# Patient Record
Sex: MALE | Race: WHITE | Employment: OTHER | URBAN - METROPOLITAN AREA
[De-identification: names, ages, dates, MRNs, and addresses within clinical notes are randomized per-mention and may not be internally consistent; named-entity substitution may affect disease eponyms.]

---

## 2017-01-02 PROBLEM — J40 BRONCHITIS: Status: ACTIVE | Noted: 2017-01-02

## 2017-08-08 ENCOUNTER — OFFICE VISIT (OUTPATIENT)
Dept: PULMONOLOGY | Age: 72
End: 2017-08-08

## 2017-08-08 VITALS
HEART RATE: 80 BPM | DIASTOLIC BLOOD PRESSURE: 74 MMHG | HEIGHT: 70 IN | OXYGEN SATURATION: 95 % | BODY MASS INDEX: 36.79 KG/M2 | SYSTOLIC BLOOD PRESSURE: 138 MMHG | TEMPERATURE: 98.6 F | RESPIRATION RATE: 12 BRPM | WEIGHT: 257 LBS

## 2017-08-08 DIAGNOSIS — R06.02 SHORTNESS OF BREATH: Primary | ICD-10-CM

## 2017-08-08 PROCEDURE — 1123F ACP DISCUSS/DSCN MKR DOCD: CPT | Performed by: NURSE PRACTITIONER

## 2017-08-08 PROCEDURE — 4040F PNEUMOC VAC/ADMIN/RCVD: CPT | Performed by: NURSE PRACTITIONER

## 2017-08-08 PROCEDURE — 1036F TOBACCO NON-USER: CPT | Performed by: NURSE PRACTITIONER

## 2017-08-08 PROCEDURE — G8417 CALC BMI ABV UP PARAM F/U: HCPCS | Performed by: NURSE PRACTITIONER

## 2017-08-08 PROCEDURE — G8598 ASA/ANTIPLAT THER USED: HCPCS | Performed by: NURSE PRACTITIONER

## 2017-08-08 PROCEDURE — 99213 OFFICE O/P EST LOW 20 MIN: CPT | Performed by: NURSE PRACTITIONER

## 2017-08-08 PROCEDURE — G8427 DOCREV CUR MEDS BY ELIG CLIN: HCPCS | Performed by: NURSE PRACTITIONER

## 2017-08-08 PROCEDURE — 3017F COLORECTAL CA SCREEN DOC REV: CPT | Performed by: NURSE PRACTITIONER

## 2017-08-08 RX ORDER — DOXYCYCLINE HYCLATE 100 MG/1
100 CAPSULE ORAL 2 TIMES DAILY
Qty: 20 CAPSULE | Refills: 0 | Status: SHIPPED | OUTPATIENT
Start: 2017-08-08 | End: 2017-08-18

## 2017-08-08 RX ORDER — PREDNISONE 20 MG/1
40 TABLET ORAL DAILY
Qty: 10 TABLET | Refills: 0 | Status: SHIPPED | OUTPATIENT
Start: 2017-08-08 | End: 2017-08-13

## 2017-08-09 RX ORDER — INHALER, ASSIST DEVICES
SPACER (EA) MISCELLANEOUS
Qty: 1 DEVICE | Refills: 0 | COMMUNITY
Start: 2017-08-09 | End: 2018-12-18 | Stop reason: ALTCHOICE

## 2017-08-16 ENCOUNTER — TELEPHONE (OUTPATIENT)
Dept: PULMONOLOGY | Age: 72
End: 2017-08-16

## 2017-08-16 DIAGNOSIS — R06.02 SOB (SHORTNESS OF BREATH): Primary | ICD-10-CM

## 2017-08-21 ENCOUNTER — TELEPHONE (OUTPATIENT)
Dept: PULMONOLOGY | Age: 72
End: 2017-08-21

## 2017-08-21 DIAGNOSIS — R60.9 SWELLING: Primary | ICD-10-CM

## 2017-08-21 RX ORDER — FUROSEMIDE 20 MG/1
TABLET ORAL
Qty: 10 TABLET | Refills: 0 | Status: SHIPPED | OUTPATIENT
Start: 2017-08-21 | End: 2017-09-08 | Stop reason: SDUPTHER

## 2017-08-23 ENCOUNTER — OFFICE VISIT (OUTPATIENT)
Dept: PULMONOLOGY | Age: 72
End: 2017-08-23

## 2017-08-23 VITALS
WEIGHT: 257 LBS | DIASTOLIC BLOOD PRESSURE: 60 MMHG | HEART RATE: 75 BPM | HEIGHT: 70 IN | TEMPERATURE: 97.7 F | SYSTOLIC BLOOD PRESSURE: 148 MMHG | RESPIRATION RATE: 20 BRPM | OXYGEN SATURATION: 94 % | BODY MASS INDEX: 36.79 KG/M2

## 2017-08-23 DIAGNOSIS — J44.1 COPD EXACERBATION (HCC): Primary | ICD-10-CM

## 2017-08-23 DIAGNOSIS — I50.22 CHRONIC SYSTOLIC HEART FAILURE (HCC): ICD-10-CM

## 2017-08-23 DIAGNOSIS — J41.0 SIMPLE CHRONIC BRONCHITIS (HCC): ICD-10-CM

## 2017-08-23 PROCEDURE — 4040F PNEUMOC VAC/ADMIN/RCVD: CPT | Performed by: NURSE PRACTITIONER

## 2017-08-23 PROCEDURE — G8598 ASA/ANTIPLAT THER USED: HCPCS | Performed by: NURSE PRACTITIONER

## 2017-08-23 PROCEDURE — 99213 OFFICE O/P EST LOW 20 MIN: CPT | Performed by: NURSE PRACTITIONER

## 2017-08-23 PROCEDURE — G8926 SPIRO NO PERF OR DOC: HCPCS | Performed by: NURSE PRACTITIONER

## 2017-08-23 PROCEDURE — 1036F TOBACCO NON-USER: CPT | Performed by: NURSE PRACTITIONER

## 2017-08-23 PROCEDURE — 3017F COLORECTAL CA SCREEN DOC REV: CPT | Performed by: NURSE PRACTITIONER

## 2017-08-23 PROCEDURE — 3023F SPIROM DOC REV: CPT | Performed by: NURSE PRACTITIONER

## 2017-08-23 PROCEDURE — 1123F ACP DISCUSS/DSCN MKR DOCD: CPT | Performed by: NURSE PRACTITIONER

## 2017-08-23 PROCEDURE — G8427 DOCREV CUR MEDS BY ELIG CLIN: HCPCS | Performed by: NURSE PRACTITIONER

## 2017-08-23 PROCEDURE — G8417 CALC BMI ABV UP PARAM F/U: HCPCS | Performed by: NURSE PRACTITIONER

## 2017-08-23 RX ORDER — PREDNISONE 10 MG/1
TABLET ORAL
Qty: 30 TABLET | Refills: 0 | Status: SHIPPED | OUTPATIENT
Start: 2017-08-23 | End: 2017-09-02

## 2017-08-23 RX ORDER — DOXYCYCLINE HYCLATE 100 MG/1
100 CAPSULE ORAL 2 TIMES DAILY
Qty: 20 CAPSULE | Refills: 0 | Status: SHIPPED | OUTPATIENT
Start: 2017-08-23 | End: 2017-09-02

## 2017-08-25 ENCOUNTER — TELEPHONE (OUTPATIENT)
Dept: PULMONOLOGY | Age: 72
End: 2017-08-25

## 2017-08-28 LAB
ANION GAP SERPL CALCULATED.3IONS-SCNC: 17 MMOL/L (ref 3–16)
BUN BLDV-MCNC: 24 MG/DL (ref 7–20)
CALCIUM SERPL-MCNC: 9.2 MG/DL (ref 8.3–10.6)
CHLORIDE BLD-SCNC: 93 MMOL/L (ref 99–110)
CO2: 26 MMOL/L (ref 21–32)
CREAT SERPL-MCNC: 1.1 MG/DL (ref 0.8–1.3)
GFR AFRICAN AMERICAN: >60
GFR NON-AFRICAN AMERICAN: >60
GLUCOSE BLD-MCNC: 321 MG/DL (ref 70–99)
POTASSIUM SERPL-SCNC: 4.7 MMOL/L (ref 3.5–5.1)
SODIUM BLD-SCNC: 136 MMOL/L (ref 136–145)

## 2017-08-30 ENCOUNTER — TELEPHONE (OUTPATIENT)
Dept: PULMONOLOGY | Age: 72
End: 2017-08-30

## 2017-09-05 ENCOUNTER — TELEPHONE (OUTPATIENT)
Dept: PULMONOLOGY | Age: 72
End: 2017-09-05

## 2017-09-05 ENCOUNTER — HOSPITAL ENCOUNTER (OUTPATIENT)
Dept: OTHER | Age: 72
Discharge: HOME | End: 2017-09-05
Attending: INTERNAL MEDICINE | Admitting: INTERNAL MEDICINE

## 2017-09-05 DIAGNOSIS — R06.02 SOB (SHORTNESS OF BREATH): Primary | ICD-10-CM

## 2017-09-05 DIAGNOSIS — R06.02 SOB (SHORTNESS OF BREATH): ICD-10-CM

## 2017-09-05 RX ORDER — PREDNISONE 10 MG/1
TABLET ORAL
Qty: 30 TABLET | Refills: 0 | Status: SHIPPED | OUTPATIENT
Start: 2017-09-05 | End: 2017-09-15

## 2017-09-05 RX ORDER — LEVOFLOXACIN 500 MG/1
500 TABLET, FILM COATED ORAL DAILY
Qty: 10 TABLET | Refills: 0 | Status: SHIPPED | OUTPATIENT
Start: 2017-09-05 | End: 2017-09-15

## 2017-09-08 ENCOUNTER — OFFICE VISIT (OUTPATIENT)
Dept: PULMONOLOGY | Age: 72
End: 2017-09-08

## 2017-09-08 VITALS
OXYGEN SATURATION: 93 % | RESPIRATION RATE: 18 BRPM | SYSTOLIC BLOOD PRESSURE: 120 MMHG | HEIGHT: 70 IN | TEMPERATURE: 97.6 F | DIASTOLIC BLOOD PRESSURE: 60 MMHG | WEIGHT: 255.2 LBS | HEART RATE: 86 BPM | BODY MASS INDEX: 36.54 KG/M2

## 2017-09-08 DIAGNOSIS — J44.1 COPD EXACERBATION (HCC): ICD-10-CM

## 2017-09-08 DIAGNOSIS — J41.1 MUCOPURULENT CHRONIC BRONCHITIS (HCC): Primary | ICD-10-CM

## 2017-09-08 PROCEDURE — 4040F PNEUMOC VAC/ADMIN/RCVD: CPT | Performed by: INTERNAL MEDICINE

## 2017-09-08 PROCEDURE — G8598 ASA/ANTIPLAT THER USED: HCPCS | Performed by: INTERNAL MEDICINE

## 2017-09-08 PROCEDURE — 99214 OFFICE O/P EST MOD 30 MIN: CPT | Performed by: INTERNAL MEDICINE

## 2017-09-08 PROCEDURE — G8428 CUR MEDS NOT DOCUMENT: HCPCS | Performed by: INTERNAL MEDICINE

## 2017-09-08 PROCEDURE — G8926 SPIRO NO PERF OR DOC: HCPCS | Performed by: INTERNAL MEDICINE

## 2017-09-08 PROCEDURE — G8417 CALC BMI ABV UP PARAM F/U: HCPCS | Performed by: INTERNAL MEDICINE

## 2017-09-08 PROCEDURE — 1036F TOBACCO NON-USER: CPT | Performed by: INTERNAL MEDICINE

## 2017-09-08 PROCEDURE — 1123F ACP DISCUSS/DSCN MKR DOCD: CPT | Performed by: INTERNAL MEDICINE

## 2017-09-08 PROCEDURE — 3023F SPIROM DOC REV: CPT | Performed by: INTERNAL MEDICINE

## 2017-09-08 PROCEDURE — 3017F COLORECTAL CA SCREEN DOC REV: CPT | Performed by: INTERNAL MEDICINE

## 2017-09-15 ENCOUNTER — OFFICE VISIT (OUTPATIENT)
Dept: PULMONOLOGY | Age: 72
End: 2017-09-15

## 2017-09-15 ENCOUNTER — TELEPHONE (OUTPATIENT)
Dept: PULMONOLOGY | Age: 72
End: 2017-09-15

## 2017-09-15 VITALS
RESPIRATION RATE: 20 BRPM | SYSTOLIC BLOOD PRESSURE: 152 MMHG | OXYGEN SATURATION: 97 % | WEIGHT: 255 LBS | BODY MASS INDEX: 36.51 KG/M2 | HEART RATE: 79 BPM | DIASTOLIC BLOOD PRESSURE: 72 MMHG | HEIGHT: 70 IN

## 2017-09-15 DIAGNOSIS — J31.0 CHRONIC RHINITIS: ICD-10-CM

## 2017-09-15 DIAGNOSIS — J41.1 MUCOPURULENT CHRONIC BRONCHITIS (HCC): Primary | ICD-10-CM

## 2017-09-15 DIAGNOSIS — Z23 NEEDS FLU SHOT: ICD-10-CM

## 2017-09-15 PROCEDURE — G0008 ADMIN INFLUENZA VIRUS VAC: HCPCS | Performed by: INTERNAL MEDICINE

## 2017-09-15 PROCEDURE — 1036F TOBACCO NON-USER: CPT | Performed by: INTERNAL MEDICINE

## 2017-09-15 PROCEDURE — G8417 CALC BMI ABV UP PARAM F/U: HCPCS | Performed by: INTERNAL MEDICINE

## 2017-09-15 PROCEDURE — G8427 DOCREV CUR MEDS BY ELIG CLIN: HCPCS | Performed by: INTERNAL MEDICINE

## 2017-09-15 PROCEDURE — 1123F ACP DISCUSS/DSCN MKR DOCD: CPT | Performed by: INTERNAL MEDICINE

## 2017-09-15 PROCEDURE — 90662 IIV NO PRSV INCREASED AG IM: CPT | Performed by: INTERNAL MEDICINE

## 2017-09-15 PROCEDURE — G8926 SPIRO NO PERF OR DOC: HCPCS | Performed by: INTERNAL MEDICINE

## 2017-09-15 PROCEDURE — 3023F SPIROM DOC REV: CPT | Performed by: INTERNAL MEDICINE

## 2017-09-15 PROCEDURE — G8598 ASA/ANTIPLAT THER USED: HCPCS | Performed by: INTERNAL MEDICINE

## 2017-09-15 PROCEDURE — 4040F PNEUMOC VAC/ADMIN/RCVD: CPT | Performed by: INTERNAL MEDICINE

## 2017-09-15 PROCEDURE — 3017F COLORECTAL CA SCREEN DOC REV: CPT | Performed by: INTERNAL MEDICINE

## 2017-09-15 PROCEDURE — 99213 OFFICE O/P EST LOW 20 MIN: CPT | Performed by: INTERNAL MEDICINE

## 2017-09-16 PROBLEM — M54.9 BACK PAIN: Status: ACTIVE | Noted: 2017-09-16

## 2017-09-22 ENCOUNTER — OFFICE VISIT (OUTPATIENT)
Dept: PULMONOLOGY | Age: 72
End: 2017-09-22

## 2017-09-22 VITALS
OXYGEN SATURATION: 94 % | DIASTOLIC BLOOD PRESSURE: 62 MMHG | SYSTOLIC BLOOD PRESSURE: 118 MMHG | HEIGHT: 71 IN | TEMPERATURE: 98 F | HEART RATE: 77 BPM | RESPIRATION RATE: 20 BRPM

## 2017-09-22 DIAGNOSIS — J31.0 CHRONIC RHINITIS: ICD-10-CM

## 2017-09-22 DIAGNOSIS — J41.1 MUCOPURULENT CHRONIC BRONCHITIS (HCC): Primary | ICD-10-CM

## 2017-09-22 PROCEDURE — G8427 DOCREV CUR MEDS BY ELIG CLIN: HCPCS | Performed by: NURSE PRACTITIONER

## 2017-09-22 PROCEDURE — G8926 SPIRO NO PERF OR DOC: HCPCS | Performed by: NURSE PRACTITIONER

## 2017-09-22 PROCEDURE — G8598 ASA/ANTIPLAT THER USED: HCPCS | Performed by: NURSE PRACTITIONER

## 2017-09-22 PROCEDURE — G8417 CALC BMI ABV UP PARAM F/U: HCPCS | Performed by: NURSE PRACTITIONER

## 2017-09-22 PROCEDURE — 1111F DSCHRG MED/CURRENT MED MERGE: CPT | Performed by: NURSE PRACTITIONER

## 2017-09-22 PROCEDURE — 1036F TOBACCO NON-USER: CPT | Performed by: NURSE PRACTITIONER

## 2017-09-22 PROCEDURE — 1123F ACP DISCUSS/DSCN MKR DOCD: CPT | Performed by: NURSE PRACTITIONER

## 2017-09-22 PROCEDURE — 4040F PNEUMOC VAC/ADMIN/RCVD: CPT | Performed by: NURSE PRACTITIONER

## 2017-09-22 PROCEDURE — 3017F COLORECTAL CA SCREEN DOC REV: CPT | Performed by: NURSE PRACTITIONER

## 2017-09-22 PROCEDURE — 3023F SPIROM DOC REV: CPT | Performed by: NURSE PRACTITIONER

## 2017-09-22 PROCEDURE — 99213 OFFICE O/P EST LOW 20 MIN: CPT | Performed by: NURSE PRACTITIONER

## 2017-10-17 ENCOUNTER — HOSPITAL ENCOUNTER (OUTPATIENT)
Dept: OTHER | Age: 72
Discharge: HOME | End: 2017-10-17
Attending: INTERNAL MEDICINE | Admitting: INTERNAL MEDICINE

## 2017-10-17 ENCOUNTER — OFFICE VISIT (OUTPATIENT)
Dept: PULMONOLOGY | Age: 72
End: 2017-10-17

## 2017-10-17 VITALS
RESPIRATION RATE: 16 BRPM | HEIGHT: 71 IN | TEMPERATURE: 97.7 F | WEIGHT: 252 LBS | HEART RATE: 80 BPM | BODY MASS INDEX: 35.28 KG/M2 | OXYGEN SATURATION: 95 % | SYSTOLIC BLOOD PRESSURE: 150 MMHG | DIASTOLIC BLOOD PRESSURE: 70 MMHG

## 2017-10-17 DIAGNOSIS — J31.0 CHRONIC RHINITIS, UNSPECIFIED TYPE: ICD-10-CM

## 2017-10-17 DIAGNOSIS — J11.1 INFLUENZA: Primary | ICD-10-CM

## 2017-10-17 DIAGNOSIS — J41.1 MUCOPURULENT CHRONIC BRONCHITIS (HCC): ICD-10-CM

## 2017-10-17 DIAGNOSIS — J11.1 INFLUENZA: ICD-10-CM

## 2017-10-17 PROCEDURE — 1123F ACP DISCUSS/DSCN MKR DOCD: CPT | Performed by: INTERNAL MEDICINE

## 2017-10-17 PROCEDURE — 1111F DSCHRG MED/CURRENT MED MERGE: CPT | Performed by: INTERNAL MEDICINE

## 2017-10-17 PROCEDURE — G8484 FLU IMMUNIZE NO ADMIN: HCPCS | Performed by: INTERNAL MEDICINE

## 2017-10-17 PROCEDURE — G8417 CALC BMI ABV UP PARAM F/U: HCPCS | Performed by: INTERNAL MEDICINE

## 2017-10-17 PROCEDURE — G8427 DOCREV CUR MEDS BY ELIG CLIN: HCPCS | Performed by: INTERNAL MEDICINE

## 2017-10-17 PROCEDURE — 99214 OFFICE O/P EST MOD 30 MIN: CPT | Performed by: INTERNAL MEDICINE

## 2017-10-17 PROCEDURE — 1036F TOBACCO NON-USER: CPT | Performed by: INTERNAL MEDICINE

## 2017-10-17 PROCEDURE — G8598 ASA/ANTIPLAT THER USED: HCPCS | Performed by: INTERNAL MEDICINE

## 2017-10-17 PROCEDURE — 4040F PNEUMOC VAC/ADMIN/RCVD: CPT | Performed by: INTERNAL MEDICINE

## 2017-10-17 PROCEDURE — 3023F SPIROM DOC REV: CPT | Performed by: INTERNAL MEDICINE

## 2017-10-17 PROCEDURE — G8926 SPIRO NO PERF OR DOC: HCPCS | Performed by: INTERNAL MEDICINE

## 2017-10-17 PROCEDURE — 3017F COLORECTAL CA SCREEN DOC REV: CPT | Performed by: INTERNAL MEDICINE

## 2017-10-17 RX ORDER — OSELTAMIVIR PHOSPHATE 75 MG/1
75 CAPSULE ORAL 2 TIMES DAILY
Qty: 20 CAPSULE | Refills: 0 | Status: SHIPPED | OUTPATIENT
Start: 2017-10-17 | End: 2017-10-27

## 2017-10-17 NOTE — PROGRESS NOTES
REASON FOR CONSULTATION/CC: simple chronic bronchitis    PCP: Walter Bosworth, MD    HISTORY OF PRESENT ILLNESS: Meena Farnsworth is a 67y.o. year old male with a history of 'COPD' who presents :           He was doing well on Daliresp with samples and then went to fill prescription for Daliresp but it was 125 dollar so did not fill. complains of being hot , sweats, decreased energy, no cough, no nausea, vomiting, diarrhea. This started 2 days ago. Did not take any otc for this. Chronic rhinitis. Controlled with Flonase. Cough. Still resolved after taking Daliresp. complains of phlegm in chest.  Continues on Spiriva and Symbicort with albuterol prn. PAST MEDICAL HISTORY:  Past Medical History:   Diagnosis Date    CAD (coronary artery disease)     CABG in 2009    Chronic back pain     Chronic systolic CHF (congestive heart failure) (HCC)     COPD (chronic obstructive pulmonary disease) (HCC)     Diabetes mellitus ([address]     Essential hypertension     Paroxysmal atrial fibrillation ([address]     On Coumadin       PAST SURGICAL HISTORY:  Past Surgical History:   Procedure Laterality Date    BACK SURGERY  1995    BACK SURGERY      CARDIAC SURGERY  2007    CORONARY ARTERY BYPASS GRAFT      FRACTURE SURGERY Left 1988    Shoulder       FAMILY HISTORY:  family history includes Brain Cancer in his mother; Heart Attack in his brother and father. SOCIAL HISTORY:   reports that he quit smoking about 21 years ago. He has a 18.00 pack-year smoking history. He has never used smokeless tobacco.      ALLERGIES:  Patient is allergic to no known allergies. REVIEW OF SYSTEMS:  Constitutional: + for fever    HENT: Negative for sore throat  Eyes: Negative for redness   Respiratory: less  for dyspnea  Resolved.   cough  Cardiovascular: Negative for chest pain  Gastrointestinal: Negative for vomiting, diarrhea      Musculoskeletal: + for arthralgias   Skin: Negative for rash  Neurological: Negative for syncope  Hematological: Negative for adenopathy  Psychiatric/Behavorial: Negative for anxiety  , decreased energy    Objective:   PHYSICAL EXAM:  Blood pressure (!) 148/78, pulse 80, temperature 97.7 °F (36.5 °C), temperature source Oral, resp. rate 16, height 5' 11\" (1.803 m), weight 252 lb (114.3 kg), SpO2 95 %.'  Gen: No distress. obese  Eyes: PERRL. No sclera icterus. No conjunctival injection. Resp: No accessory muscle use. No crackles. No wheezes. No rhonchi. CV: Regular rate. Regular rhythm. No murmur or rub. No edema. .   M/S: No cyanosis. No clubbing. No joint deformity. Neuro: Moves all four extremities. Psych: Oriented x 3. No anxiety. Awake. Alert. Intact judgement and insight. Current Outpatient Prescriptions   Medication Sig Dispense Refill    oseltamivir (TAMIFLU) 75 MG capsule Take 1 capsule by mouth 2 times daily for 10 days 20 capsule 0    miconazole (MICOTIN) 2 % powder Apply topically 2 times daily. 45 g 1    simvastatin (ZOCOR) 80 MG tablet Take 40 mg by mouth nightly      tiotropium (SPIRIVA) 18 MCG inhalation capsule Inhale 18 mcg into the lungs daily      lisinopril (PRINIVIL;ZESTRIL) 20 MG tablet Take 10 mg by mouth daily      Insulin NPH Isophane & Regular (NOVOLIN 70/30 PENFILL SC) Inject 40 Units into the skin every morning       Insulin NPH Isophane & Regular (NOVOLIN 70/30 PENFILL SC) Inject 30 Units into the skin nightly       lidocaine (LIDODERM) Place 1 patch onto the skin nightly      Misc. Devices (ACAPELLA) MISC 1 Device by Does not apply route 2 times daily 1 device. Use up to 4 times per day. 1 each 0    Roflumilast (DALIRESP) 500 MCG tablet Take 500 mcg by mouth daily 7 tablet 3    Respiratory Therapy Supplies (VORTEX HOLDING CHAMBER/MASK) JOVANA Use with inhalers 1 Device 0    furosemide (LASIX) 20 MG tablet Take 2 tablets by mouth once daily for 5 days. Then resume one tablet by mouth as needed for swelling.  30 tablet 1    polyethylene glycol (GLYCOLAX) packet Take 17 g by mouth daily as needed for Constipation      flunisolide (NASALIDE) 25 MCG/ACT (0.025%) SOLN Inhale 2 sprays into the lungs every 12 hours      lidocaine (XYLOCAINE) 5 % ointment Apply topically as needed for Pain Apply topically as needed.  vitamin D (CHOLECALCIFEROL) 1000 UNITS TABS tablet Take 1,000 Units by mouth daily      albuterol (PROVENTIL) (2.5 MG/3ML) 0.083% nebulizer solution Take 3 mLs by nebulization every 6 hours as needed for Wheezing 120 each 3    gabapentin (NEURONTIN) 800 MG tablet Take 800 mg by mouth 4 times daily      oxyCODONE HCl ER (OXYCONTIN) 40 MG CR tablet Take 40 mg by mouth 3 times daily  .  aspirin 81 MG tablet Take 81 mg by mouth daily      metFORMIN (GLUCOPHAGE) 1000 MG tablet Take 1,000 mg by mouth 2 times daily (with meals)      budesonide-formoterol (SYMBICORT) 80-4.5 MCG/ACT AERO Inhale 2 puffs into the lungs 2 times daily      warfarin (COUMADIN) 5 MG tablet Take 7.5 mg by mouth daily Except 10 mg Sunday, Tuesday, and Friday.  carvedilol (COREG) 6.25 MG tablet Take 6.25 mg by mouth 2 times daily (with meals)      albuterol sulfate HFA (PROVENTIL HFA) 108 (90 BASE) MCG/ACT inhaler Inhale 2 puffs into the lungs every 4 hours as needed for Wheezing or Shortness of Breath (cough; use with spacing device) 1 Inhaler 1     No current facility-administered medications for this visit.         Data Reviewed:   CBC and Renal reviewed  Last CBC  Lab Results   Component Value Date    WBC 8.2 09/16/2017    RBC 4.92 09/16/2017    HGB 15.1 09/16/2017    MCV 91.3 09/16/2017     09/16/2017     Last Renal  Lab Results   Component Value Date     09/19/2017    K 4.3 09/19/2017    CL 94 09/19/2017    CO2 26 09/19/2017    CO2 26 09/18/2017    CO2 28 09/16/2017    BUN 21 09/19/2017    CREATININE 1.1 09/19/2017    GLUCOSE 315 09/19/2017    CALCIUM 8.8 09/19/2017       Last ABG  POC Blood Gas: No results found for:

## 2017-10-26 ENCOUNTER — OFFICE VISIT (OUTPATIENT)
Dept: PULMONOLOGY | Age: 72
End: 2017-10-26

## 2017-10-26 VITALS
DIASTOLIC BLOOD PRESSURE: 72 MMHG | SYSTOLIC BLOOD PRESSURE: 124 MMHG | BODY MASS INDEX: 35.14 KG/M2 | HEART RATE: 80 BPM | TEMPERATURE: 97.8 F | HEIGHT: 71 IN | OXYGEN SATURATION: 94 % | RESPIRATION RATE: 16 BRPM | WEIGHT: 251 LBS

## 2017-10-26 DIAGNOSIS — J31.0 CHRONIC RHINITIS, UNSPECIFIED TYPE: Primary | ICD-10-CM

## 2017-10-26 DIAGNOSIS — J11.1 INFLUENZA: ICD-10-CM

## 2017-10-26 DIAGNOSIS — J41.1 MUCOPURULENT CHRONIC BRONCHITIS (HCC): ICD-10-CM

## 2017-10-26 PROCEDURE — 99213 OFFICE O/P EST LOW 20 MIN: CPT | Performed by: INTERNAL MEDICINE

## 2017-10-26 PROCEDURE — G8484 FLU IMMUNIZE NO ADMIN: HCPCS | Performed by: INTERNAL MEDICINE

## 2017-10-26 PROCEDURE — 1036F TOBACCO NON-USER: CPT | Performed by: INTERNAL MEDICINE

## 2017-10-26 PROCEDURE — 3017F COLORECTAL CA SCREEN DOC REV: CPT | Performed by: INTERNAL MEDICINE

## 2017-10-26 PROCEDURE — G8427 DOCREV CUR MEDS BY ELIG CLIN: HCPCS | Performed by: INTERNAL MEDICINE

## 2017-10-26 PROCEDURE — G8926 SPIRO NO PERF OR DOC: HCPCS | Performed by: INTERNAL MEDICINE

## 2017-10-26 PROCEDURE — 4040F PNEUMOC VAC/ADMIN/RCVD: CPT | Performed by: INTERNAL MEDICINE

## 2017-10-26 PROCEDURE — G8598 ASA/ANTIPLAT THER USED: HCPCS | Performed by: INTERNAL MEDICINE

## 2017-10-26 PROCEDURE — 1123F ACP DISCUSS/DSCN MKR DOCD: CPT | Performed by: INTERNAL MEDICINE

## 2017-10-26 PROCEDURE — 3023F SPIROM DOC REV: CPT | Performed by: INTERNAL MEDICINE

## 2017-10-26 PROCEDURE — G8417 CALC BMI ABV UP PARAM F/U: HCPCS | Performed by: INTERNAL MEDICINE

## 2017-10-26 NOTE — PROGRESS NOTES
REASON FOR CONSULTATION/CC: simple chronic bronchitis    PCP: Marii Cardona MD    HISTORY OF PRESENT ILLNESS: Darling Ramon is a 67y.o. year old male with a history of 'COPD' who presents :      Viral infection. Started on Tamiflu and stated to improve after 2 days. Stopped taking after getting better. Now back to baseline. Cough resolved. Not taking Daliresp. Cough. Resolved. See above. Continues to use Symbicort and Spiriva with albuterol prn. Has nebulizer. Chronic rhinitis. Flonase. PAST MEDICAL HISTORY:  Past Medical History:   Diagnosis Date    CAD (coronary artery disease)     CABG in 2009    Chronic back pain     Chronic systolic CHF (congestive heart failure) (HCC)     [facility]     [facility]     Essential hypertension     Paroxysmal atrial fibrillation ([facility]     On Coumadin       PAST SURGICAL HISTORY:  Past Surgical History:   Procedure Laterality Date    BACK SURGERY  1995    BACK SURGERY      CARDIAC SURGERY  2007    CORONARY ARTERY BYPASS GRAFT      FRACTURE SURGERY Left 1988    Shoulder       FAMILY HISTORY:  family history includes Brain Cancer in his mother; Heart Attack in his brother and father. SOCIAL HISTORY:   reports that he quit smoking about 21 years ago. He has a 18.00 pack-year smoking history. He has never used smokeless tobacco.      ALLERGIES:  Patient is allergic to no known allergies. REVIEW OF SYSTEMS:  Constitutional: - for fever    HENT: Negative for sore throat  Eyes: Negative for redness   Respiratory: resolved for dyspnea  Resolved.   cough  Cardiovascular: Negative for chest pain  Gastrointestinal: Negative for vomiting, diarrhea      Musculoskeletal: - for arthralgias   Skin: Negative for rash  Neurological: Negative for syncope  Hematological: Negative for adenopathy  Psychiatric/Behavorial: Negative for anxiety  , resolved energy    Objective:   PHYSICAL EXAM:  Blood was reviewed by me and showed   9.5. 17.  chest X-ray. No acute disease. Assessment:     ·  chronic bronchitis, simple  ·  Chronic rhintis    Plan:     1. Chronic rhinitis, unspecified type  Flonase    2. Mucopurulent chronic bronchitis (HCC)  Symbicort and Spiriva with albuterol prn. Responded to Daliresp in the past but stopped secondary to cost.    3. Influenza  symptoms resolved with Tamiflu.   He did not complete prescription for Tamiflu

## 2017-11-01 ENCOUNTER — HOSPITAL ENCOUNTER (OUTPATIENT)
Dept: OTHER | Age: 72
Discharge: HOME | End: 2017-11-30
Attending: INTERNAL MEDICINE | Admitting: INTERNAL MEDICINE

## 2017-12-01 ENCOUNTER — HOSPITAL ENCOUNTER (OUTPATIENT)
Dept: OTHER | Age: 72
Discharge: HOME | End: 2017-12-31
Attending: INTERNAL MEDICINE | Admitting: INTERNAL MEDICINE

## 2017-12-13 ENCOUNTER — OFFICE VISIT (OUTPATIENT)
Dept: PULMONOLOGY | Age: 72
End: 2017-12-13

## 2017-12-13 VITALS
OXYGEN SATURATION: 95 % | WEIGHT: 256 LBS | DIASTOLIC BLOOD PRESSURE: 62 MMHG | RESPIRATION RATE: 16 BRPM | BODY MASS INDEX: 35.84 KG/M2 | HEIGHT: 71 IN | HEART RATE: 72 BPM | SYSTOLIC BLOOD PRESSURE: 116 MMHG | TEMPERATURE: 97.7 F

## 2017-12-13 DIAGNOSIS — J41.1 MUCOPURULENT CHRONIC BRONCHITIS (HCC): Primary | ICD-10-CM

## 2017-12-13 DIAGNOSIS — J31.0 CHRONIC RHINITIS, UNSPECIFIED TYPE: ICD-10-CM

## 2017-12-13 PROCEDURE — 1036F TOBACCO NON-USER: CPT | Performed by: INTERNAL MEDICINE

## 2017-12-13 PROCEDURE — 3017F COLORECTAL CA SCREEN DOC REV: CPT | Performed by: INTERNAL MEDICINE

## 2017-12-13 PROCEDURE — G8926 SPIRO NO PERF OR DOC: HCPCS | Performed by: INTERNAL MEDICINE

## 2017-12-13 PROCEDURE — G8484 FLU IMMUNIZE NO ADMIN: HCPCS | Performed by: INTERNAL MEDICINE

## 2017-12-13 PROCEDURE — 4040F PNEUMOC VAC/ADMIN/RCVD: CPT | Performed by: INTERNAL MEDICINE

## 2017-12-13 PROCEDURE — G8417 CALC BMI ABV UP PARAM F/U: HCPCS | Performed by: INTERNAL MEDICINE

## 2017-12-13 PROCEDURE — G8598 ASA/ANTIPLAT THER USED: HCPCS | Performed by: INTERNAL MEDICINE

## 2017-12-13 PROCEDURE — 99213 OFFICE O/P EST LOW 20 MIN: CPT | Performed by: INTERNAL MEDICINE

## 2017-12-13 PROCEDURE — G8427 DOCREV CUR MEDS BY ELIG CLIN: HCPCS | Performed by: INTERNAL MEDICINE

## 2017-12-13 PROCEDURE — 3023F SPIROM DOC REV: CPT | Performed by: INTERNAL MEDICINE

## 2017-12-13 PROCEDURE — 1123F ACP DISCUSS/DSCN MKR DOCD: CPT | Performed by: INTERNAL MEDICINE

## 2017-12-13 ASSESSMENT — ENCOUNTER SYMPTOMS
SINUS PAIN: 0
SPUTUM PRODUCTION: 0
WHEEZING: 0
STRIDOR: 0
RESPIRATORY NEGATIVE: 1
COUGH: 0
HEMOPTYSIS: 0

## 2017-12-13 NOTE — PROGRESS NOTES
REASON FOR CONSULTATION/CC: simple chronic bronchitis    PCP: Tirso Bansal    HISTORY OF PRESENT ILLNESS: Dennys Yip is a 67y.o. year old male with a history of 'COPD' who presents :         shortness of breath   Called fo rapt secondary to symptoms of small bowel obstruction but this has resolved. Symbicort and Spiriva with albuterol prn. Has nebulizer but rare usage. No on daliresp      chronic rhinitis  Flonase          PAST MEDICAL HISTORY:  Past Medical History:   Diagnosis Date    CAD (coronary artery disease)     CABG in 2009    Chronic back pain     Chronic systolic CHF (congestive heart failure) (HCC)     COPD (chronic obstructive pulmonary disease) (HCC)     [facility]     Essential hypertension     Paroxysmal atrial fibrillation ([facility]     On Coumadin       PAST SURGICAL HISTORY:  Past Surgical History:   Procedure Laterality Date    BACK SURGERY  1995    BACK SURGERY      CARDIAC SURGERY  2007    CORONARY ARTERY BYPASS GRAFT      FRACTURE SURGERY Left 1988    Shoulder       FAMILY HISTORY:  family history includes Brain Cancer in his mother; Heart Attack in his brother and father. SOCIAL HISTORY:   reports that he quit smoking about 21 years ago. He has a 18.00 pack-year smoking history. He has never used smokeless tobacco.      ALLERGIES:  Patient is allergic to no known allergies. REVIEW OF SYSTEMS:  Review of Systems   Constitutional: Negative. Negative for chills and fever. HENT: Negative. Negative for congestion and sinus pain. Respiratory: Negative. Negative for cough, hemoptysis, sputum production, wheezing and stridor. Cardiovascular: Negative. Skin: Negative. Psychiatric/Behavioral: Negative. Objective:   PHYSICAL EXAM:  Blood pressure 116/62, pulse 72, temperature 97.7 °F (36.5 °C), temperature source Oral, resp.  rate 16, height 5' 11\" (1.803 m), weight 256 lb (116.1 kg), SpO2 95 %.'  Physical Exam   Constitutional: He is oriented to person, place, and time. He appears well-developed and well-nourished. No distress. HENT:   Head: Normocephalic and atraumatic. Mouth/Throat: No oropharyngeal exudate. Eyes: Right eye exhibits no discharge. Left eye exhibits no discharge. No scleral icterus. Neck: Normal range of motion. Neck supple. Cardiovascular: Normal rate. Exam reveals no gallop and no friction rub. No murmur heard. Pulmonary/Chest: Effort normal and breath sounds normal. No stridor. No respiratory distress. He has no wheezes. He has no rales. Abdominal:   obese   Musculoskeletal: Normal range of motion. Neurological: He is alert and oriented to person, place, and time. Skin: Skin is warm and dry. He is not diaphoretic. Psychiatric: He has a normal mood and affect. Judgment normal.       Current Outpatient Prescriptions   Medication Sig Dispense Refill    miconazole (MICOTIN) 2 % powder Apply topically 2 times daily. 45 g 1    simvastatin (ZOCOR) 80 MG tablet Take 40 mg by mouth nightly      tiotropium (SPIRIVA) 18 MCG inhalation capsule Inhale 18 mcg into the lungs daily      lisinopril (PRINIVIL;ZESTRIL) 20 MG tablet Take 10 mg by mouth daily      Insulin NPH Isophane & Regular (NOVOLIN 70/30 PENFILL SC) Inject 40 Units into the skin every morning       Insulin NPH Isophane & Regular (NOVOLIN 70/30 PENFILL SC) Inject 30 Units into the skin nightly       Misc. Devices (ACAPELLA) MISC 1 Device by Does not apply route 2 times daily 1 device. Use up to 4 times per day. 1 each 0    Respiratory Therapy Supplies (VORTEX HOLDING CHAMBER/MASK) JOVANA Use with inhalers 1 Device 0    furosemide (LASIX) 20 MG tablet Take 2 tablets by mouth once daily for 5 days. Then resume one tablet by mouth as needed for swelling.  30 tablet 1    polyethylene glycol (GLYCOLAX) packet Take 17 g by mouth daily as needed for Constipation      flunisolide (NASALIDE) 25 MCG/ACT (0.025%) SOLN Inhale 2 sprays into the lungs every 12 hours      lidocaine (XYLOCAINE) 5 % ointment Apply topically as needed for Pain Apply topically as needed.  vitamin D (CHOLECALCIFEROL) 1000 UNITS TABS tablet Take 1,000 Units by mouth daily      albuterol (PROVENTIL) (2.5 MG/3ML) 0.083% nebulizer solution Take 3 mLs by nebulization every 6 hours as needed for Wheezing 120 each 3    gabapentin (NEURONTIN) 800 MG tablet Take 800 mg by mouth 4 times daily      oxyCODONE HCl ER (OXYCONTIN) 40 MG CR tablet Take 40 mg by mouth 3 times daily  .  aspirin 81 MG tablet Take 81 mg by mouth daily      metFORMIN (GLUCOPHAGE) 1000 MG tablet Take 1,000 mg by mouth 2 times daily (with meals)      budesonide-formoterol (SYMBICORT) 80-4.5 MCG/ACT AERO Inhale 2 puffs into the lungs 2 times daily      warfarin (COUMADIN) 5 MG tablet Take 7.5 mg by mouth daily Except 10 mg Sunday, Tuesday, and Friday.  carvedilol (COREG) 6.25 MG tablet Take 6.25 mg by mouth 2 times daily (with meals)      albuterol sulfate HFA (PROVENTIL HFA) 108 (90 BASE) MCG/ACT inhaler Inhale 2 puffs into the lungs every 4 hours as needed for Wheezing or Shortness of Breath (cough; use with spacing device) 1 Inhaler 1    lidocaine (LIDODERM) Place 1 patch onto the skin nightly       No current facility-administered medications for this visit.         Data Reviewed:   CBC and Renal reviewed  Last CBC  Lab Results   Component Value Date    WBC 8.2 09/16/2017    RBC 4.92 09/16/2017    HGB 15.1 09/16/2017    MCV 91.3 09/16/2017     09/16/2017     Last Renal  Lab Results   Component Value Date     09/19/2017    K 4.3 09/19/2017    CL 94 09/19/2017    CO2 26 09/19/2017    CO2 26 09/18/2017    CO2 28 09/16/2017    BUN 21 09/19/2017    CREATININE 1.1 09/19/2017    GLUCOSE 315 09/19/2017    CALCIUM 8.8 09/19/2017       Last ABG  POC Blood Gas: No results found for: POCPH, POCPCO2, POCPO2, POCHCO3, NBEA, [id number]  No results for input(s): PH, PCO2, PO2, HCO3, BE, O2SAT in the last 72 hours.     Chest imaging reports were reviewed and imaging was reviewed by me and showed   9.5. 17.  chest X-ray. No acute disease. Assessment:     ·  chronic bronchitis, simple  ·  Chronic rhintis    Plan:       1. Mucopurulent chronic bronchitis (HCC)  symptoms controlled with Spiriva and Symbicort. albuterol prn    2. Chronic rhinitis, unspecified type  symptoms controlled with Flonase.

## 2018-01-01 ENCOUNTER — HOSPITAL ENCOUNTER (OUTPATIENT)
Dept: OTHER | Age: 73
Discharge: HOME | End: 2018-01-31
Attending: INTERNAL MEDICINE | Admitting: INTERNAL MEDICINE

## 2018-02-01 ENCOUNTER — HOSPITAL ENCOUNTER (OUTPATIENT)
Dept: OTHER | Age: 73
Discharge: HOME | End: 2018-02-28
Attending: INTERNAL MEDICINE | Admitting: INTERNAL MEDICINE

## 2018-03-01 ENCOUNTER — HOSPITAL ENCOUNTER (OUTPATIENT)
Dept: OTHER | Age: 73
Discharge: HOME | End: 2018-03-31
Attending: INTERNAL MEDICINE | Admitting: INTERNAL MEDICINE

## 2018-03-07 ENCOUNTER — OFFICE VISIT (OUTPATIENT)
Dept: PULMONOLOGY | Age: 73
End: 2018-03-07

## 2018-03-07 VITALS
RESPIRATION RATE: 16 BRPM | SYSTOLIC BLOOD PRESSURE: 126 MMHG | HEART RATE: 78 BPM | OXYGEN SATURATION: 94 % | TEMPERATURE: 98.3 F | WEIGHT: 257 LBS | DIASTOLIC BLOOD PRESSURE: 70 MMHG | HEIGHT: 71 IN | BODY MASS INDEX: 35.98 KG/M2

## 2018-03-07 DIAGNOSIS — J41.1 MUCOPURULENT CHRONIC BRONCHITIS (HCC): Primary | ICD-10-CM

## 2018-03-07 DIAGNOSIS — J31.0 CHRONIC RHINITIS, UNSPECIFIED TYPE: ICD-10-CM

## 2018-03-07 PROCEDURE — 3017F COLORECTAL CA SCREEN DOC REV: CPT | Performed by: INTERNAL MEDICINE

## 2018-03-07 PROCEDURE — 99213 OFFICE O/P EST LOW 20 MIN: CPT | Performed by: INTERNAL MEDICINE

## 2018-03-07 PROCEDURE — 1123F ACP DISCUSS/DSCN MKR DOCD: CPT | Performed by: INTERNAL MEDICINE

## 2018-03-07 PROCEDURE — G8484 FLU IMMUNIZE NO ADMIN: HCPCS | Performed by: INTERNAL MEDICINE

## 2018-03-07 PROCEDURE — G8598 ASA/ANTIPLAT THER USED: HCPCS | Performed by: INTERNAL MEDICINE

## 2018-03-07 PROCEDURE — G8427 DOCREV CUR MEDS BY ELIG CLIN: HCPCS | Performed by: INTERNAL MEDICINE

## 2018-03-07 PROCEDURE — 1036F TOBACCO NON-USER: CPT | Performed by: INTERNAL MEDICINE

## 2018-03-07 PROCEDURE — G8926 SPIRO NO PERF OR DOC: HCPCS | Performed by: INTERNAL MEDICINE

## 2018-03-07 PROCEDURE — 4040F PNEUMOC VAC/ADMIN/RCVD: CPT | Performed by: INTERNAL MEDICINE

## 2018-03-07 PROCEDURE — G8417 CALC BMI ABV UP PARAM F/U: HCPCS | Performed by: INTERNAL MEDICINE

## 2018-03-07 PROCEDURE — 3023F SPIROM DOC REV: CPT | Performed by: INTERNAL MEDICINE

## 2018-03-07 ASSESSMENT — ENCOUNTER SYMPTOMS
SPUTUM PRODUCTION: 1
STRIDOR: 0
WHEEZING: 0
COUGH: 1
HEMOPTYSIS: 0
SINUS PAIN: 0

## 2018-03-07 NOTE — PROGRESS NOTES
REASON FOR CONSULTATION/CC: simple chronic bronchitis    PCP: Jael Chavez    HISTORY OF PRESENT ILLNESS: Rachid Brooke is a 67y.o. year old male with a history of 'COPD' who presents :       Cough  He is doing significant better at this point. He was seen by Jael Chavez recently. He is now feeling better. Now back ot baseline coughing phlegm at night. ,Spiriva Symbicort and albuterol as needed with nebulizer. Chronic rhinitis    Flonase . Doing well. Outside records requested and reviewed:   He was seen and prescription for Doxy and Medrol dose back. PAST MEDICAL HISTORY:  Past Medical History:   Diagnosis Date    CAD (coronary artery disease)     CABG in 2009    Chronic back pain     Chronic systolic CHF (congestive heart failure) (HCC)     COPD (chronic obstructive pulmonary disease) (HCC)     Diabetes mellitus ([address]     Essential hypertension     Paroxysmal atrial fibrillation ([address]     On Coumadin       PAST SURGICAL HISTORY:  Past Surgical History:   Procedure Laterality Date    BACK SURGERY  1995    BACK SURGERY      CARDIAC SURGERY  2007    CORONARY ARTERY BYPASS GRAFT      FRACTURE SURGERY Left 1988    Shoulder       FAMILY HISTORY:  family history includes Brain Cancer in his mother; Heart Attack in his brother and father. SOCIAL HISTORY:   reports that he quit smoking about 22 years ago. He has a 18.00 pack-year smoking history. He has never used smokeless tobacco.      ALLERGIES:  Patient is allergic to no known allergies. REVIEW OF SYSTEMS:  Review of Systems   Constitutional: Negative. Negative for chills and fever. HENT: Negative. Negative for congestion and sinus pain. Respiratory: Positive for cough and sputum production. Negative for hemoptysis, wheezing and stridor. Cardiovascular: Negative. Skin: Negative. Neurological: Negative for dizziness, tremors and headaches. Psychiatric/Behavioral: Negative.   Negative for depression and memory loss. Objective:   PHYSICAL EXAM:  Blood pressure 126/70, pulse 78, temperature 98.3 °F (36.8 °C), temperature source Oral, resp. rate 16, height 5' 11\" (1.803 m), weight 257 lb (116.6 kg), SpO2 94 %.'  Physical Exam   Constitutional: He is oriented to person, place, and time. He appears well-developed and well-nourished. No distress. HENT:   Head: Normocephalic and atraumatic. Mouth/Throat: No oropharyngeal exudate. Eyes: Right eye exhibits no discharge. Left eye exhibits no discharge. No scleral icterus. Neck: Normal range of motion. Neck supple. Cardiovascular: Normal rate. Exam reveals no gallop and no friction rub. No murmur heard. Pulmonary/Chest: Effort normal and breath sounds normal. No stridor. No respiratory distress. He has no wheezes. He has no rales. Abdominal:   obese   Musculoskeletal: Normal range of motion. Neurological: He is alert and oriented to person, place, and time. Skin: Skin is warm and dry. He is not diaphoretic. Psychiatric: He has a normal mood and affect. Judgment normal.       Current Outpatient Prescriptions   Medication Sig Dispense Refill    miconazole (MICOTIN) 2 % powder Apply topically 2 times daily. 45 g 1    simvastatin (ZOCOR) 80 MG tablet Take 40 mg by mouth nightly      tiotropium (SPIRIVA) 18 MCG inhalation capsule Inhale 18 mcg into the lungs daily      lisinopril (PRINIVIL;ZESTRIL) 20 MG tablet Take 10 mg by mouth daily      Insulin NPH Isophane & Regular (NOVOLIN 70/30 PENFILL SC) Inject 40 Units into the skin every morning       Insulin NPH Isophane & Regular (NOVOLIN 70/30 PENFILL SC) Inject 30 Units into the skin nightly       lidocaine (LIDODERM) Place 1 patch onto the skin nightly      Misc. Devices (ACAPELLA) MISC 1 Device by Does not apply route 2 times daily 1 device. Use up to 4 times per day.  1 each 0    Respiratory Therapy Supplies (VORTEX HOLDING CHAMBER/MASK) JOVANA Use with inhalers 1 Device 0    furosemide 09/19/2017    CREATININE 1.1 09/19/2017    GLUCOSE 315 09/19/2017    CALCIUM 8.8 09/19/2017       Last ABG  POC Blood Gas: No results found for: POCPH, POCPCO2, POCPO2, POCHCO3, NBEA, [id number]  No results for input(s): PH, PCO2, PO2, HCO3, BE, O2SAT in the last 72 hours. Chest imaging reports were reviewed and imaging was reviewed by me and showed   9.5. 17.  chest X-ray. No acute disease. Assessment:     ·  chronic bronchitis, simple  ·  Chronic rhintis    Plan:        1. Mucopurulent chronic bronchitis ([address]  He is currently doing very well with Spiriva and Symbicort. No change in therapy. He responded well to recent Doxy / Medrol from [facility]     2. Chronic rhinitis, unspecified type  Controlled with Flonase.

## 2018-03-07 NOTE — LETTER
[address] Cristy Montero  Phone: [phone]  Fax: [phone]     3/7/2018    Dr. Lynn Rios    I have seen your patient, Bennett Farrar on follow up. Here is the assessment and plan for your patient. Any question, please do not hesitate to call. Assessment:     ·  chronic bronchitis, simple  ·  Chronic rhintis    Plan:        1. Mucopurulent chronic bronchitis ([address]  He is currently doing very well with Spiriva and Symbicort. No change in therapy. He responded well to recent Doxy / Medrol from [facility]     2. Chronic rhinitis, unspecified type  Controlled with Flonase.                 Sincerely,    Ana Rosa Carodna MD

## 2018-04-01 ENCOUNTER — HOSPITAL ENCOUNTER (OUTPATIENT)
Dept: OTHER | Age: 73
Discharge: HOME | End: 2018-04-30
Attending: INTERNAL MEDICINE | Admitting: INTERNAL MEDICINE

## 2018-04-06 PROBLEM — J96.01 ACUTE RESPIRATORY FAILURE WITH HYPOXIA (HCC): Status: ACTIVE | Noted: 2018-04-06

## 2018-04-06 PROBLEM — J18.9 PNEUMONIA: Status: ACTIVE | Noted: 2018-04-06

## 2018-04-06 PROBLEM — G89.29 CHRONIC BACK PAIN: Status: ACTIVE | Noted: 2018-04-06

## 2018-04-06 PROBLEM — M54.9 CHRONIC BACK PAIN: Status: ACTIVE | Noted: 2018-04-06

## 2018-04-06 PROBLEM — I50.22 CHRONIC SYSTOLIC HEART FAILURE (HCC): Status: ACTIVE | Noted: 2018-04-06

## 2018-04-06 PROBLEM — J18.9 CAP (COMMUNITY ACQUIRED PNEUMONIA): Status: ACTIVE | Noted: 2018-04-06

## 2018-04-06 PROBLEM — A41.9 SEPSIS (HCC): Status: ACTIVE | Noted: 2018-04-06

## 2018-05-01 ENCOUNTER — HOSPITAL ENCOUNTER (OUTPATIENT)
Dept: OTHER | Age: 73
Discharge: HOME | End: 2018-05-31
Attending: INTERNAL MEDICINE | Admitting: INTERNAL MEDICINE

## 2018-06-01 ENCOUNTER — HOSPITAL ENCOUNTER (OUTPATIENT)
Dept: OTHER | Age: 73
Discharge: HOME | End: 2018-06-30
Attending: INTERNAL MEDICINE | Admitting: INTERNAL MEDICINE

## 2018-06-05 ENCOUNTER — TELEPHONE (OUTPATIENT)
Dept: PULMONOLOGY | Age: 73
End: 2018-06-05

## 2018-06-05 RX ORDER — PREDNISONE 20 MG/1
20 TABLET ORAL DAILY
Qty: 7 TABLET | Refills: 0 | Status: SHIPPED | OUTPATIENT
Start: 2018-06-05 | End: 2018-06-12

## 2018-06-05 RX ORDER — DOXYCYCLINE HYCLATE 100 MG
100 TABLET ORAL 2 TIMES DAILY
Qty: 14 TABLET | Refills: 0 | Status: SHIPPED | OUTPATIENT
Start: 2018-06-05 | End: 2018-06-12

## 2018-07-01 ENCOUNTER — HOSPITAL ENCOUNTER (OUTPATIENT)
Dept: OTHER | Age: 73
Discharge: HOME | End: 2018-07-31
Attending: INTERNAL MEDICINE | Admitting: INTERNAL MEDICINE

## 2018-07-29 PROBLEM — I50.23 ACUTE ON CHRONIC SYSTOLIC CONGESTIVE HEART FAILURE (HCC): Status: ACTIVE | Noted: 2018-07-29

## 2018-07-30 PROBLEM — E78.5 HYPERLIPIDEMIA: Status: ACTIVE | Noted: 2018-07-30

## 2018-07-30 PROBLEM — I10 ESSENTIAL HYPERTENSION: Status: ACTIVE | Noted: 2018-07-30

## 2018-08-01 ENCOUNTER — HOSPITAL ENCOUNTER (OUTPATIENT)
Dept: OTHER | Age: 73
Discharge: HOME | End: 2018-08-31
Attending: INTERNAL MEDICINE | Admitting: INTERNAL MEDICINE

## 2018-08-03 ENCOUNTER — TELEPHONE (OUTPATIENT)
Dept: OTHER | Facility: CLINIC | Age: 73
End: 2018-08-03

## 2018-08-06 PROBLEM — R53.1 GENERALIZED WEAKNESS: Status: ACTIVE | Noted: 2018-08-06

## 2018-09-01 ENCOUNTER — HOSPITAL ENCOUNTER (OUTPATIENT)
Dept: OTHER | Age: 73
Discharge: HOME | End: 2018-09-01
Attending: INTERNAL MEDICINE | Admitting: INTERNAL MEDICINE

## 2018-09-28 ENCOUNTER — HOSPITAL ENCOUNTER (OUTPATIENT)
Age: 73
Discharge: HOME | End: 2018-09-28

## 2018-09-28 PROCEDURE — 9900000065 HC CARDIAC REHAB PHASE 3 - 1 VISIT

## 2018-10-01 ENCOUNTER — HOSPITAL ENCOUNTER (OUTPATIENT)
Age: 73
Discharge: HOME | End: 2018-10-01

## 2018-10-01 PROCEDURE — 9900000038 HC CARDIAC REHAB PHASE 3 - MONTHLY

## 2018-10-04 ENCOUNTER — HOSPITAL ENCOUNTER (EMERGENCY)
Age: 73
Discharge: HOME | End: 2018-10-04
Attending: EMERGENCY MEDICINE
Payer: MEDICARE

## 2018-10-04 ENCOUNTER — APPOINTMENT (OUTPATIENT)
Dept: GENERAL RADIOLOGY | Age: 73
End: 2018-10-04
Payer: MEDICARE

## 2018-10-04 VITALS
OXYGEN SATURATION: 94 % | HEART RATE: 81 BPM | HEIGHT: 70 IN | BODY MASS INDEX: 36.8 KG/M2 | RESPIRATION RATE: 16 BRPM | TEMPERATURE: 97.9 F | SYSTOLIC BLOOD PRESSURE: 147 MMHG | DIASTOLIC BLOOD PRESSURE: 74 MMHG | WEIGHT: 257.06 LBS

## 2018-10-04 DIAGNOSIS — R53.81 MALAISE AND FATIGUE: Primary | ICD-10-CM

## 2018-10-04 DIAGNOSIS — R53.83 MALAISE AND FATIGUE: Primary | ICD-10-CM

## 2018-10-04 LAB
A/G RATIO: 1.4 (ref 1.1–2.2)
ALBUMIN SERPL-MCNC: 4.2 G/DL (ref 3.4–5)
ALP BLD-CCNC: 53 U/L (ref 40–129)
ALT SERPL-CCNC: 17 U/L (ref 10–40)
ANION GAP SERPL CALCULATED.3IONS-SCNC: 11 MMOL/L (ref 3–16)
AST SERPL-CCNC: 18 U/L (ref 15–37)
BILIRUB SERPL-MCNC: <0.2 MG/DL (ref 0–1)
BILIRUBIN URINE: NEGATIVE
BLOOD, URINE: NEGATIVE
BUN BLDV-MCNC: 22 MG/DL (ref 7–20)
CALCIUM SERPL-MCNC: 9 MG/DL (ref 8.3–10.6)
CHLORIDE BLD-SCNC: 96 MMOL/L (ref 99–110)
CLARITY: ABNORMAL
CO2: 30 MMOL/L (ref 21–32)
COLOR: YELLOW
CREAT SERPL-MCNC: 1.1 MG/DL (ref 0.8–1.3)
EPITHELIAL CELLS, UA: 0 /HPF (ref 0–5)
GFR AFRICAN AMERICAN: >60
GFR NON-AFRICAN AMERICAN: >60
GLOBULIN: 3.1 G/DL
GLUCOSE BLD-MCNC: 207 MG/DL (ref 70–99)
GLUCOSE URINE: NEGATIVE MG/DL
HCT VFR BLD CALC: 40.5 % (ref 40.5–52.5)
HEMOGLOBIN: 13.4 G/DL (ref 13.5–17.5)
HYALINE CASTS: 1 /LPF (ref 0–8)
INR BLD: 3 (ref 0.86–1.14)
KETONES, URINE: NEGATIVE MG/DL
LACTIC ACID: 1.4 MMOL/L (ref 0.4–2)
LEUKOCYTE ESTERASE, URINE: NEGATIVE
MCH RBC QN AUTO: 30.5 PG (ref 26–34)
MCHC RBC AUTO-ENTMCNC: 33.1 G/DL (ref 31–36)
MCV RBC AUTO: 92.2 FL (ref 80–100)
MICROSCOPIC EXAMINATION: YES
NITRITE, URINE: NEGATIVE
PDW BLD-RTO: 13.5 % (ref 12.4–15.4)
PH UA: 5.5
PLATELET # BLD: 232 K/UL (ref 135–450)
PMV BLD AUTO: 7.5 FL (ref 5–10.5)
POTASSIUM SERPL-SCNC: 4.2 MMOL/L (ref 3.5–5.1)
PROTEIN UA: NEGATIVE MG/DL
PROTHROMBIN TIME: 34.2 SEC (ref 9.8–13)
RAPID INFLUENZA  B AGN: NEGATIVE
RAPID INFLUENZA A AGN: NEGATIVE
RBC # BLD: 4.39 M/UL (ref 4.2–5.9)
RBC UA: 1 /HPF (ref 0–4)
SODIUM BLD-SCNC: 137 MMOL/L (ref 136–145)
SPECIFIC GRAVITY UA: 1.01
TOTAL PROTEIN: 7.3 G/DL (ref 6.4–8.2)
TROPONIN: <0.01 NG/ML
URINE REFLEX TO CULTURE: ABNORMAL
URINE TYPE: ABNORMAL
UROBILINOGEN, URINE: 0.2 E.U./DL
WBC # BLD: 8.7 K/UL (ref 4–11)
WBC UA: 2 /HPF (ref 0–5)

## 2018-10-04 PROCEDURE — 87804 INFLUENZA ASSAY W/OPTIC: CPT

## 2018-10-04 PROCEDURE — 93005 ELECTROCARDIOGRAM TRACING: CPT | Performed by: PHYSICIAN ASSISTANT

## 2018-10-04 PROCEDURE — 85027 COMPLETE CBC AUTOMATED: CPT

## 2018-10-04 PROCEDURE — 71046 X-RAY EXAM CHEST 2 VIEWS: CPT

## 2018-10-04 PROCEDURE — 87040 BLOOD CULTURE FOR BACTERIA: CPT

## 2018-10-04 PROCEDURE — 80053 COMPREHEN METABOLIC PANEL: CPT

## 2018-10-04 PROCEDURE — 83605 ASSAY OF LACTIC ACID: CPT

## 2018-10-04 PROCEDURE — 81001 URINALYSIS AUTO W/SCOPE: CPT

## 2018-10-04 PROCEDURE — 36415 COLL VENOUS BLD VENIPUNCTURE: CPT

## 2018-10-04 PROCEDURE — 84484 ASSAY OF TROPONIN QUANT: CPT

## 2018-10-04 PROCEDURE — 99285 EMERGENCY DEPT VISIT HI MDM: CPT

## 2018-10-04 PROCEDURE — 85610 PROTHROMBIN TIME: CPT

## 2018-10-04 RX ORDER — AZITHROMYCIN 250 MG/1
TABLET, FILM COATED ORAL
Qty: 1 PACKET | Refills: 0 | Status: SHIPPED | OUTPATIENT
Start: 2018-10-04 | End: 2018-12-18 | Stop reason: ALTCHOICE

## 2018-10-04 ASSESSMENT — ENCOUNTER SYMPTOMS
DIARRHEA: 0
VOMITING: 0
SORE THROAT: 0
ABDOMINAL PAIN: 0
NAUSEA: 0
RHINORRHEA: 1
COUGH: 0
COLOR CHANGE: 0
BACK PAIN: 0
SHORTNESS OF BREATH: 0

## 2018-10-04 NOTE — ED PROVIDER NOTES
systolic CHF (congestive heart failure) (HCC)     COPD [facility]) [facility]     Diabetes mellitus ([facility]     Essential hypertension     Paroxysmal atrial fibrillation ([facility]     On Coumadin       SURGICAL HISTORY           Procedure Laterality Date    BACK SURGERY  1995    BACK SURGERY      CARDIAC SURGERY  2007    CORONARY ARTERY BYPASS GRAFT      FRACTURE SURGERY Left 1988    Shoulder       CURRENT MEDICATIONS       Discharge Medication List as of 10/4/2018  8:43 PM      CONTINUE these medications which have NOT CHANGED    Details   furosemide (LASIX) 20 MG tablet Take 2 tablets by mouth 2 times daily, Disp-30 tablet, R-1Normal      !! oxyCODONE (OXYCONTIN) 40 MG extended release tablet Take 40 mg by mouth 3 times daily. Pasquale Ball Historical Med      docusate sodium (COLACE) 100 MG capsule Take 100 mg by mouth 2 times dailyHistorical Med      lidocaine (LMX) 4 % cream Apply topically as needed for Pain (hip area) Apply topically as needed., Topical, PRN, Historical Med      !! oxyCODONE (OXYCONTIN) 20 MG extended release tablet Take 20 mg by mouth every 8 hours. .Historical Med      miconazole (MICOTIN) 2 % powder Apply topically 2 times daily. , Disp-45 g, R-1, Normal      simvastatin (ZOCOR) 80 MG tablet Take 40 mg by mouth nightlyHistorical Med      tiotropium (SPIRIVA) 18 MCG inhalation capsule Inhale 18 mcg into the lungs dailyHistorical Med      lisinopril (PRINIVIL;ZESTRIL) 20 MG tablet Take 10 mg by mouth dailyHistorical Med      !! Insulin NPH Isophane & Regular (NOVOLIN 70/30 PENFILL SC) Inject 40 Units into the skin every morning Historical Med      !! Insulin NPH Isophane & Regular (NOVOLIN 70/30 PENFILL SC) Inject 36 Units into the skin nightly Historical Med      Misc. Devices (ACAPELLA) MISC 2 TIMES DAILY Starting 9/8/2017, Until Discontinued, Disp-1 each, R-0, Print1 device. Use up to 4 times per day.       Respiratory Therapy Supplies ([employer]ING CHAMBER/MASK) JOVANA Disp-1 96 (*)     Glucose 207 (*)     BUN 22 (*)     All other components within normal limits    Narrative:     Performed at:  [facility]  [address] [facility] [address]   Phone [phone]   URINE RT REFLEX TO CULTURE - Abnormal; Notable for the following:     Clarity, UA CLOUDY (*)     All other components within normal limits    Narrative:     Performed at:  [facility]  [address] [address] [address]   Phone [phone]   PROTIME-INR - Abnormal; Notable for the following:     Protime 34.2 (*)     INR 3.00 (*)     All other components within normal limits    Narrative:     Performed at:  [facility]  [address] [facility] [address] [address]   Phone [phone]   RAPID INFLUENZA A/B ANTIGENS    Narrative:     Performed at:  [facility]  [address] [facility] [address] [address]   Phone [phone]   CULTURE BLOOD #1   CULTURE BLOOD #2   LACTIC ACID, PLASMA    Narrative:     Performed at:  [facility]  [address] [facility] [address] [address]   Phone [phone]   TROPONIN    Narrative:     Performed at:  [facility]  [address] [place] [address] [address]   Phone [phone]   MICROSCOPIC URINALYSIS    Narrative:     Performed at:  [facility]  [address] [place] [address] [address] [address]   Phone [phone]       All other labs were within normal range or not returned as of this dictation.     EMERGENCY DEPARTMENT COURSE and DIFFERENTIAL DIAGNOSIS/MDM:   Vitals:    Vitals:    10/04/18 1802   BP: (!) 147/74   Pulse: 81   Resp: 16   Temp: 97.9 °F (36.6 °C)   TempSrc: Oral   SpO2: 94%   Weight: 257 lb 0.9 oz (116.6 kg)   Height: 5' 10\" (1.778 m)     I discussed with Skyla Sim and/or family the exam results, diagnosis, care, prognosis, reasons to return and the importance of follow up. Patient and/or family is in full agreement with plan and all questions have been answered. Specific discharge instructions explained, including reasons to return to the emergency department. Luis Gayle is well appearing, non-toxic, and afebrile at the time of discharge. Patient is afebrile and not tachycardic. He complains of generalized fatigue and a runny nose no other specific symptoms. He is nontoxic clear lung sounds soft abdomen. We'll discharge with antibiotic and advised to have his INR rechecked as it is 3 in the next couple days. He is to take antibiotic as symptoms progress or fever otherwise return for any new, worsening or other concerns. I estimate there is LOW risk for PNEUMONIA, MENINGITIS, PERITONSILLAR ABSCESS, SEPSIS, MALIGNANT OTITIS EXTERNA, OR EPIGLOTTITIS thus I consider the discharge disposition reasonable. This patient was seen by the attending physician and they agreed with the assessment and plan. CONSULTS:  None    PROCEDURES:  None    FINAL IMPRESSION      1. Malaise and fatigue          DISPOSITION/PLAN   DISPOSITION Decision To Discharge 10/04/2018 08:19:42 PM      PATIENT REFERRED TO:  Faith Solano, [facility]    Call in 1 day  For follow up      DISCHARGE MEDICATIONS:  Discharge Medication List as of 10/4/2018  8:43 PM      START taking these medications    Details   azithromycin (ZITHROMAX) 250 MG tablet 2 po day 1, then 1 po days 2-5. Have your INR rechecked in the next 2-3 days. , Disp-1 packet, R-0Print             (Please note that portions of this note were completed with a voice recognition program.  Efforts were made to edit the dictations but occasionally words are mis-transcribed.)    Osito Bailey, [address], [address]  10/04/18 7065

## 2018-10-05 PROCEDURE — 93010 ELECTROCARDIOGRAM REPORT: CPT | Performed by: INTERNAL MEDICINE

## 2018-10-09 LAB
BLOOD CULTURE, ROUTINE: NORMAL
CULTURE, BLOOD 2: NORMAL
EKG ATRIAL RATE: 73 BPM
EKG DIAGNOSIS: NORMAL
EKG P AXIS: 27 DEGREES
EKG P-R INTERVAL: 172 MS
EKG Q-T INTERVAL: 392 MS
EKG QRS DURATION: 86 MS
EKG QTC CALCULATION (BAZETT): 431 MS
EKG R AXIS: 31 DEGREES
EKG T AXIS: 86 DEGREES
EKG VENTRICULAR RATE: 73 BPM

## 2018-11-01 ENCOUNTER — HOSPITAL ENCOUNTER (OUTPATIENT)
Age: 73
Discharge: HOME | End: 2018-11-01

## 2018-11-01 PROCEDURE — 9900000038 HC CARDIAC REHAB PHASE 3 - MONTHLY

## 2018-12-08 PROCEDURE — 9900000038 HC CARDIAC REHAB PHASE 3 - MONTHLY

## 2018-12-15 ENCOUNTER — HOSPITAL ENCOUNTER (OUTPATIENT)
Age: 73
Discharge: HOME | End: 2018-12-15

## 2018-12-16 ENCOUNTER — HOSPITAL ENCOUNTER (EMERGENCY)
Age: 73
Discharge: HOME | DRG: 178 | End: 2018-12-16
Attending: EMERGENCY MEDICINE
Payer: MEDICARE

## 2018-12-16 ENCOUNTER — APPOINTMENT (OUTPATIENT)
Dept: GENERAL RADIOLOGY | Age: 73
DRG: 178 | End: 2018-12-16
Payer: MEDICARE

## 2018-12-16 ENCOUNTER — APPOINTMENT (OUTPATIENT)
Dept: CT IMAGING | Age: 73
DRG: 178 | End: 2018-12-16
Payer: MEDICARE

## 2018-12-16 VITALS
HEART RATE: 91 BPM | TEMPERATURE: 98 F | HEIGHT: 70 IN | OXYGEN SATURATION: 94 % | BODY MASS INDEX: 25.7 KG/M2 | SYSTOLIC BLOOD PRESSURE: 131 MMHG | RESPIRATION RATE: 16 BRPM | WEIGHT: 179.5 LBS | DIASTOLIC BLOOD PRESSURE: 72 MMHG

## 2018-12-16 DIAGNOSIS — K59.00 CONSTIPATION, UNSPECIFIED CONSTIPATION TYPE: Primary | ICD-10-CM

## 2018-12-16 DIAGNOSIS — Z86.79 HISTORY OF CHRONIC CHF: ICD-10-CM

## 2018-12-16 DIAGNOSIS — R06.02 SOB (SHORTNESS OF BREATH): ICD-10-CM

## 2018-12-16 LAB
A/G RATIO: 1.3 (ref 1.1–2.2)
ALBUMIN SERPL-MCNC: 3.8 G/DL (ref 3.4–5)
ALP BLD-CCNC: 48 U/L (ref 40–129)
ALT SERPL-CCNC: 22 U/L (ref 10–40)
ANION GAP SERPL CALCULATED.3IONS-SCNC: 13 MMOL/L (ref 3–16)
AST SERPL-CCNC: 16 U/L (ref 15–37)
BILIRUB SERPL-MCNC: 0.6 MG/DL (ref 0–1)
BILIRUBIN URINE: NEGATIVE
BLOOD, URINE: NEGATIVE
BUN BLDV-MCNC: 20 MG/DL (ref 7–20)
CALCIUM SERPL-MCNC: 8.8 MG/DL (ref 8.3–10.6)
CHLORIDE BLD-SCNC: 94 MMOL/L (ref 99–110)
CLARITY: CLEAR
CO2: 26 MMOL/L (ref 21–32)
COLOR: YELLOW
CREAT SERPL-MCNC: 1 MG/DL (ref 0.8–1.3)
GFR AFRICAN AMERICAN: >60
GFR NON-AFRICAN AMERICAN: >60
GLOBULIN: 2.9 G/DL
GLUCOSE BLD-MCNC: 134 MG/DL (ref 70–99)
GLUCOSE BLD-MCNC: 202 MG/DL (ref 70–99)
GLUCOSE URINE: NEGATIVE MG/DL
HCT VFR BLD CALC: 41.4 % (ref 40.5–52.5)
HEMOGLOBIN: 13.8 G/DL (ref 13.5–17.5)
KETONES, URINE: NEGATIVE MG/DL
LEUKOCYTE ESTERASE, URINE: NEGATIVE
LIPASE: 12 U/L (ref 13–60)
MCH RBC QN AUTO: 30.3 PG (ref 26–34)
MCHC RBC AUTO-ENTMCNC: 33.3 G/DL (ref 31–36)
MCV RBC AUTO: 91 FL (ref 80–100)
MICROSCOPIC EXAMINATION: NORMAL
NITRITE, URINE: NEGATIVE
PDW BLD-RTO: 13.7 % (ref 12.4–15.4)
PERFORMED ON: ABNORMAL
PH UA: 5
PLATELET # BLD: 250 K/UL (ref 135–450)
PMV BLD AUTO: 6.9 FL (ref 5–10.5)
POTASSIUM SERPL-SCNC: 4.1 MMOL/L (ref 3.5–5.1)
PRO-BNP: 141 PG/ML (ref 0–124)
PROTEIN UA: NEGATIVE MG/DL
RBC # BLD: 4.55 M/UL (ref 4.2–5.9)
SODIUM BLD-SCNC: 133 MMOL/L (ref 136–145)
SPECIFIC GRAVITY UA: 1.02
TOTAL PROTEIN: 6.7 G/DL (ref 6.4–8.2)
URINE REFLEX TO CULTURE: NORMAL
URINE TYPE: NORMAL
UROBILINOGEN, URINE: 0.2 E.U./DL
WBC # BLD: 10.2 K/UL (ref 4–11)

## 2018-12-16 PROCEDURE — 94640 AIRWAY INHALATION TREATMENT: CPT

## 2018-12-16 PROCEDURE — 83690 ASSAY OF LIPASE: CPT

## 2018-12-16 PROCEDURE — 94762 N-INVAS EAR/PLS OXIMTRY CONT: CPT

## 2018-12-16 PROCEDURE — 99285 EMERGENCY DEPT VISIT HI MDM: CPT

## 2018-12-16 PROCEDURE — 81003 URINALYSIS AUTO W/O SCOPE: CPT

## 2018-12-16 PROCEDURE — 96374 THER/PROPH/DIAG INJ IV PUSH: CPT

## 2018-12-16 PROCEDURE — 83880 ASSAY OF NATRIURETIC PEPTIDE: CPT

## 2018-12-16 PROCEDURE — 85027 COMPLETE CBC AUTOMATED: CPT

## 2018-12-16 PROCEDURE — 6360000002 HC RX W HCPCS: Performed by: NURSE PRACTITIONER

## 2018-12-16 PROCEDURE — 74177 CT ABD & PELVIS W/CONTRAST: CPT

## 2018-12-16 PROCEDURE — 6370000000 HC RX 637 (ALT 250 FOR IP): Performed by: NURSE PRACTITIONER

## 2018-12-16 PROCEDURE — 80053 COMPREHEN METABOLIC PANEL: CPT

## 2018-12-16 PROCEDURE — 71045 X-RAY EXAM CHEST 1 VIEW: CPT

## 2018-12-16 PROCEDURE — 6360000004 HC RX CONTRAST MEDICATION: Performed by: NURSE PRACTITIONER

## 2018-12-16 RX ORDER — MAG HYDROX/ALUMINUM HYD/SIMETH 400-400-40
1 SUSPENSION, ORAL (FINAL DOSE FORM) ORAL ONCE
Qty: 1 SUPPOSITORY | Refills: 0 | Status: SHIPPED | OUTPATIENT
Start: 2018-12-16 | End: 2018-12-16

## 2018-12-16 RX ORDER — POLYETHYLENE GLYCOL 3350 17 G/17G
POWDER, FOR SOLUTION ORAL
Qty: 1 BOTTLE | Refills: 0 | Status: SHIPPED | OUTPATIENT
Start: 2018-12-16 | End: 2019-01-07

## 2018-12-16 RX ORDER — FUROSEMIDE 10 MG/ML
40 INJECTION INTRAMUSCULAR; INTRAVENOUS ONCE
Status: COMPLETED | OUTPATIENT
Start: 2018-12-16 | End: 2018-12-16

## 2018-12-16 RX ORDER — MAGNESIUM CITRATE
SOLUTION, ORAL ORAL
Qty: 300 ML | Refills: 0 | Status: SHIPPED | OUTPATIENT
Start: 2018-12-16 | End: 2018-12-18 | Stop reason: ALTCHOICE

## 2018-12-16 RX ORDER — METHYLPREDNISOLONE SODIUM SUCCINATE 125 MG/2ML
125 INJECTION, POWDER, LYOPHILIZED, FOR SOLUTION INTRAMUSCULAR; INTRAVENOUS ONCE
Status: COMPLETED | OUTPATIENT
Start: 2018-12-16 | End: 2018-12-16

## 2018-12-16 RX ORDER — IPRATROPIUM BROMIDE AND ALBUTEROL SULFATE 2.5; .5 MG/3ML; MG/3ML
1 SOLUTION RESPIRATORY (INHALATION) ONCE
Status: COMPLETED | OUTPATIENT
Start: 2018-12-16 | End: 2018-12-16

## 2018-12-16 RX ADMIN — IOVERSOL 75 ML: 678 INJECTION INTRA-ARTERIAL; INTRAVENOUS at 19:02

## 2018-12-16 RX ADMIN — IPRATROPIUM BROMIDE AND ALBUTEROL SULFATE 1 AMPULE: .5; 3 SOLUTION RESPIRATORY (INHALATION) at 17:59

## 2018-12-16 RX ADMIN — METHYLPREDNISOLONE SODIUM SUCCINATE 125 MG: 125 INJECTION, POWDER, FOR SOLUTION INTRAMUSCULAR; INTRAVENOUS at 18:25

## 2018-12-16 RX ADMIN — FUROSEMIDE 40 MG: 10 INJECTION, SOLUTION INTRAMUSCULAR; INTRAVENOUS at 19:20

## 2018-12-16 ASSESSMENT — ENCOUNTER SYMPTOMS
CHEST TIGHTNESS: 0
ABDOMINAL DISTENTION: 1
BACK PAIN: 1
CONSTIPATION: 1
SHORTNESS OF BREATH: 1
VOMITING: 0
COUGH: 1
ABDOMINAL PAIN: 0
DIARRHEA: 0
NAUSEA: 0

## 2018-12-16 ASSESSMENT — PAIN DESCRIPTION - PAIN TYPE: TYPE: ACUTE PAIN

## 2018-12-16 ASSESSMENT — PAIN SCALES - GENERAL
PAINLEVEL_OUTOF10: 9
PAINLEVEL_OUTOF10: 0

## 2018-12-16 ASSESSMENT — PAIN DESCRIPTION - LOCATION: LOCATION: BACK

## 2018-12-18 ENCOUNTER — HOSPITAL ENCOUNTER (INPATIENT)
Age: 73
LOS: 3 days | Discharge: HOME | DRG: 178 | End: 2018-12-21
Attending: EMERGENCY MEDICINE | Admitting: INTERNAL MEDICINE
Payer: MEDICARE

## 2018-12-18 ENCOUNTER — APPOINTMENT (OUTPATIENT)
Dept: GENERAL RADIOLOGY | Age: 73
DRG: 178 | End: 2018-12-18
Payer: MEDICARE

## 2018-12-18 DIAGNOSIS — R53.1 GENERALIZED WEAKNESS: ICD-10-CM

## 2018-12-18 DIAGNOSIS — J18.9 PNEUMONIA DUE TO ORGANISM: Primary | ICD-10-CM

## 2018-12-18 DIAGNOSIS — R29.6 FREQUENT FALLS: ICD-10-CM

## 2018-12-18 LAB
A/G RATIO: 1.2 (ref 1.1–2.2)
ALBUMIN SERPL-MCNC: 3.7 G/DL (ref 3.4–5)
ALP BLD-CCNC: 46 U/L (ref 40–129)
ALT SERPL-CCNC: 31 U/L (ref 10–40)
ANION GAP SERPL CALCULATED.3IONS-SCNC: 13 MMOL/L (ref 3–16)
AST SERPL-CCNC: 40 U/L (ref 15–37)
BASOPHILS ABSOLUTE: 0.1 K/UL (ref 0–0.2)
BASOPHILS RELATIVE PERCENT: 1.1 %
BILIRUB SERPL-MCNC: 0.7 MG/DL (ref 0–1)
BILIRUBIN URINE: NEGATIVE
BLOOD, URINE: NEGATIVE
BUN BLDV-MCNC: 29 MG/DL (ref 7–20)
CALCIUM SERPL-MCNC: 8.7 MG/DL (ref 8.3–10.6)
CHLORIDE BLD-SCNC: 91 MMOL/L (ref 99–110)
CLARITY: ABNORMAL
CO2: 30 MMOL/L (ref 21–32)
COLOR: YELLOW
CREAT SERPL-MCNC: 1.3 MG/DL (ref 0.8–1.3)
EOSINOPHILS ABSOLUTE: 0.1 K/UL (ref 0–0.6)
EOSINOPHILS RELATIVE PERCENT: 0.8 %
EPITHELIAL CELLS, UA: 0 /HPF (ref 0–5)
GFR AFRICAN AMERICAN: >60
GFR NON-AFRICAN AMERICAN: 54
GLOBULIN: 3.1 G/DL
GLUCOSE BLD-MCNC: 112 MG/DL (ref 70–99)
GLUCOSE BLD-MCNC: 216 MG/DL (ref 70–99)
GLUCOSE URINE: NEGATIVE MG/DL
HCT VFR BLD CALC: 40.1 % (ref 40.5–52.5)
HEMOGLOBIN: 13.4 G/DL (ref 13.5–17.5)
HYALINE CASTS: 0 /LPF (ref 0–8)
INR BLD: 2.41 (ref 0.86–1.14)
KETONES, URINE: NEGATIVE MG/DL
LACTIC ACID: 1.2 MMOL/L (ref 0.4–2)
LEUKOCYTE ESTERASE, URINE: NEGATIVE
LYMPHOCYTES ABSOLUTE: 1.9 K/UL (ref 1–5.1)
LYMPHOCYTES RELATIVE PERCENT: 13.9 %
MCH RBC QN AUTO: 30.6 PG (ref 26–34)
MCHC RBC AUTO-ENTMCNC: 33.4 G/DL (ref 31–36)
MCV RBC AUTO: 91.7 FL (ref 80–100)
MICROSCOPIC EXAMINATION: YES
MONOCYTES ABSOLUTE: 1.2 K/UL (ref 0–1.3)
MONOCYTES RELATIVE PERCENT: 8.5 %
NEUTROPHILS ABSOLUTE: 10.3 K/UL (ref 1.7–7.7)
NEUTROPHILS RELATIVE PERCENT: 75.7 %
NITRITE, URINE: NEGATIVE
PDW BLD-RTO: 13.9 % (ref 12.4–15.4)
PERFORMED ON: ABNORMAL
PH UA: 7.5
PLATELET # BLD: 249 K/UL (ref 135–450)
PMV BLD AUTO: 7 FL (ref 5–10.5)
POTASSIUM SERPL-SCNC: 4.3 MMOL/L (ref 3.5–5.1)
PRO-BNP: 336 PG/ML (ref 0–124)
PROCALCITONIN: 0.29 NG/ML (ref 0–0.15)
PROTEIN UA: NEGATIVE MG/DL
PROTHROMBIN TIME: 27.5 SEC (ref 9.8–13)
RAPID INFLUENZA  B AGN: NEGATIVE
RAPID INFLUENZA A AGN: NEGATIVE
RBC # BLD: 4.38 M/UL (ref 4.2–5.9)
RBC UA: 3 /HPF (ref 0–4)
SODIUM BLD-SCNC: 134 MMOL/L (ref 136–145)
SPECIFIC GRAVITY UA: 1.02
TOTAL PROTEIN: 6.8 G/DL (ref 6.4–8.2)
TROPONIN: <0.01 NG/ML
URINE REFLEX TO CULTURE: ABNORMAL
URINE TYPE: ABNORMAL
UROBILINOGEN, URINE: 0.2 E.U./DL
WBC # BLD: 13.6 K/UL (ref 4–11)
WBC UA: 0 /HPF (ref 0–5)

## 2018-12-18 PROCEDURE — 87186 SC STD MICRODIL/AGAR DIL: CPT

## 2018-12-18 PROCEDURE — 87070 CULTURE OTHR SPECIMN AEROBIC: CPT

## 2018-12-18 PROCEDURE — 73502 X-RAY EXAM HIP UNI 2-3 VIEWS: CPT

## 2018-12-18 PROCEDURE — 87040 BLOOD CULTURE FOR BACTERIA: CPT

## 2018-12-18 PROCEDURE — 83605 ASSAY OF LACTIC ACID: CPT

## 2018-12-18 PROCEDURE — 71045 X-RAY EXAM CHEST 1 VIEW: CPT

## 2018-12-18 PROCEDURE — 87205 SMEAR GRAM STAIN: CPT

## 2018-12-18 PROCEDURE — 1200000000 HC SEMI PRIVATE

## 2018-12-18 PROCEDURE — 83880 ASSAY OF NATRIURETIC PEPTIDE: CPT

## 2018-12-18 PROCEDURE — 2580000003 HC RX 258: Performed by: HOSPITALIST

## 2018-12-18 PROCEDURE — 2580000003 HC RX 258: Performed by: PHYSICIAN ASSISTANT

## 2018-12-18 PROCEDURE — 84484 ASSAY OF TROPONIN QUANT: CPT

## 2018-12-18 PROCEDURE — 6360000002 HC RX W HCPCS: Performed by: PHYSICIAN ASSISTANT

## 2018-12-18 PROCEDURE — 85025 COMPLETE CBC W/AUTO DIFF WBC: CPT

## 2018-12-18 PROCEDURE — 6370000000 HC RX 637 (ALT 250 FOR IP): Performed by: HOSPITALIST

## 2018-12-18 PROCEDURE — 87804 INFLUENZA ASSAY W/OPTIC: CPT

## 2018-12-18 PROCEDURE — 93005 ELECTROCARDIOGRAM TRACING: CPT | Performed by: PHYSICIAN ASSISTANT

## 2018-12-18 PROCEDURE — 84145 PROCALCITONIN (PCT): CPT

## 2018-12-18 PROCEDURE — 80053 COMPREHEN METABOLIC PANEL: CPT

## 2018-12-18 PROCEDURE — 99285 EMERGENCY DEPT VISIT HI MDM: CPT

## 2018-12-18 PROCEDURE — 87077 CULTURE AEROBIC IDENTIFY: CPT

## 2018-12-18 PROCEDURE — 36415 COLL VENOUS BLD VENIPUNCTURE: CPT

## 2018-12-18 PROCEDURE — 96365 THER/PROPH/DIAG IV INF INIT: CPT

## 2018-12-18 PROCEDURE — 85610 PROTHROMBIN TIME: CPT

## 2018-12-18 PROCEDURE — 81001 URINALYSIS AUTO W/SCOPE: CPT

## 2018-12-18 PROCEDURE — 73110 X-RAY EXAM OF WRIST: CPT

## 2018-12-18 RX ORDER — ONDANSETRON 2 MG/ML
4 INJECTION INTRAMUSCULAR; INTRAVENOUS EVERY 6 HOURS PRN
Status: DISCONTINUED | OUTPATIENT
Start: 2018-12-18 | End: 2018-12-21 | Stop reason: HOSPADM

## 2018-12-18 RX ORDER — FLUTICASONE PROPIONATE 50 MCG
2 SPRAY, SUSPENSION (ML) NASAL 2 TIMES DAILY
Status: DISCONTINUED | OUTPATIENT
Start: 2018-12-18 | End: 2018-12-21 | Stop reason: HOSPADM

## 2018-12-18 RX ORDER — FUROSEMIDE 20 MG/1
20 TABLET ORAL DAILY
COMMUNITY

## 2018-12-18 RX ORDER — INHALER, ASSIST DEVICES
1 SPACER (EA) MISCELLANEOUS 4 TIMES DAILY PRN
COMMUNITY
Start: 2017-08-09 | End: 2018-12-18 | Stop reason: ALTCHOICE

## 2018-12-18 RX ORDER — CARVEDILOL 12.5 MG/1
12.5 TABLET ORAL 2 TIMES DAILY WITH MEALS
Status: DISCONTINUED | OUTPATIENT
Start: 2018-12-19 | End: 2018-12-21 | Stop reason: HOSPADM

## 2018-12-18 RX ORDER — FLUNISOLIDE 0.25 MG/ML
2 SOLUTION NASAL 2 TIMES DAILY
COMMUNITY
End: 2018-12-18

## 2018-12-18 RX ORDER — NICOTINE POLACRILEX 4 MG
15 LOZENGE BUCCAL PRN
Status: DISCONTINUED | OUTPATIENT
Start: 2018-12-18 | End: 2018-12-20 | Stop reason: SDUPTHER

## 2018-12-18 RX ORDER — ASPIRIN 81 MG/1
81 TABLET, CHEWABLE ORAL DAILY
Status: DISCONTINUED | OUTPATIENT
Start: 2018-12-19 | End: 2018-12-21 | Stop reason: HOSPADM

## 2018-12-18 RX ORDER — GABAPENTIN 400 MG/1
800 CAPSULE ORAL 4 TIMES DAILY
Status: DISCONTINUED | OUTPATIENT
Start: 2018-12-18 | End: 2018-12-21 | Stop reason: HOSPADM

## 2018-12-18 RX ORDER — CARVEDILOL 6.25 MG/1
6.25 TABLET ORAL 2 TIMES DAILY
COMMUNITY
Start: 2018-03-27 | End: 2018-12-18

## 2018-12-18 RX ORDER — ACETAMINOPHEN 325 MG/1
650 TABLET ORAL EVERY 4 HOURS PRN
Status: DISCONTINUED | OUTPATIENT
Start: 2018-12-18 | End: 2018-12-21 | Stop reason: HOSPADM

## 2018-12-18 RX ORDER — DOCUSATE SODIUM 100 MG/1
100 CAPSULE, LIQUID FILLED ORAL 2 TIMES DAILY
Status: DISCONTINUED | OUTPATIENT
Start: 2018-12-18 | End: 2018-12-21 | Stop reason: HOSPADM

## 2018-12-18 RX ORDER — OXYCODONE HCL 20 MG/1
20 TABLET, FILM COATED, EXTENDED RELEASE ORAL EVERY 8 HOURS
COMMUNITY
Start: 2018-03-02 | End: 2019-05-08 | Stop reason: ALTCHOICE

## 2018-12-18 RX ORDER — SIMVASTATIN 40 MG
40 TABLET ORAL NIGHTLY
Status: DISCONTINUED | OUTPATIENT
Start: 2018-12-18 | End: 2018-12-21 | Stop reason: HOSPADM

## 2018-12-18 RX ORDER — SIMVASTATIN 80 MG
80 TABLET ORAL NIGHTLY
COMMUNITY
Start: 2018-03-27

## 2018-12-18 RX ORDER — FLUTICASONE PROPIONATE 50 MCG
2 SPRAY, SUSPENSION (ML) NASAL 2 TIMES DAILY
COMMUNITY
End: 2018-12-18

## 2018-12-18 RX ORDER — BUDESONIDE AND FORMOTEROL FUMARATE DIHYDRATE 80; 4.5 UG/1; UG/1
2 AEROSOL RESPIRATORY (INHALATION) 2 TIMES DAILY
COMMUNITY
Start: 2018-03-27 | End: 2018-12-18

## 2018-12-18 RX ORDER — FUROSEMIDE 40 MG/1
40 TABLET ORAL DAILY
COMMUNITY
Start: 2018-12-13 | End: 2018-12-18

## 2018-12-18 RX ORDER — POLYETHYLENE GLYCOL 3350 17 G/17G
17 POWDER, FOR SOLUTION ORAL DAILY
COMMUNITY
End: 2018-12-18

## 2018-12-18 RX ORDER — IPRATROPIUM BROMIDE AND ALBUTEROL SULFATE 2.5; .5 MG/3ML; MG/3ML
1 SOLUTION RESPIRATORY (INHALATION)
Status: DISCONTINUED | OUTPATIENT
Start: 2018-12-19 | End: 2018-12-21 | Stop reason: HOSPADM

## 2018-12-18 RX ORDER — LISINOPRIL 20 MG/1
10 TABLET ORAL DAILY
COMMUNITY
End: 2018-12-18

## 2018-12-18 RX ORDER — INSULIN GLARGINE 100 [IU]/ML
15 INJECTION, SOLUTION SUBCUTANEOUS NIGHTLY
Status: DISCONTINUED | OUTPATIENT
Start: 2018-12-18 | End: 2018-12-21 | Stop reason: HOSPADM

## 2018-12-18 RX ORDER — SODIUM CHLORIDE 0.9 % (FLUSH) 0.9 %
10 SYRINGE (ML) INJECTION EVERY 12 HOURS SCHEDULED
Status: DISCONTINUED | OUTPATIENT
Start: 2018-12-18 | End: 2018-12-21 | Stop reason: HOSPADM

## 2018-12-18 RX ORDER — WARFARIN SODIUM 5 MG/1
5 TABLET ORAL DAILY
COMMUNITY
End: 2018-12-18

## 2018-12-18 RX ORDER — OXYCODONE HCL 40 MG/1
40 TABLET, FILM COATED, EXTENDED RELEASE ORAL EVERY 12 HOURS PRN
COMMUNITY
End: 2018-12-18 | Stop reason: ALTCHOICE

## 2018-12-18 RX ORDER — OXYCODONE HCL 10 MG/1
20 TABLET, FILM COATED, EXTENDED RELEASE ORAL EVERY 8 HOURS PRN
Status: DISCONTINUED | OUTPATIENT
Start: 2018-12-18 | End: 2018-12-21 | Stop reason: HOSPADM

## 2018-12-18 RX ORDER — FUROSEMIDE 40 MG/1
40 TABLET ORAL 2 TIMES DAILY
Status: DISCONTINUED | OUTPATIENT
Start: 2018-12-18 | End: 2018-12-21 | Stop reason: HOSPADM

## 2018-12-18 RX ORDER — SODIUM CHLORIDE 0.9 % (FLUSH) 0.9 %
10 SYRINGE (ML) INJECTION PRN
Status: DISCONTINUED | OUTPATIENT
Start: 2018-12-18 | End: 2018-12-21 | Stop reason: HOSPADM

## 2018-12-18 RX ORDER — GABAPENTIN 800 MG/1
800 TABLET ORAL 4 TIMES DAILY
COMMUNITY
Start: 2018-03-27 | End: 2018-12-18

## 2018-12-18 RX ORDER — DEXTROSE MONOHYDRATE 25 G/50ML
12.5 INJECTION, SOLUTION INTRAVENOUS PRN
Status: DISCONTINUED | OUTPATIENT
Start: 2018-12-18 | End: 2018-12-20 | Stop reason: SDUPTHER

## 2018-12-18 RX ORDER — LISINOPRIL 10 MG/1
10 TABLET ORAL DAILY
Status: DISCONTINUED | OUTPATIENT
Start: 2018-12-19 | End: 2018-12-21 | Stop reason: HOSPADM

## 2018-12-18 RX ORDER — LIDOCAINE 50 MG/G
1 OINTMENT TOPICAL DAILY PRN
COMMUNITY
End: 2019-04-13 | Stop reason: ALTCHOICE

## 2018-12-18 RX ORDER — WARFARIN SODIUM 7.5 MG/1
7.5 TABLET ORAL DAILY
Status: DISCONTINUED | OUTPATIENT
Start: 2018-12-19 | End: 2018-12-18

## 2018-12-18 RX ORDER — OXYCODONE HCL 40 MG/1
40 TABLET, FILM COATED, EXTENDED RELEASE ORAL EVERY 8 HOURS SCHEDULED
Status: DISCONTINUED | OUTPATIENT
Start: 2018-12-18 | End: 2018-12-21 | Stop reason: HOSPADM

## 2018-12-18 RX ORDER — DEXTROSE MONOHYDRATE 50 MG/ML
100 INJECTION, SOLUTION INTRAVENOUS PRN
Status: DISCONTINUED | OUTPATIENT
Start: 2018-12-18 | End: 2018-12-20 | Stop reason: SDUPTHER

## 2018-12-18 RX ORDER — ALBUTEROL SULFATE 90 UG/1
2 AEROSOL, METERED RESPIRATORY (INHALATION) 4 TIMES DAILY PRN
COMMUNITY
End: 2018-12-18

## 2018-12-18 RX ADMIN — GABAPENTIN 800 MG: 400 CAPSULE ORAL at 23:33

## 2018-12-18 RX ADMIN — AZITHROMYCIN MONOHYDRATE 500 MG: 500 INJECTION, POWDER, LYOPHILIZED, FOR SOLUTION INTRAVENOUS at 21:52

## 2018-12-18 RX ADMIN — INSULIN GLARGINE 15 UNITS: 100 INJECTION, SOLUTION SUBCUTANEOUS at 23:37

## 2018-12-18 RX ADMIN — SIMVASTATIN 40 MG: 40 TABLET, FILM COATED ORAL at 23:33

## 2018-12-18 RX ADMIN — OXYCODONE HYDROCHLORIDE 40 MG: 40 TABLET, FILM COATED, EXTENDED RELEASE ORAL at 23:33

## 2018-12-18 RX ADMIN — INSULIN LISPRO 2 UNITS: 100 INJECTION, SOLUTION INTRAVENOUS; SUBCUTANEOUS at 23:37

## 2018-12-18 RX ADMIN — FUROSEMIDE 40 MG: 40 TABLET ORAL at 23:34

## 2018-12-18 RX ADMIN — DOCUSATE SODIUM 100 MG: 100 CAPSULE, LIQUID FILLED ORAL at 23:33

## 2018-12-18 RX ADMIN — Medication 10 ML: at 23:35

## 2018-12-18 RX ADMIN — FLUTICASONE PROPIONATE 2 SPRAY: 50 SPRAY, METERED NASAL at 23:33

## 2018-12-18 RX ADMIN — CEFTRIAXONE 1 G: 1 INJECTION, POWDER, FOR SOLUTION INTRAMUSCULAR; INTRAVENOUS at 21:20

## 2018-12-18 ASSESSMENT — ENCOUNTER SYMPTOMS
SORE THROAT: 0
ABDOMINAL PAIN: 0
NAUSEA: 0
COUGH: 1
VOMITING: 0
BACK PAIN: 0
SHORTNESS OF BREATH: 1

## 2018-12-18 ASSESSMENT — PAIN SCALES - GENERAL
PAINLEVEL_OUTOF10: 10
PAINLEVEL_OUTOF10: 7

## 2018-12-18 ASSESSMENT — PAIN DESCRIPTION - PAIN TYPE: TYPE: ACUTE PAIN

## 2018-12-19 LAB
ANION GAP SERPL CALCULATED.3IONS-SCNC: 11 MMOL/L (ref 3–16)
BASOPHILS ABSOLUTE: 0 K/UL (ref 0–0.2)
BASOPHILS RELATIVE PERCENT: 0.6 %
BUN BLDV-MCNC: 22 MG/DL (ref 7–20)
CALCIUM SERPL-MCNC: 8.4 MG/DL (ref 8.3–10.6)
CHLORIDE BLD-SCNC: 95 MMOL/L (ref 99–110)
CO2: 29 MMOL/L (ref 21–32)
CREAT SERPL-MCNC: 1.1 MG/DL (ref 0.8–1.3)
EKG ATRIAL RATE: 86 BPM
EKG DIAGNOSIS: NORMAL
EKG P AXIS: 48 DEGREES
EKG P-R INTERVAL: 172 MS
EKG Q-T INTERVAL: 358 MS
EKG QRS DURATION: 98 MS
EKG QTC CALCULATION (BAZETT): 428 MS
EKG R AXIS: 29 DEGREES
EKG T AXIS: 66 DEGREES
EKG VENTRICULAR RATE: 86 BPM
EOSINOPHILS ABSOLUTE: 0.1 K/UL (ref 0–0.6)
EOSINOPHILS RELATIVE PERCENT: 1.6 %
ESTIMATED AVERAGE GLUCOSE: 205.9 MG/DL
GFR AFRICAN AMERICAN: >60
GFR NON-AFRICAN AMERICAN: >60
GLUCOSE BLD-MCNC: 178 MG/DL (ref 70–99)
GLUCOSE BLD-MCNC: 224 MG/DL (ref 70–99)
GLUCOSE BLD-MCNC: 275 MG/DL (ref 70–99)
GLUCOSE BLD-MCNC: 294 MG/DL (ref 70–99)
GLUCOSE BLD-MCNC: 418 MG/DL (ref 70–99)
HBA1C MFR BLD: 8.8 %
HCT VFR BLD CALC: 39.8 % (ref 40.5–52.5)
HEMOGLOBIN: 13.3 G/DL (ref 13.5–17.5)
INR BLD: 2.04 (ref 0.86–1.14)
LYMPHOCYTES ABSOLUTE: 1.8 K/UL (ref 1–5.1)
LYMPHOCYTES RELATIVE PERCENT: 23.2 %
MCH RBC QN AUTO: 30.6 PG (ref 26–34)
MCHC RBC AUTO-ENTMCNC: 33.4 G/DL (ref 31–36)
MCV RBC AUTO: 91.5 FL (ref 80–100)
MONOCYTES ABSOLUTE: 0.9 K/UL (ref 0–1.3)
MONOCYTES RELATIVE PERCENT: 11.2 %
NEUTROPHILS ABSOLUTE: 4.9 K/UL (ref 1.7–7.7)
NEUTROPHILS RELATIVE PERCENT: 63.4 %
PDW BLD-RTO: 13.9 % (ref 12.4–15.4)
PERFORMED ON: ABNORMAL
PLATELET # BLD: 244 K/UL (ref 135–450)
PMV BLD AUTO: 7 FL (ref 5–10.5)
POTASSIUM REFLEX MAGNESIUM: 4.1 MMOL/L (ref 3.5–5.1)
PROTHROMBIN TIME: 23.2 SEC (ref 9.8–13)
RBC # BLD: 4.35 M/UL (ref 4.2–5.9)
SODIUM BLD-SCNC: 135 MMOL/L (ref 136–145)
WBC # BLD: 7.7 K/UL (ref 4–11)

## 2018-12-19 PROCEDURE — 6360000002 HC RX W HCPCS: Performed by: HOSPITALIST

## 2018-12-19 PROCEDURE — G8978 MOBILITY CURRENT STATUS: HCPCS

## 2018-12-19 PROCEDURE — 97530 THERAPEUTIC ACTIVITIES: CPT

## 2018-12-19 PROCEDURE — 94760 N-INVAS EAR/PLS OXIMETRY 1: CPT

## 2018-12-19 PROCEDURE — G8988 SELF CARE GOAL STATUS: HCPCS

## 2018-12-19 PROCEDURE — G8987 SELF CARE CURRENT STATUS: HCPCS

## 2018-12-19 PROCEDURE — 83036 HEMOGLOBIN GLYCOSYLATED A1C: CPT

## 2018-12-19 PROCEDURE — 6370000000 HC RX 637 (ALT 250 FOR IP): Performed by: HOSPITALIST

## 2018-12-19 PROCEDURE — G8979 MOBILITY GOAL STATUS: HCPCS

## 2018-12-19 PROCEDURE — 85025 COMPLETE CBC W/AUTO DIFF WBC: CPT

## 2018-12-19 PROCEDURE — 97535 SELF CARE MNGMENT TRAINING: CPT

## 2018-12-19 PROCEDURE — 97116 GAIT TRAINING THERAPY: CPT

## 2018-12-19 PROCEDURE — 80048 BASIC METABOLIC PNL TOTAL CA: CPT

## 2018-12-19 PROCEDURE — 97162 PT EVAL MOD COMPLEX 30 MIN: CPT

## 2018-12-19 PROCEDURE — 2580000003 HC RX 258: Performed by: HOSPITALIST

## 2018-12-19 PROCEDURE — 1200000000 HC SEMI PRIVATE

## 2018-12-19 PROCEDURE — 94640 AIRWAY INHALATION TREATMENT: CPT

## 2018-12-19 PROCEDURE — 97166 OT EVAL MOD COMPLEX 45 MIN: CPT

## 2018-12-19 PROCEDURE — 36415 COLL VENOUS BLD VENIPUNCTURE: CPT

## 2018-12-19 PROCEDURE — 85610 PROTHROMBIN TIME: CPT

## 2018-12-19 PROCEDURE — 6370000000 HC RX 637 (ALT 250 FOR IP): Performed by: PHARMACIST

## 2018-12-19 RX ORDER — WARFARIN SODIUM 5 MG/1
10 TABLET ORAL
Status: COMPLETED | OUTPATIENT
Start: 2018-12-19 | End: 2018-12-19

## 2018-12-19 RX ADMIN — FLUTICASONE PROPIONATE 2 SPRAY: 50 SPRAY, METERED NASAL at 23:11

## 2018-12-19 RX ADMIN — DOCUSATE SODIUM 100 MG: 100 CAPSULE, LIQUID FILLED ORAL at 08:15

## 2018-12-19 RX ADMIN — FUROSEMIDE 40 MG: 40 TABLET ORAL at 17:51

## 2018-12-19 RX ADMIN — GABAPENTIN 800 MG: 400 CAPSULE ORAL at 08:14

## 2018-12-19 RX ADMIN — MOMETASONE FUROATE AND FORMOTEROL FUMARATE DIHYDRATE 2 PUFF: 200; 5 AEROSOL RESPIRATORY (INHALATION) at 11:52

## 2018-12-19 RX ADMIN — IPRATROPIUM BROMIDE AND ALBUTEROL SULFATE 1 AMPULE: .5; 3 SOLUTION RESPIRATORY (INHALATION) at 16:31

## 2018-12-19 RX ADMIN — INSULIN LISPRO 3 UNITS: 100 INJECTION, SOLUTION INTRAVENOUS; SUBCUTANEOUS at 20:36

## 2018-12-19 RX ADMIN — OXYCODONE HYDROCHLORIDE 40 MG: 40 TABLET, FILM COATED, EXTENDED RELEASE ORAL at 20:35

## 2018-12-19 RX ADMIN — INSULIN LISPRO 5 UNITS: 100 INJECTION, SOLUTION INTRAVENOUS; SUBCUTANEOUS at 12:21

## 2018-12-19 RX ADMIN — INSULIN LISPRO 5 UNITS: 100 INJECTION, SOLUTION INTRAVENOUS; SUBCUTANEOUS at 16:51

## 2018-12-19 RX ADMIN — IPRATROPIUM BROMIDE AND ALBUTEROL SULFATE 1 AMPULE: .5; 3 SOLUTION RESPIRATORY (INHALATION) at 20:58

## 2018-12-19 RX ADMIN — IPRATROPIUM BROMIDE AND ALBUTEROL SULFATE 1 AMPULE: .5; 3 SOLUTION RESPIRATORY (INHALATION) at 11:52

## 2018-12-19 RX ADMIN — FUROSEMIDE 40 MG: 40 TABLET ORAL at 08:14

## 2018-12-19 RX ADMIN — Medication 10 ML: at 08:15

## 2018-12-19 RX ADMIN — Medication 10 ML: at 20:41

## 2018-12-19 RX ADMIN — FLUTICASONE PROPIONATE 2 SPRAY: 50 SPRAY, METERED NASAL at 08:17

## 2018-12-19 RX ADMIN — OXYCODONE HYDROCHLORIDE 40 MG: 40 TABLET, FILM COATED, EXTENDED RELEASE ORAL at 06:09

## 2018-12-19 RX ADMIN — CARVEDILOL 12.5 MG: 12.5 TABLET, FILM COATED ORAL at 16:50

## 2018-12-19 RX ADMIN — INSULIN GLARGINE 15 UNITS: 100 INJECTION, SOLUTION SUBCUTANEOUS at 20:36

## 2018-12-19 RX ADMIN — WARFARIN SODIUM 10 MG: 5 TABLET ORAL at 17:51

## 2018-12-19 RX ADMIN — GABAPENTIN 800 MG: 400 CAPSULE ORAL at 12:31

## 2018-12-19 RX ADMIN — DOCUSATE SODIUM 100 MG: 100 CAPSULE, LIQUID FILLED ORAL at 20:36

## 2018-12-19 RX ADMIN — AZITHROMYCIN MONOHYDRATE 500 MG: 500 INJECTION, POWDER, LYOPHILIZED, FOR SOLUTION INTRAVENOUS at 20:35

## 2018-12-19 RX ADMIN — INSULIN LISPRO 6 UNITS: 100 INJECTION, SOLUTION INTRAVENOUS; SUBCUTANEOUS at 12:21

## 2018-12-19 RX ADMIN — INSULIN LISPRO 5 UNITS: 100 INJECTION, SOLUTION INTRAVENOUS; SUBCUTANEOUS at 08:18

## 2018-12-19 RX ADMIN — LISINOPRIL 10 MG: 10 TABLET ORAL at 08:15

## 2018-12-19 RX ADMIN — OXYCODONE HYDROCHLORIDE 40 MG: 40 TABLET, FILM COATED, EXTENDED RELEASE ORAL at 14:05

## 2018-12-19 RX ADMIN — MOMETASONE FUROATE AND FORMOTEROL FUMARATE DIHYDRATE 2 PUFF: 200; 5 AEROSOL RESPIRATORY (INHALATION) at 20:58

## 2018-12-19 RX ADMIN — CARVEDILOL 12.5 MG: 12.5 TABLET, FILM COATED ORAL at 08:14

## 2018-12-19 RX ADMIN — SIMVASTATIN 40 MG: 40 TABLET, FILM COATED ORAL at 20:35

## 2018-12-19 RX ADMIN — INSULIN LISPRO 12 UNITS: 100 INJECTION, SOLUTION INTRAVENOUS; SUBCUTANEOUS at 16:50

## 2018-12-19 RX ADMIN — CEFTRIAXONE 1 G: 1 INJECTION, POWDER, FOR SOLUTION INTRAMUSCULAR; INTRAVENOUS at 20:35

## 2018-12-19 RX ADMIN — Medication 10 ML: at 23:11

## 2018-12-19 RX ADMIN — GABAPENTIN 800 MG: 400 CAPSULE ORAL at 20:35

## 2018-12-19 RX ADMIN — INSULIN LISPRO 4 UNITS: 100 INJECTION, SOLUTION INTRAVENOUS; SUBCUTANEOUS at 08:17

## 2018-12-19 RX ADMIN — ASPIRIN 81 MG 81 MG: 81 TABLET ORAL at 08:15

## 2018-12-19 RX ADMIN — GABAPENTIN 800 MG: 400 CAPSULE ORAL at 16:49

## 2018-12-19 ASSESSMENT — PAIN DESCRIPTION - LOCATION
LOCATION: BACK;LEG
LOCATION: LEG;BACK

## 2018-12-19 ASSESSMENT — PAIN DESCRIPTION - PAIN TYPE
TYPE: CHRONIC PAIN;ACUTE PAIN
TYPE: CHRONIC PAIN
TYPE: CHRONIC PAIN

## 2018-12-19 ASSESSMENT — PAIN SCALES - GENERAL
PAINLEVEL_OUTOF10: 3
PAINLEVEL_OUTOF10: 8
PAINLEVEL_OUTOF10: 9
PAINLEVEL_OUTOF10: 3
PAINLEVEL_OUTOF10: 10
PAINLEVEL_OUTOF10: 3
PAINLEVEL_OUTOF10: 8
PAINLEVEL_OUTOF10: 7
PAINLEVEL_OUTOF10: 3
PAINLEVEL_OUTOF10: 0

## 2018-12-19 ASSESSMENT — PAIN DESCRIPTION - ORIENTATION
ORIENTATION: RIGHT
ORIENTATION: RIGHT

## 2018-12-20 LAB
ANION GAP SERPL CALCULATED.3IONS-SCNC: 10 MMOL/L (ref 3–16)
BUN BLDV-MCNC: 21 MG/DL (ref 7–20)
CALCIUM SERPL-MCNC: 8.8 MG/DL (ref 8.3–10.6)
CHLORIDE BLD-SCNC: 94 MMOL/L (ref 99–110)
CO2: 29 MMOL/L (ref 21–32)
CREAT SERPL-MCNC: 1 MG/DL (ref 0.8–1.3)
GFR AFRICAN AMERICAN: >60
GFR NON-AFRICAN AMERICAN: >60
GLUCOSE BLD-MCNC: 268 MG/DL (ref 70–99)
GLUCOSE BLD-MCNC: 276 MG/DL (ref 70–99)
GLUCOSE BLD-MCNC: 282 MG/DL (ref 70–99)
GLUCOSE BLD-MCNC: 347 MG/DL (ref 70–99)
GLUCOSE BLD-MCNC: 417 MG/DL (ref 70–99)
HCT VFR BLD CALC: 39.4 % (ref 40.5–52.5)
HEMOGLOBIN: 13.2 G/DL (ref 13.5–17.5)
INR BLD: 1.78 (ref 0.86–1.14)
MCH RBC QN AUTO: 30.8 PG (ref 26–34)
MCHC RBC AUTO-ENTMCNC: 33.6 G/DL (ref 31–36)
MCV RBC AUTO: 91.6 FL (ref 80–100)
PDW BLD-RTO: 13.8 % (ref 12.4–15.4)
PERFORMED ON: ABNORMAL
PLATELET # BLD: 239 K/UL (ref 135–450)
PMV BLD AUTO: 6.9 FL (ref 5–10.5)
POTASSIUM SERPL-SCNC: 4.6 MMOL/L (ref 3.5–5.1)
PROTHROMBIN TIME: 20.3 SEC (ref 9.8–13)
RBC # BLD: 4.3 M/UL (ref 4.2–5.9)
SODIUM BLD-SCNC: 133 MMOL/L (ref 136–145)
WBC # BLD: 8.3 K/UL (ref 4–11)

## 2018-12-20 PROCEDURE — 97530 THERAPEUTIC ACTIVITIES: CPT

## 2018-12-20 PROCEDURE — 94640 AIRWAY INHALATION TREATMENT: CPT

## 2018-12-20 PROCEDURE — 85027 COMPLETE CBC AUTOMATED: CPT

## 2018-12-20 PROCEDURE — 1200000000 HC SEMI PRIVATE

## 2018-12-20 PROCEDURE — 6370000000 HC RX 637 (ALT 250 FOR IP): Performed by: INTERNAL MEDICINE

## 2018-12-20 PROCEDURE — 36415 COLL VENOUS BLD VENIPUNCTURE: CPT

## 2018-12-20 PROCEDURE — 2580000003 HC RX 258: Performed by: HOSPITALIST

## 2018-12-20 PROCEDURE — 85610 PROTHROMBIN TIME: CPT

## 2018-12-20 PROCEDURE — 97116 GAIT TRAINING THERAPY: CPT

## 2018-12-20 PROCEDURE — 6370000000 HC RX 637 (ALT 250 FOR IP): Performed by: HOSPITALIST

## 2018-12-20 PROCEDURE — 80048 BASIC METABOLIC PNL TOTAL CA: CPT

## 2018-12-20 PROCEDURE — 94664 DEMO&/EVAL PT USE INHALER: CPT

## 2018-12-20 PROCEDURE — 6360000002 HC RX W HCPCS: Performed by: HOSPITALIST

## 2018-12-20 PROCEDURE — 94760 N-INVAS EAR/PLS OXIMETRY 1: CPT

## 2018-12-20 RX ORDER — WARFARIN SODIUM 5 MG/1
10 TABLET ORAL
Status: COMPLETED | OUTPATIENT
Start: 2018-12-20 | End: 2018-12-20

## 2018-12-20 RX ORDER — DEXTROSE MONOHYDRATE 25 G/50ML
12.5 INJECTION, SOLUTION INTRAVENOUS PRN
Status: DISCONTINUED | OUTPATIENT
Start: 2018-12-20 | End: 2018-12-21 | Stop reason: HOSPADM

## 2018-12-20 RX ORDER — DEXTROSE MONOHYDRATE 50 MG/ML
100 INJECTION, SOLUTION INTRAVENOUS PRN
Status: DISCONTINUED | OUTPATIENT
Start: 2018-12-20 | End: 2018-12-21 | Stop reason: HOSPADM

## 2018-12-20 RX ORDER — NICOTINE POLACRILEX 4 MG
15 LOZENGE BUCCAL PRN
Status: DISCONTINUED | OUTPATIENT
Start: 2018-12-20 | End: 2018-12-21 | Stop reason: HOSPADM

## 2018-12-20 RX ORDER — GUAIFENESIN 600 MG/1
600 TABLET, EXTENDED RELEASE ORAL 2 TIMES DAILY
Status: DISCONTINUED | OUTPATIENT
Start: 2018-12-20 | End: 2018-12-21 | Stop reason: HOSPADM

## 2018-12-20 RX ORDER — POLYETHYLENE GLYCOL 3350 17 G/17G
17 POWDER, FOR SOLUTION ORAL DAILY
Status: DISCONTINUED | OUTPATIENT
Start: 2018-12-20 | End: 2018-12-21 | Stop reason: HOSPADM

## 2018-12-20 RX ADMIN — FLUTICASONE PROPIONATE 2 SPRAY: 50 SPRAY, METERED NASAL at 20:49

## 2018-12-20 RX ADMIN — CARVEDILOL 12.5 MG: 12.5 TABLET, FILM COATED ORAL at 08:17

## 2018-12-20 RX ADMIN — Medication 10 ML: at 08:22

## 2018-12-20 RX ADMIN — ASPIRIN 81 MG 81 MG: 81 TABLET ORAL at 08:17

## 2018-12-20 RX ADMIN — GABAPENTIN 800 MG: 400 CAPSULE ORAL at 08:17

## 2018-12-20 RX ADMIN — IPRATROPIUM BROMIDE AND ALBUTEROL SULFATE 1 AMPULE: .5; 3 SOLUTION RESPIRATORY (INHALATION) at 15:48

## 2018-12-20 RX ADMIN — GABAPENTIN 800 MG: 400 CAPSULE ORAL at 20:50

## 2018-12-20 RX ADMIN — IPRATROPIUM BROMIDE AND ALBUTEROL SULFATE 1 AMPULE: .5; 3 SOLUTION RESPIRATORY (INHALATION) at 12:14

## 2018-12-20 RX ADMIN — IPRATROPIUM BROMIDE AND ALBUTEROL SULFATE 1 AMPULE: .5; 3 SOLUTION RESPIRATORY (INHALATION) at 08:31

## 2018-12-20 RX ADMIN — INSULIN LISPRO 6 UNITS: 100 INJECTION, SOLUTION INTRAVENOUS; SUBCUTANEOUS at 07:57

## 2018-12-20 RX ADMIN — GABAPENTIN 800 MG: 400 CAPSULE ORAL at 13:08

## 2018-12-20 RX ADMIN — INSULIN LISPRO 5 UNITS: 100 INJECTION, SOLUTION INTRAVENOUS; SUBCUTANEOUS at 18:03

## 2018-12-20 RX ADMIN — OXYCODONE HYDROCHLORIDE 40 MG: 40 TABLET, FILM COATED, EXTENDED RELEASE ORAL at 05:54

## 2018-12-20 RX ADMIN — GABAPENTIN 800 MG: 400 CAPSULE ORAL at 16:00

## 2018-12-20 RX ADMIN — INSULIN LISPRO 5 UNITS: 100 INJECTION, SOLUTION INTRAVENOUS; SUBCUTANEOUS at 20:55

## 2018-12-20 RX ADMIN — GUAIFENESIN 600 MG: 600 TABLET, EXTENDED RELEASE ORAL at 20:50

## 2018-12-20 RX ADMIN — OXYCODONE HYDROCHLORIDE 40 MG: 40 TABLET, FILM COATED, EXTENDED RELEASE ORAL at 21:44

## 2018-12-20 RX ADMIN — FUROSEMIDE 40 MG: 40 TABLET ORAL at 08:17

## 2018-12-20 RX ADMIN — INSULIN LISPRO 5 UNITS: 100 INJECTION, SOLUTION INTRAVENOUS; SUBCUTANEOUS at 13:13

## 2018-12-20 RX ADMIN — FUROSEMIDE 40 MG: 40 TABLET ORAL at 17:11

## 2018-12-20 RX ADMIN — IPRATROPIUM BROMIDE AND ALBUTEROL SULFATE 1 AMPULE: .5; 3 SOLUTION RESPIRATORY (INHALATION) at 20:37

## 2018-12-20 RX ADMIN — FLUTICASONE PROPIONATE 2 SPRAY: 50 SPRAY, METERED NASAL at 08:21

## 2018-12-20 RX ADMIN — DOCUSATE SODIUM 100 MG: 100 CAPSULE, LIQUID FILLED ORAL at 08:17

## 2018-12-20 RX ADMIN — WARFARIN SODIUM 10 MG: 5 TABLET ORAL at 17:11

## 2018-12-20 RX ADMIN — CARVEDILOL 12.5 MG: 12.5 TABLET, FILM COATED ORAL at 17:10

## 2018-12-20 RX ADMIN — INSULIN GLARGINE 15 UNITS: 100 INJECTION, SOLUTION SUBCUTANEOUS at 20:56

## 2018-12-20 RX ADMIN — AZITHROMYCIN MONOHYDRATE 500 MG: 500 INJECTION, POWDER, LYOPHILIZED, FOR SOLUTION INTRAVENOUS at 21:21

## 2018-12-20 RX ADMIN — POLYETHYLENE GLYCOL 3350 17 G: 17 POWDER, FOR SOLUTION ORAL at 13:07

## 2018-12-20 RX ADMIN — LISINOPRIL 10 MG: 10 TABLET ORAL at 08:21

## 2018-12-20 RX ADMIN — INSULIN LISPRO 9 UNITS: 100 INJECTION, SOLUTION INTRAVENOUS; SUBCUTANEOUS at 17:59

## 2018-12-20 RX ADMIN — CEFTRIAXONE 1 G: 1 INJECTION, POWDER, FOR SOLUTION INTRAMUSCULAR; INTRAVENOUS at 20:48

## 2018-12-20 RX ADMIN — MOMETASONE FUROATE AND FORMOTEROL FUMARATE DIHYDRATE 2 PUFF: 200; 5 AEROSOL RESPIRATORY (INHALATION) at 20:37

## 2018-12-20 RX ADMIN — Medication 10 ML: at 20:49

## 2018-12-20 RX ADMIN — INSULIN LISPRO 5 UNITS: 100 INJECTION, SOLUTION INTRAVENOUS; SUBCUTANEOUS at 07:59

## 2018-12-20 RX ADMIN — OXYCODONE HYDROCHLORIDE 40 MG: 40 TABLET, FILM COATED, EXTENDED RELEASE ORAL at 13:08

## 2018-12-20 RX ADMIN — SIMVASTATIN 40 MG: 40 TABLET, FILM COATED ORAL at 20:50

## 2018-12-20 RX ADMIN — MOMETASONE FUROATE AND FORMOTEROL FUMARATE DIHYDRATE 2 PUFF: 200; 5 AEROSOL RESPIRATORY (INHALATION) at 08:32

## 2018-12-20 RX ADMIN — INSULIN LISPRO 12 UNITS: 100 INJECTION, SOLUTION INTRAVENOUS; SUBCUTANEOUS at 12:08

## 2018-12-20 ASSESSMENT — PAIN SCALES - GENERAL
PAINLEVEL_OUTOF10: 8
PAINLEVEL_OUTOF10: 3
PAINLEVEL_OUTOF10: 0
PAINLEVEL_OUTOF10: 7

## 2018-12-20 ASSESSMENT — PAIN DESCRIPTION - LOCATION: LOCATION: BACK

## 2018-12-20 ASSESSMENT — PAIN DESCRIPTION - PAIN TYPE: TYPE: CHRONIC PAIN

## 2018-12-20 ASSESSMENT — PAIN DESCRIPTION - ORIENTATION: ORIENTATION: RIGHT

## 2018-12-21 VITALS
TEMPERATURE: 98.3 F | DIASTOLIC BLOOD PRESSURE: 70 MMHG | WEIGHT: 264.55 LBS | SYSTOLIC BLOOD PRESSURE: 114 MMHG | OXYGEN SATURATION: 96 % | HEIGHT: 71 IN | BODY MASS INDEX: 37.04 KG/M2 | RESPIRATION RATE: 18 BRPM | HEART RATE: 77 BPM

## 2018-12-21 LAB
ANION GAP SERPL CALCULATED.3IONS-SCNC: 13 MMOL/L (ref 3–16)
BUN BLDV-MCNC: 17 MG/DL (ref 7–20)
CALCIUM SERPL-MCNC: 8.8 MG/DL (ref 8.3–10.6)
CHLORIDE BLD-SCNC: 96 MMOL/L (ref 99–110)
CO2: 28 MMOL/L (ref 21–32)
CREAT SERPL-MCNC: 0.9 MG/DL (ref 0.8–1.3)
CULTURE, RESPIRATORY: ABNORMAL
CULTURE, RESPIRATORY: ABNORMAL
GFR AFRICAN AMERICAN: >60
GFR NON-AFRICAN AMERICAN: >60
GLUCOSE BLD-MCNC: 242 MG/DL (ref 70–99)
GLUCOSE BLD-MCNC: 290 MG/DL (ref 70–99)
GLUCOSE BLD-MCNC: 325 MG/DL (ref 70–99)
GRAM STAIN RESULT: ABNORMAL
HCT VFR BLD CALC: 39.2 % (ref 40.5–52.5)
HEMOGLOBIN: 13.1 G/DL (ref 13.5–17.5)
INR BLD: 2.05 (ref 0.86–1.14)
MCH RBC QN AUTO: 30.8 PG (ref 26–34)
MCHC RBC AUTO-ENTMCNC: 33.4 G/DL (ref 31–36)
MCV RBC AUTO: 92.1 FL (ref 80–100)
ORGANISM: ABNORMAL
PDW BLD-RTO: 13.4 % (ref 12.4–15.4)
PERFORMED ON: ABNORMAL
PERFORMED ON: ABNORMAL
PLATELET # BLD: 254 K/UL (ref 135–450)
PMV BLD AUTO: 6.7 FL (ref 5–10.5)
POTASSIUM SERPL-SCNC: 4.2 MMOL/L (ref 3.5–5.1)
PROTHROMBIN TIME: 23.4 SEC (ref 9.8–13)
RBC # BLD: 4.25 M/UL (ref 4.2–5.9)
SODIUM BLD-SCNC: 137 MMOL/L (ref 136–145)
WBC # BLD: 7.9 K/UL (ref 4–11)

## 2018-12-21 PROCEDURE — 6370000000 HC RX 637 (ALT 250 FOR IP): Performed by: HOSPITALIST

## 2018-12-21 PROCEDURE — 85027 COMPLETE CBC AUTOMATED: CPT

## 2018-12-21 PROCEDURE — 6370000000 HC RX 637 (ALT 250 FOR IP): Performed by: INTERNAL MEDICINE

## 2018-12-21 PROCEDURE — 80048 BASIC METABOLIC PNL TOTAL CA: CPT

## 2018-12-21 PROCEDURE — 2580000003 HC RX 258: Performed by: HOSPITALIST

## 2018-12-21 PROCEDURE — 94640 AIRWAY INHALATION TREATMENT: CPT

## 2018-12-21 PROCEDURE — 36415 COLL VENOUS BLD VENIPUNCTURE: CPT

## 2018-12-21 PROCEDURE — 85610 PROTHROMBIN TIME: CPT

## 2018-12-21 PROCEDURE — 94760 N-INVAS EAR/PLS OXIMETRY 1: CPT

## 2018-12-21 RX ORDER — CIPROFLOXACIN 500 MG/1
500 TABLET, FILM COATED ORAL 2 TIMES DAILY
Qty: 10 TABLET | Refills: 0 | Status: SHIPPED | OUTPATIENT
Start: 2018-12-21 | End: 2018-12-26

## 2018-12-21 RX ORDER — GUAIFENESIN/DEXTROMETHORPHAN 100-10MG/5
5 SYRUP ORAL 3 TIMES DAILY PRN
Qty: 120 ML | Refills: 0 | Status: SHIPPED | OUTPATIENT
Start: 2018-12-21 | End: 2018-12-31

## 2018-12-21 RX ORDER — AZITHROMYCIN 500 MG/1
500 TABLET, FILM COATED ORAL EVERY 24 HOURS
Status: DISCONTINUED | OUTPATIENT
Start: 2018-12-21 | End: 2018-12-21 | Stop reason: HOSPADM

## 2018-12-21 RX ORDER — WARFARIN SODIUM 7.5 MG/1
7.5 TABLET ORAL
Status: DISCONTINUED | OUTPATIENT
Start: 2018-12-21 | End: 2018-12-21 | Stop reason: HOSPADM

## 2018-12-21 RX ADMIN — INSULIN LISPRO 5 UNITS: 100 INJECTION, SOLUTION INTRAVENOUS; SUBCUTANEOUS at 09:13

## 2018-12-21 RX ADMIN — CARVEDILOL 12.5 MG: 12.5 TABLET, FILM COATED ORAL at 09:09

## 2018-12-21 RX ADMIN — OXYCODONE HYDROCHLORIDE 40 MG: 40 TABLET, FILM COATED, EXTENDED RELEASE ORAL at 05:56

## 2018-12-21 RX ADMIN — ASPIRIN 81 MG 81 MG: 81 TABLET ORAL at 09:08

## 2018-12-21 RX ADMIN — FLUTICASONE PROPIONATE 2 SPRAY: 50 SPRAY, METERED NASAL at 09:09

## 2018-12-21 RX ADMIN — GUAIFENESIN 600 MG: 600 TABLET, EXTENDED RELEASE ORAL at 09:08

## 2018-12-21 RX ADMIN — POLYETHYLENE GLYCOL 3350 17 G: 17 POWDER, FOR SOLUTION ORAL at 09:08

## 2018-12-21 RX ADMIN — IPRATROPIUM BROMIDE AND ALBUTEROL SULFATE 1 AMPULE: .5; 3 SOLUTION RESPIRATORY (INHALATION) at 08:28

## 2018-12-21 RX ADMIN — LISINOPRIL 10 MG: 10 TABLET ORAL at 09:08

## 2018-12-21 RX ADMIN — Medication 10 ML: at 09:09

## 2018-12-21 RX ADMIN — GABAPENTIN 800 MG: 400 CAPSULE ORAL at 11:55

## 2018-12-21 RX ADMIN — INSULIN LISPRO 9 UNITS: 100 INJECTION, SOLUTION INTRAVENOUS; SUBCUTANEOUS at 09:11

## 2018-12-21 RX ADMIN — GABAPENTIN 800 MG: 400 CAPSULE ORAL at 09:08

## 2018-12-21 RX ADMIN — MOMETASONE FUROATE AND FORMOTEROL FUMARATE DIHYDRATE 2 PUFF: 200; 5 AEROSOL RESPIRATORY (INHALATION) at 08:28

## 2018-12-21 RX ADMIN — DOCUSATE SODIUM 100 MG: 100 CAPSULE, LIQUID FILLED ORAL at 09:08

## 2018-12-21 RX ADMIN — FUROSEMIDE 40 MG: 40 TABLET ORAL at 09:09

## 2018-12-21 ASSESSMENT — PAIN SCALES - GENERAL: PAINLEVEL_OUTOF10: 7

## 2018-12-22 ENCOUNTER — CARE COORDINATION (OUTPATIENT)
Dept: CASE MANAGEMENT | Age: 73
End: 2018-12-22

## 2018-12-23 LAB
BLOOD CULTURE, ROUTINE: NORMAL
CULTURE, BLOOD 2: NORMAL

## 2019-01-01 ENCOUNTER — APPOINTMENT (OUTPATIENT)
Dept: GENERAL RADIOLOGY | Age: 74
End: 2019-01-01
Payer: MEDICARE

## 2019-01-01 ENCOUNTER — HOSPITAL ENCOUNTER (EMERGENCY)
Age: 74
Discharge: HOME | End: 2019-01-01
Attending: EMERGENCY MEDICINE
Payer: MEDICARE

## 2019-01-01 ENCOUNTER — TELEPHONE (OUTPATIENT)
Dept: OTHER | Facility: CLINIC | Age: 74
End: 2019-01-01

## 2019-01-01 VITALS
HEIGHT: 71 IN | HEART RATE: 74 BPM | DIASTOLIC BLOOD PRESSURE: 63 MMHG | BODY MASS INDEX: 36.33 KG/M2 | RESPIRATION RATE: 24 BRPM | OXYGEN SATURATION: 90 % | TEMPERATURE: 98.4 F | WEIGHT: 259.48 LBS | SYSTOLIC BLOOD PRESSURE: 114 MMHG

## 2019-01-01 DIAGNOSIS — J44.1 ACUTE EXACERBATION OF CHRONIC OBSTRUCTIVE PULMONARY DISEASE (COPD) (HCC): Primary | ICD-10-CM

## 2019-01-01 LAB
A/G RATIO: 1.3 (ref 1.1–2.2)
ALBUMIN SERPL-MCNC: 3.7 G/DL (ref 3.4–5)
ALP BLD-CCNC: 55 U/L (ref 40–129)
ALT SERPL-CCNC: 19 U/L (ref 10–40)
ANION GAP SERPL CALCULATED.3IONS-SCNC: 12 MMOL/L (ref 3–16)
AST SERPL-CCNC: 15 U/L (ref 15–37)
BASE EXCESS VENOUS: 3.9 MMOL/L
BASOPHILS ABSOLUTE: 0.1 K/UL (ref 0–0.2)
BASOPHILS RELATIVE PERCENT: 1.1 %
BILIRUB SERPL-MCNC: 0.4 MG/DL (ref 0–1)
BILIRUBIN URINE: NEGATIVE
BLOOD, URINE: NEGATIVE
BUN BLDV-MCNC: 15 MG/DL (ref 7–20)
CALCIUM SERPL-MCNC: 8.8 MG/DL (ref 8.3–10.6)
CARBOXYHEMOGLOBIN: 3 %
CHLORIDE BLD-SCNC: 97 MMOL/L (ref 99–110)
CLARITY: ABNORMAL
CO2: 27 MMOL/L (ref 21–32)
COLOR: YELLOW
CREAT SERPL-MCNC: 1 MG/DL (ref 0.8–1.3)
EKG ATRIAL RATE: 74 BPM
EKG DIAGNOSIS: NORMAL
EKG P AXIS: 10 DEGREES
EKG P-R INTERVAL: 196 MS
EKG Q-T INTERVAL: 392 MS
EKG QRS DURATION: 78 MS
EKG QTC CALCULATION (BAZETT): 435 MS
EKG R AXIS: 13 DEGREES
EKG T AXIS: 222 DEGREES
EKG VENTRICULAR RATE: 74 BPM
EOSINOPHILS ABSOLUTE: 0.2 K/UL (ref 0–0.6)
EOSINOPHILS RELATIVE PERCENT: 2.3 %
EPITHELIAL CELLS, UA: 0 /HPF (ref 0–5)
GFR AFRICAN AMERICAN: >60
GFR NON-AFRICAN AMERICAN: >60
GLOBULIN: 2.9 G/DL
GLUCOSE BLD-MCNC: 132 MG/DL (ref 70–99)
GLUCOSE URINE: NEGATIVE MG/DL
HCO3 VENOUS: 29 MMOL/L (ref 23–29)
HCT VFR BLD CALC: 37.1 % (ref 40.5–52.5)
HEMOGLOBIN: 12.4 G/DL (ref 13.5–17.5)
HYALINE CASTS: 0 /LPF (ref 0–8)
INR BLD: 2.34 (ref 0.86–1.14)
KETONES, URINE: NEGATIVE MG/DL
LACTIC ACID: 1.5 MMOL/L (ref 0.4–2)
LEUKOCYTE ESTERASE, URINE: NEGATIVE
LYMPHOCYTES ABSOLUTE: 1.3 K/UL (ref 1–5.1)
LYMPHOCYTES RELATIVE PERCENT: 13.8 %
MCH RBC QN AUTO: 30.6 PG (ref 26–34)
MCHC RBC AUTO-ENTMCNC: 33.4 G/DL (ref 31–36)
MCV RBC AUTO: 91.4 FL (ref 80–100)
METHEMOGLOBIN VENOUS: 0.8 %
MICROSCOPIC EXAMINATION: YES
MONOCYTES ABSOLUTE: 0.9 K/UL (ref 0–1.3)
MONOCYTES RELATIVE PERCENT: 9.6 %
NEUTROPHILS ABSOLUTE: 6.9 K/UL (ref 1.7–7.7)
NEUTROPHILS RELATIVE PERCENT: 73.2 %
NITRITE, URINE: NEGATIVE
O2 CONTENT, VEN: 17 ML/DL
O2 SAT, VEN: 99 %
O2 THERAPY: NORMAL
PCO2, VEN: 46.2 MMHG (ref 40–50)
PDW BLD-RTO: 13.9 % (ref 12.4–15.4)
PH UA: 7.5
PH VENOUS: 7.41 (ref 7.35–7.45)
PLATELET # BLD: 266 K/UL (ref 135–450)
PMV BLD AUTO: 6.7 FL (ref 5–10.5)
PO2, VEN: 131 MMHG
POTASSIUM SERPL-SCNC: 4.3 MMOL/L (ref 3.5–5.1)
PRO-BNP: 232 PG/ML (ref 0–124)
PROTEIN UA: NEGATIVE MG/DL
PROTHROMBIN TIME: 26.7 SEC (ref 9.8–13)
RAPID INFLUENZA  B AGN: NEGATIVE
RAPID INFLUENZA A AGN: NEGATIVE
RBC # BLD: 4.05 M/UL (ref 4.2–5.9)
RBC UA: 0 /HPF (ref 0–4)
SODIUM BLD-SCNC: 136 MMOL/L (ref 136–145)
SPECIFIC GRAVITY UA: 1.01
TCO2 CALC VENOUS: 30 MMOL/L
TOTAL PROTEIN: 6.6 G/DL (ref 6.4–8.2)
TROPONIN: <0.01 NG/ML
URINE REFLEX TO CULTURE: ABNORMAL
URINE TYPE: ABNORMAL
UROBILINOGEN, URINE: 0.2 E.U./DL
WBC # BLD: 9.5 K/UL (ref 4–11)
WBC UA: 0 /HPF (ref 0–5)

## 2019-01-01 PROCEDURE — 71046 X-RAY EXAM CHEST 2 VIEWS: CPT

## 2019-01-01 PROCEDURE — 83605 ASSAY OF LACTIC ACID: CPT

## 2019-01-01 PROCEDURE — 36415 COLL VENOUS BLD VENIPUNCTURE: CPT

## 2019-01-01 PROCEDURE — 96374 THER/PROPH/DIAG INJ IV PUSH: CPT

## 2019-01-01 PROCEDURE — 82803 BLOOD GASES ANY COMBINATION: CPT

## 2019-01-01 PROCEDURE — 94640 AIRWAY INHALATION TREATMENT: CPT

## 2019-01-01 PROCEDURE — 99285 EMERGENCY DEPT VISIT HI MDM: CPT

## 2019-01-01 PROCEDURE — 6360000002 HC RX W HCPCS: Performed by: PHYSICIAN ASSISTANT

## 2019-01-01 PROCEDURE — 94762 N-INVAS EAR/PLS OXIMTRY CONT: CPT

## 2019-01-01 PROCEDURE — 93010 ELECTROCARDIOGRAM REPORT: CPT | Performed by: INTERNAL MEDICINE

## 2019-01-01 PROCEDURE — 93005 ELECTROCARDIOGRAM TRACING: CPT | Performed by: PHYSICIAN ASSISTANT

## 2019-01-01 PROCEDURE — 83880 ASSAY OF NATRIURETIC PEPTIDE: CPT

## 2019-01-01 PROCEDURE — 87804 INFLUENZA ASSAY W/OPTIC: CPT

## 2019-01-01 PROCEDURE — 85610 PROTHROMBIN TIME: CPT

## 2019-01-01 PROCEDURE — 6370000000 HC RX 637 (ALT 250 FOR IP): Performed by: PHYSICIAN ASSISTANT

## 2019-01-01 PROCEDURE — 85025 COMPLETE CBC W/AUTO DIFF WBC: CPT

## 2019-01-01 PROCEDURE — 84484 ASSAY OF TROPONIN QUANT: CPT

## 2019-01-01 PROCEDURE — 81001 URINALYSIS AUTO W/SCOPE: CPT

## 2019-01-01 PROCEDURE — 87040 BLOOD CULTURE FOR BACTERIA: CPT

## 2019-01-01 PROCEDURE — 94664 DEMO&/EVAL PT USE INHALER: CPT

## 2019-01-01 PROCEDURE — 80053 COMPREHEN METABOLIC PANEL: CPT

## 2019-01-01 RX ORDER — METHYLPREDNISOLONE SODIUM SUCCINATE 125 MG/2ML
125 INJECTION, POWDER, LYOPHILIZED, FOR SOLUTION INTRAMUSCULAR; INTRAVENOUS ONCE
Status: COMPLETED | OUTPATIENT
Start: 2019-01-01 | End: 2019-01-01

## 2019-01-01 RX ORDER — DOXYCYCLINE 100 MG/1
100 TABLET ORAL 2 TIMES DAILY
Qty: 20 TABLET | Refills: 0 | Status: ON HOLD | OUTPATIENT
Start: 2019-01-01 | End: 2019-01-11 | Stop reason: HOSPADM

## 2019-01-01 RX ORDER — PREDNISONE 50 MG/1
50 TABLET ORAL DAILY
Qty: 55 TABLET | Refills: 0 | Status: SHIPPED | OUTPATIENT
Start: 2019-01-01 | End: 2019-01-06

## 2019-01-01 RX ORDER — IPRATROPIUM BROMIDE AND ALBUTEROL SULFATE 2.5; .5 MG/3ML; MG/3ML
1 SOLUTION RESPIRATORY (INHALATION) ONCE
Status: COMPLETED | OUTPATIENT
Start: 2019-01-01 | End: 2019-01-01

## 2019-01-01 RX ORDER — ALBUTEROL SULFATE 2.5 MG/3ML
5 SOLUTION RESPIRATORY (INHALATION) ONCE
Status: COMPLETED | OUTPATIENT
Start: 2019-01-01 | End: 2019-01-01

## 2019-01-01 RX ADMIN — ALBUTEROL SULFATE 5 MG: 2.5 SOLUTION RESPIRATORY (INHALATION) at 11:29

## 2019-01-01 RX ADMIN — METHYLPREDNISOLONE SODIUM SUCCINATE 125 MG: 125 INJECTION, POWDER, FOR SOLUTION INTRAMUSCULAR; INTRAVENOUS at 12:11

## 2019-01-01 RX ADMIN — IPRATROPIUM BROMIDE AND ALBUTEROL SULFATE 1 AMPULE: .5; 3 SOLUTION RESPIRATORY (INHALATION) at 11:29

## 2019-01-02 ENCOUNTER — TELEPHONE (OUTPATIENT)
Dept: OTHER | Facility: CLINIC | Age: 74
End: 2019-01-02

## 2019-01-06 LAB
BLOOD CULTURE, ROUTINE: NORMAL
CULTURE, BLOOD 2: NORMAL

## 2019-01-07 ENCOUNTER — HOSPITAL ENCOUNTER (INPATIENT)
Age: 74
LOS: 4 days | Discharge: HOME HEALTH | DRG: 871 | End: 2019-01-11
Attending: EMERGENCY MEDICINE | Admitting: INTERNAL MEDICINE
Payer: MEDICARE

## 2019-01-07 ENCOUNTER — APPOINTMENT (OUTPATIENT)
Dept: GENERAL RADIOLOGY | Age: 74
DRG: 871 | End: 2019-01-07
Payer: MEDICARE

## 2019-01-07 ENCOUNTER — TELEPHONE (OUTPATIENT)
Dept: PULMONOLOGY | Age: 74
End: 2019-01-07

## 2019-01-07 ENCOUNTER — TELEPHONE (OUTPATIENT)
Dept: OTHER | Facility: CLINIC | Age: 74
End: 2019-01-07

## 2019-01-07 ENCOUNTER — APPOINTMENT (OUTPATIENT)
Dept: CT IMAGING | Age: 74
DRG: 871 | End: 2019-01-07
Payer: MEDICARE

## 2019-01-07 DIAGNOSIS — J44.1 COPD EXACERBATION (HCC): ICD-10-CM

## 2019-01-07 DIAGNOSIS — J18.9 PNEUMONIA DUE TO ORGANISM: Primary | ICD-10-CM

## 2019-01-07 LAB
A/G RATIO: 1.3 (ref 1.1–2.2)
ALBUMIN SERPL-MCNC: 3.9 G/DL (ref 3.4–5)
ALP BLD-CCNC: 55 U/L (ref 40–129)
ALT SERPL-CCNC: 19 U/L (ref 10–40)
ANION GAP SERPL CALCULATED.3IONS-SCNC: 14 MMOL/L (ref 3–16)
AST SERPL-CCNC: 15 U/L (ref 15–37)
BASOPHILS ABSOLUTE: 0.1 K/UL (ref 0–0.2)
BASOPHILS RELATIVE PERCENT: 0.5 %
BILIRUB SERPL-MCNC: 0.5 MG/DL (ref 0–1)
BUN BLDV-MCNC: 19 MG/DL (ref 7–20)
CALCIUM SERPL-MCNC: 8.7 MG/DL (ref 8.3–10.6)
CHLORIDE BLD-SCNC: 92 MMOL/L (ref 99–110)
CO2: 28 MMOL/L (ref 21–32)
CREAT SERPL-MCNC: 1 MG/DL (ref 0.8–1.3)
EOSINOPHILS ABSOLUTE: 0.1 K/UL (ref 0–0.6)
EOSINOPHILS RELATIVE PERCENT: 1.1 %
GFR AFRICAN AMERICAN: >60
GFR NON-AFRICAN AMERICAN: >60
GLOBULIN: 3.1 G/DL
GLUCOSE BLD-MCNC: 132 MG/DL (ref 70–99)
GLUCOSE BLD-MCNC: 347 MG/DL (ref 70–99)
GLUCOSE BLD-MCNC: 375 MG/DL (ref 70–99)
GLUCOSE BLD-MCNC: 380 MG/DL (ref 70–99)
HCT VFR BLD CALC: 41.8 % (ref 40.5–52.5)
HEMOGLOBIN: 13.9 G/DL (ref 13.5–17.5)
INR BLD: 2.57 (ref 0.86–1.14)
LACTIC ACID: 1.8 MMOL/L (ref 0.4–2)
LYMPHOCYTES ABSOLUTE: 1.3 K/UL (ref 1–5.1)
LYMPHOCYTES RELATIVE PERCENT: 9.3 %
MCH RBC QN AUTO: 30.4 PG (ref 26–34)
MCHC RBC AUTO-ENTMCNC: 33.2 G/DL (ref 31–36)
MCV RBC AUTO: 91.6 FL (ref 80–100)
MONOCYTES ABSOLUTE: 1 K/UL (ref 0–1.3)
MONOCYTES RELATIVE PERCENT: 7.5 %
NEUTROPHILS ABSOLUTE: 11.4 K/UL (ref 1.7–7.7)
NEUTROPHILS RELATIVE PERCENT: 81.6 %
PDW BLD-RTO: 13.9 % (ref 12.4–15.4)
PERFORMED ON: ABNORMAL
PLATELET # BLD: 332 K/UL (ref 135–450)
PMV BLD AUTO: 6.7 FL (ref 5–10.5)
POTASSIUM SERPL-SCNC: 4.4 MMOL/L (ref 3.5–5.1)
PRO-BNP: 218 PG/ML (ref 0–124)
PROTHROMBIN TIME: 29.3 SEC (ref 9.8–13)
RAPID INFLUENZA  B AGN: NEGATIVE
RAPID INFLUENZA A AGN: NEGATIVE
RBC # BLD: 4.56 M/UL (ref 4.2–5.9)
SODIUM BLD-SCNC: 134 MMOL/L (ref 136–145)
TOTAL PROTEIN: 7 G/DL (ref 6.4–8.2)
TROPONIN: <0.01 NG/ML
WBC # BLD: 14 K/UL (ref 4–11)

## 2019-01-07 PROCEDURE — 96361 HYDRATE IV INFUSION ADD-ON: CPT

## 2019-01-07 PROCEDURE — 85025 COMPLETE CBC W/AUTO DIFF WBC: CPT

## 2019-01-07 PROCEDURE — 6370000000 HC RX 637 (ALT 250 FOR IP): Performed by: INTERNAL MEDICINE

## 2019-01-07 PROCEDURE — 96365 THER/PROPH/DIAG IV INF INIT: CPT

## 2019-01-07 PROCEDURE — 96375 TX/PRO/DX INJ NEW DRUG ADDON: CPT

## 2019-01-07 PROCEDURE — 80053 COMPREHEN METABOLIC PANEL: CPT

## 2019-01-07 PROCEDURE — 83880 ASSAY OF NATRIURETIC PEPTIDE: CPT

## 2019-01-07 PROCEDURE — 87641 MR-STAPH DNA AMP PROBE: CPT

## 2019-01-07 PROCEDURE — 84484 ASSAY OF TROPONIN QUANT: CPT

## 2019-01-07 PROCEDURE — 6370000000 HC RX 637 (ALT 250 FOR IP): Performed by: EMERGENCY MEDICINE

## 2019-01-07 PROCEDURE — 2580000003 HC RX 258: Performed by: INTERNAL MEDICINE

## 2019-01-07 PROCEDURE — 94664 DEMO&/EVAL PT USE INHALER: CPT

## 2019-01-07 PROCEDURE — 6360000002 HC RX W HCPCS: Performed by: INTERNAL MEDICINE

## 2019-01-07 PROCEDURE — 87040 BLOOD CULTURE FOR BACTERIA: CPT

## 2019-01-07 PROCEDURE — 2700000000 HC OXYGEN THERAPY PER DAY

## 2019-01-07 PROCEDURE — 85610 PROTHROMBIN TIME: CPT

## 2019-01-07 PROCEDURE — 83605 ASSAY OF LACTIC ACID: CPT

## 2019-01-07 PROCEDURE — 71045 X-RAY EXAM CHEST 1 VIEW: CPT

## 2019-01-07 PROCEDURE — 6360000002 HC RX W HCPCS: Performed by: EMERGENCY MEDICINE

## 2019-01-07 PROCEDURE — 94640 AIRWAY INHALATION TREATMENT: CPT

## 2019-01-07 PROCEDURE — 99285 EMERGENCY DEPT VISIT HI MDM: CPT

## 2019-01-07 PROCEDURE — 87804 INFLUENZA ASSAY W/OPTIC: CPT

## 2019-01-07 PROCEDURE — 94762 N-INVAS EAR/PLS OXIMTRY CONT: CPT

## 2019-01-07 PROCEDURE — 1200000000 HC SEMI PRIVATE

## 2019-01-07 PROCEDURE — 36415 COLL VENOUS BLD VENIPUNCTURE: CPT

## 2019-01-07 PROCEDURE — 93010 ELECTROCARDIOGRAM REPORT: CPT | Performed by: INTERNAL MEDICINE

## 2019-01-07 PROCEDURE — 71250 CT THORAX DX C-: CPT

## 2019-01-07 PROCEDURE — 87449 NOS EACH ORGANISM AG IA: CPT

## 2019-01-07 PROCEDURE — 2580000003 HC RX 258: Performed by: EMERGENCY MEDICINE

## 2019-01-07 PROCEDURE — 93005 ELECTROCARDIOGRAM TRACING: CPT | Performed by: EMERGENCY MEDICINE

## 2019-01-07 RX ORDER — OXYCODONE HCL 40 MG/1
40 TABLET, FILM COATED, EXTENDED RELEASE ORAL 3 TIMES DAILY
Status: DISCONTINUED | OUTPATIENT
Start: 2019-01-07 | End: 2019-01-11 | Stop reason: HOSPADM

## 2019-01-07 RX ORDER — ASPIRIN 81 MG/1
81 TABLET, CHEWABLE ORAL DAILY
Status: DISCONTINUED | OUTPATIENT
Start: 2019-01-08 | End: 2019-01-11 | Stop reason: HOSPADM

## 2019-01-07 RX ORDER — 0.9 % SODIUM CHLORIDE 0.9 %
1000 INTRAVENOUS SOLUTION INTRAVENOUS ONCE
Status: COMPLETED | OUTPATIENT
Start: 2019-01-07 | End: 2019-01-08

## 2019-01-07 RX ORDER — BENZONATATE 100 MG/1
100 CAPSULE ORAL 3 TIMES DAILY PRN
Status: DISCONTINUED | OUTPATIENT
Start: 2019-01-07 | End: 2019-01-11 | Stop reason: HOSPADM

## 2019-01-07 RX ORDER — ONDANSETRON 2 MG/ML
4 INJECTION INTRAMUSCULAR; INTRAVENOUS EVERY 6 HOURS PRN
Status: DISCONTINUED | OUTPATIENT
Start: 2019-01-07 | End: 2019-01-11 | Stop reason: HOSPADM

## 2019-01-07 RX ORDER — LISINOPRIL 10 MG/1
10 TABLET ORAL DAILY
Status: DISCONTINUED | OUTPATIENT
Start: 2019-01-08 | End: 2019-01-11 | Stop reason: HOSPADM

## 2019-01-07 RX ORDER — GABAPENTIN 400 MG/1
800 CAPSULE ORAL 4 TIMES DAILY
Status: DISCONTINUED | OUTPATIENT
Start: 2019-01-07 | End: 2019-01-11 | Stop reason: HOSPADM

## 2019-01-07 RX ORDER — SODIUM CHLORIDE 9 MG/ML
INJECTION, SOLUTION INTRAVENOUS CONTINUOUS
Status: DISCONTINUED | OUTPATIENT
Start: 2019-01-07 | End: 2019-01-08

## 2019-01-07 RX ORDER — SIMVASTATIN 40 MG
40 TABLET ORAL NIGHTLY
Status: DISCONTINUED | OUTPATIENT
Start: 2019-01-07 | End: 2019-01-11 | Stop reason: HOSPADM

## 2019-01-07 RX ORDER — WARFARIN SODIUM 5 MG/1
10 TABLET ORAL
Status: COMPLETED | OUTPATIENT
Start: 2019-01-07 | End: 2019-01-07

## 2019-01-07 RX ORDER — IPRATROPIUM BROMIDE AND ALBUTEROL SULFATE 2.5; .5 MG/3ML; MG/3ML
1 SOLUTION RESPIRATORY (INHALATION) ONCE
Status: COMPLETED | OUTPATIENT
Start: 2019-01-07 | End: 2019-01-07

## 2019-01-07 RX ORDER — SODIUM CHLORIDE 0.9 % (FLUSH) 0.9 %
10 SYRINGE (ML) INJECTION PRN
Status: DISCONTINUED | OUTPATIENT
Start: 2019-01-07 | End: 2019-01-11 | Stop reason: HOSPADM

## 2019-01-07 RX ORDER — FLUTICASONE PROPIONATE 50 MCG
2 SPRAY, SUSPENSION (ML) NASAL 2 TIMES DAILY
Status: DISCONTINUED | OUTPATIENT
Start: 2019-01-08 | End: 2019-01-11 | Stop reason: HOSPADM

## 2019-01-07 RX ORDER — SODIUM CHLORIDE 0.9 % (FLUSH) 0.9 %
10 SYRINGE (ML) INJECTION EVERY 12 HOURS SCHEDULED
Status: DISCONTINUED | OUTPATIENT
Start: 2019-01-07 | End: 2019-01-11 | Stop reason: HOSPADM

## 2019-01-07 RX ORDER — DEXTROSE MONOHYDRATE 25 G/50ML
12.5 INJECTION, SOLUTION INTRAVENOUS PRN
Status: DISCONTINUED | OUTPATIENT
Start: 2019-01-07 | End: 2019-01-11 | Stop reason: HOSPADM

## 2019-01-07 RX ORDER — OXYCODONE HCL 10 MG/1
20 TABLET, FILM COATED, EXTENDED RELEASE ORAL EVERY 8 HOURS PRN
Status: DISCONTINUED | OUTPATIENT
Start: 2019-01-07 | End: 2019-01-07 | Stop reason: CLARIF

## 2019-01-07 RX ORDER — ALBUTEROL SULFATE 90 UG/1
2 AEROSOL, METERED RESPIRATORY (INHALATION) EVERY 4 HOURS PRN
Status: DISCONTINUED | OUTPATIENT
Start: 2019-01-07 | End: 2019-01-11 | Stop reason: HOSPADM

## 2019-01-07 RX ORDER — ACETAMINOPHEN 500 MG
1000 TABLET ORAL ONCE
Status: COMPLETED | OUTPATIENT
Start: 2019-01-07 | End: 2019-01-07

## 2019-01-07 RX ORDER — GUAIFENESIN/DEXTROMETHORPHAN 100-10MG/5
5 SYRUP ORAL EVERY 4 HOURS PRN
Status: DISCONTINUED | OUTPATIENT
Start: 2019-01-07 | End: 2019-01-11 | Stop reason: HOSPADM

## 2019-01-07 RX ORDER — IPRATROPIUM BROMIDE AND ALBUTEROL SULFATE 2.5; .5 MG/3ML; MG/3ML
1 SOLUTION RESPIRATORY (INHALATION)
Status: DISCONTINUED | OUTPATIENT
Start: 2019-01-08 | End: 2019-01-11 | Stop reason: HOSPADM

## 2019-01-07 RX ORDER — DEXTROSE MONOHYDRATE 50 MG/ML
100 INJECTION, SOLUTION INTRAVENOUS PRN
Status: DISCONTINUED | OUTPATIENT
Start: 2019-01-07 | End: 2019-01-11 | Stop reason: HOSPADM

## 2019-01-07 RX ORDER — ALBUTEROL SULFATE 2.5 MG/3ML
5 SOLUTION RESPIRATORY (INHALATION) ONCE
Status: COMPLETED | OUTPATIENT
Start: 2019-01-07 | End: 2019-01-07

## 2019-01-07 RX ORDER — DOCUSATE SODIUM 100 MG/1
100 CAPSULE, LIQUID FILLED ORAL 2 TIMES DAILY
Status: DISCONTINUED | OUTPATIENT
Start: 2019-01-07 | End: 2019-01-11 | Stop reason: HOSPADM

## 2019-01-07 RX ORDER — ACETAMINOPHEN 325 MG/1
650 TABLET ORAL EVERY 4 HOURS PRN
Status: DISCONTINUED | OUTPATIENT
Start: 2019-01-07 | End: 2019-01-11 | Stop reason: HOSPADM

## 2019-01-07 RX ORDER — OXYCODONE HYDROCHLORIDE 5 MG/1
20 TABLET ORAL EVERY 8 HOURS PRN
Status: DISCONTINUED | OUTPATIENT
Start: 2019-01-07 | End: 2019-01-11 | Stop reason: HOSPADM

## 2019-01-07 RX ORDER — CARVEDILOL 12.5 MG/1
12.5 TABLET ORAL 2 TIMES DAILY WITH MEALS
Status: DISCONTINUED | OUTPATIENT
Start: 2019-01-08 | End: 2019-01-11 | Stop reason: HOSPADM

## 2019-01-07 RX ORDER — METHYLPREDNISOLONE SODIUM SUCCINATE 125 MG/2ML
125 INJECTION, POWDER, LYOPHILIZED, FOR SOLUTION INTRAMUSCULAR; INTRAVENOUS ONCE
Status: COMPLETED | OUTPATIENT
Start: 2019-01-07 | End: 2019-01-07

## 2019-01-07 RX ORDER — NICOTINE POLACRILEX 4 MG
15 LOZENGE BUCCAL PRN
Status: DISCONTINUED | OUTPATIENT
Start: 2019-01-07 | End: 2019-01-11 | Stop reason: HOSPADM

## 2019-01-07 RX ORDER — 0.9 % SODIUM CHLORIDE 0.9 %
1000 INTRAVENOUS SOLUTION INTRAVENOUS ONCE
Status: COMPLETED | OUTPATIENT
Start: 2019-01-07 | End: 2019-01-07

## 2019-01-07 RX ADMIN — METHYLPREDNISOLONE SODIUM SUCCINATE 125 MG: 125 INJECTION, POWDER, FOR SOLUTION INTRAMUSCULAR; INTRAVENOUS at 13:36

## 2019-01-07 RX ADMIN — SODIUM CHLORIDE 1000 ML: 9 INJECTION, SOLUTION INTRAVENOUS at 13:38

## 2019-01-07 RX ADMIN — PIPERACILLIN SODIUM,TAZOBACTAM SODIUM 3.38 G: 3; .375 INJECTION, POWDER, FOR SOLUTION INTRAVENOUS at 16:16

## 2019-01-07 RX ADMIN — VANCOMYCIN HYDROCHLORIDE 1500 MG: 10 INJECTION, POWDER, LYOPHILIZED, FOR SOLUTION INTRAVENOUS at 16:51

## 2019-01-07 RX ADMIN — SIMVASTATIN 40 MG: 40 TABLET, FILM COATED ORAL at 23:44

## 2019-01-07 RX ADMIN — INSULIN LISPRO 3 UNITS: 100 INJECTION, SOLUTION INTRAVENOUS; SUBCUTANEOUS at 23:44

## 2019-01-07 RX ADMIN — OXYCODONE HYDROCHLORIDE 40 MG: 40 TABLET, FILM COATED, EXTENDED RELEASE ORAL at 21:29

## 2019-01-07 RX ADMIN — SODIUM CHLORIDE 1000 ML: 9 INJECTION, SOLUTION INTRAVENOUS at 23:43

## 2019-01-07 RX ADMIN — ACETAMINOPHEN 1000 MG: 500 TABLET ORAL at 13:51

## 2019-01-07 RX ADMIN — PIPERACILLIN SODIUM,TAZOBACTAM SODIUM 3.38 G: 3; .375 INJECTION, POWDER, FOR SOLUTION INTRAVENOUS at 23:44

## 2019-01-07 RX ADMIN — WARFARIN SODIUM 10 MG: 5 TABLET ORAL at 23:44

## 2019-01-07 RX ADMIN — INSULIN HUMAN 30 UNITS: 100 INJECTION, SUSPENSION SUBCUTANEOUS at 21:40

## 2019-01-07 RX ADMIN — IPRATROPIUM BROMIDE AND ALBUTEROL SULFATE 1 AMPULE: .5; 3 SOLUTION RESPIRATORY (INHALATION) at 12:32

## 2019-01-07 RX ADMIN — GABAPENTIN 800 MG: 400 CAPSULE ORAL at 21:29

## 2019-01-07 RX ADMIN — OXYCODONE HYDROCHLORIDE 20 MG: 5 TABLET ORAL at 18:34

## 2019-01-07 RX ADMIN — ALBUTEROL SULFATE 5 MG: 2.5 SOLUTION RESPIRATORY (INHALATION) at 12:25

## 2019-01-07 ASSESSMENT — PAIN DESCRIPTION - PAIN TYPE
TYPE: CHRONIC PAIN

## 2019-01-07 ASSESSMENT — PAIN SCALES - GENERAL
PAINLEVEL_OUTOF10: 10
PAINLEVEL_OUTOF10: 9
PAINLEVEL_OUTOF10: 7
PAINLEVEL_OUTOF10: 10
PAINLEVEL_OUTOF10: 8
PAINLEVEL_OUTOF10: 7
PAINLEVEL_OUTOF10: 9
PAINLEVEL_OUTOF10: 10

## 2019-01-07 ASSESSMENT — PAIN DESCRIPTION - LOCATION
LOCATION: BACK

## 2019-01-07 ASSESSMENT — PAIN DESCRIPTION - ORIENTATION
ORIENTATION: LOWER

## 2019-01-08 LAB
A/G RATIO: 1.4 (ref 1.1–2.2)
ALBUMIN SERPL-MCNC: 3.7 G/DL (ref 3.4–5)
ALP BLD-CCNC: 46 U/L (ref 40–129)
ALT SERPL-CCNC: 15 U/L (ref 10–40)
ANION GAP SERPL CALCULATED.3IONS-SCNC: 14 MMOL/L (ref 3–16)
AST SERPL-CCNC: 10 U/L (ref 15–37)
BASOPHILS ABSOLUTE: 0 K/UL (ref 0–0.2)
BASOPHILS RELATIVE PERCENT: 0.3 %
BILIRUB SERPL-MCNC: 0.5 MG/DL (ref 0–1)
BUN BLDV-MCNC: 19 MG/DL (ref 7–20)
CALCIUM SERPL-MCNC: 8.8 MG/DL (ref 8.3–10.6)
CHLORIDE BLD-SCNC: 95 MMOL/L (ref 99–110)
CO2: 26 MMOL/L (ref 21–32)
CREAT SERPL-MCNC: 1 MG/DL (ref 0.8–1.3)
EOSINOPHILS ABSOLUTE: 0 K/UL (ref 0–0.6)
EOSINOPHILS RELATIVE PERCENT: 0 %
GFR AFRICAN AMERICAN: >60
GFR NON-AFRICAN AMERICAN: >60
GLOBULIN: 2.6 G/DL
GLUCOSE BLD-MCNC: 240 MG/DL (ref 70–99)
GLUCOSE BLD-MCNC: 264 MG/DL (ref 70–99)
GLUCOSE BLD-MCNC: 309 MG/DL (ref 70–99)
GLUCOSE BLD-MCNC: 365 MG/DL (ref 70–99)
GLUCOSE BLD-MCNC: 432 MG/DL (ref 70–99)
GLUCOSE BLD-MCNC: 446 MG/DL (ref 70–99)
HCT VFR BLD CALC: 39.4 % (ref 40.5–52.5)
HEMOGLOBIN: 12.5 G/DL (ref 13.5–17.5)
INR BLD: 2.19 (ref 0.86–1.14)
L. PNEUMOPHILA SEROGP 1 UR AG: NORMAL
LYMPHOCYTES ABSOLUTE: 0.9 K/UL (ref 1–5.1)
LYMPHOCYTES RELATIVE PERCENT: 5.1 %
MCH RBC QN AUTO: 29.7 PG (ref 26–34)
MCHC RBC AUTO-ENTMCNC: 31.8 G/DL (ref 31–36)
MCV RBC AUTO: 93.4 FL (ref 80–100)
MONOCYTES ABSOLUTE: 0.6 K/UL (ref 0–1.3)
MONOCYTES RELATIVE PERCENT: 3.4 %
MRSA SCREEN RT-PCR: NORMAL
NEUTROPHILS ABSOLUTE: 15.2 K/UL (ref 1.7–7.7)
NEUTROPHILS RELATIVE PERCENT: 91.2 %
PDW BLD-RTO: 13.9 % (ref 12.4–15.4)
PERFORMED ON: ABNORMAL
PLATELET # BLD: 318 K/UL (ref 135–450)
PMV BLD AUTO: 7 FL (ref 5–10.5)
POTASSIUM REFLEX MAGNESIUM: 5 MMOL/L (ref 3.5–5.1)
PROTHROMBIN TIME: 25 SEC (ref 9.8–13)
RBC # BLD: 4.21 M/UL (ref 4.2–5.9)
REPORT: NORMAL
RESPIRATORY PANEL PCR: NORMAL
SODIUM BLD-SCNC: 135 MMOL/L (ref 136–145)
STREP PNEUMONIAE ANTIGEN, URINE: NORMAL
TOTAL PROTEIN: 6.3 G/DL (ref 6.4–8.2)
WBC # BLD: 16.7 K/UL (ref 4–11)

## 2019-01-08 PROCEDURE — 6370000000 HC RX 637 (ALT 250 FOR IP): Performed by: INTERNAL MEDICINE

## 2019-01-08 PROCEDURE — 87070 CULTURE OTHR SPECIMN AEROBIC: CPT

## 2019-01-08 PROCEDURE — 97165 OT EVAL LOW COMPLEX 30 MIN: CPT

## 2019-01-08 PROCEDURE — 87798 DETECT AGENT NOS DNA AMP: CPT

## 2019-01-08 PROCEDURE — 94761 N-INVAS EAR/PLS OXIMETRY MLT: CPT

## 2019-01-08 PROCEDURE — 97110 THERAPEUTIC EXERCISES: CPT

## 2019-01-08 PROCEDURE — 87205 SMEAR GRAM STAIN: CPT

## 2019-01-08 PROCEDURE — 94640 AIRWAY INHALATION TREATMENT: CPT

## 2019-01-08 PROCEDURE — 85610 PROTHROMBIN TIME: CPT

## 2019-01-08 PROCEDURE — 80053 COMPREHEN METABOLIC PANEL: CPT

## 2019-01-08 PROCEDURE — 6360000002 HC RX W HCPCS: Performed by: INTERNAL MEDICINE

## 2019-01-08 PROCEDURE — G8979 MOBILITY GOAL STATUS: HCPCS | Performed by: PHYSICAL THERAPIST

## 2019-01-08 PROCEDURE — 97530 THERAPEUTIC ACTIVITIES: CPT

## 2019-01-08 PROCEDURE — G8978 MOBILITY CURRENT STATUS: HCPCS | Performed by: PHYSICAL THERAPIST

## 2019-01-08 PROCEDURE — 85025 COMPLETE CBC W/AUTO DIFF WBC: CPT

## 2019-01-08 PROCEDURE — 97116 GAIT TRAINING THERAPY: CPT | Performed by: PHYSICAL THERAPIST

## 2019-01-08 PROCEDURE — 94664 DEMO&/EVAL PT USE INHALER: CPT

## 2019-01-08 PROCEDURE — 87581 M.PNEUMON DNA AMP PROBE: CPT

## 2019-01-08 PROCEDURE — 87486 CHLMYD PNEUM DNA AMP PROBE: CPT

## 2019-01-08 PROCEDURE — 97162 PT EVAL MOD COMPLEX 30 MIN: CPT | Performed by: PHYSICAL THERAPIST

## 2019-01-08 PROCEDURE — 87633 RESP VIRUS 12-25 TARGETS: CPT

## 2019-01-08 PROCEDURE — G8988 SELF CARE GOAL STATUS: HCPCS

## 2019-01-08 PROCEDURE — 2580000003 HC RX 258: Performed by: INTERNAL MEDICINE

## 2019-01-08 PROCEDURE — 1200000000 HC SEMI PRIVATE

## 2019-01-08 PROCEDURE — 92610 EVALUATE SWALLOWING FUNCTION: CPT

## 2019-01-08 PROCEDURE — 36415 COLL VENOUS BLD VENIPUNCTURE: CPT

## 2019-01-08 PROCEDURE — 97530 THERAPEUTIC ACTIVITIES: CPT | Performed by: PHYSICAL THERAPIST

## 2019-01-08 PROCEDURE — 94667 MNPJ CHEST WALL 1ST: CPT

## 2019-01-08 PROCEDURE — G8987 SELF CARE CURRENT STATUS: HCPCS

## 2019-01-08 RX ORDER — WARFARIN SODIUM 7.5 MG/1
7.5 TABLET ORAL
Status: COMPLETED | OUTPATIENT
Start: 2019-01-08 | End: 2019-01-08

## 2019-01-08 RX ORDER — POLYETHYLENE GLYCOL 3350 17 G/17G
17 POWDER, FOR SOLUTION ORAL DAILY
Status: DISCONTINUED | OUTPATIENT
Start: 2019-01-08 | End: 2019-01-11 | Stop reason: HOSPADM

## 2019-01-08 RX ADMIN — IPRATROPIUM BROMIDE AND ALBUTEROL SULFATE 1 AMPULE: 2.5; .5 SOLUTION RESPIRATORY (INHALATION) at 07:41

## 2019-01-08 RX ADMIN — Medication 1500 MG: at 11:02

## 2019-01-08 RX ADMIN — INSULIN HUMAN 10 UNITS: 100 INJECTION, SOLUTION PARENTERAL at 13:57

## 2019-01-08 RX ADMIN — INSULIN LISPRO 18 UNITS: 100 INJECTION, SOLUTION INTRAVENOUS; SUBCUTANEOUS at 11:23

## 2019-01-08 RX ADMIN — IPRATROPIUM BROMIDE AND ALBUTEROL SULFATE 1 AMPULE: 2.5; .5 SOLUTION RESPIRATORY (INHALATION) at 16:49

## 2019-01-08 RX ADMIN — WARFARIN SODIUM 7.5 MG: 7.5 TABLET ORAL at 17:36

## 2019-01-08 RX ADMIN — INSULIN HUMAN 30 UNITS: 100 INJECTION, SUSPENSION SUBCUTANEOUS at 16:29

## 2019-01-08 RX ADMIN — FLUTICASONE PROPIONATE 2 SPRAY: 50 SPRAY, METERED NASAL at 08:33

## 2019-01-08 RX ADMIN — GABAPENTIN 800 MG: 400 CAPSULE ORAL at 16:23

## 2019-01-08 RX ADMIN — LISINOPRIL 10 MG: 10 TABLET ORAL at 08:33

## 2019-01-08 RX ADMIN — MOMETASONE FUROATE AND FORMOTEROL FUMARATE DIHYDRATE 2 PUFF: 200; 5 AEROSOL RESPIRATORY (INHALATION) at 07:41

## 2019-01-08 RX ADMIN — GABAPENTIN 800 MG: 400 CAPSULE ORAL at 08:33

## 2019-01-08 RX ADMIN — OXYCODONE HYDROCHLORIDE 40 MG: 40 TABLET, FILM COATED, EXTENDED RELEASE ORAL at 20:32

## 2019-01-08 RX ADMIN — ASPIRIN 81 MG 81 MG: 81 TABLET ORAL at 08:33

## 2019-01-08 RX ADMIN — INSULIN LISPRO 5 UNITS: 100 INJECTION, SOLUTION INTRAVENOUS; SUBCUTANEOUS at 08:38

## 2019-01-08 RX ADMIN — INSULIN LISPRO 6 UNITS: 100 INJECTION, SOLUTION INTRAVENOUS; SUBCUTANEOUS at 21:28

## 2019-01-08 RX ADMIN — PIPERACILLIN SODIUM,TAZOBACTAM SODIUM 3.38 G: 3; .375 INJECTION, POWDER, FOR SOLUTION INTRAVENOUS at 07:13

## 2019-01-08 RX ADMIN — MOMETASONE FUROATE AND FORMOTEROL FUMARATE DIHYDRATE 2 PUFF: 200; 5 AEROSOL RESPIRATORY (INHALATION) at 19:59

## 2019-01-08 RX ADMIN — Medication 10 ML: at 20:32

## 2019-01-08 RX ADMIN — SIMVASTATIN 40 MG: 40 TABLET, FILM COATED ORAL at 20:32

## 2019-01-08 RX ADMIN — SODIUM CHLORIDE: 9 INJECTION, SOLUTION INTRAVENOUS at 02:39

## 2019-01-08 RX ADMIN — Medication 10 ML: at 00:00

## 2019-01-08 RX ADMIN — GABAPENTIN 800 MG: 400 CAPSULE ORAL at 11:05

## 2019-01-08 RX ADMIN — OXYCODONE HYDROCHLORIDE 20 MG: 5 TABLET ORAL at 02:38

## 2019-01-08 RX ADMIN — PIPERACILLIN SODIUM,TAZOBACTAM SODIUM 3.38 G: 3; .375 INJECTION, POWDER, FOR SOLUTION INTRAVENOUS at 15:01

## 2019-01-08 RX ADMIN — PIPERACILLIN SODIUM,TAZOBACTAM SODIUM 3.38 G: 3; .375 INJECTION, POWDER, FOR SOLUTION INTRAVENOUS at 23:35

## 2019-01-08 RX ADMIN — Medication 10 ML: at 08:36

## 2019-01-08 RX ADMIN — CARVEDILOL 12.5 MG: 12.5 TABLET, FILM COATED ORAL at 08:33

## 2019-01-08 RX ADMIN — DOCUSATE SODIUM 100 MG: 100 CAPSULE, LIQUID FILLED ORAL at 08:33

## 2019-01-08 RX ADMIN — POLYETHYLENE GLYCOL 3350 17 G: 17 POWDER, FOR SOLUTION ORAL at 12:41

## 2019-01-08 RX ADMIN — GABAPENTIN 800 MG: 400 CAPSULE ORAL at 20:31

## 2019-01-08 RX ADMIN — INSULIN LISPRO 6 UNITS: 100 INJECTION, SOLUTION INTRAVENOUS; SUBCUTANEOUS at 16:29

## 2019-01-08 RX ADMIN — IPRATROPIUM BROMIDE AND ALBUTEROL SULFATE 1 AMPULE: 2.5; .5 SOLUTION RESPIRATORY (INHALATION) at 19:59

## 2019-01-08 RX ADMIN — CARVEDILOL 12.5 MG: 12.5 TABLET, FILM COATED ORAL at 16:23

## 2019-01-08 RX ADMIN — OXYCODONE HYDROCHLORIDE 40 MG: 40 TABLET, FILM COATED, EXTENDED RELEASE ORAL at 13:57

## 2019-01-08 RX ADMIN — INSULIN HUMAN 30 UNITS: 100 INJECTION, SUSPENSION SUBCUTANEOUS at 09:49

## 2019-01-08 RX ADMIN — FLUTICASONE PROPIONATE 2 SPRAY: 50 SPRAY, METERED NASAL at 20:32

## 2019-01-08 RX ADMIN — IPRATROPIUM BROMIDE AND ALBUTEROL SULFATE 1 AMPULE: 2.5; .5 SOLUTION RESPIRATORY (INHALATION) at 11:32

## 2019-01-08 RX ADMIN — OXYCODONE HYDROCHLORIDE 40 MG: 40 TABLET, FILM COATED, EXTENDED RELEASE ORAL at 08:33

## 2019-01-08 RX ADMIN — VITAMIN D, TAB 1000IU (100/BT) 1000 UNITS: 25 TAB at 08:33

## 2019-01-08 RX ADMIN — DOCUSATE SODIUM 100 MG: 100 CAPSULE, LIQUID FILLED ORAL at 20:32

## 2019-01-08 ASSESSMENT — PAIN SCALES - GENERAL
PAINLEVEL_OUTOF10: 6
PAINLEVEL_OUTOF10: 4
PAINLEVEL_OUTOF10: 5
PAINLEVEL_OUTOF10: 2
PAINLEVEL_OUTOF10: 4
PAINLEVEL_OUTOF10: 4
PAINLEVEL_OUTOF10: 3
PAINLEVEL_OUTOF10: 0
PAINLEVEL_OUTOF10: 0

## 2019-01-08 ASSESSMENT — PAIN SCALES - WONG BAKER
WONGBAKER_NUMERICALRESPONSE: 2
WONGBAKER_NUMERICALRESPONSE: 0
WONGBAKER_NUMERICALRESPONSE: 0

## 2019-01-08 ASSESSMENT — PAIN DESCRIPTION - PAIN TYPE
TYPE: CHRONIC PAIN
TYPE: CHRONIC PAIN

## 2019-01-08 ASSESSMENT — PAIN DESCRIPTION - LOCATION
LOCATION: BACK
LOCATION: BACK

## 2019-01-09 LAB
A/G RATIO: 1.3 (ref 1.1–2.2)
ALBUMIN SERPL-MCNC: 3.4 G/DL (ref 3.4–5)
ALP BLD-CCNC: 40 U/L (ref 40–129)
ALT SERPL-CCNC: 13 U/L (ref 10–40)
ANION GAP SERPL CALCULATED.3IONS-SCNC: 10 MMOL/L (ref 3–16)
AST SERPL-CCNC: 10 U/L (ref 15–37)
BASOPHILS ABSOLUTE: 0.1 K/UL (ref 0–0.2)
BASOPHILS RELATIVE PERCENT: 0.6 %
BILIRUB SERPL-MCNC: 0.4 MG/DL (ref 0–1)
BUN BLDV-MCNC: 20 MG/DL (ref 7–20)
CALCIUM SERPL-MCNC: 8.8 MG/DL (ref 8.3–10.6)
CHLORIDE BLD-SCNC: 101 MMOL/L (ref 99–110)
CO2: 27 MMOL/L (ref 21–32)
CREAT SERPL-MCNC: 1.1 MG/DL (ref 0.8–1.3)
EOSINOPHILS ABSOLUTE: 0.1 K/UL (ref 0–0.6)
EOSINOPHILS RELATIVE PERCENT: 0.5 %
GFR AFRICAN AMERICAN: >60
GFR NON-AFRICAN AMERICAN: >60
GLOBULIN: 2.7 G/DL
GLUCOSE BLD-MCNC: 122 MG/DL (ref 70–99)
GLUCOSE BLD-MCNC: 131 MG/DL (ref 70–99)
GLUCOSE BLD-MCNC: 240 MG/DL (ref 70–99)
GLUCOSE BLD-MCNC: 279 MG/DL (ref 70–99)
GLUCOSE BLD-MCNC: 320 MG/DL (ref 70–99)
HCT VFR BLD CALC: 36 % (ref 40.5–52.5)
HEMOGLOBIN: 11.9 G/DL (ref 13.5–17.5)
INR BLD: 3.29 (ref 0.86–1.14)
LYMPHOCYTES ABSOLUTE: 2.1 K/UL (ref 1–5.1)
LYMPHOCYTES RELATIVE PERCENT: 19.2 %
MCH RBC QN AUTO: 30.3 PG (ref 26–34)
MCHC RBC AUTO-ENTMCNC: 33.1 G/DL (ref 31–36)
MCV RBC AUTO: 91.6 FL (ref 80–100)
MONOCYTES ABSOLUTE: 0.8 K/UL (ref 0–1.3)
MONOCYTES RELATIVE PERCENT: 7.7 %
NEUTROPHILS ABSOLUTE: 7.9 K/UL (ref 1.7–7.7)
NEUTROPHILS RELATIVE PERCENT: 72 %
PDW BLD-RTO: 13.7 % (ref 12.4–15.4)
PERFORMED ON: ABNORMAL
PLATELET # BLD: 320 K/UL (ref 135–450)
PMV BLD AUTO: 7 FL (ref 5–10.5)
POTASSIUM REFLEX MAGNESIUM: 4.3 MMOL/L (ref 3.5–5.1)
PROTHROMBIN TIME: 37.5 SEC (ref 9.8–13)
RBC # BLD: 3.94 M/UL (ref 4.2–5.9)
SODIUM BLD-SCNC: 138 MMOL/L (ref 136–145)
TOTAL PROTEIN: 6.1 G/DL (ref 6.4–8.2)
WBC # BLD: 10.9 K/UL (ref 4–11)

## 2019-01-09 PROCEDURE — 2580000003 HC RX 258: Performed by: INTERNAL MEDICINE

## 2019-01-09 PROCEDURE — 97530 THERAPEUTIC ACTIVITIES: CPT

## 2019-01-09 PROCEDURE — 94640 AIRWAY INHALATION TREATMENT: CPT

## 2019-01-09 PROCEDURE — 1200000000 HC SEMI PRIVATE

## 2019-01-09 PROCEDURE — 6360000002 HC RX W HCPCS: Performed by: INTERNAL MEDICINE

## 2019-01-09 PROCEDURE — 6370000000 HC RX 637 (ALT 250 FOR IP): Performed by: INTERNAL MEDICINE

## 2019-01-09 PROCEDURE — 94667 MNPJ CHEST WALL 1ST: CPT

## 2019-01-09 PROCEDURE — 80053 COMPREHEN METABOLIC PANEL: CPT

## 2019-01-09 PROCEDURE — 92526 ORAL FUNCTION THERAPY: CPT

## 2019-01-09 PROCEDURE — 36415 COLL VENOUS BLD VENIPUNCTURE: CPT

## 2019-01-09 PROCEDURE — 85610 PROTHROMBIN TIME: CPT

## 2019-01-09 PROCEDURE — 94760 N-INVAS EAR/PLS OXIMETRY 1: CPT

## 2019-01-09 PROCEDURE — 85025 COMPLETE CBC W/AUTO DIFF WBC: CPT

## 2019-01-09 RX ORDER — FUROSEMIDE 40 MG/1
40 TABLET ORAL DAILY
Status: DISCONTINUED | OUTPATIENT
Start: 2019-01-09 | End: 2019-01-11 | Stop reason: HOSPADM

## 2019-01-09 RX ADMIN — INSULIN HUMAN 30 UNITS: 100 INJECTION, SUSPENSION SUBCUTANEOUS at 08:39

## 2019-01-09 RX ADMIN — MOMETASONE FUROATE AND FORMOTEROL FUMARATE DIHYDRATE 2 PUFF: 200; 5 AEROSOL RESPIRATORY (INHALATION) at 19:49

## 2019-01-09 RX ADMIN — IPRATROPIUM BROMIDE AND ALBUTEROL SULFATE 1 AMPULE: 2.5; .5 SOLUTION RESPIRATORY (INHALATION) at 07:58

## 2019-01-09 RX ADMIN — IPRATROPIUM BROMIDE AND ALBUTEROL SULFATE 1 AMPULE: 2.5; .5 SOLUTION RESPIRATORY (INHALATION) at 15:54

## 2019-01-09 RX ADMIN — LISINOPRIL 10 MG: 10 TABLET ORAL at 08:36

## 2019-01-09 RX ADMIN — INSULIN LISPRO 9 UNITS: 100 INJECTION, SOLUTION INTRAVENOUS; SUBCUTANEOUS at 16:51

## 2019-01-09 RX ADMIN — DOCUSATE SODIUM 100 MG: 100 CAPSULE, LIQUID FILLED ORAL at 20:26

## 2019-01-09 RX ADMIN — Medication 10 ML: at 08:39

## 2019-01-09 RX ADMIN — IPRATROPIUM BROMIDE AND ALBUTEROL SULFATE 1 AMPULE: 2.5; .5 SOLUTION RESPIRATORY (INHALATION) at 19:49

## 2019-01-09 RX ADMIN — POLYETHYLENE GLYCOL 3350 17 G: 17 POWDER, FOR SOLUTION ORAL at 08:35

## 2019-01-09 RX ADMIN — GABAPENTIN 800 MG: 400 CAPSULE ORAL at 20:26

## 2019-01-09 RX ADMIN — INSULIN LISPRO 3 UNITS: 100 INJECTION, SOLUTION INTRAVENOUS; SUBCUTANEOUS at 21:49

## 2019-01-09 RX ADMIN — Medication 10 ML: at 20:26

## 2019-01-09 RX ADMIN — GABAPENTIN 800 MG: 400 CAPSULE ORAL at 08:37

## 2019-01-09 RX ADMIN — FLUTICASONE PROPIONATE 2 SPRAY: 50 SPRAY, METERED NASAL at 20:28

## 2019-01-09 RX ADMIN — SIMVASTATIN 40 MG: 40 TABLET, FILM COATED ORAL at 20:26

## 2019-01-09 RX ADMIN — INSULIN HUMAN 30 UNITS: 100 INJECTION, SUSPENSION SUBCUTANEOUS at 16:50

## 2019-01-09 RX ADMIN — ASPIRIN 81 MG 81 MG: 81 TABLET ORAL at 08:36

## 2019-01-09 RX ADMIN — PIPERACILLIN SODIUM,TAZOBACTAM SODIUM 3.38 G: 3; .375 INJECTION, POWDER, FOR SOLUTION INTRAVENOUS at 06:55

## 2019-01-09 RX ADMIN — PIPERACILLIN SODIUM,TAZOBACTAM SODIUM 3.38 G: 3; .375 INJECTION, POWDER, FOR SOLUTION INTRAVENOUS at 14:40

## 2019-01-09 RX ADMIN — OXYCODONE HYDROCHLORIDE 40 MG: 40 TABLET, FILM COATED, EXTENDED RELEASE ORAL at 13:46

## 2019-01-09 RX ADMIN — MOMETASONE FUROATE AND FORMOTEROL FUMARATE DIHYDRATE 2 PUFF: 200; 5 AEROSOL RESPIRATORY (INHALATION) at 07:58

## 2019-01-09 RX ADMIN — VITAMIN D, TAB 1000IU (100/BT) 1000 UNITS: 25 TAB at 08:36

## 2019-01-09 RX ADMIN — PIPERACILLIN SODIUM,TAZOBACTAM SODIUM 3.38 G: 3; .375 INJECTION, POWDER, FOR SOLUTION INTRAVENOUS at 23:29

## 2019-01-09 RX ADMIN — OXYCODONE HYDROCHLORIDE 40 MG: 40 TABLET, FILM COATED, EXTENDED RELEASE ORAL at 08:36

## 2019-01-09 RX ADMIN — CARVEDILOL 12.5 MG: 12.5 TABLET, FILM COATED ORAL at 08:36

## 2019-01-09 RX ADMIN — GABAPENTIN 800 MG: 400 CAPSULE ORAL at 11:12

## 2019-01-09 RX ADMIN — CARVEDILOL 12.5 MG: 12.5 TABLET, FILM COATED ORAL at 16:49

## 2019-01-09 RX ADMIN — OXYCODONE HYDROCHLORIDE 40 MG: 40 TABLET, FILM COATED, EXTENDED RELEASE ORAL at 20:30

## 2019-01-09 RX ADMIN — GABAPENTIN 800 MG: 400 CAPSULE ORAL at 16:49

## 2019-01-09 RX ADMIN — INSULIN LISPRO 12 UNITS: 100 INJECTION, SOLUTION INTRAVENOUS; SUBCUTANEOUS at 11:20

## 2019-01-09 RX ADMIN — FLUTICASONE PROPIONATE 2 SPRAY: 50 SPRAY, METERED NASAL at 08:37

## 2019-01-09 RX ADMIN — FUROSEMIDE 40 MG: 40 TABLET ORAL at 11:20

## 2019-01-09 ASSESSMENT — PAIN SCALES - GENERAL
PAINLEVEL_OUTOF10: 3
PAINLEVEL_OUTOF10: 8
PAINLEVEL_OUTOF10: 5
PAINLEVEL_OUTOF10: 0
PAINLEVEL_OUTOF10: 3
PAINLEVEL_OUTOF10: 6
PAINLEVEL_OUTOF10: 7

## 2019-01-09 ASSESSMENT — PAIN SCALES - WONG BAKER
WONGBAKER_NUMERICALRESPONSE: 0

## 2019-01-10 ENCOUNTER — APPOINTMENT (OUTPATIENT)
Dept: GENERAL RADIOLOGY | Age: 74
DRG: 871 | End: 2019-01-10
Payer: MEDICARE

## 2019-01-10 LAB
A/G RATIO: 1.2 (ref 1.1–2.2)
ALBUMIN SERPL-MCNC: 3.5 G/DL (ref 3.4–5)
ALP BLD-CCNC: 42 U/L (ref 40–129)
ALT SERPL-CCNC: 17 U/L (ref 10–40)
ANION GAP SERPL CALCULATED.3IONS-SCNC: 11 MMOL/L (ref 3–16)
AST SERPL-CCNC: 16 U/L (ref 15–37)
BASOPHILS ABSOLUTE: 0 K/UL (ref 0–0.2)
BASOPHILS RELATIVE PERCENT: 0.3 %
BILIRUB SERPL-MCNC: 0.4 MG/DL (ref 0–1)
BUN BLDV-MCNC: 21 MG/DL (ref 7–20)
CALCIUM SERPL-MCNC: 8.9 MG/DL (ref 8.3–10.6)
CHLORIDE BLD-SCNC: 98 MMOL/L (ref 99–110)
CO2: 27 MMOL/L (ref 21–32)
CREAT SERPL-MCNC: 1.1 MG/DL (ref 0.8–1.3)
CULTURE, RESPIRATORY: NORMAL
EOSINOPHILS ABSOLUTE: 0.1 K/UL (ref 0–0.6)
EOSINOPHILS RELATIVE PERCENT: 1.7 %
GFR AFRICAN AMERICAN: >60
GFR NON-AFRICAN AMERICAN: >60
GLOBULIN: 3 G/DL
GLUCOSE BLD-MCNC: 180 MG/DL (ref 70–99)
GLUCOSE BLD-MCNC: 193 MG/DL (ref 70–99)
GLUCOSE BLD-MCNC: 248 MG/DL (ref 70–99)
GLUCOSE BLD-MCNC: 274 MG/DL (ref 70–99)
GLUCOSE BLD-MCNC: 274 MG/DL (ref 70–99)
GRAM STAIN RESULT: NORMAL
HCT VFR BLD CALC: 38.8 % (ref 40.5–52.5)
HEMOGLOBIN: 12.7 G/DL (ref 13.5–17.5)
INR BLD: 2.26 (ref 0.86–1.14)
LYMPHOCYTES ABSOLUTE: 1.9 K/UL (ref 1–5.1)
LYMPHOCYTES RELATIVE PERCENT: 22.8 %
MCH RBC QN AUTO: 30.4 PG (ref 26–34)
MCHC RBC AUTO-ENTMCNC: 32.8 G/DL (ref 31–36)
MCV RBC AUTO: 92.7 FL (ref 80–100)
MONOCYTES ABSOLUTE: 0.9 K/UL (ref 0–1.3)
MONOCYTES RELATIVE PERCENT: 10.2 %
NEUTROPHILS ABSOLUTE: 5.5 K/UL (ref 1.7–7.7)
NEUTROPHILS RELATIVE PERCENT: 65 %
PDW BLD-RTO: 13.9 % (ref 12.4–15.4)
PERFORMED ON: ABNORMAL
PLATELET # BLD: 346 K/UL (ref 135–450)
PMV BLD AUTO: 6.9 FL (ref 5–10.5)
POTASSIUM REFLEX MAGNESIUM: 4.4 MMOL/L (ref 3.5–5.1)
PROTHROMBIN TIME: 25.8 SEC (ref 9.8–13)
RBC # BLD: 4.18 M/UL (ref 4.2–5.9)
SODIUM BLD-SCNC: 136 MMOL/L (ref 136–145)
TOTAL PROTEIN: 6.5 G/DL (ref 6.4–8.2)
WBC # BLD: 8.5 K/UL (ref 4–11)

## 2019-01-10 PROCEDURE — 2580000003 HC RX 258: Performed by: INTERNAL MEDICINE

## 2019-01-10 PROCEDURE — 80053 COMPREHEN METABOLIC PANEL: CPT

## 2019-01-10 PROCEDURE — 94667 MNPJ CHEST WALL 1ST: CPT

## 2019-01-10 PROCEDURE — 94640 AIRWAY INHALATION TREATMENT: CPT

## 2019-01-10 PROCEDURE — 1200000000 HC SEMI PRIVATE

## 2019-01-10 PROCEDURE — 85025 COMPLETE CBC W/AUTO DIFF WBC: CPT

## 2019-01-10 PROCEDURE — 85610 PROTHROMBIN TIME: CPT

## 2019-01-10 PROCEDURE — 6370000000 HC RX 637 (ALT 250 FOR IP): Performed by: INTERNAL MEDICINE

## 2019-01-10 PROCEDURE — 6360000002 HC RX W HCPCS: Performed by: INTERNAL MEDICINE

## 2019-01-10 PROCEDURE — 92611 MOTION FLUOROSCOPY/SWALLOW: CPT

## 2019-01-10 PROCEDURE — 94760 N-INVAS EAR/PLS OXIMETRY 1: CPT

## 2019-01-10 PROCEDURE — 74230 X-RAY XM SWLNG FUNCJ C+: CPT

## 2019-01-10 PROCEDURE — 36415 COLL VENOUS BLD VENIPUNCTURE: CPT

## 2019-01-10 RX ORDER — WARFARIN SODIUM 7.5 MG/1
7.5 TABLET ORAL
Status: COMPLETED | OUTPATIENT
Start: 2019-01-10 | End: 2019-01-10

## 2019-01-10 RX ADMIN — GABAPENTIN 800 MG: 400 CAPSULE ORAL at 11:55

## 2019-01-10 RX ADMIN — IPRATROPIUM BROMIDE AND ALBUTEROL SULFATE 1 AMPULE: 2.5; .5 SOLUTION RESPIRATORY (INHALATION) at 07:54

## 2019-01-10 RX ADMIN — INSULIN LISPRO 5 UNITS: 100 INJECTION, SOLUTION INTRAVENOUS; SUBCUTANEOUS at 22:18

## 2019-01-10 RX ADMIN — CARVEDILOL 12.5 MG: 12.5 TABLET, FILM COATED ORAL at 08:32

## 2019-01-10 RX ADMIN — GABAPENTIN 800 MG: 400 CAPSULE ORAL at 15:07

## 2019-01-10 RX ADMIN — INSULIN HUMAN 30 UNITS: 100 INJECTION, SUSPENSION SUBCUTANEOUS at 08:30

## 2019-01-10 RX ADMIN — LEVOFLOXACIN 750 MG: 500 TABLET, FILM COATED ORAL at 15:07

## 2019-01-10 RX ADMIN — FLUTICASONE PROPIONATE 2 SPRAY: 50 SPRAY, METERED NASAL at 08:38

## 2019-01-10 RX ADMIN — CARVEDILOL 12.5 MG: 12.5 TABLET, FILM COATED ORAL at 17:26

## 2019-01-10 RX ADMIN — WARFARIN SODIUM 7.5 MG: 7.5 TABLET ORAL at 08:32

## 2019-01-10 RX ADMIN — POLYETHYLENE GLYCOL 3350 17 G: 17 POWDER, FOR SOLUTION ORAL at 08:32

## 2019-01-10 RX ADMIN — Medication 10 ML: at 20:45

## 2019-01-10 RX ADMIN — OXYCODONE HYDROCHLORIDE 40 MG: 40 TABLET, FILM COATED, EXTENDED RELEASE ORAL at 15:09

## 2019-01-10 RX ADMIN — INSULIN LISPRO 9 UNITS: 100 INJECTION, SOLUTION INTRAVENOUS; SUBCUTANEOUS at 08:31

## 2019-01-10 RX ADMIN — LISINOPRIL 10 MG: 10 TABLET ORAL at 08:32

## 2019-01-10 RX ADMIN — FUROSEMIDE 40 MG: 40 TABLET ORAL at 08:32

## 2019-01-10 RX ADMIN — VITAMIN D, TAB 1000IU (100/BT) 1000 UNITS: 25 TAB at 08:32

## 2019-01-10 RX ADMIN — MOMETASONE FUROATE AND FORMOTEROL FUMARATE DIHYDRATE 2 PUFF: 200; 5 AEROSOL RESPIRATORY (INHALATION) at 07:55

## 2019-01-10 RX ADMIN — ASPIRIN 81 MG 81 MG: 81 TABLET ORAL at 08:31

## 2019-01-10 RX ADMIN — INSULIN LISPRO 3 UNITS: 100 INJECTION, SOLUTION INTRAVENOUS; SUBCUTANEOUS at 11:52

## 2019-01-10 RX ADMIN — MOMETASONE FUROATE AND FORMOTEROL FUMARATE DIHYDRATE 2 PUFF: 200; 5 AEROSOL RESPIRATORY (INHALATION) at 19:40

## 2019-01-10 RX ADMIN — GABAPENTIN 800 MG: 400 CAPSULE ORAL at 08:31

## 2019-01-10 RX ADMIN — OXYCODONE HYDROCHLORIDE 40 MG: 40 TABLET, FILM COATED, EXTENDED RELEASE ORAL at 08:32

## 2019-01-10 RX ADMIN — INSULIN LISPRO 6 UNITS: 100 INJECTION, SOLUTION INTRAVENOUS; SUBCUTANEOUS at 17:26

## 2019-01-10 RX ADMIN — INSULIN HUMAN 30 UNITS: 100 INJECTION, SUSPENSION SUBCUTANEOUS at 17:26

## 2019-01-10 RX ADMIN — IPRATROPIUM BROMIDE AND ALBUTEROL SULFATE 1 AMPULE: 2.5; .5 SOLUTION RESPIRATORY (INHALATION) at 12:23

## 2019-01-10 RX ADMIN — OXYCODONE HYDROCHLORIDE 40 MG: 40 TABLET, FILM COATED, EXTENDED RELEASE ORAL at 20:45

## 2019-01-10 RX ADMIN — IPRATROPIUM BROMIDE AND ALBUTEROL SULFATE 1 AMPULE: 2.5; .5 SOLUTION RESPIRATORY (INHALATION) at 16:57

## 2019-01-10 RX ADMIN — IPRATROPIUM BROMIDE AND ALBUTEROL SULFATE 1 AMPULE: 2.5; .5 SOLUTION RESPIRATORY (INHALATION) at 19:40

## 2019-01-10 RX ADMIN — Medication 10 ML: at 08:39

## 2019-01-10 RX ADMIN — GABAPENTIN 800 MG: 400 CAPSULE ORAL at 20:44

## 2019-01-10 RX ADMIN — FLUTICASONE PROPIONATE 2 SPRAY: 50 SPRAY, METERED NASAL at 20:45

## 2019-01-10 RX ADMIN — PIPERACILLIN SODIUM,TAZOBACTAM SODIUM 3.38 G: 3; .375 INJECTION, POWDER, FOR SOLUTION INTRAVENOUS at 06:43

## 2019-01-10 RX ADMIN — SIMVASTATIN 40 MG: 40 TABLET, FILM COATED ORAL at 20:44

## 2019-01-10 ASSESSMENT — PAIN SCALES - GENERAL
PAINLEVEL_OUTOF10: 3
PAINLEVEL_OUTOF10: 6
PAINLEVEL_OUTOF10: 0
PAINLEVEL_OUTOF10: 6
PAINLEVEL_OUTOF10: 6
PAINLEVEL_OUTOF10: 4

## 2019-01-11 VITALS
OXYGEN SATURATION: 90 % | RESPIRATION RATE: 16 BRPM | TEMPERATURE: 97.3 F | WEIGHT: 252.5 LBS | SYSTOLIC BLOOD PRESSURE: 167 MMHG | DIASTOLIC BLOOD PRESSURE: 63 MMHG | HEIGHT: 70 IN | BODY MASS INDEX: 36.15 KG/M2 | HEART RATE: 89 BPM

## 2019-01-11 PROBLEM — J96.01 ACUTE RESPIRATORY FAILURE WITH HYPOXIA (HCC): Status: RESOLVED | Noted: 2018-04-06 | Resolved: 2019-01-11

## 2019-01-11 LAB
A/G RATIO: 1.3 (ref 1.1–2.2)
ALBUMIN SERPL-MCNC: 3.7 G/DL (ref 3.4–5)
ALP BLD-CCNC: 44 U/L (ref 40–129)
ALT SERPL-CCNC: 18 U/L (ref 10–40)
ANION GAP SERPL CALCULATED.3IONS-SCNC: 10 MMOL/L (ref 3–16)
AST SERPL-CCNC: 13 U/L (ref 15–37)
BASOPHILS ABSOLUTE: 0.1 K/UL (ref 0–0.2)
BASOPHILS RELATIVE PERCENT: 0.6 %
BILIRUB SERPL-MCNC: 0.3 MG/DL (ref 0–1)
BUN BLDV-MCNC: 19 MG/DL (ref 7–20)
CALCIUM SERPL-MCNC: 9.1 MG/DL (ref 8.3–10.6)
CHLORIDE BLD-SCNC: 99 MMOL/L (ref 99–110)
CO2: 29 MMOL/L (ref 21–32)
CREAT SERPL-MCNC: 1.1 MG/DL (ref 0.8–1.3)
EKG ATRIAL RATE: 114 BPM
EKG DIAGNOSIS: NORMAL
EKG P AXIS: 40 DEGREES
EKG P-R INTERVAL: 164 MS
EKG Q-T INTERVAL: 324 MS
EKG QRS DURATION: 90 MS
EKG QTC CALCULATION (BAZETT): 446 MS
EKG R AXIS: 41 DEGREES
EKG T AXIS: 27 DEGREES
EKG VENTRICULAR RATE: 114 BPM
EOSINOPHILS ABSOLUTE: 0.1 K/UL (ref 0–0.6)
EOSINOPHILS RELATIVE PERCENT: 1.6 %
GFR AFRICAN AMERICAN: >60
GFR NON-AFRICAN AMERICAN: >60
GLOBULIN: 2.9 G/DL
GLUCOSE BLD-MCNC: 164 MG/DL (ref 70–99)
GLUCOSE BLD-MCNC: 187 MG/DL (ref 70–99)
GLUCOSE BLD-MCNC: 272 MG/DL (ref 70–99)
HCT VFR BLD CALC: 40.1 % (ref 40.5–52.5)
HEMOGLOBIN: 13.3 G/DL (ref 13.5–17.5)
INR BLD: 1.92 (ref 0.86–1.14)
LYMPHOCYTES ABSOLUTE: 1.8 K/UL (ref 1–5.1)
LYMPHOCYTES RELATIVE PERCENT: 19.6 %
MCH RBC QN AUTO: 30.2 PG (ref 26–34)
MCHC RBC AUTO-ENTMCNC: 33.1 G/DL (ref 31–36)
MCV RBC AUTO: 91.4 FL (ref 80–100)
MONOCYTES ABSOLUTE: 1 K/UL (ref 0–1.3)
MONOCYTES RELATIVE PERCENT: 11.1 %
NEUTROPHILS ABSOLUTE: 6 K/UL (ref 1.7–7.7)
NEUTROPHILS RELATIVE PERCENT: 67.1 %
PDW BLD-RTO: 13.8 % (ref 12.4–15.4)
PERFORMED ON: ABNORMAL
PERFORMED ON: ABNORMAL
PLATELET # BLD: 326 K/UL (ref 135–450)
PMV BLD AUTO: 6.9 FL (ref 5–10.5)
POTASSIUM REFLEX MAGNESIUM: 4.4 MMOL/L (ref 3.5–5.1)
PROTHROMBIN TIME: 21.9 SEC (ref 9.8–13)
RBC # BLD: 4.39 M/UL (ref 4.2–5.9)
SODIUM BLD-SCNC: 138 MMOL/L (ref 136–145)
TOTAL PROTEIN: 6.6 G/DL (ref 6.4–8.2)
WBC # BLD: 9 K/UL (ref 4–11)

## 2019-01-11 PROCEDURE — 2580000003 HC RX 258: Performed by: INTERNAL MEDICINE

## 2019-01-11 PROCEDURE — G8988 SELF CARE GOAL STATUS: HCPCS

## 2019-01-11 PROCEDURE — 80053 COMPREHEN METABOLIC PANEL: CPT

## 2019-01-11 PROCEDURE — 94760 N-INVAS EAR/PLS OXIMETRY 1: CPT

## 2019-01-11 PROCEDURE — 97116 GAIT TRAINING THERAPY: CPT | Performed by: PHYSICAL THERAPIST

## 2019-01-11 PROCEDURE — G8987 SELF CARE CURRENT STATUS: HCPCS

## 2019-01-11 PROCEDURE — G8989 SELF CARE D/C STATUS: HCPCS

## 2019-01-11 PROCEDURE — 94640 AIRWAY INHALATION TREATMENT: CPT

## 2019-01-11 PROCEDURE — 92526 ORAL FUNCTION THERAPY: CPT

## 2019-01-11 PROCEDURE — 6370000000 HC RX 637 (ALT 250 FOR IP): Performed by: INTERNAL MEDICINE

## 2019-01-11 PROCEDURE — 94667 MNPJ CHEST WALL 1ST: CPT

## 2019-01-11 PROCEDURE — 97530 THERAPEUTIC ACTIVITIES: CPT

## 2019-01-11 PROCEDURE — 85610 PROTHROMBIN TIME: CPT

## 2019-01-11 PROCEDURE — 85025 COMPLETE CBC W/AUTO DIFF WBC: CPT

## 2019-01-11 RX ORDER — BENZONATATE 100 MG/1
100 CAPSULE ORAL 3 TIMES DAILY PRN
Qty: 21 CAPSULE | Refills: 0 | Status: SHIPPED | OUTPATIENT
Start: 2019-01-11 | End: 2019-01-18

## 2019-01-11 RX ORDER — LEVOFLOXACIN 750 MG/1
750 TABLET ORAL DAILY
Qty: 2 TABLET | Refills: 0 | Status: SHIPPED | OUTPATIENT
Start: 2019-01-11 | End: 2019-01-13

## 2019-01-11 RX ORDER — WARFARIN SODIUM 5 MG/1
10 TABLET ORAL
Status: COMPLETED | OUTPATIENT
Start: 2019-01-11 | End: 2019-01-11

## 2019-01-11 RX ORDER — GUAIFENESIN/DEXTROMETHORPHAN 100-10MG/5
5 SYRUP ORAL EVERY 4 HOURS PRN
Qty: 120 ML | Refills: 0 | Status: SHIPPED | OUTPATIENT
Start: 2019-01-11 | End: 2019-01-21

## 2019-01-11 RX ADMIN — INSULIN LISPRO 9 UNITS: 100 INJECTION, SOLUTION INTRAVENOUS; SUBCUTANEOUS at 13:11

## 2019-01-11 RX ADMIN — WARFARIN SODIUM 10 MG: 5 TABLET ORAL at 08:17

## 2019-01-11 RX ADMIN — ASPIRIN 81 MG 81 MG: 81 TABLET ORAL at 08:17

## 2019-01-11 RX ADMIN — GABAPENTIN 800 MG: 400 CAPSULE ORAL at 12:00

## 2019-01-11 RX ADMIN — MOMETASONE FUROATE AND FORMOTEROL FUMARATE DIHYDRATE 2 PUFF: 200; 5 AEROSOL RESPIRATORY (INHALATION) at 07:46

## 2019-01-11 RX ADMIN — OXYCODONE HYDROCHLORIDE 40 MG: 40 TABLET, FILM COATED, EXTENDED RELEASE ORAL at 08:18

## 2019-01-11 RX ADMIN — IPRATROPIUM BROMIDE AND ALBUTEROL SULFATE 1 AMPULE: 2.5; .5 SOLUTION RESPIRATORY (INHALATION) at 07:46

## 2019-01-11 RX ADMIN — CARVEDILOL 12.5 MG: 12.5 TABLET, FILM COATED ORAL at 08:17

## 2019-01-11 RX ADMIN — Medication 10 ML: at 08:25

## 2019-01-11 RX ADMIN — INSULIN LISPRO 3 UNITS: 100 INJECTION, SOLUTION INTRAVENOUS; SUBCUTANEOUS at 08:20

## 2019-01-11 RX ADMIN — LISINOPRIL 10 MG: 10 TABLET ORAL at 08:17

## 2019-01-11 RX ADMIN — VITAMIN D, TAB 1000IU (100/BT) 1000 UNITS: 25 TAB at 08:18

## 2019-01-11 RX ADMIN — OXYCODONE HYDROCHLORIDE 40 MG: 40 TABLET, FILM COATED, EXTENDED RELEASE ORAL at 13:09

## 2019-01-11 RX ADMIN — POLYETHYLENE GLYCOL 3350 17 G: 17 POWDER, FOR SOLUTION ORAL at 08:17

## 2019-01-11 RX ADMIN — DOCUSATE SODIUM 100 MG: 100 CAPSULE, LIQUID FILLED ORAL at 08:17

## 2019-01-11 RX ADMIN — INSULIN HUMAN 30 UNITS: 100 INJECTION, SUSPENSION SUBCUTANEOUS at 08:26

## 2019-01-11 RX ADMIN — FUROSEMIDE 40 MG: 40 TABLET ORAL at 08:18

## 2019-01-11 RX ADMIN — LEVOFLOXACIN 750 MG: 500 TABLET, FILM COATED ORAL at 13:09

## 2019-01-11 RX ADMIN — GABAPENTIN 800 MG: 400 CAPSULE ORAL at 15:46

## 2019-01-11 RX ADMIN — IPRATROPIUM BROMIDE AND ALBUTEROL SULFATE 1 AMPULE: 2.5; .5 SOLUTION RESPIRATORY (INHALATION) at 12:25

## 2019-01-11 RX ADMIN — GABAPENTIN 800 MG: 400 CAPSULE ORAL at 08:17

## 2019-01-11 RX ADMIN — FLUTICASONE PROPIONATE 2 SPRAY: 50 SPRAY, METERED NASAL at 08:25

## 2019-01-11 ASSESSMENT — PAIN SCALES - GENERAL
PAINLEVEL_OUTOF10: 3
PAINLEVEL_OUTOF10: 8
PAINLEVEL_OUTOF10: 8

## 2019-01-11 ASSESSMENT — PAIN DESCRIPTION - LOCATION
LOCATION: BACK
LOCATION: BACK

## 2019-01-11 ASSESSMENT — PAIN DESCRIPTION - PAIN TYPE: TYPE: CHRONIC PAIN

## 2019-01-11 ASSESSMENT — PAIN DESCRIPTION - DESCRIPTORS: DESCRIPTORS: ACHING

## 2019-01-11 ASSESSMENT — PAIN DESCRIPTION - FREQUENCY: FREQUENCY: CONTINUOUS

## 2019-01-12 LAB
BLOOD CULTURE, ROUTINE: NORMAL
CULTURE, BLOOD 2: NORMAL

## 2019-01-14 ENCOUNTER — CARE COORDINATION (OUTPATIENT)
Dept: CASE MANAGEMENT | Age: 74
End: 2019-01-14

## 2019-01-25 ENCOUNTER — OFFICE VISIT (OUTPATIENT)
Dept: PULMONOLOGY | Age: 74
End: 2019-01-25
Payer: MEDICARE

## 2019-01-25 VITALS
RESPIRATION RATE: 20 BRPM | HEART RATE: 83 BPM | DIASTOLIC BLOOD PRESSURE: 78 MMHG | BODY MASS INDEX: 36.51 KG/M2 | HEIGHT: 70 IN | TEMPERATURE: 97.7 F | OXYGEN SATURATION: 94 % | SYSTOLIC BLOOD PRESSURE: 144 MMHG | WEIGHT: 255 LBS

## 2019-01-25 DIAGNOSIS — J31.0 CHRONIC RHINITIS: ICD-10-CM

## 2019-01-25 DIAGNOSIS — R93.89 ABNORMAL CHEST CT: Primary | ICD-10-CM

## 2019-01-25 DIAGNOSIS — J41.0 CHRONIC BRONCHITIS, SIMPLE (HCC): ICD-10-CM

## 2019-01-25 PROCEDURE — 3023F SPIROM DOC REV: CPT | Performed by: INTERNAL MEDICINE

## 2019-01-25 PROCEDURE — G8417 CALC BMI ABV UP PARAM F/U: HCPCS | Performed by: INTERNAL MEDICINE

## 2019-01-25 PROCEDURE — 3017F COLORECTAL CA SCREEN DOC REV: CPT | Performed by: INTERNAL MEDICINE

## 2019-01-25 PROCEDURE — 1101F PT FALLS ASSESS-DOCD LE1/YR: CPT | Performed by: INTERNAL MEDICINE

## 2019-01-25 PROCEDURE — G8427 DOCREV CUR MEDS BY ELIG CLIN: HCPCS | Performed by: INTERNAL MEDICINE

## 2019-01-25 PROCEDURE — 1123F ACP DISCUSS/DSCN MKR DOCD: CPT | Performed by: INTERNAL MEDICINE

## 2019-01-25 PROCEDURE — 4040F PNEUMOC VAC/ADMIN/RCVD: CPT | Performed by: INTERNAL MEDICINE

## 2019-01-25 PROCEDURE — 1036F TOBACCO NON-USER: CPT | Performed by: INTERNAL MEDICINE

## 2019-01-25 PROCEDURE — G8926 SPIRO NO PERF OR DOC: HCPCS | Performed by: INTERNAL MEDICINE

## 2019-01-25 PROCEDURE — G8598 ASA/ANTIPLAT THER USED: HCPCS | Performed by: INTERNAL MEDICINE

## 2019-01-25 PROCEDURE — 99214 OFFICE O/P EST MOD 30 MIN: CPT | Performed by: INTERNAL MEDICINE

## 2019-01-25 PROCEDURE — 1111F DSCHRG MED/CURRENT MED MERGE: CPT | Performed by: INTERNAL MEDICINE

## 2019-01-25 PROCEDURE — G8484 FLU IMMUNIZE NO ADMIN: HCPCS | Performed by: INTERNAL MEDICINE

## 2019-01-25 RX ORDER — PREDNISONE 1 MG/1
2 TABLET ORAL DAILY
Status: ON HOLD | COMMUNITY
Start: 2019-01-18 | End: 2019-04-17 | Stop reason: SDUPTHER

## 2019-01-25 ASSESSMENT — ENCOUNTER SYMPTOMS
SPUTUM PRODUCTION: 0
SINUS PAIN: 0
COUGH: 0
WHEEZING: 0
STRIDOR: 0
HEMOPTYSIS: 0

## 2019-02-01 ENCOUNTER — CARE COORDINATION (OUTPATIENT)
Dept: CARE COORDINATION | Age: 74
End: 2019-02-01

## 2019-02-14 ENCOUNTER — CARE COORDINATION (OUTPATIENT)
Dept: CASE MANAGEMENT | Age: 74
End: 2019-02-14

## 2019-02-15 ENCOUNTER — HOSPITAL ENCOUNTER (OUTPATIENT)
Age: 74
Discharge: HOME | End: 2019-02-15

## 2019-02-15 PROCEDURE — 9900000038 HC CARDIAC REHAB PHASE 3 - MONTHLY

## 2019-02-25 ENCOUNTER — CARE COORDINATION (OUTPATIENT)
Dept: CASE MANAGEMENT | Age: 74
End: 2019-02-25

## 2019-02-26 ENCOUNTER — HOSPITAL ENCOUNTER (OUTPATIENT)
Dept: CT IMAGING | Age: 74
Discharge: HOME | End: 2019-02-26
Payer: MEDICARE

## 2019-02-26 DIAGNOSIS — R93.89 ABNORMAL CHEST CT: ICD-10-CM

## 2019-02-26 PROCEDURE — 71250 CT THORAX DX C-: CPT

## 2019-03-14 ENCOUNTER — CARE COORDINATION (OUTPATIENT)
Dept: CASE MANAGEMENT | Age: 74
End: 2019-03-14

## 2019-04-05 ENCOUNTER — HOSPITAL ENCOUNTER (OUTPATIENT)
Age: 74
Discharge: HOME | End: 2019-04-05

## 2019-04-05 PROCEDURE — 9900000038 HC CARDIAC REHAB PHASE 3 - MONTHLY

## 2019-04-13 ENCOUNTER — APPOINTMENT (OUTPATIENT)
Dept: GENERAL RADIOLOGY | Age: 74
DRG: 871 | End: 2019-04-13
Payer: MEDICARE

## 2019-04-13 ENCOUNTER — HOSPITAL ENCOUNTER (INPATIENT)
Age: 74
LOS: 4 days | Discharge: HOME | DRG: 871 | End: 2019-04-17
Attending: EMERGENCY MEDICINE | Admitting: INTERNAL MEDICINE
Payer: MEDICARE

## 2019-04-13 DIAGNOSIS — R06.00 DYSPNEA, UNSPECIFIED TYPE: ICD-10-CM

## 2019-04-13 DIAGNOSIS — J44.1 COPD EXACERBATION (HCC): ICD-10-CM

## 2019-04-13 DIAGNOSIS — J18.9 PNEUMONIA DUE TO ORGANISM: Primary | ICD-10-CM

## 2019-04-13 PROBLEM — N17.9 ACUTE RENAL FAILURE (ARF) (HCC): Status: ACTIVE | Noted: 2019-04-13

## 2019-04-13 PROBLEM — E87.20 LACTIC ACIDOSIS: Status: ACTIVE | Noted: 2019-04-13

## 2019-04-13 PROBLEM — R65.20 SEVERE SEPSIS (HCC): Status: ACTIVE | Noted: 2018-04-06

## 2019-04-13 PROBLEM — J96.02 ACUTE RESPIRATORY FAILURE WITH HYPERCAPNIA (HCC): Status: ACTIVE | Noted: 2018-04-06

## 2019-04-13 LAB
A/G RATIO: 1.2 (ref 1.1–2.2)
ALBUMIN SERPL-MCNC: 4.4 G/DL (ref 3.4–5)
ALP BLD-CCNC: 64 U/L (ref 40–129)
ALT SERPL-CCNC: 20 U/L (ref 10–40)
ANION GAP SERPL CALCULATED.3IONS-SCNC: 17 MMOL/L (ref 3–16)
AST SERPL-CCNC: 12 U/L (ref 15–37)
BASE EXCESS ARTERIAL: 3 MMOL/L (ref -3–3)
BASE EXCESS VENOUS: 5 MMOL/L
BASOPHILS ABSOLUTE: 0.1 K/UL (ref 0–0.2)
BASOPHILS RELATIVE PERCENT: 1.1 %
BILIRUB SERPL-MCNC: 0.5 MG/DL (ref 0–1)
BILIRUBIN URINE: NEGATIVE
BLOOD, URINE: ABNORMAL
BUN BLDV-MCNC: 31 MG/DL (ref 7–20)
CALCIUM SERPL-MCNC: 9.6 MG/DL (ref 8.3–10.6)
CARBOXYHEMOGLOBIN ARTERIAL: 1.6 % (ref 0–1.5)
CARBOXYHEMOGLOBIN: 1.6 %
CHLORIDE BLD-SCNC: 91 MMOL/L (ref 99–110)
CLARITY: CLEAR
CO2: 29 MMOL/L (ref 21–32)
COLOR: YELLOW
CREAT SERPL-MCNC: 1.4 MG/DL (ref 0.8–1.3)
EOSINOPHILS ABSOLUTE: 0 K/UL (ref 0–0.6)
EOSINOPHILS RELATIVE PERCENT: 0.3 %
EPITHELIAL CELLS, UA: 0 /HPF (ref 0–5)
GFR AFRICAN AMERICAN: 60
GFR NON-AFRICAN AMERICAN: 50
GLOBULIN: 3.8 G/DL
GLUCOSE BLD-MCNC: 298 MG/DL (ref 70–99)
GLUCOSE BLD-MCNC: 344 MG/DL (ref 70–99)
GLUCOSE BLD-MCNC: 361 MG/DL (ref 70–99)
GLUCOSE BLD-MCNC: 537 MG/DL (ref 70–99)
GLUCOSE BLD-MCNC: >600 MG/DL (ref 70–99)
GLUCOSE URINE: >=1000 MG/DL
HCO3 ARTERIAL: 27.1 MMOL/L (ref 21–29)
HCO3 VENOUS: 31 MMOL/L (ref 23–29)
HCT VFR BLD CALC: 44.7 % (ref 40.5–52.5)
HEMOGLOBIN, ART, EXTENDED: 13 G/DL (ref 13.5–17.5)
HEMOGLOBIN: 14.9 G/DL (ref 13.5–17.5)
HYALINE CASTS: 1 /LPF (ref 0–8)
INR BLD: 7.65 (ref 0.86–1.14)
KETONES, URINE: NEGATIVE MG/DL
LACTIC ACID: 2.2 MMOL/L (ref 0.4–2)
LACTIC ACID: 2.8 MMOL/L (ref 0.4–2)
LACTIC ACID: 3.3 MMOL/L (ref 0.4–2)
LEUKOCYTE ESTERASE, URINE: NEGATIVE
LYMPHOCYTES ABSOLUTE: 1 K/UL (ref 1–5.1)
LYMPHOCYTES RELATIVE PERCENT: 8.9 %
MCH RBC QN AUTO: 30.3 PG (ref 26–34)
MCHC RBC AUTO-ENTMCNC: 33.3 G/DL (ref 31–36)
MCV RBC AUTO: 90.8 FL (ref 80–100)
METHEMOGLOBIN ARTERIAL: 0.5 %
METHEMOGLOBIN VENOUS: 0.6 %
MICROSCOPIC EXAMINATION: YES
MONOCYTES ABSOLUTE: 0.8 K/UL (ref 0–1.3)
MONOCYTES RELATIVE PERCENT: 7.1 %
NEUTROPHILS ABSOLUTE: 9.1 K/UL (ref 1.7–7.7)
NEUTROPHILS RELATIVE PERCENT: 82.6 %
NITRITE, URINE: NEGATIVE
O2 CONTENT ARTERIAL: 17 ML/DL
O2 CONTENT, VEN: 13 ML/DL
O2 SAT, ARTERIAL: 95.6 %
O2 SAT, VEN: 61 %
O2 THERAPY: ABNORMAL
O2 THERAPY: ABNORMAL
PCO2 ARTERIAL: 41.3 MMHG (ref 35–45)
PCO2, VEN: 54.6 MMHG (ref 40–50)
PDW BLD-RTO: 14.3 % (ref 12.4–15.4)
PERFORMED ON: ABNORMAL
PH ARTERIAL: 7.43 (ref 7.35–7.45)
PH UA: 5 (ref 5–8)
PH VENOUS: 7.38 (ref 7.35–7.45)
PLATELET # BLD: 222 K/UL (ref 135–450)
PMV BLD AUTO: 7.1 FL (ref 5–10.5)
PO2 ARTERIAL: 69.9 MMHG (ref 75–108)
PO2, VEN: 33 MMHG
POTASSIUM REFLEX MAGNESIUM: 4.4 MMOL/L (ref 3.5–5.1)
PROCALCITONIN: 0.18 NG/ML (ref 0–0.15)
PROTEIN UA: NEGATIVE MG/DL
PROTHROMBIN TIME: 87.2 SEC (ref 9.8–13)
RAPID INFLUENZA  B AGN: NEGATIVE
RAPID INFLUENZA A AGN: NEGATIVE
RBC # BLD: 4.93 M/UL (ref 4.2–5.9)
RBC UA: 1 /HPF (ref 0–4)
SODIUM BLD-SCNC: 137 MMOL/L (ref 136–145)
SPECIFIC GRAVITY UA: 1.02 (ref 1–1.03)
TCO2 ARTERIAL: 28.3 MMOL/L
TCO2 CALC VENOUS: 33 MMOL/L
TOTAL PROTEIN: 8.2 G/DL (ref 6.4–8.2)
TROPONIN: <0.01 NG/ML
URINE TYPE: ABNORMAL
UROBILINOGEN, URINE: 0.2 E.U./DL
WBC # BLD: 11.1 K/UL (ref 4–11)
WBC UA: 1 /HPF (ref 0–5)

## 2019-04-13 PROCEDURE — 94664 DEMO&/EVAL PT USE INHALER: CPT

## 2019-04-13 PROCEDURE — 87040 BLOOD CULTURE FOR BACTERIA: CPT

## 2019-04-13 PROCEDURE — 6370000000 HC RX 637 (ALT 250 FOR IP): Performed by: INTERNAL MEDICINE

## 2019-04-13 PROCEDURE — 80053 COMPREHEN METABOLIC PANEL: CPT

## 2019-04-13 PROCEDURE — 94660 CPAP INITIATION&MGMT: CPT

## 2019-04-13 PROCEDURE — 85025 COMPLETE CBC W/AUTO DIFF WBC: CPT

## 2019-04-13 PROCEDURE — 83036 HEMOGLOBIN GLYCOSYLATED A1C: CPT

## 2019-04-13 PROCEDURE — 94667 MNPJ CHEST WALL 1ST: CPT

## 2019-04-13 PROCEDURE — 6370000000 HC RX 637 (ALT 250 FOR IP): Performed by: PHYSICIAN ASSISTANT

## 2019-04-13 PROCEDURE — 6360000002 HC RX W HCPCS: Performed by: INTERNAL MEDICINE

## 2019-04-13 PROCEDURE — 2060000000 HC ICU INTERMEDIATE R&B

## 2019-04-13 PROCEDURE — 71045 X-RAY EXAM CHEST 1 VIEW: CPT

## 2019-04-13 PROCEDURE — 96367 TX/PROPH/DG ADDL SEQ IV INF: CPT

## 2019-04-13 PROCEDURE — 96365 THER/PROPH/DIAG IV INF INIT: CPT

## 2019-04-13 PROCEDURE — 94640 AIRWAY INHALATION TREATMENT: CPT

## 2019-04-13 PROCEDURE — 87205 SMEAR GRAM STAIN: CPT

## 2019-04-13 PROCEDURE — 96361 HYDRATE IV INFUSION ADD-ON: CPT

## 2019-04-13 PROCEDURE — 99285 EMERGENCY DEPT VISIT HI MDM: CPT

## 2019-04-13 PROCEDURE — 6370000000 HC RX 637 (ALT 250 FOR IP): Performed by: EMERGENCY MEDICINE

## 2019-04-13 PROCEDURE — 2700000000 HC OXYGEN THERAPY PER DAY

## 2019-04-13 PROCEDURE — 84145 PROCALCITONIN (PCT): CPT

## 2019-04-13 PROCEDURE — 87070 CULTURE OTHR SPECIMN AEROBIC: CPT

## 2019-04-13 PROCEDURE — 94762 N-INVAS EAR/PLS OXIMTRY CONT: CPT

## 2019-04-13 PROCEDURE — 85610 PROTHROMBIN TIME: CPT

## 2019-04-13 PROCEDURE — 82803 BLOOD GASES ANY COMBINATION: CPT

## 2019-04-13 PROCEDURE — 6360000002 HC RX W HCPCS: Performed by: EMERGENCY MEDICINE

## 2019-04-13 PROCEDURE — 87804 INFLUENZA ASSAY W/OPTIC: CPT

## 2019-04-13 PROCEDURE — 83605 ASSAY OF LACTIC ACID: CPT

## 2019-04-13 PROCEDURE — 36415 COLL VENOUS BLD VENIPUNCTURE: CPT

## 2019-04-13 PROCEDURE — 36600 WITHDRAWAL OF ARTERIAL BLOOD: CPT

## 2019-04-13 PROCEDURE — 93005 ELECTROCARDIOGRAM TRACING: CPT | Performed by: EMERGENCY MEDICINE

## 2019-04-13 PROCEDURE — 2580000003 HC RX 258: Performed by: EMERGENCY MEDICINE

## 2019-04-13 PROCEDURE — 2580000003 HC RX 258: Performed by: INTERNAL MEDICINE

## 2019-04-13 PROCEDURE — 84484 ASSAY OF TROPONIN QUANT: CPT

## 2019-04-13 PROCEDURE — 81001 URINALYSIS AUTO W/SCOPE: CPT

## 2019-04-13 RX ORDER — SODIUM CHLORIDE 0.9 % (FLUSH) 0.9 %
10 SYRINGE (ML) INJECTION EVERY 12 HOURS SCHEDULED
Status: DISCONTINUED | OUTPATIENT
Start: 2019-04-13 | End: 2019-04-17 | Stop reason: HOSPADM

## 2019-04-13 RX ORDER — CARVEDILOL 6.25 MG/1
12.5 TABLET ORAL 2 TIMES DAILY WITH MEALS
Status: DISCONTINUED | OUTPATIENT
Start: 2019-04-13 | End: 2019-04-17 | Stop reason: HOSPADM

## 2019-04-13 RX ORDER — PREDNISONE 20 MG/1
40 TABLET ORAL DAILY
Status: DISCONTINUED | OUTPATIENT
Start: 2019-04-13 | End: 2019-04-16

## 2019-04-13 RX ORDER — 0.9 % SODIUM CHLORIDE 0.9 %
30 INTRAVENOUS SOLUTION INTRAVENOUS ONCE
Status: COMPLETED | OUTPATIENT
Start: 2019-04-13 | End: 2019-04-13

## 2019-04-13 RX ORDER — IPRATROPIUM BROMIDE AND ALBUTEROL SULFATE 2.5; .5 MG/3ML; MG/3ML
1 SOLUTION RESPIRATORY (INHALATION)
Status: DISCONTINUED | OUTPATIENT
Start: 2019-04-13 | End: 2019-04-17 | Stop reason: HOSPADM

## 2019-04-13 RX ORDER — DOCUSATE SODIUM 100 MG/1
100 CAPSULE, LIQUID FILLED ORAL 2 TIMES DAILY PRN
Status: DISCONTINUED | OUTPATIENT
Start: 2019-04-13 | End: 2019-04-17 | Stop reason: HOSPADM

## 2019-04-13 RX ORDER — ASPIRIN 81 MG/1
81 TABLET, CHEWABLE ORAL DAILY
Status: DISCONTINUED | OUTPATIENT
Start: 2019-04-14 | End: 2019-04-17 | Stop reason: HOSPADM

## 2019-04-13 RX ORDER — ACETAMINOPHEN 325 MG/1
650 TABLET ORAL EVERY 8 HOURS PRN
Status: DISCONTINUED | OUTPATIENT
Start: 2019-04-13 | End: 2019-04-17 | Stop reason: HOSPADM

## 2019-04-13 RX ORDER — DEXTROSE MONOHYDRATE 25 G/50ML
12.5 INJECTION, SOLUTION INTRAVENOUS PRN
Status: DISCONTINUED | OUTPATIENT
Start: 2019-04-13 | End: 2019-04-17 | Stop reason: HOSPADM

## 2019-04-13 RX ORDER — ONDANSETRON 2 MG/ML
4 INJECTION INTRAMUSCULAR; INTRAVENOUS EVERY 6 HOURS PRN
Status: DISCONTINUED | OUTPATIENT
Start: 2019-04-13 | End: 2019-04-17 | Stop reason: HOSPADM

## 2019-04-13 RX ORDER — NICOTINE POLACRILEX 4 MG
15 LOZENGE BUCCAL PRN
Status: DISCONTINUED | OUTPATIENT
Start: 2019-04-13 | End: 2019-04-17 | Stop reason: HOSPADM

## 2019-04-13 RX ORDER — ALBUTEROL SULFATE 90 UG/1
2 AEROSOL, METERED RESPIRATORY (INHALATION) EVERY 4 HOURS PRN
Status: DISCONTINUED | OUTPATIENT
Start: 2019-04-13 | End: 2019-04-17 | Stop reason: HOSPADM

## 2019-04-13 RX ORDER — DEXTROSE MONOHYDRATE 50 MG/ML
100 INJECTION, SOLUTION INTRAVENOUS PRN
Status: DISCONTINUED | OUTPATIENT
Start: 2019-04-13 | End: 2019-04-17 | Stop reason: HOSPADM

## 2019-04-13 RX ORDER — IPRATROPIUM BROMIDE AND ALBUTEROL SULFATE 2.5; .5 MG/3ML; MG/3ML
1 SOLUTION RESPIRATORY (INHALATION) ONCE
Status: COMPLETED | OUTPATIENT
Start: 2019-04-13 | End: 2019-04-13

## 2019-04-13 RX ORDER — ACETAMINOPHEN 325 MG/1
650 TABLET ORAL ONCE
Status: COMPLETED | OUTPATIENT
Start: 2019-04-13 | End: 2019-04-13

## 2019-04-13 RX ORDER — SODIUM CHLORIDE 0.9 % (FLUSH) 0.9 %
10 SYRINGE (ML) INJECTION PRN
Status: DISCONTINUED | OUTPATIENT
Start: 2019-04-13 | End: 2019-04-17 | Stop reason: HOSPADM

## 2019-04-13 RX ADMIN — PIPERACILLIN SODIUM,TAZOBACTAM SODIUM 4.5 G: 4; .5 INJECTION, POWDER, FOR SOLUTION INTRAVENOUS at 10:32

## 2019-04-13 RX ADMIN — SODIUM CHLORIDE 3222 ML: 9 INJECTION, SOLUTION INTRAVENOUS at 07:41

## 2019-04-13 RX ADMIN — IPRATROPIUM BROMIDE AND ALBUTEROL SULFATE 1 AMPULE: .5; 3 SOLUTION RESPIRATORY (INHALATION) at 21:14

## 2019-04-13 RX ADMIN — INSULIN LISPRO 6 UNITS: 100 INJECTION, SOLUTION INTRAVENOUS; SUBCUTANEOUS at 22:45

## 2019-04-13 RX ADMIN — INSULIN LISPRO 4 UNITS: 100 INJECTION, SOLUTION INTRAVENOUS; SUBCUTANEOUS at 18:10

## 2019-04-13 RX ADMIN — ACETAMINOPHEN 650 MG: 325 TABLET ORAL at 07:22

## 2019-04-13 RX ADMIN — PIPERACILLIN SODIUM,TAZOBACTAM SODIUM 3.38 G: 3; .375 INJECTION, POWDER, FOR SOLUTION INTRAVENOUS at 18:11

## 2019-04-13 RX ADMIN — PREDNISONE 40 MG: 20 TABLET ORAL at 13:07

## 2019-04-13 RX ADMIN — PIPERACILLIN SODIUM,TAZOBACTAM SODIUM 3.38 G: 3; .375 INJECTION, POWDER, FOR SOLUTION INTRAVENOUS at 22:44

## 2019-04-13 RX ADMIN — MOMETASONE FUROATE AND FORMOTEROL FUMARATE DIHYDRATE 2 PUFF: 100; 5 AEROSOL RESPIRATORY (INHALATION) at 21:14

## 2019-04-13 RX ADMIN — IPRATROPIUM BROMIDE AND ALBUTEROL SULFATE 1 AMPULE: .5; 3 SOLUTION RESPIRATORY (INHALATION) at 16:26

## 2019-04-13 RX ADMIN — IPRATROPIUM BROMIDE AND ALBUTEROL SULFATE 1 AMPULE: .5; 3 SOLUTION RESPIRATORY (INHALATION) at 12:50

## 2019-04-13 RX ADMIN — ACETAMINOPHEN 650 MG: 325 TABLET ORAL at 15:36

## 2019-04-13 RX ADMIN — VANCOMYCIN HYDROCHLORIDE 1000 MG: 1 INJECTION, POWDER, LYOPHILIZED, FOR SOLUTION INTRAVENOUS at 16:24

## 2019-04-13 RX ADMIN — CARVEDILOL 12.5 MG: 6.25 TABLET, FILM COATED ORAL at 18:10

## 2019-04-13 RX ADMIN — VANCOMYCIN HYDROCHLORIDE 1000 MG: 1 INJECTION, POWDER, LYOPHILIZED, FOR SOLUTION INTRAVENOUS at 09:25

## 2019-04-13 RX ADMIN — IPRATROPIUM BROMIDE AND ALBUTEROL SULFATE 1 AMPULE: .5; 3 SOLUTION RESPIRATORY (INHALATION) at 07:31

## 2019-04-13 ASSESSMENT — PAIN DESCRIPTION - ORIENTATION
ORIENTATION: MID

## 2019-04-13 ASSESSMENT — PAIN DESCRIPTION - PAIN TYPE
TYPE: CHRONIC PAIN
TYPE: ACUTE PAIN
TYPE: CHRONIC PAIN

## 2019-04-13 ASSESSMENT — PAIN DESCRIPTION - LOCATION
LOCATION: BACK

## 2019-04-13 ASSESSMENT — PAIN SCALES - GENERAL
PAINLEVEL_OUTOF10: 7
PAINLEVEL_OUTOF10: 9
PAINLEVEL_OUTOF10: 8
PAINLEVEL_OUTOF10: 0
PAINLEVEL_OUTOF10: 0
PAINLEVEL_OUTOF10: 9
PAINLEVEL_OUTOF10: 9

## 2019-04-13 ASSESSMENT — PAIN DESCRIPTION - PROGRESSION
CLINICAL_PROGRESSION: NOT CHANGED
CLINICAL_PROGRESSION: NOT CHANGED

## 2019-04-13 ASSESSMENT — PAIN - FUNCTIONAL ASSESSMENT: PAIN_FUNCTIONAL_ASSESSMENT: PREVENTS OR INTERFERES SOME ACTIVE ACTIVITIES AND ADLS

## 2019-04-13 ASSESSMENT — PAIN DESCRIPTION - ONSET
ONSET: ON-GOING
ONSET: ON-GOING

## 2019-04-13 ASSESSMENT — PAIN DESCRIPTION - FREQUENCY
FREQUENCY: CONTINUOUS
FREQUENCY: CONTINUOUS

## 2019-04-13 ASSESSMENT — PAIN DESCRIPTION - DESCRIPTORS
DESCRIPTORS: DISCOMFORT
DESCRIPTORS: DISCOMFORT

## 2019-04-13 NOTE — ED NOTES
Verbal order to infulse 20mL NS and repeat Lactic and call results to MD per dr Glenna Barlow. Oncoming RN notified of verbal orders.       Ania Washington, RN  04/13/19 5130

## 2019-04-13 NOTE — ED NOTES
Pt resting in bed, eyes closed and wife at besides. Pt wife updated on admission and bed availability.       Lindsay Torres RN  04/13/19 1392

## 2019-04-13 NOTE — PROGRESS NOTES
Occupational Therapy  Occupational Therapy orders noted. Patient's therapy held by nursing this date due to him being too sedated. Will try O.T. Evaluation Monday.           Grisel Mcgraw OTL

## 2019-04-13 NOTE — ED NOTES
Pt alert and oriented. Pt sitting up and able to talk at this time. Pt has productive cough with large amount of cream tinged sputum. VSS, will continue to monitor.       Ania Washington RN  04/13/19 4221

## 2019-04-13 NOTE — PROGRESS NOTES
Physical Therapy  Janet Scott  4/13/2019  [id number]  1508    Therapy evaluate and treat orders noted. RN Lexie Londono defers evaluation until 4-14-19, as patient recently arrived to his room and is currently sedate.    Electronically signed by Nicolás George, PT [id number] on 4/13/2019 at 3:09 PM Vaccine Information Statement(s) was given today. This has been reviewed, questions answered, and verbal consent given by Parent for injection(s) and administration of Human papillomavirus (HPV) (patient waited the recommended 15 mins after receiving the HPV vaccine).        Patient tolerated without incident. See immunization grid for documentation.

## 2019-04-13 NOTE — ED NOTES
Pt report called to PETRA reed to assume care of pt. RN states no further questions at this time.      [employer], RN  04/13/19 6988

## 2019-04-13 NOTE — PROGRESS NOTES
Pt arrived to room 5123, alert, oriented, sleepy. Pt denies discomfort. Audible rhonchi noted. Pt is on 6L high flow o2. Pt oriented to room, call light, PCU routine. 4 Eyes Skin Assessment     The patient is being assess for  Admission    I agree that 2 RN's have performed a thorough Head to Toe Skin Assessment on the patient. ALL assessment sites listed below have been assessed. Areas assessed by both nurses: ***  [x]   Head, Face, and Ears   [x]   Shoulders, Back, and Chest  [x]   Arms, Elbows, and Hands   [x]   Coccyx, Sacrum, and IschIum  []   Legs, Feet, and Heels        Does the Patient have Skin Breakdown?   No         Jose Alfredo Prevention initiated:  Yes   Wound Care Orders initiated:  No      [facility] nurse consulted for Pressure Injury (Stage 3,4, Unstageable, DTI, NWPT, and Complex wounds), New and Established Ostomies:  No      Nurse 1 eSignature: Electronically signed by Chepe Vasquez RN on 4/13/19 at 2:51 PM    **SHARE this note so that the co-signing nurse is able to place an eSignature**    Nurse 2 eSignature: {Esignature:[id number]}

## 2019-04-13 NOTE — ED NOTES
Pt presents to ED via EMS from home. Pt c/o SOB and increased weakness x2 days. Pt 82% on RA on arrivAL. EMS states duo-neb given en route. Pt alert but appears lethargic. Pt VSS. Will continue to monitor.       Kevin Michelle RN  04/13/19 2185

## 2019-04-13 NOTE — CONSULTS
Clinical Pharmacy Note  Warfarin Consult    Vinicio Do is a 68 y.o. male receiving warfarin managed by pharmacy. Patient being bridged with none. Warfarin Indication: afib and CABG  Target INR range: 2-3   Dose prior to admission: regimen per spouse is 7.5 mg daily EXCEPT 10 mg on Monday, Wednesday, and Friday. Current warfarin drug-drug interactions: zosyn    Recent Labs     04/13/19  0733 04/13/19  1456   HGB 14.9  --    HCT 44.7  --    INR  --  7.65*       Assessment/Plan:    INR elevated at 7.65. Will hold warfarin tonight and likely tomorrow. Monitor for signs/symptoms of bleeding. Daily PT/INR until stable within therapeutic range. Thank you for the consult.    Solange Ledesma PharmD  4/13/2019  3:54 PM

## 2019-04-13 NOTE — PROGRESS NOTES
Pharmacy Medication Reconciliation Note    List of current medications patient is taking is complete. Source of information:   Conversation with patients spouse at bedside  Epic Records    Allergies: NKDA    Medication Notes:   Patients spouse states he received all morning medications prior to arriving in the Emergency Department today. Patient is on warfarin for history of A-Fib and CABG per spouse. His current warfarin regimen per spouse is 7.5 mg daily EXCEPT 10 mg on Monday, Wednesday, and Friday. He has not received his dose for today yet as he usually takes it in the evening. Patient is currently on a Prednisone taper for respiratory condition. His spouse states he has about 2 days of therapy remaining and is taking 2 tablets (10 mg total) daily. Patient alternates both Oxycontin 20 and 40 mg every 4 hours. Denies other OTC or herbal medications.      Junaid Flores   PharmD Candidate 2019    4/13/2019 8:55 AM

## 2019-04-13 NOTE — H&P
Hospital Medicine History & Physical      PCP: Ayana Burch    Date of Admission: 4/13/2019    Date of Service: Pt seen/examined on 04/13/2019 and Admitted to Inpatient with expected LOS greater than two midnights due to medical therapy. Chief Complaint:  SOB, cough      History Of Present Illness:      68 y.o. male who presented to [facility] with one-week history of worsening breathing, gets much worse in the last 2 days worse was ambulation, better when he is sitting still associated with cough with green sputum, fever and chills along was generalized weakness, no obvious trigger, came to emergency department started on BiPAP, found to have elevated lactic acid, found to have a pneumonia, started on IV fluid IV antibiotics and we were asked to admit for further management and treatment. Patient denies any nausea, vomiting, abdominal pain, dizziness or lightheadedness, no chest pain or palpitation. Past Medical History:          Diagnosis Date    CAD (coronary artery disease)     CABG in 2009    Chronic back pain     Chronic systolic CHF (congestive heart failure) (HCC)     COPD (chronic obstructive pulmonary disease) ([address]     Diabetes mellitus ([facility]     Essential hypertension     Paroxysmal atrial fibrillation ([facility]     On Coumadin       Past Surgical History:          Procedure Laterality Date    BACK SURGERY  1995    BACK SURGERY      CARDIAC SURGERY  2007    CORONARY ARTERY BYPASS GRAFT      FRACTURE SURGERY Left 1988    Shoulder       Medications Prior to Admission:      Prior to Admission medications    Medication Sig Start Date End Date Taking? Authorizing Provider   predniSONE (DELTASONE) 5 MG tablet Take 2 tablets by mouth daily Tapering dose 1/18/19  Yes Historical Provider, MD   oxyCODONE (OXYCONTIN) 20 MG extended release tablet Take 20 mg by mouth every 8 hours.  . 3/2/18  Yes Historical Provider, MD   simvastatin (ZOCOR) 80 MG tablet Take 40 mg by mouth nightly 3/27/18 Yes Historical Provider, MD   furosemide (LASIX) 40 MG tablet Take 40 mg by mouth daily   Yes Historical Provider, MD   oxyCODONE (OXYCONTIN) 40 MG extended release tablet Take 40 mg by mouth every 8 hours. .   Yes Historical Provider, MD   docusate sodium (COLACE) 100 MG capsule Take 100 mg by mouth 2 times daily as needed for Constipation    Yes Historical Provider, MD   lisinopril (PRINIVIL;ZESTRIL) 10 MG tablet Take 10 mg by mouth daily    Yes Historical Provider, MD   Insulin NPH Isophane & Regular (NOVOLIN 70/30 PENFILL SC) Inject 30 Units into the skin 2 times daily    Yes Historical Provider, MD   flunisolide (NASALIDE) 25 MCG/ACT (0.025%) SOLN Inhale 2 sprays into the lungs every 12 hours   Yes Historical Provider, MD   vitamin D (CHOLECALCIFEROL) 1000 UNITS TABS tablet Take 1,000 Units by mouth daily   Yes Historical Provider, MD   gabapentin (NEURONTIN) 800 MG tablet Take 800 mg by mouth 4 times daily   Yes Historical Provider, MD   aspirin 81 MG tablet Take 81 mg by mouth daily   Yes Historical Provider, MD   metFORMIN (GLUCOPHAGE) 1000 MG tablet Take 1,000 mg by mouth 2 times daily (with meals)   Yes Historical Provider, MD   budesonide-formoterol (SYMBICORT) 160-4.5 MCG/ACT AERO Inhale 2 puffs into the lungs 2 times daily   Yes Historical Provider, MD   warfarin (COUMADIN) 5 MG tablet Take 7.5 mg every evening EXCEPT 10 mg on Monday, Wednesday, and Friday. Yes Historical Provider, MD   carvedilol (COREG) 25 MG tablet Take 12.5 mg by mouth 2 times daily (with meals)    Yes Historical Provider, MD   albuterol sulfate HFA (PROVENTIL HFA) 108 (90 BASE) MCG/ACT inhaler Inhale 2 puffs into the lungs every 4 hours as needed for Wheezing or Shortness of Breath (cough; use with spacing device) 1/30/16  Yes Kaykay Katz MD       Allergies:  No known allergies    Social History:      The patient currently lives at home    TOBACCO:   reports that he quit smoking about 23 years ago.  He has a 18.00 pack-year smoking history. He has never used smokeless tobacco.  ETOH:   reports that he does not drink alcohol. Family History:      Reviewed in detail and positive as follows:        Problem Relation Age of Onset    Brain Cancer Mother     Heart Attack Father     Heart Attack Brother        REVIEW OF SYSTEMS:   Pertinent positives as noted in the HPI. All other systems reviewed and negative. PHYSICAL EXAM PERFORMED:    BP (!) 102/54   Pulse 112   Temp 98.2 °F (36.8 °C) (Oral)   Resp 25   Wt 236 lb 12.4 oz (107.4 kg)   SpO2 94%   BMI 33.97 kg/m²     General appearance:  No apparent distress  HEENT:  Normal cephalic  Neck: Supple  Respiratory:  Coarse breath sounds. Cardiovascular:  Regular rate and rhythm with normal S1/S2 without murmurs, rubs or gallops. Abdomen: Soft, non-tender. Musculoskeletal:  No clubbing. Bilateral leg edema. Skin: Skin color, texture, turgor normal.  No rashes or lesions. Neurologic:  No focal weakness   Psychiatric:  Alert and oriented  Capillary Refill: Brisk,< 3 seconds   Peripheral Pulses: +2 palpable, equal bilaterally       Labs:     Recent Labs     04/13/19  0733   WBC 11.1*   HGB 14.9   HCT 44.7        Recent Labs     04/13/19  0733      K 4.4   CL 91*   CO2 29   BUN 31*   CREATININE 1.4*   CALCIUM 9.6     Recent Labs     04/13/19  0733   AST 12*   ALT 20   BILITOT 0.5   ALKPHOS 64     No results for input(s): INR in the last 72 hours.   Recent Labs     04/13/19  0733   TROPONINI <0.01       Urinalysis:      Lab Results   Component Value Date    NITRU Negative 04/13/2019    WBCUA 1 04/13/2019    BACTERIA Rare 07/11/2018    RBCUA 1 04/13/2019    BLOODU TRACE 04/13/2019    SPECGRAV 1.021 04/13/2019    GLUCOSEU >=1000 04/13/2019       Radiology:     CXR: I have reviewed the CXR with the following interpretation: infiltrate  EKG:  I have reviewed the EKG with the following interpretation: no ST elevation    XR CHEST PORTABLE   Final Result   Findings are suggestive of pulmonary edema. Pneumonia is not excluded in the   appropriate clinical setting. XR CHEST STANDARD (2 VW)    (Results Pending)       ASSESSMENT:    Active Hospital Problems    Diagnosis Date Noted    Pneumonia due to organism [J18.9] 02/10/2016     Priority: High    Lactic acidosis [E87.2] 04/13/2019    Acute renal failure (ARF) ([facility] [N17.9] 04/13/2019    Generalized weakness [R53.1] 08/06/2018    Essential hypertension [I10] 07/30/2018    Diabetes mellitus type 2, uncontrolled ([facility] [E11.65] 04/06/2018    Severe sepsis ([facility] [A41.9, R65.20] 04/06/2018    Acute respiratory failure with hypercapnia (HCC) [J96.02] 04/06/2018    Chronic obstructive pulmonary disease ([facility] [J44.9]          PLAN:    1. Severe sepsis, due to pneumonia, IV fluid given in emergency department, IV antibiotics, patient was in the hospital was in the last 3 months, we will use Zosyn and vancomycin at this time, pro-calcitonin mildly elevated, admit for further management and treatment, blood cultures pending. Plan of care discussed with patient in details, all questions answered. 2.  Pneumonia, IV antibiotics as above. 3.  Acute respiratory failure with hypercapnia and hypoxia, when necessary BiPAP, clinically improved, will continue when necessary BiPAP, continuous pulse oximetry. 4.  COPD, no obvious exacerbation, steroids by mouth added. 5.  Acute renal failure, IV fluid, repeat renal function test in a.m.  6.  Essential hypertension, by mouth medication. 7.  Lactic acidosis, IV fluid, will trend lactic acid  8. Generalized weakness, due to infection, monitor closely, PT OT  9. H/O CHF, monitor closely     Diet: DIET CLEAR LIQUID;  Code Status: Full Code        Dispo - inpatient 2-4 days       Elvie Alcantara MD    Thank you Sadie Bautista for the opportunity to be involved in this patient's care. If you have any questions or concerns please feel free to contact me at [phone].

## 2019-04-13 NOTE — ED NOTES
Dr. Glenroy Katz at bedside to evaluate pt.  Transport arranged to [address] [place] [address], PETRA  04/13/19 9001

## 2019-04-13 NOTE — ED NOTES
Pt switched to Med-Surg bed at this time. Pt tolerated well. Pt wife at bedside. Pt off bi-pap at this time per respiratory. Pt tolerating well. Pt currently on 6L NC per resp tech.       Jessica Dumont RN  04/13/19 1118

## 2019-04-13 NOTE — PROGRESS NOTES
Clinical Pharmacy Note  Vancomycin Consult    Roseanne Babin is a 68 y.o. male ordered Vancomycin for HCAP; consult received from Dr. Glenory Katz to manage therapy. Also receiving zosyn. Patient Active Problem List   Diagnosis    Pneumonia due to organism    [facility] mellitus [facility]    CAD (coronary artery disease)    Morbid obesity due to excess calories ([address]    COPD (chronic obstructive pulmonary disease) ([address]    Chronic obstructive pulmonary disease (HCC)    Sepsis ([address]    SOB (shortness of breath)    Centrilobular emphysema (HCC)    Abnormal chest CT    Bronchitis    Back pain    Pneumonia    CAP (community acquired pneumonia)    Severe sepsis ([address]    Acute respiratory failure with hypercapnia ([facility]    Diabetes mellitus type 2, uncontrolled (HCC)    Chronic back pain    Chronic systolic heart failure (HCC)    Acute on chronic systolic congestive heart failure (HCC)    Hyperlipidemia    Essential hypertension    Leukocytosis    Chronic bronchitis, simple (HCC)    Chronic rhinitis    Generalized weakness    Lactic acidosis    [facility]F) [facility]       Allergies:  No known allergies     Temp max:  Temp (24hrs), Av.2 °F (37.9 °C), Min:98.2 °F (36.8 °C), Max:102.1 °F (38.9 °C)      Recent Labs     19  0733   WBC 11.1*       Recent Labs     19  0733   BUN 31*   CREATININE 1.4*       No intake or output data in the 24 hours ending 19 1316    Culture Results:      Ht Readings from Last 1 Encounters:   19 5' 10\" (1.778 m)        Wt Readings from Last 1 Encounters:   19 236 lb 12.4 oz (107.4 kg)         CrCl cannot be calculated (Unknown ideal weight. ). Assessment/Plan:  Will initiate vancomycin 1000 mg IV every 12 hours. Regimen projects a trough level of 15-20 mg/L. Timing of trough level will be determined based on culture results, renal function, and clinical response. Thank you for the consult. Will continue to follow.

## 2019-04-13 NOTE — ED NOTES
Pt placed on fall precautions. Pt fall risk bractlet and non-skid footwear applied to pt. Bed alarm in place. Pt directed to not get up without assistance. Pt verbalizes understanding.         Sonia Leahy RN  04/13/19 9558

## 2019-04-14 ENCOUNTER — APPOINTMENT (OUTPATIENT)
Dept: GENERAL RADIOLOGY | Age: 74
DRG: 871 | End: 2019-04-14
Payer: MEDICARE

## 2019-04-14 PROBLEM — R79.1 SUPRATHERAPEUTIC INR: Status: ACTIVE | Noted: 2019-04-14

## 2019-04-14 LAB
A/G RATIO: 1.1 (ref 1.1–2.2)
ALBUMIN SERPL-MCNC: 3.4 G/DL (ref 3.4–5)
ALP BLD-CCNC: 48 U/L (ref 40–129)
ALT SERPL-CCNC: 15 U/L (ref 10–40)
ANION GAP SERPL CALCULATED.3IONS-SCNC: 13 MMOL/L (ref 3–16)
AST SERPL-CCNC: 12 U/L (ref 15–37)
BASOPHILS ABSOLUTE: 0 K/UL (ref 0–0.2)
BASOPHILS RELATIVE PERCENT: 0.2 %
BILIRUB SERPL-MCNC: 0.9 MG/DL (ref 0–1)
BUN BLDV-MCNC: 17 MG/DL (ref 7–20)
CALCIUM SERPL-MCNC: 9 MG/DL (ref 8.3–10.6)
CHLORIDE BLD-SCNC: 96 MMOL/L (ref 99–110)
CO2: 25 MMOL/L (ref 21–32)
CREAT SERPL-MCNC: 0.9 MG/DL (ref 0.8–1.3)
EKG ATRIAL RATE: 119 BPM
EKG DIAGNOSIS: NORMAL
EKG P AXIS: 57 DEGREES
EKG P-R INTERVAL: 176 MS
EKG Q-T INTERVAL: 314 MS
EKG QRS DURATION: 94 MS
EKG QTC CALCULATION (BAZETT): 441 MS
EKG R AXIS: 65 DEGREES
EKG T AXIS: 41 DEGREES
EKG VENTRICULAR RATE: 119 BPM
EOSINOPHILS ABSOLUTE: 0 K/UL (ref 0–0.6)
EOSINOPHILS RELATIVE PERCENT: 0.1 %
ESTIMATED AVERAGE GLUCOSE: 203 MG/DL
GFR AFRICAN AMERICAN: >60
GFR NON-AFRICAN AMERICAN: >60
GLOBULIN: 3.2 G/DL
GLUCOSE BLD-MCNC: 311 MG/DL (ref 70–99)
GLUCOSE BLD-MCNC: 316 MG/DL (ref 70–99)
GLUCOSE BLD-MCNC: 376 MG/DL (ref 70–99)
GLUCOSE BLD-MCNC: 413 MG/DL (ref 70–99)
GLUCOSE BLD-MCNC: 468 MG/DL (ref 70–99)
HBA1C MFR BLD: 8.7 %
HCT VFR BLD CALC: 39.4 % (ref 40.5–52.5)
HEMOGLOBIN: 13 G/DL (ref 13.5–17.5)
INR BLD: 5.08 (ref 0.86–1.14)
LACTIC ACID: 2 MMOL/L (ref 0.4–2)
LACTIC ACID: 2.5 MMOL/L (ref 0.4–2)
LYMPHOCYTES ABSOLUTE: 1.1 K/UL (ref 1–5.1)
LYMPHOCYTES RELATIVE PERCENT: 7.8 %
MAGNESIUM: 1.9 MG/DL (ref 1.8–2.4)
MCH RBC QN AUTO: 30.3 PG (ref 26–34)
MCHC RBC AUTO-ENTMCNC: 33 G/DL (ref 31–36)
MCV RBC AUTO: 91.6 FL (ref 80–100)
MONOCYTES ABSOLUTE: 1.2 K/UL (ref 0–1.3)
MONOCYTES RELATIVE PERCENT: 8.5 %
NEUTROPHILS ABSOLUTE: 11.4 K/UL (ref 1.7–7.7)
NEUTROPHILS RELATIVE PERCENT: 83.4 %
PDW BLD-RTO: 13.6 % (ref 12.4–15.4)
PERFORMED ON: ABNORMAL
PLATELET # BLD: 223 K/UL (ref 135–450)
PMV BLD AUTO: 7.2 FL (ref 5–10.5)
POTASSIUM REFLEX MAGNESIUM: 4.4 MMOL/L (ref 3.5–5.1)
PROTHROMBIN TIME: 57.9 SEC (ref 9.8–13)
RBC # BLD: 4.3 M/UL (ref 4.2–5.9)
SODIUM BLD-SCNC: 134 MMOL/L (ref 136–145)
TOTAL PROTEIN: 6.6 G/DL (ref 6.4–8.2)
VANCOMYCIN RANDOM: 12 UG/ML
WBC # BLD: 13.7 K/UL (ref 4–11)

## 2019-04-14 PROCEDURE — 87641 MR-STAPH DNA AMP PROBE: CPT

## 2019-04-14 PROCEDURE — 80202 ASSAY OF VANCOMYCIN: CPT

## 2019-04-14 PROCEDURE — 6370000000 HC RX 637 (ALT 250 FOR IP): Performed by: INTERNAL MEDICINE

## 2019-04-14 PROCEDURE — 2580000003 HC RX 258: Performed by: INTERNAL MEDICINE

## 2019-04-14 PROCEDURE — 93010 ELECTROCARDIOGRAM REPORT: CPT | Performed by: INTERNAL MEDICINE

## 2019-04-14 PROCEDURE — 97162 PT EVAL MOD COMPLEX 30 MIN: CPT

## 2019-04-14 PROCEDURE — 2700000000 HC OXYGEN THERAPY PER DAY

## 2019-04-14 PROCEDURE — 2060000000 HC ICU INTERMEDIATE R&B

## 2019-04-14 PROCEDURE — 97530 THERAPEUTIC ACTIVITIES: CPT

## 2019-04-14 PROCEDURE — 71046 X-RAY EXAM CHEST 2 VIEWS: CPT

## 2019-04-14 PROCEDURE — 97116 GAIT TRAINING THERAPY: CPT

## 2019-04-14 PROCEDURE — 83605 ASSAY OF LACTIC ACID: CPT

## 2019-04-14 PROCEDURE — 6370000000 HC RX 637 (ALT 250 FOR IP): Performed by: PHYSICIAN ASSISTANT

## 2019-04-14 PROCEDURE — 36415 COLL VENOUS BLD VENIPUNCTURE: CPT

## 2019-04-14 PROCEDURE — 6360000002 HC RX W HCPCS: Performed by: INTERNAL MEDICINE

## 2019-04-14 PROCEDURE — 80053 COMPREHEN METABOLIC PANEL: CPT

## 2019-04-14 PROCEDURE — 94640 AIRWAY INHALATION TREATMENT: CPT

## 2019-04-14 PROCEDURE — 85025 COMPLETE CBC W/AUTO DIFF WBC: CPT

## 2019-04-14 PROCEDURE — 94668 MNPJ CHEST WALL SBSQ: CPT

## 2019-04-14 PROCEDURE — 85610 PROTHROMBIN TIME: CPT

## 2019-04-14 PROCEDURE — 94762 N-INVAS EAR/PLS OXIMTRY CONT: CPT

## 2019-04-14 PROCEDURE — 83735 ASSAY OF MAGNESIUM: CPT

## 2019-04-14 RX ORDER — SIMVASTATIN 40 MG
40 TABLET ORAL NIGHTLY
Status: DISCONTINUED | OUTPATIENT
Start: 2019-04-14 | End: 2019-04-17 | Stop reason: HOSPADM

## 2019-04-14 RX ORDER — OXYCODONE HCL 10 MG/1
20 TABLET, FILM COATED, EXTENDED RELEASE ORAL EVERY 8 HOURS
Status: DISCONTINUED | OUTPATIENT
Start: 2019-04-14 | End: 2019-04-17 | Stop reason: HOSPADM

## 2019-04-14 RX ORDER — FUROSEMIDE 10 MG/ML
20 INJECTION INTRAMUSCULAR; INTRAVENOUS ONCE
Status: COMPLETED | OUTPATIENT
Start: 2019-04-14 | End: 2019-04-14

## 2019-04-14 RX ORDER — OXYCODONE HCL 40 MG/1
40 TABLET, FILM COATED, EXTENDED RELEASE ORAL EVERY 8 HOURS
Status: DISCONTINUED | OUTPATIENT
Start: 2019-04-14 | End: 2019-04-17 | Stop reason: HOSPADM

## 2019-04-14 RX ORDER — OXYCODONE HCL 10 MG/1
20 TABLET, FILM COATED, EXTENDED RELEASE ORAL ONCE
Status: COMPLETED | OUTPATIENT
Start: 2019-04-14 | End: 2019-04-14

## 2019-04-14 RX ORDER — GABAPENTIN 400 MG/1
800 CAPSULE ORAL 4 TIMES DAILY
Status: DISCONTINUED | OUTPATIENT
Start: 2019-04-14 | End: 2019-04-14

## 2019-04-14 RX ORDER — GABAPENTIN 400 MG/1
800 CAPSULE ORAL 4 TIMES DAILY
Status: DISCONTINUED | OUTPATIENT
Start: 2019-04-14 | End: 2019-04-17 | Stop reason: HOSPADM

## 2019-04-14 RX ADMIN — OXYCODONE HYDROCHLORIDE 40 MG: 40 TABLET, FILM COATED, EXTENDED RELEASE ORAL at 12:22

## 2019-04-14 RX ADMIN — GABAPENTIN 800 MG: 400 CAPSULE ORAL at 03:48

## 2019-04-14 RX ADMIN — Medication 10 ML: at 20:33

## 2019-04-14 RX ADMIN — CARVEDILOL 12.5 MG: 6.25 TABLET, FILM COATED ORAL at 16:55

## 2019-04-14 RX ADMIN — FUROSEMIDE 20 MG: 10 INJECTION, SOLUTION INTRAMUSCULAR; INTRAVENOUS at 12:22

## 2019-04-14 RX ADMIN — SIMVASTATIN 40 MG: 40 TABLET, FILM COATED ORAL at 20:33

## 2019-04-14 RX ADMIN — PIPERACILLIN SODIUM,TAZOBACTAM SODIUM 3.38 G: 3; .375 INJECTION, POWDER, FOR SOLUTION INTRAVENOUS at 13:28

## 2019-04-14 RX ADMIN — OXYCODONE HYDROCHLORIDE 20 MG: 10 TABLET, FILM COATED, EXTENDED RELEASE ORAL at 16:55

## 2019-04-14 RX ADMIN — ACETAMINOPHEN 650 MG: 325 TABLET ORAL at 09:35

## 2019-04-14 RX ADMIN — CARVEDILOL 12.5 MG: 6.25 TABLET, FILM COATED ORAL at 09:30

## 2019-04-14 RX ADMIN — INSULIN LISPRO 6 UNITS: 100 INJECTION, SOLUTION INTRAVENOUS; SUBCUTANEOUS at 20:40

## 2019-04-14 RX ADMIN — INSULIN LISPRO 12 UNITS: 100 INJECTION, SOLUTION INTRAVENOUS; SUBCUTANEOUS at 18:00

## 2019-04-14 RX ADMIN — INSULIN LISPRO 8 UNITS: 100 INJECTION, SOLUTION INTRAVENOUS; SUBCUTANEOUS at 09:29

## 2019-04-14 RX ADMIN — PREDNISONE 40 MG: 20 TABLET ORAL at 09:30

## 2019-04-14 RX ADMIN — IPRATROPIUM BROMIDE AND ALBUTEROL SULFATE 1 AMPULE: .5; 3 SOLUTION RESPIRATORY (INHALATION) at 20:47

## 2019-04-14 RX ADMIN — MOMETASONE FUROATE AND FORMOTEROL FUMARATE DIHYDRATE 2 PUFF: 100; 5 AEROSOL RESPIRATORY (INHALATION) at 20:47

## 2019-04-14 RX ADMIN — VANCOMYCIN HYDROCHLORIDE 1000 MG: 1 INJECTION, POWDER, LYOPHILIZED, FOR SOLUTION INTRAVENOUS at 03:59

## 2019-04-14 RX ADMIN — MOMETASONE FUROATE AND FORMOTEROL FUMARATE DIHYDRATE 2 PUFF: 100; 5 AEROSOL RESPIRATORY (INHALATION) at 09:19

## 2019-04-14 RX ADMIN — GABAPENTIN 800 MG: 400 CAPSULE ORAL at 16:55

## 2019-04-14 RX ADMIN — GABAPENTIN 800 MG: 400 CAPSULE ORAL at 20:33

## 2019-04-14 RX ADMIN — VANCOMYCIN HYDROCHLORIDE 1000 MG: 1 INJECTION, POWDER, LYOPHILIZED, FOR SOLUTION INTRAVENOUS at 15:00

## 2019-04-14 RX ADMIN — INSULIN LISPRO 8 UNITS: 100 INJECTION, SOLUTION INTRAVENOUS; SUBCUTANEOUS at 12:23

## 2019-04-14 RX ADMIN — IPRATROPIUM BROMIDE AND ALBUTEROL SULFATE 1 AMPULE: .5; 3 SOLUTION RESPIRATORY (INHALATION) at 09:18

## 2019-04-14 RX ADMIN — VITAMIN D, TAB 1000IU (100/BT) 1000 UNITS: 25 TAB at 16:13

## 2019-04-14 RX ADMIN — OXYCODONE HYDROCHLORIDE 40 MG: 40 TABLET, FILM COATED, EXTENDED RELEASE ORAL at 20:32

## 2019-04-14 RX ADMIN — PIPERACILLIN SODIUM,TAZOBACTAM SODIUM 3.38 G: 3; .375 INJECTION, POWDER, FOR SOLUTION INTRAVENOUS at 16:56

## 2019-04-14 RX ADMIN — OXYCODONE HYDROCHLORIDE 20 MG: 10 TABLET, FILM COATED, EXTENDED RELEASE ORAL at 05:44

## 2019-04-14 RX ADMIN — GABAPENTIN 800 MG: 400 CAPSULE ORAL at 12:22

## 2019-04-14 RX ADMIN — ASPIRIN 81 MG 81 MG: 81 TABLET ORAL at 09:30

## 2019-04-14 RX ADMIN — INSULIN LISPRO 30 UNITS: 100 INJECTION, SUSPENSION SUBCUTANEOUS at 18:02

## 2019-04-14 RX ADMIN — PIPERACILLIN SODIUM,TAZOBACTAM SODIUM 3.38 G: 3; .375 INJECTION, POWDER, FOR SOLUTION INTRAVENOUS at 23:07

## 2019-04-14 RX ADMIN — IPRATROPIUM BROMIDE AND ALBUTEROL SULFATE 1 AMPULE: .5; 3 SOLUTION RESPIRATORY (INHALATION) at 16:01

## 2019-04-14 RX ADMIN — PIPERACILLIN SODIUM,TAZOBACTAM SODIUM 3.38 G: 3; .375 INJECTION, POWDER, FOR SOLUTION INTRAVENOUS at 06:05

## 2019-04-14 RX ADMIN — IPRATROPIUM BROMIDE AND ALBUTEROL SULFATE 1 AMPULE: .5; 3 SOLUTION RESPIRATORY (INHALATION) at 12:53

## 2019-04-14 ASSESSMENT — PAIN SCALES - GENERAL
PAINLEVEL_OUTOF10: 8
PAINLEVEL_OUTOF10: 7
PAINLEVEL_OUTOF10: 6
PAINLEVEL_OUTOF10: 0
PAINLEVEL_OUTOF10: 10
PAINLEVEL_OUTOF10: 0
PAINLEVEL_OUTOF10: 10
PAINLEVEL_OUTOF10: 2
PAINLEVEL_OUTOF10: 1

## 2019-04-14 ASSESSMENT — PAIN DESCRIPTION - ONSET
ONSET: ON-GOING

## 2019-04-14 ASSESSMENT — PAIN DESCRIPTION - LOCATION
LOCATION: LEG

## 2019-04-14 ASSESSMENT — PAIN DESCRIPTION - PROGRESSION
CLINICAL_PROGRESSION: NOT CHANGED
CLINICAL_PROGRESSION: GRADUALLY IMPROVING
CLINICAL_PROGRESSION: NOT CHANGED
CLINICAL_PROGRESSION: GRADUALLY IMPROVING

## 2019-04-14 ASSESSMENT — PAIN DESCRIPTION - PAIN TYPE
TYPE: CHRONIC PAIN
TYPE: CHRONIC PAIN
TYPE: CHRONIC PAIN;ACUTE PAIN

## 2019-04-14 ASSESSMENT — PAIN - FUNCTIONAL ASSESSMENT
PAIN_FUNCTIONAL_ASSESSMENT: ACTIVITIES ARE NOT PREVENTED
PAIN_FUNCTIONAL_ASSESSMENT: PREVENTS OR INTERFERES WITH MANY ACTIVE NOT PASSIVE ACTIVITIES
PAIN_FUNCTIONAL_ASSESSMENT: PREVENTS OR INTERFERES WITH MANY ACTIVE NOT PASSIVE ACTIVITIES

## 2019-04-14 ASSESSMENT — PAIN DESCRIPTION - ORIENTATION
ORIENTATION: RIGHT

## 2019-04-14 ASSESSMENT — PAIN DESCRIPTION - FREQUENCY
FREQUENCY: CONTINUOUS
FREQUENCY: CONTINUOUS
FREQUENCY: INTERMITTENT

## 2019-04-14 ASSESSMENT — PAIN DESCRIPTION - DESCRIPTORS
DESCRIPTORS: CONSTANT
DESCRIPTORS: CONSTANT
DESCRIPTORS: ACHING

## 2019-04-14 NOTE — CARE COORDINATION
INITIAL CASE MANAGEMENT ASSESSMENT    Reviewed chart, met with patient to assess possible discharge needs. Explained Case Management role/services. Living Situation: Lives with his wife in a 2 story house, [address]. He does not go to the 2nd floor. ADLs: He is independent with his care, uses a cane to assist with ambulation. DME: cane    PT/OT Recs: Recommending [facility]     Active Services: None     Transportation: He drives, his wife will take him home. Medications: He is able to afford all medications. PCP: Dr. Alberto Molina      HD/PD: N/A    PLAN/COMMENTS: Patient plans on returning home at discharge. [facility] He chose [facility] as he has had them in the past. Referral initiated with [facility]. Will need HC order and completion of AHSAN at discharge.      Neil Fraga, [address] [place] [address]  [phone]  4/14/19 at 10:51 AM

## 2019-04-14 NOTE — PLAN OF CARE
Problem: Pain:  Goal: Pain level will decrease  Description  Pain level will decrease  4/14/2019 0538 by Maco Culp RN  Outcome: Ongoing  4/14/2019 0536 by Maco Culp RN  Outcome: Ongoing   Order obtained for gabapentin as taken at home. Order for one-time dose of oxycontin.

## 2019-04-14 NOTE — PROGRESS NOTES
Clinical Pharmacy Note  Vancomycin Consult    Berry Hays is a 68 y.o. male ordered Vancomycin for pneumonia; consult received from Dr. Lord Luong to manage therapy. Also receiving . Patient Active Problem List   Diagnosis    Pneumonia due to organism    [facility] mellitus [facility]    CAD (coronary artery disease)    Morbid obesity due to excess calories ([facility]    COPD (chronic obstructive pulmonary disease) (HCC)    Chronic obstructive pulmonary disease (HCC)    Sepsis (HCC)    SOB (shortness of breath)    Centrilobular emphysema (HCC)    Abnormal chest CT    Bronchitis    Back pain    Pneumonia    CAP (community acquired pneumonia)    Severe [facility]    Acute [facility] failure with hypercapnia ([facility]    Diabetes mellitus type 2, uncontrolled (HCC)    Chronic back pain    Chronic systolic heart failure (HCC)    Acute on chronic systolic congestive heart failure (HCC)    Hyperlipidemia    Essential hypertension    Leukocytosis    Chronic bronchitis, simple (HCC)    Chronic rhinitis    Generalized weakness    Lactic acidosis    Acute renal failure (ARF) ([facility])    Uncontrolled diabetes mellitus ([facility])    Supratherapeutic INR       Allergies:  No known allergies     Temp max:  Temp (24hrs), Av.6 °F (36.4 °C), Min:97.5 °F (36.4 °C), Max:97.8 °F (36.6 °C)      Recent Labs     19  0733 19  0556   WBC 11.1* 13.7*       Recent Labs     19  0733 19  0556   BUN 31* 17   CREATININE 1.4* 0.9         Intake/Output Summary (Last 24 hours) at 2019 1517  Last data filed at 2019 1349  Gross per 24 hour   Intake 950 ml   Output 1895 ml   Net -945 ml       Culture Results:      Ht Readings from Last 1 Encounters:   19 5' 10.08\" (1.78 m)        Wt Readings from Last 1 Encounters:   19 253 lb 8.5 oz (115 kg)         Estimated Creatinine Clearance: 93 mL/min (based on SCr of 0.9 mg/dL). Assessment/Plan:  Will continue vancomycin 1000 mg IV every 12 hours. Regimen projects a trough level of 15-20 mg/L. Timing of trough level will be determined based on culture results, renal function, and clinical response. Thank you for the consult. Will continue to follow.

## 2019-04-14 NOTE — CONSULTS
Clinical Pharmacy Note  Warfarin Consult    Leroy Bernardo is a 68 y.o. male receiving warfarin managed by pharmacy. Patient being bridged with none. Warfarin Indication: afib and CABG  Target INR range: 2-3   Dose prior to admission: regimen per spouse is 7.5 mg daily EXCEPT 10 mg on Monday, Wednesday, and Friday. Current warfarin drug-drug interactions: zosyn  Pt was on a 21 day PREDNISONE taper prior to admit for respiratory / COPD exac     Recent Labs     04/13/19  0733 04/13/19  1456 04/14/19  0556   HGB 14.9  --  13.0*   HCT 44.7  --  39.4*   INR  --  7.65* 5.08*       Assessment/Plan:    INR elevated at 5.08. Will hold warfarin again tonight. Remains on Prednisone - has a stop date of 4/18/19  Monitor for signs/symptoms of bleeding. Daily PT/INR until stable within therapeutic range. Thank you for the consult.    Matthew Lainez [facility]  4/14/2019  7:16 AM

## 2019-04-14 NOTE — PROGRESS NOTES
Hospitalist Progress Note      PCP: Kartik Nascimento    Date of Admission: 4/13/2019        Subjective: up to chair, feels better, still coughing, no more fever, no chest pain, palpitation or abdominal pain, no family member at bedside. Had some jerky movement right leg, resolved now.         Medications:  Reviewed    Infusion Medications    dextrose       Scheduled Medications    gabapentin  800 mg Oral 4x daily    oxyCODONE  20 mg Oral Q8H    oxyCODONE  40 mg Oral Q8H    simvastatin  40 mg Oral Nightly    vitamin D  1,000 Units Oral Daily    insulin lispro protamine & lispro  30 Units Subcutaneous BID WC    furosemide  20 mg Intravenous Once    aspirin  81 mg Oral Daily    mometasone-formoterol  2 puff Inhalation BID    carvedilol  12.5 mg Oral BID WC    sodium chloride flush  10 mL Intravenous 2 times per day    piperacillin-tazobactam  3.375 g Intravenous Q6H    predniSONE  40 mg Oral Daily    ipratropium-albuterol  1 ampule Inhalation Q4H WA    vancomycin  1,000 mg Intravenous Q12H    vancomycin (VANCOCIN) intermittent dosing (placeholder)   Other RX Placeholder    warfarin (COUMADIN) daily dosing (placeholder)   Other RX Placeholder    insulin lispro  0-12 Units Subcutaneous TID WC    insulin lispro  0-6 Units Subcutaneous Nightly     PRN Meds: albuterol sulfate HFA, docusate sodium, sodium chloride flush, magnesium hydroxide, ondansetron, glucose, dextrose, glucagon (rDNA), dextrose, acetaminophen      Intake/Output Summary (Last 24 hours) at 4/14/2019 1134  Last data filed at 4/14/2019 1019  Gross per 24 hour   Intake 710 ml   Output 1495 ml   Net -785 ml       Physical Exam Performed:    /72   Pulse 79   Temp 97.6 °F (36.4 °C) (Oral)   Resp 18   Ht 5' 10.08\" (1.78 m)   Wt 253 lb 8.5 oz (115 kg)   SpO2 94%   BMI 36.30 kg/m²     General appearance: No apparent distress  Neck: Supple  Respiratory:  Coarse breath sounds   Cardiovascular: Regular rate and rhythm with normal S1/S2 without murmurs, rubs or gallops. Abdomen: Soft, non-tender  Musculoskeletal: No clubbing, cyanosis  Skin: Skin color, texture, turgor normal.  No rashes or lesions. Neurologic:  No focal weakness   Psychiatric: Alert and oriented  Capillary Refill: Brisk,< 3 seconds   Peripheral Pulses: +2 palpable, equal bilaterally       Labs:   Recent Labs     04/13/19  0733 04/14/19  0556   WBC 11.1* 13.7*   HGB 14.9 13.0*   HCT 44.7 39.4*    223     Recent Labs     04/13/19  0733 04/14/19  0556    134*   K 4.4 4.4   CL 91* 96*   CO2 29 25   BUN 31* 17   CREATININE 1.4* 0.9   CALCIUM 9.6 9.0     Recent Labs     04/13/19  0733 04/14/19  0556   AST 12* 12*   ALT 20 15   BILITOT 0.5 0.9   ALKPHOS 64 48     Recent Labs     04/13/19  1456 04/14/19  0556   INR 7.65* 5.08*     Recent Labs     04/13/19  0733   TROPONINI <0.01       Urinalysis:      Lab Results   Component Value Date    NITRU Negative 04/13/2019    WBCUA 1 04/13/2019    BACTERIA Rare 07/11/2018    RBCUA 1 04/13/2019    BLOODU TRACE 04/13/2019    SPECGRAV 1.021 04/13/2019    GLUCOSEU >=1000 04/13/2019       Radiology:  XR CHEST STANDARD (2 VW)   Final Result   Persistent bibasilar airspace disease, compatible with edema or pneumonia,   with small left pleural effusion. XR CHEST PORTABLE   Final Result   Findings are suggestive of pulmonary edema. Pneumonia is not excluded in the   appropriate clinical setting.                  Assessment/Plan:    Active Hospital Problems    Diagnosis Date Noted    Pneumonia due to organism [J18.9] 02/10/2016     Priority: High    Lactic acidosis [E87.2] 04/13/2019    Acute renal failure (ARF) ([facility] [N17.9] 04/13/2019    Generalized weakness [R53.1] 08/06/2018    Essential hypertension [I10] 07/30/2018    Diabetes mellitus type 2, uncontrolled ([facility] [E11.65] 04/06/2018    Severe sepsis ([facility] [A41.9, R65.20] 04/06/2018    Acute respiratory failure with hypercapnia (HCC) [J96.02] 04/06/2018    Chronic obstructive pulmonary disease ([facility] [J44.9]      1. Severe sepsis, due to pneumonia, IV fluid given in emergency department based on ideal weight as patient with heart failure, IV antibiotics, patient was in the hospital was in the last 3 months, currently on Zosyn and vancomycin pro-calcitonin mildly elevated,cliincally improving. 2.  streptococcal Pneumonia, IV antibiotics as above. 3.  Acute respiratory failure with hypercapnia and hypoxia, when necessary BiPAP, clinically improved, will continue when necessary BiPAP, continuous pulse oximetry. Discussed with nurse. 4.  COPD, no obvious exacerbation, steroids by mouth added for pneumonia. 5.  Acute renal failure, IV fluid, improved for now. 6.  Essential hypertension, by mouth medication. 7.  Lactic acidosis, IV fluid, trended down. 8.  Generalized weakness, due to infection, monitor closely, PT OT  9. H/O CHF, monitor closely   10. DM, uncontrolled, home insulin, SS.  11. Jerky movement in right leg, resolved, likely myoclonus, patient diabetic and with with sepsis, monitor, also gabapentin and MS contin restarted, OARRS reviewed. 12. Supra therpautic INR, pharmacy consulted.            Diet: DIET CARDIAC; Carb Control: 4 carb choices (60 gms)/meal; No Added Salt (3-4 GM)  Code Status: Full Code      Dispo - ongoing management     Abby Lyn MD

## 2019-04-14 NOTE — PROGRESS NOTES
Physical Therapy    Facility/Department: [address] PROGRESSIVE CARE  Initial Assessment    NAME: Madhavi Mejia  : [birth year]  MRN: [id number]    Date of Service: 2019    Discharge Recommendations:  Continue to assess pending progress   PT Equipment Recommendations  Equipment Needed: No   Madhavi Mejia scored a 18/24 on the AM-PAC short mobility form. Current research shows that an AM-PAC score of 18 or greater is typically associated with a discharge to the patient's home setting. Based on the patients AM-PAC score and their current functional mobility deficits, it is recommended that the patient have 2-3 sessions per week of Physical Therapy at d/c to increase the patients independence. Assessment   Body structures, Functions, Activity limitations: Decreased functional mobility ; Decreased strength;Decreased endurance; Increased Pain  Assessment: 69 y/o male admit 19 with Pneumonia, COPD Exac. PMH as noted including CAD, CABG , A-Fib (Coumadin), CHF, COPD, Chronic Back Pain, Back Surg (10-12 yr ago per pt), L UE Fx/Surg . Pt reports h/o intermittent LE Sxs (\"jerking\", giving way over past 5-6 yrs (R>L, usually at night, takes  meds). PTA pt living with wife in home iwth 1st floor bed/bath; independent with daily care and amb activities (with walker short distances/use of electric scooter longer distances). Pt does report LE sxs (jerking/giving way) affects mobility although this appears very chronic (5-6 yr at least per pt) and pt reports cont pain meds several yrs to address this. Probable d/c home with recommend Home PT although will monitor pt's progress. Prognosis: Fair  Decision Making: Medium Complexity  History: 69 y/o male admit 19 with Pneumonia, COPD Exac. PMH as noted including CAD, CABG , A-Fib (Coumadin), CHF, COPD, Chronic Back Pain, Back Surg (10-12 yr ago per pt), L UE Fx/Surg .   Pt reports h/o intermittent LE Sxs (\"jerking\", giving way over past 5-6 yrs (R>L, usually at night, takes meds). Exam: See above. Clinical Presentation: See above. Patient Education: Role of PT, POC, Need to call for assist.   Barriers to Learning: Pain management. REQUIRES PT FOLLOW UP: Yes  Activity Tolerance  Activity Tolerance: Patient limited by fatigue;Patient limited by pain; Patient limited by endurance       Patient Diagnosis(es): The primary encounter diagnosis was Pneumonia due to organism. Diagnoses of Dyspnea, unspecified type and COPD exacerbation ([address] were also pertinent to this visit. has a past medical history of CAD (coronary artery disease), Chronic back pain, Chronic systolic CHF (congestive heart failure) ([address] COPD (chronic obstructive pulmonary disease) ([address] Diabetes mellitus ([address] Essential hypertension, and Paroxysmal atrial fibrillation ([facility] has a past surgical history that includes fracture surgery (Left, 1988); back surgery (1995); Cardiac surgery (2007); Coronary artery bypass graft; and back surgery. Restrictions  Restrictions/Precautions  Restrictions/Precautions: Fall Risk  Position Activity Restriction  Other position/activity restrictions: Diet : Clear Liquid. O2 6L. Vision/Hearing  Vision: Within Functional Limits  Hearing: Within functional limits     Subjective  General  Chart Reviewed: Yes  Patient assessed for rehabilitation services?: Yes  Additional Pertinent Hx: 69 y/o male admit 4/13/19 with Pneumonia, COPD Exac. PMH as noted including CAD, CABG 2009, A-Fib (Coumadin), CHF, COPD, Chronic Back Pain, Back Surg (10-12 yr ago per pt), L UE Fx/Surg 1988. Pt reports h/o intermittent LE Sxs (\"jerking\", giving way over past 5-6 yrs (R>L, usually at night, takes meds). Family / Caregiver Present: No  Referring Practitioner: Dr. Julia Kwan  Diagnosis: Pneumonia, COPD Exac. Follows Commands: Within Functional Limits  Subjective  Subjective: Pt agreeable to PT Eval/Rx.   (Pt appears very focused on LE sxs (jerking mvt) and wanting this to be addressed, has meds prescribed and appears chronic over at least 5-6 yrs). Pain 5/10 R LE (chronic, nursing aware and addressing with meds). Pain Screening  Patient Currently in Pain: No          Orientation  Orientation  Overall Orientation Status: Within Functional Limits  Social/Functional History  Social/Functional History  Lives With: Spouse(Wife Milady Gamez). )  Type of Home: House  Home Layout: Multi-level, Able to Live on Main level with bedroom/bathroom, Laundry in basement  Home Access: Stairs to enter with rails(2 steps to enter. )  Bathroom Shower/Tub: Walk-in shower  Bathroom Toilet: Handicap height  Bathroom Equipment: Grab bars in shower, Built-in shower seat  Bathroom Accessibility: Accessible  Home Equipment: Rolling walker, 4 wheeled walker, Cane, Electric scooter  ADL Assistance: Independent(Pt reports independent daily care.  )  Ambulation Assistance: Independent(With use of [address] within home.  )  Transfer Assistance: Independent  Active : Yes(Pt reports that he cont to drive (despite LE sxs, reports \"usually at night\"). )  Occupation: Retired  Type of occupation: Retired - . Cognition        Objective     Observation/Palpation  Observation: Note intermittent mild jerking mvts R LE when sitting at EOB. AROM R LE uncontrolled at times due to this. AROM RLE (degrees)  RLE AROM: WFL  RLE General AROM: AROM WFLs although uncontrolled hip/knee at times. AROM LLE (degrees)  LLE AROM : WFL  AROM RUE (degrees)  RUE AROM : WFL  AROM LUE (degrees)  LUE AROM : WFL  Strength RLE  Comment: Inability to assess due to diminished control with AROM mvt. Strength LLE  Comment: Grossly 4- 4/5. Sensation  Overall Sensation Status: Impaired(Pt reports R LE entirely numb.   L LE numb dorsum foot. )  Bed mobility  Rolling to Left: Independent  Rolling to Right: Independent  Supine to Sit: Independent  Sit to Supine: Independent  Comment: Pt able to complete sit Therapy Time   Individual Concurrent Group Co-treatment   Time In 0645         Time Out 0730         Minutes [address], PT

## 2019-04-14 NOTE — PLAN OF CARE
Problem: Falls - Risk of:  Goal: Will remain free from falls  Description  Will remain free from falls  Outcome: Ongoing  Goal: Absence of physical injury  Description  Absence of physical injury  Outcome: Ongoing     Problem: Airway Clearance - Ineffective:  Goal: Clear lung sounds  Description  Clear lung sounds  Outcome: Ongoing  Goal: Ability to maintain a clear airway will improve  Description  Ability to maintain a clear airway will improve  Outcome: Ongoing     Problem: Gas Exchange - Impaired:  Goal: Levels of oxygenation will improve  Description  Levels of oxygenation will improve  Outcome: Ongoing

## 2019-04-15 LAB
CULTURE, RESPIRATORY: ABNORMAL
CULTURE, RESPIRATORY: ABNORMAL
GLUCOSE BLD-MCNC: 213 MG/DL (ref 70–99)
GLUCOSE BLD-MCNC: 354 MG/DL (ref 70–99)
GLUCOSE BLD-MCNC: 363 MG/DL (ref 70–99)
GLUCOSE BLD-MCNC: 397 MG/DL (ref 70–99)
GRAM STAIN RESULT: ABNORMAL
INR BLD: 2.37 (ref 0.86–1.14)
MRSA SCREEN RT-PCR: NORMAL
ORGANISM: ABNORMAL
PERFORMED ON: ABNORMAL
PROTHROMBIN TIME: 27 SEC (ref 9.8–13)

## 2019-04-15 PROCEDURE — 6370000000 HC RX 637 (ALT 250 FOR IP): Performed by: INTERNAL MEDICINE

## 2019-04-15 PROCEDURE — 6360000002 HC RX W HCPCS: Performed by: INTERNAL MEDICINE

## 2019-04-15 PROCEDURE — 94761 N-INVAS EAR/PLS OXIMETRY MLT: CPT

## 2019-04-15 PROCEDURE — 6360000002 HC RX W HCPCS: Performed by: PHARMACIST

## 2019-04-15 PROCEDURE — 97116 GAIT TRAINING THERAPY: CPT

## 2019-04-15 PROCEDURE — 2580000003 HC RX 258: Performed by: PHARMACIST

## 2019-04-15 PROCEDURE — 85610 PROTHROMBIN TIME: CPT

## 2019-04-15 PROCEDURE — 2700000000 HC OXYGEN THERAPY PER DAY

## 2019-04-15 PROCEDURE — 97530 THERAPEUTIC ACTIVITIES: CPT

## 2019-04-15 PROCEDURE — 6370000000 HC RX 637 (ALT 250 FOR IP): Performed by: PHYSICIAN ASSISTANT

## 2019-04-15 PROCEDURE — 94668 MNPJ CHEST WALL SBSQ: CPT

## 2019-04-15 PROCEDURE — 2580000003 HC RX 258: Performed by: INTERNAL MEDICINE

## 2019-04-15 PROCEDURE — 36415 COLL VENOUS BLD VENIPUNCTURE: CPT

## 2019-04-15 PROCEDURE — 94640 AIRWAY INHALATION TREATMENT: CPT

## 2019-04-15 PROCEDURE — 97166 OT EVAL MOD COMPLEX 45 MIN: CPT

## 2019-04-15 PROCEDURE — 94760 N-INVAS EAR/PLS OXIMETRY 1: CPT

## 2019-04-15 PROCEDURE — 2060000000 HC ICU INTERMEDIATE R&B

## 2019-04-15 RX ORDER — WARFARIN SODIUM 7.5 MG/1
7.5 TABLET ORAL
Status: COMPLETED | OUTPATIENT
Start: 2019-04-15 | End: 2019-04-15

## 2019-04-15 RX ADMIN — GABAPENTIN 800 MG: 400 CAPSULE ORAL at 07:50

## 2019-04-15 RX ADMIN — MOMETASONE FUROATE AND FORMOTEROL FUMARATE DIHYDRATE 2 PUFF: 100; 5 AEROSOL RESPIRATORY (INHALATION) at 20:39

## 2019-04-15 RX ADMIN — INSULIN LISPRO 10 UNITS: 100 INJECTION, SOLUTION INTRAVENOUS; SUBCUTANEOUS at 17:41

## 2019-04-15 RX ADMIN — MOMETASONE FUROATE AND FORMOTEROL FUMARATE DIHYDRATE 2 PUFF: 100; 5 AEROSOL RESPIRATORY (INHALATION) at 10:13

## 2019-04-15 RX ADMIN — IPRATROPIUM BROMIDE AND ALBUTEROL SULFATE 1 AMPULE: .5; 3 SOLUTION RESPIRATORY (INHALATION) at 20:38

## 2019-04-15 RX ADMIN — VANCOMYCIN HYDROCHLORIDE 1000 MG: 1 INJECTION, POWDER, LYOPHILIZED, FOR SOLUTION INTRAVENOUS at 02:43

## 2019-04-15 RX ADMIN — OXYCODONE HYDROCHLORIDE 20 MG: 10 TABLET, FILM COATED, EXTENDED RELEASE ORAL at 07:50

## 2019-04-15 RX ADMIN — INSULIN LISPRO 30 UNITS: 100 INJECTION, SUSPENSION SUBCUTANEOUS at 17:42

## 2019-04-15 RX ADMIN — GABAPENTIN 800 MG: 400 CAPSULE ORAL at 12:38

## 2019-04-15 RX ADMIN — PIPERACILLIN SODIUM,TAZOBACTAM SODIUM 3.38 G: 3; .375 INJECTION, POWDER, FOR SOLUTION INTRAVENOUS at 04:14

## 2019-04-15 RX ADMIN — Medication 10 ML: at 21:33

## 2019-04-15 RX ADMIN — CARVEDILOL 12.5 MG: 6.25 TABLET, FILM COATED ORAL at 17:42

## 2019-04-15 RX ADMIN — CARVEDILOL 12.5 MG: 6.25 TABLET, FILM COATED ORAL at 07:50

## 2019-04-15 RX ADMIN — INSULIN LISPRO 5 UNITS: 100 INJECTION, SOLUTION INTRAVENOUS; SUBCUTANEOUS at 21:29

## 2019-04-15 RX ADMIN — IPRATROPIUM BROMIDE AND ALBUTEROL SULFATE 1 AMPULE: .5; 3 SOLUTION RESPIRATORY (INHALATION) at 16:14

## 2019-04-15 RX ADMIN — GABAPENTIN 800 MG: 400 CAPSULE ORAL at 20:00

## 2019-04-15 RX ADMIN — MAGNESIUM HYDROXIDE 30 ML: 400 SUSPENSION ORAL at 21:29

## 2019-04-15 RX ADMIN — VITAMIN D, TAB 1000IU (100/BT) 1000 UNITS: 25 TAB at 07:50

## 2019-04-15 RX ADMIN — PIPERACILLIN SODIUM,TAZOBACTAM SODIUM 3.38 G: 3; .375 INJECTION, POWDER, FOR SOLUTION INTRAVENOUS at 11:00

## 2019-04-15 RX ADMIN — ASPIRIN 81 MG 81 MG: 81 TABLET ORAL at 07:50

## 2019-04-15 RX ADMIN — PREDNISONE 40 MG: 20 TABLET ORAL at 07:50

## 2019-04-15 RX ADMIN — GABAPENTIN 800 MG: 400 CAPSULE ORAL at 17:42

## 2019-04-15 RX ADMIN — Medication 10 ML: at 07:51

## 2019-04-15 RX ADMIN — IPRATROPIUM BROMIDE AND ALBUTEROL SULFATE 1 AMPULE: .5; 3 SOLUTION RESPIRATORY (INHALATION) at 12:20

## 2019-04-15 RX ADMIN — WARFARIN SODIUM 7.5 MG: 7.5 TABLET ORAL at 17:42

## 2019-04-15 RX ADMIN — INSULIN LISPRO 30 UNITS: 100 INJECTION, SUSPENSION SUBCUTANEOUS at 07:56

## 2019-04-15 RX ADMIN — OXYCODONE HYDROCHLORIDE 20 MG: 10 TABLET, FILM COATED, EXTENDED RELEASE ORAL at 00:00

## 2019-04-15 RX ADMIN — PIPERACILLIN SODIUM,TAZOBACTAM SODIUM 3.38 G: 3; .375 INJECTION, POWDER, FOR SOLUTION INTRAVENOUS at 17:42

## 2019-04-15 RX ADMIN — OXYCODONE HYDROCHLORIDE 40 MG: 40 TABLET, FILM COATED, EXTENDED RELEASE ORAL at 04:13

## 2019-04-15 RX ADMIN — OXYCODONE HYDROCHLORIDE 40 MG: 40 TABLET, FILM COATED, EXTENDED RELEASE ORAL at 12:39

## 2019-04-15 RX ADMIN — IPRATROPIUM BROMIDE AND ALBUTEROL SULFATE 1 AMPULE: .5; 3 SOLUTION RESPIRATORY (INHALATION) at 10:13

## 2019-04-15 RX ADMIN — SIMVASTATIN 40 MG: 40 TABLET, FILM COATED ORAL at 21:29

## 2019-04-15 RX ADMIN — INSULIN LISPRO 10 UNITS: 100 INJECTION, SOLUTION INTRAVENOUS; SUBCUTANEOUS at 12:40

## 2019-04-15 RX ADMIN — INSULIN LISPRO 4 UNITS: 100 INJECTION, SOLUTION INTRAVENOUS; SUBCUTANEOUS at 07:56

## 2019-04-15 RX ADMIN — OXYCODONE HYDROCHLORIDE 20 MG: 10 TABLET, FILM COATED, EXTENDED RELEASE ORAL at 17:42

## 2019-04-15 RX ADMIN — OXYCODONE HYDROCHLORIDE 40 MG: 40 TABLET, FILM COATED, EXTENDED RELEASE ORAL at 20:00

## 2019-04-15 ASSESSMENT — PAIN DESCRIPTION - PROGRESSION
CLINICAL_PROGRESSION: GRADUALLY IMPROVING

## 2019-04-15 ASSESSMENT — PAIN - FUNCTIONAL ASSESSMENT
PAIN_FUNCTIONAL_ASSESSMENT: ACTIVITIES ARE NOT PREVENTED
PAIN_FUNCTIONAL_ASSESSMENT: PREVENTS OR INTERFERES SOME ACTIVE ACTIVITIES AND ADLS
PAIN_FUNCTIONAL_ASSESSMENT: PREVENTS OR INTERFERES SOME ACTIVE ACTIVITIES AND ADLS
PAIN_FUNCTIONAL_ASSESSMENT: ACTIVITIES ARE NOT PREVENTED
PAIN_FUNCTIONAL_ASSESSMENT: PREVENTS OR INTERFERES SOME ACTIVE ACTIVITIES AND ADLS

## 2019-04-15 ASSESSMENT — PAIN DESCRIPTION - LOCATION
LOCATION: BACK
LOCATION: LEG
LOCATION: BACK
LOCATION: BACK;LEG
LOCATION: HEAD;BACK
LOCATION: LEG
LOCATION: BACK

## 2019-04-15 ASSESSMENT — PAIN SCALES - GENERAL
PAINLEVEL_OUTOF10: 4
PAINLEVEL_OUTOF10: 2
PAINLEVEL_OUTOF10: 0
PAINLEVEL_OUTOF10: 2
PAINLEVEL_OUTOF10: 0
PAINLEVEL_OUTOF10: 2
PAINLEVEL_OUTOF10: 2
PAINLEVEL_OUTOF10: 4
PAINLEVEL_OUTOF10: 6
PAINLEVEL_OUTOF10: 6
PAINLEVEL_OUTOF10: 0
PAINLEVEL_OUTOF10: 10
PAINLEVEL_OUTOF10: 6

## 2019-04-15 ASSESSMENT — PAIN DESCRIPTION - FREQUENCY
FREQUENCY: CONTINUOUS
FREQUENCY: INTERMITTENT
FREQUENCY: INTERMITTENT

## 2019-04-15 ASSESSMENT — PAIN DESCRIPTION - ONSET
ONSET: ON-GOING

## 2019-04-15 ASSESSMENT — PAIN DESCRIPTION - DESCRIPTORS
DESCRIPTORS: ACHING
DESCRIPTORS: ACHING
DESCRIPTORS: ACHING;HEADACHE
DESCRIPTORS: ACHING

## 2019-04-15 ASSESSMENT — PAIN DESCRIPTION - ORIENTATION
ORIENTATION: RIGHT

## 2019-04-15 ASSESSMENT — PAIN DESCRIPTION - PAIN TYPE
TYPE: CHRONIC PAIN

## 2019-04-15 ASSESSMENT — PAIN DESCRIPTION - DIRECTION: RADIATING_TOWARDS: LEG

## 2019-04-15 NOTE — PROGRESS NOTES
Physical Therapy    Facility/Department: [address] PROGRESSIVE CARE  Daily Treatment Note    This note serves as patient discharge summary if pt discharges prior to next PT visit      NAME: Joaquín Hewitt  : 1945  MRN: [id number]    Date of Service: 4/15/2019    Discharge Recommendations:  Home with assist PRN, Patient would benefit from continued therapy after discharge   (Home therapy? Return to outpatient cardiopulmonary therapy?)  Joaquín Hewitt scored a 21/24 on the AM-PAC short mobility form. Current research shows that an AM-PAC score of 18 or greater is typically associated with a discharge to the patient's home setting. Based on the patients AM-PAC score and their current functional mobility deficits, it is recommended that the patient have 2-3 sessions per week of Physical Therapy at d/c to increase the patients independence. PT Equipment Recommendations  Equipment Needed: No    Assessment   Body structures, Functions, Activity limitations: Decreased functional mobility ; Decreased strength;Decreased endurance; Increased Pain  Assessment: 67 y/o male admit 19 with Pneumonia, COPD Exac. PMH as noted including CAD, CABG , A-Fib (Coumadin), CHF, COPD, Chronic Back Pain, Back Surg (10-12 yr ago per pt), L UE Fx/Surg . PTA pt living with wife in home with 1st floor bed/bath; independent with daily care and amb activities (with walker short distances/use of electric scooter longer distances). Anticipate patient is approaching baseline status. His insight and safety awareness are variable. Anticipate he will be OK for d/c home with family support as prior. He may prefer return to cardiac rehab at [facility]. Will continue to see for therapy and will monitor pt's progress. Prognosis: Good  Patient Education: Role of PT, POC, Need to call for assist. Rationale for chair alarm. Patient upset at thought of chair alarm, and removes, despite therapist's explanation.    REQUIRES PT FOLLOW UP: Yes  Activity Tolerance  Activity Tolerance: Patient Tolerated treatment well       Patient Diagnosis(es): The primary encounter diagnosis was Pneumonia due to organism. Diagnoses of Dyspnea, unspecified type and COPD exacerbation ([facility] were also pertinent to this visit. has a past medical history of CAD (coronary artery disease), Chronic back pain, Chronic systolic CHF (congestive heart failure) ([facility] COPD (chronic obstructive pulmonary disease) ([facility] Essential hypertension, and Paroxysmal atrial fibrillation ([facility] has a past surgical history that includes fracture surgery (Left, 1988); back surgery (1995); Cardiac surgery (2007); Coronary artery bypass graft; and back surgery. Restrictions  Restrictions/Precautions  Restrictions/Precautions: Fall Risk  Position Activity Restriction  Other position/activity restrictions: 4-15-19 O2 2L.  97%, 73 HR resting; 96%, 62 following gait. Subjective  General  Chart Reviewed: Yes  Patient assessed for rehabilitation services?: Yes  Additional Pertinent Hx: 67 y/o male admit 4/13/19 with Pneumonia, COPD Exac. PMH as noted including CAD, CABG 2009, A-Fib (Coumadin), CHF, COPD, Chronic Back Pain, Back Surg (10-12 yr ago per pt), L UE Fx/Surg 1988. Pt reports h/o intermittent LE Sxs (\"jerking\", giving way over past 5-6 yrs (R>L, usually at night, takes meds). Response To Previous Treatment: Patient with no complaints from previous session. Family / Caregiver Present: No  Referring Practitioner: Dr. Silvia Norton  Subjective  Subjective: Pt agreeable to PT. Patient becomes upset as therapist places chair alarm. States \"I'm not going to fall. You should give me some slack. \" Patient becomes increasingly agitated, but conversant with this therapist. Removes chair alarm. RN informed. Patient reports constant pain at his lowback and R LE. Doesn't rate.    Pain Screening  Patient Currently in Pain: Yes Orientation  Orientation  Overall Orientation Status: Within Functional Limits(Demonstrated poor safety awareness.)  Social/Functional History  Social/Functional History  Lives With: Spouse(Wife Luann Hernandez). )  Type of Home: House  Home Layout: Multi-level, Able to Live on Main level with bedroom/bathroom, Laundry in basement  Home Access: Stairs to enter with rails(2 steps to enter. )  Bathroom Shower/Tub: Walk-in shower  Bathroom Toilet: Handicap height  Bathroom Equipment: Grab bars in shower, Built-in shower seat  Bathroom Accessibility: Accessible  Home Equipment: Rolling walker, 4 wheeled walker, Cane, Electric scooter  ADL Assistance: Independent(Pt reports independent daily care.  )  Ambulation Assistance: Independent(With use of [address] [place] [address] within home.  )  Transfer Assistance: Independent  Active : Yes(Pt reports that he cont to drive (despite LE sxs, reports \"usually at night\"). )  Occupation: Retired  Type of occupation: Retired - . Objective  Transfers  Sit to Stand: Stand by assistance  Stand to sit: Stand by assistance  Ambulation  Ambulation?: Yes  Ambulation 1  Surface: level tile  Device: Rolling Walker  Assistance: Stand by assistance  Quality of Gait: Step through pattern, with R LE brushing along floor during progression, and decreased ankle DF. Forward flexed trunk, and stands behind  walker base. Does not correct, despite mutliple verbal/tactile cues. Patient disregards, stating, \"I have my system. \"  Distance: 79' x 2. Seated rest break x 5 minutes. Comments: Patient ambulates into bathroom and stands to urinate, and stand at sink to wash hands, SBA. Stairs/Curb  Stairs?: No  Balance  Posture: Poor  Sitting - Static: Good  Sitting - Dynamic: Good  Standing - Static: Good;-  Standing - Dynamic: Good;-  Comments: Gait and stance supported at  walker    Plan   Plan  Times per week: 3-5x week while in acute care setting.    Current Treatment Recommendations: Strengthening, Functional Mobility Training, Transfer Training, Gait Training, Safety Education & Training, Patient/Caregiver Education & Training  Safety Devices  Type of devices: Call light within reach, Gait belt, Patient at risk for falls, Left in chair, Chair alarm in place, Nurse notified(Chair alarm placed and patient removes. PETRA Yan notified. )    AM-PAC Score  AM-PAC Inpatient Mobility Raw Score : 21  AM-PAC Inpatient T-Scale Score : 50.25  Mobility Inpatient CMS 0-100% Score: 28.97  Mobility Inpatient CMS G-Code Modifier : CJ     Goals  Short term goals  Time Frame for Short term goals: Upon d/c acute care setting. 4-15-19 Goals met  Short term goal 1: Transfers with assist device SBA. 4-15-19: Transfers Supervision  Short term goal 2: Amb with assist device 50' SBA/CGA. 4-15-19: Amb with LRAD, 70', SBA-Supervision. Patient Goals   Patient goals : Be able to walk, less R LE jerking/giving way.          Therapy Time   Individual Concurrent Group Co-treatment   Time In 1625         Time Out 1700         Minutes 28            Michael Razo PT  Electronically signed by Laure Summers PT [id number] on 4/15/2019 at 5:07 PM

## 2019-04-15 NOTE — PROGRESS NOTES
Exam:    Gen: No distress. Eyes: PERRL. No sclera icterus. No conjunctival injection. ENT: No discharge. Pharynx clear. External appearance of ears and nose normal.  Neck: Trachea midline. No obvious mass. Resp: No accessory muscle use. No crackles. No wheezes. Few basal rhonchi. No dullness on percussion. CV: Regular rate. Regular rhythm. No murmur or rub. No edema. GI: Non-tender. Non-distended. No hernia. Skin: Warm, dry, normal texture and turgor. No nodule on exposed extremities. Lymph: No cervical LAD. No supraclavicular LAD. M/S: No cyanosis. No clubbing. No joint deformity. Neuro: Moves all four extremities. CN 2-12 tested, no defect noted. Psych: Oriented x 3. No anxiety. Awake. Alert. Intact judgement and insight. Data    LABS  CBC:   Recent Labs     04/13/19  0733 04/14/19  0556   WBC 11.1* 13.7*   HGB 14.9 13.0*   HCT 44.7 39.4*   MCV 90.8 91.6    223     BMP:   Recent Labs     04/13/19  0733 04/14/19  0556    134*   K 4.4 4.4   CL 91* 96*   CO2 29 25   BUN 31* 17   CREATININE 1.4* 0.9     POC GLUCOSE:    Recent Labs     04/14/19  0804 04/14/19  1212 04/14/19  1624 04/14/19  2031 04/15/19  0739   POCGLU 311* 316* 468* 413* 213*     LIVER PROFILE:   Recent Labs     04/13/19  0733 04/14/19  0556   AST 12* 12*   ALT 20 15   LABALBU 4.4 3.4   BILITOT 0.5 0.9   ALKPHOS 64 48     PT/INR:   Recent Labs     04/13/19  1456 04/14/19  0556 04/15/19  0450   PROTIME 87.2* 57.9* 27.0*   INR 7.65* 5.08* 2.37*     APTT: No results for input(s): APTT in the last 72 hours.   UA:  Recent Labs     04/13/19  0757   COLORU YELLOW   PHUR 5.0   WBCUA 1   RBCUA 1   CLARITYU Clear   SPECGRAV 1.021   LEUKOCYTESUR Negative   UROBILINOGEN 0.2   BILIRUBINUR Negative   BLOODU TRACE*   GLUCOSEU >=1000*   KETUA Negative     Microbiology:  Wound Culture:   Recent Labs     04/13/19  1621   [facility] B (Strep agalacticae)*     Nasal Culture:   Recent Labs     04/13/19  1621 04/14/19  1200   [facility] B (Strep agalacticae)*  --    MRSAPCR  --  Negative - MRSA DNA not detected. Normal Range: Not detected       Blood Culture:   Recent Labs     04/13/19  0732 04/13/19  0842   BC No Growth to date. Any change in status will be called. --    BLOODCULT2  --  No Growth to date. Any change in status will be called. Fungal Culture:   No results for input(s): FUNGSM in the last 72 hours. No results for input(s): FUNCXBLD in the last 72 hours. CSF Culture:  No results for input(s): COLORCSF, APPEARCSF, CFTUBE, CLOTCSF, WBCCSF, RBCCSF, NEUTCSF, NUMCELLSCSF, LYMPHSCSF, MONOCSF, GLUCCSF, VOLCSF in the last 72 hours. Respiratory Culture:  Recent Labs     04/13/19  1621   CULTRESP Rare growth  Susceptibility testing of penicillin and other beta-lactams is  not necessary for beta hemolytic Streptococci since resistant  strains have not been identified. (CLSI J502-E49, 2018)     LABGRAM 2+ WBC's (Polymorphonuclear)  1+ Gram positive cocci       AFB:No results for input(s): AFBSMEAR in the last 72 hours. Urine Culture  No results for input(s): LABURIN in the last 72 hours. RADIOLOGY:    XR CHEST STANDARD (2 VW)   Final Result   Persistent bibasilar airspace disease, compatible with edema or pneumonia,   with small left pleural effusion. XR CHEST PORTABLE   Final Result   Findings are suggestive of pulmonary edema. Pneumonia is not excluded in the   appropriate clinical setting.              CONSULTS:    PHARMACY TO DOSE WARFARIN    ASSESSMENT AND PLAN:      Principal Problem:    Severe sepsis ([address]  Active Problems:    Pneumonia due to organism    Chronic obstructive pulmonary disease ([address]    Acute respiratory failure with hypercapnia (HCC)    Diabetes mellitus type 2, uncontrolled (HCC)    Essential hypertension    Generalized weakness    Lactic acidosis    Acute renal failure (ARF) (HCC)    Uncontrolled diabetes mellitus ([address]    Supratherapeutic INR  Resolved Problems:    * No resolved hospital problems. *    68 y.o. male who presented to [facility] with one-week history of worsening breathing, gets much worse in the last 2 days worse was ambulation, better when he is sitting still associated with cough with green sputum, fever and chills along was generalized weakness, no obvious trigger, came to emergency department started on BiPAP, found to have elevated lactic acid, found to have a pneumonia, started on IV fluid IV antibiotics and we were asked to admit for further management and treatment    Severe sepsis  -improved     Streptococcal pneumonia  -ct abx  Day 3      Acute respiratory failure with hypercapnia and hypoxia  -wean o2 as tolerated      COPD, no obvious exacerbation  - steroids by mouth added for pneumonia.         Acute renal failure  -improved   -stop IVF      Essential hypertension, by mouth medication.      Lactic acidosis, IV fluid, trended down.      Generalized weakness, due to infection, monitor closely, PT OT     H/O CHF, monitor closely        DM, uncontrolled, home insulin, SS.       Jerky movement in right leg, resolved, likely myoclonus  -resolved  - ct home meds    Afib -on coumadin  - pharmacy to manage         DVT Prophylaxis: coumadin   Diet: DIET CARDIAC; Carb Control: 4 carb choices (60 gms)/meal; No Added Salt (3-4 GM)  Code Status: Full Code    PT/OT Eval Status:ordered    Discharge plan - ct care    The patient and / or the family were informed of the results of any tests, a time was given to answer questions, a plan was proposed and they agreed with plan. Discussed with consulting physicians, nursing and social work     The note was completed using EMR. Every effort was made to ensure accuracy; however, inadvertent computerized transcription errors may be present.        Jeff Kirk MD

## 2019-04-15 NOTE — PLAN OF CARE
Problem: Falls - Risk of:  Goal: Will remain free from falls  Description  Will remain free from falls  4/15/2019 0736 by Soto Holt RN  Outcome: Ongoing  4/15/2019 0029 by Gordon Moise RN  Outcome: Ongoing  Goal: Absence of physical injury  Description  Absence of physical injury  4/15/2019 0736 by Soto Holt RN  Outcome: Ongoing  4/15/2019 0029 by Gordon Moise RN  Outcome: Ongoing  Note:   Fall risk precautions in place. Bed in lowest position with wheels locked,bed alarm in place and activated,non-skid socks on pt, fall risk ID on pt, call light in reach, will continue to monitor.        Problem: Discharge Planning:  Goal: Discharged to appropriate level of care  Description  Discharged to appropriate level of care  Outcome: Ongoing  Goal: Participates in care planning  Description  Participates in care planning  Outcome: Ongoing     Problem: Airway Clearance - Ineffective:  Goal: Clear lung sounds  Description  Clear lung sounds  Outcome: Ongoing  Goal: Ability to maintain a clear airway will improve  Description  Ability to maintain a clear airway will improve  Outcome: Ongoing     Problem: Fluid Volume - Deficit:  Goal: Achieves intake and output within specified parameters  Description  Achieves intake and output within specified parameters  Outcome: Ongoing     Problem: Gas Exchange - Impaired:  Goal: Levels of oxygenation will improve  Description  Levels of oxygenation will improve  Outcome: Ongoing     Problem: Hyperthermia:  Goal: Ability to maintain a body temperature in the normal range will improve  Description  Ability to maintain a body temperature in the normal range will improve  Outcome: Ongoing     Problem: Tobacco Use:  Goal: Will participate in inpatient tobacco-use cessation counseling  Description  Will participate in inpatient tobacco-use cessation counseling  Outcome: Ongoing     Problem: Pain:  Goal: Pain level will decrease  Description  Pain level will decrease  4/15/2019 0736 by Ziggy Quarles RN  Outcome: Ongoing  4/15/2019 0029 by Rizwana Hartman RN  Outcome: Ongoing  Goal: Control of acute pain  Description  Control of acute pain  4/15/2019 0736 by Ziggy Quarles RN  Outcome: Ongoing  4/15/2019 0029 by Rizwana Hartman RN  Outcome: Ongoing  Goal: Control of chronic pain  Description  Control of chronic pain  4/15/2019 0736 by Ziggy Quarles RN  Outcome: Ongoing  4/15/2019 0029 by Rizwana Hartman RN  Outcome: Ongoing  Note:   Pain/discomfort being managed with PRN analgesics per MD orders. Pt able to express presence and absence of pain and rate pain appropriately using numerical scale.

## 2019-04-15 NOTE — PLAN OF CARE
Problem: Falls - Risk of:  Goal: Will remain free from falls  Description  Will remain free from falls  Outcome: Ongoing  Goal: Absence of physical injury  Description  Absence of physical injury  Outcome: Ongoing  Note:   Fall risk precautions in place. Bed in lowest position with wheels locked,bed alarm in place and activated,non-skid socks on pt, fall risk ID on pt, call light in reach, will continue to monitor. Problem: Pain:  Goal: Pain level will decrease  Description  Pain level will decrease  Outcome: Ongoing  Goal: Control of acute pain  Description  Control of acute pain  Outcome: Ongoing  Goal: Control of chronic pain  Description  Control of chronic pain  Outcome: Ongoing  Note:   Pain/discomfort being managed with PRN analgesics per MD orders. Pt able to express presence and absence of pain and rate pain appropriately using numerical scale.

## 2019-04-15 NOTE — PROGRESS NOTES
Occupational Therapy   Occupational Therapy Initial Assessment and Daily Txt Note  Date: 4/15/2019   Patient Name: Vera Schmitz  MRN: [id number]     : 1945    Date of Service: 4/15/2019    Discharge Recommendations:Jorge Michaud scored a 19/24 on the AM-PAC ADL Inpatient form. At this time, no further OT is recommended upon discharge. Recommend patient returns to prior setting with prior services. Home with assist PRN(con't cardio pulmonary rehab )  OT Equipment Recommendations  Other: TBD     Assessment   Performance deficits / Impairments: Decreased functional mobility ; Decreased ADL status  Assessment: Patient is a 68 y.o. male who presents d/t Pneumonia, COPD Exac. PTA pt was independent with fxl mobility via RW for short distances and indepedent with ADLs. Currently, pt is functioning below baseline secondary to decreased endurance and activity tolerance. Anticipate pt to be safe to return home with assist prn and to resume cardio pulmonary rehab when medically ready for d/c. Prognosis: Good  Decision Making: Medium Complexity  History: See above  Exam: ADLs, transfers   Assistance / Modification: SBA  Patient Education: Role of OT, d/c planning, safety, use of call light   REQUIRES OT FOLLOW UP: Yes  Activity Tolerance  Activity Tolerance: Patient Tolerated treatment well  Safety Devices  Safety Devices in place: Yes  Type of devices: Left in chair;Nurse notified;Call light within reach; Chair alarm in place           Patient Diagnosis(es): The primary encounter diagnosis was Pneumonia due to organism. Diagnoses of Dyspnea, unspecified type and COPD exacerbation ([address] were also pertinent to this visit. has a past medical history of CAD (coronary artery disease), Chronic back pain, Chronic systolic CHF (congestive heart failure) ([address] COPD (chronic obstructive pulmonary disease) ([address] Diabetes mellitus ([address] Essential hypertension, and Paroxysmal atrial fibrillation ([address]    has a past surgical history that includes fracture surgery (Left, 1988); back surgery (1995); Cardiac surgery (2007); Coronary artery bypass graft; and back surgery. Restrictions  Restrictions/Precautions  Restrictions/Precautions: Fall Risk  Position Activity Restriction  Other position/activity restrictions: Diet : Clear Liquid. O2 2L. Subjective   General  Chart Reviewed: Yes  Patient assessed for rehabilitation services?: Yes  Additional Pertinent Hx: Patient is a 68 y.o. male who presents d/t Pneumonia, COPD Exac. Family / Caregiver Present: No  Referring Practitioner: Lolly Councilman, MD  Subjective  Subjective: Patient presents in the recliner and agreeable to OT eval. C/o pain in back 4/10. Able to continue with therapy. Pain Assessment  Pain Assessment: 0-10  Pain Level: 10  Pain Type: Chronic pain  Pain Location: Head;Back  Pain Descriptors: Aching;Headache  Non-Pharmaceutical Pain Intervention(s): Emotional support  Oxygen Therapy  SpO2: 94 %  Pulse Oximeter Device Mode: Intermittent  Pulse Oximeter Device Location: Finger  O2 Device: Nasal cannula  O2 Flow Rate (L/min): 2 L/min  Social/Functional History  Social/Functional History  Lives With: Spouse(Wife Milady Gamez). )  Type of Home: House  Home Layout: Multi-level, Able to Live on Main level with bedroom/bathroom, Laundry in basement  Home Access: Stairs to enter with rails(2 steps to enter. )  Bathroom Shower/Tub: Walk-in shower  Bathroom Toilet: Handicap height  Bathroom Equipment: Grab bars in shower, Built-in shower seat  Bathroom Accessibility: Accessible  Home Equipment: Rolling walker, 4 wheeled walker, Cane, Electric scooter  ADL Assistance: Independent(Pt reports independent daily care.  )  Ambulation Assistance: Independent(With use of [address] within home.  )  Transfer Assistance: Independent  Active : Yes(Pt reports that he cont to drive (despite LE sxs, reports \"usually at night\"). )  Occupation: Retired  Type of occupation: Retired - . Objective   Vision: Within Functional Limits  Hearing: Within functional limits    Orientation  Overall Orientation Status: Within Functional Limits     Balance  Sitting Balance: Independent  Standing Balance: Stand by assistance(RW)  Standing Balance  Sit to stand: Stand by assistance  Stand to sit: Stand by assistance  Functional Mobility  Functional - Mobility Device: Rolling Walker  Activity: To/from bathroom  Assist Level: Stand by assistance  Functional Mobility Comments: 20 ft x 2 with SBA; no LOB. Therapist managed O2 line. Toilet Transfers  Toilet - Technique: Ambulating  Equipment Used: Standard toilet  Toilet Transfer: Stand by assistance  Toilet Transfers Comments: grab bar; RW   ADL  Toileting: (dry toilet t/f completed with SBA; pt able to manage gown )  Additional Comments: Anticipate pt to require CGA/min A for bathing, CGA for LE dressing, SBA for grooming and UE dressing. Transfers  Sit to stand: Stand by assistance  Stand to sit: Stand by assistance     Cognition  Overall Cognitive Status: WFL        Sensation  Overall Sensation Status: Impaired(Pt reports R LE entirely numb.   L LE numb dorsum foot. ; WFL UEs)        LUE AROM (degrees)  LUE AROM : WFL  Left Hand AROM (degrees)  Left Hand AROM: WFL  RUE AROM (degrees)  RUE AROM : WFL  Right Hand AROM (degrees)  Right Hand AROM: WFL  LUE Strength  Gross LUE Strength: WFL  RUE Strength  Gross RUE Strength: WFL         Plan   Plan  Times per week: 3-5  Times per day: Daily  Current Treatment Recommendations: Strengthening, ROM, Balance Training, Functional Mobility Training, Endurance Training, Safety Education & Training, Patient/Caregiver Education & Training, Equipment Evaluation, Education, & procurement, Self-Care / ADL    AM-PAC Score        AM-[facility] Inpatient Daily Activity Raw Score: 19  AM-PAC Inpatient ADL T-Scale Score : 40.22  ADL Inpatient CMS 0-100% Score: 42.8  ADL Inpatient CMS G-Code

## 2019-04-16 LAB
GLUCOSE BLD-MCNC: 223 MG/DL (ref 70–99)
GLUCOSE BLD-MCNC: 295 MG/DL (ref 70–99)
GLUCOSE BLD-MCNC: 360 MG/DL (ref 70–99)
GLUCOSE BLD-MCNC: 455 MG/DL (ref 70–99)
INR BLD: 1.6 (ref 0.86–1.14)
PERFORMED ON: ABNORMAL
PROTHROMBIN TIME: 18.2 SEC (ref 9.8–13)

## 2019-04-16 PROCEDURE — 6360000002 HC RX W HCPCS: Performed by: PHARMACIST

## 2019-04-16 PROCEDURE — 2060000000 HC ICU INTERMEDIATE R&B

## 2019-04-16 PROCEDURE — 6360000002 HC RX W HCPCS: Performed by: INTERNAL MEDICINE

## 2019-04-16 PROCEDURE — 6370000000 HC RX 637 (ALT 250 FOR IP): Performed by: INTERNAL MEDICINE

## 2019-04-16 PROCEDURE — 85610 PROTHROMBIN TIME: CPT

## 2019-04-16 PROCEDURE — 2580000003 HC RX 258: Performed by: PHARMACIST

## 2019-04-16 PROCEDURE — 97116 GAIT TRAINING THERAPY: CPT | Performed by: PHYSICAL THERAPIST

## 2019-04-16 PROCEDURE — 2580000003 HC RX 258: Performed by: INTERNAL MEDICINE

## 2019-04-16 PROCEDURE — 94640 AIRWAY INHALATION TREATMENT: CPT

## 2019-04-16 PROCEDURE — 94667 MNPJ CHEST WALL 1ST: CPT

## 2019-04-16 PROCEDURE — 2700000000 HC OXYGEN THERAPY PER DAY

## 2019-04-16 PROCEDURE — 94761 N-INVAS EAR/PLS OXIMETRY MLT: CPT

## 2019-04-16 PROCEDURE — 36415 COLL VENOUS BLD VENIPUNCTURE: CPT

## 2019-04-16 PROCEDURE — 94664 DEMO&/EVAL PT USE INHALER: CPT

## 2019-04-16 PROCEDURE — 6370000000 HC RX 637 (ALT 250 FOR IP): Performed by: PHYSICIAN ASSISTANT

## 2019-04-16 RX ORDER — POLYETHYLENE GLYCOL 3350 17 G/17G
17 POWDER, FOR SOLUTION ORAL DAILY
Status: DISCONTINUED | OUTPATIENT
Start: 2019-04-16 | End: 2019-04-17 | Stop reason: HOSPADM

## 2019-04-16 RX ORDER — FUROSEMIDE 10 MG/ML
40 INJECTION INTRAMUSCULAR; INTRAVENOUS DAILY
Status: DISCONTINUED | OUTPATIENT
Start: 2019-04-16 | End: 2019-04-17 | Stop reason: HOSPADM

## 2019-04-16 RX ORDER — WARFARIN SODIUM 5 MG/1
10 TABLET ORAL
Status: COMPLETED | OUTPATIENT
Start: 2019-04-16 | End: 2019-04-16

## 2019-04-16 RX ADMIN — INSULIN LISPRO 30 UNITS: 100 INJECTION, SUSPENSION SUBCUTANEOUS at 08:43

## 2019-04-16 RX ADMIN — OXYCODONE HYDROCHLORIDE 40 MG: 40 TABLET, FILM COATED, EXTENDED RELEASE ORAL at 20:00

## 2019-04-16 RX ADMIN — ASPIRIN 81 MG 81 MG: 81 TABLET ORAL at 10:01

## 2019-04-16 RX ADMIN — SIMVASTATIN 40 MG: 40 TABLET, FILM COATED ORAL at 20:31

## 2019-04-16 RX ADMIN — INSULIN LISPRO 6 UNITS: 100 INJECTION, SOLUTION INTRAVENOUS; SUBCUTANEOUS at 17:38

## 2019-04-16 RX ADMIN — OXYCODONE HYDROCHLORIDE 20 MG: 10 TABLET, FILM COATED, EXTENDED RELEASE ORAL at 17:48

## 2019-04-16 RX ADMIN — CARVEDILOL 12.5 MG: 6.25 TABLET, FILM COATED ORAL at 17:41

## 2019-04-16 RX ADMIN — POLYETHYLENE GLYCOL 3350 17 G: 17 POWDER, FOR SOLUTION ORAL at 14:30

## 2019-04-16 RX ADMIN — OXYCODONE HYDROCHLORIDE 20 MG: 10 TABLET, FILM COATED, EXTENDED RELEASE ORAL at 08:43

## 2019-04-16 RX ADMIN — PREDNISONE 40 MG: 20 TABLET ORAL at 10:01

## 2019-04-16 RX ADMIN — CARVEDILOL 12.5 MG: 6.25 TABLET, FILM COATED ORAL at 08:44

## 2019-04-16 RX ADMIN — Medication 10 ML: at 10:02

## 2019-04-16 RX ADMIN — GABAPENTIN 800 MG: 400 CAPSULE ORAL at 20:00

## 2019-04-16 RX ADMIN — IPRATROPIUM BROMIDE AND ALBUTEROL SULFATE 1 AMPULE: .5; 3 SOLUTION RESPIRATORY (INHALATION) at 11:43

## 2019-04-16 RX ADMIN — INSULIN LISPRO 6 UNITS: 100 INJECTION, SOLUTION INTRAVENOUS; SUBCUTANEOUS at 20:32

## 2019-04-16 RX ADMIN — WARFARIN SODIUM 10 MG: 5 TABLET ORAL at 10:01

## 2019-04-16 RX ADMIN — MAGNESIUM HYDROXIDE 30 ML: 400 SUSPENSION ORAL at 10:01

## 2019-04-16 RX ADMIN — OXYCODONE HYDROCHLORIDE 20 MG: 10 TABLET, FILM COATED, EXTENDED RELEASE ORAL at 00:20

## 2019-04-16 RX ADMIN — Medication 10 ML: at 20:31

## 2019-04-16 RX ADMIN — PIPERACILLIN SODIUM,TAZOBACTAM SODIUM 3.38 G: 3; .375 INJECTION, POWDER, FOR SOLUTION INTRAVENOUS at 02:00

## 2019-04-16 RX ADMIN — MOMETASONE FUROATE AND FORMOTEROL FUMARATE DIHYDRATE 2 PUFF: 100; 5 AEROSOL RESPIRATORY (INHALATION) at 20:49

## 2019-04-16 RX ADMIN — OXYCODONE HYDROCHLORIDE 40 MG: 40 TABLET, FILM COATED, EXTENDED RELEASE ORAL at 04:00

## 2019-04-16 RX ADMIN — IPRATROPIUM BROMIDE AND ALBUTEROL SULFATE 1 AMPULE: .5; 3 SOLUTION RESPIRATORY (INHALATION) at 07:35

## 2019-04-16 RX ADMIN — VITAMIN D, TAB 1000IU (100/BT) 1000 UNITS: 25 TAB at 10:01

## 2019-04-16 RX ADMIN — MOMETASONE FUROATE AND FORMOTEROL FUMARATE DIHYDRATE 2 PUFF: 100; 5 AEROSOL RESPIRATORY (INHALATION) at 07:45

## 2019-04-16 RX ADMIN — PIPERACILLIN SODIUM,TAZOBACTAM SODIUM 3.38 G: 3; .375 INJECTION, POWDER, FOR SOLUTION INTRAVENOUS at 10:01

## 2019-04-16 RX ADMIN — IPRATROPIUM BROMIDE AND ALBUTEROL SULFATE 1 AMPULE: .5; 3 SOLUTION RESPIRATORY (INHALATION) at 20:50

## 2019-04-16 RX ADMIN — PIPERACILLIN SODIUM,TAZOBACTAM SODIUM 3.38 G: 3; .375 INJECTION, POWDER, FOR SOLUTION INTRAVENOUS at 17:41

## 2019-04-16 RX ADMIN — IPRATROPIUM BROMIDE AND ALBUTEROL SULFATE 1 AMPULE: .5; 3 SOLUTION RESPIRATORY (INHALATION) at 16:23

## 2019-04-16 RX ADMIN — INSULIN LISPRO 10 UNITS: 100 INJECTION, SOLUTION INTRAVENOUS; SUBCUTANEOUS at 12:08

## 2019-04-16 RX ADMIN — FUROSEMIDE 40 MG: 10 INJECTION, SOLUTION INTRAMUSCULAR; INTRAVENOUS at 14:30

## 2019-04-16 RX ADMIN — INSULIN LISPRO 4 UNITS: 100 INJECTION, SOLUTION INTRAVENOUS; SUBCUTANEOUS at 08:42

## 2019-04-16 RX ADMIN — INSULIN LISPRO 30 UNITS: 100 INJECTION, SUSPENSION SUBCUTANEOUS at 17:37

## 2019-04-16 RX ADMIN — GABAPENTIN 800 MG: 400 CAPSULE ORAL at 17:41

## 2019-04-16 RX ADMIN — GABAPENTIN 800 MG: 400 CAPSULE ORAL at 12:09

## 2019-04-16 RX ADMIN — OXYCODONE HYDROCHLORIDE 40 MG: 40 TABLET, FILM COATED, EXTENDED RELEASE ORAL at 12:09

## 2019-04-16 RX ADMIN — GABAPENTIN 800 MG: 400 CAPSULE ORAL at 08:44

## 2019-04-16 ASSESSMENT — PAIN DESCRIPTION - PAIN TYPE
TYPE: CHRONIC PAIN

## 2019-04-16 ASSESSMENT — PAIN DESCRIPTION - PROGRESSION
CLINICAL_PROGRESSION: GRADUALLY IMPROVING

## 2019-04-16 ASSESSMENT — PAIN DESCRIPTION - DIRECTION
RADIATING_TOWARDS: LEG
RADIATING_TOWARDS: LEG

## 2019-04-16 ASSESSMENT — PAIN DESCRIPTION - FREQUENCY
FREQUENCY: CONTINUOUS

## 2019-04-16 ASSESSMENT — PAIN SCALES - GENERAL
PAINLEVEL_OUTOF10: 5
PAINLEVEL_OUTOF10: 3
PAINLEVEL_OUTOF10: 3
PAINLEVEL_OUTOF10: 4
PAINLEVEL_OUTOF10: 0
PAINLEVEL_OUTOF10: 7
PAINLEVEL_OUTOF10: 0
PAINLEVEL_OUTOF10: 4
PAINLEVEL_OUTOF10: 5
PAINLEVEL_OUTOF10: 4
PAINLEVEL_OUTOF10: 5
PAINLEVEL_OUTOF10: 0

## 2019-04-16 ASSESSMENT — PAIN DESCRIPTION - LOCATION
LOCATION: BACK;LEG
LOCATION: BACK
LOCATION: BACK;LEG
LOCATION: BACK
LOCATION: BACK;LEG
LOCATION: BACK;LEG

## 2019-04-16 ASSESSMENT — PAIN DESCRIPTION - DESCRIPTORS
DESCRIPTORS: ACHING

## 2019-04-16 ASSESSMENT — PAIN DESCRIPTION - ONSET
ONSET: ON-GOING

## 2019-04-16 ASSESSMENT — PAIN DESCRIPTION - ORIENTATION
ORIENTATION: RIGHT

## 2019-04-16 NOTE — CONSULTS
Clinical Pharmacy Note  Warfarin Consult    Tamiko Wells is a 68 y.o. male receiving warfarin managed by pharmacy. Patient being bridged with none. Warfarin Indication: afib and CABG  Target INR range: 2-3   Dose prior to admission: regimen per spouse is 7.5 mg daily EXCEPT 10 mg on Monday, Wednesday, and Friday. Current warfarin drug-drug interactions: zosyn  Pt was on a 21 day PREDNISONE taper prior to admit for respiratory / COPD exac     Recent Labs     04/14/19  0556 04/15/19  0450 04/16/19  0503   HGB 13.0*  --   --    HCT 39.4*  --   --    INR 5.08* 2.37* 1.60*       Assessment/Plan:  Warfarin 10mg now  INR 2.37 --> 1.6 with 7.5mg yesterday. Warfarin had been held for 2  Days prior d/t supra levels. .. Remains on Prednisone - has a stop date of 4/18/19. Warfarin 7.5mg tonight  Monitor for signs/symptoms of bleeding. Daily PT/INR until stable within therapeutic range. Thank you for the consult.    Prashanth Henriquez, [facility]  4/16/2019  8:05 AM

## 2019-04-16 NOTE — PROGRESS NOTES
Physical Therapy  Facility/Department: [facility] PROGRESSIVE CARE  Daily Treatment Note  NAME: Nelly Cui  :   MRN: [id number]    Date of Service: 2019    Discharge Recommendations:  Home with assist PRN, Patient would benefit from continued therapy after discharge, S Level 1   PT Equipment Recommendations  Equipment Needed: No   Nelly Cui scored a 21/24 on the AM-PAC short mobility form. Current research shows that an AM-PAC score of 18 or greater is typically associated with a discharge to the patient's home setting. Based on the patients AM-PAC score and their current functional mobility deficits, it is recommended that the patient have 2-3 sessions per week of Physical Therapy at d/c to increase the patients independence. HOME HEALTH CARE: LEVEL 1 STANDARD     -Initial home health evaluation to occur within 24-48 hours, in patient home    -Home health agency to establish plan of care for patient over 60 day period    -Medication Reconciliation    -PCP Visit scheduled within seven days of discharge    -PT/OT to evaluate with goal of regaining prior level of functioning    -OT to evaluate if patient has [address] needs for personal care     Patient Diagnosis(es): The primary encounter diagnosis was Pneumonia due to organism. Diagnoses of Dyspnea, unspecified type and COPD exacerbation ([address] were also pertinent to this visit. has a past medical history of CAD (coronary artery disease), Chronic back pain, Chronic systolic CHF (congestive heart failure) ([address] COPD (chronic obstructive pulmonary disease) ([address] Diabetes mellitus ([address] Essential hypertension, and Paroxysmal atrial fibrillation ([address] has a past surgical history that includes fracture surgery (Left, ); back surgery (); Cardiac surgery (); Coronary artery bypass graft; and back surgery. Social/Functional History  Lives With: Spouse(Wife Juan Foley).  )  Type of Home: House  Home Layout: Multi-level, Able to Live on Main level with bedroom/bathroom, Laundry in basement  Home Access: Stairs to enter with rails(2 steps to enter. )  Bathroom Shower/Tub: Walk-in shower  Bathroom Toilet: Handicap height  Bathroom Equipment: Grab bars in shower, Built-in shower seat  Bathroom Accessibility: Accessible  Home Equipment: Rolling walker, 4 wheeled walker, Cane, Electric scooter  ADL Assistance: Independent(Pt reports independent daily care.  )  Ambulation Assistance: Independent(With use of Rolling Walker within home.  )  Transfer Assistance: Independent  Active : Yes(Pt reports that he cont to drive (despite LE sxs, reports \"usually at night\"). )  Occupation: Retired  Type of occupation: Retired - . ;    Restrictions  Restrictions/Precautions  Restrictions/Precautions: Fall Risk  Position Activity Restriction  Other position/activity restrictions: 82% spO2 on RA when walking  Subjective   General  Chart Reviewed: Yes  Additional Pertinent Hx: 69 y/o male admit 4/13/19 with Pneumonia, COPD Exac. PMH as noted including CAD, CABG 2009, A-Fib (Coumadin), CHF, COPD, Chronic Back Pain, Back Surg (10-12 yr ago per pt), L UE Fx/Surg 1988. Pt reports h/o intermittent LE Sxs (\"jerking\", giving way over past 5-6 yrs (R>L, usually at night, takes meds). Response To Previous Treatment: Patient with no complaints from previous session.   Family / Caregiver Present: No  Referring Practitioner: Dr. Block Splinter: pt agreeable to walking; denies any pain          Orientation  Orientation  Overall Orientation Status: Within Functional Limits  Cognition   Cognition  Overall Cognitive Status: WFL  Objective   Bed mobility  Comment: sitting up in chair at beginning and end of session  Transfers  Sit to Stand: Stand by assistance  Stand to sit: Stand by assistance  Ambulation  Ambulation?: Yes  Ambulation 1  Surface: level tile  Device: Rolling Walker  Assistance: Stand by assistance  Quality of Gait: slightly drags R foot; flexed posture but no LOB  Distance: 74' x4     Balance  Sitting - Static: Good  Sitting - Dynamic: Good  Standing - Static: Good  Standing - Dynamic: Good;-                           Assessment   Assessment: pt making good progress; feel he will be safe to return home with PRN assist and home PT  Patient Education: reviewed home situation and pt feels ready to return home  REQUIRES PT FOLLOW UP: Yes  Activity Tolerance  Activity Tolerance: Patient Tolerated treatment well       AM-PAC Score  AM-PAC Inpatient Mobility Raw Score : 21  AM-PAC Inpatient T-Scale Score : 50.25  Mobility Inpatient CMS 0-100% Score: 28.97  Mobility Inpatient CMS G-Code Modifier : CJ          Goals  Short term goals  Time Frame for Short term goals: Upon d/c acute care setting. 4-15-19 Goals met  Short term goal 1: Transfers with assist device SBA. 4-15-19: Transfers Supervision  Short term goal 2: Amb with assist device 50' SBA/CGA. 4-15-19: Amb with LRAD, 70', SBA-Supervision. Patient Goals   Patient goals : Be able to walk, less R LE jerking/giving way. Plan    Plan  Times per week: 3-5x week while in acute care setting. Current Treatment Recommendations: Strengthening, Functional Mobility Training, Transfer Training, Gait Training, Safety Education & Training, Patient/Caregiver Education & Training  Safety Devices  Type of devices: Call light within reach, Left in chair, Nurse notified     Therapy Time   Individual Concurrent Group Co-treatment   Time In          Time Out 8332         Minutes 54              If patient is discharged prior to the next physical therapy visit;  Please see last written PT note for discharge status.     Charu Lino, PT, [id number]   CHARU LINO, PT

## 2019-04-16 NOTE — PROGRESS NOTES
Eyes: PERRL. No sclera icterus. No conjunctival injection. ENT: No discharge. Pharynx clear. External appearance of ears and nose normal.  Neck: Trachea midline. No obvious mass. Resp: No accessory muscle use. No crackles. No wheezes. Few basal rhonchi. No dullness on percussion. CV: Regular rate. Regular rhythm. No murmur or rub. + edema. GI: Non-tender. Non-distended. No hernia. Skin: Warm, dry, normal texture and turgor. No nodule on exposed extremities. Lymph: No cervical LAD. No supraclavicular LAD. M/S: No cyanosis. No clubbing. No joint deformity. Neuro: Moves all four extremities. CN 2-12 tested, no defect noted. Psych: Oriented x 3. No anxiety. Awake. Alert. Intact judgement and insight. Data    LABS  CBC:   Recent Labs     04/14/19  0556   WBC 13.7*   HGB 13.0*   HCT 39.4*   MCV 91.6        BMP:   Recent Labs     04/14/19  0556   *   K 4.4   CL 96*   CO2 25   BUN 17   CREATININE 0.9     POC GLUCOSE:    Recent Labs     04/15/19  1122 04/15/19  1618 04/15/19  2113 04/16/19  0727 04/16/19  1142   POCGLU 354* 397* 363* 223* 360*     LIVER PROFILE:   Recent Labs     04/14/19  0556   AST 12*   ALT 15   LABALBU 3.4   BILITOT 0.9   ALKPHOS 48     PT/INR:   Recent Labs     04/14/19  0556 04/15/19  0450 04/16/19  0503   PROTIME 57.9* 27.0* 18.2*   INR 5.08* 2.37* 1.60*     APTT: No results for input(s): APTT in the last 72 hours. UA:  No results for input(s): NITRITE, COLORU, PHUR, LABCAST, WBCUA, RBCUA, MUCUS, TRICHOMONAS, YEAST, BACTERIA, CLARITYU, SPECGRAV, LEUKOCYTESUR, UROBILINOGEN, BILIRUBINUR, BLOODU, GLUCOSEU, KETUA, AMORPHOUS in the last 72 hours. Microbiology:  Wound Culture:   No results for input(s): WNDABS, ORG in the last 72 hours. Invalid input(s):  LABGRAM  Nasal Culture:   Recent Labs     04/14/19  1200   MRSAPCR Negative - MRSA DNA not detected.   Normal Range: Not detected       Blood Culture:   No results for input(s): BC, BLOODCULT2 in the last 72 hours.  Fungal Culture:   No results for input(s): FUNGSM in the last 72 hours. No results for input(s): FUNCXBLD in the last 72 hours. CSF Culture:  No results for input(s): COLORCSF, APPEARCSF, CFTUBE, CLOTCSF, WBCCSF, RBCCSF, NEUTCSF, NUMCELLSCSF, LYMPHSCSF, MONOCSF, GLUCCSF, VOLCSF in the last 72 hours. Respiratory Culture:  No results for input(s): Marliss Herter in the last 72 hours. AFB:No results for input(s): AFBSMEAR in the last 72 hours. Urine Culture  No results for input(s): LABURIN in the last 72 hours. RADIOLOGY:    XR CHEST STANDARD (2 VW)   Final Result   Persistent bibasilar airspace disease, compatible with edema or pneumonia,   with small left pleural effusion. XR CHEST PORTABLE   Final Result   Findings are suggestive of pulmonary edema. Pneumonia is not excluded in the   appropriate clinical setting. CONSULTS:    PHARMACY TO DOSE WARFARIN    ASSESSMENT AND PLAN:      Principal Problem:    Severe sepsis ([facility] [address]  Active Problems:    Pneumonia due to organism    Chronic obstructive pulmonary disease ([facility] [address]    Acute respiratory failure with hypercapnia (HCC)    Diabetes mellitus type 2, uncontrolled (HCC)    Essential hypertension    Generalized weakness    Lactic acidosis    Acute renal failure (ARF) (HCC)    Uncontrolled diabetes mellitus (HCC)    Supratherapeutic INR  Resolved Problems:    * No resolved hospital problems.  *    68 y.o. male who presented to [facility] with one-week history of worsening breathing, gets much worse in the last 2 days worse was ambulation, better when he is sitting still associated with cough with green sputum, fever and chills along was generalized weakness, no obvious trigger, came to emergency department started on BiPAP, found to have elevated lactic acid, found to have a pneumonia, started on IV fluid IV antibiotics and we were asked to admit for further management and treatment    Severe sepsis  -improved     Streptococcal pneumonia  -ct abx  Day 4, switch to po tomorrow      Acute respiratory failure with hypercapnia and hypoxia  -wean o2 as tolerated      COPD, no obvious exacerbation  - steroids by mouth added for pneumonia.         Acute renal failure  -improved   -stop IVF      Essential hypertension, by mouth medication.      Lactic acidosis, IV fluid, trended down.      Generalized weakness, due to infection, monitor closely, PT OT     H/O CHF, monitor closely - restart lasix        DM, uncontrolled, home insulin, SS.       Jerky movement in right leg, resolved, likely myoclonus  -resolved  - ct home meds    Afib -on coumadin  - pharmacy to manage         DVT Prophylaxis: coumadin   Diet: DIET CARDIAC; Carb Control: 4 carb choices (60 gms)/meal; No Added Salt (3-4 GM)  Code Status: Full Code    PT/OT Eval Status:ordered    Discharge plan - possibly tomorrow    The patient and / or the family were informed of the results of any tests, a time was given to answer questions, a plan was proposed and they agreed with plan. Discussed with consulting physicians, nursing and social work     The note was completed using EMR. Every effort was made to ensure accuracy; however, inadvertent computerized transcription errors may be present.        Yashira Veloz MD

## 2019-04-16 NOTE — PLAN OF CARE
Problem: Falls - Risk of:  Goal: Will remain free from falls  Description  Will remain free from falls  Outcome: Ongoing  Goal: Absence of physical injury  Description  Absence of physical injury  Outcome: Ongoing  Note:   Fall risk precautions in place. Bed in lowest position with wheels locked,bed alarm in place and activated,non-skid socks on pt, fall risk ID on pt, call light in reach, will continue to monitor.        Problem: Discharge Planning:  Goal: Discharged to appropriate level of care  Description  Discharged to appropriate level of care  Outcome: Ongoing  Goal: Participates in care planning  Description  Participates in care planning  Outcome: Ongoing     Problem: Airway Clearance - Ineffective:  Goal: Clear lung sounds  Description  Clear lung sounds  Outcome: Ongoing  Goal: Ability to maintain a clear airway will improve  Description  Ability to maintain a clear airway will improve  Outcome: Ongoing     Problem: Fluid Volume - Deficit:  Goal: Achieves intake and output within specified parameters  Description  Achieves intake and output within specified parameters  Outcome: Ongoing     Problem: Gas Exchange - Impaired:  Goal: Levels of oxygenation will improve  Description  Levels of oxygenation will improve  Outcome: Ongoing     Problem: Hyperthermia:  Goal: Ability to maintain a body temperature in the normal range will improve  Description  Ability to maintain a body temperature in the normal range will improve  Outcome: Ongoing     Problem: Tobacco Use:  Goal: Will participate in inpatient tobacco-use cessation counseling  Description  Will participate in inpatient tobacco-use cessation counseling  Outcome: Ongoing     Problem: Pain:  Goal: Pain level will decrease  Description  Pain level will decrease  Outcome: Ongoing  Goal: Control of acute pain  Description  Control of acute pain  Outcome: Ongoing  Goal: Control of chronic pain  Description  Control of chronic pain  Outcome: Ongoing  Note: Pain/discomfort being managed with PRN analgesics per MD orders. Pt able to express presence and absence of pain and rate pain appropriately using numerical scale.

## 2019-04-17 VITALS
SYSTOLIC BLOOD PRESSURE: 137 MMHG | HEART RATE: 83 BPM | RESPIRATION RATE: 18 BRPM | WEIGHT: 254.41 LBS | TEMPERATURE: 97.7 F | DIASTOLIC BLOOD PRESSURE: 86 MMHG | HEIGHT: 70 IN | OXYGEN SATURATION: 93 % | BODY MASS INDEX: 36.42 KG/M2

## 2019-04-17 LAB
GLUCOSE BLD-MCNC: 260 MG/DL (ref 70–99)
GLUCOSE BLD-MCNC: 333 MG/DL (ref 70–99)
INR BLD: 2.34 (ref 0.86–1.14)
PERFORMED ON: ABNORMAL
PERFORMED ON: ABNORMAL
PROTHROMBIN TIME: 26.7 SEC (ref 9.8–13)

## 2019-04-17 PROCEDURE — 6370000000 HC RX 637 (ALT 250 FOR IP): Performed by: PHYSICIAN ASSISTANT

## 2019-04-17 PROCEDURE — 94640 AIRWAY INHALATION TREATMENT: CPT

## 2019-04-17 PROCEDURE — 6370000000 HC RX 637 (ALT 250 FOR IP): Performed by: INTERNAL MEDICINE

## 2019-04-17 PROCEDURE — 85610 PROTHROMBIN TIME: CPT

## 2019-04-17 PROCEDURE — 6360000002 HC RX W HCPCS: Performed by: PHARMACIST

## 2019-04-17 PROCEDURE — 2580000003 HC RX 258: Performed by: PHARMACIST

## 2019-04-17 PROCEDURE — 94760 N-INVAS EAR/PLS OXIMETRY 1: CPT

## 2019-04-17 PROCEDURE — 2580000003 HC RX 258: Performed by: INTERNAL MEDICINE

## 2019-04-17 PROCEDURE — 97530 THERAPEUTIC ACTIVITIES: CPT

## 2019-04-17 PROCEDURE — 6360000002 HC RX W HCPCS: Performed by: INTERNAL MEDICINE

## 2019-04-17 PROCEDURE — 94668 MNPJ CHEST WALL SBSQ: CPT

## 2019-04-17 PROCEDURE — 36415 COLL VENOUS BLD VENIPUNCTURE: CPT

## 2019-04-17 RX ORDER — AMOXICILLIN AND CLAVULANATE POTASSIUM 875; 125 MG/1; MG/1
1 TABLET, FILM COATED ORAL 2 TIMES DAILY
Qty: 14 TABLET | Refills: 0 | Status: SHIPPED | OUTPATIENT
Start: 2019-04-17 | End: 2019-04-24

## 2019-04-17 RX ORDER — PREDNISONE 10 MG/1
TABLET ORAL
Qty: 12 TABLET | Refills: 0 | Status: SHIPPED | OUTPATIENT
Start: 2019-04-17 | End: 2019-12-10 | Stop reason: ALTCHOICE

## 2019-04-17 RX ADMIN — MOMETASONE FUROATE AND FORMOTEROL FUMARATE DIHYDRATE 2 PUFF: 100; 5 AEROSOL RESPIRATORY (INHALATION) at 08:38

## 2019-04-17 RX ADMIN — Medication 10 ML: at 09:38

## 2019-04-17 RX ADMIN — OXYCODONE HYDROCHLORIDE 40 MG: 40 TABLET, FILM COATED, EXTENDED RELEASE ORAL at 04:00

## 2019-04-17 RX ADMIN — PREDNISONE 30 MG: 20 TABLET ORAL at 09:37

## 2019-04-17 RX ADMIN — FUROSEMIDE 40 MG: 10 INJECTION, SOLUTION INTRAMUSCULAR; INTRAVENOUS at 09:37

## 2019-04-17 RX ADMIN — VITAMIN D, TAB 1000IU (100/BT) 1000 UNITS: 25 TAB at 09:37

## 2019-04-17 RX ADMIN — GABAPENTIN 800 MG: 400 CAPSULE ORAL at 07:34

## 2019-04-17 RX ADMIN — CARVEDILOL 12.5 MG: 6.25 TABLET, FILM COATED ORAL at 07:33

## 2019-04-17 RX ADMIN — POLYETHYLENE GLYCOL 3350 17 G: 17 POWDER, FOR SOLUTION ORAL at 09:37

## 2019-04-17 RX ADMIN — OXYCODONE HYDROCHLORIDE 40 MG: 40 TABLET, FILM COATED, EXTENDED RELEASE ORAL at 12:11

## 2019-04-17 RX ADMIN — OXYCODONE HYDROCHLORIDE 20 MG: 10 TABLET, FILM COATED, EXTENDED RELEASE ORAL at 00:11

## 2019-04-17 RX ADMIN — PIPERACILLIN SODIUM,TAZOBACTAM SODIUM 3.38 G: 3; .375 INJECTION, POWDER, FOR SOLUTION INTRAVENOUS at 02:00

## 2019-04-17 RX ADMIN — OXYCODONE HYDROCHLORIDE 20 MG: 10 TABLET, FILM COATED, EXTENDED RELEASE ORAL at 07:34

## 2019-04-17 RX ADMIN — PIPERACILLIN SODIUM,TAZOBACTAM SODIUM 3.38 G: 3; .375 INJECTION, POWDER, FOR SOLUTION INTRAVENOUS at 09:36

## 2019-04-17 RX ADMIN — GABAPENTIN 800 MG: 400 CAPSULE ORAL at 12:11

## 2019-04-17 RX ADMIN — IPRATROPIUM BROMIDE AND ALBUTEROL SULFATE 1 AMPULE: .5; 3 SOLUTION RESPIRATORY (INHALATION) at 13:05

## 2019-04-17 RX ADMIN — IPRATROPIUM BROMIDE AND ALBUTEROL SULFATE 1 AMPULE: .5; 3 SOLUTION RESPIRATORY (INHALATION) at 08:38

## 2019-04-17 RX ADMIN — ASPIRIN 81 MG 81 MG: 81 TABLET ORAL at 09:37

## 2019-04-17 RX ADMIN — INSULIN LISPRO 30 UNITS: 100 INJECTION, SUSPENSION SUBCUTANEOUS at 07:33

## 2019-04-17 RX ADMIN — INSULIN LISPRO 6 UNITS: 100 INJECTION, SOLUTION INTRAVENOUS; SUBCUTANEOUS at 07:33

## 2019-04-17 RX ADMIN — INSULIN LISPRO 8 UNITS: 100 INJECTION, SOLUTION INTRAVENOUS; SUBCUTANEOUS at 12:11

## 2019-04-17 ASSESSMENT — PAIN DESCRIPTION - PROGRESSION
CLINICAL_PROGRESSION: GRADUALLY IMPROVING

## 2019-04-17 ASSESSMENT — PAIN DESCRIPTION - FREQUENCY
FREQUENCY: CONTINUOUS

## 2019-04-17 ASSESSMENT — PAIN DESCRIPTION - ORIENTATION
ORIENTATION: RIGHT

## 2019-04-17 ASSESSMENT — PAIN - FUNCTIONAL ASSESSMENT
PAIN_FUNCTIONAL_ASSESSMENT: PREVENTS OR INTERFERES SOME ACTIVE ACTIVITIES AND ADLS

## 2019-04-17 ASSESSMENT — PAIN SCALES - GENERAL
PAINLEVEL_OUTOF10: 0
PAINLEVEL_OUTOF10: 0
PAINLEVEL_OUTOF10: 2
PAINLEVEL_OUTOF10: 5
PAINLEVEL_OUTOF10: 5
PAINLEVEL_OUTOF10: 3
PAINLEVEL_OUTOF10: 0
PAINLEVEL_OUTOF10: 2

## 2019-04-17 ASSESSMENT — PAIN DESCRIPTION - DESCRIPTORS
DESCRIPTORS: ACHING

## 2019-04-17 ASSESSMENT — PAIN DESCRIPTION - ONSET
ONSET: ON-GOING

## 2019-04-17 ASSESSMENT — PAIN DESCRIPTION - PAIN TYPE
TYPE: CHRONIC PAIN

## 2019-04-17 ASSESSMENT — PAIN DESCRIPTION - LOCATION
LOCATION: BACK
LOCATION: BACK;LEG
LOCATION: BACK
LOCATION: BACK;LEG

## 2019-04-17 NOTE — CONSULTS
Clinical Pharmacy Note  Warfarin Consult    Richardson Coleman is a 68 y.o. male receiving warfarin managed by pharmacy. Patient being bridged with none. Warfarin Indication: afib and CABG  Target INR range: 2-3   Dose prior to admission: regimen per spouse is 7.5 mg daily EXCEPT 10 mg on Monday, Wednesday, and Friday. Current warfarin drug-drug interactions: zosyn  Pt was on a 21 day PREDNISONE taper prior to admit for respiratory / COPD exac     Recent Labs     04/15/19  0450 04/16/19  0503 04/17/19  0451   INR 2.37* 1.60* 2.34*       Assessment/Plan:    INR 1.6--> 2.34 with 10mg yesterday. .  Will hold warfarin today with previous supra levels while on prednisone. .. Remains on Prednisone 30mg - has a stop date of 4/20/19. Monitor for signs/symptoms of bleeding. Daily PT/INR until stable within therapeutic range. Thank you for the consult.    Christina Funes, [address] [place] [address]  4/17/2019  7:58 AM

## 2019-04-17 NOTE — PROGRESS NOTES
Progress Note  Admit Date: 2019      PCP: Kartik Nascimento     CC: F/U for Cough SOB     SUBJECTIVE / Interval History:   Patient feels ok. SOB improved     Allergies  No known allergies    Medications    Scheduled Meds:   furosemide  40 mg Intravenous Daily    polyethylene glycol  17 g Oral Daily    predniSONE  30 mg Oral Daily    piperacillin-tazobactam  3.375 g Intravenous Q8H    gabapentin  800 mg Oral 4x daily    oxyCODONE  20 mg Oral Q8H    oxyCODONE  40 mg Oral Q8H    simvastatin  40 mg Oral Nightly    vitamin D  1,000 Units Oral Daily    insulin lispro protamine & lispro  30 Units Subcutaneous BID WC    aspirin  81 mg Oral Daily    mometasone-formoterol  2 puff Inhalation BID    carvedilol  12.5 mg Oral BID WC    sodium chloride flush  10 mL Intravenous 2 times per day    ipratropium-albuterol  1 ampule Inhalation Q4H WA    warfarin (COUMADIN) daily dosing (placeholder)   Other RX Placeholder    insulin lispro  0-12 Units Subcutaneous TID WC    insulin lispro  0-6 Units Subcutaneous Nightly     Continuous Infusions:   dextrose         PRN Meds:  albuterol sulfate HFA, docusate sodium, sodium chloride flush, magnesium hydroxide, ondansetron, glucose, dextrose, glucagon (rDNA), dextrose, acetaminophen    Vitals    TEMPERATURE:  Current - Temp: 98.1 °F (36.7 °C); Max - Temp  Av.9 °F (36.6 °C)  Min: 97.4 °F (36.3 °C)  Max: 98.3 °F (36.8 °C)  RESPIRATIONS RANGE: Resp  Av.1  Min: 18  Max: 19  PULSE RANGE: Pulse  Av.8  Min: 69  Max: 86  BLOOD PRESSURE RANGE:  Systolic (09HVT), ANKUSH:216 , Min:100 , DHP:810   ; Diastolic (36NCH), YPU:01, Min:58, Max:92    PULSE OXIMETRY RANGE: SpO2  Av.9 %  Min: 90 %  Max: 96 %  24HR INTAKE/OUTPUT:      Intake/Output Summary (Last 24 hours) at 2019 0857  Last data filed at 2019 0400  Gross per 24 hour   Intake 1410 ml   Output 2350 ml   Net -940 ml       Exam:    Gen: No distress. Eyes: PERRL. No sclera icterus.  No conjunctival injection. ENT: No discharge. Pharynx clear. External appearance of ears and nose normal.  Neck: Trachea midline. No obvious mass. Resp: No accessory muscle use. No crackles. No wheezes. Few basal rhonchi. No dullness on percussion. CV: Regular rate. Regular rhythm. No murmur or rub.trace + edema. GI: Non-tender. Non-distended. No hernia. Skin: Warm, dry, normal texture and turgor. No nodule on exposed extremities. Lymph: No cervical LAD. No supraclavicular LAD. M/S: No cyanosis. No clubbing. No joint deformity. Neuro: Moves all four extremities. CN 2-12 tested, no defect noted. Psych: Oriented x 3. No anxiety. Awake. Alert. Intact judgement and insight. Data    LABS  CBC:   No results for input(s): WBC, HGB, HCT, MCV, PLT in the last 72 hours. BMP:   No results for input(s): NA, K, CL, CO2, PHOS, BUN, CREATININE in the last 72 hours. Invalid input(s): CA  POC GLUCOSE:    Recent Labs     04/16/19  0727 04/16/19  1142 04/16/19  1703 04/16/19 2005 04/17/19  0719   POCGLU 223* 360* 295* 455* 260*     LIVER PROFILE:   No results for input(s): AST, ALT, LIPASE, AMYLASE, LABALBU, BILIDIR, BILITOT, ALKPHOS in the last 72 hours. PT/INR:   Recent Labs     04/15/19  0450 04/16/19  0503 04/17/19  0451   PROTIME 27.0* 18.2* 26.7*   INR 2.37* 1.60* 2.34*     APTT: No results for input(s): APTT in the last 72 hours. UA:  No results for input(s): NITRITE, COLORU, PHUR, LABCAST, WBCUA, RBCUA, MUCUS, TRICHOMONAS, YEAST, BACTERIA, CLARITYU, SPECGRAV, LEUKOCYTESUR, UROBILINOGEN, BILIRUBINUR, BLOODU, GLUCOSEU, KETUA, AMORPHOUS in the last 72 hours. Microbiology:  Wound Culture:   No results for input(s): WNDABS, ORG in the last 72 hours. Invalid input(s):  LABGRAM  Nasal Culture:   Recent Labs     04/14/19  1200   MRSAPCR Negative - MRSA DNA not detected. Normal Range: Not detected       Blood Culture:   No results for input(s): BC, BLOODCULT2 in the last 72 hours.   Fungal Culture:   No results for secondary to streptococcal pneumonia. His culture grew group B strep. Patient was treated with Zosyn and switched to Augmentin at the time of discharge. Patient does have COPD and was on a steroid taper at home. He will need to finish the taper. Severe sepsis  -resolved     Streptococcal pneumonia  -ct abx  Day 5, switch to po tomorrow      Acute respiratory failure with hypercapnia and hypoxia- resolved   -wean o2 as tolerated      COPD, no obvious exacerbation  - steroids by mouth added for pneumonia.         Acute renal failure- resolved         Essential hypertension, by mouth medication.      Lactic acidosis,-resolved      Generalized weakness, due to infection, monitor closely, PT OT     H/O CHF, monitor closely - restart lasix        DM, uncontrolled, home insulin, SS.       Jerky movement in right leg, resolved, likely myoclonus  -resolved  - ct home meds    Afib -on coumadin  - pharmacy to manage         DVT Prophylaxis: coumadin   Diet: DIET CARDIAC; Carb Control: 4 carb choices (60 gms)/meal; No Added Salt (3-4 GM)  Code Status: Full Code    PT/OT Eval Status:ordered    Discharge plan - today     The patient and / or the family were informed of the results of any tests, a time was given to answer questions, a plan was proposed and they agreed with plan. Discussed with consulting physicians, nursing and social work     The note was completed using EMR. Every effort was made to ensure accuracy; however, inadvertent computerized transcription errors may be present.        Ambar Jefferson MD

## 2019-04-17 NOTE — PLAN OF CARE
Problem: Falls - Risk of:  Goal: Will remain free from falls  Description  Will remain free from falls  4/17/2019 1135 by Gato Jay RN  Outcome: Ongoing  Note:   Pt is up ambulating in the room with walker and gait is steady. Problem: Falls - Risk of:  Goal: Absence of physical injury  Description  Absence of physical injury  4/17/2019 1135 by Gato Jay RN  Outcome: Ongoing  Note:   Pt has no signs of physical injury. Problem: Discharge Planning:  Goal: Discharged to appropriate level of care  Description  Discharged to appropriate level of care  4/17/2019 1135 by Gato Jay RN  Outcome: Ongoing  Note:   Plan is for pt to be discharged home today. Problem: Discharge Planning:  Goal: Participates in care planning  Description  Participates in care planning  4/17/2019 1135 by Gato Jay RN  Outcome: Ongoing  Note:   Pt is active in his care planning. Problem: Airway Clearance - Ineffective:  Goal: Clear lung sounds  Description  Clear lung sounds  4/17/2019 1135 by Gato Jay RN  Outcome: Ongoing  Note:   Lung sounds are clear but diminished. Problem: Airway Clearance - Ineffective:  Goal: Ability to maintain a clear airway will improve  Description  Ability to maintain a clear airway will improve  4/17/2019 1135 by Gato Jay RN  Outcome: Ongoing  Note:   Pt does have a productive cough. Thick brown sputum. Problem: Fluid Volume - Deficit:  Goal: Achieves intake and output within specified parameters  Description  Achieves intake and output within specified parameters  4/17/2019 1135 by Gato Jay RN  Outcome: Ongoing  Note:   Intake and output being monitored.       Problem: Fluid Volume - Deficit:  Goal: Achieves intake and output within specified parameters  Description  Achieves intake and output within specified parameters  4/17/2019 0019 by Cathy Ramirez RN  Outcome: Ongoing     Problem: Gas Exchange - Impaired:  Goal: Levels of oxygenation will improve  Description  Levels of oxygenation will improve  4/17/2019 1135 by Zachary Garcia RN  Outcome: Ongoing  Note:   Pt remains on room air and pulse ox is stable. Problem: Hyperthermia:  Goal: Ability to maintain a body temperature in the normal range will improve  Description  Ability to maintain a body temperature in the normal range will improve  4/17/2019 1135 by Zachary Garcia RN  Outcome: Ongoing  Note:   Pt is afebrile. Problem: Tobacco Use:  Goal: Will participate in inpatient tobacco-use cessation counseling  Description  Will participate in inpatient tobacco-use cessation counseling  4/17/2019 1135 by Zachary Garcia RN  Outcome: Ongoing  Note:   . Problem: Pain:  Goal: Pain level will decrease  Description  Pain level will decrease  4/17/2019 1135 by Zachary Garcia RN  Outcome: Ongoing  Note:   Pt is receiving scheduled pain medication and also changes positions frequently to also help with pain reduction. Problem: Pain:  Goal: Control of acute pain  Description  Control of acute pain  4/17/2019 1135 by Zachary Garcia RN  Outcome: Ongoing  Note:   Pain is chronic not acute. Problem: Pain:  Goal: Control of chronic pain  Description  Control of chronic pain  4/17/2019 1135 by Zachary Garcia RN  Outcome: Ongoing  Note:   Pain/discomfort being managed with PRN analgesics per MD orders. Pt able to express presence and absence of pain and rate pain appropriately using numerical scale. Problem: Risk for Impaired Skin Integrity  Goal: Tissue integrity - skin and mucous membranes  Description  Structural intactness and normal physiological function of skin and  mucous membranes. 4/17/2019 1135 by Zachary Garcia RN  Outcome: Ongoing  Note:   Skin assessment completed every shift. Pt assessed for incontinence, appropriate barrier cream applied prn. Pt encouraged to turn/rotate every 2 hours. Assistance provided if pt unable to do so themselves.

## 2019-04-17 NOTE — PLAN OF CARE
Problem: Hyperthermia:  Goal: Ability to maintain a body temperature in the normal range will improve  Description  Ability to maintain a body temperature in the normal range will improve  Outcome: Met This Shift  Note:   Pt is afebrile. Problem: Falls - Risk of:  Goal: Will remain free from falls  Description  Will remain free from falls  Outcome: Ongoing  Note:   Pt up ambulating in room with walker, gait steady. Call light within reach. Problem: Falls - Risk of:  Goal: Absence of physical injury  Description  Absence of physical injury  Outcome: Ongoing  Note:   No signs of physical injury. Problem: Falls - Risk of:  Goal: Absence of physical injury  Description  Absence of physical injury  Outcome: Ongoing  Note:   No signs of physical injury. Problem: Discharge Planning:  Goal: Discharged to appropriate level of care  Description  Discharged to appropriate level of care  Outcome: Ongoing  Note:   Pt is hoping to be discharged to home tomorrow. Problem: Discharge Planning:  Goal: Participates in care planning  Description  Participates in care planning  Outcome: Ongoing  Note:   Pt is very active in his plan of care. Knowledgeable about his care. Problem: Airway Clearance - Ineffective:  Goal: Clear lung sounds  Description  Clear lung sounds  Outcome: Ongoing  Note:   Lung sounds are clear but diminished. Pt does have a productive cough, brown sputum. Problem: Airway Clearance - Ineffective:  Goal: Ability to maintain a clear airway will improve  Description  Ability to maintain a clear airway will improve  Outcome: Ongoing  Note:   Pt has a strong cough, productive of brown sputum     Problem: Fluid Volume - Deficit:  Goal: Achieves intake and output within specified parameters  Description  Achieves intake and output within specified parameters  Outcome: Ongoing  Note:   Intake and output being monitored. Pt does forget to use the urinal at times and just goes in the toilet.  Pt

## 2019-04-17 NOTE — PLAN OF CARE
Problem: Falls - Risk of:  Goal: Will remain free from falls  Description  Will remain free from falls  4/17/2019 0721 by Angie Councilman, RN  Outcome: Ongoing  4/17/2019 0020 by Angie Councilman, RN  Outcome: Ongoing  4/17/2019 0019 by Angie Councilman, RN  Outcome: Ongoing  4/17/2019 0017 by Angie Councilman, RN  Outcome: Ongoing  4/16/2019 2012 by Hansa Chung RN  Outcome: Ongoing  Note:   Pt up ambulating in room with walker, gait steady. Call light within reach. Goal: Absence of physical injury  Description  Absence of physical injury  4/17/2019 0721 by Angie Councilman, RN  Outcome: Ongoing  4/17/2019 0020 by Angie Councilman, RN  Outcome: Ongoing  Note:   Fall risk assessment completed All precautions in place. Pt has call light within reach at all times. Room clear of clutter. Pt aware to call for assistance when getting up. 4/17/2019 0019 by Angie Councilman, RN  Outcome: Ongoing  Note:   Fall risk assessment completed  All precautions in place. Pt has call light within reach at all times. Room clear of clutter. Pt aware to call for assistance when getting up. 4/17/2019 0017 by Angie Councilman, RN  Outcome: Ongoing  Note:   Fall risk assessment completed . All precautions in place. Pt has call light within reach at all times. Room clear of clutter. Pt aware to call for assistance when getting up. 4/16/2019 2012 by Hansa Chung RN  Outcome: Ongoing  Note:   No signs of physical injury. Problem: Discharge Planning:  Goal: Discharged to appropriate level of care  Description  Discharged to appropriate level of care  4/17/2019 0721 by Angie Councilman, RN  Outcome: Ongoing  4/17/2019 0020 by Angie Councilman, RN  Outcome: Ongoing  4/17/2019 0019 by Angie Councilman, RN  Outcome: Ongoing  4/16/2019 2012 by Hansa Chung RN  Outcome: Ongoing  Note:   Pt is hoping to be discharged to home tomorrow.   Goal: Participates in care planning  Description  Participates in care RN  Outcome: Ongoing  4/17/2019 0019 by Kobe Ramirez RN  Outcome: Ongoing  4/16/2019 2012 by Clara Delgado RN  Outcome: Ongoing  Note:   Pt weaned off of oxygen today. Pulse ox continues to be checked every 4 hours and prn. Problem: Hyperthermia:  Goal: Ability to maintain a body temperature in the normal range will improve  Description  Ability to maintain a body temperature in the normal range will improve  4/17/2019 0721 by Kobe Ramirez RN  Outcome: Ongoing  4/17/2019 0020 by Kobe Ramirez RN  Outcome: Ongoing  4/17/2019 0019 by Kobe Ramirez RN  Outcome: Ongoing  4/16/2019 2012 by Clara Delgado RN  Outcome: Met This Shift  Note:   Pt is afebrile. Problem: Tobacco Use:  Goal: Will participate in inpatient tobacco-use cessation counseling  Description  Will participate in inpatient tobacco-use cessation counseling  4/17/2019 0721 by Kobe Ramirez RN  Outcome: Ongoing  4/17/2019 0020 by Kobe Ramirez RN  Outcome: Ongoing  4/17/2019 0019 by Kobe Ramirez RN  Outcome: Ongoing  4/16/2019 2012 by Clara Delgado RN  Outcome: Ongoing  Note:   . Problem: Pain:  Goal: Pain level will decrease  Description  Pain level will decrease  4/17/2019 0721 by Kobe Ramirez RN  Outcome: Ongoing  4/17/2019 0020 by Kobe Ramirez RN  Outcome: Ongoing  4/17/2019 0019 by Kobe Ramirez RN  Outcome: Ongoing  4/17/2019 0017 by Kobe Ramirez RN  Outcome: Ongoing  4/16/2019 2012 by Clara Delgado RN  Outcome: Ongoing  Note:   Pt continues to receive scheduled pain medication for his chronic back pain that radiates down his right leg. Pt does change positions to also help alleviate the pain.   Goal: Control of acute pain  Description  Control of acute pain  4/17/2019 0721 by Kobe Ramirez RN  Outcome: Ongoing  4/17/2019 0020 by Kobe Ramirez RN  Outcome: Ongoing  4/17/2019 0019 by Kobe Ramirez RN  Outcome: Ongoing  4/17/2019 0017 by Kobe Ramirez RN  Outcome: Ongoing  4/16/2019 2012 by Gato Jay RN  Outcome: Ongoing  Note:   Pt pt is experiencing is chronic not acute. Goal: Control of chronic pain  Description  Control of chronic pain  4/17/2019 0721 by Cathy Ramirez RN  Outcome: Ongoing  Note:   Pain/discomfort being managed with PRN analgesics per MD orders. Pt able to express presence and absence of pain and rate pain appropriately using numerical scale. 4/17/2019 0020 by Cathy Ramirez RN  Outcome: Ongoing  Note:   Pain/discomfort being managed with PRN analgesics per MD orders. Pt able to express presence and absence of pain and rate pain appropriately using numerical scale. 4/17/2019 0019 by Cathy Ramirez RN  Outcome: Ongoing  Note:   Pain/discomfort being managed with PRN analgesics per MD orders. Pt able to express presence and absence of pain and rate pain appropriately using numerical scale. 4/17/2019 0017 by Cathy Ramirez RN  Outcome: Ongoing  Note:   Pain/discomfort being managed with PRN analgesics per MD orders. Pt able to express presence and absence of pain and rate pain appropriately using numerical scale. 4/16/2019 2012 by Gato Jay RN  Outcome: Ongoing  Note:   Pain/discomfort being managed with PRN analgesics per MD orders. Pt able to express presence and absence of pain and rate pain appropriately using numerical scale. Problem: Risk for Impaired Skin Integrity  Goal: Tissue integrity - skin and mucous membranes  Description  Structural intactness and normal physiological function of skin and  mucous membranes. 4/17/2019 0721 by Cathy Ramirez RN  Outcome: Ongoing  4/17/2019 0020 by Cathy Ramirez RN  Outcome: Ongoing  4/17/2019 0019 by Cathy Ramirez RN  Outcome: Ongoing  4/17/2019 0017 by Cathy Ramirez RN  Outcome: Ongoing  4/16/2019 2012 by Gato Jay RN  Outcome: Ongoing  Note:   Skin assessment completed every shift.  Pt assessed for incontinence, appropriate barrier cream applied prn. Pt encouraged to turn/rotate every 2 hours. Assistance provided if pt unable to do so themselves.

## 2019-04-17 NOTE — DISCHARGE INSTR - COC
calories ([facility]) E66.01    COPD (chronic obstructive pulmonary disease) ([facility]) J44.9    Chronic obstructive pulmonary disease ([facility]) J44.9    Sepsis ([facility]) A41.9    SOB (shortness of breath) R06.02    Centrilobular emphysema ([facility]) J43.2    Abnormal chest CT R93.89    Bronchitis J40    Back pain M54.9    Pneumonia J18.9    CAP (community acquired pneumonia) J18.9    Severe sepsis ([facility]) A41.9, R65.20    Acute respiratory failure with hypercapnia ([facility]) J96.02    Diabetes mellitus type 2, uncontrolled ([facility]) E11.65    Chronic back pain M54.9, G89.29    Chronic systolic heart failure ([facility]) I50.22    Acute on chronic systolic congestive heart failure ([facility]) I50.23    Hyperlipidemia E78.5    Essential hypertension I10    Leukocytosis D72.829    Chronic bronchitis, simple ([facility]) J41.0    Chronic rhinitis J31.0    Generalized weakness R53.1    Lactic acidosis E87.2    Acute renal failure (ARF) ([facility]) N17.9    Uncontrolled diabetes mellitus ([facility]) E11.65    Supratherapeutic INR R79.1       Isolation/Infection:   Isolation          No Isolation            Nurse Assessment:  Last Vital Signs: BP (!) 150/92   Pulse 84   Temp 98.1 °F (36.7 °C) (Oral)   Resp 18   Ht 5' 10.08\" (1.78 m)   Wt 254 lb 6.6 oz (115.4 kg)   SpO2 90%   BMI 36.42 kg/m²     Last documented pain score (0-10 scale): Pain Level: 5  Last Weight:   Wt Readings from Last 1 Encounters:   04/17/19 254 lb 6.6 oz (115.4 kg)     Mental Status:  oriented, alert, coherent, logical, thought processes intact and able to concentrate and follow conversation    IV Access:  - None    Nursing Mobility/ADLs:  Walking   Independent  Transfer  Independent  Bathing  Assisted  Dressing  Independent  Toileting  Independent  Feeding  Independent  Med Admin  Assisted  Med Delivery   whole    Wound Care Documentation and Therapy:        Elimination:  Continence:   · Bowel:  Yes  · Bladder: Yes  Urinary Catheter: None   Colostomy/Ileostomy/Ileal Conduit: No Date of Last BM:     Intake/Output Summary (Last 24 hours) at 4/17/2019 0956  Last data filed at 4/17/2019 0400  Gross per 24 hour   Intake 1410 ml   Output 2350 ml   Net -940 ml     I/O last 3 completed shifts: In: 4684 [P.O.:1440; I.V.:10; IV Piggyback:200]  Out: 2350 [Urine:2350]    Safety Concerns: At Risk for Falls    Impairments/Disabilities:      None    Nutrition Therapy:  Current Nutrition Therapy:   - Oral Diet:  Carb Control 4 carbs/meal (1800kcals/day) and Low Sodium (3-4gm)    Routes of Feeding: Oral  Liquids: Thin Liquids  Daily Fluid Restriction: no  Last Modified Barium Swallow with Video (Video Swallowing Test): not done    Treatments at the Time of Hospital Discharge:   Respiratory Treatments:   Oxygen Therapy:  is not on home oxygen therapy. Ventilator:    - No ventilator support    Rehab Therapies: Physical Therapy and Occupational Therapy  Weight Bearing Status/Restrictions: No weight bearing restirctions  Other Medical Equipment (for information only, NOT a DME order):  cane and walker  Other Treatments: WEIGH YOURSELF EVERY DAY AND WRITE IT DOWN IF YOU GAIN 2LBS FROM ONE DAY TO THE NEXT OR 5LBS OVER A FEW DAYS CALL YOUR DOCTOR    Patient's personal belongings (please select all that are sent with patient): MANAGING HIS OWN BELONGINGS    RN SIGNATURE:  Electronically signed by Shreyas Worley RN on 4/17/19 at 12:00 PM    CASE MANAGEMENT/SOCIAL WORK SECTION    Inpatient Status Date: ***    Readmission Risk Assessment Score:  Readmission Risk              Risk of Unplanned Readmission:        30           Discharging to Facility/ Agency   · Name:   · Address:  · Phone:  · Fax:    Dialysis Facility (if applicable)   · Name:  · Address:  · Dialysis Schedule:  · Phone:  · Fax:    / signature: {Esignature:[id number]}    PHYSICIAN SECTION    Prognosis: Fair    Condition at Discharge: Stable    Rehab Potential (if transferring to Rehab):  Fair    Recommended Labs or Other Treatments After Discharge: none    Physician Certification: I certify the above information and transfer of Janet Scott  is necessary for the continuing treatment of the diagnosis listed and that he requires [address] for less 30 days.      Update Admission H&P: No change in H&P    PHYSICIAN SIGNATURE:  Electronically signed by Lazaro Nolasco MD on 4/17/19 at 9:56 AM

## 2019-04-17 NOTE — DISCHARGE SUMMARY
Hospital Medicine Discharge Summary      Patient ID: Leroy Bernardo      Patient's PCP: Reema Garcia    Admit Date: 4/13/2019     Discharge Date: 4/17/2019  The patient was seen and examined on day of discharge and this discharge summary is in conjunction with any daily progress note from day of discharge. Admitting Physician: Lolly Councilman, MD    Discharge Physician: Leslie Patterson MD     Admitted for   Chief Complaint   Patient presents with    Shortness of Breath     x2 days    Fatigue       Admitting Diagnosis Severe sepsis ([address] [A41.9, R65.20]    Discharge Diagnoses: Active Hospital Problems    Diagnosis Date Noted    Pneumonia due to organism [J18.9] 02/10/2016     Priority: High    Uncontrolled diabetes mellitus ([address] [E11.65] 04/14/2019    Supratherapeutic INR [R79.1] 04/14/2019    Lactic acidosis [E87.2] 04/13/2019    Acute renal failure (ARF) ([address] [N17.9] 04/13/2019    Generalized weakness [R53.1] 08/06/2018    Essential hypertension [I10] 07/30/2018    Diabetes mellitus type 2, uncontrolled ([address] [E11.65] 04/06/2018    Severe sepsis ([address] [A41.9, R65.20] 04/06/2018    Acute respiratory failure with hypercapnia ([facility]) [J96.02] 04/06/2018    Chronic obstructive pulmonary disease ([address] [J44.9]        Follow Up: Primary Care Physician in one week    PCP to Follow up on   Post dc FU           Hospital Course:   68 y. o. male  with a past medical history of COPD, chronic pain, diabetes who presented to [facility] one-week history of worsening breathing, gets much worse in the last 2 days worse was ambulation, better when he is sitting still associated with cough with green sputum, fever and chills along was generalized weakness, no obvious trigger, came to emergency department started on BiPAP, found to have elevated lactic acid, found to have a pneumonia, started on IV fluid IV antibiotics and we were asked to admit for further management and treatment.  He was (cough; use with spacing device)             amoxicillin-clavulanate (AUGMENTIN) 875-125 MG per tablet  Take 1 tablet by mouth 2 times daily for 7 days             aspirin 81 MG tablet  Take 81 mg by mouth daily             budesonide-formoterol (SYMBICORT) 160-4.5 MCG/ACT AERO  Inhale 2 puffs into the lungs 2 times daily             carvedilol (COREG) 25 MG tablet  Take 12.5 mg by mouth 2 times daily (with meals)              docusate sodium (COLACE) 100 MG capsule  Take 100 mg by mouth 2 times daily as needed for Constipation              flunisolide (NASALIDE) 25 MCG/ACT (0.025%) SOLN  Inhale 2 sprays into the lungs every 12 hours             furosemide (LASIX) 40 MG tablet  Take 40 mg by mouth daily             gabapentin (NEURONTIN) 800 MG tablet  Take 800 mg by mouth 4 times daily             Insulin NPH Isophane & Regular (NOVOLIN 70/30 PENFILL SC)  Inject 30 Units into the skin 2 times daily              lisinopril (PRINIVIL;ZESTRIL) 10 MG tablet  Take 10 mg by mouth daily              metFORMIN (GLUCOPHAGE) 1000 MG tablet  Take 1,000 mg by mouth 2 times daily (with meals)             oxyCODONE (OXYCONTIN) 20 MG extended release tablet  Take 20 mg by mouth every 8 hours. [place] oxyCODONE (OXYCONTIN) 40 MG extended release tablet  Take 40 mg by mouth every 8 hours. .             predniSONE (DELTASONE) 10 MG tablet  Take 10 mg  3 tabs po qd x 2, then 2 tabs po qd x 3, then 1 tab po qd x 3 then STOP             simvastatin (ZOCOR) 80 MG tablet  Take 40 mg by mouth nightly             vitamin D (CHOLECALCIFEROL) 1000 UNITS TABS tablet  Take 1,000 Units by mouth daily             warfarin (COUMADIN) 5 MG tablet  Take 7.5 mg every evening EXCEPT 10 mg on Monday, Wednesday, and Friday.                  Future Appointments   Date Time Provider Lindy Priya   5/8/2019  9:20 AM Kan Sanchez MD [facility]       Time Spent on discharge is more than 45 minutes in the examination, evaluation, counseling and review of medications and discharge plan. Signed:  Booker Martinez MD   4/17/2019    The note was completed using EMR. Every effort was made to ensure accuracy; however, inadvertent computerized transcription errors may be present. Thank you Vikram Blackburn for the opportunity to be involved in this patient's care. If you have any questions or concerns please feel free to contact me at [phone].

## 2019-04-17 NOTE — CARE COORDINATION
D/c order acknowledged. Sw met with patient, he confirmed plan is to return home. He is now declining home therapy. He feels that he has progressed and does not need it anymore. Encouraged him to f/u with his PCP should he change his mind at home. He expressed no other needs.     Daisy Almonte, [address] [facility]  [phone]  4/17/19 at 10:36 AM

## 2019-04-17 NOTE — PLAN OF CARE
Problem: Falls - Risk of:  Goal: Will remain free from falls  Description  Will remain free from falls  4/17/2019 0721 by Rosemary Jha RN  Outcome: Ongoing  4/17/2019 0020 by Rosemary Jha RN  Outcome: Ongoing  4/17/2019 0019 by Rosemary Jha RN  Outcome: Ongoing  4/17/2019 0017 by Rosemary Jha RN  Outcome: Ongoing  4/16/2019 2012 by Shreyas Worley RN  Outcome: Ongoing  Note:   Pt up ambulating in room with walker, gait steady. Call light within reach. Goal: Absence of physical injury  Description  Absence of physical injury  4/17/2019 0721 by Rosemary Jha RN  Outcome: Ongoing  4/17/2019 0020 by Rosemary Jha RN  Outcome: Ongoing  Note:   Fall risk assessment completed All precautions in place. Pt has call light within reach at all times. Room clear of clutter. Pt aware to call for assistance when getting up. 4/17/2019 0019 by Rosemary Jha RN  Outcome: Ongoing  Note:   Fall risk assessment completed  All precautions in place. Pt has call light within reach at all times. Room clear of clutter. Pt aware to call for assistance when getting up. 4/17/2019 0017 by Rosemary Jha RN  Outcome: Ongoing  Note:   Fall risk assessment completed . All precautions in place. Pt has call light within reach at all times. Room clear of clutter. Pt aware to call for assistance when getting up. 4/16/2019 2012 by Shreyas Worley RN  Outcome: Ongoing  Note:   No signs of physical injury. Problem: Discharge Planning:  Goal: Discharged to appropriate level of care  Description  Discharged to appropriate level of care  4/17/2019 0721 by Rosemary Jha RN  Outcome: Ongoing  4/17/2019 0020 by Rosemary Jha RN  Outcome: Ongoing  4/17/2019 0019 by Rosemary Jha RN  Outcome: Ongoing  4/16/2019 2012 by Shreyas Worley RN  Outcome: Ongoing  Note:   Pt is hoping to be discharged to home tomorrow.   Goal: Participates in care planning  Description  Participates in care planning  4/17/2019 0721 by Kate Marroquin RN  Outcome: Ongoing  4/17/2019 0020 by Kate Marroquin RN  Outcome: Ongoing  4/17/2019 0019 by Kate Marroquin RN  Outcome: Ongoing  4/16/2019 2012 by Ashutosh Bermudez RN  Outcome: Ongoing  Note:   Pt is very active in his plan of care. Knowledgeable about his care. Problem: Airway Clearance - Ineffective:  Goal: Clear lung sounds  Description  Clear lung sounds  4/17/2019 0721 by Kate Marroquin RN  Outcome: Ongoing  4/17/2019 0020 by Kate Marroquin, RN  Outcome: Ongoing  4/17/2019 0019 by Kate Marroquin RN  Outcome: Ongoing  4/16/2019 2012 by Ashutosh Bermudez RN  Outcome: Ongoing  Note:   Lung sounds are clear but diminished. Pt does have a productive cough, brown sputum. Goal: Ability to maintain a clear airway will improve  Description  Ability to maintain a clear airway will improve  4/17/2019 0721 by Kate Marroquin, RN  Outcome: Ongoing  4/17/2019 0020 by Kate Marroquin RN  Outcome: Ongoing  4/17/2019 0019 by Kate Marroquin RN  Outcome: Ongoing  4/16/2019 2012 by Ashutosh Bermudez RN  Outcome: Ongoing  Note:   Pt has a strong cough, productive of brown sputum     Problem: Fluid Volume - Deficit:  Goal: Achieves intake and output within specified parameters  Description  Achieves intake and output within specified parameters  4/17/2019 0721 by Kate Marroquin RN  Outcome: Ongoing  4/17/2019 0020 by Kate Marroquin, RN  Outcome: Ongoing  4/17/2019 0019 by Kate Marroquin, RN  Outcome: Ongoing  4/16/2019 2012 by Ashutosh Bermudez RN  Outcome: Ongoing  Note:   Intake and output being monitored. Pt does forget to use the urinal at times and just goes in the toilet.  Pt has been reminded to use the urinal.     Problem: Gas Exchange - Impaired:  Goal: Levels of oxygenation will improve  Description  Levels of oxygenation will improve  4/17/2019 0721 by Kate Marroquin, RN  Outcome: Ongoing  4/17/2019 0020 by Kate Marroquin RN  Outcome: Ongoing  4/17/2019 0019 by Felicita Burgess RN  Outcome: Ongoing  4/16/2019 2012 by Celsa Shelton RN  Outcome: Ongoing  Note:   Pt weaned off of oxygen today. Pulse ox continues to be checked every 4 hours and prn. Problem: Hyperthermia:  Goal: Ability to maintain a body temperature in the normal range will improve  Description  Ability to maintain a body temperature in the normal range will improve  4/17/2019 0721 by Felicita Burgess RN  Outcome: Ongoing  4/17/2019 0020 by Felicita Burgess RN  Outcome: Ongoing  4/17/2019 0019 by Felicita Burgess RN  Outcome: Ongoing  4/16/2019 2012 by Celsa Shelton RN  Outcome: Met This Shift  Note:   Pt is afebrile. Problem: Tobacco Use:  Goal: Will participate in inpatient tobacco-use cessation counseling  Description  Will participate in inpatient tobacco-use cessation counseling  4/17/2019 0721 by Felicita Burgess RN  Outcome: Ongoing  4/17/2019 0020 by Felicita Burgess RN  Outcome: Ongoing  4/17/2019 0019 by Felicita Burgess RN  Outcome: Ongoing  4/16/2019 2012 by Celsa Shelton RN  Outcome: Ongoing  Note:   . Problem: Pain:  Goal: Pain level will decrease  Description  Pain level will decrease  4/17/2019 0721 by Felicita Burgess RN  Outcome: Ongoing  4/17/2019 0020 by Felicita Burgess RN  Outcome: Ongoing  4/17/2019 0019 by Felicita Burgess RN  Outcome: Ongoing  4/17/2019 0017 by Felicita Burgess RN  Outcome: Ongoing  4/16/2019 2012 by Celsa Shelton RN  Outcome: Ongoing  Note:   Pt continues to receive scheduled pain medication for his chronic back pain that radiates down his right leg. Pt does change positions to also help alleviate the pain.   Goal: Control of acute pain  Description  Control of acute pain  4/17/2019 0721 by Felicita Burgess RN  Outcome: Ongoing  4/17/2019 0020 by Felicita Buregss RN  Outcome: Ongoing  4/17/2019 0019 by Felicita Burgess RN  Outcome: Ongoing  4/17/2019 0017 by Felicita Burgess RN  Outcome: Ongoing  4/16/2019 2012 by Kandace Merlin, RN  Outcome: Ongoing  Note:   Pt pt is experiencing is chronic not acute. Goal: Control of chronic pain  Description  Control of chronic pain  4/17/2019 0721 by Annmarie Dominguez RN  Outcome: Ongoing  Note:   Pain/discomfort being managed with PRN analgesics per MD orders. Pt able to express presence and absence of pain and rate pain appropriately using numerical scale. 4/17/2019 0020 by Annmarie Dominguez RN  Outcome: Ongoing  Note:   Pain/discomfort being managed with PRN analgesics per MD orders. Pt able to express presence and absence of pain and rate pain appropriately using numerical scale. 4/17/2019 0019 by Annmarie Dominguez RN  Outcome: Ongoing  Note:   Pain/discomfort being managed with PRN analgesics per MD orders. Pt able to express presence and absence of pain and rate pain appropriately using numerical scale. 4/17/2019 0017 by Annmarie Dominguez RN  Outcome: Ongoing  Note:   Pain/discomfort being managed with PRN analgesics per MD orders. Pt able to express presence and absence of pain and rate pain appropriately using numerical scale. 4/16/2019 2012 by Kandace Merlin, RN  Outcome: Ongoing  Note:   Pain/discomfort being managed with PRN analgesics per MD orders. Pt able to express presence and absence of pain and rate pain appropriately using numerical scale. Problem: Risk for Impaired Skin Integrity  Goal: Tissue integrity - skin and mucous membranes  Description  Structural intactness and normal physiological function of skin and  mucous membranes. 4/17/2019 0721 by Annmarie Dominguez RN  Outcome: Ongoing  4/17/2019 0020 by Annmarie Dominguez RN  Outcome: Ongoing  4/17/2019 0019 by Annmarie Dominguez RN  Outcome: Ongoing  4/17/2019 0017 by Annmarie Dominguez RN  Outcome: Ongoing  4/16/2019 2012 by Kandace Merlin, RN  Outcome: Ongoing  Note:   Skin assessment completed every shift.  Pt assessed for incontinence, appropriate barrier cream applied prn. Pt encouraged to turn/rotate every 2 hours. Assistance provided if pt unable to do so themselves.

## 2019-04-17 NOTE — PROGRESS NOTES
Occupational Therapy  Facility/Department: [facility] PROGRESSIVE CARE  Daily Treatment Note  NAME: Roseanne Babin  :   MRN: [id number]    Date of Service: 2019    Discharge Recommendations:  Home with assist PRN(continue cardiopulmonary rehab)  OT Equipment Recommendations  Other: Pt reports having equipment required. Pt has a built in shower chair, walker, and electric scooter. Roseanne Babin scored a 19/24 on the AM-PAC ADL Inpatient form. At this time, no further OT is recommended as pt is declining home OT. Recommend patient returns to prior setting with prior services including cardiopulmonary rehab when medically able. Assessment   Performance deficits / Impairments: Decreased functional mobility ; Decreased ADL status  Assessment: Patient is a 68 y.o. male who presents d/t Pneumonia, COPD Exac. PTA pt was independent with fxl mobility via RW for short distances and indepedent with ADLs. Currently, pt is functioning below baseline secondary to decreased endurance and activity tolerance. Pt is safe to return home with assist prn and to resume cardio pulmonary rehab when medically ready for d/c. Prognosis: Good  History: See above  Patient Education: Safe transfers  REQUIRES OT FOLLOW UP: Yes  Activity Tolerance  Activity Tolerance: Patient Tolerated treatment well  Safety Devices  Type of devices: Call light within reach; Left in chair         Patient Diagnosis(es): The primary encounter diagnosis was Pneumonia due to organism. Diagnoses of Dyspnea, unspecified type and COPD exacerbation ([address] were also pertinent to this visit. has a past medical history of CAD (coronary artery disease), Chronic back pain, Chronic systolic CHF (congestive heart failure) ([address] COPD (chronic obstructive pulmonary disease) ([address] Diabetes mellitus ([address] Essential hypertension, and Paroxysmal atrial fibrillation ([address]    has a past surgical history that includes fracture surgery (Left, ); back surgery (1995); Cardiac surgery (2007); Coronary artery bypass graft; and back surgery. Restrictions  Restrictions/Precautions  Restrictions/Precautions: Fall Risk  Position Activity Restriction  Other position/activity restrictions: 82% spO2 on RA when walking  Subjective   General  Chart Reviewed: Yes  Patient assessed for rehabilitation services?: Yes  Additional Pertinent Hx: Patient is a 68 y.o. male who presents d/t Pneumonia, COPD Exac. Family / Caregiver Present: No  Referring Practitioner: Nat Natarajan MD  Subjective  Subjective: Pt seen bedside and agreed to functional mobility. Objective       Functional Mobility  Functional - Mobility Device: Rolling Walker  Activity: Other  Assist Level: Stand by assistance  Wheelchair Bed Transfers  Wheelchair/Bed - Technique: Ambulating  Equipment Used: Other(recliner, chair with arms)  Level of Asssistance: Modified independent ;Stand by assistance        Plan   Plan  Times per week: 3-5  Times per day: Daily  Current Treatment Recommendations: Strengthening, ROM, Balance Training, Functional Mobility Training, Endurance Training, Safety Education & Training, Patient/Caregiver Education & Training, Equipment Evaluation, Education, & procurement, Self-Care / ADL    AM-[facility] Score        AM-[facility] Inpatient Daily Activity Raw Score: 19  AM-PAC Inpatient ADL T-Scale Score : 40.22  ADL Inpatient CMS 0-100% Score: 42.8  ADL Inpatient CMS G-Code Modifier : CK    Goals  Short term goals  Time Frame for Short term goals: until d/c:   Short term goal 1: Fxl mobility/transfers via RW mod I. Short term goal 2: Bathing with SPV. Short term goal 3: Dressing with SPV. Short term goal 4: Toileting mod I. Short term goal 5: Grooming at sink mod I. Patient Goals   Patient goals : \"I want to be able to return home with my wife and cat\".         Therapy Time   Individual Concurrent Group Co-treatment   Time In 0372         Time Out 1545         Minutes 30

## 2019-04-18 LAB
BLOOD CULTURE, ROUTINE: NORMAL
CULTURE, BLOOD 2: NORMAL

## 2019-04-29 ENCOUNTER — HOSPITAL ENCOUNTER (EMERGENCY)
Age: 74
Discharge: HOME | End: 2019-04-29
Attending: EMERGENCY MEDICINE
Payer: MEDICARE

## 2019-04-29 ENCOUNTER — APPOINTMENT (OUTPATIENT)
Dept: GENERAL RADIOLOGY | Age: 74
End: 2019-04-29
Payer: MEDICARE

## 2019-04-29 VITALS
BODY MASS INDEX: 35.92 KG/M2 | HEART RATE: 93 BPM | TEMPERATURE: 99.5 F | DIASTOLIC BLOOD PRESSURE: 62 MMHG | OXYGEN SATURATION: 92 % | RESPIRATION RATE: 17 BRPM | SYSTOLIC BLOOD PRESSURE: 141 MMHG | WEIGHT: 250.88 LBS

## 2019-04-29 DIAGNOSIS — R05.9 COUGH: Primary | ICD-10-CM

## 2019-04-29 LAB
A/G RATIO: 1.1 (ref 1.1–2.2)
ALBUMIN SERPL-MCNC: 3.8 G/DL (ref 3.4–5)
ALP BLD-CCNC: 57 U/L (ref 40–129)
ALT SERPL-CCNC: 19 U/L (ref 10–40)
ANION GAP SERPL CALCULATED.3IONS-SCNC: 12 MMOL/L (ref 3–16)
AST SERPL-CCNC: 15 U/L (ref 15–37)
BASE EXCESS VENOUS: 5.3 MMOL/L
BASOPHILS ABSOLUTE: 0.1 K/UL (ref 0–0.2)
BASOPHILS RELATIVE PERCENT: 1.2 %
BILIRUB SERPL-MCNC: 0.3 MG/DL (ref 0–1)
BUN BLDV-MCNC: 18 MG/DL (ref 7–20)
CALCIUM SERPL-MCNC: 9 MG/DL (ref 8.3–10.6)
CARBOXYHEMOGLOBIN: 2.2 %
CHLORIDE BLD-SCNC: 95 MMOL/L (ref 99–110)
CO2: 30 MMOL/L (ref 21–32)
CREAT SERPL-MCNC: 1.3 MG/DL (ref 0.8–1.3)
EOSINOPHILS ABSOLUTE: 0.2 K/UL (ref 0–0.6)
EOSINOPHILS RELATIVE PERCENT: 1.7 %
GFR AFRICAN AMERICAN: >60
GFR NON-AFRICAN AMERICAN: 54
GLOBULIN: 3.4 G/DL
GLUCOSE BLD-MCNC: 305 MG/DL (ref 70–99)
HCO3 VENOUS: 31 MMOL/L (ref 23–29)
HCT VFR BLD CALC: 39.5 % (ref 40.5–52.5)
HEMOGLOBIN: 13.1 G/DL (ref 13.5–17.5)
LIPASE: 14 U/L (ref 13–60)
LYMPHOCYTES ABSOLUTE: 1.6 K/UL (ref 1–5.1)
LYMPHOCYTES RELATIVE PERCENT: 18.5 %
MCH RBC QN AUTO: 30.1 PG (ref 26–34)
MCHC RBC AUTO-ENTMCNC: 33.3 G/DL (ref 31–36)
MCV RBC AUTO: 90.3 FL (ref 80–100)
METHEMOGLOBIN VENOUS: 0.8 %
MONOCYTES ABSOLUTE: 1 K/UL (ref 0–1.3)
MONOCYTES RELATIVE PERCENT: 11.1 %
NEUTROPHILS ABSOLUTE: 5.8 K/UL (ref 1.7–7.7)
NEUTROPHILS RELATIVE PERCENT: 67.5 %
O2 CONTENT, VEN: 16 ML/DL
O2 SAT, VEN: 87 %
O2 THERAPY: ABNORMAL
PCO2, VEN: 52.8 MMHG (ref 40–50)
PDW BLD-RTO: 14 % (ref 12.4–15.4)
PH VENOUS: 7.39 (ref 7.35–7.45)
PLATELET # BLD: 255 K/UL (ref 135–450)
PMV BLD AUTO: 7.4 FL (ref 5–10.5)
PO2, VEN: 51 MMHG
POTASSIUM REFLEX MAGNESIUM: 4.2 MMOL/L (ref 3.5–5.1)
PRO-BNP: 186 PG/ML (ref 0–124)
RBC # BLD: 4.38 M/UL (ref 4.2–5.9)
SODIUM BLD-SCNC: 137 MMOL/L (ref 136–145)
TCO2 CALC VENOUS: 33 MMOL/L
TOTAL PROTEIN: 7.2 G/DL (ref 6.4–8.2)
WBC # BLD: 8.7 K/UL (ref 4–11)

## 2019-04-29 PROCEDURE — 83880 ASSAY OF NATRIURETIC PEPTIDE: CPT

## 2019-04-29 PROCEDURE — 82803 BLOOD GASES ANY COMBINATION: CPT

## 2019-04-29 PROCEDURE — 83690 ASSAY OF LIPASE: CPT

## 2019-04-29 PROCEDURE — 99284 EMERGENCY DEPT VISIT MOD MDM: CPT

## 2019-04-29 PROCEDURE — 93005 ELECTROCARDIOGRAM TRACING: CPT | Performed by: EMERGENCY MEDICINE

## 2019-04-29 PROCEDURE — 80053 COMPREHEN METABOLIC PANEL: CPT

## 2019-04-29 PROCEDURE — 71046 X-RAY EXAM CHEST 2 VIEWS: CPT

## 2019-04-29 PROCEDURE — 85025 COMPLETE CBC W/AUTO DIFF WBC: CPT

## 2019-04-29 RX ORDER — GUAIFENESIN 600 MG/1
600 TABLET, EXTENDED RELEASE ORAL 2 TIMES DAILY
Qty: 30 TABLET | Refills: 0 | Status: SHIPPED | OUTPATIENT
Start: 2019-04-29 | End: 2019-04-29 | Stop reason: SDUPTHER

## 2019-04-29 RX ORDER — GUAIFENESIN 600 MG/1
600 TABLET, EXTENDED RELEASE ORAL 2 TIMES DAILY
Qty: 30 TABLET | Refills: 0 | Status: SHIPPED | OUTPATIENT
Start: 2019-04-29 | End: 2019-05-14

## 2019-04-29 RX ORDER — AMOXICILLIN AND CLAVULANATE POTASSIUM 875; 125 MG/1; MG/1
1 TABLET, FILM COATED ORAL 2 TIMES DAILY
Qty: 10 TABLET | Refills: 0 | Status: SHIPPED | OUTPATIENT
Start: 2019-04-29 | End: 2019-05-04

## 2019-04-29 RX ORDER — BENZONATATE 100 MG/1
100 CAPSULE ORAL 3 TIMES DAILY PRN
Qty: 20 CAPSULE | Refills: 0 | Status: SHIPPED | OUTPATIENT
Start: 2019-04-29 | End: 2019-12-10 | Stop reason: ALTCHOICE

## 2019-04-29 ASSESSMENT — ENCOUNTER SYMPTOMS
CHEST TIGHTNESS: 0
COUGH: 1
SHORTNESS OF BREATH: 0
CHOKING: 0
NAUSEA: 0
RHINORRHEA: 0
PHOTOPHOBIA: 0
VOMITING: 0
WHEEZING: 0
BACK PAIN: 0
COLOR CHANGE: 0

## 2019-04-30 NOTE — ED TRIAGE NOTES
PT reports productive cough, yellow and white in color. Also increased SOB. Denies CP. States that he was just recently admitted for pneumonia.

## 2019-04-30 NOTE — ED NOTES
D/c instructions given to pt at this time. Recommended follow up with PCP. Encouraged to return to ED if symptoms worsen. Verbalized understanding. No further questions or concerns. Left ambulatory with wife.       Pelon Perkins RN  04/29/19 [id number]

## 2019-04-30 NOTE — ED PROVIDER NOTES
OXYCODONE (OXYCONTIN) 20 MG EXTENDED RELEASE TABLET    Take 20 mg by mouth every 8 hours. .    OXYCODONE (OXYCONTIN) 40 MG EXTENDED RELEASE TABLET    Take 40 mg by mouth every 8 hours.  PREDNISONE (DELTASONE) 10 MG TABLET    Take 10 mg  3 tabs po qd x 2, then 2 tabs po qd x 3, then 1 tab po qd x 3 then STOP    SIMVASTATIN (ZOCOR) 80 MG TABLET    Take 40 mg by mouth nightly    VITAMIN D (CHOLECALCIFEROL) 1000 UNITS TABS TABLET    Take 1,000 Units by mouth daily    WARFARIN (COUMADIN) 5 MG TABLET    Take 7.5 mg every evening EXCEPT 10 mg on Monday, Wednesday, and Friday.        ALLERGIES     No known allergies    FAMILY HISTORY       Family History   Problem Relation Age of Onset    Brain Cancer Mother     Heart Attack Father     Heart Attack Brother           SOCIAL HISTORY       Social History     Socioeconomic History    Marital status:      Spouse name: None    Number of children: None    Years of education: None    Highest education level: None   Occupational History    None   Social Needs    Financial resource strain: None    Food insecurity:     Worry: None     Inability: None    Transportation needs:     Medical: None     Non-medical: None   Tobacco Use    Smoking status: Former Smoker     Packs/day: 1.50     Years: 12.00     Pack years: 18.00     Last attempt to quit: 3/1/1996     Years since quittin.1    Smokeless tobacco: Never Used   Substance and Sexual Activity    Alcohol use: No    Drug use: No    Sexual activity: Not Currently   Lifestyle    Physical activity:     Days per week: None     Minutes per session: None    Stress: None   Relationships    Social connections:     Talks on phone: None     Gets together: None     Attends [employer] service: None     Active member of club or organization: None     Attends meetings of clubs or organizations: None     Relationship status: None    Intimate partner violence:     Fear of current or ex partner: None     Emotionally abused: None     Physically abused: None     Forced sexual activity: None   Other Topics Concern    None   Social History Narrative    None       SCREENINGS      @FLOW([id number])@      PHYSICAL EXAM    (up to 7 for level 4, 8 or more for level 5)     ED Triage Vitals [04/29/19 1918]   BP Temp Temp Source Pulse Resp SpO2 Height Weight   (!) 171/81 99.5 °F (37.5 °C) Oral 89 17 98 % -- 250 lb 14.1 oz (113.8 kg)       Physical Exam   Constitutional: He is oriented to person, place, and time. He appears well-developed and well-nourished. No distress. HENT:   Head: Normocephalic and atraumatic. Eyes: Conjunctivae and EOM are normal.   Neck: Normal range of motion. No tracheal deviation present. Cardiovascular: Normal rate and regular rhythm. Pulmonary/Chest: Effort normal and breath sounds normal. No respiratory distress. He has no wheezes. He has no rales. He exhibits no tenderness. Abdominal: Soft. He exhibits no distension. There is no tenderness. There is no CVA tenderness. Musculoskeletal: Normal range of motion. He exhibits no edema or deformity. Neurological: He is alert and oriented to person, place, and time. Skin: Skin is warm and dry. DIAGNOSTIC RESULTS     EKG: All EKG's are interpreted by the Emergency Department Physician who either signs or Co-signsthis chart in the absence of a cardiologist.    Patient's EKG shows a sinus rhythm with a ventricular rate of 89 bpm.  The patient's MN interval and QTC intervals are within normal limits and the patient has a normal axis. Parasitic ST elevations or depressions and EKG is nondiagnostic for ACS. Compared to previous EKG there are nonspecific ST changes in the inferior leads and ST depressions or with her previously or not as pronounced .     RADIOLOGY:   Non-plain filmimages such as CT, Ultrasound and MRI are read by the radiologist. Plain radiographic images are visualized and preliminarily interpreted by the emergency physician with the below findings:    Interpretation per the Radiologist below, if available at the time ofthis note:    XR CHEST STANDARD (2 VW)   Final Result   Prior sternal splitting procedure for CABG. Left basilar chronic atelectasis/fibrosis. Lungs are otherwise clear. Small metallic foreign body in the anterior right chest wall unchanged from   the CT chest of 02/26/2019. ED BEDSIDE ULTRASOUND:   Performed by ED Physician - none    LABS:  Labs Reviewed   CBC WITH AUTO DIFFERENTIAL - Abnormal; Notable for the following components:       Result Value    Hemoglobin 13.1 (*)     Hematocrit 39.5 (*)     All other components within normal limits    Narrative:     Performed at:  [facility]  [address] [facility] [place] [address]   Phone [phone]   COMPREHENSIVE METABOLIC PANEL W/ REFLEX TO MG FOR LOW K - Abnormal; Notable for the following components:    Chloride 95 (*)     Glucose 305 (*)     GFR Non- 54 (*)     All other components within normal limits    Narrative:     Performed at:  [facility]  [address] [facility] [place] [address]   Phone [phone]   BLOOD GAS, VENOUS - Abnormal; Notable for the following components:    pCO2, Fede 52.8 (*)     HCO3, Venous 31 (*)     All other components within normal limits    Narrative:     Performed at:  [facility]  [address] [facility] [address] [facility] [place] [address]   Phone [phone]   LIPASE    Narrative:     Performed at:  [facility]  [address] [place] [address] [place] [address]   Phone [phone]   BRAIN NATRIURETIC PEPTIDE   URINE RT REFLEX TO CULTURE       All other labs were within normal range or not returned as of this dictation.     EMERGENCY DEPARTMENT COURSE and DIFFERENTIAL DIAGNOSIS/MDM:   Vitals:    Vitals:    04/29/19 1918   BP: (!) 171/81   Pulse: 89   Resp: 17   Temp: 99.5 °F (37.5 °C) TempSrc: Oral   SpO2: 98%   Weight: 250 lb 14.1 oz (113.8 kg)         MDM  Number of Diagnoses or Management Options  Cough:   Diagnosis management comments: 68-year-old male with a history of chronic pulmonary disease presents to the ED for evaluation of productive cough and congestion. On presentation patient has a normal respiratory effort and is satting well on room air. Patient is afebrile and not tachycardic. Patient has coarse breath sounds. There is no wheezing. We'll obtain a chest x-ray basic laboratory workup to evaluate for pneumonia. Differential also includes CHF exacerbation, GERD, URI, and bronchitis. REASSESSMENT      On reevaluation the patient is sitting upright and requested to be discharged home. The patient states that he does feel well. Chest x-ray showed chronic fibrotic changes but no acute consolidation or infiltrate. Patient's laboratory work was unremarkable and he is not acidotic had no leukocytosis. Given the patient's extensive pulmonary history we'll start on a course of antibiotics. Patient is amenable to discharge home with outpatient follow-up. Results were discussed with the patient and why it was of the opinion that the patient was suitable for discharge. Patient is amenable to discharge home. Return indications discussed with the patient. Patient demonstrates understanding of when to return for reevaluation for persistent or worsening symptoms. CRITICAL CARE TIME   Total Critical Care time was 0 minutes, excluding separatelyreportable procedures. There was a high probability ofclinically significant/life threatening deterioration in the patient's condition which required my urgent intervention. CONSULTS:  None    PROCEDURES:  Unless otherwise noted below, none     Procedures    FINAL IMPRESSION      1.  Cough          DISPOSITION/PLAN   DISPOSITION Decision To Discharge 04/29/2019 08:50:01 PM      PATIENT REFERREDTO:  Steve Alcala [address] [facility]  [phone]    Schedule an appointment as soon as possible for a visit  As needed      DISCHARGEMEDICATIONS:  New Prescriptions    AMOXICILLIN-CLAVULANATE (AUGMENTIN) 875-125 MG PER TABLET    Take 1 tablet by mouth 2 times daily for 5 days    BENZONATATE (TESSALON PERLES) 100 MG CAPSULE    Take 1 capsule by mouth 3 times daily as needed for Cough    GUAIFENESIN (MUCINEX) 600 MG EXTENDED RELEASE TABLET    Take 1 tablet by mouth 2 times daily for 15 days          (Please note that portions of this note were completed with a voice recognition program.  Efforts were made to edit the dictations but occasionally words are mis-transcribed.)    Sang Garza MD (electronically signed)  Attending Emergency Physician          Sang Garza MD  04/29/19 5369

## 2019-05-01 LAB
EKG ATRIAL RATE: 89 BPM
EKG DIAGNOSIS: NORMAL
EKG P AXIS: 24 DEGREES
EKG P-R INTERVAL: 150 MS
EKG Q-T INTERVAL: 372 MS
EKG QRS DURATION: 86 MS
EKG QTC CALCULATION (BAZETT): 452 MS
EKG R AXIS: 28 DEGREES
EKG T AXIS: 59 DEGREES
EKG VENTRICULAR RATE: 89 BPM

## 2019-05-01 PROCEDURE — 93010 ELECTROCARDIOGRAM REPORT: CPT | Performed by: INTERNAL MEDICINE

## 2019-05-07 ENCOUNTER — HOSPITAL ENCOUNTER (OUTPATIENT)
Age: 74
Discharge: HOME | End: 2019-05-07

## 2019-05-07 PROCEDURE — 9900000038 HC CARDIAC REHAB PHASE 3 - MONTHLY

## 2019-05-08 ENCOUNTER — OFFICE VISIT (OUTPATIENT)
Dept: PULMONOLOGY | Age: 74
End: 2019-05-08
Payer: MEDICARE

## 2019-05-08 VITALS
HEART RATE: 82 BPM | DIASTOLIC BLOOD PRESSURE: 72 MMHG | WEIGHT: 254 LBS | OXYGEN SATURATION: 97 % | TEMPERATURE: 98 F | SYSTOLIC BLOOD PRESSURE: 132 MMHG | BODY MASS INDEX: 36.36 KG/M2

## 2019-05-08 DIAGNOSIS — J31.0 CHRONIC RHINITIS: ICD-10-CM

## 2019-05-08 DIAGNOSIS — J41.0 CHRONIC BRONCHITIS, SIMPLE (HCC): Primary | ICD-10-CM

## 2019-05-08 DIAGNOSIS — J41.1 MUCOPURULENT CHRONIC BRONCHITIS (HCC): ICD-10-CM

## 2019-05-08 PROCEDURE — 1111F DSCHRG MED/CURRENT MED MERGE: CPT | Performed by: INTERNAL MEDICINE

## 2019-05-08 PROCEDURE — 3023F SPIROM DOC REV: CPT | Performed by: INTERNAL MEDICINE

## 2019-05-08 PROCEDURE — 1036F TOBACCO NON-USER: CPT | Performed by: INTERNAL MEDICINE

## 2019-05-08 PROCEDURE — G8598 ASA/ANTIPLAT THER USED: HCPCS | Performed by: INTERNAL MEDICINE

## 2019-05-08 PROCEDURE — 1123F ACP DISCUSS/DSCN MKR DOCD: CPT | Performed by: INTERNAL MEDICINE

## 2019-05-08 PROCEDURE — 4040F PNEUMOC VAC/ADMIN/RCVD: CPT | Performed by: INTERNAL MEDICINE

## 2019-05-08 PROCEDURE — 3017F COLORECTAL CA SCREEN DOC REV: CPT | Performed by: INTERNAL MEDICINE

## 2019-05-08 PROCEDURE — G8417 CALC BMI ABV UP PARAM F/U: HCPCS | Performed by: INTERNAL MEDICINE

## 2019-05-08 PROCEDURE — 99213 OFFICE O/P EST LOW 20 MIN: CPT | Performed by: INTERNAL MEDICINE

## 2019-05-08 PROCEDURE — G8926 SPIRO NO PERF OR DOC: HCPCS | Performed by: INTERNAL MEDICINE

## 2019-05-08 PROCEDURE — G8428 CUR MEDS NOT DOCUMENT: HCPCS | Performed by: INTERNAL MEDICINE

## 2019-05-08 ASSESSMENT — ENCOUNTER SYMPTOMS
HEMOPTYSIS: 0
COUGH: 1
SINUS PAIN: 0
WHEEZING: 0
STRIDOR: 0
SPUTUM PRODUCTION: 1

## 2019-05-08 NOTE — PROGRESS NOTES
REASON FOR CONSULTATION/CC: simple chronic bronchitis    PCP: Reema Garcia    HISTORY OF PRESENT ILLNESS: Leroy Bernardo is a 68y.o. year old male with a history of 'COPD' who presents :           Continue to cough up phlegm. He was admitted to the hospital for pneumonia. COPD  Symbicort and Spiriva   She was Daliresp. This worked but was very expensive. Has albuterol. chronic rhinitis   Slight drainage. Using Flonase  Bid. PAST MEDICAL HISTORY:  Past Medical History:   Diagnosis Date    CAD (coronary artery disease)     CABG in 2009    Chronic back pain     Chronic systolic CHF (congestive heart failure) (HCC)     COPD (chronic obstructive pulmonary disease) (HCC)     Diabetes mellitus ([facility]     Essential hypertension     Paroxysmal atrial fibrillation ([facility]     On Coumadin       PAST SURGICAL HISTORY:  Past Surgical History:   Procedure Laterality Date    BACK SURGERY  1995    BACK SURGERY      CARDIAC SURGERY  2007    CERVICAL SPINE SURGERY      CORONARY ARTERY BYPASS GRAFT      FRACTURE SURGERY Left 1988    Shoulder       FAMILY HISTORY:  family history includes Brain Cancer in his mother; Heart Attack in his brother and father. SOCIAL HISTORY:   reports that he quit smoking about 23 years ago. He has a 18.00 pack-year smoking history. He has never used smokeless tobacco.      ALLERGIES:  Patient is allergic to no known allergies. REVIEW OF SYSTEMS:  Review of Systems   Constitutional: Negative. Negative for chills and fever. HENT: Negative. Negative for congestion and sinus pain. Respiratory: Positive for cough and sputum production. Negative for hemoptysis, wheezing and stridor. Cardiovascular: Negative. Skin: Negative. Neurological: Negative for dizziness, tremors and headaches. Psychiatric/Behavioral: Negative. Negative for depression and memory loss.         Objective:   PHYSICAL EXAM:  Blood pressure 132/72, pulse 82, temperature 98 °F (36.7 °C), temperature source Oral, weight 254 lb (115.2 kg), SpO2 97 %.'  Physical Exam   Constitutional: He is oriented to person, place, and time. He appears well-developed and well-nourished. No distress. HENT:   Head: Normocephalic and atraumatic. Mouth/Throat: No oropharyngeal exudate. Eyes: Right eye exhibits no discharge. Left eye exhibits no discharge. No scleral icterus. Neck: Normal range of motion. Neck supple. Cardiovascular: Normal rate. Exam reveals no gallop and no friction rub. No murmur heard. Pulmonary/Chest: Effort normal. No stridor. No respiratory distress. He has no wheezes. He has rales. Abdominal:   obese   Musculoskeletal: Normal range of motion. Neurological: He is alert and oriented to person, place, and time. Skin: Skin is warm and dry. He is not diaphoretic. Psychiatric: He has a normal mood and affect.  Judgment normal.       Current Outpatient Medications   Medication Sig Dispense Refill    Roflumilast (DALIRESP) 500 MCG tablet Take 1 tablet by mouth daily 30 tablet 5    Roflumilast (DALIRESP) 250 MCG TABS Take 1 tablet by mouth daily 28 tablet 0    benzonatate (TESSALON PERLES) 100 MG capsule Take 1 capsule by mouth 3 times daily as needed for Cough 20 capsule 0    guaiFENesin (MUCINEX) 600 MG extended release tablet Take 1 tablet by mouth 2 times daily for 15 days 30 tablet 0    predniSONE (DELTASONE) 10 MG tablet Take 10 mg  3 tabs po qd x 2, then 2 tabs po qd x 3, then 1 tab po qd x 3 then STOP 12 tablet 0    simvastatin (ZOCOR) 80 MG tablet Take 40 mg by mouth nightly      furosemide (LASIX) 40 MG tablet Take 40 mg by mouth daily      lisinopril (PRINIVIL;ZESTRIL) 10 MG tablet Take 10 mg by mouth daily       Insulin NPH Isophane & Regular (NOVOLIN 70/30 PENFILL SC) Inject 40 Units into the skin 2 times daily       flunisolide (NASALIDE) 25 MCG/ACT (0.025%) SOLN Inhale 2 sprays into the lungs every 12 hours      vitamin D (CHOLECALCIFEROL) 1000 UNITS TABS tablet Take 1,000 Units by mouth daily      gabapentin (NEURONTIN) 800 MG tablet Take 800 mg by mouth 4 times daily      aspirin 81 MG tablet Take 81 mg by mouth daily      metFORMIN (GLUCOPHAGE) 1000 MG tablet Take 1,000 mg by mouth 2 times daily (with meals)      budesonide-formoterol (SYMBICORT) 160-4.5 MCG/ACT AERO Inhale 2 puffs into the lungs 2 times daily      warfarin (COUMADIN) 5 MG tablet Take 7.5 mg every evening EXCEPT 10 mg on Monday, Wednesday, and Friday.  carvedilol (COREG) 25 MG tablet Take 12.5 mg by mouth 2 times daily (with meals)       albuterol sulfate HFA (PROVENTIL HFA) 108 (90 BASE) MCG/ACT inhaler Inhale 2 puffs into the lungs every 4 hours as needed for Wheezing or Shortness of Breath (cough; use with spacing device) 1 Inhaler 1     No current facility-administered medications for this visit. Data Reviewed:   CBC and Renal reviewed  Last CBC  Lab Results   Component Value Date    WBC 8.7 04/29/2019    RBC 4.38 04/29/2019    HGB 13.1 04/29/2019    MCV 90.3 04/29/2019     04/29/2019     Last Renal  Lab Results   Component Value Date     04/29/2019    K 4.2 04/29/2019    CL 95 04/29/2019    CO2 30 04/29/2019    CO2 25 04/14/2019    CO2 29 04/13/2019    BUN 18 04/29/2019    CREATININE 1.3 04/29/2019    GLUCOSE 305 04/29/2019    CALCIUM 9.0 04/29/2019       Last ABG  POC Blood Gas: No results found for: POCPH, POCPCO2, POCPO2, POCHCO3, NBEA, FGSH2EEV  No results for input(s): PH, PCO2, PO2, HCO3, BE, O2SAT in the last 72 hours. Chest imaging reports were reviewed and imaging was reviewed by me and showed   Radiology Review:  Pertinent images / reports were reviewed as a part of this visit.     CT Chest w/ contrast:   Results for orders placed during the hospital encounter of 01/01/17   CT Chest W Contrast    Narrative EXAMINATION:  CT OF THE CHEST WITH CONTRAST 1/1/2017 3:01 am    TECHNIQUE:  CT of the chest was performed with the administration of linear and few scattered areas of ground-glass  opacities are noted in the anterior lingula and right middle lobe. Dependent  atelectasis in the left lower lobe with calcification is noted, compatible  with chronic atelectasis. No new airspace disease or effusion identified. The airway is patent. Upper Abdomen: No acute findings. Soft Tissues/Bones: No acute abnormality. Thoracic spine catheter. Impression Near complete resolution of previously seen bilateral airspace disease. No  new airspace disease identified. Chronic atelectasis in the dependent left  lower lobe. CTPA: No results found for this or any previous visit. CXR PA/LAT:   Results for orders placed during the hospital encounter of 04/29/19   XR CHEST STANDARD (2 VW)    Narrative EXAMINATION:  TWO VIEWS OF THE CHEST    4/29/2019 7:31 pm    COMPARISON:  04/14/2019 and CT chest 02/26/2019    HISTORY:  ORDERING SYSTEM PROVIDED HISTORY: sob  TECHNOLOGIST PROVIDED HISTORY:  Reason for exam:->sob  Ordering Physician Provided Reason for Exam: sob    FINDINGS:  Prior sternal splitting procedure. Heart size is normal.  There are few  bands of left basilar chronic atelectasis/fibrosis. No acute infiltrate,  pneumothorax or pleural effusion. There is a small metallic foreign body in  the subcutaneous fat of the mid right anterior chest wall. No acute bone  finding. Impression Prior sternal splitting procedure for CABG. Left basilar chronic atelectasis/fibrosis. Lungs are otherwise clear. Small metallic foreign body in the anterior right chest wall unchanged from  the CT chest of 02/26/2019.          CXR portable:   Results for orders placed during the hospital encounter of 04/13/19   XR CHEST PORTABLE    Narrative EXAMINATION:  SINGLE XRAY VIEW OF THE CHEST    4/13/2019 7:22 am    COMPARISON:  01/07/2019    HISTORY:  ORDERING SYSTEM PROVIDED HISTORY: SOB, fever  TECHNOLOGIST PROVIDED HISTORY:  Reason for exam:->SOB, fever  Ordering Physician Provided Reason for Exam: sob  Acuity: Acute  Type of Exam: Initial    FINDINGS:  Status post median sternotomy. Mild heterogeneous pulmonary opacity is seen  diffusely bilaterally, left greater than right. No pneumothorax. Cardiac  and mediastinal silhouettes are unchanged. Impression Findings are suggestive of pulmonary edema. Pneumonia is not excluded in the  appropriate clinical setting. Assessment:     ·  chronic bronchitis, simple  ·  Chronic rhintis    Plan:        Problem List Items Addressed This Visit     COPD (chronic obstructive pulmonary disease) ([address]    Relevant Medications    Roflumilast (DALIRESP) 500 MCG tablet    Roflumilast (DALIRESP) 250 MCG TABS    Chronic rhinitis     symptoms controlled with Flonase           Chronic bronchitis, simple ([address] - Primary     He is having symptoms consistent with chronic bronchitis which has been recurrent. He was on Daliresp in 2017 for this. It worked well at the time but had to stop secondary to cost.  The symptoms did not come back until 2018 and has had several episodes since. Will restart this. Continue Symbicort and Spiriva     He is on mucolytics as well and will complete. prescription for mucolyitcs from the ED. Relevant Medications    Roflumilast (DALIRESP) 250 MCG TABS        This note was transcribed using [id number]. Please disregard any translational errors.

## 2019-05-08 NOTE — ASSESSMENT & PLAN NOTE
He is having symptoms consistent with chronic bronchitis which has been recurrent. He was on Daliresp in 2017 for this. It worked well at the time but had to stop secondary to cost.  The symptoms did not come back until 2018 and has had several episodes since. Will restart this. Continue Symbicort and Spiriva     He is on mucolytics as well and will complete. prescription for mucolyitcs from the ED.

## 2019-05-29 NOTE — TELEPHONE ENCOUNTER
Pt called stating the daliresp is $581 and he can not afford it, let him know we will contact kalee and see if it needs a PA or what was going on and call him back

## 2019-06-04 ENCOUNTER — TELEPHONE (OUTPATIENT)
Dept: PULMONOLOGY | Age: 74
End: 2019-06-04

## 2019-06-04 NOTE — TELEPHONE ENCOUNTER
Received fax from [employer] for [address]iz for [address]  PA approved through 12/31/19  Faxed approval to [employer]

## 2019-06-26 ENCOUNTER — TELEPHONE (OUTPATIENT)
Dept: PULMONOLOGY | Age: 74
End: 2019-06-26

## 2019-06-26 NOTE — TELEPHONE ENCOUNTER
I don't know what really expensive means but I don't know of another option. He can use OTC Mucinex to see if this helps.

## 2019-09-04 ENCOUNTER — OFFICE VISIT (OUTPATIENT)
Dept: PULMONOLOGY | Age: 74
End: 2019-09-04
Payer: MEDICARE

## 2019-09-04 VITALS
TEMPERATURE: 97.5 F | DIASTOLIC BLOOD PRESSURE: 76 MMHG | RESPIRATION RATE: 16 BRPM | WEIGHT: 254 LBS | HEART RATE: 69 BPM | OXYGEN SATURATION: 96 % | HEIGHT: 70 IN | SYSTOLIC BLOOD PRESSURE: 151 MMHG | BODY MASS INDEX: 36.36 KG/M2

## 2019-09-04 DIAGNOSIS — J43.2 CENTRILOBULAR EMPHYSEMA (HCC): ICD-10-CM

## 2019-09-04 DIAGNOSIS — J31.0 CHRONIC RHINITIS: ICD-10-CM

## 2019-09-04 PROBLEM — J18.9 CAP (COMMUNITY ACQUIRED PNEUMONIA): Status: RESOLVED | Noted: 2018-04-06 | Resolved: 2019-09-04

## 2019-09-04 PROCEDURE — 99213 OFFICE O/P EST LOW 20 MIN: CPT | Performed by: INTERNAL MEDICINE

## 2019-09-04 PROCEDURE — 3017F COLORECTAL CA SCREEN DOC REV: CPT | Performed by: INTERNAL MEDICINE

## 2019-09-04 PROCEDURE — G8417 CALC BMI ABV UP PARAM F/U: HCPCS | Performed by: INTERNAL MEDICINE

## 2019-09-04 PROCEDURE — G8926 SPIRO NO PERF OR DOC: HCPCS | Performed by: INTERNAL MEDICINE

## 2019-09-04 PROCEDURE — G8598 ASA/ANTIPLAT THER USED: HCPCS | Performed by: INTERNAL MEDICINE

## 2019-09-04 PROCEDURE — G8427 DOCREV CUR MEDS BY ELIG CLIN: HCPCS | Performed by: INTERNAL MEDICINE

## 2019-09-04 PROCEDURE — 4040F PNEUMOC VAC/ADMIN/RCVD: CPT | Performed by: INTERNAL MEDICINE

## 2019-09-04 PROCEDURE — 1123F ACP DISCUSS/DSCN MKR DOCD: CPT | Performed by: INTERNAL MEDICINE

## 2019-09-04 PROCEDURE — 1036F TOBACCO NON-USER: CPT | Performed by: INTERNAL MEDICINE

## 2019-09-04 PROCEDURE — 3023F SPIROM DOC REV: CPT | Performed by: INTERNAL MEDICINE

## 2019-09-04 ASSESSMENT — ENCOUNTER SYMPTOMS
SINUS PAIN: 0
COUGH: 0
STRIDOR: 0
WHEEZING: 0
SPUTUM PRODUCTION: 0
HEMOPTYSIS: 0

## 2019-12-10 ENCOUNTER — OFFICE VISIT (OUTPATIENT)
Dept: PULMONOLOGY | Age: 74
End: 2019-12-10
Payer: MEDICARE

## 2019-12-10 VITALS
DIASTOLIC BLOOD PRESSURE: 72 MMHG | RESPIRATION RATE: 16 BRPM | OXYGEN SATURATION: 95 % | TEMPERATURE: 98.5 F | HEIGHT: 70 IN | SYSTOLIC BLOOD PRESSURE: 128 MMHG | HEART RATE: 74 BPM | BODY MASS INDEX: 37.37 KG/M2 | WEIGHT: 261 LBS

## 2019-12-10 DIAGNOSIS — J41.0 CHRONIC BRONCHITIS, SIMPLE (HCC): ICD-10-CM

## 2019-12-10 DIAGNOSIS — J31.0 CHRONIC RHINITIS: ICD-10-CM

## 2019-12-10 PROCEDURE — 1036F TOBACCO NON-USER: CPT | Performed by: INTERNAL MEDICINE

## 2019-12-10 PROCEDURE — 3023F SPIROM DOC REV: CPT | Performed by: INTERNAL MEDICINE

## 2019-12-10 PROCEDURE — 1123F ACP DISCUSS/DSCN MKR DOCD: CPT | Performed by: INTERNAL MEDICINE

## 2019-12-10 PROCEDURE — G8926 SPIRO NO PERF OR DOC: HCPCS | Performed by: INTERNAL MEDICINE

## 2019-12-10 PROCEDURE — 4040F PNEUMOC VAC/ADMIN/RCVD: CPT | Performed by: INTERNAL MEDICINE

## 2019-12-10 PROCEDURE — G8484 FLU IMMUNIZE NO ADMIN: HCPCS | Performed by: INTERNAL MEDICINE

## 2019-12-10 PROCEDURE — G8427 DOCREV CUR MEDS BY ELIG CLIN: HCPCS | Performed by: INTERNAL MEDICINE

## 2019-12-10 PROCEDURE — 3017F COLORECTAL CA SCREEN DOC REV: CPT | Performed by: INTERNAL MEDICINE

## 2019-12-10 PROCEDURE — 99213 OFFICE O/P EST LOW 20 MIN: CPT | Performed by: INTERNAL MEDICINE

## 2019-12-10 PROCEDURE — G8598 ASA/ANTIPLAT THER USED: HCPCS | Performed by: INTERNAL MEDICINE

## 2019-12-10 PROCEDURE — G8417 CALC BMI ABV UP PARAM F/U: HCPCS | Performed by: INTERNAL MEDICINE

## 2019-12-10 RX ORDER — POLYETHYLENE GLYCOL 3350 17 G/17G
17 POWDER, FOR SOLUTION ORAL DAILY PRN
COMMUNITY
End: 2022-02-21 | Stop reason: ALTCHOICE

## 2019-12-10 RX ORDER — ROPINIROLE 2 MG/1
2 TABLET, FILM COATED ORAL NIGHTLY
COMMUNITY

## 2019-12-10 ASSESSMENT — ENCOUNTER SYMPTOMS
HEMOPTYSIS: 0
COUGH: 0
SINUS PAIN: 0
STRIDOR: 0
WHEEZING: 0
SPUTUM PRODUCTION: 0

## 2020-02-04 ENCOUNTER — HOSPITAL ENCOUNTER (EMERGENCY)
Age: 75
Discharge: HOME | End: 2020-02-04
Payer: MEDICARE

## 2020-02-04 VITALS
HEART RATE: 98 BPM | TEMPERATURE: 98 F | SYSTOLIC BLOOD PRESSURE: 151 MMHG | DIASTOLIC BLOOD PRESSURE: 86 MMHG | BODY MASS INDEX: 37.45 KG/M2 | RESPIRATION RATE: 17 BRPM | HEIGHT: 70 IN | OXYGEN SATURATION: 98 %

## 2020-02-04 LAB
A/G RATIO: 1.2 (ref 1.1–2.2)
ALBUMIN SERPL-MCNC: 4.3 G/DL (ref 3.4–5)
ALP BLD-CCNC: 60 U/L (ref 40–129)
ALT SERPL-CCNC: 38 U/L (ref 10–40)
ANION GAP SERPL CALCULATED.3IONS-SCNC: 18 MMOL/L (ref 3–16)
AST SERPL-CCNC: 28 U/L (ref 15–37)
BASOPHILS ABSOLUTE: 0.1 K/UL (ref 0–0.2)
BASOPHILS RELATIVE PERCENT: 0.6 %
BILIRUB SERPL-MCNC: 0.6 MG/DL (ref 0–1)
BUN BLDV-MCNC: 23 MG/DL (ref 7–20)
CALCIUM SERPL-MCNC: 9.5 MG/DL (ref 8.3–10.6)
CHLORIDE BLD-SCNC: 95 MMOL/L (ref 99–110)
CO2: 24 MMOL/L (ref 21–32)
CREAT SERPL-MCNC: 1.1 MG/DL (ref 0.8–1.3)
EOSINOPHILS ABSOLUTE: 0.2 K/UL (ref 0–0.6)
EOSINOPHILS RELATIVE PERCENT: 1.7 %
GFR AFRICAN AMERICAN: >60
GFR NON-AFRICAN AMERICAN: >60
GLOBULIN: 3.7 G/DL
GLUCOSE BLD-MCNC: 254 MG/DL (ref 70–99)
HCT VFR BLD CALC: 44.1 % (ref 40.5–52.5)
HEMOGLOBIN: 14.8 G/DL (ref 13.5–17.5)
LIPASE: 20 U/L (ref 13–60)
LYMPHOCYTES ABSOLUTE: 1.3 K/UL (ref 1–5.1)
LYMPHOCYTES RELATIVE PERCENT: 12.6 %
MCH RBC QN AUTO: 31.3 PG (ref 26–34)
MCHC RBC AUTO-ENTMCNC: 33.5 G/DL (ref 31–36)
MCV RBC AUTO: 93.2 FL (ref 80–100)
MONOCYTES ABSOLUTE: 0.9 K/UL (ref 0–1.3)
MONOCYTES RELATIVE PERCENT: 8.4 %
NEUTROPHILS ABSOLUTE: 8.1 K/UL (ref 1.7–7.7)
NEUTROPHILS RELATIVE PERCENT: 76.7 %
PDW BLD-RTO: 13.2 % (ref 12.4–15.4)
PLATELET # BLD: 285 K/UL (ref 135–450)
PMV BLD AUTO: 7.2 FL (ref 5–10.5)
POTASSIUM REFLEX MAGNESIUM: 4.7 MMOL/L (ref 3.5–5.1)
RBC # BLD: 4.73 M/UL (ref 4.2–5.9)
SODIUM BLD-SCNC: 137 MMOL/L (ref 136–145)
TOTAL PROTEIN: 8 G/DL (ref 6.4–8.2)
WBC # BLD: 10.5 K/UL (ref 4–11)

## 2020-02-04 PROCEDURE — 80053 COMPREHEN METABOLIC PANEL: CPT

## 2020-02-04 PROCEDURE — 83690 ASSAY OF LIPASE: CPT

## 2020-02-04 PROCEDURE — 85025 COMPLETE CBC W/AUTO DIFF WBC: CPT

## 2020-02-04 PROCEDURE — 99283 EMERGENCY DEPT VISIT LOW MDM: CPT

## 2020-02-04 PROCEDURE — 6370000000 HC RX 637 (ALT 250 FOR IP): Performed by: PHYSICIAN ASSISTANT

## 2020-02-04 RX ORDER — POLYETHYLENE GLYCOL 3350 17 G/17G
17 POWDER, FOR SOLUTION ORAL DAILY
Qty: 1 BOTTLE | Refills: 0 | Status: SHIPPED | OUTPATIENT
Start: 2020-02-04 | End: 2020-02-11

## 2020-02-04 RX ORDER — MINERAL OIL 100 G/100G
1 OIL RECTAL ONCE
Status: COMPLETED | OUTPATIENT
Start: 2020-02-04 | End: 2020-02-04

## 2020-02-04 RX ORDER — DOCUSATE SODIUM 100 MG/1
100 CAPSULE, LIQUID FILLED ORAL 2 TIMES DAILY PRN
Qty: 60 CAPSULE | Refills: 0 | Status: SHIPPED | OUTPATIENT
Start: 2020-02-04 | End: 2021-03-04

## 2020-02-04 RX ADMIN — MINERAL OIL 1 ENEMA: 118 ENEMA RECTAL at 17:00

## 2020-02-04 ASSESSMENT — ENCOUNTER SYMPTOMS
EYE PAIN: 0
COUGH: 0
VOMITING: 0
DIARRHEA: 0
NAUSEA: 0
CONSTIPATION: 1
ABDOMINAL PAIN: 1
SHORTNESS OF BREATH: 0
BACK PAIN: 0

## 2020-02-04 ASSESSMENT — PAIN DESCRIPTION - PROGRESSION: CLINICAL_PROGRESSION: NOT CHANGED

## 2020-02-04 ASSESSMENT — PAIN - FUNCTIONAL ASSESSMENT: PAIN_FUNCTIONAL_ASSESSMENT: PREVENTS OR INTERFERES SOME ACTIVE ACTIVITIES AND ADLS

## 2020-02-04 ASSESSMENT — PAIN DESCRIPTION - ONSET: ONSET: ON-GOING

## 2020-02-04 ASSESSMENT — PAIN DESCRIPTION - PAIN TYPE: TYPE: ACUTE PAIN

## 2020-02-04 ASSESSMENT — PAIN SCALES - GENERAL: PAINLEVEL_OUTOF10: 8

## 2020-02-04 ASSESSMENT — PAIN DESCRIPTION - FREQUENCY: FREQUENCY: CONTINUOUS

## 2020-02-04 ASSESSMENT — PAIN DESCRIPTION - LOCATION: LOCATION: ABDOMEN

## 2020-02-04 NOTE — ED NOTES
Patient unable to have bowel movement on bedside commode. GRAHAM Rene made aware. Verbal order to proceed with soap suds enema.       Marbin Reagan RN  02/04/20 5782

## 2020-02-04 NOTE — ED NOTES
D/C: Order noted for d/c. Pt confirmed d/c paperwork and three RX have correct name. Discharge and education instructions reviewed with patient. Teach-back successful. Pt verbalized understanding and signed d/c papers. Pt denied questions at this time. No acute distress noted. Patient instructed to follow-up as noted - return to emergency department if symptoms worsen. Patient verbalized understanding. Discharged per EDMD with discharge instructions. Pt discharged to private vehicle. Patient stable upon departure. Thanked patient for choosing [facility] for care. Provider aware of patient pain at time of discharge.        Margot Faulkner RN  02/04/20 4809

## 2020-02-04 NOTE — ED NOTES
Patient yelled out from stretcher, \"I HAVE TO GO NOW. HELP ME.\" Bedside commode brought to patient's room. Patient sitting on commode at this time.       French Nguyen RN  02/04/20 0432

## 2020-02-04 NOTE — ED PROVIDER NOTES
Medications    ALBUTEROL SULFATE HFA (PROVENTIL HFA) 108 (90 BASE) MCG/ACT INHALER    Inhale 2 puffs into the lungs every 4 hours as needed for Wheezing or Shortness of Breath (cough; use with spacing device)    ASPIRIN 81 MG TABLET    Take 81 mg by mouth daily    BUDESONIDE-FORMOTEROL (SYMBICORT) 160-4.5 MCG/ACT AERO    Inhale 2 puffs into the lungs 2 times daily    CARVEDILOL (COREG) 25 MG TABLET    Take 12.5 mg by mouth 2 times daily (with meals)     FLUTICASONE PROPIONATE, NASAL, NA    by Nasal route    FUROSEMIDE (LASIX) 40 MG TABLET    Take 40 mg by mouth daily    GABAPENTIN (NEURONTIN) 800 MG TABLET    Take 800 mg by mouth 4 times daily    INSULIN NPH ISOPHANE & REGULAR (NOVOLIN 70/30 PENFILL SC)    Inject 40 Units into the skin 2 times daily     LIDOCAINE (XYLOCAINE) 5 % OINTMENT    Apply topically as needed for Pain Apply topically as needed. LISINOPRIL (PRINIVIL;ZESTRIL) 10 MG TABLET    Take 10 mg by mouth daily     METFORMIN (GLUCOPHAGE) 1000 MG TABLET    Take 1,000 mg by mouth 2 times daily (with meals)    POLYETHYLENE GLYCOL (GLYCOLAX) PACKET    Take 17 g by mouth daily as needed for Constipation    ROFLUMILAST (DALIRESP) 500 MCG TABLET    Take 1 tablet by mouth daily    ROPINIROLE (REQUIP) 1 MG TABLET    Take 1 mg by mouth 3 times daily    SIMVASTATIN (ZOCOR) 80 MG TABLET    Take 40 mg by mouth nightly    VITAMIN D (CHOLECALCIFEROL) 1000 UNITS TABS TABLET    Take 1,000 Units by mouth daily    WARFARIN (COUMADIN) 5 MG TABLET    Take 7.5 mg every evening EXCEPT 10 mg on Monday, Wednesday, and Friday. Review of Systems:  Review of Systems   Constitutional: Negative for chills, fatigue and fever. Eyes: Negative for pain. Respiratory: Negative for cough and shortness of breath. Cardiovascular: Negative for chest pain. Gastrointestinal: Positive for abdominal pain and constipation. Negative for diarrhea, nausea and vomiting. Genitourinary: Negative for dysuria.    Musculoskeletal: Negative for back pain, neck pain and neck stiffness. Skin: Negative for rash. Neurological: Negative for dizziness and headaches. Psychiatric/Behavioral: Negative for confusion. Positives and Pertinent negatives as per HPI. Except as noted above in the ROS, problem specific ROS was completed and is negative. Physical Exam:  Physical Exam  Vitals signs and nursing note reviewed. Constitutional:       General: He is not in acute distress. Appearance: He is well-developed. He is not diaphoretic. HENT:      Head: Normocephalic and atraumatic. Eyes:      General:         Right eye: No discharge. Left eye: No discharge. Neck:      Musculoskeletal: Normal range of motion and neck supple. Pulmonary:      Effort: No respiratory distress. Breath sounds: No stridor. Abdominal:      Tenderness: There is abdominal tenderness (generalized mild). There is no rebound. Genitourinary:     Rectum: Normal.      Comments: JEWEL with firm brown stool on exam  Musculoskeletal: Normal range of motion. Skin:     General: Skin is warm and dry. Coloration: Skin is not pale. Neurological:      Mental Status: He is alert and oriented to person, place, and time. Comments: No gross facial drooping. Moves all 4 extremities spontaneously.    Psychiatric:         Behavior: Behavior normal.         MEDICAL DECISION MAKING    Vitals:    Vitals:    02/04/20 1437   BP: (!) 151/86   Pulse: 98   Resp: 17   Temp: 98 °F (36.7 °C)   TempSrc: Oral   SpO2: 98%   Height: 5' 10\" (1.778 m)       LABS:  Labs Reviewed   CBC WITH AUTO DIFFERENTIAL - Abnormal; Notable for the following components:       Result Value    Neutrophils Absolute 8.1 (*)     All other components within normal limits    Narrative:     Performed at:  [address] [address]   Phone [phone]   COMPREHENSIVE METABOLIC PANEL W/ REFLEX TO MG FOR LOW K - Abnormal; Notable for the following components:    Chloride 95 (*)     Anion Gap 18 (*)     Glucose 254 (*)     BUN 23 (*)     All other components within normal limits    Narrative:     Performed at:  [facility]  [address] [place] [address]   Phone [phone]   LIPASE    Narrative:     Performed at:  [facility]  [address] [address]   Phone [phone]   URINE RT REFLEX TO CULTURE        Remainder of labs reviewed and werenegative at this time or not returned at the time of this note. RADIOLOGY:   Non-plain film images such as CT, Ultrasound and MRI are read by the radiologist. GRAHAM Blue have directly visualized the radiologic plain film image(s) with the below findings:        Interpretation per the Radiologist below, if available at the time of this note:    No orders to display        No results found. MEDICAL DECISION MAKING / ED COURSE:      PROCEDURES:   Procedures    None    Patient was given:  Medications   mineral oil enema 1 enema (has no administration in time range)       Ddx: constipation, SBO, mesenteric ischemia  Patient seen and examined today for constipation x 3 days in setting of chronic opioid use. Has tried miralax, fleets enema, mg citrate without relief. See HPI for patient presentation. Patient is hemodynamically stable, in no acute distress, nontoxic, afebrile, and without tachycardia, tachypnea, and hypoxia. Physical exam as above. I have reviewed the patient's laboratory results. The patient has normal WBCs, hematocrit and platelets. They have no severe electrolyte abnormality or renal impairment. He has elevated glucose 254 with known h/o DM. Lipase nml. Attempted soap suds enema without stool output. Attempted digital disimpaction multiple times with moderate success. Firm and brown.      Attempted mineral oil warm water enema which patient tolerated well but not [address]  [phone]      ED follow up    [facility] Emergency Department  [address] [facility]  [phone]    If symptoms worsen      DISCHARGE MEDICATIONS:  New Prescriptions    DOCUSATE SODIUM (COLACE) 100 MG CAPSULE    Take 1 capsule by mouth 2 times daily as needed for Constipation    MAGNESIUM CITRATE SOLUTION    Take 296 mLs by mouth once for 1 dose    POLYETHYLENE GLYCOL (MIRALAX) POWDER    Take 17 g by mouth daily for 7 days PRN constipation       DISCONTINUED MEDICATIONS:  Discontinued Medications    MAGNESIUM CITRATE 1.745 GM/30ML SOLUTION    Take 296 mLs by mouth once for 1 dose              (Please note the MDM and HPI sections of this note were completed with a voice recognition program.  Efforts were made to edit the dictations but occasionally words are mis-transcribed.)    Electronically signed, Michael Mac,           Michael Mac  02/04/20 4876

## 2020-02-04 NOTE — ED TRIAGE NOTES
Patient to ED via private vehicle for constipation. Patient had his last BM three days ago. Patient states he took miralax, two suppositories, and magnesium citrate at home for constipation today, with no relief. States abdominal pain with movement. VS as noted, BP elevated, other vitals stable. Patient A&Ox4. Respirations easy and unlabored. Skin warm and dry and appropriate for ethnicity. No acute distress noted at this time.

## 2020-03-18 RX ORDER — ALBUTEROL SULFATE 2.5 MG/3ML
SOLUTION RESPIRATORY (INHALATION)
Qty: 120 VIAL | Refills: 11 | Status: SHIPPED | OUTPATIENT
Start: 2020-03-18 | End: 2021-08-25

## 2020-03-24 ENCOUNTER — VIRTUAL VISIT (OUTPATIENT)
Dept: PULMONOLOGY | Age: 75
End: 2020-03-24
Payer: MEDICARE

## 2020-03-24 PROCEDURE — 99441 PR PHYS/QHP TELEPHONE EVALUATION 5-10 MIN: CPT | Performed by: INTERNAL MEDICINE

## 2020-03-24 NOTE — PROGRESS NOTES
Virtual Visit note:  [phone] (home)    Preferred phone: [phone]      History:  chronic bronchitis  Symbicort , Daliresp. No issues with cost or side effects       Has nebulizer but doing very well and rare usage. Chronic rhinitis    Controlled. Using Flonase . Controlled. Problem List Items Addressed This Visit     Chronic rhinitis     Controlled with Flonase. Chronic bronchitis, simple ([address]     He continues to do great with that Daliresp and Symbicort. No issues with cost or side effect. Because of the COVID-19, I was advised by to have telephone encounter and bill rather than in person visit. Entire telephone time spent was 8 minutes             Current Outpatient Medications   Medication Sig Dispense Refill    albuterol (PROVENTIL) (2.5 MG/3ML) 0.083% nebulizer solution USE 1 VIAL IN NEBULIZER 4 TIMES DAILY 120 vial 11    docusate sodium (COLACE) 100 MG capsule Take 1 capsule by mouth 2 times daily as needed for Constipation 60 capsule 0    magnesium citrate solution Take 296 mLs by mouth once for 1 dose 296 mL 0    FLUTICASONE PROPIONATE, NASAL, NA by Nasal route      lidocaine (XYLOCAINE) 5 % ointment Apply topically as needed for Pain Apply topically as needed.       rOPINIRole (REQUIP) 1 MG tablet Take 1 mg by mouth 3 times daily      polyethylene glycol (GLYCOLAX) packet Take 17 g by mouth daily as needed for Constipation      Roflumilast (DALIRESP) 500 MCG tablet Take 1 tablet by mouth daily 30 tablet 5    simvastatin (ZOCOR) 80 MG tablet Take 40 mg by mouth nightly      furosemide (LASIX) 40 MG tablet Take 40 mg by mouth daily      lisinopril (PRINIVIL;ZESTRIL) 10 MG tablet Take 10 mg by mouth daily       Insulin NPH Isophane & Regular (NOVOLIN 70/30 PENFILL SC) Inject 40 Units into the skin 2 times daily       vitamin D (CHOLECALCIFEROL) 1000 UNITS TABS tablet Take 1,000 Units by mouth daily      gabapentin (NEURONTIN) 800 MG

## 2020-05-02 RX ORDER — PREDNISONE 10 MG/1
TABLET ORAL
Qty: 30 TABLET | Refills: 0 | Status: SHIPPED | OUTPATIENT
Start: 2020-05-02 | End: 2020-05-12

## 2020-05-04 ENCOUNTER — VIRTUAL VISIT (OUTPATIENT)
Dept: PULMONOLOGY | Age: 75
End: 2020-05-04
Payer: MEDICARE

## 2020-05-04 PROBLEM — A41.9 SEVERE SEPSIS (HCC): Status: RESOLVED | Noted: 2018-04-06 | Resolved: 2020-05-04

## 2020-05-04 PROBLEM — R65.20 SEVERE SEPSIS (HCC): Status: RESOLVED | Noted: 2018-04-06 | Resolved: 2020-05-04

## 2020-05-04 PROCEDURE — 3023F SPIROM DOC REV: CPT | Performed by: INTERNAL MEDICINE

## 2020-05-04 PROCEDURE — 99213 OFFICE O/P EST LOW 20 MIN: CPT | Performed by: INTERNAL MEDICINE

## 2020-05-04 PROCEDURE — G8428 CUR MEDS NOT DOCUMENT: HCPCS | Performed by: INTERNAL MEDICINE

## 2020-05-04 PROCEDURE — 4040F PNEUMOC VAC/ADMIN/RCVD: CPT | Performed by: INTERNAL MEDICINE

## 2020-05-04 PROCEDURE — G8926 SPIRO NO PERF OR DOC: HCPCS | Performed by: INTERNAL MEDICINE

## 2020-05-04 PROCEDURE — 1123F ACP DISCUSS/DSCN MKR DOCD: CPT | Performed by: INTERNAL MEDICINE

## 2020-05-04 PROCEDURE — 3017F COLORECTAL CA SCREEN DOC REV: CPT | Performed by: INTERNAL MEDICINE

## 2020-05-04 PROCEDURE — G8417 CALC BMI ABV UP PARAM F/U: HCPCS | Performed by: INTERNAL MEDICINE

## 2020-05-04 PROCEDURE — 1036F TOBACCO NON-USER: CPT | Performed by: INTERNAL MEDICINE

## 2020-05-04 NOTE — ASSESSMENT & PLAN NOTE
Feeling better after prednisone. Continues to use Symbicort. No change. This visit was completed virtually using Doxy. me    Provider: Serenity Steele  Location of patient: Home   Location of provider: Work   Consent: given  Other parties on phone call: wife

## 2020-05-04 NOTE — PROGRESS NOTES
median sternotomy. Mild heterogeneous pulmonary opacity is seen  diffusely bilaterally, left greater than right. No pneumothorax. Cardiac  and mediastinal silhouettes are unchanged. Impression Findings are suggestive of pulmonary edema. Pneumonia is not excluded in the  appropriate clinical setting. Assessment:     ·  chronic bronchitis   · Chronic rhonitis  ·       Plan:      Problem List Items Addressed This Visit     Chronic bronchitis, simple ([address]     Feeling better after prednisone. Continues to use Symbicort. No change. This visit was completed virtually using Doxy. me    Provider: Jesus Young  Location of patient: Home   Location of provider: Work   Consent: given  Other parties on phone call: wife                       This note was transcribed using Dragon Dictation software. Please disregard any translational errors.     aVlentin Ignacio Pulmonary, Sleep and [facility] [phone]

## 2020-05-22 RX ORDER — ALBUTEROL SULFATE 90 UG/1
2 AEROSOL, METERED RESPIRATORY (INHALATION) EVERY 4 HOURS PRN
Qty: 1 INHALER | Refills: 5 | Status: SHIPPED | OUTPATIENT
Start: 2020-05-22 | End: 2020-09-15 | Stop reason: SDUPTHER

## 2020-09-15 ENCOUNTER — OFFICE VISIT (OUTPATIENT)
Dept: PULMONOLOGY | Age: 75
End: 2020-09-15
Payer: MEDICARE

## 2020-09-15 VITALS
BODY MASS INDEX: 36.94 KG/M2 | RESPIRATION RATE: 16 BRPM | WEIGHT: 258 LBS | SYSTOLIC BLOOD PRESSURE: 138 MMHG | OXYGEN SATURATION: 94 % | HEART RATE: 76 BPM | DIASTOLIC BLOOD PRESSURE: 80 MMHG | TEMPERATURE: 98.7 F | HEIGHT: 70 IN

## 2020-09-15 PROCEDURE — G8417 CALC BMI ABV UP PARAM F/U: HCPCS | Performed by: INTERNAL MEDICINE

## 2020-09-15 PROCEDURE — 1123F ACP DISCUSS/DSCN MKR DOCD: CPT | Performed by: INTERNAL MEDICINE

## 2020-09-15 PROCEDURE — 4040F PNEUMOC VAC/ADMIN/RCVD: CPT | Performed by: INTERNAL MEDICINE

## 2020-09-15 PROCEDURE — 3017F COLORECTAL CA SCREEN DOC REV: CPT | Performed by: INTERNAL MEDICINE

## 2020-09-15 PROCEDURE — 99213 OFFICE O/P EST LOW 20 MIN: CPT | Performed by: INTERNAL MEDICINE

## 2020-09-15 PROCEDURE — 3023F SPIROM DOC REV: CPT | Performed by: INTERNAL MEDICINE

## 2020-09-15 PROCEDURE — 1036F TOBACCO NON-USER: CPT | Performed by: INTERNAL MEDICINE

## 2020-09-15 PROCEDURE — G8926 SPIRO NO PERF OR DOC: HCPCS | Performed by: INTERNAL MEDICINE

## 2020-09-15 PROCEDURE — G8427 DOCREV CUR MEDS BY ELIG CLIN: HCPCS | Performed by: INTERNAL MEDICINE

## 2020-09-15 RX ORDER — ALBUTEROL SULFATE 90 UG/1
2 AEROSOL, METERED RESPIRATORY (INHALATION) EVERY 4 HOURS PRN
Qty: 1 INHALER | Refills: 5 | Status: SHIPPED | OUTPATIENT
Start: 2020-09-15 | End: 2021-03-08

## 2020-09-15 RX ORDER — OXYCODONE 27 MG/1
27 CAPSULE, EXTENDED RELEASE ORAL 2 TIMES DAILY
COMMUNITY
End: 2021-03-04

## 2020-09-15 NOTE — LETTER
[facility]   [address] [facility] [facility]  Phone: [phone]  Fax: [phone]     9/15/2020    Dr. Nathanael Maldonado    I have seen your patient, Mehreen Danielson on follow up. Here is the assessment and plan for your patient. Any question, please do not hesitate to call. Problem List Items Addressed This Visit     Chronic rhinitis     Controlled with Flonase  Bid. Chronic bronchitis, simple ([address] - Primary     symptoms currently controlled off Daliresp. Using Symbicort bid.             Relevant Medications    albuterol sulfate HFA (PROVENTIL HFA) 108 (90 Base) MCG/ACT inhaler            Sincerely,    Valentin Ignacio Pulmonary, Sleep and Critical Care  [phone]

## 2020-09-15 NOTE — PROGRESS NOTES
138/80, pulse 76, temperature 98.7 °F (37.1 °C), temperature source Oral, resp. rate 16, height 5' 10\" (1.778 m), weight 258 lb (117 kg), SpO2 94 %.'  Gen: No distress. ENT:   Resp: No accessory muscle use. No crackles. No wheezes. No rhonchi. CV: Regular rate. Regular rhythm. No murmur or rub. No edema. Skin: Warm, dry, normal texture and turgor. No nodule on exposed extremities. M/S: No cyanosis. No clubbing. No joint deformity. Psych: Oriented x 3. No anxiety. Awake. Alert. Intact judgement and insight. Good Mood / Affect. Memory appears in tact        Current Outpatient Medications   Medication Sig Dispense Refill    oxyCODONE ER (XTAMPZA ER) 27 MG C12A Take 27 mg by mouth 2 times daily.  albuterol sulfate HFA (PROVENTIL HFA) 108 (90 Base) MCG/ACT inhaler Inhale 2 puffs into the lungs every 4 hours as needed for Wheezing or Shortness of Breath (cough; use with spacing device) 1 Inhaler 5    albuterol (PROVENTIL) (2.5 MG/3ML) 0.083% nebulizer solution USE 1 VIAL IN NEBULIZER 4 TIMES DAILY 120 vial 11    docusate sodium (COLACE) 100 MG capsule Take 1 capsule by mouth 2 times daily as needed for Constipation 60 capsule 0    FLUTICASONE PROPIONATE, NASAL, NA by Nasal route      lidocaine (XYLOCAINE) 5 % ointment Apply topically as needed for Pain Apply topically as needed.       rOPINIRole (REQUIP) 1 MG tablet Take 1 mg by mouth 3 times daily      polyethylene glycol (GLYCOLAX) packet Take 17 g by mouth daily as needed for Constipation      simvastatin (ZOCOR) 80 MG tablet Take 40 mg by mouth nightly      furosemide (LASIX) 40 MG tablet Take 40 mg by mouth daily      lisinopril (PRINIVIL;ZESTRIL) 10 MG tablet Take 10 mg by mouth daily       Insulin NPH Isophane & Regular (NOVOLIN 70/30 PENFILL SC) Inject 40 Units into the skin 2 times daily       vitamin D (CHOLECALCIFEROL) 1000 UNITS TABS tablet Take 1,000 Units by mouth daily      gabapentin (NEURONTIN) 800 MG tablet Take 800 mg by mouth 4 times daily      aspirin 81 MG tablet Take 81 mg by mouth daily      metFORMIN (GLUCOPHAGE) 1000 MG tablet Take 1,000 mg by mouth 2 times daily (with meals)      budesonide-formoterol (SYMBICORT) 160-4.5 MCG/ACT AERO Inhale 2 puffs into the lungs 2 times daily      warfarin (COUMADIN) 5 MG tablet Take 7.5 mg every evening EXCEPT 10 mg on Monday, Wednesday, and Friday.  carvedilol (COREG) 25 MG tablet Take 12.5 mg by mouth 2 times daily (with meals)       magnesium citrate solution Take 296 mLs by mouth once for 1 dose 296 mL 0     No current facility-administered medications for this visit. Data Reviewed:   CBC and Renal reviewed  Last CBC  Lab Results   Component Value Date    WBC 10.5 02/04/2020    RBC 4.73 02/04/2020    HGB 14.8 02/04/2020    MCV 93.2 02/04/2020     02/04/2020     Last Renal  Lab Results   Component Value Date     02/04/2020    K 4.7 02/04/2020    CL 95 02/04/2020    CO2 24 02/04/2020    CO2 30 04/29/2019    CO2 25 04/14/2019    BUN 23 02/04/2020    CREATININE 1.1 02/04/2020    GLUCOSE 254 02/04/2020    CALCIUM 9.5 02/04/2020       Last ABG  POC Blood Gas: No results found for: POCPH, POCPCO2, POCPO2, POCHCO3, NBEA, SZFN1MTF  No results for input(s): PH, PCO2, PO2, HCO3, BE, O2SAT in the last 72 hours. Assessment:     ·  chronic bronchitis   · Chronic rhonitis  ·       Plan:      Problem List Items Addressed This Visit     Chronic rhinitis     Controlled with Flonase  Bid. Chronic bronchitis, simple ([address] - Primary     symptoms currently controlled off Daliresp. Using Symbicort bid. Relevant Medications    albuterol sulfate HFA (PROVENTIL HFA) 108 (90 Base) MCG/ACT inhaler                This note was transcribed using [id number] [facility]. Please disregard any translational errors.     Valentin Ignacio Pulmonary, Sleep and [facility] [phone]

## 2020-10-07 RX ORDER — PREDNISONE 20 MG/1
20 TABLET ORAL DAILY
Qty: 7 TABLET | Refills: 0 | Status: SHIPPED | OUTPATIENT
Start: 2020-10-07 | End: 2020-10-14

## 2020-11-18 ENCOUNTER — TELEPHONE (OUTPATIENT)
Dept: PULMONOLOGY | Age: 75
End: 2020-11-18

## 2020-11-18 RX ORDER — PREDNISONE 10 MG/1
TABLET ORAL
Qty: 30 TABLET | Refills: 0 | Status: SHIPPED | OUTPATIENT
Start: 2020-11-18 | End: 2020-11-28

## 2020-11-18 NOTE — TELEPHONE ENCOUNTER
LOV: 9/15/2020    Bang calls sob for 3-4 days. Worse with exertion/ walking room to room. No other symptoms including no fever. Complains  SOB  Duration 3-4 days  Cough with sputum production? No   Fever? no  Any other Symptoms? no  Any current treatment tried? Nebulizer/Albuterol   Using inhalers? No. Has Albuterol though. [facility]? [facility]. On file  Pt requesting tampering dosage of prednisone 10 mg if possible always helps.

## 2020-12-21 RX ORDER — PREDNISONE 10 MG/1
TABLET ORAL
Qty: 30 TABLET | Refills: 0 | Status: SHIPPED | OUTPATIENT
Start: 2020-12-21 | End: 2020-12-31

## 2020-12-23 ENCOUNTER — TELEPHONE (OUTPATIENT)
Dept: PULMONOLOGY | Age: 75
End: 2020-12-23

## 2020-12-23 RX ORDER — DOXYCYCLINE HYCLATE 100 MG
100 TABLET ORAL 2 TIMES DAILY
Qty: 14 TABLET | Refills: 0 | Status: SHIPPED | OUTPATIENT
Start: 2020-12-23 | End: 2020-12-30

## 2020-12-23 NOTE — TELEPHONE ENCOUNTER
Genie Rubio called back said that he is not on Warfarin but is taking Eliquis 5 mg BID. He is asking should he still take a half of a pill twice daily?

## 2020-12-23 NOTE — TELEPHONE ENCOUNTER
Patient was given instructions. I had him repeat the instructions back to me, so I knew he understood. He did.

## 2021-01-02 ENCOUNTER — APPOINTMENT (OUTPATIENT)
Dept: GENERAL RADIOLOGY | Age: 76
End: 2021-01-02
Payer: MEDICARE

## 2021-01-02 ENCOUNTER — HOSPITAL ENCOUNTER (EMERGENCY)
Age: 76
Discharge: HOME | End: 2021-01-03
Attending: EMERGENCY MEDICINE
Payer: MEDICARE

## 2021-01-02 DIAGNOSIS — I49.3 PVC (PREMATURE VENTRICULAR CONTRACTION): ICD-10-CM
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
## 2021-01-02 LAB
A/G RATIO: 1.6 (ref 1.1–2.2)
ALBUMIN SERPL-MCNC: 4.1 G/DL (ref 3.4–5)
ALP BLD-CCNC: 55 U/L (ref 40–129)
ALT SERPL-CCNC: 26 U/L (ref 10–40)
ANION GAP SERPL CALCULATED.3IONS-SCNC: 11 MMOL/L (ref 3–16)
AST SERPL-CCNC: 21 U/L (ref 15–37)
BASOPHILS ABSOLUTE: 0.1 K/UL (ref 0–0.2)
BASOPHILS RELATIVE PERCENT: 0.9 %
BILIRUB SERPL-MCNC: 0.4 MG/DL (ref 0–1)
BUN BLDV-MCNC: 26 MG/DL (ref 7–20)
CALCIUM SERPL-MCNC: 9.1 MG/DL (ref 8.3–10.6)
CHLORIDE BLD-SCNC: 96 MMOL/L (ref 99–110)
CO2: 26 MMOL/L (ref 21–32)
CREAT SERPL-MCNC: 1.1 MG/DL (ref 0.8–1.3)
EOSINOPHILS ABSOLUTE: 0.2 K/UL (ref 0–0.6)
EOSINOPHILS RELATIVE PERCENT: 1.5 %
GFR AFRICAN AMERICAN: >60
GFR NON-AFRICAN AMERICAN: >60
GLOBULIN: 2.5 G/DL
GLUCOSE BLD-MCNC: 282 MG/DL (ref 70–99)
HCT VFR BLD CALC: 44.7 % (ref 40.5–52.5)
HEMOGLOBIN: 14.4 G/DL (ref 13.5–17.5)
LYMPHOCYTES ABSOLUTE: 1.8 K/UL (ref 1–5.1)
LYMPHOCYTES RELATIVE PERCENT: 15.6 %
MCH RBC QN AUTO: 30.3 PG (ref 26–34)
MCHC RBC AUTO-ENTMCNC: 32.2 G/DL (ref 31–36)
MCV RBC AUTO: 94 FL (ref 80–100)
MONOCYTES ABSOLUTE: 1.2 K/UL (ref 0–1.3)
MONOCYTES RELATIVE PERCENT: 10.3 %
NEUTROPHILS ABSOLUTE: 8.4 K/UL (ref 1.7–7.7)
NEUTROPHILS RELATIVE PERCENT: 71.7 %
PDW BLD-RTO: 13.2 % (ref 12.4–15.4)
PLATELET # BLD: 273 K/UL (ref 135–450)
PMV BLD AUTO: 7.9 FL (ref 5–10.5)
POTASSIUM REFLEX MAGNESIUM: 4.3 MMOL/L (ref 3.5–5.1)
PRO-BNP: 214 PG/ML (ref 0–449)
RAPID INFLUENZA  B AGN: NEGATIVE
RAPID INFLUENZA A AGN: NEGATIVE
RBC # BLD: 4.75 M/UL (ref 4.2–5.9)
SARS-COV-2, NAAT: NOT DETECTED
SODIUM BLD-SCNC: 133 MMOL/L (ref 136–145)
TOTAL PROTEIN: 6.6 G/DL (ref 6.4–8.2)
TROPONIN: <0.01 NG/ML
WBC # BLD: 11.8 K/UL (ref 4–11)

## 2021-01-02 PROCEDURE — 87804 INFLUENZA ASSAY W/OPTIC: CPT

## 2021-01-02 PROCEDURE — 83880 ASSAY OF NATRIURETIC PEPTIDE: CPT

## 2021-01-02 PROCEDURE — 80053 COMPREHEN METABOLIC PANEL: CPT

## 2021-01-02 PROCEDURE — U0002 COVID-19 LAB TEST NON-CDC: HCPCS

## 2021-01-02 PROCEDURE — 73080 X-RAY EXAM OF ELBOW: CPT

## 2021-01-02 PROCEDURE — 85025 COMPLETE CBC W/AUTO DIFF WBC: CPT

## 2021-01-02 PROCEDURE — 99282 EMERGENCY DEPT VISIT SF MDM: CPT

## 2021-01-02 PROCEDURE — 93005 ELECTROCARDIOGRAM TRACING: CPT | Performed by: STUDENT IN AN ORGANIZED HEALTH CARE EDUCATION/TRAINING PROGRAM

## 2021-01-02 PROCEDURE — 71045 X-RAY EXAM CHEST 1 VIEW: CPT

## 2021-01-02 PROCEDURE — 84484 ASSAY OF TROPONIN QUANT: CPT

## 2021-01-02 ASSESSMENT — PAIN DESCRIPTION - ONSET: ONSET: ON-GOING

## 2021-01-02 ASSESSMENT — PAIN DESCRIPTION - LOCATION: LOCATION: LEG

## 2021-01-02 ASSESSMENT — PAIN SCALES - GENERAL: PAINLEVEL_OUTOF10: 8

## 2021-01-03 VITALS
WEIGHT: 264.55 LBS | HEIGHT: 70 IN | TEMPERATURE: 97.8 F | OXYGEN SATURATION: 94 % | DIASTOLIC BLOOD PRESSURE: 59 MMHG | HEART RATE: 83 BPM | BODY MASS INDEX: 37.87 KG/M2 | RESPIRATION RATE: 16 BRPM | SYSTOLIC BLOOD PRESSURE: 158 MMHG

## 2021-01-03 RX ORDER — PREDNISONE 20 MG/1
60 TABLET ORAL ONCE
Status: DISCONTINUED | OUTPATIENT
Start: 2021-01-03 | End: 2021-01-03 | Stop reason: HOSPADM

## 2021-01-03 RX ORDER — PREDNISONE 50 MG/1
50 TABLET ORAL DAILY
Qty: 5 TABLET | Refills: 0 | Status: SHIPPED | OUTPATIENT
Start: 2021-01-03 | End: 2021-01-08

## 2021-01-03 RX ORDER — DOXYCYCLINE 100 MG/1
100 TABLET ORAL 2 TIMES DAILY
Qty: 20 TABLET | Refills: 0 | Status: SHIPPED | OUTPATIENT
Start: 2021-01-03 | End: 2021-01-13

## 2021-01-03 RX ORDER — DOXYCYCLINE HYCLATE 100 MG
100 TABLET ORAL ONCE
Status: DISCONTINUED | OUTPATIENT
Start: 2021-01-03 | End: 2021-01-03 | Stop reason: HOSPADM

## 2021-01-03 RX ORDER — IPRATROPIUM BROMIDE AND ALBUTEROL SULFATE 2.5; .5 MG/3ML; MG/3ML
1 SOLUTION RESPIRATORY (INHALATION) ONCE
Status: DISCONTINUED | OUTPATIENT
Start: 2021-01-03 | End: 2021-01-03 | Stop reason: HOSPADM

## 2021-01-03 ASSESSMENT — ENCOUNTER SYMPTOMS
BLOOD IN STOOL: 0
APNEA: 0
EYE PAIN: 0
RECTAL PAIN: 0
DIARRHEA: 0
CHEST TIGHTNESS: 1
SHORTNESS OF BREATH: 1
VOMITING: 0
COUGH: 1
STRIDOR: 0
ABDOMINAL PAIN: 0
PHOTOPHOBIA: 0
ABDOMINAL DISTENTION: 0
CHOKING: 0
WHEEZING: 0
COLOR CHANGE: 0
EYE DISCHARGE: 0
EYE REDNESS: 0
NAUSEA: 0
EYE ITCHING: 0
BACK PAIN: 0
ANAL BLEEDING: 0
CONSTIPATION: 0

## 2021-01-03 NOTE — ED NOTES
D/C: Order noted for d/c. Pt confirmed d/c paperwork . Discharge and education instructions reviewed with patient. Teach-back successful. Pt verbalized understanding and signed d/c papers. Pt denied questions at this time. No acute distress noted. Patient instructed to follow-up as noted - return to emergency department if symptoms worsen. Patient verbalized understanding. Discharged per EDMD with discharge instructions. Pt discharged to private vehicle. Patient stable upon departure. Thanked patient for choosing [facility] for care. Provider aware of patient pain at time of discharge.      Kathleen Walker RN  01/03/21 3613

## 2021-01-03 NOTE — ED NOTES
Bed: B-10  Expected date:   Expected time:   Means of arrival:   Comments:  Once bed back; [facility]  01/02/21 4731

## 2021-01-03 NOTE — ED TRIAGE NOTES
Patient came in from home complaining of fall at home. States he is here for symptoms of pneumonia because he fell. Denies dizziness or hitting his head. States he fell because his legs gave out beneath him. History of COPD which causes his shortness of breath. Denies increased shortness of breath.

## 2021-01-04 LAB
EKG ATRIAL RATE: 102 BPM
EKG DIAGNOSIS: NORMAL
EKG P AXIS: 67 DEGREES
EKG P-R INTERVAL: 180 MS
EKG Q-T INTERVAL: 344 MS
EKG QRS DURATION: 92 MS
EKG QTC CALCULATION (BAZETT): 448 MS
EKG R AXIS: 71 DEGREES
EKG T AXIS: 62 DEGREES
EKG VENTRICULAR RATE: 102 BPM

## 2021-01-04 NOTE — ED PROVIDER NOTES
for arthralgias and back pain. Skin: Negative for color change and pallor. Neurological: Negative for dizziness and facial asymmetry. Hematological: Negative for adenopathy. Does not bruise/bleed easily. Psychiatric/Behavioral: Negative for agitation, behavioral problems, confusion and decreased concentration. Except as noted above the remainder of the review of systems was reviewed and negative. PAST MEDICAL HISTORY     Past Medical History:   Diagnosis Date    CAD (coronary artery disease)     CABG in 2009    Chronic back pain     Chronic systolic CHF (congestive heart failure) (HCC)     [facility]     Diabetes mellitus [facility]     Essential hypertension     Paroxysmal atrial fibrillation ([facility]     On Coumadin       SURGICAL HISTORY       Past Surgical History:   Procedure Laterality Date    BACK SURGERY  1995    BACK SURGERY      CARDIAC SURGERY  2007    CERVICAL SPINE SURGERY      CORONARY ARTERY BYPASS GRAFT      FRACTURE SURGERY Left 1988    Shoulder       CURRENT MEDICATIONS       Discharge Medication List as of 1/3/2021 12:28 AM      CONTINUE these medications which have NOT CHANGED    Details   apixaban (ELIQUIS) 5 MG TABS tablet Take 1 tablet by mouth 2 times daily, Disp-60 tablet, R-0Historical Med      oxyCODONE ER (XTAMPZA ER) 27 MG C12A Take 27 mg by mouth 2 times daily. Historical Med      albuterol sulfate HFA (PROVENTIL HFA) 108 (90 Base) MCG/ACT inhaler Inhale 2 puffs into the lungs every 4 hours as needed for Wheezing or Shortness of Breath (cough; use with spacing device), Disp-1 Inhaler,R-5Normal      albuterol (PROVENTIL) (2.5 MG/3ML) 0.083% nebulizer solution USE 1 VIAL IN NEBULIZER 4 TIMES DAILY, Disp-120 vial, R-11Normal      docusate sodium (COLACE) 100 MG capsule Take 1 capsule by mouth 2 times daily as needed for Constipation, Disp-60 capsule, R-0Print      magnesium citrate solution Take 296 mLs by mouth once for 1 dose, Disp-296 mL, R-0Print      FLUTICASONE PROPIONATE, NASAL, NA by Nasal routeHistorical Med      lidocaine (XYLOCAINE) 5 % ointment Apply topically as needed for Pain Apply topically as needed., Topical, PRN, Historical Med      rOPINIRole (REQUIP) 1 MG tablet Take 1 mg by mouth 3 times dailyHistorical Med      polyethylene glycol (GLYCOLAX) packet Take 17 g by mouth daily as needed for ConstipationHistorical Med      simvastatin (ZOCOR) 80 MG tablet Take 40 mg by mouth nightlyHistorical Med      furosemide (LASIX) 40 MG tablet Take 40 mg by mouth dailyHistorical Med      lisinopril (PRINIVIL;ZESTRIL) 10 MG tablet Take 10 mg by mouth daily Historical Med      Insulin NPH Isophane & Regular (NOVOLIN 70/30 PENFILL SC) Inject 40 Units into the skin 2 times daily Historical Med      vitamin D (CHOLECALCIFEROL) 1000 UNITS TABS tablet Take 1,000 Units by mouth dailyHistorical Med      gabapentin (NEURONTIN) 800 MG tablet Take 800 mg by mouth 4 times daily      aspirin 81 MG tablet Take 81 mg by mouth daily      metFORMIN (GLUCOPHAGE) 1000 MG tablet Take 1,000 mg by mouth 2 times daily (with meals)      budesonide-formoterol (SYMBICORT) 160-4.5 MCG/ACT AERO Inhale 2 puffs into the lungs 2 times dailyHistorical Med      carvedilol (COREG) 25 MG tablet Take 12.5 mg by mouth 2 times daily (with meals) Historical Med             ALLERGIES     No known allergies    FAMILY HISTORY        Family History   Problem Relation Age of Onset    Brain Cancer Mother     Heart Attack Father     Heart Attack Brother        SOCIAL HISTORY       Social History     Socioeconomic History    Marital status:      Spouse name: Not on file    Number of children: Not on file    Years of education: Not on file    Highest education level: Not on file   Occupational History    Not on file   Social Needs    Financial resource strain: Not on file    Food insecurity     Worry: Not on file     Inability: Not on file   [employer] needs Medical: Not on file     Non-medical: Not on file   Tobacco Use    Smoking status: Former Smoker     Packs/day: 1.50     Years: 12.00     Pack years: 18.00     Quit date: 3/1/1996     Years since quittin.8    Smokeless tobacco: Never Used   Substance and Sexual Activity    Alcohol use: No    Drug use: No    Sexual activity: Not Currently   Lifestyle    Physical activity     Days per week: Not on file     Minutes per session: Not on file    Stress: Not on file   Relationships    Social connections     Talks on phone: Not on file     Gets together: Not on file     Attends Jewish service: Not on file     Active member of club or organization: Not on file     Attends meetings of clubs or organizations: Not on file     Relationship status: Not on file    Intimate partner violence     Fear of current or ex partner: Not on file     Emotionally abused: Not on file     Physically abused: Not on file     Forced sexual activity: Not on file   Other Topics Concern    Not on file   Social History Narrative    Not on file       PHYSICAL EXAM       ED Triage Vitals   BP Temp Temp Source Pulse Resp SpO2 Height Weight   21   (!) 153/82 97.8 °F (36.6 °C) Oral 92 22 94 % 5' 10\" (1.778 m) 264 lb 8.8 oz (120 kg)       Physical Exam  Vitals signs and nursing note reviewed. Constitutional:       General: He is not in acute distress. Appearance: He is well-developed. He is not diaphoretic. HENT:      Head: Normocephalic and atraumatic. Eyes:      General:         Right eye: No discharge. Left eye: No discharge. Pupils: Pupils are equal, round, and reactive to light. Neck:      Musculoskeletal: Normal range of motion. Thyroid: No thyromegaly. Trachea: No tracheal deviation. Cardiovascular:      Rate and Rhythm: Normal rate and regular rhythm. Heart sounds: No murmur. Pulmonary:      Breath sounds: Wheezing present. No rales. Chest:      Chest wall: No tenderness. Abdominal:      General: There is no distension. Palpations: Abdomen is soft. There is no mass. Tenderness: There is no abdominal tenderness. There is no guarding or rebound. Musculoskeletal: Normal range of motion. General: No tenderness or deformity. Skin:     General: Skin is warm. Coloration: Skin is not pale. Findings: No erythema or rash. Comments: Abrasion left elbow   Neurological:      Mental Status: He is alert. Cranial Nerves: No cranial nerve deficit. Motor: No abnormal muscle tone.       Coordination: Coordination normal.         DIAGNOSTIC RESULTS     EKG: All EKG's are interpreted by the Emergency Department Physician who either signs or Co-signs this chart in the absence of acardiologist.    EKG sinus tachycardia with occasional PVC nonspecific EKG changes    RADIOLOGY:   Non-plain film images such as CT, Ultrasoundand MRI are read by the radiologist. Plain radiographic images are visualized and preliminarily interpreted by the emergency physician with the below findings:    X-ray are reassuring    ED BEDSIDE ULTRASOUND:   Performed by ED Physician - none    LABS:  Labs Reviewed   CBC WITH AUTO DIFFERENTIAL - Abnormal; Notable for the following components:       Result Value    WBC 11.8 (*)     Neutrophils Absolute 8.4 (*)     All other components within normal limits    Narrative:     Performed at:  [facility]  [address] [place], [place] [address]   Phone [phone]   COMPREHENSIVE METABOLIC PANEL W/ REFLEX TO MG FOR LOW K - Abnormal; Notable for the following components:    Sodium 133 (*)     Chloride 96 (*)     Glucose 282 (*)     BUN 26 (*)     All other components within normal limits    Narrative:     Performed at:  [facility]  [address] [place], [place] [address] Phone [phone]   RAPID INFLUENZA A/B ANTIGENS    Narrative:     Performed at:  [facility]  [address] [facility], [place] [address]   Phone [phone]   TROPONIN    Narrative:     Performed at:  [facility]  [address] [facility], [place] [address]   Phone [phone]   BRAIN NATRIURETIC PEPTIDE    Narrative:     Performed at:  [facility]  [address] [facility], [place] [address]   Phone [phone]    Narrative:     Performed at:  [facility]  [address] [facility], [place] [address]   Phone [phone]       All other labs were withinnormal range or not returned as of this dictation. EMERGENCY DEPARTMENT COURSE and DIFFERENTIAL DIAGNOSIS/MDM:     PMH, Surgical Hx, FH, Social Hx reviewed by myself (ETOH usage, Tobacco usage, Drug usage reviewed by myself, no pertinent Hx)- No Pertinent Hx     Old records were reviewed by me    66-year-old concern for COPD exacerbation. Nebs and steroids given with improvement. We'll send home on prednisone and doxycycline. Ambulates with O2 greater than 94%. I estimate there is LOW risk for Sepsis, MI, Stroke, Tamponade, PTX, Toxicity or other life threatening etiology thus I consider the discharge disposition reasonable. The patient is at low risk for mortality based on demographic, history and clinical factors. Given the best available information and clinical assessment, I estimate the risk of hospitalization to be greater than risk of treatment at home. I have explained to the patient that the risk could rapidly change, given precautions for return and instructions. Explained to patient that the risk for mortality is low based on demographic, history and clinical factors. I discussed with patient the results of evaluation in the ED, diagnosis, care, and prognosis.   The plan is to discharge to home.  Patient is in agreement with plan and questions have been answered. I also discussed with patient the reasons which may require a return visit and the importance of follow-up care. The patient is well-appearing, nontoxic, and improved at the time of discharge. Patient agrees to call to arrange follow-up care as directed. Patient understands to return immediately for worsening/change in symptoms. CRITICAL CARE TIME   Total Critical Caretime was 21 minutes, excluding separately reportable procedures. There was a high probability of clinically significant/life threatening deterioration in the patient's condition which required my urgent intervention. PROCEDURES:  Unlessotherwise noted below, none    FINAL IMPRESSION      1. Fall, initial encounter    2.  PVC (premature ventricular contraction)          DISPOSITION/PLAN   DISPOSITION Decision To Discharge 01/03/2021 12:21:08 AM    PATIENT REFERRED TO:  Jolly Bruno  [address] [place] [address]  [address]  [phone]    Call today        DISCHARGE MEDICATIONS:  Discharge Medication List as of 1/3/2021 12:28 AM      START taking these medications    Details   doxycycline monohydrate (ADOXA) 100 MG tablet Take 1 tablet by mouth 2 times daily for 10 days, Disp-20 tablet, R-0Print      predniSONE (DELTASONE) 50 MG tablet Take 1 tablet by mouth daily for 5 days, Disp-5 tablet, R-0Print                (Please note that portions ofthis note were completed with a voice recognition program.  Efforts were made to edit the dictations but occasionally words are mis-transcribed.)    Andrzej Kelly MD(electronically signed)  Attending Emergency Physician        Andrzej Kelly MD  01/03/21 [id number]

## 2021-01-15 ENCOUNTER — TELEPHONE (OUTPATIENT)
Dept: SLEEP MEDICINE | Age: 76
End: 2021-01-15

## 2021-01-15 DIAGNOSIS — R05.9 COUGH: Primary | ICD-10-CM

## 2021-01-15 RX ORDER — PREDNISONE 20 MG/1
40 TABLET ORAL DAILY
Qty: 10 TABLET | Refills: 0 | Status: SHIPPED | OUTPATIENT
Start: 2021-01-15 | End: 2021-01-20

## 2021-01-30 RX ORDER — PREDNISONE 20 MG/1
TABLET ORAL
Qty: 13 TABLET | Refills: 0 | Status: SHIPPED | OUTPATIENT
Start: 2021-01-30 | End: 2021-03-04

## 2021-01-30 NOTE — PROGRESS NOTES
Patient called the answering service complaining of 1 day of exertional shortness of breath. He denies sick contacts and says he recently had his first vaccine against COVID-19. He has been using his nebulizer. He checked his oxygen saturation while we were on the phone. His oxygen saturation was 91%. Will send a 10-day taper of prednisone, however, I have discussed with patient and his wife that he should monitor his oxygen saturation frequently and that if his oxygen saturation is persistently below 90% or if his symptoms worsen he should go to the emergency room. They expressed understanding.

## 2021-02-14 DIAGNOSIS — J41.0 CHRONIC BRONCHITIS, SIMPLE (HCC): Primary | ICD-10-CM

## 2021-02-14 RX ORDER — PREDNISONE 20 MG/1
40 TABLET ORAL DAILY
Qty: 10 TABLET | Refills: 0 | Status: SHIPPED | OUTPATIENT
Start: 2021-02-14 | End: 2021-02-19

## 2021-02-22 ENCOUNTER — TELEPHONE (OUTPATIENT)
Dept: PULMONOLOGY | Age: 76
End: 2021-02-22

## 2021-02-22 DIAGNOSIS — R06.02 SOB (SHORTNESS OF BREATH): Primary | ICD-10-CM

## 2021-02-22 RX ORDER — PREDNISONE 10 MG/1
TABLET ORAL
Qty: 30 TABLET | Refills: 0 | Status: SHIPPED | OUTPATIENT
Start: 2021-02-22 | End: 2021-03-04

## 2021-02-22 NOTE — TELEPHONE ENCOUNTER
He is still on prednisone from his request for prednisone on 2/14. New prescription sent 10 but he needs to complete a chest x-ray. This was also ordered.

## 2021-02-22 NOTE — TELEPHONE ENCOUNTER
Francis Weaver calling stating he is having some SOB with his lowest O2 is 92. When he takes a breathing treatment his O2 goes back up but not for long. No other symptoms. He is requesting Prednisone.   Please advise

## 2021-02-24 ENCOUNTER — HOSPITAL ENCOUNTER (OUTPATIENT)
Dept: GENERAL RADIOLOGY | Age: 76
Discharge: HOME | End: 2021-02-24
Payer: MEDICARE

## 2021-02-24 ENCOUNTER — HOSPITAL ENCOUNTER (OUTPATIENT)
Age: 76
Discharge: HOME | End: 2021-02-24
Payer: MEDICARE

## 2021-02-24 DIAGNOSIS — R06.02 SOB (SHORTNESS OF BREATH): ICD-10-CM

## 2021-02-24 PROCEDURE — 71046 X-RAY EXAM CHEST 2 VIEWS: CPT

## 2021-02-25 ENCOUNTER — TELEPHONE (OUTPATIENT)
Dept: PULMONOLOGY | Age: 76
End: 2021-02-25

## 2021-02-25 DIAGNOSIS — J41.0 CHRONIC BRONCHITIS, SIMPLE (HCC): Primary | ICD-10-CM

## 2021-02-25 NOTE — TELEPHONE ENCOUNTER
Received fax from [place] requesting PA for [employer] and did PA over the Phone   Approved from 2/25/21 - 12/31/21    [id number]  Faxed approval to [place]

## 2021-02-26 ENCOUNTER — TELEPHONE (OUTPATIENT)
Dept: PULMONOLOGY | Age: 76
End: 2021-02-26

## 2021-03-04 ENCOUNTER — OFFICE VISIT (OUTPATIENT)
Dept: PULMONOLOGY | Age: 76
End: 2021-03-04
Payer: MEDICARE

## 2021-03-04 VITALS
BODY MASS INDEX: 36.08 KG/M2 | TEMPERATURE: 97.2 F | WEIGHT: 252 LBS | HEART RATE: 96 BPM | HEIGHT: 70 IN | DIASTOLIC BLOOD PRESSURE: 78 MMHG | OXYGEN SATURATION: 93 % | RESPIRATION RATE: 16 BRPM | SYSTOLIC BLOOD PRESSURE: 128 MMHG

## 2021-03-04 DIAGNOSIS — J41.0 CHRONIC BRONCHITIS, SIMPLE (HCC): ICD-10-CM

## 2021-03-04 DIAGNOSIS — J31.0 CHRONIC RHINITIS: ICD-10-CM

## 2021-03-04 PROBLEM — J18.9 PNEUMONIA: Status: RESOLVED | Noted: 2018-04-06 | Resolved: 2021-03-04

## 2021-03-04 PROCEDURE — 4040F PNEUMOC VAC/ADMIN/RCVD: CPT | Performed by: INTERNAL MEDICINE

## 2021-03-04 PROCEDURE — 3023F SPIROM DOC REV: CPT | Performed by: INTERNAL MEDICINE

## 2021-03-04 PROCEDURE — 3017F COLORECTAL CA SCREEN DOC REV: CPT | Performed by: INTERNAL MEDICINE

## 2021-03-04 PROCEDURE — 99214 OFFICE O/P EST MOD 30 MIN: CPT | Performed by: INTERNAL MEDICINE

## 2021-03-04 PROCEDURE — G8417 CALC BMI ABV UP PARAM F/U: HCPCS | Performed by: INTERNAL MEDICINE

## 2021-03-04 PROCEDURE — G8427 DOCREV CUR MEDS BY ELIG CLIN: HCPCS | Performed by: INTERNAL MEDICINE

## 2021-03-04 PROCEDURE — 1123F ACP DISCUSS/DSCN MKR DOCD: CPT | Performed by: INTERNAL MEDICINE

## 2021-03-04 PROCEDURE — G8926 SPIRO NO PERF OR DOC: HCPCS | Performed by: INTERNAL MEDICINE

## 2021-03-04 PROCEDURE — 1036F TOBACCO NON-USER: CPT | Performed by: INTERNAL MEDICINE

## 2021-03-04 PROCEDURE — G8484 FLU IMMUNIZE NO ADMIN: HCPCS | Performed by: INTERNAL MEDICINE

## 2021-03-04 ASSESSMENT — COPD QUESTIONNAIRES
QUESTION2_CHESTPHLEGM: 3
QUESTION5_HOMEACTIVITIES: 0

## 2021-03-04 NOTE — LETTER
[facility]   [address] [facility] [facility]  Phone: [phone]  Fax: [phone]     3/4/2021    Dr. Carina Juárez    Today had the pleasure to see our mutual patient, Lee Bates. My office note is attached. Please let me know if you have any questions.         Sincerely,    Valentin Ignacio Pulmonary, Sleep and Critical Care  [phone]

## 2021-03-04 NOTE — PROGRESS NOTES
REASON FOR CONSULTATION/CC:    Chief Complaint   Patient presents with    COPD     f/u COPD        Consult at request of   Christopher Villatoro for      PCP: Christopher Villatoro    HISTORY OF PRESENT ILLNESS: Glenis Carter is a 76y.o. year old male with a history of chronic bronchitis  who presents :        chronic bronchitis   He was recently having increaed cough and shortness of breath. He was use Daliresp and very well controlled. With this he stopped Daliresp. Over the last several months, he as had increased symptoms with cough and shortness of breath. He states Daliresp now cost $158 dollars. This has worked well in the past but he forgot this. He kept referring to it as \"the new medications\"     Continues on Symbicort with albuterol        Chronic rhinitis    Well controlled with Flonase                   PAST MEDICAL HISTORY:  Past Medical History:   Diagnosis Date    CAD (coronary artery disease)     CABG in 2009    Chronic back pain     Chronic systolic CHF (congestive heart failure) (HCC)     COPD (chronic obstructive pulmonary disease) ([address]     Diabetes mellitus ([address]     Essential hypertension     Paroxysmal atrial fibrillation ([address]     On Coumadin       PAST SURGICAL HISTORY:  Past Surgical History:   Procedure Laterality Date    BACK SURGERY  1995    BACK SURGERY      CARDIAC SURGERY  2007    CERVICAL SPINE SURGERY      CORONARY ARTERY BYPASS GRAFT      FRACTURE SURGERY Left 1988    Shoulder       FAMILY HISTORY:  family history includes Brain Cancer in his mother; Heart Attack in his brother and father. SOCIAL HISTORY:   reports that he quit smoking about 25 years ago. He has a 18.00 pack-year smoking history. He has never used smokeless tobacco.      ALLERGIES:  Patient is allergic to no known allergies. Objective:   PHYSICAL EXAM:  Blood pressure 128/78, pulse 96, temperature 97.2 °F (36.2 °C), temperature source Infrared, resp.  rate 16, height 5' 10\" (1.778 m), weight 252 lb (114.3 kg), SpO2 93 %.'  Body mass index is 36.16 kg/m². Gen: No distress. Eyes:    ENT:    Neck:    Resp: No accessory muscle use. No crackles. No wheezes. No rhonchi. CV: Regular rate. Regular rhythm. No murmur or rub. No edema. GI:    Skin:    Lymph:    M/S: No cyanosis. No clubbing. Neuro: Moves all four extremities. Psych: Oriented x 3. No anxiety. Awake. Alert. Intact judgement and insight. Data Reviewed:        Assessment:     ·  chronic bronchitis   · Chronic rhonitis  ·       Plan:      Problem List Items Addressed This Visit     Chronic rhinitis     Continues to be controlled on Flonase         Chronic bronchitis, simple ([address]     He continues to be on Symbicort and albuterol as needed. His symptoms have been increased over the last 3 months. He used to be on Daliresp. This worked significantly better than any other inhaled medications that he was on. Secondary to cost, this was stopped back in May. While he initially did well, symptoms having increased over time. Therefore, he was restarted. Is currently complaining about the cost of $158 after insurance at this time. This is not able to be picked up by the [place], reasons for which he is not clear. Therefore, he will restart the Daliresp and we will stop this as soon as able. Consider stopping at follow-up since he did well during the summer months without the medication. This note was transcribed using [id number] [employer]. Please disregard any translational errors.     Valentin Ignacio Pulmonary, Sleep and [phone]

## 2021-03-04 NOTE — ASSESSMENT & PLAN NOTE
He continues to be on Symbicort and albuterol as needed. His symptoms have been increased over the last 3 months. He used to be on Daliresp. This worked significantly better than any other inhaled medications that he was on. Secondary to cost, this was stopped back in May. While he initially did well, symptoms having increased over time. Therefore, he was restarted. Is currently complaining about the cost of $158 after insurance at this time. This is not able to be picked up by the [place], reasons for which he is not clear. Therefore, he will restart the Daliresp and we will stop this as soon as able. Consider stopping at follow-up since he did well during the summer months without the medication.

## 2021-03-16 ENCOUNTER — HOSPITAL ENCOUNTER (EMERGENCY)
Age: 76
Discharge: HOME | End: 2021-03-16
Attending: EMERGENCY MEDICINE
Payer: MEDICARE

## 2021-03-16 ENCOUNTER — APPOINTMENT (OUTPATIENT)
Dept: GENERAL RADIOLOGY | Age: 76
End: 2021-03-16
Payer: MEDICARE

## 2021-03-16 ENCOUNTER — TELEPHONE (OUTPATIENT)
Dept: PULMONOLOGY | Age: 76
End: 2021-03-16

## 2021-03-16 VITALS
OXYGEN SATURATION: 92 % | DIASTOLIC BLOOD PRESSURE: 63 MMHG | TEMPERATURE: 97.8 F | RESPIRATION RATE: 15 BRPM | HEART RATE: 82 BPM | SYSTOLIC BLOOD PRESSURE: 149 MMHG

## 2021-03-16 DIAGNOSIS — I50.9 CONGESTIVE HEART FAILURE, UNSPECIFIED HF CHRONICITY, UNSPECIFIED HEART FAILURE TYPE (HCC): ICD-10-CM

## 2021-03-16 DIAGNOSIS — J44.9 CHRONIC OBSTRUCTIVE PULMONARY DISEASE, UNSPECIFIED COPD TYPE (HCC): ICD-10-CM

## 2021-03-16 DIAGNOSIS — R06.02 SHORTNESS OF BREATH: Primary | ICD-10-CM

## 2021-03-16 LAB
A/G RATIO: 1.2 (ref 1.1–2.2)
ALBUMIN SERPL-MCNC: 3.9 G/DL (ref 3.4–5)
ALP BLD-CCNC: 53 U/L (ref 40–129)
ALT SERPL-CCNC: 17 U/L (ref 10–40)
ANION GAP SERPL CALCULATED.3IONS-SCNC: 10 MMOL/L (ref 3–16)
AST SERPL-CCNC: 17 U/L (ref 15–37)
BASE EXCESS VENOUS: 5.6 MMOL/L
BASOPHILS ABSOLUTE: 0.1 K/UL (ref 0–0.2)
BASOPHILS RELATIVE PERCENT: 1.1 %
BILIRUB SERPL-MCNC: 0.4 MG/DL (ref 0–1)
BUN BLDV-MCNC: 10 MG/DL (ref 7–20)
CALCIUM SERPL-MCNC: 9.1 MG/DL (ref 8.3–10.6)
CARBOXYHEMOGLOBIN: 1.3 %
CHLORIDE BLD-SCNC: 97 MMOL/L (ref 99–110)
CO2: 29 MMOL/L (ref 21–32)
CREAT SERPL-MCNC: 0.9 MG/DL (ref 0.8–1.3)
EOSINOPHILS ABSOLUTE: 0.2 K/UL (ref 0–0.6)
EOSINOPHILS RELATIVE PERCENT: 2.5 %
GFR AFRICAN AMERICAN: >60
GFR NON-AFRICAN AMERICAN: >60
GLOBULIN: 3.2 G/DL
GLUCOSE BLD-MCNC: 263 MG/DL (ref 70–99)
GLUCOSE BLD-MCNC: 327 MG/DL (ref 70–99)
HCO3 VENOUS: 32 MMOL/L (ref 23–29)
HCT VFR BLD CALC: 39.1 % (ref 40.5–52.5)
HEMOGLOBIN: 13.2 G/DL (ref 13.5–17.5)
LYMPHOCYTES ABSOLUTE: 1.3 K/UL (ref 1–5.1)
LYMPHOCYTES RELATIVE PERCENT: 19.2 %
MCH RBC QN AUTO: 30.6 PG (ref 26–34)
MCHC RBC AUTO-ENTMCNC: 33.7 G/DL (ref 31–36)
MCV RBC AUTO: 90.7 FL (ref 80–100)
METHEMOGLOBIN VENOUS: 0.6 %
MONOCYTES ABSOLUTE: 0.6 K/UL (ref 0–1.3)
MONOCYTES RELATIVE PERCENT: 9.1 %
NEUTROPHILS ABSOLUTE: 4.6 K/UL (ref 1.7–7.7)
NEUTROPHILS RELATIVE PERCENT: 68.1 %
O2 CONTENT, VEN: 12 ML/DL
O2 SAT, VEN: 66 %
O2 THERAPY: ABNORMAL
PCO2, VEN: 51.6 MMHG (ref 40–50)
PDW BLD-RTO: 13.6 % (ref 12.4–15.4)
PERFORMED ON: ABNORMAL
PH VENOUS: 7.4 (ref 7.35–7.45)
PLATELET # BLD: 222 K/UL (ref 135–450)
PMV BLD AUTO: 7.2 FL (ref 5–10.5)
PO2, VEN: 35 MMHG
POTASSIUM REFLEX MAGNESIUM: 4.8 MMOL/L (ref 3.5–5.1)
PRO-BNP: 714 PG/ML (ref 0–449)
RBC # BLD: 4.31 M/UL (ref 4.2–5.9)
SODIUM BLD-SCNC: 136 MMOL/L (ref 136–145)
TCO2 CALC VENOUS: 33 MMOL/L
TOTAL PROTEIN: 7.1 G/DL (ref 6.4–8.2)
TROPONIN: <0.01 NG/ML
WBC # BLD: 6.8 K/UL (ref 4–11)

## 2021-03-16 PROCEDURE — 85025 COMPLETE CBC W/AUTO DIFF WBC: CPT

## 2021-03-16 PROCEDURE — 6360000002 HC RX W HCPCS: Performed by: NURSE PRACTITIONER

## 2021-03-16 PROCEDURE — 6370000000 HC RX 637 (ALT 250 FOR IP): Performed by: NURSE PRACTITIONER

## 2021-03-16 PROCEDURE — 83880 ASSAY OF NATRIURETIC PEPTIDE: CPT

## 2021-03-16 PROCEDURE — 93005 ELECTROCARDIOGRAM TRACING: CPT | Performed by: NURSE PRACTITIONER

## 2021-03-16 PROCEDURE — 94761 N-INVAS EAR/PLS OXIMETRY MLT: CPT

## 2021-03-16 PROCEDURE — 94640 AIRWAY INHALATION TREATMENT: CPT

## 2021-03-16 PROCEDURE — 82803 BLOOD GASES ANY COMBINATION: CPT

## 2021-03-16 PROCEDURE — 96374 THER/PROPH/DIAG INJ IV PUSH: CPT

## 2021-03-16 PROCEDURE — 84484 ASSAY OF TROPONIN QUANT: CPT

## 2021-03-16 PROCEDURE — 80053 COMPREHEN METABOLIC PANEL: CPT

## 2021-03-16 PROCEDURE — 36415 COLL VENOUS BLD VENIPUNCTURE: CPT

## 2021-03-16 PROCEDURE — 71045 X-RAY EXAM CHEST 1 VIEW: CPT

## 2021-03-16 PROCEDURE — 99283 EMERGENCY DEPT VISIT LOW MDM: CPT

## 2021-03-16 PROCEDURE — 96375 TX/PRO/DX INJ NEW DRUG ADDON: CPT

## 2021-03-16 RX ORDER — IPRATROPIUM BROMIDE AND ALBUTEROL SULFATE 2.5; .5 MG/3ML; MG/3ML
1 SOLUTION RESPIRATORY (INHALATION) ONCE
Status: COMPLETED | OUTPATIENT
Start: 2021-03-16 | End: 2021-03-16

## 2021-03-16 RX ORDER — FUROSEMIDE 10 MG/ML
60 INJECTION INTRAMUSCULAR; INTRAVENOUS ONCE
Status: COMPLETED | OUTPATIENT
Start: 2021-03-16 | End: 2021-03-16

## 2021-03-16 RX ORDER — ALBUTEROL SULFATE 2.5 MG/3ML
5 SOLUTION RESPIRATORY (INHALATION) ONCE
Status: COMPLETED | OUTPATIENT
Start: 2021-03-16 | End: 2021-03-16

## 2021-03-16 RX ORDER — ROPINIROLE 1 MG/1
1 TABLET, FILM COATED ORAL ONCE
Status: COMPLETED | OUTPATIENT
Start: 2021-03-16 | End: 2021-03-16

## 2021-03-16 RX ORDER — METHYLPREDNISOLONE SODIUM SUCCINATE 125 MG/2ML
62.5 INJECTION, POWDER, LYOPHILIZED, FOR SOLUTION INTRAMUSCULAR; INTRAVENOUS ONCE
Status: COMPLETED | OUTPATIENT
Start: 2021-03-16 | End: 2021-03-16

## 2021-03-16 RX ADMIN — IPRATROPIUM BROMIDE AND ALBUTEROL SULFATE 1 AMPULE: .5; 3 SOLUTION RESPIRATORY (INHALATION) at 20:37

## 2021-03-16 RX ADMIN — METHYLPREDNISOLONE SODIUM SUCCINATE 62.5 MG: 125 INJECTION, POWDER, FOR SOLUTION INTRAMUSCULAR; INTRAVENOUS at 19:53

## 2021-03-16 RX ADMIN — ALBUTEROL SULFATE 5 MG: 2.5 SOLUTION RESPIRATORY (INHALATION) at 20:36

## 2021-03-16 RX ADMIN — FUROSEMIDE 60 MG: 10 INJECTION, SOLUTION INTRAMUSCULAR; INTRAVENOUS at 22:16

## 2021-03-16 RX ADMIN — ROPINIROLE HYDROCHLORIDE 1 MG: 1 TABLET, FILM COATED ORAL at 21:01

## 2021-03-16 ASSESSMENT — ENCOUNTER SYMPTOMS
DIARRHEA: 0
NAUSEA: 0
RECTAL PAIN: 0
SHORTNESS OF BREATH: 1
VOMITING: 0
ABDOMINAL PAIN: 0
CONSTIPATION: 0
EYE PAIN: 0
CHOKING: 0
CHEST TIGHTNESS: 0
ABDOMINAL DISTENTION: 0
BACK PAIN: 0
EYE DISCHARGE: 0
STRIDOR: 0
COLOR CHANGE: 0
ANAL BLEEDING: 0
EYE REDNESS: 0
COUGH: 1
PHOTOPHOBIA: 0
BLOOD IN STOOL: 0
APNEA: 0
EYE ITCHING: 0
WHEEZING: 0

## 2021-03-16 NOTE — TELEPHONE ENCOUNTER
Called with complains of shortness of breath and sats of 80%. I advised he needs o2 and needs to go to the ER to be assessed since after hours. He will go after wife gets home.

## 2021-03-16 NOTE — ED NOTES
Unable to obtain EKG at this time due to significant power line interference. Will complete when pt is transferred to bed.       Casi Meier  03/16/21 [id number]

## 2021-03-16 NOTE — ED PROVIDER NOTES
and headaches. Hematological: Negative for adenopathy. Psychiatric/Behavioral: Negative for confusion. All other systems reviewed and are negative. Positives and Pertinent negatives as per HPI. Except as noted above in the ROS, all other systems were reviewed and negative.        PAST MEDICAL HISTORY     Past Medical History:   Diagnosis Date    CAD (coronary artery disease)     CABG in 2009    Chronic back pain     Chronic systolic CHF (congestive heart failure) (HCC)     COPD (chronic obstructive pulmonary disease) (HCC)     Diabetes mellitus ([facility]     Essential hypertension     Paroxysmal atrial fibrillation ([facility]     On Coumadin         SURGICAL HISTORY     Past Surgical History:   Procedure Laterality Date    BACK SURGERY  1995    BACK SURGERY      CARDIAC SURGERY  2007    CERVICAL SPINE SURGERY      CORONARY ARTERY BYPASS GRAFT      FRACTURE SURGERY Left 1988    Shoulder         CURRENTMEDICATIONS       Previous Medications    ALBUTEROL (PROVENTIL) (2.5 MG/3ML) 0.083% NEBULIZER SOLUTION    USE 1 VIAL IN NEBULIZER 4 TIMES DAILY    ALBUTEROL SULFATE  (90 BASE) MCG/ACT INHALER    Inhale 2 puffs into the lungs every 4 hours as needed for Wheezing    APIXABAN (ELIQUIS) 5 MG TABS TABLET    Take 1 tablet by mouth 2 times daily    ASPIRIN 81 MG TABLET    Take 81 mg by mouth daily    BUDESONIDE-FORMOTEROL (SYMBICORT) 160-4.5 MCG/ACT AERO    Inhale 2 puffs into the lungs 2 times daily    CARVEDILOL (COREG) 25 MG TABLET    Take 12.5 mg by mouth 2 times daily (with meals)     FEXOFENADINE HCL (MUCINEX ALLERGY PO)    Take by mouth 2 times daily    FLUTICASONE PROPIONATE, NASAL, NA    by Nasal route    FUROSEMIDE (LASIX) 40 MG TABLET    Take 40 mg by mouth daily    GABAPENTIN (NEURONTIN) 800 MG TABLET    Take 800 mg by mouth 4 times daily    INSULIN NPH ISOPHANE & REGULAR (NOVOLIN 70/30 PENFILL SC)    Inject 45 Units into the skin 2 times daily     LIDOCAINE (XYLOCAINE) 5 % OINTMENT    Apply topically as needed for Pain Apply topically as needed. LISINOPRIL (PRINIVIL;ZESTRIL) 10 MG TABLET    Take 10 mg by mouth daily     MAGNESIUM CITRATE SOLUTION    Take 296 mLs by mouth once for 1 dose    METFORMIN (GLUCOPHAGE) 1000 MG TABLET    Take 1,000 mg by mouth 2 times daily (with meals)    POLYETHYLENE GLYCOL (GLYCOLAX) PACKET    Take 17 g by mouth daily as needed for Constipation    ROFLUMILAST (DALIRESP) 500 MCG TABLET    Take 1 tablet by mouth daily    ROPINIROLE (REQUIP) 1 MG TABLET    Take 1 mg by mouth 3 times daily    SIMVASTATIN (ZOCOR) 80 MG TABLET    Take 40 mg by mouth nightly    VITAMIN D (CHOLECALCIFEROL) 1000 UNITS TABS TABLET    Take 1,000 Units by mouth daily         ALLERGIES     No known allergies    FAMILYHISTORY       Family History   Problem Relation Age of Onset    Brain Cancer Mother     Heart Attack Father     Heart Attack Brother           SOCIAL HISTORY       Social History     Tobacco Use    Smoking status: Former Smoker     Packs/day: 1.50     Years: 12.00     Pack years: 18.00     Quit date: 3/1/1996     Years since quittin.0    Smokeless tobacco: Never Used   Substance Use Topics    Alcohol use: No    Drug use: No       SCREENINGS             PHYSICAL EXAM    (up to 7 for level 4, 8 or more for level 5)     ED Triage Vitals [21 1825]   BP Temp Temp Source Pulse Resp SpO2 Height Weight   (!) 189/97 97.8 °F (36.6 °C) Temporal 96 18 95 % -- --       Physical Exam  Vitals signs and nursing note reviewed. Constitutional:       General: He is not in acute distress. Appearance: Normal appearance. He is well-developed. He is not toxic-appearing. HENT:      Head: Normocephalic and atraumatic. Eyes:      General: No scleral icterus. Conjunctiva/sclera: Conjunctivae normal.   Neck:      Musculoskeletal: Normal range of motion and neck supple. Vascular: No JVD. Cardiovascular:      Rate and Rhythm: Normal rate and regular rhythm.       Heart sounds: Normal heart sounds. Comments: Leg edema R>L  Pulmonary:      Effort: Pulmonary effort is normal. No respiratory distress. Breath sounds: Wheezing and rales present. Comments: Patient ambulated from bed to wheelchair, and his oxygen saturation stayed > 91%. Abdominal:      General: There is no distension. Palpations: Abdomen is soft. Abdomen is not rigid. Tenderness: There is no abdominal tenderness. Musculoskeletal: Normal range of motion. Skin:     General: Skin is warm and dry. Findings: No rash. Neurological:      General: No focal deficit present. Mental Status: He is alert and oriented to person, place, and time.    Psychiatric:         Mood and Affect: Mood normal.         DIAGNOSTIC RESULTS   LABS:    Labs Reviewed   CBC WITH AUTO DIFFERENTIAL - Abnormal; Notable for the following components:       Result Value    Hemoglobin 13.2 (*)     Hematocrit 39.1 (*)     All other components within normal limits    Narrative:     Performed at:  [address] [facility] [address]   Phone [phone]   COMPREHENSIVE METABOLIC PANEL W/ REFLEX TO MG FOR LOW K - Abnormal; Notable for the following components:    Chloride 97 (*)     Glucose 327 (*)     All other components within normal limits    Narrative:     Performed at:  [address] [address]   Phone [phone]   BLOOD GAS, VENOUS - Abnormal; Notable for the following components:    pCO2, Fede 51.6 (*)     HCO3, Venous 32 (*)     All other components within normal limits    Narrative:     Performed at:  [address] [facility] [address]   Phone [phone]   BRAIN NATRIURETIC PEPTIDE - Abnormal; Notable for the following components:    Pro- (*)     All other components within normal limits    Narrative:     Performed at:  [address] [facility]  [address] [facility], [place] [address]   Phone [phone]   POCT GLUCOSE - Abnormal; Notable for the following components:    POC Glucose 263 (*)     All other components within normal limits    Narrative:     Performed at:  [facility]  [address] [place] [address]   Phone [phone]   TROPONIN    Narrative:     Performed at:  [facility]  [address] [facility], [place] [address]   Phone [phone]       All other labs were within normal range or not returned as of this dictation. EKG: All EKG's are interpreted by the Emergency Department Physician in the absence of a cardiologist.  Please see their note for interpretation of EKG. RADIOLOGY:   Non-plain film images such as CT, Ultrasound and MRI are read by the radiologist. Plain radiographic images are visualized and preliminarily interpreted by the ED Provider with the below findings:        Interpretation per the Radiologist below, if available at the time of this note:    XR CHEST PORTABLE   Final Result   1. Minimal interstitial edema. Xr Chest Portable    Result Date: 3/16/2021  EXAMINATION: ONE XRAY VIEW OF THE CHEST 3/16/2021 7:00 pm COMPARISON: None. HISTORY: ORDERING SYSTEM PROVIDED HISTORY: sob TECHNOLOGIST PROVIDED HISTORY: Reason for exam:->sob Reason for Exam: Shortness of Breath Acuity: Acute Type of Exam: Initial FINDINGS: No lines or tubes. Normal cardiomediastinal silhouette. The lungs are clear without focal consolidation or pleural effusion. No suspicious pulmonary nodules. Minimal interstitial edema is present. No pneumothorax. No acute osseous abnormality. Status post sternotomy. 1. Minimal interstitial edema.            PROCEDURES   Unless otherwise noted below, none     Procedures    CRITICAL CARE TIME   N/A    CONSULTS:  None      EMERGENCY DEPARTMENT COURSE and DIFFERENTIAL DIAGNOSIS/MDM: Vitals:    Vitals:    03/16/21 2043 03/16/21 2131 03/16/21 2147 03/16/21 2202   BP:  (!) 168/69 (!) 159/66 (!) 149/63   Pulse:  89 85 82   Resp: 14 19 19 15   Temp:       TempSrc:       SpO2: 100% 93% 93% 92%       Patient was given the following medications:  Medications   methylPREDNISolone sodium (SOLU-MEDROL) injection 62.5 mg (62.5 mg Intravenous Given 3/16/21 1953)   ipratropium-albuterol (DUONEB) nebulizer solution 1 ampule (1 ampule Inhalation Given 3/16/21 2037)   albuterol (PROVENTIL) nebulizer solution 5 mg (5 mg Nebulization Given 3/16/21 2036)   rOPINIRole (REQUIP) tablet 1 mg (1 mg Oral Given 3/16/21 2101)   furosemide (LASIX) injection 60 mg (60 mg Intravenous Given 3/16/21 2216)           Differential Diagnosis: Acute Coronary Syndrome, Congestive Heart Failure, Myocardial Infarction, Pulmonary Embolus, Pneumonia, Pneumothorax, other    Patient seen and examined today for SOB. See HPI for patient presentation. Patient is hemodynamically stable, nontoxic, afebrile, and without tachycardia, tachypnea, and hypoxia. Physical exam as above. 76year-old lying in bed in no acute distress. He is not hypoxic on arrival.  He was wheezy with some scattered rales. Cardiac exam normal.  VBG shows normal pH. CBC and chemistry unremarkable. Troponin negative.  and CXR shows interstitial edema. Emergency department course included breathing treatments and steroids as well as 60 mg of Lasix IV. Patient is not hypoxic. He is in no respiratory distress. He is not tachycardic and I have no suspicion for PE. He has no chest pain or tightness and I have no suspicion for ACS. He is not taken his Lasix in 2 weeks I advised him he should probably start taking it more frequently. I advised him to follow-up with his pulmonologist tomorrow. Discharged home in stable condition with strict return precautions.   At this time, the evidence for any other entities in the differential is insufficient to DISCONTINUED MEDICATIONS:  Discontinued Medications    No medications on file              (Please note that portions of this note were completed with a voice recognition program.  Efforts were made to edit the dictations but occasionally words are mis-transcribed.)    FLAVIO Collins Se, CNP (electronically signed)           FLAVIO Collins Se, CNP  03/16/21 4791

## 2021-03-17 ENCOUNTER — TELEPHONE (OUTPATIENT)
Dept: PULMONOLOGY | Age: 76
End: 2021-03-17

## 2021-03-17 ENCOUNTER — CARE COORDINATION (OUTPATIENT)
Dept: CARE COORDINATION | Age: 76
End: 2021-03-17

## 2021-03-17 LAB
EKG ATRIAL RATE: 92 BPM
EKG DIAGNOSIS: NORMAL
EKG P AXIS: 36 DEGREES
EKG P-R INTERVAL: 174 MS
EKG Q-T INTERVAL: 356 MS
EKG QRS DURATION: 96 MS
EKG QTC CALCULATION (BAZETT): 440 MS
EKG R AXIS: 38 DEGREES
EKG T AXIS: 46 DEGREES
EKG VENTRICULAR RATE: 92 BPM

## 2021-03-17 PROCEDURE — 93010 ELECTROCARDIOGRAM REPORT: CPT | Performed by: INTERNAL MEDICINE

## 2021-03-17 NOTE — TELEPHONE ENCOUNTER
----- Message from Laure Whipple MD sent at 3/17/2021 10:16 AM EDT -----      ----- Message -----  From: Joan Reynoso MD  Sent: 3/16/2021  10:42 PM EDT  To: Laure Whipple MD

## 2021-03-17 NOTE — CARE COORDINATION
symptoms and risk factors. Pt reports he is feeling better today. He stated he is no longer SOB, but continues to cough. He reports coughing up thick, yellow phlegm, which is baseline for him in the mornings while drinking coffee. ACM encouraged pt to call his PCP if the color changes. Pt reports he will f/u with his PCP and his pulmonologist today. He reports he is checking his pulse ox on a regular basis, but he has not checked it today. Pt stated the swelling in his legs has started to go down, but it typically takes 3-4 days for the swelling to go away completely. He reports he took his 40mg of Lasix this morning and he will resume taking this daily as ordered. ACM asked pt if he checks his weight on a regular basis and he stated he does not. ACM encouraged pt to start doing this on a daily basis and keeping a log of his weights. ACM educated pt on calling his PCP if he notices weight gain from day to day as this could be related to holding onto excess water. ACM reviewed s/s of when to return to the ED including any chest pain/pressure, any SOB or increase in difficulty breathing, any fainting episodes. Pt verbalized understanding. Pt reports he checks his BS daily and that it was \"in the 400's\" this morning. He stated it's usually in the lower 200 range. He stated he took his insulin, will recheck it today and f/u with his PCP today as well. Pt reports he is trying to change his eating habits and his wife is cooking low Na and lower sugar. Pt encouraged to call with any questions/concerns and he agrees with this plan.

## 2021-03-17 NOTE — ED PROVIDER NOTES
[id number]      Pt Name: Baldemar Godfrey  MRN: [id number]  [place] 1945  Date of evaluation: 3/16/2021  Provider: Alexander Walters MD    CHIEF COMPLAINT       Chief Complaint   Patient presents with    Shortness of Breath     Sent by PCP for low O2, 95% on RA upon arrival       [facility]    Baldemar Godfrey is a 76 y.o. male who presents to the emergency department with SOB. To the patient endorses acute on chronic shortness of breath for the last few days. States his pulmonologist wanted him to come to the emergency room because his O2 was in the low 80s. He does have a productive cough. Denies chest pain or tightness. No fevers or chills. Last echocardiogram showed EF of 40 to 45%. Is supposed be taking Lasix every other day. States he has not taken in over 2 weeks. Positive for bilateral pitting edema. No other associated symptoms. No fevers or chills. Nursing Notes were reviewed. Including nursing noted for FM, Surgical History, Past Medical History, Social History, vitals, and allergies; agree with all. REVIEW OF SYSTEMS       Review of Systems   Constitutional: Positive for fatigue. Negative for fever and unexpected weight change. HENT: Negative for congestion, dental problem, drooling, ear discharge and ear pain. Eyes: Negative for photophobia, pain, discharge, redness, itching and visual disturbance. Respiratory: Positive for cough and shortness of breath. Negative for apnea, choking, chest tightness, wheezing and stridor. Cardiovascular: Positive for palpitations. Negative for chest pain and leg swelling. Gastrointestinal: Negative for abdominal distention, abdominal pain, anal bleeding, blood in stool, constipation, diarrhea, nausea, rectal pain and vomiting. Endocrine: Negative for cold intolerance and heat intolerance. Genitourinary: Negative for decreased urine volume and urgency. Musculoskeletal: Negative for arthralgias and back pain. Skin: Negative for color change and pallor. Neurological: Negative for dizziness and facial asymmetry. Hematological: Negative for adenopathy. Does not bruise/bleed easily. Psychiatric/Behavioral: Negative for agitation, behavioral problems, confusion and decreased concentration. Except as noted above the remainder of the review of systems was reviewed and negative.      PAST MEDICAL HISTORY     Past Medical History:   Diagnosis Date    CAD (coronary artery disease)     CABG in 2009    Chronic back pain     Chronic systolic CHF (congestive heart failure) (HCC)     COPD (chronic obstructive pulmonary disease) ([facility] [address]     Diabetes mellitus ([facility] [address]     Essential hypertension     Paroxysmal atrial fibrillation ([address]     On Coumadin       SURGICAL HISTORY       Past Surgical History:   Procedure Laterality Date    BACK SURGERY  1995    BACK SURGERY      CARDIAC SURGERY  2007    CERVICAL SPINE SURGERY      CORONARY ARTERY BYPASS GRAFT      FRACTURE SURGERY Left 1988    Shoulder       CURRENT MEDICATIONS       Discharge Medication List as of 3/16/2021 10:22 PM      CONTINUE these medications which have NOT CHANGED    Details   albuterol sulfate  (90 Base) MCG/ACT inhaler Inhale 2 puffs into the lungs every 4 hours as needed for Wheezing, Disp-1 Inhaler, R-11Normal      Fexofenadine HCl (MUCINEX ALLERGY PO) Take by mouth 2 times dailyHistorical Med      Roflumilast (DALIRESP) 500 MCG tablet Take 1 tablet by mouth daily, Disp-30 tablet, R-11Normal      apixaban (ELIQUIS) 5 MG TABS tablet Take 1 tablet by mouth 2 times daily, Disp-60 tablet, R-0Historical Med      albuterol (PROVENTIL) (2.5 MG/3ML) 0.083% nebulizer solution USE 1 VIAL IN NEBULIZER 4 TIMES DAILY, Disp-120 vial, R-11Normal      magnesium citrate solution Take 296 mLs by mouth once for 1 dose, Disp-296 mL, R-0Print      FLUTICASONE PROPIONATE, NASAL, NA by Nasal routeHistorical Med      lidocaine (XYLOCAINE) 5 % ointment Apply topically as needed for Pain Apply topically as needed., Topical, PRN, Historical Med      rOPINIRole (REQUIP) 1 MG tablet Take 1 mg by mouth 3 times dailyHistorical Med      polyethylene glycol (GLYCOLAX) packet Take 17 g by mouth daily as needed for ConstipationHistorical Med      simvastatin (ZOCOR) 80 MG tablet Take 40 mg by mouth nightlyHistorical Med      furosemide (LASIX) 40 MG tablet Take 40 mg by mouth dailyHistorical Med      lisinopril (PRINIVIL;ZESTRIL) 10 MG tablet Take 10 mg by mouth daily Historical Med      Insulin NPH Isophane & Regular (NOVOLIN 70/30 PENFILL SC) Inject 45 Units into the skin 2 times daily Historical Med      vitamin D (CHOLECALCIFEROL) 1000 UNITS TABS tablet Take 1,000 Units by mouth dailyHistorical Med      gabapentin (NEURONTIN) 800 MG tablet Take 800 mg by mouth 4 times daily      aspirin 81 MG tablet Take 81 mg by mouth daily      metFORMIN (GLUCOPHAGE) 1000 MG tablet Take 1,000 mg by mouth 2 times daily (with meals)      budesonide-formoterol (SYMBICORT) 160-4.5 MCG/ACT AERO Inhale 2 puffs into the lungs 2 times dailyHistorical Med      carvedilol (COREG) 25 MG tablet Take 12.5 mg by mouth 2 times daily (with meals) Historical Med             ALLERGIES     No known allergies    FAMILY HISTORY        Family History   Problem Relation Age of Onset    Brain Cancer Mother     Heart Attack Father     Heart Attack Brother        SOCIAL HISTORY       Social History     Socioeconomic History    Marital status:      Spouse name: None    Number of children: None    Years of education: None    Highest education level: None   Occupational History    None   Social Needs    Financial resource strain: None    Food insecurity     Worry: None     Inability: None    Transportation needs     Medical: None     Non-medical: None   Tobacco Use    Smoking status: Former Smoker     Packs/day: 1.50     Years: 12.00 Pack years: 18.00     Quit date: 3/1/1996     Years since quittin.0    Smokeless tobacco: Never Used   Substance and Sexual Activity    Alcohol use: No    Drug use: No    Sexual activity: Not Currently   Lifestyle    Physical activity     Days per week: None     Minutes per session: None    Stress: None   Relationships    Social connections     Talks on phone: None     Gets together: None     Attends [employer] service: None     Active member of club or organization: None     Attends meetings of clubs or organizations: None     Relationship status: None    Intimate partner violence     Fear of current or ex partner: None     Emotionally abused: None     Physically abused: None     Forced sexual activity: None   Other Topics Concern    None   Social History Narrative    None       PHYSICAL EXAM       ED Triage Vitals [21 1825]   BP Temp Temp Source Pulse Resp SpO2 Height Weight   (!) 189/97 97.8 °F (36.6 °C) Temporal 96 18 95 % -- --       Physical Exam  Vitals signs and nursing note reviewed. Constitutional:       General: He is not in acute distress. Appearance: He is well-developed. He is ill-appearing. He is not diaphoretic. HENT:      Head: Normocephalic and atraumatic. Eyes:      General:         Right eye: No discharge. Left eye: No discharge. Pupils: Pupils are equal, round, and reactive to light. Neck:      Musculoskeletal: Normal range of motion. Thyroid: No thyromegaly. Trachea: No tracheal deviation. Cardiovascular:      Rate and Rhythm: Normal rate and regular rhythm. Heart sounds: No murmur. Pulmonary:      Breath sounds: Wheezing and rales present. Chest:      Chest wall: No tenderness. Abdominal:      General: There is no distension. Palpations: Abdomen is soft. There is no mass. Tenderness: There is no abdominal tenderness. There is no guarding or rebound. Musculoskeletal: Normal range of motion.          General: No tenderness or deformity. Right lower leg: Edema present. Left lower leg: Edema present. Skin:     General: Skin is warm. Coloration: Skin is not pale. Findings: No erythema or rash. Neurological:      Mental Status: He is alert. Cranial Nerves: No cranial nerve deficit. Motor: No abnormal muscle tone. Coordination: Coordination normal.         DIAGNOSTIC RESULTS     RADIOLOGY:   Non-plain film images such as CT, Ultrasoundand MRI are read by the radiologist. Plain radiographic images are visualized and preliminarily interpreted by the emergency physician with the below findings:    Impression   1.  Minimal interstitial edema.         ED BEDSIDE ULTRASOUND:   Performed by ED Physician - none    LABS:  Labs Reviewed   CBC WITH AUTO DIFFERENTIAL - Abnormal; Notable for the following components:       Result Value    Hemoglobin 13.2 (*)     Hematocrit 39.1 (*)     All other components within normal limits    Narrative:     Performed at:  [address] [place] [address]   Phone [phone]   COMPREHENSIVE METABOLIC PANEL W/ REFLEX TO MG FOR LOW K - Abnormal; Notable for the following components:    Chloride 97 (*)     Glucose 327 (*)     All other components within normal limits    Narrative:     Performed at:  [address] [place] [address]   Phone [phone]   BLOOD GAS, VENOUS - Abnormal; Notable for the following components:    pCO2, Fede 51.6 (*)     HCO3, Venous 32 (*)     All other components within normal limits    Narrative:     Performed at:  [address] [facility] [facility] [address]   Phone [phone]   BRAIN NATRIURETIC PEPTIDE - Abnormal; Notable for the following components:    Pro- (*)     All other components within normal limits    Narrative:     Performed at:  [facility]  [address] [address]   Phone [phone]   POCT GLUCOSE - Abnormal; Notable for the following components:    POC Glucose 263 (*)     All other components within normal limits    Narrative:     Performed at:  [facility]  [address] [place] [address]   Phone [phone]   TROPONIN    Narrative:     Performed at:  [facility]  [address] [address]   Phone [phone]       All other labs were withinnormal range or not returned as of this dictation. EMERGENCY DEPARTMENT COURSE and DIFFERENTIAL DIAGNOSIS/MDM:     PMH, Surgical Hx, FH, Social Hx reviewed by myself (ETOH usage, Tobacco usage, Drug usage reviewed by myself, no pertinent Hx)- No Pertinent Hx     Old records were reviewed by me     66-year-old male with shortness of breath. Appears to be COPD and pulmonary edema related. Lasix given. Discussed the importance of being compliant with his water pills. Feels significantly better. O2 greater than 97% on room air. Patient feels comfortable going home. Discussed close PCP follow-up. I estimate there is LOW risk for Sepsis, MI, Stroke, Tamponade, PTX, Toxicity or other life threatening etiology thus I consider the discharge disposition reasonable. The patient is at low risk for mortality based on demographic, history and clinical factors. Given the best available information and clinical assessment, I estimate the risk of hospitalization to be greater than risk of treatment at home. I have explained to the patient that the risk could rapidly change, given precautions for return and instructions. Explained to patient that the risk for mortality is low based on demographic, history and clinical factors. I discussed with patient the results of evaluation in the ED, diagnosis, care, and prognosis. The plan is to discharge to home.   Patient is in agreement with plan and questions have been answered. I also discussed with patient the reasons which may require a return visit and the importance of follow-up care. The patient is well-appearing, nontoxic, and improved at the time of discharge. Patient agrees to call to arrange follow-up care as directed. Patient understands to return immediately for worsening/change in symptoms. CRITICAL CARE TIME   Total Critical Caretime was 21 minutes, excluding separately reportable procedures. There was a high probability of clinically significant/life threatening deterioration in the patient's condition which required my urgent intervention. PROCEDURES:  Unlessotherwise noted below, none    FINAL IMPRESSION      1. Shortness of breath    2. Congestive heart failure, unspecified HF chronicity, unspecified heart failure type ([facility]    3.  Chronic obstructive pulmonary disease, unspecified COPD type [facility])          DISPOSITION/PLAN   DISPOSITION Decision To Discharge 03/16/2021 10:13:43 PM    PATIENT REFERRED TO:  Dano Strong, [address]  [address] [place] [address]  [phone]    Schedule an appointment as soon as possible for a visit       Kavya Pagan  [address] [address]  [phone]    Schedule an appointment as soon as possible for a visit       [facility] Emergency Department  [address] [address]  [phone]  Go to   As needed      DISCHARGE MEDICATIONS:  Discharge Medication List as of 3/16/2021 10:22 PM             (Please note that portions ofthis note were completed with a voice recognition program.  Efforts were made to edit the dictations but occasionally words are mis-transcribed.)    Karen Grubbs MD(electronically signed)  Attending Emergency Physician          Karen Grubbs MD  03/16/21 [id number]

## 2021-03-23 ENCOUNTER — OFFICE VISIT (OUTPATIENT)
Dept: PULMONOLOGY | Age: 76
End: 2021-03-23
Payer: MEDICARE

## 2021-03-23 VITALS
HEART RATE: 80 BPM | RESPIRATION RATE: 16 BRPM | HEIGHT: 70 IN | DIASTOLIC BLOOD PRESSURE: 80 MMHG | OXYGEN SATURATION: 94 % | SYSTOLIC BLOOD PRESSURE: 138 MMHG | WEIGHT: 255 LBS | BODY MASS INDEX: 36.51 KG/M2 | TEMPERATURE: 97.7 F

## 2021-03-23 DIAGNOSIS — J41.0 CHRONIC BRONCHITIS, SIMPLE (HCC): ICD-10-CM

## 2021-03-23 DIAGNOSIS — I50.22 CHRONIC SYSTOLIC HEART FAILURE (HCC): ICD-10-CM

## 2021-03-23 PROBLEM — I50.23 ACUTE ON CHRONIC SYSTOLIC CONGESTIVE HEART FAILURE (HCC): Status: RESOLVED | Noted: 2018-07-29 | Resolved: 2021-03-23

## 2021-03-23 PROCEDURE — 3017F COLORECTAL CA SCREEN DOC REV: CPT | Performed by: INTERNAL MEDICINE

## 2021-03-23 PROCEDURE — 1123F ACP DISCUSS/DSCN MKR DOCD: CPT | Performed by: INTERNAL MEDICINE

## 2021-03-23 PROCEDURE — 1036F TOBACCO NON-USER: CPT | Performed by: INTERNAL MEDICINE

## 2021-03-23 PROCEDURE — 99214 OFFICE O/P EST MOD 30 MIN: CPT | Performed by: INTERNAL MEDICINE

## 2021-03-23 PROCEDURE — G8484 FLU IMMUNIZE NO ADMIN: HCPCS | Performed by: INTERNAL MEDICINE

## 2021-03-23 PROCEDURE — 3023F SPIROM DOC REV: CPT | Performed by: INTERNAL MEDICINE

## 2021-03-23 PROCEDURE — G8427 DOCREV CUR MEDS BY ELIG CLIN: HCPCS | Performed by: INTERNAL MEDICINE

## 2021-03-23 PROCEDURE — G8417 CALC BMI ABV UP PARAM F/U: HCPCS | Performed by: INTERNAL MEDICINE

## 2021-03-23 PROCEDURE — G8926 SPIRO NO PERF OR DOC: HCPCS | Performed by: INTERNAL MEDICINE

## 2021-03-23 PROCEDURE — 4040F PNEUMOC VAC/ADMIN/RCVD: CPT | Performed by: INTERNAL MEDICINE

## 2021-03-23 NOTE — LETTER
[facility]  [address] [place] [address] [address] [facility]. [address]  Phone: [phone]  Fax: [phone]     3/23/2021    Dr. Dee Dee Saez    Today had the pleasure to see our mutual patient, Milton Carias. My office note is attached. Please let me know if you have any questions.         Sincerely,    Val[facility] Ign[facility]  [phone]

## 2021-03-23 NOTE — PROGRESS NOTES
REASON FOR CONSULTATION/CC:    Chief Complaint   Patient presents with    Shortness of Breath     f/u SOB        Consult at request of   Oleg Yepez for      PCP: Oleg Yepez    HISTORY OF PRESENT ILLNESS: Ike Noble is a 76y.o. year old male with a history of chronic bronchitis  who presents :           chronic bronchitis   Restarted Daliresp. Still having yellow phlegm in the morning. This is much better. Using Symbicort as well. shortness of breath    shortness of breath is better. He had stopped lasix and become fluid overload. After restarting lasix, lower extremities edema has improved   Still has some edema. Objective:   PHYSICAL EXAM:  Blood pressure 138/80, pulse 80, temperature 97.7 °F (36.5 °C), temperature source Infrared, resp. rate 16, height 5' 10\" (1.778 m), weight 255 lb (115.7 kg), SpO2 94 %.'  Body mass index is 36.59 kg/m². Gen: No distress. Eyes:    ENT:    Neck:    Resp: No accessory muscle use. No crackles. No wheezes. No rhonchi. CV: Regular rate. Regular rhythm. No murmur or rub. + edema. GI:    Skin:    Lymph:    M/S: No cyanosis. No clubbing. Neuro: Moves all four extremities. Psych: Oriented x 3. No anxiety. Awake. Alert. Intact judgement and insight. Data Reviewed:        Assessment:     ·  chronic bronchitis   · Chronic rhonitis  ·       Plan:      Problem List Items Addressed This Visit     Chronic systolic heart failure ([address]     Patient is now controlled after resuming Lasix. Is not clear why he stopped his Lasix. It is likely secondary to the patient not knowing his medications. This is likely the etiology of his decline over the last month to 2 months with increasing shortness of breath. Therefore, Erasmo Sharps may not be needed at this juncture. Chronic bronchitis, simple ([address]     Symptoms significant controlled with the Daliresp albuterol and Symbicort. Less phlegm.     Shortness of breath appears to

## 2021-03-23 NOTE — ASSESSMENT & PLAN NOTE
Symptoms significant controlled with the Daliresp albuterol and Symbicort. Less phlegm. Shortness of breath appears to be correlated to control of volume with Lasix.   Therefore, he may be able to stop the Daliresp in the summer to try to save cost.

## 2021-03-24 ENCOUNTER — CARE COORDINATION (OUTPATIENT)
Dept: CARE COORDINATION | Age: 76
End: 2021-03-24

## 2021-03-24 NOTE — CARE COORDINATION
Patient contacted regarding COVID-19 risk and screening. Discussed COVID-19 related testing which was not done at this time. Test results were not done. Patient informed of results, if available? N/A. Ambulatory Care Manager contacted the patient by telephone to perform follow-up assessment. Verified name and  with patient as identifiers. Patient has following risk factors of: heart failure and COPD. Symptoms reviewed with patient who verbalized the following symptoms: shortness of breath, no new symptoms and no worsening symptoms. Was patient discharged with a pulse oximeter? Pt was not sent home with pulse oximeter because he has one at home already. Discussed and confirmed pulse oximeter discharge instructions and when to notify provider or seek emergency care. Due to no new or worsening symptoms encounter was not routed to provider for escalation. Education provided regarding infection prevention, and signs and symptoms of COVID-19 and when to seek medical attention with patient who verbalized understanding. Discussed exposure protocols and quarantine from [address] you at higher risk for severe illness  and given an opportunity for questions and concerns. The patient agrees to contact the COVID-19 hotline 619-940-2673 or PCP office for questions related to their healthcare. AC provided contact information for future reference. From CDC: Are you at higher risk for severe illness?  Wash your hands often.  Avoid close contact (6 feet, which is about two arm lengths) with people who are sick.  Put distance between yourself and other people if COVID-19 is spreading in your community.  Clean and disinfect frequently touched surfaces.  Avoid all cruise travel and non-essential air travel.  Call your healthcare professional if you have concerns about COVID-19 and your underlying condition or if you are sick.     For more information on steps you can take to protect yourself, see CDC's How to [place] for follow-up call in 5-7 days based on severity of symptoms and risk factors. Pt reports he is doing well. He stated he f/u with his pulmonologist and has an appointment scheduled with his PCP as well. He continues to take his medications for COPD and stated he still has some SOB, but his meds help. Pt reports he checks his O2 sats regularly and his most recent reading is 97%. He does not have any questions/concerns for ACM today.

## 2021-03-31 ENCOUNTER — CARE COORDINATION (OUTPATIENT)
Dept: CARE COORDINATION | Age: 76
End: 2021-03-31

## 2021-03-31 NOTE — CARE COORDINATION
You Patient resolved from the Care Transitions episode on 3.31.21  Discussed COVID-19 related testing which was previously tested in January at this time. Test results were n/a. Patient informed of results, if available? n/a    Patient/family has been provided the following resources and education related to COVID-19:                         Signs, symptoms and red flags related to COVID-19            [facility] exposure and quarantine guidelines            [employer] exposure contact - [phone]            Contact for their local Department of Health                 Patient currently reports that the following symptoms have improved:  no new/worsening symptoms     No further outreach scheduled with this CTN/ACM. Episode of Care resolved. Patient has this CTN/ACM contact information if future needs arise. Pt reports he is doing well. He stated he has no needs from ACM at this time.

## 2021-07-06 ENCOUNTER — HOSPITAL ENCOUNTER (INPATIENT)
Age: 76
LOS: 2 days | Discharge: HOME | DRG: 190 | End: 2021-07-08
Attending: INTERNAL MEDICINE | Admitting: INTERNAL MEDICINE
Payer: MEDICARE

## 2021-07-06 ENCOUNTER — APPOINTMENT (OUTPATIENT)
Dept: GENERAL RADIOLOGY | Age: 76
DRG: 190 | End: 2021-07-06
Payer: MEDICARE

## 2021-07-06 DIAGNOSIS — J18.9 PNEUMONIA OF LEFT LOWER LOBE DUE TO INFECTIOUS ORGANISM: ICD-10-CM

## 2021-07-06 DIAGNOSIS — A41.9 SEPTICEMIA (HCC): Primary | ICD-10-CM

## 2021-07-06 LAB
ALBUMIN SERPL-MCNC: 4.2 G/DL (ref 3.4–5)
ALP BLD-CCNC: 62 U/L (ref 40–129)
ALT SERPL-CCNC: 16 U/L (ref 10–40)
ANION GAP SERPL CALCULATED.3IONS-SCNC: 13 MMOL/L (ref 3–16)
AST SERPL-CCNC: 28 U/L (ref 15–37)
BASOPHILS ABSOLUTE: 0.2 K/UL (ref 0–0.2)
BASOPHILS RELATIVE PERCENT: 0.9 %
BILIRUB SERPL-MCNC: 0.6 MG/DL (ref 0–1)
BILIRUBIN DIRECT: <0.2 MG/DL (ref 0–0.3)
BILIRUBIN, INDIRECT: NORMAL MG/DL (ref 0–1)
BUN BLDV-MCNC: 17 MG/DL (ref 7–20)
CALCIUM SERPL-MCNC: 9.6 MG/DL (ref 8.3–10.6)
CHLORIDE BLD-SCNC: 98 MMOL/L (ref 99–110)
CO2: 25 MMOL/L (ref 21–32)
CREAT SERPL-MCNC: 1.1 MG/DL (ref 0.8–1.3)
EOSINOPHILS ABSOLUTE: 0.3 K/UL (ref 0–0.6)
EOSINOPHILS RELATIVE PERCENT: 1.6 %
GFR AFRICAN AMERICAN: >60
GFR NON-AFRICAN AMERICAN: >60
GLUCOSE BLD-MCNC: 175 MG/DL (ref 70–99)
GLUCOSE BLD-MCNC: 226 MG/DL (ref 70–99)
HCT VFR BLD CALC: 43.3 % (ref 40.5–52.5)
HEMOGLOBIN: 14.6 G/DL (ref 13.5–17.5)
LACTIC ACID: 1.1 MMOL/L (ref 0.4–2)
LYMPHOCYTES ABSOLUTE: 1.6 K/UL (ref 1–5.1)
LYMPHOCYTES RELATIVE PERCENT: 9.2 %
MCH RBC QN AUTO: 30.3 PG (ref 26–34)
MCHC RBC AUTO-ENTMCNC: 33.8 G/DL (ref 31–36)
MCV RBC AUTO: 89.5 FL (ref 80–100)
MONOCYTES ABSOLUTE: 1.4 K/UL (ref 0–1.3)
MONOCYTES RELATIVE PERCENT: 8.3 %
NEUTROPHILS ABSOLUTE: 13.9 K/UL (ref 1.7–7.7)
NEUTROPHILS RELATIVE PERCENT: 80 %
PDW BLD-RTO: 13.7 % (ref 12.4–15.4)
PERFORMED ON: ABNORMAL
PLATELET # BLD: 301 K/UL (ref 135–450)
PMV BLD AUTO: 6.9 FL (ref 5–10.5)
POTASSIUM REFLEX MAGNESIUM: 4.6 MMOL/L (ref 3.5–5.1)
PRO-BNP: 297 PG/ML (ref 0–449)
PROCALCITONIN: 0.12 NG/ML (ref 0–0.15)
RBC # BLD: 4.83 M/UL (ref 4.2–5.9)
SODIUM BLD-SCNC: 136 MMOL/L (ref 136–145)
TOTAL PROTEIN: 8.1 G/DL (ref 6.4–8.2)
TROPONIN: <0.01 NG/ML
WBC # BLD: 17.4 K/UL (ref 4–11)

## 2021-07-06 PROCEDURE — 71046 X-RAY EXAM CHEST 2 VIEWS: CPT

## 2021-07-06 PROCEDURE — 2580000003 HC RX 258: Performed by: NURSE PRACTITIONER

## 2021-07-06 PROCEDURE — 87040 BLOOD CULTURE FOR BACTERIA: CPT

## 2021-07-06 PROCEDURE — 6370000000 HC RX 637 (ALT 250 FOR IP): Performed by: NURSE PRACTITIONER

## 2021-07-06 PROCEDURE — 84484 ASSAY OF TROPONIN QUANT: CPT

## 2021-07-06 PROCEDURE — 85025 COMPLETE CBC W/AUTO DIFF WBC: CPT

## 2021-07-06 PROCEDURE — 83605 ASSAY OF LACTIC ACID: CPT

## 2021-07-06 PROCEDURE — 93005 ELECTROCARDIOGRAM TRACING: CPT | Performed by: EMERGENCY MEDICINE

## 2021-07-06 PROCEDURE — 80076 HEPATIC FUNCTION PANEL: CPT

## 2021-07-06 PROCEDURE — 99283 EMERGENCY DEPT VISIT LOW MDM: CPT

## 2021-07-06 PROCEDURE — 84145 PROCALCITONIN (PCT): CPT

## 2021-07-06 PROCEDURE — 6360000002 HC RX W HCPCS: Performed by: NURSE PRACTITIONER

## 2021-07-06 PROCEDURE — 80048 BASIC METABOLIC PNL TOTAL CA: CPT

## 2021-07-06 PROCEDURE — 83880 ASSAY OF NATRIURETIC PEPTIDE: CPT

## 2021-07-06 PROCEDURE — 94640 AIRWAY INHALATION TREATMENT: CPT

## 2021-07-06 PROCEDURE — 2060000000 HC ICU INTERMEDIATE R&B

## 2021-07-06 RX ORDER — SODIUM CHLORIDE 0.9 % (FLUSH) 0.9 %
10 SYRINGE (ML) INJECTION EVERY 12 HOURS SCHEDULED
Status: DISCONTINUED | OUTPATIENT
Start: 2021-07-06 | End: 2021-07-08 | Stop reason: HOSPADM

## 2021-07-06 RX ORDER — VITAMIN B COMPLEX
1000 TABLET ORAL DAILY
Status: DISCONTINUED | OUTPATIENT
Start: 2021-07-07 | End: 2021-07-08 | Stop reason: HOSPADM

## 2021-07-06 RX ORDER — OXYCODONE 27 MG/1
27 CAPSULE, EXTENDED RELEASE ORAL 2 TIMES DAILY
Status: ON HOLD | COMMUNITY
End: 2021-07-06 | Stop reason: ALTCHOICE

## 2021-07-06 RX ORDER — NICOTINE POLACRILEX 4 MG
15 LOZENGE BUCCAL PRN
Status: DISCONTINUED | OUTPATIENT
Start: 2021-07-06 | End: 2021-07-08 | Stop reason: HOSPADM

## 2021-07-06 RX ORDER — ACETAMINOPHEN 650 MG/1
650 SUPPOSITORY RECTAL EVERY 6 HOURS PRN
Status: DISCONTINUED | OUTPATIENT
Start: 2021-07-06 | End: 2021-07-08 | Stop reason: HOSPADM

## 2021-07-06 RX ORDER — GUAIFENESIN 600 MG/1
600 TABLET, EXTENDED RELEASE ORAL DAILY
Status: DISCONTINUED | OUTPATIENT
Start: 2021-07-07 | End: 2021-07-08 | Stop reason: HOSPADM

## 2021-07-06 RX ORDER — POLYETHYLENE GLYCOL 3350 17 G/17G
17 POWDER, FOR SOLUTION ORAL DAILY PRN
Status: DISCONTINUED | OUTPATIENT
Start: 2021-07-06 | End: 2021-07-08 | Stop reason: HOSPADM

## 2021-07-06 RX ORDER — ALBUTEROL SULFATE 2.5 MG/3ML
2.5 SOLUTION RESPIRATORY (INHALATION)
Status: DISCONTINUED | OUTPATIENT
Start: 2021-07-06 | End: 2021-07-08 | Stop reason: HOSPADM

## 2021-07-06 RX ORDER — ATORVASTATIN CALCIUM 20 MG/1
20 TABLET, FILM COATED ORAL DAILY
Status: DISCONTINUED | OUTPATIENT
Start: 2021-07-07 | End: 2021-07-08 | Stop reason: HOSPADM

## 2021-07-06 RX ORDER — EMPAGLIFLOZIN 25 MG/1
25 TABLET, FILM COATED ORAL DAILY
COMMUNITY
End: 2022-02-21 | Stop reason: ALTCHOICE

## 2021-07-06 RX ORDER — PREDNISONE 20 MG/1
40 TABLET ORAL DAILY
Status: DISCONTINUED | OUTPATIENT
Start: 2021-07-07 | End: 2021-07-08 | Stop reason: HOSPADM

## 2021-07-06 RX ORDER — M-VIT,TX,IRON,MINS/CALC/FOLIC 27MG-0.4MG
1 TABLET ORAL DAILY
COMMUNITY

## 2021-07-06 RX ORDER — BUDESONIDE AND FORMOTEROL FUMARATE DIHYDRATE 80; 4.5 UG/1; UG/1
2 AEROSOL RESPIRATORY (INHALATION) 2 TIMES DAILY
Status: DISCONTINUED | OUTPATIENT
Start: 2021-07-06 | End: 2021-07-08 | Stop reason: HOSPADM

## 2021-07-06 RX ORDER — ONDANSETRON 2 MG/ML
4 INJECTION INTRAMUSCULAR; INTRAVENOUS EVERY 6 HOURS PRN
Status: DISCONTINUED | OUTPATIENT
Start: 2021-07-06 | End: 2021-07-08 | Stop reason: HOSPADM

## 2021-07-06 RX ORDER — ROPINIROLE 1 MG/1
1 TABLET, FILM COATED ORAL 2 TIMES DAILY
Status: DISCONTINUED | OUTPATIENT
Start: 2021-07-06 | End: 2021-07-08 | Stop reason: HOSPADM

## 2021-07-06 RX ORDER — ACETAMINOPHEN 325 MG/1
650 TABLET ORAL EVERY 6 HOURS PRN
Status: DISCONTINUED | OUTPATIENT
Start: 2021-07-06 | End: 2021-07-08 | Stop reason: HOSPADM

## 2021-07-06 RX ORDER — IPRATROPIUM BROMIDE AND ALBUTEROL SULFATE 2.5; .5 MG/3ML; MG/3ML
2 SOLUTION RESPIRATORY (INHALATION) ONCE
Status: COMPLETED | OUTPATIENT
Start: 2021-07-06 | End: 2021-07-06

## 2021-07-06 RX ORDER — DEXTROSE MONOHYDRATE 50 MG/ML
100 INJECTION, SOLUTION INTRAVENOUS PRN
Status: DISCONTINUED | OUTPATIENT
Start: 2021-07-06 | End: 2021-07-08 | Stop reason: HOSPADM

## 2021-07-06 RX ORDER — 0.9 % SODIUM CHLORIDE 0.9 %
30 INTRAVENOUS SOLUTION INTRAVENOUS ONCE
Status: COMPLETED | OUTPATIENT
Start: 2021-07-06 | End: 2021-07-06

## 2021-07-06 RX ORDER — GABAPENTIN 400 MG/1
800 CAPSULE ORAL 4 TIMES DAILY
Status: DISCONTINUED | OUTPATIENT
Start: 2021-07-06 | End: 2021-07-08 | Stop reason: HOSPADM

## 2021-07-06 RX ORDER — OXYCODONE 36 MG/1
36 CAPSULE, EXTENDED RELEASE ORAL EVERY 12 HOURS SCHEDULED
COMMUNITY
End: 2021-08-25

## 2021-07-06 RX ORDER — GUAIFENESIN 600 MG/1
600 TABLET, EXTENDED RELEASE ORAL DAILY
COMMUNITY

## 2021-07-06 RX ORDER — SODIUM CHLORIDE 9 MG/ML
INJECTION, SOLUTION INTRAVENOUS CONTINUOUS
Status: DISCONTINUED | OUTPATIENT
Start: 2021-07-06 | End: 2021-07-08 | Stop reason: HOSPADM

## 2021-07-06 RX ORDER — FUROSEMIDE 20 MG/1
10 TABLET ORAL EVERY OTHER DAY
Status: DISCONTINUED | OUTPATIENT
Start: 2021-07-08 | End: 2021-07-08 | Stop reason: HOSPADM

## 2021-07-06 RX ORDER — AZITHROMYCIN 500 MG/1
500 TABLET, FILM COATED ORAL EVERY 24 HOURS
Status: DISCONTINUED | OUTPATIENT
Start: 2021-07-07 | End: 2021-07-08 | Stop reason: HOSPADM

## 2021-07-06 RX ORDER — SODIUM CHLORIDE 9 MG/ML
25 INJECTION, SOLUTION INTRAVENOUS PRN
Status: DISCONTINUED | OUTPATIENT
Start: 2021-07-06 | End: 2021-07-08 | Stop reason: HOSPADM

## 2021-07-06 RX ORDER — LIDOCAINE 50 MG/G
OINTMENT TOPICAL 4 TIMES DAILY PRN
Status: DISCONTINUED | OUTPATIENT
Start: 2021-07-06 | End: 2021-07-08 | Stop reason: HOSPADM

## 2021-07-06 RX ORDER — ASPIRIN 81 MG/1
81 TABLET, CHEWABLE ORAL DAILY
Status: DISCONTINUED | OUTPATIENT
Start: 2021-07-07 | End: 2021-07-08 | Stop reason: HOSPADM

## 2021-07-06 RX ORDER — IPRATROPIUM BROMIDE AND ALBUTEROL SULFATE 2.5; .5 MG/3ML; MG/3ML
1 SOLUTION RESPIRATORY (INHALATION)
Status: DISCONTINUED | OUTPATIENT
Start: 2021-07-07 | End: 2021-07-07

## 2021-07-06 RX ORDER — CARVEDILOL 12.5 MG/1
12.5 TABLET ORAL 2 TIMES DAILY WITH MEALS
Status: DISCONTINUED | OUTPATIENT
Start: 2021-07-07 | End: 2021-07-08 | Stop reason: HOSPADM

## 2021-07-06 RX ORDER — SODIUM CHLORIDE 0.9 % (FLUSH) 0.9 %
10 SYRINGE (ML) INJECTION PRN
Status: DISCONTINUED | OUTPATIENT
Start: 2021-07-06 | End: 2021-07-08 | Stop reason: HOSPADM

## 2021-07-06 RX ORDER — METHYLPREDNISOLONE SODIUM SUCCINATE 125 MG/2ML
125 INJECTION, POWDER, LYOPHILIZED, FOR SOLUTION INTRAMUSCULAR; INTRAVENOUS ONCE
Status: COMPLETED | OUTPATIENT
Start: 2021-07-06 | End: 2021-07-06

## 2021-07-06 RX ORDER — DEXTROSE MONOHYDRATE 25 G/50ML
12.5 INJECTION, SOLUTION INTRAVENOUS PRN
Status: DISCONTINUED | OUTPATIENT
Start: 2021-07-06 | End: 2021-07-08 | Stop reason: HOSPADM

## 2021-07-06 RX ORDER — ONDANSETRON 4 MG/1
4 TABLET, ORALLY DISINTEGRATING ORAL EVERY 8 HOURS PRN
Status: DISCONTINUED | OUTPATIENT
Start: 2021-07-06 | End: 2021-07-08 | Stop reason: HOSPADM

## 2021-07-06 RX ADMIN — AZITHROMYCIN MONOHYDRATE 500 MG: 500 INJECTION, POWDER, LYOPHILIZED, FOR SOLUTION INTRAVENOUS at 22:11

## 2021-07-06 RX ADMIN — OXYCODONE HYDROCHLORIDE 35 MG: 15 TABLET, FILM COATED, EXTENDED RELEASE ORAL at 22:12

## 2021-07-06 RX ADMIN — SODIUM CHLORIDE 2190 ML: 9 INJECTION, SOLUTION INTRAVENOUS at 20:09

## 2021-07-06 RX ADMIN — APIXABAN 5 MG: 5 TABLET, FILM COATED ORAL at 22:12

## 2021-07-06 RX ADMIN — CEFTRIAXONE 1000 MG: 1 INJECTION, POWDER, FOR SOLUTION INTRAMUSCULAR; INTRAVENOUS at 20:11

## 2021-07-06 RX ADMIN — BUDESONIDE AND FORMOTEROL FUMARATE DIHYDRATE 2 PUFF: 80; 4.5 AEROSOL RESPIRATORY (INHALATION) at 21:29

## 2021-07-06 RX ADMIN — IPRATROPIUM BROMIDE AND ALBUTEROL SULFATE 1 AMPULE: .5; 3 SOLUTION RESPIRATORY (INHALATION) at 21:29

## 2021-07-06 RX ADMIN — METHYLPREDNISOLONE SODIUM SUCCINATE 125 MG: 125 INJECTION, POWDER, FOR SOLUTION INTRAMUSCULAR; INTRAVENOUS at 20:09

## 2021-07-06 RX ADMIN — SODIUM CHLORIDE: 9 INJECTION, SOLUTION INTRAVENOUS at 21:24

## 2021-07-06 RX ADMIN — Medication 10 ML: at 22:11

## 2021-07-06 RX ADMIN — ROPINIROLE HYDROCHLORIDE 1 MG: 1 TABLET, FILM COATED ORAL at 22:13

## 2021-07-06 RX ADMIN — GABAPENTIN 800 MG: 400 CAPSULE ORAL at 22:12

## 2021-07-06 ASSESSMENT — ENCOUNTER SYMPTOMS
SHORTNESS OF BREATH: 1
DIARRHEA: 0
ABDOMINAL PAIN: 0
COUGH: 1
BACK PAIN: 0
VOMITING: 0
NAUSEA: 0

## 2021-07-06 ASSESSMENT — PAIN DESCRIPTION - LOCATION: LOCATION: GENERALIZED

## 2021-07-06 ASSESSMENT — PAIN SCALES - GENERAL: PAINLEVEL_OUTOF10: 6

## 2021-07-06 NOTE — ED NOTES
Pt 86% on RA after ambulating from scooter to bed, pt placed on 2L NC pt now 91%.      Bernabe Reed RN  07/06/21 8107

## 2021-07-06 NOTE — H&P
Hospital Medicine History & Physical      PCP: Marybeth Hayden    Date of Admission: 7/6/2021    Date of Service: Pt seen/examined on 7/6/2021 and Admitted to Inpatient with expected LOS greater than two midnights due to medical therapy. Chief Complaint: Shortness of breath      History Of Present Illness:      68 y.o. male with PMHx of CAD, CHF, COPD, DM2, HTN, HLD and A-fib presented to [facility] with shortness of breath, cough and change in sputum production. Patient reports change in sputum and cough began about a week ago. He then noted shortness of breath followed by worsening weakness and fatigue. Over the last day or so he has had decreased appetite and profound weakness. He denies any vomiting, abdominal pain or diarrhea. He has no chest pain. He has no reported fevers. Past Medical History:          Diagnosis Date    CAD (coronary artery disease)     CABG in 2009    Chronic back pain     Chronic systolic CHF (congestive heart failure) (HCC)     [facility]ch[facility]     Diabetes mellitus ([facility]     Essential hypertension     Paroxysmal atrial fibrillation ([facility]     On Coumadin       Past Surgical History:          Procedure Laterality Date    BACK SURGERY  1995    BACK SURGERY      CARDIAC SURGERY  2007    CERVICAL SPINE SURGERY      CORONARY ARTERY BYPASS GRAFT      FRACTURE SURGERY Left 1988    Shoulder       Medications Prior to Admission:      Prior to Admission medications    Medication Sig Start Date End Date Taking?  Authorizing Provider   Multiple Vitamins-Minerals (THERAPEUTIC MULTIVITAMIN-MINERALS) tablet Take 1 tablet by mouth daily   Yes Historical Provider, MD   empagliflozin (JARDIANCE) 25 MG tablet Take 25 mg by mouth daily   Yes Historical Provider, MD   guaiFENesin (MUCINEX) 600 MG extended release tablet Take 600 mg by mouth daily   Yes Historical Provider, MD   insulin NPH (HUMULIN N;NOVOLIN N) 100 UNIT/ML injection vial Inject 38 Units into the skin 2 times daily (before meals)   Yes Historical Provider, MD   oxyCODONE ER (XTAMPZA ER) 36 MG C12A Take 36 mg by mouth every 12 hours. Yes Historical Provider, MD   albuterol sulfate  (90 Base) MCG/ACT inhaler Inhale 2 puffs into the lungs every 4 hours as needed for Wheezing 3/8/21  Yes Smith Cochran MD   Roflumilast (DALIRESP) 500 MCG tablet Take 1 tablet by mouth daily 2/25/21  Yes Smith Cochran MD   apixaban [facility]) 5 MG TABS tablet Take 1 tablet by mouth 2 times daily 12/23/20  Yes Smith Cochran MD   lidocaine (XYLOCAINE) 5 % ointment Apply topically as needed for Pain Apply topically as needed.    Yes Historical Provider, MD   rOPINIRole (REQUIP) 1 MG tablet Take 1 mg by mouth 2 times daily    Yes Historical Provider, MD   polyethylene glycol (GLYCOLAX) packet Take 17 g by mouth daily as needed for Constipation   Yes Historical Provider, MD   simvastatin (ZOCOR) 80 MG tablet Take 40 mg by mouth nightly 3/27/18  Yes Historical Provider, MD   furosemide (LASIX) 20 MG tablet Take 10 mg by mouth every other day    Yes Historical Provider, MD   lisinopril (PRINIVIL;ZESTRIL) 20 MG tablet Take 30 mg by mouth daily    Yes Historical Provider, MD   vitamin D (CHOLECALCIFEROL) 1000 UNITS TABS tablet Take 1,000 Units by mouth daily   Yes Historical Provider, MD   gabapentin (NEURONTIN) 800 MG tablet Take 800 mg by mouth 4 times daily   Yes Historical Provider, MD   aspirin 81 MG tablet Take 81 mg by mouth daily   Yes Historical Provider, MD   metFORMIN (GLUCOPHAGE) 1000 MG tablet Take 1,000 mg by mouth 2 times daily (with meals)   Yes Historical Provider, MD   budesonide-formoterol (SYMBICORT) 80-4.5 MCG/ACT AERO Inhale 2 puffs into the lungs 2 times daily    Yes Historical Provider, MD   carvedilol (COREG) 25 MG tablet Take 12.5 mg by mouth 2 times daily (with meals)    Yes Historical Provider, MD   albuterol (PROVENTIL) (2.5 MG/3ML) 0.083% nebulizer solution USE 1 VIAL IN NEBULIZER 4 TIMES DAILY 3/18/20   Ari Pugh MD   magnesium citrate solution Take 296 mLs by mouth once for 1 dose 2/4/20 2/4/20  GRAHAM Sullivan       Allergies:  No known allergies    Social History:      The patient currently lives with wife    TOBACCO:   reports that he quit smoking about 25 years ago. He has a 18.00 pack-year smoking history. He has never used smokeless tobacco.  ETOH:   reports no history of alcohol use. Family History:      Reviewed in detail positive as follows:        Problem Relation Age of Onset    Brain Cancer Mother     Heart Attack Father     Heart Attack Brother        REVIEW OF SYSTEMS:   Pertinent positives as noted in the HPI. All other systems reviewed and negative. PHYSICAL EXAM PERFORMED:    BP (!) 144/82   Pulse 93   Temp 98.2 °F (36.8 °C) (Oral)   Resp 16   Ht 5' 10\" (1.778 m)   Wt 244 lb 0.8 oz (110.7 kg)   SpO2 95%   BMI 35.02 kg/m²     General appearance: Well-developed, well-nourished  male lying on hospital bed in no apparent distress, appears stated age and cooperative. HEENT:  Normal cephalic, atraumatic without obvious deformity. Pupils equal, round, and reactive to light. Extra ocular muscles intact. Conjunctivae/corneas clear. Neck: Supple, with full range of motion. No jugular venous distention. Trachea midline. Respiratory:  Normal respiratory effort. Clear to auscultation, bilaterally without Rales/Wheezes/Rhonchi. Patient just completed DuoNeb treatment. Cardiovascular:  Regular rate and rhythm without murmurs, rubs or gallops. No lower extremity edema. Abdomen: Soft, nontender protuberant abdomen without rebound, guarding or distention. Normal bowel sounds. Musculoskeletal:  No clubbing, cyanosis or edema bilaterally. Full range of motion without deformity. Skin: Skin color, texture, turgor normal.  Skin tear noted to the right shin. Bandage applied. Bleeding controlled.   No other rashes or lesions. Neurologic:  Neurovascularly intact without any focal sensory/motor deficits. Cranial nerves: II-XII intact, grossly non-focal.  Psychiatric:  Alert and oriented, thought content appropriate, normal insight  Capillary Refill: Brisk,< 3 seconds   Peripheral Pulses: +2 palpable, equal bilaterally       Labs:     Recent Labs     07/06/21  1700   WBC 17.4*   HGB 14.6   HCT 43.3        Recent Labs     07/06/21  1700      K 4.6   CL 98*   CO2 25   BUN 17   CREATININE 1.1   CALCIUM 9.6     Recent Labs     07/06/21  1700   AST 28   ALT 16   BILIDIR <0.2   BILITOT 0.6   ALKPHOS 62     No results for input(s): INR in the last 72 hours. Recent Labs     07/06/21  1700   TROPONINI <0.01       Urinalysis:      Lab Results   Component Value Date    NITRU Negative 04/13/2019    WBCUA 1 04/13/2019    BACTERIA Rare 07/11/2018    RBCUA 1 04/13/2019    BLOODU TRACE 04/13/2019    SPECGRAV 1.021 04/13/2019    GLUCOSEU >=1000 04/13/2019       Radiology:       XR CHEST (2 VW)   Preliminary Result   Left lower lobe airspace disease, likely representing either aspiration or   pneumonia. RECOMMENDATION:   Suggest appropriate clinical treatment, and short-term chest CT follow-up in   2-4 weeks to ensure resolution of this opacity. ASSESSMENT:    Active Hospital Problems    Diagnosis Date Noted    Pneumonia [J18.9] 07/06/2021         PLAN:    Pneumonia, organism unspecified   - Pt has been started on Rocephin and Azithromycin   - Blood cultures x 2 ordered  - Sputum culture ordered  - Legionella pneumophilia and Strep pneumoniae urine antigens ordered  - Procalcitonin ordered  - supplemental O2, Nebs PRN    COPD (acute exacerbation)   - Nebulizer treatments every 4 hours and every 2 hours prn   - Solumedrol and Antibiotics started in ED. - will continue prednisone daily   - Patient will be monitored closely, and deep breathing and coughing will be encouraged while awake.      Atrial Fibrillation   - currently rate controlled  - continue Eliquis    Diabetes mellitus II   - hold home po meds, Lantus, SSI and CCD    Essential (primary) hypertension   - monitor blood pressure  - continue home meds     Hyperlipidemia   - continue statin     CAD  - s/p CABG 20558  - asa and statin    Systolic CHF  - euvolemic  - last Echo 2018 EF 47-51%  - continue coreg, lasix, lisinopril  - daily weight   - I/O's    DVT Prophylaxis: Eliquis  Diet: ADULT DIET; Regular; 3 carb choices (45 gm/meal)  Code Status: Full Code    Dispo - Inpatient       [address], APRN - CNP    Thank you Owen Pena for the opportunity to be involved in this patient's care. If you have any questions or concerns please feel free to contact me at [phone].

## 2021-07-06 NOTE — ED PROVIDER NOTES
**ADVANCED PRACTICE PROVIDER, I HAVE EVALUATED THIS PATIENT**        [address]      Pt Name: Mey Todd  CRW:[id number]  Cbgfagnes 1945  Date of evaluation: 7/6/2021  Provider: FLAVIO Collado CNP      Chief Complaint:    Chief Complaint   Patient presents with    Shortness of Breath     onset 3 days ago. acute on chronic.   history of COPD.  pt denies cough. pt denies fever         Nursing Notes, Past Medical Hx, Past Surgical Hx, Social Hx, Allergies, and Family Hx were all reviewed and agreed with or any disagreements were addressed in the HPI.    HPI: (Location, Duration, Timing, Severity, Quality, Assoc Sx, Context, Modifying factors)    Chief Complaint of SOB    This is a  68 y.o. male with PMH significant for CAD on Eliquis, CHF, COPD, HTN, PAF, and DMII who presents to the emergency department today complaining of shortness of breath, cough, chills, and fatigue. Onset was 3 days ago. The context is that the symptoms started spontaneously and have been worsening. Cough is productive to yellow sputum in the morning when drinking coffee. No chest pain.     PastMedical/Surgical History:      Diagnosis Date    CAD (coronary artery disease)     CABG in 2009    Chronic back pain     Chronic systolic CHF (congestive heart failure) (HCC)     COPD (chronic obstructive pulmonary disease) (HCC)     Diabetes mellitus ([address]     Essential hypertension     Paroxysmal atrial fibrillation ([address]     On Coumadin         Procedure Laterality Date    BACK SURGERY  1995    BACK SURGERY      CARDIAC SURGERY  2007    CERVICAL SPINE SURGERY      CORONARY ARTERY BYPASS GRAFT      FRACTURE SURGERY Left 1988    Shoulder       Medications:  Previous Medications    ALBUTEROL (PROVENTIL) (2.5 MG/3ML) 0.083% NEBULIZER SOLUTION    USE 1 VIAL IN NEBULIZER 4 TIMES DAILY    ALBUTEROL SULFATE  (90 BASE) MCG/ACT INHALER    Inhale 2 puffs into the lungs every 4 hours as needed for Wheezing    APIXABAN (ELIQUIS) 5 MG TABS TABLET    Take 1 tablet by mouth 2 times daily    ASPIRIN 81 MG TABLET    Take 81 mg by mouth daily    BUDESONIDE-FORMOTEROL (SYMBICORT) 160-4.5 MCG/ACT AERO    Inhale 2 puffs into the lungs 2 times daily    CARVEDILOL (COREG) 25 MG TABLET    Take 12.5 mg by mouth 2 times daily (with meals)     FEXOFENADINE HCL (MUCINEX ALLERGY PO)    Take by mouth 2 times daily    FLUTICASONE PROPIONATE, NASAL, NA    by Nasal route    FUROSEMIDE (LASIX) 40 MG TABLET    Take 40 mg by mouth daily    GABAPENTIN (NEURONTIN) 800 MG TABLET    Take 800 mg by mouth 4 times daily    INSULIN NPH ISOPHANE & REGULAR (NOVOLIN 70/30 PENFILL SC)    Inject 45 Units into the skin 2 times daily     LIDOCAINE (XYLOCAINE) 5 % OINTMENT    Apply topically as needed for Pain Apply topically as needed. LISINOPRIL (PRINIVIL;ZESTRIL) 10 MG TABLET    Take 10 mg by mouth daily     MAGNESIUM CITRATE SOLUTION    Take 296 mLs by mouth once for 1 dose    METFORMIN (GLUCOPHAGE) 1000 MG TABLET    Take 1,000 mg by mouth 2 times daily (with meals)    POLYETHYLENE GLYCOL (GLYCOLAX) PACKET    Take 17 g by mouth daily as needed for Constipation    ROFLUMILAST (DALIRESP) 500 MCG TABLET    Take 1 tablet by mouth daily    ROPINIROLE (REQUIP) 1 MG TABLET    Take 1 mg by mouth 3 times daily    SIMVASTATIN (ZOCOR) 80 MG TABLET    Take 40 mg by mouth nightly    VITAMIN D (CHOLECALCIFEROL) 1000 UNITS TABS TABLET    Take 1,000 Units by mouth daily         Review of Systems:  (2-9 systems needed)  Review of Systems   Constitutional: Positive for chills and fatigue. Negative for diaphoresis and fever. HENT: Negative. Respiratory: Positive for cough and shortness of breath. Cardiovascular: Negative for chest pain, palpitations and leg swelling. Gastrointestinal: Negative for abdominal pain, diarrhea, nausea and vomiting.    Genitourinary: Negative for dysuria, flank pain and hematuria. Musculoskeletal: Negative for back pain, neck pain and neck stiffness. Skin: Negative for pallor and rash. Allergic/Immunologic: Negative for immunocompromised state. Neurological: Negative for dizziness and headaches. Hematological: Negative for adenopathy. Psychiatric/Behavioral: Negative for confusion. All other systems reviewed and are negative. \"Positives and Pertinent negatives as per HPI\"    Physical Exam:  Physical Exam  Vitals and nursing note reviewed. Constitutional:       General: He is not in acute distress. Appearance: Normal appearance. He is well-developed. He is not toxic-appearing. HENT:      Head: Normocephalic and atraumatic. Eyes:      General: No scleral icterus. Conjunctiva/sclera: Conjunctivae normal.   Neck:      Vascular: No JVD. Cardiovascular:      Rate and Rhythm: Normal rate and regular rhythm. Heart sounds: Normal heart sounds. Pulmonary:      Effort: Pulmonary effort is normal. No respiratory distress. Breath sounds: Wheezing and rhonchi (expiratory) present. Abdominal:      General: There is no distension. Palpations: Abdomen is soft. Abdomen is not rigid. Tenderness: There is no abdominal tenderness. Musculoskeletal:         General: Normal range of motion. Cervical back: Normal range of motion and neck supple. Skin:     General: Skin is warm and dry. Capillary Refill: Capillary refill takes less than 2 seconds. Findings: No rash. Neurological:      General: No focal deficit present. Mental Status: He is alert and oriented to person, place, and time.    Psychiatric:         Mood and Affect: Mood normal.         MEDICAL DECISION MAKING    Vitals:    Vitals:    07/06/21 1836 07/06/21 1915 07/1945 07/06/21 1948   BP: 135/69 (!) 123/57 116/66    Pulse: 97 90 99    Resp: 18 16 15    Temp: 99.3 °F (37.4 °C)      TempSrc: Oral      SpO2: 92% 93% 94%    Weight: 242 lb 4.6 oz (109.9 kg) Height:    5' 10\" (1.778 m)       LABS:  Labs Reviewed   CBC WITH AUTO DIFFERENTIAL - Abnormal; Notable for the following components:       Result Value    WBC 17.4 (*)     Neutrophils Absolute 13.9 (*)     Monocytes Absolute 1.4 (*)     All other components within normal limits    Narrative:     Performed at:  [address] [employer] [address]   Phone [phone]   BASIC METABOLIC PANEL W/ REFLEX TO MG FOR LOW K - Abnormal; Notable for the following components:    Chloride 98 (*)     Glucose 175 (*)     All other components within normal limits    Narrative:     Performed at:  [address] [address]   Phone [phone]   CULTURE, BLOOD 1   CULTURE, BLOOD 2   BRAIN NATRIURETIC PEPTIDE    Narrative:     Performed at:  [address] [employer] [address]   Phone [phone]   TROPONIN    Narrative:     Performed at:  [facility]  [address] [place] [address]   Phone [phone]   PROCALCITONIN    Narrative:     Performed at:  [address] [address]   Phone [phone]   HEPATIC FUNCTION PANEL    Narrative:     Performed at:  [address] [address]   Phone [phone]   LACTIC ACID, PLASMA        Remainder of labs reviewed and were negative at this time or not returned at the time of this note.     RADIOLOGY:   Non-plain film images such as CT, Ultrasound and MRI are read by the radiologist. FLAVIO Mccabe CNP have directly visualized the radiologic plain film image(s) with the below findings:      Interpretation per the Radiologist below, if available at the time of this note:    XR CHEST (2 VW)   Preliminary Result   Left lower lobe airspace disease, likely representing either aspiration or   pneumonia. RECOMMENDATION:   Suggest appropriate clinical treatment, and short-term chest CT follow-up in   2-4 weeks to ensure resolution of this opacity. XR CHEST (2 VW)    Result Date: 7/6/2021  EXAMINATION: TWO XRAY VIEWS OF THE CHEST 7/6/2021 5:08 pm COMPARISON: 03/16/2021 HISTORY: ORDERING SYSTEM PROVIDED HISTORY: Shortness of breath TECHNOLOGIST PROVIDED HISTORY: Reason for exam:->Shortness of breath Reason for Exam: SOB, winded during exam Acuity: Acute Type of Exam: Initial FINDINGS: Frontal and lateral views of the chest were performed. There is no acute skeletal abnormality. The patient is status post CABG. The heart size and mediastinal contours are stable, and within normal limits. There is mild pulmonary vascular congestion. There is focal airspace opacity within left lower lobe, concerning for either aspiration or pneumonia. There is no evidence of a pneumothorax. Left lower lobe airspace disease, likely representing either aspiration or pneumonia. RECOMMENDATION: Suggest appropriate clinical treatment, and short-term chest CT follow-up in 2-4 weeks to ensure resolution of this opacity.           MEDICAL DECISION MAKING / ED COURSE:      PROCEDURES:   Procedures    None    Patient was given:  Medications   cefTRIAXone (ROCEPHIN) 1000 mg IVPB in 50 mL D5W minibag (has no administration in time range)   azithromycin (ZITHROMAX) 500 mg in D5W 250ml addavial (has no administration in time range)   ipratropium-albuterol (DUONEB) nebulizer solution 2 ampule (has no administration in time range)   methylPREDNISolone sodium (SOLU-MEDROL) injection 125 mg (has no administration in time range)   0.9 % sodium chloride bolus (has no administration in time range)       Differential Diagnosis: Acute Coronary Syndrome, Congestive Heart Failure, Myocardial Infarction, Pulmonary Embolus, Pneumonia, Pneumothorax, other    Patient presents with shortness of breath cough and fever. See HPI for full presentation. Physical exam as above. 68year-old lying in bed in no acute distress. Awake alert and oriented. No neurovascular deficits. Expiratory wheezing and rhonchi. Cardiac exam normal.  Chest x-ray shows LLL pneumonia. CBC shows leukocytosis with a white count of 17 and left shift of 13 with no bandemia. Chemistry unremarkable. . Troponin negative. Emergency department course included breathing treatments and steroids. He was given sepsis fluids, and Rocephin and azithromycin to cover for community-acquired pneumonia. He will be admitted for further treatment. He was given all test results and given an opportunity to ask and have any questions answered. He was agreeable to admission. The hospitalist was consulted and agreed to meet patient and write orders. The patient tolerated their visit well. I evaluated the patient. The physician was available for consultation as needed. The patient and / or the family were informed of the results of any tests, a time was given to answer questions, a plan was proposed and they agreed with plan. CLINICAL IMPRESSION:  1. Septicemia ([facility]    2. Pneumonia of left lower lobe due to infectious organism        DISPOSITION Decision To Admit 07/06/2021 07:46:27 PM      PATIENT REFERRED TO:  No follow-up provider specified.     DISCHARGE MEDICATIONS:  New Prescriptions    No medications on file       DISCONTINUED MEDICATIONS:  Discontinued Medications    No medications on file              (Please note the MDM and HPI sections of this note were completed with a voice recognition program.  Efforts were made to edit the dictations but occasionally words are mis-transcribed.)    Electronically signed, FLAVIO Mccain CNP,           FLAVIO Mccain CNP  07/06/21 1958

## 2021-07-06 NOTE — ED NOTES
Fall risk screening completed. Fall risk bracelet applied to patient. Non-skid socks provided and placed on patient. Side rails up x2. The fall risk is indicated using  dome light . Based on score, a bed alarm was indicated and applied. The call light is within the patient's reach, and instructions for use were provided. The bed is in the lowest position with wheels locked. The patient has been advised to notify staff, using the call light, if there is a need to get up or use restroom. The patient verbalized understanding of safety precautions and how to contact staff for assistance.       Nay Ng RN  07/06/21 4693

## 2021-07-07 LAB
ANION GAP SERPL CALCULATED.3IONS-SCNC: 15 MMOL/L (ref 3–16)
BUN BLDV-MCNC: 19 MG/DL (ref 7–20)
CALCIUM SERPL-MCNC: 9.4 MG/DL (ref 8.3–10.6)
CHLORIDE BLD-SCNC: 98 MMOL/L (ref 99–110)
CO2: 22 MMOL/L (ref 21–32)
CREAT SERPL-MCNC: 1 MG/DL (ref 0.8–1.3)
EKG ATRIAL RATE: 108 BPM
EKG DIAGNOSIS: NORMAL
EKG P AXIS: 65 DEGREES
EKG P-R INTERVAL: 176 MS
EKG Q-T INTERVAL: 348 MS
EKG QRS DURATION: 98 MS
EKG QTC CALCULATION (BAZETT): 466 MS
EKG R AXIS: 55 DEGREES
EKG T AXIS: 40 DEGREES
EKG VENTRICULAR RATE: 108 BPM
GFR AFRICAN AMERICAN: >60
GFR NON-AFRICAN AMERICAN: >60
GLUCOSE BLD-MCNC: 304 MG/DL (ref 70–99)
GLUCOSE BLD-MCNC: 328 MG/DL (ref 70–99)
GLUCOSE BLD-MCNC: 328 MG/DL (ref 70–99)
GLUCOSE BLD-MCNC: 355 MG/DL (ref 70–99)
GLUCOSE BLD-MCNC: 381 MG/DL (ref 70–99)
HCT VFR BLD CALC: 42.1 % (ref 40.5–52.5)
HEMOGLOBIN: 14 G/DL (ref 13.5–17.5)
L. PNEUMOPHILA SEROGP 1 UR AG: NORMAL
LACTIC ACID: 1.4 MMOL/L (ref 0.4–2)
MCH RBC QN AUTO: 30 PG (ref 26–34)
MCHC RBC AUTO-ENTMCNC: 33.3 G/DL (ref 31–36)
MCV RBC AUTO: 90 FL (ref 80–100)
PDW BLD-RTO: 13.6 % (ref 12.4–15.4)
PERFORMED ON: ABNORMAL
PLATELET # BLD: 272 K/UL (ref 135–450)
PMV BLD AUTO: 7 FL (ref 5–10.5)
POTASSIUM REFLEX MAGNESIUM: 5 MMOL/L (ref 3.5–5.1)
PROCALCITONIN: 0.11 NG/ML (ref 0–0.15)
RBC # BLD: 4.68 M/UL (ref 4.2–5.9)
SODIUM BLD-SCNC: 135 MMOL/L (ref 136–145)
STREP PNEUMONIAE ANTIGEN, URINE: NORMAL
WBC # BLD: 9.7 K/UL (ref 4–11)

## 2021-07-07 PROCEDURE — 6360000002 HC RX W HCPCS: Performed by: NURSE PRACTITIONER

## 2021-07-07 PROCEDURE — 2580000003 HC RX 258: Performed by: NURSE PRACTITIONER

## 2021-07-07 PROCEDURE — 6370000000 HC RX 637 (ALT 250 FOR IP): Performed by: INTERNAL MEDICINE

## 2021-07-07 PROCEDURE — 6370000000 HC RX 637 (ALT 250 FOR IP): Performed by: NURSE PRACTITIONER

## 2021-07-07 PROCEDURE — 84145 PROCALCITONIN (PCT): CPT

## 2021-07-07 PROCEDURE — 2060000000 HC ICU INTERMEDIATE R&B

## 2021-07-07 PROCEDURE — 94761 N-INVAS EAR/PLS OXIMETRY MLT: CPT

## 2021-07-07 PROCEDURE — 94680 O2 UPTK RST&XERS DIR SIMPLE: CPT

## 2021-07-07 PROCEDURE — 80048 BASIC METABOLIC PNL TOTAL CA: CPT

## 2021-07-07 PROCEDURE — 85027 COMPLETE CBC AUTOMATED: CPT

## 2021-07-07 PROCEDURE — 94640 AIRWAY INHALATION TREATMENT: CPT

## 2021-07-07 PROCEDURE — 97166 OT EVAL MOD COMPLEX 45 MIN: CPT

## 2021-07-07 PROCEDURE — 83036 HEMOGLOBIN GLYCOSYLATED A1C: CPT

## 2021-07-07 PROCEDURE — 83605 ASSAY OF LACTIC ACID: CPT

## 2021-07-07 PROCEDURE — 93010 ELECTROCARDIOGRAM REPORT: CPT | Performed by: INTERNAL MEDICINE

## 2021-07-07 PROCEDURE — 97162 PT EVAL MOD COMPLEX 30 MIN: CPT

## 2021-07-07 PROCEDURE — 87449 NOS EACH ORGANISM AG IA: CPT

## 2021-07-07 PROCEDURE — 97530 THERAPEUTIC ACTIVITIES: CPT

## 2021-07-07 PROCEDURE — 36415 COLL VENOUS BLD VENIPUNCTURE: CPT

## 2021-07-07 RX ORDER — DEXTROSE MONOHYDRATE 50 MG/ML
100 INJECTION, SOLUTION INTRAVENOUS PRN
Status: DISCONTINUED | OUTPATIENT
Start: 2021-07-07 | End: 2021-07-08 | Stop reason: HOSPADM

## 2021-07-07 RX ORDER — IPRATROPIUM BROMIDE AND ALBUTEROL SULFATE 2.5; .5 MG/3ML; MG/3ML
1 SOLUTION RESPIRATORY (INHALATION) EVERY 4 HOURS PRN
Status: DISCONTINUED | OUTPATIENT
Start: 2021-07-07 | End: 2021-07-08 | Stop reason: HOSPADM

## 2021-07-07 RX ORDER — DEXTROSE MONOHYDRATE 25 G/50ML
12.5 INJECTION, SOLUTION INTRAVENOUS PRN
Status: DISCONTINUED | OUTPATIENT
Start: 2021-07-07 | End: 2021-07-08 | Stop reason: HOSPADM

## 2021-07-07 RX ORDER — NICOTINE POLACRILEX 4 MG
15 LOZENGE BUCCAL PRN
Status: DISCONTINUED | OUTPATIENT
Start: 2021-07-07 | End: 2021-07-08 | Stop reason: HOSPADM

## 2021-07-07 RX ADMIN — APIXABAN 5 MG: 5 TABLET, FILM COATED ORAL at 21:17

## 2021-07-07 RX ADMIN — BUDESONIDE AND FORMOTEROL FUMARATE DIHYDRATE 2 PUFF: 80; 4.5 AEROSOL RESPIRATORY (INHALATION) at 20:30

## 2021-07-07 RX ADMIN — INSULIN LISPRO 3 UNITS: 100 INJECTION, SOLUTION INTRAVENOUS; SUBCUTANEOUS at 12:37

## 2021-07-07 RX ADMIN — ROFLUMILAST 500 MCG: 500 TABLET ORAL at 08:01

## 2021-07-07 RX ADMIN — OXYCODONE HYDROCHLORIDE 35 MG: 15 TABLET, FILM COATED, EXTENDED RELEASE ORAL at 21:18

## 2021-07-07 RX ADMIN — INSULIN HUMAN 38 UNITS: 100 INJECTION, SUSPENSION SUBCUTANEOUS at 08:03

## 2021-07-07 RX ADMIN — Medication 10 ML: at 08:01

## 2021-07-07 RX ADMIN — BUDESONIDE AND FORMOTEROL FUMARATE DIHYDRATE 2 PUFF: 80; 4.5 AEROSOL RESPIRATORY (INHALATION) at 09:10

## 2021-07-07 RX ADMIN — GABAPENTIN 800 MG: 400 CAPSULE ORAL at 08:03

## 2021-07-07 RX ADMIN — IPRATROPIUM BROMIDE AND ALBUTEROL SULFATE 1 AMPULE: .5; 3 SOLUTION RESPIRATORY (INHALATION) at 13:00

## 2021-07-07 RX ADMIN — GABAPENTIN 800 MG: 400 CAPSULE ORAL at 16:10

## 2021-07-07 RX ADMIN — GABAPENTIN 800 MG: 400 CAPSULE ORAL at 21:23

## 2021-07-07 RX ADMIN — Medication 1000 UNITS: at 08:02

## 2021-07-07 RX ADMIN — OXYCODONE HYDROCHLORIDE 35 MG: 15 TABLET, FILM COATED, EXTENDED RELEASE ORAL at 08:02

## 2021-07-07 RX ADMIN — APIXABAN 5 MG: 5 TABLET, FILM COATED ORAL at 08:03

## 2021-07-07 RX ADMIN — CEFTRIAXONE 1000 MG: 1 INJECTION, POWDER, FOR SOLUTION INTRAMUSCULAR; INTRAVENOUS at 21:18

## 2021-07-07 RX ADMIN — ATORVASTATIN CALCIUM 20 MG: 20 TABLET, FILM COATED ORAL at 08:03

## 2021-07-07 RX ADMIN — GUAIFENESIN 600 MG: 600 TABLET ORAL at 08:01

## 2021-07-07 RX ADMIN — SODIUM CHLORIDE: 9 INJECTION, SOLUTION INTRAVENOUS at 15:34

## 2021-07-07 RX ADMIN — GABAPENTIN 800 MG: 400 CAPSULE ORAL at 12:13

## 2021-07-07 RX ADMIN — IPRATROPIUM BROMIDE AND ALBUTEROL SULFATE 1 AMPULE: .5; 3 SOLUTION RESPIRATORY (INHALATION) at 09:10

## 2021-07-07 RX ADMIN — INSULIN HUMAN 38 UNITS: 100 INJECTION, SUSPENSION SUBCUTANEOUS at 16:10

## 2021-07-07 RX ADMIN — CARVEDILOL 12.5 MG: 12.5 TABLET, FILM COATED ORAL at 08:01

## 2021-07-07 RX ADMIN — AZITHROMYCIN MONOHYDRATE 500 MG: 500 TABLET ORAL at 21:17

## 2021-07-07 RX ADMIN — ASPIRIN 81 MG: 81 TABLET, CHEWABLE ORAL at 08:01

## 2021-07-07 RX ADMIN — PREDNISONE 40 MG: 20 TABLET ORAL at 08:02

## 2021-07-07 RX ADMIN — CARVEDILOL 12.5 MG: 12.5 TABLET, FILM COATED ORAL at 16:47

## 2021-07-07 RX ADMIN — ROPINIROLE HYDROCHLORIDE 1 MG: 1 TABLET, FILM COATED ORAL at 21:17

## 2021-07-07 RX ADMIN — LISINOPRIL 30 MG: 20 TABLET ORAL at 08:02

## 2021-07-07 RX ADMIN — ROPINIROLE HYDROCHLORIDE 1 MG: 1 TABLET, FILM COATED ORAL at 08:02

## 2021-07-07 ASSESSMENT — PAIN SCALES - GENERAL
PAINLEVEL_OUTOF10: 8
PAINLEVEL_OUTOF10: 8

## 2021-07-07 NOTE — PLAN OF CARE
Problem: Falls - Risk of:  Goal: Will remain free from falls  Description: Will remain free from falls  7/7/2021 1416 by Cory Mann RN  Outcome: Ongoing     Problem: Falls - Risk of:  Goal: Absence of physical injury  Description: Absence of physical injury  7/7/2021 1416 by Cory Mann RN  Outcome: Ongoing

## 2021-07-07 NOTE — ED NOTES
O2 was taken off by JEFFRY Paredes. Pt sats currently 92% on RA.       [facility]  07/06/21 2041

## 2021-07-07 NOTE — RT PROTOCOL NOTE
RT Inhaler-Nebulizer Bronchodilator Protocol Note    There is a bronchodilator order in the chart from a provider indicating to follow the RT Bronchodilator Protocol and there is an Initiate RT Bronchodilator Protocol order as well (see protocol at bottom of note). The findings from the last RT Protocol Assessment were as follows:  Smoking: >15 Pack years  Surgical Status: No surgery  Xray: One lobe infiltrates/atelectasis/pleural effusion/edema  Respiratory Pattern: RR 12-20  Mental Status: Alert and Oriented  Breath Sounds: Diminished and/or crackles  Cough: Strong, spontaneous, non-productive  Activity Level: Walking with assistance  Oxygen Requirement: Room Air - 2LNC/28% or home setting  Indication for Bronchodilator Therapy: Decreased or absent breath sounds, On home bronchodilators  Bronchodilator Assessment Score: 5    Aerosolized bronchodilator medication orders have been revised according to the RT Bronchodilator Protocol. RT Inhaler-Nebulizer Bronchodilator Protocol:    Respiratory Therapist to perform RT Therapy Protocol Assessment then follow the protocol. No Indications - adjust the frequency to every 6 hours PRN wheezing or bronchospasm, if no treatments needed after 48 hours then discontinue using Per Protocol order mode. If indication present, adjust the RT bronchodilator orders based on the Bronchodilator Assessment Score as follows:    0-6 - enter or revise RT bronchodilator order to Albuterol Inhaler order with frequency of every 2 hours PRN for wheezing or increased work of breathing using Per Protocol order mode. If Albuterol Inhaler not tolerated or not effective, then discontinue the Albuterol Inhaler order and enter Albuterol Nebulizer order with same frequency and PRN reasons. Repeat RT Therapy Protocol Assessment as needed.     7-10 - discontinue any other Inpatient aerosolized bronchodilator medication orders and enter or revise two Albuterol Inhaler orders, one with BID frequency and one with frequency of every 2 hours PRN wheezing or increased work of breathing using Per Protocol order mode. Repeat RT Therapy Protocol Assessment with second treatment then BID and as needed. If Albuterol Inhaler not tolerated or not effective, then discontinue the Albuterol Inhaler orders and enter two Albuterol Nebulizer orders with same frequencies and PRN reasons. 11-13 - discontinue any other Inpatient aerosolized bronchodilator medication orders and enter DuoNeb Nebulizer orders QID frequency and an Albuterol Nebulizer order every 2 hours PRN wheezing or increased work of breathing using Per Protocol order mode. Repeat RT Therapy Protocol Assessment with second treatment then QID and as needed. Greater than 13 - discontinue any other Inpatient bronchodilator aerosolized medication orders and enter DuoNeb Nebulizer order every 4 hours frequency and Albuterol Nebulizer every 2 hours PRN wheezing or increased work of breathing using Per Protocol order mode. Repeat RT Therapy Protocol Assessment with second treatment then every 4 hours and as needed. RT to enter RT Home Evaluation for COPD & MDI Assessment order using Per Protocol order mode.     Electronically signed by Nii Vasquez RCP on 7/7/2021 at 1:06 PM

## 2021-07-07 NOTE — CARE COORDINATION
INITIAL CASE MANAGEMENT ASSESSMENT    Reviewed chart, met with patient to assess possible discharge needs. Explained Case Management role/services. Living Situation: verified address, lives with his wife in a 2 story home, only access second floor when necessary, 2 SULY    ADLs: independent     DME: cane (several, in all cars & in house), scooter, walker    PT/OT Recs:   PT  \"Date of Service: 7/7/2021     Discharge Recommendations:  24 hour supervision or assist, Patient would benefit from continued therapy after discharge, S Level 1     Cecilia Martinez scored a 19/24 on the AM-PAC short mobility form. Current research shows that an AM-PAC score of 18 or greater is typically associated with a discharge to the patient's home setting. Based on the patient's AM-PAC score and their current functional mobility deficits, it is recommended that the patient have 2-3 sessions per week of Physical Therapy at d/c to increase the patient's independence.  At this time, this patient demonstrates the endurance and safety to discharge home with HHPT and a follow up treatment frequency of 2-3x/wk. Please see assessment section for further patient specific details. \"    OT  \"Date of Service: 7/7/2021     Discharge Recommendations:  24 hour supervision or assist, Home with assist PRN  OT Equipment Recommendations  Equipment Needed: No     Cecilia Martinez scored a 19/24 on the AM-PAC ADL Inpatient form. At this time, no further OT is recommended upon discharge due to fxl near baseline. Recommend patient returns to prior setting with prior services.  \"     Active Services: none at this time, has used [facility] in the past & if recommended for [facility]  at [place], would like them again     Transportation: active , wife will transport [place] at [place]     Medications: no barriers, [facility] [place]    PCP: Andrzej Panchal      HD/PD: na    PLAN/COMMENTS: plan is to return home, denies any needs at this time but is open to [facility]

## 2021-07-07 NOTE — ACP (ADVANCE CARE PLANNING)
ADVANCED CARE PLANNING    Name:Jorge Spivey       :  1945              MRN:  [id number]    Purpose of Encounter: Advanced care planning in light of acute and chronic deteriorating medical conditions. Parties in attendance: :Rossana Henderson MD ( myself ),wife at bedside    POA : wife at bedside    Decisional Capacity: YES      Subjective: Patient/family understand in this voluntary conversation what inteventions and plans patient would want implemented in light of the following diagnoses:    Diagnosis: PNA, uncontrolled DM    Discussion highlights: I discussed with patient advanced directives or impending END of life event in the light of above diagnosis, we discussed the needs for possible CPR, intubation/ventilator support if needed. In such an event patient wishes to remain DNR-CCA. Goals of Care Determinations: Patient wishes DNR-CCA but has interest in continuing this conversation, and discussing with family before making any changes to code status and goals of care.      Code Status: DNR-CCA    Time Spent: 17 minutes    Electronically signed by Melinda Garrison MD on 2021 at 1:53 PM

## 2021-07-07 NOTE — DISCHARGE INSTR - COC
Summary (Last 24 hours) at 2021 1302  Last data filed at 2021 1248  Gross per 24 hour   Intake 2941.5 ml   Output --   Net 2941.5 ml     I/O last 3 completed shifts: In: 2521.5 [P.O.:240;  I.V.:120; IV Piggyback:2161.5]  Out: -     Safety Concerns:     { AHSAN Safety Concerns:211065285}    Impairments/Disabilities:      {[facility] Impairments/Disabilities:614474832}    Nutrition Therapy:  Current Nutrition Therapy:   [facility] Diet List:385702441}    Routes of Feeding: {CHP DME Other Feedings:207177736}  Liquids: {Slp liquid thickness:11187}  Daily Fluid Restriction: {CHP DME Yes amt example:306482935}  Last Modified Barium Swallow with Video (Video Swallowing Test): {Done Not Done CAXR:920267981}    Treatments at the Time of Hospital Discharge:   Respiratory Treatments: ***  Oxygen Therapy:  {Therapy; copd oxygen:75425}  Ventilator:    {[facility] Vent CUBN:812429377}    Rehab Therapies: Physical Therapy, Occupational Therapy and Nurse  Weight Bearing Status/Restrictions: [facility] Weight Bearin}  Other Medical Equipment (for information only, NOT a DME order):  {EQUIPMENT:141433975}  Other Treatments: HOME HEALTH CARE: LEVEL 3 [address] [place] [address], [address] agency to establish plan of care for patient over 60 day period   Nursing  Initial home SN evaluation visit to occur within 24-48 hours for:  1)  medication management  2)  VS and clinical assessment  3)  S&S chronic disease exacerbation education + when to contact MD/NP  4)  care coordination  Medication Reconciliation during 1st SN visit  PT/OT/Speech   Evaluations in home within 24-48 hours of discharge to include DME and home safety   Frontload therapy 5 days, then 3x a week   OT to evaluate if patient has [address] needs for personal care    evaluation within 24-48 hours to evaluate resources & insurance for potential [place], [place], LTC, and Medicaid options   Palliative Care referral within 5 days of hospital discharge   PCP Visit scheduled within 3 - 7 days of hospital discharge [address] (If patient is agreeable and meets guidelines)  Heart Failure Instructions for Daily Management  Patient was treated for chronic systolic heart failure. he  will require the following:     Please weigh daily on the same scale and approximately the same time of day. Report weight gain of 3 pounds/day or 5 pounds/week to : [facility] [phone].  Please use hospital discharge weight as baseline reference.  Please monitor for signs and symptoms of and report to MD:  o Worsening Heart Failure: sudden weight gain, shortness of breath, lower extremity or general edema/swelling, abdominal bloating/swelling, inability to lie flat, intolerance to usual activity, or cough (especially at night). Report these finding even if no increase in weight.  o Dehydration:  having difficulty or a decrease in urination, dizziness, worsening fatigue, or new onset/worsening of generalized weakness.  Please continue a LOW SODIUM diet and LIMIT fluid intake to 48 - 64 ounces ( 1.5 - 2 liters) per day. Mark Dior Call [facility] [phone] and/or [address] @ [phone] with any questions or concerns.  Please continue heart failure education to patient and family/support system.  See After Visit Summary for hospital follow up appointment details.  Consider spiritual care referral for support and/or completion of advance directives [phone].  Consider: [facility]  telehealth program if patient agreeable and able to participate, palliative care consult for ongoing goals of care, end of life, and/or chronic disease management discussions and referral to [facility] [phone]) once SNF/HHC complete.       Patient's personal belongings (please select all that are sent with patient):  {CHP DME Belongings:118707376}    RN SIGNATURE:  {Esignature:572755524}    CASE MANAGEMENT/SOCIAL WORK

## 2021-07-07 NOTE — PROGRESS NOTES
Physical Therapy    Facility/Department: [address] MED SURG  Initial Assessment    NAME: Shade Carter  :   MRN: [id number]    Date of Service: 2021    Discharge Recommendations:  24 hour supervision or assist, Patient would benefit from continued therapy after discharge, S Level 1     Shade Carter scored a 19/24 on the AM-PAC short mobility form. Current research shows that an AM-PAC score of 18 or greater is typically associated with a discharge to the patient's home setting. Based on the patient's AM-PAC score and their current functional mobility deficits, it is recommended that the patient have 2-3 sessions per week of Physical Therapy at d/c to increase the patient's independence. At this time, this patient demonstrates the endurance and safety to discharge home with HHPT and a follow up treatment frequency of 2-3x/wk. Please see assessment section for further patient specific details. If patient discharges prior to next session this note will serve as a discharge summary. Please see below for the latest assessment towards goals. HOME HEALTH CARE: LEVEL 1 STANDARD     -Initial home health evaluation to occur within 24-48 hours, in patient home    -Home health agency to establish plan of care for patient over 60 day period    -Medication Reconciliation    -PCP Visit scheduled within seven days of discharge    -PT/OT to evaluate with goal of regaining prior level of functioning    -OT to evaluate if patient has [address] needs for personal care       Assessment   Body structures, Functions, Activity limitations: Decreased functional mobility ; Decreased strength;Decreased balance;Decreased safe awareness  Assessment: 68 y.o. male with PMHx of CAD, CHF, COPD, DM2, HTN, HLD and A-fib presented to [facility] on 21 with shortness of breath, cough and change in sputum production. Pt found to have pneumonia. Prior to admission, pt living with spouse in one level home with 2 SULY. Pt reports he was independent with ADLs and ambulation with RW. Pt currently functioning near his baseline. Recommend return home with initial 24hr supv and level 1 HHPT. Treatment Diagnosis: impaired mobility; impaired activity tolerance  Prognosis: Good  Decision Making: Medium Complexity  History: see below  Exam: see below  Clinical Presentation: evolving  PT Education: PT Role;Plan of Care;General Safety;Transfer Training;Functional Mobility Training;Gait Training  Barriers to Learning: impulsive  REQUIRES PT FOLLOW UP: Yes  Activity Tolerance  Activity Tolerance: Patient Tolerated treatment well       Patient Diagnosis(es): The primary encounter diagnosis was Septicemia ([facility] A diagnosis of Pneumonia of left lower lobe due to infectious organism was also pertinent to this visit. has a past medical history of CAD (coronary artery disease), Chronic back pain, Chronic systolic CHF (congestive heart failure) ([facility] COPD (chronic obstructive pulmonary disease) ([facility] [facility] Diabetes mellitus ([facility] [facility] Essential hypertension, and Paroxysmal atrial fibrillation ([facility] has a past surgical history that includes fracture surgery (Left, 1988); back surgery (1995); Cardiac surgery (2007); Coronary artery bypass graft; back surgery; and Cervical spine surgery. Restrictions  Restrictions/Precautions  Restrictions/Precautions: Fall Risk     Vision/Hearing  Vision: Impaired  Vision Exceptions: Wears glasses at all times  Hearing: Within functional limits       Subjective  General  Chart Reviewed: Yes  Patient assessed for rehabilitation services?: Yes  Additional Pertinent Hx: 68 y.o. male with PMHx of CAD, CHF, COPD, DM2, HTN, HLD and A-fib presented to [facility] on 7/6/21 with shortness of breath, cough and change in sputum production. Pt found to have pneumonia.   Response To Previous Treatment: Not applicable  Family / Caregiver Present: No  Referring Practitioner: FLAVIO Shirley - CNP  Referral Date : 07/06/21  Diagnosis: Pneumonia  Follows Commands: Within Functional Limits  Subjective  Subjective: Pt is agreeable to PT. Denies pain. Pain Screening  Patient Currently in Pain: Denies          Orientation  Orientation  Overall Orientation Status: Within Functional Limits     Social/Functional History  Social/Functional History  Lives With: Spouse  Type of Home: House  Home Layout: One level, Able to Live on Main level with bedroom/bathroom  Home Access: Stairs to enter with rails  Entrance Stairs - Number of Steps: 2  Entrance Stairs - Rails: Both  Bathroom Shower/Tub: Walk-in shower  Bathroom Toilet: Handicap height  Bathroom Equipment: Grab bars in shower, Built-in shower seat, Shower chair, Hand-held shower, Grab bars around toilet  Bathroom Accessibility: Wheelchair accessible  Home Equipment: Lift chair, Rolling walker, Cane, 4 wheeled walker, Electric scooter (scooter)  ADL Assistance: Independent  Homemaking Assistance: Needs assistance (wife)  Ambulation Assistance:  (combination of RW, cane in home. scooter outside home)  Active : Yes (very little)  Occupation: Retired  Type of occupation: ,   Additional Comments: fall \"a long time ago\"     Cognition   Cognition  Overall Cognitive Status: Exceptions  Cognition Comment: decreased safety awareness; impulsive    Objective  Strength RLE  Strength RLE: Exception  Comment: mild generalized weakness  Strength LLE  Strength LLE: WFL  Motor Control  Gross Motor?: WFL     Bed mobility  Supine to Sit: Unable to assess  Sit to Supine: Unable to assess  Comment: In recliner at beginning and end of session.   Transfers  Sit to Stand: Contact guard assistance;Minimal Assistance  Stand to sit: Contact guard assistance;Minimal Assistance  Ambulation  Ambulation?: Yes  Ambulation 1  Surface: level tile  Device: Rolling Walker  Assistance: Contact guard assistance  Quality of Gait: R foot drag (pt reports chronic)  Gait Deviations: Slow Emely;Decreased step height  Distance: 80' x 2 trials  Comments: Pt impulsive - cues for walker safety. Stairs/Curb  Stairs?: No     Balance  Posture: Fair  Sitting - Static: Good  Sitting - Dynamic: Good  Standing - Static: Fair;+ (@RW)  Standing - Dynamic: Fair;+ (@RW)        Plan   Plan  Times per week: 3-5x/week  Current Treatment Recommendations: Strengthening, Transfer Training, Neuromuscular Re-education, Patient/Caregiver Education & Training, Functional Mobility Training, Gait Training, Safety Education & Training, Balance Training  Safety Devices  Type of devices:  All fall risk precautions in place, Gait belt, Call light within reach, Patient at risk for falls, Chair alarm in place, Left in chair, Nurse notified      AM-PAC Score  AM-PAC Inpatient Mobility Raw Score : 19 (07/07/21 0814)  AM-PAC Inpatient T-Scale Score : 45.44 (07/07/21 0814)  Mobility Inpatient CMS 0-100% Score: 41.77 (07/07/21 6655)  Mobility Inpatient CMS G-Code Modifier : CK (07/07/21 0814)          Goals  Short term goals  Time Frame for Short term goals: by acute discharge  Short term goal 1: bed mobility with SBA  Short term goal 2: sit<>stand with SBA  Short term goal 3: ambulate > 48' with RW and SBA  Patient Goals   Patient goals : none stated       Therapy Time   Individual Concurrent Group Co-treatment   Time In 0715         Time Out 0805         Minutes 50         Timed Code Treatment Minutes: 40 Minutes       Nenita Henry PT

## 2021-07-07 NOTE — ED NOTES
ED SBAR report provider to floor RN. Patient to be transported to Room 4278 via stretcher by RN. Patient transported with bedside cardiac monitor and with IV medications infusing. IV site clean, dry, and intact. MEWS score and pain assessed  and documented. Updated patient and family on plan of care.      [address] [place] [address]  07/06/21 4291

## 2021-07-07 NOTE — PROGRESS NOTES
Occupational Therapy   Occupational Therapy Initial Assessment  Date: 2021   Patient Name: Rosalio Dye  MRN: [id number]     : 1945    Date of Service: 2021    Discharge Recommendations:  24 hour supervision or assist, Home with assist PRN  OT Equipment Recommendations  Equipment Needed: No    Rosalio Dye scored a 19/24 on the AM[facility] ADL Inpatient form. At this time, no further OT is recommended upon discharge due to fxl near baseline. Recommend patient returns to prior setting with prior services. Assessment   Performance deficits / Impairments: Decreased functional mobility ; Decreased safe awareness;Decreased balance;Decreased ADL status; Decreased endurance;Decreased high-level IADLs;Decreased strength  Assessment: 69 yo male admitted  for SOB with Pneumonia. PMHx of CAD, CHF, COPD, DM2, HTN, HLD and A-fib. PTA, pt reports living with wife and independence with ADL, fxl mobility with RW and cane in home and scooter outside of home, and assist with IADLs. Today, pt functioning below, yet near baseline base don above deficits. Pt with decreased safety awareness and requires Min/CGA for transfers, CGA for fxl mobility with RW with cues for RW management, and SBA seated for sock doff/donning. Pt with minimal SOB noted with ~40 feet ambulation requiring seated rest break. WFL BUE ROM for self care. Anticipate CGA/Min A with LB and seated set up for UB ADL based on PLOF, balance, endurance, cognition, and safety awareness. Cont acute OT and rec 24 hr supervision at d/t when returning home  Treatment Diagnosis: Impaired ADl and fxl mobility  Prognosis: Good  Decision Making: Low Complexity  OT Education: OT Role;Plan of Care;Transfer Training  REQUIRES OT FOLLOW UP: Yes  Activity Tolerance  Activity Tolerance: Patient Tolerated treatment well  Activity Tolerance: minimal SOB noted with ambulation  Safety Devices  Safety Devices in place: Yes  Type of devices: Nurse notified; Left in chair;Patient at risk for falls;Gait belt;Call light within reach; Chair alarm in place         Patient Diagnosis(es): The primary encounter diagnosis was Septicemia ([facility] [address] A diagnosis of Pneumonia of left lower lobe due to infectious organism was also pertinent to this visit. has a past medical history of CAD (coronary artery disease), Chronic back pain, Chronic systolic CHF (congestive heart failure) ([facility] [address] COPD (chronic obstructive pulmonary disease) ([facility] [address] [facility] Diabetes mellitus ([facility] [address] [facility] Essential hypertension, and Paroxysmal atrial fibrillation ([facility] [address] has a past surgical history that includes fracture surgery (Left, 1988); back surgery (1995); Cardiac surgery (2007); Coronary artery bypass graft; back surgery; and Cervical spine surgery. Treatment Diagnosis: Impaired ADl and fxl mobility      Restrictions  Restrictions/Precautions  Restrictions/Precautions: Fall Risk    Subjective   General  Chart Reviewed: Yes  Patient assessed for rehabilitation services?: Yes  Additional Pertinent Hx: 69 yo male admitted 7/6 for SOB with Pneumonia. PMHx of CAD, CHF, COPD, DM2, HTN, HLD and A-fib  Family / Caregiver Present: No  Referring Practitioner: FLAVIO House CNP  Diagnosis: Pneumonia  Subjective  Subjective: Pt resting in chair with PT upon arrival. Pt agreeable to OT/PT eval. Complaints of RLE shin pain.   Pain Assessment  Pain Level: 8    Social/Functional History  Social/Functional History  Lives With: Spouse  Type of Home: House  Home Layout: One level, Able to Live on Main level with bedroom/bathroom  Home Access: Stairs to enter with rails  Entrance Stairs - Number of Steps: 2  Entrance Stairs - Rails: Both  Bathroom Shower/Tub: Walk-in shower  Bathroom Toilet: Handicap height  Bathroom Equipment: Grab bars in shower, Built-in shower seat, Shower chair, Hand-held shower, Grab bars around toilet  Bathroom Accessibility: Wheelchair accessible  Home Equipment: Lift chair, Rolling walker, [place], 4 wheeled walker, Electric scooter (scooter)  ADL Assistance: [address]: Needs assistance (wife)  Ambulation Assistance:  (combination of RW, cane in home. scooter outside home)  Active : Yes (very little)  Occupation: Retired  Type of occupation: ,   Additional Comments: fall \"a long time ago\"       Objective   Vision: Impaired  Vision Exceptions: Wears glasses at all times  Hearing: Within functional limits    Orientation  Overall Orientation Status: Within Normal Limits     Balance  Sitting Balance: Supervision  Standing Balance: Contact guard assistance (PRN transfers)  Functional Mobility  Functional - Mobility Device: Rolling Walker  Activity:  (recliner>within tiwari (~40 ft)>recliner)  Assist Level: Contact guard assistance  Functional Mobility Comments: cues required for proximity of self in RW. Forward flexed posture. RLE dragging, cues to - reports baseline. Therapist manages IV pole  ADL  LE Dressing: Stand by assistance (socks)  Additional Comments: Declines further ADL. Anticipate CGA/Min A with LB and seated set up for UB ADL based on PLOF, balance, endurance, cognition, and safety awareness  Tone RUE  RUE Tone: Normotonic  Tone LUE  LUE Tone: Normotonic  Coordination  Movements Are Fluid And Coordinated: Yes     Bed mobility  Comment: ZEENAT. chair at start and EOS  Transfers  Sit to stand: Contact guard assistance;Minimal assistance (Min A from lower surface)  Stand to sit: Contact guard assistance  Transfer Comments: chair>RW. couch in tiwari>RW.  Cues for hand placement     Cognition  Overall Cognitive Status: Exceptions  Cognition Comment: decreased safety awareness        Sensation  Overall Sensation Status:  (BLE neuropathy)        LUE AROM (degrees)  LUE AROM : WFL  RUE AROM (degrees)  RUE AROM : WFL  LUE Strength  Gross LUE Strength: WFL  RUE Strength  Gross RUE Strength: WFL                 Plan   Plan  Times per week: 3-5  Times per day: Daily  Current Treatment Recommendations: Strengthening, Endurance Training, Patient/Caregiver Education & Training, ROM, Self-Care / ADL, Balance Training, Functional Mobility Training, Safety Education & Training, Positioning      AM-PAC Score        AM-PAC Inpatient Daily Activity Raw Score: 19 (07/07/21 0811)  AM-PAC Inpatient ADL T-Scale Score : 40.22 (07/07/21 0811)  ADL Inpatient CMS 0-100% Score: 42.8 (07/07/21 0811)  ADL Inpatient CMS G-Code Modifier : CK (07/07/21 0811)    Goals  Short term goals  Time Frame for Short term goals: prior to d/c  Short term goal 1: ADL tx SUP  Short term goal 2: toileting SUP  Short term goal 3: LB dressing SUP  Short term goal 4: Tolerate 5 min fxl standing task with SUP  Short term goal 5: BUE exercises in all planes to improve strength and endurance for ADL  Patient Goals   Patient goals : to get home       Therapy Time   Individual Concurrent Group Co-treatment   Time In 0725         Time Out 0805         Minutes 40         Timed Code Treatment Minutes: 25 Minutes (15 eval, 25 TA)       CAMERON Subramanian, OTR/L

## 2021-07-07 NOTE — PROGRESS NOTES
Pharmacy Medication Reconciliation Note     List of medications patient is currently taking is complete. Source of information:   1. Per conversations with patient and spouse at bedside   2. Patient had a home med list brought in from home     Notes regarding home medications:   1. Spouse reports patient had all AM meds PTA and will needs his PM meds tonight   2. Spouse reports insulin changed to Novolin N 38 units BID, Lisinpril 1.5 tabs (30 mg) QD, and patient started on Jardiance recently.   3. Patient on Eliquis for AFIB    Patient denies taking any other OTC or herbal medications    Rhoda English, Pharmacy Intern  7/6/2021  8:11 PM

## 2021-07-07 NOTE — PROGRESS NOTES
Pt admitted from the ED to room 4278. Able to transfer from the stretcher to the bed with assist. Personal scooter at the bedside. Weakness noted to bilateral lower extremities with increased pain and weakness to the right leg. Right lower leg with 3 dressings to shin area. Pt states that he \"slid out of his chair\". Dyspnea with exertion noted with ambulation. Currently on room air. Vital signs stable. Respiratory therapy notified that pt has been admitted. Oriented to room, call light and fall precautions. Pt verbalized understanding. Will continue to monitor.

## 2021-07-07 NOTE — CARE COORDINATION
Advance Care Planning     Advance Care Planning Activator (Inpatient)  Conversation Note      Date of ACP Conversation: 7/7/2021     Conversation Conducted with: Patient with Decision Making Capacity    ACP Activator: Robel Garner RN      Health Care Decision Maker:     Current Designated Health Care Decision Maker:     Primary Decision Maker: Harjeet Whitfield - [phone]  Click here to complete Healthcare Decision Makers including section of the Healthcare Decision Maker Relationship (ie \"Primary\")  Today we documented Decision Maker(s) consistent with Legal Next of Kin hierarchy. Care Preferences    Ventilation: \"If you were in your present state of health and suddenly became very ill and were unable to breathe on your own, what would your preference be about the use of a ventilator (breathing machine) if it were available to you? \"      Would the patient desire the use of ventilator (breathing machine)?: no    \"If your health worsens and it becomes clear that your chance of recovery is unlikely, what would your preference be about the use of a ventilator (breathing machine) if it were available to you? \"     Would the patient desire the use of ventilator (breathing machine)?: No      Resuscitation  \"CPR works best to restart the heart when there is a sudden event, like a heart attack, in someone who is otherwise healthy. Unfortunately, CPR does not typically restart the heart for people who have serious health conditions or who are very sick. \"    \"In the event your heart stopped as a result of an underlying serious health condition, would you want attempts to be made to restart your heart (answer \"yes\" for attempt to resuscitate) or would you prefer a natural death (answer \"no\" for do not attempt to resuscitate)? \" no       [] Yes   [] No   Educated Patient / Moshe Public regarding differences between Advance Directives and portable DNR orders.     Length of ACP Conversation in minutes:  4 min    Conversation Outcomes:  [x] ACP discussion completed  [] Existing advance directive reviewed with patient; no changes to patient's previously recorded wishes  [] New Advance Directive completed  [] Portable Do Not Rescitate prepared for Provider review and signature  [] POLST/POST/MOLST/MOST prepared for Provider review and signature      Follow-up plan:    [] Schedule follow-up conversation to continue planning  [] Referred individual to Provider for additional questions/concerns   [] Advised patient/agent/surrogate to review completed ACP document and update if needed with changes in condition, patient preferences or care setting    [] This note routed to one or more involved healthcare providers        Electronically signed by Marleny Quan RN on 7/7/21 at 10:18 AM EDT

## 2021-07-07 NOTE — PROGRESS NOTES
Hospitalist Progress Note      PCP: Marybeth Hayden    Chief Complaint. Presented to hospital for SOB    Date of Admission: 7/6/2021      Subjective:   denies chest pain, nausea, vomiting, shortness of breath, fever or chills. mention feels overall better    Medications:  Reviewed    Infusion Medications    dextrose      sodium chloride 75 mL/hr at 07/06/21 2124    sodium chloride      dextrose       Scheduled Medications    sodium chloride flush  10 mL Intravenous 2 times per day    cefTRIAXone (ROCEPHIN) IV  1,000 mg Intravenous Q24H    And    azithromycin  500 mg Oral Q24H    predniSONE  40 mg Oral Daily    apixaban  5 mg Oral BID    aspirin  81 mg Oral Daily    budesonide-formoterol  2 puff Inhalation BID    carvedilol  12.5 mg Oral BID WC    [START ON 7/8/2021] furosemide  10 mg Oral Every Other Day    gabapentin  800 mg Oral 4x daily    guaiFENesin  600 mg Oral Daily    insulin NPH  38 Units Subcutaneous BID AC    lisinopril  30 mg Oral Daily    Roflumilast  500 mcg Oral Daily    rOPINIRole  1 mg Oral BID    atorvastatin  20 mg Oral Daily    Vitamin D  1,000 Units Oral Daily    oxyCODONE  35 mg Oral 2 times per day     PRN Meds: glucose, dextrose, glucagon (rDNA), dextrose, ipratropium-albuterol, sodium chloride flush, sodium chloride, ondansetron **OR** ondansetron, magnesium hydroxide, acetaminophen **OR** acetaminophen, albuterol, lidocaine, polyethylene glycol, glucose, dextrose, glucagon (rDNA), dextrose      Intake/Output Summary (Last 24 hours) at 7/7/2021 1525  Last data filed at 7/7/2021 1248  Gross per 24 hour   Intake 2941.5 ml   Output --   Net 2941.5 ml       Physical Exam Performed:    BP (!) 140/68   Pulse 82   Temp 97.8 °F (36.6 °C) (Oral)   Resp 18   Ht 5' 10\" (1.778 m)   Wt 244 lb 0.8 oz (110.7 kg)   SpO2 94%   BMI 35.02 kg/m²     General appearance: No apparent distress,   HEENT:  Conjunctivae/corneas clear. Neck: Supple, with full range of motion. Fibrillation   - currently rate controlled  - continue Eliquis     Diabetes mellitus II   - hold home po meds, Lantus, SSI and CCD     Essential (primary) hypertension   - monitor blood pressure  - continue home meds      Hyperlipidemia   - continue statin     CAD  - s/p CABG 43829  - asa and statin     Systolic CHF  - euvolemic  - last Echo 2018 EF 47-51%  - continue coreg, lasix, lisinopril  - daily weight   - I/O's    DVT Prophylaxis:  Diet: ADULT DIET; Regular; 3 carb choices (45 gm/meal);  Low Sodium (2 gm); 2000 ml  Code Status: DNR-CCA    PT/OT Eval Status: ordered    Dispo - continue above treatment plan today, likely Vikki Cam MD

## 2021-07-08 VITALS
HEART RATE: 90 BPM | TEMPERATURE: 97.7 F | SYSTOLIC BLOOD PRESSURE: 145 MMHG | DIASTOLIC BLOOD PRESSURE: 74 MMHG | RESPIRATION RATE: 18 BRPM | BODY MASS INDEX: 34.77 KG/M2 | WEIGHT: 242.9 LBS | OXYGEN SATURATION: 91 % | HEIGHT: 70 IN

## 2021-07-08 LAB
ESTIMATED AVERAGE GLUCOSE: 162.8 MG/DL
GLUCOSE BLD-MCNC: 170 MG/DL (ref 70–99)
GLUCOSE BLD-MCNC: 299 MG/DL (ref 70–99)
HBA1C MFR BLD: 7.3 %
PERFORMED ON: ABNORMAL
PERFORMED ON: ABNORMAL

## 2021-07-08 PROCEDURE — 97116 GAIT TRAINING THERAPY: CPT

## 2021-07-08 PROCEDURE — 94640 AIRWAY INHALATION TREATMENT: CPT

## 2021-07-08 PROCEDURE — 94761 N-INVAS EAR/PLS OXIMETRY MLT: CPT

## 2021-07-08 PROCEDURE — 2580000003 HC RX 258: Performed by: NURSE PRACTITIONER

## 2021-07-08 PROCEDURE — 6370000000 HC RX 637 (ALT 250 FOR IP): Performed by: NURSE PRACTITIONER

## 2021-07-08 RX ORDER — CEFUROXIME AXETIL 500 MG/1
500 TABLET ORAL 2 TIMES DAILY
Qty: 14 TABLET | Refills: 0 | Status: SHIPPED | OUTPATIENT
Start: 2021-07-08 | End: 2021-07-15

## 2021-07-08 RX ADMIN — GABAPENTIN 800 MG: 400 CAPSULE ORAL at 08:29

## 2021-07-08 RX ADMIN — Medication 10 ML: at 08:30

## 2021-07-08 RX ADMIN — GUAIFENESIN 600 MG: 600 TABLET ORAL at 08:28

## 2021-07-08 RX ADMIN — LISINOPRIL 30 MG: 20 TABLET ORAL at 08:28

## 2021-07-08 RX ADMIN — ATORVASTATIN CALCIUM 20 MG: 20 TABLET, FILM COATED ORAL at 08:29

## 2021-07-08 RX ADMIN — ASPIRIN 81 MG: 81 TABLET, CHEWABLE ORAL at 08:28

## 2021-07-08 RX ADMIN — BUDESONIDE AND FORMOTEROL FUMARATE DIHYDRATE 2 PUFF: 80; 4.5 AEROSOL RESPIRATORY (INHALATION) at 08:27

## 2021-07-08 RX ADMIN — OXYCODONE HYDROCHLORIDE 35 MG: 15 TABLET, FILM COATED, EXTENDED RELEASE ORAL at 08:28

## 2021-07-08 RX ADMIN — SODIUM CHLORIDE: 9 INJECTION, SOLUTION INTRAVENOUS at 03:53

## 2021-07-08 RX ADMIN — ROFLUMILAST 500 MCG: 500 TABLET ORAL at 08:31

## 2021-07-08 RX ADMIN — CARVEDILOL 12.5 MG: 12.5 TABLET, FILM COATED ORAL at 08:29

## 2021-07-08 RX ADMIN — APIXABAN 5 MG: 5 TABLET, FILM COATED ORAL at 08:28

## 2021-07-08 RX ADMIN — FUROSEMIDE 10 MG: 20 TABLET ORAL at 08:30

## 2021-07-08 RX ADMIN — PREDNISONE 40 MG: 20 TABLET ORAL at 08:29

## 2021-07-08 RX ADMIN — ROPINIROLE HYDROCHLORIDE 1 MG: 1 TABLET, FILM COATED ORAL at 08:29

## 2021-07-08 RX ADMIN — INSULIN HUMAN 38 UNITS: 100 INJECTION, SUSPENSION SUBCUTANEOUS at 08:31

## 2021-07-08 RX ADMIN — Medication 1000 UNITS: at 08:29

## 2021-07-08 ASSESSMENT — PAIN SCALES - GENERAL
PAINLEVEL_OUTOF10: 0
PAINLEVEL_OUTOF10: 8
PAINLEVEL_OUTOF10: 0

## 2021-07-08 NOTE — PLAN OF CARE
Problem: Falls - Risk of:  Goal: Will remain free from falls  Description: Will remain free from falls  7/8/2021 0204 by Jocelyne Cortes RN  Outcome: Ongoing  7/7/2021 1416 by Qi Terry RN  Outcome: Ongoing  Goal: Absence of physical injury  Description: Absence of physical injury  7/8/2021 0204 by Jocelyne Cortes RN  Outcome: Ongoing  7/7/2021 1416 by Qi Terry RN  Outcome: Ongoing     Problem: Discharge Planning:  Goal: Discharged to appropriate level of care  Description: Discharged to appropriate level of care  7/8/2021 0204 by Jocelyne Cortes RN  Outcome: Ongoing  7/7/2021 1416 by Qi Terry RN  Outcome: Ongoing  Goal: Participates in care planning  Description: Participates in care planning  7/8/2021 0204 by Jocelyne Cortes RN  Outcome: Ongoing  7/7/2021 1416 by Qi Terry RN  Outcome: Ongoing     Problem: Airway Clearance - Ineffective:  Goal: Clear lung sounds  Description: Clear lung sounds  7/8/2021 0204 by Jocelyne Cortes RN  Outcome: Ongoing  7/7/2021 1416 by Qi Terry RN  Outcome: Ongoing  Goal: Ability to maintain a clear airway will improve  Description: Ability to maintain a clear airway will improve  7/8/2021 0204 by Jocelyne Cortes RN  Outcome: Ongoing  7/7/2021 1416 by Qi Terry RN  Outcome: Ongoing     Problem: Fluid Volume - Deficit:  Goal: Achieves intake and output within specified parameters  Description: Achieves intake and output within specified parameters  7/8/2021 0204 by Jocelyne Cortes RN  Outcome: Ongoing  7/7/2021 1416 by Qi Terry RN  Outcome: Ongoing     Problem: Gas Exchange - Impaired:  Goal: Levels of oxygenation will improve  Description: Levels of oxygenation will improve  7/8/2021 0204 by Jocelyne Cortes RN  Outcome: Ongoing  7/7/2021 1416 by Qi Terry RN  Outcome: Ongoing     Problem: Hyperthermia:  Goal: Ability to maintain a body temperature in the normal range will improve  Description: Ability to maintain a body temperature in the normal range will improve  7/8/2021 0204 by Scottie Talbot RN  Outcome: Ongoing  7/7/2021 1416 by Easton Murray RN  Outcome: Ongoing     Problem: OXYGENATION/RESPIRATORY FUNCTION  Goal: Patient will maintain patent airway  7/8/2021 0204 by Scottie Talbot RN  Outcome: Ongoing  7/7/2021 1416 by Easton Murray RN  Outcome: Ongoing  Goal: Patient will achieve/maintain normal respiratory rate/effort  Description: Respiratory rate and effort will be within normal limits for the patient  7/8/2021 0204 by Scottie Talbot RN  Outcome: Ongoing  7/7/2021 1416 by Easton Murray RN  Outcome: Ongoing     Problem: HEMODYNAMIC STATUS  Goal: Patient has stable vital signs and fluid balance  7/8/2021 0204 by Scottie Talbot RN  Outcome: Ongoing  7/7/2021 1416 by Easton Murray RN  Outcome: Ongoing     Problem: FLUID AND ELECTROLYTE IMBALANCE  Goal: Fluid and electrolyte balance are achieved/maintained  7/8/2021 0204 by Scottie Talbot RN  Outcome: Ongoing  7/7/2021 1416 by Easton Murray RN  Outcome: Ongoing     Problem: ACTIVITY INTOLERANCE/IMPAIRED MOBILITY  Goal: Mobility/activity is maintained at optimum level for patient  7/8/2021 0204 by Scottie Talbot RN  Outcome: Ongoing  7/7/2021 1416 by Easton Murray RN  Outcome: Ongoing

## 2021-07-08 NOTE — PROGRESS NOTES
Pt refusing bed and chair alarm at this time. Informed pt of the importance of safety and alarms. Pt still refusing. Asked pt to call if he needs to get up.    Electronically signed by Malorie Nava RN on 7/7/2021 at 9:12 PM

## 2021-07-08 NOTE — PLAN OF CARE
Exchange - Impaired:  Goal: Levels of oxygenation will improve  Description: Levels of oxygenation will improve  7/8/2021 1213 by Carisa Fortune RN  Outcome: Ongoing  7/8/2021 1213 by Carisa Fortune RN  Outcome: Ongoing  7/8/2021 0204 by Joanne Rivera RN  Outcome: Ongoing     Problem: Hyperthermia:  Goal: Ability to maintain a body temperature in the normal range will improve  Description: Ability to maintain a body temperature in the normal range will improve  7/8/2021 1213 by Carisa Fortune RN  Outcome: Ongoing  7/8/2021 1213 by Carisa Fortune RN  Outcome: Ongoing  7/8/2021 0204 by Joanne Rivera RN  Outcome: Ongoing     Problem: OXYGENATION/RESPIRATORY FUNCTION  Goal: Patient will maintain patent airway  7/8/2021 1213 by Carisa Fortune RN  Outcome: Ongoing  7/8/2021 1213 by Carisa Fortune RN  Outcome: Ongoing  7/8/2021 0204 by Joanne Rivera RN  Outcome: Ongoing  Goal: Patient will achieve/maintain normal respiratory rate/effort  Description: Respiratory rate and effort will be within normal limits for the patient  7/8/2021 1213 by Carisa Fortune RN  Outcome: Ongoing  7/8/2021 1213 by Carisa Fortune RN  Outcome: Ongoing  7/8/2021 0204 by Joanne Rivera RN  Outcome: Ongoing     Problem: HEMODYNAMIC STATUS  Goal: Patient has stable vital signs and fluid balance  7/8/2021 1213 by Carisa Fortune RN  Outcome: Ongoing  7/8/2021 1213 by Carisa Fortune RN  Outcome: Ongoing  7/8/2021 0204 by Joanne Rivera RN  Outcome: Ongoing     Problem: FLUID AND ELECTROLYTE IMBALANCE  Goal: Fluid and electrolyte balance are achieved/maintained  7/8/2021 1213 by Carisa Fortune RN  Outcome: Ongoing  7/8/2021 1213 by Carisa Fortune RN  Outcome: Ongoing  7/8/2021 0204 by Joanne Rivera RN  Outcome: Ongoing     Problem: ACTIVITY INTOLERANCE/IMPAIRED MOBILITY  Goal: Mobility/activity is maintained at optimum level for patient  7/8/2021 1213 by Carisa Fortune RN  Outcome: Ongoing  7/8/2021 1213 by Carisa Fortune RN  Outcome: Ongoing  7/8/2021 0204 by Vivi Cardenas RN  Outcome: Ongoing

## 2021-07-08 NOTE — PROGRESS NOTES
Physical Therapy  Facility/Department: [address] MED SURG  Daily Treatment Note  NAME: Nayely Avila  : [birth year]  MRN: [id number]    Date of Service: 2021    Discharge Recommendations:  24 hour supervision or assist, Patient would benefit from continued therapy after discharge, S Level 1     Nayely Avila scored a 19/24 on the AM-PAC short mobility form. Current research shows that an AM-PAC score of 18 or greater is typically associated with a discharge to the patient's home setting. Based on the patient's AM-PAC score and their current functional mobility deficits, it is recommended that the patient have 2-3 sessions per week of Physical Therapy at d/c to increase the patient's independence. At this time, this patient demonstrates the endurance and safety to discharge home with HHPT and a follow up treatment frequency of 2-3x/wk. Please see assessment section for further patient specific details. If patient discharges prior to next session this note will serve as a discharge summary. Please see below for the latest assessment towards goals. HOME HEALTH CARE: LEVEL 1 STANDARD     -Initial home health evaluation to occur within 24-48 hours, in patient home    -Home health agency to establish plan of care for patient over 60 day period    -Medication Reconciliation    -PCP Visit scheduled within seven days of discharge    -PT/OT to evaluate with goal of regaining prior level of functioning    -OT to evaluate if patient has [address] needs for personal care         Assessment   Body structures, Functions, Activity limitations: Decreased functional mobility ; Decreased strength;Decreased balance;Decreased safe awareness  Assessment: 68 y.o. male with PMHx of CAD, CHF, COPD, DM2, HTN, HLD and A-fib presented to [facility] on 21 with shortness of breath, cough and change in sputum production. Pt found to have pneumonia. Prior to admission, pt living with spouse in one level home with 2 SULY.  Pt reports he was independent with ADLs and ambulation with RW. Pt currently functioning near his baseline. Recommend return home with initial 24hr supv and level 1 HHPT. Treatment Diagnosis: impaired mobility; impaired activity tolerance  Prognosis: Good  Decision Making: Medium Complexity  History: see below  Exam: see below  Clinical Presentation: evolving  PT Education: PT Role;Plan of Care;General Safety;Transfer Training;Functional Mobility Training;Gait Training  Barriers to Learning: impulsive  REQUIRES PT FOLLOW UP: Yes  Activity Tolerance  Activity Tolerance: Patient Tolerated treatment well     Patient Diagnosis(es): The primary encounter diagnosis was Septicemia ([facility] A diagnosis of Pneumonia of left lower lobe due to infectious organism was also pertinent to this visit. has a past medical history of CAD (coronary artery disease), Chronic back pain, Chronic systolic CHF (congestive heart failure) ([facility] COPD (chronic obstructive pulmonary disease) ([facility] Diabetes mellitus ([facility] Essential hypertension, and Paroxysmal atrial fibrillation ([facility] has a past surgical history that includes fracture surgery (Left, 1988); back surgery (1995); Cardiac surgery (2007); Coronary artery bypass graft; back surgery; and Cervical spine surgery. Restrictions  Restrictions/Precautions  Restrictions/Precautions: Fall Risk     Subjective   General  Chart Reviewed: Yes  Additional Pertinent Hx: 68 y.o. male with PMHx of CAD, CHF, COPD, DM2, HTN, HLD and A-fib presented to [facility] on 7/6/21 with shortness of breath, cough and change in sputum production. Pt found to have pneumonia. Response To Previous Treatment: Patient with no complaints from previous session. Family / Caregiver Present: No  Referring Practitioner: FLAVIO Bennett CNP  Subjective  Subjective: Pt is agreeable to PT. Denies pain.           Orientation  Orientation  Overall Orientation Status: Within Functional Limits     Cognition Cognition  Overall Cognitive Status: Exceptions  Cognition Comment: decreased safety awareness; impulsive     Objective   Bed mobility  Supine to Sit: Unable to assess  Sit to Supine: Unable to assess  Transfers  Sit to Stand: Stand by assistance  Stand to sit: Stand by assistance  Comment: multiple trials  Ambulation  Ambulation?: Yes  Ambulation 1  Surface: level tile  Device: Rolling Walker  Assistance: Contact guard assistance  Quality of Gait: R foot drag (pt reports chronic)  Gait Deviations: Slow Emely;Decreased step height  Distance: 80' x 2 trials  Comments: Pt impulsive - cues for walker safety. Discussed that his R foot drag is worse when he is ambulating behind walker, flexed posture - improved foot drag when at appropriate distance from RW. Stairs/Curb  Stairs?: No     Balance  Posture: Fair  Sitting - Static: Good  Sitting - Dynamic: Good  Standing - Static: Fair;+ (@RW)  Standing - Dynamic: Fair;+ (@RW)  Exercises  Straight Leg Raise: x10 reps with cues for alignment        AM-PAC Score  AM-PAC Inpatient Mobility Raw Score : 19 (07/08/21 0727)  AM-PAC Inpatient T-Scale Score : 45.44 (07/08/21 0727)  Mobility Inpatient CMS 0-100% Score: 41.77 (07/08/21 0727)  Mobility Inpatient CMS G-Code Modifier : CK (07/08/21 0727)          Goals  Short term goals  Time Frame for Short term goals: by acute discharge - all goals ongoing as of 7/8/21 unless noted otherwise  Short term goal 1: bed mobility with SBA  Short term goal 2: sit<>stand with SBA - MET 7/8  Short term goal 3: ambulate > 48' with RW and SBA  Patient Goals   Patient goals : none stated    Plan    Plan  Times per week: 3-5x/week  Current Treatment Recommendations: Strengthening, Transfer Training, Neuromuscular Re-education, Patient/Caregiver Education & Training, Functional Mobility Training, Gait Training, Safety Education & Training, Balance Training  Safety Devices  Type of devices:  All fall risk precautions in place, Gait belt, Patient at risk for falls, Left in chair, Nurse notified, Call light within reach     Therapy Time   Individual Concurrent Group Co-treatment   Time In 0700         Time Out 0725         Minutes 25         Timed Code Treatment Minutes: 25 Minutes       Dino Hill, PT

## 2021-07-08 NOTE — CARE COORDINATION
CASE MANAGEMENT DISCHARGE SUMMARY:    DISCHARGE DATE: 7/8/21    DISCHARGED TO: Home    Discharging to Facility/ Agency   · Name:  [facility]    · Address: [address] [place] [address]., [address] [place] [address]., [address] [place] [address]  · Phone: [phone]  · Fax: [phone]     Primo Reyes RN, BSN, Case Management  [phone]    Electronically signed by Primo Reyes RN on 7/8/2021 at 11:51 AM

## 2021-07-10 LAB
BLOOD CULTURE, ROUTINE: NORMAL
CULTURE, BLOOD 2: NORMAL

## 2021-07-19 ENCOUNTER — OFFICE VISIT (OUTPATIENT)
Dept: PULMONOLOGY | Age: 76
End: 2021-07-19
Payer: MEDICARE

## 2021-07-19 VITALS
BODY MASS INDEX: 34.1 KG/M2 | SYSTOLIC BLOOD PRESSURE: 130 MMHG | DIASTOLIC BLOOD PRESSURE: 64 MMHG | OXYGEN SATURATION: 94 % | WEIGHT: 238.2 LBS | HEIGHT: 70 IN | TEMPERATURE: 97.5 F | RESPIRATION RATE: 16 BRPM | HEART RATE: 74 BPM

## 2021-07-19 DIAGNOSIS — J41.0 CHRONIC BRONCHITIS, SIMPLE (HCC): ICD-10-CM

## 2021-07-19 DIAGNOSIS — J18.9 PNEUMONIA DUE TO INFECTIOUS ORGANISM, UNSPECIFIED LATERALITY, UNSPECIFIED PART OF LUNG: ICD-10-CM

## 2021-07-19 PROCEDURE — G8417 CALC BMI ABV UP PARAM F/U: HCPCS | Performed by: INTERNAL MEDICINE

## 2021-07-19 PROCEDURE — G8427 DOCREV CUR MEDS BY ELIG CLIN: HCPCS | Performed by: INTERNAL MEDICINE

## 2021-07-19 PROCEDURE — 1111F DSCHRG MED/CURRENT MED MERGE: CPT | Performed by: INTERNAL MEDICINE

## 2021-07-19 PROCEDURE — 1123F ACP DISCUSS/DSCN MKR DOCD: CPT | Performed by: INTERNAL MEDICINE

## 2021-07-19 PROCEDURE — 4040F PNEUMOC VAC/ADMIN/RCVD: CPT | Performed by: INTERNAL MEDICINE

## 2021-07-19 PROCEDURE — 1036F TOBACCO NON-USER: CPT | Performed by: INTERNAL MEDICINE

## 2021-07-19 PROCEDURE — G8926 SPIRO NO PERF OR DOC: HCPCS | Performed by: INTERNAL MEDICINE

## 2021-07-19 PROCEDURE — 3023F SPIROM DOC REV: CPT | Performed by: INTERNAL MEDICINE

## 2021-07-19 PROCEDURE — 99214 OFFICE O/P EST MOD 30 MIN: CPT | Performed by: INTERNAL MEDICINE

## 2021-07-19 ASSESSMENT — COPD QUESTIONNAIRES
QUESTION6_LEAVINGHOUSE: 0
QUESTION7_SLEEPQUALITY: 0
QUESTION8_ENERGYLEVEL: 1
QUESTION1_COUGHFREQUENCY: 1
CAT_TOTALSCORE: 4
QUESTION4_WALKINCLINE: 1
QUESTION5_HOMEACTIVITIES: 1
QUESTION3_CHESTTIGHTNESS: 0
QUESTION2_CHESTPHLEGM: 0

## 2021-07-19 NOTE — PROGRESS NOTES
REASON FOR CONSULTATION/CC:    Chief Complaint   Patient presents with    COPD        Consult at request of   [facility] for      PCP: [facility]    HISTORY OF PRESENT ILLNESS: Mey Todd is a 68y.o. year old male with a history of chronic bronchitis  who presents :                   chronic bronchitis   Recently admitted for pneumonia . No better. He is using Daliresp but this works work. Symbicort and albuterol           Objective:   PHYSICAL EXAM:  Blood pressure 130/64, pulse 74, temperature 97.5 °F (36.4 °C), temperature source Infrared, resp. rate 16, height 5' 10\" (1.778 m), weight 238 lb 3.2 oz (108 kg), SpO2 94 %.'  Body mass index is 34.18 kg/m². Gen: No distress. Eyes:    ENT:    Neck:    Resp: No accessory muscle use. No crackles. No wheezes. No rhonchi. CV: Regular rate. Regular rhythm. No murmur or rub. + edema. GI:    Skin:    Lymph:    M/S: No cyanosis. No clubbing. Neuro: Moves all four extremities. Psych: Oriented x 3. No anxiety. Awake. Alert. Intact judgement and insight. Data Reviewed:        Assessment:     ·  chronic bronchitis   · Chronic rhonitis  ·       Plan:      Problem List Items Addressed This Visit     Pneumonia     Completed antibiotics and now back to baseline    Plan to follow up CT chest at next visit to ensure clearance. Chronic bronchitis, simple ([address]      As prior, he has responded well to Daliresp with Symbicort and albuterol as needed. This note was transcribed using [id number] [address]. Please disregard any translational errors.     Valentin Ignacio Pulmonary, Sleep and [facility] [phone]

## 2021-07-20 NOTE — ASSESSMENT & PLAN NOTE
Completed antibiotics and now back to baseline    Plan to follow up CT chest at next visit to ensure clearance.

## 2021-08-09 NOTE — DISCHARGE SUMMARY
Hospital Medicine Discharge Summary    Patient ID: Martinez Pain      Patient's PCP: Rosemarie Templeton    Admit Date: 7/6/2021     Discharge Date: 7/8/2021     Admitting Physician: Juma Miles MD     Discharge Physician: Lamine Wolf MD     Discharge Diagnoses: Active Hospital Problems    Diagnosis Date Noted    Pneumonia [J18.9] 07/06/2021       The patient was seen and examined on day of discharge and this discharge summary is in conjunction with any daily progress note from day of discharge. Condition at discharge - stable    Hospital Course: patient seen and evaluated on the day of discharge. Patient informed about following up with appointments. Patient verbalized understanding for follow-up appointments. The patient and / or the family were informed of the results of tests, a time was given to answer questions, a plan was proposed and they agreed with plan. Medical reconciliation performed. Patient discharged stable condition.       Pneumonia, organism unspecified   - Pt has been started on Rocephin and Azithromycin   - Blood cultures x 2 ordered - ngtd  - Sputum culture ordered  - Procalcitonin ordered - wnl  - supplemental O2, Nebs PRN     COPD (acute exacerbation)   - Nebulizer treatments every 4 hours and every 2 hours prn, continue breathing treatments, steroids     Atrial Fibrillation   - currently rate controlled  - continue Eliquis     Diabetes mellitus II   - hold home po meds, Lantus, SSI and CCD     Essential (primary) hypertension   - monitor blood pressure  - continue home meds      Hyperlipidemia   - continue statin     CAD  - s/p CABG 50098  - asa and statin     Systolic CHF  - euvolemic  - last Echo 2018 EF 47-51%  - continue coreg, lasix, lisinopril  - daily weight   - I/O's          Exam:     BP (!) 145/74   Pulse 90   Temp 97.7 °F (36.5 °C) (Oral)   Resp 18   Ht 5' 10\" (1.778 m)   Wt 242 lb 14.4 oz (110.2 kg)   SpO2 91%   BMI 34.85 kg/m²     General appearance: No apparent distress,   HEENT:  Conjunctivae/corneas clear. Neck: Supple, with full range of motion. Respiratory:  Normal respiratory effort. Clear to auscultation, bilaterally without Rales/Wheezes/Rhonchi. Cardiovascular: Regular rate and rhythm with normal S1/S2 without murmurs or rubs  Abdomen: Soft, non-tender, non-distended, normal bowel sounds. Musculoskeletal: No cyanosis or edema bilaterally  Neurologic:  without any focal sensory/motor deficits. grossly non-focal.  Psychiatric: Alert and oriented, Normal mood  Peripheral Pulses: +2 palpable, equal bilaterally     Consults:     IP CONSULT TO HOME CARE NEEDS      Code Status:  Prior    Activity: activity as tolerated    Labs: For convenience and continuity at follow-up the following most recent labs are provided:      CBC:    Lab Results   Component Value Date    WBC 9.7 07/07/2021    HGB 14.0 07/07/2021    HCT 42.1 07/07/2021     07/07/2021       Renal:    Lab Results   Component Value Date     07/07/2021    K 5.0 07/07/2021    CL 98 07/07/2021    CO2 22 07/07/2021    BUN 19 07/07/2021    CREATININE 1.0 07/07/2021    CALCIUM 9.4 07/07/2021    PHOS 3.5 09/19/2017       Discharge Medications:     Discharge Medication List as of 7/8/2021 12:28 PM           Details   cefUROXime (CEFTIN) 500 MG tablet Take 1 tablet by mouth 2 times daily for 7 days, Disp-14 tablet, R-0Normal              Details   Multiple Vitamins-Minerals (THERAPEUTIC MULTIVITAMIN-MINERALS) tablet Take 1 tablet by mouth dailyHistorical Med      empagliflozin (JARDIANCE) 25 MG tablet Take 25 mg by mouth dailyHistorical Med      guaiFENesin (MUCINEX) 600 MG extended release tablet Take 600 mg by mouth dailyHistorical Med      insulin NPH (HUMULIN N;NOVOLIN N) 100 UNIT/ML injection vial Inject 38 Units into the skin 2 times daily (before meals)Historical Med      oxyCODONE ER (XTAMPZA ER) 36 MG C12A Take 36 mg by mouth every 12 hours. Historical Med      albuterol sulfate  (90 Base) MCG/ACT inhaler Inhale 2 puffs into the lungs every 4 hours as needed for Wheezing, Disp-1 Inhaler, R-11Normal      Roflumilast (DALIRESP) 500 MCG tablet Take 1 tablet by mouth daily, Disp-30 tablet, R-11Normal      apixaban (ELIQUIS) 5 MG TABS tablet Take 1 tablet by mouth 2 times daily, Disp-60 tablet, R-0Historical Med      lidocaine (XYLOCAINE) 5 % ointment Apply topically as needed for Pain Apply topically as needed., Topical, PRN, Historical Med      rOPINIRole (REQUIP) 1 MG tablet Take 1 mg by mouth 2 times daily Historical Med      polyethylene glycol (GLYCOLAX) packet Take 17 g by mouth daily as needed for ConstipationHistorical Med      simvastatin (ZOCOR) 80 MG tablet Take 40 mg by mouth nightlyHistorical Med      furosemide (LASIX) 20 MG tablet Take 10 mg by mouth every other day Historical Med      lisinopril (PRINIVIL;ZESTRIL) 20 MG tablet Take 30 mg by mouth daily Historical Med      vitamin D (CHOLECALCIFEROL) 1000 UNITS TABS tablet Take 1,000 Units by mouth dailyHistorical Med      gabapentin (NEURONTIN) 800 MG tablet Take 800 mg by mouth 4 times daily      aspirin 81 MG tablet Take 81 mg by mouth daily      metFORMIN (GLUCOPHAGE) 1000 MG tablet Take 1,000 mg by mouth 2 times daily (with meals)      budesonide-formoterol (SYMBICORT) 80-4.5 MCG/ACT AERO Inhale 2 puffs into the lungs 2 times daily Historical Med      carvedilol (COREG) 25 MG tablet Take 12.5 mg by mouth 2 times daily (with meals) Historical Med      albuterol (PROVENTIL) (2.5 MG/3ML) 0.083% nebulizer solution USE 1 VIAL IN NEBULIZER 4 TIMES DAILY, Disp-120 vial, R-11Normal      magnesium citrate solution Take 296 mLs by mouth once for 1 dose, Disp-296 mL, R-0Print             Time Spent on discharge is more than 30 mints in the examination, evaluation, counseling and review of medications and discharge plan.       Signed:    Chanelle Bah MD   8/8/2021      Thank you Wilson Ware for the opportunity to be involved in this patient's care. If you have any questions or concerns please feel free to contact me at [phone].

## 2021-08-25 ENCOUNTER — HOSPITAL ENCOUNTER (INPATIENT)
Age: 76
LOS: 2 days | Discharge: HOME HEALTH | DRG: 871 | End: 2021-08-27
Attending: HOSPITALIST | Admitting: HOSPITALIST
Payer: MEDICARE

## 2021-08-25 ENCOUNTER — APPOINTMENT (OUTPATIENT)
Dept: GENERAL RADIOLOGY | Age: 76
DRG: 871 | End: 2021-08-25
Payer: MEDICARE

## 2021-08-25 DIAGNOSIS — J18.9 PNEUMONIA OF BOTH LUNGS DUE TO INFECTIOUS ORGANISM, UNSPECIFIED PART OF LUNG: ICD-10-CM

## 2021-08-25 DIAGNOSIS — J18.9 PNEUMONIA DUE TO INFECTIOUS ORGANISM, UNSPECIFIED LATERALITY, UNSPECIFIED PART OF LUNG: ICD-10-CM

## 2021-08-25 DIAGNOSIS — J96.01 ACUTE RESPIRATORY FAILURE WITH HYPOXIA (HCC): Primary | ICD-10-CM

## 2021-08-25 DIAGNOSIS — A41.9 SEPTICEMIA (HCC): ICD-10-CM

## 2021-08-25 LAB
ANION GAP SERPL CALCULATED.3IONS-SCNC: 17 MMOL/L (ref 3–16)
BASOPHILS ABSOLUTE: 0.1 K/UL (ref 0–0.2)
BASOPHILS RELATIVE PERCENT: 0.4 %
BUN BLDV-MCNC: 18 MG/DL (ref 7–20)
CALCIUM SERPL-MCNC: 9.9 MG/DL (ref 8.3–10.6)
CHLORIDE BLD-SCNC: 94 MMOL/L (ref 99–110)
CO2: 24 MMOL/L (ref 21–32)
CREAT SERPL-MCNC: 1.2 MG/DL (ref 0.8–1.3)
EOSINOPHILS ABSOLUTE: 0.1 K/UL (ref 0–0.6)
EOSINOPHILS RELATIVE PERCENT: 0.7 %
GFR AFRICAN AMERICAN: >60
GFR NON-AFRICAN AMERICAN: 59
GLUCOSE BLD-MCNC: 263 MG/DL (ref 70–99)
GLUCOSE BLD-MCNC: 297 MG/DL (ref 70–99)
HCG QUALITATIVE: NEGATIVE
HCT VFR BLD CALC: 47.9 % (ref 40.5–52.5)
HEMOGLOBIN: 15.8 G/DL (ref 13.5–17.5)
LACTIC ACID: 1.8 MMOL/L (ref 0.4–2)
LACTIC ACID: 3.1 MMOL/L (ref 0.4–2)
LYMPHOCYTES ABSOLUTE: 0.7 K/UL (ref 1–5.1)
LYMPHOCYTES RELATIVE PERCENT: 5.6 %
MCH RBC QN AUTO: 30 PG (ref 26–34)
MCHC RBC AUTO-ENTMCNC: 33 G/DL (ref 31–36)
MCV RBC AUTO: 90.7 FL (ref 80–100)
MONOCYTES ABSOLUTE: 1.1 K/UL (ref 0–1.3)
MONOCYTES RELATIVE PERCENT: 8.5 %
NEUTROPHILS ABSOLUTE: 10.9 K/UL (ref 1.7–7.7)
NEUTROPHILS RELATIVE PERCENT: 84.8 %
PDW BLD-RTO: 14.4 % (ref 12.4–15.4)
PERFORMED ON: ABNORMAL
PLATELET # BLD: 281 K/UL (ref 135–450)
PMV BLD AUTO: 7 FL (ref 5–10.5)
POTASSIUM REFLEX MAGNESIUM: 4.7 MMOL/L (ref 3.5–5.1)
PRO-BNP: 139 PG/ML (ref 0–449)
PROCALCITONIN: 0.19 NG/ML (ref 0–0.15)
RBC # BLD: 5.28 M/UL (ref 4.2–5.9)
SARS-COV-2: NOT DETECTED
SODIUM BLD-SCNC: 135 MMOL/L (ref 136–145)
TROPONIN: <0.01 NG/ML
WBC # BLD: 12.8 K/UL (ref 4–11)

## 2021-08-25 PROCEDURE — 6360000002 HC RX W HCPCS: Performed by: PHYSICIAN ASSISTANT

## 2021-08-25 PROCEDURE — 84703 CHORIONIC GONADOTROPIN ASSAY: CPT

## 2021-08-25 PROCEDURE — 93005 ELECTROCARDIOGRAM TRACING: CPT | Performed by: EMERGENCY MEDICINE

## 2021-08-25 PROCEDURE — 83605 ASSAY OF LACTIC ACID: CPT

## 2021-08-25 PROCEDURE — 2580000003 HC RX 258: Performed by: PHYSICIAN ASSISTANT

## 2021-08-25 PROCEDURE — 71046 X-RAY EXAM CHEST 2 VIEWS: CPT

## 2021-08-25 PROCEDURE — 83880 ASSAY OF NATRIURETIC PEPTIDE: CPT

## 2021-08-25 PROCEDURE — 6370000000 HC RX 637 (ALT 250 FOR IP): Performed by: HOSPITALIST

## 2021-08-25 PROCEDURE — 84145 PROCALCITONIN (PCT): CPT

## 2021-08-25 PROCEDURE — 6370000000 HC RX 637 (ALT 250 FOR IP): Performed by: NURSE PRACTITIONER

## 2021-08-25 PROCEDURE — 94640 AIRWAY INHALATION TREATMENT: CPT

## 2021-08-25 PROCEDURE — 96361 HYDRATE IV INFUSION ADD-ON: CPT

## 2021-08-25 PROCEDURE — 6370000000 HC RX 637 (ALT 250 FOR IP): Performed by: PHYSICIAN ASSISTANT

## 2021-08-25 PROCEDURE — 2700000000 HC OXYGEN THERAPY PER DAY

## 2021-08-25 PROCEDURE — U0003 INFECTIOUS AGENT DETECTION BY NUCLEIC ACID (DNA OR RNA); SEVERE ACUTE RESPIRATORY SYNDROME CORONAVIRUS 2 (SARS-COV-2) (CORONAVIRUS DISEASE [COVID-19]), AMPLIFIED PROBE TECHNIQUE, MAKING USE OF HIGH THROUGHPUT TECHNOLOGIES AS DESCRIBED BY CMS-2020-01-R: HCPCS

## 2021-08-25 PROCEDURE — 80048 BASIC METABOLIC PNL TOTAL CA: CPT

## 2021-08-25 PROCEDURE — 1200000000 HC SEMI PRIVATE

## 2021-08-25 PROCEDURE — 99283 EMERGENCY DEPT VISIT LOW MDM: CPT

## 2021-08-25 PROCEDURE — 84484 ASSAY OF TROPONIN QUANT: CPT

## 2021-08-25 PROCEDURE — 83036 HEMOGLOBIN GLYCOSYLATED A1C: CPT

## 2021-08-25 PROCEDURE — 85025 COMPLETE CBC W/AUTO DIFF WBC: CPT

## 2021-08-25 PROCEDURE — U0005 INFEC AGEN DETEC AMPLI PROBE: HCPCS

## 2021-08-25 PROCEDURE — 87040 BLOOD CULTURE FOR BACTERIA: CPT

## 2021-08-25 PROCEDURE — 94760 N-INVAS EAR/PLS OXIMETRY 1: CPT

## 2021-08-25 PROCEDURE — 2580000003 HC RX 258: Performed by: HOSPITALIST

## 2021-08-25 PROCEDURE — 96365 THER/PROPH/DIAG IV INF INIT: CPT

## 2021-08-25 RX ORDER — ALBUTEROL SULFATE 2.5 MG/3ML
2.5 SOLUTION RESPIRATORY (INHALATION) EVERY 4 HOURS PRN
Status: DISCONTINUED | OUTPATIENT
Start: 2021-08-25 | End: 2021-08-27 | Stop reason: HOSPADM

## 2021-08-25 RX ORDER — GUAIFENESIN 600 MG/1
600 TABLET, EXTENDED RELEASE ORAL DAILY
Status: DISCONTINUED | OUTPATIENT
Start: 2021-08-26 | End: 2021-08-27 | Stop reason: HOSPADM

## 2021-08-25 RX ORDER — SODIUM CHLORIDE 0.9 % (FLUSH) 0.9 %
10 SYRINGE (ML) INJECTION PRN
Status: DISCONTINUED | OUTPATIENT
Start: 2021-08-25 | End: 2021-08-27 | Stop reason: HOSPADM

## 2021-08-25 RX ORDER — VITAMIN B COMPLEX
1000 TABLET ORAL DAILY
Status: DISCONTINUED | OUTPATIENT
Start: 2021-08-26 | End: 2021-08-27 | Stop reason: HOSPADM

## 2021-08-25 RX ORDER — ROPINIROLE 1 MG/1
1 TABLET, FILM COATED ORAL 2 TIMES DAILY
Status: DISCONTINUED | OUTPATIENT
Start: 2021-08-25 | End: 2021-08-25

## 2021-08-25 RX ORDER — ACETAMINOPHEN 650 MG/1
650 SUPPOSITORY RECTAL EVERY 6 HOURS PRN
Status: DISCONTINUED | OUTPATIENT
Start: 2021-08-25 | End: 2021-08-27 | Stop reason: HOSPADM

## 2021-08-25 RX ORDER — M-VIT,TX,IRON,MINS/CALC/FOLIC 27MG-0.4MG
1 TABLET ORAL DAILY
Status: DISCONTINUED | OUTPATIENT
Start: 2021-08-26 | End: 2021-08-27 | Stop reason: HOSPADM

## 2021-08-25 RX ORDER — ROPINIROLE 1 MG/1
1 TABLET, FILM COATED ORAL ONCE
Status: DISCONTINUED | OUTPATIENT
Start: 2021-08-25 | End: 2021-08-25 | Stop reason: SDUPTHER

## 2021-08-25 RX ORDER — DEXTROSE MONOHYDRATE 50 MG/ML
100 INJECTION, SOLUTION INTRAVENOUS PRN
Status: DISCONTINUED | OUTPATIENT
Start: 2021-08-25 | End: 2021-08-27 | Stop reason: HOSPADM

## 2021-08-25 RX ORDER — DEXTROSE MONOHYDRATE 25 G/50ML
12.5 INJECTION, SOLUTION INTRAVENOUS PRN
Status: DISCONTINUED | OUTPATIENT
Start: 2021-08-25 | End: 2021-08-27 | Stop reason: HOSPADM

## 2021-08-25 RX ORDER — POLYETHYLENE GLYCOL 3350 17 G/17G
17 POWDER, FOR SOLUTION ORAL DAILY PRN
Status: DISCONTINUED | OUTPATIENT
Start: 2021-08-25 | End: 2021-08-27 | Stop reason: HOSPADM

## 2021-08-25 RX ORDER — 0.9 % SODIUM CHLORIDE 0.9 %
1000 INTRAVENOUS SOLUTION INTRAVENOUS ONCE
Status: COMPLETED | OUTPATIENT
Start: 2021-08-25 | End: 2021-08-25

## 2021-08-25 RX ORDER — BUDESONIDE AND FORMOTEROL FUMARATE DIHYDRATE 160; 4.5 UG/1; UG/1
2 AEROSOL RESPIRATORY (INHALATION) 2 TIMES DAILY
Status: DISCONTINUED | OUTPATIENT
Start: 2021-08-25 | End: 2021-08-27 | Stop reason: HOSPADM

## 2021-08-25 RX ORDER — ONDANSETRON 4 MG/1
4 TABLET, ORALLY DISINTEGRATING ORAL EVERY 8 HOURS PRN
Status: DISCONTINUED | OUTPATIENT
Start: 2021-08-25 | End: 2021-08-27 | Stop reason: HOSPADM

## 2021-08-25 RX ORDER — ALBUTEROL SULFATE 90 UG/1
2 AEROSOL, METERED RESPIRATORY (INHALATION) EVERY 4 HOURS PRN
Status: DISCONTINUED | OUTPATIENT
Start: 2021-08-25 | End: 2021-08-26

## 2021-08-25 RX ORDER — ROPINIROLE 0.5 MG/1
1 TABLET, FILM COATED ORAL ONCE
Status: COMPLETED | OUTPATIENT
Start: 2021-08-25 | End: 2021-08-25

## 2021-08-25 RX ORDER — ALBUTEROL SULFATE 2.5 MG/3ML
2.5 SOLUTION RESPIRATORY (INHALATION)
Status: DISCONTINUED | OUTPATIENT
Start: 2021-08-25 | End: 2021-08-25

## 2021-08-25 RX ORDER — ROPINIROLE 1 MG/1
2 TABLET, FILM COATED ORAL NIGHTLY
Status: DISCONTINUED | OUTPATIENT
Start: 2021-08-25 | End: 2021-08-27 | Stop reason: HOSPADM

## 2021-08-25 RX ORDER — ALBUTEROL SULFATE 90 UG/1
2 AEROSOL, METERED RESPIRATORY (INHALATION)
Status: DISCONTINUED | OUTPATIENT
Start: 2021-08-26 | End: 2021-08-26

## 2021-08-25 RX ORDER — INSULIN GLARGINE 100 [IU]/ML
43 INJECTION, SOLUTION SUBCUTANEOUS NIGHTLY
Status: DISCONTINUED | OUTPATIENT
Start: 2021-08-25 | End: 2021-08-27 | Stop reason: HOSPADM

## 2021-08-25 RX ORDER — CARVEDILOL 6.25 MG/1
12.5 TABLET ORAL 2 TIMES DAILY WITH MEALS
Status: DISCONTINUED | OUTPATIENT
Start: 2021-08-25 | End: 2021-08-27 | Stop reason: HOSPADM

## 2021-08-25 RX ORDER — ASPIRIN 81 MG/1
81 TABLET, CHEWABLE ORAL DAILY
Status: DISCONTINUED | OUTPATIENT
Start: 2021-08-26 | End: 2021-08-27 | Stop reason: HOSPADM

## 2021-08-25 RX ORDER — NICOTINE POLACRILEX 4 MG
15 LOZENGE BUCCAL PRN
Status: DISCONTINUED | OUTPATIENT
Start: 2021-08-25 | End: 2021-08-27 | Stop reason: HOSPADM

## 2021-08-25 RX ORDER — ACETAMINOPHEN 325 MG/1
650 TABLET ORAL EVERY 6 HOURS PRN
Status: DISCONTINUED | OUTPATIENT
Start: 2021-08-25 | End: 2021-08-27 | Stop reason: HOSPADM

## 2021-08-25 RX ORDER — SODIUM CHLORIDE 9 MG/ML
25 INJECTION, SOLUTION INTRAVENOUS PRN
Status: DISCONTINUED | OUTPATIENT
Start: 2021-08-25 | End: 2021-08-27 | Stop reason: HOSPADM

## 2021-08-25 RX ORDER — POLYETHYLENE GLYCOL 3350 17 G/17G
17 POWDER, FOR SOLUTION ORAL DAILY PRN
Status: DISCONTINUED | OUTPATIENT
Start: 2021-08-25 | End: 2021-08-25 | Stop reason: SDUPTHER

## 2021-08-25 RX ORDER — SODIUM CHLORIDE 0.9 % (FLUSH) 0.9 %
10 SYRINGE (ML) INJECTION EVERY 12 HOURS SCHEDULED
Status: DISCONTINUED | OUTPATIENT
Start: 2021-08-25 | End: 2021-08-27 | Stop reason: HOSPADM

## 2021-08-25 RX ORDER — GABAPENTIN 800 MG/1
800 TABLET ORAL 4 TIMES DAILY
Status: DISCONTINUED | OUTPATIENT
Start: 2021-08-25 | End: 2021-08-25

## 2021-08-25 RX ORDER — ONDANSETRON 2 MG/ML
4 INJECTION INTRAMUSCULAR; INTRAVENOUS EVERY 6 HOURS PRN
Status: DISCONTINUED | OUTPATIENT
Start: 2021-08-25 | End: 2021-08-27 | Stop reason: HOSPADM

## 2021-08-25 RX ORDER — GABAPENTIN 400 MG/1
800 CAPSULE ORAL 3 TIMES DAILY
Status: DISCONTINUED | OUTPATIENT
Start: 2021-08-26 | End: 2021-08-27 | Stop reason: HOSPADM

## 2021-08-25 RX ORDER — ATORVASTATIN CALCIUM 10 MG/1
10 TABLET, FILM COATED ORAL NIGHTLY
Status: DISCONTINUED | OUTPATIENT
Start: 2021-08-25 | End: 2021-08-27 | Stop reason: HOSPADM

## 2021-08-25 RX ORDER — ALBUTEROL SULFATE 2.5 MG/3ML
2.5 SOLUTION RESPIRATORY (INHALATION)
Status: DISCONTINUED | OUTPATIENT
Start: 2021-08-25 | End: 2021-08-27 | Stop reason: HOSPADM

## 2021-08-25 RX ORDER — BUDESONIDE AND FORMOTEROL FUMARATE DIHYDRATE 80; 4.5 UG/1; UG/1
2 AEROSOL RESPIRATORY (INHALATION) 2 TIMES DAILY
Status: DISCONTINUED | OUTPATIENT
Start: 2021-08-25 | End: 2021-08-25

## 2021-08-25 RX ORDER — SODIUM CHLORIDE 9 MG/ML
INJECTION, SOLUTION INTRAVENOUS CONTINUOUS
Status: DISCONTINUED | OUTPATIENT
Start: 2021-08-25 | End: 2021-08-25

## 2021-08-25 RX ADMIN — CARVEDILOL 12.5 MG: 6.25 TABLET, FILM COATED ORAL at 21:18

## 2021-08-25 RX ADMIN — ATORVASTATIN CALCIUM 10 MG: 10 TABLET, FILM COATED ORAL at 21:18

## 2021-08-25 RX ADMIN — APIXABAN 5 MG: 5 TABLET, FILM COATED ORAL at 21:18

## 2021-08-25 RX ADMIN — SODIUM CHLORIDE: 9 INJECTION, SOLUTION INTRAVENOUS at 20:53

## 2021-08-25 RX ADMIN — INSULIN GLARGINE 43 UNITS: 100 INJECTION, SOLUTION SUBCUTANEOUS at 21:21

## 2021-08-25 RX ADMIN — ROPINIROLE HYDROCHLORIDE 1 MG: 0.5 TABLET, FILM COATED ORAL at 18:48

## 2021-08-25 RX ADMIN — SODIUM CHLORIDE 1000 ML: 9 INJECTION, SOLUTION INTRAVENOUS at 17:57

## 2021-08-25 RX ADMIN — Medication 2 PUFF: at 21:25

## 2021-08-25 RX ADMIN — ROPINIROLE HYDROCHLORIDE 2 MG: 1 TABLET, FILM COATED ORAL at 21:18

## 2021-08-25 RX ADMIN — OXYCODONE HYDROCHLORIDE 40 MG: 15 TABLET, FILM COATED, EXTENDED RELEASE ORAL at 21:18

## 2021-08-25 RX ADMIN — Medication 2 PUFF: at 21:30

## 2021-08-25 RX ADMIN — Medication 1500 MG: at 18:55

## 2021-08-25 RX ADMIN — CEFEPIME HYDROCHLORIDE 2000 MG: 2 INJECTION, POWDER, FOR SOLUTION INTRAVENOUS at 17:15

## 2021-08-25 ASSESSMENT — PAIN DESCRIPTION - PAIN TYPE
TYPE: CHRONIC PAIN
TYPE: CHRONIC PAIN

## 2021-08-25 ASSESSMENT — ENCOUNTER SYMPTOMS
VOMITING: 0
COUGH: 1
ABDOMINAL PAIN: 0
NAUSEA: 0
SHORTNESS OF BREATH: 1
EYES NEGATIVE: 1

## 2021-08-25 ASSESSMENT — PAIN SCALES - GENERAL
PAINLEVEL_OUTOF10: 9
PAINLEVEL_OUTOF10: 4
PAINLEVEL_OUTOF10: 7
PAINLEVEL_OUTOF10: 0

## 2021-08-25 ASSESSMENT — PAIN DESCRIPTION - LOCATION
LOCATION: LEG
LOCATION: LEG

## 2021-08-25 ASSESSMENT — PAIN DESCRIPTION - ORIENTATION
ORIENTATION: RIGHT;LEFT
ORIENTATION: RIGHT;LEFT

## 2021-08-25 NOTE — ED PROVIDER NOTES
[address]  [address] [address]  Dept: [phone]  Loc: [phone]  eMERGENCYdEPARTMENT eNCOUnter      Pt Name: Mariam Babin  MRN: [id number]  Cbgfagnes 1945  Date of evaluation: 8/25/2021  Provider:Enid Engle PA-C    CHIEF COMPLAINT       Chief Complaint   Patient presents with    Shortness of Breath     States he has pneumonia, has not been seen for this yet, having SOB for a few days        CRITICAL CARE TIME   Total Critical Care time was 34 minutes, excluding separately reportable procedures. There was a high probability of clinically significant/life threatening deterioration in the patient's condition which required my urgentintervention. HISTORY OF PRESENT ILLNESS  (Location/Symptom, Timing/Onset, Context/Setting, Quality, Duration,Modifying Factors, Severity.)   Mariam Babin is a 68 y.o. male with past medical history of hypertension, hyperlipidemia, type 2 diabetes, COPD, CAD, who presents to the emergency department by private vehicle for concern of pneumonia. Patient states 3 days ago he began with cough, fatigue, shortness of breath and generalized ill feeling. Symptoms have progressively increased in severity since onset. Patient states he can barely stand up without support given severity of fatigue. Cough is productive. He denies any chest pain. Symptoms consistent previous episodes of pneumonia. Patient fully vaccine against COVID-19. No known sick contacts. He did have all his lower teeth extracted on 8/19/2021, he states he was conscious for procedure. No reported complications following extraction. He was admitted for sepsis and pneumonia here at [facility] from 7/6/2020 127/8/2021. He was treated with azithromycin and ceftriaxone at previous  hospitalization. Nursing Notes were reviewedand agreed with or any disagreements were addressed in the HPI.     REVIEW OF SYSTEMS    (2-9 systems for level 4, 10 or more for level 5)     Review of Systems   Constitutional: Positive for activity change and fatigue. Negative for chills and fever. HENT: Negative. Eyes: Negative. Respiratory: Positive for cough and shortness of breath. Cardiovascular: Negative for chest pain. Gastrointestinal: Negative for abdominal pain, nausea and vomiting. Genitourinary: Negative. Musculoskeletal: Negative for arthralgias and myalgias. Neurological: Negative for dizziness and light-headedness. Psychiatric/Behavioral: Negative for behavioral problems and confusion. Except as noted above the remainder of the review of systems was reviewed and negative. PAST MEDICAL HISTORY         Diagnosis Date    CAD (coronary artery disease)     CABG in     Chronic back pain     Chronic systolic CHF [facility]congestive heart failure) ([facility]     COPD (chronic obstructive pulmonary disease) ([facility]     Diabetes mellitus ([address]     Essential hypertension     Paroxysmal atrial fibrillation [address] [address]     On Coumadin       SURGICAL HISTORY           Procedure Laterality Date    BACK SURGERY      BACK SURGERY      CARDIAC SURGERY      CERVICAL SPINE SURGERY      CORONARY ARTERY BYPASS GRAFT      FRACTURE SURGERY Left 1988    Shoulder       CURRENT MEDICATIONS     [unfilled]    ALLERGIES     No known allergies    FAMILY HISTORY           Problem Relation Age of Onset    Brain Cancer Mother     Heart Attack Father     Heart Attack Brother      Family Status   Relation Name Status    Mother      Father      Brother  Alive        SOCIAL HISTORY      reports that he quit smoking about 25 years ago. He has a 18.00 pack-year smoking history. He has never used smokeless tobacco. He reports that he does not drink alcohol and does not use drugs.     PHYSICAL EXAM    (up to 7 for level 4, 8 or more for level 5)     ED Triage Vitals   Enc Vitals Group      BP 21 1532 (!) 145/78      Pulse 08/25/21 1532 119      Resp 08/25/21 1532 22      Temp 08/25/21 1532 99.4 °F (37.4 °C)      Temp Source 08/25/21 1532 Temporal      SpO2 08/25/21 1532 96 %      Weight 08/25/21 1815 238 lb 8.6 oz (108.2 kg)      Height 08/25/21 1815 5' 10\" (1.778 m)      Head Circumference --       Peak Flow --       Pain Score --       Pain Loc --       Pain Edu? --       Excl. in [address]? --         Physical Exam  Vitals reviewed. Constitutional:       Appearance: He is well-developed. HENT:      Head: Normocephalic and atraumatic. Cardiovascular:      Rate and Rhythm: Regular rhythm. Tachycardia present. Pulmonary:      Breath sounds: No stridor. No wheezing. Comments: Tachypneic  Chest:      Chest wall: No tenderness. Abdominal:      Palpations: Abdomen is soft. Tenderness: There is no abdominal tenderness. There is no guarding or rebound. Musculoskeletal:         General: Normal range of motion. Cervical back: Normal range of motion and neck supple. Skin:     General: Skin is warm. Neurological:      General: No focal deficit present. Mental Status: He is alert and oriented to person, place, and time.    Psychiatric:         Mood and Affect: Mood normal.         Behavior: Behavior normal.           DIAGNOSTIC RESULTS     EKG: All EKG's are interpreted by the Emergency Department Physician who either signs or Co-signs this chart in the absence of a cardiologist.    RADIOLOGY:   Non-plain film images such as CT, Ultrasound and MRI are read by the radiologist. Plain radiographic images are visualized and preliminarilyinterpreted by the emergency physician with the below findings:    Interpretation per the Radiologist below,if available at the time of this note:    XR CHEST (2 VW)   Final Result   Bibasilar airspace opacities favored to represent pneumonia               LABS:  Labs Reviewed   CBC WITH AUTO DIFFERENTIAL - Abnormal; Notable for the following components: Result Value    WBC 12.8 (*)     Neutrophils Absolute 10.9 (*)     Lymphocytes Absolute 0.7 (*)     All other components within normal limits    Narrative:     Performed at:  [facility]  [address] [facility] [place] [address]   Phone [phone]   BASIC METABOLIC PANEL W/ REFLEX TO MG FOR LOW K - Abnormal; Notable for the following components:    Sodium 135 (*)     Chloride 94 (*)     Anion Gap 17 (*)     Glucose 263 (*)     GFR Non- 59 (*)     All other components within normal limits    Narrative:     Performed at:  [facility]  [address]   Phone [phone]   LACTIC ACID, PLASMA - Abnormal; Notable for the following components:    Lactic Acid 3.1 (*)     All other components within normal limits    Narrative:     Performed at:  [facility]  [address] [facility] [place] [address]   Phone [phone]   PROCALCITONIN - Abnormal; Notable for the following components:    Procalcitonin 0.19 (*)     All other components within normal limits    Narrative:     Performed at:  [facility]  [address] [facility] [place] [address]   Phone [phone]   CULTURE, BLOOD 1   CULTURE, BLOOD 2   CULTURE, RESPIRATORY   RAPID INFLUENZA A/B ANTIGENS   LEGIONELLA ANTIGEN, URINE   STREP PNEUMONIAE ANTIGEN   HCG, SERUM, QUALITATIVE    Narrative:     Performed at:  [place] [address] [facility] [address] [address]   Phone [phone]   BRAIN NATRIURETIC PEPTIDE    Narrative:     Performed at:  [facility]  [address] [place], [place] [address] [address]   Phone [phone]   TROPONIN    Narrative:     Performed at:  [facility]  [address] [place], [place] [address]   Phone [phone]   LACTIC ACID, PLASMA    Narrative: Performed at:  [facility]  [address] [facility] [address] [address]   Phone [phone]   [id number]   BASIC METABOLIC PANEL W/ REFLEX TO MG FOR LOW K   PROCALCITONIN   CBC       All other labs were within normal range or not returned as of this dictation. EMERGENCY DEPARTMENT COURSE and DIFFERENTIAL DIAGNOSIS/MDM:   Vitals:    Vitals:    08/25/21 1815 08/25/21 1900 08/25/21 1930 08/1945   BP: 135/69 138/62 112/72 133/63   Pulse: 109 102 97 103   Resp: 17 19 18 21   Temp:       TempSrc:       SpO2: 93% 94% 94% 94%   Weight: 238 lb 8.6 oz (108.2 kg)      Height: 5' 10\" (1.778 m)          MDM     Patient presents ED with HPI noted above. Work-up in the ED found patient to have pneumonia. Chest x-ray showed bibasilar airspace disease. Symptoms and history consistent with pneumonia. Patient was hospitalized in early July for sepsis and pneumonia as well. Patient started on IV vancomycin and cefepime in the ED. He does meet sepsis criteria. Lactic acid 3.1, WBC 12.8. Patient tachycardic and tachypneic. Blood cultures obtained and pending. Procalcitonin 0.19. Patient fully vaccine against COVID-19. Did obtain Covid PCR. Cardiac work-up obtained. EKG showed no STEMI. Troponin less than 0.01. BNP normal.  Patient with no chest pain. Remainder of labs as above. Patient be admitted for acute respiratory failure with hypoxia, pneumonia, sepsis. Dr. Waylon Hicks kindly admitted patient. CONSULTS:  PHARMACY TO DOSE VANCOMYCIN    PROCEDURES:  Procedures    FINAL IMPRESSION      1. Acute respiratory failure with hypoxia ([address]    2. Septicemia ([address]    3. Pneumonia of both lungs due to infectious organism, unspecified part of lung          DISPOSITION/PLAN   @[facility]@    PATIENT REFERRED TO:  No follow-up provider specified.     DISCHARGE MEDICATIONS:  New Prescriptions    No medications on file       (Please note that portions of this note were completed with a voice recognition program.  Efforts were made to edit the dictations but occasionally words are mis-transcribed.)    RAVEN Núñez PA-C  08/25/21 [address]  08/25/21 2001

## 2021-08-25 NOTE — ED NOTES
Report given to PETRA James. All questions answered, RN verbalized understanding.       Sacha Nguyen RN  08/25/21 1942

## 2021-08-26 LAB
ANION GAP SERPL CALCULATED.3IONS-SCNC: 15 MMOL/L (ref 3–16)
BUN BLDV-MCNC: 16 MG/DL (ref 7–20)
CALCIUM SERPL-MCNC: 9.5 MG/DL (ref 8.3–10.6)
CHLORIDE BLD-SCNC: 96 MMOL/L (ref 99–110)
CO2: 25 MMOL/L (ref 21–32)
CREAT SERPL-MCNC: 1.1 MG/DL (ref 0.8–1.3)
EKG ATRIAL RATE: 117 BPM
EKG DIAGNOSIS: NORMAL
EKG P AXIS: 29 DEGREES
EKG P-R INTERVAL: 178 MS
EKG Q-T INTERVAL: 318 MS
EKG QRS DURATION: 88 MS
EKG QTC CALCULATION (BAZETT): 443 MS
EKG R AXIS: 59 DEGREES
EKG T AXIS: 27 DEGREES
EKG VENTRICULAR RATE: 117 BPM
ESTIMATED AVERAGE GLUCOSE: 174.3 MG/DL
GFR AFRICAN AMERICAN: >60
GFR NON-AFRICAN AMERICAN: >60
GLUCOSE BLD-MCNC: 163 MG/DL (ref 70–99)
GLUCOSE BLD-MCNC: 205 MG/DL (ref 70–99)
GLUCOSE BLD-MCNC: 227 MG/DL (ref 70–99)
GLUCOSE BLD-MCNC: 240 MG/DL (ref 70–99)
GLUCOSE BLD-MCNC: 297 MG/DL (ref 70–99)
HBA1C MFR BLD: 7.7 %
HCT VFR BLD CALC: 41.9 % (ref 40.5–52.5)
HEMOGLOBIN: 13.8 G/DL (ref 13.5–17.5)
L. PNEUMOPHILA SEROGP 1 UR AG: NORMAL
MCH RBC QN AUTO: 29.7 PG (ref 26–34)
MCHC RBC AUTO-ENTMCNC: 33 G/DL (ref 31–36)
MCV RBC AUTO: 90 FL (ref 80–100)
MRSA SCREEN RT-PCR: NORMAL
PDW BLD-RTO: 14.5 % (ref 12.4–15.4)
PERFORMED ON: ABNORMAL
PLATELET # BLD: 266 K/UL (ref 135–450)
PMV BLD AUTO: 7.2 FL (ref 5–10.5)
POTASSIUM REFLEX MAGNESIUM: 4.5 MMOL/L (ref 3.5–5.1)
PROCALCITONIN: 0.83 NG/ML (ref 0–0.15)
RAPID INFLUENZA  B AGN: NEGATIVE
RAPID INFLUENZA A AGN: NEGATIVE
RBC # BLD: 4.65 M/UL (ref 4.2–5.9)
SODIUM BLD-SCNC: 136 MMOL/L (ref 136–145)
STREP PNEUMONIAE ANTIGEN, URINE: NORMAL
WBC # BLD: 10.7 K/UL (ref 4–11)

## 2021-08-26 PROCEDURE — 2580000003 HC RX 258: Performed by: HOSPITALIST

## 2021-08-26 PROCEDURE — 2700000000 HC OXYGEN THERAPY PER DAY

## 2021-08-26 PROCEDURE — 87641 MR-STAPH DNA AMP PROBE: CPT

## 2021-08-26 PROCEDURE — 36415 COLL VENOUS BLD VENIPUNCTURE: CPT

## 2021-08-26 PROCEDURE — 94640 AIRWAY INHALATION TREATMENT: CPT

## 2021-08-26 PROCEDURE — 6370000000 HC RX 637 (ALT 250 FOR IP): Performed by: HOSPITALIST

## 2021-08-26 PROCEDURE — 6360000002 HC RX W HCPCS: Performed by: HOSPITALIST

## 2021-08-26 PROCEDURE — 93010 ELECTROCARDIOGRAM REPORT: CPT | Performed by: INTERNAL MEDICINE

## 2021-08-26 PROCEDURE — 84145 PROCALCITONIN (PCT): CPT

## 2021-08-26 PROCEDURE — 1200000000 HC SEMI PRIVATE

## 2021-08-26 PROCEDURE — 6370000000 HC RX 637 (ALT 250 FOR IP): Performed by: NURSE PRACTITIONER

## 2021-08-26 PROCEDURE — 94150 VITAL CAPACITY TEST: CPT

## 2021-08-26 PROCEDURE — 87449 NOS EACH ORGANISM AG IA: CPT

## 2021-08-26 PROCEDURE — 85027 COMPLETE CBC AUTOMATED: CPT

## 2021-08-26 PROCEDURE — 80048 BASIC METABOLIC PNL TOTAL CA: CPT

## 2021-08-26 PROCEDURE — 87804 INFLUENZA ASSAY W/OPTIC: CPT

## 2021-08-26 RX ORDER — ALBUTEROL SULFATE 90 UG/1
2 AEROSOL, METERED RESPIRATORY (INHALATION) EVERY 4 HOURS PRN
Status: DISCONTINUED | OUTPATIENT
Start: 2021-08-26 | End: 2021-08-27 | Stop reason: HOSPADM

## 2021-08-26 RX ADMIN — Medication 1 TABLET: at 09:02

## 2021-08-26 RX ADMIN — Medication 2 PUFF: at 09:38

## 2021-08-26 RX ADMIN — SODIUM CHLORIDE: 9 INJECTION, SOLUTION INTRAVENOUS at 00:24

## 2021-08-26 RX ADMIN — ATORVASTATIN CALCIUM 10 MG: 10 TABLET, FILM COATED ORAL at 21:26

## 2021-08-26 RX ADMIN — OXYCODONE HYDROCHLORIDE 40 MG: 15 TABLET, FILM COATED, EXTENDED RELEASE ORAL at 21:25

## 2021-08-26 RX ADMIN — OXYCODONE HYDROCHLORIDE 40 MG: 15 TABLET, FILM COATED, EXTENDED RELEASE ORAL at 09:01

## 2021-08-26 RX ADMIN — VANCOMYCIN HYDROCHLORIDE 750 MG: 750 INJECTION, POWDER, LYOPHILIZED, FOR SOLUTION INTRAVENOUS at 09:09

## 2021-08-26 RX ADMIN — GABAPENTIN 800 MG: 400 CAPSULE ORAL at 16:57

## 2021-08-26 RX ADMIN — POLYETHYLENE GLYCOL 3350 17 G: 17 POWDER, FOR SOLUTION ORAL at 08:59

## 2021-08-26 RX ADMIN — APIXABAN 5 MG: 5 TABLET, FILM COATED ORAL at 09:02

## 2021-08-26 RX ADMIN — GUAIFENESIN 600 MG: 600 TABLET ORAL at 09:01

## 2021-08-26 RX ADMIN — Medication 2 PUFF: at 20:45

## 2021-08-26 RX ADMIN — INSULIN LISPRO 8 UNITS: 100 INJECTION, SOLUTION INTRAVENOUS; SUBCUTANEOUS at 12:42

## 2021-08-26 RX ADMIN — Medication 1000 UNITS: at 09:02

## 2021-08-26 RX ADMIN — INSULIN LISPRO 2 UNITS: 100 INJECTION, SOLUTION INTRAVENOUS; SUBCUTANEOUS at 18:01

## 2021-08-26 RX ADMIN — INSULIN GLARGINE 43 UNITS: 100 INJECTION, SOLUTION SUBCUTANEOUS at 21:26

## 2021-08-26 RX ADMIN — SODIUM CHLORIDE 25 ML: 9 INJECTION, SOLUTION INTRAVENOUS at 17:04

## 2021-08-26 RX ADMIN — ASPIRIN 81 MG: 81 TABLET, CHEWABLE ORAL at 09:02

## 2021-08-26 RX ADMIN — ROFLUMILAST 500 MCG: 500 TABLET ORAL at 09:02

## 2021-08-26 RX ADMIN — CARVEDILOL 12.5 MG: 6.25 TABLET, FILM COATED ORAL at 16:57

## 2021-08-26 RX ADMIN — INSULIN LISPRO 8 UNITS: 100 INJECTION, SOLUTION INTRAVENOUS; SUBCUTANEOUS at 18:01

## 2021-08-26 RX ADMIN — GABAPENTIN 800 MG: 400 CAPSULE ORAL at 21:26

## 2021-08-26 RX ADMIN — CEFEPIME HYDROCHLORIDE 2000 MG: 2 INJECTION, POWDER, FOR SOLUTION INTRAVENOUS at 04:51

## 2021-08-26 RX ADMIN — INSULIN LISPRO 6 UNITS: 100 INJECTION, SOLUTION INTRAVENOUS; SUBCUTANEOUS at 12:42

## 2021-08-26 RX ADMIN — CEFEPIME HYDROCHLORIDE 2000 MG: 2 INJECTION, POWDER, FOR SOLUTION INTRAVENOUS at 17:05

## 2021-08-26 RX ADMIN — INSULIN LISPRO 8 UNITS: 100 INJECTION, SOLUTION INTRAVENOUS; SUBCUTANEOUS at 10:28

## 2021-08-26 RX ADMIN — CARVEDILOL 12.5 MG: 6.25 TABLET, FILM COATED ORAL at 09:02

## 2021-08-26 RX ADMIN — Medication 10 ML: at 09:04

## 2021-08-26 RX ADMIN — APIXABAN 5 MG: 5 TABLET, FILM COATED ORAL at 21:25

## 2021-08-26 RX ADMIN — Medication 10 ML: at 21:26

## 2021-08-26 RX ADMIN — ROPINIROLE HYDROCHLORIDE 2 MG: 1 TABLET, FILM COATED ORAL at 21:26

## 2021-08-26 RX ADMIN — ALBUTEROL SULFATE 2 PUFF: 90 AEROSOL, METERED RESPIRATORY (INHALATION) at 09:38

## 2021-08-26 RX ADMIN — INSULIN LISPRO 3 UNITS: 100 INJECTION, SOLUTION INTRAVENOUS; SUBCUTANEOUS at 01:00

## 2021-08-26 RX ADMIN — INSULIN LISPRO 4 UNITS: 100 INJECTION, SOLUTION INTRAVENOUS; SUBCUTANEOUS at 10:28

## 2021-08-26 RX ADMIN — GABAPENTIN 800 MG: 400 CAPSULE ORAL at 09:01

## 2021-08-26 RX ADMIN — INSULIN LISPRO 2 UNITS: 100 INJECTION, SOLUTION INTRAVENOUS; SUBCUTANEOUS at 21:26

## 2021-08-26 ASSESSMENT — PAIN DESCRIPTION - FREQUENCY: FREQUENCY: CONTINUOUS

## 2021-08-26 ASSESSMENT — PAIN - FUNCTIONAL ASSESSMENT: PAIN_FUNCTIONAL_ASSESSMENT: ACTIVITIES ARE NOT PREVENTED

## 2021-08-26 ASSESSMENT — PAIN SCALES - GENERAL
PAINLEVEL_OUTOF10: 0
PAINLEVEL_OUTOF10: 0
PAINLEVEL_OUTOF10: 6
PAINLEVEL_OUTOF10: 0
PAINLEVEL_OUTOF10: 0
PAINLEVEL_OUTOF10: 6

## 2021-08-26 ASSESSMENT — PAIN DESCRIPTION - PAIN TYPE: TYPE: CHRONIC PAIN

## 2021-08-26 ASSESSMENT — PAIN DESCRIPTION - PROGRESSION: CLINICAL_PROGRESSION: NOT CHANGED

## 2021-08-26 ASSESSMENT — PAIN DESCRIPTION - LOCATION: LOCATION: BACK;LEG

## 2021-08-26 ASSESSMENT — PAIN DESCRIPTION - DESCRIPTORS: DESCRIPTORS: ACHING

## 2021-08-26 ASSESSMENT — PAIN DESCRIPTION - ONSET: ONSET: ON-GOING

## 2021-08-26 ASSESSMENT — PAIN DESCRIPTION - ORIENTATION: ORIENTATION: RIGHT;LEFT;LOWER

## 2021-08-26 NOTE — FLOWSHEET NOTE
4 Eyes Skin Assessment     NAME:  Pierre Canchola  YOB: 1945  MEDICAL RECORD NUMBER:  [id number]    The patient is being assess for  Admission    I agree that 2 RN's have performed a thorough Head to Toe Skin Assessment on the patient. ALL assessment sites listed below have been assessed. Areas assessed by both nurses:    Head, Face, Ears, Shoulders, Back, Chest, Arms, Elbows, Hands, Sacrum. Buttock, Coccyx, Ischium and Legs. Feet and Heels        Does the Patient have a Wound?  No noted wound(s)       Jose Alfredo Prevention initiated:  No   Wound Care Orders initiated:  No    Pressure Injury (Stage 3,4, Unstageable, DTI, NWPT, and Complex wounds) if present place consult order under [de-identified] No    New and Established Ostomies if present place consult order under : No      Nurse 1 eSignature: Electronically signed by Todd Landau, RN on 8/26/21 at 5:35 AM EDT    **SHARE this note so that the co-signing nurse is able to place an eSignature**    Nurse 2 eSignature: Electronically signed by Wilson Forbes RN on 8/26/21 at 5:36 AM EDT

## 2021-08-26 NOTE — PLAN OF CARE
Problem: Falls - Risk of:  Goal: Will remain free from falls  Description: Will remain free from falls  Outcome: Ongoing  Goal: Absence of physical injury  Description: Absence of physical injury  Outcome: Ongoing   Pt free from falls this shift. Fall precautions in place at all times. Call light always within reach. Pt able and agreeable to contact for safety appropriately. Problem: Airway Clearance - Ineffective:  Goal: Ability to maintain a clear airway will improve  Description: Ability to maintain a clear airway will improve  Outcome: Ongoing     Problem: Discharge Planning:  Goal: Discharged to appropriate level of care  Description: Discharged to appropriate level of care  Outcome: Ongoing   Continuing to work with patient and health care team on discharge plan. Discharge instructions and medication management will be reviewed prior to discharge.

## 2021-08-26 NOTE — CARE COORDINATION
Advance Care Planning     Advance Care Planning Activator (Inpatient)  Conversation Note      Date of ACP Conversation: 8/26/2021     Conversation Conducted with: Patient with Decision Making Capacity    ACP Activator: Angel Clinton RN    Health Care Decision Maker:     Current Designated Health Care Decision Maker:     Primary Decision Maker: Cherylene Imperial  [phone]  Click here to complete Healthcare Decision Makers including section of the Healthcare Decision Maker Relationship (ie \"Primary\")  Today we documented Decision Maker(s) consistent with Legal Next of Kin hierarchy. Care Preferences    Ventilation: \"If you were in your present state of health and suddenly became very ill and were unable to breathe on your own, what would your preference be about the use of a ventilator (breathing machine) if it were available to you? \"      Would the patient desire the use of ventilator (breathing machine)?: no    \"If your health worsens and it becomes clear that your chance of recovery is unlikely, what would your preference be about the use of a ventilator (breathing machine) if it were available to you? \"     Would the patient desire the use of ventilator (breathing machine)?: No      Resuscitation  \"CPR works best to restart the heart when there is a sudden event, like a heart attack, in someone who is otherwise healthy. Unfortunately, CPR does not typically restart the heart for people who have serious health conditions or who are very sick. \"    \"In the event your heart stopped as a result of an underlying serious health condition, would you want attempts to be made to restart your heart (answer \"yes\" for attempt to resuscitate) or would you prefer a natural death (answer \"no\" for do not attempt to resuscitate)? \" no       [] Yes   [] No   Educated Patient / Rupert Farmer regarding differences between Advance Directives and portable DNR orders.     Length of ACP Conversation in minutes:  4 min    Conversation Outcomes:  [x] ACP discussion completed  [] Existing advance directive reviewed with patient; no changes to patient's previously recorded wishes  [] New Advance Directive completed  [] Portable Do Not Rescitate prepared for Provider review and signature  [] POLST/POST/MOLST/MOST prepared for Provider review and signature      Follow-up plan:    [] Schedule follow-up conversation to continue planning  [] Referred individual to Provider for additional questions/concerns   [] Advised patient/agent/surrogate to review completed ACP document and update if needed with changes in condition, patient preferences or care setting    [x] This note routed to one or more involved healthcare providers      Dr. Lashonda Mcleod updated on pt wishes.     Electronically signed by Monae Shelton RN on 8/26/21 at 10:41 AM EDT

## 2021-08-26 NOTE — RT PROTOCOL NOTE
RT Nebulizer Bronchodilator Protocol Note    There is a bronchodilator order in the chart from a provider indicating to follow the RT Bronchodilator Protocol and there is an Initiate RT Bronchodilator Protocol order as well (see protocol at bottom of note). The findings from the last RT Protocol Assessment were as follows:  Smoking: >15 Pack years  Surgical Status: No surgery  Xray: Multiple lobe infiltrates/atelectasis/pleural effusion/edema  Respiratory Pattern: RR 12-20  Mental Status: Alert and Oriented  Breath Sounds: Diminished and/or crackles  Cough: Strong, spontaneous, non-productive  Activity Level: Bedridden, unresponsive or quadriplegic  Oxygen Requirement: 29% or 3LNC - 5LNC/40%  Indication for Bronchodilator Therapy: On home bronchodilators  Bronchodilator Assessment Score: 6. Pt states takes respiratory medications on regular basis at home    Aerosolized bronchodilator medication orders have been revised according to the RT Bronchodilator Protocol. RT Bronchodilator Protocol:    Respiratory Therapist to perform RT Therapy Protocol Assessment then follow the protocol. No Indications - adjust the frequency to every 6 hours PRN wheezing or bronchospasm, if no treatments needed after 48 hours then discontinue using Per Protocol order mode. If indication present, adjust the RT bronchodilator orders based on the Bronchodilator Assessment Score as follows:    0-6 - enter or revise RT Bronchodilator order to Albuterol Nebulizer order with frequency of every 2 hours PRN for wheezing or increased work of breathing using Per Protocol order mode. Repeat RT Therapy Protocol Assessment as needed. 7-10 - discontinue any other Inpatient aerosolized bronchodilator medication orders and enter or revise two Albuterol Nebulizer orders, one with BID frequency and one with frequency of every 2 hours PRN wheezing or increased work of breathing using Per Protocol order mode.   Repeat RT Therapy Protocol Assessment with second treatment then BID and as needed. 11-13 - discontinue any other Inpatient aerosolized bronchodilator medication orders and enter DuoNeb Nebulizer order with QID frequency and an Albuterol Nebulizer order with frequency of every 2 hours PRN wheezing or increased work of breathing using Per Protocol order mode. Repeat RT Therapy Protocol Assessment with second treatment then QID and as needed. Greater than 13 - discontinue any other Inpatient aerosolized bronchodilator medication orders and enter a DuoNeb Nebulizer order with every 4 hours frequency and an Albuterol Nebulizer order with frequency of every 2 hours PRN wheezing or increased work of breathing using Per Protocol order mode. Repeat RT Therapy Protocol Assessment with second treatment then every 4 hours and as needed. RT to enter RT Home Evaluation for COPD & MDI Assessment order using Per Protocol order mode.     Electronically signed by Guerrero Jackson RCP on 8/25/2021 at 9:36 PM

## 2021-08-26 NOTE — PROGRESS NOTES
Pharmacy Medication Reconciliation Note     List of medications patient is currently taking is complete. Source of information:   [facility]. [facility]  2. Patient interview  3. MD office visits    Notes regarding home medications:   1. Changed Symbicort to 160-4.5,changed Gabapentin to 800mg tid,& changed Ropinirole to 2mg QHS  2.  Changed Humulin N to Novolin 70/30 38 units BID    Denies taking any other OTC or herbal medications    ASAEL Fraser [facility] 8/26/2021 6:33 AM

## 2021-08-26 NOTE — FLOWSHEET NOTE
Pt arrived to 4278 at 2000 via stretcher from [facility] Dx:  PNA. AAO x 4 and able to answer all admission questions. From home with wife. Recent admission in July 2021. In isolation d/t waiting for Covid PCR to result. Anxious at times. Unsteady and will be a SBA with use of walker. Refuses to wear gown. On tele NSR. Pt on O2 at 4 lpm/nc d/t O2 sats in the 80's on room air. CXR shows PNA. Oriented to room and call light. All fall precautions in place. Bed alarm on. Call light in reach.

## 2021-08-26 NOTE — PROGRESS NOTES
Hospitalist Progress Note      PCP: Lawrence Hdz    Date of Admission: 8/25/2021    Chief Complaint: 68 y.o.male with medical history significant for coronary artery disease patient is status post coronary artery bypass grafting in 2009. Patient has history of CHF COPD paroxysmal atrial fibrillation and hypertension. Patient presented to the emergency room with 3 days of cough congestion and shortness of breath that has been getting worse progressively. Subjective: feels better today.           Medications:  Reviewed    Infusion Medications    sodium chloride 25 mL (08/26/21 1704)    dextrose       Scheduled Medications    apixaban  5 mg Oral BID    aspirin  81 mg Oral Daily    carvedilol  12.5 mg Oral BID WC    guaiFENesin  600 mg Oral Daily    therapeutic multivitamin-minerals  1 tablet Oral Daily    Roflumilast  500 mcg Oral Daily    atorvastatin  10 mg Oral Nightly    Vitamin D  1,000 Units Oral Daily    sodium chloride flush  10 mL IntraVENous 2 times per day    cefepime  2,000 mg IntraVENous Q12H    gabapentin  800 mg Oral TID    oxyCODONE  40 mg Oral 2 times per day    rOPINIRole  2 mg Oral Nightly    budesonide-formoterol  2 puff Inhalation BID    insulin glargine  43 Units Subcutaneous Nightly    insulin lispro  8 Units Subcutaneous TID WC    vancomycin  750 mg IntraVENous Q12H    vancomycin (VANCOCIN) intermittent dosing (placeholder)   Other RX Placeholder    insulin lispro  0-12 Units Subcutaneous TID WC    insulin lispro  0-6 Units Subcutaneous Nightly     PRN Meds: albuterol sulfate HFA, albuterol, polyethylene glycol, sodium chloride flush, sodium chloride, ondansetron **OR** ondansetron, acetaminophen **OR** acetaminophen, albuterol, glucose, dextrose, glucagon (rDNA), dextrose      Intake/Output Summary (Last 24 hours) at 8/26/2021 1712  Last data filed at 8/26/2021 1027  Gross per 24 hour   Intake 1970 ml   Output --   Net 1970 ml       Physical Exam Performed:    /68   Pulse 88   Temp 98.1 °F (36.7 °C) (Oral)   Resp 16   Ht 5' 10\" (1.778 m)   Wt 237 lb 14 oz (107.9 kg)   SpO2 98%   BMI 34.13 kg/m²     General appearance: No apparent distress, appears stated age and cooperative. HEENT: Pupils equal, round, and reactive to light. Conjunctivae/corneas clear. Neck: Supple, with full range of motion. No jugular venous distention. Trachea midline. Respiratory:  Normal respiratory effort. Clear to auscultation, bilaterally without Rales/Wheezes/Rhonchi. Cardiovascular: Regular rate and rhythm with normal S1/S2 without murmurs, rubs or gallops. Abdomen: Soft, non-tender, non-distended with normal bowel sounds. Musculoskeletal: No clubbing, cyanosis or edema bilaterally. Full range of motion without deformity. Skin: Skin color, texture, turgor normal.  No rashes or lesions. Neurologic:  Neurovascularly intact without any focal sensory/motor deficits. Cranial nerves: II-XII intact, grossly non-focal.  Psychiatric: Alert and oriented, thought content appropriate, normal insight  Capillary Refill: Brisk,3 seconds, normal   Peripheral Pulses: +2 palpable, equal bilaterally       Labs:   Recent Labs     08/25/21  1641 08/26/21  0631   WBC 12.8* 10.7   HGB 15.8 13.8   HCT 47.9 41.9    266     Recent Labs     08/25/21  1641 08/26/21  0631   * 136   K 4.7 4.5   CL 94* 96*   CO2 24 25   BUN 18 16   CREATININE 1.2 1.1   CALCIUM 9.9 9.5     No results for input(s): AST, ALT, BILIDIR, BILITOT, ALKPHOS in the last 72 hours. No results for input(s): INR in the last 72 hours.   Recent Labs     08/25/21  1641   TROPONINI <0.01       Urinalysis:      Lab Results   Component Value Date    NITRU Negative 04/13/2019    WBCUA 1 04/13/2019    BACTERIA Rare 07/11/2018    RBCUA 1 04/13/2019    BLOODU TRACE 04/13/2019    SPECGRAV 1.021 04/13/2019    GLUCOSEU >=1000 04/13/2019       Radiology:  XR CHEST (2 VW)   Final Result   Bibasilar airspace opacities favored to represent pneumonia                 Assessment/Plan:    Active Hospital Problems    Diagnosis     Pneumonia [J18.9]        Pneumonia. Likely gram-negative  Cover with cefepime and vancomycin to cover healthcare associated pneumonia  Check procalcitonin  Bronchodilators  Echocardiogram to rule out CHF  Patient has history of low ejection fraction in 2017  DC IV fluids   Flu negative, MRSA negative, COVID negative. Stop vanc.           Acute respiratory failure with hypoxia  Likely due to pneumonia and COPD  Titrate oxygen for saturations greater than or equal to 90%        Atrial fibrillation I48.91  Heart rate is controlled  Continue patient on eliquis        Type 2 diabetes mellitus E11.9  Continue Lantus insulin  prandial insulin.   Insulin sliding scale  Check A1c               DVT and GI prophylaxis        Full Code         Clarice Irwin MD

## 2021-08-26 NOTE — RT PROTOCOL NOTE
RT Inhaler-Nebulizer Bronchodilator Protocol Note    There is a bronchodilator order in the chart from a provider indicating to follow the RT Bronchodilator Protocol and there is an Initiate RT Bronchodilator Protocol order as well (see protocol at bottom of note). The findings from the last RT Protocol Assessment were as follows:  Smoking: >15 Pack years  Surgical Status: No surgery  Xray: Multiple lobe infiltrates/atelectasis/pleural effusion/edema  Respiratory Pattern: RR 12-20  Mental Status: Alert and Oriented  Breath Sounds: Diminished and/or crackles  Cough: Strong, spontaneous, non-productive  Activity Level: Walking with assistance  Oxygen Requirement: 29% or 3LNC - 5LNC/40%  Indication for Bronchodilator Therapy: On home bronchodilators  Bronchodilator Assessment Score: 6    Aerosolized bronchodilator medication orders have been revised according to the RT Bronchodilator Protocol. RT Inhaler-Nebulizer Bronchodilator Protocol:    Respiratory Therapist to perform RT Therapy Protocol Assessment then follow the protocol. No Indications - adjust the frequency to every 6 hours PRN wheezing or bronchospasm, if no treatments needed after 48 hours then discontinue using Per Protocol order mode. If indication present, adjust the RT bronchodilator orders based on the Bronchodilator Assessment Score as follows:    0-6 - enter or revise RT bronchodilator order to Albuterol Inhaler order with frequency of every 2 hours PRN for wheezing or increased work of breathing using Per Protocol order mode. If Albuterol Inhaler not tolerated or not effective, then discontinue the Albuterol Inhaler order and enter Albuterol Nebulizer order with same frequency and PRN reasons. Repeat RT Therapy Protocol Assessment as needed.     7-10 - discontinue any other Inpatient aerosolized bronchodilator medication orders and enter or revise two Albuterol Inhaler orders, one with BID frequency and one with frequency of every 2 hours PRN wheezing or increased work of breathing using Per Protocol order mode. Repeat RT Therapy Protocol Assessment with second treatment then BID and as needed. If Albuterol Inhaler not tolerated or not effective, then discontinue the Albuterol Inhaler orders and enter two Albuterol Nebulizer orders with same frequencies and PRN reasons. 11-13 - discontinue any other Inpatient aerosolized bronchodilator medication orders and enter DuoNeb Nebulizer orders QID frequency and an Albuterol Nebulizer order every 2 hours PRN wheezing or increased work of breathing using Per Protocol order mode. Repeat RT Therapy Protocol Assessment with second treatment then QID and as needed. Greater than 13 - discontinue any other Inpatient bronchodilator aerosolized medication orders and enter DuoNeb Nebulizer order every 4 hours frequency and Albuterol Nebulizer every 2 hours PRN wheezing or increased work of breathing using Per Protocol order mode. Repeat RT Therapy Protocol Assessment with second treatment then every 4 hours and as needed. RT to enter RT Home Evaluation for COPD & MDI Assessment order using Per Protocol order mode.     Electronically signed by Noa Alvarado RCP on 8/26/2021 at 9:42 AM

## 2021-08-26 NOTE — DISCHARGE INSTR - COC
bronchitis, simple ([facility]) J41.0    Chronic rhinitis J31.0    Generalized weakness R53.1    Lactic acidosis E87.2    Acute renal failure (ARF) ([facility]) N17.9    Uncontrolled diabetes mellitus ([facility]) E11.65    Supratherapeutic INR R79.1    Pneumonia J18.9       Isolation/Infection:   Isolation          No Isolation        Patient Infection Status     Infection Onset Added Last Indicated Last Indicated By Review Planned Expiration Resolved Resolved By    None active    Resolved    COVID-19 Rule Out 08/25/21 08/25/21 08/25/21 COVID-19 (Ordered)   08/26/21 Rule-Out Test Resulted    COVID-19 Rule Out 01/02/21 01/02/21 01/02/21 COVID-19 (Ordered)   01/02/21 Rule-Out Test Resulted          Nurse Assessment:  Last Vital Signs: /77   Pulse 98   Temp 98.2 °F (36.8 °C) (Oral)   Resp 17   Ht 5' 10\" (1.778 m)   Wt 237 lb 14 oz (107.9 kg)   SpO2 93%   BMI 34.13 kg/m²     Last documented pain score (0-10 scale): Pain Level: 8  Last Weight:   Wt Readings from Last 1 Encounters:   08/27/21 237 lb 14 oz (107.9 kg)     Mental Status:  {IP PT MENTAL STATUS:20030}    IV Access:  { AHSAN IV ACCESS:202031203}    Nursing Mobility/ADLs:  Walking   {CHP DME EOXM:524069397}  Transfer  {CHP DME JXZX:268467520}  Bathing  {CHP DME AREA:155993169}  Dressing  {P DME DMDP:919384474}  Toileting  {P DME FQKS:010601648}  Feeding  {P DME NJWZ:947444811}  Med Admin  {P DME CINZ:118295659}  Med Delivery   { AHSAN MED Delivery:564443583}    Wound Care Documentation and Therapy:        Elimination:  Continence:   · Bowel: {YES / MR:99060}  · Bladder: {YES / TX:09958}  Urinary Catheter: {Urinary Catheter:745204068}   Colostomy/Ileostomy/Ileal Conduit: {YES / RE:40384}       Date of Last BM: ***    Intake/Output Summary (Last 24 hours) at 8/27/2021 1637  Last data filed at 8/27/2021 1229  Gross per 24 hour   Intake 1178.91 ml   Output 325 ml   Net 853.91 ml     I/O last 3 completed shifts: In: 1178.9 [P.O.:1020;  I.V.:8.4; IV Piggyback:150.5]  Out: 325 [Urine:325]    Safety Concerns:     [address] FOREMAN Safety Concerns:256582520}    Impairments/Disabilities:      [address] FOREMAN Impairments/Disabilities:856695906}    Nutrition Therapy:  Current Nutrition Therapy:   [address] FOREMAN Diet List:565344845}    Routes of Feeding: {CHP DME Other Feedings:338882788}  Liquids: {Slp liquid thickness:85406}  Daily Fluid Restriction: {CHP DME Yes amt example:290189499}  Last Modified Barium Swallow with Video (Video Swallowing Test): {Done Not Done ZZWY:335508884}    Treatments at the Time of Hospital Discharge:   Respiratory Treatments: ***  Oxygen Therapy:  {Therapy; copd oxygen:68900}  Ventilator:    [address] Aranda  Vent CKQQ:021721214}    Rehab Therapies: Physical Therapy, Occupational Therapy and Skilled Nurse    - [address]     Weight Bearing Status/Restrictions: [address] Aranda  Weight Bearin}  Other Medical Equipment (for information only, NOT a DME order):  {EQUIPMENT:990514315}  Other Treatments: ***    Patient's personal belongings (please select all that are sent with patient):  {CHP DME Belongings:935569587}    RN SIGNATURE:  {Esignature:202666006}    CASE MANAGEMENT/SOCIAL WORK SECTION    Inpatient Status Date: ***    Readmission Risk Assessment Score:  Readmission Risk              Risk of Unplanned Readmission:  19           · Discharging to Facility/ Agency   · Name:  [facility] care    · Address: [address] [place] [address] [address] [facility], [address] [facility]  · Phone: [phone]  · Fax: [phone]    / signature: {Esignature:036646723}    PHYSICIAN SECTION    Prognosis: {Prognosis:0816114579}    Condition at Discharge: [address] Aranda Patient Condition:666632725}    Rehab Potential (if transferring to Rehab): {Prognosis:1524064152}    Recommended Labs or Other Treatments After Discharge: ***    Physician Certification: I certify the above information and transfer of Ophelia Edwards  is necessary for the continuing treatment of the diagnosis listed and that he requires {Admit to Appropriate Level of Care:14955} for {GREATER/LESS:497979004} 30 days.      Update Admission H&P: {CHP DME Changes in UFVUW:349504596}    PHYSICIAN SIGNATURE:  {Esignature:918075778}

## 2021-08-26 NOTE — CARE COORDINATION
Count includes the [facility]  Patient is active with [facility], Will follow for discharge to home with orders to resume care.   Trinity Nuñez LPN  CTN with  [facility],  [address] [place] Cell [phone], Fax [phone]

## 2021-08-26 NOTE — PLAN OF CARE
Problem: Falls - Risk of:  Goal: Will remain free from falls  Description: Will remain free from falls  8/26/2021 0210 by Teena Travis RN  Outcome: Ongoing  8/26/2021 0139 by Amparo William RN  Outcome: Ongoing  Goal: Absence of physical injury  Description: Absence of physical injury  8/26/2021 0210 by Teena Travis RN  Outcome: Ongoing  8/26/2021 0139 by Amparo William RN  Outcome: Ongoing     Problem: Airway Clearance - Ineffective:  Goal: Ability to maintain a clear airway will improve  Description: Ability to maintain a clear airway will improve  8/26/2021 0210 by Teena Travis RN  Outcome: Ongoing  8/26/2021 0139 by Amparo William RN  Outcome: Ongoing     Problem: Discharge Planning:  Goal: Discharged to appropriate level of care  Description: Discharged to appropriate level of care  8/26/2021 0210 by Teena Travis RN  Outcome: Ongoing  8/26/2021 0139 by Amparo William RN  Outcome: Ongoing

## 2021-08-26 NOTE — CARE COORDINATION
INITIAL CASE MANAGEMENT ASSESSMENT    Reviewed chart, met with patient to assess possible discharge needs. Explained Case Management role/services. Living Situation: verified address, lives with wife in a 2 story house, only goes to the second floor when necessary, 2 [address]ULY house    ADLs: independent     DME: canes (several through out the house & in all cars), scooter (in the room), walker    PT/OT Recs: not ordered yet     Active Services: [facility]-they are following     Transportation: active , wife will take him home at dc     Medications: no barriers, [facility]    PCP: Tony Villalobos    PLAN/COMMENTS: denies any needs, plan is to return home at dc    CM provided contact information for patient or family to call with any questions. CM will follow and assist as needed.     Robin Harper RN, BSN,   [phone]    Electronically signed by Robin Harper RN on 8/26/2021 at 10:38 AM

## 2021-08-26 NOTE — PROGRESS NOTES
Physician Progress Note      PATIENTJaynee Dakins  CSN #:                  [id number]  :                       1945  ADMIT DATE:       2021 3:35 PM  [address] [place] DATE:  RESPONDING  PROVIDER #:        Maira Sharma MD          QUERY TEXT:    Pt with admitted with gram negative pneumonia and acute respiratory failure. Pt noted to have lactic acid 3.1, Procalcitonin up to 0.83, WBC 12.8. On   arrival T 99.4 - P 119 - R 22 - /78. If possible, please document in the   progress notes and discharge summary if you are evaluating and /or treating   any of the following: The medical record reflects the following:  Risk Factors: pneumonia, DM, COPD  Clinical Indicators: lactic acid 3.1, Procalcitonin up to 0.83, WBC 12.8. On   arrival T 99.4 - P 119 - R 22 - /78  Treatment: Maxipime, Vancomycin    Thank you,  Samantha Arevalo RN, BSN, CCDS  [email or web address]  Options provided:  -- Sepsis, present on admission  -- Pneumonia without sepsis  -- Other - I will add my own diagnosis  -- Disagree - Not applicable / Not valid  -- Disagree - Clinically unable to determine / Unknown  -- Refer to Clinical Documentation Reviewer    PROVIDER RESPONSE TEXT:    This patient has sepsis which was present on admission.     Query created by: Yared Arteaga on 2021 10:23 AM      Electronically signed by:  Maira Sharma MD 2021 5:09 PM

## 2021-08-26 NOTE — FLOWSHEET NOTE
Pt arrived to [phone] at 2000 with Dx: PNA. Pt is in Covid isolation pending PCR test. AAO x 4 and able to answer all admission questions. From home with wife. Up with assist d/t being unsteady. Oriented to room and call light. Has scooter in room. No skin impairments. Pt advised to call for assist with any ambulation and pt verbalized understanding. Bed alarm on. Call light in reach.

## 2021-08-26 NOTE — H&P
Hospitalist  History and Physical    Patient:  Mariam Babin  MRN: [id number]  PCP: Randi Rios    CHIEF COMPLAINT:  sob      HISTORY OF PRESENT ILLNESS:   The patient Mariam Babin is a 68 y.o.male with medical history significant for coronary artery disease patient is status post coronary artery bypass grafting in 2009. Patient has history of CHF COPD paroxysmal atrial fibrillation and hypertension. Patient presented to the emergency room with 3 days of cough congestion and shortness of breath that has been getting worse progressively. Patient reports cough as productive of yellow to green sputum. Patient denies chest pain. Patient has history of pneumonia and he feels a that he has developed pneumonia again. Patient is vaccinated against COVID-19. Past Medical History:        Diagnosis Date    CAD (coronary artery disease)     CABG in 2009    Chronic back pain     Chronic systolic CHF (congestive heart failure) (HCC)     COPD (chronic obstructive pulmonary disease) ([address]     Diabetes mellitus [facility]     Essential hypertension     Paroxysmal atrial fibrillation ([facility]     On Coumadin       Past Surgical History:        Procedure Laterality Date    BACK SURGERY  1995    BACK SURGERY      CARDIAC SURGERY  2007    CERVICAL SPINE SURGERY      CORONARY ARTERY BYPASS GRAFT      FRACTURE SURGERY Left 1988    Shoulder       Medications Prior to Admission:    Prior to Admission medications    Medication Sig Start Date End Date Taking? Authorizing Provider   dapagliflozin (FARXIGA) 5 MG tablet Take 5 mg by mouth every morning   Yes Historical Provider, MD   oxyCODONE ER 36 MG C12A Take 36 mg by mouth 2 times daily.     Yes Historical Provider, MD   insulin 70-30 (NOVOLIN 70/30) (70-30) 100 UNIT per ML injection vial Inject 38 Units into the skin 2 times daily    Yes Historical Provider, MD   Multiple Vitamins-Minerals (THERAPEUTIC MULTIVITAMIN-MINERALS) tablet Take 1 tablet by mouth daily   Yes Historical Provider, MD   Roflumilast (DALIRESP) 500 MCG tablet Take 1 tablet by mouth daily 2/25/21  Yes Christian Winston MD   apixaban [facility]) 5 MG TABS tablet Take 1 tablet by mouth 2 times daily 12/23/20  Yes Christian Winston MD   Lidocaine 4 % OINT Apply topically as needed for Pain Apply topically as needed. Yes Historical Provider, MD   rOPINIRole (REQUIP) 2 MG tablet Take 2 mg by mouth nightly    Yes Historical Provider, MD   polyethylene glycol (GLYCOLAX) packet Take 17 g by mouth daily as needed for Constipation   Yes Historical Provider, MD   simvastatin (ZOCOR) 80 MG tablet Take 80 mg by mouth nightly  3/27/18  Yes Historical Provider, MD   furosemide (LASIX) 20 MG tablet Take 20 mg by mouth daily    Yes Historical Provider, MD   lisinopril (PRINIVIL;ZESTRIL) 20 MG tablet Take 30 mg by mouth daily    Yes Historical Provider, MD   gabapentin (NEURONTIN) 800 MG tablet Take 800 mg by mouth 3 times daily.     Yes Historical Provider, MD   aspirin 81 MG tablet Take 81 mg by mouth daily   Yes Historical Provider, MD   metFORMIN (GLUCOPHAGE) 1000 MG tablet Take 1,000 mg by mouth 2 times daily (with meals)   Yes Historical Provider, MD   empagliflozin (JARDIANCE) 25 MG tablet Take 25 mg by mouth daily    Historical Provider, MD   guaiFENesin (MUCINEX) 600 MG extended release tablet Take 600 mg by mouth daily    Historical Provider, MD   albuterol sulfate  (90 Base) MCG/ACT inhaler Inhale 2 puffs into the lungs every 4 hours as needed for Wheezing 3/8/21   Christian Winston MD   magnesium citrate solution Take 296 mLs by mouth once for 1 dose 2/4/20 2/4/20  GRAHAM Mei   vitamin D (CHOLECALCIFEROL) 1000 UNITS TABS tablet Take 1,000 Units by mouth daily    Historical Provider, MD   budesonide-formoterol (SYMBICORT) 160-4.5 MCG/ACT AERO Inhale 2 puffs into the lungs 2 times daily     Historical Provider, MD   carvedilol (COREG) 25 MG tablet Take 12.5 mg by mouth 2 times daily (with meals)     Historical Provider, MD       Allergies:  No known allergies      Social History:   TOBACCO:   reports that he quit smoking about 25 years ago. He has a 18.00 pack-year smoking history. He has never used smokeless tobacco.  ETOH:   reports no history of alcohol use. Family History:       Problem Relation Age of Onset    Brain Cancer Mother     Heart Attack Father     Heart Attack Brother            REVIEW OF SYSTEMS:     patients reported symptoms are in BOLD all other symptoms are negative. CONSTITUTIONAL:      fatigue, fever, chills or night sweats, recent weight gain, recent wt loss, insomnia,  General weakness, poor appetite, muscle aches and pains, activity change    HEAD: headache, dizziness    EYES:      blurriness,  double vision, dryness,  discharge, irritation,diplopia    EARS:      hearing loss, vertigo, ear discharge,  Earache. Ringing in the ears. NOSE:      Rhinorrhea, sneezing, epistaxis. Discharge, sinusitis,     MOUTH/THROAT:         sore throat, mouth ulcers, Hoarseness    RESPIRATORY:        Shortness of breath, wheezing,  cough, sputum, hemoptysis, obstructive sleep apnea,    CARDIOVASCULAR :      chest pain, palpitations, dyspnea on exercise, Lower extrimity edema (swelling),     GASTROINTESTINAL:       Dysphagia, Poor appetite,  Nausea, Vomiting, diarrhea, heartburn, abdominal pain. Blood in the stools, hematemesis. Pain with swallowing, constipation    GENITOURINARY:       Urinary frequency, hesitancy,  urgency, Dysuria, hematuria,  Urinary Incontinence. Urinary Retention. GYNECOLOGICAL: vaginal bleeding , vaginal discharge, menopause    MUSCULOSKELETAL:       joint swelling or stiffness, joint pain, muscle pain, balance problems, low back pain. NEUROLOGICAL:      Gait problems. Tremor. Dizziness. Pain and paresthesias, weakness in extremities.  Seizures, memory loss    PSYCHLOGICAL:        Anxiety, depression    SKIN :      Rashes ulcers, skin color changes, easy bruisability, lymphadenopathy      Physical Exam:      Vitals: /66   Pulse 88   Temp 99.4 °F (37.4 °C) (Temporal)   Resp 20   Ht 5' 10\" (1.778 m)   Wt 235 lb 3.7 oz (106.7 kg)   SpO2 96%   BMI 33.75 kg/m²     Gen:          Alert and oriented x 3  Eyes: PERRL. No sclera icterus. No conjunctival injection. ENT: No discharge. Pharynx clear. External appearance of ears and nose normal.  Neck: Trachea midline. No obvious mass. Resp: Decreased breath sounds bilaterally  CV: Regular rate. Regular rhythm. No murmur or rub. No edema. GI: Non-tender. Non-distended. No hernia. Skin: Warm, dry, normal texture and turgor. Lymph: No cervical LAD. No supraclavicular LAD. M/S: / Ext. No cyanosis. No clubbing. No joint deformity. Neuro: Moves all four extremities. CN 2-12 tested, no deficits noted. Peripheral pulses and capillary refill is intact. CBC:   Recent Labs     08/25/21  1641   WBC 12.8*   HGB 15.8        BMP:    Recent Labs     08/25/21  1641   *   K 4.7   CL 94*   CO2 24   BUN 18   CREATININE 1.2   GLUCOSE 263*     Hepatic: No results for input(s): AST, ALT, ALB, BILITOT, ALKPHOS in the last 72 hours. Troponin:   Recent Labs     08/25/21  1641   TROPONINI <0.01     BNP: No results for input(s): BNP in the last 72 hours. INR: No results for input(s): INR in the last 72 hours. Lab Results   Component Value Date    LABA1C 7.3 07/07/2021           No results for input(s): CKTOTAL in the last 72 hours. -----------------------------------------------------------------    XR CHEST (2 VW)  Bibasilar airspace opacities favored to represent pneumonia    Echocardiogram in 2017  Ejection fraction 40 to 45%    Assessment / Plan     Pneumonia.   Likely gram-negative  Cover with cefepime and vancomycin to cover healthcare associated pneumonia  Check procalcitonin  Bronchodilators  Echocardiogram to rule out CHF  Patient has history of low ejection fraction in 2017  DC IV fluids      Acute respiratory failure with hypoxia  Likely due to pneumonia and COPD  Titrate oxygen for saturations greater than or equal to 90%      Atrial fibrillation I48.91  Heart rate is controlled  Continue patient on eliquis      Type 2 diabetes mellitus E11.9  Continue Lantus insulin  prandial insulin. Insulin sliding scale  Check A1c                      DVT and GI prophylaxis      Full Code      Daniel Carr MD M.D    This note was transcribed using [id number] [employer]. Please disregard any translational errors.

## 2021-08-26 NOTE — ED TRIAGE NOTES
Pt presents to ED with SOB, O2 in upper 80s. Pt states he knows he has pneumonia. Pt is on room air at home.

## 2021-08-26 NOTE — CONSULTS
Clinical Pharmacy Note  Vancomycin Consult    Cecilia Martinez is a 68 y.o. male ordered Vancomycin for HAP; consult received from Dr. Luis Miguel Lane to manage therapy. Also receiving Cefepime. Patient Active Problem List   Diagnosis    [facility]    CAD (coronary artery disease)    Morbid obesity due to excess calories (HCC)    SOB (shortness of breath)    Abnormal chest CT    Back pain    Diabetes mellitus type 2, uncontrolled (HCC)    Chronic back pain    Chronic systolic heart failure (HCC)    Hyperlipidemia    Essential hypertension    Leukocytosis    Chronic bronchitis, simple (HCC)    Chronic rhinitis    Generalized weakness    Lactic acidosis    Acute renal failure (ARF) ([facility]    Uncontrolled diabetes mellitus ([facility]    Supratherapeutic INR    Pneumonia       Allergies:  No known allergies     Temp max:  Temp (24hrs), Av.4 °F (37.4 °C), Min:99.4 °F (37.4 °C), Max:99.4 °F (37.4 °C)      Recent Labs     21  1641   WBC 12.8*       Recent Labs     21  1641   BUN 18   CREATININE 1.2         Intake/Output Summary (Last 24 hours) at 2021  Last data filed at 2021 1932  Gross per 24 hour   Intake 1000 ml   Output --   Net 1000 ml       Culture Results:      Ht Readings from Last 1 Encounters:   21 5' 10\" (1.778 m)        Wt Readings from Last 1 Encounters:   21 235 lb 3.7 oz (106.7 kg)         Estimated Creatinine Clearance: 64 mL/min (based on SCr of 1.2 mg/dL). Assessment/Plan:  Patient received Vancomycin 1500mg in ED  Vancomycin 750 mg IV every 12 hours ordered. Regimen projects a trough level of 15-20 mg/L. Level ordered for . Thank you for the consult.    Zach Miller, [facility], [address] 2021 8:59 PM

## 2021-08-27 VITALS
WEIGHT: 237.88 LBS | TEMPERATURE: 98.2 F | HEIGHT: 70 IN | HEART RATE: 98 BPM | DIASTOLIC BLOOD PRESSURE: 77 MMHG | RESPIRATION RATE: 17 BRPM | SYSTOLIC BLOOD PRESSURE: 112 MMHG | OXYGEN SATURATION: 93 % | BODY MASS INDEX: 34.06 KG/M2

## 2021-08-27 LAB
GLUCOSE BLD-MCNC: 188 MG/DL (ref 70–99)
GLUCOSE BLD-MCNC: 238 MG/DL (ref 70–99)
LV EF: 48 %
LVEF MODALITY: NORMAL
PERFORMED ON: ABNORMAL
PERFORMED ON: ABNORMAL

## 2021-08-27 PROCEDURE — C8929 TTE W OR WO FOL WCON,DOPPLER: HCPCS

## 2021-08-27 PROCEDURE — 97166 OT EVAL MOD COMPLEX 45 MIN: CPT

## 2021-08-27 PROCEDURE — 6370000000 HC RX 637 (ALT 250 FOR IP): Performed by: NURSE PRACTITIONER

## 2021-08-27 PROCEDURE — 94640 AIRWAY INHALATION TREATMENT: CPT

## 2021-08-27 PROCEDURE — 97530 THERAPEUTIC ACTIVITIES: CPT

## 2021-08-27 PROCEDURE — 2580000003 HC RX 258: Performed by: HOSPITALIST

## 2021-08-27 PROCEDURE — 97535 SELF CARE MNGMENT TRAINING: CPT

## 2021-08-27 PROCEDURE — 6360000002 HC RX W HCPCS: Performed by: HOSPITALIST

## 2021-08-27 PROCEDURE — 6360000004 HC RX CONTRAST MEDICATION: Performed by: INTERNAL MEDICINE

## 2021-08-27 PROCEDURE — 97162 PT EVAL MOD COMPLEX 30 MIN: CPT

## 2021-08-27 PROCEDURE — 6370000000 HC RX 637 (ALT 250 FOR IP): Performed by: HOSPITALIST

## 2021-08-27 RX ORDER — LEVOFLOXACIN 500 MG/1
500 TABLET, FILM COATED ORAL DAILY
Qty: 7 TABLET | Refills: 0 | Status: SHIPPED | OUTPATIENT
Start: 2021-08-27 | End: 2021-09-03

## 2021-08-27 RX ADMIN — GABAPENTIN 800 MG: 400 CAPSULE ORAL at 09:42

## 2021-08-27 RX ADMIN — OXYCODONE HYDROCHLORIDE 40 MG: 15 TABLET, FILM COATED, EXTENDED RELEASE ORAL at 09:42

## 2021-08-27 RX ADMIN — Medication 2 PUFF: at 09:15

## 2021-08-27 RX ADMIN — CEFEPIME HYDROCHLORIDE 2000 MG: 2 INJECTION, POWDER, FOR SOLUTION INTRAVENOUS at 05:00

## 2021-08-27 RX ADMIN — ROFLUMILAST 500 MCG: 500 TABLET ORAL at 09:43

## 2021-08-27 RX ADMIN — Medication 1000 UNITS: at 09:43

## 2021-08-27 RX ADMIN — Medication 10 ML: at 09:43

## 2021-08-27 RX ADMIN — INSULIN LISPRO 8 UNITS: 100 INJECTION, SOLUTION INTRAVENOUS; SUBCUTANEOUS at 12:10

## 2021-08-27 RX ADMIN — GUAIFENESIN 600 MG: 600 TABLET ORAL at 09:43

## 2021-08-27 RX ADMIN — Medication 1 TABLET: at 09:43

## 2021-08-27 RX ADMIN — INSULIN LISPRO 2 UNITS: 100 INJECTION, SOLUTION INTRAVENOUS; SUBCUTANEOUS at 09:44

## 2021-08-27 RX ADMIN — CARVEDILOL 12.5 MG: 6.25 TABLET, FILM COATED ORAL at 09:42

## 2021-08-27 RX ADMIN — APIXABAN 5 MG: 5 TABLET, FILM COATED ORAL at 09:43

## 2021-08-27 RX ADMIN — INSULIN LISPRO 4 UNITS: 100 INJECTION, SOLUTION INTRAVENOUS; SUBCUTANEOUS at 12:10

## 2021-08-27 RX ADMIN — GABAPENTIN 800 MG: 400 CAPSULE ORAL at 14:30

## 2021-08-27 RX ADMIN — SODIUM CHLORIDE 25 ML: 9 INJECTION, SOLUTION INTRAVENOUS at 04:59

## 2021-08-27 RX ADMIN — PERFLUTREN 1.5 ML: 6.52 INJECTION, SUSPENSION INTRAVENOUS at 07:55

## 2021-08-27 RX ADMIN — INSULIN LISPRO 8 UNITS: 100 INJECTION, SOLUTION INTRAVENOUS; SUBCUTANEOUS at 09:44

## 2021-08-27 RX ADMIN — ASPIRIN 81 MG: 81 TABLET, CHEWABLE ORAL at 09:42

## 2021-08-27 ASSESSMENT — PAIN DESCRIPTION - DESCRIPTORS: DESCRIPTORS: ACHING

## 2021-08-27 ASSESSMENT — PAIN SCALES - GENERAL: PAINLEVEL_OUTOF10: 8

## 2021-08-27 ASSESSMENT — PAIN - FUNCTIONAL ASSESSMENT: PAIN_FUNCTIONAL_ASSESSMENT: PREVENTS OR INTERFERES SOME ACTIVE ACTIVITIES AND ADLS

## 2021-08-27 ASSESSMENT — PAIN DESCRIPTION - LOCATION: LOCATION: BACK;LEG

## 2021-08-27 ASSESSMENT — PAIN DESCRIPTION - PAIN TYPE: TYPE: CHRONIC PAIN

## 2021-08-27 ASSESSMENT — PAIN DESCRIPTION - PROGRESSION: CLINICAL_PROGRESSION: GRADUALLY WORSENING

## 2021-08-27 ASSESSMENT — PAIN DESCRIPTION - ORIENTATION: ORIENTATION: RIGHT;LEFT

## 2021-08-27 ASSESSMENT — PAIN DESCRIPTION - FREQUENCY: FREQUENCY: CONTINUOUS

## 2021-08-27 ASSESSMENT — PAIN DESCRIPTION - ONSET: ONSET: ON-GOING

## 2021-08-27 NOTE — CARE COORDINATION
CASE MANAGEMENT DISCHARGE SUMMARY:    DISCHARGE DATE: 8/27/21    DISCHARGED TO: Home     Discharging to Facility/ Agency   · Name:  [facility]    · Address: [address] [place] [address]., [address] [place] [address]., [facility]  · Phone: [phone]  · Fax: [phone]     TRANSPORTATION: per wife             TIME: on call    [facility]    Pt agreeable with dc plan. Called Vivian with [facility] & updated of dc order.     Oriana Colin RN, BSN, Case Management  [phone]    Electronically signed by Oriana Colin RN on 8/27/2021 at 11:11 AM

## 2021-08-27 NOTE — DISCHARGE SUMMARY
Hospital Medicine Discharge Summary    Patient ID: Shade Carter      Patient's PCP: Owen Pena    Admit Date: 8/25/2021     Discharge Date:  8/27/2021      Admitting Physician: Jerry Cruz MD     Discharge Physician: Cheyenne Najera MD     Discharge Diagnoses: Active Hospital Problems    Diagnosis     Pneumonia [J18.9]        The patient was seen and examined on day of discharge and this discharge summary is in conjunction with any daily progress note from day of discharge. Hospital Course: 68 y.o.male with medical history significant for coronary artery disease patient is status post coronary artery bypass grafting in 2009. Fredi Flaherty has history of CHF COPD paroxysmal atrial fibrillation and hypertension. Also Hx of failed back surgery with chronic severe pain on chronic high dose opiate regimen. Patient presented to the emergency room with 3 days of cough, congestion and shortness of breath that has been getting worse progressively. Pneumonia. Likely gram-negative  Cover with cefepime and vancomycin to cover healthcare associated pneumonia  Bronchodilators  DC IV fluids              Flu negative, MRSA negative, COVID negative. Stopped vanc.     levaquin PO on discharge         Acute respiratory failure with hypoxia - resolved  Likely due to pneumonia and COPD  Titrate oxygen for saturations greater than or equal to 90%        Atrial fibrillation   Heart rate is controlled  Continue patient on eliquis        Type 2 diabetes mellitus E11.9  Continue Lantus insulin  prandial insulin. Insulin sliding scale  Check A1c      Chronic Pain - medications unchanged.      Chronic systolic CHF. ECHO this admit actually shows improving EF.    Resume PTA regimen.        Physical Exam Performed:     /77   Pulse 98   Temp 98.2 °F (36.8 °C) (Oral)   Resp 17   Ht 5' 10\" (1.778 m)   Wt 237 lb 14 oz (107.9 kg)   SpO2 93%   BMI 34.13 kg/m²       General appearance: No apparent distress, appears stated age and cooperative. HEENT: Pupils equal, round, and reactive to light. Conjunctivae/corneas clear. Neck: Supple, with full range of motion. No jugular venous distention. Trachea midline. Respiratory:  Normal respiratory effort. Clear to auscultation, bilaterally without Rales/Wheezes/Rhonchi. Cardiovascular: Regular rate and rhythm with normal S1/S2 without murmurs, rubs or gallops. Abdomen: Soft, non-tender, non-distended with normal bowel sounds. Musculoskeletal: No clubbing, cyanosis or edema bilaterally. Full range of motion without deformity. Skin: Skin color, texture, turgor normal.  No rashes or lesions. Neurologic:  Neurovascularly intact without any focal sensory/motor deficits. Cranial nerves: II-XII intact, grossly non-focal.  Psychiatric: Alert and oriented, thought content appropriate, normal insight  Capillary Refill: Brisk,3 seconds, normal   Peripheral Pulses: +2 palpable, equal bilaterally          Labs: For convenience and continuity at follow-up the following most recent labs are provided:      CBC:    Lab Results   Component Value Date    WBC 10.7 08/26/2021    HGB 13.8 08/26/2021    HCT 41.9 08/26/2021     08/26/2021       Renal:    Lab Results   Component Value Date     08/26/2021    K 4.5 08/26/2021    CL 96 08/26/2021    CO2 25 08/26/2021    BUN 16 08/26/2021    CREATININE 1.1 08/26/2021    CALCIUM 9.5 08/26/2021    PHOS 3.5 09/19/2017         Significant Diagnostic Studies    Radiology:   XR CHEST (2 VW)   Final Result   Bibasilar airspace opacities favored to represent pneumonia         XR CHEST STANDARD (2 VW)    (Results Pending)          Consults:     None    Disposition: home    Condition at Discharge: Stable    Discharge Instructions/Follow-up:  PCP 1 week.      Code Status:  Full Code     Activity: activity as tolerated    Diet: cardiac diet      Discharge Medications:     Current Discharge Medication List (SYMBICORT) 160-4.5 MCG/ACT AERO Inhale 2 puffs into the lungs 2 times daily       carvedilol (COREG) 25 MG tablet Take 12.5 mg by mouth 2 times daily (with meals)              Time Spent on discharge is more than 30 minutes in the examination, evaluation, counseling and review of medications and discharge plan. Signed:    Eliel Villar MD   8/27/2021      Thank you Alex Anguiano for the opportunity to be involved in this patient's care. If you have any questions or concerns please feel free to contact me at [phone].

## 2021-08-27 NOTE — PROGRESS NOTES
Occupational Therapy   Occupational Therapy Initial Assessment  Date: 2021   Patient Name: Mariam Babin  MRN: [id number]     : 1945    Date of Service: 2021    Discharge Recommendations:  Home with Home health OT, Home with assist PRN  OT Equipment Recommendations  Other: TBD by home OT, may benefit from A/E to assist with LB ADLs     Mariam Babin scored a 20/24 on the AM-PAC ADL Inpatient form. Current research shows that an AM-PAC score of 18 or greater is typically associated with a discharge to the patient's home setting. Based on the patient's AM-PAC score, and their current ADL deficits, it is recommended that the patient have 2-3 sessions per week of Occupational Therapy at d/c to increase the patient's independence. At this time, this patient demonstrates the endurance and safety to discharge home with assist prn and a home OT follow up treatment frequency of 2-3x/wk. Please see assessment section for further patient specific details. If patient discharges prior to next session this note will serve as a discharge summary. Please see below for the latest assessment towards goals. Assessment   Performance deficits / Impairments: Decreased functional mobility ; Decreased ADL status; Decreased balance;Decreased endurance;Decreased sensation;Decreased high-level IADLs  Assessment: Pt is a 68 y.o. male admitted with PNA. At baseline, pt lives with wife and modified independent ADLs and fxl mobility using RW vs cane. Pt currently functioning slightly below baseline d/t the above deficits, today requiring SBA/CGA fxl transfers/mobility with RW, SBA for grooming in stance at sink, and anticipate pt would require overall SBA/CGA for ADLs. Will cont to see on acute to address the above limitations and maximize pt's safety and independence. Will cont to follow while hospitalized.   Prognosis: Good  Decision Making: Medium Complexity  History: see below  Exam: self-care, ROM/strength, balance/fxl mobility, endurance/O2 sats  Assistance / Modification: anticipate overall SBA/CGA for ADLs  OT Education: OT Role;Plan of Care;ADL Adaptive Strategies;Transfer Training  REQUIRES OT FOLLOW UP: Yes  Activity Tolerance  Activity Tolerance: Patient Tolerated treatment well  Activity Tolerance: SpO2 on RA >90% throughout session. Safety Devices  Safety Devices in place: Yes  Type of devices: Call light within reach; Chair alarm in place; Left in chair;Gait belt           Patient Diagnosis(es): The primary encounter diagnosis was Acute respiratory failure with hypoxia ([address] Diagnoses of Septicemia ([address] Pneumonia of both lungs due to infectious organism, unspecified part of lung, and Pneumonia due to infectious organism, unspecified laterality, unspecified part of lung were also pertinent to this visit. has a past medical history of CAD (coronary artery disease), Chronic back pain, Chronic systolic CHF (congestive heart failure) ([address] COPD (chronic obstructive pulmonary disease) ([address] Diabetes mellitus ([address] Essential hypertension, and Paroxysmal atrial fibrillation ([address] has a past surgical history that includes fracture surgery (Left, 1988); back surgery (1995); Cardiac surgery (2007); Coronary artery bypass graft; back surgery; and Cervical spine surgery. Restrictions  Restrictions/Precautions  Restrictions/Precautions: Fall Risk  Position Activity Restriction  Other position/activity restrictions: 1500ml    Subjective   General  Chart Reviewed: Yes  Patient assessed for rehabilitation services?: Yes  Additional Pertinent Hx: 68 y.o.male with medical history significant for coronary artery disease patient is status post coronary artery bypass grafting in 2009. Patient has history of CHF COPD paroxysmal atrial fibrillation and hypertension. Patient presented to the emergency room with 3 days of cough congestion and shortness of breath that has been getting worse progressively.  Pt found to have PNA. Family / Caregiver Present: No  Referring Practitioner: Darek Millan MD  Diagnosis: PNA  Subjective  Subjective: Pt met b/s for OT eval/tx. Pt seated in recliner on arrival, agreeable to participate in therapy. Pt reports 6/10 chronic pain B LE's, feet, back. Social/Functional History  Social/Functional History  Lives With: Spouse (wife ambulates with cane)  Type of Home: House  Home Layout: One level, Able to Live on Main level with bedroom/bathroom  Home Access: Stairs to enter with rails  Entrance Stairs - Number of Steps: 2  Entrance Stairs - Rails: Both  Bathroom Shower/Tub: Walk-in shower  Bathroom Toilet: Handicap height  Bathroom Equipment: Grab bars in shower, Built-in shower seat, Shower chair, Hand-held shower, Grab bars around toilet  Bathroom Accessibility: Wheelchair accessible  Home Equipment: Lift chair, Rolling walker, Cane, 4 wheeled walker, Electric scooter (bedrail)  ADL Assistance: Independent  Homemaking Assistance: Needs assistance (wife assists with med management, cooking, laundry, cleaning.  Pt goes to grocery store with wife and rides electric scooter)  Ambulation Assistance: Independent (combination of RW, cane in home. scooter outside home)  Transfer Assistance: Independent  Active : Yes (very little)  Occupation: Retired  Type of occupation: ,   IADL Comments: pt sleeps in regular bed with bedrail       Objective   Vision: Impaired  Vision Exceptions: Wears glasses at all times (bifocals)  Hearing: Exceptions to [facility]  Hearing Exceptions: Hard of hearing/hearing concerns;Bilateral hearing aid (hearing aids at home)      Orientation  Overall Orientation Status: Impaired  Orientation Level: Disoriented to time;Oriented to person;Oriented to place;Oriented to situation     Balance  Sitting Balance: Independent  Standing Balance: Stand by assistance  Functional Mobility  Functional - Mobility Device: Rolling Walker  Activity: To/from bathroom  Assist Level: Contact guard assistance  Functional Mobility Comments: Pt completed fxl mobility to/from BR with RW and SBA/CGA. Pt slightly drags R LE. ADL  Grooming: Stand by assistance (standing at sink for oral care, pt bracing over sink onto forearms)  LE Bathing:  (per pt report, showered earlier this morning with set-up and use of shower chair)  Toileting:  (pt demo'd toilet transfer with SBA/CGA. Pt has been using urinal. anticipate SBA/CGA for toileting)  Additional Comments: Anticipate pt independent feeding, SBA/CGA bathing and dressing based on ROM/strength, balance, endurance.      Tone RUE  RUE Tone: Normotonic  Tone LUE  LUE Tone: Normotonic  Coordination  Movements Are Fluid And Coordinated: Yes     Bed mobility  Supine to Sit: Stand by assistance (HOB flat/no rail)  Sit to Supine: Stand by assistance  Scooting: Stand by assistance     Transfers  Sit to stand: Contact guard assistance;Stand by assistance  Stand to sit: Contact guard assistance;Stand by assistance  Transfer Comments: to/from RW, VC's for safe technique as pt tends to leave walker off to side   Toilet Transfers  Toilet - Technique: Ambulating  Equipment Used: Grab bars  Toilet Transfer: Contact guard assistance;Stand by assistance    Cognition  Overall Cognitive Status: Exceptions  Memory: Decreased short term memory  Safety Judgement: Decreased awareness of need for safety;Decreased awareness of need for assistance  Problem Solving: Decreased awareness of errors     Sensation  Overall Sensation Status: Impaired (pt reports decreased sensation B LE's d/t peripheral neuropathy, denies N/T in hands)     LUE AROM (degrees)  LUE AROM : WFL  RUE AROM (degrees)  RUE AROM : WFL     LUE Strength  Gross LUE Strength: WFL  RUE Strength  Gross RUE Strength: WFL                   Plan   Plan  Times per week: 3-5  Current Treatment Recommendations: Functional Mobility Training, Balance Training, Strengthening, Endurance Training, Safety Education & Training, Self-Care / ADL, Equipment Evaluation, Education, & procurement      AM-PAC Score        AM-PAC Inpatient Daily Activity Raw Score: 20 (08/27/21 1136)  AM-PAC Inpatient ADL T-Scale Score : 42.03 (08/27/21 1136)  ADL Inpatient CMS 0-100% Score: 38.32 (08/27/21 1136)  ADL Inpatient CMS G-Code Modifier : Angeles Zaragoza (08/27/21 1136)    Goals  Short term goals  Time Frame for Short term goals: Prior to d/c:  Short term goal 1: Pt will bathe with mod I. Short term goal 2: Pt will dress with mod I. Short term goal 3: Pt will toilet with mod I. Short term goal 4: Pt complete fxl mobility and fxl transfers to/from ADL surfaces with mod I using AD. Short term goal 5: Pt will tolerate standing >5 minutes for functional task with mod I.  Long term goals  Time Frame for Long term goals : STGs=LTGs  Patient Goals   Patient goals : to return home.        Therapy Time   Individual Concurrent Group Co-treatment   Time In 5860         Time Out 1120         Minutes 29         Timed Code Treatment Minutes: [address] [facility], OTR/L 0487

## 2021-08-27 NOTE — PROGRESS NOTES
CLINICAL PHARMACY NOTE: MEDS TO BEDS    Total # of Prescriptions Filled: 1   The following medications were delivered to the patient:  Current Discharge Medication List      START taking these medications    Details   levoFLOXacin (LEVAQUIN) 500 MG tablet Take 1 tablet by mouth daily for 7 days  Qty: 7 tablet, Refills: 0         ·   ·     Additional Documentation:

## 2021-08-27 NOTE — PLAN OF CARE
Problem: Falls - Risk of:  Goal: Will remain free from falls  Description: Will remain free from falls  8/27/2021 1057 by Genet Sanchez RN  Outcome: Ongoing  8/27/2021 0450 by Lane Vazquez RN  Outcome: Ongoing  Goal: Absence of physical injury  Description: Absence of physical injury  8/27/2021 1057 by Genet Sanchez RN  Outcome: Ongoing  8/27/2021 0450 by Lane Vazquez RN  Outcome: Ongoing     Problem: Airway Clearance - Ineffective:  Goal: Ability to maintain a clear airway will improve  Description: Ability to maintain a clear airway will improve  8/27/2021 1057 by Genet Sanchez RN  Outcome: Ongoing  8/27/2021 0450 by Lane Vazquez RN  Outcome: Ongoing     Problem: Discharge Planning:  Goal: Discharged to appropriate level of care  Description: Discharged to appropriate level of care  8/27/2021 1057 by Genet Sanchez RN  Outcome: Ongoing  8/27/2021 0450 by Lane Vazquez RN  Outcome: Ongoing

## 2021-08-27 NOTE — PROGRESS NOTES
Pt alert and oriented x4, up to chair. VSS. Denies SOB. Complains of chronic back and bilateral leg pain. Meds given per MAR. Denies further needs. Call light within reach.  Will monitor

## 2021-08-27 NOTE — PLAN OF CARE
Problem: Falls - Risk of:  Goal: Will remain free from falls  Description: Will remain free from falls  Outcome: Ongoing  Goal: Absence of physical injury  Description: Absence of physical injury  Outcome: Ongoing     Problem: Airway Clearance - Ineffective:  Goal: Ability to maintain a clear airway will improve  Description: Ability to maintain a clear airway will improve  Outcome: Ongoing     Problem: Discharge Planning:  Goal: Discharged to appropriate level of care  Description: Discharged to appropriate level of care  Outcome: Ongoing

## 2021-08-27 NOTE — CARE COORDINATION
[facility]      Message left for CM to have HCO placed.         Jose Ramon Mattsonhan  Work mobile: [phone]  [facility] office: [phone]

## 2021-08-27 NOTE — DISCHARGE INSTR - DIET

## 2021-08-27 NOTE — PROGRESS NOTES
his RW or SPC inside home, scooter for community. Pt currently functioning slightly below baseline. Anticipate return home with PRN assist and level 1 HHPT. Treatment Diagnosis: impaired mobility  Prognosis: Good  Decision Making: Medium Complexity  History: see below  Exam: see below  Clinical Presentation: evolving  PT Education: PT Role;Plan of Care;General Safety;Gait Training;Functional Mobility Training;Transfer Training  REQUIRES PT FOLLOW UP: Yes  Activity Tolerance  Activity Tolerance: Patient Tolerated treatment well       Patient Diagnosis(es): The primary encounter diagnosis was Acute respiratory failure with hypoxia ([address] Diagnoses of Septicemia ([address] and Pneumonia of both lungs due to infectious organism, unspecified part of lung were also pertinent to this visit. has a past medical history of CAD (coronary artery disease), Chronic back pain, Chronic systolic CHF (congestive heart failure) ([address] COPD (chronic obstructive pulmonary disease) ([address] Diabetes mellitus ([address] Essential hypertension, and Paroxysmal atrial fibrillation ([address] has a past surgical history that includes fracture surgery (Left, 1988); back surgery (1995); Cardiac surgery (2007); Coronary artery bypass graft; back surgery; and Cervical spine surgery. Restrictions  Restrictions/Precautions  Restrictions/Precautions: Fall Risk  Position Activity Restriction  Other position/activity restrictions: 1500ml     Vision/Hearing  Vision: Impaired  Vision Exceptions: Wears glasses at all times  Hearing: Within functional limits       Subjective  General  Chart Reviewed: Yes  Patient assessed for rehabilitation services?: Yes  Additional Pertinent Hx: Patient presented to the emergency room on 8/25/21 with 3 days of cough congestion and shortness of breath that has been getting worse progressively. Pt found to have pneumonia.   Response To Previous Treatment: Not applicable  Family / Caregiver Present: No  Referring Practitioner: Saji Steen MD  Referral Date : 08/26/21  Diagnosis: pneumonia  Follows Commands: Within Functional Limits  Subjective  Subjective: Pt is agreeable to PT.           Orientation  Orientation  Overall Orientation Status: Within Functional Limits     Social/Functional History  Social/Functional History  Lives With: Spouse  Type of Home: House  Home Layout: One level, Able to Live on Main level with bedroom/bathroom  Home Access: Stairs to enter with rails  Entrance Stairs - Number of Steps: 2  Entrance Stairs - Rails: Both  Bathroom Shower/Tub: Walk-in shower  Bathroom Toilet: Handicap height  Bathroom Equipment: Grab bars in shower, Built-in shower seat, Shower chair, Hand-held shower, Grab bars around toilet  Bathroom Accessibility: Wheelchair accessible  Home Equipment: Lift chair, Rolling walker, Cane, 4 wheeled walker, Electric scooter  ADL Assistance: Independent  Ambulation Assistance: Independent (combination of RW, cane in home. scooter outside home)  Transfer Assistance: Independent  Active : Yes (very little)  Occupation: Retired  Type of occupation: ,      Cognition   Cognition  Overall Cognitive Status: WFL    Objective  Strength RLE  Strength RLE: Exception  Comment: mild generalized weakness  Strength LLE  Strength LLE: Exception  Comment: mild generalized weakness  Motor Control  Gross Motor?: WFL     Bed mobility  Supine to Sit: Moderate assistance  Sit to Supine: Unable to assess (in recliner at end of session.)  Transfers  Sit to Stand: Stand by assistance;Contact guard assistance  Stand to sit: Stand by assistance;Contact guard assistance  Ambulation  Ambulation?: Yes  Ambulation 1  Surface: level tile  Device: Rolling Walker  Assistance: Contact guard assistance  Gait Deviations: Slow Emely;Decreased step length;Decreased step height  Distance: 10' + 30'  Stairs/Curb  Stairs?: No     Balance  Posture: Good  Sitting - Static: Good  Sitting - Dynamic: Good  Standing -

## 2021-08-27 NOTE — CARE COORDINATION
[facility]    Referral received from CM to follow for home care services. I will follow for needs, and speak with patient to verify demos. [facility] approved and will see patient on DC home.           Jose Ramon Geiger  Work mobile: [phone]  [facility] office: [phone]

## 2021-08-27 NOTE — CARE COORDINATION
[facility]    DC order noted, all docs needed have been faxed to [facility] for home care services. GREG WITH [facility].         Jose Ramon Mattsonhan  Jose Carlos mobile: [phone]  [facility] office: [phone]

## 2021-08-29 LAB
BLOOD CULTURE, ROUTINE: NORMAL
CULTURE, BLOOD 2: NORMAL

## 2021-11-22 ENCOUNTER — OFFICE VISIT (OUTPATIENT)
Dept: PULMONOLOGY | Age: 76
End: 2021-11-22
Payer: MEDICARE

## 2021-11-22 VITALS
SYSTOLIC BLOOD PRESSURE: 128 MMHG | TEMPERATURE: 98 F | BODY MASS INDEX: 34.13 KG/M2 | RESPIRATION RATE: 16 BRPM | DIASTOLIC BLOOD PRESSURE: 78 MMHG | OXYGEN SATURATION: 95 % | HEART RATE: 94 BPM | HEIGHT: 70 IN

## 2021-11-22 DIAGNOSIS — J41.0 CHRONIC BRONCHITIS, SIMPLE (HCC): ICD-10-CM

## 2021-11-22 PROCEDURE — 3023F SPIROM DOC REV: CPT | Performed by: INTERNAL MEDICINE

## 2021-11-22 PROCEDURE — G8926 SPIRO NO PERF OR DOC: HCPCS | Performed by: INTERNAL MEDICINE

## 2021-11-22 PROCEDURE — 4040F PNEUMOC VAC/ADMIN/RCVD: CPT | Performed by: INTERNAL MEDICINE

## 2021-11-22 PROCEDURE — G8484 FLU IMMUNIZE NO ADMIN: HCPCS | Performed by: INTERNAL MEDICINE

## 2021-11-22 PROCEDURE — 1123F ACP DISCUSS/DSCN MKR DOCD: CPT | Performed by: INTERNAL MEDICINE

## 2021-11-22 PROCEDURE — G8427 DOCREV CUR MEDS BY ELIG CLIN: HCPCS | Performed by: INTERNAL MEDICINE

## 2021-11-22 PROCEDURE — 1036F TOBACCO NON-USER: CPT | Performed by: INTERNAL MEDICINE

## 2021-11-22 PROCEDURE — G8417 CALC BMI ABV UP PARAM F/U: HCPCS | Performed by: INTERNAL MEDICINE

## 2021-11-22 PROCEDURE — 99213 OFFICE O/P EST LOW 20 MIN: CPT | Performed by: INTERNAL MEDICINE

## 2021-11-22 ASSESSMENT — COPD QUESTIONNAIRES
QUESTION4_WALKINCLINE: 0
QUESTION7_SLEEPQUALITY: 0
QUESTION5_HOMEACTIVITIES: 5
QUESTION6_LEAVINGHOUSE: 0
QUESTION3_CHESTTIGHTNESS: 0
CAT_TOTALSCORE: 12
QUESTION8_ENERGYLEVEL: 5
QUESTION2_CHESTPHLEGM: 0
QUESTION1_COUGHFREQUENCY: 2

## 2021-11-22 NOTE — PROGRESS NOTES
REASON FOR CONSULTATION/CC:    Chief Complaint   Patient presents with    COPD     f/u COPD        Consult at request of   Jessie Taylor for      PCP: Jessie Taylor    HISTORY OF PRESENT ILLNESS: Iiss Cao is a 68y.o. year old male with a history of chronic bronchitis  who presents :           chronic bronchitis   Daliresp   Symbicort   albuterol     All working well. Objective:   PHYSICAL EXAM:  Blood pressure 128/78, pulse 94, temperature 98 °F (36.7 °C), temperature source Infrared, resp. rate 16, height 5' 10\" (1.778 m), SpO2 95 %.'  Body mass index is 34.13 kg/m². Gen: No distress. Eyes:    ENT:    Neck:    Resp: No accessory muscle use. No crackles. No wheezes. No rhonchi. CV: Regular rate. Regular rhythm. No murmur or rub. no edema. GI:    Skin:    Lymph:    M/S: No cyanosis. No clubbing. Neuro: Moves all four extremities. Psych: Oriented x 3. No anxiety. Awake. Alert. Intact judgement and insight. Data Reviewed:        Assessment:     ·  chronic bronchitis   · Chronic rhonitis  · Neuropathy with DM       Plan:      Problem List Items Addressed This Visit     Chronic bronchitis, simple ([address]      Controlled with Daliresp and Symbicort   albuterol                      This note was transcribed using [id number] [employer]. Please disregard any translational errors.     Valentin Ignacio Pulmonary, Sleep and [facility] [address] [phone]

## 2022-02-21 ENCOUNTER — OFFICE VISIT (OUTPATIENT)
Dept: ENT CLINIC | Age: 77
End: 2022-02-21
Payer: MEDICARE

## 2022-02-21 VITALS
TEMPERATURE: 98.2 F | BODY MASS INDEX: 32.28 KG/M2 | WEIGHT: 230.6 LBS | DIASTOLIC BLOOD PRESSURE: 56 MMHG | HEART RATE: 78 BPM | HEIGHT: 71 IN | SYSTOLIC BLOOD PRESSURE: 116 MMHG

## 2022-02-21 DIAGNOSIS — K11.7 SIALORRHEA: ICD-10-CM

## 2022-02-21 DIAGNOSIS — H61.23 BILATERAL IMPACTED CERUMEN: Primary | ICD-10-CM

## 2022-02-21 PROCEDURE — G8417 CALC BMI ABV UP PARAM F/U: HCPCS | Performed by: OTOLARYNGOLOGY

## 2022-02-21 PROCEDURE — G8427 DOCREV CUR MEDS BY ELIG CLIN: HCPCS | Performed by: OTOLARYNGOLOGY

## 2022-02-21 PROCEDURE — 1036F TOBACCO NON-USER: CPT | Performed by: OTOLARYNGOLOGY

## 2022-02-21 PROCEDURE — 99202 OFFICE O/P NEW SF 15 MIN: CPT | Performed by: OTOLARYNGOLOGY

## 2022-02-21 PROCEDURE — G8484 FLU IMMUNIZE NO ADMIN: HCPCS | Performed by: OTOLARYNGOLOGY

## 2022-02-21 PROCEDURE — 1123F ACP DISCUSS/DSCN MKR DOCD: CPT | Performed by: OTOLARYNGOLOGY

## 2022-02-21 PROCEDURE — 4040F PNEUMOC VAC/ADMIN/RCVD: CPT | Performed by: OTOLARYNGOLOGY

## 2022-02-21 RX ORDER — LISINOPRIL 10 MG/1
10 TABLET ORAL DAILY
COMMUNITY

## 2022-02-21 RX ORDER — FLUTICASONE PROPIONATE 50 MCG
1 SPRAY, SUSPENSION (ML) NASAL DAILY
COMMUNITY

## 2022-02-21 NOTE — PROGRESS NOTES
Patient referred by neurologist.  Patient has peripheral diabetic neuropathy and also complained about sialorrhea. He was referred to our office for consideration of botulinum toxin injection into the salivary glands to control his sialorrhea. The patient's ear nose and throat exam was normal today with the exception of bilateral impacted cerumen. Cerumen was cleaned from both ears with curettes and the tympanic membranes are normal.  Not aware of the use of Botox to control sialorrhea and I am unable to help the patient for this.

## 2022-03-01 NOTE — PLAN OF CARE
counseling  Outcome: Ongoing     Problem: Pain:  Goal: Pain level will decrease  Description  Pain level will decrease  4/14/2019 1029 by Franchesca Armstrong RN  Outcome: Ongoing  4/14/2019 0538 by Urban Darden RN  Outcome: Ongoing  4/14/2019 0536 by Urban Darden RN  Outcome: Ongoing  Goal: Control of acute pain  Description  Control of acute pain  4/14/2019 1029 by Franchesca Armstrong RN  Outcome: Ongoing  4/14/2019 0538 by Urban Darden RN  Outcome: Ongoing  4/14/2019 0536 by Urban Darden RN  Outcome: Ongoing  Goal: Control of chronic pain  Description  Control of chronic pain  4/14/2019 1029 by Franchesca Armstrong RN  Outcome: Ongoing  4/14/2019 0538 by Urban Darden RN  Outcome: Ongoing  4/14/2019 0536 by Urban Darden RN  Outcome: Ongoing 65.1

## 2022-05-11 NOTE — FLOWSHEET NOTE
Patient was awake most of the night and asked for assistance in going to the BR. He needs someone to lift his legs to swing over the bed so he could stand up and use his walker. He prefers to urinate on the commode than using a urinal since he had a hard time taking down his pullups/depends. He had a large loose BM earlier this shift. He complained that his legs started hurting too much at 4am, he requested to be assisted to sit on the couch and have Biofreeze applied on his legs (he said he had a pack in his bag that was over his scooter.) HE also was non compliant with wearing his O2 n.c. and often takes it off while in bed. He was hesitant got his nares swabbed for MRSA and rapid flu testing. When offered to be assisted to the toilet to get urine sample for Legionella, he refused. \"I don't want to go to the commode, I want to sit on the couch!:\". A clean urine collection hat left in the commode. Resident's call light was placed on the table that was moved closer to him on the couch. But the patient would just ordered three things to be handed or done to him whenever a staff goes into his room. Pt is aware realized after she left the message

## 2022-05-24 ENCOUNTER — OFFICE VISIT (OUTPATIENT)
Dept: PULMONOLOGY | Age: 77
End: 2022-05-24
Payer: MEDICARE

## 2022-05-24 VITALS
OXYGEN SATURATION: 95 % | BODY MASS INDEX: 32.16 KG/M2 | RESPIRATION RATE: 16 BRPM | TEMPERATURE: 97.7 F | HEART RATE: 68 BPM | HEIGHT: 71 IN | SYSTOLIC BLOOD PRESSURE: 140 MMHG | DIASTOLIC BLOOD PRESSURE: 70 MMHG

## 2022-05-24 DIAGNOSIS — J41.0 CHRONIC BRONCHITIS, SIMPLE (HCC): ICD-10-CM

## 2022-05-24 PROBLEM — E87.20 LACTIC ACIDOSIS: Status: RESOLVED | Noted: 2019-04-13 | Resolved: 2022-05-24

## 2022-05-24 PROCEDURE — 1036F TOBACCO NON-USER: CPT | Performed by: INTERNAL MEDICINE

## 2022-05-24 PROCEDURE — G8417 CALC BMI ABV UP PARAM F/U: HCPCS | Performed by: INTERNAL MEDICINE

## 2022-05-24 PROCEDURE — 1123F ACP DISCUSS/DSCN MKR DOCD: CPT | Performed by: INTERNAL MEDICINE

## 2022-05-24 PROCEDURE — G8427 DOCREV CUR MEDS BY ELIG CLIN: HCPCS | Performed by: INTERNAL MEDICINE

## 2022-05-24 PROCEDURE — 3023F SPIROM DOC REV: CPT | Performed by: INTERNAL MEDICINE

## 2022-05-24 PROCEDURE — 99213 OFFICE O/P EST LOW 20 MIN: CPT | Performed by: INTERNAL MEDICINE

## 2022-05-24 RX ORDER — FLUTICASONE PROPIONATE AND SALMETEROL 100; 50 UG/1; UG/1
1 POWDER RESPIRATORY (INHALATION) EVERY 12 HOURS
COMMUNITY

## 2022-05-24 ASSESSMENT — COPD QUESTIONNAIRES
QUESTION1_COUGHFREQUENCY: 2
QUESTION3_CHESTTIGHTNESS: 0
QUESTION2_CHESTPHLEGM: 0
QUESTION5_HOMEACTIVITIES: 3
QUESTION6_LEAVINGHOUSE: 2
QUESTION7_SLEEPQUALITY: 4
QUESTION4_WALKINCLINE: 0
CAT_TOTALSCORE: 16
QUESTION8_ENERGYLEVEL: 5

## 2022-05-24 NOTE — PROGRESS NOTES
REASON FOR CONSULTATION/CC:    Chief Complaint   Patient presents with    COPD    Follow-up        Consult at request of   Rafael Rivers for      PCP: Rafael Rivers    HISTORY OF PRESENT ILLNESS: Barbi Max is a 68y.o. year old male with a history of chronic bronchitis  who presents :           chronic bronchitis   Daliresp   Symbicort   albuterol   No change. Working well     occassions dark phlegm with drinking coffee. No clear aspiration. No cough during day. Objective:   PHYSICAL EXAM:  Blood pressure (!) 140/70, pulse 68, temperature 97.7 °F (36.5 °C), temperature source Infrared, resp. rate 16, height 5' 11\" (1.803 m), SpO2 95 %.'  Body mass index is 32.16 kg/m². Gen: No distress. Eyes:    ENT:    Neck:    Resp: No accessory muscle use. No crackles. No wheezes. No rhonchi. CV: Regular rate. Regular rhythm. No murmur or rub. no edema. GI:    Skin:    Lymph:    M/S: No cyanosis. No clubbing. Neuro: Moves all four extremities. Psych: Oriented x 3. No anxiety. Awake. Alert. Intact judgement and insight. Data Reviewed:        Assessment:     ·  chronic bronchitis   · Chronic rhonitis  · Neuropathy with DM       Plan:      Problem List Items Addressed This Visit     Chronic bronchitis, simple ([address]     No change. Controlled with Symbicort with Daliresp / albuterol prn                      This note was transcribed using [id number] [employer]. Please disregard any translational errors.     Valentin Igncaio Pulmonary, Sleep and [facility] [address] [phone]

## 2022-12-08 ENCOUNTER — OFFICE VISIT (OUTPATIENT)
Dept: PULMONOLOGY | Age: 77
End: 2022-12-08
Payer: MEDICARE

## 2022-12-08 VITALS
DIASTOLIC BLOOD PRESSURE: 60 MMHG | WEIGHT: 240 LBS | TEMPERATURE: 97.4 F | RESPIRATION RATE: 16 BRPM | BODY MASS INDEX: 33.6 KG/M2 | HEIGHT: 71 IN | SYSTOLIC BLOOD PRESSURE: 110 MMHG

## 2022-12-08 DIAGNOSIS — J41.0 CHRONIC BRONCHITIS, SIMPLE (HCC): ICD-10-CM

## 2022-12-08 PROCEDURE — 3078F DIAST BP <80 MM HG: CPT | Performed by: INTERNAL MEDICINE

## 2022-12-08 PROCEDURE — 99213 OFFICE O/P EST LOW 20 MIN: CPT | Performed by: INTERNAL MEDICINE

## 2022-12-08 PROCEDURE — 1123F ACP DISCUSS/DSCN MKR DOCD: CPT | Performed by: INTERNAL MEDICINE

## 2022-12-08 PROCEDURE — 3074F SYST BP LT 130 MM HG: CPT | Performed by: INTERNAL MEDICINE

## 2022-12-08 PROCEDURE — 1036F TOBACCO NON-USER: CPT | Performed by: INTERNAL MEDICINE

## 2022-12-08 PROCEDURE — 3023F SPIROM DOC REV: CPT | Performed by: INTERNAL MEDICINE

## 2022-12-08 PROCEDURE — G8427 DOCREV CUR MEDS BY ELIG CLIN: HCPCS | Performed by: INTERNAL MEDICINE

## 2022-12-08 PROCEDURE — G8417 CALC BMI ABV UP PARAM F/U: HCPCS | Performed by: INTERNAL MEDICINE

## 2022-12-08 PROCEDURE — G8484 FLU IMMUNIZE NO ADMIN: HCPCS | Performed by: INTERNAL MEDICINE

## 2022-12-08 ASSESSMENT — COPD QUESTIONNAIRES
QUESTION8_ENERGYLEVEL: 4
QUESTION3_CHESTTIGHTNESS: 0
QUESTION4_WALKINCLINE: 5
CAT_TOTALSCORE: 22
QUESTION6_LEAVINGHOUSE: 0
QUESTION1_COUGHFREQUENCY: 2
QUESTION2_CHESTPHLEGM: 2
QUESTION7_SLEEPQUALITY: 5
QUESTION5_HOMEACTIVITIES: 4

## 2022-12-08 NOTE — PROGRESS NOTES
REASON FOR CONSULTATION/CC:    Chief Complaint   Patient presents with    COPD        Consult at request of   Efrain Ness for      PCP: Efrain Ness    HISTORY OF PRESENT ILLNESS: Destiny Blanton is a 68y.o. year old male with a history of chronic bronchitis  who presents :           chronic bronchitis   Daliresp   Advair 250    albuterol   No change. Working well     Receiving meds from [facility]. Objective:   PHYSICAL EXAM:  Blood pressure 110/60, temperature 97.4 °F (36.3 °C), temperature source Infrared, resp. rate 16, height 5' 11\" (1.803 m), weight 240 lb (108.9 kg). '  Body mass index is 33.47 kg/m². Gen: No distress. Eyes:    ENT:    Neck:    Resp: No accessory muscle use. No crackles. No wheezes. No rhonchi. CV: Regular rate. Regular rhythm. No murmur or rub. no edema. GI:    Skin:    Lymph:    M/S: No cyanosis. No clubbing. Neuro: Moves all four extremities. Psych: Oriented x 3. No anxiety. Awake. Alert. Intact judgement and insight. Data Reviewed:        Assessment:      chronic bronchitis   Chronic rhonitis  Neuropathy with DM       Plan:      Problem List Items Addressed This Visit       Chronic bronchitis, simple ([address]     Received notification from the [facility]. Daliresp, Advair, albuterol as needed. Doing extremely well. Follow-up in 6 months. This note was transcribed using [employer]. Please disregard any translational errors.     Valentin Ignacio Pulmonary, Sleep and [facility] [phone]

## 2022-12-09 NOTE — ASSESSMENT & PLAN NOTE
Received notification from the [place]. Daliresp, Advair, albuterol as needed. Doing extremely well. Follow-up in 6 months.

## 2023-01-25 ENCOUNTER — TELEPHONE (OUTPATIENT)
Dept: PULMONOLOGY | Age: 78
End: 2023-01-25

## 2023-01-25 NOTE — TELEPHONE ENCOUNTER
ESTER Pearce regarding Jorge. Did inform the patient that anything under 88% would be concerning and probably warrants him to go back to the ED. She understands and will inform the patient.

## 2023-01-25 NOTE — TELEPHONE ENCOUNTER
Serenity Manzano calls in concerning Leotis Band. She says sitting still his oxygen goes down to 85% He's recovering from the fly and pneumonia. No fever. He got prednisone and mucous relief from his PCP. Finished both 2 days ago and is not better. Coughing up brownish yellow phlegm, SOB, wheezing, no fever. [address] [place] [address] [phone] is their pharmacy. Please review. Thank you.

## 2023-01-28 ENCOUNTER — HOSPITAL ENCOUNTER (OUTPATIENT)
Dept: GENERAL RADIOLOGY | Age: 78
Discharge: HOME | End: 2023-01-28
Payer: MEDICARE

## 2023-01-28 ENCOUNTER — HOSPITAL ENCOUNTER (OUTPATIENT)
Age: 78
Discharge: HOME | End: 2023-01-28
Payer: MEDICARE

## 2023-01-28 DIAGNOSIS — J44.1 COPD EXACERBATION (HCC): ICD-10-CM

## 2023-01-28 PROCEDURE — 71046 X-RAY EXAM CHEST 2 VIEWS: CPT

## 2023-02-02 ENCOUNTER — TELEPHONE (OUTPATIENT)
Dept: PULMONOLOGY | Age: 78
End: 2023-02-02

## 2023-02-02 NOTE — TELEPHONE ENCOUNTER
Wife calling  states [facility] called yesterday (no note found) re pt  Wife states he is sick- had/has pneumonia -has been on 3-4 different rxs  When he breathes he's congested -hears rattles in chest  -had CXR 5-6 days ago  PCP states to f/u with pulm  They would like him to be seen    Please advise

## 2023-02-03 ENCOUNTER — OFFICE VISIT (OUTPATIENT)
Dept: PULMONOLOGY | Age: 78
End: 2023-02-03
Payer: MEDICARE

## 2023-02-03 VITALS
HEART RATE: 71 BPM | OXYGEN SATURATION: 97 % | DIASTOLIC BLOOD PRESSURE: 60 MMHG | BODY MASS INDEX: 31.67 KG/M2 | RESPIRATION RATE: 21 BRPM | WEIGHT: 226.2 LBS | SYSTOLIC BLOOD PRESSURE: 95 MMHG | TEMPERATURE: 97.1 F | HEIGHT: 71 IN

## 2023-02-03 DIAGNOSIS — R05.1 ACUTE COUGH: Primary | ICD-10-CM

## 2023-02-03 DIAGNOSIS — J41.0 CHRONIC BRONCHITIS, SIMPLE (HCC): ICD-10-CM

## 2023-02-03 DIAGNOSIS — E66.01 MORBID OBESITY DUE TO EXCESS CALORIES (HCC): ICD-10-CM

## 2023-02-03 PROBLEM — J18.9 PNEUMONIA: Status: RESOLVED | Noted: 2021-07-06 | Resolved: 2023-02-03

## 2023-02-03 PROCEDURE — G8427 DOCREV CUR MEDS BY ELIG CLIN: HCPCS | Performed by: INTERNAL MEDICINE

## 2023-02-03 PROCEDURE — 3023F SPIROM DOC REV: CPT | Performed by: INTERNAL MEDICINE

## 2023-02-03 PROCEDURE — G8484 FLU IMMUNIZE NO ADMIN: HCPCS | Performed by: INTERNAL MEDICINE

## 2023-02-03 PROCEDURE — 1036F TOBACCO NON-USER: CPT | Performed by: INTERNAL MEDICINE

## 2023-02-03 PROCEDURE — 3074F SYST BP LT 130 MM HG: CPT | Performed by: INTERNAL MEDICINE

## 2023-02-03 PROCEDURE — 3078F DIAST BP <80 MM HG: CPT | Performed by: INTERNAL MEDICINE

## 2023-02-03 PROCEDURE — 1123F ACP DISCUSS/DSCN MKR DOCD: CPT | Performed by: INTERNAL MEDICINE

## 2023-02-03 PROCEDURE — 99214 OFFICE O/P EST MOD 30 MIN: CPT | Performed by: INTERNAL MEDICINE

## 2023-02-03 PROCEDURE — G8417 CALC BMI ABV UP PARAM F/U: HCPCS | Performed by: INTERNAL MEDICINE

## 2023-02-03 RX ORDER — LEVOFLOXACIN 500 MG/1
500 TABLET, FILM COATED ORAL DAILY
Qty: 7 TABLET | Refills: 0 | Status: ON HOLD | OUTPATIENT
Start: 2023-02-03 | End: 2023-02-10

## 2023-02-03 RX ORDER — PRAMIPEXOLE DIHYDROCHLORIDE 0.12 MG/1
0.12 TABLET ORAL 2 TIMES DAILY
COMMUNITY
End: 2023-02-04

## 2023-02-03 NOTE — PROGRESS NOTES
REASON FOR CONSULTATION/CC:    Chief Complaint   Patient presents with    Follow-up        Consult at request of   Leah Barlow for      PCP: Leah Barlow    HISTORY OF PRESENT ILLNESS: Herbert Rojas is a 68y.o. year old male with a history of chronic bronchitis  who presents :           chronic bronchitis   Daliresp   Advair 250    albuterol      He was admitted for flu at [facility] . This wa sin dec and states he has had issues with  recovering  since. He was d/c doxy, Mucinex and Tamiflu. Continues to have cough with phlegm . Seen by Dr. Cher Zee. Used rx for DM and Mucinex. He received prednisone . Then Augmentin. Still using Daliresp, Advair, albuterol     phlegm is yellow. Cough is worse at night. Blowing nose at night. Obesity             Objective:   PHYSICAL EXAM:  Blood pressure 95/60, pulse 71, temperature 97.1 °F (36.2 °C), resp. rate 21, height 5' 11\" (1.803 m), weight 226 lb 3.2 oz (102.6 kg), SpO2 97 %.'  Body mass index is 31.55 kg/m². Gen: No distress. Eyes:    ENT:    Neck:    Resp: No accessory muscle use. No crackles. No wheezes. No rhonchi. CV: Regular rate. Regular rhythm. No murmur or rub. no edema. GI:    Skin:    Lymph:    M/S: No cyanosis. No clubbing. Neuro: Moves all four extremities. Psych: Oriented x 3. No anxiety. Awake. Alert. Intact judgement and insight. Data Reviewed:        Assessment:      chronic bronchitis   Chronic rhonitis  Neuropathy with DM       Plan:      Problem List Items Addressed This Visit       Morbid obesity due to excess calories ([address]      Uncontrolled. Weight loss needed. Chronic bronchitis, simple (HCC)      Advair and albuterol prn      Worsening cough after admission for Flu. This is likely secondary to flu. He has had multiple treatment. Suspect continued cough secondary to post nasal drip.             Other Visit Diagnoses       Acute cough    -  Primary    Relevant Medications Phenylephrine-Bromphen-DM 10-4-20 MG/5ML LIQD    levoFLOXacin (LEVAQUIN) 500 MG tablet                      This note was transcribed using [id number] [employer]. Please disregard any translational errors.     Valentin Ignacio Pulmonary, Sleep and [phone]

## 2023-02-03 NOTE — ASSESSMENT & PLAN NOTE
Advair and albuterol prn      Worsening cough after admission for Flu. This is likely secondary to flu. He has had multiple treatment. Suspect continued cough secondary to post nasal drip.

## 2023-02-04 ENCOUNTER — APPOINTMENT (OUTPATIENT)
Dept: GENERAL RADIOLOGY | Age: 78
DRG: 177 | End: 2023-02-04
Payer: MEDICARE

## 2023-02-04 ENCOUNTER — HOSPITAL ENCOUNTER (INPATIENT)
Age: 78
LOS: 3 days | Discharge: HOME HEALTH | DRG: 177 | End: 2023-02-07
Attending: EMERGENCY MEDICINE | Admitting: INTERNAL MEDICINE
Payer: MEDICARE

## 2023-02-04 DIAGNOSIS — J18.9 MULTIFOCAL PNEUMONIA: Primary | ICD-10-CM

## 2023-02-04 DIAGNOSIS — J41.0 CHRONIC BRONCHITIS, SIMPLE (HCC): ICD-10-CM

## 2023-02-04 DIAGNOSIS — J96.01 ACUTE RESPIRATORY FAILURE WITH HYPOXIA (HCC): ICD-10-CM

## 2023-02-04 PROBLEM — R06.82 TACHYPNEA: Status: ACTIVE | Noted: 2023-02-04

## 2023-02-04 PROBLEM — E11.9 DMII (DIABETES MELLITUS, TYPE 2) (HCC): Status: ACTIVE | Noted: 2019-04-14

## 2023-02-04 PROBLEM — J44.1 COPD EXACERBATION (HCC): Status: ACTIVE | Noted: 2023-02-04

## 2023-02-04 PROBLEM — R00.0 TACHYCARDIA: Status: ACTIVE | Noted: 2023-02-04

## 2023-02-04 PROBLEM — I50.42 CHRONIC COMBINED SYSTOLIC (CONGESTIVE) AND DIASTOLIC (CONGESTIVE) HEART FAILURE (HCC): Status: ACTIVE | Noted: 2018-04-06

## 2023-02-04 PROBLEM — Z95.1 HISTORY OF CORONARY ARTERY BYPASS GRAFT: Status: ACTIVE | Noted: 2023-02-04

## 2023-02-04 PROBLEM — E66.09 NON MORBID OBESITY DUE TO EXCESS CALORIES: Status: ACTIVE | Noted: 2023-02-04

## 2023-02-04 PROBLEM — R53.83 FATIGUE: Status: ACTIVE | Noted: 2023-02-04

## 2023-02-04 PROBLEM — R05.8 COUGH PRODUCTIVE OF CLEAR SPUTUM: Status: ACTIVE | Noted: 2023-02-04

## 2023-02-04 PROBLEM — I48.0 PAROXYSMAL ATRIAL FIBRILLATION (HCC): Status: ACTIVE | Noted: 2023-02-04

## 2023-02-04 PROBLEM — S50.311A ABRASION OF RIGHT ELBOW: Status: ACTIVE | Noted: 2023-02-04

## 2023-02-04 LAB
A/G RATIO: 0.8 (ref 1.1–2.2)
ALBUMIN SERPL-MCNC: 3.5 G/DL (ref 3.4–5)
ALP BLD-CCNC: 52 U/L (ref 40–129)
ALT SERPL-CCNC: 11 U/L (ref 10–40)
ANION GAP SERPL CALCULATED.3IONS-SCNC: 11 MMOL/L (ref 3–16)
AST SERPL-CCNC: 13 U/L (ref 15–37)
BASOPHILS ABSOLUTE: 0.1 K/UL (ref 0–0.2)
BASOPHILS RELATIVE PERCENT: 1.1 %
BILIRUB SERPL-MCNC: 0.4 MG/DL (ref 0–1)
BILIRUBIN URINE: NEGATIVE
BLOOD, URINE: NEGATIVE
BUN BLDV-MCNC: 24 MG/DL (ref 7–20)
CALCIUM SERPL-MCNC: 9 MG/DL (ref 8.3–10.6)
CHLORIDE BLD-SCNC: 94 MMOL/L (ref 99–110)
CLARITY: CLEAR
CO2: 27 MMOL/L (ref 21–32)
COLOR: YELLOW
CREAT SERPL-MCNC: 1.3 MG/DL (ref 0.8–1.3)
EOSINOPHILS ABSOLUTE: 0.4 K/UL (ref 0–0.6)
EOSINOPHILS RELATIVE PERCENT: 4.2 %
GFR SERPL CREATININE-BSD FRML MDRD: 56 ML/MIN/{1.73_M2}
GLUCOSE BLD-MCNC: 180 MG/DL (ref 70–99)
GLUCOSE BLD-MCNC: 183 MG/DL (ref 70–99)
GLUCOSE BLD-MCNC: 207 MG/DL (ref 70–99)
GLUCOSE URINE: >=1000 MG/DL
HCT VFR BLD CALC: 40.8 % (ref 40.5–52.5)
HEMOGLOBIN: 13.2 G/DL (ref 13.5–17.5)
KETONES, URINE: NEGATIVE MG/DL
LACTIC ACID: 1.5 MMOL/L (ref 0.4–2)
LEUKOCYTE ESTERASE, URINE: NEGATIVE
LYMPHOCYTES ABSOLUTE: 1.5 K/UL (ref 1–5.1)
LYMPHOCYTES RELATIVE PERCENT: 16.1 %
MCH RBC QN AUTO: 29.6 PG (ref 26–34)
MCHC RBC AUTO-ENTMCNC: 32.5 G/DL (ref 31–36)
MCV RBC AUTO: 91.2 FL (ref 80–100)
MICROSCOPIC EXAMINATION: ABNORMAL
MONOCYTES ABSOLUTE: 1.1 K/UL (ref 0–1.3)
MONOCYTES RELATIVE PERCENT: 11.5 %
NEUTROPHILS ABSOLUTE: 6.4 K/UL (ref 1.7–7.7)
NEUTROPHILS RELATIVE PERCENT: 67.1 %
NITRITE, URINE: NEGATIVE
PDW BLD-RTO: 14.3 % (ref 12.4–15.4)
PERFORMED ON: ABNORMAL
PERFORMED ON: ABNORMAL
PH UA: 5 (ref 5–8)
PLATELET # BLD: 343 K/UL (ref 135–450)
PMV BLD AUTO: 6.6 FL (ref 5–10.5)
POTASSIUM SERPL-SCNC: 4.9 MMOL/L (ref 3.5–5.1)
PRO-BNP: 536 PG/ML (ref 0–449)
PROTEIN UA: NEGATIVE MG/DL
RAPID INFLUENZA  B AGN: NEGATIVE
RAPID INFLUENZA A AGN: NEGATIVE
RBC # BLD: 4.47 M/UL (ref 4.2–5.9)
SARS-COV-2, NAAT: NOT DETECTED
SODIUM BLD-SCNC: 132 MMOL/L (ref 136–145)
SPECIFIC GRAVITY UA: 1.02 (ref 1–1.03)
TOTAL PROTEIN: 8 G/DL (ref 6.4–8.2)
TROPONIN: 0.01 NG/ML
URINE REFLEX TO CULTURE: ABNORMAL
URINE TYPE: ABNORMAL
UROBILINOGEN, URINE: 0.2 E.U./DL
WBC # BLD: 9.6 K/UL (ref 4–11)

## 2023-02-04 PROCEDURE — 87040 BLOOD CULTURE FOR BACTERIA: CPT

## 2023-02-04 PROCEDURE — 71045 X-RAY EXAM CHEST 1 VIEW: CPT

## 2023-02-04 PROCEDURE — 2580000003 HC RX 258: Performed by: PHYSICIAN ASSISTANT

## 2023-02-04 PROCEDURE — 80053 COMPREHEN METABOLIC PANEL: CPT

## 2023-02-04 PROCEDURE — 85025 COMPLETE CBC W/AUTO DIFF WBC: CPT

## 2023-02-04 PROCEDURE — 36415 COLL VENOUS BLD VENIPUNCTURE: CPT

## 2023-02-04 PROCEDURE — 83880 ASSAY OF NATRIURETIC PEPTIDE: CPT

## 2023-02-04 PROCEDURE — 96375 TX/PRO/DX INJ NEW DRUG ADDON: CPT

## 2023-02-04 PROCEDURE — 6370000000 HC RX 637 (ALT 250 FOR IP): Performed by: PHYSICIAN ASSISTANT

## 2023-02-04 PROCEDURE — 87804 INFLUENZA ASSAY W/OPTIC: CPT

## 2023-02-04 PROCEDURE — 73080 X-RAY EXAM OF ELBOW: CPT

## 2023-02-04 PROCEDURE — 96374 THER/PROPH/DIAG INJ IV PUSH: CPT

## 2023-02-04 PROCEDURE — 83605 ASSAY OF LACTIC ACID: CPT

## 2023-02-04 PROCEDURE — 2700000000 HC OXYGEN THERAPY PER DAY

## 2023-02-04 PROCEDURE — 81003 URINALYSIS AUTO W/O SCOPE: CPT

## 2023-02-04 PROCEDURE — 1200000000 HC SEMI PRIVATE

## 2023-02-04 PROCEDURE — 94640 AIRWAY INHALATION TREATMENT: CPT

## 2023-02-04 PROCEDURE — 6360000002 HC RX W HCPCS: Performed by: PHYSICIAN ASSISTANT

## 2023-02-04 PROCEDURE — 84484 ASSAY OF TROPONIN QUANT: CPT

## 2023-02-04 PROCEDURE — 99285 EMERGENCY DEPT VISIT HI MDM: CPT

## 2023-02-04 PROCEDURE — 87635 SARS-COV-2 COVID-19 AMP PRB: CPT

## 2023-02-04 PROCEDURE — 84145 PROCALCITONIN (PCT): CPT

## 2023-02-04 PROCEDURE — 93005 ELECTROCARDIOGRAM TRACING: CPT | Performed by: PHYSICIAN ASSISTANT

## 2023-02-04 RX ORDER — CARVEDILOL 12.5 MG/1
12.5 TABLET ORAL 2 TIMES DAILY WITH MEALS
Status: DISCONTINUED | OUTPATIENT
Start: 2023-02-04 | End: 2023-02-04

## 2023-02-04 RX ORDER — FLUTICASONE PROPIONATE 50 MCG
1 SPRAY, SUSPENSION (ML) NASAL DAILY PRN
Status: DISCONTINUED | OUTPATIENT
Start: 2023-02-04 | End: 2023-02-07 | Stop reason: HOSPADM

## 2023-02-04 RX ORDER — IPRATROPIUM BROMIDE AND ALBUTEROL SULFATE 2.5; .5 MG/3ML; MG/3ML
3 SOLUTION RESPIRATORY (INHALATION) ONCE
Status: COMPLETED | OUTPATIENT
Start: 2023-02-04 | End: 2023-02-04

## 2023-02-04 RX ORDER — ACETAMINOPHEN 650 MG/1
650 SUPPOSITORY RECTAL EVERY 4 HOURS PRN
Status: DISCONTINUED | OUTPATIENT
Start: 2023-02-04 | End: 2023-02-07 | Stop reason: HOSPADM

## 2023-02-04 RX ORDER — PRAMIPEXOLE DIHYDROCHLORIDE 0.25 MG/1
0.25 TABLET ORAL 3 TIMES DAILY
COMMUNITY
Start: 2023-01-29

## 2023-02-04 RX ORDER — CARVEDILOL 3.12 MG/1
3.12 TABLET ORAL 2 TIMES DAILY WITH MEALS
Status: DISCONTINUED | OUTPATIENT
Start: 2023-02-04 | End: 2023-02-07 | Stop reason: HOSPADM

## 2023-02-04 RX ORDER — GABAPENTIN 800 MG/1
800 TABLET ORAL 3 TIMES DAILY
Status: DISCONTINUED | OUTPATIENT
Start: 2023-02-04 | End: 2023-02-04

## 2023-02-04 RX ORDER — SODIUM CHLORIDE 9 MG/ML
INJECTION, SOLUTION INTRAVENOUS PRN
Status: DISCONTINUED | OUTPATIENT
Start: 2023-02-04 | End: 2023-02-07 | Stop reason: HOSPADM

## 2023-02-04 RX ORDER — BENZONATATE 200 MG/1
200 CAPSULE ORAL 3 TIMES DAILY PRN
Status: DISCONTINUED | OUTPATIENT
Start: 2023-02-04 | End: 2023-02-07 | Stop reason: HOSPADM

## 2023-02-04 RX ORDER — FUROSEMIDE 40 MG/1
40 TABLET ORAL DAILY
Status: DISCONTINUED | OUTPATIENT
Start: 2023-02-05 | End: 2023-02-07 | Stop reason: HOSPADM

## 2023-02-04 RX ORDER — FLUNISOLIDE 0.25 MG/ML
2 SOLUTION NASAL 2 TIMES DAILY
COMMUNITY

## 2023-02-04 RX ORDER — POLYETHYLENE GLYCOL 3350 17 G/17G
17 POWDER, FOR SOLUTION ORAL DAILY PRN
Status: DISCONTINUED | OUTPATIENT
Start: 2023-02-04 | End: 2023-02-07 | Stop reason: HOSPADM

## 2023-02-04 RX ORDER — FUROSEMIDE 20 MG/1
20 TABLET ORAL DAILY
Status: DISCONTINUED | OUTPATIENT
Start: 2023-02-05 | End: 2023-02-04

## 2023-02-04 RX ORDER — ALBUTEROL SULFATE 90 UG/1
2 AEROSOL, METERED RESPIRATORY (INHALATION) EVERY 4 HOURS PRN
Status: DISCONTINUED | OUTPATIENT
Start: 2023-02-04 | End: 2023-02-07 | Stop reason: HOSPADM

## 2023-02-04 RX ORDER — INSULIN LISPRO 100 [IU]/ML
0-4 INJECTION, SOLUTION INTRAVENOUS; SUBCUTANEOUS NIGHTLY
Status: DISCONTINUED | OUTPATIENT
Start: 2023-02-04 | End: 2023-02-07 | Stop reason: HOSPADM

## 2023-02-04 RX ORDER — DEXTROSE MONOHYDRATE 100 MG/ML
INJECTION, SOLUTION INTRAVENOUS CONTINUOUS PRN
Status: DISCONTINUED | OUTPATIENT
Start: 2023-02-04 | End: 2023-02-07 | Stop reason: HOSPADM

## 2023-02-04 RX ORDER — ASPIRIN 81 MG/1
81 TABLET ORAL DAILY
Status: DISCONTINUED | OUTPATIENT
Start: 2023-02-05 | End: 2023-02-07 | Stop reason: HOSPADM

## 2023-02-04 RX ORDER — GABAPENTIN 400 MG/1
800 CAPSULE ORAL EVERY 6 HOURS
Status: DISCONTINUED | OUTPATIENT
Start: 2023-02-05 | End: 2023-02-07 | Stop reason: HOSPADM

## 2023-02-04 RX ORDER — FUROSEMIDE 40 MG/1
1 TABLET ORAL DAILY
COMMUNITY
Start: 2022-12-11

## 2023-02-04 RX ORDER — IPRATROPIUM BROMIDE AND ALBUTEROL SULFATE 2.5; .5 MG/3ML; MG/3ML
1 SOLUTION RESPIRATORY (INHALATION)
Status: DISCONTINUED | OUTPATIENT
Start: 2023-02-05 | End: 2023-02-06

## 2023-02-04 RX ORDER — OXYCODONE 36 MG/1
CAPSULE, EXTENDED RELEASE ORAL
COMMUNITY
Start: 2023-01-19

## 2023-02-04 RX ORDER — PRAMIPEXOLE DIHYDROCHLORIDE 0.25 MG/1
0.25 TABLET ORAL NIGHTLY
COMMUNITY

## 2023-02-04 RX ORDER — SODIUM CHLORIDE 0.9 % (FLUSH) 0.9 %
10 SYRINGE (ML) INJECTION EVERY 12 HOURS SCHEDULED
Status: DISCONTINUED | OUTPATIENT
Start: 2023-02-04 | End: 2023-02-07 | Stop reason: HOSPADM

## 2023-02-04 RX ORDER — IPRATROPIUM BROMIDE AND ALBUTEROL SULFATE 2.5; .5 MG/3ML; MG/3ML
1 SOLUTION RESPIRATORY (INHALATION)
Status: DISCONTINUED | OUTPATIENT
Start: 2023-02-04 | End: 2023-02-07 | Stop reason: HOSPADM

## 2023-02-04 RX ORDER — ROFLUMILAST 500 UG/1
500 TABLET ORAL DAILY
Status: DISCONTINUED | OUTPATIENT
Start: 2023-02-05 | End: 2023-02-07 | Stop reason: HOSPADM

## 2023-02-04 RX ORDER — SODIUM CHLORIDE 0.9 % (FLUSH) 0.9 %
10 SYRINGE (ML) INJECTION PRN
Status: DISCONTINUED | OUTPATIENT
Start: 2023-02-04 | End: 2023-02-07 | Stop reason: HOSPADM

## 2023-02-04 RX ORDER — LISINOPRIL 10 MG/1
10 TABLET ORAL DAILY
Status: DISCONTINUED | OUTPATIENT
Start: 2023-02-05 | End: 2023-02-07 | Stop reason: HOSPADM

## 2023-02-04 RX ORDER — POLYETHYLENE GLYCOL 3350 17 G/17G
17 POWDER, FOR SOLUTION ORAL DAILY PRN
COMMUNITY

## 2023-02-04 RX ORDER — METHYLPREDNISOLONE SODIUM SUCCINATE 40 MG/ML
40 INJECTION, POWDER, LYOPHILIZED, FOR SOLUTION INTRAMUSCULAR; INTRAVENOUS EVERY 6 HOURS
Status: DISCONTINUED | OUTPATIENT
Start: 2023-02-05 | End: 2023-02-05

## 2023-02-04 RX ORDER — ONDANSETRON 2 MG/ML
4 INJECTION INTRAMUSCULAR; INTRAVENOUS EVERY 4 HOURS PRN
Status: DISCONTINUED | OUTPATIENT
Start: 2023-02-04 | End: 2023-02-07 | Stop reason: HOSPADM

## 2023-02-04 RX ORDER — ATORVASTATIN CALCIUM 40 MG/1
40 TABLET, FILM COATED ORAL NIGHTLY
Status: DISCONTINUED | OUTPATIENT
Start: 2023-02-04 | End: 2023-02-04

## 2023-02-04 RX ORDER — ATORVASTATIN CALCIUM 20 MG/1
20 TABLET, FILM COATED ORAL NIGHTLY
Status: DISCONTINUED | OUTPATIENT
Start: 2023-02-04 | End: 2023-02-07 | Stop reason: HOSPADM

## 2023-02-04 RX ORDER — INSULIN LISPRO 100 [IU]/ML
0-8 INJECTION, SOLUTION INTRAVENOUS; SUBCUTANEOUS
Status: DISCONTINUED | OUTPATIENT
Start: 2023-02-05 | End: 2023-02-07 | Stop reason: HOSPADM

## 2023-02-04 RX ORDER — PRAMIPEXOLE DIHYDROCHLORIDE 0.5 MG/1
0.25 TABLET ORAL 3 TIMES DAILY
Status: DISCONTINUED | OUTPATIENT
Start: 2023-02-04 | End: 2023-02-05 | Stop reason: DRUGHIGH

## 2023-02-04 RX ORDER — PRAMIPEXOLE DIHYDROCHLORIDE 0.12 MG/1
0.12 TABLET ORAL 2 TIMES DAILY
Status: DISCONTINUED | OUTPATIENT
Start: 2023-02-04 | End: 2023-02-04

## 2023-02-04 RX ORDER — METHYLPREDNISOLONE SODIUM SUCCINATE 125 MG/2ML
125 INJECTION, POWDER, LYOPHILIZED, FOR SOLUTION INTRAMUSCULAR; INTRAVENOUS ONCE
Status: COMPLETED | OUTPATIENT
Start: 2023-02-04 | End: 2023-02-04

## 2023-02-04 RX ORDER — ACETAMINOPHEN 325 MG/1
650 TABLET ORAL EVERY 4 HOURS PRN
Status: DISCONTINUED | OUTPATIENT
Start: 2023-02-04 | End: 2023-02-07 | Stop reason: HOSPADM

## 2023-02-04 RX ADMIN — VANCOMYCIN HYDROCHLORIDE 1500 MG: 1.5 INJECTION, POWDER, LYOPHILIZED, FOR SOLUTION INTRAVENOUS at 21:50

## 2023-02-04 RX ADMIN — METHYLPREDNISOLONE SODIUM SUCCINATE 125 MG: 125 INJECTION, POWDER, FOR SOLUTION INTRAMUSCULAR; INTRAVENOUS at 20:14

## 2023-02-04 RX ADMIN — IPRATROPIUM BROMIDE AND ALBUTEROL SULFATE 3 AMPULE: 2.5; .5 SOLUTION RESPIRATORY (INHALATION) at 20:59

## 2023-02-04 RX ADMIN — CEFEPIME 2000 MG: 2 INJECTION, POWDER, FOR SOLUTION INTRAVENOUS at 21:10

## 2023-02-04 ASSESSMENT — LIFESTYLE VARIABLES
HOW MANY STANDARD DRINKS CONTAINING ALCOHOL DO YOU HAVE ON A TYPICAL DAY: PATIENT DOES NOT DRINK
HOW OFTEN DO YOU HAVE A DRINK CONTAINING ALCOHOL: NEVER

## 2023-02-04 ASSESSMENT — ENCOUNTER SYMPTOMS
VOMITING: 0
ABDOMINAL PAIN: 0
WHEEZING: 1
SHORTNESS OF BREATH: 1
COUGH: 1

## 2023-02-04 ASSESSMENT — PAIN - FUNCTIONAL ASSESSMENT: PAIN_FUNCTIONAL_ASSESSMENT: 0-10

## 2023-02-04 ASSESSMENT — PAIN SCALES - GENERAL
PAINLEVEL_OUTOF10: 0
PAINLEVEL_OUTOF10: 5

## 2023-02-05 LAB
ALBUMIN SERPL-MCNC: 3.5 G/DL (ref 3.4–5)
ANION GAP SERPL CALCULATED.3IONS-SCNC: 14 MMOL/L (ref 3–16)
ANION GAP SERPL CALCULATED.3IONS-SCNC: 15 MMOL/L (ref 3–16)
BASOPHILS ABSOLUTE: 0 K/UL (ref 0–0.2)
BASOPHILS RELATIVE PERCENT: 0.4 %
BUN BLDV-MCNC: 27 MG/DL (ref 7–20)
BUN BLDV-MCNC: 28 MG/DL (ref 7–20)
CALCIUM SERPL-MCNC: 9 MG/DL (ref 8.3–10.6)
CALCIUM SERPL-MCNC: 9 MG/DL (ref 8.3–10.6)
CHLORIDE BLD-SCNC: 91 MMOL/L (ref 99–110)
CHLORIDE BLD-SCNC: 95 MMOL/L (ref 99–110)
CO2: 23 MMOL/L (ref 21–32)
CO2: 24 MMOL/L (ref 21–32)
CREAT SERPL-MCNC: 1.1 MG/DL (ref 0.8–1.3)
CREAT SERPL-MCNC: 1.2 MG/DL (ref 0.8–1.3)
EKG ATRIAL RATE: 102 BPM
EKG DIAGNOSIS: NORMAL
EKG P AXIS: 65 DEGREES
EKG P-R INTERVAL: 184 MS
EKG Q-T INTERVAL: 348 MS
EKG QRS DURATION: 90 MS
EKG QTC CALCULATION (BAZETT): 453 MS
EKG R AXIS: 45 DEGREES
EKG T AXIS: 12 DEGREES
EKG VENTRICULAR RATE: 102 BPM
EOSINOPHILS ABSOLUTE: 0 K/UL (ref 0–0.6)
EOSINOPHILS RELATIVE PERCENT: 0 %
ESTIMATED AVERAGE GLUCOSE: 168.6 MG/DL
GFR SERPL CREATININE-BSD FRML MDRD: >60 ML/MIN/{1.73_M2}
GFR SERPL CREATININE-BSD FRML MDRD: >60 ML/MIN/{1.73_M2}
GLUCOSE BLD-MCNC: 315 MG/DL (ref 70–99)
GLUCOSE BLD-MCNC: 344 MG/DL (ref 70–99)
GLUCOSE BLD-MCNC: 363 MG/DL (ref 70–99)
GLUCOSE BLD-MCNC: 398 MG/DL (ref 70–99)
GLUCOSE BLD-MCNC: 452 MG/DL (ref 70–99)
GLUCOSE BLD-MCNC: 468 MG/DL (ref 70–99)
GLUCOSE BLD-MCNC: 510 MG/DL (ref 70–99)
HBA1C MFR BLD: 7.5 %
HCT VFR BLD CALC: 40.1 % (ref 40.5–52.5)
HEMOGLOBIN: 13.2 G/DL (ref 13.5–17.5)
L. PNEUMOPHILA SEROGP 1 UR AG: NORMAL
LYMPHOCYTES ABSOLUTE: 0.7 K/UL (ref 1–5.1)
LYMPHOCYTES RELATIVE PERCENT: 11 %
MAGNESIUM: 2.2 MG/DL (ref 1.8–2.4)
MCH RBC QN AUTO: 29.7 PG (ref 26–34)
MCHC RBC AUTO-ENTMCNC: 32.9 G/DL (ref 31–36)
MCV RBC AUTO: 90.3 FL (ref 80–100)
MONOCYTES ABSOLUTE: 0.2 K/UL (ref 0–1.3)
MONOCYTES RELATIVE PERCENT: 2.6 %
NEUTROPHILS ABSOLUTE: 5.7 K/UL (ref 1.7–7.7)
NEUTROPHILS RELATIVE PERCENT: 86 %
PDW BLD-RTO: 14 % (ref 12.4–15.4)
PERFORMED ON: ABNORMAL
PHOSPHORUS: 3 MG/DL (ref 2.5–4.9)
PLATELET # BLD: 307 K/UL (ref 135–450)
PMV BLD AUTO: 6.6 FL (ref 5–10.5)
POTASSIUM REFLEX MAGNESIUM: 5 MMOL/L (ref 3.5–5.1)
POTASSIUM SERPL-SCNC: 4.7 MMOL/L (ref 3.5–5.1)
PROCALCITONIN: 0.14 NG/ML (ref 0–0.15)
PROCALCITONIN: 0.19 NG/ML (ref 0–0.15)
RBC # BLD: 4.44 M/UL (ref 4.2–5.9)
SODIUM BLD-SCNC: 129 MMOL/L (ref 136–145)
SODIUM BLD-SCNC: 133 MMOL/L (ref 136–145)
WBC # BLD: 6.6 K/UL (ref 4–11)

## 2023-02-05 PROCEDURE — 6360000002 HC RX W HCPCS: Performed by: INTERNAL MEDICINE

## 2023-02-05 PROCEDURE — 93010 ELECTROCARDIOGRAM REPORT: CPT | Performed by: INTERNAL MEDICINE

## 2023-02-05 PROCEDURE — 6370000000 HC RX 637 (ALT 250 FOR IP): Performed by: INTERNAL MEDICINE

## 2023-02-05 PROCEDURE — 94640 AIRWAY INHALATION TREATMENT: CPT

## 2023-02-05 PROCEDURE — 94669 MECHANICAL CHEST WALL OSCILL: CPT

## 2023-02-05 PROCEDURE — 87641 MR-STAPH DNA AMP PROBE: CPT

## 2023-02-05 PROCEDURE — 87070 CULTURE OTHR SPECIMN AEROBIC: CPT

## 2023-02-05 PROCEDURE — 2580000003 HC RX 258: Performed by: INTERNAL MEDICINE

## 2023-02-05 PROCEDURE — 2700000000 HC OXYGEN THERAPY PER DAY

## 2023-02-05 PROCEDURE — 80069 RENAL FUNCTION PANEL: CPT

## 2023-02-05 PROCEDURE — 85025 COMPLETE CBC W/AUTO DIFF WBC: CPT

## 2023-02-05 PROCEDURE — 83735 ASSAY OF MAGNESIUM: CPT

## 2023-02-05 PROCEDURE — 94760 N-INVAS EAR/PLS OXIMETRY 1: CPT

## 2023-02-05 PROCEDURE — 1200000000 HC SEMI PRIVATE

## 2023-02-05 PROCEDURE — 97530 THERAPEUTIC ACTIVITIES: CPT

## 2023-02-05 PROCEDURE — 87449 NOS EACH ORGANISM AG IA: CPT

## 2023-02-05 PROCEDURE — 87205 SMEAR GRAM STAIN: CPT

## 2023-02-05 PROCEDURE — 83036 HEMOGLOBIN GLYCOSYLATED A1C: CPT

## 2023-02-05 PROCEDURE — 36415 COLL VENOUS BLD VENIPUNCTURE: CPT

## 2023-02-05 PROCEDURE — 84145 PROCALCITONIN (PCT): CPT

## 2023-02-05 PROCEDURE — 99223 1ST HOSP IP/OBS HIGH 75: CPT | Performed by: INTERNAL MEDICINE

## 2023-02-05 PROCEDURE — 97162 PT EVAL MOD COMPLEX 30 MIN: CPT

## 2023-02-05 RX ORDER — INSULIN GLARGINE 100 [IU]/ML
25 INJECTION, SOLUTION SUBCUTANEOUS DAILY
Status: DISCONTINUED | OUTPATIENT
Start: 2023-02-06 | End: 2023-02-07

## 2023-02-05 RX ORDER — MULTIVITAMIN WITH IRON
1 TABLET ORAL DAILY
Status: DISCONTINUED | OUTPATIENT
Start: 2023-02-05 | End: 2023-02-07 | Stop reason: HOSPADM

## 2023-02-05 RX ORDER — FLUTICASONE PROPIONATE 50 MCG
2 SPRAY, SUSPENSION (ML) NASAL 2 TIMES DAILY
Status: DISCONTINUED | OUTPATIENT
Start: 2023-02-05 | End: 2023-02-07 | Stop reason: HOSPADM

## 2023-02-05 RX ORDER — INSULIN GLARGINE 100 [IU]/ML
15 INJECTION, SOLUTION SUBCUTANEOUS DAILY
Status: DISCONTINUED | OUTPATIENT
Start: 2023-02-06 | End: 2023-02-05

## 2023-02-05 RX ORDER — INSULIN GLARGINE 100 [IU]/ML
20 INJECTION, SOLUTION SUBCUTANEOUS ONCE
Status: COMPLETED | OUTPATIENT
Start: 2023-02-05 | End: 2023-02-05

## 2023-02-05 RX ORDER — PRAMIPEXOLE DIHYDROCHLORIDE 0.5 MG/1
0.25 TABLET ORAL NIGHTLY
Status: DISCONTINUED | OUTPATIENT
Start: 2023-02-05 | End: 2023-02-05 | Stop reason: DRUGHIGH

## 2023-02-05 RX ORDER — INSULIN GLARGINE 100 [IU]/ML
15 INJECTION, SOLUTION SUBCUTANEOUS ONCE
Status: COMPLETED | OUTPATIENT
Start: 2023-02-05 | End: 2023-02-05

## 2023-02-05 RX ORDER — PRAMIPEXOLE DIHYDROCHLORIDE 0.5 MG/1
0.25 TABLET ORAL 2 TIMES DAILY
Status: DISCONTINUED | OUTPATIENT
Start: 2023-02-05 | End: 2023-02-07 | Stop reason: HOSPADM

## 2023-02-05 RX ORDER — PRAMIPEXOLE DIHYDROCHLORIDE 0.5 MG/1
0.5 TABLET ORAL NIGHTLY
Status: DISCONTINUED | OUTPATIENT
Start: 2023-02-05 | End: 2023-02-07 | Stop reason: HOSPADM

## 2023-02-05 RX ORDER — INSULIN LISPRO 100 [IU]/ML
20 INJECTION, SOLUTION INTRAVENOUS; SUBCUTANEOUS ONCE
Status: COMPLETED | OUTPATIENT
Start: 2023-02-05 | End: 2023-02-05

## 2023-02-05 RX ORDER — INSULIN LISPRO 100 [IU]/ML
5 INJECTION, SOLUTION INTRAVENOUS; SUBCUTANEOUS
Status: DISCONTINUED | OUTPATIENT
Start: 2023-02-05 | End: 2023-02-06

## 2023-02-05 RX ORDER — VANCOMYCIN 1.75 G/350ML
1250 INJECTION, SOLUTION INTRAVENOUS
Status: DISCONTINUED | OUTPATIENT
Start: 2023-02-05 | End: 2023-02-06

## 2023-02-05 RX ORDER — PREDNISONE 20 MG/1
40 TABLET ORAL DAILY
Status: DISCONTINUED | OUTPATIENT
Start: 2023-02-06 | End: 2023-02-07

## 2023-02-05 RX ADMIN — CEFEPIME 2000 MG: 2 INJECTION, POWDER, FOR SOLUTION INTRAVENOUS at 09:22

## 2023-02-05 RX ADMIN — FLUTICASONE PROPIONATE 2 SPRAY: 50 SPRAY, METERED NASAL at 21:01

## 2023-02-05 RX ADMIN — SODIUM CHLORIDE, PRESERVATIVE FREE 10 ML: 5 INJECTION INTRAVENOUS at 08:58

## 2023-02-05 RX ADMIN — GABAPENTIN 800 MG: 400 CAPSULE ORAL at 16:29

## 2023-02-05 RX ADMIN — VANCOMYCIN 1250 MG: 1.75 INJECTION, SOLUTION INTRAVENOUS at 16:20

## 2023-02-05 RX ADMIN — GABAPENTIN 800 MG: 400 CAPSULE ORAL at 06:21

## 2023-02-05 RX ADMIN — OXYCODONE HYDROCHLORIDE 35 MG: 15 TABLET, FILM COATED, EXTENDED RELEASE ORAL at 08:56

## 2023-02-05 RX ADMIN — PRAMIPEXOLE DIHYDROCHLORIDE 0.25 MG: 0.5 TABLET ORAL at 00:04

## 2023-02-05 RX ADMIN — ROFLUMILAST 500 MCG: 500 TABLET ORAL at 09:01

## 2023-02-05 RX ADMIN — ASPIRIN 81 MG: 81 TABLET, COATED ORAL at 08:56

## 2023-02-05 RX ADMIN — LISINOPRIL 10 MG: 10 TABLET ORAL at 08:56

## 2023-02-05 RX ADMIN — CEFEPIME 2000 MG: 2 INJECTION, POWDER, FOR SOLUTION INTRAVENOUS at 21:05

## 2023-02-05 RX ADMIN — GABAPENTIN 800 MG: 400 CAPSULE ORAL at 00:04

## 2023-02-05 RX ADMIN — INSULIN GLARGINE 15 UNITS: 100 INJECTION, SOLUTION SUBCUTANEOUS at 13:15

## 2023-02-05 RX ADMIN — CARVEDILOL 3.12 MG: 3.12 TABLET, FILM COATED ORAL at 08:56

## 2023-02-05 RX ADMIN — CARVEDILOL 3.12 MG: 3.12 TABLET, FILM COATED ORAL at 00:04

## 2023-02-05 RX ADMIN — INSULIN LISPRO 4 UNITS: 100 INJECTION, SOLUTION INTRAVENOUS; SUBCUTANEOUS at 21:12

## 2023-02-05 RX ADMIN — PRAMIPEXOLE DIHYDROCHLORIDE 0.5 MG: 0.5 TABLET ORAL at 20:59

## 2023-02-05 RX ADMIN — METHYLPREDNISOLONE SODIUM SUCCINATE 40 MG: 40 INJECTION, POWDER, FOR SOLUTION INTRAMUSCULAR; INTRAVENOUS at 13:10

## 2023-02-05 RX ADMIN — INSULIN LISPRO 5 UNITS: 100 INJECTION, SOLUTION INTRAVENOUS; SUBCUTANEOUS at 13:15

## 2023-02-05 RX ADMIN — ATORVASTATIN CALCIUM 20 MG: 20 TABLET, FILM COATED ORAL at 21:00

## 2023-02-05 RX ADMIN — GABAPENTIN 800 MG: 400 CAPSULE ORAL at 23:55

## 2023-02-05 RX ADMIN — METHYLPREDNISOLONE SODIUM SUCCINATE 40 MG: 40 INJECTION, POWDER, FOR SOLUTION INTRAMUSCULAR; INTRAVENOUS at 08:56

## 2023-02-05 RX ADMIN — INSULIN LISPRO 8 UNITS: 100 INJECTION, SOLUTION INTRAVENOUS; SUBCUTANEOUS at 16:30

## 2023-02-05 RX ADMIN — INSULIN GLARGINE 20 UNITS: 100 INJECTION, SOLUTION SUBCUTANEOUS at 18:59

## 2023-02-05 RX ADMIN — INSULIN LISPRO 6 UNITS: 100 INJECTION, SOLUTION INTRAVENOUS; SUBCUTANEOUS at 08:23

## 2023-02-05 RX ADMIN — APIXABAN 5 MG: 5 TABLET, FILM COATED ORAL at 21:00

## 2023-02-05 RX ADMIN — GABAPENTIN 800 MG: 400 CAPSULE ORAL at 13:14

## 2023-02-05 RX ADMIN — FUROSEMIDE 40 MG: 40 TABLET ORAL at 08:56

## 2023-02-05 RX ADMIN — INSULIN LISPRO 20 UNITS: 100 INJECTION, SOLUTION INTRAVENOUS; SUBCUTANEOUS at 18:59

## 2023-02-05 RX ADMIN — OXYCODONE HYDROCHLORIDE 35 MG: 15 TABLET, FILM COATED, EXTENDED RELEASE ORAL at 21:00

## 2023-02-05 RX ADMIN — INSULIN LISPRO 8 UNITS: 100 INJECTION, SOLUTION INTRAVENOUS; SUBCUTANEOUS at 13:15

## 2023-02-05 RX ADMIN — MOMETASONE FUROATE AND FORMOTEROL FUMARATE DIHYDRATE 2 PUFF: 200; 5 AEROSOL RESPIRATORY (INHALATION) at 20:21

## 2023-02-05 RX ADMIN — METHYLPREDNISOLONE SODIUM SUCCINATE 40 MG: 40 INJECTION, POWDER, FOR SOLUTION INTRAMUSCULAR; INTRAVENOUS at 01:06

## 2023-02-05 RX ADMIN — FLUTICASONE PROPIONATE 2 SPRAY: 50 SPRAY, METERED NASAL at 13:10

## 2023-02-05 RX ADMIN — APIXABAN 5 MG: 5 TABLET, FILM COATED ORAL at 00:04

## 2023-02-05 RX ADMIN — PRAMIPEXOLE DIHYDROCHLORIDE 0.25 MG: 0.5 TABLET ORAL at 13:11

## 2023-02-05 RX ADMIN — IPRATROPIUM BROMIDE AND ALBUTEROL SULFATE 1 AMPULE: 2.5; .5 SOLUTION RESPIRATORY (INHALATION) at 15:58

## 2023-02-05 RX ADMIN — IPRATROPIUM BROMIDE AND ALBUTEROL SULFATE 1 AMPULE: 2.5; .5 SOLUTION RESPIRATORY (INHALATION) at 20:21

## 2023-02-05 RX ADMIN — PRAMIPEXOLE DIHYDROCHLORIDE 0.25 MG: 0.5 TABLET ORAL at 08:56

## 2023-02-05 RX ADMIN — APIXABAN 5 MG: 5 TABLET, FILM COATED ORAL at 08:56

## 2023-02-05 RX ADMIN — CARVEDILOL 3.12 MG: 3.12 TABLET, FILM COATED ORAL at 16:23

## 2023-02-05 RX ADMIN — THERA TABS 1 TABLET: TAB at 13:17

## 2023-02-05 RX ADMIN — IPRATROPIUM BROMIDE AND ALBUTEROL SULFATE 1 AMPULE: 2.5; .5 SOLUTION RESPIRATORY (INHALATION) at 08:24

## 2023-02-05 RX ADMIN — BENZONATATE 200 MG: 200 CAPSULE ORAL at 00:04

## 2023-02-05 RX ADMIN — MOMETASONE FUROATE AND FORMOTEROL FUMARATE DIHYDRATE 2 PUFF: 200; 5 AEROSOL RESPIRATORY (INHALATION) at 08:24

## 2023-02-05 RX ADMIN — INSULIN LISPRO 5 UNITS: 100 INJECTION, SOLUTION INTRAVENOUS; SUBCUTANEOUS at 16:30

## 2023-02-05 RX ADMIN — IPRATROPIUM BROMIDE AND ALBUTEROL SULFATE 1 AMPULE: 2.5; .5 SOLUTION RESPIRATORY (INHALATION) at 12:01

## 2023-02-05 RX ADMIN — ATORVASTATIN CALCIUM 20 MG: 20 TABLET, FILM COATED ORAL at 00:05

## 2023-02-05 ASSESSMENT — PAIN SCALES - GENERAL
PAINLEVEL_OUTOF10: 0

## 2023-02-05 NOTE — CONSULTS
REASON FOR CONSULTATION/CC: Hypoxia, recurrent pneumonia      Consult at request of Gerry Merino MD     PCP: Wilbur Kennedy Pulmonologist: Chuck Garcia    Chief Complaint   Patient presents with    [address] down while walking to the bathroom, patient uses a walker       HISTORY OF PRESENT ILLNESS: Rocio Barrios is a 68y.o. year old male with a history of chronic bronchitis, frequent pneumonia who presents with a fall. Also found to have some shortness of breath and a productive cough. Chest x-ray suggestive of possible bibasilar infiltrates. Given recurrent nature patient started empirically on broad-spectrum antibiotics for healthcare associated pneumonia. Respiratory culture is pending. Pro-Waqas was initially mildly elevated but is cleared. Patient during my visit is on 2 L of oxygen. States his symptoms wax and wane. Have been present for several weeks. Has not found anything that makes it better or worse. Of note was recently treated as an outpatient for possible pneumonia versus acute bronchitis. Does not feel like anything is made it better. Past Medical History:   Diagnosis Date    Abrasion of right elbow 2/4/2023    Arthritis     CAD (coronary artery disease)     CABG in 2009    Chronic back pain     Chronic systolic CHF (congestive heart failure) (HCC)     COPD (chronic obstructive pulmonary disease) ([address]     Dental disease     Diabetes mellitus ([address]     Essential hypertension     Hearing loss     Paroxysmal atrial fibrillation ([address]     On Coumadin    Tinnitus          Past Surgical History:   Procedure Laterality Date    [address] [facility]  2007    CERVICAL SPINE SURGERY      CORONARY ARTERY BYPASS GRAFT      FRACTURE SURGERY Left 1988    Shoulder       Family Hx  family history includes Brain Cancer in his mother; Heart Attack in his brother and father. Social Hx   reports that he quit smoking about 26 years ago.  His smoking use included cigarettes. He has a 18.00 pack-year smoking history. He has never used smokeless tobacco.    Scheduled Meds:   vancomycin (VANCOCIN) intermittent dosing (placeholder)   Other RX Placeholder    vancomycin  1,250 mg IntraVENous Q18H    Roflumilast  500 mcg Oral Daily    lisinopril  10 mg Oral Daily    mometasone-formoterol  2 puff Inhalation BID    aspirin  81 mg Oral Daily    apixaban  5 mg Oral BID    sodium chloride flush  10 mL IntraVENous 2 times per day    cefepime  2,000 mg IntraVENous Q12H    insulin lispro  0-8 Units SubCUTAneous TID     insulin lispro  0-4 Units SubCUTAneous Nightly    ipratropium-albuterol  1 ampule Inhalation Q4H WA    methylPREDNISolone  40 mg IntraVENous Q6H    atorvastatin  20 mg Oral Nightly    carvedilol  3.125 mg Oral BID WC    gabapentin  800 mg Oral Q6H    furosemide  40 mg Oral Daily    pramipexole  0.25 mg Oral TID    oxyCODONE  35 mg Oral 2 times per day       Continuous Infusions:   dextrose      sodium chloride         PRN Meds:  benzonatate, fluticasone, albuterol sulfate HFA, glucose, dextrose bolus **OR** dextrose bolus, glucagon (rDNA), dextrose, sodium chloride flush, sodium chloride, ondansetron, polyethylene glycol, acetaminophen **OR** acetaminophen, ipratropium-albuterol    ALLERGIES:  Patient is allergic to no known allergies. REVIEW OF SYSTEMS:  Constitutional: Negative for fever  HENT: Negative for sore throat  Eyes: Negative for redness   Respiratory: +dyspnea, +cough  Cardiovascular: Negative for chest pain  Gastrointestinal: Negative for vomiting, diarrhea   Genitourinary: Negative for hematuria   Musculoskeletal: Negative for arthralgias   Skin: Negative for rash  Neurological: Negative for syncope  Hematological: Negative for adenopathy  Psychiatric/Behavorial: Negative for anxiety    Objective:   PHYSICAL EXAM:  Blood pressure 138/65, pulse 97, temperature 97.4 °F (36.3 °C), temperature source Oral, resp.  rate 18, height 5' 10\" (1.778 m), weight 227 lb 11.8 oz (103.3 kg), SpO2 97 %.'  Gen:  No acute distress. Eyes: PERRL. Anicteric sclera. No conjunctival injection. ENT: No discharge. Posterior oropharynx clear. External appearance of ears and nose normal.  Neck: Trachea midline. No mass   Resp:  No crackles. No wheezes. +rhonchi. No dullness on percussion. CV: Regular rate. Regular rhythm. No murmur or rub. No edema. GI: Soft, Non-tender. Non-distended. +BS  Skin: Warm, dry, w/o erythema. Lymph: No cervical or supraclavicular LAD. M/S: No cyanosis. No clubbing. Neuro:  CN 2-12 tested, no focal neurologic deficit. Moves all extremities  Psych: Awake and alert, Oriented x 3. Judgement and insight appropriate. Mood stable. Data Reviewed:   LABS:  CBC:   Recent Labs     02/04/23 2022 02/05/23  0647   WBC 9.6 6.6   HGB 13.2* 13.2*   HCT 40.8 40.1*   MCV 91.2 90.3    307     BMP:   Recent Labs     02/04/23 2022 02/05/23  0647   * 133*   K 4.9 5.0   CL 94* 95*   CO2 27 23   BUN 24* 27*   CREATININE 1.3 1.2     LIVER PROFILE:   Recent Labs     02/04/23 2022   AST 13*   ALT 11   BILITOT 0.4   ALKPHOS 52     PT/INR: No results for input(s): PROTIME, INR in the last 72 hours. APTT: No results for input(s): APTT in the last 72 hours. UA:  Recent Labs     02/04/23 2012   COLORU Yellow   PHUR 5.0   CLARITYU Clear   SPECGRAV 1.018   LEUKOCYTESUR Negative   UROBILINOGEN 0.2   BILIRUBINUR Negative   BLOODU Negative   GLUCOSEU >=1000*     No results for input(s): PHART, AUN0XVT, PO2ART in the last 72 hours. Vent Information  Equipment Changed: Nebulizer equipment (mdi spacer)    Radiology Review:  Pertinent images / reports were reviewed as a part of this visit. Chest x-ray images reviewed personally by me, interpretation as follows:  -Bibasilar predominant infiltrates cannot rule out pulmonary edema      ECHO  Summary   Definity contrast administered. Left ventricular cavity size is moderately dilated. Normal left ventricular wall thickness. Ejection fraction is visually estimated to be 45-50%. There is inferior wall hypokinesis. Grade II diastolic dysfunction with elevated LV filling pressures. Mildly dilated right ventricle. Right ventricular systolic function is mildly reduced . Assessment/Plan:     Acute hypoxemic respiratory failure with SPO2 less than 90% on room air  -Wean oxygen, goal saturation 90%  -Acapella/I-S, OOB, PT/OT    Recurrent pneumonia  -Agree with broad-spectrum antibiotics  -We will add pneumonia panel onto culture  -Needs airway clearance as above  -Recommend SLP eval.  Had a MBS in 2019 consistent with dysphagia. Recurrent aspiration could explain recurrent pneumonias    This note was transcribed using [id number] [employer]. Please disregard any translational errors.     Thank you for the consult    [address]  Pulmonary, Sleep and Critical Care Medicine

## 2023-02-05 NOTE — PROGRESS NOTES
Hospital Medicine Progress Note     Date:  2/5/2023    PCP: Priscilla Daily (Tel: [phone])    Date of Admission: 2/4/2023    Chief complaint:   Chief Complaint   Patient presents with    [address] down while walking to the bathroom, patient uses a walker       Brief admission history: 70-year-old male with history of COPD, chronic combined systolic and diastolic heart failure (echocardiogram from August 2021 with ejection fraction of 45 to 82%, grade 2 diastolic dysfunction), CAD status post CABG, uncontrolled diabetes type 2 with hyperglycemia, essential hypertension, paroxysmal atrial fibrillation (on chronic anticoagulation with eliquis), stated with BMI of 33 kg/m², who was recently admitted to [facility] for management of bacterial pneumonia in December 2022. Since then, he continues to have ongoing shortness of breath and productive cough, and was recently reevaluated by his pulmonologist on February 4, 2023, when he was started on Levaquin. He then presented to the emergency room with report of increasing shortness of breath, generalized weakness and a fall that was described as sliding down to the floor, complicated by abrasion to right elbow. He was found to have low oxygen saturation of 87% on room air in the emergency room. He was tachycardic with heart rate up to 113 beats per minute, tachypneic with respiratory rate up to 26 bpm.  He reportedly had wheezes on examination. Chest imaging also revealed multifocal infiltrates. X-ray-right elbow was unremarkable for acute pathology or fracture. Assessment/plan:  COPD exacerbation. Continue steroid, breathing treatments and antibiotics. Pulmonology has been consulted to assist with evaluation. Bacterial pneumonia, likely due to aspiration. Continue antibiotics for now. Follow cultures. Procalcitonin level is normal.  Hypoxia. Oxygen saturation was down to 87% on room air. Continue management of #1.   Monitor pulse oximeter closely. Wean supplemental oxygen as tolerated. Uncontrolled diabetes type 2 with hyperglycemia. A1c is 7.5. Current hyperglycemia is likely secondary to steroid. Added scheduled lantus, prandial humalog to SSI on 2/5/2023. Monitor accucheck closely and adjust insulin doses as needed. Generalized weakness. Likely secondary to acute medical conditions. Maintain on fall precautions. Therapy evaluation. Other comorbidities: history of COPD, chronic combined systolic and diastolic heart failure (echocardiogram from August 2021 with ejection fraction of 45 to 52%, grade 2 diastolic dysfunction), CAD status post CABG, uncontrolled diabetes type 2 with hyperglycemia, essential hypertension, paroxysmal atrial fibrillation (on chronic anticoagulation with eliquis). Diet: ADULT DIET; Regular; 5 carb choices (75 gm/meal)    Code status: DNR-CCA   ----------  Subjective  Reports cough, SOB, wheezing. Objective  Physical exam:  Vitals: BP (!) 155/79   Pulse 91   Temp 97.3 °F (36.3 °C) (Oral)   Resp 16   Ht 5' 10\" (1.778 m)   Wt 227 lb 11.8 oz (103.3 kg)   SpO2 96%   BMI 32.68 kg/m²   Gen/overall appearance: Not in acute distress. Alert. Oriented X3  Head: Normocephalic, atraumatic  Eyes: EOMI, good acuity  ENT: Oral mucosa moist  Neck: No JVD, thyromegaly  CVS: Nml S1S2, no MRG, RRR  Pulm: Diffuse expiratory wheezes present. Gastrointestinal: Soft, NT/ND, +BS  Musculoskeletal: No edema. Warm  Neuro: No focal deficit. Moves extremity spontaneously. Psychiatry: Appropriate affect. Not agitated. Skin: Warm, dry with normal turgor. No rash  Capillary refill: Brisk,< 3 seconds   Peripheral Pulses: +2 palpable, equal bilaterally      24HR INTAKE/OUTPUT:    Intake/Output Summary (Last 24 hours) at 2/5/2023 0742  Last data filed at 2/5/2023 0342  Gross per 24 hour   Intake --   Output 1000 ml   Net -1000 ml     I/O last 3 completed shifts:  In: -   Out: 1000 [Urine:1000]  No intake/output data recorded.   Meds: vancomycin (VANCOCIN) intermittent dosing (placeholder)   Other RX Placeholder    vancomycin  1,250 mg IntraVENous Q18H    fluticasone  2 spray Each Nostril BID    multivitamin  1 tablet Oral Daily    [START ON 2/6/2023] insulin glargine  15 Units SubCUTAneous Daily    insulin glargine  15 Units SubCUTAneous Once    insulin lispro  5 Units SubCUTAneous TID     pramipexole  0.25 mg Oral BID    pramipexole  0.5 mg Oral Nightly    Roflumilast  500 mcg Oral Daily    lisinopril  10 mg Oral Daily    mometasone-formoterol  2 puff Inhalation BID    aspirin  81 mg Oral Daily    apixaban  5 mg Oral BID    sodium chloride flush  10 mL IntraVENous 2 times per day    cefepime  2,000 mg IntraVENous Q12H    insulin lispro  0-8 Units SubCUTAneous TID     insulin lispro  0-4 Units SubCUTAneous Nightly    ipratropium-albuterol  1 ampule Inhalation Q4H WA    methylPREDNISolone  40 mg IntraVENous Q6H    atorvastatin  20 mg Oral Nightly    carvedilol  3.125 mg Oral BID     gabapentin  800 mg Oral Q6H    furosemide  40 mg Oral Daily    oxyCODONE  35 mg Oral 2 times per day     Infusions:    dextrose      sodium chloride       PRN Meds: benzonatate, fluticasone, albuterol sulfate HFA, glucose, dextrose bolus **OR** dextrose bolus, glucagon (rDNA), dextrose, sodium chloride flush, sodium chloride, ondansetron, polyethylene glycol, acetaminophen **OR** acetaminophen, ipratropium-albuterol    Labs/imaging:  CBC:   Recent Labs     02/04/23 2022 02/05/23  0647   WBC 9.6 6.6   HGB 13.2* 13.2*    307     BMP:    Recent Labs     02/04/23 2022   *   K 4.9   CL 94*   CO2 27   BUN 24*   CREATININE 1.3   GLUCOSE 183*     Hepatic:   Recent Labs     02/04/23 2022   AST 13*   ALT 11   BILITOT 0.4   ALKPHOS 52       Gilles Gooden MD  -------------------------------  Rounding hospitalist

## 2023-02-05 NOTE — H&P
Hospital Medicine  History and Physical    PCP: Jenn Martinez  Patient Name: Monika Barnhart    Date of Service: Pt seen/examined on 2/4/23 and admitted to Inpatient with expected LOS greater than two midnights due to medical therapy. CHIEF COMPLAINT:  Pt c/o shortness of breath, productive cough, weakness and fatigue  HISTORY OF PRESENT ILLNESS: Pt is an 68y.o. year-old male with a history of hypertension, hyperlipidemia, diabetes mellitus type II, coronary artery disease s/p CABG, chronic combined systolic and diastolic congestive heart failure, A fib and non-morbid obesity. He was admitted to [place] [address] for pneumonia in December. He states that he has had continuing shortness of breath and a productive cough since his discharge from that admission. His pulmonologist is Dr. Charlotte Perez. He saw him yesterday and started him on Levaquin. The patient states that he has taken 1 dose of this so far, earlier today. He presents to the emergency room for evaluation of increasing shortness of breath, weakness and fatigue. He states that he was walking to his bathroom a short time ago when he became so weak he could not continue to walk. He slid down to the floor and suffered an abrasion to his right elbow. He reports continuing moderately severe shortness of breath which is worse with minimal exertion and improved with rest.  At baseline, he does not require oxygen. In the emergency room he was found to have a resting oxygen saturation of 87% on room air. On further evaluation he was found to have multifocal pneumonia and associated sepsis along with a COPD exacerbation. . Pt denies head trauma, and associated signs and symptoms do not include dizziness or lightheadedness, chest pain, shortness of breath or neurological symptoms prior to the fall. Patient denies neck pain or stiffness, diplopia or other vision changes, difficulty swallowing, numbness in the arms or legs, or focal neurological deficits.  He is being admitted for further evaluation and treatment. Past Medical History:        Diagnosis Date    Arthritis     CAD (coronary artery disease)     CABG in 2009    Chronic back pain     Chronic systolic CHF (congestive heart failure) (HCC)     COPD (chronic obstructive pulmonary disease) ([facility] [address]     Dental disease     Diabetes mellitus ([facility] [address]     Essential hypertension     Hearing loss     Paroxysmal atrial fibrillation ([facility] [address]     On Coumadin    Tinnitus        Past Surgical History:        Procedure Laterality Date    [address]  2007    CERVICAL SPINE SURGERY      CORONARY ARTERY BYPASS GRAFT      FRACTURE SURGERY Left 1988    Shoulder       Allergies:  No known allergies    Medications Prior to Admission:    Prior to Admission medications    Medication Sig Start Date End Date Taking?  Authorizing Provider   Lidocaine 4 % OINT Apply topically As needed for pain   Yes Historical Provider, MD   polyethylene glycol (GLYCOLAX) 17 GM/SCOOP powder Take 17 g by mouth daily as needed   Yes Historical Provider, MD   Multiple Vitamins-Minerals (PRESERVISION AREDS 2+MULTI VIT PO) Take 1 tablet by mouth in the morning and at bedtime   Yes Historical Provider, MD   pramipexole (MIRAPEX) 0.125 MG tablet Take 0.25 mg by mouth at bedtime   Yes Historical Provider, MD   pramipexole (MIRAPEX) 0.25 MG tablet TAKE 1 TABLET BY MOUTH THREE TIMES DAILY 1/29/23   Historical Provider, MD   furosemide (LASIX) 40 MG tablet Take 1 tablet by mouth daily 12/11/22   Historical Provider, MD   flunisolide (NASALIDE) 25 MCG/ACT (0.025%) SOLN 2 sprays by Nasal route in the morning and at bedtime    Historical Provider, MD   XTAMPZA ER 36 MG C12A TAKE 1 CAPSULE BY MOUTH EVERY 12 HOURS 1/19/23   Historical Provider, MD   levoFLOXacin (LEVAQUIN) 500 MG tablet Take 1 tablet by mouth daily for 7 days 2/3/23 2/10/23  Gabbi Lees MD   fluticasone-salmeterol (ADVIAR) 100-50 MCG/ACT AEPB diskus inhaler Inhale 1 puff into the lungs every 12 hours    Historical Provider, MD   Roflumilast (DALIRESP) 500 MCG tablet Take 500 mcg by mouth daily    Historical Provider, MD   lisinopril (PRINIVIL;ZESTRIL) 10 MG tablet Take 10 mg by mouth daily    Historical Provider, MD   dapagliflozin (FARXIGA) 10 MG tablet Take 10 mg by mouth every morning    Historical Provider, MD   insulin 70-30 (HUMULIN;NOVOLIN) (70-30) 100 UNIT per ML injection vial Inject 25 Units into the skin 2 times daily    Historical Provider, MD   Multiple Vitamins-Minerals (THERAPEUTIC MULTIVITAMIN-MINERALS) tablet Take 1 tablet by mouth daily    Historical Provider, MD   albuterol sulfate  (90 Base) MCG/ACT inhaler Inhale 2 puffs into the lungs every 4 hours as needed for Wheezing 3/8/21   Shekhar Stephens MD   apixaban Ross Sears) 5 MG TABS tablet Take 1 tablet by mouth 2 times daily 12/23/20   Shekhar Stephens MD   simvastatin (ZOCOR) 80 MG tablet Take 40 mg by mouth nightly 3/27/18   Historical Provider, MD   vitamin D (CHOLECALCIFEROL) 1000 UNITS TABS tablet Take 1,000 Units by mouth daily    Historical Provider, MD   gabapentin (NEURONTIN) 800 MG tablet Take 800 mg by mouth 4 times daily. Historical Provider, MD   aspirin 81 MG tablet Take 81 mg by mouth daily    Historical Provider, MD   metFORMIN (GLUCOPHAGE) 1000 MG tablet Take 1,000 mg by mouth 2 times daily (with meals)    Historical Provider, MD   budesonide-formoterol (SYMBICORT) 160-4.5 MCG/ACT AERO Inhale 2 puffs into the lungs 2 times daily     Historical Provider, MD   carvedilol (COREG) 6.25 MG tablet Take 3.125 mg by mouth 2 times daily (with meals)    Historical Provider, MD       Family History:       Problem Relation Age of Onset    Brain Cancer Mother     Heart Attack Father     Heart Attack Brother      Social History:   TOBACCO:   reports that he quit smoking about 26 years ago. His smoking use included cigarettes. He has a 18.00 pack-year smoking history.  He has never used smokeless tobacco.  ETOH:   reports no history of alcohol use. OCCUPATION:      REVIEW OF SYSTEMS:  A full review of systems was performed and is negative except for that which appears in the HPI    Physical Exam:    Vitals: BP (!) 130/59   Pulse (!) 103   Temp 99.7 °F (37.6 °C) (Oral)   Resp 16   Wt 235 lb 14.3 oz (107 kg)   SpO2 97%   BMI 32.90 kg/m²   General appearance: Obese, ill but not toxic appearing 68y.o. year-old male who is alert, appears stated age and is cooperative  HEENT: Head: Normocephalic, no lesions, without obvious abnormality. Eye: Normal external eye, conjunctiva, lids cornea, PEERL. Ears: Normal external ears. Non-tender. Nose: Normal external nose, mucus membranes and septum. Pharynx: Dental Hygiene adequate. Normal buccal mucosa. Normal pharynx. Neck: no adenopathy, no carotid bruit, no JVD, supple, symmetrical, trachea midline and thyroid not enlarged, symmetric, no tenderness/mass/nodules  Lungs: Tachypnea present. Scattered wheezing and rhonchi on auscultation bilaterally and no use of accessory muscles  Heart: rapid rate and regular rhythm, S1, S2 normal, no murmur, click, rub or gallop and normal apical impulse  Abdomen: soft, non-tender; bowel sounds normal; no masses, no organomegaly  Extremities: extremities atraumatic, no cyanosis or edema and Homans sign is negative, no sign of DVT. Capillary Refill: Acceptable < 3 seconds   Peripheral Pulses: +3 easily felt, not easily obliterated with pressures   Skin: Skin color, texture, turgor normal. No rashes or lesions on exposed skin  Neurologic: Neurovascularly intact without any focal sensory/motor deficits. Cranial nerves: II-XII intact, grossly non-focal. Gait was not tested.   Mental Status: Alert and oriented, thought content appropriate, normal insight        CBC:   Recent Labs     02/04/23 2022   WBC 9.6   HGB 13.2*        BMP:    Recent Labs     02/04/23 2022   *   K 4.9   CL 94*   CO2 27 BUN 24*   CREATININE 1.3   GLUCOSE 183*     Troponin:   Recent Labs     02/04/23 2022   TROPONINI 0.01     PT/INR:  No results found for: PTINR  U/A:    Lab Results   Component Value Date/Time    LEUKOCYTESUR Negative 02/04/2023 08:12 PM    RBCUA 1 04/13/2019 07:57 AM    SPECGRAV 1.018 02/04/2023 08:12 PM    UROBILINOGEN 0.2 02/04/2023 08:12 PM    BILIRUBINUR Negative 02/04/2023 08:12 PM    BLOODU Negative 02/04/2023 08:12 PM    GLUCOSEU >=1000 02/04/2023 08:12 PM    PROTEINU Negative 02/04/2023 08:12 PM         RAD:   I have independently reviewed and interpreted the imaging studies below and based my recommendations to the patient on those findings. XR CHEST (2 VW)    Result Date: 1/28/2023  EXAMINATION: TWO XRAY VIEWS OF THE CHEST 1/28/2023 8:54 am COMPARISON: 08/25/2021 HISTORY: ORDERING SYSTEM PROVIDED HISTORY: COPD exacerbation ([address] TECHNOLOGIST PROVIDED HISTORY: Reason for Exam: SOB Relevant Medical/Surgical History: COPD FINDINGS: The lungs are hyperexpanded with diffuse bilateral interstitial prominence. There is patchy bibasilar segmental airspace disease, left greater than right. Heart size and vascularity are stable with note made of prior CABG. There is no pneumothorax or effusion. COPD with mild bibasilar atelectasis versus pneumonia. XR ELBOW RIGHT (MIN 3 VIEWS)    Result Date: 2/4/2023  EXAMINATION: THREE XRAY VIEWS OF THE RIGHT ELBOW 2/4/2023 7:50 pm COMPARISON: None. HISTORY: ORDERING SYSTEM PROVIDED HISTORY: Trauma, R/O fx TECHNOLOGIST PROVIDED HISTORY: Reason for exam:->Trauma, R/O fx Reason for Exam: fall FINDINGS: No fracture or malalignment identified. The joint spaces are maintained. No discrete soft tissue abnormality identified. No acute osseous abnormality identified.      XR CHEST PORTABLE    Result Date: 2/4/2023  EXAMINATION: ONE XRAY VIEW OF THE CHEST 2/4/2023 7:50 pm COMPARISON: 01/28/2023, 08/25/2021 HISTORY: ORDERING SYSTEM PROVIDED HISTORY: sob, cough TECHNOLOGIST PROVIDED HISTORY: Reason for exam:->sob, cough Reason for Exam: fall, cough, sob FINDINGS: The cardiomediastinal silhouette is unchanged in appearance. Patchy opacities in the mid to lower lung fields again demonstrated and without significant change. No new airspace disease, pneumothorax or significant effusion identified. Similar appearance of the osseous structures are unchanged in appearance. Basilar airspace disease, for which multifocal inflammatory process or infection should be considered. EKG:   Read by ER in the absence of a Cardiologist shows sinus tachycardia with nonspecific ST and T wave changes which are similar to previous EKG dated August 25, 2021. Normal [place] interval.  Normal QRS duration. Normal QTC. Normal axis. Interpreted by myself. Assessment:   Principal Problem:    Pneumonia due to infectious agent  Active Problems:    Tachycardia    Tachypnea    Non morbid obesity due to excess calories    Fatigue    Cough productive of clear sputum    History of coronary artery bypass graft    Abrasion of right elbow    Acute respiratory failure with hypoxia (HCC)    COPD exacerbation (HCC)    Paroxysmal atrial fibrillation (HCC)    CAD (coronary artery disease)    Sepsis (HCC)    Chronic combined systolic (congestive) and diastolic (congestive) heart failure (HCC)    Hyperlipidemia    Essential hypertension    Generalized weakness    DMII (diabetes mellitus, type 2) ([facility] [address]  Resolved Problems:    * No resolved hospital problems. *      Plan:       Pneumonia due to infectious agent -patient with recent hospitalization in December.   He has been started on vancomycin and cefepime.  - Pt tested negative for the COVID-19 virus  - Negative for Influenza A&B  - Blood cultures x 2 ordered  - Respiratory culture ordered  - Legionella pneumophilia and Strep pneumoniae urine antigens ordered  - Procalcitonin ordered  - MRSA nasal probe ordered  - His Pulmonologist, Dr Deena Frost, has been consulted    COPD (acute exacerbation) - Patient has been started on Nebulizer treatments to be given on a scheduled basis every 4 hours, and on an as-needed basis every 2 hours based upon needs identified through close monitoring. In addition, Solumedrol and Antibiotics have been prescribed. The Solumedrol will be tapered as the patient improves. Patient will be monitored closely, and deep breathing and coughing will be encouraged while awake. Acute respiratory failure with hypoxia - as evidenced by wheezing and rhonchi and an oxygen saturation of less than 90% on room air  - Due to Pneumonia and a COPD exacerbaton  -We will provide oxygen as necessary to maintain an oxygen saturation of 92% or higher     Cough productive of clear sputum - we will provide antitussive medications as needed. Sepsis ([address] due to Pneumonia with tachycardia, tachypnea. Initial Lactic Acid was not elevated. - Blood cultures x 2 have been ordered    Generalized weakness/fatigue, slid to the floor at home - We will will ask PT/OT to evaluate and treat patient, and if necessary to provide recommendations for post hospital therapy    Abrasion of right elbow - provide wound care as needed    CAD (coronary artery disease), history of coronary artery bypass graft - currently stable. Pt denies chest pain. Continue beta blocker, statin and aspirin    Chronic combined systolic (congestive) and diastolic (congestive) heart failure (HCC) - A 2D Echocardiogram on 08/27/2021 shows an EF of 45-50%  and grade II diastolic dysfunction. Continue home Lasix. Monitor strict I&Os and daily weights. Paroxysmal atrial fibrillation (HCC) -currently in sinus tachycardia. Continue Coreg and Eliquis    Diabetes mellitus II - SSI and carb control diet    Essential (primary) hypertension - continue home meds and monitor blood pressure    Hyperlipidemia - No current evidence of Rhabdomyolysis or other adverse effects.  Continue statin therapy while in the hospital    Non-morbid obesity due to excess calories (Body mass index is 32.9 kg/m². ) - Complicating assessment and treatment. Placing patient at risk for multiple co-morbidities as well as early death and contributing to the patient's presentation. Code Status: DNR-CCA  Diet: Regular, Carb control  DVT Prophylaxis: Eliquis    (Advanced care planning has been discussed with patient and/or responsible family member and is reflected in the code status.  Further orders associated with this have been entered if appropriate)    Disposition: Anticipate that patient will remain in the hospital for 2 or more midnights depending on further evaluation and clinical course     Please note that over 50 minutes was spent in evaluating the patient, review of records and results, discussion with staff/family, etc.      Kraig Shone, MD

## 2023-02-05 NOTE — CONSULTS
Clinical Pharmacy Note  Vancomycin Consult    Pharmacy consult received for one-time dose of vancomycin in the Emergency Department per GRAHAM Velasquez. Ht Readings from Last 1 Encounters:   02/03/23 5' 11\" (1.803 m)        Wt Readings from Last 1 Encounters:   02/04/23 235 lb 14.3 oz (107 kg)         Assessment/Plan:  Vancomycin 1500 mg x 1 in ED. If Vancomycin is to continue on admission and pharmacy is to manage dosing, please re-consult with admission orders.     Sarah Olmstead, PharmD  2/4/2023 9:03 PM

## 2023-02-05 NOTE — ED PROVIDER NOTES
I have personally performed a face to face diagnostic evaluation on this patient. I have fully participated in the care of this patient I personally saw the patient and performed a substantive portion of the visit including all aspects of the medical decision making. I have reviewed and agree with all pertinent clinical information including history, physical exam, diagnostic tests, and the plan. HPI: Ge Chapin presented with fall while walking the bathroom. Patient uses a walker lying on his right side. Witnessed fall. Landed on his right elbow. Did not hit his head. No head pain or neck pain. Having some pain in his right elbow. Of note patient also has severe COPD recently saw his primary care doctor today and his pulmonologist yesterday and was placed on antibiotics for COPD exacerbation and a cough. Patient has been on a cough for multiple weeks. Started on Levaquin but has not yet taken. Patient is not wear oxygen at baseline. Chief Complaint   Patient presents with    Onur Jerod down while walking to the bathroom, patient uses a walker      Review of Systems: See TRES note  Vital Signs: BP (!) 130/59   Pulse (!) 103   Temp 99.7 °F (37.6 °C) (Oral)   Resp 16   Wt 235 lb 14.3 oz (107 kg)   SpO2 97%   BMI 32.90 kg/m²     Alert 68 y.o. male who does not appear toxic or acutely ill  HENT: Atraumatic, oral mucosa moist  Neck: Grossly normal ROM  Chest/Lungs: Mild tachypnea, rhonchi bilaterally, mild wheezing, significant transmitted upper airway sounds. Abdomen: Soft nontender  Musculoskeletal: Multiple skin tears on right elbow  Skin: No palor or diaphoresis    Medical Decision Making and Plan:  Pertinent Labs & Imaging studies reviewed. (See TRES chart for details)  I agree with TRES assessment and plan. 44-year-old male who presented initially for a fall who has mild skin tears x-ray without signs of acute fracture. No head trauma no C-spine tenderness.   No altered mental status who was found to be significantly hypoxic with abnormal lung sounds mild tachypnea. Patient has multifocal pneumonia. Will give antibiotics for hospital-acquired pneumonia. Patient also given breathing treatment and steroids for likely component of COPD exacerbation. Patient on 2 L nasal cannula now saturating 97%. No elevated white blood cell count. Patient does not have elevated lactate. Patient also has history of CHF. See TRES note for further details. I personally saw the patient and independently provided 12 minutes of nonconcurrent critical care out of the total shared critical care time provided    EKG: All EKG's are interpreted by the Emergency Department Physician who either signs or Co-signs this chart in the absence of a cardiologist.  EKG Interpretation    Interpreted by emergency department physician    Rhythm: sinus tachycardia  Rate: 100-110  Axis: normal  Ectopy: none  Conduction: normal  ST Segments: nonspecific changes  T Waves: non specific changes  Q Waves: none    Clinical Impression: Sinus tachycardia with nonspecific ST and T wave changes which are similar to previous EKG dated August 25, 2021. Normal [place] interval.  Normal QRS duration. Normal QTC. Normal axis. Interpreted by myself.     MD Shayna Palmer MD  02/04/23 [id number]

## 2023-02-05 NOTE — PROGRESS NOTES
Physical Therapy  Facility/Department: [facility]  Physical Therapy Initial Assessment    Name: Christi Vasquez  :   MRN: [id number]  Date of Service: 2023    Discharge Recommendations:  24 hour supervision or assist, Home with Home health PT, Continue to assess pending progress          Patient Diagnosis(es): The primary encounter diagnosis was Multifocal pneumonia. A diagnosis of Acute respiratory failure with hypoxia (HCC) was also pertinent to this visit. Past Medical History:  has a past medical history of Abrasion of right elbow, Arthritis, CAD (coronary artery disease), Chronic back pain, Chronic systolic CHF (congestive heart failure) ([address] COPD (chronic obstructive pulmonary disease) ([facility] [address] Dental disease, Diabetes mellitus ([facility] [address] Essential hypertension, Hearing loss, Paroxysmal atrial fibrillation ([facility] [address] and Tinnitus. Past Surgical History:  has a past surgical history that includes fracture surgery (Left, ); back surgery (); Cardiac surgery (); Coronary artery bypass graft; back surgery; and Cervical spine surgery. Assessment   Body Structures, Functions, Activity Limitations Requiring Skilled Therapeutic Intervention: Decreased functional mobility ; Decreased endurance  Assessment: Pt is a 68 y.o. male who presented to the ED on 23 with increasing SOB, weakness, and fatigue. He also reports he fell at home due to progressive SOB and weakness. He was admitted to [place] [address] for pneumonia in December - reports he has had continuing shortness of breath and a productive cough since his discharge from that admission. Prior to admission, pt living with spouse in home setting, independent with ADLs and ambulation with RW. Pt currently functioning below baseline. Anticipate return home with initial 24hr supv and level 1 HHPT.   Treatment Diagnosis: impaired activity tolerance  Therapy Prognosis: Good  Decision Making: Medium Complexity  History: see above  Exam: see below  Clinical Presentation: evolving  Requires PT Follow-Up: Yes  Activity Tolerance  Activity Tolerance: Patient tolerated treatment well     Plan   Physcial Therapy Plan  General Plan: 3-5 times per week  Current Treatment Recommendations: Strengthening, Balance training, Functional mobility training, Transfer training, Gait training, Endurance training, Neuromuscular re-education, Safety education & training, Equipment evaluation, education, & procurement, Patient/Caregiver education & training, Therapeutic activities  Safety Devices  Type of Devices: All fall risk precautions in place, Call light within reach, Gait belt, Patient at risk for falls, Left in chair, Chair alarm in place, Nurse notified     Restrictions  Restrictions/Precautions  Restrictions/Precautions: Up as Tolerated  Position Activity Restriction  Other position/activity restrictions: 4L to 2L O2     Subjective   General  Chart Reviewed: Yes  Patient assessed for rehabilitation services?: Yes  Additional Pertinent Hx: Pt is a 68 y.o. male who presented to the ED on 2/4/23 with increasing SOB, weakness, and fatigue. He also reports he fell at home due to progressive SOB and weakness. He was admitted to [place] [address] for pneumonia in December - reports he has had continuing shortness of breath and a productive cough since his discharge from that admission.   Response To Previous Treatment: Not applicable  Family / Caregiver Present: No  Referring Practitioner: Shelby Patel MD  Referral Date : 02/04/23  Diagnosis: pneumonia  Follows Commands: Within Functional Limits  Subjective  Subjective: Pt is pleasantly agreeable to PT         Social/Functional History  Social/Functional History  Lives With: Spouse  Type of Home: House  Home Layout: One level, Able to Live on Main level with bedroom/bathroom  Home Access: Stairs to enter with rails  Entrance Stairs - Number of Steps: 2  Entrance Stairs - Rails: Both  Bathroom Shower/Tub: Walk-in shower  Bathroom Toilet: Handicap height  Bathroom Equipment: Grab bars in shower, Built-in shower seat, Shower chair, Hand-held shower, Grab bars around toilet  Bathroom Accessibility: Wheelchair accessible  Home Equipment: Lift chair, Walker, rolling, [employer], Walker, 4 wheeled, Electric scooter  ADL Assistance: Independent  Homemaking Assistance: Needs assistance  Ambulation Assistance: Independent (combination of RW, cane in home. scooter outside home)  Transfer Assistance: Independent  Active : Yes  Occupation: Retired  Type of Occupation: ,     Vision/Hearing  Hearing  Hearing: Within functional limits      Cognition   Orientation  Overall Orientation Status: Within Functional Limits  Cognition  Overall Cognitive Status: WFL     Objective   Gross Assessment  Strength: Generally decreased, functional (RLE weaker than LLE)  Sensation: Impaired (neuropathy BLE)     Bed mobility  Supine to Sit: Minimal assistance  Sit to Supine: Unable to assess (in recliner at end of session)  Transfers  Sit to Stand: Contact guard assistance  Stand to Sit: Contact guard assistance  Ambulation  Surface: Level tile  Device: Rolling Walker  Other Apparatus: O2 (3L)  Assistance: Contact guard assistance  Quality of Gait: mild R knee buckling  Gait Deviations: Slow Emely;Decreased step length;Decreased step height  Distance: 20'  Comments: Pt reporting R knee buckling is baseline due to neuropathy. O2 sats at 95-97% throughout session. Weaned to 2L with same result.      Balance  Posture: Good  Sitting - Static: Good  Sitting - Dynamic: Good  Standing - Static: Fair;+ (@RW)  Standing - Dynamic: Fair;+ (@RW)           AM-PAC Score  AM-PAC Inpatient Mobility Raw Score : 20 (02/05/23 0806)  AM-PAC Inpatient T-Scale Score : 47.67 (02/05/23 0806)  Mobility Inpatient CMS 0-100% Score: 35.83 (02/05/23 0806)  Mobility Inpatient CMS G-Code Modifier : CJ (02/05/23 3148)        Goals  Short Term Goals  Time Frame for Short Term Goals: by acute discharge  Short Term Goal 1: bed mobility mod I  Short Term Goal 2: sit<>Stand SBA  Short Term Goal 3: ambulate > 48' with RW and SBA  Patient Goals   Patient Goals : none stated       Education  Patient Education  Education Given To: Patient  Education Provided: Role of Therapy;Plan of Care  Education Method: Verbal  Barriers to Learning: None  Education Outcome: Verbalized understanding;Continued education needed      Therapy Time   Individual Concurrent Group Co-treatment   Time In 0710         Time Out 0750         Minutes 40         Timed Code Treatment Minutes: 25 Minutes       Jeromy Martinez, PT

## 2023-02-05 NOTE — PLAN OF CARE
Problem: Discharge Planning  Goal: Discharge to home or other facility with appropriate resources  Outcome: Progressing     Problem: Pain  Goal: Verbalizes/displays adequate comfort level or baseline comfort level  2/5/2023 0750 by Lissette Miller RN  Outcome: Progressing  2/4/2023 2250 by Aggie Ash RN  Outcome: Progressing     Problem: Safety - Adult  Goal: Free from fall injury  2/5/2023 0750 by Lissette Miller RN  Outcome: Progressing  2/4/2023 2250 by Aggie Ash RN  Outcome: Progressing     Problem: ABCDS Injury Assessment  Goal: Absence of physical injury  2/5/2023 0750 by Lissette Miller RN  Outcome: Progressing  2/4/2023 2250 by Aggie Ash RN  Outcome: Progressing

## 2023-02-05 NOTE — CONSULTS
Clinical Pharmacy Note  Vancomycin Consult    Cindy Whelan is a 68 y.o. male ordered Vancomycin for HAP; consult received from Dr. Hollie Amaya to manage therapy. Also receiving cefepime. Allergies:  No known allergies     Temp max:  Temp (24hrs), Av °F (37.2 °C), Min:98.3 °F (36.8 °C), Max:99.7 °F (37.6 °C)      Recent Labs     23   WBC 9.6       Recent Labs     23   BUN 24*   CREATININE 1.3         Intake/Output Summary (Last 24 hours) at 2023 0112  Last data filed at 2023 0110  Gross per 24 hour   Intake --   Output 800 ml   Net -800 ml       Culture Results:  pending    Ht Readings from Last 1 Encounters:   23 5' 10\" (1.778 m)        Wt Readings from Last 1 Encounters:   23 227 lb 11.8 oz (103.3 kg)         Estimated Creatinine Clearance: 57 mL/min (based on SCr of 1.3 mg/dL). Assessment  -67 y/o M presented to ED with SOB, cough, and fatigue  -PMH HTN, HLD, T2DM, CAD s/p CABG, HFrEF  -Imaging revealed basilar airspace disease  -Renal function appears to be at baseline (Scr 1.3 mg/dL, CrCl 57 mL/min --> however, baseline Scr appears to be 1.0-1.1 mg/dL)    Plan:  Day # 1 of vancomycin. Vancomycin 1500mg x 1 dose given in ED  Vancomycin 1250 mg IV every 18 hours ordered. Goal -600  Predicted   Will obtain level after 2 doses due to high risk nephrotoxicity  Level ordered for 0600 on     Thank you for the consult.    Sushma Frye, PharmD  2023 1:15 AM

## 2023-02-05 NOTE — ED PROVIDER NOTES
[facility]        Pt Name: Stefano Yepez  MRN: [id number]  [place] 1945  Date of evaluation: 2/4/2023  Provider: GRAHAM Odom  PCP: Anish Horn  Note Started: 9:11 PM EST 2/4/23       I have seen and evaluated this patient with my supervising physician Tete Burton MD.      [address] [place] [address]       Chief Complaint   Patient presents with    Teresia Regulo down while walking to the bathroom, patient uses a walker       HISTORY OF PRESENT ILLNESS: 1 or more Elements     History from : Patient    Limitations to history : None    Stefano Yepez is a 68 y.o. male who presents via EMS from home where he lives with his wife. He tells me he was walking with his wife and walker to the bathroom when he slipped. According to the wife he had gotten weak fell down he hit the back of his elbow on the wall. He states he did not hit his head he just slid down. He has skin tear and abrasion to his right elbow. He is on Eliquis has history of A. fib, COPD, CHF and diabetes. His tetanus is up-to-date. He sees pulmonology Dr. Leny Joel reportedly is not on home oxygen. Quit smoking about 25 years ago history of COPD, CHF, coronary artery disease, hypertension hyperlipidemia. Pulmonology started him on Levaquin 2 days ago he tells me he has been coughing since his hospitalization at [facility] [address] and it really has not gotten any better. No leg swelling increased weight or other edema. Nursing Notes were all reviewed and agreed with or any disagreements were addressed in the HPI. REVIEW OF SYSTEMS :      Review of Systems   Constitutional:  Positive for chills and fatigue. Respiratory:  Positive for cough, shortness of breath and wheezing. Cardiovascular:  Negative for chest pain and leg swelling. Gastrointestinal:  Negative for abdominal pain and vomiting.    Musculoskeletal:  Positive for arthralgias. Skin:  Positive for wound. Neurological:  Positive for weakness. Negative for numbness. Psychiatric/Behavioral:  Negative for agitation, behavioral problems and confusion. Positives and Pertinent negatives as per HPI. SURGICAL HISTORY     Past Surgical History:   Procedure Laterality Date    [address]  2007    CERVICAL SPINE SURGERY      CORONARY ARTERY BYPASS GRAFT      FRACTURE SURGERY Left 1988    Shoulder       CURRENTMEDICATIONS       Previous Medications    ALBUTEROL SULFATE  (90 BASE) MCG/ACT INHALER    Inhale 2 puffs into the lungs every 4 hours as needed for Wheezing    APIXABAN (ELIQUIS) 5 MG TABS TABLET    Take 1 tablet by mouth 2 times daily    ASPIRIN 81 MG TABLET    Take 81 mg by mouth daily    BUDESONIDE-FORMOTEROL (SYMBICORT) 160-4.5 MCG/ACT AERO    Inhale 2 puffs into the lungs 2 times daily     CARVEDILOL (COREG) 25 MG TABLET    Take 12.5 mg by mouth 2 times daily (with meals)     DAPAGLIFLOZIN (FARXIGA) 5 MG TABLET    Take 5 mg by mouth every morning    FLUTICASONE (FLONASE) 50 MCG/ACT NASAL SPRAY    1 spray by Each Nostril route daily    FLUTICASONE-SALMETEROL (ADVIAR) 100-50 MCG/ACT AEPB DISKUS INHALER    Inhale 1 puff into the lungs every 12 hours    FUROSEMIDE (LASIX) 20 MG TABLET    Take 20 mg by mouth daily     GABAPENTIN (NEURONTIN) 800 MG TABLET    Take 800 mg by mouth 3 times daily. GUAIFENESIN (MUCINEX) 600 MG EXTENDED RELEASE TABLET    Take 600 mg by mouth daily    INSULIN 70-30 (HUMULIN;NOVOLIN) (70-30) 100 UNIT PER ML INJECTION VIAL    Inject 38 Units into the skin 2 times daily     LEVOFLOXACIN (LEVAQUIN) 500 MG TABLET    Take 1 tablet by mouth daily for 7 days    LIDOCAINE 4 % OINT    Apply topically as needed for Pain Apply topically as needed.      LISINOPRIL (PRINIVIL;ZESTRIL) 10 MG TABLET    Take 10 mg by mouth daily    MAGNESIUM CITRATE SOLUTION    Take 296 mLs by mouth once for 1 dose METFORMIN (GLUCOPHAGE) 1000 MG TABLET    Take 1,000 mg by mouth 2 times daily (with meals)    MULTIPLE VITAMINS-MINERALS (THERAPEUTIC MULTIVITAMIN-MINERALS) TABLET    Take 1 tablet by mouth daily    PHENYLEPHRINE-BROMPHEN-DM 10-4-20 MG/5ML LIQD    Take 5 mLs by mouth 4 times daily as needed (cough)    PRAMIPEXOLE (MIRAPEX) 0.125 MG TABLET    Take 0.125 mg by mouth in the morning and at bedtime    ROFLUMILAST (DALIRESP) 500 MCG TABLET    Take 500 mcg by mouth daily    ROPINIROLE (REQUIP) 2 MG TABLET    Take 2 mg by mouth nightly     SIMVASTATIN (ZOCOR) 80 MG TABLET    Take 80 mg by mouth nightly     VITAMIN D (CHOLECALCIFEROL) 1000 UNITS TABS TABLET    Take 1,000 Units by mouth daily       ALLERGIES     No known allergies    FAMILYHISTORY       Family History   Problem Relation Age of Onset    Brain Cancer Mother     Heart Attack Father     Heart Attack Brother         SOCIAL HISTORY       Social History     Tobacco Use    Smoking status: Former     Packs/day: 1.50     Years: 12.00     Pack years: 18.00     Types: Cigarettes     Quit date: 3/1/1996     Years since quittin.9    Smokeless tobacco: Never   Vaping Use    Vaping Use: Never used   Substance Use Topics    Alcohol use: No    Drug use: No       SCREENINGS        [place] Coma Scale  Eye Opening: Spontaneous  Best Verbal Response: Oriented  Best Motor Response: Obeys commands  [place] Coma Scale Score: 15                CIWA Assessment  BP: (!) 130/59  Heart Rate: (!) 103           PHYSICAL EXAM  1 or more Elements     ED Triage Vitals [23 1936]   BP Temp Temp Source Heart Rate Resp SpO2 Height Weight   (!) 130/59 99.7 °F (37.6 °C) Oral (!) 113 24 (!) 87 % -- 235 lb 14.3 oz (107 kg)       Physical Exam  Vitals and nursing note reviewed. Constitutional:       General: He is not in acute distress. Appearance: Normal appearance. He is ill-appearing. HENT:      Head: Normocephalic and atraumatic.       Mouth/Throat:      Mouth: Mucous membranes are moist.   Eyes:      Pupils: Pupils are equal, round, and reactive to light. Cardiovascular:      Rate and Rhythm: Tachycardia present. Pulmonary:      Effort: Pulmonary effort is normal. No respiratory distress. Breath sounds: Wheezing and rales present. Abdominal:      Tenderness: There is no abdominal tenderness. There is no guarding. Musculoskeletal:         General: No swelling. Right elbow: Laceration (skin tear) present. Arms:       Cervical back: Normal range of motion. Skin:     General: Skin is warm. Neurological:      General: No focal deficit present. Mental Status: He is alert and oriented to person, place, and time. Psychiatric:         Mood and Affect: Mood normal.         Behavior: Behavior normal.       DIAGNOSTIC RESULTS   LABS:    Labs Reviewed   CBC WITH AUTO DIFFERENTIAL - Abnormal; Notable for the following components:       Result Value    Hemoglobin 13.2 (*)     All other components within normal limits   COMPREHENSIVE METABOLIC PANEL - Abnormal; Notable for the following components:    Sodium 132 (*)     Chloride 94 (*)     Glucose 183 (*)     BUN 24 (*)     Est, Glom Filt Rate 56 (*)     Albumin/Globulin Ratio 0.8 (*)     AST 13 (*)     All other components within normal limits   BRAIN NATRIURETIC PEPTIDE - Abnormal; Notable for the following components:    Pro- (*)     All other components within normal limits   URINALYSIS WITH REFLEX TO CULTURE - Abnormal; Notable for the following components:    Glucose, Ur >=1000 (*)     All other components within normal limits   POCT GLUCOSE - Abnormal; Notable for the following components:    POC Glucose 180 (*)     All other components within normal limits   RAPID INFLUENZA A/B ANTIGENS   COVID-19, RAPID   CULTURE, BLOOD 1   CULTURE, BLOOD 2   TROPONIN   LACTIC ACID       When ordered only abnormal lab results are displayed.  All other labs were within normal range or not returned as of this dictation. EKG: When ordered, EKG's are interpreted by the Emergency Department Physician in the absence of a cardiologist.  Please see their note for interpretation of EKG. RADIOLOGY:   Non-plain film images such as CT, Ultrasound and MRI are read by the radiologist. Plain radiographic images are visualized and preliminarily interpreted by the ED Provider with the below findings:    Interpretation per the Radiologist below, if available at the time of this note:    XR ELBOW RIGHT (MIN 3 VIEWS)   Final Result   No acute osseous abnormality identified. XR CHEST PORTABLE   Final Result   Basilar airspace disease, for which multifocal inflammatory process or   infection should be considered. XR ELBOW RIGHT (MIN 3 VIEWS)    Result Date: 2/4/2023  EXAMINATION: THREE XRAY VIEWS OF THE RIGHT ELBOW 2/4/2023 7:50 pm COMPARISON: None. HISTORY: ORDERING SYSTEM PROVIDED HISTORY: Trauma, R/O fx TECHNOLOGIST PROVIDED HISTORY: Reason for exam:->Trauma, R/O fx Reason for Exam: fall FINDINGS: No fracture or malalignment identified. The joint spaces are maintained. No discrete soft tissue abnormality identified. No acute osseous abnormality identified. XR CHEST PORTABLE    Result Date: 2/4/2023  EXAMINATION: ONE XRAY VIEW OF THE CHEST 2/4/2023 7:50 pm COMPARISON: 01/28/2023, 08/25/2021 HISTORY: ORDERING SYSTEM PROVIDED HISTORY: sob, cough TECHNOLOGIST PROVIDED HISTORY: Reason for exam:->sob, cough Reason for Exam: fall, cough, sob FINDINGS: The cardiomediastinal silhouette is unchanged in appearance. Patchy opacities in the mid to lower lung fields again demonstrated and without significant change. No new airspace disease, pneumothorax or significant effusion identified. Similar appearance of the osseous structures are unchanged in appearance. Basilar airspace disease, for which multifocal inflammatory process or infection should be considered. No results found.     PROCEDURES   Unless otherwise noted below, none     Procedures    CRITICAL CARE TIME (.cctime)   I performed a total Critical Care time of  31 minutes, excluding separately reportable procedures. There was a high probability of clinically significant/life threatening deterioration in the patient's condition which required my urgent intervention. Not limited to multiple reexaminations, discussions with attending physician and consultants. PAST MEDICAL HISTORY      has a past medical history of Arthritis, CAD (coronary artery disease), Chronic back pain, Chronic systolic CHF (congestive heart failure) ([address] COPD (chronic obstructive pulmonary disease) ([address] Dental disease, Diabetes mellitus ([facility] Essential hypertension, Hearing loss, Paroxysmal atrial fibrillation ([address] and Tinnitus. EMERGENCY DEPARTMENT COURSE and DIFFERENTIAL DIAGNOSIS/MDM:   Vitals:    Vitals:    02/1945 02/04/23 2030 02/04/23 2045 02/04/23 2057   BP:       Pulse:  (!) 106 99 (!) 103   Resp:  26 16 16   Temp:       TempSrc:       SpO2: 100% 94%  97%   Weight:           Patient was given the following medications:  Medications   cefepime (MAXIPIME) 2,000 mg in sodium chloride 0.9 % 50 mL IVPB (mini-bag) (2,000 mg IntraVENous New Bag 2/4/23 2110)   vancomycin (VANCOCIN) 1,500 mg in sodium chloride 0.9 % 250 mL IVPB (ADDAVIAL) (has no administration in time range)   ipratropium-albuterol (DUONEB) nebulizer solution 3 ampule (3 ampules Inhalation Given 2/4/23 2059)   methylPREDNISolone sodium (SOLU-MEDROL) injection 125 mg (125 mg IntraVENous Given 2/4/23 2014)             Is this patient to be included in the SEP-1 Core Measure due to severe sepsis or septic shock?    No   Exclusion criteria - the patient is NOT to be included for SEP-1 Core Measure due to:  May have criteria for sepsis, but does not meet criteria for severe sepsis or septic shock    CONSULTS: (Who and What was discussed)  IP CONSULT TO PHARMACY  IP CONSULT TO HOSPITALIST  Discussion with Other Profesionals : Admitting Team      Social Determinants : None    Records Reviewed : Inpatient Notes      CC/HPI Summary, DDx, ED Course, and Reassessment: Patient is hypoxic in the upper 80s on room air does not use home oxygen. He has wheezes rales and rhonchi on auscultation the lungs. Was hospitalized with pneumonia/influenza A in December that was covered with vancomycin and cefepime. Multifocal pneumonia requiring oxygen. He does not have an elevated white blood cell count or lactic acid. He has a history of congestive heart failure and thus was not given fluid sepsis bolus. He is stable on nasal cannula and agreeable to admission cover with broad-spectrum antibiotics. I am the Primary Clinician of Record. FINAL IMPRESSION      1. Multifocal pneumonia    2. Acute respiratory failure with hypoxia [facility])          DISPOSITION/PLAN     DISPOSITION Decision To Admit 02/04/2023 09:08:38 PM      PATIENT REFERRED TO:  No follow-up provider specified.     DISCHARGE MEDICATIONS:  New Prescriptions    No medications on file       DISCONTINUED MEDICATIONS:  Discontinued Medications    No medications on file              (Please note that portions of this note were completed with a voice recognition program.  Efforts were made to edit the dictations but occasionally words are mis-transcribed.)    Michael Castrejon (electronically signed)           GRAHAM Castrejon  02/04/23 5325

## 2023-02-05 NOTE — PROGRESS NOTES
Recent glucose of 510. Changed IV solumedrol to oral prednisone. Give additional lantus and humalog now. Increased lantus dose starting tomorrow morning. Check renal function panel and mag to ensure not in DKA. D/w nursing staff.     SIGNED: Waylon Clark MD, MPH. 2/5/2023

## 2023-02-05 NOTE — PROGRESS NOTES
Medication Reconciliation     List of medications patient is currently taking is complete. Source of information:   1. Conversation with patient/wife at bedside  2. EPIC records        Notes regarding home medications:  1. Patient received all of his AM home medications today PTA. 2. Patient is anticoagulated on Eliquis 5 mg BID for Afib. Received 1 of 2 doses today. 3. Medication changes:  Mirapex 0.125 mg BID -> 0.25 mg TID + 0.25 mg at bedtime  Novolin 38U -> 25U BID  Simvastatin 80 mg -> 40 mg QHS  Neurontin TID -> QID  Coreg 12.5 mg BID -> 3.125 mg BID  Lasix 20 mg -> 40 mg  4. Patient started Levaquin 500 mg x7 for cough today. Has six doses remaining. 5. Medications added:  Xtampza ER 36 mg Q12H    Denies any other OTC/herbal medications.      Peg Horn, Pharmacy Intern

## 2023-02-06 LAB
A/G RATIO: 0.8 (ref 1.1–2.2)
ALBUMIN SERPL-MCNC: 3.2 G/DL (ref 3.4–5)
ALP BLD-CCNC: 43 U/L (ref 40–129)
ALT SERPL-CCNC: 9 U/L (ref 10–40)
ANION GAP SERPL CALCULATED.3IONS-SCNC: 11 MMOL/L (ref 3–16)
AST SERPL-CCNC: 12 U/L (ref 15–37)
BASOPHILS ABSOLUTE: 0 K/UL (ref 0–0.2)
BASOPHILS RELATIVE PERCENT: 0.3 %
BILIRUB SERPL-MCNC: 0.3 MG/DL (ref 0–1)
BUN BLDV-MCNC: 33 MG/DL (ref 7–20)
CALCIUM SERPL-MCNC: 9 MG/DL (ref 8.3–10.6)
CHLORIDE BLD-SCNC: 99 MMOL/L (ref 99–110)
CO2: 25 MMOL/L (ref 21–32)
CREAT SERPL-MCNC: 1 MG/DL (ref 0.8–1.3)
EOSINOPHILS ABSOLUTE: 0 K/UL (ref 0–0.6)
EOSINOPHILS RELATIVE PERCENT: 0 %
GFR SERPL CREATININE-BSD FRML MDRD: >60 ML/MIN/{1.73_M2}
GLUCOSE BLD-MCNC: 278 MG/DL (ref 70–99)
GLUCOSE BLD-MCNC: 285 MG/DL (ref 70–99)
GLUCOSE BLD-MCNC: 289 MG/DL (ref 70–99)
GLUCOSE BLD-MCNC: 328 MG/DL (ref 70–99)
GLUCOSE BLD-MCNC: 329 MG/DL (ref 70–99)
GLUCOSE BLD-MCNC: 401 MG/DL (ref 70–99)
HCT VFR BLD CALC: 38.7 % (ref 40.5–52.5)
HEMOGLOBIN: 12.3 G/DL (ref 13.5–17.5)
LYMPHOCYTES ABSOLUTE: 1.9 K/UL (ref 1–5.1)
LYMPHOCYTES RELATIVE PERCENT: 14.1 %
MAGNESIUM: 2.1 MG/DL (ref 1.8–2.4)
MCH RBC QN AUTO: 28.9 PG (ref 26–34)
MCHC RBC AUTO-ENTMCNC: 31.6 G/DL (ref 31–36)
MCV RBC AUTO: 91.2 FL (ref 80–100)
MONOCYTES ABSOLUTE: 1.1 K/UL (ref 0–1.3)
MONOCYTES RELATIVE PERCENT: 8.1 %
MRSA SCREEN RT-PCR: NORMAL
NEUTROPHILS ABSOLUTE: 10.3 K/UL (ref 1.7–7.7)
NEUTROPHILS RELATIVE PERCENT: 77.5 %
PDW BLD-RTO: 14.1 % (ref 12.4–15.4)
PERFORMED ON: ABNORMAL
PHOSPHORUS: 3.1 MG/DL (ref 2.5–4.9)
PLATELET # BLD: 345 K/UL (ref 135–450)
PMV BLD AUTO: 6.8 FL (ref 5–10.5)
POTASSIUM SERPL-SCNC: 4.5 MMOL/L (ref 3.5–5.1)
RBC # BLD: 4.25 M/UL (ref 4.2–5.9)
SODIUM BLD-SCNC: 135 MMOL/L (ref 136–145)
STREP PNEUMONIAE ANTIGEN, URINE: NORMAL
TOTAL PROTEIN: 7.1 G/DL (ref 6.4–8.2)
VANCOMYCIN RANDOM: 11.8 UG/ML
WBC # BLD: 13.2 K/UL (ref 4–11)

## 2023-02-06 PROCEDURE — 99232 SBSQ HOSP IP/OBS MODERATE 35: CPT | Performed by: INTERNAL MEDICINE

## 2023-02-06 PROCEDURE — 94669 MECHANICAL CHEST WALL OSCILL: CPT

## 2023-02-06 PROCEDURE — 6370000000 HC RX 637 (ALT 250 FOR IP): Performed by: INTERNAL MEDICINE

## 2023-02-06 PROCEDURE — 94640 AIRWAY INHALATION TREATMENT: CPT

## 2023-02-06 PROCEDURE — 80053 COMPREHEN METABOLIC PANEL: CPT

## 2023-02-06 PROCEDURE — 36415 COLL VENOUS BLD VENIPUNCTURE: CPT

## 2023-02-06 PROCEDURE — 1200000000 HC SEMI PRIVATE

## 2023-02-06 PROCEDURE — 97530 THERAPEUTIC ACTIVITIES: CPT

## 2023-02-06 PROCEDURE — 94760 N-INVAS EAR/PLS OXIMETRY 1: CPT

## 2023-02-06 PROCEDURE — 92610 EVALUATE SWALLOWING FUNCTION: CPT

## 2023-02-06 PROCEDURE — 85025 COMPLETE CBC W/AUTO DIFF WBC: CPT

## 2023-02-06 PROCEDURE — 97166 OT EVAL MOD COMPLEX 45 MIN: CPT

## 2023-02-06 PROCEDURE — 2580000003 HC RX 258: Performed by: INTERNAL MEDICINE

## 2023-02-06 PROCEDURE — 2700000000 HC OXYGEN THERAPY PER DAY

## 2023-02-06 PROCEDURE — 6360000002 HC RX W HCPCS: Performed by: INTERNAL MEDICINE

## 2023-02-06 PROCEDURE — 84100 ASSAY OF PHOSPHORUS: CPT

## 2023-02-06 PROCEDURE — 80202 ASSAY OF VANCOMYCIN: CPT

## 2023-02-06 PROCEDURE — 83735 ASSAY OF MAGNESIUM: CPT

## 2023-02-06 RX ORDER — IPRATROPIUM BROMIDE AND ALBUTEROL SULFATE 2.5; .5 MG/3ML; MG/3ML
1 SOLUTION RESPIRATORY (INHALATION) 2 TIMES DAILY
Status: DISCONTINUED | OUTPATIENT
Start: 2023-02-07 | End: 2023-02-07 | Stop reason: HOSPADM

## 2023-02-06 RX ORDER — DOXYCYCLINE HYCLATE 100 MG
100 TABLET ORAL EVERY 12 HOURS SCHEDULED
Status: DISCONTINUED | OUTPATIENT
Start: 2023-02-06 | End: 2023-02-07 | Stop reason: HOSPADM

## 2023-02-06 RX ORDER — INSULIN LISPRO 100 [IU]/ML
8 INJECTION, SOLUTION INTRAVENOUS; SUBCUTANEOUS
Status: DISCONTINUED | OUTPATIENT
Start: 2023-02-06 | End: 2023-02-07

## 2023-02-06 RX ORDER — INSULIN LISPRO 100 [IU]/ML
5 INJECTION, SOLUTION INTRAVENOUS; SUBCUTANEOUS ONCE
Status: COMPLETED | OUTPATIENT
Start: 2023-02-06 | End: 2023-02-06

## 2023-02-06 RX ADMIN — OXYCODONE HYDROCHLORIDE 35 MG: 15 TABLET, FILM COATED, EXTENDED RELEASE ORAL at 21:02

## 2023-02-06 RX ADMIN — IPRATROPIUM BROMIDE AND ALBUTEROL SULFATE 1 AMPULE: 2.5; .5 SOLUTION RESPIRATORY (INHALATION) at 15:45

## 2023-02-06 RX ADMIN — INSULIN GLARGINE 25 UNITS: 100 INJECTION, SOLUTION SUBCUTANEOUS at 08:55

## 2023-02-06 RX ADMIN — BENZONATATE 200 MG: 200 CAPSULE ORAL at 21:09

## 2023-02-06 RX ADMIN — INSULIN LISPRO 4 UNITS: 100 INJECTION, SOLUTION INTRAVENOUS; SUBCUTANEOUS at 21:55

## 2023-02-06 RX ADMIN — GABAPENTIN 800 MG: 400 CAPSULE ORAL at 12:08

## 2023-02-06 RX ADMIN — PRAMIPEXOLE DIHYDROCHLORIDE 0.5 MG: 0.5 TABLET ORAL at 21:01

## 2023-02-06 RX ADMIN — CEFEPIME 2000 MG: 2 INJECTION, POWDER, FOR SOLUTION INTRAVENOUS at 09:04

## 2023-02-06 RX ADMIN — LISINOPRIL 10 MG: 10 TABLET ORAL at 08:50

## 2023-02-06 RX ADMIN — THERA TABS 1 TABLET: TAB at 08:50

## 2023-02-06 RX ADMIN — SODIUM CHLORIDE, PRESERVATIVE FREE 10 ML: 5 INJECTION INTRAVENOUS at 21:03

## 2023-02-06 RX ADMIN — INSULIN LISPRO 5 UNITS: 100 INJECTION, SOLUTION INTRAVENOUS; SUBCUTANEOUS at 14:16

## 2023-02-06 RX ADMIN — ROFLUMILAST 500 MCG: 500 TABLET ORAL at 08:53

## 2023-02-06 RX ADMIN — ATORVASTATIN CALCIUM 20 MG: 20 TABLET, FILM COATED ORAL at 21:01

## 2023-02-06 RX ADMIN — GABAPENTIN 800 MG: 400 CAPSULE ORAL at 17:24

## 2023-02-06 RX ADMIN — INSULIN LISPRO 8 UNITS: 100 INJECTION, SOLUTION INTRAVENOUS; SUBCUTANEOUS at 17:24

## 2023-02-06 RX ADMIN — ASPIRIN 81 MG: 81 TABLET, COATED ORAL at 08:50

## 2023-02-06 RX ADMIN — PREDNISONE 40 MG: 20 TABLET ORAL at 08:50

## 2023-02-06 RX ADMIN — MOMETASONE FUROATE AND FORMOTEROL FUMARATE DIHYDRATE 2 PUFF: 200; 5 AEROSOL RESPIRATORY (INHALATION) at 20:09

## 2023-02-06 RX ADMIN — GABAPENTIN 800 MG: 400 CAPSULE ORAL at 05:47

## 2023-02-06 RX ADMIN — CARVEDILOL 3.12 MG: 3.12 TABLET, FILM COATED ORAL at 17:24

## 2023-02-06 RX ADMIN — INSULIN LISPRO 5 UNITS: 100 INJECTION, SOLUTION INTRAVENOUS; SUBCUTANEOUS at 12:09

## 2023-02-06 RX ADMIN — FLUTICASONE PROPIONATE 2 SPRAY: 50 SPRAY, METERED NASAL at 21:03

## 2023-02-06 RX ADMIN — CARVEDILOL 3.12 MG: 3.12 TABLET, FILM COATED ORAL at 08:51

## 2023-02-06 RX ADMIN — INSULIN LISPRO 6 UNITS: 100 INJECTION, SOLUTION INTRAVENOUS; SUBCUTANEOUS at 12:09

## 2023-02-06 RX ADMIN — IPRATROPIUM BROMIDE AND ALBUTEROL SULFATE 1 AMPULE: 2.5; .5 SOLUTION RESPIRATORY (INHALATION) at 20:09

## 2023-02-06 RX ADMIN — PRAMIPEXOLE DIHYDROCHLORIDE 0.25 MG: 0.5 TABLET ORAL at 08:48

## 2023-02-06 RX ADMIN — DOXYCYCLINE HYCLATE 100 MG: 100 TABLET, COATED ORAL at 21:01

## 2023-02-06 RX ADMIN — POLYETHYLENE GLYCOL 3350 17 G: 17 POWDER, FOR SOLUTION ORAL at 09:10

## 2023-02-06 RX ADMIN — APIXABAN 5 MG: 5 TABLET, FILM COATED ORAL at 21:01

## 2023-02-06 RX ADMIN — FLUTICASONE PROPIONATE 2 SPRAY: 50 SPRAY, METERED NASAL at 08:58

## 2023-02-06 RX ADMIN — IPRATROPIUM BROMIDE AND ALBUTEROL SULFATE 1 AMPULE: 2.5; .5 SOLUTION RESPIRATORY (INHALATION) at 12:17

## 2023-02-06 RX ADMIN — CEFEPIME 2000 MG: 2 INJECTION, POWDER, FOR SOLUTION INTRAVENOUS at 20:59

## 2023-02-06 RX ADMIN — INSULIN LISPRO 5 UNITS: 100 INJECTION, SOLUTION INTRAVENOUS; SUBCUTANEOUS at 08:55

## 2023-02-06 RX ADMIN — IPRATROPIUM BROMIDE AND ALBUTEROL SULFATE 1 AMPULE: 2.5; .5 SOLUTION RESPIRATORY (INHALATION) at 09:15

## 2023-02-06 RX ADMIN — APIXABAN 5 MG: 5 TABLET, FILM COATED ORAL at 08:50

## 2023-02-06 RX ADMIN — OXYCODONE HYDROCHLORIDE 35 MG: 15 TABLET, FILM COATED, EXTENDED RELEASE ORAL at 09:09

## 2023-02-06 RX ADMIN — INSULIN LISPRO 4 UNITS: 100 INJECTION, SOLUTION INTRAVENOUS; SUBCUTANEOUS at 08:55

## 2023-02-06 RX ADMIN — PRAMIPEXOLE DIHYDROCHLORIDE 0.25 MG: 0.5 TABLET ORAL at 14:09

## 2023-02-06 RX ADMIN — DOXYCYCLINE HYCLATE 100 MG: 100 TABLET, COATED ORAL at 10:37

## 2023-02-06 RX ADMIN — MOMETASONE FUROATE AND FORMOTEROL FUMARATE DIHYDRATE 2 PUFF: 200; 5 AEROSOL RESPIRATORY (INHALATION) at 09:15

## 2023-02-06 ASSESSMENT — PAIN DESCRIPTION - LOCATION
LOCATION: BACK
LOCATION: LEG;FOOT;BACK

## 2023-02-06 ASSESSMENT — PAIN SCALES - GENERAL
PAINLEVEL_OUTOF10: 0
PAINLEVEL_OUTOF10: 7
PAINLEVEL_OUTOF10: 0
PAINLEVEL_OUTOF10: 7

## 2023-02-06 ASSESSMENT — PAIN DESCRIPTION - ORIENTATION
ORIENTATION: UPPER
ORIENTATION: RIGHT

## 2023-02-06 ASSESSMENT — PAIN DESCRIPTION - DESCRIPTORS
DESCRIPTORS: ACHING;TINGLING;NUMBNESS
DESCRIPTORS: ACHING

## 2023-02-06 NOTE — PROGRESS NOTES
Hospital Medicine Progress Note     Date:  2/6/2023    PCP: Erna Hill (Tel: [phone])    Date of Admission: 2/4/2023    Chief complaint:   Chief Complaint   Patient presents with    Kerry Romance down while walking to the bathroom, patient uses a walker       Brief admission history: 26-year-old male with history of COPD, chronic combined systolic and diastolic heart failure (echocardiogram from August 2021 with ejection fraction of 45 to 67%, grade 2 diastolic dysfunction), CAD status post CABG, uncontrolled diabetes type 2 with hyperglycemia, essential hypertension, paroxysmal atrial fibrillation (on chronic anticoagulation with eliquis), stated with BMI of 33 kg/m², who was recently admitted to [facility] for management of bacterial pneumonia in December 2022. Since then, he continues to have ongoing shortness of breath and productive cough, and was recently reevaluated by his pulmonologist on February 4, 2023, when he was started on Levaquin. He then presented to the emergency room with report of increasing shortness of breath, generalized weakness and a fall that was described as sliding down to the floor, complicated by abrasion to right elbow. He was found to have low oxygen saturation of 87% on room air in the emergency room. He was tachycardic with heart rate up to 113 beats per minute, tachypneic with respiratory rate up to 26 bpm.  He reportedly had wheezes on examination. Chest imaging also revealed multifocal infiltrates. X-ray-right elbow was unremarkable for acute pathology or fracture. Assessment/plan:  COPD exacerbation. Continue steroid, breathing treatments and antibiotics. Changed IV solumedrol to oral prednisone 2/5/2023 due to significant hyperglycemia. Bacterial pneumonia, likely due to aspiration. Continue antibiotics for now. Follow cultures. MRSA nares negative; discontinued vancomycin 2/6. Continue doxycycline, zosyn. Hypoxia.   Oxygen saturation was down to 87% on room air. Continue management of #1. Monitor pulse oximeter closely. Wean supplemental oxygen as tolerated. Uncontrolled diabetes type 2 with hyperglycemia. A1c is 7.5. Current hyperglycemia is likely secondary to steroid. Adjusted insulin dose to accommodate hyperglycemia control. Will need lower insulin doses once off steroid. Generalized weakness. Likely secondary to acute medical conditions. Maintain on fall precautions. Therapy evaluation. Other comorbidities: history of COPD, chronic combined systolic and diastolic heart failure (echocardiogram from August 2021 with ejection fraction of 45 to 89%, grade 2 diastolic dysfunction), CAD status post CABG, uncontrolled diabetes type 2 with hyperglycemia, essential hypertension, paroxysmal atrial fibrillation (on chronic anticoagulation with eliquis). Diet: ADULT DIET; Regular; 5 carb choices (75 gm/meal)    Code status: DNR-CCA   ----------  Subjective  Breathing better. No SOB today. Objective  Physical exam:  Vitals: /63   Pulse 91   Temp 97.6 °F (36.4 °C) (Oral)   Resp 18   Ht 5' 10\" (1.778 m)   Wt 222 lb 14.2 oz (101.1 kg)   SpO2 95%   BMI 31.98 kg/m²   Gen/overall appearance: Not in acute distress. Alert. Oriented X3  Head: Normocephalic, atraumatic  Eyes: EOMI, good acuity  ENT: Oral mucosa moist  Neck: No JVD, thyromegaly  CVS: Nml S1S2, no MRG, RRR  Pulm: Clear bilaterally. No wheezes. Gastrointestinal: Soft, NT/ND, +BS  Musculoskeletal: No edema. Warm  Neuro: No focal deficit. Moves extremity spontaneously. Psychiatry: Appropriate affect. Not agitated. Skin: Warm, dry with normal turgor. No rash  Capillary refill: Brisk,< 3 seconds   Peripheral Pulses: +2 palpable, equal bilaterally      24HR INTAKE/OUTPUT:    Intake/Output Summary (Last 24 hours) at 2/6/2023 0931  Last data filed at 2/6/2023 0849  Gross per 24 hour   Intake 1040 ml   Output 1000 ml   Net 40 ml       I/O last 3 completed shifts:   In: 1150 [P.O.:1150]  Out: 46 [Urine:2350]  I/O this shift:  In: 240 [P.O.:240]  Out: -   Meds:    doxycycline hyclate  100 mg Oral 2 times per day    insulin lispro  8 Units SubCUTAneous TID     insulin lispro  5 Units SubCUTAneous Once    fluticasone  2 spray Each Nostril BID    multivitamin  1 tablet Oral Daily    pramipexole  0.25 mg Oral BID    pramipexole  0.5 mg Oral Nightly    insulin glargine  25 Units SubCUTAneous Daily    predniSONE  40 mg Oral Daily    Roflumilast  500 mcg Oral Daily    lisinopril  10 mg Oral Daily    mometasone-formoterol  2 puff Inhalation BID    aspirin  81 mg Oral Daily    apixaban  5 mg Oral BID    sodium chloride flush  10 mL IntraVENous 2 times per day    cefepime  2,000 mg IntraVENous Q12H    insulin lispro  0-8 Units SubCUTAneous TID WC    insulin lispro  0-4 Units SubCUTAneous Nightly    ipratropium-albuterol  1 ampule Inhalation Q4H WA    atorvastatin  20 mg Oral Nightly    carvedilol  3.125 mg Oral BID WC    gabapentin  800 mg Oral Q6H    [Held by provider] furosemide  40 mg Oral Daily    oxyCODONE  35 mg Oral 2 times per day     Infusions:    dextrose      sodium chloride       PRN Meds: benzonatate, fluticasone, albuterol sulfate HFA, glucose, dextrose bolus **OR** dextrose bolus, glucagon (rDNA), dextrose, sodium chloride flush, sodium chloride, ondansetron, polyethylene glycol, acetaminophen **OR** acetaminophen, ipratropium-albuterol    Labs/imaging:  CBC:   Recent Labs     02/04/23 2022 02/05/23 0647 02/06/23  0717   WBC 9.6 6.6 13.2*   HGB 13.2* 13.2* 12.3*    307 345       BMP:    Recent Labs     02/05/23 0647 02/05/23 1903 02/06/23  0717   * 129* 135*   K 5.0 4.7 4.5   CL 95* 91* 99   CO2 23 24 25   BUN 27* 28* 33*   CREATININE 1.2 1.1 1.0   GLUCOSE 344* 452* 285*       Hepatic:   Recent Labs     02/04/23 2022 02/06/23  0717   AST 13* 12*   ALT 11 9*   BILITOT 0.4 0.3   ALKPHOS 52 43         Kathy Boyle MD  -------------------------------  Thee hospitalist

## 2023-02-06 NOTE — PLAN OF CARE
Problem: Discharge Planning  Goal: Discharge to home or other facility with appropriate resources  Outcome: Progressing  Flowsheets  Taken 2/6/2023 1057 by Geovanny Chang RN  Discharge to home or other facility with appropriate resources: Identify barriers to discharge with patient and caregiver  Taken 2/6/2023 0900 by Tonja Ty RN  Discharge to home or other facility with appropriate resources: Identify barriers to discharge with patient and caregiver     Problem: Pain  Goal: Verbalizes/displays adequate comfort level or baseline comfort level  2/6/2023 1241 by Geovanny Chang RN  Outcome: Progressing  2/5/2023 2320 by Fara Perez RN  Outcome: Progressing     Problem: Safety - Adult  Goal: Free from fall injury  2/6/2023 1241 by Geovanny Chang RN  Outcome: Progressing  2/5/2023 2320 by Fara Perez RN  Outcome: Progressing     Problem: ABCDS Injury Assessment  Goal: Absence of physical injury  Outcome: Progressing     Problem: Chronic Conditions and Co-morbidities  Goal: Patient's chronic conditions and co-morbidity symptoms are monitored and maintained or improved  Outcome: Progressing  Flowsheets  Taken 2/6/2023 1057 by Henrietta Stevenson 34 - Patient's Chronic Conditions and Co-Morbidity Symptoms are Monitored and Maintained or Improved: Monitor and assess patient's chronic conditions and comorbid symptoms for stability, deterioration, or improvement  Taken 2/6/2023 0900 by Henrietta Peguero 34 - Patient's Chronic Conditions and Co-Morbidity Symptoms are Monitored and Maintained or Improved: Monitor and assess patient's chronic conditions and comorbid symptoms for stability, deterioration, or improvement

## 2023-02-06 NOTE — PROGRESS NOTES
Pulmonary Progress Note    Date of Admission: 2/4/2023   LOS: 2 days     Chief Complaint   Patient presents with    Yahir Cook down while walking to the bathroom, patient uses a walker       Assessment/Plan:     Acute hypoxemic respiratory failure  -Wean oxygen, goal saturation 90%  -Acapella/I-S, OOB, PT/OT    Recurrent pneumonia  -Agree with broad-spectrum antibiotics  -Pneumonia panel/culture pending  -Airway clearance  -SLP following      24 Hour Events/Subjective  No acute events overnight. Remains on supplemental oxygen    ROS:   No nausea  No Vomiting  No chest pain      Intake/Output Summary (Last 24 hours) at 2/6/2023 0904  Last data filed at 2/6/2023 0849  Gross per 24 hour   Intake 1040 ml   Output 1350 ml   Net -310 ml         PHYSICAL EXAM:   Blood pressure 121/63, pulse 79, temperature 97.6 °F (36.4 °C), temperature source Oral, resp. rate 18, height 5' 10\" (1.778 m), weight 222 lb 14.2 oz (101.1 kg), SpO2 95 %.'  Gen:  No acute distress. Eyes: PERRL. Anicteric sclera. No conjunctival injection. ENT: No discharge. Posterior oropharynx clear. External appearance of ears and nose normal.  Neck: Trachea midline. No mass   Resp:  No crackles. No wheezes. No rhonchi. No dullness on percussion. CV: Regular rate. Regular rhythm. No murmur or rub. No edema. GI: Soft, Non-tender. Non-distended. +BS  Skin: Warm, dry, w/o erythema. Lymph: No cervical or supraclavicular LAD. M/S: No cyanosis. No clubbing. Neuro:  no focal neurologic deficit. Moves all extremities  Psych: Awake and alert, Oriented x 3. Judgement and insight appropriate. Mood stable.       Medications:    Scheduled Meds:   vancomycin (VANCOCIN) intermittent dosing (placeholder)   Other RX Placeholder    vancomycin  1,250 mg IntraVENous Q18H    fluticasone  2 spray Each Nostril BID    multivitamin  1 tablet Oral Daily    insulin lispro  5 Units SubCUTAneous TID WC    pramipexole  0.25 mg Oral BID    pramipexole  0.5 mg Oral Nightly    insulin glargine  25 Units SubCUTAneous Daily    predniSONE  40 mg Oral Daily    Roflumilast  500 mcg Oral Daily    lisinopril  10 mg Oral Daily    mometasone-formoterol  2 puff Inhalation BID    aspirin  81 mg Oral Daily    apixaban  5 mg Oral BID    sodium chloride flush  10 mL IntraVENous 2 times per day    cefepime  2,000 mg IntraVENous Q12H    insulin lispro  0-8 Units SubCUTAneous TID     insulin lispro  0-4 Units SubCUTAneous Nightly    ipratropium-albuterol  1 ampule Inhalation Q4H WA    atorvastatin  20 mg Oral Nightly    carvedilol  3.125 mg Oral BID     gabapentin  800 mg Oral Q6H    [Held by provider] furosemide  40 mg Oral Daily    oxyCODONE  35 mg Oral 2 times per day       Continuous Infusions:   dextrose      sodium chloride         PRN Meds:  benzonatate, fluticasone, albuterol sulfate HFA, glucose, dextrose bolus **OR** dextrose bolus, glucagon (rDNA), dextrose, sodium chloride flush, sodium chloride, ondansetron, polyethylene glycol, acetaminophen **OR** acetaminophen, ipratropium-albuterol    Labs reviewed:  CBC:   Recent Labs     02/04/23 2022 02/05/23  0647 02/06/23  0717   WBC 9.6 6.6 13.2*   HGB 13.2* 13.2* 12.3*   HCT 40.8 40.1* 38.7*   MCV 91.2 90.3 91.2    307 345     BMP:   Recent Labs     02/05/23  0647 02/05/23  1903 02/06/23  0717   * 129* 135*   K 5.0 4.7 4.5   CL 95* 91* 99   CO2 23 24 25   PHOS  --  3.0 3.1   BUN 27* 28* 33*   CREATININE 1.2 1.1 1.0     LIVER PROFILE:   Recent Labs     02/04/23 2022 02/06/23  0717   AST 13* 12*   ALT 11 9*   BILITOT 0.4 0.3   ALKPHOS 52 43     PT/INR: No results for input(s): PROTIME, INR in the last 72 hours. Films:  Radiology Review:  Pertinent images / reports were reviewed as a part of this visit. This note was transcribed using [id number] [facility]. Please disregard any translational errors.     Thank you for this consult,    Abraham Severs, MD  [facility] Pulmonary, Critical Care, and Sleep Medicine

## 2023-02-06 NOTE — PROGRESS NOTES
Facility/Department: [address] MED SURG  Initial Assessment  DYSPHAGIA BEDSIDE SWALLOW EVALUATION     Patient: Lizzie Parnell   : 1945   MRN: [id number]      Evaluation Date: 2023   Admitting Diagnosis:   Pneumonia due to infectious agent [J18.9]  Acute respiratory failure with hypoxia ([facility] [address] [J96.01]  Multifocal pneumonia [J18.9]    Pain: Denies                                                       CHART REVIEW:  2023 admitted with c/o SOB, productive cough, weakness, fatigue  MD ADMISSION H&P HPI:  Pt is an 68y.o. year-old male with a history of hypertension, hyperlipidemia, diabetes mellitus type II, coronary artery disease s/p CABG, chronic combined systolic and diastolic congestive heart failure, A fib and non-morbid obesity. He was admitted to [place] [address] for pneumonia in December. He states that he has had continuing shortness of breath and a productive cough since his discharge from that admission. His pulmonologist is Dr. Shelby Steen. He saw him yesterday and started him on Levaquin. The patient states that he has taken 1 dose of this so far, earlier today. He presents to the emergency room for evaluation of increasing shortness of breath, weakness and fatigue. He states that he was walking to his bathroom a short time ago when he became so weak he could not continue to walk. He slid down to the floor and suffered an abrasion to his right elbow. He reports continuing moderately severe shortness of breath which is worse with minimal exertion and improved with rest.  At baseline, he does not require oxygen. In the emergency room he was found to have a resting oxygen saturation of 87% on room air. On further evaluation he was found to have multifocal pneumonia and associated sepsis along with a COPD exacerbation. . Pt denies head trauma, and associated signs and symptoms do not include dizziness or lightheadedness, chest pain, shortness of breath or neurological symptoms prior to the fall. Patient denies neck pain or stiffness, diplopia or other vision changes, difficulty swallowing, numbness in the arms or legs, or focal neurological deficits. He is being admitted for further evaluation and treatment. 2/5/2023 Pulmonology: Recommend SLP joyce.  Had a MBS in 2019 consistent with dysphagia. Recurrent aspiration could explain recurrent pneumonias    IMAGING:  CXR: 2/4/2023  Impression   Basilar airspace disease, for which multifocal inflammatory process or   infection should be considered. Current Diet Level (prior to evaluation): Regular texture diet  Thin liquids    Respiratory Status:   []Room Air   [x]O2 via nasal cannula   []Other:    Modified Barium Swallow Study: completed 8/3/2018 and 1/10/2019 - both with rec for thin liquids with protective cough    Reason for referral: SLP evaluation orders received due to prior hx of dysphagia     History/Prior Level of Function:   Living Status: admitted from home with wife  Baseline diet: regular/thin  Prior Dysphagia History: patient with limited recall for prior dysphagia intervention and recs; concern for reduced recall for comp start carryover now    Dysphagia Impressions/Diagnosis: Oropharyngeal Dysphagia   Accepted and tolerated evaluation at bedside. Patient alert and cooperative; oriented to self, place, situation; disoriented to year, month, date; verbally responsive but appears to be a poor historian d/t limited/absent recall for dysphagia history and prior recs; follows dx. Mild oral stage dysphagia characterized by prolonged but adequate mastication and lingula manipulation for bolus formation and movement with solids with concern for potential for premature bolus loss to the pharynx with all PO trials. Concern for pharyngeal stage dysphagia characterized by potential delayed swallow and decreased laryngeal elevation with known history of silent aspiration.  Cough with thin liquids via straw but not by cup; concern for high risk for silent aspiration, though. Additional concern for decreased ability to carryover comp strats which is a decline from prior. Recommend MBS and downgrade to nectar thick liquids pending MBS. MEDICAL OR COGNITIVE/BEHAVIORAL FACTORS WHICH CAN EXACERBATE: h/o silent aspiration; reduced recall for comp start carryover      Recommended Diet and Intervention 2/6/2023:  Diet Solids Recommendation:  Regular texture diet  Liquid Consistency Recommendation:  Mildly (nectar) thick liquids  Recommended form of Meds: Meds in puree      Recommend completion of Modified Barium Swallow study to further assess pharyngeal phase of swallow     Compensatory Swallowing Strategies:  Upright as possible with all PO intake , Small bites/sips , Eat/feed slowly, Remain upright 30-45 min     SHORT TERM DYSPHAGIA GOALS/PLAN OF CARE: Speech therapy for dysphagia tx 3-5 times per week during acute care stay.     Goals  Pt will functionally tolerate recommended diet with no overt clinical s/s of aspiration   Pt will demonstrate understanding of aspiration risk and precautions via education/demonstration with occasional prompting  Pt will participate in Modified Barium swallow study to further assess pharyngeal phase of swallow, assist with diet recommendations and to further direct plan of care        Dysphagia Therapeutic Intervention:  Bolus Control Exercise, Oral Care, Laryngeal Exercises , Pharyngeal Exercise, Diet Tolerance Monitoring , Oral Motor Exercises , Patient/Family Education , Therapeutic Trials with SLP , Instrumental assessment of swallow function (Modified Barium Swallow Study)     Dysphagia Prognosis: [] good []fair  [x]guarded []poor  Barriers to progress: Cognitive deficit, Limited insight into deficits, and Long standing deficits    Discharge Recommendations: Recommend ongoing SLP for dysphagia therapy upon discharge from hospital       TEST DATA  Vision: []WFL [x]Impaired: wears glasses  Hearing: []WFL [x]Impaired: [place]    Cognitive/behavioral/communication:   Oriented to [x] self [x] place [x] date [] situation  [x]alert []lethargic  [x]cooperative []self-limiting   []confused   []distractible []agitated []impulsive  [x]verbally responsive []nonverbal []limited verbal responses  [x]follows one step commands []does not follow dx []follows complex commands  []aphasic []dysarthric  [x] other: concern for reduced recall    Dentition: [x]Adequate []Dentures []Missing Many Teeth []Edentulous []Other:  Vocal Quality: [x]Normal []Dysphonic  []Aphonic  []Hoarse []Wet []Weak []Other:  Volitional Cough: [x]Strong []Weak []Wet []Absent []Congested []Other:  Volitional Swallow:   []Absent  [x]Delayed []Adequate []Required use of drink     Patient Positioning: Upright in chair    Consistencies Presented: Thin liquid;   Mildly / nectar thick liquid ;    Moderately / honey thick liquid  Puree food  Soft/bite size food  Regular solid food    Oral Mechanism Exam:  [x]WFL []Mild   [] Moderate  []Severe  []To be assessed  Impaired:   []Left side      []Right side    []Labial ROM/Coordination    []Labial Symmetry   []Lingual ROM/Coordination   []Lingual Symmetry  []Gag  []Other:     Oral Phase: []WFL [x]Mild   [] Moderate  []Severe  []To be assessed   [x]Impaired/Prolonged Mastication: textured solids   []Spillage Left:   []Spillage Right:  []Pocketing Left:   []Pocketing Right:   [x]Decreased Anterior to Posterior Transit: textured solids   [x]Suspected Premature Bolus Loss: all   []Lingual/Palatal Residue:   []Other:     Pharyngeal Phase: []WFL []Mild   [] Moderate  []Severe  [x]To be assessed   [x]Delayed Swallow: all   []Suspected Pharyngeal Pooling:   [x]Decreased Laryngeal Elevation: all   []Absent Swallow:  []Wet Vocal Quality:   []Throat Clearing-Immediate:   []Throat Clearing-Delayed:   [x]Cough-Immediate: thin-straw   []Cough-Delayed:  []Change in Vital Signs:  []Suspected Delayed Pharyngeal Clearing:  [x]Other: h/o silent aspiration      Eating Assistance:  [x]Independent  []Setup or clean-up assistance   [] Supervision or touching assistance   [] Partial or moderate assistance   [] Substantial or maximal assistance  [] Dependent       EDUCATION:   Provided education regarding role of SLP, results of assessment, recommendations and general speech pathology plan of care. [] Pt verbalized understanding and agreement   [x] Pt requires ongoing learning   [] No evidence of comprehension     If patient discharges prior to next visit, this note will serve as discharge. Timed Code Minutes: 0  Total Treatment Minutes: 30    Electronically signed by:    Conner Mayfield  [facility], [address] [place] [address], [id number]  Speech-Language Pathologist  Portable phone: [phone]  02/06/23 11:22 AM

## 2023-02-06 NOTE — ACP (ADVANCE CARE PLANNING)
Advance Care Planning     Advance Care Planning Activator (Inpatient)  Conversation Note      Date of ACP Conversation: 2/6/2023     Conversation Conducted with: Patient with Decision Making Capacity    ACP Activator: Xiao Conner RN    Health Care Decision Maker:     Current Designated Health Care Decision Maker:     Primary Decision Maker: Kate Goddard - [phone]  Click here to complete Healthcare Decision Makers including section of the Healthcare Decision Maker Relationship (ie \"Primary\")  Today we documented Decision Maker(s) consistent with Legal Next of Kin hierarchy. Care Preferences    Pt wants to be a DNR, verified she is a DNR.     Length of ACP Conversation in minutes:  4 min    Xiao Conner RN, BSN,   [phone]  Electronically signed by Xiao Conner RN on 2/6/2023 at 11:22 AM

## 2023-02-06 NOTE — PROGRESS NOTES
Occupational Therapy  Facility/Department: [facility]  Occupational Therapy Initial Assessment    Name: Stella Gómez  :   MRN: [id number]  Date of Service: 2023    Discharge Recommendations:  Home with assist PRN     Stella Gómez scored a 21/24 on the AM-PAC ADL Inpatient form. At this time, no further OT is recommended upon discharge due to pt near baseline. Recommend patient returns to prior setting with prior services. Patient Diagnosis(es): The primary encounter diagnosis was Multifocal pneumonia. A diagnosis of Acute respiratory failure with hypoxia (HCC) was also pertinent to this visit. Past Medical History:  has a past medical history of Abrasion of right elbow, Arthritis, CAD (coronary artery disease), Chronic back pain, Chronic systolic CHF (congestive heart failure) ([address] COPD (chronic obstructive pulmonary disease) ([address] Dental disease, Diabetes mellitus ([address] Essential hypertension, Hearing loss, Paroxysmal atrial fibrillation ([address] and Tinnitus. Past Surgical History:  has a past surgical history that includes fracture surgery (Left, ); back surgery (); Cardiac surgery (); Coronary artery bypass graft; back surgery; and Cervical spine surgery. Treatment Diagnosis: Decreased: ADLs, functional transfers/mobility    Assessment   Performance deficits / Impairments: Decreased functional mobility ; Decreased ADL status; Decreased high-level IADLs;Decreased endurance  Assessment: Pt is a 69 yo M admitted with worsening SOB, cough, weakness/fatigue; found to have pnuemonia. PTA, pt lives at home w/ his wife where he is typically independent in self-care and completes functional mobility mod I using RW. He is slightly below baseline at this time, but able to complete functional transfers SBA and functional mobility SBA/CGA using RW. He was weaned from 2L O2 to RA w/ O2 sats staying in high 90s. Anticipate overall SBA for full ADL.  Will continue to see on acute in order to address the above deficits and maximize pt's functional performance. Anticipate pt will be safe to return home w/ assist PRN when medically stable. Treatment Diagnosis: Decreased: ADLs, functional transfers/mobility  Prognosis: Fair  Decision Making: Medium Complexity  REQUIRES OT FOLLOW-UP: Yes  Activity Tolerance  Activity Tolerance: Patient Tolerated treatment well  Activity Tolerance Comments: Pt started on 2L O2 via NC. On RA, his O2 sats were 96-97% at rest. After each bout of ambulation, his O2 sats remained at 96% on RA. RN aware, OK w/ pt staying on RA        Plan   Occupational Therapy Plan  Times Per Week: 3-5  Times Per Day: Once a day  Current Treatment Recommendations: Strengthening, Balance training, ROM, Functional mobility training, Endurance training, Self-Care / ADL, Safety education & training     Restrictions  Restrictions/Precautions  Restrictions/Precautions: Up as Tolerated  Position Activity Restriction  Other position/activity restrictions: 2L O2 to RA    Subjective   General  Chart Reviewed: Yes  Patient assessed for rehabilitation services?: Yes  Additional Pertinent Hx: per H&P: \"Pt is an 68y.o. year-old male with a history of hypertension, hyperlipidemia, diabetes mellitus type II, coronary artery disease s/p CABG, chronic combined systolic and diastolic congestive heart failure, A fib and non-morbid obesity. .. He presents to the emergency room for evaluation of increasing shortness of breath, weakness and fatigue. He states that he was walking to his bathroom a short time ago when he became so weak he could not continue to walk. \"  Family / Caregiver Present: No  Referring Practitioner: Gabriela Escoto  Diagnosis: Pnuemonia  Subjective  Subjective: Pt met b/s for OT eval/tx. Pt in recliner, agreeable to therapy and wanting to go for a walk.  Denied pain     Social/Functional History  Social/Functional History  Lives With: Spouse  Type of Home: House  Home Layout: One level, Able to Live on Main level with bedroom/bathroom  Home Access: Stairs to enter with rails  Entrance Stairs - Number of Steps: 2  Entrance Stairs - Rails: Both  Bathroom Shower/Tub: Walk-in shower  Bathroom Toilet: Handicap height  Bathroom Equipment: Grab bars in shower, Built-in shower seat, Shower chair, Hand-held shower, Grab bars around toilet  Bathroom Accessibility: Wheelchair accessible  Home Equipment: Lift chair, Walker, rolling, [address], Walker, 4 wheeled, Electric scooter  Has the patient had two or more falls in the past year or any fall with injury in the past year?: Yes (1 fall PTA)  ADL Assistance: Independent  Homemaking Assistance: Needs assistance  Ambulation Assistance: Independent (combination of RW, cane in home. scooter outside home)  Transfer Assistance: Independent  Active : Yes  Occupation: Retired  Type of Occupation: ,        Objective   Safety Devices  Type of Devices: All fall risk precautions in place;Call light within reach;Gait belt;Patient at risk for falls; Left in chair;Chair alarm in place;Nurse notified        AROM: Within functional limits  PROM: Within functional limits  Strength: Within functional limits  Coordination: Within functional limits  ADL  Additional Comments: Declined ADLs.  Anticipate pt would be independent w/ feeding and grooming, SBA for bathing/dressing, SBA toileting based on ROM, strength, endurance this session        Bed mobility  Supine to Sit: Unable to assess  Sit to Supine: Unable to assess  Bed Mobility Comments: in recliner at start and end of session  Transfers  Sit to stand: Stand by assistance  Stand to sit: Stand by assistance  Transfer Comments: RW. Pt completed functional mobility ~40 ft, then ~100 ft SBA/CGA using RW, he does have some difficulty advancing R LE - reports chronic  Vision  Vision: Impaired  Vision Exceptions: Wears glasses at all times  Hearing  Hearing: Within functional limits  Cognition  Overall Cognitive Status: WFL  Orientation  Overall Orientation Status: Within Functional Limits                  Education Given To: Patient  Education Provided: Role of Therapy;Transfer Training;Energy Conservation  Education Method: Verbal;Demonstration  Barriers to Learning: None  Education Outcome: Verbalized understanding        AM-PAC Score  AM-PAC Inpatient Daily Activity Raw Score: 21 (02/06/23 1108)  AM-PAC Inpatient ADL T-Scale Score : 44.27 (02/06/23 1108)  ADL Inpatient CMS 0-100% Score: 32.79 (02/06/23 1108)  ADL Inpatient CMS G-Code Modifier : Marleni Higgins (02/06/23 1108)      Goals  Short Term Goals  Time Frame for Short Term Goals: Prior to d/c  Short Term Goal 1: Pt will complete ADL transfers mod I  Short Term Goal 2: Pt will toilet mod I  Short Term Goal 3: Pt will bathe w/ supervision  Short Term Goal 4: Pt will complete LB dressing w/ supervision  Short Term Goal 5: Pt will tolerate standing x5 mins during ADL task w/ supervision  Long Term Goals  Time Frame for Long Term Goals : LTG=STG  Patient Goals   Patient goals : to go home       Therapy Time   Individual Concurrent Group Co-treatment   Time In 1035         Time Out 1105         Minutes 30         Timed Code Treatment Minutes: JAXON Singh/L [id number]

## 2023-02-06 NOTE — PROGRESS NOTES
Physical Therapy  Facility/Department: [address] [facility] SUR[facility]  Physical Therapy Treatment Note    Name: Darling Marcum  :   MRN: [id number]  Date of Service: 2023    Discharge Recommendations:  24 hour supervision or assist, Home with Home health PT, Continue to assess pending progress        PM session:  Pt sitting in recliner upon entering room. Sit<>stand with SBA. Ambulated 80' x 2 trials with seated rest break. Ambulated 25' x 1 trial. Voided in toilet. Washed hands at sink. Pt in recliner at end of session with all needs and call light in reach. Total Treatment Time: 25 minutes        Patient Diagnosis(es): The primary encounter diagnosis was Multifocal pneumonia. A diagnosis of Acute respiratory failure with hypoxia (HCC) was also pertinent to this visit. Past Medical History:  has a past medical history of Abrasion of right elbow, Arthritis, CAD (coronary artery disease), Chronic back pain, Chronic systolic CHF (congestive heart failure) ([address] COPD (chronic obstructive pulmonary disease) ([address] Dental disease, Diabetes mellitus ([address] Essential hypertension, Hearing loss, Paroxysmal atrial fibrillation ([address] and Tinnitus. Past Surgical History:  has a past surgical history that includes fracture surgery (Left, ); back surgery (); Cardiac surgery (); Coronary artery bypass graft; back surgery; and Cervical spine surgery. Assessment   Body Structures, Functions, Activity Limitations Requiring Skilled Therapeutic Intervention: Decreased functional mobility ; Decreased endurance  Assessment: Pt is a 68 y.o. male who presented to the ED on 23 with increasing SOB, weakness, and fatigue. He also reports he fell at home due to progressive SOB and weakness. He was admitted to [place] [address] for pneumonia in December - reports he has had continuing shortness of breath and a productive cough since his discharge from that admission.  Prior to admission, pt living with spouse in home setting, independent with ADLs and ambulation with RW. Pt currently functioning below baseline. Anticipate return home with initial 24hr supv and level 1 HHPT. Treatment Diagnosis: impaired activity tolerance  Therapy Prognosis: Good  Decision Making: Medium Complexity  History: see above  Exam: see below  Clinical Presentation: evolving  Activity Tolerance  Activity Tolerance: Patient tolerated treatment well     Plan   Physcial Therapy Plan  General Plan: 3-5 times per week  Current Treatment Recommendations: Strengthening, Balance training, Functional mobility training, Transfer training, Gait training, Endurance training, Neuromuscular re-education, Safety education & training, Equipment evaluation, education, & procurement, Patient/Caregiver education & training, Therapeutic activities  Safety Devices  Type of Devices: All fall risk precautions in place, Call light within reach, Gait belt, Patient at risk for falls, Left in chair, Chair alarm in place, Nurse notified     Restrictions  Restrictions/Precautions  Restrictions/Precautions: Up as Tolerated  Position Activity Restriction  Other position/activity restrictions: 2L O2 to RA     Subjective   General  Chart Reviewed: Yes  Patient assessed for rehabilitation services?: Yes  Additional Pertinent Hx: Pt is a 68 y.o. male who presented to the ED on 2/4/23 with increasing SOB, weakness, and fatigue. He also reports he fell at home due to progressive SOB and weakness. He was admitted to [place] [address] for pneumonia in December - reports he has had continuing shortness of breath and a productive cough since his discharge from that admission.   Response To Previous Treatment: Not applicable  Family / Caregiver Present: No  Referring Practitioner: Angel Albarado MD  Referral Date : 02/04/23  Diagnosis: pneumonia  Follows Commands: Within Functional Limits  Subjective  Subjective: Pt is pleasantly agreeable to PT         Social/Functional History  Social/Functional History  Lives With: Spouse  Type of Home: House  Home Layout: One level, Able to Live on Main level with bedroom/bathroom  Home Access: Stairs to enter with rails  Entrance Stairs - Number of Steps: 2  Entrance Stairs - Rails: Both  Bathroom Shower/Tub: Walk-in shower  Bathroom Toilet: Handicap height  Bathroom Equipment: Grab bars in shower, Built-in shower seat, Shower chair, Hand-held shower, Grab bars around toilet  Bathroom Accessibility: Wheelchair accessible  Home Equipment: Lift chair, Walker, rolling, Cane, Walker, 4 wheeled, Electric scooter  Has the patient had two or more falls in the past year or any fall with injury in the past year?: Yes (1 fall PTA)  ADL Assistance: Independent  Homemaking Assistance: Needs assistance  Ambulation Assistance: Independent (combination of RW, cane in home. scooter outside home)  Transfer Assistance: Independent  Active : Yes  Occupation: Retired  Type of Occupation: ,     Vision/Hearing  Vision  Vision: Impaired  Vision Exceptions: Wears glasses at all times  Hearing  Hearing: Within functional limits      Objective   Gross Assessment  Strength: Generally decreased, functional (RLE weaker than LLE)  Sensation: Impaired (neuropathy BLE)       Bed mobility  Supine to Sit: Unable to assess  Sit to Supine: Unable to assess  Bed Mobility Comments: Pt standing in bathroom at start of session; in recliner at end of session  Transfers  Sit to Stand: Stand by assistance  Stand to Sit: Stand by assistance  Ambulation  Surface: Level tile  Device: Rolling Walker  Assistance: Contact guard assistance  Quality of Gait: mild R knee buckling  Gait Deviations: Slow Emely;Decreased step length;Decreased step height  Distance: 20' + 80' x 2 trials     Balance  Posture: Good  Sitting - Static: Good  Sitting - Dynamic: Good  Standing - Static: Fair;+ (@RW)  Standing - Dynamic: Fair;+ (@RW)       AM-PAC Score  AM-PAC Inpatient Mobility Raw Score : 20 (02/06/23  1309)  AM-PAC Inpatient T-Scale Score : 47.67 (02/06/23 1309)  Mobility Inpatient CMS 0-100% Score: 35.83 (02/06/23 1309)  Mobility Inpatient CMS G-Code Modifier : CJ (02/06/23 1309)        Goals  Short Term Goals  Time Frame for Short Term Goals: by acute discharge - all goals ongoing as of 2/6/23  Short Term Goal 1: bed mobility mod I  Short Term Goal 2: sit<>Stand SBA  Short Term Goal 3: ambulate > 48' with RW and SBA  Patient Goals   Patient Goals : none stated       Education  Patient Education  Education Given To: Patient  Education Provided: Role of Therapy;Plan of Care  Education Method: Verbal  Barriers to Learning: None  Education Outcome: Verbalized understanding;Continued education needed      Therapy Time   Individual Concurrent Group Co-treatment   Time In 1120         Time Out 1145         Minutes 25         Timed Code Treatment Minutes: 25 Minutes       Zhao Jackson PT

## 2023-02-06 NOTE — CARE COORDINATION
[facility]  Patient is active with [facility] for nurse only. Will follow for discharge to home with orders to resume care.       Bayron Harvey RN, BSN CTN  [facility] [phone]

## 2023-02-06 NOTE — CARE COORDINATION
Case Management Assessment  Initial Evaluation    Date/Time of Evaluation: 2/6/2023 11:49 AM  Assessment Completed by: Ramos Toney RN    If patient is discharged prior to next notation, then this note serves as note for discharge by case management. Patient Name: Rocio Barrios                   YOB: 1945  Diagnosis: Pneumonia due to infectious agent [J18.9]  Acute respiratory failure with hypoxia ([address] [J96.01]  Multifocal pneumonia [J18.9]                   Date / Time: 2/4/2023  7:30 PM    Patient Admission Status: Inpatient   Readmission Risk (Low < 19, Mod (19-27), High > 27): Readmission Risk Score: 12.5    Current PCP: Wilbur Sanderson  PCP verified by CM? Yes    Chart Reviewed: Yes      History Provided by: Patient  Patient Orientation: Alert and Oriented, Person, Place, Situation, Self    Patient Cognition: Alert    Hospitalization in the last 30 days (Readmission):  No    If yes, Readmission Assessment in CM Navigator will be completed. Advance Directives:      Code Status: DNR-CCA   Patient's Primary Decision Maker is: Legal Next of Kin    Primary Decision MakerPonhattie Trujillo Spouse - [phone]    Discharge Planning:    Patient lives with: Spouse/Significant Other Type of Home: House  Primary Care Giver: Self  Patient Support Systems include: Spouse/Significant Other, Children   Current Financial resources: Medicare  Current community resources:    Current services prior to admission: Durable Medical Equipment, Home Care            Current DME: Mason Castillo, Other (Comment) (shower is handicap accessible with surround grab bars & built in seats)            Type of Home Care services:  OT, PT    ADLS  Prior functional level: Independent in ADLs/IADLs  Current functional level: Independent in ADLs/IADLs    PT AM-PAC: 20 /24  OT AM-PAC: 21 /24    Family can provide assistance at DC:  Yes  Would you like Case Management to discuss the discharge plan with any other family members/significant others, and if so, who? Yes (emergency contacts)  Plans to Return to Present Housing: Yes  Other Identified Issues/Barriers to RETURNING to current housing: na  Potential Assistance needed at discharge: N/A            Potential DME:    Patient expects to discharge to: [address] [place] [address] for transportation at discharge: Family    Financial    Payor: [facility] [facility] [address] / Plan: MEDICARE PART A AND B / Product Type: *No Product type* /     Does insurance require precert for SNF: na    Potential assistance Purchasing Medications: No  Meds-to-Beds request:        Ova Lone [address] [facility], [address] [phone] Josi Settler [phone]  [address] [facility] [address]  Phone: [phone] Fax: [phone] [address] Robby Merritt [phone] - F [phone]  [address] [facility].   [facility] [address]  Phone: [phone] Fax: [phone] [phone] Josi Settler [phone]  [address] [address]  Phone: [phone] Fax: [phone]      Notes:    Factors facilitating achievement of predicted outcomes: Family support, Motivated, Cooperative, and Pleasant    Barriers to discharge: na    Additional Case Management Notes: denies needs, plans to dc home with wife, wants to resume [address] [facility] for Transition of Care is related to the following treatment goals of Pneumonia due to infectious agent [J18.9]  Acute respiratory failure with hypoxia ([address] [J96.01]  Multifocal pneumonia [J18.9]    Henrietta Shepard RN, BSN,   [phone]  Electronically signed by Henrietta Shepard RN on 2/6/2023 at 11:51 AM

## 2023-02-07 ENCOUNTER — APPOINTMENT (OUTPATIENT)
Dept: GENERAL RADIOLOGY | Age: 78
DRG: 177 | End: 2023-02-07
Payer: MEDICARE

## 2023-02-07 VITALS
HEIGHT: 70 IN | WEIGHT: 222.88 LBS | SYSTOLIC BLOOD PRESSURE: 111 MMHG | OXYGEN SATURATION: 91 % | BODY MASS INDEX: 31.91 KG/M2 | HEART RATE: 89 BPM | DIASTOLIC BLOOD PRESSURE: 68 MMHG | RESPIRATION RATE: 16 BRPM | TEMPERATURE: 97.4 F

## 2023-02-07 LAB
A/G RATIO: 1 (ref 1.1–2.2)
ALBUMIN SERPL-MCNC: 3.6 G/DL (ref 3.4–5)
ALP BLD-CCNC: 48 U/L (ref 40–129)
ALT SERPL-CCNC: 11 U/L (ref 10–40)
ANION GAP SERPL CALCULATED.3IONS-SCNC: 14 MMOL/L (ref 3–16)
AST SERPL-CCNC: 12 U/L (ref 15–37)
BASOPHILS ABSOLUTE: 0.1 K/UL (ref 0–0.2)
BASOPHILS RELATIVE PERCENT: 0.6 %
BILIRUB SERPL-MCNC: 0.4 MG/DL (ref 0–1)
BUN BLDV-MCNC: 27 MG/DL (ref 7–20)
CALCIUM SERPL-MCNC: 9.2 MG/DL (ref 8.3–10.6)
CHLORIDE BLD-SCNC: 98 MMOL/L (ref 99–110)
CO2: 23 MMOL/L (ref 21–32)
CREAT SERPL-MCNC: 0.9 MG/DL (ref 0.8–1.3)
CULTURE, RESPIRATORY: NORMAL
EOSINOPHILS ABSOLUTE: 0 K/UL (ref 0–0.6)
EOSINOPHILS RELATIVE PERCENT: 0.4 %
GFR SERPL CREATININE-BSD FRML MDRD: >60 ML/MIN/{1.73_M2}
GLUCOSE BLD-MCNC: 217 MG/DL (ref 70–99)
GLUCOSE BLD-MCNC: 248 MG/DL (ref 70–99)
GLUCOSE BLD-MCNC: 251 MG/DL (ref 70–99)
GRAM STAIN RESULT: NORMAL
HCT VFR BLD CALC: 40.6 % (ref 40.5–52.5)
HEMOGLOBIN: 13 G/DL (ref 13.5–17.5)
LYMPHOCYTES ABSOLUTE: 2.2 K/UL (ref 1–5.1)
LYMPHOCYTES RELATIVE PERCENT: 23.6 %
MAGNESIUM: 1.9 MG/DL (ref 1.8–2.4)
MCH RBC QN AUTO: 29.1 PG (ref 26–34)
MCHC RBC AUTO-ENTMCNC: 32.1 G/DL (ref 31–36)
MCV RBC AUTO: 90.4 FL (ref 80–100)
MONOCYTES ABSOLUTE: 0.8 K/UL (ref 0–1.3)
MONOCYTES RELATIVE PERCENT: 8.2 %
NEUTROPHILS ABSOLUTE: 6.3 K/UL (ref 1.7–7.7)
NEUTROPHILS RELATIVE PERCENT: 67.2 %
PDW BLD-RTO: 14 % (ref 12.4–15.4)
PERFORMED ON: ABNORMAL
PERFORMED ON: ABNORMAL
PHOSPHORUS: 2.4 MG/DL (ref 2.5–4.9)
PLATELET # BLD: 362 K/UL (ref 135–450)
PMV BLD AUTO: 6.8 FL (ref 5–10.5)
POTASSIUM SERPL-SCNC: 4.6 MMOL/L (ref 3.5–5.1)
RBC # BLD: 4.49 M/UL (ref 4.2–5.9)
SODIUM BLD-SCNC: 135 MMOL/L (ref 136–145)
TOTAL PROTEIN: 7.1 G/DL (ref 6.4–8.2)
WBC # BLD: 9.3 K/UL (ref 4–11)

## 2023-02-07 PROCEDURE — 83735 ASSAY OF MAGNESIUM: CPT

## 2023-02-07 PROCEDURE — 6370000000 HC RX 637 (ALT 250 FOR IP): Performed by: INTERNAL MEDICINE

## 2023-02-07 PROCEDURE — 80053 COMPREHEN METABOLIC PANEL: CPT

## 2023-02-07 PROCEDURE — 2580000003 HC RX 258: Performed by: INTERNAL MEDICINE

## 2023-02-07 PROCEDURE — 84100 ASSAY OF PHOSPHORUS: CPT

## 2023-02-07 PROCEDURE — 74230 X-RAY XM SWLNG FUNCJ C+: CPT

## 2023-02-07 PROCEDURE — 99232 SBSQ HOSP IP/OBS MODERATE 35: CPT | Performed by: INTERNAL MEDICINE

## 2023-02-07 PROCEDURE — 97530 THERAPEUTIC ACTIVITIES: CPT

## 2023-02-07 PROCEDURE — 92611 MOTION FLUOROSCOPY/SWALLOW: CPT

## 2023-02-07 PROCEDURE — 97129 THER IVNTJ 1ST 15 MIN: CPT

## 2023-02-07 PROCEDURE — 85025 COMPLETE CBC W/AUTO DIFF WBC: CPT

## 2023-02-07 PROCEDURE — 36415 COLL VENOUS BLD VENIPUNCTURE: CPT

## 2023-02-07 PROCEDURE — 92526 ORAL FUNCTION THERAPY: CPT

## 2023-02-07 RX ORDER — PREDNISONE 20 MG/1
20 TABLET ORAL DAILY
Status: DISCONTINUED | OUTPATIENT
Start: 2023-02-07 | End: 2023-02-07 | Stop reason: HOSPADM

## 2023-02-07 RX ORDER — GUAIFENESIN 600 MG/1
600 TABLET, EXTENDED RELEASE ORAL 2 TIMES DAILY
Qty: 30 TABLET | Refills: 0 | Status: SHIPPED | OUTPATIENT
Start: 2023-02-07 | End: 2023-02-22

## 2023-02-07 RX ORDER — PREDNISONE 20 MG/1
20 TABLET ORAL DAILY
Qty: 3 TABLET | Refills: 0 | Status: SHIPPED | OUTPATIENT
Start: 2023-02-07 | End: 2023-02-10

## 2023-02-07 RX ORDER — INSULIN LISPRO 100 [IU]/ML
10 INJECTION, SOLUTION INTRAVENOUS; SUBCUTANEOUS
Status: DISCONTINUED | OUTPATIENT
Start: 2023-02-07 | End: 2023-02-07 | Stop reason: HOSPADM

## 2023-02-07 RX ORDER — DOXYCYCLINE HYCLATE 100 MG
100 TABLET ORAL EVERY 12 HOURS SCHEDULED
Qty: 10 TABLET | Refills: 0 | Status: SHIPPED | OUTPATIENT
Start: 2023-02-07 | End: 2023-02-12

## 2023-02-07 RX ORDER — ALBUTEROL SULFATE 90 UG/1
2 AEROSOL, METERED RESPIRATORY (INHALATION) EVERY 4 HOURS PRN
Qty: 1 EACH | Refills: 3 | Status: SHIPPED | OUTPATIENT
Start: 2023-02-07

## 2023-02-07 RX ORDER — FLUTICASONE PROPIONATE AND SALMETEROL 100; 50 UG/1; UG/1
1 POWDER RESPIRATORY (INHALATION) EVERY 12 HOURS
Qty: 1 EACH | Refills: 3 | Status: SHIPPED | OUTPATIENT
Start: 2023-02-07

## 2023-02-07 RX ORDER — ROFLUMILAST 500 UG/1
500 TABLET ORAL DAILY
Qty: 30 TABLET | Refills: 3 | Status: SHIPPED | OUTPATIENT
Start: 2023-02-07

## 2023-02-07 RX ORDER — IPRATROPIUM BROMIDE AND ALBUTEROL SULFATE 2.5; .5 MG/3ML; MG/3ML
3 SOLUTION RESPIRATORY (INHALATION) EVERY 6 HOURS PRN
Qty: 360 ML | Refills: 3 | Status: SHIPPED | OUTPATIENT
Start: 2023-02-07

## 2023-02-07 RX ORDER — BENZONATATE 200 MG/1
200 CAPSULE ORAL 3 TIMES DAILY PRN
Qty: 30 CAPSULE | Refills: 0 | Status: SHIPPED | OUTPATIENT
Start: 2023-02-07 | End: 2023-02-17

## 2023-02-07 RX ORDER — AMOXICILLIN AND CLAVULANATE POTASSIUM 875; 125 MG/1; MG/1
1 TABLET, FILM COATED ORAL EVERY 12 HOURS SCHEDULED
Qty: 8 TABLET | Refills: 0 | Status: SHIPPED | OUTPATIENT
Start: 2023-02-07 | End: 2023-02-11

## 2023-02-07 RX ORDER — INSULIN GLARGINE 100 [IU]/ML
30 INJECTION, SOLUTION SUBCUTANEOUS DAILY
Status: DISCONTINUED | OUTPATIENT
Start: 2023-02-07 | End: 2023-02-07 | Stop reason: HOSPADM

## 2023-02-07 RX ORDER — AMOXICILLIN AND CLAVULANATE POTASSIUM 875; 125 MG/1; MG/1
1 TABLET, FILM COATED ORAL EVERY 12 HOURS SCHEDULED
Status: DISCONTINUED | OUTPATIENT
Start: 2023-02-07 | End: 2023-02-07 | Stop reason: HOSPADM

## 2023-02-07 RX ADMIN — INSULIN LISPRO 10 UNITS: 100 INJECTION, SOLUTION INTRAVENOUS; SUBCUTANEOUS at 13:17

## 2023-02-07 RX ADMIN — INSULIN LISPRO 10 UNITS: 100 INJECTION, SOLUTION INTRAVENOUS; SUBCUTANEOUS at 08:24

## 2023-02-07 RX ADMIN — GABAPENTIN 800 MG: 400 CAPSULE ORAL at 13:15

## 2023-02-07 RX ADMIN — GABAPENTIN 800 MG: 400 CAPSULE ORAL at 00:28

## 2023-02-07 RX ADMIN — PRAMIPEXOLE DIHYDROCHLORIDE 0.25 MG: 0.5 TABLET ORAL at 08:17

## 2023-02-07 RX ADMIN — LISINOPRIL 10 MG: 10 TABLET ORAL at 08:16

## 2023-02-07 RX ADMIN — GABAPENTIN 800 MG: 400 CAPSULE ORAL at 06:48

## 2023-02-07 RX ADMIN — SODIUM CHLORIDE, PRESERVATIVE FREE 10 ML: 5 INJECTION INTRAVENOUS at 08:27

## 2023-02-07 RX ADMIN — INSULIN LISPRO 4 UNITS: 100 INJECTION, SOLUTION INTRAVENOUS; SUBCUTANEOUS at 08:24

## 2023-02-07 RX ADMIN — THERA TABS 1 TABLET: TAB at 08:17

## 2023-02-07 RX ADMIN — POLYETHYLENE GLYCOL 3350 17 G: 17 POWDER, FOR SOLUTION ORAL at 08:21

## 2023-02-07 RX ADMIN — DOXYCYCLINE HYCLATE 100 MG: 100 TABLET, COATED ORAL at 08:16

## 2023-02-07 RX ADMIN — OXYCODONE HYDROCHLORIDE 35 MG: 15 TABLET, FILM COATED, EXTENDED RELEASE ORAL at 08:21

## 2023-02-07 RX ADMIN — AMOXICILLIN AND CLAVULANATE POTASSIUM 1 TABLET: 875; 125 TABLET, FILM COATED ORAL at 08:17

## 2023-02-07 RX ADMIN — INSULIN LISPRO 2 UNITS: 100 INJECTION, SOLUTION INTRAVENOUS; SUBCUTANEOUS at 13:17

## 2023-02-07 RX ADMIN — CARVEDILOL 3.12 MG: 3.12 TABLET, FILM COATED ORAL at 08:16

## 2023-02-07 RX ADMIN — ROFLUMILAST 500 MCG: 500 TABLET ORAL at 08:21

## 2023-02-07 RX ADMIN — PRAMIPEXOLE DIHYDROCHLORIDE 0.25 MG: 0.5 TABLET ORAL at 13:15

## 2023-02-07 RX ADMIN — APIXABAN 5 MG: 5 TABLET, FILM COATED ORAL at 08:16

## 2023-02-07 RX ADMIN — INSULIN GLARGINE 30 UNITS: 100 INJECTION, SOLUTION SUBCUTANEOUS at 08:24

## 2023-02-07 RX ADMIN — PREDNISONE 20 MG: 20 TABLET ORAL at 08:17

## 2023-02-07 RX ADMIN — FLUTICASONE PROPIONATE 2 SPRAY: 50 SPRAY, METERED NASAL at 08:22

## 2023-02-07 RX ADMIN — ASPIRIN 81 MG: 81 TABLET, COATED ORAL at 08:16

## 2023-02-07 ASSESSMENT — PAIN SCALES - GENERAL: PAINLEVEL_OUTOF10: 7

## 2023-02-07 ASSESSMENT — PAIN DESCRIPTION - ORIENTATION: ORIENTATION: RIGHT

## 2023-02-07 ASSESSMENT — PAIN DESCRIPTION - DESCRIPTORS: DESCRIPTORS: ACHING;NUMBNESS;TINGLING

## 2023-02-07 ASSESSMENT — PAIN DESCRIPTION - LOCATION: LOCATION: BUTTOCKS;BACK;LEG

## 2023-02-07 NOTE — RT PROTOCOL NOTE
RT Inhaler-Nebulizer Bronchodilator Protocol Note    There is a bronchodilator order in the chart from a provider indicating to follow the RT Bronchodilator Protocol and there is an Initiate RT Inhaler-Nebulizer Bronchodilator Protocol order as well (see protocol at bottom of note). CXR Findings:  No results found. The findings from the last RT Protocol Assessment were as follows:   History Pulmonary Disease: Chronic pulmonary disease  Respiratory Pattern: Regular pattern and RR 12-20 bpm  Breath Sounds: Slightly diminished and/or crackles  Cough: Strong, productive  Indication for Bronchodilator Therapy: Decreased or absent breath sounds, On home bronchodilators (pt states he takes tx's BID at home)  Bronchodilator Assessment Score: 5    Aerosolized bronchodilator medication orders have been revised according to the RT Inhaler-Nebulizer Bronchodilator Protocol below. Respiratory Therapist to perform RT Therapy Protocol Assessment initially then follow the protocol. Repeat RT Therapy Protocol Assessment PRN for score 0-3 or on second treatment, BID, and PRN for scores above 3. No Indications - adjust the frequency to every 6 hours PRN wheezing or bronchospasm, if no treatments needed after 48 hours then discontinue using Per Protocol order mode. If indication present, adjust the RT bronchodilator orders based on the Bronchodilator Assessment Score as indicated below. Use Inhaler orders unless patient has one or more of the following: on home nebulizer, not able to hold breath for 10 seconds, is not alert and oriented, cannot activate and use MDI correctly, or respiratory rate 25 breaths per minute or more, then use the equivalent nebulizer order(s) with same Frequency and PRN reasons based on the score. If a patient is on this medication at home then do not decrease Frequency below that used at home.     0-3 - enter or revise RT bronchodilator order(s) to equivalent RT Bronchodilator order with Frequency of every 4 hours PRN for wheezing or increased work of breathing using Per Protocol order mode. 4-6 - enter or revise RT Bronchodilator order(s) to two equivalent RT bronchodilator orders with one order with BID Frequency and one order with Frequency of every 4 hours PRN wheezing or increased work of breathing using Per Protocol order mode. 7-10 - enter or revise RT Bronchodilator order(s) to two equivalent RT bronchodilator orders with one order with TID Frequency and one order with Frequency of every 4 hours PRN wheezing or increased work of breathing using Per Protocol order mode. 11-13 - enter or revise RT Bronchodilator order(s) to one equivalent RT bronchodilator order with QID Frequency and an Albuterol order with Frequency of every 4 hours PRN wheezing or increased work of breathing using Per Protocol order mode. Greater than 13 - enter or revise RT Bronchodilator order(s) to one equivalent RT bronchodilator order with every 4 hours Frequency and an Albuterol order with Frequency of every 2 hours PRN wheezing or increased work of breathing using Per Protocol order mode. RT to enter RT Home Evaluation for COPD & MDI Assessment order using Per Protocol order mode.     Electronically signed by Sadiq Springer RCP on 2/6/2023 at 8:15 PM

## 2023-02-07 NOTE — CARE COORDINATION
Met with the patient to discuss discharge needs for home. States he has a nebulizer at home that is operational and does not need a new one. Spouse is providing transportation. [facility].  Speech therapy added to the orders as requested by our speech therapist.     Mary MENDEZ RN  Case Management  [phone]    Electronically signed by Mary Myles RN on 2/7/2023 at 12:24 PM

## 2023-02-07 NOTE — PROGRESS NOTES
Physical Therapy  Facility/Department: [facility] SURG  Physical Therapy Initial Assessment    Name: Rocio Barrios  :   MRN: [id number]  Date of Service: 2023    Discharge Recommendations:  24 hour supervision or assist, Home with Home health PT, Continue to assess pending progress          Patient Diagnosis(es): The primary encounter diagnosis was Multifocal pneumonia. Diagnoses of Acute respiratory failure with hypoxia (HCC) and Chronic bronchitis, simple ([address] were also pertinent to this visit. Past Medical History:  has a past medical history of Abrasion of right elbow, Arthritis, CAD (coronary artery disease), Chronic back pain, Chronic systolic CHF (congestive heart failure) ([address] COPD (chronic obstructive pulmonary disease) ([address] Dental disease, Diabetes mellitus ([address] Essential hypertension, Hearing loss, Paroxysmal atrial fibrillation ([address] and Tinnitus. Past Surgical History:  has a past surgical history that includes fracture surgery (Left, ); back surgery (); Cardiac surgery (); Coronary artery bypass graft; back surgery; and Cervical spine surgery. Assessment   Body Structures, Functions, Activity Limitations Requiring Skilled Therapeutic Intervention: Decreased functional mobility ; Decreased endurance  Assessment: Pt is a 68 y.o. male who presented to the ED on 23 with increasing SOB, weakness, and fatigue. He also reports he fell at home due to progressive SOB and weakness. He was admitted to [place] [address] for pneumonia in December - reports he has had continuing shortness of breath and a productive cough since his discharge from that admission. Prior to admission, pt living with spouse in home setting, independent with ADLs and ambulation with RW. Pt currently functioning below baseline. Anticipate return home with initial 24hr supv and level 1 HHPT.   Treatment Diagnosis: impaired activity tolerance  Therapy Prognosis: Good  Decision Making: Medium Complexity  History: see above  Exam: see below  Clinical Presentation: evolving  Activity Tolerance  Activity Tolerance: Patient tolerated treatment well     Plan   Physcial Therapy Plan  General Plan: 3-5 times per week  Current Treatment Recommendations: Strengthening, Balance training, Functional mobility training, Transfer training, Gait training, Endurance training, Neuromuscular re-education, Safety education & training, Equipment evaluation, education, & procurement, Patient/Caregiver education & training, Therapeutic activities  Safety Devices  Type of Devices: All fall risk precautions in place, Call light within reach, Gait belt, Patient at risk for falls, Left in chair, Chair alarm in place, Nurse notified     Restrictions  Restrictions/Precautions  Restrictions/Precautions: Up as Tolerated  Position Activity Restriction  Other position/activity restrictions: 2L O2 to RA     Subjective   General  Chart Reviewed: Yes  Patient assessed for rehabilitation services?: Yes  Additional Pertinent Hx: Pt is a 68 y.o. male who presented to the ED on 2/4/23 with increasing SOB, weakness, and fatigue. He also reports he fell at home due to progressive SOB and weakness. He was admitted to [place] [address] for pneumonia in December - reports he has had continuing shortness of breath and a productive cough since his discharge from that admission.   Response To Previous Treatment: Not applicable  Family / Caregiver Present: No  Referring Practitioner: Ruby Chiang MD  Referral Date : 02/04/23  Diagnosis: pneumonia  Follows Commands: Within Functional Limits  Subjective  Subjective: Pt is pleasantly agreeable to PT         Social/Functional History  Social/Functional History  Lives With: Spouse  Type of Home: House  Home Layout: One level, Able to Live on Main level with bedroom/bathroom  Home Access: Stairs to enter with rails  Entrance Stairs - Number of Steps: 2  Entrance Stairs - Rails: Both  Bathroom Shower/Tub: Walk-in shower  Bathroom Toilet: Handicap height  Bathroom Equipment: Grab bars in shower, Built-in shower seat, Shower chair, Hand-held shower, Grab bars around toilet  Bathroom Accessibility: Wheelchair accessible  Home Equipment: Lift chair, Walker, [place], [place], Walker, 4 wheeled, Electric scooter  Has the patient had two or more falls in the past year or any fall with injury in the past year?: Yes (1 fall PTA)  ADL Assistance: Independent  Homemaking Assistance: Needs assistance  Ambulation Assistance: Independent (combination of RW, cane in home. scooter outside home)  Transfer Assistance: Independent  Active : Yes  Occupation: Retired  Type of Occupation: ,     Vision/Hearing  Vision  Vision: Impaired  Vision Exceptions: Wears glasses at all times  Hearing  Hearing: Within functional limits      Cognition   Orientation  Overall Orientation Status: Within Functional Limits  Cognition  Overall Cognitive Status: WFL     Objective   Gross Assessment  Strength: Generally decreased, functional (RLE weaker than LLE)  Sensation: Impaired (neuropathy BLE)     Bed mobility  Supine to Sit: Unable to assess  Sit to Supine: Unable to assess  Bed Mobility Comments: Pt in bathroom at start of session; in recliner at end of session  Transfers  Sit to Stand: Stand by assistance  Stand to Sit: Stand by assistance  Ambulation  Surface: Level tile  Device: Rolling Walker  Assistance: Contact guard assistance  Quality of Gait: functional foot drop on RLE  Gait Deviations: Slow Emely;Decreased step length;Decreased step height  Distance: 25' + 80' x 2 trials; 40' trial with AFO in R shoe  Comments: Pt with mild improvement in gait using AFO on RLE - cues for hip flexion to make large impact.      Balance  Posture: Good  Sitting - Static: Good  Sitting - Dynamic: Good  Standing - Static: Fair;+ (@RW)  Standing - Dynamic: Fair;+ (@RW)         AM-PAC Score  AM-PAC Inpatient Mobility Raw Score : 20 (02/07/23 56)  AM-PAC Inpatient T-Scale Score : 47.67 (02/07/23 0748)  Mobility Inpatient CMS 0-100% Score: 35.83 (02/07/23 0748)  Mobility Inpatient CMS G-Code Modifier : CJ (02/07/23 4210)          Goals  Short Term Goals  Time Frame for Short Term Goals: by acute discharge - all goals ongoing as of 2/7/23  Short Term Goal 1: bed mobility mod I  Short Term Goal 2: sit<>Stand SBA - MET 2/7/23  Short Term Goal 3: ambulate > 50' with RW and SBA  Patient Goals   Patient Goals : none stated       Education  Patient Education  Education Given To: Patient  Education Provided: Role of Therapy;Plan of Care  Education Method: Verbal  Barriers to Learning: None  Education Outcome: Verbalized understanding;Continued education needed      Therapy Time   Individual Concurrent Group Co-treatment   Time In 0705         Time Out 0745         Minutes 40         Timed Code Treatment Minutes: Raghav 64, PT

## 2023-02-07 NOTE — PROGRESS NOTES
Physician Progress Note      Aureliano German  CSN #:                  [id number]  :                       1945  ADMIT DATE:       2023 7:30 PM  [address] [place] DATE:  RESPONDING  PROVIDER #:        Myrtis Bloch MD          QUERY TEXT:    Patient admitted with Pneumonia. Documentation reflects Sepsis in H&P note(s)   dated 23. If possible, please document in the progress notes and   discharge summary if Sepsis was: The medical record reflects the following:  Risk Factors: Pneumonia  Clinical Indicators:  WBC WNL, afebrile HR > 90, RR > 22 ,lactic acid WNL  Treatment: In ED Cefepime  Vanco  Solumedrol  HHN  Options provided:  -- Sepsis ruled out after study  -- Sepsis confirmed after study, please provide clinical support. -- Other - I will add my own diagnosis  -- Disagree - Not applicable / Not valid  -- Disagree - Clinically unable to determine / Unknown  -- Refer to Clinical Documentation Reviewer    PROVIDER RESPONSE TEXT:    Sepsis ruled out after study.     Query created by: Martin Cordova on 2023 8:34 AM      Electronically signed by:  Myrtis Bloch MD 2023 12:45 AM

## 2023-02-07 NOTE — CARE COORDINATION
02/07/23 1224   IMM Letter   IMM Letter given to Patient/Family/Significant other/Guardian/POA/by: Educations provided to the patient. Verbalized understanding and denied questions. Patient aware of the 4 hours allotted to determine to pursue the Medicare Appeal process.  Education and copy provided by Davene Kehr RN    IMM Letter date given: 02/07/23   IMM Letter time given: [address] [facility] BSN RN  Case Management  [phone]    Electronically signed by Davene Kehr, RN on 2/7/2023 at 12:26 PM

## 2023-02-07 NOTE — PLAN OF CARE
Problem: Discharge Planning  Goal: Discharge to home or other facility with appropriate resources  2/7/2023 1140 by Vinita Richardson RN  Outcome: Progressing  Flowsheets (Taken 2/7/2023 0800 by Maribel Rain RN)  Discharge to home or other facility with appropriate resources: Identify barriers to discharge with patient and caregiver  2/7/2023 0239 by Fani Martinez RN  Outcome: Progressing     Problem: Pain  Goal: Verbalizes/displays adequate comfort level or baseline comfort level  2/7/2023 1140 by Vinita Richardson RN  Outcome: Progressing  2/7/2023 0239 by Fani Martinez RN  Outcome: Progressing     Problem: Safety - Adult  Goal: Free from fall injury  2/7/2023 1140 by Vinita Richardson RN  Outcome: Progressing  2/7/2023 0239 by Fani Martinez RN  Outcome: Progressing     Problem: ABCDS Injury Assessment  Goal: Absence of physical injury  2/7/2023 1140 by Vinita Richardson RN  Outcome: Progressing  2/7/2023 0239 by Fani Martinez RN  Outcome: Progressing     Problem: Chronic Conditions and Co-morbidities  Goal: Patient's chronic conditions and co-morbidity symptoms are monitored and maintained or improved  2/7/2023 1140 by Vinita Richardson RN  Outcome: Progressing  Flowsheets (Taken 2/7/2023 0800 by Maribel Rain RN)  Care Plan - Patient's Chronic Conditions and Co-Morbidity Symptoms are Monitored and Maintained or Improved: Monitor and assess patient's chronic conditions and comorbid symptoms for stability, deterioration, or improvement  2/7/2023 0239 by Fani Martinez RN  Outcome: Progressing     Problem: Respiratory - Adult  Goal: Achieves optimal ventilation and oxygenation  2/7/2023 1140 by Vinita Richardson RN  Outcome: Progressing  2/7/2023 0239 by Fani Martinez RN  Outcome: Progressing     Problem: Skin/Tissue Integrity - Adult  Goal: Skin integrity remains intact  2/7/2023 1140 by Vinita Richardson RN  Outcome: Progressing  Flowsheets  Taken 2/7/2023 1140 by Vinita Richardson RN  Skin Integrity Remains Intact: Monitor for areas of redness and/or skin breakdown  Taken 2/7/2023 0800 by Argentina Cuenca RN  Skin Integrity Remains Intact: Monitor for areas of redness and/or skin breakdown  2/7/2023 0239 by Jessica Charles RN  Outcome: Progressing  Goal: Incisions, wounds, or drain sites healing without S/S of infection  2/7/2023 1140 by Jameson Iyer RN  Outcome: Progressing  Flowsheets (Taken 2/7/2023 0800 by Argentina Cuenca RN)  Incisions, Wounds, or Drain Sites Healing Without Sign and Symptoms of Infection: ADMISSION and DAILY: Assess and document risk factors for pressure ulcer development  2/7/2023 0239 by Jessica Charles RN  Outcome: Progressing  Goal: Oral mucous membranes remain intact  2/7/2023 1140 by Jameson Iyer RN  Outcome: Progressing  Flowsheets (Taken 2/7/2023 0800 by Argentina Cuenca RN)  Oral Mucous Membranes Remain Intact: Assess oral mucosa and hygiene practices  2/7/2023 0239 by Jessica Charles RN  Outcome: Progressing     Problem: Musculoskeletal - Adult  Goal: Return mobility to safest level of function  2/7/2023 1140 by Jameson Iyer RN  Outcome: Progressing  [address] (Taken 2/7/2023 0800 by Argentina Cuenca RN)  Return Mobility to Safest Level of Function: Assist with transfers and ambulation using safe patient handling equipment as needed  2/7/2023 0239 by Jessica Charles RN  Outcome: Progressing  Goal: Maintain proper alignment of affected body part  2/7/2023 1140 by Jameson Iyer RN  Outcome: Progressing  Flowsheets (Taken 2/7/2023 0800 by Argentina Cuenca RN)  Maintain proper alignment of affected body part: Support and protect limb and body alignment per provider's orders  2/7/2023 0239 by Jessica Charles RN  Outcome: Progressing  Goal: Return ADL status to a safe level of function  2/7/2023 1140 by Jameson Iyer RN  Outcome: Progressing  Flowsheets (Taken 2/7/2023 0800 by Argentina Cuenca RN)  Return ADL Status to a Safe Level of Function: Assess activities of daily living deficits and provide assistive devices as needed  2/7/2023 0239 by Marlen Kate RN  Outcome: Progressing     Problem: Gastrointestinal - Adult  Goal: Minimal or absence of nausea and vomiting  2/7/2023 1140 by Matheus Cortes RN  Outcome: Progressing  Flowsheets (Taken 2/7/2023 0800 by Inez Johns RN)  Minimal or absence of nausea and vomiting: Administer IV fluids as ordered to ensure adequate hydration  2/7/2023 0239 by Marlen Kate RN  Outcome: Progressing  Goal: Maintains or returns to baseline bowel function  2/7/2023 1140 by Matheus Cortes RN  Outcome: Progressing  Flowsheets (Taken 2/7/2023 0800 by Inez Johns RN)  Maintains or returns to baseline bowel function: Encourage oral fluids to ensure adequate hydration  2/7/2023 0239 by Marlen Kate RN  Outcome: Progressing  Goal: Maintains adequate nutritional intake  2/7/2023 1140 by Matheus Cortes RN  Outcome: Progressing  Flowsheets (Taken 2/7/2023 0800 by Inez Johns RN)  Maintains adequate nutritional intake: Monitor percentage of each meal consumed  2/7/2023 0239 by Marlen Kate RN  Outcome: Progressing     Problem: Metabolic/Fluid and Electrolytes - Adult  Goal: Electrolytes maintained within normal limits  2/7/2023 1140 by Matheus Cortes RN  Outcome: Progressing  Flowsheets (Taken 2/7/2023 0800 by Inez Johns RN)  Electrolytes maintained within normal limits: Monitor labs and assess patient for signs and symptoms of electrolyte imbalances  2/7/2023 0239 by Marlen Kate RN  Outcome: Progressing  Goal: Hemodynamic stability and optimal renal function maintained  2/7/2023 1140 by Matheus Cortes RN  Outcome: Progressing  Flowsheets (Taken 2/7/2023 0800 by Inez Johns RN)  Hemodynamic stability and optimal renal function maintained: Monitor labs and assess for signs and symptoms of volume excess or deficit  2/7/2023 0239 by Marlen Kate RN  Outcome: Progressing  Goal: Glucose maintained within prescribed range  2/7/2023 1140 by Jyoti Levy, RN  Outcome: Progressing  Flowsheets (Taken 2/7/2023 0800 by Nasir Goff, PETRA)  Glucose maintained within prescribed range: Monitor blood glucose as ordered  2/7/2023 0239 by Carlos Alberto Curry RN  Outcome: Progressing

## 2023-02-07 NOTE — DISCHARGE SUMMARY
Hospital Discharge Summary    Patient's PCP: Rhonda Lew  Admit Date: 2/4/2023   Discharge Date: 2/7/2023    Admitting Physician: Dr. Enma Calhoun MD  Discharge Physician: Dr. Ananth Zacarias MD     Consults:   IP CONSULT TO [address] [place] [address] TO HOSPITALIST  IP CONSULT TO PULMONOLOGY  IP CONSULT TO HOME CARE NEEDS    Brief HPI: Patient admitted with pneumonia    Brief hospital course: 66-year-old male with history of COPD, chronic combined systolic and diastolic heart failure (echocardiogram from August 2021 with ejection fraction of 45 to 60%, grade 2 diastolic dysfunction), CAD status post CABG, uncontrolled diabetes type 2 with hyperglycemia, essential hypertension, paroxysmal atrial fibrillation (on chronic anticoagulation with eliquis), stated with BMI of 33 kg/m², who was recently admitted to [facility] for management of bacterial pneumonia in December 2022. Since then, he continues to have ongoing shortness of breath and productive cough, and was recently reevaluated by his pulmonologist on February 4, 2023, when he was started on Levaquin. He then presented to the emergency room with report of increasing shortness of breath, generalized weakness and a fall that was described as sliding down to the floor, complicated by abrasion to right elbow. He was found to have low oxygen saturation of 87% on room air in the emergency room. He was tachycardic with heart rate up to 113 beats per minute, tachypneic with respiratory rate up to 26 bpm.  He reportedly had wheezes on examination. Chest imaging also revealed multifocal infiltrates. X-ray-right elbow was unremarkable for acute pathology or fracture. Assessment/plan:  COPD exacerbation. Continue steroid, breathing treatments and antibiotics. Maintain follow up with pulmonologist as outpatient. Changed IV solumedrol to oral prednisone 2/5/2023 due to significant hyperglycemia.   Decreased oral prednisone from 40 mg daily to 20 mg daily on 2/7/2023 - wheezes resolved, but patient having persistent hyperglycemia with glucose >300 due to high dose prednisone. Bacterial pneumonia, likely due to aspiration. MBS positive for aspiration. Continue antibiotics. On dysphagia diet per speech therapy recommendation. MRSA nares negative; discontinued vancomycin 2/6. He has been on doxycycline, cefepime. Will plan on discharge on doxyxycline and augmentin, to complete therapy as outpatient. Hypoxia, resolved. Oxygen saturation was down to 87% on room air. Continue management of #1. He has been weaned off supplemental oxygen as of 2/6/2023. Home oxygen evaluation ordered on 2/7/2023. Uncontrolled diabetes type 2 with hyperglycemia. A1c is 7.5. Current hyperglycemia is likely secondary to steroid. Adjusted insulin dose to accommodate hyperglycemia control during hospital stay. Will need lower insulin doses once off steroid; hence, OK to resume home doses of diabetes medications at discharge, now that prednisone is being weaned. Generalized weakness. Likely secondary to acute medical conditions. Therapy has evaluated - recommended home with assist PRN. Other comorbidities: history of COPD, chronic combined systolic and diastolic heart failure (echocardiogram from August 2021 with ejection fraction of 45 to 90%, grade 2 diastolic dysfunction), CAD status post CABG, uncontrolled diabetes type 2 with hyperglycemia, essential hypertension, paroxysmal atrial fibrillation (on chronic anticoagulation with eliquis). Disposition. Discharge once OK'd by pulmonologist.    Invasive procedures:  None. Discharge Diagnoses:   See above. Physical Exam: /68   Pulse 89   Temp 97.4 °F (36.3 °C) (Oral)   Resp 16   Ht 5' 10\" (1.778 m)   Wt 222 lb 14.2 oz (101.1 kg)   SpO2 91%   BMI 31.98 kg/m²   Gen/overall appearance: Not in acute distress. Alert.  Oriented X3  Head: Normocephalic, atraumatic  Eyes: EOMI, good acuity  ENT: Oral mucosa moist  Neck: No JVD, thyromegaly  CVS: Nml S1S2, no MRG, RRR  Pulm: Clear bilaterally. No crackles/wheezes  Gastrointestinal: Soft, NT/ND, +BS  Musculoskeletal: No edema. Warm  Neuro: No focal deficit. Moves extremity spontaneously. Psychiatry: Appropriate affect. Not agitated. Skin: Warm, dry with normal turgor. No rash  Capillary refill: Brisk,< 3 seconds   Peripheral Pulses: +2 palpable, equal bilaterally     Significant diagnostic studies that may require follow up:  XR CHEST (2 VW)    Result Date: 1/28/2023  EXAMINATION: TWO XRAY VIEWS OF THE CHEST 1/28/2023 8:54 am COMPARISON: 08/25/2021 HISTORY: ORDERING SYSTEM PROVIDED HISTORY: COPD exacerbation ([facility] [address] TECHNOLOGIST PROVIDED HISTORY: Reason for Exam: SOB Relevant Medical/Surgical History: COPD FINDINGS: The lungs are hyperexpanded with diffuse bilateral interstitial prominence. There is patchy bibasilar segmental airspace disease, left greater than right. Heart size and vascularity are stable with note made of prior CABG. There is no pneumothorax or effusion. COPD with mild bibasilar atelectasis versus pneumonia. XR ELBOW RIGHT (MIN 3 VIEWS)    Result Date: 2/4/2023  EXAMINATION: THREE XRAY VIEWS OF THE RIGHT ELBOW 2/4/2023 7:50 pm COMPARISON: None. HISTORY: ORDERING SYSTEM PROVIDED HISTORY: Trauma, R/O fx TECHNOLOGIST PROVIDED HISTORY: Reason for exam:->Trauma, R/O fx Reason for Exam: fall FINDINGS: No fracture or malalignment identified. The joint spaces are maintained. No discrete soft tissue abnormality identified. No acute osseous abnormality identified. XR CHEST PORTABLE    Result Date: 2/4/2023  EXAMINATION: ONE XRAY VIEW OF THE CHEST 2/4/2023 7:50 pm COMPARISON: 01/28/2023, 08/25/2021 HISTORY: ORDERING SYSTEM PROVIDED HISTORY: sob, cough TECHNOLOGIST PROVIDED HISTORY: Reason for exam:->sob, cough Reason for Exam: fall, cough, sob FINDINGS: The cardiomediastinal silhouette is unchanged in appearance.   Patchy opacities in the mid to lower lung fields again demonstrated and without significant change. No new airspace disease, pneumothorax or significant effusion identified. Similar appearance of the osseous structures are unchanged in appearance. Basilar airspace disease, for which multifocal inflammatory process or infection should be considered. FL MODIFIED BARIUM SWALLOW W VIDEO    Result Date: 2/7/2023  EXAMINATION: MODIFIED BARIUM SWALLOW WAS PERFORMED IN CONJUNCTION WITH SPEECH PATHOLOGY SERVICES TECHNIQUE: Under fluoroscopic evaluation cineradiography/videoradiography recordings were performed in conjunction with the speech-language pathologist (SLP). Various liquid, solid and/or semi-solid barium preparations were used to assess swallowing function. FLUOROSCOPY DOSE AND TYPE OR TIME AND EXPOSURES: 2.8 minutes of fluoroscopy was utilized. 1 fluoroscopic spot image was obtained. COMPARISON: None HISTORY: ORDERING SYSTEM PROVIDED HISTORY: dysphagia TECHNOLOGIST PROVIDED HISTORY: Reason for exam:->dysphagia FINDINGS: Oral phase of swallowing was grossly within normal limits. Several episodes of oscar laryngeal penetration and aspiration were observed. Positive for aspiration Please see separate speech pathology report for full discussion of findings and recommendations. Treatments: As above.     Discharge Medications:     Medication List        START taking these medications      amoxicillin-clavulanate 875-125 MG per tablet  Commonly known as: AUGMENTIN  Take 1 tablet by mouth every 12 hours for 8 doses     benzonatate 200 MG capsule  Commonly known as: TESSALON  Take 1 capsule by mouth 3 times daily as needed for Cough     doxycycline hyclate 100 MG tablet  Commonly known as: VIBRA-TABS  Take 1 tablet by mouth every 12 hours for 10 doses     guaiFENesin 600 MG extended release tablet  Commonly known as: MUCINEX  Take 1 tablet by mouth 2 times daily for 15 days     ipratropium-albuterol 0.5-2.5 (3) MG/3ML Soln nebulizer solution  Commonly known as: DUONEB  Inhale 3 mLs into the lungs every 6 hours as needed for Shortness of Breath     predniSONE 20 MG tablet  Commonly known as: DELTASONE  Take 1 tablet by mouth daily for 3 doses            CONTINUE taking these medications      albuterol sulfate  (90 Base) MCG/ACT inhaler  Commonly known as: PROVENTIL;VENTOLIN;PROAIR  Inhale 2 puffs into the lungs every 4 hours as needed for Wheezing     apixaban 5 MG Tabs tablet  Commonly known as: ELIQUIS     aspirin 81 MG tablet     carvedilol 6.25 MG tablet  Commonly known as: COREG     dapagliflozin 10 MG tablet  Commonly known as: FARXIGA     flunisolide 25 MCG/ACT (0.025%) Soln  Commonly known as: NASALIDE     fluticasone-salmeterol 100-50 MCG/ACT Aepb diskus inhaler  Commonly known as: ADVAIR  Inhale 1 puff into the lungs in the morning and 1 puff in the evening. furosemide 40 MG tablet  Commonly known as: LASIX     gabapentin 800 MG tablet  Commonly known as: NEURONTIN     insulin 70-30 (70-30) 100 UNIT per ML injection vial  Commonly known as: HUMULIN;NOVOLIN     Lidocaine 4 % Oint     lisinopril 10 MG tablet  Commonly known as: PRINIVIL;ZESTRIL     metFORMIN 1000 MG tablet  Commonly known as: GLUCOPHAGE     polyethylene glycol 17 GM/SCOOP powder  Commonly known as: GLYCOLAX     * pramipexole 0.25 MG tablet  Commonly known as: MIRAPEX     * pramipexole 0.25 MG tablet  Commonly known as: MIRAPEX     Roflumilast 500 MCG tablet  Commonly known as: DALIRESP  Take 1 tablet by mouth daily     simvastatin 80 MG tablet  Commonly known as: ZOCOR     * therapeutic multivitamin-minerals tablet     * PRESERVISION AREDS 2+MULTI VIT PO     vitamin D 25 MCG (1000 UT) Tabs tablet  Commonly known as: CHOLECALCIFEROL     Xtampza ER 36 MG C12a  Generic drug: oxyCODONE ER           * This list has 4 medication(s) that are the same as other medications prescribed for you.  Read the directions carefully, and ask your doctor or other care provider to review them with you. STOP taking these medications      budesonide-formoterol 160-4.5 MCG/ACT Aero  Commonly known as: SYMBICORT     levoFLOXacin 500 MG tablet  Commonly known as: Levaquin               Where to Get Your Medications        These medications were sent to [address], [address] [phone] Jacob Watson [phone]  [address] [id number]      Hours: 24-hours Phone: [phone]   albuterol sulfate  (90 Base) MCG/ACT inhaler  amoxicillin-clavulanate 875-125 MG per tablet  benzonatate 200 MG capsule  doxycycline hyclate 100 MG tablet  fluticasone-salmeterol 100-50 MCG/ACT Aepb diskus inhaler  guaiFENesin 600 MG extended release tablet  ipratropium-albuterol 0.5-2.5 (3) MG/3ML Soln nebulizer solution  predniSONE 20 MG tablet  Roflumilast 500 MCG tablet         Activity: activity as tolerated  Diet: ADULT DIET; Easy to Chew; 5 carb choices (75 gm/meal); Low Sodium (2 gm); Moderately Thick (Honey)      Disposition: home   Discharged Condition: Stable  Follow Up:   Vicky Lee  [address] [facility]  [facility] [address]  [phone]    Schedule an appointment as soon as possible for a visit in 1 week(s)      Veda Malik, [address] [facility]  [address] [place] [address]  [phone]    Schedule an appointment as soon as possible for a visit in 1 week(s)        Code status:  DNR-CCA     Total time spent on discharge, finalizing medications, referrals and arranging outpatient follow up was more than 30 minutes      Thank you Dr. Vicky Lee for the opportunity to be involved in this patients care.

## 2023-02-07 NOTE — PROGRESS NOTES
Pulmonary Progress Note    Date of Admission: 2/4/2023   LOS: 3 days     Chief Complaint   Patient presents with    Aloma Fontan down while walking to the bathroom, patient uses a walker       Assessment/Plan:     Acute hypoxemic respiratory failure-resolved  -Already room air  -Acapella/I-S, OOB, PT/OT    Recurrent pneumonia  Dysphagia  -Agree with broad-spectrum antibiotics  -Pneumonia panel/culture pending  -Airway clearance  -SLP following, recommending outpatient SLP therapy and honey thick nectar      24 Hour Events/Subjective  No acute events overnight. Tolerating room air    ROS:   No nausea  No Vomiting  No chest pain      Intake/Output Summary (Last 24 hours) at 2/7/2023 0909  Last data filed at 2/7/2023 0431  Gross per 24 hour   Intake 360 ml   Output 900 ml   Net -540 ml         PHYSICAL EXAM:   Blood pressure 111/68, pulse 89, temperature 97.4 °F (36.3 °C), temperature source Oral, resp. rate 16, height 5' 10\" (1.778 m), weight 222 lb 14.2 oz (101.1 kg), SpO2 91 %.'  Gen:  No acute distress. Eyes: PERRL. Anicteric sclera. No conjunctival injection. ENT: No discharge. Posterior oropharynx clear. External appearance of ears and nose normal.  Neck: Trachea midline. No mass   Resp:  No crackles. No wheezes. No rhonchi. No dullness on percussion. CV: Regular rate. Regular rhythm. No murmur or rub. No edema. GI: Soft, Non-tender. Non-distended. +BS  Skin: Warm, dry, w/o erythema. Lymph: No cervical or supraclavicular LAD. M/S: No cyanosis. No clubbing. Neuro:  no focal neurologic deficit. Moves all extremities  Psych: Awake and alert, Oriented x 3. Judgement and insight appropriate. Mood stable.       Medications:    Scheduled Meds:   insulin lispro  10 Units SubCUTAneous TID     insulin glargine  30 Units SubCUTAneous Daily    predniSONE  20 mg Oral Daily    amoxicillin-clavulanate  1 tablet Oral 2 times per day    doxycycline hyclate  100 mg Oral 2 times per day    ipratropium-albuterol 1 ampule Inhalation BID    fluticasone  2 spray Each Nostril BID    multivitamin  1 tablet Oral Daily    pramipexole  0.25 mg Oral BID    pramipexole  0.5 mg Oral Nightly    Roflumilast  500 mcg Oral Daily    lisinopril  10 mg Oral Daily    mometasone-formoterol  2 puff Inhalation BID    aspirin  81 mg Oral Daily    apixaban  5 mg Oral BID    sodium chloride flush  10 mL IntraVENous 2 times per day    insulin lispro  0-8 Units SubCUTAneous TID WC    insulin lispro  0-4 Units SubCUTAneous Nightly    atorvastatin  20 mg Oral Nightly    carvedilol  3.125 mg Oral BID WC    gabapentin  800 mg Oral Q6H    [Held by provider] furosemide  40 mg Oral Daily    oxyCODONE  35 mg Oral 2 times per day       Continuous Infusions:   dextrose      sodium chloride         PRN Meds:  benzonatate, fluticasone, albuterol sulfate HFA, glucose, dextrose bolus **OR** dextrose bolus, glucagon (rDNA), dextrose, sodium chloride flush, sodium chloride, ondansetron, polyethylene glycol, acetaminophen **OR** acetaminophen, ipratropium-albuterol    Labs reviewed:  CBC:   Recent Labs     02/05/23  0647 02/06/23  0717 02/07/23  0749   WBC 6.6 13.2* 9.3   HGB 13.2* 12.3* 13.0*   HCT 40.1* 38.7* 40.6   MCV 90.3 91.2 90.4    345 362     BMP:   Recent Labs     02/05/23  0647 02/05/23  1903 02/06/23  0717   * 129* 135*   K 5.0 4.7 4.5   CL 95* 91* 99   CO2 23 24 25   PHOS  --  3.0 3.1   BUN 27* 28* 33*   CREATININE 1.2 1.1 1.0     LIVER PROFILE:   Recent Labs     02/04/23  2022 02/06/23  0717   AST 13* 12*   ALT 11 9*   BILITOT 0.4 0.3   ALKPHOS 52 43     PT/INR: No results for input(s): PROTIME, INR in the last 72 hours. Films:  Radiology Review:  Pertinent images / reports were reviewed as a part of this visit. This note was transcribed using [id number] [facility]. Please disregard any translational errors.     Thank you for this consult,    Tonia Driscoll MD  [facility] Pulmonary, Critical Care, and Sleep Medicine

## 2023-02-07 NOTE — PROCEDURES
INSTRUMENTAL SWALLOW REPORT  MODIFIED BARIUM SWALLOW    NAME: Carolin Cummings   : 1945  MRN: [id number]       Date of Eval: 2023     Ordering Physician: Lucero Carias  Radiologist: Marco Diaz     Referring Diagnosis: oropharyngeal dysphagia; r 13.12    Past Medical History:  has a past medical history of Abrasion of right elbow, Arthritis, CAD (coronary artery disease), Chronic back pain, Chronic systolic CHF (congestive heart failure) ([address] COPD (chronic obstructive pulmonary disease) ([address] Dental disease, Diabetes mellitus ([address] Essential hypertension, Hearing loss, Paroxysmal atrial fibrillation ([address] and Tinnitus. Past Surgical History:  has a past surgical history that includes fracture surgery (Left, ); back surgery (); Cardiac surgery (); Coronary artery bypass graft; back surgery; and Cervical spine surgery. CHART REVIEW:  2023 admitted with c/o SOB, productive cough, weakness, fatigue  MD ADMISSION H&P HPI:  Pt is an 68y.o. year-old male with a history of hypertension, hyperlipidemia, diabetes mellitus type II, coronary artery disease s/p CABG, chronic combined systolic and diastolic congestive heart failure, A fib and non-morbid obesity. He was admitted to [place] [address] for pneumonia in December. He states that he has had continuing shortness of breath and a productive cough since his discharge from that admission. His pulmonologist is Dr. Teresa Britton. He saw him yesterday and started him on Levaquin. The patient states that he has taken 1 dose of this so far, earlier today. He presents to the emergency room for evaluation of increasing shortness of breath, weakness and fatigue. He states that he was walking to his bathroom a short time ago when he became so weak he could not continue to walk. He slid down to the floor and suffered an abrasion to his right elbow.   He reports continuing moderately severe shortness of breath which is worse with minimal exertion and improved with rest.  At baseline, he does not require oxygen. In the emergency room he was found to have a resting oxygen saturation of 87% on room air. On further evaluation he was found to have multifocal pneumonia and associated sepsis along with a COPD exacerbation. . Pt denies head trauma, and associated signs and symptoms do not include dizziness or lightheadedness, chest pain, shortness of breath or neurological symptoms prior to the fall. Patient denies neck pain or stiffness, diplopia or other vision changes, difficulty swallowing, numbness in the arms or legs, or focal neurological deficits. He is being admitted for further evaluation and treatment. 2/5/2023 Pulmonology: Recommend SLP joyce.  Had a MBS in 2019 consistent with dysphagia. Recurrent aspiration could explain recurrent pneumonias     IMAGING:  CXR: 2/4/2023  Impression   Basilar airspace disease, for which multifocal inflammatory process or   infection should be considered. Modified Barium Swallow Study: completed 8/3/2018 and 1/10/2019 - both with rec for thin liquids with protective cough     History/Prior Level of Function:   Living Status: admitted from home with wife  Baseline diet: regular/thin  Prior Dysphagia History: patient with limited recall for prior dysphagia intervention and recs; concern for reduced recall for comp start carryover now    Patient Complaints/Reason for Referral:  Carolin Cummings was referred for a MBS to assess the efficiency of his/her swallow function, assess for aspiration, and to make recommendations regarding safe dietary consistencies, effective compensatory strategies, and safe eating environment. Patient complaints: patient without specific dysphagia complaint; med team concern for silent aspiration d/t h/o and recurrent pneumonias    Onset of problem:   Date of Onset: 2/4/2023    Behavior/Cognition/Vision/Hearing:  Behavior/Cognition: Alert; Cooperative;Pleasant mood (Reduced recall)  Vision Exceptions: Wears glasses at all times  Hearing Exceptions: Hard of hearing/hearing concerns    Impressions:  Treatment Dx and ICD 10: oropharyngeal dysphagia; r 13.12    Patient Position: Lateral (Upright in VSE chair)     Consistencies Administered: Pureed; Moderately Thick cup;Mildly Thick cup; Thin teaspoon; Thin cup; Soft & Bite Sized; Easy to Chew;Regular    Mild-moderate oral stage dysphagia characterized by decreased mastication, decreased lingual manipulation, and decreased sensation. Prolonged but adequate mastication with textured solids. Prolonged bolus formation and movement with solids. Reduced bolus control with premature bolus loss to the pharynx over the tongue base with all. Inconsistent sensation for discrimination of bolus volume. Moderate-severe pharyngeal stage dysphagia characterized by delayed swallow initiation, decreased laryngeal elevation, decreased pharyngeal peristalsis, and reduced sensation. Premature spillage to the valleculae with all. Overflow to the pyriform with nectar and thin. Penetration that does not clear with thin via tsp and cup using small sips; chin tuck attempted but not beneficial; protective cough does not clear penetrate which is a decline from prior studies in 2018 and 2019. SILENT aspiration with nectar via cup without use of small sip. Concern for inconsistent recall and discrimination for consistent carryover of nato sips. Vallecular residue with easy to chew and regular solids requires repeat swallows to clear; max effort to clear regular residue. Recommend easy to chew diet texture with honey thick liquids; meds in puree/pudding. Could consider upgrade to nectar thick liquids if/when able to consistently demonstrate use if small sips INDEPENDENTLY. Dysphagia Outcome Severity Scale: Level 3: Moderate dysphagia- Total assisstance, supervision or strategies.  Two or more diet consistencies restricted  Penetration-Aspiration Scale (PAS): 8 - Material Enters the airway, passes below the vocal folds, and no effort is made to eject    Recommended Diet:  Solid consistency: Easy to Chew  Liquid consistency: Moderately Thick  Liquid administration via: Cup  Medication administration: Meds in puree  Compensatory Swallowing Strategies : Upright as possible for all oral intake;Remain upright for 30-45 minutes after meals;Small bites/sips;Eat/Feed slowly; Swallow 2 times per bite/sip    Recommendations/Treatment  Requires SLP Intervention: Yes - ST to tx 3-5 times per week during acute admission  D/C Recommendations: Ongoing speech therapy is recommended at next level of care  Therapeutic Interventions: Bolus control exercises;Oral care; Thermal stimulation;Diet tolerance monitoring;Oral motor exercises; Therapeutic PO trials with SLP; Free Water Protocol; Patient/Family education;Vital Stim/NMES;Laryngeal exercises; Pharyngeal exercises    Education:   Patient Education: completed on results/recs - concern for reduced recall with need for reinforcement    Speech Therapy Prognosis  Prognosis: Guarded  Prognosis Considerations: Severity of Impairments (time post initial onset)    Goals:    Dysphagia Goals: The patient will tolerate recommended diet without observed clinical signs of aspiration; The patient/caregiver will demonstrate understanding of compensatory strategies for improved swallowing safety. (The patient will tolerate mildly thick liquids USING SMALL SIPS independent of cueing 10/10; The patient will improve swallow function via swallow ex completed 10/10 each)      Oral Preparation / Oral Phase  Decreased mastication  Decreased lingual manipulation  Decreased sensation. Prolonged but adequate mastication with textured solids. Prolonged bolus formation and movement with solids. Reduced bolus control with premature bolus loss to the pharynx over the tongue base with all. Inconsistent sensation for discrimination of bolus volume.     Pharyngeal Phase  Delayed swallow initiation  Decreased laryngeal elevation  Decreased pharyngeal peristalsis  Reduced sensation. Premature spillage to the valleculae with all. Overflow to the pyriform with nectar and thin. Penetration that does not clear with thin via tsp and cup using small sips; chin tuck attempted but not beneficial; protective cough does not clear penetrate which is a decline from prior studies in 2018 and 2019. SILENT aspiration with nectar via cup without use of small sip. Concern for inconsistent recall and discrimination for consistent carryover of nato sips. Vallecular residue with easy to chew and regular solids requires repeat swallows to clear; max effort to clear regular residue. Upper Esophageal Phase  Esophageal Screen:  (ZEENAT)    Pain   Pain: 0      Therapy Time:  8:50 AM - 9:35 AM  45 minutes    Radha Barry, #[id number]  Speech-Language Pathologist  Portable phone: [phone]  2/7/2023, 9:35 AM

## 2023-02-07 NOTE — PROGRESS NOTES
Removed pt IV and applied a dry dressing without complications. Reviewed discharge instructions and answered questions with pt and family. Pt taken down in wheelchair for discharge home.

## 2023-02-07 NOTE — PROGRESS NOTES
Speech Language Pathology    Patient and wife met at bedside. Discharge education with handouts provided and demonstration completed on diet recommendations as well as comp strats and recs for continued therapy. Wife/patient without questions and provided with SLP contact information should questions arise post-discharge. Thank you. Ramila Ingram, #[id number]  Speech-Language Pathologist  Portable phone: [phone]    Time with patient: 10\"

## 2023-02-07 NOTE — DISCHARGE INSTR - COC
Continuity of Care Form    Patient Name: Maria Aldridge   :  [id number]  MRN:  [id number]    Admit date:  2023  Discharge date:  2023    Code Status Order: DNR-CCA   Advance Directives:     Admitting Physician:  Alejandro Chavez MD  PCP: Freya Gunter    Discharging Nurse: [facility] Unit/Room#: Y7U-7288/8937-64  Discharging Unit Phone Number: [phone]    Emergency Contact:   Extended Emergency Contact Information  Primary Emergency Contact: Portia Gauthier  Address: [address] [place] [address], [address] [place] [address],[address] Phone: [phone]  Work Phone: [phone]  Mobile Phone: [phone]  Relation: Spouse  Secondary Emergency Contact: Eduardo Bridegroom  Home Phone: [phone]  Relation: Child    Past Surgical History:  Past Surgical History:   Procedure Laterality Date    23[address] [facility]      CERVICAL SPINE SURGERY      CORONARY ARTERY BYPASS GRAFT      FRACTURE SURGERY Left 1988    Shoulder       Immunization History:   Immunization History   Administered Date(s) Administered    Influenza Vaccine, unspecified formulation 10/22/2016    Influenza, AFLURIA (age 1 yrs+), FLUZONE, (age 10 mo+), MDV, 0.5mL 10/22/2016, 09/15/2017    Influenza, High Dose (Fluzone 65 yrs and older) 09/15/2017    Pneumococcal Conjugate 13-valent (Angelina Claw) 2016    Tdap (Boostrix, Adacel) 2018       Active Problems:  Patient Active Problem List   Diagnosis Code    Diabetes mellitus ([facility] [facility] E11.9    CAD (coronary artery disease) I25.10    Morbid obesity due to excess calories ([facility]) E66.01    COPD (chronic obstructive pulmonary disease) ([facility]) J44.9    Sepsis ([facility] [address] A41.9    SOB (shortness of breath) R06.02    Abnormal chest CT R93.89    Back pain M54.9    Diabetes mellitus type 2, uncontrolled [id number]    Chronic back pain M54.9, G89.29    Chronic combined systolic (congestive) and diastolic (congestive) heart failure ([facility]) I50.42 Hyperlipidemia E78.5    Essential hypertension I10    Leukocytosis D72.829    Chronic bronchitis, simple ([facility]) J41.0    Chronic rhinitis J31.0    Generalized weakness R53.1    Acute renal failure (ARF) ([facility]) N17.9    DMII (diabetes mellitus, type 2) ([facility]) E11.9    Supratherapeutic INR R79.1    Pneumonia due to infectious agent J18.9    Tachycardia R00.0    Tachypnea R06.82    Non morbid obesity due to excess calories E66.09    Fatigue R53.83    Cough productive of clear sputum R05.8    History of coronary artery bypass graft Z95.1    Abrasion of right elbow S50.311A    Acute respiratory failure with hypoxia ([facility]) J96.01    COPD exacerbation ([facility]) J44.1    Paroxysmal atrial fibrillation ([facility]) I48.0       Isolation/Infection:   Isolation            No Isolation          Patient Infection Status       Infection Onset Added Last Indicated Last Indicated By Review Planned Expiration Resolved Resolved By    None active    Resolved    COVID-19 (Rule Out) 02/04/23 02/04/23 02/04/23 COVID-19, Rapid (Ordered)   02/04/23 Rule-Out Test Resulted    COVID-19 (Rule Out) 08/25/21 08/25/21 08/25/21 COVID-19 (Ordered)   08/26/21 Rule-Out Test Resulted    COVID-19 (Rule Out) 01/02/21 01/02/21 01/02/21 COVID-19 (Ordered)   01/02/21 Rule-Out Test Resulted            Nurse Assessment:  Last Vital Signs: /61   Pulse 84   Temp 97.5 °F (36.4 °C) (Oral)   Resp 17   Ht 5' 10\" (1.778 m)   Wt 222 lb 14.2 oz (101.1 kg)   SpO2 93%   BMI 31.98 kg/m²     Last documented pain score (0-10 scale): Pain Level: 7  Last Weight:   Wt Readings from Last 1 Encounters:   02/07/23 222 lb 14.2 oz (101.1 kg)     Mental Status:  oriented, alert, coherent, logical, thought processes intact, and able to concentrate and follow conversation    IV Access:  - None    Nursing Mobility/ADLs:  Walking   Assisted  Transfer  Assisted  Bathing  Assisted  Dressing  Independent  Toileting  Independent  Feeding  Independent  Med [address]  Independent  Med Delivery whole    Wound Care Documentation and Therapy:        Elimination:  Continence: Bowel: Yes  Bladder: Yes  Urinary Catheter: None   Colostomy/Ileostomy/Ileal Conduit: No       Date of Last BM: 02/06/2023    Intake/Output Summary (Last 24 hours) at 2/7/2023 0523  Last data filed at 2/7/2023 0431  Gross per 24 hour   Intake 600 ml   Output 1300 ml   Net -700 ml     I/O last 3 completed shifts: In: 4795 [P.O.:1390]  Out: [address] [Urine:1350]    Safety Concerns: At Risk for Falls    Impairments/Disabilities:      None    Nutrition Therapy:  Current Nutrition Therapy:   - Oral Diet:  Carb Control 5 carbs/meal (2000kcals/day)    Routes of Feeding: Oral  Liquids: Honey Thick Liquids  Daily Fluid Restriction: no  Last Modified Barium Swallow with Video (Video Swallowing Test): done on 02/07/2023/Recommend continue out pt therapy    Treatments at the Time of Hospital Discharge:   Respiratory Treatments: ***  Oxygen Therapy:  is not on home oxygen therapy.   Ventilator:    - No ventilator support    Rehab Therapies: Speech/Language Therapy  Weight Bearing Status/Restrictions: No weight bearing restrictions  Other Medical Equipment (for information only, NOT a DME order):  walker  Other Treatments: ***    Patient's personal belongings (please select all that are sent with patient):  Glasses, Dentures upper and lower    RN SIGNATURE:  Electronically signed by Javon Feldman RN on 2/7/23 at 1:36 PM EST    CASE MANAGEMENT/SOCIAL WORK SECTION    Inpatient Status Date: 02/04/2023    Readmission Risk Assessment Score:  Readmission Risk              Risk of Unplanned Readmission:  21           Discharging to Facility/ Agency   Name:     [facility]  Address:  [address] [place] [address],  [address], [address] [id number]  Phone:     [phone]  Fax:         [phone]       / signature: Electronically signed by Kayce Kumari RN on 2/7/23 at 12:07 PM EST    PHYSICIAN SECTION    Prognosis: Fair    Condition at Discharge: Stable    Rehab Potential (if transferring to Rehab): Fair    Recommended Labs or Other Treatments After Discharge: Repeat CXR in about 4 weeks. Physician Certification: I certify the above information and transfer of Codey Rainey  is necessary for the continuing treatment of the diagnosis listed and that he requires [address] for less 30 days.      Update Admission H&P: No change in H&P    PHYSICIAN SIGNATURE:  Electronically signed by Aravind Colón MD on 2/7/23 at 5:23 AM EST

## 2023-02-07 NOTE — PLAN OF CARE
Problem: Discharge Planning  Goal: Discharge to home or other facility with appropriate resources  2/7/2023 0239 by Edgardo Winn RN  Outcome: Progressing     Problem: Pain  Goal: Verbalizes/displays adequate comfort level or baseline comfort level  2/7/2023 0239 by Edgardo Winn RN  Outcome: Progressing     Problem: Safety - Adult  Goal: Free from fall injury  2/7/2023 0239 by Edgardo Winn RN  Outcome: Progressing     Problem: ABCDS Injury Assessment  Goal: Absence of physical injury  2/7/2023 0239 by Edgardo Winn RN  Outcome: Progressing     Problem: Chronic Conditions and Co-morbidities  Goal: Patient's chronic conditions and co-morbidity symptoms are monitored and maintained or improved  2/7/2023 0239 by Edgardo Winn RN  Outcome: Progressing     Problem: Respiratory - Adult  Goal: Achieves optimal ventilation and oxygenation  Outcome: Progressing     Problem: Skin/Tissue Integrity - Adult  Goal: Skin integrity remains intact  Outcome: Progressing  Goal: Incisions, wounds, or drain sites healing without S/S of infection  Outcome: Progressing  Goal: Oral mucous membranes remain intact  Outcome: Progressing     Problem: Musculoskeletal - Adult  Goal: Return mobility to safest level of function  Outcome: Progressing  Goal: Maintain proper alignment of affected body part  Outcome: Progressing  Goal: Return ADL status to a safe level of function  Outcome: Progressing     Problem: Gastrointestinal - Adult  Goal: Minimal or absence of nausea and vomiting  Outcome: Progressing  Goal: Maintains or returns to baseline bowel function  Outcome: Progressing  Goal: Maintains adequate nutritional intake  Outcome: Progressing     Problem: Metabolic/Fluid and Electrolytes - Adult  Goal: Electrolytes maintained within normal limits  Outcome: Progressing  Goal: Hemodynamic stability and optimal renal function maintained  Outcome: Progressing  Goal: Glucose maintained within prescribed range  Outcome: Progressing

## 2023-02-07 NOTE — CARE COORDINATION
[facility]    DC order noted, all docs needed have been faxed to [facility] for home care services.     Home care to see patient within 24-48 hrs    Cherylene Arista RN, BSN CTN  [facility] [phone]

## 2023-02-07 NOTE — PROGRESS NOTES
Hospital Medicine Progress Note     Date:  2/7/2023    PCP: Flori Gates (Tel: [phone])    Date of Admission: 2/4/2023    Chief complaint:   Chief Complaint   Patient presents with    Arlon Flores down while walking to the bathroom, patient uses a walker       Brief admission history: 70-year-old male with history of COPD, chronic combined systolic and diastolic heart failure (echocardiogram from August 2021 with ejection fraction of 45 to 79%, grade 2 diastolic dysfunction), CAD status post CABG, uncontrolled diabetes type 2 with hyperglycemia, essential hypertension, paroxysmal atrial fibrillation (on chronic anticoagulation with eliquis), stated with BMI of 33 kg/m², who was recently admitted to [facility] for management of bacterial pneumonia in December 2022. Since then, he continues to have ongoing shortness of breath and productive cough, and was recently reevaluated by his pulmonologist on February 4, 2023, when he was started on Levaquin. He then presented to the emergency room with report of increasing shortness of breath, generalized weakness and a fall that was described as sliding down to the floor, complicated by abrasion to right elbow. He was found to have low oxygen saturation of 87% on room air in the emergency room. He was tachycardic with heart rate up to 113 beats per minute, tachypneic with respiratory rate up to 26 bpm.  He reportedly had wheezes on examination. Chest imaging also revealed multifocal infiltrates. X-ray-right elbow was unremarkable for acute pathology or fracture. Assessment/plan:  COPD exacerbation. Continue steroid, breathing treatments and antibiotics. Maintain follow up with pulmonologist as outpatient. Changed IV solumedrol to oral prednisone 2/5/2023 due to significant hyperglycemia.   Decreased oral prednisone from 40 mg daily to 20 mg daily on 2/7/2023 - wheezes resolved, but patient having persistent hyperglycemia with glucose >300 due to high dose prednisone. Bacterial pneumonia, likely due to aspiration. Continue antibiotics for now. Follow cultures. MRSA nares negative; discontinued vancomycin 2/6. He has been on doxycycline, cefepime. Will plan on discharge on doxyxycline and augmentin, to complete therapy as outpatient. Hypoxia, resolved. Oxygen saturation was down to 87% on room air. Continue management of #1. He has been weaned off supplemental oxygen as of 2/6/2023. Home oxygen evaluation ordered on 2/7/2023. Uncontrolled diabetes type 2 with hyperglycemia. A1c is 7.5. Current hyperglycemia is likely secondary to steroid. Adjusted insulin dose to accommodate hyperglycemia control during hospital stay. Will need lower insulin doses once off steroid; hence, OK to resume home doses of diabetes medications at discharge, now that prednisone is being weaned. Generalized weakness. Likely secondary to acute medical conditions. Therapy has evaluated - recommended home with assist PRN. Other comorbidities: history of COPD, chronic combined systolic and diastolic heart failure (echocardiogram from August 2021 with ejection fraction of 45 to 29%, grade 2 diastolic dysfunction), CAD status post CABG, uncontrolled diabetes type 2 with hyperglycemia, essential hypertension, paroxysmal atrial fibrillation (on chronic anticoagulation with eliquis). Disposition. Stable for discharge home with recommendation to follow up with PCP and pulmonologist as outpatient. Discharge once OK'd from pulmonology standpoint. Diet: ADULT DIET; Easy to Chew; 5 carb choices (75 gm/meal); Low Sodium (2 gm); Moderately Thick (Honey)    Code status: DNR-CCA   ----------  Subjective  No SOB today. Denies any needs. Objective  Physical exam:  Vitals: /61   Pulse 84   Temp 97.5 °F (36.4 °C) (Oral)   Resp 17   Ht 5' 10\" (1.778 m)   Wt 222 lb 14.2 oz (101.1 kg)   SpO2 93%   BMI 31.98 kg/m²   Gen/overall appearance: Not in acute distress. Alert. Oriented X3  Head: Normocephalic, atraumatic  Eyes: EOMI, good acuity  ENT: Oral mucosa moist  Neck: No JVD, thyromegaly  CVS: Nml S1S2, no MRG, RRR  Pulm: Clear bilaterally. No wheezes. Gastrointestinal: Soft, NT/ND, +BS  Musculoskeletal: No edema. Warm  Neuro: No focal deficit. Moves extremity spontaneously. Psychiatry: Appropriate affect. Not agitated. Skin: Warm, dry with normal turgor. No rash  Capillary refill: Brisk,< 3 seconds   Peripheral Pulses: +2 palpable, equal bilaterally      24HR INTAKE/OUTPUT:    Intake/Output Summary (Last 24 hours) at 2/7/2023 0515  Last data filed at 2/7/2023 0149  Gross per 24 hour   Intake 600 ml   Output 800 ml   Net -200 ml       I/O last 3 completed shifts:   In: 1390 [P.O.:1390]  Out: 1350 [Urine:1350]  I/O this shift:  In: 360 [P.O.:360]  Out: 400 [Urine:400]  Meds:    insulin lispro  10 Units SubCUTAneous TID     insulin glargine  30 Units SubCUTAneous Daily    predniSONE  20 mg Oral Daily    doxycycline hyclate  100 mg Oral 2 times per day    ipratropium-albuterol  1 ampule Inhalation BID    fluticasone  2 spray Each Nostril BID    multivitamin  1 tablet Oral Daily    pramipexole  0.25 mg Oral BID    pramipexole  0.5 mg Oral Nightly    Roflumilast  500 mcg Oral Daily    lisinopril  10 mg Oral Daily    mometasone-formoterol  2 puff Inhalation BID    aspirin  81 mg Oral Daily    apixaban  5 mg Oral BID    sodium chloride flush  10 mL IntraVENous 2 times per day    cefepime  2,000 mg IntraVENous Q12H    insulin lispro  0-8 Units SubCUTAneous TID     insulin lispro  0-4 Units SubCUTAneous Nightly    atorvastatin  20 mg Oral Nightly    carvedilol  3.125 mg Oral BID WC    gabapentin  800 mg Oral Q6H    [Held by provider] furosemide  40 mg Oral Daily    oxyCODONE  35 mg Oral 2 times per day     Infusions:    dextrose      sodium chloride       PRN Meds: benzonatate, fluticasone, albuterol sulfate HFA, glucose, dextrose bolus **OR** dextrose bolus, glucagon (rDNA), dextrose, sodium chloride flush, sodium chloride, ondansetron, polyethylene glycol, acetaminophen **OR** acetaminophen, ipratropium-albuterol    Labs/imaging:  CBC:   Recent Labs     02/04/23 2022 02/05/23 0647 02/06/23  0717   WBC 9.6 6.6 13.2*   HGB 13.2* 13.2* 12.3*    307 345       BMP:    Recent Labs     02/05/23 0647 02/05/23 1903 02/06/23  0717   * 129* 135*   K 5.0 4.7 4.5   CL 95* 91* 99   CO2 23 24 25   BUN 27* 28* 33*   CREATININE 1.2 1.1 1.0   GLUCOSE 344* 452* 285*       Hepatic:   Recent Labs     02/04/23 2022 02/06/23  0717   AST 13* 12*   ALT 11 9*   BILITOT 0.4 0.3   ALKPHOS 52 43         Artem Dorsey MD  -------------------------------  Rounding hospitalist

## 2023-02-09 LAB
BLOOD CULTURE, ROUTINE: NORMAL
CULTURE, BLOOD 2: NORMAL

## 2023-02-14 ENCOUNTER — TELEPHONE (OUTPATIENT)
Dept: PULMONOLOGY | Age: 78
End: 2023-02-14

## 2023-02-14 NOTE — TELEPHONE ENCOUNTER
Since December Maggi Villafuerte has been battling pneumonia. He has a home care nurse who came in yesterday and said that she still hears crackling in his lungs. She thinks that he should be seen. Please review and advise. He's been out of the hospital for about a week and has finished all prescribed medications.  NOV 3/7

## 2023-02-17 ENCOUNTER — OFFICE VISIT (OUTPATIENT)
Dept: PULMONOLOGY | Age: 78
End: 2023-02-17
Payer: MEDICARE

## 2023-02-17 VITALS
DIASTOLIC BLOOD PRESSURE: 70 MMHG | SYSTOLIC BLOOD PRESSURE: 130 MMHG | WEIGHT: 222 LBS | TEMPERATURE: 97.3 F | OXYGEN SATURATION: 97 % | HEIGHT: 71 IN | HEART RATE: 71 BPM | RESPIRATION RATE: 18 BRPM | BODY MASS INDEX: 31.08 KG/M2

## 2023-02-17 DIAGNOSIS — J41.0 CHRONIC BRONCHITIS, SIMPLE (HCC): ICD-10-CM

## 2023-02-17 DIAGNOSIS — J18.9 PNEUMONIA DUE TO INFECTIOUS ORGANISM, UNSPECIFIED LATERALITY, UNSPECIFIED PART OF LUNG: Primary | ICD-10-CM

## 2023-02-17 PROCEDURE — 3078F DIAST BP <80 MM HG: CPT | Performed by: INTERNAL MEDICINE

## 2023-02-17 PROCEDURE — G8417 CALC BMI ABV UP PARAM F/U: HCPCS | Performed by: INTERNAL MEDICINE

## 2023-02-17 PROCEDURE — 1111F DSCHRG MED/CURRENT MED MERGE: CPT | Performed by: INTERNAL MEDICINE

## 2023-02-17 PROCEDURE — 3023F SPIROM DOC REV: CPT | Performed by: INTERNAL MEDICINE

## 2023-02-17 PROCEDURE — 1036F TOBACCO NON-USER: CPT | Performed by: INTERNAL MEDICINE

## 2023-02-17 PROCEDURE — 99214 OFFICE O/P EST MOD 30 MIN: CPT | Performed by: INTERNAL MEDICINE

## 2023-02-17 PROCEDURE — 1123F ACP DISCUSS/DSCN MKR DOCD: CPT | Performed by: INTERNAL MEDICINE

## 2023-02-17 PROCEDURE — 3074F SYST BP LT 130 MM HG: CPT | Performed by: INTERNAL MEDICINE

## 2023-02-17 PROCEDURE — G8484 FLU IMMUNIZE NO ADMIN: HCPCS | Performed by: INTERNAL MEDICINE

## 2023-02-17 PROCEDURE — G8427 DOCREV CUR MEDS BY ELIG CLIN: HCPCS | Performed by: INTERNAL MEDICINE

## 2023-02-17 RX ORDER — BENZONATATE 200 MG/1
200 CAPSULE ORAL 3 TIMES DAILY PRN
Qty: 90 CAPSULE | Refills: 0 | Status: SHIPPED | OUTPATIENT
Start: 2023-02-17 | End: 2023-03-19

## 2023-02-17 RX ORDER — SULFAMETHOXAZOLE AND TRIMETHOPRIM 800; 160 MG/1; MG/1
1 TABLET ORAL 2 TIMES DAILY
Qty: 14 TABLET | Refills: 0 | Status: SHIPPED | OUTPATIENT
Start: 2023-02-17 | End: 2023-02-24

## 2023-02-17 NOTE — PROGRESS NOTES
REASON FOR CONSULTATION/CC:    Chief Complaint   Patient presents with    Follow-up     Crackling in lungs. Possible pneumonia        Consult at request of   Kourtney Lopez for      PCP: Kourtney Lopez    HISTORY OF PRESENT ILLNESS: Ishmael Pearce is a 68y.o. year old male with a history of chronic bronchitis  who presents :           chronic bronchitis   Daliresp   Advair 250    albuterol                 Obesity        Cough. Recently admitted for Aspiration pneumonia  Continues have cough with some phlegm . Thick yellow phlegm     After last visit, he was admitted for pneumonia and copd exacerbation. He was treated with Vanc, doxy and Augmentin. Took tesselon pearles and symptoms conrolled. Still using Daliresp, Advair, albuterol         Aspiration pneumonia. Has speech therapy  Completed antibiotics. culture was negative 2/4/23. Objective:   PHYSICAL EXAM:  Blood pressure 130/70, pulse 71, temperature 97.3 °F (36.3 °C), resp. rate 18, height 5' 11\" (1.803 m), weight 222 lb (100.7 kg), SpO2 97 %.'  Body mass index is 30.96 kg/m². Gen: No distress. Eyes:    ENT:    Neck:    Resp: No accessory muscle use. No crackles. No wheezes. No rhonchi. CV: Regular rate. Regular rhythm. No murmur or rub. no edema. GI:    Skin:    Lymph:    M/S: No cyanosis. No clubbing. Neuro: Moves all four extremities. Psych: Oriented x 3. No anxiety. Awake. Alert. Intact judgement and insight. Data Reviewed:        Assessment:      chronic bronchitis   Chronic rhonitis  Neuropathy with DM       Plan:      Problem List Items Addressed This Visit       Pneumonia due to infectious agent - Primary     Recently admitted for aspiration pneumonia. Will given another round of antibiotics, bactrim.     Tesselon for coughs           Relevant Medications    sulfamethoxazole-trimethoprim (BACTRIM DS;SEPTRA DS) 800-160 MG per tablet    benzonatate (TESSALON) 200 MG capsule    Chronic bronchitis, simple ([facility] [address]     Continues to be treated with Advair and Daliresp. albuterol prn. This note was transcribed using [id number] [employer]. Please disregard any translational errors.     Valentin Ignacio Pulmonary, Sleep and [facility] [phone]

## 2023-02-19 NOTE — ASSESSMENT & PLAN NOTE
Recently admitted for aspiration pneumonia. Will given another round of antibiotics, bactrim.     Tesselon for coughs

## 2023-02-21 RX ORDER — ALBUTEROL SULFATE 2.5 MG/3ML
SOLUTION RESPIRATORY (INHALATION)
Qty: 120 EACH | Refills: 11 | Status: SHIPPED | OUTPATIENT
Start: 2023-02-21

## 2023-02-21 NOTE — TELEPHONE ENCOUNTER
Last appt: 2-  Next appt: 3-7-2023  We were not the last provider to prescribe this medication.   Medication matches medication on Epic list

## 2023-03-07 ENCOUNTER — OFFICE VISIT (OUTPATIENT)
Dept: PULMONOLOGY | Age: 78
End: 2023-03-07
Payer: MEDICARE

## 2023-03-07 VITALS
HEIGHT: 71 IN | WEIGHT: 223 LBS | HEART RATE: 78 BPM | BODY MASS INDEX: 31.22 KG/M2 | OXYGEN SATURATION: 96 % | DIASTOLIC BLOOD PRESSURE: 60 MMHG | SYSTOLIC BLOOD PRESSURE: 110 MMHG | TEMPERATURE: 97.1 F

## 2023-03-07 DIAGNOSIS — J41.0 CHRONIC BRONCHITIS, SIMPLE (HCC): ICD-10-CM

## 2023-03-07 DIAGNOSIS — T17.908D CHRONIC PULMONARY ASPIRATION, SUBSEQUENT ENCOUNTER: ICD-10-CM

## 2023-03-07 PROBLEM — T17.908A CHRONIC PULMONARY ASPIRATION: Status: ACTIVE | Noted: 2023-03-07

## 2023-03-07 PROBLEM — J44.1 COPD EXACERBATION (HCC): Status: RESOLVED | Noted: 2023-02-04 | Resolved: 2023-03-07

## 2023-03-07 PROBLEM — J96.01 ACUTE RESPIRATORY FAILURE WITH HYPOXIA (HCC): Status: RESOLVED | Noted: 2023-02-04 | Resolved: 2023-03-07

## 2023-03-07 PROBLEM — N17.9 ACUTE RENAL FAILURE (ARF) (HCC): Status: RESOLVED | Noted: 2019-04-13 | Resolved: 2023-03-07

## 2023-03-07 PROBLEM — J18.9 PNEUMONIA DUE TO INFECTIOUS AGENT: Status: RESOLVED | Noted: 2023-02-04 | Resolved: 2023-03-07

## 2023-03-07 PROCEDURE — 3023F SPIROM DOC REV: CPT | Performed by: INTERNAL MEDICINE

## 2023-03-07 PROCEDURE — 1123F ACP DISCUSS/DSCN MKR DOCD: CPT | Performed by: INTERNAL MEDICINE

## 2023-03-07 PROCEDURE — 3078F DIAST BP <80 MM HG: CPT | Performed by: INTERNAL MEDICINE

## 2023-03-07 PROCEDURE — 1036F TOBACCO NON-USER: CPT | Performed by: INTERNAL MEDICINE

## 2023-03-07 PROCEDURE — G8484 FLU IMMUNIZE NO ADMIN: HCPCS | Performed by: INTERNAL MEDICINE

## 2023-03-07 PROCEDURE — 1111F DSCHRG MED/CURRENT MED MERGE: CPT | Performed by: INTERNAL MEDICINE

## 2023-03-07 PROCEDURE — 3074F SYST BP LT 130 MM HG: CPT | Performed by: INTERNAL MEDICINE

## 2023-03-07 PROCEDURE — G8427 DOCREV CUR MEDS BY ELIG CLIN: HCPCS | Performed by: INTERNAL MEDICINE

## 2023-03-07 PROCEDURE — 99214 OFFICE O/P EST MOD 30 MIN: CPT | Performed by: INTERNAL MEDICINE

## 2023-03-07 PROCEDURE — G8417 CALC BMI ABV UP PARAM F/U: HCPCS | Performed by: INTERNAL MEDICINE

## 2023-03-07 NOTE — PROGRESS NOTES
REASON FOR CONSULTATION/CC:    Chief Complaint   Patient presents with    Follow-Up from Hospital    Pneumonia        Consult at request of   Tereza Valderrama for      PCP: Tereza Valderrama    HISTORY OF PRESENT ILLNESS: Susy Mccoy is a 68y.o. year old male with a history of chronic bronchitis  who presents :           chronic bronchitis   Daliresp   Advair 250    albuterol         Now back to normal after bactrim. Aspiration   Working with SLP  Using thickener           Obesity                       Objective:   PHYSICAL EXAM:  Blood pressure 110/60, pulse 78, temperature 97.1 °F (36.2 °C), temperature source Infrared, height 5' 11\" (1.803 m), weight 223 lb (101.2 kg), SpO2 96 %.'  Body mass index is 31.1 kg/m². Gen: No distress. Eyes:    ENT:    Neck:    Resp: No accessory muscle use. No crackles. No wheezes. No rhonchi. CV: Regular rate. Regular rhythm. No murmur or rub. no edema. GI:    Skin:    Lymph:    M/S: No cyanosis. No clubbing. Neuro: Moves all four extremities. Psych: Oriented x 3. No anxiety. Awake. Alert. Intact judgement and insight. Data Reviewed:        Assessment:      chronic bronchitis   Chronic rhonitis  Neuropathy with DM       Plan:      Problem List Items Addressed This Visit       Chronic pulmonary aspiration     Working with SLP. Using thickener. Chronic bronchitis, simple ([address]     Back to baseline with Advair, Daliresp, with albuterol after Bactrim. This note was transcribed using [id number] [employer]. Please disregard any translational errors.     Valentin Ignacio Pulmonary, Sleep and [facility] [phone]

## 2023-03-10 ENCOUNTER — TELEPHONE (OUTPATIENT)
Dept: PULMONOLOGY | Age: 78
End: 2023-03-10

## 2023-03-10 DIAGNOSIS — J41.0 CHRONIC BRONCHITIS, SIMPLE (HCC): Primary | ICD-10-CM

## 2023-03-10 NOTE — TELEPHONE ENCOUNTER
Wife Xiomy Brought calls. Request order for new nebulizer machine. Current one making noise and feels vaporizing not as strong as it should be.   Last ov on 3/7/2023  Fax order to [address] at [phone]

## 2023-03-10 NOTE — TELEPHONE ENCOUNTER
Call from [facility](pt pcp). Pt has had pneumonia x3 in last 3 months. PCP wants to discuss  thoughts on putting patient on a chronic antibiotic. Call routed to  to reach out to [address] [place] [address] [address].   His cell number :[phone] Benadryl 12.5 mg (5 ml) every 6 hrs  for 2 days.  To return to the ED for persistent or worsening signs and symptoms.

## 2023-03-13 ENCOUNTER — APPOINTMENT (OUTPATIENT)
Dept: GENERAL RADIOLOGY | Age: 78
End: 2023-03-13
Payer: MEDICARE

## 2023-03-13 ENCOUNTER — APPOINTMENT (OUTPATIENT)
Dept: ULTRASOUND IMAGING | Age: 78
End: 2023-03-13
Payer: MEDICARE

## 2023-03-13 ENCOUNTER — HOSPITAL ENCOUNTER (INPATIENT)
Age: 78
LOS: 4 days | Discharge: HOME | End: 2023-03-17
Attending: INTERNAL MEDICINE | Admitting: INTERNAL MEDICINE
Payer: MEDICARE

## 2023-03-13 ENCOUNTER — APPOINTMENT (OUTPATIENT)
Dept: CT IMAGING | Age: 78
End: 2023-03-13
Payer: MEDICARE

## 2023-03-13 DIAGNOSIS — J90 PLEURAL EFFUSION, BILATERAL: ICD-10-CM

## 2023-03-13 DIAGNOSIS — R77.8 ELEVATED TROPONIN: ICD-10-CM

## 2023-03-13 DIAGNOSIS — J15.6 GRAM-NEGATIVE PNEUMONIA (HCC): ICD-10-CM

## 2023-03-13 DIAGNOSIS — J18.9 PNEUMONIA OF BOTH LUNGS DUE TO INFECTIOUS ORGANISM, UNSPECIFIED PART OF LUNG: Primary | ICD-10-CM

## 2023-03-13 DIAGNOSIS — N17.9 AKI (ACUTE KIDNEY INJURY) (HCC): ICD-10-CM

## 2023-03-13 PROBLEM — J91.8 PARAPNEUMONIC EFFUSION: Status: ACTIVE | Noted: 2023-03-13

## 2023-03-13 PROBLEM — R79.89 ELEVATED TROPONIN: Status: ACTIVE | Noted: 2023-03-13

## 2023-03-13 LAB
A/G RATIO: 0.8 (ref 1.1–2.2)
ALBUMIN SERPL-MCNC: 3.3 G/DL (ref 3.4–5)
ALP BLD-CCNC: 59 U/L (ref 40–129)
ALT SERPL-CCNC: 9 U/L (ref 10–40)
ANION GAP SERPL CALCULATED.3IONS-SCNC: 11 MMOL/L (ref 3–16)
ANION GAP SERPL CALCULATED.3IONS-SCNC: 13 MMOL/L (ref 3–16)
APPEARANCE FLUID: NORMAL
AST SERPL-CCNC: 13 U/L (ref 15–37)
BASE EXCESS VENOUS: 2.1 MMOL/L
BASO FLUID: 1 %
BASOPHILS ABSOLUTE: 0.1 K/UL (ref 0–0.2)
BASOPHILS RELATIVE PERCENT: 1 %
BILIRUB SERPL-MCNC: 0.5 MG/DL (ref 0–1)
BUN BLDV-MCNC: 37 MG/DL (ref 7–20)
BUN BLDV-MCNC: 42 MG/DL (ref 7–20)
CALCIUM SERPL-MCNC: 8.5 MG/DL (ref 8.3–10.6)
CALCIUM SERPL-MCNC: 8.9 MG/DL (ref 8.3–10.6)
CARBOXYHEMOGLOBIN: 2.3 %
CELL COUNT FLUID TYPE: NORMAL
CHLORIDE BLD-SCNC: 95 MMOL/L (ref 99–110)
CHLORIDE BLD-SCNC: 99 MMOL/L (ref 99–110)
CHLORIDE URINE RANDOM: 75 MMOL/L
CLOT EVALUATION: NORMAL
CO2: 25 MMOL/L (ref 21–32)
CO2: 26 MMOL/L (ref 21–32)
COLOR FLUID: NORMAL
CREAT SERPL-MCNC: 1.5 MG/DL (ref 0.8–1.3)
CREAT SERPL-MCNC: 2 MG/DL (ref 0.8–1.3)
EKG ATRIAL RATE: 95 BPM
EKG DIAGNOSIS: NORMAL
EKG P AXIS: 58 DEGREES
EKG P-R INTERVAL: 186 MS
EKG Q-T INTERVAL: 354 MS
EKG QRS DURATION: 88 MS
EKG QTC CALCULATION (BAZETT): 444 MS
EKG R AXIS: 71 DEGREES
EKG T AXIS: 16 DEGREES
EKG VENTRICULAR RATE: 95 BPM
EOSINOPHIL FLUID: 7 %
EOSINOPHILS ABSOLUTE: 0.6 K/UL (ref 0–0.6)
EOSINOPHILS RELATIVE PERCENT: 5 %
ESTIMATED AVERAGE GLUCOSE: 154.2 MG/DL
FERRITIN: 244.9 NG/ML (ref 30–400)
GFR SERPL CREATININE-BSD FRML MDRD: 34 ML/MIN/{1.73_M2}
GFR SERPL CREATININE-BSD FRML MDRD: 47 ML/MIN/{1.73_M2}
GLUCOSE BLD-MCNC: 204 MG/DL (ref 70–99)
GLUCOSE BLD-MCNC: 209 MG/DL (ref 70–99)
GLUCOSE BLD-MCNC: 237 MG/DL (ref 70–99)
GLUCOSE BLD-MCNC: 261 MG/DL (ref 70–99)
GLUCOSE BLD-MCNC: 268 MG/DL (ref 70–99)
HBA1C MFR BLD: 7 %
HCO3 VENOUS: 28 MMOL/L (ref 23–29)
HCT VFR BLD CALC: 38.7 % (ref 40.5–52.5)
HEMOGLOBIN: 12.5 G/DL (ref 13.5–17.5)
INR BLD: 1.27 (ref 0.87–1.14)
IRON SATURATION: 9 % (ref 20–50)
IRON: 18 UG/DL (ref 59–158)
LACTATE DEHYDROGENASE: 130 U/L (ref 100–190)
LACTIC ACID: 1.6 MMOL/L (ref 0.4–2)
LIPASE: 12 U/L (ref 13–60)
LYMPHOCYTES ABSOLUTE: 1.1 K/UL (ref 1–5.1)
LYMPHOCYTES RELATIVE PERCENT: 10 %
LYMPHOCYTES, BODY FLUID: 9 %
MACROPHAGE FLUID: 1 %
MCH RBC QN AUTO: 29.2 PG (ref 26–34)
MCHC RBC AUTO-ENTMCNC: 32.2 G/DL (ref 31–36)
MCV RBC AUTO: 90.7 FL (ref 80–100)
METHEMOGLOBIN VENOUS: 0.4 %
MONOCYTE, FLUID: 1 %
MONOCYTES ABSOLUTE: 1.2 K/UL (ref 0–1.3)
MONOCYTES RELATIVE PERCENT: 11 %
NEUTROPHIL, FLUID: 81 %
NEUTROPHILS ABSOLUTE: 8.2 K/UL (ref 1.7–7.7)
NEUTROPHILS RELATIVE PERCENT: 73 %
NUCLEATED CELLS FLUID: 3681 /CUMM
NUMBER OF CELLS COUNTED FLUID: 100
O2 SAT, VEN: 50 %
O2 THERAPY: NORMAL
ORGANISM: ABNORMAL
PCO2, VEN: 48.4 MMHG (ref 40–50)
PDW BLD-RTO: 15.1 % (ref 12.4–15.4)
PERFORMED ON: ABNORMAL
PH VENOUS: 7.37 (ref 7.35–7.45)
PLATELET # BLD: 300 K/UL (ref 135–450)
PLATELET SLIDE REVIEW: ADEQUATE
PMV BLD AUTO: 7 FL (ref 5–10.5)
PNEUMONIA PANEL MOLECULAR: ABNORMAL
PO2, VEN: <30 MMHG
POTASSIUM REFLEX MAGNESIUM: 5 MMOL/L (ref 3.5–5.1)
POTASSIUM SERPL-SCNC: 5 MMOL/L (ref 3.5–5.1)
POTASSIUM, UR: 30.2 MMOL/L
PROCALCITONIN: 0.19 NG/ML (ref 0–0.15)
PROTEIN PROTEIN: 0.01 G/DL
PROTEIN PROTEIN: 6 MG/DL
PROTHROMBIN TIME: 15.9 SEC (ref 11.7–14.5)
RAPID INFLUENZA  B AGN: NEGATIVE
RAPID INFLUENZA A AGN: NEGATIVE
RBC # BLD: 4.27 M/UL (ref 4.2–5.9)
RBC FLUID: NORMAL /CUMM
REPORT: NORMAL
SARS-COV-2, NAAT: NOT DETECTED
SLIDE REVIEW: ABNORMAL
SODIUM BLD-SCNC: 133 MMOL/L (ref 136–145)
SODIUM BLD-SCNC: 136 MMOL/L (ref 136–145)
SODIUM URINE: 60 MMOL/L
TCO2 CALC VENOUS: 30 MMOL/L
TOTAL IRON BINDING CAPACITY: 200 UG/DL (ref 260–445)
TOTAL PROTEIN: 7.2 G/DL (ref 6.4–8.2)
TROPONIN: 0.02 NG/ML
VOLUME: 550 ML
WBC # BLD: 11.2 K/UL (ref 4–11)

## 2023-03-13 PROCEDURE — 2580000003 HC RX 258: Performed by: INTERNAL MEDICINE

## 2023-03-13 PROCEDURE — 6370000000 HC RX 637 (ALT 250 FOR IP): Performed by: INTERNAL MEDICINE

## 2023-03-13 PROCEDURE — 74177 CT ABD & PELVIS W/CONTRAST: CPT

## 2023-03-13 PROCEDURE — 0W993ZZ DRAINAGE OF RIGHT PLEURAL CAVITY, PERCUTANEOUS APPROACH: ICD-10-PCS | Performed by: STUDENT IN AN ORGANIZED HEALTH CARE EDUCATION/TRAINING PROGRAM

## 2023-03-13 PROCEDURE — 71045 X-RAY EXAM CHEST 1 VIEW: CPT

## 2023-03-13 PROCEDURE — 89051 BODY FLUID CELL COUNT: CPT

## 2023-03-13 PROCEDURE — 84484 ASSAY OF TROPONIN QUANT: CPT

## 2023-03-13 PROCEDURE — 94667 MNPJ CHEST WALL 1ST: CPT

## 2023-03-13 PROCEDURE — 87641 MR-STAPH DNA AMP PROBE: CPT

## 2023-03-13 PROCEDURE — C1729 CATH, DRAINAGE: HCPCS

## 2023-03-13 PROCEDURE — 99223 1ST HOSP IP/OBS HIGH 75: CPT | Performed by: INTERNAL MEDICINE

## 2023-03-13 PROCEDURE — 85610 PROTHROMBIN TIME: CPT

## 2023-03-13 PROCEDURE — 88112 CYTOPATH CELL ENHANCE TECH: CPT

## 2023-03-13 PROCEDURE — 9990000010 HC NO CHARGE VISIT

## 2023-03-13 PROCEDURE — 6360000004 HC RX CONTRAST MEDICATION: Performed by: PHYSICIAN ASSISTANT

## 2023-03-13 PROCEDURE — 83036 HEMOGLOBIN GLYCOSYLATED A1C: CPT

## 2023-03-13 PROCEDURE — 80053 COMPREHEN METABOLIC PANEL: CPT

## 2023-03-13 PROCEDURE — 84156 ASSAY OF PROTEIN URINE: CPT

## 2023-03-13 PROCEDURE — 87206 SMEAR FLUORESCENT/ACID STAI: CPT

## 2023-03-13 PROCEDURE — 87116 MYCOBACTERIA CULTURE: CPT

## 2023-03-13 PROCEDURE — 99285 EMERGENCY DEPT VISIT HI MDM: CPT

## 2023-03-13 PROCEDURE — 96375 TX/PRO/DX INJ NEW DRUG ADDON: CPT

## 2023-03-13 PROCEDURE — 71260 CT THORAX DX C+: CPT | Performed by: PHYSICIAN ASSISTANT

## 2023-03-13 PROCEDURE — 6360000002 HC RX W HCPCS: Performed by: PHYSICIAN ASSISTANT

## 2023-03-13 PROCEDURE — 1200000000 HC SEMI PRIVATE

## 2023-03-13 PROCEDURE — 93005 ELECTROCARDIOGRAM TRACING: CPT | Performed by: PHYSICIAN ASSISTANT

## 2023-03-13 PROCEDURE — 96361 HYDRATE IV INFUSION ADD-ON: CPT

## 2023-03-13 PROCEDURE — 84300 ASSAY OF URINE SODIUM: CPT

## 2023-03-13 PROCEDURE — 85025 COMPLETE CBC W/AUTO DIFF WBC: CPT

## 2023-03-13 PROCEDURE — 87070 CULTURE OTHR SPECIMN AEROBIC: CPT

## 2023-03-13 PROCEDURE — 82728 ASSAY OF FERRITIN: CPT

## 2023-03-13 PROCEDURE — 83540 ASSAY OF IRON: CPT

## 2023-03-13 PROCEDURE — 84166 PROTEIN E-PHORESIS/URINE/CSF: CPT

## 2023-03-13 PROCEDURE — 87102 FUNGUS ISOLATION CULTURE: CPT

## 2023-03-13 PROCEDURE — 87804 INFLUENZA ASSAY W/OPTIC: CPT

## 2023-03-13 PROCEDURE — 87635 SARS-COV-2 COVID-19 AMP PRB: CPT

## 2023-03-13 PROCEDURE — 87040 BLOOD CULTURE FOR BACTERIA: CPT

## 2023-03-13 PROCEDURE — 96365 THER/PROPH/DIAG IV INF INIT: CPT

## 2023-03-13 PROCEDURE — 88305 TISSUE EXAM BY PATHOLOGIST: CPT

## 2023-03-13 PROCEDURE — 2500000003 HC RX 250 WO HCPCS: Performed by: INTERNAL MEDICINE

## 2023-03-13 PROCEDURE — 87205 SMEAR GRAM STAIN: CPT

## 2023-03-13 PROCEDURE — 84133 ASSAY OF URINE POTASSIUM: CPT

## 2023-03-13 PROCEDURE — 6360000002 HC RX W HCPCS: Performed by: INTERNAL MEDICINE

## 2023-03-13 PROCEDURE — 82436 ASSAY OF URINE CHLORIDE: CPT

## 2023-03-13 PROCEDURE — 82803 BLOOD GASES ANY COMBINATION: CPT

## 2023-03-13 PROCEDURE — 2580000003 HC RX 258: Performed by: PHYSICIAN ASSISTANT

## 2023-03-13 PROCEDURE — 83615 LACTATE (LD) (LDH) ENZYME: CPT

## 2023-03-13 PROCEDURE — 94761 N-INVAS EAR/PLS OXIMETRY MLT: CPT

## 2023-03-13 PROCEDURE — 83605 ASSAY OF LACTIC ACID: CPT

## 2023-03-13 PROCEDURE — 2700000000 HC OXYGEN THERAPY PER DAY

## 2023-03-13 PROCEDURE — 93010 ELECTROCARDIOGRAM REPORT: CPT | Performed by: INTERNAL MEDICINE

## 2023-03-13 PROCEDURE — 83550 IRON BINDING TEST: CPT

## 2023-03-13 PROCEDURE — 94640 AIRWAY INHALATION TREATMENT: CPT

## 2023-03-13 PROCEDURE — 36415 COLL VENOUS BLD VENIPUNCTURE: CPT

## 2023-03-13 PROCEDURE — 84145 PROCALCITONIN (PCT): CPT

## 2023-03-13 PROCEDURE — 83690 ASSAY OF LIPASE: CPT

## 2023-03-13 PROCEDURE — 6370000000 HC RX 637 (ALT 250 FOR IP): Performed by: PHYSICIAN ASSISTANT

## 2023-03-13 PROCEDURE — 87633 RESP VIRUS 12-25 TARGETS: CPT

## 2023-03-13 RX ORDER — ATORVASTATIN CALCIUM 40 MG/1
40 TABLET, FILM COATED ORAL NIGHTLY
Status: DISCONTINUED | OUTPATIENT
Start: 2023-03-13 | End: 2023-03-17 | Stop reason: HOSPADM

## 2023-03-13 RX ORDER — IPRATROPIUM BROMIDE AND ALBUTEROL SULFATE 2.5; .5 MG/3ML; MG/3ML
1 SOLUTION RESPIRATORY (INHALATION)
Status: DISCONTINUED | OUTPATIENT
Start: 2023-03-13 | End: 2023-03-17 | Stop reason: HOSPADM

## 2023-03-13 RX ORDER — SODIUM CHLORIDE 9 MG/ML
INJECTION, SOLUTION INTRAVENOUS PRN
Status: DISCONTINUED | OUTPATIENT
Start: 2023-03-13 | End: 2023-03-17 | Stop reason: HOSPADM

## 2023-03-13 RX ORDER — ONDANSETRON 4 MG/1
4 TABLET, ORALLY DISINTEGRATING ORAL EVERY 8 HOURS PRN
Status: DISCONTINUED | OUTPATIENT
Start: 2023-03-13 | End: 2023-03-17 | Stop reason: HOSPADM

## 2023-03-13 RX ORDER — CARVEDILOL 6.25 MG/1
3.12 TABLET ORAL 2 TIMES DAILY WITH MEALS
Status: DISCONTINUED | OUTPATIENT
Start: 2023-03-13 | End: 2023-03-14

## 2023-03-13 RX ORDER — GABAPENTIN 100 MG/1
100 CAPSULE ORAL 3 TIMES DAILY
Status: DISCONTINUED | OUTPATIENT
Start: 2023-03-13 | End: 2023-03-17 | Stop reason: HOSPADM

## 2023-03-13 RX ORDER — ASPIRIN 81 MG/1
81 TABLET, CHEWABLE ORAL ONCE
Status: COMPLETED | OUTPATIENT
Start: 2023-03-13 | End: 2023-03-13

## 2023-03-13 RX ORDER — ONDANSETRON 2 MG/ML
4 INJECTION INTRAMUSCULAR; INTRAVENOUS EVERY 6 HOURS PRN
Status: DISCONTINUED | OUTPATIENT
Start: 2023-03-13 | End: 2023-03-17 | Stop reason: HOSPADM

## 2023-03-13 RX ORDER — GABAPENTIN 400 MG/1
800 CAPSULE ORAL 4 TIMES DAILY
Status: DISCONTINUED | OUTPATIENT
Start: 2023-03-13 | End: 2023-03-13

## 2023-03-13 RX ORDER — ROFLUMILAST 500 UG/1
500 TABLET ORAL DAILY
Status: DISCONTINUED | OUTPATIENT
Start: 2023-03-13 | End: 2023-03-17 | Stop reason: HOSPADM

## 2023-03-13 RX ORDER — FUROSEMIDE 40 MG/1
40 TABLET ORAL DAILY
Status: DISCONTINUED | OUTPATIENT
Start: 2023-03-13 | End: 2023-03-13

## 2023-03-13 RX ORDER — SODIUM CHLORIDE, SODIUM LACTATE, POTASSIUM CHLORIDE, CALCIUM CHLORIDE 600; 310; 30; 20 MG/100ML; MG/100ML; MG/100ML; MG/100ML
INJECTION, SOLUTION INTRAVENOUS CONTINUOUS
Status: DISCONTINUED | OUTPATIENT
Start: 2023-03-13 | End: 2023-03-17 | Stop reason: HOSPADM

## 2023-03-13 RX ORDER — INSULIN LISPRO 100 [IU]/ML
0-8 INJECTION, SOLUTION INTRAVENOUS; SUBCUTANEOUS
Status: DISCONTINUED | OUTPATIENT
Start: 2023-03-13 | End: 2023-03-17 | Stop reason: HOSPADM

## 2023-03-13 RX ORDER — INSULIN GLARGINE 100 [IU]/ML
0.15 INJECTION, SOLUTION SUBCUTANEOUS NIGHTLY
Status: DISCONTINUED | OUTPATIENT
Start: 2023-03-13 | End: 2023-03-17 | Stop reason: HOSPADM

## 2023-03-13 RX ORDER — PRAMIPEXOLE DIHYDROCHLORIDE 0.25 MG/1
0.25 TABLET ORAL NIGHTLY
Status: DISCONTINUED | OUTPATIENT
Start: 2023-03-13 | End: 2023-03-17 | Stop reason: HOSPADM

## 2023-03-13 RX ORDER — 0.9 % SODIUM CHLORIDE 0.9 %
500 INTRAVENOUS SOLUTION INTRAVENOUS ONCE
Status: COMPLETED | OUTPATIENT
Start: 2023-03-13 | End: 2023-03-13

## 2023-03-13 RX ORDER — ASPIRIN 81 MG/1
81 TABLET ORAL DAILY
Status: DISCONTINUED | OUTPATIENT
Start: 2023-03-13 | End: 2023-03-17 | Stop reason: HOSPADM

## 2023-03-13 RX ORDER — METRONIDAZOLE 500 MG/100ML
500 INJECTION, SOLUTION INTRAVENOUS EVERY 8 HOURS
Status: DISCONTINUED | OUTPATIENT
Start: 2023-03-13 | End: 2023-03-14

## 2023-03-13 RX ORDER — DEXTROSE MONOHYDRATE 100 MG/ML
INJECTION, SOLUTION INTRAVENOUS CONTINUOUS PRN
Status: DISCONTINUED | OUTPATIENT
Start: 2023-03-13 | End: 2023-03-17 | Stop reason: HOSPADM

## 2023-03-13 RX ORDER — POLYETHYLENE GLYCOL 3350 17 G/17G
17 POWDER, FOR SOLUTION ORAL DAILY PRN
Status: DISCONTINUED | OUTPATIENT
Start: 2023-03-13 | End: 2023-03-17 | Stop reason: HOSPADM

## 2023-03-13 RX ORDER — NEBULIZER ACCESSORIES
KIT MISCELLANEOUS
Qty: 1 KIT | Refills: 0 | Status: SHIPPED | OUTPATIENT
Start: 2023-03-13

## 2023-03-13 RX ORDER — SODIUM CHLORIDE 0.9 % (FLUSH) 0.9 %
5-40 SYRINGE (ML) INJECTION EVERY 12 HOURS SCHEDULED
Status: DISCONTINUED | OUTPATIENT
Start: 2023-03-13 | End: 2023-03-17 | Stop reason: HOSPADM

## 2023-03-13 RX ORDER — ENOXAPARIN SODIUM 100 MG/ML
1 INJECTION SUBCUTANEOUS DAILY
Status: DISCONTINUED | OUTPATIENT
Start: 2023-03-13 | End: 2023-03-17 | Stop reason: HOSPADM

## 2023-03-13 RX ORDER — SODIUM CHLORIDE FOR INHALATION 3 %
4 VIAL, NEBULIZER (ML) INHALATION
Status: DISCONTINUED | OUTPATIENT
Start: 2023-03-13 | End: 2023-03-17 | Stop reason: HOSPADM

## 2023-03-13 RX ORDER — BUDESONIDE 0.5 MG/2ML
0.5 INHALANT ORAL 2 TIMES DAILY
Status: DISCONTINUED | OUTPATIENT
Start: 2023-03-13 | End: 2023-03-17 | Stop reason: HOSPADM

## 2023-03-13 RX ORDER — ACETAMINOPHEN 650 MG/1
650 SUPPOSITORY RECTAL EVERY 6 HOURS PRN
Status: DISCONTINUED | OUTPATIENT
Start: 2023-03-13 | End: 2023-03-17 | Stop reason: HOSPADM

## 2023-03-13 RX ORDER — MORPHINE SULFATE 2 MG/ML
2 INJECTION, SOLUTION INTRAMUSCULAR; INTRAVENOUS ONCE
Status: COMPLETED | OUTPATIENT
Start: 2023-03-13 | End: 2023-03-13

## 2023-03-13 RX ORDER — ACETAMINOPHEN 325 MG/1
650 TABLET ORAL EVERY 6 HOURS PRN
Status: DISCONTINUED | OUTPATIENT
Start: 2023-03-13 | End: 2023-03-17 | Stop reason: HOSPADM

## 2023-03-13 RX ORDER — PRAMIPEXOLE DIHYDROCHLORIDE 0.25 MG/1
0.25 TABLET ORAL 3 TIMES DAILY
Status: DISCONTINUED | OUTPATIENT
Start: 2023-03-13 | End: 2023-03-17 | Stop reason: HOSPADM

## 2023-03-13 RX ORDER — INSULIN LISPRO 100 [IU]/ML
0-4 INJECTION, SOLUTION INTRAVENOUS; SUBCUTANEOUS NIGHTLY
Status: DISCONTINUED | OUTPATIENT
Start: 2023-03-13 | End: 2023-03-17 | Stop reason: HOSPADM

## 2023-03-13 RX ORDER — SODIUM CHLORIDE 0.9 % (FLUSH) 0.9 %
5-40 SYRINGE (ML) INJECTION PRN
Status: DISCONTINUED | OUTPATIENT
Start: 2023-03-13 | End: 2023-03-17 | Stop reason: HOSPADM

## 2023-03-13 RX ORDER — SODIUM CHLORIDE FOR INHALATION 3 %
15 VIAL, NEBULIZER (ML) INHALATION
Status: DISCONTINUED | OUTPATIENT
Start: 2023-03-13 | End: 2023-03-13

## 2023-03-13 RX ADMIN — ASPIRIN 81 MG: 81 TABLET, COATED ORAL at 12:56

## 2023-03-13 RX ADMIN — SODIUM CHLORIDE, POTASSIUM CHLORIDE, SODIUM LACTATE AND CALCIUM CHLORIDE: 600; 310; 30; 20 INJECTION, SOLUTION INTRAVENOUS at 12:42

## 2023-03-13 RX ADMIN — CARVEDILOL 3.12 MG: 6.25 TABLET, FILM COATED ORAL at 12:47

## 2023-03-13 RX ADMIN — MORPHINE SULFATE 2 MG: 2 INJECTION, SOLUTION INTRAMUSCULAR; INTRAVENOUS at 07:09

## 2023-03-13 RX ADMIN — PRAMIPEXOLE DIHYDROCHLORIDE 0.25 MG: 0.25 TABLET ORAL at 20:08

## 2023-03-13 RX ADMIN — GABAPENTIN 800 MG: 400 CAPSULE ORAL at 12:47

## 2023-03-13 RX ADMIN — SODIUM CHLORIDE 500 ML: 9 INJECTION, SOLUTION INTRAVENOUS at 09:15

## 2023-03-13 RX ADMIN — METRONIDAZOLE 500 MG: 500 INJECTION, SOLUTION INTRAVENOUS at 20:08

## 2023-03-13 RX ADMIN — SODIUM CHLORIDE 500 ML: 9 INJECTION, SOLUTION INTRAVENOUS at 07:08

## 2023-03-13 RX ADMIN — INSULIN GLARGINE 15 UNITS: 100 INJECTION, SOLUTION SUBCUTANEOUS at 22:36

## 2023-03-13 RX ADMIN — SODIUM CHLORIDE SOLN NEBU 3% 4 ML: 3 NEBU SOLN at 20:05

## 2023-03-13 RX ADMIN — ROFLUMILAST 500 MCG: 500 TABLET ORAL at 15:46

## 2023-03-13 RX ADMIN — INSULIN LISPRO 2 UNITS: 100 INJECTION, SOLUTION INTRAVENOUS; SUBCUTANEOUS at 12:50

## 2023-03-13 RX ADMIN — CARVEDILOL 3.12 MG: 6.25 TABLET, FILM COATED ORAL at 17:17

## 2023-03-13 RX ADMIN — VANCOMYCIN HYDROCHLORIDE 1500 MG: 1.5 INJECTION, POWDER, LYOPHILIZED, FOR SOLUTION INTRAVENOUS at 10:19

## 2023-03-13 RX ADMIN — SODIUM CHLORIDE SOLN NEBU 3% 4 ML: 3 NEBU SOLN at 16:42

## 2023-03-13 RX ADMIN — BUDESONIDE 500 MCG: 0.5 SUSPENSION RESPIRATORY (INHALATION) at 20:05

## 2023-03-13 RX ADMIN — ATORVASTATIN CALCIUM 40 MG: 40 TABLET, FILM COATED ORAL at 20:08

## 2023-03-13 RX ADMIN — INSULIN LISPRO 2 UNITS: 100 INJECTION, SOLUTION INTRAVENOUS; SUBCUTANEOUS at 17:20

## 2023-03-13 RX ADMIN — CEFEPIME 2000 MG: 2 INJECTION, POWDER, FOR SOLUTION INTRAVENOUS at 09:36

## 2023-03-13 RX ADMIN — IOPAMIDOL 75 ML: 755 INJECTION, SOLUTION INTRAVENOUS at 07:55

## 2023-03-13 RX ADMIN — CEFEPIME 2000 MG: 2 INJECTION, POWDER, FOR SOLUTION INTRAVENOUS at 12:45

## 2023-03-13 RX ADMIN — IPRATROPIUM BROMIDE AND ALBUTEROL SULFATE 1 AMPULE: 2.5; .5 SOLUTION RESPIRATORY (INHALATION) at 20:05

## 2023-03-13 RX ADMIN — IPRATROPIUM BROMIDE AND ALBUTEROL SULFATE 1 AMPULE: 2.5; .5 SOLUTION RESPIRATORY (INHALATION) at 11:41

## 2023-03-13 RX ADMIN — GABAPENTIN 100 MG: 100 CAPSULE ORAL at 20:08

## 2023-03-13 RX ADMIN — METRONIDAZOLE 500 MG: 500 INJECTION, SOLUTION INTRAVENOUS at 15:49

## 2023-03-13 RX ADMIN — ENOXAPARIN SODIUM 100 MG: 100 INJECTION SUBCUTANEOUS at 12:49

## 2023-03-13 RX ADMIN — BUDESONIDE 500 MCG: 0.5 SUSPENSION RESPIRATORY (INHALATION) at 11:41

## 2023-03-13 RX ADMIN — PRAMIPEXOLE DIHYDROCHLORIDE 0.25 MG: 0.25 TABLET ORAL at 15:45

## 2023-03-13 RX ADMIN — ASPIRIN 81 MG 81 MG: 81 TABLET ORAL at 09:15

## 2023-03-13 RX ADMIN — FUROSEMIDE 40 MG: 40 TABLET ORAL at 12:47

## 2023-03-13 RX ADMIN — IPRATROPIUM BROMIDE AND ALBUTEROL SULFATE 1 AMPULE: 2.5; .5 SOLUTION RESPIRATORY (INHALATION) at 16:42

## 2023-03-13 ASSESSMENT — LIFESTYLE VARIABLES
HOW OFTEN DO YOU HAVE A DRINK CONTAINING ALCOHOL: NEVER
HOW MANY STANDARD DRINKS CONTAINING ALCOHOL DO YOU HAVE ON A TYPICAL DAY: PATIENT DOES NOT DRINK
HOW MANY STANDARD DRINKS CONTAINING ALCOHOL DO YOU HAVE ON A TYPICAL DAY: PATIENT DOES NOT DRINK
HOW OFTEN DO YOU HAVE A DRINK CONTAINING ALCOHOL: NEVER

## 2023-03-13 ASSESSMENT — PAIN SCALES - GENERAL
PAINLEVEL_OUTOF10: 0
PAINLEVEL_OUTOF10: 5

## 2023-03-13 NOTE — PROGRESS NOTES
Pt brought to room via transport. Pt denies pain. Lung sounds diminished and abdomen distended. Mild edema in bilateral lower extremities. Pt educated on use of call light. Bed locked, in lowest position with alarm on d/t fall risk.  Electronically signed by Alexa Issa RN on 3/13/2023 at 4:40 PM

## 2023-03-13 NOTE — PROGRESS NOTES
Pt returned from thoracentesis via transport. Dressing on lower right back. Dressing clean, dry, and intact.  Electronically signed by Nelida Lindsay RN on 3/13/2023 at 6:21 PM

## 2023-03-13 NOTE — ED PROVIDER NOTES
[address] [facility]        Pt Name: Herbert Rojas  MRN: [id number]  [place] 1945  Date of evaluation: 3/13/2023  Provider: Madi Gant PA-C  PCP: Leah Barlow  Note Started: 7:07 AM EDT 3/13/23      TRES. I have evaluated this patient. My supervising physician was available for consultation. CHIEF COMPLAINT       Chief Complaint   Patient presents with    Abdominal Pain     Says pain started when he began \"coughing real bad\" a few days ago. Fatigue       HISTORY OF PRESENT ILLNESS: 1 or more Elements     History From: patient  Limitations to history : None    Herbert Rojas is a 68 y.o. male who presents to the emergency department by EMS. Patient states over the past couple days had become extremely fatigued. States he was unable to get up and walk today given generalized weakness. Patient reports dry mouth, shortness of breath, right-sided abdominal pain and cough. Patient states he has had a cough and right-sided abdominal pain that worsens with coughing since having COVID last month. States cough and sputum production have increased over the past couple days. Denies any fevers, chest pain, nausea, vomiting. Nursing Notes were all reviewed and agreed with or any disagreements were addressed in the HPI. REVIEW OF SYSTEMS :      Review of Systems    Positives and Pertinent negatives as per HPI.      SURGICAL HISTORY     Past Surgical History:   Procedure Laterality Date    BACK SURGERY  1995    BACK SURGERY      CARDIAC SURGERY  2007    CERVICAL SPINE SURGERY      CORONARY ARTERY BYPASS GRAFT      FRACTURE SURGERY Left 1988    Shoulder       CURRENTMEDICATIONS       Previous Medications    ALBUTEROL (PROVENTIL) (2.5 MG/3ML) 0.083% NEBULIZER SOLUTION    USE 1 VIAL IN NEBULIZER 4 TIMES DAILY    ALBUTEROL SULFATE HFA (PROVENTIL;VENTOLIN;PROAIR) 108 (90 BASE) MCG/ACT INHALER    Inhale 2 puffs into the lungs every 4 hours as needed for Wheezing    APIXABAN (ELIQUIS) 5 MG TABS TABLET    Take 1 tablet by mouth 2 times daily    ASPIRIN 81 MG TABLET    Take 81 mg by mouth daily    BENZONATATE (TESSALON) 200 MG CAPSULE    Take 1 capsule by mouth 3 times daily as needed for Cough    CARVEDILOL (COREG) 6.25 MG TABLET    Take 3.125 mg by mouth 2 times daily (with meals)    DAPAGLIFLOZIN (FARXIGA) 10 MG TABLET    Take 10 mg by mouth every morning    FLUNISOLIDE (NASALIDE) 25 MCG/ACT (0.025%) SOLN    2 sprays by Nasal route in the morning and at bedtime    FLUTICASONE-SALMETEROL (ADVAIR) 100-50 MCG/ACT AEPB DISKUS INHALER    Inhale 1 puff into the lungs in the morning and 1 puff in the evening. FUROSEMIDE (LASIX) 40 MG TABLET    Take 1 tablet by mouth daily    GABAPENTIN (NEURONTIN) 800 MG TABLET    Take 800 mg by mouth 4 times daily.     INSULIN 70-30 (HUMULIN;NOVOLIN) (70-30) 100 UNIT PER ML INJECTION VIAL    Inject 25 Units into the skin 2 times daily    IPRATROPIUM-ALBUTEROL (DUONEB) 0.5-2.5 (3) MG/3ML SOLN NEBULIZER SOLUTION    Inhale 3 mLs into the lungs every 6 hours as needed for Shortness of Breath    LIDOCAINE 4 % OINT    Apply topically As needed for pain    LISINOPRIL (PRINIVIL;ZESTRIL) 10 MG TABLET    Take 10 mg by mouth daily    METFORMIN (GLUCOPHAGE) 1000 MG TABLET    Take 1,000 mg by mouth 2 times daily (with meals)    MULTIPLE VITAMINS-MINERALS (PRESERVISION AREDS 2+MULTI VIT PO)    Take 1 tablet by mouth in the morning and at bedtime    MULTIPLE VITAMINS-MINERALS (THERAPEUTIC MULTIVITAMIN-MINERALS) TABLET    Take 1 tablet by mouth daily    POLYETHYLENE GLYCOL (GLYCOLAX) 17 GM/SCOOP POWDER    Take 17 g by mouth daily as needed    PRAMIPEXOLE (MIRAPEX) 0.25 MG TABLET    Take 0.25 mg by mouth 3 times daily    PRAMIPEXOLE (MIRAPEX) 0.25 MG TABLET    Take 0.25 mg by mouth at bedtime    ROFLUMILAST (DALIRESP) 500 MCG TABLET    Take 1 tablet by mouth daily    SIMVASTATIN (ZOCOR) 80 MG TABLET    Take 40 mg by mouth nightly VITAMIN D (CHOLECALCIFEROL) 1000 UNITS TABS TABLET    Take 1,000 Units by mouth daily    XTAMPZA ER 36 MG C12A    TAKE 1 CAPSULE BY MOUTH EVERY 12 HOURS       ALLERGIES     No known allergies    FAMILYHISTORY       Family History   Problem Relation Age of Onset    Brain Cancer Mother     Heart Attack Father     Heart Attack Brother         SOCIAL HISTORY       Social History     Tobacco Use    Smoking status: Former     Packs/day: 1.50     Years: 12.00     Pack years: 18.00     Types: Cigarettes     Start date: 1959     Quit date: 3/1/1996     Years since quittin.0    Smokeless tobacco: Never   Vaping Use    Vaping Use: Never used   Substance Use Topics    Alcohol use: No    Drug use: No       SCREENINGS                         CIWA Assessment  BP: (!) 130/114  Heart Rate: (!) 104           PHYSICAL EXAM  1 or more Elements     ED Triage Vitals   BP Temp Temp Source Heart Rate Resp SpO2 Height Weight   23 0650 23 0653 23 0653 23 0650 23 0650 23 0650 23 0652 23 0652   (!) 130/114 98.5 °F (36.9 °C) Oral (!) 105 20 (!) 86 % 5' 11\" (1.803 m) 226 lb (102.5 kg)       Physical Exam  HENT:      Head: Normocephalic and atraumatic. Pulmonary:      Effort: Pulmonary effort is normal. No respiratory distress. Comments: Conversational tachypnea, rhonchi noted on left mid   Abdominal:      Palpations: Abdomen is soft. Musculoskeletal:         General: Normal range of motion. Cervical back: Normal range of motion and neck supple. Skin:     General: Skin is warm. Neurological:      General: No focal deficit present. Mental Status: He is alert and oriented to person, place, and time.    Psychiatric:         Mood and Affect: Mood normal.         Behavior: Behavior normal.           DIAGNOSTIC RESULTS   LABS:    Labs Reviewed   CBC WITH AUTO DIFFERENTIAL - Abnormal; Notable for the following components:       Result Value    WBC 11.2 (*)     Hemoglobin 12.5 (*)     Hematocrit 38.7 (*)     All other components within normal limits   COMPREHENSIVE METABOLIC PANEL W/ REFLEX TO MG FOR LOW K - Abnormal; Notable for the following components:    Sodium 133 (*)     Chloride 95 (*)     Glucose 268 (*)     BUN 42 (*)     Creatinine 2.0 (*)     Est, Glom Filt Rate 34 (*)     Albumin 3.3 (*)     Albumin/Globulin Ratio 0.8 (*)     ALT 9 (*)     AST 13 (*)     All other components within normal limits   LIPASE - Abnormal; Notable for the following components:    Lipase 12.0 (*)     All other components within normal limits   TROPONIN - Abnormal; Notable for the following components:    Troponin 0.02 (*)     All other components within normal limits   COVID-19, RAPID   RAPID INFLUENZA A/B ANTIGENS   CULTURE, BLOOD 1   CULTURE, BLOOD 2   LACTIC ACID   BLOOD GAS, VENOUS       When ordered only abnormal lab results are displayed. All other labs were within normal range or not returned as of this dictation.    EKG: When ordered, EKG's are interpreted by the Emergency Department Physician in the absence of a cardiologist.  Please see their note for interpretation of EKG.    RADIOLOGY:   Non-plain film images such as CT, Ultrasound and MRI are read by the radiologist. Plain radiographic images are visualized and preliminarily interpreted by the ED Provider with the below findings:      Interpretation per the Radiologist below, if available at the time of this note:    CT CHEST PULMONARY EMBOLISM W CONTRAST   Final Result   Chest      No central pulmonary embolus identified      Bilateral pleural effusions, right greater than left with adjacent   consolidation of the lungs, either due to atelectasis or pneumonia.   Scattered ground-glass opacity in tree-in-bud nodularity seen in the aerated   portion of the lungs, likely postinflammatory-infectious.  Scattered areas of   bronchial wall thickening are seen      Abdomen and pelvis      No acute intra-abdominal abnormality         CT ABDOMEN  PELVIS W IV CONTRAST Additional Contrast? None   Final Result   Chest      No central pulmonary embolus identified      Bilateral pleural effusions, right greater than left with adjacent   consolidation of the lungs, either due to atelectasis or pneumonia. Scattered ground-glass opacity in tree-in-bud nodularity seen in the aerated   portion of the lungs, likely postinflammatory-infectious. Scattered areas of   bronchial wall thickening are seen      Abdomen and pelvis      No acute intra-abdominal abnormality         XR CHEST PORTABLE   Final Result   Mild congestion. Small right pleural effusion. Bibasilar compressive   atelectasis versus infiltrates. No results found. No results found. PROCEDURES   Unless otherwise noted below, none     Procedures    CRITICAL CARE TIME (.cctime)   CRITICAL CARE NOTE:  There was a high probability of clinically significant life-threatening deterioration of the patient's condition requiring my urgent intervention. Total critical care time is 32 minutes. This includes vital sign monitoring, pulse oximetry monitoring, telemetry monitoring, clinical response to the IV medications, reviewing the nursing notes, consultation time, dictation/documentation time, and interpretation of the labwork. This excludes any separately billable procedures performed. PAST MEDICAL HISTORY      has a past medical history of Abrasion of right elbow (2/4/2023), Arthritis, CAD (coronary artery disease), Chronic back pain, Chronic systolic CHF (congestive heart failure) ([address] COPD (chronic obstructive pulmonary disease) ([address] Dental disease, Diabetes mellitus ([address] Essential hypertension, Hearing loss, Paroxysmal atrial fibrillation ([address] and Tinnitus.      EMERGENCY DEPARTMENT COURSE and DIFFERENTIAL DIAGNOSIS/MDM:   Vitals:    Vitals:    03/13/23 0650 03/13/23 0652 03/13/23 0653   BP: (!) 130/114  (!) 130/114   Pulse: (!) 105  (!) 104   Resp: 20  18   Temp:   98.5 °F (36.9 °C)   TempSrc:   Oral   SpO2: (!) 86%  90%   Weight:  226 lb (102.5 kg)    Height:  5' 11\" (1.803 m)        Patient was given the following medications:  Medications   0.9 % sodium chloride bolus (has no administration in time range)   cefepime (MAXIPIME) 2,000 mg in sodium chloride 0.9 % 50 mL IVPB (mini-bag) (has no administration in time range)   vancomycin (VANCOCIN) 1,500 mg in sodium chloride 0.9 % 250 mL IVPB (ADDAVIAL) (has no administration in time range)   aspirin chewable tablet 81 mg (has no administration in time range)   0.9 % sodium chloride bolus (0 mLs IntraVENous Stopped 3/13/23 0809)   morphine (PF) injection 2 mg (2 mg IntraVENous Given 3/13/23 0709)   iopamidol (ISOVUE-370) 76 % injection 75 mL (75 mLs IntraVENous Given 3/13/23 0755)             Is this patient to be included in the SEP-1 Core Measure due to severe sepsis or septic shock? No   Exclusion criteria - the patient is NOT to be included for SEP-1 Core Measure due to:  2+ SIRS criteria are not met    Chronic Conditions affecting care:    has a past medical history of Abrasion of right elbow (2/4/2023), Arthritis, CAD (coronary artery disease), Chronic back pain, Chronic systolic CHF (congestive heart failure) ([address] COPD (chronic obstructive pulmonary disease) ([address] Dental disease, Diabetes mellitus ([address] Essential hypertension, Hearing loss, Paroxysmal atrial fibrillation ([address] and Tinnitus. CONSULTS: (Who and What was discussed)  PHARMACY TO DOSE VANCOMYCIN  Hospitalist-admission, see below    Social Determinants : None    Records Reviewed (Source): Previous admission for COVID/multifocal pneumonia from 2/2023 reviewed in epic    CC/HPI Summary, DDx, ED Course, and Reassessment:     Patient presents ED complaining of generalized fatigue, increased shortness of breath, cough and sputum for the past couple days. On arrival patient mildly tachycardic with a pulse of 104. He is afebrile and ill but nontoxic-appearing. Oxygen saturation 86% on room air. Patient mid for COVID-19 and aspiration pneumonia in early 2/2023. CT chest today showed bilateral pleural effusions with adjacent consolidation of lungs atelectasis versus pneumonia. There also postinflammatory/infectious changes noted. Suspect this comment is due to recent COVID. I am concerned that he has developed bacterial pneumonia given CT findings, progression of symptoms. He does not meet SIRS criteria. He was given ceftriaxone and vancomycin in the ED for pneumonia given recent admission. Blood cultures obtained and pending. Lactic acid normal.    Cardiac work-up was obtained. EKG showed no STEMI. Troponin mildly elevated 0.02. Troponin on 2/4 was 0.01. Patient denies any chest pain currently. He does have an WEI with creatinine 2.0, previous 0.9. IV fluid given. We will continue monitor closely. Do not suspect ACS. Will discuss with hospitalist.  Aleatha Lucina and flu negative. Remainder labs work-up as above. Consultation made to hospitalist regarding admission for WEI, PNA. Disposition Considerations (tests considered but not done, Admit vs D/C, Shared Decision Making, Pt Expectation of Test or Tx.): see below       I am the Primary Clinician of Record. FINAL IMPRESSION      1. Pneumonia of both lungs due to infectious organism, unspecified part of lung    2. WEI (acute kidney injury) ([facility] [address]    3. Elevated troponin    4. Pleural effusion, bilateral          DISPOSITION/PLAN     DISPOSITION Decision To Admit 03/13/2023 08:53:59 AM      PATIENT REFERRED TO:  No follow-up provider specified. DISCHARGE MEDICATIONS:  New Prescriptions    RESPIRATORY THERAPY SUPPLIES (NEBULIZER/TUBING/MOUTHPIECE) KIT    With full face mask.        DISCONTINUED MEDICATIONS:  Discontinued Medications    No medications on file              (Please note that portions of this note were completed with a voice recognition program.  Efforts were made to edit the dictations but occasionally words are mis-transcribed.)    Curt Recinos PA-C (electronically signed)          Curt Recinos PA-C  03/13/23 [address] [place], [place] [address]RAVEN  03/13/23 3894

## 2023-03-13 NOTE — ED NOTES
Pt resting in bed at this time, laying in a supine position with head of bed elevated . Call light remains in reach instructed pt how to use, and encouraged pt to call if needed assistance, no distress noted. RR even and unlabored, skin warm and dry. No needs at this time. Will continue to monitor closely.        Danielle Miranda RN  03/13/23 2684

## 2023-03-13 NOTE — CONSULTS
[address] [place] [address]                                  CONSULTATION    PATIENT NAME: Mirta Stephens                     :        1945  MED REC NO:   [id number]                          ROOM:       6364  ACCOUNT NO:   [de-identified]                           ADMIT DATE: 2023  PROVIDER:     Raven Bautista MD    CONSULT DATE:  2023    REASON FOR CONSULTATION:  Acute kidney injury. HISTORY OF PRESENT ILLNESS:  He is a 80-year-old  male with  past medical history significant for obesity, hypertension, diabetes  mellitus type 2, peripheral vascular disease, chronic atrial  fibrillation, coronary artery disease, congestive heart failure, COPD,  came to ER complaining of shortness of breath, dyspnea on exertion with  worsening weakness and decline in his mentation. In the emergency room,  the patient had a CT scan of the chest, abdomen and pelvis with IV  contrast.  CAT scan of the chest showed right-sided right lower lobe  pneumonia with large right-sided pleural effusion. CT scan of the  abdomen was unremarkable for any acute intraabdominal pathology. The  patient had been for further workup and management. he has been  started on broad-spectrum antibiotics. Renal consultation has been  called for acute kidney injury. At the time of consultation, the patient is drowsy, sleepy, and  arousable. Denies any chest pain or shortness of breath, but had some  dyspnea on exertion with cough and complaining of off and on fever for  the last few days. Admit to poor appetite, but denies any urinary  symptoms. PAST MEDICAL HISTORY:  1. Longstanding history of diabetes mellitus type 2.  2.  History of hypertension. 3.  Chronic atrial fibrillation. 4.  Coronary artery disease. 5.  Congestive heart failure. 6.  COPD. 7.  Chronic back pain. PAST SURGICAL HISTORY:  1.  Status post CABG.   2.  Status post back surgery. 3.  Status post shoulder surgery. FAMILY HISTORY:  Positive for diabetes and coronary artery disease. SOCIAL HISTORY:  He used to smoke, quit long time ago. No alcohol  abuse. OUTPATIENT MEDICATIONS:  Include Proventil, Advair, Ventolin, DuoNeb  inhaler, Mirapex, Lasix, Zestril, Zocor, Eliquis, vitamin D supplement,  Neurontin, and Coreg. ALLERGIES:  None. REVIEW OF SYSTEMS:  Limited due to the patient's factor. Denies any  chest pain. The patient is hard of hearing. Complaining of some cough  and dyspnea on exertion. Admit to fever with decreased level of energy  and poor appetite. PHYSICAL EXAMINATION:  GENERAL:  He is lying in bed, looks comfortable. VITAL SIGNS:  Blood pressure 123/68, pulse 91, temperature 98.5. HEENT:  Pupils reactive to light. CHEST:  Bilateral scattered rhonchi. Decreased breath sounds in right  base. CARDIOVASCULAR:  S1 and S2 audible. Irregular rate and rhythm. ABDOMEN:  Soft, nontender. Positive bowel sounds. EXTREMITIES:  1+ edema. CNS:  Nonfocal.    LABORATORY DATA:  Sodium 133, potassium 5, chloride 95, bicarb 25, BUN  42, creatinine 2, albumin 3.3. WBC is 11.2, hemoglobin 12.5. IMPRESSION:  1. Acute kidney injury, most likely hemodynamic due to pneumonia. 2.  Lisinopril. 3.  The patient got two loads of IV contrast in the emergency room in  the emergency room, but had CAT scan of the abdomen. 4.  Right-sided pneumonia with pleural.  5.  History of hypertension. 6.  Anemia of chronic disease. 7.  Coronary artery disease with congestive heart failure. 8.  COPD. PLAN:  1. Daily weight. 2.  Strict I's and O's.  3.  I agree with holding Lasix and gentle hydration. 4.  Continue with broad-spectrum antibiotic. 5.  Hold lisinopril. 6.  Check the iron studies and ferritin. 7.  Check the phosphorus and PTA. 8.  Check the urine lytes. 9.  Daily weight and strict I's and O's advised. We will follow.   Thank you very noe.        Renard Quiñonez MD    D: 03/13/2023 15:00:49       T: 03/13/2023 15:51:39     [email or web address]  Job#: [id number]     Doc#: [id number]    CC:

## 2023-03-13 NOTE — PLAN OF CARE
Problem: ABCDS Injury Assessment  Goal: Absence of physical injury  3/13/2023 1640 by Loyda Lucas RN  Outcome: Progressing  Note: Pt educated on use of call light for assistance. Pt educated to wait for assistance. Appropriate ambulatory aid provided and used. Pt verbalizes understanding. Problem: Safety - Adult  Goal: Free from fall injury  3/13/2023 1640 by Loyda Lucas RN  Outcome: Progressing  Note: Potential fall hazards identified and removed accordingly. Pt educated to use call light for assistance. Bed locked, in lowest position with alarm on. Problem: Pain  Goal: Verbalizes/displays adequate comfort level or baseline comfort level  Outcome: Progressing  Note: Pt educated on 0-10 pain scale. Pt educated on non-pharmacological pain interventions. Pain medications given as needed per [facility]

## 2023-03-13 NOTE — PROGRESS NOTES
Physical Therapy  Initial Evaluation Attempt    23    Name: Navid Moya   : [birth year]    MRN: [id number]    PT order noted. Patient unable to be seen by PT due to leaving room with transport. Will continue to follow.      Electronically signed by Brayden Bañuelos PT on 3/13/2023 at 2:59 PM

## 2023-03-13 NOTE — ED NOTES
Pt resting in bed at this time, laying in a supine position with head of bed elevated . Call light remains in reach instructed pt how to use, and encouraged pt to call if needed assistance, no distress noted. RR even and unlabored, skin warm and dry. No needs at this time. Will continue to monitor closely.        Emily Edwards, PETRA  03/13/23 6533

## 2023-03-13 NOTE — PROGRESS NOTES
Pharmacy Note - Renal Dosing and Extended Infusion Beta-Lactam Adjustment    Cefepime 2000mg Q8h for treatment of Community acquired pneumonia. Per [facility] Renal Dose Adjustment Policy and Extended Infusion Beta-Lactam Policy, cefepime will be changed to 2000mg load followed by 2000mg Q12h extended infusion    Estimated Creatinine Clearance: Estimated Creatinine Clearance: 38 mL/min (A) (based on SCr of 2 mg/dL (H)). BMI: Body mass index is 31.52 kg/m². Please call with any questions.     Thank you,    Christopher Noland, [facility]

## 2023-03-13 NOTE — ED NOTES
Report called to  Yovani        RN   To Room   6460  Cardiac monitor on during transfer  Pt's pain level   denies  VSS, Afebrile   IV site is clean, dry and intact, vancomycin at 167ml/hr  Family updated on transfer, wife at [address] [facility]  03/13/23 1130

## 2023-03-13 NOTE — CONSULTS
PATIENT IS SEEN AT THE REQUEST OF DR. Carl Fregoso for pleural effusion, mucus plug     HISTORY OF PRESENT ILLNESS:  This is a 68 y.o. male who presented to the ED on 3/13 with a CC of SOB and low oxygen levels . Wife actually called our answering service this am, and I directed them to the ED. He had been doing ok last week and saw his pulm doc and PCP. Then over the weekend he felt terrible and was very weak and tired. Some SOB and some mucus. No significant episodes of choking or vomiting etc.  Does not use oxygen at home. Uses nebulizers. Trying to follow modified diet. Established Pulmonologist:  [place]     PAST MEDICAL HISTORY:  Past Medical History:   Diagnosis Date    Abrasion of right elbow 2/4/2023    Arthritis     CAD (coronary artery disease)     CABG in 2009    Chronic back pain     Chronic systolic CHF (congestive heart failure) (HCC)     COPD (chronic obstructive pulmonary disease) ([address]     Dental disease     Diabetes mellitus ([address]     Essential hypertension     Hearing loss     Paroxysmal atrial fibrillation ([address]     On Coumadin    Tinnitus        PAST SURGICAL HISTORY:  Past Surgical History:   Procedure Laterality Date    [facility]  2007    CERVICAL SPINE SURGERY      CORONARY ARTERY BYPASS GRAFT      FRACTURE SURGERY Left 1988    Shoulder       FAMILY HISTORY:  family history includes Brain Cancer in his mother; Heart Attack in his brother and father. SOCIAL HISTORY:   reports that he quit smoking about 27 years ago. His smoking use included cigarettes. He started smoking about 64 years ago. He has a 18.00 pack-year smoking history.  He has never used smokeless tobacco.    Scheduled Meds:   aspirin  81 mg Oral Daily    carvedilol  3.125 mg Oral BID WC    furosemide  40 mg Oral Daily    gabapentin  800 mg Oral 4x Daily    pramipexole  0.25 mg Oral TID    pramipexole  0.25 mg Oral Nightly    Roflumilast  500 mcg Oral Daily    sodium chloride flush  5-40 mL IntraVENous 2 times per day    cefepime  2,000 mg IntraVENous Once    [START ON 3/14/2023] cefepime  2,000 mg IntraVENous Q12H    metroNIDAZOLE  500 mg IntraVENous Q8H    insulin glargine  0.15 Units/kg SubCUTAneous Nightly    insulin lispro  0-8 Units SubCUTAneous TID WC    insulin lispro  0-4 Units SubCUTAneous Nightly    enoxaparin  1 mg/kg SubCUTAneous Daily    ipratropium-albuterol  1 ampule Inhalation Q4H WA    budesonide  0.5 mg Nebulization BID    atorvastatin  40 mg Oral Nightly    sodium chloride (Inhalant)  15 mL Nebulization Q4H While awake       Continuous Infusions:   sodium chloride      lactated ringers IV soln 50 mL/hr at 03/13/23 1242    dextrose         PRN Meds:  sodium chloride flush, sodium chloride, ondansetron **OR** ondansetron, polyethylene glycol, acetaminophen **OR** acetaminophen, glucose, dextrose bolus **OR** dextrose bolus, glucagon (rDNA), dextrose    ALLERGIES:  Patient is allergic to no known allergies. REVIEW OF SYSTEMS:  Constitutional: Very tired and weak   HENT: Negative for sore throat  Eyes: Negative for redness   Respiratory: SOB, low oxygen, coughing   Cardiovascular: Negative for chest pain  Gastrointestinal: Negative for vomiting, diarrhea   Genitourinary: Negative for hematuria, negative for dysuria  Musculoskeletal: Negative for arthralgias   Skin: Negative for rash  Neurological: Negative for syncope  Hematological: Negative for adenopathy  Extremities:  Negative for swelling    PHYSICAL EXAM:  Blood pressure 123/68, pulse 93, temperature 97.8 °F (36.6 °C), temperature source Oral, resp. rate 18, height 5' 11\" (1.803 m), weight 226 lb (102.5 kg), SpO2 93 %.'  Gen: Mild distress. , chronically ill appearing    Eyes: PERRL. No sclera icterus. No conjunctival injection. ENT: No discharge. Pharynx clear. Neck: Trachea midline. No obvious mass. Resp: Rhonchi, wheezing   CV: Tachy, distant No murmur or rub. GI: Non-tender. Non-distended. No hernia. BS present. Skin: Warm and dry. No nodule on exposed extremities. Lymph: No cervical LAD. No supraclavicular LAD. M/S: No cyanosis. No joint deformity. Neuro: Awake. Alert. Moves all four extremities. EXT:   + edema, no clubbing    LABS:  CBC:   Recent Labs     23   WBC 11.2*   HGB 12.5*   HCT 38.7*   MCV 90.7        BMP:   Recent Labs     23   *   K 5.0   CL 95*   CO2 25   BUN 42*   CREATININE 2.0*     LIVER PROFILE:   Recent Labs     23   AST 13*   ALT 9*   LIPASE 12.0*   BILITOT 0.5   ALKPHOS 59     PT/INR: No results for input(s): PROTIME, INR in the last 72 hours. APTT: No results for input(s): APTT in the last 72 hours. UA:No results for input(s): NITRITE, COLORU, PHUR, LABCAST, WBCUA, RBCUA, MUCUS, TRICHOMONAS, YEAST, BACTERIA, CLARITYU, SPECGRAV, LEUKOCYTESUR, UROBILINOGEN, BILIRUBINUR, BLOODU, GLUCOSEU, AMORPHOUS in the last 72 hours. Invalid input(s): KETONESU  No results for input(s): PHART, [id number], PO2ART in the last 72 hours. Cultures:   Pending     PFTs:     None    ECHO:     Definity contrast administered. Left ventricular cavity size is moderately dilated. Normal left ventricular wall thickness. Ejection fraction is visually estimated to be 45-50%. There is inferior wall hypokinesis. Grade II diastolic dysfunction with elevated LV filling pressures. Mildly dilated right ventricle. Right ventricular systolic function is mildly reduced . VB.37    Chest CT:  Chest imaging was reviewed by me and showed moderate right effusion with atelectasis mucus in the airways. Small left effusion     I reviewed all the above labs and studies pertaining to this visit. ASSESSMENT/PLAN:  Acute Hypoxic Respiratory Failure with saturations less than 90% on room air    Titrate oxygen for saturations greater than or equal to 90%  RLL pneumonia with likely parapneumonic effusion  Continue with Cefepime and Flagyl.   One time dose of Vanco  Send sputum and pneumonia panel  Add saline nebs to duonebs  Add chest vest q 8 hours  Thoracentesis has been ordered.  Routine studies pending   Check procal   Dysphagia with known aspiration into the airways   May need speech to follow along     DVT prophylaxis  Lovenox     Thanks    Rahul Orellana, DO  [facility]

## 2023-03-13 NOTE — ED NOTES
Pt provided urinal,  pt refuses to put a gown on, pt's wife is at bedside at this time      Erick Harvey, PETRA  03/13/23 [id number]

## 2023-03-13 NOTE — PROGRESS NOTES
Medication Reconciliation    List of medications patient is currently taking is complete. Source of information: 1. Conversation with patient and wife at bedside                                      2. EPIC records      Allergies  No known allergies     Notes regarding home medications:   1. Patient didn't receive any of his home medications prior to arrival to the emergency department.     Javid Murphy [facility], PharmD, BCPS  3/13/2023 11:31 AM

## 2023-03-13 NOTE — ED NOTES
Pt resting in bed at this time, laying in a supine position with head of bed elevated . Call light remains in reach instructed pt how to use, and encouraged pt to call if needed assistance, no distress noted. RR even and unlabored, skin warm and dry. No needs at this time. Will continue to monitor closely.     Hospitalist at bedside speaking with pt at this time,       ED Tech informed of need for blood cultures at this time as well      Juan Ramon Kramer RN  03/13/23 [id number]

## 2023-03-13 NOTE — CONSULTS
Clinical Pharmacy Note  Vancomycin Consult    Pharmacy consult received for one-time dose of vancomycin in the Emergency Department per [place] PA-C. Ht Readings from Last 1 Encounters:   03/13/23 5' 11\" (1.803 m)        Wt Readings from Last 1 Encounters:   03/13/23 226 lb (102.5 kg)         Assessment/Plan:  Vancomycin 1500mg  x 1 in ED. If Vancomycin is to continue on admission and pharmacy is to manage dosing, please re-consult with admission orders.         Anya [facility], 3/13/2023 8:44 AM

## 2023-03-13 NOTE — H&P
Hospital Medicine History & Physical      PCP: Arturo Sandraharvey    Date of Admission: 3/13/2023    Date of Service: Pt seen/examined on 03/13/23 and Admitted to Inpatient with expected LOS greater than two midnights due to medical therapy. Chief Complaint:    Chief Complaint   Patient presents with    Abdominal Pain     Says pain started when he began \"coughing real bad\" a few days ago. Fatigue       History Of Present Illness: 77-year-old male with past medical history significant for CAD, COPD, insulin-dependent diabetes, atrial fibrillation presents to the hospital with complaints of weakness and cough. Patient reports not feeling well for the last few days. He reports severe weakness, he was unable to perform his ADLs, and has been having productive cough and his significant shortness of breath with exertion. Denies lower extremity swelling. Patient saw his pulmonologist last week. CT of the chest demonstrated large right pleural effusion, with consolidation, concerning for pneumonia. Past Medical History:          Diagnosis Date    Abrasion of right elbow 2/4/2023    Arthritis     CAD (coronary artery disease)     CABG in 2009    Chronic back pain     Chronic systolic CHF (congestive heart failure) (HCC)     COPD (chronic obstructive pulmonary disease) ([address]     Dental disease     Diabetes mellitus ([address]     Essential hypertension     Hearing loss     Paroxysmal atrial fibrillation ([address]     On Coumadin    Tinnitus        Past Surgical History:          Procedure Laterality Date    [address] [facility]  2007    CERVICAL SPINE SURGERY      CORONARY ARTERY BYPASS GRAFT      FRACTURE SURGERY Left 1988    Shoulder       Medications Prior to Admission:      Prior to Admission medications    Medication Sig Start Date End Date Taking? Authorizing Provider   Respiratory Therapy Supplies (NEBULIZER/TUBING/MOUTHPIECE) KIT With full face mask.  3/13/23   Ladarius Driscoll Killian Bernardo MD   albuterol (PROVENTIL) (2.5 MG/3ML) 0.083% nebulizer solution USE 1 VIAL IN NEBULIZER 4 TIMES DAILY 2/21/23   Brenda Barnes MD   benzonatate (TESSALON) 200 MG capsule Take 1 capsule by mouth 3 times daily as needed for Cough 2/17/23 3/19/23  Brenda Barnes MD   fluticasone-salmeterol (ADVAIR) 100-50 MCG/ACT AEPB diskus inhaler Inhale 1 puff into the lungs in the morning and 1 puff in the evening.  2/7/23   Radha Mcdonald MD   albuterol sulfate HFA (PROVENTIL;VENTOLIN;PROAIR) 108 (90 Base) MCG/ACT inhaler Inhale 2 puffs into the lungs every 4 hours as needed for Wheezing 2/7/23   Radha Mcdonald MD   Roflumilast (DALIRESP) 500 MCG tablet Take 1 tablet by mouth daily 2/7/23   Radha Mcdonald MD   ipratropium-albuterol (DUONEB) 0.5-2.5 (3) MG/3ML SOLN nebulizer solution Inhale 3 mLs into the lungs every 6 hours as needed for Shortness of Breath 2/7/23   Rahda Mcdonald MD   pramipexole (MIRAPEX) 0.25 MG tablet Take 0.25 mg by mouth 3 times daily 1/29/23   Historical Provider, MD   furosemide (LASIX) 40 MG tablet Take 1 tablet by mouth daily 12/11/22   Historical Provider, MD   flunisolide (NASALIDE) 25 MCG/ACT (0.025%) SOLN 2 sprays by Nasal route in the morning and at bedtime    Historical Provider, MD   Lidocaine 4 % OINT Apply topically As needed for pain    Historical Provider, MD   polyethylene glycol (GLYCOLAX) 17 GM/SCOOP powder Take 17 g by mouth daily as needed    Historical Provider, MD   Multiple Vitamins-Minerals (PRESERVISION AREDS 2+MULTI VIT PO) Take 1 tablet by mouth in the morning and at bedtime    Historical Provider, MD   pramipexole (MIRAPEX) 0.25 MG tablet Take 0.25 mg by mouth at bedtime    Historical Provider, MD   XTAMPZA ER 36 MG C12A TAKE 1 CAPSULE BY MOUTH EVERY 12 HOURS 1/19/23   Historical Provider, MD   lisinopril (PRINIVIL;ZESTRIL) 10 MG tablet Take 10 mg by mouth daily    Historical Provider, MD   dapagliflozin (FARXIGA) 10 MG tablet Take 10 mg by mouth every morning    Historical Provider, MD   insulin 70-30 (HUMULIN;NOVOLIN) (70-30) 100 UNIT per ML injection vial Inject 25 Units into the skin 2 times daily    Historical Provider, MD   Multiple Vitamins-Minerals (THERAPEUTIC MULTIVITAMIN-MINERALS) tablet Take 1 tablet by mouth daily    Historical Provider, MD   apixaban (ELIQUIS) 5 MG TABS tablet Take 1 tablet by mouth 2 times daily 12/23/20   Melani Rodriguez MD   simvastatin (ZOCOR) 80 MG tablet Take 40 mg by mouth nightly 3/27/18   Historical Provider, MD   vitamin D (CHOLECALCIFEROL) 1000 UNITS TABS tablet Take 1,000 Units by mouth daily    Historical Provider, MD   gabapentin (NEURONTIN) 800 MG tablet Take 800 mg by mouth 4 times daily. Historical Provider, MD   aspirin 81 MG tablet Take 81 mg by mouth daily    Historical Provider, MD   metFORMIN (GLUCOPHAGE) 1000 MG tablet Take 1,000 mg by mouth 2 times daily (with meals)    Historical Provider, MD   carvedilol (COREG) 6.25 MG tablet Take 3.125 mg by mouth 2 times daily (with meals)    Historical Provider, MD       Allergies:  No known allergies    Social History:      The patient currently lives home    TOBACCO:   reports that he quit smoking about 27 years ago. His smoking use included cigarettes. He started smoking about 64 years ago. He has a 18.00 pack-year smoking history. He has never used smokeless tobacco.  ETOH:   reports no history of alcohol use. E-cigarette/Vaping       Questions Responses    E-cigarette/Vaping Use Never User    Start Date     Passive Exposure     Quit Date     Counseling Given     Comments               Family History:       Reviewed and negative in regards to presenting illness/complaint. Problem Relation Age of Onset    Brain Cancer Mother     Heart Attack Father     Heart Attack Brother        REVIEW OF SYSTEMS COMPLETED:   Pertinent positives as noted in the HPI. All other systems reviewed and negative.     PHYSICAL EXAM PERFORMED:    BP (!) 110/54 Pulse 89   Temp 98.5 °F (36.9 °C) (Oral)   Resp 15   Ht 5' 11\" (1.803 m)   Wt 226 lb (102.5 kg)   SpO2 94%   BMI 31.52 kg/m²     General appearance:  No apparent distress, appears stated age and cooperative. HEENT:  Normal cephalic, atraumatic without obvious deformity. Pupils equal, round, and reactive to light. Extra ocular muscles intact. Conjunctivae/corneas clear. Neck: Supple, with full range of motion. No jugular venous distention. Trachea midline. Respiratory:  Normal respiratory effort. Clear to auscultation, bilaterally without Rales/Wheezes/Rhonchi. Cardiovascular:  Regular rate and rhythm with normal S1/S2 without murmurs, rubs or gallops. Abdomen: Soft, non-tender, non-distended with normal bowel sounds. Musculoskeletal:  No clubbing, cyanosis or edema bilaterally. Full range of motion without deformity. Skin: Skin color, texture, turgor normal.  No rashes or lesions. Neurologic:  Neurovascularly intact without any focal sensory/motor deficits. Cranial nerves: II-XII intact, grossly non-focal.  Psychiatric:  Alert and oriented, thought content appropriate, normal insight  Capillary Refill: Brisk,3 seconds, normal  Peripheral Pulses: +2 palpable, equal bilaterally       Labs:     Recent Labs     03/13/23  0703   WBC 11.2*   HGB 12.5*   HCT 38.7*        Recent Labs     03/13/23  0703   *   K 5.0   CL 95*   CO2 25   BUN 42*   CREATININE 2.0*   CALCIUM 8.9     Recent Labs     03/13/23  0703   AST 13*   ALT 9*   BILITOT 0.5   ALKPHOS 59     No results for input(s): INR in the last 72 hours.   Recent Labs     03/13/23  0703   TROPONINI 0.02*       Urinalysis:      Lab Results   Component Value Date/Time    NITRU Negative 02/04/2023 08:12 PM    WBCUA 1 04/13/2019 07:57 AM    BACTERIA Rare 07/11/2018 01:27 AM    RBCUA 1 04/13/2019 07:57 AM    BLOODU Negative 02/04/2023 08:12 PM    SPECGRAV 1.018 02/04/2023 08:12 PM    GLUCOSEU >=1000 02/04/2023 08:12 PM       Radiology:         CT CHEST PULMONARY EMBOLISM W CONTRAST   Final Result   Chest      No central pulmonary embolus identified      Bilateral pleural effusions, right greater than left with adjacent   consolidation of the lungs, either due to atelectasis or pneumonia. Scattered ground-glass opacity in tree-in-bud nodularity seen in the aerated   portion of the lungs, likely postinflammatory-infectious. Scattered areas of   bronchial wall thickening are seen      Abdomen and pelvis      No acute intra-abdominal abnormality         CT ABDOMEN PELVIS W IV CONTRAST Additional Contrast? None   Final Result   Chest      No central pulmonary embolus identified      Bilateral pleural effusions, right greater than left with adjacent   consolidation of the lungs, either due to atelectasis or pneumonia. Scattered ground-glass opacity in tree-in-bud nodularity seen in the aerated   portion of the lungs, likely postinflammatory-infectious. Scattered areas of   bronchial wall thickening are seen      Abdomen and pelvis      No acute intra-abdominal abnormality         XR CHEST PORTABLE   Final Result   Mild congestion. Small right pleural effusion. Bibasilar compressive   atelectasis versus infiltrates.          IR GUIDED THORACENTESIS PLEURAL    (Results Pending)       Consults:    PHARMACY TO DOSE VANCOMYCIN  IP CONSULT TO NEPHROLOGY  IP CONSULT TO PULMONOLOGY    ASSESSMENT:    Active Hospital Problems    Diagnosis Date Noted    Pneumonia due to organism [J18.9] 03/13/2023     Priority: Medium         PLAN:      Pneumonia complicated with right pleural effusion   Treat empirically with cefepime and Flagyl   MRSA probe   Scheduled bronchodilators   Consult IR for thoracentesis, follow-up with fluids   Consult pulmonology    Atrial fibrillation- persistent/paroxysmal   Rate controlled; [id number]: > 2  Hold Eliquis due to procedure/surgery; lovenox 1mg /kg daily   Resume antiarrhythmic medication    Type 2 diabetes   A1c: 7.5  Euglycemic  Hold p.o. medicines  Monitor with Accu-Cheks ACHS  Sliding scale as needed  Add Lantus      Acute COPD exacerbation   Chest x-ray: PNA   Continue oxygen supplementation, currently requiring 3 L   IV steroids with p.o. taper   Scheduled bronchodilators     Elevated troponin  CAD status post remote CABG    Cardiology evaluation   Continue antiplatelet therapy, statin    Acute kidney injury   Creatinine on admission 2.0  Suspecting prerenal injury and ATN in the setting on volume depletion  Check bladder scan to rule out obstruction  Avoid nephrotoxic medications, contrast   Check renal ultrasound, UA and  urine studies  Consult nephrology  for evaluation  Gentle IV fluid  Monitor renal function daily         DVT Prophylaxis: +  Diet: No diet orders on file  Code Status: Prior    PT/OT Eval Status: +    Dispo - admit       Majo Jacome MD    Thank you Alexandru Hinkle for the opportunity to be involved in this patient's care. If you have any questions or concerns please feel free to contact me at [phone].

## 2023-03-13 NOTE — CONSULTS
Ariana [id number]  [birth year]    March 13, 2023    Reason for Consult: Elevated Troponin    CC: Fatigue    HPI:  The patient is 68 y.o. male with a past medical history significant for type 2 DM, paroxysmal atrial fibrillation, CAD s/p CABG in 9162, chronic systolic CHF and COPD who presented to [facility] with abdominal pain and fatigue. I was asked to see the patient for troponin elevation to 0.02. Review of Systems:  Constitutional: No fatigue, weakness, night sweats or fever. HEENT: No new vision difficulties or ringing in the ears. Respiratory: No new SOB, PND, orthopnea or cough. Cardiovascular: See HPI   GI: No n/v, diarrhea, constipation, abdominal pain or changes in bowel habits. No melena, no hematochezia  : No urinary frequency, urgency, incontinence, hematuria or dysuria. Skin: No cyanosis or skin lesions. Musculoskeletal: No new muscle or joint pain. Neurological: No syncope or TIA-like symptoms.   Psychiatric: No anxiety, insomnia or depression     Past Medical History:   Diagnosis Date    Abrasion of right elbow 2/4/2023    Arthritis     CAD (coronary artery disease)     CABG in 2009    Chronic back pain     Chronic systolic CHF (congestive heart failure) (HCC)     COPD (chronic obstructive pulmonary disease) ([facility]     Dental disease     Diabetes mellitus ([facility] [address]     Essential hypertension     Hearing loss     Paroxysmal atrial fibrillation ([facility] [address]     On Coumadin    Tinnitus      Past Surgical History:   Procedure Laterality Date    [address]  2007    CERVICAL SPINE SURGERY      CORONARY ARTERY BYPASS GRAFT      FRACTURE SURGERY Left 1988    Shoulder     Family History   Problem Relation Age of Onset    Brain Cancer Mother     Heart Attack Father     Heart Attack Brother      Social History     Tobacco Use    Smoking status: Former     Packs/day: 1.50     Years: 12.00     Pack years: 18.00     Types: Cigarettes     Start date: 1959     Quit date: 3/1/1996     Years since quittin.0    Smokeless tobacco: Never   Vaping Use    Vaping Use: Never used   Substance Use Topics    Alcohol use: No    Drug use: No       Allergies   Allergen Reactions    No Known Allergies      Current Facility-Administered Medications   Medication Dose Route Frequency Provider Last Rate Last Admin    aspirin EC tablet 81 mg  81 mg Oral Daily Chong Hansen MD   81 mg at 23 1256    carvedilol (COREG) tablet 3.125 mg  3.125 mg Oral BID WC Chong Hansen MD   3.125 mg at 23 1247    pramipexole (MIRAPEX) tablet 0.25 mg  0.25 mg Oral TID Chong Hansen MD   0.25 mg at 23 1545    pramipexole (MIRAPEX) tablet 0.25 mg  0.25 mg Oral Nightly Chong Hansen MD        Roflumilast (DALIRESP) tablet 500 mcg  500 mcg Oral Daily Chong Hansen MD   500 mcg at 23 1546    sodium chloride flush 0.9 % injection 5-40 mL  5-40 mL IntraVENous 2 times per day Chong Hansen MD        sodium chloride flush 0.9 % injection 5-40 mL  5-40 mL IntraVENous PRN Chong Hansen MD        0.9 % sodium chloride infusion   IntraVENous PRN Chong Hansen MD        ondansetron (ZOFRAN-ODT) disintegrating tablet 4 mg  4 mg Oral Q8H PRN Chong Hansen MD        Or    ondansetron [facility] injection 4 mg  4 mg IntraVENous Q6H PRN Chong Hansen MD        polyethylene glycol (GLYCOLAX) packet 17 g  17 g Oral Daily PRN Chong Hansen MD        acetaminophen (TYLENOL) tablet 650 mg  650 mg Oral Q6H PRN Chong Hansen MD        Or    acetaminophen (TYLENOL) suppository 650 mg  650 mg Rectal Q6H PRN Chong Hansen MD        lactated ringers IV soln infusion   IntraVENous Continuous Chong Hansen MD 50 mL/hr at 23 1616 Restarted at 23 1616    [START ON 3/14/2023] cefepime (MAXIPIME) 2,000 mg in sodium chloride 0.9 % 50 mL IVPB (mini-bag)  2,000 mg IntraVENous Q12H Heavenly Nguyen MD        metronidazole (FLAGYL) 500 mg in 0.9% NaCl 100 mL IVPB premix  500 mg IntraVENous Q8H Heavenly Nguyen  mL/hr at 03/13/23 1624 Restarted at 03/13/23 1624    glucose chewable tablet 16 g  4 tablet Oral PRN Heavenly Nguyen MD        dextrose bolus 10% 125 mL  125 mL IntraVENous PRN Heavenly Nguyen MD        Or    dextrose bolus 10% 250 mL  250 mL IntraVENous PRN Heavenly Nguyen MD        glucagon (rDNA) injection 1 mg  1 mg SubCUTAneous PRN Heavenly Nguyen MD        dextrose 10 % infusion   IntraVENous Continuous PRN Heavenly Nguyen MD        insulin glargine (LANTUS) injection vial 15 Units  0.15 Units/kg SubCUTAneous Nightly Heavenly Nguyen MD        insulin lispro (HUMALOG) injection vial 0-8 Units  0-8 Units SubCUTAneous TID  Heavenly Nguyen MD   2 Units at 03/13/23 1250    insulin lispro (HUMALOG) injection vial 0-4 Units  0-4 Units SubCUTAneous Nightly Heavenly Nguyen MD        enoxaparin (LOVENOX) injection 100 mg  1 mg/kg SubCUTAneous Daily Heavenly Nguyen MD   100 mg at 03/13/23 1249    ipratropium-albuterol (DUONEB) nebulizer solution 1 ampule  1 ampule Inhalation Q4H WA Heavenly Nguyen MD   1 ampule at 03/13/23 1642    budesonide (PULMICORT) nebulizer suspension 500 mcg  0.5 mg Nebulization BID Heavenly Nguyen MD   500 mcg at 03/13/23 1141    atorvastatin (LIPITOR) tablet 40 mg  40 mg Oral Nightly Heavenly Nguyen MD        sodium chloride (Inhalant) 3 % nebulizer solution 4 mL  4 mL Nebulization Q4H THOMAS Nguyen DO   4 mL at 03/13/23 1642    gabapentin (NEURONTIN) capsule 100 mg  100 mg Oral TID Tami Hawkins MD           Physical Exam:   /80   Pulse 95   Temp 98.1 °F (36.7 °C) (Oral)   Resp 18   Ht 5' 11\" (1.803 m)   Wt 226 lb (102.5 kg)   SpO2 94%   BMI 31.52 kg/m²     Intake/Output Summary (Last 24 hours) at 3/13/2023 1714  Last data filed at 3/13/2023 1514  Gross per 24 hour   Intake --   Output 550 ml   Net -550 ml     Wt Readings from Last 2 Encounters:   03/13/23 226 lb (102.5 kg)   03/07/23 223 lb (101.2 kg)     Constitutional: He is oriented to person, place, and time. He appears well-developed and well-nourished. In no acute distress. Head: Normocephalic and atraumatic. Neck: Neck supple. No JVD present. Carotid bruit is not present. No mass and no thyromegaly present. No lymphadenopathy present. Cardiovascular: Normal rate, regular rhythm, normal heart sounds and intact distal pulses. Exam reveals no gallop and no friction rub. No murmur heard. Pulmonary/Chest: Effort normal and breath sounds normal. No respiratory distress. He has no wheezes, rhonchi or rales. Abdominal: Soft, non-tender. Bowel sounds and aorta are normal. He exhibits no organomegaly, mass or bruit. Extremities: No edema, cyanosis, or clubbing. Pulses are 2+ radial/carotid/dorsalis pedis and posterior tibial bilaterally. Neurological: He is alert and oriented to person, place, and time. He has normal reflexes. No cranial nerve deficit. Coordination normal.   Skin: Skin is warm and dry. There is no rash or diaphoresis. Psychiatric: He has a normal mood and affect.  His speech is normal and behavior is normal.     Personally reviewed and interpreted   EKG Interpretation: Sinus rhythm with normal intervals and axis    Lab Review:   Lab Results   Component Value Date/Time    TRIG 149 10/20/2011 10:53 AM    HDL 46 10/20/2011 10:53 AM    LDLCALC 62 10/20/2011 10:53 AM    LABVLDL 30 10/20/2011 10:53 AM     Lab Results   Component Value Date/Time     03/13/2023 02:39 PM    K 5.0 03/13/2023 02:39 PM    K 5.0 03/13/2023 07:03 AM    BUN 37 03/13/2023 02:39 PM    CREATININE 1.5 03/13/2023 02:39 PM     Recent Labs     03/13/23  0703   WBC 11.2*   HGB 12.5*   HCT 38.7*        Troponin 0.02    Chest CT 3/13/23:  Chest       No central pulmonary embolus identified       Bilateral pleural effusions, right greater than left with adjacent   consolidation of the lungs, either due to atelectasis or pneumonia. Scattered ground-glass opacity in tree-in-bud nodularity seen in the aerated   portion of the lungs, likely postinflammatory-infectious. Scattered areas of   bronchial wall thickening are seen       Abdomen and pelvis       No acute intra-abdominal abnormality     Echo 2018 (the [facility]): - Left ventricle: The cavity size was normal. Wall thickness was     normal. Systolic function was mildly reduced. The estimated     ejection fraction was in the range of 47% to 51%. Moderate     hypokinesis of the basal-midinferoseptal myocardium. Normal     diastolic function. - Left atrium: The atrium was mildly dilated. No change from prior     echo 2015. - Inferior vena cava: The vessel was normal in size; the     respirophasic diameter changes were in the normal range (>= 50%);     findings are consistent with normal central venous pressure. Assessment / Plan: 1. Elevated Troponin  This is a troponin assay that is just outside the range of normal and amplified given the creatinine of 1.5. He has no symptoms suggestive of angina. I would not pursue this at all and nor would I check it again. 2.  Pleural effusions, bilateral  This may be due to pneumonia. Diastolic heart failure is always a consideration. His LV systolic function is on the low end of normal.  If the primary team or pulmonology feels it is warranted we can always entertain a right heart catheterization form. I do not feel strongly about it at this time. 3.  CAD of native coronary arteries without angina  Continue aspirin, carvedilol and statin therapy.   He follows with Dr. Valentine Chowdhury at [facility].

## 2023-03-13 NOTE — PROGRESS NOTES
Department of Internal Medicine  Nephrology Progress Note    SUBJECTIVE:  We are following this patient for blanco  Patient progress reviewed. REVIEW OF SYSTEMS:  No CP/SOB tired /sleepy     Physical Exam:    VITALS:  /68   Pulse 93   Temp 97.8 °F (36.6 °C) (Oral)   Resp 18   Ht 5' 11\" (1.803 m)   Wt 226 lb (102.5 kg)   SpO2 93%   BMI 31.52 kg/m²   24HR INTAKE/OUTPUT:  No intake or output data in the 24 hours ending 03/13/23 1436    Constitutional: resting   Respiratory:  Decrease BS at bases / Scattered rhonchi   Decrease BS at Rt bases   Gastrointestinal: No tenderness.   Normal Bowel Sounds  Cardiovascular:  S1, S2 irregular   Edema:  + edema    DATA:    CBC:  Lab Results   Component Value Date/Time    WBC 11.2 03/13/2023 07:03 AM    RBC 4.27 03/13/2023 07:03 AM    HGB 12.5 03/13/2023 07:03 AM    HCT 38.7 03/13/2023 07:03 AM    MCV 90.7 03/13/2023 07:03 AM    MCH 29.2 03/13/2023 07:03 AM    MCHC 32.2 03/13/2023 07:03 AM    RDW 15.1 03/13/2023 07:03 AM     03/13/2023 07:03 AM    MPV 7.0 03/13/2023 07:03 AM     CMP:  Lab Results   Component Value Date/Time     03/13/2023 07:03 AM    K 5.0 03/13/2023 07:03 AM    CL 95 03/13/2023 07:03 AM    CO2 25 03/13/2023 07:03 AM    BUN 42 03/13/2023 07:03 AM    CREATININE 2.0 03/13/2023 07:03 AM    GFRAA >60 08/26/2021 06:31 AM    GFRAA >60 02/15/2013 08:48 AM    AGRATIO 0.8 03/13/2023 07:03 AM    LABGLOM 34 03/13/2023 07:03 AM    GLUCOSE 268 03/13/2023 07:03 AM    PROT 7.2 03/13/2023 07:03 AM    PROT 7.0 09/23/2012 08:18 PM    CALCIUM 8.9 03/13/2023 07:03 AM    BILITOT 0.5 03/13/2023 07:03 AM    ALKPHOS 59 03/13/2023 07:03 AM    AST 13 03/13/2023 07:03 AM    ALT 9 03/13/2023 07:03 AM      Hepatic Function Panel:   Lab Results   Component Value Date/Time    ALKPHOS 59 03/13/2023 07:03 AM    ALT 9 03/13/2023 07:03 AM    AST 13 03/13/2023 07:03 AM    PROT 7.2 03/13/2023 07:03 AM    PROT 7.0 09/23/2012 08:18 PM    BILITOT 0.5 03/13/2023 07:03 AM BILIDIR <0.2 07/06/2021 05:00 PM    IBILI see below 07/06/2021 05:00 PM      Phosphorus:   Lab Results   Component Value Date/Time    PHOS 2.4 02/07/2023 07:49 AM       ASSESSMENT:  Principal Problem:    Pneumonia of right lower lobe due to infectious organism  Active Problems:    Acute respiratory failure with hypoxia (HCC)  Resolved Problems:    * No resolved hospital problems. *      PLAN:  WEI most likely pre renal . Pt had two dye loads in ER . Hold lasix /ACE-I , agree with IVF for now . Will do w/u. RLL pneumonia with likely parapneumonic effusion. Abx per Pul.   HTN controlled   DM 2 meds per  team .   A fib on meds   Meds reviewed and adjusted   Check labs now and in am     Bishop All MD, Shelbie Torres

## 2023-03-14 ENCOUNTER — APPOINTMENT (OUTPATIENT)
Dept: GENERAL RADIOLOGY | Age: 78
End: 2023-03-14
Payer: MEDICARE

## 2023-03-14 ENCOUNTER — APPOINTMENT (OUTPATIENT)
Dept: INTERVENTIONAL RADIOLOGY/VASCULAR | Age: 78
End: 2023-03-14
Payer: MEDICARE

## 2023-03-14 PROBLEM — I48.11 LONGSTANDING PERSISTENT ATRIAL FIBRILLATION (HCC): Status: ACTIVE | Noted: 2023-03-14

## 2023-03-14 LAB
ACID FAST STN SPEC QL: NORMAL
ALBUMIN SERPL-MCNC: 3.5 G/DL (ref 3.4–5)
ANION GAP SERPL CALCULATED.3IONS-SCNC: 16 MMOL/L (ref 3–16)
BASOPHILS # BLD: 0.1 K/UL (ref 0–0.2)
BASOPHILS NFR BLD: 1 %
BUN SERPL-MCNC: 22 MG/DL (ref 7–20)
CALCIUM SERPL-MCNC: 9.1 MG/DL (ref 8.3–10.6)
CHLORIDE SERPL-SCNC: 98 MMOL/L (ref 99–110)
CO2 SERPL-SCNC: 22 MMOL/L (ref 21–32)
CREAT SERPL-MCNC: 1.1 MG/DL (ref 0.8–1.3)
DEPRECATED RDW RBC AUTO: 14.6 % (ref 12.4–15.4)
EOSINOPHIL # BLD: 0.4 K/UL (ref 0–0.6)
EOSINOPHIL NFR BLD: 4 %
GFR SERPLBLD CREATININE-BSD FMLA CKD-EPI: >60 ML/MIN/{1.73_M2}
GLUCOSE BLD-MCNC: 198 MG/DL (ref 70–99)
GLUCOSE BLD-MCNC: 205 MG/DL (ref 70–99)
GLUCOSE BLD-MCNC: 206 MG/DL (ref 70–99)
GLUCOSE BLD-MCNC: 226 MG/DL (ref 70–99)
GLUCOSE SERPL-MCNC: 204 MG/DL (ref 70–99)
HCT VFR BLD AUTO: 38.2 % (ref 40.5–52.5)
HGB BLD-MCNC: 12.8 G/DL (ref 13.5–17.5)
LDH FLD L TO P-CCNC: 677 U/L
LOEFFLER MB STN SPEC: NORMAL
LYMPHOCYTES # BLD: 1.8 K/UL (ref 1–5.1)
LYMPHOCYTES NFR BLD: 16 %
MCH RBC QN AUTO: 29.9 PG (ref 26–34)
MCHC RBC AUTO-ENTMCNC: 33.4 G/DL (ref 31–36)
MCV RBC AUTO: 89.6 FL (ref 80–100)
MONOCYTES # BLD: 1 K/UL (ref 0–1.3)
MONOCYTES NFR BLD: 9 %
MRSA SCREEN RT-PCR: NORMAL
NEUTROPHILS # BLD: 7.5 K/UL (ref 1.7–7.7)
NEUTROPHILS NFR BLD: 69 %
NT-PROBNP SERPL-MCNC: 395 PG/ML (ref 0–449)
PERFORMED ON: ABNORMAL
PHOSPHATE SERPL-MCNC: 2.7 MG/DL (ref 2.5–4.9)
PLATELET # BLD AUTO: 361 K/UL (ref 135–450)
PLATELET BLD QL SMEAR: ADEQUATE
PMV BLD AUTO: 6.9 FL (ref 5–10.5)
POTASSIUM SERPL-SCNC: 4.4 MMOL/L (ref 3.5–5.1)
POTASSIUM SERPL-SCNC: NORMAL MMOL/L (ref 3.5–5.1)
RBC # BLD AUTO: 4.27 M/UL (ref 4.2–5.9)
SLIDE REVIEW: ABNORMAL
SODIUM SERPL-SCNC: 136 MMOL/L (ref 136–145)
SPECIMEN SOURCE FLD: NORMAL
VARIANT LYMPHS NFR BLD MANUAL: 1 % (ref 0–6)
WBC # BLD AUTO: 10.8 K/UL (ref 4–11)

## 2023-03-14 PROCEDURE — 87070 CULTURE OTHR SPECIMN AEROBIC: CPT

## 2023-03-14 PROCEDURE — 6360000002 HC RX W HCPCS: Performed by: STUDENT IN AN ORGANIZED HEALTH CARE EDUCATION/TRAINING PROGRAM

## 2023-03-14 PROCEDURE — 83880 ASSAY OF NATRIURETIC PEPTIDE: CPT

## 2023-03-14 PROCEDURE — 99233 SBSQ HOSP IP/OBS HIGH 50: CPT | Performed by: INTERNAL MEDICINE

## 2023-03-14 PROCEDURE — 97535 SELF CARE MNGMENT TRAINING: CPT

## 2023-03-14 PROCEDURE — 94668 MNPJ CHEST WALL SBSQ: CPT

## 2023-03-14 PROCEDURE — 32557 INSERT CATH PLEURA W/ IMAGE: CPT

## 2023-03-14 PROCEDURE — 88112 CYTOPATH CELL ENHANCE TECH: CPT

## 2023-03-14 PROCEDURE — 88305 TISSUE EXAM BY PATHOLOGIST: CPT

## 2023-03-14 PROCEDURE — 2700000000 HC OXYGEN THERAPY PER DAY

## 2023-03-14 PROCEDURE — 99233 SBSQ HOSP IP/OBS HIGH 50: CPT | Performed by: NURSE PRACTITIONER

## 2023-03-14 PROCEDURE — 6370000000 HC RX 637 (ALT 250 FOR IP): Performed by: INTERNAL MEDICINE

## 2023-03-14 PROCEDURE — 0W9930Z DRAINAGE OF RIGHT PLEURAL CAVITY WITH DRAINAGE DEVICE, PERCUTANEOUS APPROACH: ICD-10-PCS | Performed by: STUDENT IN AN ORGANIZED HEALTH CARE EDUCATION/TRAINING PROGRAM

## 2023-03-14 PROCEDURE — 94640 AIRWAY INHALATION TREATMENT: CPT

## 2023-03-14 PROCEDURE — 1200000000 HC SEMI PRIVATE

## 2023-03-14 PROCEDURE — 32551 INSERTION OF CHEST TUBE: CPT

## 2023-03-14 PROCEDURE — 2500000003 HC RX 250 WO HCPCS: Performed by: INTERNAL MEDICINE

## 2023-03-14 PROCEDURE — 6360000002 HC RX W HCPCS: Performed by: INTERNAL MEDICINE

## 2023-03-14 PROCEDURE — 92610 EVALUATE SWALLOWING FUNCTION: CPT

## 2023-03-14 PROCEDURE — 85025 COMPLETE CBC W/AUTO DIFF WBC: CPT

## 2023-03-14 PROCEDURE — 97166 OT EVAL MOD COMPLEX 45 MIN: CPT

## 2023-03-14 PROCEDURE — 94669 MECHANICAL CHEST WALL OSCILL: CPT

## 2023-03-14 PROCEDURE — 94760 N-INVAS EAR/PLS OXIMETRY 1: CPT

## 2023-03-14 PROCEDURE — 2709999900 IR CHEST TUBE INSERTION

## 2023-03-14 PROCEDURE — 83615 LACTATE (LD) (LDH) ENZYME: CPT

## 2023-03-14 PROCEDURE — 2580000003 HC RX 258: Performed by: INTERNAL MEDICINE

## 2023-03-14 PROCEDURE — 80069 RENAL FUNCTION PANEL: CPT

## 2023-03-14 PROCEDURE — 87205 SMEAR GRAM STAIN: CPT

## 2023-03-14 PROCEDURE — 97162 PT EVAL MOD COMPLEX 30 MIN: CPT

## 2023-03-14 PROCEDURE — 97530 THERAPEUTIC ACTIVITIES: CPT

## 2023-03-14 PROCEDURE — 36415 COLL VENOUS BLD VENIPUNCTURE: CPT

## 2023-03-14 PROCEDURE — 6370000000 HC RX 637 (ALT 250 FOR IP): Performed by: NURSE PRACTITIONER

## 2023-03-14 RX ORDER — FENTANYL CITRATE 50 UG/ML
INJECTION, SOLUTION INTRAMUSCULAR; INTRAVENOUS DAILY PRN
Status: COMPLETED | OUTPATIENT
Start: 2023-03-14 | End: 2023-03-14

## 2023-03-14 RX ORDER — SENNA AND DOCUSATE SODIUM 50; 8.6 MG/1; MG/1
1 TABLET, FILM COATED ORAL DAILY
Status: DISCONTINUED | OUTPATIENT
Start: 2023-03-14 | End: 2023-03-17 | Stop reason: HOSPADM

## 2023-03-14 RX ORDER — HYDROCODONE BITARTRATE AND ACETAMINOPHEN 7.5; 325 MG/1; MG/1
1 TABLET ORAL EVERY 6 HOURS PRN
Status: DISCONTINUED | OUTPATIENT
Start: 2023-03-14 | End: 2023-03-17 | Stop reason: HOSPADM

## 2023-03-14 RX ORDER — CARVEDILOL 12.5 MG/1
12.5 TABLET ORAL 2 TIMES DAILY WITH MEALS
Status: DISCONTINUED | OUTPATIENT
Start: 2023-03-14 | End: 2023-03-14

## 2023-03-14 RX ORDER — CARVEDILOL 25 MG/1
25 TABLET ORAL 2 TIMES DAILY WITH MEALS
Status: DISCONTINUED | OUTPATIENT
Start: 2023-03-14 | End: 2023-03-17 | Stop reason: HOSPADM

## 2023-03-14 RX ADMIN — IPRATROPIUM BROMIDE AND ALBUTEROL SULFATE 1 AMPULE: 2.5; .5 SOLUTION RESPIRATORY (INHALATION) at 08:11

## 2023-03-14 RX ADMIN — ROFLUMILAST 500 MCG: 500 TABLET ORAL at 07:27

## 2023-03-14 RX ADMIN — MEROPENEM 1000 MG: 1 INJECTION, POWDER, FOR SOLUTION INTRAVENOUS at 10:01

## 2023-03-14 RX ADMIN — FENTANYL CITRATE 25 MCG: 50 INJECTION INTRAMUSCULAR; INTRAVENOUS at 11:14

## 2023-03-14 RX ADMIN — ACETAMINOPHEN 650 MG: 325 TABLET ORAL at 07:32

## 2023-03-14 RX ADMIN — PRAMIPEXOLE DIHYDROCHLORIDE 0.25 MG: 0.25 TABLET ORAL at 07:27

## 2023-03-14 RX ADMIN — INSULIN GLARGINE 15 UNITS: 100 INJECTION, SOLUTION SUBCUTANEOUS at 21:21

## 2023-03-14 RX ADMIN — IPRATROPIUM BROMIDE AND ALBUTEROL SULFATE 1 AMPULE: 2.5; .5 SOLUTION RESPIRATORY (INHALATION) at 11:37

## 2023-03-14 RX ADMIN — SODIUM CHLORIDE SOLN NEBU 3% 4 ML: 3 NEBU SOLN at 08:11

## 2023-03-14 RX ADMIN — SODIUM CHLORIDE SOLN NEBU 3% 4 ML: 3 NEBU SOLN at 19:58

## 2023-03-14 RX ADMIN — GABAPENTIN 100 MG: 100 CAPSULE ORAL at 13:55

## 2023-03-14 RX ADMIN — MEROPENEM 1000 MG: 1 INJECTION, POWDER, FOR SOLUTION INTRAVENOUS at 16:57

## 2023-03-14 RX ADMIN — CARVEDILOL 25 MG: 25 TABLET, FILM COATED ORAL at 16:53

## 2023-03-14 RX ADMIN — FENTANYL CITRATE 25 MCG: 50 INJECTION INTRAMUSCULAR; INTRAVENOUS at 11:06

## 2023-03-14 RX ADMIN — ASPIRIN 81 MG: 81 TABLET, COATED ORAL at 07:27

## 2023-03-14 RX ADMIN — CARVEDILOL 3.12 MG: 6.25 TABLET, FILM COATED ORAL at 07:27

## 2023-03-14 RX ADMIN — INSULIN LISPRO 2 UNITS: 100 INJECTION, SOLUTION INTRAVENOUS; SUBCUTANEOUS at 17:01

## 2023-03-14 RX ADMIN — BUDESONIDE 500 MCG: 0.5 SUSPENSION RESPIRATORY (INHALATION) at 08:11

## 2023-03-14 RX ADMIN — INSULIN LISPRO 2 UNITS: 100 INJECTION, SOLUTION INTRAVENOUS; SUBCUTANEOUS at 12:40

## 2023-03-14 RX ADMIN — SODIUM CHLORIDE, PRESERVATIVE FREE 10 ML: 5 INJECTION INTRAVENOUS at 21:20

## 2023-03-14 RX ADMIN — FENTANYL CITRATE 25 MCG: 50 INJECTION INTRAMUSCULAR; INTRAVENOUS at 11:17

## 2023-03-14 RX ADMIN — ENOXAPARIN SODIUM 100 MG: 100 INJECTION SUBCUTANEOUS at 07:27

## 2023-03-14 RX ADMIN — PRAMIPEXOLE DIHYDROCHLORIDE 0.25 MG: 0.25 TABLET ORAL at 21:21

## 2023-03-14 RX ADMIN — HYDROMORPHONE HYDROCHLORIDE 1 MG: 1 INJECTION, SOLUTION INTRAMUSCULAR; INTRAVENOUS; SUBCUTANEOUS at 21:20

## 2023-03-14 RX ADMIN — HYDROCODONE BITARTRATE AND ACETAMINOPHEN 1 TABLET: 7.5; 325 TABLET ORAL at 13:44

## 2023-03-14 RX ADMIN — CEFEPIME 2000 MG: 2 INJECTION, POWDER, FOR SOLUTION INTRAVENOUS at 00:13

## 2023-03-14 RX ADMIN — HYDROMORPHONE HYDROCHLORIDE 1 MG: 1 INJECTION, SOLUTION INTRAMUSCULAR; INTRAVENOUS; SUBCUTANEOUS at 16:52

## 2023-03-14 RX ADMIN — PRAMIPEXOLE DIHYDROCHLORIDE 0.25 MG: 0.25 TABLET ORAL at 13:55

## 2023-03-14 RX ADMIN — IPRATROPIUM BROMIDE AND ALBUTEROL SULFATE 1 AMPULE: 2.5; .5 SOLUTION RESPIRATORY (INHALATION) at 19:54

## 2023-03-14 RX ADMIN — PRAMIPEXOLE DIHYDROCHLORIDE 0.25 MG: 0.25 TABLET ORAL at 21:20

## 2023-03-14 RX ADMIN — GABAPENTIN 100 MG: 100 CAPSULE ORAL at 21:20

## 2023-03-14 RX ADMIN — SODIUM CHLORIDE SOLN NEBU 3% 4 ML: 3 NEBU SOLN at 11:37

## 2023-03-14 RX ADMIN — METRONIDAZOLE 500 MG: 500 INJECTION, SOLUTION INTRAVENOUS at 06:04

## 2023-03-14 RX ADMIN — GABAPENTIN 100 MG: 100 CAPSULE ORAL at 07:27

## 2023-03-14 RX ADMIN — BUDESONIDE 500 MCG: 0.5 SUSPENSION RESPIRATORY (INHALATION) at 19:58

## 2023-03-14 RX ADMIN — ATORVASTATIN CALCIUM 40 MG: 40 TABLET, FILM COATED ORAL at 21:20

## 2023-03-14 ASSESSMENT — PAIN DESCRIPTION - DESCRIPTORS
DESCRIPTORS: ACHING;SORE
DESCRIPTORS: SHARP
DESCRIPTORS: SHARP

## 2023-03-14 ASSESSMENT — PAIN SCALES - GENERAL
PAINLEVEL_OUTOF10: 3
PAINLEVEL_OUTOF10: 10
PAINLEVEL_OUTOF10: 7
PAINLEVEL_OUTOF10: 10

## 2023-03-14 ASSESSMENT — PAIN DESCRIPTION - LOCATION
LOCATION: ABDOMEN;FLANK
LOCATION: ABDOMEN;CHEST
LOCATION: ABDOMEN
LOCATION: FLANK

## 2023-03-14 ASSESSMENT — PAIN - FUNCTIONAL ASSESSMENT
PAIN_FUNCTIONAL_ASSESSMENT: ACTIVITIES ARE NOT PREVENTED

## 2023-03-14 ASSESSMENT — PAIN DESCRIPTION - ORIENTATION
ORIENTATION: RIGHT
ORIENTATION: RIGHT
ORIENTATION: RIGHT;LOWER
ORIENTATION: RIGHT

## 2023-03-14 NOTE — CARE COORDINATION
03/14/23 1405   Readmission Assessment   Number of Days since last admission? 8-30 days   Previous Disposition Home with Home Health   Who is being Interviewed Caregiver   What was the patient's/caregiver's perception as to why they think they needed to return back to the hospital? Other (Comment)  (still sick)   Did you visit your Primary Care Physician after you left the hospital, before you returned this time? No   Why weren't you able to visit your PCP? Did not have an appointment   Did you see a specialist, such as Cardiac, Pulmonary, Orthopedic Physician, etc. after you left the hospital? Yes   Who advised the patient to return to the hospital? Self-referral   Does the patient report anything that got in the way of taking their medications? No   In our efforts to provide the best possible care to you and others like you, can you think of anything that we could have done to help you after you left the hospital the first time, so that you might not have needed to return so soon?  Other (Comment)  (need to be healthier)   Electronically signed by Keene Goldmann, MSW on 3/14/2023 at 2:07 PM

## 2023-03-14 NOTE — PROGRESS NOTES
Physical Therapy  Facility/Department: [facility]  Physical Therapy Initial Assessment    Name: Gayathri Mason  : [birth year]  MRN: [id number]  Date of Service: 3/14/2023    Discharge Recommendations:  24 hour supervision or assist, Home with Home health PT          Patient Diagnosis(es): The primary encounter diagnosis was Pneumonia of both lungs due to infectious organism, unspecified part of lung. Diagnoses of WEI (acute kidney injury) ([address] Elevated troponin, and Pleural effusion, bilateral were also pertinent to this visit. Past Medical History:  has a past medical history of Abrasion of right elbow, Arthritis, CAD (coronary artery disease), Chronic back pain, Chronic systolic CHF (congestive heart failure) ([address] COPD (chronic obstructive pulmonary disease) ([address] Dental disease, Diabetes mellitus ([address] Essential hypertension, Hearing loss, Paroxysmal atrial fibrillation ([address] and Tinnitus. Past Surgical History:  has a past surgical history that includes fracture surgery (Left, ); back surgery (); Cardiac surgery (); Coronary artery bypass graft; back surgery; and Cervical spine surgery. Assessment   Body Structures, Functions, Activity Limitations Requiring Skilled Therapeutic Intervention: Decreased functional mobility ; Decreased ADL status; Decreased strength;Decreased safe awareness;Decreased balance;Decreased endurance  Assessment: Pt is a 68 y.o. male who presented to the ED on 3/13/23 with c/o not feeling well for the last few days. He reports severe weakness, he was unable to perform his ADLs, and has been having productive cough and his significant shortness of breath with exertion. Pt diagnosed with pneumonia. Prior to admission, pt living with spouse in home setting, independent with ADLs, use of various device with ambulation. Pt currently functioning slightly below baseline. Anticipate return home with with 24hr supv and level 1 HHPT.  As in previous admission, continue to recommned AFO for RLE to aid in ground clearance. Treatment Diagnosis: impaired mobility  Therapy Prognosis: Fair  Decision Making: Medium Complexity  History: see above  Exam: see below  Clinical Presentation: evolving  Requires PT Follow-Up: Yes  Activity Tolerance  Activity Tolerance: Patient tolerated treatment well;Patient limited by endurance     Plan   Physcial Therapy Plan  General Plan: 3-5 times per week  Current Treatment Recommendations: Strengthening, Balance training, Functional mobility training, Transfer training, Endurance training, Gait training, Neuromuscular re-education, Patient/Caregiver education & training, Safety education & training, Equipment evaluation, education, & procurement  Safety Devices  Type of Devices: All fall risk precautions in place, Call light within reach, Gait belt, Patient at risk for falls, Left in chair, Chair alarm in place, Nurse notified     Restrictions  Restrictions/Precautions  Restrictions/Precautions: Fall Risk, Swallowing - Thickened Liquids  Position Activity Restriction  Other position/activity restrictions: Honey thick; soft bite sized; 2L to RA     Subjective   General  Chart Reviewed: Yes  Patient assessed for rehabilitation services?: Yes  Additional Pertinent Hx: Pt is a 68 y.o. male who presented to the ED on 3/13/23 with c/o not feeling well for the last few days. He reports severe weakness, he was unable to perform his ADLs, and has been having productive cough and his significant shortness of breath with exertion. Pt diagnosed with pneumonia. Response To Previous Treatment: Not applicable  Family / Caregiver Present: No  Referring Practitioner: Brian Virgen MD  Referral Date : 03/13/23  Diagnosis: Pneumonia  Follows Commands: Within Functional Limits  Subjective  Subjective: Pt is agreeable to PT. Multiple c/o care since admission.          Social/Functional History  Social/Functional History  Lives With: Spouse  Type of Home: House  Home Layout: One level, Able to Live on Main level with bedroom/bathroom  Home Access: Stairs to enter with rails  Entrance Stairs - Number of Steps: 2  Entrance Stairs - Rails: Both  Bathroom Shower/Tub: Walk-in shower  Bathroom Toilet: Handicap height  Bathroom Equipment: Grab bars in shower, Built-in shower seat, Shower chair, Hand-held shower, Grab bars around toilet  Bathroom Accessibility: Wheelchair accessible  Home Equipment: Lift chair, Walker, rolling, Cane, Rollator, Electric scooter  ADL Assistance: Independent  Homemaking Assistance: Needs assistance  Ambulation Assistance: Independent (combination of RW, cane in home. scooter outside home)  Transfer Assistance: Independent  Active : Yes  Occupation: Retired  Type of Occupation: ,     Vision/Hearing  Vision  Vision: Impaired  Vision Exceptions: Wears glasses at all times  Hearing  Hearing: Exceptions to [facility]  Hearing Exceptions: Hard of hearing/hearing concerns      Cognition   Orientation  Overall Orientation Status: Within Functional Limits  Cognition  Overall Cognitive Status: Exceptions  Safety Judgement: Decreased awareness of need for safety;Decreased awareness of need for assistance  Insights: Decreased awareness of deficits  Cognition Comment: decreased insight into current medical situation     Objective   Gross Assessment  Strength: Generally decreased, functional     Bed mobility  Supine to Sit: Minimal assistance  Sit to Supine: Unable to assess  Bed Mobility Comments: Pt not following commands for sitting up from bed, requiring min A. Transfers  Sit to Stand: Contact guard assistance  Stand to Sit: Contact guard assistance  Ambulation  Surface: Level tile  Device: Rolling Walker  Assistance: Contact guard assistance  Gait Deviations: Slow Emely;Decreased step length;Decreased step height  Distance: 10' + 25' x 2  Comments: Pt with O2 sats 89-92% on RA throughout session.      Balance  Posture: Good  Sitting - Static: Good  Sitting - Dynamic: Good  Standing - Static: Fair;+ (@RW)  Standing - Dynamic: Fair (@RW)         AM-PAC Score  AM-PAC Inpatient Mobility Raw Score : 18 (03/14/23 1008)  AM-PAC Inpatient T-Scale Score : 43.63 (03/14/23 1008)  Mobility Inpatient CMS 0-100% Score: 46.58 (03/14/23 1008)  Mobility Inpatient CMS G-Code Modifier : CK (03/14/23 1008)              Goals  Short Term Goals  Time Frame for Short Term Goals: by acute discharge  Short Term Goal 1: bed mobility with SBA  Short Term Goal 2: sit<>stand with SBA  Short Term Goal 3: ambulate > 36' with RW and SBA  Patient Goals   Patient Goals : none stated       Education  Patient Education  Education Given To: Patient  Education Provided: Role of Therapy;Plan of Care  Education Method: Verbal  Barriers to Learning: None  Education Outcome: Verbalized understanding;Continued education needed      Therapy Time   Individual Concurrent Group Co-treatment   Time In 0905         Time Out 1005         Minutes 60         Timed Code Treatment Minutes: 45 Minutes       Roberto Dejesus, PT

## 2023-03-14 NOTE — PROGRESS NOTES
Speech Language Pathology    Patient met in fluoroscopy suite for Modified Barium Swallow. Just prior to initiating study, patient reports urgent need to move bowels; patient with increasing agitation repeating request. Study held and patient returned to room to toilet. Patient toileted and returned to bed with SLP and rad tech assist. MBS to be rescheduled to 3/15/2023. Thank you. Vania Barr, Melissa Crow, #[id number]  Speech-Language Pathologist  Portable phone: [phone]    26 minutes non-billable time with patient

## 2023-03-14 NOTE — PROGRESS NOTES
Cardiology - PROGRESS NOTE    Admit Date: 3/13/2023     Reason for follow up: AF, elevated troponin, shortness of breath     68 y.o. male with a past medical history significant for type 2 DM, paroxysmal atrial fibrillation, CAD s/p CABG in 2276, chronic systolic CHF and COPD who presented to [facility] with abdominal pain and fatigue. I was asked to see the patient for troponin elevation to 0.02. Social History:   reports that he quit smoking about 27 years ago. His smoking use included cigarettes. He started smoking about 64 years ago. He has a 18.00 pack-year smoking history. He has never used smokeless tobacco. He reports that he does not drink alcohol and does not use drugs. Family History: family history includes Brain Cancer in his mother; Heart Attack in his brother and father. Interval History:   Patient seen and examined and notes reviewed   Pt scheduled for swallow eval   Pt refused   Per nursing increased agitation   Chest tube placed 3/14  Shortness of breath   All other systems reviewed and negative except as above. Diet: ADULT DIET; Dysphagia - Soft and Bite Sized; 3 carb choices (45 gm/meal); Low Sodium (2 gm);  Moderately Thick (Honey)  Pain is:Mild  Nausea:None    In: 1794.9 [P.O.:50; I.V.:768.6]  Out: 1200    Patient Vitals for the past 96 hrs (Last 3 readings):   Weight   03/14/23 0229 229 lb 0.9 oz (103.9 kg)   03/13/23 0652 226 lb (102.5 kg)           Data:   Scheduled Meds:   Scheduled Meds:   meropenem  1,000 mg IntraVENous Q8H    carvedilol  12.5 mg Oral BID     sennosides-docusate sodium  1 tablet Oral Daily    aspirin  81 mg Oral Daily    pramipexole  0.25 mg Oral TID    pramipexole  0.25 mg Oral Nightly    Roflumilast  500 mcg Oral Daily    sodium chloride flush  5-40 mL IntraVENous 2 times per day    insulin glargine  0.15 Units/kg SubCUTAneous Nightly    insulin lispro  0-8 Units SubCUTAneous TID     insulin lispro  0-4 Units SubCUTAneous Nightly    [Held by provider] enoxaparin  1 mg/kg SubCUTAneous Daily    ipratropium-albuterol  1 ampule Inhalation Q4H WA    budesonide  0.5 mg Nebulization BID    atorvastatin  40 mg Oral Nightly    sodium chloride (Inhalant)  4 mL Nebulization Q4H WA    gabapentin  100 mg Oral TID     Continuous Infusions:   sodium chloride      lactated ringers IV soln Stopped (03/14/23 0953)    dextrose       PRN Meds:. HYDROmorphone, HYDROcodone-acetaminophen, sodium chloride flush, sodium chloride, ondansetron **OR** ondansetron, polyethylene glycol, acetaminophen **OR** acetaminophen, glucose, dextrose bolus **OR** dextrose bolus, glucagon (rDNA), dextrose  Continuous Infusions:   sodium chloride      lactated ringers IV soln Stopped (03/14/23 0953)    dextrose         Intake/Output Summary (Last 24 hours) at 3/14/2023 1450  Last data filed at 3/14/2023 1227  Gross per 24 hour   Intake 1794.93 ml   Output 1200 ml   Net 594.93 ml       Lab Results   Component Value Date    ALT 9 (L) 03/13/2023    AST 13 (L) 03/13/2023    ALKPHOS 59 03/13/2023    BILITOT 0.5 03/13/2023     Lab Results   Component Value Date    CREATININE 1.1 03/14/2023    BUN 22 (H) 03/14/2023     03/14/2023    K 4.4 03/14/2023    K see below 03/14/2023    CL 98 (L) 03/14/2023    CO2 22 03/14/2023     Lab Results   Component Value Date    TSH 3.05 10/20/2011     Lab Results   Component Value Date    WBC 10.8 03/14/2023    HGB 12.8 (L) 03/14/2023    HCT 38.2 (L) 03/14/2023    MCV 89.6 03/14/2023     03/14/2023     No components found for: CHLPL  Lab Results   Component Value Date    TRIG 149 10/20/2011    TRIG 145 09/30/2010     Lab Results   Component Value Date    HDL 46 10/20/2011    HDL 41 09/30/2010     Lab Results   Component Value Date    LDLCALC 62 10/20/2011    LDLCALC 54 09/30/2010     Lab Results   Component Value Date    LABVLDL 30 10/20/2011    LABVLDL 29 09/30/2010 -----------------------------------------------------------------  Telemetry: currently not on telemetry       Chest CT 3/13/23:  Chest       No central pulmonary embolus identified       Bilateral pleural effusions, right greater than left with adjacent   consolidation of the lungs, either due to atelectasis or pneumonia. Scattered ground-glass opacity in tree-in-bud nodularity seen in the aerated   portion of the lungs, likely postinflammatory-infectious. Scattered areas of   bronchial wall thickening are seen       Abdomen and pelvis       No acute intra-abdominal abnormality      Echo 2018 (the [facility]): - Left ventricle: The cavity size was normal. Wall thickness was     normal. Systolic function was mildly reduced. The estimated     ejection fraction was in the range of 47% to 51%. Moderate     hypokinesis of the basal-midinferoseptal myocardium. Normal     diastolic function. - Left atrium: The atrium was mildly dilated. No change from prior     echo 2015. - Inferior vena cava: The vessel was normal in size; the     respirophasic diameter changes were in the normal range (>= 50%);     findings are consistent with normal central venous pressure. Objective:   Vitals: BP (!) 159/77   Pulse 90   Temp 98.1 °F (36.7 °C) (Oral)   Resp 16   Ht 5' 11\" (1.803 m)   Wt 229 lb 0.9 oz (103.9 kg)   SpO2 92%   BMI 31.95 kg/m²   General appearance: alert, appears stated age and uncooperative,   Skin: Skin color, texture, turgor normal. No rashes or ecchymosis. HEENT: Head: Normal, normocephalic, atraumatic.   Lungs: clear to auscultation bilaterally and diminished breath sounds bilaterally, no accessory muscle use, R chest tube   Heart: irregularly irregular rhythm and S1, S2 normal  Abdomen: soft, non-tender; bowel sounds normal; no masses,  no organomegaly  Extremities: extremities normal, atraumatic, no cyanosis or edema, pulses: DP +2/+2, PT +2/+2,   Neurologic: Mental status: Alert, oriented, thought content appropriate, no tremors, no gross sensory motor deficit,   Psychiatric: normal insight and affect      Assessment & Plan:    Patient Active Problem List:          Plan:  1. Elevated troponin    Hx of CAD with CABG   Mildly elevated and likely elevated secondary to renal fx   No chest pain    No additional cardiac evaluation at this time   Continue risk factor modification    ASA, statin, beta-blocker   2. Atrial Fibrillation    Chronic    Rate controlled    On beta-blocker therapy    Eliquis on hold secondary to procedures   3. Pleural effusions   Bilateral    PNA v DHF ? Chest tube with dark bloody fluid-less likely CHF    Does not appear fluid overloaded    May consider RHC if needed per primary service   4. WEI   Cr 2.0 on admit   Improved to 1.1 with hydration    Avoid nephrotoxic agents      5.  HTN    Elevated   On beta-blocker    Increase dosage    On ACE and lasix as outpt    Continue to hold   Consider hydralazine if BP remains elevated       SUMI Nina   [address]  Cardiology   3/14/2023  2:50 PM

## 2023-03-14 NOTE — CARE COORDINATION
Case Management Assessment  Initial Evaluation    Date/Time of Evaluation: 3/14/2023 2:04 PM  Assessment Completed by: RENETTA Carlton    If patient is discharged prior to next notation, then this note serves as note for discharge by case management. Patient Name: Destiny Blanton                   YOB: 1945  Diagnosis: Pneumonia due to organism [J18.9]  Elevated troponin [R77.8]  Pleural effusion, bilateral [J90]  WEI (acute kidney injury) ([address] [N17.9]  Pneumonia of both lungs due to infectious organism, unspecified part of lung [J18.9]                   Date / Time: 3/13/2023  6:44 AM    Patient Admission Status: Inpatient   Readmission Risk (Low < 19, Mod (19-27), High > 27): Readmission Risk Score: 17.3    Current PCP: Efrain Ness  PCP verified by CM? Yes    Chart Reviewed: Yes      History Provided by: Spouse  Patient Orientation: Alert and Oriented    Patient Cognition: Alert    Hospitalization in the last 30 days (Readmission):  Yes    If yes, Readmission Assessment in  Navigator will be completed.     Advance Directives:      Code Status: Full Code   Patient's Primary Decision Maker is: Legal Next of Kin    Primary Decision MakerMmicheal Rosas Spouse - [phone]    Secondary Decision Maker: Bobby Pace - Child - [phone]    Discharge Planning:    Patient lives with: Spouse/Significant Other Type of Home: House  Primary Care Giver: Self  Patient Support Systems include: Spouse/Significant Other, Home Care Staff   Current Financial resources: None  Current community resources: ECF/Home Care  Current services prior to admission: Durable Medical Equipment            Current DME: Carolyne Bryant            Type of Home Care services:  PT, Skilled Therapy, Nursing Services (active with [facility] for PT, Speech and RN)    ADLS  Prior functional level: Assistance with the following:, Housework, Shopping, Cooking  Current functional level: Assistance with the following:, Cooking, Housework, Shopping, Mobility (wife watched pt get up and reports he can come home at [place])    PT AM-PAC: 18 /24  OT AM-PAC: 23 /24    Family can provide assistance at DC: Yes  Would you like Case Management to discuss the discharge plan with any other family members/significant others, and if so, who? Yes (spouse)  Plans to Return to Present Housing: Yes  Other Identified Issues/Barriers to RETURNING to current housing: possibly too weak  Potential Assistance needed at discharge: Home Care            Potential DME:    Patient expects to discharge to: [address] [place] [address] for transportation at discharge: Family    Financial    Payor: Gladys Delacruz / Plan: MEDICARE PART A AND B / Product Type: *No Product type* /     Does insurance require precert for SNF: No    Potential assistance Purchasing Medications: No  Meds-to-Beds request: Yes      [address] [place] [address], [address] [place] [address] Drive [phone] Pamla Erps [phone]  [address] [place] [id number]  Phone: [phone] Fax: [phone]    [address] Liudmila Yousif [phone] - F [phone]  [address] [place] [address]. [facility] [address] [facility]  Phone: [phone] Fax: [phone] [phone] Pamla Erps [phone]  [address] [address]  Phone: [phone] Fax: [phone]      Notes:    Factors facilitating achievement of predicted outcomes: Family support, Cooperative, and Pleasant    Barriers to discharge: Lower extremity weakness    Additional Case Management Notes: spoke with wife as pt was working with RN then in the bathroom. Wife reports plan is home at [place] and [facility] who is active with RN, PT and speech.   She will transport home    The Plan for Transition of Care is related to the following treatment goals of Pneumonia due to organism [J18.9]  Elevated troponin [R77.8]  Pleural effusion, bilateral [J90]  WEI (acute kidney injury) ([facility] [address] [N17.9]  Pneumonia of both lungs due to infectious organism, unspecified part of lung [B40.1]    IF APPLICABLE: The Patient and/or patient representative Lv Malloy and his family were provided with a choice of provider and agrees with the discharge plan. Freedom of choice list with basic dialogue that supports the patient's individualized plan of care/goals and shares the quality data associated with the providers was provided to: Patient   Patient Representative Name:       The Patient and/or Patient Representative Agree with the Discharge Plan?  Yes    RENETTA Cruz  Case Management Department  Ph: [phone] Fax: [phone]  Electronically signed by RENETTA Cruz on 3/14/2023 at 2:04 PM

## 2023-03-14 NOTE — PROGRESS NOTES
Facility/Department: [address] MED SURG  Initial Assessment  DYSPHAGIA BEDSIDE SWALLOW EVALUATION     Patient: Shraddha Smith   : 1945   MRN: [id number]      Evaluation Date: 3/14/2023   Admitting Diagnosis:   Pneumonia due to organism [J18.9]  Elevated troponin [R77.8]  Pleural effusion, bilateral [J90]  WEI (acute kidney injury) ([facility] [address] [N17.9]  Pneumonia of both lungs due to infectious organism, unspecified part of lung [J18.9]    Pain: Did not state                                                       CHART REVIEW:  3/13/2023 admitted with c/o abdominal pain and fatigue  MD ADMISSION H&P HPI:  79-year-old male with past medical history significant for CAD, COPD, insulin-dependent diabetes, atrial fibrillation presents to the hospital with complaints of weakness and cough. Patient reports not feeling well for the last few days. He reports severe weakness, he was unable to perform his ADLs, and has been having productive cough and his significant shortness of breath with exertion. Denies lower extremity swelling. Patient saw his pulmonologist last week. CT of the chest demonstrated large right pleural effusion, with consolidation, concerning for pneumonia. IMAGING:  CXR: 3/13/2023  Impression   No evidence of pneumothorax following right thoracentesis. Persistent right pleural effusion with associated consolidation, likely   atelectasis. CT CHEST: 3/13/2023  Impression   Chest       No central pulmonary embolus identified       Bilateral pleural effusions, right greater than left with adjacent   consolidation of the lungs, either due to atelectasis or pneumonia. Scattered ground-glass opacity in tree-in-bud nodularity seen in the aerated   portion of the lungs, likely postinflammatory-infectious.   Scattered areas of   bronchial wall thickening are seen     Current Diet Level (prior to evaluation): Regular texture diet  Thin liquids    Respiratory Status:   []Room Air   [x]O2 via nasal cannula   []Other:    Modified Barium Swallow Study: 2/7/2023  Mild-moderate oral stage dysphagia characterized by decreased mastication, decreased lingual manipulation, and decreased sensation. Prolonged but adequate mastication with textured solids. Prolonged bolus formation and movement with solids. Reduced bolus control with premature bolus loss to the pharynx over the tongue base with all. Inconsistent sensation for discrimination of bolus volume. Moderate-severe pharyngeal stage dysphagia characterized by delayed swallow initiation, decreased laryngeal elevation, decreased pharyngeal peristalsis, and reduced sensation. Premature spillage to the valleculae with all. Overflow to the pyriform with nectar and thin. Penetration that does not clear with thin via tsp and cup using small sips; chin tuck attempted but not beneficial; protective cough does not clear penetrate which is a decline from prior studies in 2018 and 2019. SILENT aspiration with nectar via cup without use of small sip. Concern for inconsistent recall and discrimination for consistent carryover of nato sips. Vallecular residue with easy to chew and regular solids requires repeat swallows to clear; max effort to clear regular residue. Recommend easy to chew diet texture with honey thick liquids; meds in puree/pudding. Could consider upgrade to nectar thick liquids if/when able to consistently demonstrate use if small sips INDEPENDENTLY.     Reason for referral: SLP evaluation orders received due to prior hx of dysphagia  and new diagnosis of PNA     History/Prior Level of Function:   Living Status: admitted from home with wife  Baseline diet: patient endorses continued soft foods and thick liquids at home  Prior Dysphagia History: initially seen for dysphagia in 2018 with silent aspiration dating back to then; 2/7/2023 [facility] rec for downgrade to soft/honey; patient dc'd home that day with pt/fam ed completed prior to dc; activie with home ST; 3/7/2023 OP Pulm notes: working with SLP and using thickener      Dysphagia Impressions/Diagnosis: Oropharyngeal Dysphagia   Accepted and tolerated evaluation at bedside. Patient alert and cooperative; oriented to self, place, situation; disoriented to year, month, date; verbally responsive but appears to be a limited historian d/t vague/general recall for dysphagia history and prior recs; follows dx. Mild-moderate oral stage dysphagia characterized by prolonged but adequate mastication and lingula manipulation for bolus formation and movement with soft solids with concern for potential for premature bolus loss to the pharynx with all PO trials. Concern for residual moderate-severe pharyngeal stage dysphagia characterized by potential delayed swallow and decreased laryngeal elevation with known history of silent aspiration. Delayed cough with nectar and thin trials this date. Suspect swallow function remains comparable to most recently completed MBS 2/7/2023. IF MD desires repeat instrumental assessment for comparison, MBS can be completed with MD order. MEDICAL OR COGNITIVE/BEHAVIORAL FACTORS WHICH CAN EXACERBATE: h/o silent aspiration; reduced recall for comp start carryover    Recommended Diet and Intervention 3/14/2023:  Diet Solids Recommendation:  Dysphagia III Soft and bite sized  Liquid Consistency Recommendation:  Moderately (honey) thick liquids  Recommended form of Meds: Meds in puree      Compensatory Swallowing Strategies:  Upright as possible for all oral intake;Remain upright for 30-45 minutes after meals;Small bites/sips;Eat/Feed slowly; Swallow 2 times per bite/sip; No straws    SHORT TERM DYSPHAGIA GOALS/PLAN OF CARE: Speech therapy for dysphagia tx 3-5 times per week during acute care stay.     Goals  Pt will functionally tolerate recommended diet with no overt clinical s/s of aspiration   Pt will demonstrate understanding of aspiration risk and precautions via education/demonstration with occasional prompting  Pt will improve swallow function via swallow ex completed 10/10    Dysphagia Therapeutic Intervention:  Bolus Control Exercise, Laryngeal Exercises , Pharyngeal Exercise, Diet Tolerance Monitoring , Oral Motor Exercises , Patient/Family Education , Therapeutic Trials with SLP     Dysphagia Prognosis: [] good []fair  [x]guarded []poor  Barriers to progress: Severity of Impairments (time post initial onset)    Discharge Recommendations: Recommend ongoing SLP for dysphagia therapy upon discharge from hospital       TEST DATA  Vision: []WFL [x]Impaired: wears glasses  Hearing: []WFL [x]Impaired: Andreafski     Cognitive/behavioral/communication:   Oriented to [x] self [x] place [x] date [] situation  [x]alert []lethargic  [x]cooperative []self-limiting   []confused   []distractible []agitated []impulsive  [x]verbally responsive []nonverbal []limited verbal responses  [x]follows one step commands []does not follow dx []follows complex commands  []aphasic []dysarthric  [x] other: concern for reduced recall     Dentition: [x]Adequate []Dentures []Missing Many Teeth []Edentulous []Other:  Vocal Quality: [x]Normal []Dysphonic  []Aphonic  []Hoarse []Wet []Weak []Other:  Volitional Cough: []Strong [x]Weak []Wet []Absent []Congested []Other:  Volitional Swallow:   []Absent  [x]Delayed []Adequate []Required use of drink      Patient Positioning: Upright in chair    Consistencies Presented: Thin liquid;   Mildly / nectar thick liquid ;    Moderately / honey thick liquid  Puree food  Soft/bite size food    Oral Mechanism Exam:  [x]WFL []Mild   [] Moderate  []Severe  []To be assessed  Impaired:   []Left side      []Right side    []Labial ROM/Coordination    []Labial Symmetry   []Lingual ROM/Coordination   []Lingual Symmetry  []Gag  []Other:     Oral Phase: []WFL [x]Mild   [x] Moderate  []Severe  []To be assessed   [x]Impaired/Prolonged Mastication: reports increased difficulty with mastication since last seen; appears adequate with soft   []Spillage Left:   []Spillage Right:  []Pocketing Left:   []Pocketing Right:   [x]Decreased Anterior to Posterior Transit: all   [x]Suspected Premature Bolus Loss: all   []Lingual/Palatal Residue:   []Other:     Pharyngeal Phase: []WFL []Mild   [x] Moderate  [x]Severe  []To be assessed   [x]Delayed Swallow:   [x]Suspected Pharyngeal Pooling:   [x]Decreased Laryngeal Elevation:   []Absent Swallow:  []Wet Vocal Quality:   []Throat Clearing-Immediate:   []Throat Clearing-Delayed:   []Cough-Immediate:   [x]Cough-Delayed: thin, nectar  []Change in Vital Signs:  [x]Suspected Delayed Pharyngeal Clearing:  []Other:     Eating Assistance:  []Independent  [x]Setup or clean-up assistance   [] Supervision or touching assistance   [] Partial or moderate assistance   [] Substantial or maximal assistance  [] Dependent       EDUCATION:   Provided education regarding role of SLP, results of assessment, recommendations and general speech pathology plan of care. [] Pt verbalized understanding and agreement   [x] Pt requires ongoing learning   [] No evidence of comprehension     If patient discharges prior to next visit, this note will serve as discharge. Timed Code Minutes: 0  Total Treatment Minutes: 35     Electronically signed by:    Ramila Medina, #[id number]  Speech-Language Pathologist  Portable phone: [phone]  03/14/23 8:50 AM

## 2023-03-14 NOTE — PROGRESS NOTES
Occupational Therapy  Facility/Department: [facility] MED SURG  Occupational Therapy Initial Assessment    Name: Jamey Castro  :   MRN: [id number]  Date of Service: 3/14/2023    Discharge Recommendations:  Home with assist PRN      Jamey Castro scored a 19/24 on the AM-PAC ADL Inpatient form. At this time, no further OT is recommended upon discharge due to pt nearing baseline. Recommend patient returns to prior setting with prior services. Patient Diagnosis(es): The primary encounter diagnosis was Pneumonia of both lungs due to infectious organism, unspecified part of lung. Diagnoses of WEI (acute kidney injury) ([address] Elevated troponin, and Pleural effusion, bilateral were also pertinent to this visit. Past Medical History:  has a past medical history of Abrasion of right elbow, Arthritis, CAD (coronary artery disease), Chronic back pain, Chronic systolic CHF (congestive heart failure) ([address] COPD (chronic obstructive pulmonary disease) ([address] Dental disease, Diabetes mellitus ([address] Essential hypertension, Hearing loss, Paroxysmal atrial fibrillation ([address] and Tinnitus. Past Surgical History:  has a past surgical history that includes fracture surgery (Left, ); back surgery (); Cardiac surgery (); Coronary artery bypass graft; back surgery; and Cervical spine surgery. Treatment Diagnosis: Decreased functional mobility and ADLs      Assessment   Performance deficits / Impairments: Decreased functional mobility ; Decreased strength;Decreased endurance;Decreased ADL status; Decreased safe awareness;Decreased balance  Assessment: Pt is a 67 yo M admitted with pneumonia of both lungs d/t infectious organism. PTA, pt lived at home with his wife and was mod IND with ADLs and functional mobility using cane or RW in the house and a scooter outside of the home.  Today, pt is slightly below baseline, requiring up to CGA with functional mobility d/t safety concerns with the RW and up to mod A for LE ADL completion. Pt weaned off NC during therapy session- O2 at 92% on room air. OT will continue to see while on acute to address current deficits and to increase pt independence. Anticipate pt will be safe to return home with assist PRN once medically stable. Treatment Diagnosis: Decreased functional mobility and ADLs  Prognosis: Fair  Decision Making: Medium Complexity  REQUIRES OT FOLLOW-UP: Yes  Activity Tolerance  Activity Tolerance: Patient Tolerated treatment well;Patient limited by fatigue  Activity Tolerance Comments: O2 at 92% on RA        Plan   Occupational Therapy Plan  Times Per Week: 3-5  Times Per Day: Once a day  Current Treatment Recommendations: Strengthening, Balance training, Functional mobility training, Endurance training, ROM, Safety education & training, Self-Care / ADL     Restrictions  Restrictions/Precautions  Restrictions/Precautions: Fall Risk, Swallowing - Thickened Liquids  Position Activity Restriction  Other position/activity restrictions: Honey thick; soft bite sized; 2L to RA    Subjective   General  Chart Reviewed: Yes  Patient assessed for rehabilitation services?: Yes  Additional Pertinent Hx: Pt is a 69 yo M admitted with pneumonia of both lungs d/t infectious organism. Family / Caregiver Present: No  Referring Practitioner: Nara Padilla MD  Diagnosis: Pneumonia of both lungs  Subjective  Subjective: Pt met b/s for OT eval/tx with PT. Pt in bed, agreeable to therapy with encouragement. Pt reports pain in his side but did not rate.      Social/Functional History  Social/Functional History  Lives With: Spouse  Type of Home: House  Home Layout: One level, Able to Live on Main level with bedroom/bathroom  Home Access: Stairs to enter with rails  Entrance Stairs - Number of Steps: 2  Entrance Stairs - Rails: Both  Bathroom Shower/Tub: Walk-in shower  Bathroom Toilet: Handicap height  Bathroom Equipment: Grab bars in shower, Built-in shower seat, Shower chair, Hand-held shower, Grab bars around toilet  Bathroom Accessibility: Wheelchair accessible  Home Equipment: Lift chair, Walker, rolling, Cane, Rollator, Electric scooter  ADL Assistance: Independent  Homemaking Assistance: Needs assistance  Ambulation Assistance: Independent (combination of RW, cane in home. scooter outside home)  Transfer Assistance: Independent  Active : Yes  Occupation: Retired  Type of Occupation: ,        Objective   Safety Devices  Type of Devices: All fall risk precautions in place;Call light within reach;Gait belt;Patient at risk for falls; Left in chair;Chair alarm in place;Nurse notified     Toilet Transfers  Toilet - Technique: Ambulating  Equipment Used: Grab bars  Toilet Transfer: Contact guard assistance  Toilet Transfers Comments: RW  AROM: Within functional limits  Strength: Generally decreased, functional  Coordination: Within functional limits  Tone: Normal  Sensation: Intact  ADL  Feeding: Thickened liquids  Grooming: Stand by assistance  Grooming Skilled Clinical Factors: Pt brushed teeth while seated at sink SBA. UE Bathing: Stand by assistance  UE Bathing Skilled Clinical Factors: Pt sponge bathed while seated at sink SBA. LE Bathing: Minimal assistance  LE Bathing Skilled Clinical Factors: Pt sponge bathed while seated at sink SBA. Pt required min A for drying off feet. UE Dressing: Stand by assistance  LE Dressing: Moderate assistance  LE Dressing Skilled Clinical Factors: Pt donned hospital pants, underwear, and socks with mod A. Pt requried assistance with threading clothing over feet and pulling up to thighs. Toileting: Contact guard assistance  Toileting Skilled Clinical Factors: Pt voided urine on the commode CGA. Additional Comments: Anticipate pt will require setup A for feeding based on the strength, endurance, and ADL performance seen today, as well as restricted diet orders and thickened liquid status.      Activity Tolerance  Activity Tolerance: Patient tolerated treatment well;Patient limited by endurance  Bed mobility  Supine to Sit: Minimal assistance  Sit to Supine: Unable to assess  Bed Mobility Comments: Pt not following commands for sitting up from bed, requiring min A. Transfers  Sit to stand: Contact guard assistance  Stand to sit: Contact guard assistance;Minimal assistance  Transfer Comments: Pt completed functional mobility from bed>BR>recliner x2 using RW CGA. Pt impulsive to sit when using RW and required multiple verbal and physical cues for walker safety.   Vision  Vision: Impaired  Vision Exceptions: Wears glasses at all times  Hearing  Hearing: Exceptions to [facility]  Hearing Exceptions: Hard of hearing/hearing concerns  Cognition  Overall Cognitive Status: Exceptions  Safety Judgement: Decreased awareness of need for safety;Decreased awareness of need for assistance  Insights: Decreased awareness of deficits  Cognition Comment: decreased insight into current medical situation  Orientation  Overall Orientation Status: Within Functional Limits    Education Given To: Patient  Education Provided: Role of Therapy;Plan of Care;ADL Adaptive Strategies;Transfer Training  Education Method: Demonstration;Verbal  Barriers to Learning: None  Education Outcome: Verbalized understanding;Continued education needed    AM-PAC Score  AM-[facility] Inpatient Daily Activity Raw Score: 19 (03/14/23 1008)  AM-PAC Inpatient ADL T-Scale Score : 40.22 (03/14/23 1008)  ADL Inpatient CMS 0-100% Score: 42.8 (03/14/23 1008)  ADL Inpatient CMS G-Code Modifier : CK (03/14/23 1008)    Goals  Short Term Goals  Time Frame for Short Term Goals: Prior to d/c  Short Term Goal 1: Pt will complete grooming in stance at sink SBA  Short Term Goal 2: Pt will complete toileting with supervision  Short Term Goal 3: Pt will complete dressing CGA  Short Term Goal 4: Pt will complete bathing with supervision  Short Term Goal 5: Pt will demonstrate understanding of safe walker use when completing functional mobility  Long Term Goals  Time Frame for Long Term Goals : LTG=STG  Patient Goals   Patient goals : to return home       Therapy Time   Individual Concurrent Group Co-treatment   Time In 0905         Time Out 1005         Minutes 60         Timed Code Treatment Minutes: 39 Minutes       Samreen Gamble  Therapist was present, directed the patient's care, made skilled judgement, and was responsible for assessment and treatment of the patient    Electronically signed by Rinku James OT on 3/14/23 at 10:36 AM EDT

## 2023-03-14 NOTE — PLAN OF CARE
Problem: ABCDS Injury Assessment  Goal: Absence of physical injury  3/14/2023 0017 by Emmett Lock RN  Outcome: Progressing  3/13/2023 1640 by Dajuan Coronado RN  Outcome: Progressing  Note: Pt educated on use of call light for assistance. Pt educated to wait for assistance. Appropriate ambulatory aid provided and used. Pt verbalizes understanding. 3/13/2023 1154 by Berry Ohraa RN  Outcome: Progressing     Problem: Safety - Adult  Goal: Free from fall injury  3/14/2023 0017 by Emmett Lock RN  Outcome: Progressing  3/13/2023 1640 by Dajuan Coronado RN  Outcome: Progressing  Note: Potential fall hazards identified and removed accordingly. Pt educated to use call light for assistance. Bed locked, in lowest position with alarm on.    3/13/2023 1154 by Berry Ohara RN  Outcome: Progressing     Problem: Discharge Planning  Goal: Discharge to home or other facility with appropriate resources  3/14/2023 0017 by Emmett Lock RN  Outcome: Progressing  3/13/2023 1154 by Berry Ohara RN  Outcome: Progressing     Problem: Pain  Goal: Verbalizes/displays adequate comfort level or baseline comfort level  3/14/2023 0017 by Emmett Lock RN  Outcome: Progressing  3/13/2023 1640 by Dajuan Coronado RN  Outcome: Progressing  Note: Pt educated on 0-10 pain scale. Pt educated on non-pharmacological pain interventions. Pain medications given as needed per [facility]

## 2023-03-14 NOTE — DISCHARGE INSTR - COC
Continuity of Care Form    Patient Name: Alton Bhardwaj   :    MRN:  [id number]    Admit date:  3/13/2023  Discharge date:  3/17/2023    Code Status Order: Full Code   Advance Directives:     Admitting Physician:  Darylene Bolk, MD  PCP: Geoff Bah    Discharging Nurse: Raisa Medellin Unit/Room#: J8A-5228/6151-08  Discharging Unit Phone Number: [phone]    Emergency Contact:   Extended Emergency Contact Information  Primary Emergency Contact: Portia Gauthier  Address: [address] [place] [address], [address] [place] [address],[address] Phone: [phone]  Work Phone: [phone]  Mobile Phone: [phone]  Relation: Spouse  Secondary Emergency Contact: Yeimi Nascimento  Home Phone: [phone]  Relation: Child    Past Surgical History:  Past Surgical History:   Procedure Laterality Date    [address] [facility]      CERVICAL SPINE SURGERY      CHEST TUBE INSERTION Right 2023    14F.  Dr. Lj Bentley    Shoulder       Immunization History:   Immunization History   Administered Date(s) Administered    Influenza Vaccine, unspecified formulation 10/22/2016    Influenza, AFLURIA (age 1 yrs+), FLUZONE, (age 10 mo+), MDV, 0.5mL 10/22/2016, 09/15/2017    Influenza, High Dose (Fluzone 65 yrs and older) 09/15/2017    Pneumococcal Conjugate 13-valent ([place]) 2016    Tdap (Boostrix, Adacel) 2018       Active Problems:  Patient Active Problem List   Diagnosis Code    Diabetes mellitus ([facility] E11.9    Atherosclerosis of native coronary artery of native heart without angina pectoris I25.10    Morbid obesity due to excess calories (HCC) E66.01    Sepsis ([address] A41.9    SOB (shortness of breath) R06.02    Abnormal chest CT R93.89    Back pain M54.9    Diabetes mellitus type 2, uncontrolled [id number]    Chronic back pain M54.9, G89.29    Chronic combined systolic (congestive) and diastolic (congestive) heart failure ([facility]) I50.42    Hyperlipidemia E78.5    Essential hypertension I10    Leukocytosis D72.829    Chronic bronchitis, simple ([facility]) J41.0    Chronic rhinitis J31.0    Generalized weakness R53.1    DMII (diabetes mellitus, type 2) ([facility]) E11.9    Supratherapeutic INR R79.1    Tachycardia R00.0    Tachypnea R06.82    Non morbid obesity due to excess calories E66.09    Fatigue R53.83    Cough productive of clear sputum R05.8    History of coronary artery bypass graft Z95.1    Abrasion of right elbow S50.311A    Acute respiratory failure with hypoxia ([facility]) J96.01    Paroxysmal atrial fibrillation ([facility]) I48.0    Chronic pulmonary aspiration T17.908A    Pneumonia of both lungs due to infectious organism J18.9    Elevated troponin R77.8    Pleural effusion, bilateral J90       Isolation/Infection:   Isolation            No Isolation          Patient Infection Status       Infection Onset Added Last Indicated Last Indicated By Review Planned Expiration Resolved Resolved By    None active    Resolved    COVID-19 (Rule Out) 03/13/23 03/13/23 03/13/23 COVID-19, Rapid (Ordered)   03/13/23 Rule-Out Test Resulted    COVID-19 (Rule Out) 02/04/23 02/04/23 02/04/23 COVID-19, Rapid (Ordered)   02/04/23 Rule-Out Test Resulted    COVID-19 (Rule Out) 08/25/21 08/25/21 08/25/21 COVID-19 (Ordered)   08/26/21 Rule-Out Test Resulted    COVID-19 (Rule Out) 01/02/21 01/02/21 01/02/21 COVID-19 (Ordered)   01/02/21 Rule-Out Test Resulted            Nurse Assessment:  Last Vital Signs: BP (!) 159/77   Pulse 90   Temp 98.1 °F (36.7 °C) (Oral)   Resp 16   Ht 5' 11\" (1.803 m)   Wt 229 lb 0.9 oz (103.9 kg)   SpO2 92%   BMI 31.95 kg/m²     Last documented pain score (0-10 scale): Pain Level: 3  Last Weight:   Wt Readings from Last 1 Encounters:   03/14/23 229 lb 0.9 oz (103.9 kg)     Mental Status:  oriented, alert, coherent, logical, thought processes intact, and able to concentrate and follow conversation    IV Access:  - PICC - site  GREG Magana, insertion date: 3/16/2023    Nursing Mobility/ADLs:  Walking   Assisted  Transfer  Assisted  Bathing  Assisted  Dressing  Assisted  Toileting  Assisted  Feeding  Assisted  Med Admin  Assisted  Med Delivery   prefers mixed with applesauce whole    Wound Care Documentation and Therapy:        Elimination:  Continence: Bowel: Yes  Bladder: Yes  Urinary Catheter: None   Colostomy/Ileostomy/Ileal Conduit: No       Date of Last BM: 3/17/2023    Intake/Output Summary (Last 24 hours) at 3/14/2023 1156  Last data filed at 3/14/2023 0637  Gross per 24 hour   Intake 1744.93 ml   Output 1200 ml   Net 544.93 ml     I/O last 3 completed shifts: In: 1744.9 [I.V.:768.6; IV Piggyback:976.4]  Out: 1200 [Urine:650; Other:550]    Safety Concerns: At Risk for Falls    Impairments/Disabilities:      None    Nutrition Therapy:  Current Nutrition Therapy:   - Oral Diet:  Dysphagia - Minced and Moist    Routes of Feeding: Oral  Liquids: Honey Thick Liquids  Daily Fluid Restriction: no  Last Modified Barium Swallow with Video (Video Swallowing Test): not done    Treatments at the Time of Hospital Discharge:   Respiratory Treatments: see orders  Oxygen Therapy:  is not on home oxygen therapy. Ventilator:    - No ventilator support    Rehab Therapies: Physical Therapy and Occupational Therapy  Weight Bearing Status/Restrictions: No weight bearing restrictions  Other Medical Equipment (for information only, NOT a DME order):  walker  Other Treatments: see orders    Heart Failure Instructions for Daily Management  Patient was treated for chronic systolic heart failure. he  will require the following:    Please weigh daily on the same scale and approximately the same time of day. Report weight gain of 3 pounds/day or 5 pounds/week to : [address] [facility] [phone] and Meena Espinoza [phone]. Please use hospital discharge weight as baseline reference.    Please monitor for signs and symptoms of and report to MD:  Worsening Heart Failure: sudden weight gain, shortness of breath, lower extremity or general edema/swelling, abdominal bloating/swelling, inability to lie flat, intolerance to usual activity, or cough (especially at night). Report these finding even if no increase in weight. Dehydration:  having difficulty or a decrease in urination, dizziness, worsening fatigue, or new onset/worsening of generalized weakness. Please continue a LOW SODIUM diet and LIMIT fluid intake to 48 - 64 ounces ( 1.5 - 2 liters) per day. Call [facility] [phone] and [facility] [phone] and/or Hortensia Muniz @ [phone] with any questions or concerns. Please continue heart failure education to patient and family/support system. See After Visit Summary for hospital follow up appointment details. Consider spiritual care referral for support and/or completion of advance directives [phone]. Consider: having the facility MD complete required 7 day follow up, [place] [address] telehealth program if patient agreeable and able to participate, and palliative care consult for ongoing goals of care, end of life, and/or chronic disease management discussions.      Patient's personal belongings (please select all that are sent with patient):  Glasses    RN SIGNATURE:  Electronically signed by Heena Becerra on 3/17/23 at 2:27 PM EDT Terrance Michelle RN    CASE MANAGEMENT/SOCIAL WORK SECTION    Inpatient Status Date: ***    Readmission Risk Assessment Score:  Readmission Risk              Risk of Unplanned Readmission:  24           Discharging to Facility/ Agency   Name:     Husam Muniz  Address:  [address] [place] [address],  [address] [place] [address],[address], [address] [facility] [address]  Phone:     [phone]  Fax:         [phone]      / signature: Electronically signed by Miles Velez RN on 3/17/23 at 12:27 PM EDT    PHYSICIAN SECTION    Prognosis: Fair    Condition at Discharge: Stable    Rehab Potential (if transferring to Rehab): Fair    Recommended Labs or Other Treatments After Discharge:  IV Ertapenem x 1 gm Q 24 hr x stop date x 3/30  First dose given in hospital   CBC with diff, CMP, ESR, CRP weekly  Fax results to [phone]  PICC line in place  No ID follow up  CXR in 2 weeks     [address] Physician  Phone: [phone]   Fax : [phone]       Physician Certification: I certify the above information and transfer of Adan Mcnamara  is necessary for the continuing treatment of the diagnosis listed and that he requires [facility] for greater 30 days.      Update Admission H&P: No change in H&P    PHYSICIAN SIGNATURE:  Electronically signed by Miles Kiser MD on 3/17/23 at 12:51 PM EDT

## 2023-03-14 NOTE — PROGRESS NOTES
Department of Internal Medicine  Nephrology Progress Note          REASON FOR CONSULTATION:  Acute kidney injury. HISTORY OF PRESENT ILLNESS:  He is a 57-year-old  male with  past medical history significant for obesity, hypertension, diabetes  mellitus type 2, peripheral vascular disease, chronic atrial  fibrillation, coronary artery disease, congestive heart failure, COPD,  came to ER complaining of shortness of breath, dyspnea on exertion with  worsening weakness and decline in his mentation. In the emergency room,  the patient had a CT scan of the chest, abdomen and pelvis with IV  contrast.  CAT scan of the chest showed right-sided right lower lobe  pneumonia with large right-sided pleural effusion. CT scan of the  abdomen was unremarkable for any acute intraabdominal pathology. The patient had been for further workup and management. Renal consultation has been called for acute kidney injury. REVIEW OF SYSTEMS:  No CP/SOB tired /sleepy     Physical Exam:    VITALS:  BP (!) 151/70   Pulse 98   Temp 98.1 °F (36.7 °C) (Oral)   Resp 16   Ht 5' 11\" (1.803 m)   Wt 229 lb 0.9 oz (103.9 kg)   SpO2 96%   BMI 31.95 kg/m²   24HR INTAKE/OUTPUT:    Intake/Output Summary (Last 24 hours) at 3/14/2023 1055  Last data filed at 3/14/2023 4532  Gross per 24 hour   Intake 1744.93 ml   Output 1200 ml   Net 544.93 ml       Constitutional: resting   Respiratory:  Decrease BS at bases / Scattered rhonchi   Decrease BS at Rt bases   Gastrointestinal: No tenderness.   Normal Bowel Sounds  Cardiovascular:  S1, S2 irregular   Edema:  + edema    DATA:    CBC:  Lab Results   Component Value Date/Time    WBC 10.8 03/14/2023 07:50 AM    RBC 4.27 03/14/2023 07:50 AM    HGB 12.8 03/14/2023 07:50 AM    HCT 38.2 03/14/2023 07:50 AM    MCV 89.6 03/14/2023 07:50 AM    MCH 29.9 03/14/2023 07:50 AM    MCHC 33.4 03/14/2023 07:50 AM    RDW 14.6 03/14/2023 07:50 AM     03/14/2023 07:50 AM    MPV 6.9 03/14/2023 07:50 AM     CMP:  Lab Results   Component Value Date/Time     03/14/2023 07:50 AM    K 4.4 03/14/2023 07:50 AM    CL 98 03/14/2023 07:50 AM    CO2 22 03/14/2023 07:50 AM    BUN 22 03/14/2023 07:50 AM    CREATININE 1.1 03/14/2023 07:50 AM    GFRAA >60 08/26/2021 06:31 AM    GFRAA >60 02/15/2013 08:48 AM    AGRATIO 0.8 03/13/2023 07:03 AM    LABGLOM >60 03/14/2023 07:50 AM    GLUCOSE 204 03/14/2023 07:50 AM    PROT 7.2 03/13/2023 07:03 AM    PROT 7.0 09/23/2012 08:18 PM    CALCIUM 9.1 03/14/2023 07:50 AM    BILITOT 0.5 03/13/2023 07:03 AM    ALKPHOS 59 03/13/2023 07:03 AM    AST 13 03/13/2023 07:03 AM    ALT 9 03/13/2023 07:03 AM      Hepatic Function Panel:   Lab Results   Component Value Date/Time    ALKPHOS 59 03/13/2023 07:03 AM    ALT 9 03/13/2023 07:03 AM    AST 13 03/13/2023 07:03 AM    PROT 7.2 03/13/2023 07:03 AM    PROT 7.0 09/23/2012 08:18 PM    BILITOT 0.5 03/13/2023 07:03 AM    BILIDIR <0.2 07/06/2021 05:00 PM    IBILI see below 07/06/2021 05:00 PM      Phosphorus:   Lab Results   Component Value Date/Time    PHOS 2.7 03/14/2023 07:50 AM       ASSESSMENT:  Principal Problem:    Pneumonia of both lungs due to infectious organism  Active Problems:    Acute respiratory failure with hypoxia (HCC)    Elevated troponin    Pleural effusion, bilateral    Atherosclerosis of native coronary artery of native heart without angina pectoris  Resolved Problems:    * No resolved hospital problems. *      PLAN:  WEI most likely pre renal . S/p iv contrast . . off  lasix /ACE-I , Cr better with IVF . RLL pneumonia with likely parapneumonic effusion. Abx per Pul. HTN Uncontrolled , meds adjusted .    DM 2 meds per  team .   A fib on meds   Check labs now and in am     Mary Jefferson MD, [address]

## 2023-03-14 NOTE — PRE SEDATION
Sedation Pre-Procedure Note    Patient Name: Brian March   YOB: 1945  Room/Bed: K8F-4835/4082-68  Medical Record Number: [id number]  Date: 3/14/2023   Time: 10:57 AM       Indication:  Right hemothorax, chest tube placement    Consent: I have discussed with the patient and/or the patient representative the indication, alternatives, and the possible risks and/or complications of the planned procedure and the anesthesia methods. The patient and/or patient representative appear to understand and agree to proceed. Vital Signs:   Vitals:    03/14/23 0812   BP:    Pulse:    Resp:    Temp:    SpO2: 96%       Past Medical History:   has a past medical history of Abrasion of right elbow, Arthritis, CAD (coronary artery disease), Chronic back pain, Chronic systolic CHF (congestive heart failure) ([address] COPD (chronic obstructive pulmonary disease) ([address] Dental disease, Diabetes mellitus ([address] Essential hypertension, Hearing loss, Paroxysmal atrial fibrillation ([address] and Tinnitus. Past Surgical History:   has a past surgical history that includes fracture surgery (Left, 1988); back surgery (1995); Cardiac surgery (2007); Coronary artery bypass graft; back surgery; and Cervical spine surgery.     Medications:   Scheduled Meds:    meropenem  1,000 mg IntraVENous Q8H    carvedilol  12.5 mg Oral BID WC    aspirin  81 mg Oral Daily    pramipexole  0.25 mg Oral TID    pramipexole  0.25 mg Oral Nightly    Roflumilast  500 mcg Oral Daily    sodium chloride flush  5-40 mL IntraVENous 2 times per day    insulin glargine  0.15 Units/kg SubCUTAneous Nightly    insulin lispro  0-8 Units SubCUTAneous TID     insulin lispro  0-4 Units SubCUTAneous Nightly    enoxaparin  1 mg/kg SubCUTAneous Daily    ipratropium-albuterol  1 ampule Inhalation Q4H WA    budesonide  0.5 mg Nebulization BID    atorvastatin  40 mg Oral Nightly    sodium chloride (Inhalant)  4 mL Nebulization Q4H WA    gabapentin  100 mg Oral TID Continuous Infusions:    sodium chloride      lactated ringers IV soln Stopped (03/14/23 0953)    dextrose       PRN Meds: sodium chloride flush, sodium chloride, ondansetron **OR** ondansetron, polyethylene glycol, acetaminophen **OR** acetaminophen, glucose, dextrose bolus **OR** dextrose bolus, glucagon (rDNA), dextrose  Home Meds:   Prior to Admission medications    Medication Sig Start Date End Date Taking? Authorizing Provider   Respiratory Therapy Supplies (NEBULIZER/TUBING/MOUTHPIECE) KIT With full face mask. 3/13/23   Celsa Ching MD   albuterol (PROVENTIL) (2.5 MG/3ML) 0.083% nebulizer solution USE 1 VIAL IN NEBULIZER 4 TIMES DAILY 2/21/23   Celsa Ching MD   benzonatate (TESSALON) 200 MG capsule Take 1 capsule by mouth 3 times daily as needed for Cough 2/17/23 3/19/23  Celsa Ching MD   fluticasone-salmeterol (ADVAIR) 100-50 MCG/ACT AEPB diskus inhaler Inhale 1 puff into the lungs in the morning and 1 puff in the evening.  2/7/23   Christi Guzman MD   albuterol sulfate HFA (PROVENTIL;VENTOLIN;PROAIR) 108 (90 Base) MCG/ACT inhaler Inhale 2 puffs into the lungs every 4 hours as needed for Wheezing 2/7/23   Christi Guzman MD   Roflumilast (DALIRESP) 500 MCG tablet Take 1 tablet by mouth daily 2/7/23   Christi Guzman MD   ipratropium-albuterol (DUONEB) 0.5-2.5 (3) MG/3ML SOLN nebulizer solution Inhale 3 mLs into the lungs every 6 hours as needed for Shortness of Breath 2/7/23   Christi Guzman MD   pramipexole (MIRAPEX) 0.25 MG tablet Take 0.25 mg by mouth 3 times daily 1/29/23   Historical Provider, MD   furosemide (LASIX) 40 MG tablet Take 1 tablet by mouth daily 12/11/22   Historical Provider, MD   flunisolide (NASALIDE) 25 MCG/ACT (0.025%) SOLN 2 sprays by Nasal route in the morning and at bedtime    Historical Provider, MD   Lidocaine 4 % OINT Apply topically As needed for pain    Historical Provider, MD   polyethylene glycol (GLYCOLAX) 17 GM/SCOOP powder Take 17 g by mouth daily as needed    Historical Provider, MD   Multiple Vitamins-Minerals (PRESERVISION AREDS 2+MULTI VIT PO) Take 1 tablet by mouth in the morning and at bedtime    Historical Provider, MD   pramipexole (MIRAPEX) 0.25 MG tablet Take 0.25 mg by mouth at bedtime    Historical Provider, MD   XTAMPZA ER 36 MG C12A Take 36 mg by mouth in the morning and at bedtime. 1/19/23   Historical Provider, MD   lisinopril (PRINIVIL;ZESTRIL) 10 MG tablet Take 10 mg by mouth daily    Historical Provider, MD   dapagliflozin (FARXIGA) 10 MG tablet Take 5 mg by mouth in the morning and at bedtime    Historical Provider, MD   insulin 70-30 (HUMULIN;NOVOLIN) (70-30) 100 UNIT per ML injection vial Inject 25 Units into the skin 2 times daily    Historical Provider, MD   Multiple Vitamins-Minerals (THERAPEUTIC MULTIVITAMIN-MINERALS) tablet Take 1 tablet by mouth daily    Historical Provider, MD   apixaban (ELIQUIS) 5 MG TABS tablet Take 1 tablet by mouth 2 times daily 12/23/20   Rajinder Underwood MD   simvastatin (ZOCOR) 80 MG tablet Take 40 mg by mouth nightly 3/27/18   Historical Provider, MD   vitamin D (CHOLECALCIFEROL) 1000 UNITS TABS tablet Take 1,000 Units by mouth daily    Historical Provider, MD   gabapentin (NEURONTIN) 800 MG tablet Take 800 mg by mouth 4 times daily. Historical Provider, MD   aspirin 81 MG tablet Take 81 mg by mouth daily    Historical Provider, MD   metFORMIN (GLUCOPHAGE) 1000 MG tablet Take 1,000 mg by mouth 2 times daily (with meals)    Historical Provider, MD   carvedilol (COREG) 6.25 MG tablet Take 3.125 mg by mouth 2 times daily (with meals)    Historical Provider, MD     Coumadin Use Last 7 Days:  no  Antiplatelet drug therapy use last 7 days: no  Other anticoagulant use last 7 days: no  Additional Medication Information:  N/A      Pre-Sedation Documentation and Exam:   I have reviewed the patient's history and review of systems.     Mallampati Airway Assessment:  normal, Mallampati Class II - (soft palate, fauces & uvula are visible)    Prior History of Anesthesia Complications:   none    ASA Classification:  Class 2 - A normal healthy patient with mild systemic disease    Sedation/ Anesthesia Plan:   intravenous sedation    Medications Planned:   midazolam (Versed) intravenously and fentanyl intravenously    Patient is an appropriate candidate for plan of sedation: yes    Electronically signed by Saintclair Meter, DO on 3/14/2023 at 10:57 AM

## 2023-03-14 NOTE — PROGRESS NOTES
Patient back in room from chest tube placement. Patient A&O x4, unpleasant. Chest tube to CLWS. Patient ambulatory with walker to bathroom. Patient continues to be rude to staff, using profanity stating that \"nobody ever takes him to the bathroom,\" as he is sitting on the toilet.     Electronically signed by Earlene Michael RN on 3/14/23 at 11:57 AM EDT

## 2023-03-14 NOTE — PROGRESS NOTES
Hospitalist Progress Note      PCP: August Harrell    Date of Admission: 3/13/2023    Chief Complaint:   Chief Complaint   Patient presents with    Abdominal Pain     Says pain started when he began \"coughing real bad\" a few days ago. Fatigue       Hospital Course: 51-year-old male with past medical history significant for CAD, COPD, insulin-dependent diabetes, atrial fibrillation presents to the hospital with complaints of weakness and cough. Patient reports not feeling well for the last few days. He reports severe weakness, he was unable to perform his ADLs, and has been having productive cough and his significant shortness of breath with exertion. Denies lower extremity swelling. Patient saw his pulmonologist last week. CT of the chest demonstrated large right pleural effusion, with consolidation, concerning for pneumonia.     Subjective: feels better      Medications:  Reviewed    Infusion Medications    sodium chloride      lactated ringers IV soln Stopped (03/14/23 0953)    dextrose       Scheduled Medications    meropenem  1,000 mg IntraVENous Q8H    carvedilol  12.5 mg Oral BID WC    aspirin  81 mg Oral Daily    pramipexole  0.25 mg Oral TID    pramipexole  0.25 mg Oral Nightly    Roflumilast  500 mcg Oral Daily    sodium chloride flush  5-40 mL IntraVENous 2 times per day    insulin glargine  0.15 Units/kg SubCUTAneous Nightly    insulin lispro  0-8 Units SubCUTAneous TID WC    insulin lispro  0-4 Units SubCUTAneous Nightly    enoxaparin  1 mg/kg SubCUTAneous Daily    ipratropium-albuterol  1 ampule Inhalation Q4H WA    budesonide  0.5 mg Nebulization BID    atorvastatin  40 mg Oral Nightly    sodium chloride (Inhalant)  4 mL Nebulization Q4H WA    gabapentin  100 mg Oral TID     PRN Meds: fentanNYL, sodium chloride flush, sodium chloride, ondansetron **OR** ondansetron, polyethylene glycol, acetaminophen **OR** acetaminophen, glucose, dextrose bolus **OR** dextrose bolus, glucagon (rDNA), dextrose      Intake/Output Summary (Last 24 hours) at 3/14/2023 1114  Last data filed at 3/14/2023 2668  Gross per 24 hour   Intake 1744.93 ml   Output 1200 ml   Net 544.93 ml       Physical Exam Performed:    /69   Pulse 84   Temp 98.1 °F (36.7 °C) (Oral)   Resp 17   Ht 5' 11\" (1.803 m)   Wt 229 lb 0.9 oz (103.9 kg)   SpO2 96%   BMI 31.95 kg/m²     General appearance: No apparent distress, appears stated age and cooperative. HEENT: Pupils equal, round, and reactive to light. Conjunctivae/corneas clear. Neck: Supple, with full range of motion. No jugular venous distention. Trachea midline. Respiratory:  Normal respiratory effort. Clear to auscultation, bilaterally without Rales/Wheezes/Rhonchi. Cardiovascular: Regular rate and rhythm with normal S1/S2 without murmurs, rubs or gallops. Abdomen: Soft, non-tender, non-distended with normal bowel sounds. Musculoskeletal: No clubbing, cyanosis or edema bilaterally. Full range of motion without deformity. Skin: Skin color, texture, turgor normal.  No rashes or lesions. Neurologic:  Neurovascularly intact without any focal sensory/motor deficits.  Cranial nerves: II-XII intact, grossly non-focal.  Psychiatric: Alert and oriented, thought content appropriate, normal insight  Capillary Refill: Brisk, 3 seconds, normal   Peripheral Pulses: +2 palpable, equal bilaterally       Labs:   Recent Labs     03/13/23  0703 03/14/23  0750   WBC 11.2* 10.8   HGB 12.5* 12.8*   HCT 38.7* 38.2*    361     Recent Labs     03/13/23  0703 03/13/23  1439 03/14/23  0750   * 136 136   K 5.0 5.0 4.4   CL 95* 99 98*   CO2 25 26 22   BUN 42* 37* 22*   CREATININE 2.0* 1.5* 1.1   CALCIUM 8.9 8.5 9.1   PHOS  --   --  2.7     Recent Labs     03/13/23  0703   AST 13*   ALT 9*   BILITOT 0.5   ALKPHOS 59     Recent Labs     03/13/23  1227   INR 1.27*     Recent Labs     03/13/23  0703   TROPONINI 0.02*       Urinalysis:      Lab Results   Component Value Date/Time    NITRU Negative 02/04/2023 08:12 PM    WBCUA 1 04/13/2019 07:57 AM    BACTERIA Rare 07/11/2018 01:27 AM    RBCUA 1 04/13/2019 07:57 AM    BLOODU Negative 02/04/2023 08:12 PM    SPECGRAV 1.018 02/04/2023 08:12 PM    GLUCOSEU >=1000 02/04/2023 08:12 PM       Radiology:  XR CHEST 1 VIEW   Final Result   No evidence of pneumothorax following right thoracentesis. Persistent right pleural effusion with associated consolidation, likely   atelectasis. US THORACENTESIS Which side should the procedure be performed? Right   Final Result   Successful ultrasound guided right thoracentesis. Thinly septated right pleural effusion with bloody pleural fluid removed with   thoracentesis, consistent with a hemothorax. CT CHEST PULMONARY EMBOLISM W CONTRAST   Final Result   Chest      No central pulmonary embolus identified      Bilateral pleural effusions, right greater than left with adjacent   consolidation of the lungs, either due to atelectasis or pneumonia. Scattered ground-glass opacity in tree-in-bud nodularity seen in the aerated   portion of the lungs, likely postinflammatory-infectious. Scattered areas of   bronchial wall thickening are seen      Abdomen and pelvis      No acute intra-abdominal abnormality         CT ABDOMEN PELVIS W IV CONTRAST Additional Contrast? None   Final Result   Chest      No central pulmonary embolus identified      Bilateral pleural effusions, right greater than left with adjacent   consolidation of the lungs, either due to atelectasis or pneumonia. Scattered ground-glass opacity in tree-in-bud nodularity seen in the aerated   portion of the lungs, likely postinflammatory-infectious. Scattered areas of   bronchial wall thickening are seen      Abdomen and pelvis      No acute intra-abdominal abnormality         XR CHEST PORTABLE   Final Result   Mild congestion. Small right pleural effusion. Bibasilar compressive   atelectasis versus infiltrates. FL MODIFIED BARIUM SWALLOW W VIDEO    (Results Pending)   IR CHEST TUBE INSERTION    (Results Pending)   XR CHEST PORTABLE    (Results Pending)       PHARMACY TO DOSE VANCOMYCIN  IP CONSULT TO NEPHROLOGY  IP CONSULT TO CARDIOLOGY  IP CONSULT TO PULMONOLOGY    Assessment/Plan:    Active Hospital Problems    Diagnosis     Pneumonia of both lungs due to infectious organism [J18.9]      Priority: Medium    Elevated troponin [R77.8]      Priority: Medium    Pleural effusion, bilateral [J90]      Priority: Medium    Acute respiratory failure with hypoxia (HCC) [J96.01]      Priority: Medium    Atherosclerosis of native coronary artery of native heart without angina pectoris [I25.10]      Pneumonia complicated with right pleural effusion              Pneumonia molecular panel positive for Enterobacter ESBL, switch to meropenem.   MRSA probe is negative   Status post thoracentesis on 3/13, patient with a lot of PRBCs concerning for malignant pleural effusion versus hemothorax, plans for CT tube placement per pulmonology              Scheduled bronchodilators   Follow up with cytology to rule out malignancy      Atrial fibrillation- persistent/paroxysmal   Rate controlled; [id number]: > 2  Hold Eliquis due to procedure/surgery; lovenox 1mg /kg daily (ON HOLD)  Resume antiarrhythmic medication     Type 2 diabetes              A1c: 7.5  Euglycemic  Hold p.o. medicines  Monitor with Accu-Cheks ACHS  Sliding scale as needed  Add Lantus    Acute COPD exacerbation              Chest x-ray: PNA              Continue oxygen supplementation, currently requiring 3 L              IV steroids with p.o. taper              Scheduled bronchodilators      Elevated troponin  CAD status post remote CABG              Cardiology evaluation              Continue antiplatelet therapy, statin     Acute kidney injury   Creatinine on admission 2.0  Suspecting prerenal injury and ATN in the setting on volume depletion  Check bladder scan to rule out obstruction  Avoid nephrotoxic medications, contrast   Check renal ultrasound, UA and  urine studies  Consult nephrology  for evaluation  Gentle IV fluid  Monitor renal function daily            DVT Prophylaxis: +  Diet: No diet orders on file  Code Status: Prior     PT/OT Eval Status: +     Dispo - admit      Rafael Madison MD

## 2023-03-14 NOTE — ACP (ADVANCE CARE PLANNING)
Advance Care Planning     Advance Care Planning Activator (Inpatient)  Conversation Note      Date of ACP Conversation: 3/14/2023     Conversation Conducted with: Patient with Decision Making Capacity   Healthcare Decision Maker: Named in Advance Directive or Healthcare Power of  (name) spouse    ACP Activator: RENETTA Colindres    Health Care Decision Maker:     Current Designated Health Care Decision Maker:     Primary Decision Maker: Bekah Louis - Spouse - [phone]    Secondary Decision Maker: Lea Solano - Child - [phone]    Care Preferences    Wife reports pt should be a DNR, messaged MD to make aware    Ventilation: \"If you were in your present state of health and suddenly became very ill and were unable to breathe on your own, what would your preference be about the use of a ventilator (breathing machine) if it were available to you? \"      Would the patient desire the use of ventilator (breathing machine)?: no    \"If your health worsens and it becomes clear that your chance of recovery is unlikely, what would your preference be about the use of a ventilator (breathing machine) if it were available to you? \"     Would the patient desire the use of ventilator (breathing machine)?: No      Resuscitation  \"CPR works best to restart the heart when there is a sudden event, like a heart attack, in someone who is otherwise healthy. Unfortunately, CPR does not typically restart the heart for people who have serious health conditions or who are very sick. \"    \"In the event your heart stopped as a result of an underlying serious health condition, would you want attempts to be made to restart your heart (answer \"yes\" for attempt to resuscitate) or would you prefer a natural death (answer \"no\" for do not attempt to resuscitate)? \" no       [] Yes   [x] No   Educated Patient / Greta Willams regarding differences between Advance Directives and portable DNR orders.     Length of ACP Conversation in minutes: 2    Conversation Outcomes:  ACP discussion completed    Follow-up plan:    [] Schedule follow-up conversation to continue planning  [] Referred individual to Provider for additional questions/concerns   [] Advised patient/agent/surrogate to review completed ACP document and update if needed with changes in condition, patient preferences or care setting    [x] This note routed to one or more involved healthcare providers    Electronically signed by RENETTA Sanz on 3/14/2023 at 1:59 PM

## 2023-03-14 NOTE — BRIEF OP NOTE
Brief Postoperative Note    Rafia De La Vega  YOB: 1945  [id number]      Pre-operative Diagnosis: Right hemothorax    Post-operative Diagnosis: Same    Procedure: US/fluoro guided right chest tube placement    Anesthesia: Local, IV fentanyl    Surgeons/Assistants: Lindy Kuo DO    Estimated Blood Loss: less than 50     Complications: None    Specimens: Was Obtained: bloody fluid sent as ordered    Findings:   Successful placement of a 14-F locking pigtail chest tube into a right pleural fluid collection with immediate return of dark bloody fluid.        Lindy Kuo DO  3/14/2023, 11:24 AM  Interventional Radiology  [phone])

## 2023-03-14 NOTE — CARE COORDINATION
[facility]  Patient is active with [facility] for nurse and ST. Will follow for discharge to home with orders to resume care.       Shana De La Cruz RN, BSN CTN  [facility] [phone]

## 2023-03-14 NOTE — PROGRESS NOTES
Pulmonary Progress Note    CC:  Follow up respiratory failure, effusion    Subjective:  Down to 1 liter of oxygen  Pneumonia panel with enterobacter  Thoracentesis resulted in bloody effusion  Said he was miserable last night and is sweating a lot  Thinks the oxygen flow was too high  Chest vest did not help much but was able to provide sputum    ROS  Miserable night  Sweating        Intake/Output Summary (Last 24 hours) at 3/14/2023 0834  Last data filed at 3/14/2023 7514  Gross per 24 hour   Intake 1744.93 ml   Output 1200 ml   Net 544.93 ml         PHYSICAL EXAM:  Blood pressure (!) 151/70, pulse 98, temperature 98.1 °F (36.7 °C), temperature source Oral, resp. rate 16, height 5' 11\" (1.803 m), weight 229 lb 0.9 oz (103.9 kg), SpO2 96 %.'  Gen: No distress. Eyes: PERRL. No sclera icterus. No conjunctival injection. ENT: No discharge. Pharynx clear. External appearance of ears and nose normal.  Neck: Trachea midline. No obvious mass. Resp: Scattered rhonchi  CV: Regular rate. Regular rhythm. No murmur or rub. GI: Non-tender. Non-distended. No hernia. Hernia noted   Skin: Warm, dry, normal texture and turgor. No nodule on exposed extremities. Lymph: No cervical LAD. No supraclavicular LAD. M/S: No cyanosis. No clubbing. No joint deformity. Neuro: Moves all four extremities. CN 2-12 tested, no defect noted.   Ext:   + edema    Medications:    Scheduled Meds:   meropenem  1,000 mg IntraVENous Once    Followed by    meropenem  1,000 mg IntraVENous Q8H    aspirin  81 mg Oral Daily    carvedilol  3.125 mg Oral BID WC    pramipexole  0.25 mg Oral TID    pramipexole  0.25 mg Oral Nightly    Roflumilast  500 mcg Oral Daily    sodium chloride flush  5-40 mL IntraVENous 2 times per day    insulin glargine  0.15 Units/kg SubCUTAneous Nightly    insulin lispro  0-8 Units SubCUTAneous TID WC    insulin lispro  0-4 Units SubCUTAneous Nightly    enoxaparin  1 mg/kg SubCUTAneous Daily    ipratropium-albuterol  1 ampule Inhalation Q4H WA    budesonide  0.5 mg Nebulization BID    atorvastatin  40 mg Oral Nightly    sodium chloride (Inhalant)  4 mL Nebulization Q4H WA    gabapentin  100 mg Oral TID       Continuous Infusions:   sodium chloride      lactated ringers IV soln 50 mL/hr at 03/14/23 0619    dextrose         PRN Meds:  sodium chloride flush, sodium chloride, ondansetron **OR** ondansetron, polyethylene glycol, acetaminophen **OR** acetaminophen, glucose, dextrose bolus **OR** dextrose bolus, glucagon (rDNA), dextrose    Labs:  CBC:   Recent Labs     03/13/23  0703   WBC 11.2*   HGB 12.5*   HCT 38.7*   MCV 90.7        BMP:   Recent Labs     03/13/23 0703 03/13/23  1439   * 136   K 5.0 5.0   CL 95* 99   CO2 25 26   BUN 42* 37*   CREATININE 2.0* 1.5*     LIVER PROFILE:   Recent Labs     03/13/23  0703   AST 13*   ALT 9*   LIPASE 12.0*   BILITOT 0.5   ALKPHOS 59     PT/INR:   Recent Labs     03/13/23  1227   PROTIME 15.9*   INR 1.27*     APTT: No results for input(s): APTT in the last 72 hours.  UA:No results for input(s): NITRITE, COLORU, PHUR, LABCAST, WBCUA, RBCUA, MUCUS, TRICHOMONAS, YEAST, BACTERIA, CLARITYU, SPECGRAV, LEUKOCYTESUR, UROBILINOGEN, BILIRUBINUR, BLOODU, GLUCOSEU, AMORPHOUS in the last 72 hours.    Invalid input(s): KETONESU  No results for input(s): PH, PCO2, PO2 in the last 72 hours.        Films:  Chest imaging reports were reviewed and imaging was reviewed by me and showed small right effusion with atelectasis.      ABG:  No new draws    Cultures:  Pneumonia panel\"  ESBL Enterobacter     I reviewed the labs and images listed above    ASSESSMENT/PLAN:  Acute Hypoxic Respiratory Failure with saturations less than 90% on room air    Titrate oxygen for saturations greater than or equal to 90%  Gram negative pneumonia due to enterobacter (ESBL)  Merrem to continue. Very concerned about having this in the lung.  May need ID consult as he might need PICC with longer term antibiotics   Right  pleural effusion s/p thoracentesis. May be parapneumonic with hemothorax and/or malignant. May be asbestos related as well. It is new based on previous imaging   Incomplete studies sent on the fluid. Will ask for cytology if sample is retained   Request chest tube. Lung may not expand if due to asbestos. Will ask for cytology, LDH and culture.     Saline nebs to duonecelena  Discontinue chest vest due to lack of efficacy and overall annoyance to him   Dysphagia with known aspiration into the airways   May need speech to follow along      DVT prophylaxis  Pramod Pineda,   [facility]

## 2023-03-15 ENCOUNTER — APPOINTMENT (OUTPATIENT)
Dept: GENERAL RADIOLOGY | Age: 78
End: 2023-03-15
Payer: MEDICARE

## 2023-03-15 PROBLEM — J15.6 GRAM-NEGATIVE PNEUMONIA (HCC): Status: ACTIVE | Noted: 2023-03-15

## 2023-03-15 PROBLEM — J15.69 ENTEROBACTER CLOACAE PNEUMONIA: Status: ACTIVE | Noted: 2023-03-15

## 2023-03-15 PROBLEM — R93.89 ABNORMAL CT OF THE CHEST: Status: ACTIVE | Noted: 2023-03-15

## 2023-03-15 PROBLEM — J15.6 ENTEROBACTER CLOACAE PNEUMONIA (HCC): Status: ACTIVE | Noted: 2023-03-15

## 2023-03-15 PROBLEM — A49.9 ESBL (EXTENDED SPECTRUM BETA-LACTAMASE) PRODUCING BACTERIA INFECTION: Status: ACTIVE | Noted: 2023-03-15

## 2023-03-15 PROBLEM — J15.69 GRAM-NEGATIVE PNEUMONIA: Status: ACTIVE | Noted: 2023-03-15

## 2023-03-15 PROBLEM — J18.9 PNEUMONIA OF BOTH LUNGS DUE TO INFECTIOUS ORGANISM: Status: ACTIVE | Noted: 2023-03-15

## 2023-03-15 PROBLEM — Z16.12 ESBL (EXTENDED SPECTRUM BETA-LACTAMASE) PRODUCING BACTERIA INFECTION: Status: ACTIVE | Noted: 2023-03-15

## 2023-03-15 LAB
ALBUMIN SERPL-MCNC: 3.2 G/DL (ref 3.4–5)
ANION GAP SERPL CALCULATED.3IONS-SCNC: 15 MMOL/L (ref 3–16)
BACTERIA SPEC RESP CULT: NORMAL
BASOPHILS # BLD: 0.1 K/UL (ref 0–0.2)
BASOPHILS NFR BLD: 1 %
BUN SERPL-MCNC: 17 MG/DL (ref 7–20)
CALCIUM SERPL-MCNC: 9 MG/DL (ref 8.3–10.6)
CHLORIDE SERPL-SCNC: 99 MMOL/L (ref 99–110)
CO2 SERPL-SCNC: 21 MMOL/L (ref 21–32)
CREAT SERPL-MCNC: 0.8 MG/DL (ref 0.8–1.3)
DEPRECATED RDW RBC AUTO: 14.8 % (ref 12.4–15.4)
EOSINOPHIL # BLD: 0.2 K/UL (ref 0–0.6)
EOSINOPHIL NFR BLD: 2.5 %
GFR SERPLBLD CREATININE-BSD FMLA CKD-EPI: >60 ML/MIN/{1.73_M2}
GLUCOSE BLD-MCNC: 212 MG/DL (ref 70–99)
GLUCOSE BLD-MCNC: 272 MG/DL (ref 70–99)
GLUCOSE BLD-MCNC: 283 MG/DL (ref 70–99)
GLUCOSE SERPL-MCNC: 202 MG/DL (ref 70–99)
GRAM STN SPEC: NORMAL
HCT VFR BLD AUTO: 37.4 % (ref 40.5–52.5)
HGB BLD-MCNC: 12.4 G/DL (ref 13.5–17.5)
LYMPHOCYTES # BLD: 1 K/UL (ref 1–5.1)
LYMPHOCYTES NFR BLD: 11 %
MCH RBC QN AUTO: 29.5 PG (ref 26–34)
MCHC RBC AUTO-ENTMCNC: 33 G/DL (ref 31–36)
MCV RBC AUTO: 89.4 FL (ref 80–100)
MONOCYTES # BLD: 1.2 K/UL (ref 0–1.3)
MONOCYTES NFR BLD: 13.2 %
NEUTROPHILS # BLD: 6.7 K/UL (ref 1.7–7.7)
NEUTROPHILS NFR BLD: 72.3 %
NT-PROBNP SERPL-MCNC: 784 PG/ML (ref 0–449)
PERFORMED ON: ABNORMAL
PHOSPHATE SERPL-MCNC: 2.3 MG/DL (ref 2.5–4.9)
PLATELET # BLD AUTO: 368 K/UL (ref 135–450)
PMV BLD AUTO: 6.8 FL (ref 5–10.5)
POTASSIUM SERPL-SCNC: 3.7 MMOL/L (ref 3.5–5.1)
POTASSIUM SERPL-SCNC: ABNORMAL MMOL/L (ref 3.5–5.1)
PROT PATTERN UR ELPH-IMP: NORMAL
RBC # BLD AUTO: 4.19 M/UL (ref 4.2–5.9)
SODIUM SERPL-SCNC: 135 MMOL/L (ref 136–145)
WBC # BLD AUTO: 9.2 K/UL (ref 4–11)

## 2023-03-15 PROCEDURE — 36415 COLL VENOUS BLD VENIPUNCTURE: CPT

## 2023-03-15 PROCEDURE — 94760 N-INVAS EAR/PLS OXIMETRY 1: CPT

## 2023-03-15 PROCEDURE — 97530 THERAPEUTIC ACTIVITIES: CPT

## 2023-03-15 PROCEDURE — 99232 SBSQ HOSP IP/OBS MODERATE 35: CPT | Performed by: NURSE PRACTITIONER

## 2023-03-15 PROCEDURE — 94669 MECHANICAL CHEST WALL OSCILL: CPT

## 2023-03-15 PROCEDURE — 80069 RENAL FUNCTION PANEL: CPT

## 2023-03-15 PROCEDURE — 1200000000 HC SEMI PRIVATE

## 2023-03-15 PROCEDURE — 92526 ORAL FUNCTION THERAPY: CPT

## 2023-03-15 PROCEDURE — 99223 1ST HOSP IP/OBS HIGH 75: CPT | Performed by: INTERNAL MEDICINE

## 2023-03-15 PROCEDURE — 94640 AIRWAY INHALATION TREATMENT: CPT

## 2023-03-15 PROCEDURE — 6360000002 HC RX W HCPCS: Performed by: INTERNAL MEDICINE

## 2023-03-15 PROCEDURE — 6370000000 HC RX 637 (ALT 250 FOR IP): Performed by: NURSE PRACTITIONER

## 2023-03-15 PROCEDURE — 6370000000 HC RX 637 (ALT 250 FOR IP): Performed by: INTERNAL MEDICINE

## 2023-03-15 PROCEDURE — 92611 MOTION FLUOROSCOPY/SWALLOW: CPT

## 2023-03-15 PROCEDURE — 85025 COMPLETE CBC W/AUTO DIFF WBC: CPT

## 2023-03-15 PROCEDURE — 74230 X-RAY XM SWLNG FUNCJ C+: CPT

## 2023-03-15 PROCEDURE — 2580000003 HC RX 258: Performed by: INTERNAL MEDICINE

## 2023-03-15 PROCEDURE — 83880 ASSAY OF NATRIURETIC PEPTIDE: CPT

## 2023-03-15 PROCEDURE — 71045 X-RAY EXAM CHEST 1 VIEW: CPT

## 2023-03-15 PROCEDURE — 99233 SBSQ HOSP IP/OBS HIGH 50: CPT | Performed by: INTERNAL MEDICINE

## 2023-03-15 RX ORDER — LOPERAMIDE HYDROCHLORIDE 2 MG/1
2 CAPSULE ORAL 4 TIMES DAILY PRN
Status: DISCONTINUED | OUTPATIENT
Start: 2023-03-15 | End: 2023-03-17 | Stop reason: HOSPADM

## 2023-03-15 RX ORDER — HYDRALAZINE HYDROCHLORIDE 25 MG/1
25 TABLET, FILM COATED ORAL EVERY 8 HOURS SCHEDULED
Status: DISCONTINUED | OUTPATIENT
Start: 2023-03-15 | End: 2023-03-17 | Stop reason: HOSPADM

## 2023-03-15 RX ADMIN — PRAMIPEXOLE DIHYDROCHLORIDE 0.25 MG: 0.25 TABLET ORAL at 21:52

## 2023-03-15 RX ADMIN — ROFLUMILAST 500 MCG: 500 TABLET ORAL at 08:02

## 2023-03-15 RX ADMIN — IPRATROPIUM BROMIDE AND ALBUTEROL SULFATE 1 AMPULE: 2.5; .5 SOLUTION RESPIRATORY (INHALATION) at 15:59

## 2023-03-15 RX ADMIN — INSULIN LISPRO 4 UNITS: 100 INJECTION, SOLUTION INTRAVENOUS; SUBCUTANEOUS at 12:28

## 2023-03-15 RX ADMIN — HYDRALAZINE HYDROCHLORIDE 25 MG: 25 TABLET, FILM COATED ORAL at 17:21

## 2023-03-15 RX ADMIN — MEROPENEM 1000 MG: 1 INJECTION, POWDER, FOR SOLUTION INTRAVENOUS at 18:24

## 2023-03-15 RX ADMIN — IPRATROPIUM BROMIDE AND ALBUTEROL SULFATE 1 AMPULE: 2.5; .5 SOLUTION RESPIRATORY (INHALATION) at 11:50

## 2023-03-15 RX ADMIN — SODIUM CHLORIDE SOLN NEBU 3% 4 ML: 3 NEBU SOLN at 15:59

## 2023-03-15 RX ADMIN — GABAPENTIN 100 MG: 100 CAPSULE ORAL at 14:30

## 2023-03-15 RX ADMIN — GABAPENTIN 100 MG: 100 CAPSULE ORAL at 08:02

## 2023-03-15 RX ADMIN — CARVEDILOL 25 MG: 25 TABLET, FILM COATED ORAL at 08:02

## 2023-03-15 RX ADMIN — IPRATROPIUM BROMIDE AND ALBUTEROL SULFATE 1 AMPULE: 2.5; .5 SOLUTION RESPIRATORY (INHALATION) at 07:51

## 2023-03-15 RX ADMIN — ATORVASTATIN CALCIUM 40 MG: 40 TABLET, FILM COATED ORAL at 21:47

## 2023-03-15 RX ADMIN — GABAPENTIN 100 MG: 100 CAPSULE ORAL at 21:47

## 2023-03-15 RX ADMIN — PRAMIPEXOLE DIHYDROCHLORIDE 0.25 MG: 0.25 TABLET ORAL at 08:01

## 2023-03-15 RX ADMIN — BUDESONIDE 500 MCG: 0.5 SUSPENSION RESPIRATORY (INHALATION) at 07:51

## 2023-03-15 RX ADMIN — SODIUM CHLORIDE SOLN NEBU 3% 4 ML: 3 NEBU SOLN at 07:52

## 2023-03-15 RX ADMIN — SODIUM CHLORIDE, PRESERVATIVE FREE 10 ML: 5 INJECTION INTRAVENOUS at 21:56

## 2023-03-15 RX ADMIN — INSULIN GLARGINE 15 UNITS: 100 INJECTION, SOLUTION SUBCUTANEOUS at 21:56

## 2023-03-15 RX ADMIN — INSULIN LISPRO 2 UNITS: 100 INJECTION, SOLUTION INTRAVENOUS; SUBCUTANEOUS at 08:03

## 2023-03-15 RX ADMIN — SODIUM CHLORIDE SOLN NEBU 3% 4 ML: 3 NEBU SOLN at 11:50

## 2023-03-15 RX ADMIN — ASPIRIN 81 MG: 81 TABLET, COATED ORAL at 08:02

## 2023-03-15 RX ADMIN — HYDROCODONE BITARTRATE AND ACETAMINOPHEN 1 TABLET: 7.5; 325 TABLET ORAL at 21:47

## 2023-03-15 RX ADMIN — MEROPENEM 1000 MG: 1 INJECTION, POWDER, FOR SOLUTION INTRAVENOUS at 00:34

## 2023-03-15 RX ADMIN — HYDRALAZINE HYDROCHLORIDE 25 MG: 25 TABLET, FILM COATED ORAL at 21:47

## 2023-03-15 RX ADMIN — PRAMIPEXOLE DIHYDROCHLORIDE 0.25 MG: 0.25 TABLET ORAL at 14:30

## 2023-03-15 RX ADMIN — INSULIN LISPRO 4 UNITS: 100 INJECTION, SOLUTION INTRAVENOUS; SUBCUTANEOUS at 17:21

## 2023-03-15 RX ADMIN — CARVEDILOL 25 MG: 25 TABLET, FILM COATED ORAL at 17:21

## 2023-03-15 RX ADMIN — MEROPENEM 1000 MG: 1 INJECTION, POWDER, FOR SOLUTION INTRAVENOUS at 08:18

## 2023-03-15 RX ADMIN — SODIUM CHLORIDE, PRESERVATIVE FREE 10 ML: 5 INJECTION INTRAVENOUS at 08:02

## 2023-03-15 NOTE — PROGRESS NOTES
Physician Progress Note      Barrett Thompson  CSN #:                  [id number]  :                       1945  ADMIT DATE:       3/13/2023 6:44 AM  DISCH DATE:  RESPONDING  PROVIDER #:        Marjorie Walker MD          QUERY TEXT:    Patient admitted with gram negative pneumonia, hemothorax, and acute   respiratory failure. Per H&P and attending progress notes WEI \"Suspecting   prerenal injury and ATN in the setting on volume depletion. \"  Per nephrology   progress note on 3/15 \"WEI most likely pre renal . S/p iv contrast . . off    lasix /ACE-I , Cr better with IVF. \"   If possible, please document in progress   notes and discharge summary if you are evaluating and /or treating any of the   following: The medical record reflects the following:  Risk Factors: diuretic use, IV contrast  Clinical Indicators: renal labs improving with IV fluids; on arrival BUN 42,   creatinine 2.0, GFR 34 and currently BUN 22, creatinine 1.1, GFR >60  Treatment: IV fluids, nephrology consult, monitoring renal labs    Thank you,  Milagros Steele RN, BSN, CCDS  [email or web address]  Options provided:  -- WEI with ATN  -- WEI without ATN  -- Other - I will add my own diagnosis  -- Disagree - Not applicable / Not valid  -- Disagree - Clinically unable to determine / Unknown  -- Refer to Clinical Documentation Reviewer    PROVIDER RESPONSE TEXT:    This patient has WEI without ATN.     Query created by: Matilde Anderson on 3/15/2023 6:40 AM      Electronically signed by:  Marjorie Walker MD 3/15/2023 4:06 PM

## 2023-03-15 NOTE — PLAN OF CARE
Problem: ABCDS Injury Assessment  Goal: Absence of physical injury  3/14/2023 2312 by Delight Krabbe, RN  Outcome: Progressing  Flowsheets (Taken 3/14/2023 2309)  Absence of Physical Injury: Implement safety measures based on patient assessment     Problem: Safety - Adult  Goal: Free from fall injury  3/14/2023 2312 by Delight Krabbe, RN  Outcome: Progressing  Flowsheets (Taken 3/14/2023 2309)  Free From Fall Injury: Instruct family/caregiver on patient safety     Problem: Discharge Planning  Goal: Discharge to home or other facility with appropriate resources  3/14/2023 2312 by Delight Krabbe, RN  Outcome: Progressing  Flowsheets (Taken 3/14/2023 1935)  Discharge to home or other facility with appropriate resources: Identify barriers to discharge with patient and caregiver     Problem: Pain  Goal: Verbalizes/displays adequate comfort level or baseline comfort level  3/14/2023 2312 by Delight Krabbe, RN  Outcome: Progressing  Flowsheets (Taken 3/14/2023 2120)  Verbalizes/displays adequate comfort level or baseline comfort level: Encourage patient to monitor pain and request assistance

## 2023-03-15 NOTE — PLAN OF CARE
Problem: ABCDS Injury Assessment  Goal: Absence of physical injury  3/15/2023 1112 by Giovana Welch RN  Outcome: Progressing  3/14/2023 2312 by Juan Burton RN  Outcome: Progressing  Flowsheets (Taken 3/14/2023 2309)  Absence of Physical Injury: Implement safety measures based on patient assessment     Problem: Safety - Adult  Goal: Free from fall injury  3/15/2023 1112 by Giovana Welch RN  Outcome: Progressing  3/14/2023 2312 by Juan Burton RN  Outcome: Progressing  Flowsheets (Taken 3/14/2023 2309)  Free From Fall Injury: Instruct family/caregiver on patient safety     Problem: Discharge Planning  Goal: Discharge to home or other facility with appropriate resources  3/15/2023 1112 by Giovana Welch RN  Outcome: Progressing  3/14/2023 2312 by Juan Burton RN  Outcome: Progressing  Flowsheets (Taken 3/14/2023 1935)  Discharge to home or other facility with appropriate resources: Identify barriers to discharge with patient and caregiver     Problem: Pain  Goal: Verbalizes/displays adequate comfort level or baseline comfort level  3/15/2023 1112 by Giovana Welch RN  Outcome: Progressing  3/14/2023 2312 by Juan Burton RN  Outcome: Progressing  Flowsheets (Taken 3/14/2023 2120)  Verbalizes/displays adequate comfort level or baseline comfort level: Encourage patient to monitor pain and request assistance     Problem: Chronic Conditions and Co-morbidities  Goal: Patient's chronic conditions and co-morbidity symptoms are monitored and maintained or improved  Outcome: Progressing

## 2023-03-15 NOTE — PROGRESS NOTES
Patient alert and oriented X4. Patient tolerated nightly medications well in apple sauce. Patient stated 7 out of 10 right pain near chest tube. Site is clean, dry, intact. Followed PRN pain management protocol. See MAR. Patient verbalizes understanding of education. Patient vital signs recorded. Fall precautions in place. Bed in lowest position, side rails X2. Bed locks on. Bed alarm on. Non slip socks on. Needed items within reach. Call light within reach.  Electronically signed by Ruddy Brennan RN on 3/14/2023 at 9:36 PM

## 2023-03-15 NOTE — PROGRESS NOTES
Department of Internal Medicine  Nephrology Progress Note          REASON FOR CONSULTATION:  Acute kidney injury. HISTORY OF PRESENT ILLNESS:  He is a 80-year-old  male with  past medical history significant for obesity, hypertension, diabetes  mellitus type 2, peripheral vascular disease, chronic atrial  fibrillation, coronary artery disease, congestive heart failure, COPD,  came to ER complaining of shortness of breath, dyspnea on exertion with  worsening weakness and decline in his mentation. In the emergency room,  the patient had a CT scan of the chest, abdomen and pelvis with IV  contrast.  CAT scan of the chest showed right-sided right lower lobe  pneumonia with large right-sided pleural effusion. CT scan of the  abdomen was unremarkable for any acute intraabdominal pathology. The patient had been for further workup and management. Renal consultation has been called for acute kidney injury. REVIEW OF SYSTEMS:  No  CP, c/o COLLADO /SOB     Physical Exam:    VITALS:  /65   Pulse 92   Temp 97.8 °F (36.6 °C) (Oral)   Resp 20   Ht 5' 11\" (1.803 m)   Wt 197 lb 5 oz (89.5 kg)   SpO2 93%   BMI 27.52 kg/m²   24HR INTAKE/OUTPUT:    Intake/Output Summary (Last 24 hours) at 3/15/2023 1014  Last data filed at 3/15/2023 0630  Gross per 24 hour   Intake 2150 ml   Output 730 ml   Net 1420 ml         Constitutional: resting   Respiratory:  Decrease BS at bases / Scattered rhonchi   Decrease BS at Rt bases   Gastrointestinal: No tenderness.   Normal Bowel Sounds  Cardiovascular:  S1, S2 irregular   Edema:  + edema    DATA:    CBC:  Lab Results   Component Value Date/Time    WBC 9.2 03/15/2023 05:34 AM    RBC 4.19 03/15/2023 05:34 AM    HGB 12.4 03/15/2023 05:34 AM    HCT 37.4 03/15/2023 05:34 AM    MCV 89.4 03/15/2023 05:34 AM    MCH 29.5 03/15/2023 05:34 AM    MCHC 33.0 03/15/2023 05:34 AM    RDW 14.8 03/15/2023 05:34 AM     03/15/2023 05:34 AM    MPV 6.8 03/15/2023 05:34 AM CMP:  Lab Results   Component Value Date/Time     03/15/2023 05:34 AM    K 3.7 03/15/2023 05:34 AM    CL 99 03/15/2023 05:34 AM    CO2 21 03/15/2023 05:34 AM    BUN 17 03/15/2023 05:34 AM    CREATININE 0.8 03/15/2023 05:34 AM    GFRAA >60 08/26/2021 06:31 AM    GFRAA >60 02/15/2013 08:48 AM    AGRATIO 0.8 03/13/2023 07:03 AM    LABGLOM >60 03/15/2023 05:34 AM    GLUCOSE 202 03/15/2023 05:34 AM    PROT 7.2 03/13/2023 07:03 AM    PROT 7.0 09/23/2012 08:18 PM    CALCIUM 9.0 03/15/2023 05:34 AM    BILITOT 0.5 03/13/2023 07:03 AM    ALKPHOS 59 03/13/2023 07:03 AM    AST 13 03/13/2023 07:03 AM    ALT 9 03/13/2023 07:03 AM      Hepatic Function Panel:   Lab Results   Component Value Date/Time    ALKPHOS 59 03/13/2023 07:03 AM    ALT 9 03/13/2023 07:03 AM    AST 13 03/13/2023 07:03 AM    PROT 7.2 03/13/2023 07:03 AM    PROT 7.0 09/23/2012 08:18 PM    BILITOT 0.5 03/13/2023 07:03 AM    BILIDIR <0.2 07/06/2021 05:00 PM    IBILI see below 07/06/2021 05:00 PM      Phosphorus:   Lab Results   Component Value Date/Time    PHOS 2.3 03/15/2023 05:34 AM       ASSESSMENT:  Principal Problem:    Parapneumonic effusion  Active Problems:    Acute respiratory failure with hypoxia (HCC)    Elevated troponin    Pleural effusion, bilateral    Longstanding persistent atrial fibrillation (HCC)    Enterobacter cloacae pneumonia ([facility]    Atherosclerosis of native coronary artery of native heart without angina pectoris    Primary hypertension    WEI (acute kidney injury) ([facility]  Resolved Problems:    * No resolved hospital problems. *      PLAN:  WEI most likely pre renal . S/p iv contrast . Resolved , dc IVF . RLL pneumonia with likely parapneumonic effusion. Abx per Pul.   HTN controlled   DM 2 meds per  team .   A fib on meds   Will sign off     Michelle Tavarez MD, Mekhi Ontiveros

## 2023-03-15 NOTE — PROCEDURES
INSTRUMENTAL SWALLOW REPORT  MODIFIED BARIUM SWALLOW    NAME: Nancy Ramirez   : 1945  MRN: [id number]       Date of Eval: 3/15/2023     Ordering Physician: Lamin Carl  Radiologist: Wilbur Torres     Referring Diagnosis: oropharyngeal dysphagia; r 13.12    Past Medical History:  has a past medical history of Abrasion of right elbow, Arthritis, CAD (coronary artery disease), Chronic back pain, Chronic systolic CHF (congestive heart failure) ([address] COPD (chronic obstructive pulmonary disease) ([address] Dental disease, Diabetes mellitus ([address] Essential hypertension, Hearing loss, Paroxysmal atrial fibrillation ([address] and Tinnitus. Past Surgical History:  has a past surgical history that includes fracture surgery (Left, ); back surgery (); Cardiac surgery (); Coronary artery bypass graft; back surgery; Cervical spine surgery; chest tube insertion (Right, 2023); and IR CHEST TUBE INSERTION (3/14/2023). CHART REVIEW:  3/13/2023 admitted with c/o abdominal pain and fatigue  MD ADMISSION H&P HPI:  80-year-old male with past medical history significant for CAD, COPD, insulin-dependent diabetes, atrial fibrillation presents to the hospital with complaints of weakness and cough. Patient reports not feeling well for the last few days. He reports severe weakness, he was unable to perform his ADLs, and has been having productive cough and his significant shortness of breath with exertion. Denies lower extremity swelling. Patient saw his pulmonologist last week. CT of the chest demonstrated large right pleural effusion, with consolidation, concerning for pneumonia. IMAGING:  CXR: 3/13/2023  Impression   No evidence of pneumothorax following right thoracentesis. Persistent right pleural effusion with associated consolidation, likely   atelectasis.       CT CHEST: 3/13/2023  Impression   Chest       No central pulmonary embolus identified       Bilateral pleural effusions, right greater than left with adjacent   consolidation of the lungs, either due to atelectasis or pneumonia. Scattered ground-glass opacity in tree-in-bud nodularity seen in the aerated   portion of the lungs, likely postinflammatory-infectious. Scattered areas of   bronchial wall thickening are seen      Modified Barium Swallow Study: 2/7/2023  Mild-moderate oral stage dysphagia characterized by decreased mastication, decreased lingual manipulation, and decreased sensation. Prolonged but adequate mastication with textured solids. Prolonged bolus formation and movement with solids. Reduced bolus control with premature bolus loss to the pharynx over the tongue base with all. Inconsistent sensation for discrimination of bolus volume. Moderate-severe pharyngeal stage dysphagia characterized by delayed swallow initiation, decreased laryngeal elevation, decreased pharyngeal peristalsis, and reduced sensation. Premature spillage to the valleculae with all. Overflow to the pyriform with nectar and thin. Penetration that does not clear with thin via tsp and cup using small sips; chin tuck attempted but not beneficial; protective cough does not clear penetrate which is a decline from prior studies in 2018 and 2019. SILENT aspiration with nectar via cup without use of small sip. Concern for inconsistent recall and discrimination for consistent carryover of nato sips. Vallecular residue with easy to chew and regular solids requires repeat swallows to clear; max effort to clear regular residue. Recommend easy to chew diet texture with honey thick liquids; meds in puree/pudding. Could consider upgrade to nectar thick liquids if/when able to consistently demonstrate use if small sips INDEPENDENTLY.      History/Prior Level of Function:   Living Status: admitted from home with wife  Baseline diet: patient endorses continued soft foods and thick liquids at home  Prior Dysphagia History: initially seen for dysphagia in 2018 with silent aspiration dating back to then; 2/7/2023 MBS rec for downgrade to soft/honey; patient dc'd home that day with pt/fam ed completed prior to dc; active with home ST; 3/7/2023 OP Pulm notes: working with SLP and using thickener    Patient Complaints/Reason for Referral:  Lexx Sol was referred for a MBS to assess the efficiency of his/her swallow function, assess for aspiration, and to make recommendations regarding safe dietary consistencies, effective compensatory strategies, and safe eating environment. Patient complaints: med team concern for aspiration    Onset of problem:   Date of Onset: 3/13/2023    Behavior/Cognition/Vision/Hearing:  Behavior/Cognition: Alert; Cooperative (Easily frustrated)  Vision Exceptions: Wears glasses at all times  Hearing Exceptions: Hard of hearing/hearing concerns    Impressions:  Treatment Dx and ICD 10: oropharyngeal dysphagia; r 13.12    Patient Position: Lateral (Fully upright in VSE chair)    Consistencies Administered: Moderately Thick cup;Pureed;Minced and Moist;Mildly Thick cup; Soft & Bite Sized; Moderately Thickstraw    Mild-moderate oral stage dysphagia characterized by decreased mastication and decreased lingual manipulation. Prolonged but adequate mastication with textured solids. Prolonged bolus formation and movement with solids. Reduced bolus control with premature bolus loss to the pharynx over the tongue base with all. Moderate-severe pharyngeal stage dysphagia characterized by delayed swallow initiation, decreased laryngeal elevation, decreased pharyngeal peristalsis, and reduced sensation. Premature spillage to the valleculae with all.    Occasional shallow penetration with honey via cup appears to clear; penetration with honey via straw is not cleared  Penetration that does not clear with nectar via cup using small sips (which is a decline from 2/7/2023 MBS); chin tuck attempted but not beneficial resulting in silent aspiration; protective cough does not clear penetrate which remains a decline from prior studies in 2018 and 2019. Vallecular residue with soft/bite-size requires max effort to clear which appears to be an additional decline since 2/7/2023. Recommend minced/moist diet texture with honey thick liquids; meds crushed in puree/pudding. Swallow function appears slightly declined compared to prior study which may be 2/2 deconditioning with current hospital re-admission? Dysphagia Outcome Severity Scale: Level 3: Moderate dysphagia- Total assisstance, supervision or strategies. Two or more diet consistencies restricted  Penetration-Aspiration Scale (PAS): 8 - Material Enters the airway, passes below the vocal folds, and no effort is made to eject    Recommended Diet:  Solid consistency: Minced and Moist  Liquid consistency: Moderately Thick  Liquid administration via: Cup  Medication administration: Meds in puree (CRUSHED)  Compensatory Swallowing Strategies : Upright as possible for all oral intake;Remain upright for 30-45 minutes after meals;Small bites/sips;Swallow 2 times per bite/sip    Recommendations/Treatment  Requires SLP Intervention: Yes - ST to tx 3-5 times per week during acute admission  Therapeutic Interventions: Diet tolerance monitoring;Patient/Family education; Bolus control exercises;Oral motor exercises; Therapeutic PO trials with SLP;Laryngeal exercises; Pharyngeal exercises  D/C Recommendations: Ongoing speech therapy is recommended at next level of care (continue [address])    Education:   Patient Education: Completed on results/recs; concern for limited recall for education    Goals:    Dysphagia Goals: The patient will tolerate recommended diet without observed clinical signs of aspiration; The patient/caregiver will demonstrate understanding of compensatory strategies for improved swallowing safety.  (The patient will improve swallow function via swallow ex completed 10/10)    Oral Preparation / Oral Phase  Decreased mastication  Decreased lingual manipulation  Prolonged but adequate mastication with textured solids. Prolonged bolus formation and movement with solids. Reduced bolus control with premature bolus loss to the pharynx over the tongue base with all. Pharyngeal Phase  Delayed swallow initiation  Decreased laryngeal elevation  Decreased pharyngeal peristalsis  Reduced sensation. Premature spillage to the valleculae with all. Occasional shallow penetration with honey via cup appears to clear; penetration with honey via straw is not cleared  Penetration that does not clear with nectar via cup using small sips (which is a decline from 2/7/2023 MBS); chin tuck attempted but not beneficial resulting in silent aspiration; protective cough does not clear penetrate which remains a decline from prior studies in 2018 and 2019. Vallecular residue with soft/bite-size requires max effort to clear which appears to be an additional decline since 2/7/2023. Upper Esophageal Phase  Esophageal Screen:  (ZEENAT)    Pain   Pain: did not state    Therapy Time:  10:20 AM - 11:05 AM  45 minutes    Ramila Jara, #[id number]  Speech-Language Pathologist  Portable phone: [phone]  3/15/2023, 11:05 AM

## 2023-03-15 NOTE — PROGRESS NOTES
Physical Therapy  Facility/Department: [address] MED SURG  Physical Therapy Treatment Note    Name: Lexx Sol  :   MRN: [id number]  Date of Service: 3/15/2023    Discharge Recommendations:  24 hour supervision or assist, Home with Home health PT          Patient Diagnosis(es): The primary encounter diagnosis was Pneumonia of both lungs due to infectious organism, unspecified part of lung. Diagnoses of WEI (acute kidney injury) ([address] Elevated troponin, and Pleural effusion, bilateral were also pertinent to this visit. Past Medical History:  has a past medical history of Abrasion of right elbow, Arthritis, CAD (coronary artery disease), Chronic back pain, Chronic systolic CHF (congestive heart failure) ([address] COPD (chronic obstructive pulmonary disease) ([address] Dental disease, Diabetes mellitus ([address] Essential hypertension, Hearing loss, Paroxysmal atrial fibrillation ([address] and Tinnitus. Past Surgical History:  has a past surgical history that includes fracture surgery (Left, ); back surgery (); Cardiac surgery (); Coronary artery bypass graft; back surgery; Cervical spine surgery; chest tube insertion (Right, 2023); and IR CHEST TUBE INSERTION (3/14/2023). Assessment   Body Structures, Functions, Activity Limitations Requiring Skilled Therapeutic Intervention: Decreased functional mobility ; Decreased ADL status; Decreased strength;Decreased safe awareness;Decreased balance;Decreased endurance  Assessment: Pt is a 68 y.o. male who presented to the ED on 3/13/23 with c/o not feeling well for the last few days. He reports severe weakness, he was unable to perform his ADLs, and has been having productive cough and his significant shortness of breath with exertion. Pt diagnosed with pneumonia. Prior to admission, pt living with spouse in home setting, independent with ADLs, use of various device with ambulation. Pt currently functioning slightly below baseline.  Anticipate return home with with 24hr supv and level 1 HHPT. As in previous admission, continue to recommned AFO for RLE to aid in ground clearance. Treatment Diagnosis: impaired mobility  Therapy Prognosis: Fair  Decision Making: Medium Complexity  History: see above  Exam: see below  Clinical Presentation: evolving  Activity Tolerance  Activity Tolerance: Patient tolerated treatment well;Patient limited by endurance     Plan   Physcial Therapy Plan  General Plan: 3-5 times per week  Current Treatment Recommendations: Strengthening, Balance training, Functional mobility training, Transfer training, Endurance training, Gait training, Neuromuscular re-education, Patient/Caregiver education & training, Safety education & training, Equipment evaluation, education, & procurement  Safety Devices  Type of Devices: All fall risk precautions in place, Call light within reach, Gait belt, Patient at risk for falls, Left in chair, Chair alarm in place, Nurse notified     Restrictions  Restrictions/Precautions  Restrictions/Precautions: Fall Risk, Swallowing - Thickened Liquids  Position Activity Restriction  Other position/activity restrictions: Honey thick; soft bite sized; chest tube on suction     Subjective   General  Chart Reviewed: Yes  Patient assessed for rehabilitation services?: Yes  Additional Pertinent Hx: Pt is a 68 y.o. male who presented to the ED on 3/13/23 with c/o not feeling well for the last few days. He reports severe weakness, he was unable to perform his ADLs, and has been having productive cough and his significant shortness of breath with exertion. Pt diagnosed with pneumonia. Response To Previous Treatment: Patient with no complaints from previous session. Family / Caregiver Present: No  Referring Practitioner: Gabby Valerio MD  Referral Date : 03/13/23  Diagnosis: Pneumonia  Follows Commands: Within Functional Limits  Subjective  Subjective: Pt is agreeable to PT.          Social/Functional History  Social/Functional History  Lives With: Spouse  Type of Home: House  Home Layout: One level, Able to Live on Main level with bedroom/bathroom  Home Access: Stairs to enter with rails  Entrance Stairs - Number of Steps: 2  Entrance Stairs - Rails: Both  Bathroom Shower/Tub: Walk-in shower  Bathroom Toilet: Handicap height  Bathroom Equipment: Grab bars in shower, Built-in shower seat, Shower chair, Hand-held shower, Grab bars around toilet  Bathroom Accessibility: Wheelchair accessible  Home Equipment: Lift chair, Walker, rolling, Cane, Rollator, Electric scooter  ADL Assistance: Independent  Homemaking Assistance: Needs assistance  Ambulation Assistance: Independent (combination of RW, cane in home. scooter outside home)  Transfer Assistance: Independent  Active : Yes  Occupation: Retired  Type of Occupation: ,     Vision/Hearing  Vision  Vision Exceptions: Wears glasses at all times  Hearing  Hearing Exceptions: Hard of hearing/hearing concerns      Cognition   Orientation  Overall Orientation Status: Within Functional Limits  Cognition  Overall Cognitive Status: Exceptions  Safety Judgement: Decreased awareness of need for safety;Decreased awareness of need for assistance  Insights: Decreased awareness of deficits  Cognition Comment: decreased insight into current medical situation     Objective   Gross Assessment  Strength: Generally decreased, functional (hx of CVA with R sided weakness/spasticity)     Bed mobility  Supine to Sit: Unable to assess  Sit to Supine: Unable to assess  Bed Mobility Comments: Pt in recliner at start and end of session. Transfers  Sit to Stand: Contact guard assistance  Stand to Sit: Contact guard assistance  Ambulation  Surface: Level tile  Device: Rolling Walker  Assistance: Contact guard assistance  Gait Deviations: Slow Emely;Decreased step length;Decreased step height  Distance: 30'  Comments: Pt reproting fatigue last 5'.      Balance  Posture: Good  Sitting - Static: Good  Sitting - Dynamic: Good  Standing - Static: Fair;+ (@RW)  Standing - Dynamic: Fair (@RW)             AM-PAC Score  AM-PAC Inpatient Mobility Raw Score : 18 (03/15/23 1149)  AM-PAC Inpatient T-Scale Score : 43.63 (03/15/23 1149)  Mobility Inpatient CMS 0-100% Score: 46.58 (03/15/23 1149)  Mobility Inpatient CMS G-Code Modifier : CK (03/15/23 1149)          Goals  Short Term Goals  Time Frame for Short Term Goals: by acute discharge - all goals ongoing as of 3/15/23  Short Term Goal 1: bed mobility with SBA  Short Term Goal 2: sit<>stand with SBA  Short Term Goal 3: ambulate > 40' with RW and SBA  Patient Goals   Patient Goals : none stated       Education  Patient Education  Education Given To: Patient  Education Provided: Role of Therapy;Plan of Care  Education Method: Verbal  Barriers to Learning: None  Education Outcome: Verbalized understanding;Continued education needed      Therapy Time   Individual Concurrent Group Co-treatment   Time In 1125         Time Out 1140         Minutes 15         Timed Code Treatment Minutes: Hussein [id number], PT

## 2023-03-15 NOTE — CONSULTS
Infectious Diseases Inpatient Consult Note      Reason for Consult:  Rt pleural effusion and complicated Pneumonia s/p chest tube placement      Requesting Physician:  Bethanie Saint       Primary Care Physician:  Ceci King    History Obtained From:  Epic and patient     CHIEF COMPLAINT:     Chief Complaint   Patient presents with    Abdominal Pain     Says pain started when he began \"coughing real bad\" a few days ago. Fatigue         HISTORY OF PRESENT ILLNESS:  68 y.o. man with significant history for diabetes, paroxysmal atrial fibrillation, coronary artery disease, CABG, chronic systolic heart failure, COPD admitted secondary to shortness of breath and cough and weakness. Patient is followed by Dr. Paige Riddle pulmonary critical care-he called our answering service secondary to shortness of breath and was advised to come to the hospital for further evaluation. Admission labs indicate creatinine 1.5 procalcitonin 0.19 blood glucose elevated at 261, WBC 11.2 hemoglobin 12.5 hemoglobin A1c at 7, CT chest indicated bilateral pleural effusion right greater than the left with associated consolidation of the lung scattered groundglass opacity tree-in-bud nodularity noted. CT abdomen pelvis was negative, he underwent ultrasound evaluation of the right pleural space lace indicated septated right pleural effusion. Approximately 550 mL of bloody pleural fluid was removed and sent for culture on 3/13/23. He underwent IR guided successful ultrasound-guided IR chest tube placement on 3/14/23. Respiratory pneumonia panel was positive for Enterobacter, with CTX gene +Ve concerning for esbl,  but sputum culture thus far negative MRSA probe negative body fluid culture negative. He was hospitalized here from 2/4/ 23 to 2/7/23 and treated for PNA and COPD, per HPI he was Hospitalized at [facility] for PNA in Dec 2022 as well. He was on courses of Levofloxacin, Doxycycline and Augmentin in the past.        He was treated for Influenza PNA in Dec 2022 at [facility]-    Location : Rt side pain,      Quality : Aching, burning     Severity : 8/10  Duration : 1 week     Timing : Constant  Context : Recurrent pneumonia and pleural effusion  Modifying factors : None  Associated signs and symptoms: Shortness of breath, sweats        Past Medical History:    Past Medical History:   Diagnosis Date    Abrasion of right elbow 2/4/2023    Arthritis     CAD (coronary artery disease)     CABG in 2009    Chronic back pain     Chronic systolic CHF (congestive heart failure) (HCC)     COPD (chronic obstructive pulmonary disease) ([address]     Dental disease     Diabetes mellitus ([address]     Essential hypertension     Hearing loss     Paroxysmal atrial fibrillation ([address]     On Coumadin    Tinnitus        Past Surgical History:    Past Surgical History:   Procedure Laterality Date    [address] [facility]  2007    CERVICAL SPINE SURGERY      CHEST TUBE INSERTION Right 03/14/2023    14F. Dr. [address]    Shoulder    IR CHEST TUBE INSERTION  3/14/2023    IR CHEST TUBE INSERTION 3/14/2023 [facility] SPECIAL PROCEDURES       Current Medications:    No outpatient medications have been marked as taking for the 3/13/23 encounter [facility] Encounter).        Allergies:  No known allergies    Immunizations :   Immunization History   Administered Date(s) Administered    Influenza Vaccine, unspecified formulation 10/22/2016    Influenza, AFLURIA (age 1 yrs+), FLUZONE, (age 10 mo+), MDV, 0.5mL 10/22/2016, 09/15/2017    Influenza, High Dose (Fluzone 65 yrs and older) 09/15/2017    Pneumococcal Conjugate 13-valent (Patricia Ren) 12/12/2016    Tdap (Boostrix, Adacel) 05/16/2018         Social History:    Social History     Tobacco Use    Smoking status: Former     Packs/day: 1.50     Years: 12.00     Pack years: 18.00     Types: Cigarettes     Start date: 1/1/1959     Quit date: 3/1/1996     Years since quittin.0    Smokeless tobacco: Never   Vaping Use    Vaping Use: Never used   Substance Use Topics    Alcohol use: No    Drug use: No     Social History     Tobacco Use   Smoking Status Former    Packs/day: 1.50    Years: 12.00    Pack years: 18.00    Types: Cigarettes    Start date: 1959    Quit date: 3/1/1996    Years since quittin.0   Smokeless Tobacco Never      Family History   Problem Relation Age of Onset    Brain Cancer Mother     Heart Attack Father     Heart Attack Brother          REVIEW OF SYSTEMS:     Constitutional:  negative for fevers, chills, night sweats  Eyes:  negative for blurred vision, eye discharge, visual disturbance   HEENT:  negative for hearing loss, ear drainage,nasal congestion  Respiratory:  cough,++  shortness of breath ++ or hemoptysis   Cardiovascular:  negative for chest pain, palpitations, syncope  Gastrointestinal:  negative for nausea, vomiting, diarrhea, constipation, abdominal pain  Genitourinary:  negative for frequency, dysuria, urinary incontinence, hematuria  Hematologic/Lymphatic:  negative for easy bruising, bleeding and lymphadenopathy  Allergic/Immunologic:  negative for recurrent infections, angioedema, anaphylaxis   Endocrine:  negative for weight changes, polyuria, polydipsia and polyphagia  Musculoskeletal:  negative for joint  pain, swelling, decreased range of motion  Integumentary: No rashes, skin lesions  Neurological:  negative for headaches, slurred speech, unilateral weakness  Psychiatric: negative for hallucinations,confusion,agitation.      PHYSICAL EXAM:      Vitals:    /65   Pulse 92   Temp 97.8 °F (36.6 °C) (Oral)   Resp 20   Ht 5' 11\" (1.803 m)   Wt 197 lb 5 oz (89.5 kg)   SpO2 93%   BMI 27.52 kg/m²     General Appearance: alert,in some acute distress, ++ pallor, no icterus   Skin: warm and dry, no rash or erythema  Head: normocephalic and atraumatic  Eyes: pupils equal, round, and reactive to light, conjunctivae normal  ENT: tympanic membrane, external ear and ear canal normal bilaterally, nose without deformity, nasal mucosa and turbinates normal without polyps  Neck: supple and non-tender without mass, no thyromegaly  no cervical lymphadenopathy  Pulmonary/Chest:  Rt side coarse crepts ++- no wheezes, rales or rhonchi, normal air movement, in some respiratory distress  Cardiovascular: normal rate, regular rhythm, normal S1 and S2, no murmurs, rubs, clicks, or gallops, no carotid bruits  Abdomen: soft, non-tender, non-distended, normal bowel sounds, no masses or organomegaly  Extremities: no cyanosis, clubbing or edema  Musculoskeletal: normal range of motion, no joint swelling, deformity or tenderness  Integumentary: No rashes, no abnormal skin lesions, no petechiae  Neurologic: reflexes normal and symmetric, no cranial nerve deficit  Psych:  Orientation, sensorium, mood normal   Lines: IV  Rt chest tube in place      DATA:    CBC:   Lab Results   Component Value Date    WBC 9.2 03/15/2023    HGB 12.4 (L) 03/15/2023    HCT 37.4 (L) 03/15/2023    MCV 89.4 03/15/2023     03/15/2023     RENAL:   Lab Results   Component Value Date    CREATININE 0.8 03/15/2023    BUN 17 03/15/2023     (L) 03/15/2023    K 3.7 03/15/2023    K see below 03/15/2023    CL 99 03/15/2023    CO2 21 03/15/2023     SED RATE:   Lab Results   Component Value Date/Time    SEDRATE 19 09/16/2017 06:42 AM     CK:   Lab Results   Component Value Date/Time    CKTOTAL 85 09/16/2017 06:42 AM     CRP:   Lab Results   Component Value Date/Time    CRP 25.1 09/16/2017 06:42 AM     Hepatic Function Panel:   Lab Results   Component Value Date/Time    ALKPHOS 59 03/13/2023 07:03 AM    ALT 9 03/13/2023 07:03 AM    AST 13 03/13/2023 07:03 AM    PROT 7.2 03/13/2023 07:03 AM    PROT 7.0 09/23/2012 08:18 PM    BILITOT 0.5 03/13/2023 07:03 AM    BILIDIR <0.2 07/06/2021 05:00 PM    IBILI see below 07/06/2021 05:00 PM    LABALBU 3.2 03/15/2023 05:34 AM UA:  Lab Results   Component Value Date/Time    COLORU Yellow 02/04/2023 08:12 PM    CLARITYU Clear 02/04/2023 08:12 PM    GLUCOSEU >=1000 02/04/2023 08:12 PM    BILIRUBINUR Negative 02/04/2023 08:12 PM    KETUA Negative 02/04/2023 08:12 PM    SPECGRAV 1.018 02/04/2023 08:12 PM    BLOODU Negative 02/04/2023 08:12 PM    PHUR 5.0 02/04/2023 08:12 PM    PROTEINU Negative 02/04/2023 08:12 PM    UROBILINOGEN 0.2 02/04/2023 08:12 PM    NITRU Negative 02/04/2023 08:12 PM    LEUKOCYTESUR Negative 02/04/2023 08:12 PM    LABMICR Not Indicated 02/04/2023 08:12 PM    URINETYPE Cleancatch 02/04/2023 08:12 PM      Urine Microscopic:   Lab Results   Component Value Date/Time    BACTERIA Rare 07/11/2018 01:27 AM    HYALCAST 1 04/13/2019 07:57 AM    WBCUA 1 04/13/2019 07:57 AM    RBCUA 1 04/13/2019 07:57 AM    EPIU 0 04/13/2019 07:57 AM     Urine Reflex to Culture:   Lab Results   Component Value Date/Time    URRFLXCULT Not Indicated 02/04/2023 08:12 PM       Creat  1.5     Procal  0.19        Component Ref Range & Units 03/13/23 1227   Hemoglobin A1C See comment % 7.0    Comment: Comment:   Diagnosis of Diabetes: > or = 6.5%   Increased risk of diabetes (Prediabetes): 5.7-6.4%   Glycemic Control: Nonpregnant Adults: <7.0%                     Pregnant: <6.0%      eAG mg/dL 154.2    Resulting Agency  [address]              Component Ref Range & Units 3/13/23 1507   Cell Count Fluid Type  Pleural    Comment: Right   Color, Fluid  Dark Red    Appearance, Fluid  Turbid    Clot Eval.  see below    Comment: No Clots Seen   Nucl Cell, Fluid /cumm 3,681    RBC, Fluid /cumm 691,000    Neutrophil Count, Fluid % 81    Lymphocytes, Body Fluid % 9    Monocyte Count, Fluid % 1    Eos, Fluid % 7    Basos, Fluid % 1    Macrophages % 1    Number of Cells Counted Fluid  100    Comment: The reference interval(s) and other method performance   specifications are unavailable for this body fluid.    Comparison of the result with concentration in the         MICRO: cultures reviewed and updated by me     Procedure Component Value Units Date/Time   Culture, Body Fluid [[id number]] Collected: 03/14/23 1112   Order Status: Completed Specimen: Body Fluid from Pleural Updated: 03/15/23 1011    Body Fluid Culture, Sterile No growth to date    Gram Stain Result 3+ WBC's (Polymorphonuclear)   No organisms seen    Narrative:     ORDER#: [id number]                          ORDERED BY: CHAMP ENGLE   SOURCE: Pleural                            COLLECTED:  03/14/23 11:12   ANTIBIOTICS AT SABAS.:                      RECEIVED :  03/14/23 11:35   Culture, Respiratory [[id number]] Collected: 03/13/23 1800   Order Status: Completed Specimen: Sputum Expectorated Updated: 03/15/23 0738    CULTURE, RESPIRATORY Normal respiratory jodi    Gram Stain Result 4+ Gram positive cocci   2+ Gram variable rods present   3+ WBC's (Polymorphonuclear) present   2+ WBC's (Mononuclear) present   2+ Epithelial Cells present    Narrative:     ORDER#: [id number]                          ORDERED BY: CHAMP ENGLE   SOURCE: Sputum Expectorated                COLLECTED:  03/13/23 18:00   ANTIBIOTICS AT SABAS.:                      RECEIVED :  03/13/23 18:49   Culture, Body Fluid [[id number]] Collected: 03/13/23 1507   Order Status: Completed Specimen:  Body Fluid Updated: 03/15/23 0648    Body Fluid Culture, Sterile --    No growth to date   No growth 36 to 48 hours     Gram Stain Result 2+ WBC's (Polymorphonuclear)   No organisms seen    Narrative:     ORDER#: [id number]                          ORDERED BY: FRIDA NEGRETE   SOURCE: Fluid Pleural                      COLLECTED:  03/13/23 15:07   ANTIBIOTICS AT [facility]AB[facility].:                      RECEIVED :  03/13/23 15:32   Culture, Fungus [[id number]] Collected: 03/13/23 1507   Order Status: Sent Specimen: Fluid Updated: 03/14/23 1351    Fungus Stain No Fungal elements seen   Narrative:     ORDER#: [id number] ORDERED BY: FRIDA NEGRETE   SOURCE: Fluid                              COLLECTED:  03/13/23 15:07   ANTIBIOTICS AT [facility].:                      RECEIVED :  03/13/23 15:31   Culture with Smear, Acid Fast Bacillius [[id number]] Collected: 03/13/23 1507   Order Status: Sent Specimen: Fluid Updated: 03/14/23 1336    AFB Smear No AFB observed by Fluorescent stain   Narrative:     ORDER#: [id number]                          ORDERED BY: FRIDA Montiel   SOURCE: Fluid                              COLLECTED:  03/13/23 15:07   ANTIBIOTICS AT [facility].:                      RECEIVED :  03/13/23 15:31   Culture, Blood 2 [[id number]] Collected: 03/13/23 1227   Order Status: Completed Specimen: Blood Updated: 03/14/23 1321    Culture, Blood 2 No Growth to date. Any change in status will be called. Narrative:     ORDER#: [id number]                          ORDERED BY: CARLOS NG   SOURCE: Blood                              COLLECTED:  03/13/23 12:27   ANTIBIOTICS AT [facility].:                      RECEIVED :  03/13/23 12:50   If child <=2 yrs old please draw pediatric bottle. ~Blood Culture #2   Culture, Blood 1 [[id number]] Collected: 03/13/23 0933   Order Status: Completed Specimen: Blood Updated: 03/14/23 1018    Blood Culture, Routine No Growth to date. Any change in status will be called. Narrative:     ORDER#: [id number]                          ORDERED BY: CARLOS NG   SOURCE: Blood                              COLLECTED:  03/13/23 09:33   ANTIBIOTICS AT SABAS.:                      RECEIVED :  03/13/23 09:37   If child <=2 yrs old please draw pediatric bottle. ~Blood Culture 1   MRSA DNA Probe, Nasal [[id number]] Collected: 03/13/23 1600   Order Status: Completed Specimen: Nares Updated: 03/14/23 0508    MRSA SCREEN RT-PCR --    Negative  MRSA DNA not detected.    Normal Range: Not detected    Narrative:     ORDER#: [id number]                          ORDERED BY: FRIDA Montiel   SOURCE: Nares COLLECTED:  03/13/23 16:00   ANTIBIOTICS AT SABAS.:                      RECEIVED :  03/13/23 16:13   PNEUMONIA PANEL FILM ARRAY REPORT [[id number]] Collected: 03/13/23 1800   Order Status: Completed Updated: 03/13/23 2352    Report SEE IMAGE   Pneumonia Panel, Molecular [[id number]] (Abnormal) Collected: 03/13/23 1800   Order Status: Completed Specimen: Sputum Expectorated Updated: 03/13/23 2348    Organism Enterobacter cloacae complex DNA Detected Abnormal     Pneumonia Panel Molecular --    10,000 copies/mL   See additional report for complete Pneumonia panel. CTX-M gene Detected. This organism is an ESBL . Narrative:     ORDER#: [id number]                          ORDERED BY: CHAMP ENGLE   SOURCE: Sputum Expectorated                COLLECTED:  03/13/23 18:00   ANTIBIOTICS AT SABAS.:                      RECEIVED :  03/13/23 18:49   Gram stain [[id number]] Collected: 03/13/23 1507   Order Status: Canceled Specimen: Fluid    MRSA DNA Probe, Nasal [[id number]]    Order Status: No result Specimen: Nares    COVID-19, Rapid [[id number]] Collected: 03/13/23 0809   Order Status: Completed Specimen: Nasopharyngeal Swab Updated: 03/13/23 0833    SARS-CoV-2, NAAT Not Detected    Comment: Rapid NAAT:   Negative results should be treated as presumptive and,   if inconsistent with clinical signs and symptoms or necessary for   patient management, should be tested with an alternative molecular   assay. Negative results do not preclude SARS-CoV-2 infection and   should not be used as the sole basis for patient management decisions. This test has been authorized by the FDA under an Emergency Use   Authorization (EUA) for use by authorized laboratories.      Fact sheet for Healthcare Providers:   http://www.scottie-raymundo.biz/   Fact sheet for Patients: http://www.rubin-raymundo.biz/     METHODOLOGY: Isothermal Nucleic Acid Amplification       Rapid influenza A/B antigens [[id number]] Collected: 03/13/23 0809   Order Status: Completed Specimen: Nasopharyngeal Updated: 03/13/23 0831    Rapid Influenza A Ag Negative    Rapid Influenza B Ag Negative     Blood Culture:   Lab Results   Component Value Date/Time    [facility]  03/13/2023 09:33 AM     No Growth to date. Any change in status will be called. [facility]  03/13/2023 12:27 PM     No Growth to date. Any change in status will be called. Viral Culture:    Lab Results   Component Value Date/Time    COVID19 Not Detected 03/13/2023 08:09 AM    COVID19 Not Detected 08/25/2021 06:13 PM     Urine Culture: No results for input(s): Maverick Vázquez in the last 72 hours. Scheduled Meds:   meropenem  1,000 mg IntraVENous Q8H    sennosides-docusate sodium  1 tablet Oral Daily    carvedilol  25 mg Oral BID WC    aspirin  81 mg Oral Daily    pramipexole  0.25 mg Oral TID    pramipexole  0.25 mg Oral Nightly    Roflumilast  500 mcg Oral Daily    sodium chloride flush  5-40 mL IntraVENous 2 times per day    insulin glargine  0.15 Units/kg SubCUTAneous Nightly    insulin lispro  0-8 Units SubCUTAneous TID WC    insulin lispro  0-4 Units SubCUTAneous Nightly    [Held by provider] enoxaparin  1 mg/kg SubCUTAneous Daily    ipratropium-albuterol  1 ampule Inhalation Q4H WA    budesonide  0.5 mg Nebulization BID    atorvastatin  40 mg Oral Nightly    sodium chloride (Inhalant)  4 mL Nebulization Q4H WA    gabapentin  100 mg Oral TID       Continuous Infusions:   sodium chloride      lactated ringers IV soln Stopped (03/14/23 0953)    dextrose         PRN Meds:  HYDROmorphone, HYDROcodone-acetaminophen, sodium chloride flush, sodium chloride, ondansetron **OR** ondansetron, polyethylene glycol, acetaminophen **OR** acetaminophen, glucose, dextrose bolus **OR** dextrose bolus, glucagon (rDNA), dextrose    Imaging:   FL MODIFIED BARIUM SWALLOW W VIDEO   Final Result   1.  Positive mild laryngeal penetration without tracheal aspiration of nectar thickened liquids and honey thickened liquids. This did not improve with   chin-tuck. 2. No evidence of laryngeal penetration or tracheal aspiration of purees or   soft solids. Please see separate speech pathology report for full discussion of findings   and recommendations. XR CHEST PORTABLE   Final Result   Decreased right pleural effusion status post chest tube placement. IR CHEST TUBE INSERTION   Final Result   Successful ultrasound and fluoroscopic guided placement of a right 14 Malay   locking pigtail chest tube into a right hemothorax. XR CHEST 1 VIEW   Final Result   No evidence of pneumothorax following right thoracentesis. Persistent right pleural effusion with associated consolidation, likely   atelectasis. US THORACENTESIS Which side should the procedure be performed? Right   Final Result   Successful ultrasound guided right thoracentesis. Thinly septated right pleural effusion with bloody pleural fluid removed with   thoracentesis, consistent with a hemothorax. CT CHEST PULMONARY EMBOLISM W CONTRAST   Final Result   Chest      No central pulmonary embolus identified      Bilateral pleural effusions, right greater than left with adjacent   consolidation of the lungs, either due to atelectasis or pneumonia. Scattered ground-glass opacity in tree-in-bud nodularity seen in the aerated   portion of the lungs, likely postinflammatory-infectious. Scattered areas of   bronchial wall thickening are seen      Abdomen and pelvis      No acute intra-abdominal abnormality         CT ABDOMEN PELVIS W IV CONTRAST Additional Contrast? None   Final Result   Chest      No central pulmonary embolus identified      Bilateral pleural effusions, right greater than left with adjacent   consolidation of the lungs, either due to atelectasis or pneumonia.    Scattered ground-glass opacity in tree-in-bud nodularity seen in the aerated   portion of the lungs, likely postinflammatory-infectious. Scattered areas of   bronchial wall thickening are seen      Abdomen and pelvis      No acute intra-abdominal abnormality         XR CHEST PORTABLE   Final Result   Mild congestion. Small right pleural effusion. Bibasilar compressive   atelectasis versus infiltrates. XR CHEST PORTABLE    (Results Pending)       All pertinent images and reports for the current Hospitalization were reviewed by me. IMPRESSION:    Patient Active Problem List   Diagnosis Code    Diabetes mellitus ([facility] E11.9    Atherosclerosis of native coronary artery of native heart without angina pectoris I25.10    Morbid obesity due to excess calories ([facility]) E66.01    Sepsis ([facility] A41.9    SOB (shortness of breath) R06.02    Abnormal chest CT R93.89    Back pain M54.9    Diabetes mellitus type 2, uncontrolled [id number]    Chronic back pain M54.9, G89.29    Chronic combined systolic (congestive) and diastolic (congestive) heart failure ([facility]) I50.42    Hyperlipidemia E78.5    Primary hypertension I10    Leukocytosis D72.829    Chronic bronchitis, simple ([facility]) J41.0    Chronic rhinitis J31.0    Generalized weakness R53.1    WEI (acute kidney injury) ([facility] N17.9    DMII (diabetes mellitus, type 2) ([facility]) E11.9    Supratherapeutic INR R79.1    Tachycardia R00.0    Tachypnea R06.82    Non morbid obesity due to excess calories E66.09    Fatigue R53.83    Cough productive of clear sputum R05.8    History of coronary artery bypass graft Z95.1    Abrasion of right elbow S50.311A    Acute respiratory failure with hypoxia ([facility]) J96.01    Paroxysmal atrial fibrillation ([facility]) I48.0    Chronic pulmonary aspiration T17.908A    Parapneumonic effusion J18.9, J91.8    Elevated troponin R77.8    Pleural effusion, bilateral J90    Longstanding persistent atrial fibrillation ([facility]) I48.11    Enterobacter cloacae pneumonia ([facility] J15.6        ICD-10-CM    1.  Pneumonia of both lungs due to infectious organism, unspecified part of lung J18.9       2. WEI (acute kidney injury) ([facility]  N17.9       3. Elevated troponin  R77.8       4. Pleural effusion, bilateral  J90          Rt Side PNA  Rt lower lobe, Middle lobe consolidation   Rt complex pleural effusion  Partial treatment hence No EMPYEMA  But septated, Exudative Pleural effusion  COPD  H/O  Severe Influenza A PNA in Dec 2022  He never recovered fully after that with Secondary Bacterial PNA  He had multiple courses of oral abx  Pneumonia PANEL +v Enterobacter ESBL +vE  Sputum cx negative  Pleural fluid cx negative from prior abx use  CABG  WEI  A fib on anticoagulation   S/p IR thoracentesis on 3/13  And s/p IR Chest tube placement on 3/14      Given the complicated course with unresolved Bacterial PNA as a secondary complication from POST INFLUENZA A infection now with ESBL Enterobacter will need IV abx     This is very consistent with Post influenza Secondary complicated PNA from gram -ves     He would respond partially on oral abx and relapse after with complications                   Labs, Microbiology, Radiology and pertinent results from current hospitalization and care every where were reviewed by me as a part of the consultation. PLAN :  Agree with IV Meropenem 1 gm Q 8 HRS  ESR, CRP  Drain all the pleural fluid   CT chest in follow up   Anticipate x 14-21 days of abx therapy at d/c  Control DM  Anticoagulation per primary   Contact isolation for ESBL       Discussed with patient/Family and Nursing   Risk of Complications/Morbidity: High      Illness(es)/ Infection present that pose threat to bodily function. There is potential for severe exacerbation of infection/side effects of treatment. Therapy requires intensive monitoring for antimicrobial agent toxicity. Thanks for allowing me to participate in your patient's care please call me with any questions or concerns.     Dr. Martha Graff MD  [address] [place] [address] Physician  Phone: [phone]   Fax : [phone]

## 2023-03-15 NOTE — PROGRESS NOTES
Occupational Therapy  Facility/Department: [facility]  Occupational Therapy Daily Treatment    Name: Uche Lino  :   MRN: [id number]  Date of Service: 3/15/2023    Discharge Recommendations:  Home with assist PRN      Uche Lino scored a / on the AM-PAC ADL Inpatient form. At this time, no further OT is recommended upon discharge due to pt nearing baseline. Recommend patient returns to prior setting with prior services. Patient Diagnosis(es): The primary encounter diagnosis was Pneumonia of both lungs due to infectious organism, unspecified part of lung. Diagnoses of WEI (acute kidney injury) ([address] Elevated troponin, and Pleural effusion, bilateral were also pertinent to this visit. Past Medical History:  has a past medical history of Abrasion of right elbow, Arthritis, CAD (coronary artery disease), Chronic back pain, Chronic systolic CHF (congestive heart failure) ([address] COPD (chronic obstructive pulmonary disease) ([address] Dental disease, Diabetes mellitus ([address] Essential hypertension, Hearing loss, Paroxysmal atrial fibrillation ([address] and Tinnitus. Past Surgical History:  has a past surgical history that includes fracture surgery (Left, ); back surgery (); Cardiac surgery (); Coronary artery bypass graft; back surgery; Cervical spine surgery; chest tube insertion (Right, 2023); and IR CHEST TUBE INSERTION (3/14/2023). Treatment Diagnosis: Decreased functional mobility and ADLs      Assessment   Performance deficits / Impairments: Decreased functional mobility ; Decreased strength;Decreased endurance;Decreased ADL status; Decreased safe awareness;Decreased balance  Assessment: Pt is a 69 yo M admitted with pneumonia of both lungs d/t infectious organism. PTA, pt lived at home with his wife and was mod IND with ADLs and functional mobility using cane or RW in the house and a scooter outside of the home.  Today, pt is slightly below baseline, requiring up to CGA with functional mobility d/t safety concerns with the RW. Pt weaned off oxygen prior to therapy session. Pt tolerated activity on RA well, however pt has new chest tube inserted. Anticipate pt will require up to mod A for ADLs with management of chest tube line. OT will continue to see while on acute to address current deficits and to increase pt independence. Anticipate pt will be safe to return home with assist PRN once medically stable. Treatment Diagnosis: Decreased functional mobility and ADLs  Prognosis: Fair  Decision Making: Medium Complexity  REQUIRES OT FOLLOW-UP: Yes  Activity Tolerance  Activity Tolerance: Patient Tolerated treatment well;Patient limited by fatigue        Plan   Occupational Therapy Plan  Times Per Week: 3-5  Times Per Day: Once a day  Current Treatment Recommendations: Strengthening, Balance training, Functional mobility training, Endurance training, ROM, Safety education & training, Self-Care / ADL     Restrictions  Restrictions/Precautions  Restrictions/Precautions: Fall Risk, Swallowing - Thickened Liquids  Position Activity Restriction  Other position/activity restrictions: Honey thick; soft bite sized    Subjective   General  Chart Reviewed: Yes  Patient assessed for rehabilitation services?: Yes  Additional Pertinent Hx: Pt is a 69 yo M admitted with pneumonia of both lungs d/t infectious organism. Family / Caregiver Present: No  Referring Practitioner: Joseline Sanchez MD  Diagnosis: Pneumonia of both lungs  Subjective  Subjective: Pt met b/s for OT tx with PT. Pt in recliner, agreeable to therapy. No c/o pain.      Social/Functional History  Social/Functional History  Lives With: Spouse  Type of Home: House  Home Layout: One level, Able to Live on Main level with bedroom/bathroom  Home Access: Stairs to enter with rails  Entrance Stairs - Number of Steps: 2  Entrance Stairs - Rails: Both  Bathroom Shower/Tub: Walk-in shower  Bathroom Toilet: Handicap height  Bathroom Equipment: Grab bars in shower, Built-in shower seat, Shower chair, Hand-held shower, Grab bars around toilet  Bathroom Accessibility: Wheelchair accessible  Home Equipment: Lift chair, Walker, rolling, Cane, Rollator, Electric scooter  ADL Assistance: Independent  Homemaking Assistance: Needs assistance  Ambulation Assistance: Independent (combination of RW, cane in home. scooter outside home)  Transfer Assistance: Independent  Active : Yes  Occupation: Retired  Type of Occupation: ,        Objective   Safety Devices  Type of Devices: All fall risk precautions in place;Call light within reach;Gait belt;Patient at risk for falls; Left in chair;Chair alarm in place;Nurse notified     ADL  Additional Comments: Pt declined participation in ADLs at this time. Bed mobility  Supine to Sit: Unable to assess  Sit to Supine: Unable to assess  Bed Mobility Comments: Pt in recliner at start and end of session. Transfers  Sit to stand: Contact guard assistance  Stand to sit: Contact guard assistance  Transfer Comments: Pt completed functional mobility from recliner>room threshold>recliner using RW CGA (~40 ft). Pt impulsive to sit and required verbal cues for safety.   Vision  Vision Exceptions: Wears glasses at all times  Hearing  Hearing Exceptions: Hard of hearing/hearing concerns  Cognition  Overall Cognitive Status: Exceptions  Safety Judgement: Decreased awareness of need for safety;Decreased awareness of need for assistance  Insights: Decreased awareness of deficits  Cognition Comment: decreased insight into current medical situation  Orientation  Overall Orientation Status: Within Functional Limits    Education Given To: Patient  Education Provided: Role of Therapy;Plan of Care;Transfer Training  Education Method: Demonstration;Verbal  Barriers to Learning: None  Education Outcome: Verbalized understanding;Continued education needed    AM-PAC Score  AM-PAC Inpatient Daily Activity Raw Score: 19 (03/15/23 1144)  AM-PAC Inpatient ADL T-Scale Score : 40.22 (03/15/23 1144)  ADL Inpatient CMS 0-100% Score: 42.8 (03/15/23 1144)  ADL Inpatient CMS G-Code Modifier : CK (03/15/23 1144)    Goals  Short Term Goals  Time Frame for Short Term Goals: Prior to d/c; goals ongoing  Short Term Goal 1: Pt will complete grooming in stance at sink SBA  Short Term Goal 2: Pt will complete toileting with supervision  Short Term Goal 3: Pt will complete dressing CGA  Short Term Goal 4: Pt will complete bathing with supervision  Short Term Goal 5: Pt will demonstrate understanding of safe walker use when completing functional mobility  Long Term Goals  Time Frame for Long Term Goals : LTG=STG  Patient Goals   Patient goals : to return home       Therapy Time   Individual Concurrent Group Co-treatment   Time In 1125         Time Out 1140         Minutes Kandice Gamble S/OT  Therapist was present, directed the patient's care, made skilled judgement, and was responsible for assessment and treatment of the patient.   Electronically signed by Milo Poe OT on 3/15/23 at 11:55 AM EDT

## 2023-03-15 NOTE — PROGRESS NOTES
Pulmonary Progress Note    CC:  Follow up respiratory failure, effusion    Subjective:  Chest tube place yesterday  On room air today   580 ml of output from chest tube in past 24 hours     ROS  Less SOB  Nose is runny        Intake/Output Summary (Last 24 hours) at 3/15/2023 0820  Last data filed at 3/15/2023 0630  Gross per 24 hour   Intake 2150 ml   Output 730 ml   Net 1420 ml           PHYSICAL EXAM:  Blood pressure 137/65, pulse 92, temperature 97.8 °F (36.6 °C), temperature source Oral, resp. rate 20, height 5' 11\" (1.803 m), weight 197 lb 5 oz (89.5 kg), SpO2 93 %.'  Gen: No distress. Eyes: PERRL. No sclera icterus. No conjunctival injection. ENT: No discharge. Pharynx clear. External appearance of ears and nose normal.  Neck: Trachea midline. No obvious mass. Resp: Scattered rhonchi with chest tube. Small air leak. No tidal  CV: Regular rate. Regular rhythm. No murmur or rub. GI: Non-tender. Non-distended. No hernia. Hernia noted   Skin: Warm, dry, normal texture and turgor. No nodule on exposed extremities. Lymph: No cervical LAD. No supraclavicular LAD. M/S: No cyanosis. No clubbing. No joint deformity. Neuro: Moves all four extremities. CN 2-12 tested, no defect noted.   Ext:   + edema    Medications:    Scheduled Meds:   meropenem  1,000 mg IntraVENous Q8H    sennosides-docusate sodium  1 tablet Oral Daily    carvedilol  25 mg Oral BID     aspirin  81 mg Oral Daily    pramipexole  0.25 mg Oral TID    pramipexole  0.25 mg Oral Nightly    Roflumilast  500 mcg Oral Daily    sodium chloride flush  5-40 mL IntraVENous 2 times per day    insulin glargine  0.15 Units/kg SubCUTAneous Nightly    insulin lispro  0-8 Units SubCUTAneous TID     insulin lispro  0-4 Units SubCUTAneous Nightly    [Held by provider] enoxaparin  1 mg/kg SubCUTAneous Daily    ipratropium-albuterol  1 ampule Inhalation Q4H WA    budesonide  0.5 mg Nebulization BID    atorvastatin  40 mg Oral Nightly    sodium chloride (Inhalant)  4 mL Nebulization Q4H WA    gabapentin  100 mg Oral TID       Continuous Infusions:   sodium chloride      lactated ringers IV soln Stopped (03/14/23 0953)    dextrose         PRN Meds:  HYDROmorphone, HYDROcodone-acetaminophen, sodium chloride flush, sodium chloride, ondansetron **OR** ondansetron, polyethylene glycol, acetaminophen **OR** acetaminophen, glucose, dextrose bolus **OR** dextrose bolus, glucagon (rDNA), dextrose    Labs:  CBC:   Recent Labs     03/13/23  0703 03/14/23  0750 03/15/23  0534   WBC 11.2* 10.8 9.2   HGB 12.5* 12.8* 12.4*   HCT 38.7* 38.2* 37.4*   MCV 90.7 89.6 89.4    361 368       BMP:   Recent Labs     03/13/23  1439 03/14/23  0750 03/15/23  0534    136 135*   K 5.0 see below  4.4 3.7   CL 99 98* 99   CO2 26 22 21   PHOS  --  2.7 2.3*   BUN 37* 22* 17   CREATININE 1.5* 1.1 0.8       LIVER PROFILE:   Recent Labs     03/13/23  0703   AST 13*   ALT 9*   LIPASE 12.0*   BILITOT 0.5   ALKPHOS 59       PT/INR:   Recent Labs     03/13/23  1227   PROTIME 15.9*   INR 1.27*       APTT: No results for input(s): APTT in the last 72 hours. UA:No results for input(s): NITRITE, COLORU, PHUR, LABCAST, WBCUA, RBCUA, MUCUS, TRICHOMONAS, YEAST, BACTERIA, CLARITYU, SPECGRAV, LEUKOCYTESUR, UROBILINOGEN, BILIRUBINUR, BLOODU, GLUCOSEU, AMORPHOUS in the last 72 hours. Invalid input(s): Sherrie Heady  No results for input(s): PH, PCO2, PO2 in the last 72 hours.         Films:  Chest imaging reports were reviewed and imaging was reviewed by me and showed improved right effusion with pigtail chest tube in place    ABG:  No new draws    Cultures:  Pneumonia panel:  ESBL Enterobacter   Sputum:  normal jodi  Body fluid:  NGTD    I reviewed the labs and images listed above    ASSESSMENT/PLAN:  Acute Hypoxic Respiratory Failure with saturations less than 90% on room air    Titrate oxygen for saturations greater than or equal to 90%  Gram negative pneumonia due to enterobacter (ESBL)  Merrem Consult ID for antibiotic plan as he may need PICC and longer course of antibiotics   Right pleural effusion s/p thoracentesis. May be parapneumonic with hemothorax and/or malignant. May be asbestos related as well. It is new based on previous imaging   Chest tube to suction. I am apprehensive about giving TPA/Dornase due to bloody output. I did flush the chest tube with saline and it did flush easily.   Will assess   Cultures and cytology pending  Daily CXR  Dysphagia with known aspiration into the airways   Speech following      DVT prophylaxis  Lovenox       Asa Favor, DO  [facility]

## 2023-03-15 NOTE — PROGRESS NOTES
Hospitalist Progress Note      PCP: Arturo Johnson    Date of Admission: 3/13/2023    Chief Complaint:   Chief Complaint   Patient presents with    Abdominal Pain     Says pain started when he began \"coughing real bad\" a few days ago. Fatigue       Hospital Course: 71-year-old male with past medical history significant for CAD, COPD, insulin-dependent diabetes, atrial fibrillation presents to the hospital with complaints of weakness and cough. Patient reports not feeling well for the last few days. He reports severe weakness, he was unable to perform his ADLs, and has been having productive cough and his significant shortness of breath with exertion. Denies lower extremity swelling. Patient saw his pulmonologist last week. CT of the chest demonstrated large right pleural effusion, with consolidation, concerning for pneumonia.     Subjective: feels better    S/p chest tube on 3/14  Sitting in chair  Not in acute distress      Medications:  Reviewed    Infusion Medications    sodium chloride      lactated ringers IV soln Stopped (03/14/23 0953)    dextrose       Scheduled Medications    meropenem  1,000 mg IntraVENous Q8H    sennosides-docusate sodium  1 tablet Oral Daily    carvedilol  25 mg Oral BID WC    aspirin  81 mg Oral Daily    pramipexole  0.25 mg Oral TID    pramipexole  0.25 mg Oral Nightly    Roflumilast  500 mcg Oral Daily    sodium chloride flush  5-40 mL IntraVENous 2 times per day    insulin glargine  0.15 Units/kg SubCUTAneous Nightly    insulin lispro  0-8 Units SubCUTAneous TID WC    insulin lispro  0-4 Units SubCUTAneous Nightly    [Held by provider] enoxaparin  1 mg/kg SubCUTAneous Daily    ipratropium-albuterol  1 ampule Inhalation Q4H WA    budesonide  0.5 mg Nebulization BID    atorvastatin  40 mg Oral Nightly    sodium chloride (Inhalant)  4 mL Nebulization Q4H WA    gabapentin  100 mg Oral TID     PRN Meds: HYDROmorphone, HYDROcodone-acetaminophen, sodium chloride flush, sodium chloride, ondansetron **OR** ondansetron, polyethylene glycol, acetaminophen **OR** acetaminophen, glucose, dextrose bolus **OR** dextrose bolus, glucagon (rDNA), dextrose      Intake/Output Summary (Last 24 hours) at 3/15/2023 1249  Last data filed at 3/15/2023 0630  Gross per 24 hour   Intake 2100 ml   Output 730 ml   Net 1370 ml         Physical Exam Performed:    /65   Pulse 92   Temp 97.8 °F (36.6 °C) (Oral)   Resp 20   Ht 5' 11\" (1.803 m)   Wt 197 lb 5 oz (89.5 kg)   SpO2 93%   BMI 27.52 kg/m²     General appearance: No apparent distress, appears stated age and cooperative. HEENT: Pupils equal, round, and reactive to light. Conjunctivae/corneas clear. Neck: Supple, with full range of motion. No jugular venous distention. Trachea midline. Respiratory:  Normal respiratory effort. Clear to auscultation, bilaterally without Rales/Wheezes/Rhonchi. Cardiovascular: Regular rate and rhythm with normal S1/S2 without murmurs, rubs or gallops. Abdomen: Soft, non-tender, non-distended with normal bowel sounds. Musculoskeletal: No clubbing, cyanosis or edema bilaterally. Full range of motion without deformity. Skin: Skin color, texture, turgor normal.  No rashes or lesions. Neurologic:  Neurovascularly intact without any focal sensory/motor deficits.  Cranial nerves: II-XII intact, grossly non-focal.  Psychiatric: Alert and oriented, thought content appropriate, normal insight  Capillary Refill: Brisk, 3 seconds, normal   Peripheral Pulses: +2 palpable, equal bilaterally       Labs:   Recent Labs     03/13/23  0703 03/14/23  0750 03/15/23  0534   WBC 11.2* 10.8 9.2   HGB 12.5* 12.8* 12.4*   HCT 38.7* 38.2* 37.4*    361 368       Recent Labs     03/13/23  1439 03/14/23  0750 03/15/23  0534    136 135*   K 5.0 see below  4.4 see below  3.7   CL 99 98* 99   CO2 26 22 21   BUN 37* 22* 17   CREATININE 1.5* 1.1 0.8   CALCIUM 8.5 9.1 9.0   PHOS  --  2.7 2.3*       Recent Labs 03/13/23  0703   AST 13*   ALT 9*   BILITOT 0.5   ALKPHOS 59       Recent Labs     03/13/23  1227   INR 1.27*       Recent Labs     03/13/23  0703   TROPONINI 0.02*         Urinalysis:      Lab Results   Component Value Date/Time    NITRU Negative 02/04/2023 08:12 PM    WBCUA 1 04/13/2019 07:57 AM    BACTERIA Rare 07/11/2018 01:27 AM    RBCUA 1 04/13/2019 07:57 AM    BLOODU Negative 02/04/2023 08:12 PM    SPECGRAV 1.018 02/04/2023 08:12 PM    GLUCOSEU >=1000 02/04/2023 08:12 PM       Radiology:  Antonio Guzman MODIFIED BARIUM SWALLOW W VIDEO   Final Result   1. Positive mild laryngeal penetration without tracheal aspiration of nectar   thickened liquids and honey thickened liquids. This did not improve with   chin-tuck. 2. No evidence of laryngeal penetration or tracheal aspiration of purees or   soft solids. Please see separate speech pathology report for full discussion of findings   and recommendations. XR CHEST PORTABLE   Final Result   Decreased right pleural effusion status post chest tube placement. IR CHEST TUBE INSERTION   Final Result   Successful ultrasound and fluoroscopic guided placement of a right 14 Sami   locking pigtail chest tube into a right hemothorax. XR CHEST 1 VIEW   Final Result   No evidence of pneumothorax following right thoracentesis. Persistent right pleural effusion with associated consolidation, likely   atelectasis. US THORACENTESIS Which side should the procedure be performed? Right   Final Result   Successful ultrasound guided right thoracentesis. Thinly septated right pleural effusion with bloody pleural fluid removed with   thoracentesis, consistent with a hemothorax. CT CHEST PULMONARY EMBOLISM W CONTRAST   Final Result   Chest      No central pulmonary embolus identified      Bilateral pleural effusions, right greater than left with adjacent   consolidation of the lungs, either due to atelectasis or pneumonia.    Scattered ground-glass opacity in tree-in-bud nodularity seen in the aerated   portion of the lungs, likely postinflammatory-infectious. Scattered areas of   bronchial wall thickening are seen      Abdomen and pelvis      No acute intra-abdominal abnormality         CT ABDOMEN PELVIS W IV CONTRAST Additional Contrast? None   Final Result   Chest      No central pulmonary embolus identified      Bilateral pleural effusions, right greater than left with adjacent   consolidation of the lungs, either due to atelectasis or pneumonia. Scattered ground-glass opacity in tree-in-bud nodularity seen in the aerated   portion of the lungs, likely postinflammatory-infectious. Scattered areas of   bronchial wall thickening are seen      Abdomen and pelvis      No acute intra-abdominal abnormality         XR CHEST PORTABLE   Final Result   Mild congestion. Small right pleural effusion. Bibasilar compressive   atelectasis versus infiltrates. XR CHEST PORTABLE    (Results Pending)       PHARMACY TO DOSE VANCOMYCIN  IP CONSULT TO NEPHROLOGY  IP CONSULT TO CARDIOLOGY  IP CONSULT TO PULMONOLOGY  IP CONSULT TO INFECTIOUS DISEASES    Assessment/Plan:    Active Hospital Problems    Diagnosis     Enterobacter cloacae pneumonia ([facility] [[email or web address] [J15.6]      Priority: Medium    Longstanding persistent atrial fibrillation (HCC) [I48.11]      Priority: Medium    Parapneumonic effusion [J18.9, J91.8]      Priority: Medium    Elevated troponin [R77.8]      Priority: Medium    Pleural effusion, bilateral [J90]      Priority: Medium    Acute respiratory failure with hypoxia (HCC) [J96.01]      Priority: Medium    WE[facility] (acute kidney [facility]) [facility] [N17.9]     Primary hypertension [I10]     Atherosclerosis of native coronary artery of native heart without angina pectoris [I25.10]      Pneumonia complicated with right pleural effusion              Pneumonia molecular panel positive for Enterobacter ESBL, switch to meropenem.   MRSA probe is negative   Status post thoracentesis on 3/13, patient with a lot of PRBCs concerning for malignant pleural effusion versus hemothorax,     S/p Chest tube placement on 3/14, further management per pulmonary                   Scheduled bronchodilators   Follow up with cytology to rule out malignancy      Atrial fibrillation- persistent/paroxysmal   Rate controlled; [id number]: > 2  Hold Eliquis due to procedure/surgery; lovenox 1mg /kg daily (ON HOLD)  Resume antiarrhythmic medication     Type 2 diabetes              A1c: 7.5  Euglycemic  Hold p.o. medicines  Monitor with Accu-Cheks ACHS  Sliding scale as needed  Add Lantus    Acute COPD exacerbation              Chest x-ray: PNA              Continue oxygen supplementation, currently requiring 3 L              IV steroids with p.o. taper              Scheduled bronchodilators      Elevated troponin  CAD status post remote CABG              Cardiology evaluation              Continue antiplatelet therapy, statin     Acute kidney injury   Creatinine on admission 2.0  Suspecting prerenal injury and ATN in the setting on volume depletion  Check bladder scan to rule out obstruction  Avoid nephrotoxic medications, contrast   Check renal ultrasound, UA and  urine studies  Consult nephrology  for evaluation  Gentle IV fluid  Monitor renal function daily            DVT Prophylaxis: +  Diet:       For swallow eval    Code Status: Prior     PT/OT Eval Status: +     Dispo - admit    DC plan : CT tube , pending clinical improvement      Ernestene Bamberger, MD

## 2023-03-15 NOTE — PROGRESS NOTES
Cardiology - PROGRESS NOTE    Admit Date: 3/13/2023     Reason for follow up: AF, elevated troponin, shortness of breath     68 y.o. male with a past medical history significant for type 2 DM, paroxysmal atrial fibrillation, CAD s/p CABG in 0961, chronic systolic CHF and COPD who presented to [facility] with abdominal pain and fatigue. I was asked to see the patient for troponin elevation to 0.02. Social History:   reports that he quit smoking about 27 years ago. His smoking use included cigarettes. He started smoking about 64 years ago. He has a 18.00 pack-year smoking history. He has never used smokeless tobacco. He reports that he does not drink alcohol and does not use drugs. Family History: family history includes Brain Cancer in his mother; Heart Attack in his brother and father. Interval History:   Patient seen and examined and notes reviewed   Chest tube placed 3/14  Shortness of breath   BP elevated   In chair   NAD   All other systems reviewed and negative except as above. Diet: ADULT DIET; Dysphagia - Soft and Bite Sized; 3 carb choices (45 gm/meal); Low Sodium (2 gm);  Moderately Thick (Honey)  Pain is:Mild  Nausea:None    In: 2150 [P.O.:2150]  Out: 730    Patient Vitals for the past 96 hrs (Last 3 readings):   Weight   03/15/23 0507 197 lb 5 oz (89.5 kg)   03/14/23 0229 229 lb 0.9 oz (103.9 kg)   03/13/23 0652 226 lb (102.5 kg)           Data:   Scheduled Meds:   Scheduled Meds:   meropenem  1,000 mg IntraVENous Q8H    sennosides-docusate sodium  1 tablet Oral Daily    carvedilol  25 mg Oral BID WC    aspirin  81 mg Oral Daily    pramipexole  0.25 mg Oral TID    pramipexole  0.25 mg Oral Nightly    Roflumilast  500 mcg Oral Daily    sodium chloride flush  5-40 mL IntraVENous 2 times per day    insulin glargine  0.15 Units/kg SubCUTAneous Nightly    insulin lispro  0-8 Units SubCUTAneous TID WC    insulin lispro  0-4 Units SubCUTAneous Nightly    [Held by provider] enoxaparin  1 mg/kg SubCUTAneous Daily    ipratropium-albuterol  1 ampule Inhalation Q4H WA    budesonide  0.5 mg Nebulization BID    atorvastatin  40 mg Oral Nightly    sodium chloride (Inhalant)  4 mL Nebulization Q4H WA    gabapentin  100 mg Oral TID     Continuous Infusions:   sodium chloride      lactated ringers IV soln Stopped (03/14/23 0953)    dextrose       PRN Meds:. HYDROmorphone, HYDROcodone-acetaminophen, sodium chloride flush, sodium chloride, ondansetron **OR** ondansetron, polyethylene glycol, acetaminophen **OR** acetaminophen, glucose, dextrose bolus **OR** dextrose bolus, glucagon (rDNA), dextrose  Continuous Infusions:   sodium chloride      lactated ringers IV soln Stopped (03/14/23 0953)    dextrose         Intake/Output Summary (Last 24 hours) at 3/15/2023 0907  Last data filed at 3/15/2023 0630  Gross per 24 hour   Intake 2150 ml   Output 730 ml   Net 1420 ml       Lab Results   Component Value Date    ALT 9 (L) 03/13/2023    AST 13 (L) 03/13/2023    ALKPHOS 59 03/13/2023    BILITOT 0.5 03/13/2023     Lab Results   Component Value Date    CREATININE 0.8 03/15/2023    BUN 17 03/15/2023     (L) 03/15/2023    K 3.7 03/15/2023    CL 99 03/15/2023    CO2 21 03/15/2023     Lab Results   Component Value Date    TSH 3.05 10/20/2011     Lab Results   Component Value Date    WBC 9.2 03/15/2023    HGB 12.4 (L) 03/15/2023    HCT 37.4 (L) 03/15/2023    MCV 89.4 03/15/2023     03/15/2023     No components found for: CHLPL  Lab Results   Component Value Date    TRIG 149 10/20/2011    TRIG 145 09/30/2010     Lab Results   Component Value Date    HDL 46 10/20/2011    HDL 41 09/30/2010     Lab Results   Component Value Date    LDLCALC 62 10/20/2011    LDLCALC 54 09/30/2010     Lab Results   Component Value Date    LABVLDL 30 10/20/2011    LABVLDL 29 09/30/2010         -----------------------------------------------------------------  Telemetry: currently not on telemetry       Chest CT 3/13/23:  Chest       No central pulmonary embolus identified       Bilateral pleural effusions, right greater than left with adjacent   consolidation of the lungs, either due to atelectasis or pneumonia. Scattered ground-glass opacity in tree-in-bud nodularity seen in the aerated   portion of the lungs, likely postinflammatory-infectious. Scattered areas of   bronchial wall thickening are seen       Abdomen and pelvis       No acute intra-abdominal abnormality      Echo 2018 (the [facility]): - Left ventricle: The cavity size was normal. Wall thickness was     normal. Systolic function was mildly reduced. The estimated     ejection fraction was in the range of 47% to 51%. Moderate     hypokinesis of the basal-midinferoseptal myocardium. Normal     diastolic function. - Left atrium: The atrium was mildly dilated. No change from prior     echo 2015. - Inferior vena cava: The vessel was normal in size; the     respirophasic diameter changes were in the normal range (>= 50%);     findings are consistent with normal central venous pressure. Objective:   Vitals: /65   Pulse 92   Temp 97.8 °F (36.6 °C) (Oral)   Resp 20   Ht 5' 11\" (1.803 m)   Wt 197 lb 5 oz (89.5 kg)   SpO2 93%   BMI 27.52 kg/m²   General appearance: alert, appears stated age and uncooperative,   Skin: Skin color, texture, turgor normal. No rashes or ecchymosis. HEENT: Head: Normal, normocephalic, atraumatic.   Lungs: clear to auscultation bilaterally and diminished breath sounds bilaterally, no accessory muscle use, R chest tube   Heart: irregularly irregular rhythm and S1, S2 normal  Abdomen: soft, non-tender; bowel sounds normal; no masses,  no organomegaly  Extremities: extremities normal, atraumatic, no cyanosis or edema, pulses: DP +2/+2, PT +2/+2,   Neurologic: Mental status: Alert, oriented, thought content appropriate, no tremors, no gross sensory motor deficit,   Psychiatric: normal insight and affect      Assessment & Plan:    Patient Active Problem List:          Plan:  1. Elevated troponin    Hx of CAD with CABG   Mildly elevated and likely elevated secondary to renal fx   No chest pain    No additional cardiac evaluation at this time   Continue risk factor modification    ASA, statin, beta-blocker   2. Atrial Fibrillation    Chronic    Rate controlled    On beta-blocker therapy    Eliquis on hold secondary to procedures   3. Pleural effusions   Bilateral    PNA v DHF ? Chest tube with dark bloody fluid-less likely CHF    Does not appear fluid overloaded    May consider RHC if needed per primary service   4. WEI   Cr 2.0 on admit   Improved to 0.8 with hydration    Avoid nephrotoxic agents      5.  HTN    Improved    On beta-blocker   On ACE and lasix as outpt    Continue to hold   Add hydralazine      FLAVIO Salter-CNP   [facility], [place]  Cardiology   3/15/2023  9:07 AM

## 2023-03-15 NOTE — PROGRESS NOTES
Patient stable all night walking with walker. Patient not stable this AM. Patient required stedy this AM to get from bathroom to chair.  Electronically signed by Reji Arizmendi RN on 3/15/2023 at 7:02 AM

## 2023-03-16 ENCOUNTER — APPOINTMENT (OUTPATIENT)
Dept: GENERAL RADIOLOGY | Age: 78
End: 2023-03-16
Payer: MEDICARE

## 2023-03-16 PROBLEM — R79.82 CRP ELEVATED: Status: ACTIVE | Noted: 2023-03-16

## 2023-03-16 PROBLEM — R70.0 ELEVATED ERYTHROCYTE SEDIMENTATION RATE: Status: ACTIVE | Noted: 2023-03-16

## 2023-03-16 LAB
ANION GAP SERPL CALCULATED.3IONS-SCNC: 16 MMOL/L (ref 3–16)
BACTERIA FLD AEROBE CULT: NORMAL
BASOPHILS # BLD: 0.1 K/UL (ref 0–0.2)
BASOPHILS NFR BLD: 1.2 %
BUN SERPL-MCNC: 15 MG/DL (ref 7–20)
CALCIUM SERPL-MCNC: 8.9 MG/DL (ref 8.3–10.6)
CHLORIDE SERPL-SCNC: 105 MMOL/L (ref 99–110)
CO2 SERPL-SCNC: 23 MMOL/L (ref 21–32)
CREAT SERPL-MCNC: 0.8 MG/DL (ref 0.8–1.3)
CRP SERPL-MCNC: 99.3 MG/L (ref 0–5.1)
DEPRECATED RDW RBC AUTO: 14.8 % (ref 12.4–15.4)
EOSINOPHIL # BLD: 0.2 K/UL (ref 0–0.6)
EOSINOPHIL NFR BLD: 2.1 %
ERYTHROCYTE [SEDIMENTATION RATE] IN BLOOD BY WESTERGREN METHOD: 90 MM/HR (ref 0–20)
GFR SERPLBLD CREATININE-BSD FMLA CKD-EPI: >60 ML/MIN/{1.73_M2}
GLUCOSE BLD-MCNC: 253 MG/DL (ref 70–99)
GLUCOSE BLD-MCNC: 275 MG/DL (ref 70–99)
GLUCOSE BLD-MCNC: 298 MG/DL (ref 70–99)
GLUCOSE BLD-MCNC: 302 MG/DL (ref 70–99)
GLUCOSE SERPL-MCNC: 263 MG/DL (ref 70–99)
GRAM STN SPEC: NORMAL
HCT VFR BLD AUTO: 38.3 % (ref 40.5–52.5)
HGB BLD-MCNC: 12.8 G/DL (ref 13.5–17.5)
LYMPHOCYTES # BLD: 1.1 K/UL (ref 1–5.1)
LYMPHOCYTES NFR BLD: 11.7 %
MCH RBC QN AUTO: 29.4 PG (ref 26–34)
MCHC RBC AUTO-ENTMCNC: 33.4 G/DL (ref 31–36)
MCV RBC AUTO: 87.8 FL (ref 80–100)
MONOCYTES # BLD: 1.3 K/UL (ref 0–1.3)
MONOCYTES NFR BLD: 13.5 %
NEUTROPHILS # BLD: 6.8 K/UL (ref 1.7–7.7)
NEUTROPHILS NFR BLD: 71.5 %
PERFORMED ON: ABNORMAL
PLATELET # BLD AUTO: 405 K/UL (ref 135–450)
PMV BLD AUTO: 6.8 FL (ref 5–10.5)
POTASSIUM SERPL-SCNC: 3.7 MMOL/L (ref 3.5–5.1)
RBC # BLD AUTO: 4.36 M/UL (ref 4.2–5.9)
SODIUM SERPL-SCNC: 144 MMOL/L (ref 136–145)
WBC # BLD AUTO: 9.5 K/UL (ref 4–11)

## 2023-03-16 PROCEDURE — 6370000000 HC RX 637 (ALT 250 FOR IP): Performed by: NURSE PRACTITIONER

## 2023-03-16 PROCEDURE — 76937 US GUIDE VASCULAR ACCESS: CPT

## 2023-03-16 PROCEDURE — 6360000002 HC RX W HCPCS: Performed by: INTERNAL MEDICINE

## 2023-03-16 PROCEDURE — 6370000000 HC RX 637 (ALT 250 FOR IP): Performed by: INTERNAL MEDICINE

## 2023-03-16 PROCEDURE — 97535 SELF CARE MNGMENT TRAINING: CPT

## 2023-03-16 PROCEDURE — 71045 X-RAY EXAM CHEST 1 VIEW: CPT

## 2023-03-16 PROCEDURE — 85025 COMPLETE CBC W/AUTO DIFF WBC: CPT

## 2023-03-16 PROCEDURE — 1200000000 HC SEMI PRIVATE

## 2023-03-16 PROCEDURE — 94760 N-INVAS EAR/PLS OXIMETRY 1: CPT

## 2023-03-16 PROCEDURE — 97530 THERAPEUTIC ACTIVITIES: CPT

## 2023-03-16 PROCEDURE — 2580000003 HC RX 258: Performed by: INTERNAL MEDICINE

## 2023-03-16 PROCEDURE — 80048 BASIC METABOLIC PNL TOTAL CA: CPT

## 2023-03-16 PROCEDURE — 86140 C-REACTIVE PROTEIN: CPT

## 2023-03-16 PROCEDURE — 92526 ORAL FUNCTION THERAPY: CPT

## 2023-03-16 PROCEDURE — 36569 INSJ PICC 5 YR+ W/O IMAGING: CPT

## 2023-03-16 PROCEDURE — 99233 SBSQ HOSP IP/OBS HIGH 50: CPT | Performed by: INTERNAL MEDICINE

## 2023-03-16 PROCEDURE — 97129 THER IVNTJ 1ST 15 MIN: CPT

## 2023-03-16 PROCEDURE — C1751 CATH, INF, PER/CENT/MIDLINE: HCPCS

## 2023-03-16 PROCEDURE — 94669 MECHANICAL CHEST WALL OSCILL: CPT

## 2023-03-16 PROCEDURE — 02HV33Z INSERTION OF INFUSION DEVICE INTO SUPERIOR VENA CAVA, PERCUTANEOUS APPROACH: ICD-10-PCS | Performed by: INTERNAL MEDICINE

## 2023-03-16 PROCEDURE — 94640 AIRWAY INHALATION TREATMENT: CPT

## 2023-03-16 PROCEDURE — 2500000003 HC RX 250 WO HCPCS: Performed by: INTERNAL MEDICINE

## 2023-03-16 PROCEDURE — 36415 COLL VENOUS BLD VENIPUNCTURE: CPT

## 2023-03-16 PROCEDURE — 85652 RBC SED RATE AUTOMATED: CPT

## 2023-03-16 RX ORDER — SODIUM CHLORIDE 9 MG/ML
25 INJECTION, SOLUTION INTRAVENOUS PRN
Status: DISCONTINUED | OUTPATIENT
Start: 2023-03-16 | End: 2023-03-16 | Stop reason: SDUPTHER

## 2023-03-16 RX ORDER — SODIUM CHLORIDE 0.9 % (FLUSH) 0.9 %
5-40 SYRINGE (ML) INJECTION EVERY 12 HOURS SCHEDULED
Status: DISCONTINUED | OUTPATIENT
Start: 2023-03-16 | End: 2023-03-16 | Stop reason: SDUPTHER

## 2023-03-16 RX ORDER — SODIUM CHLORIDE 0.9 % (FLUSH) 0.9 %
5-40 SYRINGE (ML) INJECTION PRN
Status: DISCONTINUED | OUTPATIENT
Start: 2023-03-16 | End: 2023-03-16 | Stop reason: SDUPTHER

## 2023-03-16 RX ORDER — LIDOCAINE HYDROCHLORIDE 10 MG/ML
5 INJECTION, SOLUTION EPIDURAL; INFILTRATION; INTRACAUDAL; PERINEURAL ONCE
Status: COMPLETED | OUTPATIENT
Start: 2023-03-16 | End: 2023-03-16

## 2023-03-16 RX ADMIN — BUDESONIDE 500 MCG: 0.5 SUSPENSION RESPIRATORY (INHALATION) at 19:38

## 2023-03-16 RX ADMIN — MEROPENEM 1000 MG: 1 INJECTION, POWDER, FOR SOLUTION INTRAVENOUS at 00:30

## 2023-03-16 RX ADMIN — HYDRALAZINE HYDROCHLORIDE 25 MG: 25 TABLET, FILM COATED ORAL at 06:18

## 2023-03-16 RX ADMIN — INSULIN LISPRO 4 UNITS: 100 INJECTION, SOLUTION INTRAVENOUS; SUBCUTANEOUS at 18:27

## 2023-03-16 RX ADMIN — LOPERAMIDE HYDROCHLORIDE 2 MG: 2 CAPSULE ORAL at 22:12

## 2023-03-16 RX ADMIN — HYDROCODONE BITARTRATE AND ACETAMINOPHEN 1 TABLET: 7.5; 325 TABLET ORAL at 22:03

## 2023-03-16 RX ADMIN — INSULIN GLARGINE 15 UNITS: 100 INJECTION, SOLUTION SUBCUTANEOUS at 22:13

## 2023-03-16 RX ADMIN — MEROPENEM 1000 MG: 1 INJECTION, POWDER, FOR SOLUTION INTRAVENOUS at 16:08

## 2023-03-16 RX ADMIN — CARVEDILOL 25 MG: 25 TABLET, FILM COATED ORAL at 09:25

## 2023-03-16 RX ADMIN — LIDOCAINE HYDROCHLORIDE 5 ML: 10 INJECTION, SOLUTION EPIDURAL; INFILTRATION; INTRACAUDAL; PERINEURAL at 15:01

## 2023-03-16 RX ADMIN — SODIUM CHLORIDE SOLN NEBU 3% 4 ML: 3 NEBU SOLN at 07:33

## 2023-03-16 RX ADMIN — GABAPENTIN 100 MG: 100 CAPSULE ORAL at 12:23

## 2023-03-16 RX ADMIN — SODIUM CHLORIDE, PRESERVATIVE FREE 10 ML: 5 INJECTION INTRAVENOUS at 22:04

## 2023-03-16 RX ADMIN — PRAMIPEXOLE DIHYDROCHLORIDE 0.25 MG: 0.25 TABLET ORAL at 09:25

## 2023-03-16 RX ADMIN — HYDRALAZINE HYDROCHLORIDE 25 MG: 25 TABLET, FILM COATED ORAL at 12:23

## 2023-03-16 RX ADMIN — IPRATROPIUM BROMIDE AND ALBUTEROL SULFATE 1 AMPULE: 2.5; .5 SOLUTION RESPIRATORY (INHALATION) at 15:57

## 2023-03-16 RX ADMIN — ATORVASTATIN CALCIUM 40 MG: 40 TABLET, FILM COATED ORAL at 22:02

## 2023-03-16 RX ADMIN — ROFLUMILAST 500 MCG: 500 TABLET ORAL at 09:25

## 2023-03-16 RX ADMIN — PRAMIPEXOLE DIHYDROCHLORIDE 0.25 MG: 0.25 TABLET ORAL at 22:12

## 2023-03-16 RX ADMIN — PRAMIPEXOLE DIHYDROCHLORIDE 0.25 MG: 0.25 TABLET ORAL at 16:05

## 2023-03-16 RX ADMIN — INSULIN LISPRO 6 UNITS: 100 INJECTION, SOLUTION INTRAVENOUS; SUBCUTANEOUS at 12:17

## 2023-03-16 RX ADMIN — LOPERAMIDE HYDROCHLORIDE 2 MG: 2 CAPSULE ORAL at 06:18

## 2023-03-16 RX ADMIN — MEROPENEM 1000 MG: 1 INJECTION, POWDER, FOR SOLUTION INTRAVENOUS at 09:33

## 2023-03-16 RX ADMIN — SODIUM CHLORIDE, PRESERVATIVE FREE 10 ML: 5 INJECTION INTRAVENOUS at 09:40

## 2023-03-16 RX ADMIN — IPRATROPIUM BROMIDE AND ALBUTEROL SULFATE 1 AMPULE: 2.5; .5 SOLUTION RESPIRATORY (INHALATION) at 11:38

## 2023-03-16 RX ADMIN — HYDRALAZINE HYDROCHLORIDE 25 MG: 25 TABLET, FILM COATED ORAL at 22:02

## 2023-03-16 RX ADMIN — CARVEDILOL 25 MG: 25 TABLET, FILM COATED ORAL at 16:04

## 2023-03-16 RX ADMIN — ASPIRIN 81 MG: 81 TABLET, COATED ORAL at 09:25

## 2023-03-16 RX ADMIN — GABAPENTIN 100 MG: 100 CAPSULE ORAL at 09:25

## 2023-03-16 RX ADMIN — IPRATROPIUM BROMIDE AND ALBUTEROL SULFATE 1 AMPULE: 2.5; .5 SOLUTION RESPIRATORY (INHALATION) at 19:36

## 2023-03-16 RX ADMIN — SODIUM CHLORIDE SOLN NEBU 3% 4 ML: 3 NEBU SOLN at 15:50

## 2023-03-16 RX ADMIN — LOPERAMIDE HYDROCHLORIDE 2 MG: 2 CAPSULE ORAL at 12:23

## 2023-03-16 RX ADMIN — INSULIN LISPRO 4 UNITS: 100 INJECTION, SOLUTION INTRAVENOUS; SUBCUTANEOUS at 09:25

## 2023-03-16 RX ADMIN — GABAPENTIN 100 MG: 100 CAPSULE ORAL at 22:02

## 2023-03-16 RX ADMIN — IPRATROPIUM BROMIDE AND ALBUTEROL SULFATE 1 AMPULE: 2.5; .5 SOLUTION RESPIRATORY (INHALATION) at 07:33

## 2023-03-16 RX ADMIN — SODIUM CHLORIDE SOLN NEBU 3% 4 ML: 3 NEBU SOLN at 11:38

## 2023-03-16 RX ADMIN — BUDESONIDE 500 MCG: 0.5 SUSPENSION RESPIRATORY (INHALATION) at 07:33

## 2023-03-16 RX ADMIN — SODIUM CHLORIDE SOLN NEBU 3% 4 ML: 3 NEBU SOLN at 19:36

## 2023-03-16 ASSESSMENT — PAIN SCALES - GENERAL: PAINLEVEL_OUTOF10: 7

## 2023-03-16 ASSESSMENT — PAIN DESCRIPTION - ORIENTATION: ORIENTATION: RIGHT

## 2023-03-16 NOTE — PROGRESS NOTES
[facility]   Speech Therapy  Daily Dysphagia Treatment Note    Jorge Gauthier  AGE: 77 y.o.   GENDER: male  : 1945  [id number]  EPISODE DATE:  3/13/2023    Patient Active Problem List   Diagnosis    Diabetes mellitus (HCC)    Atherosclerosis of native coronary artery of native heart without angina pectoris    Morbid obesity due to excess calories (HCC)    Sepsis (HCC)    SOB (shortness of breath)    Abnormal chest CT    Back pain    Diabetes mellitus type 2, uncontrolled    Chronic back pain    Chronic combined systolic (congestive) and diastolic (congestive) heart failure (HCC)    Hyperlipidemia    Primary hypertension    Leukocytosis    Chronic bronchitis, simple (HCC)    Chronic rhinitis    Generalized weakness    WEI (acute kidney injury) ([facility])    DMII (diabetes mellitus, type 2) ([facility])    Supratherapeutic INR    Tachycardia    Tachypnea    Non morbid obesity due to excess calories    Fatigue    Cough productive of clear sputum    History of coronary artery bypass graft    Abrasion of right elbow    Acute respiratory failure with hypoxia ([facility])    Paroxysmal atrial fibrillation ([facility])    Chronic pulmonary aspiration    Parapneumonic effusion    Elevated troponin    Pleural effusion, bilateral    Longstanding persistent atrial fibrillation ([facility])    Enterobacter cloacae pneumonia ([facility])    ESBL (extended spectrum beta-lactamase) producing bacteria infection    Gram-negative pneumonia ([facility])    Abnormal CT of the chest    Pneumonia of both lungs due to infectious organism     Allergies   Allergen Reactions    No Known Allergies        Treatment Diagnosis: Dysphagia     CHART REVIEW:  3/13/2023 admitted with c/o abdominal pain and fatigue  MD ADMISSION H&P HPI:  77-year-old male with past medical history significant for CAD, COPD, insulin-dependent diabetes, atrial fibrillation presents to the hospital with complaints of weakness and cough.  Patient reports not feeling well for the last few  days.  He reports severe weakness, he was unable to perform his ADLs, and has been having productive cough and his significant shortness of breath with exertion. Denies lower extremity swelling. Patient saw his pulmonologist last week. CT of the chest demonstrated large right pleural effusion, with consolidation, concerning for pneumonia. IMAGING:  CXR: 3/13/2023  Impression   No evidence of pneumothorax following right thoracentesis. Persistent right pleural effusion with associated consolidation, likely   atelectasis. CT CHEST: 3/13/2023  Impression   Chest       No central pulmonary embolus identified       Bilateral pleural effusions, right greater than left with adjacent   consolidation of the lungs, either due to atelectasis or pneumonia. Scattered ground-glass opacity in tree-in-bud nodularity seen in the aerated   portion of the lungs, likely postinflammatory-infectious. Scattered areas of   bronchial wall thickening are seen      Modified Barium Swallow Study: 3/15/2023  Mild-moderate oral stage dysphagia characterized by decreased mastication and decreased lingual manipulation. Prolonged but adequate mastication with textured solids. Prolonged bolus formation and movement with solids. Reduced bolus control with premature bolus loss to the pharynx over the tongue base with all. Moderate-severe pharyngeal stage dysphagia characterized by delayed swallow initiation, decreased laryngeal elevation, decreased pharyngeal peristalsis, and reduced sensation. Premature spillage to the valleculae with all.    Occasional shallow penetration with honey via cup appears to clear; penetration with honey via straw is not cleared  Penetration that does not clear with nectar via cup using small sips (which is a decline from 2/7/2023 MBS); chin tuck attempted but not beneficial resulting in silent aspiration; protective cough does not clear penetrate which remains a decline from prior studies in 2018 and 2019. Vallecular residue with soft/bite-size requires max effort to clear which appears to be an additional decline since 2/7/2023. Recommend minced/moist diet texture with honey thick liquids; meds crushed in puree/pudding. Swallow function appears slightly declined compared to prior study which may be 2/2 deconditioning with current hospital re-admission?      History/Prior Level of Function:   Living Status: admitted from home with wife  Baseline diet: patient endorses continued soft foods and thick liquids at home  Prior Dysphagia History: initially seen for dysphagia in 2018 with silent aspiration dating back to then; 2/7/2023 MBS rec for downgrade to soft/honey; patient dc'd home that day with pt/fam ed completed prior to dc; active with home ST; 3/7/2023 OP Pulm notes: working with SLP and using thickener      Subjective:     Current Diet Level: Dysphagia II Minced and Moist ; Moderately (honey) thick liquids      Comments regarding tolerating Current Diet: patient verbalizes strong distaste for diet/liquids      Objective:     Pain: Denies               Vision Exceptions: Wears glasses at all times  Hearing Exceptions: Hard of hearing/hearing concerns    Cognitive/behavioral/communication:   Oriented to [x] self [x] place [] date [x] situation  [x]alert []lethargic  [x]cooperative []self-limiting   []confused   []distractible []agitated []impulsive  [x]verbally responsive []nonverbal []limited verbal responses  [x]follows one step commands []does not follow dx []follows complex commands  []aphasic []dysarthric  [] other:     Respiratory Status: [x]Room Air []O2 via nasal cannula []Other:  Dentition: []Adequate [x]Dentures []Missing Many Teeth []Edentulous []Other:    Patient Positioning: Upright in chair    PO Trials:  Honey Thick liquids: no overt signs/symptoms of penetration/aspiration  Puree: no overt signs/symptoms of penetration/aspiration   Minced food: no overt signs/symptoms of penetration/aspiration    Dysphagia Tx:   Direct Dysphagia tx: PO trials as described above; requires reinforcement for diet rationale  Dysphagia ex: completed lingual raise, exaggerated yawn, glottals, nafisa, pitch swing, falsetto with mod imp  Training in compensatory strategies: needs continued reinforcement  Pt response to ex/training: patient receptive to education, but easily agitated/frustrated by current recs with tendency to be somewhat nonreceptive to dysphagia learning d/t frustration and reduced insight    Goals: The patient will tolerate recommended diet without observed clinical signs of aspiration, continue  The patient/caregiver will demonstrate understanding of compensatory strategies for improved swallowing safety. Continue  The patient will improve swallow function via swallow ex completed 10/10 continue    Assessment:   Impressions:   Patient alert and cooperative; oriented to self, place, situation; disoriented to year, month, date; verbally responsive; follows dx; easily frustrated by dysphagia. Mild-moderate oral stage dysphagia characterized by prolonged but adequate mastication and lingula manipulation for bolus formation and movement with concern for potential for premature bolus loss to the pharynx with all PO trials. Concern for residual moderate-severe pharyngeal stage dysphagia characterized by potential delayed swallow and decreased laryngeal elevation with known history of silent aspiration. Suspect comparable to above noted MBS. Completed swallow ex with mod impairment. Receptive to dysphagia education but easily frustrated by current recs with tendency to present with suspected reduced flexibility for increasing insight to dysphagia. Handouts with current results/recs/diet description provided.     Recommended Diet and Intervention 3/16/2023:  Diet Solids Recommendation:  Dysphagia II Minced and Moist  Liquid Consistency Recommendation:  Moderately (honey) thick liquids Recommended form of Meds: Meds crushed as able in puree      Compensatory Swallowing Strategies:  Upright as possible for all oral intake;Remain upright for 30-45 minutes after meals;Small bites/sips;Swallow 2 times per bite/sip    EDUCATION:   Provided education regarding role of SLP, results of assessment, recommendations and general speech pathology plan of care. [] Pt verbalized understanding and agreement   [x] Pt requires ongoing learning   [] No evidence of comprehension     Dysphagia Prognosis: [] good []fair []poor [x]Guarded d/t severity of impairments and time post initial dysphagia onset    Plan:     Continue Dysphagia Therapy: YES    Interventions: Bolus Control Exercise, Laryngeal Exercises , Pharyngeal Exercise, Diet Tolerance Monitoring , Oral Motor Exercises , Patient/Family Education , Therapeutic Trials with SLP   Duration/Frequency of therapy while on unit: Speech therapy for dysphagia tx 3-5 times per week during acute care stay. Discharge Instructions:   Recommend ongoing SLP for dysphagia therapy upon discharge from hospital     This note serves as a D/C Summary in the event that this patient is discharged prior to the next therapy session.     Coded treatment time: 15  Total treatment time: 39    Electronically signed by JASIEL Ernandez on 3/16/2023 at 11:37 AM

## 2023-03-16 NOTE — PROGRESS NOTES
Pulmonary Progress Note    CC:  Follow up respiratory failure, effusion    Subjective:  Decreased output from chest tube  Room air  No new issues  Bloody output in atrium still     ROS  Less SOB        Intake/Output Summary (Last 24 hours) at 3/16/2023 1047  Last data filed at 3/16/2023 0941  Gross per 24 hour   Intake 1120 ml   Output 30 ml   Net 1090 ml         PHYSICAL EXAM:  Blood pressure (!) 143/70, pulse 78, temperature 98 °F (36.7 °C), temperature source Oral, resp. rate 18, height 5' 11\" (1.803 m), weight 222 lb 14.2 oz (101.1 kg), SpO2 94 %.'  Gen: No distress. Eyes: PERRL. No sclera icterus. No conjunctival injection. ENT: No discharge. Pharynx clear. External appearance of ears and nose normal.  Neck: Trachea midline. No obvious mass. Resp: less rhonchi, chest tube without tidaling   CV: Regular rate. Regular rhythm. No murmur or rub. GI: Non-tender. Non-distended. No hernia. Hernia noted   Skin: Warm, dry, normal texture and turgor. No nodule on exposed extremities. Lymph: No cervical LAD. No supraclavicular LAD. M/S: No cyanosis. No clubbing. No joint deformity. Neuro: Moves all four extremities. CN 2-12 tested, no defect noted.   Ext:   + edema    Medications:    Scheduled Meds:   hydrALAZINE  25 mg Oral 3 times per day    meropenem  1,000 mg IntraVENous Q8H    sennosides-docusate sodium  1 tablet Oral Daily    carvedilol  25 mg Oral BID     aspirin  81 mg Oral Daily    pramipexole  0.25 mg Oral TID    pramipexole  0.25 mg Oral Nightly    Roflumilast  500 mcg Oral Daily    sodium chloride flush  5-40 mL IntraVENous 2 times per day    insulin glargine  0.15 Units/kg SubCUTAneous Nightly    insulin lispro  0-8 Units SubCUTAneous TID     insulin lispro  0-4 Units SubCUTAneous Nightly    [Held by provider] enoxaparin  1 mg/kg SubCUTAneous Daily    ipratropium-albuterol  1 ampule Inhalation Q4H WA    budesonide  0.5 mg Nebulization BID    atorvastatin  40 mg Oral Nightly    sodium chloride (Inhalant)  4 mL Nebulization Q4H WA    gabapentin  100 mg Oral TID       Continuous Infusions:   sodium chloride      lactated ringers IV soln Stopped (03/14/23 0953)    dextrose         PRN Meds:  loperamide, HYDROmorphone, HYDROcodone-acetaminophen, sodium chloride flush, sodium chloride, ondansetron **OR** ondansetron, polyethylene glycol, acetaminophen **OR** acetaminophen, glucose, dextrose bolus **OR** dextrose bolus, glucagon (rDNA), dextrose    Labs:  CBC:   Recent Labs     03/14/23  0750 03/15/23  0534 03/16/23  0617   WBC 10.8 9.2 9.5   HGB 12.8* 12.4* 12.8*   HCT 38.2* 37.4* 38.3*   MCV 89.6 89.4 87.8    368 405     BMP:   Recent Labs     03/14/23  0750 03/15/23  0534 03/16/23  0617    135* 144   K see below  4.4 see below  3.7 3.7   CL 98* 99 105   CO2 22 21 23   PHOS 2.7 2.3*  --    BUN 22* 17 15   CREATININE 1.1 0.8 0.8     LIVER PROFILE:   No results for input(s): AST, ALT, LIPASE, BILIDIR, BILITOT, ALKPHOS in the last 72 hours. Invalid input(s): AMYLASE,  ALB    PT/INR:   Recent Labs     03/13/23  1227   PROTIME 15.9*   INR 1.27*     APTT: No results for input(s): APTT in the last 72 hours. UA:No results for input(s): NITRITE, COLORU, PHUR, LABCAST, WBCUA, RBCUA, MUCUS, TRICHOMONAS, YEAST, BACTERIA, CLARITYU, SPECGRAV, LEUKOCYTESUR, UROBILINOGEN, BILIRUBINUR, BLOODU, GLUCOSEU, AMORPHOUS in the last 72 hours. Invalid input(s): Beryle Sandman  No results for input(s): PH, PCO2, PO2 in the last 72 hours. Films:  Chest imaging reports were reviewed and imaging was reviewed by me and showed improved right effusion with right airspace disease.   Chest tube in place     ABG:  No new draws    Cultures:  Pneumonia panel:  ESBL Enterobacter   Sputum:  normal jodi  Body fluid:  NGTD    I reviewed the labs and images listed above    ASSESSMENT/PLAN:  Acute Hypoxic Respiratory Failure with saturations less than 90% on room air    Titrate oxygen for saturations greater than or equal to 90%  Gram negative pneumonia due to enterobacter (ESBL)  Merrem   Appreciate ID input   Right pleural effusion s/p thoracentesis. May be parapneumonic with hemothorax and/or malignant. May be asbestos related as well. It is new based on previous imaging   1/3 cytology is negative  Daily CXR  Chest tube flushed. No plans for TPA due to bloody output. Likely to get chest tube removed tomorrow.     Dysphagia with known aspiration into the airways   Speech following      DVT prophylaxis  DO FLORA Baumann [facility] Pulmonary

## 2023-03-16 NOTE — PROGRESS NOTES
Hospitalist Progress Note      PCP: Tino Crocker    Date of Admission: 3/13/2023    Chief Complaint:   Chief Complaint   Patient presents with    Abdominal Pain     Says pain started when he began \"coughing real bad\" a few days ago. Fatigue       Hospital Course: 68-year-old male with past medical history significant for CAD, COPD, insulin-dependent diabetes, atrial fibrillation presents to the hospital with complaints of weakness and cough. Patient reports not feeling well for the last few days. He reports severe weakness, he was unable to perform his ADLs, and has been having productive cough and his significant shortness of breath with exertion. Denies lower extremity swelling. Patient saw his pulmonologist last week. CT of the chest demonstrated large right pleural effusion, with consolidation, concerning for pneumonia.     Subjective: feels better    S/p chest tube on 3/14  Sitting in chair  Not in acute distress  S/p MBS study on 3/15      Medications:  Reviewed    Infusion Medications    sodium chloride      lactated ringers IV soln Stopped (03/14/23 0953)    dextrose       Scheduled Medications    hydrALAZINE  25 mg Oral 3 times per day    meropenem  1,000 mg IntraVENous Q8H    sennosides-docusate sodium  1 tablet Oral Daily    carvedilol  25 mg Oral BID WC    aspirin  81 mg Oral Daily    pramipexole  0.25 mg Oral TID    pramipexole  0.25 mg Oral Nightly    Roflumilast  500 mcg Oral Daily    sodium chloride flush  5-40 mL IntraVENous 2 times per day    insulin glargine  0.15 Units/kg SubCUTAneous Nightly    insulin lispro  0-8 Units SubCUTAneous TID WC    insulin lispro  0-4 Units SubCUTAneous Nightly    [Held by provider] enoxaparin  1 mg/kg SubCUTAneous Daily    ipratropium-albuterol  1 ampule Inhalation Q4H WA    budesonide  0.5 mg Nebulization BID    atorvastatin  40 mg Oral Nightly    sodium chloride (Inhalant)  4 mL Nebulization Q4H WA    gabapentin  100 mg Oral TID     PRN Meds: loperamide, HYDROmorphone, HYDROcodone-acetaminophen, sodium chloride flush, sodium chloride, ondansetron **OR** ondansetron, polyethylene glycol, acetaminophen **OR** acetaminophen, glucose, dextrose bolus **OR** dextrose bolus, glucagon (rDNA), dextrose      Intake/Output Summary (Last 24 hours) at 3/16/2023 1252  Last data filed at 3/16/2023 1236  Gross per 24 hour   Intake 1360 ml   Output 30 ml   Net 1330 ml         Physical Exam Performed:    BP (!) 143/70   Pulse 78   Temp 98 °F (36.7 °C) (Oral)   Resp 18   Ht 5' 11\" (1.803 m)   Wt 222 lb 14.2 oz (101.1 kg)   SpO2 96%   BMI 31.09 kg/m²     General appearance: No apparent distress, appears stated age and cooperative. HEENT: Pupils equal, round, and reactive to light. Conjunctivae/corneas clear. Neck: Supple, with full range of motion. No jugular venous distention. Trachea midline. Respiratory:  Normal respiratory effort. Clear to auscultation, bilaterally without Rales/Wheezes/Rhonchi. Cardiovascular: Regular rate and rhythm with normal S1/S2 without murmurs, rubs or gallops. Abdomen: Soft, non-tender, non-distended with normal bowel sounds. Musculoskeletal: No clubbing, cyanosis or edema bilaterally. Full range of motion without deformity. Skin: Skin color, texture, turgor normal.  No rashes or lesions. Neurologic:  Neurovascularly intact without any focal sensory/motor deficits.  Cranial nerves: II-XII intact, grossly non-focal.  Psychiatric: Alert and oriented, thought content appropriate, normal insight  Capillary Refill: Brisk, 3 seconds, normal   Peripheral Pulses: +2 palpable, equal bilaterally       Labs:   Recent Labs     03/14/23  0750 03/15/23  0534 03/16/23  0617   WBC 10.8 9.2 9.5   HGB 12.8* 12.4* 12.8*   HCT 38.2* 37.4* 38.3*    368 405       Recent Labs     03/14/23  0750 03/15/23  0534 03/16/23  0617    135* 144   K see below  4.4 see below  3.7 3.7   CL 98* 99 105   CO2 22 21 23   BUN 22* 17 15   CREATININE 1.1 0.8 0.8   CALCIUM 9.1 9.0 8.9   PHOS 2.7 2.3*  --        No results for input(s): AST, ALT, BILIDIR, BILITOT, ALKPHOS in the last 72 hours. No results for input(s): INR in the last 72 hours. No results for input(s): [place] in the last 72 hours. Urinalysis:      Lab Results   Component Value Date/Time    NITRU Negative 02/04/2023 08:12 PM    WBCUA 1 04/13/2019 07:57 AM    BACTERIA Rare 07/11/2018 01:27 AM    RBCUA 1 04/13/2019 07:57 AM    BLOODU Negative 02/04/2023 08:12 PM    SPECGRAV 1.018 02/04/2023 08:12 PM    GLUCOSEU >=1000 02/04/2023 08:12 PM       Radiology:  XR CHEST PORTABLE   Final Result   Mildly increased right basilar airspace opacities. Right chest tube in place with unchanged right pleural effusion. FL MODIFIED BARIUM SWALLOW W VIDEO   Final Result   1. Positive mild laryngeal penetration without tracheal aspiration of nectar   thickened liquids and honey thickened liquids. This did not improve with   chin-tuck. 2. No evidence of laryngeal penetration or tracheal aspiration of purees or   soft solids. Please see separate speech pathology report for full discussion of findings   and recommendations. XR CHEST PORTABLE   Final Result   Decreased right pleural effusion status post chest tube placement. IR CHEST TUBE INSERTION   Final Result   Successful ultrasound and fluoroscopic guided placement of a right 14 [place]   locking pigtail chest tube into a right hemothorax. XR CHEST 1 VIEW   Final Result   No evidence of pneumothorax following right thoracentesis. Persistent right pleural effusion with associated consolidation, likely   atelectasis. US THORACENTESIS Which side should the procedure be performed? Right   Final Result   Successful ultrasound guided right thoracentesis. Thinly septated right pleural effusion with bloody pleural fluid removed with   thoracentesis, consistent with a hemothorax.          CT CHEST PULMONARY EMBOLISM W CONTRAST   Final Result   Chest      No central pulmonary embolus identified      Bilateral pleural effusions, right greater than left with adjacent   consolidation of the lungs, either due to atelectasis or pneumonia. Scattered ground-glass opacity in tree-in-bud nodularity seen in the aerated   portion of the lungs, likely postinflammatory-infectious. Scattered areas of   bronchial wall thickening are seen      Abdomen and pelvis      No acute intra-abdominal abnormality         CT ABDOMEN PELVIS W IV CONTRAST Additional Contrast? None   Final Result   Chest      No central pulmonary embolus identified      Bilateral pleural effusions, right greater than left with adjacent   consolidation of the lungs, either due to atelectasis or pneumonia. Scattered ground-glass opacity in tree-in-bud nodularity seen in the aerated   portion of the lungs, likely postinflammatory-infectious. Scattered areas of   bronchial wall thickening are seen      Abdomen and pelvis      No acute intra-abdominal abnormality         XR CHEST PORTABLE   Final Result   Mild congestion. Small right pleural effusion. Bibasilar compressive   atelectasis versus infiltrates.          XR CHEST PORTABLE    (Results Pending)       PHARMACY TO DOSE VANCOMYCIN  IP CONSULT TO NEPHROLOGY  IP CONSULT TO CARDIOLOGY  IP CONSULT TO PULMONOLOGY  IP CONSULT TO INFECTIOUS DISEASES    Assessment/Plan:    Active Hospital Problems    Diagnosis     Enterobacter cloacae pneumonia ([facility] [address] [J15.6]      Priority: Medium    ESBL (extended spectrum beta-lactamase) producing bacteria infection [A49.9, Z16.12]      Priority: Medium    Gram-negative pneumonia ([facility] [address] [J15.6]      Priority: Medium    Abnormal CT of the chest [R93.89]      Priority: Medium    Pneumonia of both lungs due to infectious organism [J18.9]      Priority: Medium    Longstanding persistent atrial fibrillation (HCC) [I48.11]      Priority: Medium    Parapneumonic effusion [J18.9, J91.8]      Priority: Medium    Elevated troponin [R77.8]      Priority: Medium    Pleural effusion, bilateral [J90]      Priority: Medium    Acute respiratory failure with hypoxia (HCC) [J96.01]      Priority: Medium    WEI (acute kidney injury) (HCC) [N17.9]     Primary hypertension [I10]     Atherosclerosis of native coronary artery of native heart without angina pectoris [I25.10]      Pneumonia complicated with right pleural effusion              Pneumonia molecular panel positive for Enterobacter ESBL, switch to meropenem.  MRSA probe is negative   Status post thoracentesis on 3/13, patient with a lot of PRBCs concerning for malignant pleural effusion versus hemothorax,     S/p Chest tube placement on 3/14, further management per pulmonary                     Scheduled bronchodilators   Follow up with cytology to rule out malignancy      Atrial fibrillation- persistent/paroxysmal   Rate controlled; [id number]: > 2  Hold Eliquis due to procedure/surgery; lovenox 1mg /kg daily (ON HOLD)  Resume antiarrhythmic medication     Type 2 diabetes              A1c: 7.5  Euglycemic  Hold p.o. medicines  Monitor with Accu-Cheks ACHS  Sliding scale as needed  Add Lantus    Acute COPD exacerbation              Chest x-ray: PNA              Continue oxygen supplementation, currently requiring 3 L              IV steroids with p.o. taper              Scheduled bronchodilators      Elevated troponin  CAD status post remote CABG              Cardiology evaluation              Continue antiplatelet therapy, statin     Acute kidney injury   Creatinine on admission 2.0  Suspecting prerenal injury and ATN in the setting on volume depletion  Check bladder scan to rule out obstruction  Avoid nephrotoxic medications, contrast   Check renal ultrasound, UA and  urine studies  Consult nephrology  for evaluation  Gentle IV fluid  Monitor renal function daily            DVT Prophylaxis: +  Diet: dysphagia         Code Status: Prior    PT/OT Eval Status: +     Dispo - admit    DC plan : CT tube , pending clinical improvement      Savannah Schmidt MD

## 2023-03-16 NOTE — PROGRESS NOTES
Upon arrival to place PICC line assessed chart for issues related to picc placement, check for consent, and did time out with PETRA Devine. Pt. Tolerated PICC placement well, no difficulty accessing basilic vein and 3CG technology used to verify PICC tip placement. Positive P wave with no negative deflection. Printed wave form and placed In chart.  Reported off to PETRA Devine

## 2023-03-16 NOTE — CARE COORDINATION
Met with pt & wife re: need for IVAB at dc; pt does not want to come in to the infusion center daily so agreeable to [facility]. Wife stated he has had [address] wants to use them again. Called Deven Jimenez with [address], the are following & aware of IVAB at dc. Called Janiya with [employer] & pt information given; she will run benefits & get back with me.     Ashley Malagon RN, BSN,   [phone]  Electronically signed by Ashley Malagon RN on 3/16/2023 at 3:34 PM

## 2023-03-16 NOTE — PROGRESS NOTES
Physical Therapy  Facility/Department: [address] MED SURG  Physical Therapy Treatment Note    Name: Destiny Blanton  :   MRN: [id number]  Date of Service: 3/16/2023    Discharge Recommendations:  24 hour supervision or assist, Home with Home health PT          Patient Diagnosis(es): The primary encounter diagnosis was Pneumonia of both lungs due to infectious organism, unspecified part of lung. Diagnoses of WEI (acute kidney injury) ([address] Elevated troponin, and Pleural effusion, bilateral were also pertinent to this visit. Past Medical History:  has a past medical history of Abrasion of right elbow, Arthritis, CAD (coronary artery disease), Chronic back pain, Chronic systolic CHF (congestive heart failure) ([address] COPD (chronic obstructive pulmonary disease) ([address] Dental disease, Diabetes mellitus ([address] Essential hypertension, Hearing loss, Paroxysmal atrial fibrillation ([address] and Tinnitus. Past Surgical History:  has a past surgical history that includes fracture surgery (Left, ); back surgery (); Cardiac surgery (); Coronary artery bypass graft; back surgery; Cervical spine surgery; chest tube insertion (Right, 2023); and IR CHEST TUBE INSERTION (3/14/2023). Assessment   Body Structures, Functions, Activity Limitations Requiring Skilled Therapeutic Intervention: Decreased functional mobility ; Decreased ADL status; Decreased strength;Decreased safe awareness;Decreased balance;Decreased endurance  Assessment: Pt is a 68 y.o. male who presented to the ED on 3/13/23 with c/o not feeling well for the last few days. He reports severe weakness, he was unable to perform his ADLs, and has been having productive cough and his significant shortness of breath with exertion. Pt diagnosed with pneumonia. Prior to admission, pt living with spouse in home setting, independent with ADLs, use of various device with ambulation. Pt currently functioning slightly below baseline.  Anticipate return home with with 24hr supv and level 1 HHPT. As in previous admission, continue to recommned AFO for RLE to aid in ground clearance. Treatment Diagnosis: impaired mobility  Therapy Prognosis: Fair  Decision Making: Medium Complexity  History: see above  Exam: see below  Clinical Presentation: evolving  Activity Tolerance  Activity Tolerance: Patient tolerated treatment well;Patient limited by endurance     Plan   Physcial Therapy Plan  General Plan: 3-5 times per week  Current Treatment Recommendations: Strengthening, Balance training, Functional mobility training, Transfer training, Endurance training, Gait training, Neuromuscular re-education, Patient/Caregiver education & training, Safety education & training, Equipment evaluation, education, & procurement  Safety Devices  Type of Devices: All fall risk precautions in place, Call light within reach, Gait belt, Patient at risk for falls, Left in chair, Chair alarm in place, Nurse notified     Restrictions  Restrictions/Precautions  Restrictions/Precautions: Fall Risk, Swallowing - Thickened Liquids  Position Activity Restriction  Other position/activity restrictions: Honey thick; soft bite sized; chest tube on suction     Subjective   General  Chart Reviewed: Yes  Patient assessed for rehabilitation services?: Yes  Additional Pertinent Hx: Pt is a 68 y.o. male who presented to the ED on 3/13/23 with c/o not feeling well for the last few days. He reports severe weakness, he was unable to perform his ADLs, and has been having productive cough and his significant shortness of breath with exertion. Pt diagnosed with pneumonia. Response To Previous Treatment: Patient with no complaints from previous session. Family / Caregiver Present: No  Referring Practitioner: Rafael Madison MD  Referral Date : 03/13/23  Diagnosis: Pneumonia  Follows Commands: Within Functional Limits  Subjective  Subjective: Pt is agreeable to PT.          Social/Functional History  Social/Functional History  Lives With: Spouse  Type of Home: House  Home Layout: One level, Able to Live on Main level with bedroom/bathroom  Home Access: Stairs to enter with rails  Entrance Stairs - Number of Steps: 2  Entrance Stairs - Rails: Both  Bathroom Shower/Tub: Walk-in shower  Bathroom Toilet: Handicap height  Bathroom Equipment: Grab bars in shower, Built-in shower seat, Shower chair, Hand-held shower, Grab bars around toilet  Bathroom Accessibility: Wheelchair accessible  Home Equipment: Lift chair, Walker, rolling, Cane, Rollator, Electric scooter  ADL Assistance: Independent  Homemaking Assistance: Needs assistance  Ambulation Assistance: Independent (combination of RW, cane in home. scooter outside home)  Transfer Assistance: Independent  Active : Yes  Occupation: Retired  Type of Occupation: ,     Vision/Hearing  Vision  Vision Exceptions: Wears glasses at all times  Hearing  Hearing Exceptions: Hard of hearing/hearing concerns      Cognition   Orientation  Overall Orientation Status: Within Functional Limits  Cognition  Overall Cognitive Status: Exceptions  Safety Judgement: Decreased awareness of need for safety;Decreased awareness of need for assistance  Insights: Decreased awareness of deficits  Cognition Comment: decreased insight into current medical situation     Objective   Gross Assessment  Strength: Generally decreased, functional (hx of CVA with R sided weakness/spasticity)     Bed mobility  Supine to Sit: Unable to assess  Sit to Supine: Unable to assess  Bed Mobility Comments: Pt in recliner at start and end of session.   Transfers  Sit to Stand: Contact guard assistance  Stand to Sit: Contact guard assistance  Ambulation  Surface: Level tile  Device: Rolling Walker  Assistance: Contact guard assistance  Gait Deviations: Slow Emely;Decreased step length;Decreased step height  Distance: 25' x 2 trials + 40' x 3 trials + 50' x 1 trial  Comments: Pt reporting 48' is max distance     Balance  Posture: Good  Sitting - Static: Good  Sitting - Dynamic: Good  Standing - Static: Fair;+ (@RW)  Standing - Dynamic: Fair (@RW)           AM-PAC Score  AM-PAC Inpatient Mobility Raw Score : 19 (03/16/23 0933)  AM-PAC Inpatient T-Scale Score : 45.44 (03/16/23 0933)  Mobility Inpatient CMS 0-100% Score: 41.77 (03/16/23 0933)  Mobility Inpatient CMS G-Code Modifier : CK (03/16/23 0933)        Goals  Short Term Goals  Time Frame for Short Term Goals: by acute discharge - all goals ongoing as of 3/16/23  Short Term Goal 1: bed mobility with SBA  Short Term Goal 2: sit<>stand with SBA  Short Term Goal 3: ambulate > 40' with RW and SBA  Patient Goals   Patient Goals : none stated       Education  Patient Education  Education Given To: Patient  Education Provided: Role of Therapy;Plan of Care  Education Method: Verbal  Barriers to Learning: None  Education Outcome: Verbalized understanding;Continued education needed      Therapy Time   Individual Concurrent Group Co-treatment   Time In 0820         Time Out 0920         Minutes 60         Timed Code Treatment Minutes: 64 Celia Rodríguez PT

## 2023-03-16 NOTE — PLAN OF CARE
Problem: ABCDS Injury Assessment  Goal: Absence of physical injury  Outcome: Progressing     Problem: Safety - Adult  Goal: Free from fall injury  Outcome: Progressing     Problem: Discharge Planning  Goal: Discharge to home or other facility with appropriate resources  Outcome: Progressing  Flowsheets (Taken 3/15/2023 2049)  Discharge to home or other facility with appropriate resources:   Identify barriers to discharge with patient and caregiver   Arrange for needed discharge resources and transportation as appropriate   Identify discharge learning needs (meds, wound care, etc)   Refer to discharge planning if patient needs post-hospital services based on physician order or complex needs related to functional status, cognitive ability or social support system     Problem: Pain  Goal: Verbalizes/displays adequate comfort level or baseline comfort level  Outcome: Progressing     Problem: Chronic Conditions and Co-morbidities  Goal: Patient's chronic conditions and co-morbidity symptoms are monitored and maintained or improved  Outcome: Progressing  Flowsheets (Taken 3/15/2023 2049)  Care Plan - Patient's Chronic Conditions and Co-Morbidity Symptoms are Monitored and Maintained or Improved:   Monitor and assess patient's chronic conditions and comorbid symptoms for stability, deterioration, or improvement   Collaborate with multidisciplinary team to address chronic and comorbid conditions and prevent exacerbation or deterioration     Problem: Respiratory - Adult  Goal: Achieves optimal ventilation and oxygenation  Outcome: Progressing  Flowsheets (Taken 3/15/2023 2049)  Achieves optimal ventilation and oxygenation:   Assess for changes in respiratory status   Assess for changes in mentation and behavior   Position to facilitate oxygenation and minimize respiratory effort   Assess and instruct to report shortness of breath or any respiratory difficulty   Respiratory therapy support as indicated

## 2023-03-16 NOTE — PROGRESS NOTES
Infectious Diseases Inpatient Progress Note      CHIEF COMPLAINT:     Rt lower lobe PNA  Parapneumonic effuson  ESBL Enterobacter  Chest tube in place  H/o Influenza A PNA in Dec       HISTORY OF PRESENT ILLNESS:  68 y.o. man with significant history for diabetes, paroxysmal atrial fibrillation, coronary artery disease, CABG, chronic systolic heart failure, COPD admitted secondary to shortness of breath and cough and weakness. Patient is followed by Dr. Lorenzo Luz pulmonary critical care-he called our answering service secondary to shortness of breath and was advised to come to the hospital for further evaluation. Admission labs indicate creatinine 1.5 procalcitonin 0.19 blood glucose elevated at 261, WBC 11.2 hemoglobin 12.5 hemoglobin A1c at 7, CT chest indicated bilateral pleural effusion right greater than the left with associated consolidation of the lung scattered groundglass opacity tree-in-bud nodularity noted. CT abdomen pelvis was negative, he underwent ultrasound evaluation of the right pleural space lace indicated septated right pleural effusion. Approximately 550 mL of bloody pleural fluid was removed and sent for culture on 3/13/23. He underwent IR guided successful ultrasound-guided IR chest tube placement on 3/14/23. Respiratory pneumonia panel was positive for Enterobacter, with CTX gene +Ve concerning for esbl,  but sputum culture thus far negative MRSA probe negative body fluid culture negative. He was hospitalized here from 2/4/ 23 to 2/7/23 and treated for PNA and COPD, per HPI he was Hospitalized at [facility], [place], operated by [facility] for PNA in Dec 2022 as well. He was on courses of Levofloxacin, Doxycycline and Augmentin in the past.        He was treated for Influenza PNA in Dec 2022 at [facility], [place], operated by [facility]-    Location : Rt side pain,      Quality : Aching, burning     Severity : 8/10  Duration : 1 week     Timing : Constant  Context : Recurrent pneumonia and pleural effusion  Modifying factors : None  Associated signs and symptoms: Shortness of breath, sweats    Interval History : sitting in the chair and chest tube working well and breathing a bit better and OPAT d/w pt ok with PICC line and Plerual fluid cx -ve      Past Medical History:    Past Medical History:   Diagnosis Date    Abrasion of right elbow 2023    Arthritis     CAD (coronary artery disease)     CABG in     Chronic back pain     Chronic systolic CHF (congestive heart failure) (HCC)     COPD (chronic obstructive pulmonary disease) ([facility]     Dental disease     Diabetes mellitus ([facility]     Essential hypertension     Hearing loss     Paroxysmal atrial fibrillation ([address]     On Coumadin    Tinnitus        Past Surgical History:    Past Surgical History:   Procedure Laterality Date    BACK SURGERY      BACK SURGERY      CARDIAC SURGERY      CERVICAL SPINE SURGERY      CHEST TUBE INSERTION Right 2023    14F. Dr. Marco Duque    Shoulder    IR CHEST TUBE INSERTION  3/14/2023    IR CHEST TUBE INSERTION 3/14/2023 [facility] SPECIAL PROCEDURES       Current Medications:    No outpatient medications have been marked as taking for the 3/13/23 encounter [facility] Encounter).        Allergies:  No known allergies    Immunizations :   Immunization History   Administered Date(s) Administered    Influenza Vaccine, unspecified formulation 10/22/2016    Influenza, AFLURIA (age 1 yrs+), FLUZONE, (age 10 mo+), MDV, 0.5mL 10/22/2016, 09/15/2017    Influenza, High Dose (Fluzone 65 yrs and older) 09/15/2017    Pneumococcal Conjugate 13-valent ([facility]) 2016    Tdap (Boostrix, Adacel) 2018         Social History:    Social History     Tobacco Use    Smoking status: Former     Packs/day: 1.50     Years: 12.00     Pack years: 18.00     Types: Cigarettes     Start date: 1959     Quit date: 3/1/1996     Years since quittin.0    Smokeless tobacco: Never   Vaping Use    Vaping Use: Never used Substance Use Topics    Alcohol use: No    Drug use: No     Social History     Tobacco Use   Smoking Status Former    Packs/day: 1.50    Years: 12.00    Pack years: 18.00    Types: Cigarettes    Start date: 1959    Quit date: 3/1/1996    Years since quittin.0   Smokeless Tobacco Never      Family History   Problem Relation Age of Onset    Brain Cancer Mother     Heart Attack Father     Heart Attack Brother          REVIEW OF SYSTEMS:     Constitutional:  negative for fevers, chills, night sweats  Eyes:  negative for blurred vision, eye discharge, visual disturbance   HEENT:  negative for hearing loss, ear drainage,nasal congestion  Respiratory:  cough,++  shortness of breath ++ or hemoptysis   Cardiovascular:  negative for chest pain, palpitations, syncope  Gastrointestinal:  negative for nausea, vomiting, diarrhea, constipation, abdominal pain  Genitourinary:  negative for frequency, dysuria, urinary incontinence, hematuria  Hematologic/Lymphatic:  negative for easy bruising, bleeding and lymphadenopathy  Allergic/Immunologic:  negative for recurrent infections, angioedema, anaphylaxis   Endocrine:  negative for weight changes, polyuria, polydipsia and polyphagia  Musculoskeletal:  negative for joint  pain, swelling, decreased range of motion  Integumentary: No rashes, skin lesions  Neurological:  negative for headaches, slurred speech, unilateral weakness  Psychiatric: negative for hallucinations,confusion,agitation.      PHYSICAL EXAM:      Vitals:    BP (!) 143/70   Pulse 78   Temp 98 °F (36.7 °C) (Oral)   Resp 18   Ht 5' 11\" (1.803 m)   Wt 222 lb 14.2 oz (101.1 kg)   SpO2 94%   BMI 31.09 kg/m²     General Appearance: alert,in some acute distress, ++ pallor, no icterus   Skin: warm and dry, no rash or erythema  Head: normocephalic and atraumatic  Eyes: pupils equal, round, and reactive to light, conjunctivae normal  ENT: tympanic membrane, external ear and ear canal normal bilaterally, nose without deformity, nasal mucosa and turbinates normal without polyps  Neck: supple and non-tender without mass, no thyromegaly  no cervical lymphadenopathy  Pulmonary/Chest:  Rt side coarse crepts ++- no wheezes, rales or rhonchi, normal air movement, in some respiratory distress  Cardiovascular: normal rate, regular rhythm, normal S1 and S2, no murmurs, rubs, clicks, or gallops, no carotid bruits  Abdomen: soft, non-tender, non-distended, normal bowel sounds, no masses or organomegaly  Extremities: no cyanosis, clubbing or edema  Musculoskeletal: normal range of motion, no joint swelling, deformity or tenderness  Integumentary: No rashes, no abnormal skin lesions, no petechiae  Neurologic: reflexes normal and symmetric, no cranial nerve deficit  Psych:  Orientation, sensorium, mood normal   Lines: IV  Rt chest tube in place      DATA:    CBC:   Lab Results   Component Value Date    WBC 9.5 03/16/2023    HGB 12.8 (L) 03/16/2023    HCT 38.3 (L) 03/16/2023    MCV 87.8 03/16/2023     03/16/2023     RENAL:   Lab Results   Component Value Date    CREATININE 0.8 03/16/2023    BUN 15 03/16/2023     03/16/2023    K 3.7 03/16/2023     03/16/2023    CO2 23 03/16/2023     SED RATE:   Lab Results   Component Value Date/Time    SEDRATE 90 03/16/2023 06:17 AM     CK:   Lab Results   Component Value Date/Time    CKTOTAL 85 09/16/2017 06:42 AM     CRP:   Lab Results   Component Value Date/Time    CRP 99.3 03/16/2023 06:17 AM     Hepatic Function Panel:   Lab Results   Component Value Date/Time    ALKPHOS 59 03/13/2023 07:03 AM    ALT 9 03/13/2023 07:03 AM    AST 13 03/13/2023 07:03 AM    PROT 7.2 03/13/2023 07:03 AM    PROT 7.0 09/23/2012 08:18 PM    BILITOT 0.5 03/13/2023 07:03 AM    BILIDIR <0.2 07/06/2021 05:00 PM    IBILI see below 07/06/2021 05:00 PM    LABALBU 3.2 03/15/2023 05:34 AM     UA:  Lab Results   Component Value Date/Time    COLORU Yellow 02/04/2023 08:12 PM    CLARITYU Clear 02/04/2023 08:12 PM GLUCOSEU >=1000 02/04/2023 08:12 PM    BILIRUBINUR Negative 02/04/2023 08:12 PM    KETUA Negative 02/04/2023 08:12 PM    SPECGRAV 1.018 02/04/2023 08:12 PM    BLOODU Negative 02/04/2023 08:12 PM    PHUR 5.0 02/04/2023 08:12 PM    PROTEINU Negative 02/04/2023 08:12 PM    UROBILINOGEN 0.2 02/04/2023 08:12 PM    NITRU Negative 02/04/2023 08:12 PM    LEUKOCYTESUR Negative 02/04/2023 08:12 PM    LABMICR Not Indicated 02/04/2023 08:12 PM    URINETYPE Cleancatch 02/04/2023 08:12 PM      Urine Microscopic:   Lab Results   Component Value Date/Time    BACTERIA Rare 07/11/2018 01:27 AM    HYALCAST 1 04/13/2019 07:57 AM    WBCUA 1 04/13/2019 07:57 AM    RBCUA 1 04/13/2019 07:57 AM    EPIU 0 04/13/2019 07:57 AM     Urine Reflex to Culture:   Lab Results   Component Value Date/Time    URRFLXCULT Not Indicated 02/04/2023 08:12 PM     CRP  99.3 \    Esr 90     Creat  1.5     Procal  0.19        Component Ref Range & Units 03/13/23 1227   Hemoglobin A1C See comment % 7.0    Comment: Comment:   Diagnosis of Diabetes: > or = 6.5%   Increased risk of diabetes (Prediabetes): 5.7-6.4%   Glycemic Control: Nonpregnant Adults: <7.0%                     Pregnant: <6.0%      eAG mg/dL 154.2    Resulting Agency  [address]              Component Ref Range & Units 3/13/23 1507   Cell Count Fluid Type  Pleural    Comment: Right   Color, Fluid  Dark Red    Appearance, Fluid  Turbid    Clot Eval.  see below    Comment: No Clots Seen   Nucl Cell, Fluid /cumm 3,681    RBC, Fluid /cumm 691,000    Neutrophil Count, Fluid % 81    Lymphocytes, Body Fluid % 9    Monocyte Count, Fluid % 1    Eos, Fluid % 7    Basos, Fluid % 1    Macrophages % 1    Number of Cells Counted Fluid  100    Comment: The reference interval(s) and other method performance   specifications are unavailable for this body fluid.    Comparison of the result with concentration in the         MICRO: cultures reviewed and updated by me     Procedure Component Value Units Date/Time   Culture, Body Fluid [[id number]] Collected: 03/14/23 1112   Order Status: Completed Specimen: Body Fluid from Pleural Updated: 03/15/23 1011    Body Fluid Culture, Sterile No growth to date    Gram Stain Result 3+ WBC's (Polymorphonuclear)   No organisms seen    Narrative:     ORDER#: [id number]                          ORDERED BY: CHAMP ENGLE   SOURCE: Pleural                            COLLECTED:  03/14/23 11:12   ANTIBIOTICS AT SABAS.:                      RECEIVED :  03/14/23 11:35   Culture, Respiratory [[id number]] Collected: 03/13/23 1800   Order Status: Completed Specimen: Sputum Expectorated Updated: 03/15/23 0738    CULTURE, RESPIRATORY Normal respiratory jodi    Gram Stain Result 4+ Gram positive cocci   2+ Gram variable rods present   3+ WBC's (Polymorphonuclear) present   2+ WBC's (Mononuclear) present   2+ Epithelial Cells present    Narrative:     ORDER#: [id number]                          ORDERED BY: CHAMP ENGLE   SOURCE: Sputum Expectorated                COLLECTED:  03/13/23 18:00   ANTIBIOTICS AT SABAS.:                      RECEIVED :  03/13/23 18:49   Culture, Body Fluid [[id number]] Collected: 03/13/23 1507   Order Status: Completed Specimen:  Body Fluid Updated: 03/15/23 0648    Body Fluid Culture, Sterile --    No growth to date   No growth 36 to 48 hours     Gram Stain Result 2+ WBC's (Polymorphonuclear)   No organisms seen    Narrative:     ORDER#: [id number]                          ORDERED BY: FRIDA Whitten   SOURCE: Fluid Pleural                      COLLECTED:  03/13/23 15:07   ANTIBIOTICS AT SABAS.:                      RECEIVED :  03/13/23 15:32   Culture, Fungus [[id number]] Collected: 03/13/23 1507   Order Status: Sent Specimen: Fluid Updated: 03/14/23 1351    Fungus Stain No Fungal elements seen   Narrative:     ORDER#: [id number]                          ORDERED BY: FRIDA Whitten   SOURCE: Fluid                              COLLECTED: 03/13/23 15:07   ANTIBIOTICS AT SABAS.:                      RECEIVED :  03/13/23 15:31   Culture with Smear, Acid Fast Bacillius [[id number]] Collected: 03/13/23 1507   Order Status: Sent Specimen: Fluid Updated: 03/14/23 1336    AFB Smear No AFB observed by Fluorescent stain   Narrative:     ORDER#: [id number]                          ORDERED BY: FRIDA Fierro   SOURCE: Fluid                              COLLECTED:  03/13/23 15:07   ANTIBIOTICS AT SABAS.:                      RECEIVED :  03/13/23 15:31   Culture, Blood 2 [[id number]] Collected: 03/13/23 1227   Order Status: Completed Specimen: Blood Updated: 03/14/23 1321    Culture, Blood 2 No Growth to date. Any change in status will be called. Narrative:     ORDER#: [id number]                          ORDERED BY: CARLOS NG   SOURCE: Blood                              COLLECTED:  03/13/23 12:27   ANTIBIOTICS AT SABAS.:                      RECEIVED :  03/13/23 12:50   If child <=2 yrs old please draw pediatric bottle. ~Blood Culture #2   Culture, Blood 1 [[id number]] Collected: 03/13/23 0933   Order Status: Completed Specimen: Blood Updated: 03/14/23 1018    Blood Culture, Routine No Growth to date. Any change in status will be called. Narrative:     ORDER#: [id number]                          ORDERED BY: CARLOS NG   SOURCE: Blood                              COLLECTED:  03/13/23 09:33   ANTIBIOTICS AT SABAS.:                      RECEIVED :  03/13/23 09:37   If child <=2 yrs old please draw pediatric bottle. ~Blood Culture 1   MRSA DNA Probe, Nasal [[id number]] Collected: 03/13/23 1600   Order Status: Completed Specimen: Nares Updated: 03/14/23 0508    MRSA SCREEN RT-PCR --    Negative  MRSA DNA not detected.    Normal Range: Not detected    Narrative:     ORDER#: [id number]                          ORDERED BY: FRIDA Fierro   SOURCE: Nares                              COLLECTED:  03/13/23 16:00   ANTIBIOTICS AT SABAS.: RECEIVED :  03/13/23 16:13   PNEUMONIA PANEL FILM ARRAY REPORT [[id number]] Collected: 03/13/23 1800   Order Status: Completed Updated: 03/13/23 2352    Report SEE IMAGE   Pneumonia Panel, Molecular [[id number]] (Abnormal) Collected: 03/13/23 1800   Order Status: Completed Specimen: Sputum Expectorated Updated: 03/13/23 2348    Organism Enterobacter cloacae complex DNA Detected Abnormal     Pneumonia Panel Molecular --    10,000 copies/mL   See additional report for complete Pneumonia panel. CTX-M gene Detected. This organism is an ESBL . Narrative:     ORDER#: [id number]                          ORDERED BY: CHAMP ENGLE   SOURCE: Sputum Expectorated                COLLECTED:  03/13/23 18:00   ANTIBIOTICS AT SABAS.:                      RECEIVED :  03/13/23 18:49   Gram stain [[id number]] Collected: 03/13/23 1507   Order Status: Canceled Specimen: Fluid    MRSA DNA Probe, Nasal [[id number]]    Order Status: No result Specimen: Nares    COVID-19, Rapid [[id number]] Collected: 03/13/23 0809   Order Status: Completed Specimen: Nasopharyngeal Swab Updated: 03/13/23 0833    SARS-CoV-2, NAAT Not Detected    Comment: Rapid NAAT:   Negative results should be treated as presumptive and,   if inconsistent with clinical signs and symptoms or necessary for   patient management, should be tested with an alternative molecular   assay. Negative results do not preclude SARS-CoV-2 infection and   should not be used as the sole basis for patient management decisions. This test has been authorized by the FDA under an Emergency Use   Authorization (EUA) for use by authorized laboratories.      Fact sheet for Healthcare Providers:   http://www.rubin-raymundo.biz/   Fact sheet for Patients: http://www.rubin-raymundo.biz/     METHODOLOGY: Isothermal Nucleic Acid Amplification       Rapid influenza A/B antigens [[id number]] Collected: 03/13/23 0809   Order Status: Completed Specimen: Nasopharyngeal Updated: 03/13/23 0831    Rapid Influenza A Ag Negative    Rapid Influenza B Ag Negative     Blood Culture:   Lab Results   Component Value Date/Time    [facility]  03/13/2023 09:33 AM     No Growth to date. Any change in status will be called. Dary Conte  03/13/2023 12:27 PM     No Growth to date. Any change in status will be called. Viral Culture:    Lab Results   Component Value Date/Time    COVID19 Not Detected 03/13/2023 08:09 AM    COVID19 Not Detected 08/25/2021 06:13 PM     Urine Culture: No results for input(s): Odalis Willams in the last 72 hours. Scheduled Meds:   hydrALAZINE  25 mg Oral 3 times per day    meropenem  1,000 mg IntraVENous Q8H    sennosides-docusate sodium  1 tablet Oral Daily    carvedilol  25 mg Oral BID WC    aspirin  81 mg Oral Daily    pramipexole  0.25 mg Oral TID    pramipexole  0.25 mg Oral Nightly    Roflumilast  500 mcg Oral Daily    sodium chloride flush  5-40 mL IntraVENous 2 times per day    insulin glargine  0.15 Units/kg SubCUTAneous Nightly    insulin lispro  0-8 Units SubCUTAneous TID WC    insulin lispro  0-4 Units SubCUTAneous Nightly    [Held by provider] enoxaparin  1 mg/kg SubCUTAneous Daily    ipratropium-albuterol  1 ampule Inhalation Q4H WA    budesonide  0.5 mg Nebulization BID    atorvastatin  40 mg Oral Nightly    sodium chloride (Inhalant)  4 mL Nebulization Q4H WA    gabapentin  100 mg Oral TID       Continuous Infusions:   sodium chloride      lactated ringers IV soln Stopped (03/14/23 0953)    dextrose         PRN Meds:  loperamide, HYDROmorphone, HYDROcodone-acetaminophen, sodium chloride flush, sodium chloride, ondansetron **OR** ondansetron, polyethylene glycol, acetaminophen **OR** acetaminophen, glucose, dextrose bolus **OR** dextrose bolus, glucagon (rDNA), dextrose    Imaging:   XR CHEST PORTABLE   Final Result   Mildly increased right basilar airspace opacities. Right chest tube in place with unchanged right pleural effusion.          FL MODIFIED BARIUM SWALLOW W VIDEO   Final Result   1. Positive mild laryngeal penetration without tracheal aspiration of nectar   thickened liquids and honey thickened liquids. This did not improve with   chin-tuck. 2. No evidence of laryngeal penetration or tracheal aspiration of purees or   soft solids. Please see separate speech pathology report for full discussion of findings   and recommendations. XR CHEST PORTABLE   Final Result   Decreased right pleural effusion status post chest tube placement. IR CHEST TUBE INSERTION   Final Result   Successful ultrasound and fluoroscopic guided placement of a right [place]   locking pigtail chest tube into a right hemothorax. XR CHEST 1 VIEW   Final Result   No evidence of pneumothorax following right thoracentesis. Persistent right pleural effusion with associated consolidation, likely   atelectasis. US THORACENTESIS Which side should the procedure be performed? Right   Final Result   Successful ultrasound guided right thoracentesis. Thinly septated right pleural effusion with bloody pleural fluid removed with   thoracentesis, consistent with a hemothorax. CT CHEST PULMONARY EMBOLISM W CONTRAST   Final Result   Chest      No central pulmonary embolus identified      Bilateral pleural effusions, right greater than left with adjacent   consolidation of the lungs, either due to atelectasis or pneumonia. Scattered ground-glass opacity in tree-in-bud nodularity seen in the aerated   portion of the lungs, likely postinflammatory-infectious.   Scattered areas of   bronchial wall thickening are seen      Abdomen and pelvis      No acute intra-abdominal abnormality         CT ABDOMEN PELVIS W IV CONTRAST Additional Contrast? None   Final Result   Chest      No central pulmonary embolus identified      Bilateral pleural effusions, right greater than left with adjacent   consolidation of the lungs, either due to atelectasis or pneumonia. Scattered ground-glass opacity in tree-in-bud nodularity seen in the aerated   portion of the lungs, likely postinflammatory-infectious. Scattered areas of   bronchial wall thickening are seen      Abdomen and pelvis      No acute intra-abdominal abnormality         XR CHEST PORTABLE   Final Result   Mild congestion. Small right pleural effusion. Bibasilar compressive   atelectasis versus infiltrates. XR CHEST PORTABLE    (Results Pending)       All pertinent images and reports for the current Hospitalization were reviewed by me.     IMPRESSION:    Patient Active Problem List   Diagnosis Code    Diabetes mellitus ([facility] E11.9    Atherosclerosis of native coronary artery of native heart without angina pectoris I25.10    Morbid obesity due to excess calories ([facility]) E66.01    Sepsis ([facility] A41.9    SOB (shortness of breath) R06.02    Abnormal chest CT R93.89    Back pain M54.9    Diabetes mellitus type 2, uncontrolled [id number]    Chronic back pain M54.9, G89.29    Chronic combined systolic (congestive) and diastolic (congestive) heart failure ([facility]) I50.42    Hyperlipidemia E78.5    Primary hypertension I10    Leukocytosis D72.829    Chronic bronchitis, simple ([facility]) J41.0    Chronic rhinitis J31.0    Generalized weakness R53.1    WEI (acute kidney injury) ([facility] N17.9    DMII (diabetes mellitus, type 2) ([facility]) E11.9    Supratherapeutic INR R79.1    Tachycardia R00.0    Tachypnea R06.82    Non morbid obesity due to excess calories E66.09    Fatigue R53.83    Cough productive of clear sputum R05.8    History of coronary artery bypass graft Z95.1    Abrasion of right elbow S50.311A    Acute respiratory failure with hypoxia ([facility]) J96.01    Paroxysmal atrial fibrillation ([facility]) I48.0    Chronic pulmonary aspiration T17.908A    Parapneumonic effusion J18.9, J91.8    Elevated troponin R77.8    Pleural effusion, bilateral J90    Longstanding persistent atrial fibrillation ([facility]) I48.11    Enterobacter cloacae pneumonia [facility] J15.6    ESBL (extended spectrum beta-lactamase) producing bacteria infection A49.9, Z16.12    Gram-negative pneumonia (HCC) J15.6    Abnormal CT of the chest R93.89    Pneumonia of both lungs due to infectious organism J18.9        ICD-10-CM    1. Pneumonia of both lungs due to infectious organism, unspecified part of lung  J18.9       2. WEI (acute kidney injury) ([facility]  N17.9       3. Elevated troponin  R77.8       4. Pleural effusion, bilateral  J90          Rt Side PNA  Rt lower lobe, Middle lobe consolidation   Rt complex pleural effusion  Partial treatment hence No EMPYEMA  But septated, Exudative Pleural effusion  COPD  H/O  Severe Influenza A PNA in Dec 2022  He never recovered fully after that with Secondary Bacterial PNA  He had multiple courses of oral abx  Pneumonia PANEL +v Enterobacter ESBL +vE  Sputum cx negative  Pleural fluid cx negative from prior abx use  CABG  WEI  A fib on anticoagulation   S/p IR thoracentesis on 3/13  And s/p IR Chest tube placement on 3/14      Given the complicated course with unresolved Bacterial PNA as a secondary complication from POST INFLUENZA A infection now with ESBL Enterobacter will need IV abx     This is very consistent with Post influenza Secondary complicated PNA from gram -ves     He would respond partially on oral abx and relapse after with complications           Labs, Microbiology, Radiology and pertinent results from current hospitalization and care every where were reviewed by me as a part of the consultation.     PLAN :  Agree with IV Meropenem 1 gm Q 8 HRS  ESR 90 , CRP 99.3  Drain all the pleural fluid   CT chest in follow up   Anticipate x 14- days of abx therapy at d/c  Control DM  Anticoagulation per primary   Contact isolation for ESBL     Ok for picc line  At d/c we can choose IV Ertapenem x 1 gm Q 24 hrx 14 days     Discussed with patient/Family and Nursing   Risk of Complications/Morbidity: High      Illness(es)/ Infection present that pose threat to bodily function. There is potential for severe exacerbation of infection/side effects of treatment. Therapy requires intensive monitoring for antimicrobial agent toxicity. Thanks for allowing me to participate in your patient's care please call me with any questions or concerns.     Dr. Yumiko Sanchez MD  [address] [place] [address] Physician  Phone: [phone]   Fax : [phone]

## 2023-03-16 NOTE — PROGRESS NOTES
Occupational Therapy  Facility/Department: [address] MED SURG  Occupational Therapy Daily Treatment    Name: Jorge Gauthier  : 1945  MRN: [id number]  Date of Service: 3/16/2023    Discharge Recommendations:  Home with assist PRN      Jorge Gauthier scored a 20/24 on the AM-PAC ADL Inpatient form.  At this time, no further OT is recommended upon discharge due to pt nearing baseline.  Recommend patient returns to prior setting with prior services.       Patient Diagnosis(es): The primary encounter diagnosis was Pneumonia of both lungs due to infectious organism, unspecified part of lung. Diagnoses of WEI (acute kidney injury) (Formerly [facility]), Elevated troponin, and Pleural effusion, bilateral were also pertinent to this visit.  Past Medical History:  has a past medical history of Abrasion of right elbow, Arthritis, CAD (coronary artery disease), Chronic back pain, Chronic systolic CHF (congestive heart failure) (Formerly [facility]), COPD (chronic obstructive pulmonary disease) ([facility]), Dental disease, Diabetes mellitus ([facility]), Essential hypertension, Hearing loss, Paroxysmal atrial fibrillation ([facility]), and Tinnitus.  Past Surgical History:  has a past surgical history that includes fracture surgery (Left, ); back surgery (); Cardiac surgery (); Coronary artery bypass graft; back surgery; Cervical spine surgery; chest tube insertion (Right, 2023); and IR CHEST TUBE INSERTION (3/14/2023).    Treatment Diagnosis: Decreased functional mobility and ADLs      Assessment   Performance deficits / Impairments: Decreased functional mobility ;Decreased strength;Decreased endurance;Decreased ADL status;Decreased safe awareness;Decreased balance  Assessment: Pt is a 76 yo M admitted with pneumonia of both lungs d/t infectious organism. PTA, pt lived at home with his wife and was mod IND with ADLs and functional mobility using cane or RW in the house and a scooter outside of the home. Today, pt is slightly below baseline, requiring up  to SBA with functional mobility d/t safety concerns with the RW. Pt tolerated activity on RA well, however pt has new chest tube inserted. Anticipate pt will require up to mod A for ADLs with management of chest tube line. OT will continue to see while on acute to address current deficits and to increase pt independence. Anticipate pt will be safe to return home with assist PRN once medically stable. Treatment Diagnosis: Decreased functional mobility and ADLs  Prognosis: Fair  REQUIRES OT FOLLOW-UP: Yes  Activity Tolerance  Activity Tolerance: Patient Tolerated treatment well        Plan   Occupational Therapy Plan  Times Per Week: 3-5  Times Per Day: Once a day  Specific Instructions for Next Treatment:  arrived to room during session, made pt aware of home IV antibiotics. Plan to educate pt on ADL completion while self-managing IV. Current Treatment Recommendations: Strengthening, Balance training, Functional mobility training, Endurance training, ROM, Safety education & training, Self-Care / ADL     Restrictions  Restrictions/Precautions  Restrictions/Precautions: Fall Risk, Swallowing - Thickened Liquids  Position Activity Restriction  Other position/activity restrictions: Honey thick; soft bite sized; chest tube on suction    Subjective   General  Chart Reviewed: Yes  Patient assessed for rehabilitation services?: Yes  Additional Pertinent Hx: Pt is a 67 yo M admitted with pneumonia of both lungs d/t infectious organism. Family / Caregiver Present: No  Referring Practitioner: Hever Carrillo MD  Diagnosis: Pneumonia of both lungs  Subjective  Subjective: Pt met b/s for OT tx. Pt in recliner, agreeable to therapy. No c/o pain.      Social/Functional History  Social/Functional History  Lives With: Spouse  Type of Home: House  Home Layout: One level, Able to Live on Main level with bedroom/bathroom  Home Access: Stairs to enter with rails  Entrance Stairs - Number of Steps: 2  Entrance Stairs - Rails: Both  Bathroom Shower/Tub: Walk-in shower  Bathroom Toilet: Handicap height  Bathroom Equipment: Grab bars in shower, Built-in shower seat, Shower chair, Hand-held shower, Grab bars around toilet  Bathroom Accessibility: Wheelchair accessible  Home Equipment: Lift chair, Walker, rolling, Cane, Rollator, Electric scooter  ADL Assistance: Independent  Homemaking Assistance: Needs assistance  Ambulation Assistance: Independent (combination of RW, cane in home. scooter outside home)  Transfer Assistance: Independent  Active : Yes  Occupation: Retired  Type of Occupation: ,        Objective   Safety Devices  Type of Devices: All fall risk precautions in place;Call light within reach;Gait belt;Patient at risk for falls; Left in chair;Chair alarm in place;Nurse notified       ADL  Feeding: Thickened liquids  Grooming: Stand by assistance  Grooming Skilled Clinical Factors: Pt washed face while seated at sink SBA  UE Bathing: Stand by assistance  UE Bathing Skilled Clinical Factors: Pt sponge bathed while seated at sink SBA. LE Bathing: Stand by assistance  LE Bathing Skilled Clinical Factors: Pt sponge bathed while seated at sink SBA. Pt required min A for drying off feet. UE Dressing Skilled Clinical Factors: OT assist to don hospital gown  LE Dressing: Moderate assistance  LE Dressing Skilled Clinical Factors: Pt donned briefs and socks with mod A. Pt requried assistance with threading clothing over feet and pulling up to thighs. Activity Tolerance  Activity Tolerance: Patient tolerated treatment well;Patient limited by endurance  Bed mobility  Supine to Sit: Unable to assess  Sit to Supine: Unable to assess  Bed Mobility Comments: Pt in recliner at start and end of session. Transfers  Sit to stand: Stand by assistance  Stand to sit: Stand by assistance  Transfer Comments: Pt completed functional mobility from recliner>BR>recliner using RW SBA (~40 ft).  Pt required verbal cues for walker safety when sitting. Vision  Vision Exceptions: Wears glasses at all times  Hearing  Hearing Exceptions: Hard of hearing/hearing concerns  Cognition  Overall Cognitive Status: Exceptions  Safety Judgement: Decreased awareness of need for safety;Decreased awareness of need for assistance  Insights: Decreased awareness of deficits  Cognition Comment: decreased insight into current medical situation  Orientation  Overall Orientation Status: Within Functional Limits    Education Given To: Patient  Education Provided: Role of Therapy;Plan of Care;Transfer Training  Education Method: Demonstration;Verbal  Barriers to Learning: None  Education Outcome: Verbalized understanding;Continued education needed    AM-PAC Score  AM-PAC Inpatient Daily Activity Raw Score: 20 (03/16/23 1039)  AM-PAC Inpatient ADL T-Scale Score : 42.03 (03/16/23 1039)  ADL Inpatient CMS 0-100% Score: 38.32 (03/16/23 1039)  ADL Inpatient CMS G-Code Modifier : CJ (03/16/23 1039)      Goals  Short Term Goals  Time Frame for Short Term Goals: Prior to d/c; goals ongoing  Short Term Goal 1: Pt will complete grooming in stance at sink SBA  Short Term Goal 2: Pt will complete toileting with supervision  Short Term Goal 3: Pt will complete dressing CGA  Short Term Goal 4: Pt will complete bathing with supervision  Short Term Goal 5: Pt will demonstrate understanding of safe walker use when completing functional mobility  Long Term Goals  Time Frame for Long Term Goals : LTG=STG  Patient Goals   Patient goals : to return home       Therapy Time   Individual Concurrent Group Co-treatment   Time In 1000         Time Out 1040         Minutes 40                 TAE [place]/OT  Therapist was present, directed the patient's care, made skilled judgement, and was responsible for assessment and treatment of the patient.   Electronically signed by Alicia Lucas OT on 3/16/23 at 1:05 PM EDT

## 2023-03-16 NOTE — PLAN OF CARE
Problem: ABCDS Injury Assessment  Goal: Absence of physical injury  3/16/2023 0946 by Soila Gonzalez  Outcome: Progressing  3/16/2023 0050 by Karlos Rios RN  Outcome: Progressing  Flowsheets (Taken 3/16/2023 0050)  Absence of Physical Injury: Implement safety measures based on patient assessment     Problem: Safety - Adult  Goal: Free from fall injury  3/16/2023 0946 by Soila Gonzalez  Outcome: Progressing  3/16/2023 0050 by Karlos Rios RN  Outcome: Progressing  Flowsheets (Taken 3/16/2023 0050)  Free From Fall Injury: Instruct family/caregiver on patient safety     Problem: Discharge Planning  Goal: Discharge to home or other facility with appropriate resources  3/16/2023 0946 by Soila Gonzalez  Outcome: Progressing  3/16/2023 0050 by Karlos Rios RN  Outcome: Progressing  Flowsheets (Taken 3/15/2023 2049)  Discharge to home or other facility with appropriate resources:   Identify barriers to discharge with patient and caregiver   Arrange for needed discharge resources and transportation as appropriate   Identify discharge learning needs (meds, wound care, etc)   Refer to discharge planning if patient needs post-hospital services based on physician order or complex needs related to functional status, cognitive ability or social support system     Problem: Pain  Goal: Verbalizes/displays adequate comfort level or baseline comfort level  3/16/2023 0946 by Soila Gonzalez  Outcome: Progressing  3/16/2023 0050 by Karlos Rios RN  Outcome: Progressing     Problem: Chronic Conditions and Co-morbidities  Goal: Patient's chronic conditions and co-morbidity symptoms are monitored and maintained or improved  3/16/2023 0946 by Soila Gonzalez  Outcome: Progressing  3/16/2023 0050 by Karlos Rios RN  Outcome: Progressing  Flowsheets (Taken 3/15/2023 2049)  Care Plan - Patient's Chronic Conditions and Co-Morbidity Symptoms are Monitored and Maintained or Improved:   Monitor and assess patient's chronic conditions and comorbid symptoms for stability, deterioration, or improvement   Collaborate with multidisciplinary team to address chronic and comorbid conditions and prevent exacerbation or deterioration     Problem: Respiratory - Adult  Goal: Achieves optimal ventilation and oxygenation  3/16/2023 0946 by Cindi Myles  Outcome: Progressing  3/16/2023 0050 by Alice Haywood RN  Outcome: Progressing  Flowsheets (Taken 3/15/2023 2049)  Achieves optimal ventilation and oxygenation:   Assess for changes in respiratory status   Assess for changes in mentation and behavior   Position to facilitate oxygenation and minimize respiratory effort   Assess and instruct to report shortness of breath or any respiratory difficulty   Respiratory therapy support as indicated

## 2023-03-16 NOTE — PROGRESS NOTES
Cardiology - PROGRESS NOTE    Admit Date: 3/13/2023     Reason for follow up: AF, elevated troponin, shortness of breath     68 y.o. male with a past medical history significant for type 2 DM, paroxysmal atrial fibrillation, CAD s/p CABG in 1238, chronic systolic CHF and COPD who presented to [facility] with abdominal pain and fatigue. I was asked to see the patient for troponin elevation to 0.02. Social History:   reports that he quit smoking about 27 years ago. His smoking use included cigarettes. He started smoking about 64 years ago. He has a 18.00 pack-year smoking history. He has never used smokeless tobacco. He reports that he does not drink alcohol and does not use drugs. Family History: family history includes Brain Cancer in his mother; Heart Attack in his brother and father. Interval History:   Patient seen and examined and notes reviewed   Chest tube placed 3/14  On RA    In chair   NAD   All other systems reviewed and negative except as above. Diet: ADULT DIET; Dysphagia - Minced and Moist; 3 carb choices (45 gm/meal); Low Sodium (2 gm);  Moderately Thick (Honey)  Pain is:Mild  Nausea:None    In: 820 [P.O.:420]  Out: 30    Patient Vitals for the past 96 hrs (Last 3 readings):   Weight   03/16/23 0334 222 lb 14.2 oz (101.1 kg)   03/15/23 0507 197 lb 5 oz (89.5 kg)   03/14/23 0229 229 lb 0.9 oz (103.9 kg)           Data:   Scheduled Meds:   Scheduled Meds:   hydrALAZINE  25 mg Oral 3 times per day    meropenem  1,000 mg IntraVENous Q8H    sennosides-docusate sodium  1 tablet Oral Daily    carvedilol  25 mg Oral BID WC    aspirin  81 mg Oral Daily    pramipexole  0.25 mg Oral TID    pramipexole  0.25 mg Oral Nightly    Roflumilast  500 mcg Oral Daily    sodium chloride flush  5-40 mL IntraVENous 2 times per day    insulin glargine  0.15 Units/kg SubCUTAneous Nightly    insulin lispro  0-8 Units SubCUTAneous TID WC    insulin lispro 0-4 Units SubCUTAneous Nightly    [Held by provider] enoxaparin  1 mg/kg SubCUTAneous Daily    ipratropium-albuterol  1 ampule Inhalation Q4H WA    budesonide  0.5 mg Nebulization BID    atorvastatin  40 mg Oral Nightly    sodium chloride (Inhalant)  4 mL Nebulization Q4H WA    gabapentin  100 mg Oral TID     Continuous Infusions:   sodium chloride      lactated ringers IV soln Stopped (03/14/23 0953)    dextrose       PRN Meds:. loperamide, HYDROmorphone, HYDROcodone-acetaminophen, sodium chloride flush, sodium chloride, ondansetron **OR** ondansetron, polyethylene glycol, acetaminophen **OR** acetaminophen, glucose, dextrose bolus **OR** dextrose bolus, glucagon (rDNA), dextrose  Continuous Infusions:   sodium chloride      lactated ringers IV soln Stopped (03/14/23 0953)    dextrose         Intake/Output Summary (Last 24 hours) at 3/16/2023 0915  Last data filed at 3/16/2023 0600  Gross per 24 hour   Intake 820 ml   Output 30 ml   Net 790 ml       Lab Results   Component Value Date    ALT 9 (L) 03/13/2023    AST 13 (L) 03/13/2023    ALKPHOS 59 03/13/2023    BILITOT 0.5 03/13/2023     Lab Results   Component Value Date    CREATININE 0.8 03/16/2023    BUN 15 03/16/2023     03/16/2023    K 3.7 03/16/2023     03/16/2023    CO2 23 03/16/2023     Lab Results   Component Value Date    TSH 3.05 10/20/2011     Lab Results   Component Value Date    WBC 9.5 03/16/2023    HGB 12.8 (L) 03/16/2023    HCT 38.3 (L) 03/16/2023    MCV 87.8 03/16/2023     03/16/2023     No components found for: CHLPL  Lab Results   Component Value Date    TRIG 149 10/20/2011    TRIG 145 09/30/2010     Lab Results   Component Value Date    HDL 46 10/20/2011    HDL 41 09/30/2010     Lab Results   Component Value Date    LDLCALC 62 10/20/2011    LDLCALC 54 09/30/2010     Lab Results   Component Value Date    LABVLDL 30 10/20/2011    LABVLDL 29 09/30/2010 -----------------------------------------------------------------  Telemetry: currently not on telemetry       Chest CT 3/13/23:  Chest       No central pulmonary embolus identified       Bilateral pleural effusions, right greater than left with adjacent   consolidation of the lungs, either due to atelectasis or pneumonia. Scattered ground-glass opacity in tree-in-bud nodularity seen in the aerated   portion of the lungs, likely postinflammatory-infectious. Scattered areas of   bronchial wall thickening are seen       Abdomen and pelvis       No acute intra-abdominal abnormality      Echo 2018 ([facility]): - Left ventricle: The cavity size was normal. Wall thickness was     normal. Systolic function was mildly reduced. The estimated     ejection fraction was in the range of 47% to 51%. Moderate     hypokinesis of the basal-midinferoseptal myocardium. Normal     diastolic function. - Left atrium: The atrium was mildly dilated. No change from prior     echo 2015. - Inferior vena cava: The vessel was normal in size; the     respirophasic diameter changes were in the normal range (>= 50%);     findings are consistent with normal central venous pressure. Objective:   Vitals: BP (!) 143/70   Pulse 78   Temp 98 °F (36.7 °C) (Oral)   Resp 18   Ht 5' 11\" (1.803 m)   Wt 222 lb 14.2 oz (101.1 kg)   SpO2 94%   BMI 31.09 kg/m²   General appearance: alert, appears stated age and uncooperative,   Skin: Skin color, texture, turgor normal. No rashes or ecchymosis. HEENT: Head: Normal, normocephalic, atraumatic.   Lungs: clear to auscultation bilaterally and diminished breath sounds bilaterally, no accessory muscle use, R chest tube   Heart: irregularly irregular rhythm and S1, S2 normal  Abdomen: soft, non-tender; bowel sounds normal; no masses,  no organomegaly  Extremities: extremities normal, atraumatic, no cyanosis or edema, pulses: DP +2/+2, PT +2/+2,   Neurologic: Mental status: Alert, oriented, thought content appropriate, no tremors, no gross sensory motor deficit,   Psychiatric: normal insight and affect      Assessment & Plan:    Patient Active Problem List:          Plan:  1. Elevated troponin    Hx of CAD with CABG   Mildly elevated and likely elevated secondary to renal fx   No chest pain    No additional cardiac evaluation at this time   Continue risk factor modification    ASA, statin, beta-blocker   2. Atrial Fibrillation    Chronic    Rate controlled    On beta-blocker therapy    Eliquis on hold secondary to procedures   3. Pleural effusions   Bilateral    PNA v DHF ? Chest tube with dark bloody fluid-less likely CHF    Does not appear fluid overloaded    May consider RHC if needed per primary service   4. WEI   Cr 2.0 on admit   Improved to 0.8 with hydration    Avoid nephrotoxic agents      5. HTN    Improved    On beta-blocker, ACE and hydralazine       Cardiology will sign off with outpatient follow up in 2-3 weeks. Please call with questions.                  FLAVIO Cuevas-JOEY   [address]  Cardiology   3/16/2023  9:15 AM

## 2023-03-17 ENCOUNTER — APPOINTMENT (OUTPATIENT)
Dept: GENERAL RADIOLOGY | Age: 78
End: 2023-03-17
Payer: MEDICARE

## 2023-03-17 ENCOUNTER — TELEPHONE (OUTPATIENT)
Dept: PULMONOLOGY | Age: 78
End: 2023-03-17

## 2023-03-17 VITALS
WEIGHT: 222 LBS | RESPIRATION RATE: 18 BRPM | HEART RATE: 80 BPM | OXYGEN SATURATION: 95 % | TEMPERATURE: 97.7 F | BODY MASS INDEX: 31.08 KG/M2 | DIASTOLIC BLOOD PRESSURE: 61 MMHG | HEIGHT: 71 IN | SYSTOLIC BLOOD PRESSURE: 118 MMHG

## 2023-03-17 LAB
BACTERIA BLD CULT ORG #2: NORMAL
BACTERIA BLD CULT: NORMAL
BACTERIA FLD AEROBE CULT: NORMAL
CRP SERPL-MCNC: 60.6 MG/L (ref 0–5.1)
GLUCOSE BLD-MCNC: 267 MG/DL (ref 70–99)
GLUCOSE BLD-MCNC: 273 MG/DL (ref 70–99)
GRAM STN SPEC: NORMAL
PERFORMED ON: ABNORMAL
PERFORMED ON: ABNORMAL
PROCALCITONIN SERPL IA-MCNC: 0.1 NG/ML (ref 0–0.15)

## 2023-03-17 PROCEDURE — 6370000000 HC RX 637 (ALT 250 FOR IP): Performed by: INTERNAL MEDICINE

## 2023-03-17 PROCEDURE — 6360000002 HC RX W HCPCS: Performed by: INTERNAL MEDICINE

## 2023-03-17 PROCEDURE — 2580000003 HC RX 258: Performed by: INTERNAL MEDICINE

## 2023-03-17 PROCEDURE — 71045 X-RAY EXAM CHEST 1 VIEW: CPT

## 2023-03-17 PROCEDURE — 97129 THER IVNTJ 1ST 15 MIN: CPT

## 2023-03-17 PROCEDURE — 84145 PROCALCITONIN (PCT): CPT

## 2023-03-17 PROCEDURE — 97530 THERAPEUTIC ACTIVITIES: CPT

## 2023-03-17 PROCEDURE — 6370000000 HC RX 637 (ALT 250 FOR IP): Performed by: NURSE PRACTITIONER

## 2023-03-17 PROCEDURE — 94760 N-INVAS EAR/PLS OXIMETRY 1: CPT

## 2023-03-17 PROCEDURE — 99233 SBSQ HOSP IP/OBS HIGH 50: CPT | Performed by: INTERNAL MEDICINE

## 2023-03-17 PROCEDURE — 36415 COLL VENOUS BLD VENIPUNCTURE: CPT

## 2023-03-17 PROCEDURE — 97535 SELF CARE MNGMENT TRAINING: CPT

## 2023-03-17 PROCEDURE — 86140 C-REACTIVE PROTEIN: CPT

## 2023-03-17 PROCEDURE — 94640 AIRWAY INHALATION TREATMENT: CPT

## 2023-03-17 PROCEDURE — 92526 ORAL FUNCTION THERAPY: CPT

## 2023-03-17 RX ADMIN — LOPERAMIDE HYDROCHLORIDE 2 MG: 2 CAPSULE ORAL at 12:41

## 2023-03-17 RX ADMIN — PRAMIPEXOLE DIHYDROCHLORIDE 0.25 MG: 0.25 TABLET ORAL at 08:28

## 2023-03-17 RX ADMIN — BUDESONIDE 500 MCG: 0.5 SUSPENSION RESPIRATORY (INHALATION) at 09:15

## 2023-03-17 RX ADMIN — HYDRALAZINE HYDROCHLORIDE 25 MG: 25 TABLET, FILM COATED ORAL at 04:30

## 2023-03-17 RX ADMIN — INSULIN LISPRO 4 UNITS: 100 INJECTION, SOLUTION INTRAVENOUS; SUBCUTANEOUS at 08:25

## 2023-03-17 RX ADMIN — SODIUM CHLORIDE SOLN NEBU 3% 4 ML: 3 NEBU SOLN at 09:14

## 2023-03-17 RX ADMIN — ROFLUMILAST 500 MCG: 500 TABLET ORAL at 08:33

## 2023-03-17 RX ADMIN — CARVEDILOL 25 MG: 25 TABLET, FILM COATED ORAL at 08:25

## 2023-03-17 RX ADMIN — ASPIRIN 81 MG: 81 TABLET, COATED ORAL at 08:25

## 2023-03-17 RX ADMIN — GABAPENTIN 100 MG: 100 CAPSULE ORAL at 08:25

## 2023-03-17 RX ADMIN — HYDROCODONE BITARTRATE AND ACETAMINOPHEN 1 TABLET: 7.5; 325 TABLET ORAL at 12:41

## 2023-03-17 RX ADMIN — IPRATROPIUM BROMIDE AND ALBUTEROL SULFATE 1 AMPULE: 2.5; .5 SOLUTION RESPIRATORY (INHALATION) at 09:12

## 2023-03-17 RX ADMIN — MEROPENEM 1000 MG: 1 INJECTION, POWDER, FOR SOLUTION INTRAVENOUS at 01:22

## 2023-03-17 RX ADMIN — GABAPENTIN 100 MG: 100 CAPSULE ORAL at 14:09

## 2023-03-17 RX ADMIN — ERTAPENEM SODIUM 1000 MG: 1 INJECTION, POWDER, LYOPHILIZED, FOR SOLUTION INTRAMUSCULAR; INTRAVENOUS at 14:14

## 2023-03-17 RX ADMIN — MEROPENEM 1000 MG: 1 INJECTION, POWDER, FOR SOLUTION INTRAVENOUS at 08:32

## 2023-03-17 RX ADMIN — LOPERAMIDE HYDROCHLORIDE 2 MG: 2 CAPSULE ORAL at 04:30

## 2023-03-17 RX ADMIN — PRAMIPEXOLE DIHYDROCHLORIDE 0.25 MG: 0.25 TABLET ORAL at 14:09

## 2023-03-17 RX ADMIN — SODIUM CHLORIDE, PRESERVATIVE FREE 10 ML: 5 INJECTION INTRAVENOUS at 08:26

## 2023-03-17 RX ADMIN — INSULIN LISPRO 4 UNITS: 100 INJECTION, SOLUTION INTRAVENOUS; SUBCUTANEOUS at 12:39

## 2023-03-17 RX ADMIN — HYDROCODONE BITARTRATE AND ACETAMINOPHEN 1 TABLET: 7.5; 325 TABLET ORAL at 04:30

## 2023-03-17 RX ADMIN — HYDRALAZINE HYDROCHLORIDE 25 MG: 25 TABLET, FILM COATED ORAL at 14:09

## 2023-03-17 ASSESSMENT — PAIN DESCRIPTION - FREQUENCY: FREQUENCY: INTERMITTENT

## 2023-03-17 ASSESSMENT — PAIN DESCRIPTION - LOCATION: LOCATION: HIP

## 2023-03-17 ASSESSMENT — PAIN DESCRIPTION - PAIN TYPE: TYPE: CHRONIC PAIN

## 2023-03-17 ASSESSMENT — PAIN DESCRIPTION - ONSET: ONSET: ON-GOING

## 2023-03-17 ASSESSMENT — PAIN - FUNCTIONAL ASSESSMENT: PAIN_FUNCTIONAL_ASSESSMENT: ACTIVITIES ARE NOT PREVENTED

## 2023-03-17 ASSESSMENT — PAIN DESCRIPTION - ORIENTATION: ORIENTATION: RIGHT

## 2023-03-17 ASSESSMENT — PAIN SCALES - GENERAL: PAINLEVEL_OUTOF10: 10

## 2023-03-17 ASSESSMENT — PAIN DESCRIPTION - DESCRIPTORS: DESCRIPTORS: SHARP

## 2023-03-17 NOTE — PROGRESS NOTES
Chest tube removed without any apparent complication noted on CXR. Ok to DC from [facility] standpoint.    He follows with Mary Rivers DO  [facility]  [phone]

## 2023-03-17 NOTE — DISCHARGE SUMMARY
Hospital Medicine Discharge Summary    Patient ID: Sreedhar Alejandra      Patient's PCP: Ceci King    Admit Date: 3/13/2023     Discharge Date:   3/17/23    Admitting Provider: Mortimer Goes, MD     Discharge Provider: Terri Martinez MD     Discharge Diagnoses: Active Hospital Problems    Diagnosis     Elevated erythrocyte sedimentation rate [R70.0]      Priority: Medium    CRP elevated [R79.82]      Priority: Medium    Enterobacter cloacae pneumonia ([address] [J15.6]      Priority: Medium    ESBL (extended spectrum beta-lactamase) producing bacteria infection [A49.9, Z16.12]      Priority: Medium    Gram-negative pneumonia (HCC) [J15.6]      Priority: Medium    Abnormal CT of the chest [R93.89]      Priority: Medium    Pneumonia of both lungs due to infectious organism [J18.9]      Priority: Medium    Longstanding persistent atrial fibrillation (HCC) [I48.11]      Priority: Medium    Parapneumonic effusion [J18.9, J91.8]      Priority: Medium    Elevated troponin [R77.8]      Priority: Medium    Pleural effusion, bilateral [J90]      Priority: Medium    Acute respiratory failure with hypoxia ([address] [J96.01]      Priority: Medium    WEI (acute kidney injury) ([address] [N17.9]     Primary hypertension [I10]     Atherosclerosis of native coronary artery of native heart without angina pectoris [I25.10]        The patient was seen and examined on day of discharge and this discharge summary is in conjunction with any daily progress note from day of discharge. Hospital Course:     41-year-old male with past medical history significant for CAD, COPD, insulin-dependent diabetes, atrial fibrillation presents to the hospital with complaints of weakness and cough. Patient reports not feeling well for the last few days. He reports severe weakness, he was unable to perform his ADLs, and has been having productive cough and his significant shortness of breath with exertion. Denies lower extremity swelling. Patient saw his pulmonologist last week. CT of the chest demonstrated large right pleural effusion, with consolidation, concerning for pneumonia. Pneumonia complicated with right pleural effusion              Pneumonia molecular panel positive for Enterobacter ESBL, switch to meropenem. MRSA probe is negative              Status post thoracentesis on 3/13, patient with a lot of PRBCs concerning for malignant pleural effusion versus hemothorax,      S/p Chest tube placement on 3/14,  Chest tube removed on 3/17/23                 Scheduled bronchodilators              Follow up with cytology to rule out malignancy      Atrial fibrillation- persistent/paroxysmal   Rate controlled; [id number]: > 2  Hold Eliquis due to procedure/surgery; lovenox 1mg /kg daily (ON HOLD)  Resume antiarrhythmic medication     Type 2 diabetes              A1c: 7.5  Euglycemic  Hold p.o. medicines  Monitor with Accu-Cheks ACHS  Sliding scale as needed  Add Lantus     Acute COPD exacerbation              Chest x-ray: PNA              Continue oxygen supplementation, currently requiring 3 L              IV steroids with p.o. taper              Scheduled bronchodilators      Elevated troponin  CAD status post remote CABG              Cardiology evaluation              Continue antiplatelet therapy, statin     Acute kidney injury   Creatinine on admission 2.0  Suspecting prerenal injury and ATN in the setting on volume depletion  Check bladder scan to rule out obstruction  Avoid nephrotoxic medications, contrast   Check renal ultrasound, UA and  urine studies  Consult nephrology  for evaluation  Gentle IV fluid  Monitor renal function daily        Pulmonary consult   Acute Hypoxic Respiratory Failure with saturations less than 90% on room air , resolved   Titrate oxygen for saturations greater than or equal to 90%  Gram negative pneumonia due to enterobacter (ESBL)  Merrem per ID.   PICC placed   Right pleural effusion s/p thoracentesis.  May be parapneumonic with hemothorax and/or malignant.  May be asbestos related as well.  It is new based on previous imaging   Unable to give TPA due to bloody output.  Suspect he may have to live with some effusion as it might be asbestos related.  Would need surgery for definitive clean out but I don't recommend that  Still has 2 other pleural fluid specimens for cytology in process. One was negative   Chest tube removed at bedside.  STAT CXR today   Dysphagia with known aspiration into the airways   Speech following       ID consult        Rt Side PNA  Rt lower lobe, Middle lobe consolidation   Rt complex pleural effusion  Partial treatment hence No EMPYEMA  But septated, Exudative Pleural effusion  COPD  H/O  Severe Influenza A PNA in Dec 2022  He never recovered fully after that with Secondary Bacterial PNA  He had multiple courses of oral abx  Pneumonia PANEL +v Enterobacter ESBL +vE  Sputum cx negative  Pleural fluid cx negative from prior abx use  CABG  WEI  A fib on anticoagulation   S/p IR thoracentesis on 3/13  And s/p IR Chest tube placement on 3/14        Given the complicated course with unresolved Bacterial PNA as a secondary complication from POST INFLUENZA A infection now with ESBL Enterobacter will need IV abx      This is very consistent with Post influenza Secondary complicated PNA from gram -ves      He would respond partially on oral abx and relapse after with complications              Labs, Microbiology, Radiology and pertinent results from current hospitalization and care every where were reviewed by me as a part of the consultation.     PLAN :  Agree with IV Meropenem 1 gm Q 8 HRS  ESR 90 , CRP 99.3  Drain all the pleural fluid   CT chest in follow up   Anticipate x 14- days of abx therapy at d/c  Control DM  Anticoagulation per primary   Contact isolation for ESBL     Ok for picc line  At d/c we can choose IV Ertapenem x 1 gm Q 24 hrx 14 days       Physical Exam Performed:  /64   Pulse 76   Temp 97.7 °F (36.5 °C) (Oral)   Resp 18   Ht 5' 11\" (1.803 m)   Wt 222 lb 0.1 oz (100.7 kg)   SpO2 96%   BMI 30.96 kg/m²       General appearance:  No apparent distress, appears stated age and cooperative. HEENT:  Normal cephalic, atraumatic without obvious deformity. Pupils equal, round, and reactive to light. Extra ocular muscles intact. Conjunctivae/corneas clear. Neck: Supple, with full range of motion. No jugular venous distention. Trachea midline. Respiratory:  Normal respiratory effort. Clear to auscultation, bilaterally without Rales/Wheezes/Rhonchi. Cardiovascular:  Regular rate and rhythm with normal S1/S2 without murmurs, rubs or gallops. Abdomen: Soft, non-tender, non-distended with normal bowel sounds. Musculoskeletal:  No clubbing, cyanosis or edema bilaterally. Full range of motion without deformity. Skin: Skin color, texture, turgor normal.  No rashes or lesions. Neurologic:  Neurovascularly intact without any focal sensory/motor deficits. Cranial nerves: II-XII intact, grossly non-focal.  Psychiatric:  Alert and oriented, thought content appropriate, normal insight  Capillary Refill: Brisk,< 3 seconds   Peripheral Pulses: +2 palpable, equal bilaterally       Labs: For convenience and continuity at follow-up the following most recent labs are provided:      CBC:    Lab Results   Component Value Date/Time    WBC 9.5 03/16/2023 06:17 AM    HGB 12.8 03/16/2023 06:17 AM    HCT 38.3 03/16/2023 06:17 AM     03/16/2023 06:17 AM       Renal:    Lab Results   Component Value Date/Time     03/16/2023 06:17 AM    K 3.7 03/16/2023 06:17 AM     03/16/2023 06:17 AM    CO2 23 03/16/2023 06:17 AM    BUN 15 03/16/2023 06:17 AM    CREATININE 0.8 03/16/2023 06:17 AM    CALCIUM 8.9 03/16/2023 06:17 AM    PHOS 2.3 03/15/2023 05:34 AM         Significant Diagnostic Studies    Radiology:   XR CHEST PORTABLE   Final Result   1.   Small right pleural effusion appears stable after removal of chest tube. More prominent right lower lobe airspace infiltrates could represent   atelectasis or pneumonia. XR CHEST PORTABLE   Final Result   Stable to mildly increased right pleural effusion with a right-sided chest   tube in place. Right upper extremity PICC tip projects over the distal SVC. XR CHEST PORTABLE   Final Result   Mildly increased right basilar airspace opacities. Right chest tube in place with unchanged right pleural effusion. FL MODIFIED BARIUM SWALLOW W VIDEO   Final Result   1. Positive mild laryngeal penetration without tracheal aspiration of nectar   thickened liquids and honey thickened liquids. This did not improve with   chin-tuck. 2. No evidence of laryngeal penetration or tracheal aspiration of purees or   soft solids. Please see separate speech pathology report for full discussion of findings   and recommendations. XR CHEST PORTABLE   Final Result   Decreased right pleural effusion status post chest tube placement. IR CHEST TUBE INSERTION   Final Result   Successful ultrasound and fluoroscopic guided placement of a right 14 Indonesian   locking pigtail chest tube into a right hemothorax. XR CHEST 1 VIEW   Final Result   No evidence of pneumothorax following right thoracentesis. Persistent right pleural effusion with associated consolidation, likely   atelectasis. US THORACENTESIS Which side should the procedure be performed? Right   Final Result   Successful ultrasound guided right thoracentesis. Thinly septated right pleural effusion with bloody pleural fluid removed with   thoracentesis, consistent with a hemothorax. CT CHEST PULMONARY EMBOLISM W CONTRAST   Final Result   Chest      No central pulmonary embolus identified      Bilateral pleural effusions, right greater than left with adjacent   consolidation of the lungs, either due to atelectasis or pneumonia. Scattered ground-glass opacity in tree-in-bud nodularity seen in the aerated   portion of the lungs, likely postinflammatory-infectious. Scattered areas of   bronchial wall thickening are seen      Abdomen and pelvis      No acute intra-abdominal abnormality         CT ABDOMEN PELVIS W IV CONTRAST Additional Contrast? None   Final Result   Chest      No central pulmonary embolus identified      Bilateral pleural effusions, right greater than left with adjacent   consolidation of the lungs, either due to atelectasis or pneumonia. Scattered ground-glass opacity in tree-in-bud nodularity seen in the aerated   portion of the lungs, likely postinflammatory-infectious. Scattered areas of   bronchial wall thickening are seen      Abdomen and pelvis      No acute intra-abdominal abnormality         XR CHEST PORTABLE   Final Result   Mild congestion. Small right pleural effusion. Bibasilar compressive   atelectasis versus infiltrates. Consults:     PHARMACY TO DOSE VANCOMYCIN  IP CONSULT TO NEPHROLOGY  IP CONSULT TO CARDIOLOGY  IP CONSULT TO PULMONOLOGY  IP CONSULT TO INFECTIOUS DISEASES  IP CONSULT TO PHARMACY    Disposition:  SNF    Condition at Discharge: Stable    Discharge Instructions/Follow-up:  IV Ertapenem for 14 days. Code Status:  Full Code     Activity: activity as tolerated    Diet: cardiac diet      Discharge Medications:     Current Discharge Medication List             Details   Respiratory Therapy Supplies (NEBULIZER/TUBING/MOUTHPIECE) KIT With full face mask.   Qty: 1 kit, Refills: 0    Associated Diagnoses: Chronic bronchitis, simple (HCC)      albuterol (PROVENTIL) (2.5 MG/3ML) 0.083% nebulizer solution USE 1 VIAL IN NEBULIZER 4 TIMES DAILY  Qty: 120 each, Refills: 11      benzonatate (TESSALON) 200 MG capsule Take 1 capsule by mouth 3 times daily as needed for Cough  Qty: 90 capsule, Refills: 0    Associated Diagnoses: Pneumonia due to infectious organism, unspecified laterality, unspecified part of lung      fluticasone-salmeterol (ADVAIR) 100-50 MCG/ACT AEPB diskus inhaler Inhale 1 puff into the lungs in the morning and 1 puff in the evening. Qty: 1 each, Refills: 3      albuterol sulfate HFA (PROVENTIL;VENTOLIN;PROAIR) 108 (90 Base) MCG/ACT inhaler Inhale 2 puffs into the lungs every 4 hours as needed for Wheezing  Qty: 1 each, Refills: 3    Associated Diagnoses: Chronic bronchitis, simple (HCC)      Roflumilast (DALIRESP) 500 MCG tablet Take 1 tablet by mouth daily  Qty: 30 tablet, Refills: 3      ipratropium-albuterol (DUONEB) 0.5-2.5 (3) MG/3ML SOLN nebulizer solution Inhale 3 mLs into the lungs every 6 hours as needed for Shortness of Breath  Qty: 360 mL, Refills: 3      pramipexole (MIRAPEX) 0.25 MG tablet Take 0.25 mg by mouth 3 times daily      furosemide (LASIX) 40 MG tablet Take 1 tablet by mouth daily      flunisolide (NASALIDE) 25 MCG/ACT (0.025%) SOLN 2 sprays by Nasal route in the morning and at bedtime      Lidocaine 4 % OINT Apply topically As needed for pain      polyethylene glycol (GLYCOLAX) 17 GM/SCOOP powder Take 17 g by mouth daily as needed      Multiple Vitamins-Minerals (PRESERVISION AREDS 2+MULTI VIT PO) Take 1 tablet by mouth in the morning and at bedtime      XTAMPZA ER 36 MG C12A Take 36 mg by mouth in the morning and at bedtime.       lisinopril (PRINIVIL;ZESTRIL) 10 MG tablet Take 10 mg by mouth daily      dapagliflozin (FARXIGA) 10 MG tablet Take 5 mg by mouth in the morning and at bedtime      insulin 70-30 (HUMULIN;NOVOLIN) (70-30) 100 UNIT per ML injection vial Inject 25 Units into the skin 2 times daily      Multiple Vitamins-Minerals (THERAPEUTIC MULTIVITAMIN-MINERALS) tablet Take 1 tablet by mouth daily      apixaban (ELIQUIS) 5 MG TABS tablet Take 1 tablet by mouth 2 times daily  Qty: 60 tablet, Refills: 0      simvastatin (ZOCOR) 80 MG tablet Take 40 mg by mouth nightly      vitamin D (CHOLECALCIFEROL) 1000 UNITS TABS tablet Take 1,000 Units by mouth daily      gabapentin (NEURONTIN) 800 MG tablet Take 800 mg by mouth 4 times daily. aspirin 81 MG tablet Take 81 mg by mouth daily      metFORMIN (GLUCOPHAGE) 1000 MG tablet Take 1,000 mg by mouth 2 times daily (with meals)      carvedilol (COREG) 6.25 MG tablet Take 3.125 mg by mouth 2 times daily (with meals)             Time Spent on discharge: 35 mins in the examination, evaluation, counseling and review of medications and discharge plan. Signed: [place] MD   3/17/2023      Thank you Bao Melvin for the opportunity to be involved in this patient's care. If you have any questions or concerns, please feel free to contact me at [phone].

## 2023-03-17 NOTE — CARE COORDINATION
03/17/23 1241   IMM Letter   IMM Letter given to Patient/Family/Significant other/Guardian/POA/by: second IMM explained to pt, he verbalized understanding, copy Bahman Manuel RN   IMM Letter date given: 03/17/23   IMM Letter time given: 1239     IMM provided to patient at bedside by Rosa Hitchcock RN CM. Education provided to patient, they denied any questions and verbalized understanding. They are aware of 4 hours allotted to determine if they choose to pursue Medicare appeal process.     Rosa Hitchcock RN, BSN,   [phone]  Electronically signed by Rosa Hitchcock RN on 3/17/2023 at 12:41 PM

## 2023-03-17 NOTE — PROGRESS NOTES
Occupational Therapy  Facility/Department: [facility]  Occupational Therapy Daily Treatment    Name: Dedra Arnold  :   MRN: [id number]  Date of Service: 3/17/2023    Discharge Recommendations:  Home with assist PRN   Dedra Arnold scored a 20/24 on the AM-PAC ADL Inpatient form. At this time, no further OT is recommended upon discharge due to pt nearing baseline. Recommend patient returns to prior setting with prior services. Patient Diagnosis(es): The primary encounter diagnosis was Pneumonia of both lungs due to infectious organism, unspecified part of lung. Diagnoses of EWI (acute kidney injury) ([address] Elevated troponin, and Pleural effusion, bilateral were also pertinent to this visit. Past Medical History:  has a past medical history of Abrasion of right elbow, Arthritis, CAD (coronary artery disease), Chronic back pain, Chronic systolic CHF (congestive heart failure) ([address] COPD (chronic obstructive pulmonary disease) ([address] Dental disease, Diabetes mellitus ([address] Essential hypertension, Hearing loss, Paroxysmal atrial fibrillation ([address] and Tinnitus. Past Surgical History:  has a past surgical history that includes fracture surgery (Left, ); back surgery (); Cardiac surgery (); Coronary artery bypass graft; back surgery; Cervical spine surgery; chest tube insertion (Right, 2023); and IR CHEST TUBE INSERTION (3/14/2023). Treatment Diagnosis: Decreased functional mobility and ADLs    Assessment   Performance deficits / Impairments: Decreased functional mobility ; Decreased strength;Decreased endurance;Decreased ADL status; Decreased safe awareness;Decreased balance  Assessment: Pt is a 69 yo M admitted with pneumonia of both lungs d/t infectious organism. PTA, pt lived at home with his wife and was mod IND with ADLs and functional mobility using cane or RW in the house and a scooter outside of the home.  Today, pt is slightly below baseline, requiring up to SBA with functional mobility d/t safety concerns with the RW. Pt required up to mod A for ADL completion. OT will continue to see while on acute to address current deficits and to increase pt independence. Anticipate pt will be safe to return home with assist PRN once medically stable. Treatment Diagnosis: Decreased functional mobility and ADLs  Prognosis: Fair  Decision Making: Medium Complexity  REQUIRES OT FOLLOW-UP: Yes  Activity Tolerance  Activity Tolerance: Patient Tolerated treatment well        Plan   Occupational Therapy Plan  Times Per Week: 3-5  Times Per Day: Once a day  Specific Instructions for Next Treatment: Potential need to educate pt on ADL completion while self-managing IV as he may be getting home IV antibiotics. Current Treatment Recommendations: Strengthening, Balance training, Functional mobility training, Endurance training, ROM, Safety education & training, Self-Care / ADL     Restrictions  Restrictions/Precautions  Restrictions/Precautions: Fall Risk, Swallowing - Thickened Liquids  Position Activity Restriction  Other position/activity restrictions: Honey thick; soft bite sized; chest tube on suction    Subjective   General  Chart Reviewed: Yes  Patient assessed for rehabilitation services?: Yes  Additional Pertinent Hx: Pt is a 69 yo M admitted with pneumonia of both lungs d/t infectious organism. Family / Caregiver Present: No  Referring Practitioner: Dolly Dennis MD  Diagnosis: Pneumonia of both lungs  Subjective  Subjective: Pt met b/s for OT tx. Pt in recliner, agreeable to therapy. No c/o pain.      Social/Functional History  Social/Functional History  Lives With: Spouse  Type of Home: House  Home Layout: One level, Able to Live on Main level with bedroom/bathroom  Home Access: Stairs to enter with rails  Entrance Stairs - Number of Steps: 2  Entrance Stairs - Rails: Both  Bathroom Shower/Tub: Walk-in shower  Bathroom Toilet: Handicap height  Bathroom Equipment: Grab bars in shower, Built-in shower seat, Shower chair, Hand-held shower, Grab bars around toilet  Bathroom Accessibility: Wheelchair accessible  Home Equipment: Lift chair, Walker, rolling, Cane, Rollator, Electric scooter  ADL Assistance: Independent  Homemaking Assistance: Needs assistance  Ambulation Assistance: Independent (combination of RW, cane in home. scooter outside home)  Transfer Assistance: Independent  Active : Yes  Occupation: Retired  Type of Occupation: ,        Objective   Observation/Palpation  Observation: Pt noted to have a cup of ice chips in room - nsg aware. Safety Devices  Type of Devices: All fall risk precautions in place;Call light within reach;Gait belt;Patient at risk for falls; Left in chair;Chair alarm in place;Nurse notified     ADL  Feeding: Thickened liquids  Grooming: Stand by assistance  Grooming Skilled Clinical Factors: Pt washed face while seated at sink and brushed teeth in stance at sink SBA. UE Bathing: Stand by assistance  UE Bathing Skilled Clinical Factors: Pt sponge bathed while seated at sink SBA. LE Bathing: Stand by assistance  LE Bathing Skilled Clinical Factors: Pt sponge bathed while seated at sink SBA. UE Dressing: Minimal assistance  UE Dressing Skilled Clinical Factors: Pt changed t-shirt. Pt required min A to pull down new shirt in the back. LE Dressing: Moderate assistance  LE Dressing Skilled Clinical Factors: Pt donned briefs and socks with mod A. Pt requried assistance with threading clothing over feet and pulling up to thighs. Toileting: Stand by assistance  Toileting Skilled Clinical Factors: Pt voided urine on the commode CGA. Activity Tolerance  Activity Tolerance: Patient tolerated treatment well;Patient limited by endurance  Bed mobility  Supine to Sit: Unable to assess  Sit to Supine: Unable to assess  Bed Mobility Comments: Pt in recliner at start and end of session.   Transfers  Sit to stand: Stand by assistance  Stand to sit: Stand by assistance  Transfer Comments: Pt completed functional mobility from recliner>BR>recliner using RW SBA (~40 ft). Pt required verbal cues for walker safety when sitting.   Vision  Vision Exceptions: Wears glasses at all times  Hearing  Hearing Exceptions: Hard of hearing/hearing concerns  Cognition  Overall Cognitive Status: Exceptions  Safety Judgement: Decreased awareness of need for safety;Decreased awareness of need for assistance  Insights: Decreased awareness of deficits  Cognition Comment: decreased insight into current medical situation  Orientation  Overall Orientation Status: Within Functional Limits     Education Given To: Patient  Education Provided: Role of Therapy;Plan of Care;Transfer Training  Education Method: Demonstration;Verbal  Barriers to Learning: None  Education Outcome: Verbalized understanding;Continued education needed    AM-PAC Score  AM-PAC Inpatient Daily Activity Raw Score: 20 (03/17/23 1036)  AM-PAC Inpatient ADL T-Scale Score : 42.03 (03/17/23 1036)  ADL Inpatient CMS 0-100% Score: 38.32 (03/17/23 1036)  ADL Inpatient CMS G-Code Modifier : Fariha Morales (03/17/23 1036)    Goals  Short Term Goals  Time Frame for Short Term Goals: Prior to d/c; goals ongoing  Short Term Goal 1: Pt will complete grooming in stance at sink SBA: MET 3/17  Short Term Goal 2: Pt will complete toileting with supervision  Short Term Goal 3: Pt will complete dressing CGA  Short Term Goal 4: Pt will complete bathing with supervision: MET 3/17  Short Term Goal 5: Pt will demonstrate understanding of safe walker use when completing functional mobility  Long Term Goals  Time Frame for Long Term Goals : LTG=STG  Patient Goals   Patient goals : to return home       Therapy Time   Individual Concurrent Group Co-treatment   Time In 0930         Time Out 1025         Minutes 54                 Dayna Moe S/OT  Therapist was present, directed the patient's care, made skilled judgement, and was responsible for assessment and treatment of the patient.  Electronically signed by Colleen Garcia OT on 3/17/23 at 11:42 AM EDT

## 2023-03-17 NOTE — PROGRESS NOTES
Physical Therapy  Facility/Department: [facility]  Physical Therapy Initial Assessment    Name: Peter Marks  :   MRN: [id number]  Date of Service: 3/17/2023    Discharge Recommendations:  24 hour supervision or assist, Home with Home health PT          Patient Diagnosis(es): The primary encounter diagnosis was Pneumonia of both lungs due to infectious organism, unspecified part of lung. Diagnoses of WEI (acute kidney injury) ([address] Elevated troponin, and Pleural effusion, bilateral were also pertinent to this visit. Past Medical History:  has a past medical history of Abrasion of right elbow, Arthritis, CAD (coronary artery disease), Chronic back pain, Chronic systolic CHF (congestive heart failure) ([address] COPD (chronic obstructive pulmonary disease) ([address] Dental disease, Diabetes mellitus ([address] Essential hypertension, Hearing loss, Paroxysmal atrial fibrillation ([address] and Tinnitus. Past Surgical History:  has a past surgical history that includes fracture surgery (Left, ); back surgery (); Cardiac surgery (); Coronary artery bypass graft; back surgery; Cervical spine surgery; chest tube insertion (Right, 2023); and IR CHEST TUBE INSERTION (3/14/2023). Assessment   Body Structures, Functions, Activity Limitations Requiring Skilled Therapeutic Intervention: Decreased functional mobility ; Decreased ADL status; Decreased strength;Decreased safe awareness;Decreased balance;Decreased endurance  Assessment: Pt is a 68 y.o. male who presented to the ED on 3/13/23 with c/o not feeling well for the last few days. He reports severe weakness, he was unable to perform his ADLs, and has been having productive cough and his significant shortness of breath with exertion. Pt diagnosed with pneumonia. Prior to admission, pt living with spouse in home setting, independent with ADLs, use of various device with ambulation. Pt currently functioning slightly below baseline.  Anticipate return home with with 24hr supv and level 1 HHPT. As in previous admission, continue to recommned AFO for RLE to aid in ground clearance. Treatment Diagnosis: impaired mobility  Therapy Prognosis: Fair  Decision Making: Medium Complexity  History: see above  Exam: see below  Clinical Presentation: evolving  Activity Tolerance  Activity Tolerance: Patient tolerated treatment well;Patient limited by endurance     Plan   Physcial Therapy Plan  General Plan: 3-5 times per week  Current Treatment Recommendations: Strengthening, Balance training, Functional mobility training, Transfer training, Endurance training, Gait training, Neuromuscular re-education, Patient/Caregiver education & training, Safety education & training, Equipment evaluation, education, & procurement  Safety Devices  Type of Devices: All fall risk precautions in place, Call light within reach, Gait belt, Patient at risk for falls, Left in chair, Chair alarm in place, Nurse notified     Restrictions  Restrictions/Precautions  Restrictions/Precautions: Fall Risk, Swallowing - Thickened Liquids  Position Activity Restriction  Other position/activity restrictions: Honey thick; soft bite sized; chest tube on suction     Subjective   General  Chart Reviewed: Yes  Patient assessed for rehabilitation services?: Yes  Additional Pertinent Hx: Pt is a 68 y.o. male who presented to the ED on 3/13/23 with c/o not feeling well for the last few days. He reports severe weakness, he was unable to perform his ADLs, and has been having productive cough and his significant shortness of breath with exertion. Pt diagnosed with pneumonia. Response To Previous Treatment: Patient with no complaints from previous session. Family / Caregiver Present: No  Referring Practitioner: Majo Jacome MD  Referral Date : 03/13/23  Diagnosis: Pneumonia  Follows Commands: Within Functional Limits  Subjective  Subjective: Pt is agreeable to PT.          Social/Functional History  Social/Functional History  Lives With: Spouse  Type of Home: House  Home Layout: One level, Able to Live on Main level with bedroom/bathroom  Home Access: Stairs to enter with rails  Entrance Stairs - Number of Steps: 2  Entrance Stairs - Rails: Both  Bathroom Shower/Tub: Walk-in shower  Bathroom Toilet: Handicap height  Bathroom Equipment: Grab bars in shower, Built-in shower seat, Shower chair, Hand-held shower, Grab bars around toilet  Bathroom Accessibility: Wheelchair accessible  Home Equipment: Lift chair, Walker, rolling, Cane, Rollator, Electric scooter  ADL Assistance: Independent  Homemaking Assistance: Needs assistance  Ambulation Assistance: Independent (combination of RW, cane in home. scooter outside home)  Transfer Assistance: Independent  Active : Yes  Occupation: Retired  Type of Occupation: ,     Vision/Hearing  Vision  Vision Exceptions: Wears glasses at all times  Hearing  Hearing Exceptions: Hard of hearing/hearing concerns      Cognition   Orientation  Overall Orientation Status: Within Functional Limits  Cognition  Overall Cognitive Status: Exceptions  Safety Judgement: Decreased awareness of need for safety;Decreased awareness of need for assistance  Insights: Decreased awareness of deficits  Cognition Comment: decreased insight into current medical situation     Objective   Observation/Palpation  Observation: Pt noted to have a cup of ice chips in room - nsg aware.   Gross Assessment  Strength: Generally decreased, functional (hx of CVA with R sided weakness/spasticity)     Bed mobility  Supine to Sit: Minimal assistance  Sit to Supine: Unable to assess (in recliner at end of session)  Transfers  Sit to Stand: Contact guard assistance  Stand to Sit: Contact guard assistance  Comment: Cues for hand placement  Ambulation  Surface: Level tile  Device: Rolling Walker  Assistance: Contact guard assistance  Gait Deviations: Slow Emely;Decreased step length;Decreased step height  Distance: 13' + 36' + 50'  Comments: Multiple seated rest breaks.      Balance  Posture: Good  Sitting - Static: Good  Sitting - Dynamic: Good  Standing - Static: Fair;+ (@RW)  Standing - Dynamic: Fair (@RW)           AM-PAC Score  AM-PAC Inpatient Mobility Raw Score : 19 (03/17/23 0838)  AM-PAC Inpatient T-Scale Score : 45.44 (03/17/23 3099)  Mobility Inpatient CMS 0-100% Score: 41.77 (03/17/23 7624)  Mobility Inpatient CMS G-Code Modifier : CK (03/17/23 9757)         Goals  Short Term Goals  Time Frame for Short Term Goals: by acute discharge - all goals ongoing as of 3/17/23  Short Term Goal 1: bed mobility with SBA  Short Term Goal 2: sit<>stand with SBA  Short Term Goal 3: ambulate > 40' with RW and SBA  Patient Goals   Patient Goals : none stated       Education  Patient Education  Education Given To: Patient  Education Provided: Role of Therapy;Plan of Care  Education Method: Verbal  Barriers to Learning: None  Education Outcome: Verbalized understanding;Continued education needed      Therapy Time   Individual Concurrent Group Co-treatment   Time In 0730         Time Out 0830         Minutes 60         Timed Code Treatment Minutes: 60 Minutes       Kelsey Velasco PT

## 2023-03-17 NOTE — PLAN OF CARE
Problem: ABCDS Injury Assessment  Goal: Absence of physical injury  Outcome: Progressing     Problem: Safety - Adult  Goal: Free from fall injury  Outcome: Progressing     Problem: Discharge Planning  Goal: Discharge to home or other facility with appropriate resources  Outcome: Progressing  Flowsheets (Taken 3/16/2023 2200)  Discharge to home or other facility with appropriate resources:   Identify barriers to discharge with patient and caregiver   Arrange for needed discharge resources and transportation as appropriate   Identify discharge learning needs (meds, wound care, etc)   Refer to discharge planning if patient needs post-hospital services based on physician order or complex needs related to functional status, cognitive ability or social support system     Problem: Pain  Goal: Verbalizes/displays adequate comfort level or baseline comfort level  Outcome: Progressing     Problem: Chronic Conditions and Co-morbidities  Goal: Patient's chronic conditions and co-morbidity symptoms are monitored and maintained or improved  Outcome: Progressing  Flowsheets (Taken 3/16/2023 2200)  Care Plan - Patient's Chronic Conditions and Co-Morbidity Symptoms are Monitored and Maintained or Improved:   Monitor and assess patient's chronic conditions and comorbid symptoms for stability, deterioration, or improvement   Collaborate with multidisciplinary team to address chronic and comorbid conditions and prevent exacerbation or deterioration     Problem: Respiratory - Adult  Goal: Achieves optimal ventilation and oxygenation  Outcome: Progressing  Flowsheets (Taken 3/16/2023 2200)  Achieves optimal ventilation and oxygenation:   Assess for changes in respiratory status   Assess for changes in mentation and behavior   Position to facilitate oxygenation and minimize respiratory effort   Encourage broncho-pulmonary hygiene including cough, deep breathe, incentive spirometry   Assess and instruct to report shortness of breath or any respiratory difficulty   Respiratory therapy support as indicated

## 2023-03-17 NOTE — PLAN OF CARE
Problem: ABCDS Injury Assessment  Goal: Absence of physical injury  3/17/2023 0758 by Rossy Perez  Outcome: Progressing  Flowsheets (Taken 3/17/2023 0330 by Pilar Medina RN)  Absence of Physical Injury: Implement safety measures based on patient assessment  3/17/2023 0329 by Pilar Medina RN  Outcome: Progressing     Problem: Safety - Adult  Goal: Free from fall injury  3/17/2023 0758 by Rossy Perez  Outcome: Progressing  Flowsheets (Taken 3/17/2023 0330 by Pilar Medina RN)  Free From Fall Injury: Instruct family/caregiver on patient safety  3/17/2023 0329 by Pilar Medina RN  Outcome: Progressing     Problem: Discharge Planning  Goal: Discharge to home or other facility with appropriate resources  3/17/2023 0758 by Rossy Perez  Outcome: Progressing  3/17/2023 0329 by Pilar Medina RN  Outcome: Progressing  Flowsheets (Taken 3/16/2023 2200)  Discharge to home or other facility with appropriate resources:   Identify barriers to discharge with patient and caregiver   Arrange for needed discharge resources and transportation as appropriate   Identify discharge learning needs (meds, wound care, etc)   Refer to discharge planning if patient needs post-hospital services based on physician order or complex needs related to functional status, cognitive ability or social support system     Problem: Pain  Goal: Verbalizes/displays adequate comfort level or baseline comfort level  3/17/2023 0758 by Rossy Perez  Outcome: Progressing  3/17/2023 0329 by Pilar Medina RN  Outcome: Progressing     Problem: Chronic Conditions and Co-morbidities  Goal: Patient's chronic conditions and co-morbidity symptoms are monitored and maintained or improved  3/17/2023 0758 by Rossy Perez  Outcome: Progressing  3/17/2023 0329 by Pilar Medina RN  Outcome: Progressing  Flowsheets (Taken 3/16/2023 2200)  Care Plan - Patient's Chronic Conditions and Co-Morbidity Symptoms are Monitored and Maintained or Improved:   Monitor and assess patient's chronic conditions and comorbid symptoms for stability, deterioration, or improvement   Collaborate with multidisciplinary team to address chronic and comorbid conditions and prevent exacerbation or deterioration     Problem: Respiratory - Adult  Goal: Achieves optimal ventilation and oxygenation  3/17/2023 0758 by Carmen Adams  Outcome: Progressing  3/17/2023 0329 by Alton Chaves RN  Outcome: Progressing  Flowsheets (Taken 3/16/2023 2200)  Achieves optimal ventilation and oxygenation:   Assess for changes in respiratory status   Assess for changes in mentation and behavior   Position to facilitate oxygenation and minimize respiratory effort   Encourage broncho-pulmonary hygiene including cough, deep breathe, incentive spirometry   Assess and instruct to report shortness of breath or any respiratory difficulty   Respiratory therapy support as indicated

## 2023-03-17 NOTE — PROGRESS NOTES
[facility]   Speech Therapy  Daily Dysphagia Treatment Note    Herbert Rojas  AGE: 68 y.o.    GENDER: male  : 1945  [id number]  EPISODE DATE:  3/13/2023    Patient Active Problem List   Diagnosis    Diabetes mellitus ([facility]    Atherosclerosis of native coronary artery of native heart without angina pectoris    Morbid obesity due to excess calories ([facility])    Sepsis ([facility])    SOB (shortness of breath)    Abnormal chest CT    Back pain    Diabetes mellitus type 2, uncontrolled    Chronic back pain    Chronic combined systolic (congestive) and diastolic (congestive) heart failure ([facility])    Hyperlipidemia    Primary hypertension    Leukocytosis    Chronic bronchitis, simple ([facility])    Chronic rhinitis    Generalized weakness    WEI (acute kidney injury) ([facility]    DMII (diabetes mellitus, type 2) ([facility])    Supratherapeutic INR    Tachycardia    Tachypnea    Non morbid obesity due to excess calories    Fatigue    Cough productive of clear sputum    History of coronary artery bypass graft    Abrasion of right elbow    Acute respiratory failure with hypoxia ([facility])    Paroxysmal atrial fibrillation ([facility])    Chronic pulmonary aspiration    Parapneumonic effusion    Elevated troponin    Pleural effusion, bilateral    Longstanding persistent atrial fibrillation ([facility])    Enterobacter cloacae pneumonia ([facility])    ESBL (extended spectrum beta-lactamase) producing bacteria infection    Gram-negative pneumonia ([facility])    Abnormal CT of the chest    Pneumonia of both lungs due to infectious organism    Elevated erythrocyte sedimentation rate    CRP elevated     Allergies   Allergen Reactions    No Known Allergies        Treatment Diagnosis: Dysphagia     CHART REVIEW:  3/13/2023 admitted with c/o abdominal pain and fatigue  MD ADMISSION H&P HPI:  80-year-old male with past medical history significant for CAD, COPD, insulin-dependent diabetes, atrial fibrillation presents to the hospital with complaints of weakness and cough. Patient reports not feeling well for the last few days. He reports severe weakness, he was unable to perform his ADLs, and has been having productive cough and his significant shortness of breath with exertion. Denies lower extremity swelling. Patient saw his pulmonologist last week. CT of the chest demonstrated large right pleural effusion, with consolidation, concerning for pneumonia. IMAGING:  CXR: 3/13/2023  Impression   No evidence of pneumothorax following right thoracentesis. Persistent right pleural effusion with associated consolidation, likely   atelectasis. CT CHEST: 3/13/2023  Impression   Chest       No central pulmonary embolus identified       Bilateral pleural effusions, right greater than left with adjacent   consolidation of the lungs, either due to atelectasis or pneumonia. Scattered ground-glass opacity in tree-in-bud nodularity seen in the aerated   portion of the lungs, likely postinflammatory-infectious. Scattered areas of   bronchial wall thickening are seen      Modified Barium Swallow Study: 3/15/2023  Mild-moderate oral stage dysphagia characterized by decreased mastication and decreased lingual manipulation. Prolonged but adequate mastication with textured solids. Prolonged bolus formation and movement with solids. Reduced bolus control with premature bolus loss to the pharynx over the tongue base with all. Moderate-severe pharyngeal stage dysphagia characterized by delayed swallow initiation, decreased laryngeal elevation, decreased pharyngeal peristalsis, and reduced sensation. Premature spillage to the valleculae with all.    Occasional shallow penetration with honey via cup appears to clear; penetration with honey via straw is not cleared  Penetration that does not clear with nectar via cup using small sips (which is a decline from 2/7/2023 MBS); chin tuck attempted but not beneficial resulting in silent aspiration; protective cough does not clear penetrate which remains a decline from prior studies in 2018 and 2019. Vallecular residue with soft/bite-size requires max effort to clear which appears to be an additional decline since 2/7/2023. Recommend minced/moist diet texture with honey thick liquids; meds crushed in puree/pudding. Swallow function appears slightly declined compared to prior study which may be 2/2 deconditioning with current hospital re-admission?      History/Prior Level of Function:   Living Status: admitted from home with wife  Baseline diet: patient endorses continued soft foods and thick liquids at home  Prior Dysphagia History: initially seen for dysphagia in 2018 with silent aspiration dating back to then; 2/7/2023 MBS rec for downgrade to soft/honey; patient dc'd home that day with pt/fam ed completed prior to dc; active with home ST; 3/7/2023 OP Pulm notes: working with SLP and using thickener      Subjective:     Current Diet Level: Dysphagia II Minced and Moist ; Moderately (honey) thick liquids      Comments regarding tolerating Current Diet: patient verbalizes strong distaste for diet/liquids      Objective:     Pain: Denies               Vision Exceptions: Wears glasses at all times  Hearing Exceptions: Hard of hearing/hearing concerns    Cognitive/behavioral/communication:   Oriented to [x] self [x] place [] date [x] situation  [x]alert []lethargic  [x]cooperative []self-limiting   []confused   []distractible []agitated []impulsive  [x]verbally responsive []nonverbal []limited verbal responses  [x]follows one step commands []does not follow dx []follows complex commands  []aphasic []dysarthric  [] other:     Respiratory Status: [x]Room Air []O2 via nasal cannula []Other:  Dentition: []Adequate [x]Dentures []Missing Many Teeth []Edentulous []Other:    Patient Positioning: Upright in chair    PO Trials:  Honey Thick liquids: no overt signs/symptoms of penetration/aspiration  Puree: no overt signs/symptoms of penetration/aspiration   Minced food: no overt signs/symptoms of penetration/aspiration    Dysphagia Tx:   Direct Dysphagia tx: PO trials as described above; requires reinforcement for diet rationale  Dysphagia ex: completed lingual raise, exaggerated yawn, glottals, nafisa, pitch swing, falsetto with mod imp  Training in compensatory strategies: needs continued reinforcement  Pt response to ex/training: patient receptive to education, but easily agitated/frustrated by current recs with tendency to be somewhat nonreceptive to dysphagia learning d/t frustration and reduced insight; current recs discussed with wife via telephone: wife reports concern for low likelihood for diet compliance at home - discussed modifying diet as much as able as a benefit over not modifying at all; also discussed rec to consider review of goals of care if patient unable to remain compliant    Goals: The patient will tolerate recommended diet without observed clinical signs of aspiration, continue  The patient/caregiver will demonstrate understanding of compensatory strategies for improved swallowing safety. Continue  The patient will improve swallow function via swallow ex completed 10/10 continue    Assessment:   Impressions:   Patient alert and cooperative; oriented to self, place, situation; disoriented to year, month, date; verbally responsive; follows dx; easily frustrated by dysphagia. Mild-moderate oral stage dysphagia characterized by prolonged but adequate mastication and lingula manipulation for bolus formation and movement with concern for potential for premature bolus loss to the pharynx with all PO trials. Concern for residual moderate-severe pharyngeal stage dysphagia characterized by potential delayed swallow and decreased laryngeal elevation with known history of silent aspiration. Suspect comparable to above noted MBS. Completed swallow ex with mod impairment.   Receptive to dysphagia education but easily frustrated by current recs with tendency to present with suspected reduced flexibility for increasing insight to dysphagia. Handouts with current results/recs/diet description provided. Recommended Diet and Intervention 3/17/2023:  Diet Solids Recommendation:  Dysphagia II Minced and Moist  Liquid Consistency Recommendation:  Moderately (honey) thick liquids  Recommended form of Meds: Meds crushed as able in puree      Compensatory Swallowing Strategies:  Upright as possible for all oral intake;Remain upright for 30-45 minutes after meals;Small bites/sips;Swallow 2 times per bite/sip    EDUCATION:   Provided education regarding role of SLP, results of assessment, recommendations and general speech pathology plan of care. [] Pt verbalized understanding and agreement   [x] Pt requires ongoing learning   [] No evidence of comprehension     Dysphagia Prognosis: [] good []fair []poor [x]Guarded d/t severity of impairments and time post initial dysphagia onset    Plan:     Continue Dysphagia Therapy: YES    Interventions: Bolus Control Exercise, Laryngeal Exercises , Pharyngeal Exercise, Diet Tolerance Monitoring , Oral Motor Exercises , Patient/Family Education , Therapeutic Trials with SLP   Duration/Frequency of therapy while on unit: Speech therapy for dysphagia tx 3-5 times per week during acute care stay. Discharge Instructions:   Recommend ongoing SLP for dysphagia therapy upon discharge from hospital     This note serves as a D/C Summary in the event that this patient is discharged prior to the next therapy session.     Coded treatment time: 15  Total treatment time: 25    Electronically signed by JASIEL Prather on 3/17/2023 at 12:35 PM

## 2023-03-17 NOTE — CARE COORDINATION
Chest tube removed this am & pulm ok with dc. Reached out to Dr Tiesha Hardwick & he is ok to dc pt today. [id number] [address] aware; will call script to [facility]. Case Management Discharge Note          Date / Time of Note: 3/17/2023 12:31 PM                  Patient Name: Magdiel Valdez   YOB: 1945  Diagnosis: Pneumonia due to organism [J18.9]  Elevated troponin [R77.8]  Pleural effusion, bilateral [J90]  WEI (acute kidney injury) ([address] [N17.9]  Pneumonia of both lungs due to infectious organism, unspecified part of lung [J18.9]   Date / Time: 3/13/2023  6:44 AM    Financial:  Payor: MEDICARE / Plan: MEDICARE PART A AND B / Product Type: *No Product type* /      Pharmacy:    [facility] [address] [place] [id number]  Phone: [phone] Fax: [address] [place] [address], [address] [place] [address]. [facility] [phone] -  [phone]  [facility] [address]  Phone: [phone] Fax: [address] [phone] Catherine Farmerut [phone]  [address] [address]  Phone: [phone] Fax: [phone]      Assistance purchasing medications?: Potential Assistance Purchasing Medications: No  Assistance provided by Case Management: None at this time    DISCHARGE Disposition: Home with [facility] Completed: Yes    Home Care:  Home Care ordered at discharge: Yes  [address]: Husam Muniz  Phone: [phone]  Fax: [phone]  Orders faxed: Yes    [address] at home, first dose here, has PICC, script called to pharmacy    Transportation:  Transportation PLAN for discharge: family       IMM Completed:   Yes, Case management has presented and reviewed IMM letter #2.    IMM Letter given to Patient/Family/Significant other/Guardian/POA/by[de-identified] (P) second IMM explained to pt, he verbalized understanding, copy Felipa Blunt RN   IMM Letter date given[de-identified] (P) 03/17/23  IMM Letter time given[de-identified] (P) 1239. Patient and/or family/POA verbalized understanding of their medicare rights and appeal process if needed. Patient and/or family/POA has signed, initialed and placed the date and time on IMM letter #2 on the the appropriate lines. Copy of letter offered and they are aware that the original copy of IMM letter #2 is available prior to discharge from the paper chart on the unit. Electronic documentation has been entered into epic for IMM letter #2 and original paper copy has been added to the paper chart at the nurses station. Additional CM Notes: dc home [facility]ICC line & [facility] & followed by [facility], wife will transport    The Plan for Transition of Care is related to the following treatment goals of Pneumonia due to organism [J18.9]  Elevated troponin [R77.8]  Pleural effusion, bilateral [J90]  WEI (acute kidney injury) ([address] [N17.9]  Pneumonia of both lungs due to infectious organism, unspecified part of lung [J18.9]    The Patient and/or patient representative Vj Tovar and his family were provided with a choice of provider and agrees with the discharge plan Yes    Freedom of choice list was provided with basic dialogue that supports the patient's individualized plan of care/goals and shares the quality data associated with the providers.  Declined, wanted to use [facility]  agency they have had in the past    Zander Reagan RN, BSN,   [phone]  Electronically signed by Zander Reagan RN on 3/17/2023 at 12:43 PM

## 2023-03-17 NOTE — PROGRESS NOTES
Hospitalist Progress Note      PCP: Oralia Ty    Date of Admission: 3/13/2023    Chief Complaint:   Chief Complaint   Patient presents with    Abdominal Pain     Says pain started when he began \"coughing real bad\" a few days ago. Fatigue       Hospital Course: 49-year-old male with past medical history significant for CAD, COPD, insulin-dependent diabetes, atrial fibrillation presents to the hospital with complaints of weakness and cough. Patient reports not feeling well for the last few days. He reports severe weakness, he was unable to perform his ADLs, and has been having productive cough and his significant shortness of breath with exertion. Denies lower extremity swelling. Patient saw his pulmonologist last week. CT of the chest demonstrated large right pleural effusion, with consolidation, concerning for pneumonia. Subjective:     S/p chest tube on 3/14  Chest tube removed this am.   Pt has mild dyspnea on exertion. Pt has occasional cough with whitish sputum.      Medications:  Reviewed    Infusion Medications    sodium chloride      lactated ringers IV soln Stopped (03/14/23 0953)    dextrose       Scheduled Medications    hydrALAZINE  25 mg Oral 3 times per day    meropenem  1,000 mg IntraVENous Q8H    sennosides-docusate sodium  1 tablet Oral Daily    carvedilol  25 mg Oral BID WC    aspirin  81 mg Oral Daily    pramipexole  0.25 mg Oral TID    pramipexole  0.25 mg Oral Nightly    Roflumilast  500 mcg Oral Daily    sodium chloride flush  5-40 mL IntraVENous 2 times per day    insulin glargine  0.15 Units/kg SubCUTAneous Nightly    insulin lispro  0-8 Units SubCUTAneous TID     insulin lispro  0-4 Units SubCUTAneous Nightly    [Held by provider] enoxaparin  1 mg/kg SubCUTAneous Daily    ipratropium-albuterol  1 ampule Inhalation Q4H WA    budesonide  0.5 mg Nebulization BID    atorvastatin  40 mg Oral Nightly    sodium chloride (Inhalant)  4 mL Nebulization Q4H WA gabapentin  100 mg Oral TID     PRN Meds: loperamide, HYDROmorphone, HYDROcodone-acetaminophen, sodium chloride flush, sodium chloride, ondansetron **OR** ondansetron, polyethylene glycol, acetaminophen **OR** acetaminophen, glucose, dextrose bolus **OR** dextrose bolus, glucagon (rDNA), dextrose      Intake/Output Summary (Last 24 hours) at 3/17/2023 0950  Last data filed at 3/17/2023 9386  Gross per 24 hour   Intake 1074.82 ml   Output 75 ml   Net 999.82 ml       Physical Exam Performed:    /64   Pulse 76   Temp 97.7 °F (36.5 °C) (Oral)   Resp 18   Ht 5' 11\" (1.803 m)   Wt 222 lb 0.1 oz (100.7 kg)   SpO2 96%   BMI 30.96 kg/m²     General appearance: No apparent distress, appears stated age and cooperative. HEENT: Pupils equal, round, and reactive to light. Conjunctivae/corneas clear. Neck: Supple, with full range of motion. No jugular venous distention. Trachea midline. Respiratory:  Normal respiratory effort. Clear to auscultation, bilaterally without Rales/Wheezes/Rhonchi. Cardiovascular: Regular rate and rhythm with normal S1/S2 without murmurs, rubs or gallops. Abdomen: Soft, non-tender, non-distended with normal bowel sounds. Musculoskeletal: No clubbing, cyanosis or edema bilaterally. Full range of motion without deformity. Skin: Skin color, texture, turgor normal.  No rashes or lesions. Neurologic:  Neurovascularly intact without any focal sensory/motor deficits.  Cranial nerves: II-XII intact, grossly non-focal.  Psychiatric: Alert and oriented, thought content appropriate, normal insight  Capillary Refill: Brisk, 3 seconds, normal   Peripheral Pulses: +2 palpable, equal bilaterally       Labs:   Recent Labs     03/15/23  0534 03/16/23  0617   WBC 9.2 9.5   HGB 12.4* 12.8*   HCT 37.4* 38.3*    405     Recent Labs     03/15/23  0534 03/16/23  0617   * 144   K see below  3.7 3.7   CL 99 105   CO2 21 23   BUN 17 15   CREATININE 0.8 0.8   CALCIUM 9.0 8.9   PHOS 2.3*  -- No results for input(s): AST, ALT, BILIDIR, BILITOT, ALKPHOS in the last 72 hours. No results for input(s): INR in the last 72 hours. No results for input(s): [place] Bounds in the last 72 hours. Urinalysis:      Lab Results   Component Value Date/Time    NITRU Negative 02/04/2023 08:12 PM    WBCUA 1 04/13/2019 07:57 AM    BACTERIA Rare 07/11/2018 01:27 AM    RBCUA 1 04/13/2019 07:57 AM    BLOODU Negative 02/04/2023 08:12 PM    SPECGRAV 1.018 02/04/2023 08:12 PM    GLUCOSEU >=1000 02/04/2023 08:12 PM       Radiology:  XR CHEST PORTABLE   Final Result   Stable to mildly increased right pleural effusion with a right-sided chest   tube in place. Right upper extremity PICC tip projects over the distal SVC. XR CHEST PORTABLE   Final Result   Mildly increased right basilar airspace opacities. Right chest tube in place with unchanged right pleural effusion. FL MODIFIED BARIUM SWALLOW W VIDEO   Final Result   1. Positive mild laryngeal penetration without tracheal aspiration of nectar   thickened liquids and honey thickened liquids. This did not improve with   chin-tuck. 2. No evidence of laryngeal penetration or tracheal aspiration of purees or   soft solids. Please see separate speech pathology report for full discussion of findings   and recommendations. XR CHEST PORTABLE   Final Result   Decreased right pleural effusion status post chest tube placement. IR CHEST TUBE INSERTION   Final Result   Successful ultrasound and fluoroscopic guided placement of a right 14 Croatian   locking pigtail chest tube into a right hemothorax. XR CHEST 1 VIEW   Final Result   No evidence of pneumothorax following right thoracentesis. Persistent right pleural effusion with associated consolidation, likely   atelectasis. US THORACENTESIS Which side should the procedure be performed? Right   Final Result   Successful ultrasound guided right thoracentesis. Thinly septated right pleural effusion with bloody pleural fluid removed with   thoracentesis, consistent with a hemothorax. CT CHEST PULMONARY EMBOLISM W CONTRAST   Final Result   Chest      No central pulmonary embolus identified      Bilateral pleural effusions, right greater than left with adjacent   consolidation of the lungs, either due to atelectasis or pneumonia. Scattered ground-glass opacity in tree-in-bud nodularity seen in the aerated   portion of the lungs, likely postinflammatory-infectious. Scattered areas of   bronchial wall thickening are seen      Abdomen and pelvis      No acute intra-abdominal abnormality         CT ABDOMEN PELVIS W IV CONTRAST Additional Contrast? None   Final Result   Chest      No central pulmonary embolus identified      Bilateral pleural effusions, right greater than left with adjacent   consolidation of the lungs, either due to atelectasis or pneumonia. Scattered ground-glass opacity in tree-in-bud nodularity seen in the aerated   portion of the lungs, likely postinflammatory-infectious. Scattered areas of   bronchial wall thickening are seen      Abdomen and pelvis      No acute intra-abdominal abnormality         XR CHEST PORTABLE   Final Result   Mild congestion. Small right pleural effusion. Bibasilar compressive   atelectasis versus infiltrates.          XR CHEST PORTABLE    (Results Pending)       PHARMACY TO DOSE VANCOMYCIN  IP CONSULT TO NEPHROLOGY  IP CONSULT TO CARDIOLOGY  IP CONSULT TO PULMONOLOGY  IP CONSULT TO INFECTIOUS DISEASES    Assessment/Plan:    Active Hospital Problems    Diagnosis     Elevated erythrocyte sedimentation rate [R70.0]      Priority: Medium    CRP elevated [R79.82]      Priority: Medium    Enterobacter cloacae pneumonia ([address] [J15.6]      Priority: Medium    ESBL (extended spectrum beta-lactamase) producing bacteria infection [A49.9, Z16.12]      Priority: Medium    Gram-negative pneumonia ([address] [J15.6]      Priority: Medium    Abnormal CT of the chest [R93.89]      Priority: Medium    Pneumonia of both lungs due to infectious organism [J18.9]      Priority: Medium    Longstanding persistent atrial fibrillation (HCC) [I48.11]      Priority: Medium    Parapneumonic effusion [J18.9, J91.8]      Priority: Medium    Elevated troponin [R77.8]      Priority: Medium    Pleural effusion, bilateral [J90]      Priority: Medium    Acute respiratory failure with hypoxia (HCC) [J96.01]      Priority: Medium    WEI (acute kidney injury) ([facility] [N17.9]     Primary hypertension [I10]     Atherosclerosis of native coronary artery of native heart without angina pectoris [I25.10]      Pneumonia complicated with right pleural effusion              Pneumonia molecular panel positive for Enterobacter ESBL, switch to meropenem.   MRSA probe is negative   Status post thoracentesis on 3/13, patient with a lot of PRBCs concerning for malignant pleural effusion versus hemothorax,     S/p Chest tube placement on 3/14,  Chest tube removed on 3/17/23                Scheduled bronchodilators   Follow up with cytology to rule out malignancy      Atrial fibrillation- persistent/paroxysmal   Rate controlled; [id number]: > 2  Hold Eliquis due to procedure/surgery; lovenox 1mg /kg daily (ON HOLD)  Resume antiarrhythmic medication     Type 2 diabetes              A1c: 7.5  Euglycemic  Hold p.o. medicines  Monitor with Accu-Cheks ACHS  Sliding scale as needed  Add Lantus    Acute COPD exacerbation              Chest x-ray: PNA              Continue oxygen supplementation, currently requiring 3 L              IV steroids with p.o. taper              Scheduled bronchodilators      Elevated troponin  CAD status post remote CABG              Cardiology evaluation              Continue antiplatelet therapy, statin     Acute kidney injury   Creatinine on admission 2.0  Suspecting prerenal injury and ATN in the setting on volume depletion  Check bladder scan to rule out obstruction  Avoid nephrotoxic medications, contrast   Check renal ultrasound, UA and  urine studies  Consult nephrology  for evaluation  Gentle IV fluid  Monitor renal function daily            DVT Prophylaxis: +  Diet: dysphagia         Code Status: Prior     PT/OT Eval Status: +     Dispo - admit    DC plan : CT tube , pending clinical improvement      Mendez Carrasco MD

## 2023-03-17 NOTE — PLAN OF CARE
Problem: ABCDS Injury Assessment  Goal: Absence of physical injury  3/17/2023 1417 by Bee Renteria  Outcome: Completed  3/17/2023 0758 by Bee Renteria  Outcome: Progressing  Flowsheets (Taken 3/17/2023 0330 by Dash Purcell RN)  Absence of Physical Injury: Implement safety measures based on patient assessment  3/17/2023 0329 by Dash Purcell RN  Outcome: Progressing     Problem: Safety - Adult  Goal: Free from fall injury  3/17/2023 1417 by Bee Renteria  Outcome: Completed  3/17/2023 0758 by Bee Renteria  Outcome: Progressing  Flowsheets (Taken 3/17/2023 0330 by Dash Purcell RN)  Free From Fall Injury: Instruct family/caregiver on patient safety  3/17/2023 0329 by Dash Purcell RN  Outcome: Progressing     Problem: Discharge Planning  Goal: Discharge to home or other facility with appropriate resources  3/17/2023 1417 by Bee Renteria  Outcome: Completed  3/17/2023 0758 by Bee Renteria  Outcome: Progressing  3/17/2023 0329 by Dash Purcell RN  Outcome: Progressing  Flowsheets (Taken 3/16/2023 2200)  Discharge to home or other facility with appropriate resources:   Identify barriers to discharge with patient and caregiver   Arrange for needed discharge resources and transportation as appropriate   Identify discharge learning needs (meds, wound care, etc)   Refer to discharge planning if patient needs post-hospital services based on physician order or complex needs related to functional status, cognitive ability or social support system     Problem: Pain  Goal: Verbalizes/displays adequate comfort level or baseline comfort level  3/17/2023 1417 by Bee Renteria  Outcome: Completed  3/17/2023 0758 by Bee Renteria  Outcome: Progressing  3/17/2023 0329 by Dash Purcell RN  Outcome: Progressing     Problem: Chronic Conditions and Co-morbidities  Goal: Patient's chronic conditions and co-morbidity symptoms are monitored and maintained or improved  3/17/2023 1417 by Bee Renteria  Outcome: Completed  3/17/2023 0758 by Jaylene Cazares  Outcome: Progressing  3/17/2023 0329 by Abiodun Lobo RN  Outcome: Progressing  Flowsheets (Taken 3/16/2023 2200)  Care Plan - Patient's Chronic Conditions and Co-Morbidity Symptoms are Monitored and Maintained or Improved:   Monitor and assess patient's chronic conditions and comorbid symptoms for stability, deterioration, or improvement   Collaborate with multidisciplinary team to address chronic and comorbid conditions and prevent exacerbation or deterioration     Problem: Respiratory - Adult  Goal: Achieves optimal ventilation and oxygenation  3/17/2023 1417 by Jaylene Cazares  Outcome: Completed  3/17/2023 0758 by Jaylene Cazares  Outcome: Progressing  3/17/2023 0329 by Abiodun Lobo RN  Outcome: Progressing  Flowsheets (Taken 3/16/2023 2200)  Achieves optimal ventilation and oxygenation:   Assess for changes in respiratory status   Assess for changes in mentation and behavior   Position to facilitate oxygenation and minimize respiratory effort   Encourage broncho-pulmonary hygiene including cough, deep breathe, incentive spirometry   Assess and instruct to report shortness of breath or any respiratory difficulty   Respiratory therapy support as indicated

## 2023-03-17 NOTE — CARE COORDINATION
[facility]    DC order noted, all docs needed have been faxed to [facility] for home care services.     Home care to see patient by 3/18/23    Olive Eng RN, BSN CTN  [facility] [phone]

## 2023-03-17 NOTE — PROGRESS NOTES
Verbal and written discharge instructions was given to the patient and wife. Patient verbalizes understanding of d/c instructions including medications orders for home and possible side effects associated with them. Pt. instructed and verbalizes understanding to call the doctor listed if they should have any complications or worsening of symptoms and verbalizes understanding about follow-up appointments. D/C'd IV access, patient tolerated well, no signs of bleeding. Pt. Discharged to home with all belongings. Out via wheelchair.

## 2023-03-17 NOTE — TELEPHONE ENCOUNTER
Nella needs updated office visit notes stating need for nebulizer machine with diagnosis. Can an addendum be added to office visit on 3/7/23 ?

## 2023-03-17 NOTE — PROGRESS NOTES
Infectious Diseases Inpatient Progress Note      CHIEF COMPLAINT:     Rt lower lobe PNA  Parapneumonic effuson  ESBL Enterobacter  Chest tube in place  H/o Influenza A PNA in Dec       HISTORY OF PRESENT ILLNESS:  68 y.o. man with significant history for diabetes, paroxysmal atrial fibrillation, coronary artery disease, CABG, chronic systolic heart failure, COPD admitted secondary to shortness of breath and cough and weakness. Patient is followed by Dr. Per Lamas pulmonary critical care-he called our answering service secondary to shortness of breath and was advised to come to the hospital for further evaluation. Admission labs indicate creatinine 1.5 procalcitonin 0.19 blood glucose elevated at 261, WBC 11.2 hemoglobin 12.5 hemoglobin A1c at 7, CT chest indicated bilateral pleural effusion right greater than the left with associated consolidation of the lung scattered groundglass opacity tree-in-bud nodularity noted. CT abdomen pelvis was negative, he underwent ultrasound evaluation of the right pleural space lace indicated septated right pleural effusion. Approximately 550 mL of bloody pleural fluid was removed and sent for culture on 3/13/23. He underwent IR guided successful ultrasound-guided IR chest tube placement on 3/14/23. Respiratory pneumonia panel was positive for Enterobacter, with CTX gene +Ve concerning for esbl,  but sputum culture thus far negative MRSA probe negative body fluid culture negative. He was hospitalized here from 2/4/ 23 to 2/7/23 and treated for PNA and COPD, per HPI he was Hospitalized at [facility] for PNA in Dec 2022 as well. He was on courses of Levofloxacin, Doxycycline and Augmentin in the past.        He was treated for Influenza PNA in Dec 2022 at [facility]-    Location : Rt side pain,      Quality : Aching, burning     Severity : 8/10  Duration : 1 week     Timing : Constant  Context : Recurrent pneumonia and pleural effusion  Modifying factors : None  Associated signs and symptoms: Shortness of breath, sweats    Interval History : sitting in the chair WIFE at bed side and chest tube now removed and PICC working ok and OPAT d/w pt and his wife    Past Medical History:    Past Medical History:   Diagnosis Date    Abrasion of right elbow 2023    Arthritis     CAD (coronary artery disease)     CABG in     Chronic back pain     Chronic systolic CHF (congestive heart failure) (HCC)     COPD (chronic obstructive pulmonary disease) (HCC)     Dental disease     Diabetes mellitus (HCC)     Essential hypertension     Hearing loss     Paroxysmal atrial fibrillation (HCC)     On Coumadin    Tinnitus        Past Surgical History:    Past Surgical History:   Procedure Laterality Date    BACK SURGERY      BACK SURGERY      CARDIAC SURGERY      CERVICAL SPINE SURGERY      CHEST TUBE INSERTION Right 2023    14F. Dr. Jalloh    CORONARY ARTERY BYPASS GRAFT      FRACTURE SURGERY Left     Shoulder    IR CHEST TUBE INSERTION  3/14/2023    IR CHEST TUBE INSERTION 3/14/2023 [facility] PROCEDURES       Current Medications:    No outpatient medications have been marked as taking for the 3/13/23 encounter (Hospital Encounter).       Allergies:  No known allergies    Immunizations :   Immunization History   Administered Date(s) Administered    Influenza Vaccine, unspecified formulation 10/22/2016    Influenza, AFLURIA (age 3 yrs+), FLUZONE, (age 6 mo+), MDV, 0.5mL 10/22/2016, 09/15/2017    Influenza, High Dose (Fluzone 65 yrs and older) 09/15/2017    Pneumococcal Conjugate 13-valent ([id number]) 2016    Tdap (Boostrix, Adacel) 2018         Social History:    Social History     Tobacco Use    Smoking status: Former     Packs/day: 1.50     Years: 12.00     Pack years: 18.00     Types: Cigarettes     Start date: 1959     Quit date: 3/1/1996     Years since quittin.0    Smokeless tobacco: Never   Vaping Use    Vaping Use: Never used   Substance Use Topics     Alcohol use: No    Drug use: No     Social History     Tobacco Use   Smoking Status Former    Packs/day: 1.50    Years: 12.00    Pack years: 18.00    Types: Cigarettes    Start date: 1959    Quit date: 3/1/1996    Years since quittin.0   Smokeless Tobacco Never      Family History   Problem Relation Age of Onset    Brain Cancer Mother     Heart Attack Father     Heart Attack Brother          REVIEW OF SYSTEMS:     Constitutional:  negative for fevers, chills, night sweats  Eyes:  negative for blurred vision, eye discharge, visual disturbance   HEENT:  negative for hearing loss, ear drainage,nasal congestion  Respiratory:  cough,++  shortness of breath ++ or hemoptysis   Cardiovascular:  negative for chest pain, palpitations, syncope  Gastrointestinal:  negative for nausea, vomiting, diarrhea, constipation, abdominal pain  Genitourinary:  negative for frequency, dysuria, urinary incontinence, hematuria  Hematologic/Lymphatic:  negative for easy bruising, bleeding and lymphadenopathy  Allergic/Immunologic:  negative for recurrent infections, angioedema, anaphylaxis   Endocrine:  negative for weight changes, polyuria, polydipsia and polyphagia  Musculoskeletal:  negative for joint  pain, swelling, decreased range of motion  Integumentary: No rashes, skin lesions  Neurological:  negative for headaches, slurred speech, unilateral weakness  Psychiatric: negative for hallucinations,confusion,agitation.      PHYSICAL EXAM:      Vitals:    /64   Pulse 76   Temp 97.7 °F (36.5 °C) (Oral)   Resp 18   Ht 5' 11\" (1.803 m)   Wt 222 lb 0.1 oz (100.7 kg)   SpO2 96%   BMI 30.96 kg/m²     General Appearance: alert,in some acute distress, ++ pallor, no icterus   Skin: warm and dry, no rash or erythema  Head: normocephalic and atraumatic  Eyes: pupils equal, round, and reactive to light, conjunctivae normal  ENT: tympanic membrane, external ear and ear canal normal bilaterally, nose without deformity, nasal mucosa and turbinates normal without polyps  Neck: supple and non-tender without mass, no thyromegaly  no cervical lymphadenopathy  Pulmonary/Chest:  Rt side coarse crepts ++- no wheezes, rales or rhonchi, normal air movement, in some respiratory distress  Cardiovascular: normal rate, regular rhythm, normal S1 and S2, no murmurs, rubs, clicks, or gallops, no carotid bruits  Abdomen: soft, non-tender, non-distended, normal bowel sounds, no masses or organomegaly  Extremities: no cyanosis, clubbing or edema  Musculoskeletal: normal range of motion, no joint swelling, deformity or tenderness  Integumentary: No rashes, no abnormal skin lesions, no petechiae  Neurologic: reflexes normal and symmetric, no cranial nerve deficit  Psych:  Orientation, sensorium, mood normal   Lines: PICC  Rt chest tube NOW REMOVED     DATA:    CBC:   Lab Results   Component Value Date    WBC 9.5 03/16/2023    HGB 12.8 (L) 03/16/2023    HCT 38.3 (L) 03/16/2023    MCV 87.8 03/16/2023     03/16/2023     RENAL:   Lab Results   Component Value Date    CREATININE 0.8 03/16/2023    BUN 15 03/16/2023     03/16/2023    K 3.7 03/16/2023     03/16/2023    CO2 23 03/16/2023     SED RATE:   Lab Results   Component Value Date/Time    SEDRATE 90 03/16/2023 06:17 AM     CK:   Lab Results   Component Value Date/Time    CKTOTAL 85 09/16/2017 06:42 AM     CRP:   Lab Results   Component Value Date/Time    CRP 99.3 03/16/2023 06:17 AM     Hepatic Function Panel:   Lab Results   Component Value Date/Time    ALKPHOS 59 03/13/2023 07:03 AM    ALT 9 03/13/2023 07:03 AM    AST 13 03/13/2023 07:03 AM    PROT 7.2 03/13/2023 07:03 AM    PROT 7.0 09/23/2012 08:18 PM    BILITOT 0.5 03/13/2023 07:03 AM    BILIDIR <0.2 07/06/2021 05:00 PM    IBILI see below 07/06/2021 05:00 PM    LABALBU 3.2 03/15/2023 05:34 AM     UA:  Lab Results   Component Value Date/Time    COLORU Yellow 02/04/2023 08:12 PM    CLARITYU Clear 02/04/2023 08:12 PM    GLUCOSEU >=1000 02/04/2023 08:12 PM    BILIRUBINUR Negative 02/04/2023 08:12 PM    KETUA Negative 02/04/2023 08:12 PM    SPECGRAV 1.018 02/04/2023 08:12 PM    BLOODU Negative 02/04/2023 08:12 PM    PHUR 5.0 02/04/2023 08:12 PM    PROTEINU Negative 02/04/2023 08:12 PM    UROBILINOGEN 0.2 02/04/2023 08:12 PM    NITRU Negative 02/04/2023 08:12 PM    LEUKOCYTESUR Negative 02/04/2023 08:12 PM    LABMICR Not Indicated 02/04/2023 08:12 PM    URINETYPE Cleancatch 02/04/2023 08:12 PM      Urine Microscopic:   Lab Results   Component Value Date/Time    BACTERIA Rare 07/11/2018 01:27 AM    HYALCAST 1 04/13/2019 07:57 AM    WBCUA 1 04/13/2019 07:57 AM    RBCUA 1 04/13/2019 07:57 AM    EPIU 0 04/13/2019 07:57 AM     Urine Reflex to Culture:   Lab Results   Component Value Date/Time    URRFLXCULT Not Indicated 02/04/2023 08:12 PM     CRP  99.3 \    Esr 90     Creat  1.5     Procal  0.19        Component Ref Range & Units 03/13/23 1227   Hemoglobin A1C See comment % 7.0    Comment: Comment:   Diagnosis of Diabetes: > or = 6.5%   Increased risk of diabetes (Prediabetes): 5.7-6.4%   Glycemic Control: Nonpregnant Adults: <7.0%                     Pregnant: <6.0%      eAG mg/dL 154.2    Resulting Agency  [address]              Component Ref Range & Units 3/13/23 1507   Cell Count Fluid Type  Pleural    Comment: Right   Color, Fluid  Dark Red    Appearance, Fluid  Turbid    Clot Eval.  see below    Comment: No Clots Seen   Nucl Cell, Fluid /cumm 3,681    RBC, Fluid /cumm 691,000    Neutrophil Count, Fluid % 81    Lymphocytes, Body Fluid % 9    Monocyte Count, Fluid % 1    Eos, Fluid % 7    Basos, Fluid % 1    Macrophages % 1    Number of Cells Counted Fluid  100    Comment: The reference interval(s) and other method performance   specifications are unavailable for this body fluid.    Comparison of the result with concentration in the         MICRO: cultures reviewed and updated by me     Procedure Component Value Units Date/Time   Culture, Body Fluid [[id number]] Collected: 03/14/23 1112   Order Status: Completed Specimen: Body Fluid from Pleural Updated: 03/15/23 1011    Body Fluid Culture, Sterile No growth to date    Gram Stain Result 3+ WBC's (Polymorphonuclear)   No organisms seen    Narrative:     ORDER#: [id number]                          ORDERED BY: CHAMP ENGLE   SOURCE: Pleural                            COLLECTED:  03/14/23 11:12   ANTIBIOTICS AT [facility].:                      RECEIVED :  03/14/23 11:35   Culture, Respiratory [[id number]] Collected: 03/13/23 1800   Order Status: Completed Specimen: Sputum Expectorated Updated: 03/15/23 0738    CULTURE, RESPIRATORY Normal respiratory jodi    Gram Stain Result 4+ Gram positive cocci   2+ Gram variable rods present   3+ WBC's (Polymorphonuclear) present   2+ WBC's (Mononuclear) present   2+ Epithelial Cells present    Narrative:     ORDER#: [id number]                          ORDERED BY: CHAMP ENGLE   SOURCE: Sputum Expectorated                COLLECTED:  03/13/23 18:00   ANTIBIOTICS AT [facility].:                      RECEIVED :  03/13/23 18:49   Culture, Body Fluid [[id number]] Collected: 03/13/23 1507   Order Status: Completed Specimen:  Body Fluid Updated: 03/15/23 0648    Body Fluid Culture, Sterile --    No growth to date   No growth 36 to 48 hours     Gram Stain Result 2+ WBC's (Polymorphonuclear)   No organisms seen    Narrative:     ORDER#: [id number]                          ORDERED BY: FRIDA NEGRETE   SOURCE: Fluid Pleural                      COLLECTED:  03/13/23 15:07   ANTIBIOTICS AT [facility].:                      RECEIVED :  03/13/23 15:32   Culture, Fungus [[id number]] Collected: 03/13/23 1507   Order Status: Sent Specimen: Fluid Updated: 03/14/23 1351    Fungus Stain No Fungal elements seen   Narrative:     ORDER#: [id number]                          ORDERED BY: FRIDA Brody   SOURCE: Fluid                              COLLECTED:  03/13/23 15:07   ANTIBIOTICS AT [facility].: RECEIVED :  03/13/23 15:31   Culture with Smear, Acid Fast Bacillius [[id number]] Collected: 03/13/23 1507   Order Status: Sent Specimen: Fluid Updated: 03/14/23 1336    AFB Smear No AFB observed by Fluorescent stain   Narrative:     ORDER#: [id number]                          ORDERED BY: FRIDA Mata   SOURCE: Fluid                              COLLECTED:  03/13/23 15:07   ANTIBIOTICS AT [facility].:                      RECEIVED :  03/13/23 15:31   Culture, Blood 2 [[id number]] Collected: 03/13/23 1227   Order Status: Completed Specimen: Blood Updated: 03/14/23 1321    Culture, Blood 2 No Growth to date. Any change in status will be called. Narrative:     ORDER#: [id number]                          ORDERED BY: CARLOS NG   SOURCE: Blood                              COLLECTED:  03/13/23 12:27   ANTIBIOTICS AT [facility].:                      RECEIVED :  03/13/23 12:50   If child <=2 yrs old please draw pediatric bottle. ~Blood Culture #2   Culture, Blood 1 [[id number]] Collected: 03/13/23 0933   Order Status: Completed Specimen: Blood Updated: 03/14/23 1018    Blood Culture, Routine No Growth to date. Any change in status will be called. Narrative:     ORDER#: [id number]                          ORDERED BY: CARLOS NG   SOURCE: Blood                              COLLECTED:  03/13/23 09:33   ANTIBIOTICS AT [facility].:                      RECEIVED :  03/13/23 09:37   If child <=2 yrs old please draw pediatric bottle. ~Blood Culture 1   MRSA DNA Probe, Nasal [[id number]] Collected: 03/13/23 1600   Order Status: Completed Specimen: Nares Updated: 03/14/23 0508    MRSA SCREEN RT-PCR --    Negative  MRSA DNA not detected.    Normal Range: Not detected    Narrative:     ORDER#: [id number]                          ORDERED BY: FRIDA Mata   SOURCE: Nares                              COLLECTED:  03/13/23 16:00   ANTIBIOTICS AT [facility].:                      RECEIVED :  03/13/23 16:13   PNEUMONIA PANEL FILM ARRAY REPORT [[id number]] Collected: 03/13/23 1800   Order Status: Completed Updated: 03/13/23 2352    Report SEE IMAGE   Pneumonia Panel, Molecular [[id number]] (Abnormal) Collected: 03/13/23 1800   Order Status: Completed Specimen: Sputum Expectorated Updated: 03/13/23 2348    Organism Enterobacter cloacae complex DNA Detected Abnormal     Pneumonia Panel Molecular --    10,000 copies/mL   See additional report for complete Pneumonia panel. CTX-M gene Detected. This organism is an ESBL . Narrative:     ORDER#: [id number]                          ORDERED BY: CHAMP ENGLE   SOURCE: Sputum Expectorated                COLLECTED:  03/13/23 18:00   ANTIBIOTICS AT [facility].:                      RECEIVED :  03/13/23 18:49   Gram stain [[id number]] Collected: 03/13/23 1507   Order Status: Canceled Specimen: Fluid    MRSA DNA Probe, Nasal [[id number]]    Order Status: No result Specimen: Nares    COVID-19, Rapid [[id number]] Collected: 03/13/23 0809   Order Status: Completed Specimen: Nasopharyngeal Swab Updated: 03/13/23 0833    SARS-CoV-2, NAAT Not Detected    Comment: Rapid NAAT:   Negative results should be treated as presumptive and,   if inconsistent with clinical signs and symptoms or necessary for   patient management, should be tested with an alternative molecular   assay. Negative results do not preclude SARS-CoV-2 infection and   should not be used as the sole basis for patient management decisions. This test has been authorized by the FDA under an Emergency Use   Authorization (EUA) for use by authorized laboratories.      Fact sheet for Healthcare Providers:   http://www.scottie-raymundo.biz/   Fact sheet for Patients: http://www.rubin-raymundo.biz/     METHODOLOGY: Isothermal Nucleic Acid Amplification       Rapid influenza A/B antigens [[id number]] Collected: 03/13/23 0809   Order Status: Completed Specimen: Nasopharyngeal Updated: 03/13/23 0831    Rapid Influenza A Ag Negative    Rapid Influenza B Ag Negative     Blood Culture:   Lab Results   Component Value Date/Time    [facility]  03/13/2023 09:33 AM     No Growth to date. Any change in status will be called. Ari Salts  03/13/2023 12:27 PM     No Growth to date. Any change in status will be called. Viral Culture:    Lab Results   Component Value Date/Time    COVID19 Not Detected 03/13/2023 08:09 AM    COVID19 Not Detected 08/25/2021 06:13 PM     Urine Culture: No results for input(s): Irina Costa in the last 72 hours. Scheduled Meds:   hydrALAZINE  25 mg Oral 3 times per day    meropenem  1,000 mg IntraVENous Q8H    sennosides-docusate sodium  1 tablet Oral Daily    carvedilol  25 mg Oral BID WC    aspirin  81 mg Oral Daily    pramipexole  0.25 mg Oral TID    pramipexole  0.25 mg Oral Nightly    Roflumilast  500 mcg Oral Daily    sodium chloride flush  5-40 mL IntraVENous 2 times per day    insulin glargine  0.15 Units/kg SubCUTAneous Nightly    insulin lispro  0-8 Units SubCUTAneous TID WC    insulin lispro  0-4 Units SubCUTAneous Nightly    [Held by provider] enoxaparin  1 mg/kg SubCUTAneous Daily    ipratropium-albuterol  1 ampule Inhalation Q4H WA    budesonide  0.5 mg Nebulization BID    atorvastatin  40 mg Oral Nightly    sodium chloride (Inhalant)  4 mL Nebulization Q4H WA    gabapentin  100 mg Oral TID       Continuous Infusions:   sodium chloride      lactated ringers IV soln Stopped (03/14/23 0953)    dextrose         PRN Meds:  loperamide, HYDROmorphone, HYDROcodone-acetaminophen, sodium chloride flush, sodium chloride, ondansetron **OR** ondansetron, polyethylene glycol, acetaminophen **OR** acetaminophen, glucose, dextrose bolus **OR** dextrose bolus, glucagon (rDNA), dextrose    Imaging:   XR CHEST PORTABLE   Final Result   Stable to mildly increased right pleural effusion with a right-sided chest   tube in place. Right upper extremity PICC tip projects over the distal SVC.          XR CHEST PORTABLE   Final Result   Mildly increased right basilar airspace opacities. Right chest tube in place with unchanged right pleural effusion. FL MODIFIED BARIUM SWALLOW W VIDEO   Final Result   1. Positive mild laryngeal penetration without tracheal aspiration of nectar   thickened liquids and honey thickened liquids. This did not improve with   chin-tuck. 2. No evidence of laryngeal penetration or tracheal aspiration of purees or   soft solids. Please see separate speech pathology report for full discussion of findings   and recommendations. XR CHEST PORTABLE   Final Result   Decreased right pleural effusion status post chest tube placement. IR CHEST TUBE INSERTION   Final Result   Successful ultrasound and fluoroscopic guided placement of a right 14 Hungarian   locking pigtail chest tube into a right hemothorax. XR CHEST 1 VIEW   Final Result   No evidence of pneumothorax following right thoracentesis. Persistent right pleural effusion with associated consolidation, likely   atelectasis. US THORACENTESIS Which side should the procedure be performed? Right   Final Result   Successful ultrasound guided right thoracentesis. Thinly septated right pleural effusion with bloody pleural fluid removed with   thoracentesis, consistent with a hemothorax. CT CHEST PULMONARY EMBOLISM W CONTRAST   Final Result   Chest      No central pulmonary embolus identified      Bilateral pleural effusions, right greater than left with adjacent   consolidation of the lungs, either due to atelectasis or pneumonia. Scattered ground-glass opacity in tree-in-bud nodularity seen in the aerated   portion of the lungs, likely postinflammatory-infectious.   Scattered areas of   bronchial wall thickening are seen      Abdomen and pelvis      No acute intra-abdominal abnormality         CT ABDOMEN PELVIS W IV CONTRAST Additional Contrast? None   Final Result   Chest      No central pulmonary embolus identified      Bilateral pleural effusions, right greater than left with adjacent   consolidation of the lungs, either due to atelectasis or pneumonia. Scattered ground-glass opacity in tree-in-bud nodularity seen in the aerated   portion of the lungs, likely postinflammatory-infectious. Scattered areas of   bronchial wall thickening are seen      Abdomen and pelvis      No acute intra-abdominal abnormality         XR CHEST PORTABLE   Final Result   Mild congestion. Small right pleural effusion. Bibasilar compressive   atelectasis versus infiltrates. XR CHEST PORTABLE    (Results Pending)   XR CHEST PORTABLE    (Results Pending)       All pertinent images and reports for the current Hospitalization were reviewed by me.     IMPRESSION:    Patient Active Problem List   Diagnosis Code    Diabetes mellitus ([facility] E11.9    Atherosclerosis of native coronary artery of native heart without angina pectoris I25.10    Morbid obesity due to excess calories ([facility]) E66.01    Sepsis ([facility] A41.9    SOB (shortness of breath) R06.02    Abnormal chest CT R93.89    Back pain M54.9    Diabetes mellitus type 2, uncontrolled [id number]    Chronic back pain M54.9, G89.29    Chronic combined systolic (congestive) and diastolic (congestive) heart failure ([facility]) I50.42    Hyperlipidemia E78.5    Primary hypertension I10    Leukocytosis D72.829    Chronic bronchitis, simple ([facility]) J41.0    Chronic rhinitis J31.0    Generalized weakness R53.1    WEI (acute kidney injury) ([facility] N17.9    DMII (diabetes mellitus, type 2) ([facility]) E11.9    Supratherapeutic INR R79.1    Tachycardia R00.0    Tachypnea R06.82    Non morbid obesity due to excess calories E66.09    Fatigue R53.83    Cough productive of clear sputum R05.8    History of coronary artery bypass graft Z95.1    Abrasion of right elbow S50.311A    Acute respiratory failure with hypoxia ([facility]) J96.01    Paroxysmal atrial fibrillation ([facility]) I48.0    Chronic pulmonary aspiration T17.908A    Parapneumonic effusion J18.9, J91.8    Elevated troponin R77.8    Pleural effusion, bilateral J90    Longstanding persistent atrial fibrillation (HCC) I48.11    Enterobacter cloacae pneumonia ([facility]) J15.6    ESBL (extended spectrum beta-lactamase) producing bacteria infection A49.9, Z16.12    Gram-negative pneumonia ([facility]) J15.6    Abnormal CT of the chest R93.89    Pneumonia of both lungs due to infectious organism J18.9    Elevated erythrocyte sedimentation rate R70.0    CRP elevated R79.82        ICD-10-CM    1. Pneumonia of both lungs due to infectious organism, unspecified part of lung  J18.9       2. WEI (acute kidney injury) ([facility]  N17.9       3. Elevated troponin  R77.8       4. Pleural effusion, bilateral  J90          Rt Side PNA  Rt lower lobe, Middle lobe consolidation   Rt complex pleural effusion  Partial treatment hence No EMPYEMA  But septated, Exudative Pleural effusion  COPD  H/O  Severe Influenza A PNA in Dec 2022  He never recovered fully after that with Secondary Bacterial PNA  He had multiple courses of oral abx  Pneumonia PANEL +v Enterobacter ESBL +vE  Sputum cx negative  Pleural fluid cx negative from prior abx use  CABG  WEI Resolvd  A fib on anticoagulation   S/p IR thoracentesis on 3/13  And s/p IR Chest tube placement on 3/14      Given the complicated course with unresolved Bacterial PNA as a secondary complication from POST INFLUENZA A infection now with ESBL Enterobacter will need IV abx     This is very consistent with Post influenza Secondary complicated PNA from gram -ves     He would respond partially on oral abx and relapse after with complications       S/p PICC line for home IV abx and OPAT d/w pt and ok with the plan     Chest tube mow removed and CXR reviewed     Labs, Microbiology, Radiology and pertinent results from current hospitalization and care every where were reviewed by me as a part of the consultation.     PLAN :  Change to IV Ertapenem x 1 gm Q 24 hr x stop date  3/30  ESR 90 , CRP 99.3  Drain all the pleural fluid   CT chest in follow up   Anticipate x 14- days of abx therapy at d/c  Control DM  Anticoagulation per primary   Contact isolation for ESBL     S/p  picc line  CXR in 2 weeks order in place   OPAT d/w pt   Discussed with patient/Family and Nursing  d/w wife at bed side   Risk of Complications/Morbidity: High      Illness(es)/ Infection present that pose threat to bodily function. There is potential for severe exacerbation of infection/side effects of treatment. Therapy requires intensive monitoring for antimicrobial agent toxicity. Thanks for allowing me to participate in your patient's care please call me with any questions or concerns.     Dr. Marcus Cruz MD  [facility]  Phone: [phone]   Fax : [phone]

## 2023-03-17 NOTE — PROGRESS NOTES
Pulmonary Progress Note    CC:  Follow up respiratory failure, effusion    Subjective:  Room air  Chest tube with 75 ml of output   No new events  Breathing ok    ROS  NO SOB        Intake/Output Summary (Last 24 hours) at 3/17/2023 0831  Last data filed at 3/17/2023 0600  Gross per 24 hour   Intake 1134.82 ml   Output 75 ml   Net 1059.82 ml           PHYSICAL EXAM:  Blood pressure 136/64, pulse 78, temperature 97.7 °F (36.5 °C), temperature source Oral, resp. rate 16, height 5' 11\" (1.803 m), weight 222 lb 0.1 oz (100.7 kg), SpO2 93 %.'  Gen: No distress. Lethargic today   Eyes: PERRL. No sclera icterus. No conjunctival injection. ENT: No discharge. Pharynx clear. External appearance of ears and nose normal.  Neck: Trachea midline. No obvious mass. Resp: Diminished but clear, chest tube in place   CV: Regular rate. Regular rhythm. No murmur or rub. GI: Non-tender. Non-distended. No hernia. Hernia noted   Skin: Warm, dry, normal texture and turgor. No nodule on exposed extremities. Lymph: No cervical LAD. No supraclavicular LAD. M/S: No cyanosis. No clubbing. No joint deformity. Neuro: Moves all four extremities. CN 2-12 tested, no defect noted.   Ext:   + edema    Medications:    Scheduled Meds:   hydrALAZINE  25 mg Oral 3 times per day    meropenem  1,000 mg IntraVENous Q8H    sennosides-docusate sodium  1 tablet Oral Daily    carvedilol  25 mg Oral BID     aspirin  81 mg Oral Daily    pramipexole  0.25 mg Oral TID    pramipexole  0.25 mg Oral Nightly    Roflumilast  500 mcg Oral Daily    sodium chloride flush  5-40 mL IntraVENous 2 times per day    insulin glargine  0.15 Units/kg SubCUTAneous Nightly    insulin lispro  0-8 Units SubCUTAneous TID     insulin lispro  0-4 Units SubCUTAneous Nightly    [Held by provider] enoxaparin  1 mg/kg SubCUTAneous Daily    ipratropium-albuterol  1 ampule Inhalation Q4H WA    budesonide  0.5 mg Nebulization BID    atorvastatin  40 mg Oral Nightly    sodium chloride (Inhalant)  4 mL Nebulization Q4H WA    gabapentin  100 mg Oral TID       Continuous Infusions:   sodium chloride      lactated ringers IV soln Stopped (03/14/23 0953)    dextrose         PRN Meds:  loperamide, HYDROmorphone, HYDROcodone-acetaminophen, sodium chloride flush, sodium chloride, ondansetron **OR** ondansetron, polyethylene glycol, acetaminophen **OR** acetaminophen, glucose, dextrose bolus **OR** dextrose bolus, glucagon (rDNA), dextrose    Labs:  CBC:   Recent Labs     03/15/23  0534 03/16/23  0617   WBC 9.2 9.5   HGB 12.4* 12.8*   HCT 37.4* 38.3*   MCV 89.4 87.8    405       BMP:   Recent Labs     03/15/23  0534 03/16/23  0617   * 144   K see below  3.7 3.7   CL 99 105   CO2 21 23   PHOS 2.3*  --    BUN 17 15   CREATININE 0.8 0.8       LIVER PROFILE:   No results for input(s): AST, ALT, LIPASE, BILIDIR, BILITOT, ALKPHOS in the last 72 hours. Invalid input(s): AMYLASE,  ALB    PT/INR:   No results for input(s): PROTIME, INR in the last 72 hours. APTT: No results for input(s): APTT in the last 72 hours. UA:No results for input(s): NITRITE, COLORU, PHUR, LABCAST, WBCUA, RBCUA, MUCUS, TRICHOMONAS, YEAST, BACTERIA, CLARITYU, SPECGRAV, LEUKOCYTESUR, UROBILINOGEN, BILIRUBINUR, BLOODU, GLUCOSEU, AMORPHOUS in the last 72 hours. Invalid input(s): Calixto Blaire  No results for input(s): PH, PCO2, PO2 in the last 72 hours.         Films:  Chest imaging reports were reviewed and imaging was reviewed by me and showed improved right effusion with residual effusion and right sided airspace disease     ABG:  No new draws    Cultures:  Pneumonia panel:  ESBL Enterobacter   Sputum:  normal jodi  Body fluid:  NGTD    I reviewed the labs and images listed above    ASSESSMENT/PLAN:  Acute Hypoxic Respiratory Failure with saturations less than 90% on room air , resolved   Titrate oxygen for saturations greater than or equal to 90%  Gram negative pneumonia due to enterobacter (ESBL)  Merrem per ID.  PICC placed   Right pleural effusion s/p thoracentesis. May be parapneumonic with hemothorax and/or malignant. May be asbestos related as well. It is new based on previous imaging   Unable to give TPA due to bloody output. Suspect he may have to live with some effusion as it might be asbestos related. Would need surgery for definitive clean out but I don't recommend that  Still has 2 other pleural fluid specimens for cytology in process. One was negative   Chest tube removed at bedside. STAT CXR today   Dysphagia with known aspiration into the airways   Speech following      DVT prophylaxis  Lovenox     Ok to DC from pulm standpoint.   He follows with DO Rosario [facility]dz Pul[facility]

## 2023-03-18 PROBLEM — Z45.2 PERIPHERALLY INSERTED CENTRAL CATHETER (PICC) IN PLACE: Status: ACTIVE | Noted: 2023-03-18

## 2023-03-20 ENCOUNTER — TELEPHONE (OUTPATIENT)
Dept: PULMONOLOGY | Age: 78
End: 2023-03-20

## 2023-03-20 DIAGNOSIS — Z16.12 ESBL (EXTENDED SPECTRUM BETA-LACTAMASE) PRODUCING BACTERIA INFECTION: Primary | ICD-10-CM

## 2023-03-20 DIAGNOSIS — A49.9 ESBL (EXTENDED SPECTRUM BETA-LACTAMASE) PRODUCING BACTERIA INFECTION: Primary | ICD-10-CM

## 2023-03-20 LAB
ALBUMIN SERPL-MCNC: 3.2 G/DL (ref 3.4–5)
ALBUMIN/GLOB SERPL: 1.1 {RATIO} (ref 1.1–2.2)
ALP SERPL-CCNC: 61 U/L (ref 40–129)
ALT SERPL-CCNC: 15 U/L (ref 10–40)
ANION GAP SERPL CALCULATED.3IONS-SCNC: 11 MMOL/L (ref 3–16)
AST SERPL-CCNC: 18 U/L (ref 15–37)
BASOPHILS # BLD: 0.1 K/UL (ref 0–0.2)
BASOPHILS NFR BLD: 1.2 %
BILIRUB SERPL-MCNC: 0.4 MG/DL (ref 0–1)
BUN SERPL-MCNC: 20 MG/DL (ref 7–20)
CALCIUM SERPL-MCNC: 8.4 MG/DL (ref 8.3–10.6)
CHLORIDE SERPL-SCNC: 97 MMOL/L (ref 99–110)
CO2 SERPL-SCNC: 26 MMOL/L (ref 21–32)
CREAT SERPL-MCNC: 1.1 MG/DL (ref 0.8–1.3)
CRP SERPL-MCNC: 59.4 MG/L (ref 0–5.1)
DEPRECATED RDW RBC AUTO: 14.7 % (ref 12.4–15.4)
EOSINOPHIL # BLD: 0.4 K/UL (ref 0–0.6)
EOSINOPHIL NFR BLD: 3.8 %
ERYTHROCYTE [SEDIMENTATION RATE] IN BLOOD BY WESTERGREN METHOD: 83 MM/HR (ref 0–20)
GFR SERPLBLD CREATININE-BSD FMLA CKD-EPI: >60 ML/MIN/{1.73_M2}
GLUCOSE SERPL-MCNC: 173 MG/DL (ref 70–99)
HCT VFR BLD AUTO: 35.6 % (ref 40.5–52.5)
HGB BLD-MCNC: 11.7 G/DL (ref 13.5–17.5)
LYMPHOCYTES # BLD: 1.8 K/UL (ref 1–5.1)
LYMPHOCYTES NFR BLD: 18.8 %
MCH RBC QN AUTO: 28.9 PG (ref 26–34)
MCHC RBC AUTO-ENTMCNC: 32.8 G/DL (ref 31–36)
MCV RBC AUTO: 88.3 FL (ref 80–100)
MONOCYTES # BLD: 1.3 K/UL (ref 0–1.3)
MONOCYTES NFR BLD: 14.1 %
NEUTROPHILS # BLD: 5.9 K/UL (ref 1.7–7.7)
NEUTROPHILS NFR BLD: 62.1 %
PLATELET # BLD AUTO: 394 K/UL (ref 135–450)
PMV BLD AUTO: 6.5 FL (ref 5–10.5)
POTASSIUM SERPL-SCNC: 4.4 MMOL/L (ref 3.5–5.1)
PROT SERPL-MCNC: 6.1 G/DL (ref 6.4–8.2)
RBC # BLD AUTO: 4.03 M/UL (ref 4.2–5.9)
SODIUM SERPL-SCNC: 134 MMOL/L (ref 136–145)
WBC # BLD AUTO: 9.6 K/UL (ref 4–11)

## 2023-03-20 NOTE — TELEPHONE ENCOUNTER
Wife Familia Carrs calls. Gavino Kiser was d/c from hospital on Friday, 3/17. Please advise on date/time for appointment.

## 2023-03-22 ENCOUNTER — TELEPHONE (OUTPATIENT)
Dept: INFECTIOUS DISEASES | Age: 78
End: 2023-03-22

## 2023-03-22 NOTE — PROGRESS NOTES
Physician Progress Note      Junaid Ward  [facility] #:                  [id number]  :                       1945  ADMIT DATE:       3/13/2023 6:44 AM  DISCH DATE:        3/17/2023 6:22 PM  RESPONDING  PROVIDER #:        Paola Gottlieb MD          QUERY TEXT:    Patient admitted with gram negative pneumonia, exacerbation of COPD, and   hemothorax. Noted documentation of \"Acute Hypoxic Respiratory Failure with   saturations less than 90% on room air, resolved\" in Discharge Summary. O2 sat   86% on room air on arrival and placed on 4L O2. No documented respiratory   distress. In order to support the diagnosis of acute respiratory failure,   please include additional clinical indicators in your documentation. Or   please document if the diagnosis of acute respiratory failure has been ruled   out after further study. The medical record reflects the following:  Risk Factors: pneumonia, exacerbation of COPD, hemothorax  Clinical Indicators:   O2 sat 86% on room air on arrival and placed on 4L O2. No documented respiratory distress. Treatment: chest tube inserted, pulm consult, IV abx, monitoring O2 sat,   supplemental O2    Acute Respiratory Failure Clinical Indicators per  MS-DRG Training Guide and   Quick Reference Guide:  pO2 < 60 mmHg or SpO2 (pulse oximetry) < 91% breathing room air  pCO2 > 50 and pH < 7.35  P/F ratio (pO2 / FIO2) < 300  pO2 decrease or pCO2 increase by 10 mmHg from baseline (if known)  Supplemental oxygen of 40% or more  Presence of respiratory distress, tachypnea, dyspnea, shortness of breath,   wheezing  Unable to speak in complete sentences  Use of accessory muscles to breathe  Extreme anxiety and feeling of impending doom  Tripod position  Confusion/altered mental status/obtunded    Thank you,  Alesia Jacob RN, BSN, CCDS  [email or web address]  Options provided:  -- Hypoxia only.   Acute Respiratory Failure ruled out after study  -- Acute Respiratory

## 2023-03-27 ENCOUNTER — TELEPHONE (OUTPATIENT)
Dept: INFECTIOUS DISEASES | Age: 78
End: 2023-03-27

## 2023-03-27 LAB
ALBUMIN SERPL-MCNC: 3.6 G/DL (ref 3.4–5)
ALBUMIN/GLOB SERPL: 1.2 {RATIO} (ref 1.1–2.2)
ALP SERPL-CCNC: 61 U/L (ref 40–129)
ALT SERPL-CCNC: 9 U/L (ref 10–40)
ANION GAP SERPL CALCULATED.3IONS-SCNC: 15 MMOL/L (ref 3–16)
AST SERPL-CCNC: 13 U/L (ref 15–37)
BASOPHILS # BLD: 0.2 K/UL (ref 0–0.2)
BASOPHILS NFR BLD: 1.5 %
BILIRUB SERPL-MCNC: 0.4 MG/DL (ref 0–1)
BUN SERPL-MCNC: 21 MG/DL (ref 7–20)
CALCIUM SERPL-MCNC: 8.8 MG/DL (ref 8.3–10.6)
CHLORIDE SERPL-SCNC: 97 MMOL/L (ref 99–110)
CO2 SERPL-SCNC: 23 MMOL/L (ref 21–32)
CREAT SERPL-MCNC: 1.1 MG/DL (ref 0.8–1.3)
CRP SERPL-MCNC: 60.6 MG/L (ref 0–5.1)
DEPRECATED RDW RBC AUTO: 14.9 % (ref 12.4–15.4)
EOSINOPHIL # BLD: 0.2 K/UL (ref 0–0.6)
EOSINOPHIL NFR BLD: 1.8 %
ERYTHROCYTE [SEDIMENTATION RATE] IN BLOOD BY WESTERGREN METHOD: 97 MM/HR (ref 0–20)
FUNGUS SPEC CULT: NORMAL
GFR SERPLBLD CREATININE-BSD FMLA CKD-EPI: >60 ML/MIN/{1.73_M2}
GLUCOSE SERPL-MCNC: 100 MG/DL (ref 70–99)
HCT VFR BLD AUTO: 36.3 % (ref 40.5–52.5)
HGB BLD-MCNC: 11.9 G/DL (ref 13.5–17.5)
LOEFFLER MB STN SPEC: NORMAL
LYMPHOCYTES # BLD: 2.1 K/UL (ref 1–5.1)
LYMPHOCYTES NFR BLD: 20.5 %
MCH RBC QN AUTO: 28.3 PG (ref 26–34)
MCHC RBC AUTO-ENTMCNC: 32.6 G/DL (ref 31–36)
MCV RBC AUTO: 86.9 FL (ref 80–100)
MONOCYTES # BLD: 1.3 K/UL (ref 0–1.3)
MONOCYTES NFR BLD: 13.1 %
NEUTROPHILS # BLD: 6.4 K/UL (ref 1.7–7.7)
NEUTROPHILS NFR BLD: 63.1 %
PLATELET # BLD AUTO: 511 K/UL (ref 135–450)
PMV BLD AUTO: 6.7 FL (ref 5–10.5)
POTASSIUM SERPL-SCNC: 4.2 MMOL/L (ref 3.5–5.1)
PROT SERPL-MCNC: 6.6 G/DL (ref 6.4–8.2)
RBC # BLD AUTO: 4.18 M/UL (ref 4.2–5.9)
SODIUM SERPL-SCNC: 135 MMOL/L (ref 136–145)
WBC # BLD AUTO: 10.1 K/UL (ref 4–11)

## 2023-03-27 NOTE — TELEPHONE ENCOUNTER
Noted agree and he has only few days to go given ESBL choices are limited and no oral abx options  - hope he can finish planned treatment

## 2023-03-27 NOTE — TELEPHONE ENCOUNTER
Received call from home care RN stating pt had diarrhea after last dose of ABX yesterday , pt has taken at least 3 doses of imodium before it slowed down. Pt did have a fall due to diarrhea  and scraped knee. Wife nervous to give more abx. Spoke with wife who states no diarrhea so far today, verbalized understanding to this office if diarrhea continues today. Wife encouraged to get pt probiotic, eat yogurt and stay hydrated. Wife verbalized understanding. Thanks.

## 2023-03-28 ENCOUNTER — HOSPITAL ENCOUNTER (OUTPATIENT)
Age: 78
Discharge: HOME | End: 2023-03-28
Payer: MEDICARE

## 2023-03-28 ENCOUNTER — HOSPITAL ENCOUNTER (OUTPATIENT)
Dept: GENERAL RADIOLOGY | Age: 78
Discharge: HOME | End: 2023-03-28
Payer: MEDICARE

## 2023-03-28 DIAGNOSIS — J15.6 GRAM-NEGATIVE PNEUMONIA (HCC): ICD-10-CM

## 2023-03-28 LAB
ACID FAST STN SPEC QL: NORMAL
MYCOBACTERIUM SPEC CULT: NORMAL

## 2023-03-28 PROCEDURE — 71046 X-RAY EXAM CHEST 2 VIEWS: CPT

## 2023-03-29 ENCOUNTER — TELEPHONE (OUTPATIENT)
Dept: INFECTIOUS DISEASES | Age: 78
End: 2023-03-29

## 2023-03-29 NOTE — TELEPHONE ENCOUNTER
Please review CXR, pt due to end 3/30/23. [facility] [place]. RN would like to know if PICC to be pulled tomorrow. Please advise. Thanks.

## 2023-03-29 NOTE — TELEPHONE ENCOUNTER
Pt states his \"breathing is better but I have a cough I can't get rid of. Light yellow sputum. Worse when I lay flat, so I'm sleeping in my recliner. Is there anything we can do for this cough. \" Please advise. Thanks.

## 2023-03-30 NOTE — TELEPHONE ENCOUNTER
Spoke with Zoë pharmacist at [address] [place] [address] verbalized understanding to DC IV ABX and pull PICC after last dose on 3/30/23.

## 2023-04-03 ENCOUNTER — TELEPHONE (OUTPATIENT)
Dept: PULMONOLOGY | Age: 78
End: 2023-04-03

## 2023-04-03 LAB
FUNGUS SPEC CULT: NORMAL
LOEFFLER MB STN SPEC: NORMAL

## 2023-04-03 NOTE — TELEPHONE ENCOUNTER
Rodney Haile calls, [employer] has yet to receive a new nebulizer machine. Order was faxed early March. Per Claude Crane, \"received ov notes but not sure why\". Nebulizer machine order refaxed today to Attention Claude Crane per her request. Fax # [phone] directly to her. Per Claude Crane patient should have machine by tomorrow afternoon. Rodney Haile was informed if she has not heard from [facility] by tomorrow morning to call back to our office.

## 2023-04-04 LAB
ACID FAST STN SPEC QL: NORMAL
MYCOBACTERIUM SPEC CULT: NORMAL

## 2023-04-12 PROBLEM — R77.8 ELEVATED TROPONIN: Status: RESOLVED | Noted: 2023-03-13 | Resolved: 2023-04-12

## 2023-04-12 PROBLEM — R79.89 ELEVATED TROPONIN: Status: RESOLVED | Noted: 2023-03-13 | Resolved: 2023-04-12

## 2023-04-17 LAB
FUNGUS SPEC CULT: NORMAL
LOEFFLER MB STN SPEC: NORMAL

## 2023-04-18 LAB
ACID FAST STN SPEC QL: NORMAL
MYCOBACTERIUM SPEC CULT: NORMAL

## 2023-04-25 LAB
ACID FAST STN SPEC QL: NORMAL
MYCOBACTERIUM SPEC CULT: NORMAL

## 2023-05-02 LAB
ACID FAST STN SPEC QL: NORMAL
MYCOBACTERIUM SPEC CULT: NORMAL

## 2023-05-03 ENCOUNTER — OFFICE VISIT (OUTPATIENT)
Dept: PULMONOLOGY | Age: 78
End: 2023-05-03
Payer: MEDICARE

## 2023-05-03 VITALS
SYSTOLIC BLOOD PRESSURE: 120 MMHG | RESPIRATION RATE: 18 BRPM | HEART RATE: 87 BPM | WEIGHT: 222.2 LBS | DIASTOLIC BLOOD PRESSURE: 70 MMHG | OXYGEN SATURATION: 97 % | BODY MASS INDEX: 31.11 KG/M2 | HEIGHT: 71 IN | TEMPERATURE: 97.4 F

## 2023-05-03 DIAGNOSIS — J41.0 CHRONIC BRONCHITIS, SIMPLE (HCC): ICD-10-CM

## 2023-05-03 DIAGNOSIS — E66.01 MORBID OBESITY DUE TO EXCESS CALORIES (HCC): ICD-10-CM

## 2023-05-03 DIAGNOSIS — T17.908D CHRONIC PULMONARY ASPIRATION, SUBSEQUENT ENCOUNTER: ICD-10-CM

## 2023-05-03 PROBLEM — J18.9 PNEUMONIA OF BOTH LUNGS DUE TO INFECTIOUS ORGANISM: Status: RESOLVED | Noted: 2023-03-15 | Resolved: 2023-05-03

## 2023-05-03 PROCEDURE — G8427 DOCREV CUR MEDS BY ELIG CLIN: HCPCS | Performed by: INTERNAL MEDICINE

## 2023-05-03 PROCEDURE — 3074F SYST BP LT 130 MM HG: CPT | Performed by: INTERNAL MEDICINE

## 2023-05-03 PROCEDURE — 1036F TOBACCO NON-USER: CPT | Performed by: INTERNAL MEDICINE

## 2023-05-03 PROCEDURE — 3078F DIAST BP <80 MM HG: CPT | Performed by: INTERNAL MEDICINE

## 2023-05-03 PROCEDURE — 1123F ACP DISCUSS/DSCN MKR DOCD: CPT | Performed by: INTERNAL MEDICINE

## 2023-05-03 PROCEDURE — 3023F SPIROM DOC REV: CPT | Performed by: INTERNAL MEDICINE

## 2023-05-03 PROCEDURE — G8417 CALC BMI ABV UP PARAM F/U: HCPCS | Performed by: INTERNAL MEDICINE

## 2023-05-03 PROCEDURE — 99214 OFFICE O/P EST MOD 30 MIN: CPT | Performed by: INTERNAL MEDICINE

## 2023-05-03 NOTE — PROGRESS NOTES
REASON FOR CONSULTATION/CC:    Chief Complaint   Patient presents with    Follow-Up from Hospital    Pneumonia        Consult at request of   Monique Martin for      PCP: Monique Martin    HISTORY OF PRESENT ILLNESS: Darling Marcum is a 68y.o. year old male with a history of chronic bronchitis  who presents :           chronic bronchitis   Daliresp   Advair 250    albuterol   Back to prior medications. Aspiration with hx of pneumonia. Working with SLP  Recently admitted for aspiration pneumonia with empyema. Obesity   Losing weight with new SLP diet. Objective:   PHYSICAL EXAM:  Blood pressure 120/70, pulse 87, temperature 97.4 °F (36.3 °C), temperature source Infrared, resp. rate 18, height 5' 11\" (1.803 m), weight 222 lb 3.2 oz (100.8 kg), SpO2 97 %.'  Body mass index is 30.99 kg/m². Gen: No distress. Eyes:    ENT:    Neck:    Resp: No accessory muscle use. No crackles. No wheezes. No rhonchi. CV: Regular rate. Regular rhythm. No murmur or rub. no edema. GI:    Skin:    Lymph:    M/S: No cyanosis. No clubbing. Neuro: Moves all four extremities. Psych: Oriented x 3. No anxiety. Awake. Alert. Intact judgement and insight. Data Reviewed:        Assessment:      chronic bronchitis   Chronic rhonitis  Neuropathy with DM       Plan:      Problem List Items Addressed This Visit       Chronic pulmonary aspiration     Recent admission for aspiration pneumonia. He is working with speech-language pathology to limit aspiration. Modifying diet. Morbid obesity due to excess calories ([address]      Lost some weight with recent dietary change         Chronic bronchitis, simple (HCC)     Daliresp, Advair   albuterol                             This note was transcribed using [id number] [employer]. Please disregard any translational errors.     Valentin Ignacio Pulmonary, Sleep and [facility] [phone]

## 2023-05-03 NOTE — ASSESSMENT & PLAN NOTE
Recent admission for aspiration pneumonia. He is working with speech-language pathology to limit aspiration. Modifying diet.

## 2023-06-01 ENCOUNTER — OFFICE VISIT (OUTPATIENT)
Dept: PULMONOLOGY | Age: 78
End: 2023-06-01
Payer: MEDICARE

## 2023-06-01 VITALS
SYSTOLIC BLOOD PRESSURE: 122 MMHG | TEMPERATURE: 97.3 F | HEIGHT: 71 IN | HEART RATE: 76 BPM | OXYGEN SATURATION: 95 % | BODY MASS INDEX: 31.64 KG/M2 | WEIGHT: 226 LBS | DIASTOLIC BLOOD PRESSURE: 70 MMHG | RESPIRATION RATE: 18 BRPM

## 2023-06-01 DIAGNOSIS — E66.01 MORBID OBESITY DUE TO EXCESS CALORIES (HCC): ICD-10-CM

## 2023-06-01 DIAGNOSIS — J41.0 CHRONIC BRONCHITIS, SIMPLE (HCC): ICD-10-CM

## 2023-06-01 PROBLEM — J15.6 GRAM-NEGATIVE PNEUMONIA (HCC): Status: RESOLVED | Noted: 2023-03-15 | Resolved: 2023-06-01

## 2023-06-01 PROBLEM — J15.69 GRAM-NEGATIVE PNEUMONIA: Status: RESOLVED | Noted: 2023-03-15 | Resolved: 2023-06-01

## 2023-06-01 PROBLEM — J96.01 ACUTE RESPIRATORY FAILURE WITH HYPOXIA (HCC): Status: RESOLVED | Noted: 2023-02-04 | Resolved: 2023-06-01

## 2023-06-01 PROCEDURE — 3078F DIAST BP <80 MM HG: CPT | Performed by: INTERNAL MEDICINE

## 2023-06-01 PROCEDURE — 1036F TOBACCO NON-USER: CPT | Performed by: INTERNAL MEDICINE

## 2023-06-01 PROCEDURE — 99214 OFFICE O/P EST MOD 30 MIN: CPT | Performed by: INTERNAL MEDICINE

## 2023-06-01 PROCEDURE — G8427 DOCREV CUR MEDS BY ELIG CLIN: HCPCS | Performed by: INTERNAL MEDICINE

## 2023-06-01 PROCEDURE — 3074F SYST BP LT 130 MM HG: CPT | Performed by: INTERNAL MEDICINE

## 2023-06-01 PROCEDURE — G8417 CALC BMI ABV UP PARAM F/U: HCPCS | Performed by: INTERNAL MEDICINE

## 2023-06-01 PROCEDURE — 3023F SPIROM DOC REV: CPT | Performed by: INTERNAL MEDICINE

## 2023-06-01 PROCEDURE — 1123F ACP DISCUSS/DSCN MKR DOCD: CPT | Performed by: INTERNAL MEDICINE

## 2023-06-01 RX ORDER — CYCLOBENZAPRINE HCL 5 MG
5 TABLET ORAL 3 TIMES DAILY
COMMUNITY
Start: 2023-05-30

## 2023-06-01 NOTE — ASSESSMENT & PLAN NOTE
Continue Advair and Daliresp with albuterol as needed. Significant improved control after changing method of eating.

## 2023-06-01 NOTE — PROGRESS NOTES
REASON FOR CONSULTATION/CC:    Chief Complaint   Patient presents with    COPD        Consult at request of   Merly Marie for      PCP: Merly Marie    HISTORY OF PRESENT ILLNESS: Ana Ruiz is a 66y.o. year old male with a history of chronic bronchitis  who presents :           chronic bronchitis   Daliresp   Advair 250    albuterol   No changes. Aspiration with hx of pneumonia. Working with SLP  Continues with this. Helping. Obesity   Gained a few lbs. Objective:   PHYSICAL EXAM:  Blood pressure 122/70, pulse 76, temperature 97.3 °F (36.3 °C), temperature source Infrared, resp. rate 18, height 5' 11\" (1.803 m), weight 226 lb (102.5 kg), SpO2 95 %.'  Body mass index is 31.52 kg/m². Gen: No distress. Eyes:    ENT:    Neck:    Resp: No accessory muscle use. No crackles. No wheezes. No rhonchi. CV: Regular rate. Regular rhythm. No murmur or rub. no edema. GI:    Skin:    Lymph:    M/S: No cyanosis. No clubbing. Neuro: Moves all four extremities. Psych: Oriented x 3. No anxiety. Awake. Alert. Intact judgement and insight. Data Reviewed:        Assessment:      chronic bronchitis   Chronic rhonitis  Neuropathy with DM       Plan:      Problem List Items Addressed This Visit       Morbid obesity due to excess calories ([address]      Gained weight. Again, discussed and advised weight loss. Chronic bronchitis, simple ([address]      Continue Advair and Daliresp with albuterol as needed. Significant improved control after changing method of eating. This note was transcribed using [id number] [employer]. Please disregard any translational errors.     Valentin Ignacio Pulmonary, Sleep and [facility] [phone]

## 2023-06-30 ENCOUNTER — HOSPITAL ENCOUNTER (OUTPATIENT)
Dept: GENERAL RADIOLOGY | Age: 78
Discharge: HOME | End: 2023-06-30
Payer: MEDICARE

## 2023-06-30 ENCOUNTER — HOSPITAL ENCOUNTER (OUTPATIENT)
Age: 78
Discharge: HOME | End: 2023-06-30
Payer: MEDICARE

## 2023-06-30 PROCEDURE — 71046 X-RAY EXAM CHEST 2 VIEWS: CPT

## 2023-07-05 NOTE — TELEPHONE ENCOUNTER
Wife Courtney Gilliam calls. [place] Prudent was being processed through [facility] by Dr. Alirio Mcneil, PCP with [facility]. Dr. Alirio Mcneil is now asking Dr. Annamarie Khalil be the pre scriber. Script to be faxed to [facility] pharmacy @ [phone]. [place] to [place] forms per wife Courtney Tawana today. Courtney Gilliam is requesting a call back on update of paper work.

## 2023-07-06 RX ORDER — ROFLUMILAST 500 UG/1
500 TABLET ORAL DAILY
Qty: 90 TABLET | Refills: 3 | Status: SHIPPED | OUTPATIENT
Start: 2023-07-06

## 2023-07-06 NOTE — TELEPHONE ENCOUNTER
Just received a request from [place] for  [employer]. They are requesting a \"Wet signature\"  Please issue written order to be mailed with paperwork when you return.   See form on your desk

## 2023-08-25 ENCOUNTER — TELEPHONE (OUTPATIENT)
Dept: PULMONOLOGY | Age: 78
End: 2023-08-25

## 2023-08-25 RX ORDER — GUAIFENESIN AND DEXTROMETHORPHAN HYDROBROMIDE 600; 30 MG/1; MG/1
2 TABLET, EXTENDED RELEASE ORAL 2 TIMES DAILY
Qty: 40 TABLET | Refills: 0 | Status: SHIPPED | OUTPATIENT
Start: 2023-08-25

## 2023-08-25 NOTE — TELEPHONE ENCOUNTER
Pt has a cough x 3-4 days  yellow phelghm    Made an appt 8-28  Would like ?  Something for the weekend if possible    [facility]

## 2023-08-28 ENCOUNTER — OFFICE VISIT (OUTPATIENT)
Dept: PULMONOLOGY | Age: 78
End: 2023-08-28
Payer: MEDICARE

## 2023-08-28 VITALS
WEIGHT: 217.6 LBS | SYSTOLIC BLOOD PRESSURE: 130 MMHG | RESPIRATION RATE: 16 BRPM | DIASTOLIC BLOOD PRESSURE: 80 MMHG | TEMPERATURE: 97.5 F | HEIGHT: 71 IN | BODY MASS INDEX: 30.46 KG/M2

## 2023-08-28 DIAGNOSIS — J41.0 CHRONIC BRONCHITIS, SIMPLE (HCC): ICD-10-CM

## 2023-08-28 DIAGNOSIS — T17.908S CHRONIC PULMONARY ASPIRATION, SEQUELA: ICD-10-CM

## 2023-08-28 DIAGNOSIS — J18.9 PNEUMONIA DUE TO INFECTIOUS ORGANISM, UNSPECIFIED LATERALITY, UNSPECIFIED PART OF LUNG: Primary | ICD-10-CM

## 2023-08-28 PROCEDURE — 1036F TOBACCO NON-USER: CPT | Performed by: INTERNAL MEDICINE

## 2023-08-28 PROCEDURE — 3079F DIAST BP 80-89 MM HG: CPT | Performed by: INTERNAL MEDICINE

## 2023-08-28 PROCEDURE — 3023F SPIROM DOC REV: CPT | Performed by: INTERNAL MEDICINE

## 2023-08-28 PROCEDURE — 3075F SYST BP GE 130 - 139MM HG: CPT | Performed by: INTERNAL MEDICINE

## 2023-08-28 PROCEDURE — 1123F ACP DISCUSS/DSCN MKR DOCD: CPT | Performed by: INTERNAL MEDICINE

## 2023-08-28 PROCEDURE — G8417 CALC BMI ABV UP PARAM F/U: HCPCS | Performed by: INTERNAL MEDICINE

## 2023-08-28 PROCEDURE — 99214 OFFICE O/P EST MOD 30 MIN: CPT | Performed by: INTERNAL MEDICINE

## 2023-08-28 PROCEDURE — G8427 DOCREV CUR MEDS BY ELIG CLIN: HCPCS | Performed by: INTERNAL MEDICINE

## 2023-08-28 RX ORDER — AMOXICILLIN AND CLAVULANATE POTASSIUM 500; 125 MG/1; MG/1
1 TABLET, FILM COATED ORAL 3 TIMES DAILY
Qty: 30 TABLET | Refills: 0 | Status: SHIPPED | OUTPATIENT
Start: 2023-08-28 | End: 2023-09-07

## 2023-08-28 ASSESSMENT — COPD QUESTIONNAIRES
CAT_TOTALSCORE: 40
QUESTION2_CHESTPHLEGM: 5
QUESTION8_ENERGYLEVEL: 5
QUESTION1_COUGHFREQUENCY: 5
QUESTION7_SLEEPQUALITY: 5
QUESTION5_HOMEACTIVITIES: 5
QUESTION3_CHESTTIGHTNESS: 5
QUESTION4_WALKINCLINE: 5
QUESTION6_LEAVINGHOUSE: 5

## 2023-08-28 NOTE — ASSESSMENT & PLAN NOTE
LIkely has another aspiration pneumonia. Rhonchi with purulent phlegm . Start Augmentin. Sputum culture. Discussed airway clearance with airway clearance device.

## 2023-08-31 DIAGNOSIS — J18.9 PNEUMONIA DUE TO INFECTIOUS ORGANISM, UNSPECIFIED LATERALITY, UNSPECIFIED PART OF LUNG: ICD-10-CM

## 2023-09-02 DIAGNOSIS — J18.9 PNEUMONIA DUE TO INFECTIOUS ORGANISM, UNSPECIFIED LATERALITY, UNSPECIFIED PART OF LUNG: Primary | ICD-10-CM

## 2023-09-02 LAB
BACTERIA SPEC RESP CULT: ABNORMAL
BACTERIA SPEC RESP CULT: ABNORMAL
GRAM STN SPEC: ABNORMAL
ORGANISM: ABNORMAL

## 2023-09-02 RX ORDER — CIPROFLOXACIN 500 MG/1
500 TABLET, FILM COATED ORAL 2 TIMES DAILY
Qty: 14 TABLET | Refills: 0 | Status: SHIPPED | OUTPATIENT
Start: 2023-09-02 | End: 2023-09-09

## 2023-09-08 ENCOUNTER — TELEPHONE (OUTPATIENT)
Dept: PULMONOLOGY | Age: 78
End: 2023-09-08

## 2023-09-08 DIAGNOSIS — T17.908S CHRONIC PULMONARY ASPIRATION, SEQUELA: Primary | ICD-10-CM

## 2023-09-08 RX ORDER — LEVOFLOXACIN 750 MG/1
750 TABLET ORAL DAILY
Qty: 7 TABLET | Refills: 0 | Status: SHIPPED | OUTPATIENT
Start: 2023-09-08 | End: 2023-09-15

## 2023-09-08 NOTE — TELEPHONE ENCOUNTER
Pt is not doing better on medication. He has a cough and runny nose.  He wants to know if Dr. Disha Jamison can send in another rx for him to [facility] [address] [facility] or if he can work him in for an appointment before 09/18/23

## 2023-09-19 ENCOUNTER — OFFICE VISIT (OUTPATIENT)
Dept: PULMONOLOGY | Age: 78
End: 2023-09-19
Payer: MEDICARE

## 2023-09-19 VITALS
RESPIRATION RATE: 18 BRPM | HEART RATE: 68 BPM | OXYGEN SATURATION: 96 % | TEMPERATURE: 97.1 F | DIASTOLIC BLOOD PRESSURE: 60 MMHG | WEIGHT: 216 LBS | SYSTOLIC BLOOD PRESSURE: 110 MMHG | HEIGHT: 71 IN | BODY MASS INDEX: 30.24 KG/M2

## 2023-09-19 DIAGNOSIS — T17.908S CHRONIC PULMONARY ASPIRATION, SEQUELA: ICD-10-CM

## 2023-09-19 DIAGNOSIS — J41.0 CHRONIC BRONCHITIS, SIMPLE (HCC): ICD-10-CM

## 2023-09-19 PROBLEM — N17.9 AKI (ACUTE KIDNEY INJURY) (HCC): Status: RESOLVED | Noted: 2019-04-13 | Resolved: 2023-09-19

## 2023-09-19 PROCEDURE — G8427 DOCREV CUR MEDS BY ELIG CLIN: HCPCS | Performed by: INTERNAL MEDICINE

## 2023-09-19 PROCEDURE — 1123F ACP DISCUSS/DSCN MKR DOCD: CPT | Performed by: INTERNAL MEDICINE

## 2023-09-19 PROCEDURE — 3078F DIAST BP <80 MM HG: CPT | Performed by: INTERNAL MEDICINE

## 2023-09-19 PROCEDURE — G8417 CALC BMI ABV UP PARAM F/U: HCPCS | Performed by: INTERNAL MEDICINE

## 2023-09-19 PROCEDURE — 1036F TOBACCO NON-USER: CPT | Performed by: INTERNAL MEDICINE

## 2023-09-19 PROCEDURE — 3074F SYST BP LT 130 MM HG: CPT | Performed by: INTERNAL MEDICINE

## 2023-09-19 PROCEDURE — 99214 OFFICE O/P EST MOD 30 MIN: CPT | Performed by: INTERNAL MEDICINE

## 2023-09-19 PROCEDURE — 3023F SPIROM DOC REV: CPT | Performed by: INTERNAL MEDICINE

## 2023-09-19 NOTE — ASSESSMENT & PLAN NOTE
We discussed his chronic aspiration/cough. We discussed this could be medicated by a PEG. However, this does not conducive to the best quality of life. Therefore, focus on airway clearance with flutter device. Add 7% saline. He will buy this from [employer]. He was told by using this twice a day. Advised side effect of bronchospasm. If this is working well, he will step down to once daily. He typically brings up yellow phlegm. He was advised to call for fevers and green phlegm for antibiotics. He was advised it would not get antibiotics outside of this. He expressed understanding. Also, is not clear that Mucinex DM is helping. Wife will experiment with stopping this after starting the 7%.

## 2023-11-13 ENCOUNTER — APPOINTMENT (OUTPATIENT)
Dept: CT IMAGING | Age: 78
DRG: 871 | End: 2023-11-13
Payer: MEDICARE

## 2023-11-13 ENCOUNTER — HOSPITAL ENCOUNTER (INPATIENT)
Age: 78
LOS: 2 days | Discharge: HOME HEALTH | DRG: 871 | End: 2023-11-15
Attending: FAMILY MEDICINE | Admitting: FAMILY MEDICINE
Payer: MEDICARE

## 2023-11-13 ENCOUNTER — APPOINTMENT (OUTPATIENT)
Dept: GENERAL RADIOLOGY | Age: 78
DRG: 871 | End: 2023-11-13
Payer: MEDICARE

## 2023-11-13 DIAGNOSIS — R09.02 HYPOXIA: ICD-10-CM

## 2023-11-13 DIAGNOSIS — J18.9 PNEUMONIA OF BOTH LUNGS DUE TO INFECTIOUS ORGANISM, UNSPECIFIED PART OF LUNG: ICD-10-CM

## 2023-11-13 DIAGNOSIS — U07.1 COVID: ICD-10-CM

## 2023-11-13 DIAGNOSIS — Z86.79 HISTORY OF CHF (CONGESTIVE HEART FAILURE): ICD-10-CM

## 2023-11-13 DIAGNOSIS — R53.1 GENERALIZED WEAKNESS: ICD-10-CM
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
## 2023-11-13 PROBLEM — J96.01 ACUTE RESPIRATORY FAILURE WITH HYPOXIA (HCC): Status: ACTIVE | Noted: 2023-11-13

## 2023-11-13 LAB
ALBUMIN SERPL-MCNC: 4 G/DL (ref 3.4–5)
ALBUMIN/GLOB SERPL: 1.1 {RATIO} (ref 1.1–2.2)
ALP SERPL-CCNC: 66 U/L (ref 40–129)
ALT SERPL-CCNC: 15 U/L (ref 10–40)
ANION GAP SERPL CALCULATED.3IONS-SCNC: 12 MMOL/L (ref 3–16)
AST SERPL-CCNC: 23 U/L (ref 15–37)
BACTERIA URNS QL MICRO: NORMAL /HPF
BASOPHILS # BLD: 0.1 K/UL (ref 0–0.2)
BASOPHILS NFR BLD: 1.3 %
BILIRUB SERPL-MCNC: 0.4 MG/DL (ref 0–1)
BILIRUB UR QL STRIP.AUTO: NEGATIVE
BUN SERPL-MCNC: 20 MG/DL (ref 7–20)
CALCIUM SERPL-MCNC: 9 MG/DL (ref 8.3–10.6)
CHLORIDE SERPL-SCNC: 98 MMOL/L (ref 99–110)
CLARITY UR: CLEAR
CO2 SERPL-SCNC: 26 MMOL/L (ref 21–32)
COLOR UR: YELLOW
CREAT SERPL-MCNC: 1.4 MG/DL (ref 0.8–1.3)
CRP SERPL-MCNC: 23.4 MG/L (ref 0–5.1)
DEPRECATED RDW RBC AUTO: 14.9 % (ref 12.4–15.4)
EKG ATRIAL RATE: 99 BPM
EKG DIAGNOSIS: NORMAL
EKG P AXIS: 74 DEGREES
EKG P-R INTERVAL: 188 MS
EKG Q-T INTERVAL: 344 MS
EKG QRS DURATION: 98 MS
EKG QTC CALCULATION (BAZETT): 441 MS
EKG R AXIS: 64 DEGREES
EKG T AXIS: 35 DEGREES
EKG VENTRICULAR RATE: 99 BPM
EOSINOPHIL # BLD: 0 K/UL (ref 0–0.6)
EOSINOPHIL NFR BLD: 0.2 %
EPI CELLS #/AREA URNS AUTO: 1 /HPF (ref 0–5)
FLUAV RNA UPPER RESP QL NAA+PROBE: NEGATIVE
FLUBV AG NPH QL: NEGATIVE
GFR SERPLBLD CREATININE-BSD FMLA CKD-EPI: 51 ML/MIN/{1.73_M2}
GLUCOSE BLD-MCNC: 237 MG/DL (ref 70–99)
GLUCOSE BLD-MCNC: 269 MG/DL (ref 70–99)
GLUCOSE SERPL-MCNC: 148 MG/DL (ref 70–99)
GLUCOSE UR STRIP.AUTO-MCNC: >=1000 MG/DL
HCT VFR BLD AUTO: 44.9 % (ref 40.5–52.5)
HGB BLD-MCNC: 14.8 G/DL (ref 13.5–17.5)
HGB UR QL STRIP.AUTO: NEGATIVE
HYALINE CASTS #/AREA URNS AUTO: 3 /LPF (ref 0–8)
KETONES UR STRIP.AUTO-MCNC: ABNORMAL MG/DL
LACTATE BLDV-SCNC: 1.5 MMOL/L (ref 0.4–1.9)
LEUKOCYTE ESTERASE UR QL STRIP.AUTO: NEGATIVE
LYMPHOCYTES # BLD: 1.3 K/UL (ref 1–5.1)
LYMPHOCYTES NFR BLD: 20.2 %
MCH RBC QN AUTO: 29.6 PG (ref 26–34)
MCHC RBC AUTO-ENTMCNC: 32.9 G/DL (ref 31–36)
MCV RBC AUTO: 90.1 FL (ref 80–100)
MONOCYTES # BLD: 1.5 K/UL (ref 0–1.3)
MONOCYTES NFR BLD: 23.7 %
NEUTROPHILS # BLD: 3.5 K/UL (ref 1.7–7.7)
NEUTROPHILS NFR BLD: 54.6 %
NITRITE UR QL STRIP.AUTO: NEGATIVE
NT-PROBNP SERPL-MCNC: 658 PG/ML (ref 0–449)
PERFORMED ON: ABNORMAL
PERFORMED ON: ABNORMAL
PH UR STRIP.AUTO: 5 [PH] (ref 5–8)
PLATELET # BLD AUTO: 217 K/UL (ref 135–450)
PMV BLD AUTO: 7.1 FL (ref 5–10.5)
POTASSIUM SERPL-SCNC: 4.6 MMOL/L (ref 3.5–5.1)
PROCALCITONIN SERPL IA-MCNC: 0.16 NG/ML (ref 0–0.15)
PROT SERPL-MCNC: 7.6 G/DL (ref 6.4–8.2)
PROT UR STRIP.AUTO-MCNC: ABNORMAL MG/DL
RBC # BLD AUTO: 4.98 M/UL (ref 4.2–5.9)
RBC CLUMPS #/AREA URNS AUTO: 3 /HPF (ref 0–4)
SARS-COV-2 RDRP RESP QL NAA+PROBE: DETECTED
SODIUM SERPL-SCNC: 136 MMOL/L (ref 136–145)
SP GR UR STRIP.AUTO: 1.03 (ref 1–1.03)
TROPONIN, HIGH SENSITIVITY: 35 NG/L (ref 0–22)
TROPONIN, HIGH SENSITIVITY: 36 NG/L (ref 0–22)
UA COMPLETE W REFLEX CULTURE PNL UR: ABNORMAL
UA DIPSTICK W REFLEX MICRO PNL UR: YES
URN SPEC COLLECT METH UR: ABNORMAL
UROBILINOGEN UR STRIP-ACNC: 0.2 E.U./DL
WBC # BLD AUTO: 6.5 K/UL (ref 4–11)
WBC #/AREA URNS AUTO: 1 /HPF (ref 0–5)

## 2023-11-13 PROCEDURE — 94761 N-INVAS EAR/PLS OXIMETRY MLT: CPT

## 2023-11-13 PROCEDURE — 86140 C-REACTIVE PROTEIN: CPT

## 2023-11-13 PROCEDURE — 87635 SARS-COV-2 COVID-19 AMP PRB: CPT

## 2023-11-13 PROCEDURE — 71046 X-RAY EXAM CHEST 2 VIEWS: CPT

## 2023-11-13 PROCEDURE — 70450 CT HEAD/BRAIN W/O DYE: CPT

## 2023-11-13 PROCEDURE — 83605 ASSAY OF LACTIC ACID: CPT

## 2023-11-13 PROCEDURE — 87040 BLOOD CULTURE FOR BACTERIA: CPT

## 2023-11-13 PROCEDURE — 36415 COLL VENOUS BLD VENIPUNCTURE: CPT

## 2023-11-13 PROCEDURE — 6360000002 HC RX W HCPCS: Performed by: PHYSICIAN ASSISTANT

## 2023-11-13 PROCEDURE — 93010 ELECTROCARDIOGRAM REPORT: CPT | Performed by: INTERNAL MEDICINE

## 2023-11-13 PROCEDURE — 87804 INFLUENZA ASSAY W/OPTIC: CPT

## 2023-11-13 PROCEDURE — 6370000000 HC RX 637 (ALT 250 FOR IP): Performed by: FAMILY MEDICINE

## 2023-11-13 PROCEDURE — 96374 THER/PROPH/DIAG INJ IV PUSH: CPT

## 2023-11-13 PROCEDURE — 1200000000 HC SEMI PRIVATE

## 2023-11-13 PROCEDURE — 80053 COMPREHEN METABOLIC PANEL: CPT

## 2023-11-13 PROCEDURE — 93005 ELECTROCARDIOGRAM TRACING: CPT | Performed by: PHYSICIAN ASSISTANT

## 2023-11-13 PROCEDURE — 83880 ASSAY OF NATRIURETIC PEPTIDE: CPT

## 2023-11-13 PROCEDURE — 84484 ASSAY OF TROPONIN QUANT: CPT

## 2023-11-13 PROCEDURE — 81001 URINALYSIS AUTO W/SCOPE: CPT

## 2023-11-13 PROCEDURE — 85025 COMPLETE CBC W/AUTO DIFF WBC: CPT

## 2023-11-13 PROCEDURE — 2580000003 HC RX 258: Performed by: FAMILY MEDICINE

## 2023-11-13 PROCEDURE — 6370000000 HC RX 637 (ALT 250 FOR IP): Performed by: PHYSICIAN ASSISTANT

## 2023-11-13 PROCEDURE — 84145 PROCALCITONIN (PCT): CPT

## 2023-11-13 PROCEDURE — 71260 CT THORAX DX C+: CPT

## 2023-11-13 PROCEDURE — 99285 EMERGENCY DEPT VISIT HI MDM: CPT

## 2023-11-13 PROCEDURE — 2580000003 HC RX 258: Performed by: PHYSICIAN ASSISTANT

## 2023-11-13 PROCEDURE — 6360000004 HC RX CONTRAST MEDICATION: Performed by: PHYSICIAN ASSISTANT

## 2023-11-13 PROCEDURE — 96361 HYDRATE IV INFUSION ADD-ON: CPT

## 2023-11-13 PROCEDURE — 72125 CT NECK SPINE W/O DYE: CPT

## 2023-11-13 PROCEDURE — 94640 AIRWAY INHALATION TREATMENT: CPT

## 2023-11-13 RX ORDER — PRAMIPEXOLE DIHYDROCHLORIDE 0.5 MG/1
0.25 TABLET ORAL 4 TIMES DAILY
Status: DISCONTINUED | OUTPATIENT
Start: 2023-11-13 | End: 2023-11-15 | Stop reason: HOSPADM

## 2023-11-13 RX ORDER — FUROSEMIDE 40 MG/1
40 TABLET ORAL DAILY
Status: DISCONTINUED | OUTPATIENT
Start: 2023-11-14 | End: 2023-11-15 | Stop reason: HOSPADM

## 2023-11-13 RX ORDER — SODIUM CHLORIDE 0.9 % (FLUSH) 0.9 %
5-40 SYRINGE (ML) INJECTION EVERY 12 HOURS SCHEDULED
Status: DISCONTINUED | OUTPATIENT
Start: 2023-11-13 | End: 2023-11-15 | Stop reason: HOSPADM

## 2023-11-13 RX ORDER — METHOCARBAMOL 500 MG/1
500 TABLET, FILM COATED ORAL 3 TIMES DAILY PRN
COMMUNITY

## 2023-11-13 RX ORDER — ONDANSETRON 2 MG/ML
4 INJECTION INTRAMUSCULAR; INTRAVENOUS EVERY 6 HOURS PRN
Status: DISCONTINUED | OUTPATIENT
Start: 2023-11-13 | End: 2023-11-15 | Stop reason: HOSPADM

## 2023-11-13 RX ORDER — INSULIN LISPRO 100 [IU]/ML
0-4 INJECTION, SOLUTION INTRAVENOUS; SUBCUTANEOUS NIGHTLY
Status: DISCONTINUED | OUTPATIENT
Start: 2023-11-13 | End: 2023-11-15 | Stop reason: HOSPADM

## 2023-11-13 RX ORDER — CARVEDILOL 3.12 MG/1
3.12 TABLET ORAL 2 TIMES DAILY WITH MEALS
Status: DISCONTINUED | OUTPATIENT
Start: 2023-11-13 | End: 2023-11-15 | Stop reason: HOSPADM

## 2023-11-13 RX ORDER — 0.9 % SODIUM CHLORIDE 0.9 %
500 INTRAVENOUS SOLUTION INTRAVENOUS ONCE
Status: COMPLETED | OUTPATIENT
Start: 2023-11-13 | End: 2023-11-13

## 2023-11-13 RX ORDER — FLUTICASONE PROPIONATE 50 MCG
1 SPRAY, SUSPENSION (ML) NASAL 2 TIMES DAILY
Status: DISCONTINUED | OUTPATIENT
Start: 2023-11-13 | End: 2023-11-15 | Stop reason: HOSPADM

## 2023-11-13 RX ORDER — GABAPENTIN 400 MG/1
800 CAPSULE ORAL 4 TIMES DAILY
Status: DISCONTINUED | OUTPATIENT
Start: 2023-11-13 | End: 2023-11-15 | Stop reason: HOSPADM

## 2023-11-13 RX ORDER — POTASSIUM CHLORIDE 20 MEQ/1
40 TABLET, EXTENDED RELEASE ORAL PRN
Status: DISCONTINUED | OUTPATIENT
Start: 2023-11-13 | End: 2023-11-15 | Stop reason: HOSPADM

## 2023-11-13 RX ORDER — ACETAMINOPHEN 500 MG
1000 TABLET ORAL ONCE
Status: COMPLETED | OUTPATIENT
Start: 2023-11-13 | End: 2023-11-13

## 2023-11-13 RX ORDER — SODIUM CHLORIDE 0.9 % (FLUSH) 0.9 %
5-40 SYRINGE (ML) INJECTION PRN
Status: DISCONTINUED | OUTPATIENT
Start: 2023-11-13 | End: 2023-11-15 | Stop reason: HOSPADM

## 2023-11-13 RX ORDER — ATORVASTATIN CALCIUM 20 MG/1
20 TABLET, FILM COATED ORAL DAILY
Status: DISCONTINUED | OUTPATIENT
Start: 2023-11-14 | End: 2023-11-15 | Stop reason: HOSPADM

## 2023-11-13 RX ORDER — ROFLUMILAST 500 UG/1
500 TABLET ORAL DAILY
Status: DISCONTINUED | OUTPATIENT
Start: 2023-11-14 | End: 2023-11-15 | Stop reason: HOSPADM

## 2023-11-13 RX ORDER — ONDANSETRON 4 MG/1
4 TABLET, ORALLY DISINTEGRATING ORAL EVERY 8 HOURS PRN
Status: DISCONTINUED | OUTPATIENT
Start: 2023-11-13 | End: 2023-11-15 | Stop reason: HOSPADM

## 2023-11-13 RX ORDER — POLYETHYLENE GLYCOL 3350 17 G/17G
17 POWDER, FOR SOLUTION ORAL DAILY PRN
Status: DISCONTINUED | OUTPATIENT
Start: 2023-11-13 | End: 2023-11-15

## 2023-11-13 RX ORDER — LAMOTRIGINE 25 MG/1
50 TABLET ORAL NIGHTLY
COMMUNITY

## 2023-11-13 RX ORDER — DEXTROSE MONOHYDRATE 100 MG/ML
INJECTION, SOLUTION INTRAVENOUS CONTINUOUS PRN
Status: DISCONTINUED | OUTPATIENT
Start: 2023-11-13 | End: 2023-11-15 | Stop reason: HOSPADM

## 2023-11-13 RX ORDER — INSULIN LISPRO 100 [IU]/ML
0-8 INJECTION, SOLUTION INTRAVENOUS; SUBCUTANEOUS
Status: DISCONTINUED | OUTPATIENT
Start: 2023-11-13 | End: 2023-11-15 | Stop reason: HOSPADM

## 2023-11-13 RX ORDER — LISINOPRIL 10 MG/1
10 TABLET ORAL DAILY
Status: DISCONTINUED | OUTPATIENT
Start: 2023-11-14 | End: 2023-11-15 | Stop reason: HOSPADM

## 2023-11-13 RX ORDER — IPRATROPIUM BROMIDE AND ALBUTEROL SULFATE 2.5; .5 MG/3ML; MG/3ML
SOLUTION RESPIRATORY (INHALATION)
Status: DISPENSED
Start: 2023-11-13 | End: 2023-11-14

## 2023-11-13 RX ORDER — FLUTICASONE PROPIONATE AND SALMETEROL 250; 50 UG/1; UG/1
1 POWDER RESPIRATORY (INHALATION) EVERY 12 HOURS
COMMUNITY

## 2023-11-13 RX ORDER — ACETAMINOPHEN 650 MG/1
650 SUPPOSITORY RECTAL EVERY 6 HOURS PRN
Status: DISCONTINUED | OUTPATIENT
Start: 2023-11-13 | End: 2023-11-15 | Stop reason: HOSPADM

## 2023-11-13 RX ORDER — GUAIFENESIN 600 MG/1
600 TABLET, EXTENDED RELEASE ORAL 2 TIMES DAILY
Status: DISCONTINUED | OUTPATIENT
Start: 2023-11-13 | End: 2023-11-15 | Stop reason: HOSPADM

## 2023-11-13 RX ORDER — CYCLOBENZAPRINE HCL 10 MG
5 TABLET ORAL 3 TIMES DAILY
Status: CANCELLED | OUTPATIENT
Start: 2023-11-13

## 2023-11-13 RX ORDER — LAMOTRIGINE 25 MG/1
25 TABLET ORAL EVERY MORNING
Status: DISCONTINUED | OUTPATIENT
Start: 2023-11-14 | End: 2023-11-15 | Stop reason: HOSPADM

## 2023-11-13 RX ORDER — SODIUM CHLORIDE FOR INHALATION 0.9 %
3 VIAL, NEBULIZER (ML) INHALATION EVERY 6 HOURS PRN
Status: DISCONTINUED | OUTPATIENT
Start: 2023-11-13 | End: 2023-11-15 | Stop reason: HOSPADM

## 2023-11-13 RX ORDER — INSULIN GLARGINE 100 [IU]/ML
10 INJECTION, SOLUTION SUBCUTANEOUS DAILY
Status: DISCONTINUED | OUTPATIENT
Start: 2023-11-14 | End: 2023-11-14

## 2023-11-13 RX ORDER — ASPIRIN 81 MG/1
81 TABLET, CHEWABLE ORAL DAILY
Status: DISCONTINUED | OUTPATIENT
Start: 2023-11-14 | End: 2023-11-15 | Stop reason: HOSPADM

## 2023-11-13 RX ORDER — MAGNESIUM SULFATE IN WATER 40 MG/ML
2000 INJECTION, SOLUTION INTRAVENOUS PRN
Status: DISCONTINUED | OUTPATIENT
Start: 2023-11-13 | End: 2023-11-15 | Stop reason: HOSPADM

## 2023-11-13 RX ORDER — LAMOTRIGINE 25 MG/1
50 TABLET ORAL NIGHTLY
Status: DISCONTINUED | OUTPATIENT
Start: 2023-11-13 | End: 2023-11-15 | Stop reason: HOSPADM

## 2023-11-13 RX ORDER — IPRATROPIUM BROMIDE AND ALBUTEROL SULFATE 2.5; .5 MG/3ML; MG/3ML
1 SOLUTION RESPIRATORY (INHALATION) ONCE
Status: DISCONTINUED | OUTPATIENT
Start: 2023-11-13 | End: 2023-11-13

## 2023-11-13 RX ORDER — DEXAMETHASONE SODIUM PHOSPHATE 4 MG/ML
6 INJECTION, SOLUTION INTRA-ARTICULAR; INTRALESIONAL; INTRAMUSCULAR; INTRAVENOUS; SOFT TISSUE ONCE
Status: COMPLETED | OUTPATIENT
Start: 2023-11-13 | End: 2023-11-13

## 2023-11-13 RX ORDER — POTASSIUM CHLORIDE 7.45 MG/ML
10 INJECTION INTRAVENOUS PRN
Status: DISCONTINUED | OUTPATIENT
Start: 2023-11-13 | End: 2023-11-15 | Stop reason: HOSPADM

## 2023-11-13 RX ORDER — LAMOTRIGINE 25 MG/1
25 TABLET ORAL EVERY MORNING
COMMUNITY

## 2023-11-13 RX ORDER — INSULIN LISPRO 100 [IU]/ML
5 INJECTION, SOLUTION INTRAVENOUS; SUBCUTANEOUS
Status: DISCONTINUED | OUTPATIENT
Start: 2023-11-13 | End: 2023-11-14

## 2023-11-13 RX ORDER — ACETAMINOPHEN 325 MG/1
650 TABLET ORAL EVERY 6 HOURS PRN
Status: DISCONTINUED | OUTPATIENT
Start: 2023-11-13 | End: 2023-11-15 | Stop reason: HOSPADM

## 2023-11-13 RX ORDER — DEXAMETHASONE 6 MG/1
6 TABLET ORAL DAILY
Status: DISCONTINUED | OUTPATIENT
Start: 2023-11-14 | End: 2023-11-15 | Stop reason: HOSPADM

## 2023-11-13 RX ORDER — SODIUM CHLORIDE 9 MG/ML
INJECTION, SOLUTION INTRAVENOUS PRN
Status: DISCONTINUED | OUTPATIENT
Start: 2023-11-13 | End: 2023-11-15 | Stop reason: HOSPADM

## 2023-11-13 RX ADMIN — GUAIFENESIN 600 MG: 600 TABLET ORAL at 21:04

## 2023-11-13 RX ADMIN — LAMOTRIGINE 50 MG: 25 TABLET ORAL at 21:04

## 2023-11-13 RX ADMIN — INSULIN LISPRO 2 UNITS: 100 INJECTION, SOLUTION INTRAVENOUS; SUBCUTANEOUS at 17:58

## 2023-11-13 RX ADMIN — CARVEDILOL 3.12 MG: 3.12 TABLET, FILM COATED ORAL at 17:57

## 2023-11-13 RX ADMIN — SODIUM CHLORIDE 500 ML: 9 INJECTION, SOLUTION INTRAVENOUS at 12:39

## 2023-11-13 RX ADMIN — INSULIN LISPRO 5 UNITS: 100 INJECTION, SOLUTION INTRAVENOUS; SUBCUTANEOUS at 17:59

## 2023-11-13 RX ADMIN — INSULIN LISPRO 5 UNITS: 100 INJECTION, SOLUTION INTRAVENOUS; SUBCUTANEOUS at 21:06

## 2023-11-13 RX ADMIN — SODIUM CHLORIDE, PRESERVATIVE FREE 12 ML: 5 INJECTION INTRAVENOUS at 21:42

## 2023-11-13 RX ADMIN — APIXABAN 5 MG: 5 TABLET, FILM COATED ORAL at 21:04

## 2023-11-13 RX ADMIN — DEXAMETHASONE SODIUM PHOSPHATE 6 MG: 4 INJECTION INTRA-ARTICULAR; INTRALESIONAL; INTRAMUSCULAR; INTRAVENOUS; SOFT TISSUE at 12:39

## 2023-11-13 RX ADMIN — PRAMIPEXOLE DIHYDROCHLORIDE 0.25 MG: 0.5 TABLET ORAL at 17:57

## 2023-11-13 RX ADMIN — AZITHROMYCIN MONOHYDRATE 500 MG: 500 INJECTION, POWDER, LYOPHILIZED, FOR SOLUTION INTRAVENOUS at 13:12

## 2023-11-13 RX ADMIN — GABAPENTIN 800 MG: 400 CAPSULE ORAL at 17:58

## 2023-11-13 RX ADMIN — MOMETASONE FUROATE AND FORMOTEROL FUMARATE DIHYDRATE 2 PUFF: 200; 5 AEROSOL RESPIRATORY (INHALATION) at 21:10

## 2023-11-13 RX ADMIN — FLUTICASONE PROPIONATE 1 SPRAY: 50 SPRAY, METERED NASAL at 21:05

## 2023-11-13 RX ADMIN — GABAPENTIN 800 MG: 400 CAPSULE ORAL at 21:04

## 2023-11-13 RX ADMIN — Medication 2 PUFF: at 12:44

## 2023-11-13 RX ADMIN — IOPAMIDOL 75 ML: 755 INJECTION, SOLUTION INTRAVENOUS at 12:30

## 2023-11-13 RX ADMIN — PRAMIPEXOLE DIHYDROCHLORIDE 0.25 MG: 0.5 TABLET ORAL at 21:04

## 2023-11-13 RX ADMIN — ACETAMINOPHEN 1000 MG: 500 TABLET ORAL at 11:18

## 2023-11-13 RX ADMIN — CEFTRIAXONE 1000 MG: 1 INJECTION, POWDER, FOR SOLUTION INTRAMUSCULAR; INTRAVENOUS at 13:08

## 2023-11-13 ASSESSMENT — PAIN SCALES - GENERAL
PAINLEVEL_OUTOF10: 8
PAINLEVEL_OUTOF10: 0

## 2023-11-13 ASSESSMENT — PAIN DESCRIPTION - ORIENTATION: ORIENTATION: LEFT;RIGHT;LOWER

## 2023-11-13 ASSESSMENT — PAIN - FUNCTIONAL ASSESSMENT: PAIN_FUNCTIONAL_ASSESSMENT: 0-10

## 2023-11-13 ASSESSMENT — PAIN DESCRIPTION - LOCATION: LOCATION: BACK

## 2023-11-13 NOTE — ED PROVIDER NOTES
Pt Name: Yris Higgins  MRN: [id number]  [address] 1945  Date of evaluation: 11/13/2023    EKG Interpretation    The purpose of this note is for preliminary EKG interpretation only. This patient was seen independently by the mid-level provider and was not seen by this provider. EKG visualized preliminarily interpreted by myself shows sinus rhythm at a rate of 99 with an axis of 64. Poor R wave progression from V2 to V3. Compared to an EKG from March 13 of this year really no significant change.   Specifically the limb leads all looked identical.  There is a minor change in the R wave in lead V3 but otherwise no significant pathology was noted and the intervals are normal.        Kyle Potter MD  11/13/23 4683

## 2023-11-13 NOTE — ED NOTES
Pt placed on 2L nasal canulla, pt had desated to 88-89% on RA.       Victoria Molina RN  11/13/23 4813

## 2023-11-13 NOTE — ED NOTES
ED TO INPATIENT SBAR HANDOFF    Patient Name: Zachary Weller   :  [birth year]  66 y.o. MRN:  [id number]  Preferred Name  Lokesh Vail   ED Room #:  034/B-34  Family/Caregiver Present no   Restraints no   Sitter no   Sepsis Risk Score Sepsis Risk Score: 13.86    Situation  Code Status: Prior No additional code details. Allergies: No known allergies  Weight: Patient Vitals for the past 96 hrs (Last 3 readings):   Weight   23 1019 96.6 kg (213 lb)     Arrived from: home  Chief Complaint:   Chief Complaint   Patient presents with    Fall     Via EMS from home, c/o fall while trying to walk today, unable to walk starting last night, denies hitting head, lac to left lower forearm, rt lower leg. On coumadin. Reports lower back pain. States before last night, does not typically have trouble walking. Hospital Problem/Diagnosis:  Principal Problem:    Acute respiratory failure with hypoxia (HCC)  Resolved Problems:    * No resolved hospital problems. *    Imaging:   CT CHEST PULMONARY EMBOLISM W CONTRAST   Final Result   1. Negative for pulmonary embolus. 2. CHF with mild pulmonary edema and or superimposed pneumonia. XR CHEST (2 VW)   Final Result   Small right pleural effusion. Left basilar atelectasis versus scarring. CT HEAD WO CONTRAST   Final Result   Head:      No acute traumatic intracranial abnormality      Atrophy and small-vessel ischemic change      Cervical spine      Scattered degenerative disc disease and facet arthropathy. No acute fracture         CT C-Spine W/O Contrast   Final Result   Head:      No acute traumatic intracranial abnormality      Atrophy and small-vessel ischemic change      Cervical spine      Scattered degenerative disc disease and facet arthropathy.   No acute fracture           Abnormal labs:   Abnormal Labs Reviewed   COVID-19, RAPID - Abnormal; Notable for the following components:       Result Value    SARS-CoV-2, NAAT DETECTED (*)     All other

## 2023-11-13 NOTE — ED PROVIDER NOTES
[address] [facility] [address] [address]        Pt Name: Bertram Swain  MRN: [id number]  [address] [facility] 1945  Date of evaluation: 11/13/2023  Provider: Kevon Castillo PA-C  PCP: Carmelita SANTIAGO  Note Started: 12:51 PM EST 11/13/23      TRES. I have evaluated this patient. CHIEF COMPLAINT       Chief Complaint   Patient presents with    Fall     Via EMS from home, c/o fall while trying to walk today, unable to walk starting last night, denies hitting head, lac to left lower forearm, rt lower leg. On coumadin. Reports lower back pain. States before last night, does not typically have trouble walking. HISTORY OF PRESENT ILLNESS: 1 or more Elements             Bertram Swain is a 66 y.o. male who presents to the ED with complaint of a fall that occurred earlier today. States that he normally walks without difficulty but today his legs gave out on him. He fell to the ground. He is unsure if he hit his head. Patient is on Coumadin. He reports lower back pain but states that the back pain has been constant for months. It is not worse than normal.  He has abrasions diffusely from today as well as from previous injuries. He does endorse a cough that is been over the past 5 to 7 days. It is nonproductive. Denies shortness of breath, chest pain. Nursing Notes were all reviewed and agreed with or any disagreements were addressed in the HPI. REVIEW OF SYSTEMS :      Review of Systems   All other systems reviewed and are negative. Positives and Pertinent negatives as per HPI. SURGICAL HISTORY     Past Surgical History:   Procedure Laterality Date    BACK SURGERY  1995    BACK SURGERY      CARDIAC SURGERY  2007    CERVICAL SPINE SURGERY      CHEST TUBE INSERTION Right 03/14/2023    14F.  Dr. Daksha Conklin    Shoulder    IR CHEST TUBE INSERTION  3/14/2023    IR CHEST TUBE INSERTION

## 2023-11-13 NOTE — H&P
two days. Cough is mildly productive. Associated fever and chills. Has had weakness associated ambulatory dysfunction. In the ED he was febrile to 100.5 and mildly tachycardic and required 2L oxygen. He tested positive for Covid. CXR showed pneumonia and mild pulmonary edema. He was admitted for COPD exacerbation and acute hypoxic respiratory failure due to covid pneumonia. Review of Systems:        Pertinent positives and negatives discussed in HPI     Objective: Intake/Output Summary (Last 24 hours) at 11/13/2023 1606  Last data filed at 11/13/2023 1343  Gross per 24 hour   Intake 550.56 ml   Output --   Net 550.56 ml      Vitals:   Vitals:    11/13/23 1412 11/13/23 1417 11/13/23 1422 11/13/23 1427   BP:       Pulse: 89 77 87 85   Resp: 16 16 17 17   Temp:       TempSrc:       SpO2: 98% 91% 96% 96%   Weight:       Height:           Medications Prior to Admission     Prior to Admission medications    Medication Sig Start Date End Date Taking? Authorizing Provider   fluticasone-salmeterol (ADVAIR DISKUS) 250-50 MCG/ACT AEPB diskus inhaler Inhale 1 puff into the lungs in the morning and 1 puff in the evening. Yes Ildefonso Bassett MD   lamoTRIgine (LAMICTAL) 25 MG tablet Take 1 tablet by mouth every morning   Yes Ildefonso Bassett MD   lamoTRIgine (LAMICTAL) 25 MG tablet Take 2 tablets by mouth at bedtime   Yes Ildefonso Bassett MD   methocarbamol (ROBAXIN) 500 MG tablet Take 1 tablet by mouth 3 times daily as needed (spasms)   Yes ProviderIldefonso MD   Respiratory Therapy Supplies (NEBULIZER/TUBING/MOUTHPIECE) KIT With full face mask.  3/13/23   Reymundo Romero MD   albuterol (PROVENTIL) (2.5 MG/3ML) 0.083% nebulizer solution USE 1 VIAL IN NEBULIZER 4 TIMES DAILY 2/21/23   Reymundo Romero MD   albuterol sulfate HFA (PROVENTIL;VENTOLIN;PROAIR) 108 (90 Base) MCG/ACT inhaler Inhale 2 puffs into the lungs every 4 hours as needed for Wheezing 2/7/23   Robles Casiano MD

## 2023-11-14 LAB
ANION GAP SERPL CALCULATED.3IONS-SCNC: 9 MMOL/L (ref 3–16)
BASOPHILS # BLD: 0 K/UL (ref 0–0.2)
BASOPHILS NFR BLD: 0.8 %
BUN SERPL-MCNC: 25 MG/DL (ref 7–20)
CALCIUM SERPL-MCNC: 8.6 MG/DL (ref 8.3–10.6)
CHLORIDE SERPL-SCNC: 101 MMOL/L (ref 99–110)
CO2 SERPL-SCNC: 26 MMOL/L (ref 21–32)
CREAT SERPL-MCNC: 1.2 MG/DL (ref 0.8–1.3)
DEPRECATED RDW RBC AUTO: 14.4 % (ref 12.4–15.4)
EOSINOPHIL # BLD: 0 K/UL (ref 0–0.6)
EOSINOPHIL NFR BLD: 0 %
GFR SERPLBLD CREATININE-BSD FMLA CKD-EPI: >60 ML/MIN/{1.73_M2}
GLUCOSE BLD-MCNC: 160 MG/DL (ref 70–99)
GLUCOSE BLD-MCNC: 173 MG/DL (ref 70–99)
GLUCOSE BLD-MCNC: 254 MG/DL (ref 70–99)
GLUCOSE BLD-MCNC: 271 MG/DL (ref 70–99)
GLUCOSE BLD-MCNC: 367 MG/DL (ref 70–99)
GLUCOSE BLD-MCNC: 380 MG/DL (ref 70–99)
GLUCOSE SERPL-MCNC: 184 MG/DL (ref 70–99)
HCT VFR BLD AUTO: 39.5 % (ref 40.5–52.5)
HGB BLD-MCNC: 13.2 G/DL (ref 13.5–17.5)
LYMPHOCYTES # BLD: 1.4 K/UL (ref 1–5.1)
LYMPHOCYTES NFR BLD: 24.8 %
MCH RBC QN AUTO: 29.9 PG (ref 26–34)
MCHC RBC AUTO-ENTMCNC: 33.4 G/DL (ref 31–36)
MCV RBC AUTO: 89.6 FL (ref 80–100)
MONOCYTES # BLD: 1.4 K/UL (ref 0–1.3)
MONOCYTES NFR BLD: 25.2 %
NEUTROPHILS # BLD: 2.8 K/UL (ref 1.7–7.7)
NEUTROPHILS NFR BLD: 49.2 %
PERFORMED ON: ABNORMAL
PLATELET # BLD AUTO: 203 K/UL (ref 135–450)
PMV BLD AUTO: 7.2 FL (ref 5–10.5)
POTASSIUM SERPL-SCNC: 4.6 MMOL/L (ref 3.5–5.1)
RBC # BLD AUTO: 4.4 M/UL (ref 4.2–5.9)
SODIUM SERPL-SCNC: 136 MMOL/L (ref 136–145)
WBC # BLD AUTO: 5.7 K/UL (ref 4–11)

## 2023-11-14 PROCEDURE — 80048 BASIC METABOLIC PNL TOTAL CA: CPT

## 2023-11-14 PROCEDURE — 97530 THERAPEUTIC ACTIVITIES: CPT

## 2023-11-14 PROCEDURE — 97116 GAIT TRAINING THERAPY: CPT

## 2023-11-14 PROCEDURE — 6360000002 HC RX W HCPCS: Performed by: FAMILY MEDICINE

## 2023-11-14 PROCEDURE — 94640 AIRWAY INHALATION TREATMENT: CPT

## 2023-11-14 PROCEDURE — 97165 OT EVAL LOW COMPLEX 30 MIN: CPT

## 2023-11-14 PROCEDURE — 85025 COMPLETE CBC W/AUTO DIFF WBC: CPT

## 2023-11-14 PROCEDURE — 6370000000 HC RX 637 (ALT 250 FOR IP): Performed by: FAMILY MEDICINE

## 2023-11-14 PROCEDURE — 2580000003 HC RX 258: Performed by: FAMILY MEDICINE

## 2023-11-14 PROCEDURE — 36415 COLL VENOUS BLD VENIPUNCTURE: CPT

## 2023-11-14 PROCEDURE — 1200000000 HC SEMI PRIVATE

## 2023-11-14 PROCEDURE — 97161 PT EVAL LOW COMPLEX 20 MIN: CPT

## 2023-11-14 PROCEDURE — 94760 N-INVAS EAR/PLS OXIMETRY 1: CPT

## 2023-11-14 RX ORDER — INSULIN GLARGINE 100 [IU]/ML
30 INJECTION, SOLUTION SUBCUTANEOUS DAILY
Status: DISCONTINUED | OUTPATIENT
Start: 2023-11-15 | End: 2023-11-15 | Stop reason: HOSPADM

## 2023-11-14 RX ORDER — INSULIN GLARGINE 100 [IU]/ML
20 INJECTION, SOLUTION SUBCUTANEOUS ONCE
Status: COMPLETED | OUTPATIENT
Start: 2023-11-14 | End: 2023-11-14

## 2023-11-14 RX ORDER — INSULIN LISPRO 100 [IU]/ML
8 INJECTION, SOLUTION INTRAVENOUS; SUBCUTANEOUS
Status: DISCONTINUED | OUTPATIENT
Start: 2023-11-15 | End: 2023-11-15 | Stop reason: HOSPADM

## 2023-11-14 RX ADMIN — APIXABAN 5 MG: 5 TABLET, FILM COATED ORAL at 08:58

## 2023-11-14 RX ADMIN — CARVEDILOL 3.12 MG: 3.12 TABLET, FILM COATED ORAL at 08:58

## 2023-11-14 RX ADMIN — MOMETASONE FUROATE AND FORMOTEROL FUMARATE DIHYDRATE 2 PUFF: 200; 5 AEROSOL RESPIRATORY (INHALATION) at 20:35

## 2023-11-14 RX ADMIN — SODIUM CHLORIDE, PRESERVATIVE FREE 10 ML: 5 INJECTION INTRAVENOUS at 08:59

## 2023-11-14 RX ADMIN — FLUTICASONE PROPIONATE 1 SPRAY: 50 SPRAY, METERED NASAL at 08:57

## 2023-11-14 RX ADMIN — PRAMIPEXOLE DIHYDROCHLORIDE 0.25 MG: 0.5 TABLET ORAL at 21:22

## 2023-11-14 RX ADMIN — GABAPENTIN 800 MG: 400 CAPSULE ORAL at 08:57

## 2023-11-14 RX ADMIN — LAMOTRIGINE 25 MG: 25 TABLET ORAL at 08:57

## 2023-11-14 RX ADMIN — PRAMIPEXOLE DIHYDROCHLORIDE 0.25 MG: 0.5 TABLET ORAL at 08:57

## 2023-11-14 RX ADMIN — GABAPENTIN 800 MG: 400 CAPSULE ORAL at 21:22

## 2023-11-14 RX ADMIN — PRAMIPEXOLE DIHYDROCHLORIDE 0.25 MG: 0.5 TABLET ORAL at 18:23

## 2023-11-14 RX ADMIN — GUAIFENESIN 600 MG: 600 TABLET ORAL at 21:22

## 2023-11-14 RX ADMIN — INSULIN LISPRO 5 UNITS: 100 INJECTION, SOLUTION INTRAVENOUS; SUBCUTANEOUS at 13:23

## 2023-11-14 RX ADMIN — GABAPENTIN 800 MG: 400 CAPSULE ORAL at 18:22

## 2023-11-14 RX ADMIN — INSULIN LISPRO 5 UNITS: 100 INJECTION, SOLUTION INTRAVENOUS; SUBCUTANEOUS at 07:06

## 2023-11-14 RX ADMIN — SODIUM CHLORIDE, PRESERVATIVE FREE 10 ML: 5 INJECTION INTRAVENOUS at 21:28

## 2023-11-14 RX ADMIN — ROFLUMILAST 500 MCG: 500 TABLET ORAL at 08:57

## 2023-11-14 RX ADMIN — GUAIFENESIN 600 MG: 600 TABLET ORAL at 08:57

## 2023-11-14 RX ADMIN — APIXABAN 5 MG: 5 TABLET, FILM COATED ORAL at 21:22

## 2023-11-14 RX ADMIN — INSULIN GLARGINE 20 UNITS: 100 INJECTION, SOLUTION SUBCUTANEOUS at 19:37

## 2023-11-14 RX ADMIN — PRAMIPEXOLE DIHYDROCHLORIDE 0.25 MG: 0.5 TABLET ORAL at 13:23

## 2023-11-14 RX ADMIN — INSULIN LISPRO 8 UNITS: 100 INJECTION, SOLUTION INTRAVENOUS; SUBCUTANEOUS at 18:23

## 2023-11-14 RX ADMIN — ASPIRIN 81 MG: 81 TABLET, CHEWABLE ORAL at 08:57

## 2023-11-14 RX ADMIN — FLUTICASONE PROPIONATE 1 SPRAY: 50 SPRAY, METERED NASAL at 21:23

## 2023-11-14 RX ADMIN — INSULIN LISPRO 4 UNITS: 100 INJECTION, SOLUTION INTRAVENOUS; SUBCUTANEOUS at 13:22

## 2023-11-14 RX ADMIN — INSULIN LISPRO 5 UNITS: 100 INJECTION, SOLUTION INTRAVENOUS; SUBCUTANEOUS at 18:24

## 2023-11-14 RX ADMIN — LAMOTRIGINE 50 MG: 25 TABLET ORAL at 21:23

## 2023-11-14 RX ADMIN — GABAPENTIN 800 MG: 400 CAPSULE ORAL at 13:23

## 2023-11-14 RX ADMIN — MOMETASONE FUROATE AND FORMOTEROL FUMARATE DIHYDRATE 2 PUFF: 200; 5 AEROSOL RESPIRATORY (INHALATION) at 08:30

## 2023-11-14 RX ADMIN — DEXAMETHASONE 6 MG: 6 TABLET ORAL at 08:57

## 2023-11-14 RX ADMIN — LISINOPRIL 10 MG: 10 TABLET ORAL at 08:57

## 2023-11-14 RX ADMIN — ATORVASTATIN CALCIUM 20 MG: 20 TABLET, FILM COATED ORAL at 08:57

## 2023-11-14 RX ADMIN — INSULIN GLARGINE 10 UNITS: 100 INJECTION, SOLUTION SUBCUTANEOUS at 08:57

## 2023-11-14 RX ADMIN — FUROSEMIDE 40 MG: 40 TABLET ORAL at 08:57

## 2023-11-14 RX ADMIN — CEFTRIAXONE 1000 MG: 1 INJECTION, POWDER, FOR SOLUTION INTRAMUSCULAR; INTRAVENOUS at 13:29

## 2023-11-14 NOTE — ACP (ADVANCE CARE PLANNING)
Advance Care Planning     Advance Care Planning Activator (Inpatient)  Conversation Note      Date of ACP Conversation: 11/14/2023     Conversation Conducted with: Patient with Decision Making Capacity    ACP Activator: Maura Young RN    Health Care Decision Maker:     Current Designated Health Care Decision Maker:     Primary Decision Maker: Cheyenne Shoemaker - [phone]    Secondary Decision Maker: Sunil Kline - [phone]    Care Preferences    Ventilation: \"If you were in your present state of health and suddenly became very ill and were unable to breathe on your own, what would your preference be about the use of a ventilator (breathing machine) if it were available to you? \"      Would the patient desire the use of ventilator (breathing machine)?: no    \"If your health worsens and it becomes clear that your chance of recovery is unlikely, what would your preference be about the use of a ventilator (breathing machine) if it were available to you? \"     Would the patient desire the use of ventilator (breathing machine)?: No      Resuscitation  \"CPR works best to restart the heart when there is a sudden event, like a heart attack, in someone who is otherwise healthy. Unfortunately, CPR does not typically restart the heart for people who have serious health conditions or who are very sick. \"    \"In the event your heart stopped as a result of an underlying serious health condition, would you want attempts to be made to restart your heart (answer \"yes\" for attempt to resuscitate) or would you prefer a natural death (answer \"no\" for do not attempt to resuscitate)? \" no       [] Yes   [x] No   Educated Patient / Berle Picket regarding differences between Advance Directives and portable DNR orders.     Length of ACP Conversation in minutes:  5 minutes    Conversation Outcomes:  ACP discussion completed    Follow-up plan:    [x] Schedule follow-up conversation to continue [address]

## 2023-11-14 NOTE — CARE COORDINATION
Case Management Assessment  Initial Evaluation    Date/Time of Evaluation: 11/14/2023 9:44 AM  Assessment Completed by: Paramjit Rhodes RN    If patient is discharged prior to next notation, then this note serves as note for discharge by case management. Patient Name: Neena Bruno                   YOB: 1945  Diagnosis: Hypoxia [R09.02]  Generalized weakness [R53.1]  History of CHF (congestive heart failure) [Z86.79]  Acute respiratory failure with hypoxia ([address]) [J96.01]  Fall, initial encounter [W19. XXXA]  Pneumonia of both lungs due to infectious organism, unspecified part of lung [J18.9]  COVID [U07.1]                   Date / Time: 11/13/2023 10:13 AM    Patient Admission Status: Inpatient   Readmission Risk (Low < 19, Mod (19-27), High > 27): Readmission Risk Score: 14.2    Current PCP: Fauzia Angeles  PCP verified by CM? Yes    Chart Reviewed: Yes      History Provided by: Patient  Patient Orientation: Alert and Oriented    Patient Cognition: Alert    Hospitalization in the last 30 days (Readmission):  No    If yes, Readmission Assessment in CM Navigator will be completed.     Advance Directives:      Code Status: Full Code   Patient's Primary Decision Maker is: Legal Next of Kin    Primary Decision MakerObadimayi Patterson Spouse - [phone]    Secondary Decision Maker: Jacqueline Suárez Child - [phone]    Discharge Planning:    Patient lives with: Spouse/Significant Other Type of Home: House (3 steps)  Primary Care Giver: Self  Patient Support Systems include: Spouse/Significant Other, Children   Current Financial resources: Medicare  Current community resources: ECF/Home Care  Current services prior to admission: Durable Medical Equipment            Current DME: Mergonsaloyn Francisca, Other (Comment), [employer] (motorized scooter; handicap bathroom)            Type of Home Care services:  None    ADLS  Prior functional level: Assistance with the following:, Housework, Cooking  Current

## 2023-11-14 NOTE — CARE COORDINATION
[facility]    Referral received from  to follow for home care services. I will follow for needs, and speak with patient to verify demos.     Erwin Ervin RN, BSN CTN  [facility] [phone]

## 2023-11-14 NOTE — PLAN OF CARE
Problem: Discharge Planning  Goal: Discharge to home or other facility with appropriate resources  11/14/2023 0526 by Tho Beavers RN  Outcome: Progressing     Problem: Pain  Goal: Verbalizes/displays adequate comfort level or baseline comfort level  11/14/2023 0526 by Tho Beavers RN  Outcome: Progressing     Problem: Safety - Adult  Goal: Free from fall injury  11/14/2023 0526 by Tho Beavers RN  Outcome: Progressing     Problem: ABCDS Injury Assessment  Goal: Absence of physical injury  11/14/2023 0526 by Tho Beavers RN  Outcome: Progressing

## 2023-11-15 VITALS
HEART RATE: 80 BPM | HEIGHT: 70 IN | DIASTOLIC BLOOD PRESSURE: 61 MMHG | BODY MASS INDEX: 30.87 KG/M2 | TEMPERATURE: 97.6 F | RESPIRATION RATE: 16 BRPM | OXYGEN SATURATION: 97 % | SYSTOLIC BLOOD PRESSURE: 125 MMHG | WEIGHT: 215.61 LBS

## 2023-11-15 LAB
ANION GAP SERPL CALCULATED.3IONS-SCNC: 13 MMOL/L (ref 3–16)
BASOPHILS # BLD: 0 K/UL (ref 0–0.2)
BASOPHILS NFR BLD: 0.5 %
BUN SERPL-MCNC: 35 MG/DL (ref 7–20)
CALCIUM SERPL-MCNC: 8.8 MG/DL (ref 8.3–10.6)
CHLORIDE SERPL-SCNC: 98 MMOL/L (ref 99–110)
CO2 SERPL-SCNC: 24 MMOL/L (ref 21–32)
CREAT SERPL-MCNC: 1.3 MG/DL (ref 0.8–1.3)
DEPRECATED RDW RBC AUTO: 14.3 % (ref 12.4–15.4)
EOSINOPHIL # BLD: 0 K/UL (ref 0–0.6)
EOSINOPHIL NFR BLD: 0.1 %
GFR SERPLBLD CREATININE-BSD FMLA CKD-EPI: 56 ML/MIN/{1.73_M2}
GLUCOSE BLD-MCNC: 219 MG/DL (ref 70–99)
GLUCOSE BLD-MCNC: 229 MG/DL (ref 70–99)
GLUCOSE SERPL-MCNC: 277 MG/DL (ref 70–99)
HCT VFR BLD AUTO: 40.6 % (ref 40.5–52.5)
HGB BLD-MCNC: 13.4 G/DL (ref 13.5–17.5)
LYMPHOCYTES # BLD: 1.6 K/UL (ref 1–5.1)
LYMPHOCYTES NFR BLD: 28.3 %
MCH RBC QN AUTO: 29.5 PG (ref 26–34)
MCHC RBC AUTO-ENTMCNC: 33 G/DL (ref 31–36)
MCV RBC AUTO: 89.5 FL (ref 80–100)
MONOCYTES # BLD: 1 K/UL (ref 0–1.3)
MONOCYTES NFR BLD: 17.4 %
NEUTROPHILS # BLD: 3.1 K/UL (ref 1.7–7.7)
NEUTROPHILS NFR BLD: 53.7 %
PERFORMED ON: ABNORMAL
PERFORMED ON: ABNORMAL
PLATELET # BLD AUTO: 238 K/UL (ref 135–450)
PMV BLD AUTO: 7.5 FL (ref 5–10.5)
POTASSIUM SERPL-SCNC: 4.2 MMOL/L (ref 3.5–5.1)
RBC # BLD AUTO: 4.53 M/UL (ref 4.2–5.9)
SODIUM SERPL-SCNC: 135 MMOL/L (ref 136–145)
WBC # BLD AUTO: 5.8 K/UL (ref 4–11)

## 2023-11-15 PROCEDURE — 94640 AIRWAY INHALATION TREATMENT: CPT

## 2023-11-15 PROCEDURE — 97530 THERAPEUTIC ACTIVITIES: CPT

## 2023-11-15 PROCEDURE — 36415 COLL VENOUS BLD VENIPUNCTURE: CPT

## 2023-11-15 PROCEDURE — 6370000000 HC RX 637 (ALT 250 FOR IP): Performed by: FAMILY MEDICINE

## 2023-11-15 PROCEDURE — 85025 COMPLETE CBC W/AUTO DIFF WBC: CPT

## 2023-11-15 PROCEDURE — 94760 N-INVAS EAR/PLS OXIMETRY 1: CPT

## 2023-11-15 PROCEDURE — 80048 BASIC METABOLIC PNL TOTAL CA: CPT

## 2023-11-15 PROCEDURE — 2580000003 HC RX 258: Performed by: FAMILY MEDICINE

## 2023-11-15 PROCEDURE — 97116 GAIT TRAINING THERAPY: CPT

## 2023-11-15 PROCEDURE — 6360000002 HC RX W HCPCS: Performed by: FAMILY MEDICINE

## 2023-11-15 PROCEDURE — 97110 THERAPEUTIC EXERCISES: CPT

## 2023-11-15 RX ORDER — DEXAMETHASONE 6 MG/1
6 TABLET ORAL DAILY
Qty: 7 TABLET | Refills: 0 | Status: SHIPPED | OUTPATIENT
Start: 2023-11-16 | End: 2023-11-23

## 2023-11-15 RX ORDER — DEXAMETHASONE 6 MG/1
6 TABLET ORAL DAILY
Qty: 7 TABLET | Refills: 0 | Status: SHIPPED | OUTPATIENT
Start: 2023-11-16 | End: 2023-11-15 | Stop reason: SDUPTHER

## 2023-11-15 RX ORDER — POLYETHYLENE GLYCOL 3350 17 G/17G
17 POWDER, FOR SOLUTION ORAL DAILY
Status: DISCONTINUED | OUTPATIENT
Start: 2023-11-16 | End: 2023-11-15 | Stop reason: HOSPADM

## 2023-11-15 RX ADMIN — FUROSEMIDE 40 MG: 40 TABLET ORAL at 09:57

## 2023-11-15 RX ADMIN — SODIUM CHLORIDE, PRESERVATIVE FREE 10 ML: 5 INJECTION INTRAVENOUS at 09:59

## 2023-11-15 RX ADMIN — INSULIN LISPRO 8 UNITS: 100 INJECTION, SOLUTION INTRAVENOUS; SUBCUTANEOUS at 12:56

## 2023-11-15 RX ADMIN — APIXABAN 5 MG: 5 TABLET, FILM COATED ORAL at 09:58

## 2023-11-15 RX ADMIN — INSULIN LISPRO 2 UNITS: 100 INJECTION, SOLUTION INTRAVENOUS; SUBCUTANEOUS at 08:36

## 2023-11-15 RX ADMIN — INSULIN LISPRO 8 UNITS: 100 INJECTION, SOLUTION INTRAVENOUS; SUBCUTANEOUS at 08:36

## 2023-11-15 RX ADMIN — INSULIN GLARGINE 30 UNITS: 100 INJECTION, SOLUTION SUBCUTANEOUS at 09:57

## 2023-11-15 RX ADMIN — ATORVASTATIN CALCIUM 20 MG: 20 TABLET, FILM COATED ORAL at 09:57

## 2023-11-15 RX ADMIN — GUAIFENESIN 600 MG: 600 TABLET ORAL at 09:57

## 2023-11-15 RX ADMIN — ASPIRIN 81 MG: 81 TABLET, CHEWABLE ORAL at 09:57

## 2023-11-15 RX ADMIN — CEFTRIAXONE 1000 MG: 1 INJECTION, POWDER, FOR SOLUTION INTRAMUSCULAR; INTRAVENOUS at 13:01

## 2023-11-15 RX ADMIN — LISINOPRIL 10 MG: 10 TABLET ORAL at 14:18

## 2023-11-15 RX ADMIN — MOMETASONE FUROATE AND FORMOTEROL FUMARATE DIHYDRATE 2 PUFF: 200; 5 AEROSOL RESPIRATORY (INHALATION) at 07:41

## 2023-11-15 RX ADMIN — FLUTICASONE PROPIONATE 1 SPRAY: 50 SPRAY, METERED NASAL at 10:05

## 2023-11-15 RX ADMIN — CARVEDILOL 3.12 MG: 3.12 TABLET, FILM COATED ORAL at 09:58

## 2023-11-15 RX ADMIN — PRAMIPEXOLE DIHYDROCHLORIDE 0.25 MG: 0.5 TABLET ORAL at 09:58

## 2023-11-15 RX ADMIN — GABAPENTIN 800 MG: 400 CAPSULE ORAL at 09:57

## 2023-11-15 RX ADMIN — DEXAMETHASONE 6 MG: 6 TABLET ORAL at 09:57

## 2023-11-15 RX ADMIN — PRAMIPEXOLE DIHYDROCHLORIDE 0.25 MG: 0.5 TABLET ORAL at 12:55

## 2023-11-15 RX ADMIN — POLYETHYLENE GLYCOL 3350 17 G: 17 POWDER, FOR SOLUTION ORAL at 08:31

## 2023-11-15 RX ADMIN — INSULIN LISPRO 2 UNITS: 100 INJECTION, SOLUTION INTRAVENOUS; SUBCUTANEOUS at 12:56

## 2023-11-15 RX ADMIN — ROFLUMILAST 500 MCG: 500 TABLET ORAL at 09:58

## 2023-11-15 RX ADMIN — LAMOTRIGINE 25 MG: 25 TABLET ORAL at 09:57

## 2023-11-15 RX ADMIN — GABAPENTIN 800 MG: 400 CAPSULE ORAL at 12:55

## 2023-11-15 NOTE — DISCHARGE INSTRUCTIONS
Follow up with PCP within 1 week. Thank you for letting me take part of your care. Wish you a speedy recovery. Call PCP or go to ED if symptoms return.

## 2023-11-15 NOTE — DISCHARGE INSTR - COC
Continuity of Care Form    Patient Name: Adiel Deshpande   :  [id number]  MRN:  [id number]    Admit date:  2023  Discharge date:  ***    Code Status Order: Full Code   Advance Directives:     Admitting Physician: Verlie Cranker, MD  PCP: Juanita Hinkle    Discharging Nurse: [facility] Unit/Room#: P3C-7921/0217-00  Discharging Unit Phone Number: ***    Emergency Contact:   Extended Emergency Contact Information  Primary Emergency Contact: GauthierPortia  Address: [address] [place] [address] Héctor Deandra of [address] [address] Phone: [phone]  Work Phone: [phone]  Mobile Phone: [phone]  Relation: Spouse  Secondary Emergency Contact: Dontae Morales  Home Phone: [phone]  Relation: Child    Past Surgical History:  Past Surgical History:   Procedure Laterality Date    [address]  2007    CERVICAL SPINE SURGERY      CHEST TUBE INSERTION Right 2023    14F.  Dr. Joe Servant    Shoulder    IR CHEST TUBE INSERTION  3/14/2023    IR CHEST TUBE INSERTION 3/14/2023 [facility] SPECIAL PROCEDURES       Immunization History:   Immunization History   Administered Date(s) Administered    COVID-19, MODERNA Bivalent, (age 12y+), IM, 48 mcg/0.5 mL 2022    Influenza Vaccine, unspecified formulation 10/22/2016    Influenza, AFLURIA (age 1 yrs+), FLUZONE, (age 10 mo+), MDV, 0.5mL 10/22/2016, 09/15/2017    Influenza, High Dose (Fluzone 65 yrs and older) 09/15/2017    Pneumococcal, PCV-13, PREVNAR 15, (age 6w+), IM, 0.5mL 2016    TDaP, ADACEL (age 6y-58y), BOOSTRIX (age 10y+), IM, 0.5mL 2018       Active Problems:  Patient Active Problem List   Diagnosis Code    Diabetes mellitus ([address]) E11.9    Atherosclerosis of native coronary artery of native heart without angina pectoris I25.10    Morbid obesity due to excess calories (HCC) E66.01    SOB (shortness of breath) R06.02

## 2023-11-15 NOTE — CARE COORDINATION
Case Management Discharge Note          Date / Time of Note: 11/15/2023 12:25 PM                  Patient Name: Galen Sebastian   YOB: 1945  Diagnosis: Hypoxia [R09.02]  Generalized weakness [R53.1]  History of CHF (congestive heart failure) [Z86.79]  Acute respiratory failure with hypoxia ([address]) [J96.01]  Fall, initial encounter [W19. XXXA]  Pneumonia of both lungs due to infectious organism, unspecified part of lung [J18.9]  COVID [U07.1]   Date / Time: 11/13/2023 10:13 AM    Financial:  Payor: MEDICARE / Plan: MEDICARE PART A AND B / Product Type: *No Product type* /      Pharmacy:    [address] [place] [address] [phone] - F [phone]  [facility] [address]  Phone: [phone] Fax: [phone]      Assistance purchasing medications?: Potential Assistance Purchasing Medications: No  Assistance provided by Case Management: None at this time    DISCHARGE Disposition: Home with [address] [place] [address]:  Home Care ordered at discharge: Yes  [address]: [address] [facility]  Phone: [phone]  Fax: [phone]  Orders faxed: Yes    Transportation:  Transportation PLAN for discharge: family   Mode of Transport: Private Car  Time of Transport: TBD    IMM Completed:   Given on admission, 2nd IMM not required at this time    Additional CM Notes: Patient will return home with wife & [facility]. Patient denies additional needs. Critical access hospital/ [facility] aware of dc and able to pull orders via Epic. The Plan for Transition of Care is related to the following treatment goals of Hypoxia [R09.02]  Generalized weakness [R53.1]  History of CHF (congestive heart failure) [Z86.79]  Acute respiratory failure with hypoxia ([address]) [J96.01]  Fall, initial encounter [W19. XXXA]  Pneumonia of both lungs due to infectious organism, unspecified part of lung [J18.9]  COVID [U07.1]    The Patient and/or patient representative Peter Christie and his family

## 2023-11-15 NOTE — PROGRESS NOTES
4 Eyes Skin Assessment     NAME:  Fanta Bingham  YOB: 1945  MEDICAL RECORD NUMBER:  [id number]    The patient is being assessed for  Admission    I agree that at least one RN has performed a thorough Head to Toe Skin Assessment on the patient. ALL assessment sites listed below have been assessed. Areas assessed by both nurses:    Head, Face, Ears, Shoulders, Back, Chest, Arms, Elbows, Hands, Sacrum. Buttock, Coccyx, Ischium, Legs. Feet and Heels, and Under Medical Devices         Does the Patient have a Wound? Yes wound(s) were present on assessment.  LDA wound assessment was Initiated and completed by RN       Jose Alfredo Prevention initiated by RN: No  Wound Care Orders initiated by RN: No    Pressure Injury (Stage 3,4, Unstageable, DTI, NWPT, and Complex wounds) if present, place Wound referral order by RN under : No    New Ostomies, if present place, Ostomy referral order under : No     Nurse 1 eSignature: Electronically signed by Javier Read RN on 11/14/23 at 6:04 AM EST    **SHARE this note so that the co-signing nurse can place an eSignature**    Nurse 2 eSignature: Electronically signed by Naida Seip, RN on 11/14/23 at 10:40 AM EST
CLINICAL PHARMACY NOTE: MEDS TO BEDS    Total # of Prescriptions Filled: 0   The following medications were delivered to the patient:  Current Discharge Medication List        START taking these medications    Details   dexamethasone (DECADRON) 6 MG tablet Take 1 tablet by mouth daily for 7 days  Qty: 7 tablet, Refills: 0               Additional Documentation:  No medications delivered,  sent out new script to home pharmacy pr pt request
Medication Reconciliation    List of medications patient is currently taking is complete. Source of information: 1. Conversation with patient at bedside                                      2. EPIC records      Allergies  No known allergies     Notes regarding home medications:   1. Patient did not receive any of his home medications prior to arrival to the ER today.     Drew Green, [address] [place] [address], PharmD, BCPS  11/13/2023 3:08 PM
Patient discharged to home with home care. Peripheral IVs and heart monitor removed. Discharge instructions and medications discussed with patient; patient verbalized that he was very familiar with discharge instructions and his home medications. Reminded patient to  his prescription (dexamethasone) at [facility]. Patient verbalized \"I'll take care of it\". Patient left floor via wheelchair.
Patient was angry that he was not given Glycolax the previous day. Nurse attempted to educate patient that his order was \"as needed\" and patient would need to communicate with staff when he needed to take the medication. Patient became more angry and stated \"I take this everyday at home and I should have been given everyday here, too\". Nurse suggested to patient to speak with his PCP to change frequency to daily instead of \"as needed\" so that in the future if patient was to be in any hospital, the hospitalist would order the medication per his PCP's order. Patient just yelled: [facility] doctor has nothing to do with it. I don't care. I'm right and you're wrong. \"    Patient was given Glycolax, per order.
Physical Therapy  Facility/Department: [address] PROGRESSIVE CARE  Physical Therapy Treatment Note     Name: Coty Guzman  :   MRN: [id number]  Date of Service: 11/15/2023    Discharge Recommendations:  Continue to assess pending progress, Home with Home health PT   PT Equipment Recommendations  Equipment Needed: No  Coty Guzman scored a 20/24 on the AM-PAC short mobility form. Current research shows that an AM-PAC score of 18 or greater is typically associated with a discharge to the patient's home setting. Based on the patient's AM-PAC score and their current functional mobility deficits, it is recommended that the patient have 2-3 sessions per week of Physical Therapy at d/c to increase the patient's independence. At this time, this patient demonstrates the endurance and safety to discharge home with Home PT Evaluation  and a follow up treatment frequency of 2-3x/wk. Please see assessment section for further patient specific details. If patient discharges prior to next session this note will serve as a discharge summary. Please see below for the latest assessment towards goals. Assessment   Body Structures, Functions, Activity Limitations Requiring Skilled Therapeutic Intervention: Decreased functional mobility ; Decreased strength;Decreased safe awareness;Decreased sensation;Decreased balance  Assessment: 67 y/o male admit 2023 with Acute Respiratory, Pneumonia, COVID +, Fall. CT Head/C-Spine : negative. CT Chest : negative for PE.  PMH as noted including CAD, PAF, CABG, CHF, Pleural Effusion, Chronic Back Pain, Polyneuropathy. PTA pt living with wife in multi level home with few steps to enter and 1st floor bed/bath; independent daily care and functional mobility (with walker in home, scooter when going out).   Pt reports sign sensory impairments B LEs (R sign moreso than L) affecting mobility at baseline; strength appears comparable/functional.  Currently, pt suraj
Pt arrived to floor via stretcher from ED and transferred to bed. Telemetry activated. Patient oriented to room and use of call light. Call light and personal items within reach. Admission and assessment initiated. POC and education initiated and reviewed with patient. Denied further needs or questions at this time. Will continue to monitor.      Electronically signed by Pippa White RN on 11/13/2023 at 3:18 PM
,Respiratory: Tight and wheezing throughout. On admission rr 16-24,    95%ra and dropped to 89% and placed on 2L briefly. Has been on room air since   and sats-97%. Treatment: [email or web address] and has been on room air, monitor resp status, monitor o2   sats    Thank you, Kevin Branch RN CDS [email or web address]    Acute Respiratory Failure Clinical Indicators per 3M MS-DRG Training Guide and   Quick Reference Guide:  Supplemental oxygen of 40% or more  Presence of respiratory distress, tachypnea, dyspnea, shortness of breath,   wheezing  Unable to speak in complete sentences  Use of accessory muscles to breathe  Extreme anxiety and feeling of impending doom  Tripod position  Confusion/altered mental status/obtunded  Options provided:  -- Acute Respiratory Failure as evidenced by, Please document evidence. -- Acute Respiratory Failure ruled out after study  -- Other - I will add my own diagnosis  -- Disagree - Not applicable / Not valid  -- Disagree - Clinically unable to determine / Unknown  -- Refer to Clinical Documentation Reviewer    PROVIDER RESPONSE TEXT:    Acute Respiratory Failure has been ruled out after study. Query created by:  Kellen Branch on 11/15/2023 1:24 PM      Electronically signed by:  Emily Mcdonough MD 11/15/2023 2:31 PM
ANATOMY/TUBES/LINES: N/A    Running Summary, Assessment, and Plan     Shannan Lopez is a 66 y.o. male with pmh of DM2 who presents with Acute respiratory failure with hypoxia ([address]). Found to have [place]. Admitted for further management. Improved better than expected on Decadron and symptomatic management. Procal only 0.16.           Plan:      COPD exacerbation  Acute hypoxic respiratory failure ruled out  Covid pneumonia  Sepsis 2/2 COVID  - cont dulera  - combivent, mucinex, decadron  - takes saline nebs at home, cont to loose phelm  -Decadron     Mild pulmonary edema:  - likely due to pneumonia  - cont home oral dose lasix    #Hyponatremia, mild  -Trend      #DM2 with hyperglycemia   - increase lantus to 30U daily and lispro 8 U with meals      H/o CAD, CABG, Atrial fibrillation:  - cont all home medications including lasix     Cont home lamictil and miriplex    #Disposition:  home    #Barriers to discharge:  dispo    Advance Directive: Full Code     Rosalia Ortega MD  Rounding Hospitalist    Time Spent:  55 minutes    Discussed disposition above with      Has at least one of these; Number and Complexity of Problems Addressed at the Encounter:  -1 or more chronic illnesses with severe exacerbation, progression, or side effects of treatment  -1 acute or chronic illness or injury that poses a threat to life or bodily function     Risk of Complications and/or Morbidity or Mortality of Patient Management:  -Drug therapy requiring intensive monitoring for toxicity
PRN   Or  potassium chloride, 10 mEq, PRN  magnesium sulfate, 2,000 mg, PRN  ondansetron, 4 mg, Q8H PRN   Or  ondansetron, 4 mg, Q6H PRN  polyethylene glycol, 17 g, Daily PRN  acetaminophen, 650 mg, Q6H PRN   Or  acetaminophen, 650 mg, Q6H PRN  sodium chloride nebulizer, 3 mL, Q6H PRN        Labs and Imaging   CT CHEST PULMONARY EMBOLISM W CONTRAST    Result Date: 11/13/2023  EXAMINATION: CTA OF THE CHEST 11/13/2023 12:24 pm TECHNIQUE: CTA of the chest was performed after the administration of intravenous contrast.  Multiplanar reformatted images are provided for review. MIP images are provided for review. Automated exposure control, iterative reconstruction, and/or weight based adjustment of the mA/kV was utilized to reduce the radiation dose to as low as reasonably achievable. COMPARISON: None. HISTORY: ORDERING SYSTEM PROVIDED HISTORY: hypoxic, cov id TECHNOLOGIST PROVIDED HISTORY: Reason for exam:->hypoxic, cov id Decision Support Exception - unselect if not a suspected or confirmed emergency medical condition->Emergency Medical Condition (MA) Reason for Exam: hypoxic covid FINDINGS: Pulmonary Arteries: Pulmonary arteries are adequately opacified for evaluation. No evidence of intraluminal filling defect to suggest pulmonary embolism. Main pulmonary artery is normal in caliber. Mediastinum: No evidence of mediastinal lymphadenopathy. The heart and pericardium demonstrate no acute abnormality. There is no acute abnormality of the thoracic aorta. Lungs/pleura: There is mild bibasilar segmental consolidation with small parapneumonic effusions. The upper lobes are clear. Upper Abdomen: Limited images of the upper abdomen are unremarkable. Soft Tissues/Bones: No acute bone or soft tissue abnormality. 1. Negative for pulmonary embolus. 2. CHF with mild pulmonary edema and or superimposed pneumonia.      XR CHEST (2 VW)    Result Date: 11/13/2023  EXAMINATION: TWO XRAY VIEWS OF THE CHEST 11/13/2023 10:54 am
PT Eval/Rx. Social/Functional History  Social/Functional History  Lives With: Spouse  Type of Home: House  Home Layout: Multi-level, Able to Live on Main level with bedroom/bathroom, Laundry in basement  Home Access: Stairs to enter with rails  Entrance Stairs - Number of Steps: 2  Bathroom Shower/Tub: Walk-in shower  Bathroom Toilet: Handicap height  Bathroom Equipment: Grab bars in shower, Built-in shower seat  Bathroom Accessibility: Accessible  Home Equipment: Cane, Electric scooter, Lift chair, Walker, rolling, Allie Payan, 4 wheeled  Has the patient had two or more falls in the past year or any fall with injury in the past year?: Yes (1)  ADL Assistance: [address]  (Wife takes care of homemaking needs.)  Ambulation Assistance: Independent (with RW at home and scooter in community)  Transfer Assistance: Independent  Active : No  Patient's  Info: wife drives  Occupation: Retired  Type of Occupation: Retired : . Additional Comments: sleeps in lift chair  Vision/Hearing  Vision  Vision: Impaired (Wears Glasses.)  Hearing  Hearing: Exceptions to [facility]  Hearing Exceptions: Hard of hearing/hearing concerns (Has hearing aids; left at home.)    Cognition   Orientation  Overall Orientation Status: Within Functional Limits  Cognition  Overall Cognitive Status: Exceptions  Arousal/Alertness: Appropriate responses to stimuli  Following Commands: Follows one step commands consistently  Attention Span: Appears intact  Memory: Appears intact  Safety Judgement: Decreased awareness of need for safety     Objective           Gross Assessment  AROM: Generally decreased, functional  Strength: Generally decreased, functional  Sensation: Impaired (Diminished B LEs (R : entire distal to groin; L distal to knee). )                    Bed mobility  Bed Mobility Comments: Pt oob prior PT arrival; sleeps in lift chair.   Transfers  Sit to Stand: Stand by assistance (With Allie Payan.)  Stand to Sit:
Clinical Factors: SBA/CGA for pant management in stance at toilet; SUP to void seated on toilet  Additional Comments: Antcipate pt to require CGA for all ADLs due to decreased strength, balance and endurance observed this session. Bed mobility  Bed Mobility Comments: Unable to assess this date; Pt oob prior to therapist arrival; seated in recliner at end of session; sleeps in lift chair. Transfers  Sit to stand: Stand by assistance  Stand to sit: Stand by assistance  Transfer Comments: to/from RW; RW<>recliner with SBA x2 trials; VCs for hand placement to complete safe transfers  Vision  Vision: Impaired (Wears Glasses.)  Hearing  Hearing: Within functional limits  Cognition  Overall Cognitive Status: WFL  Orientation  Overall Orientation Status: Within Functional Limits               Functional Mobility Circuit Training: Pt ambulating 15-20 ft with RW and CGA; VCs for walker management/safety; no unsteadiness or LOB throughout  Static Sitting Balance Exercises: SUP seated on toilet  Static Standing Balance Exercises: CGA with RW in prep for fxl tx  Dynamic Standing Balance Exercises: Pt completes x25 marches in BLEs with RW and CGA; SBA/CGA for pant management up  Education Given To: Patient  Education Provided: Role of Therapy;Plan of Care;Energy Conservation; Fall Prevention Strategies  Education Provided Comments: taking seated rest breaks; completing bathing/dressing seated  Education Method: Verbal;Demonstration  Barriers to Learning: Hearing  Education Outcome: Verbalized understanding;Demonstrated understanding;Continued education needed                      AM-PAC Score        AM-PAC Inpatient Daily Activity Raw Score: 19 (11/14/23 1337)  AM-PAC Inpatient ADL T-Scale Score : 40.22 (11/14/23 1337)  ADL Inpatient CMS 0-100% Score: 42.8 (11/14/23 1337)  ADL Inpatient CMS G-Code Modifier : CK (11/14/23 1337)    Goals  Short Term Goals  Time Frame for Short Term Goals: prior to d/c  Short Term Goal 1: mod

## 2023-11-15 NOTE — CARE COORDINATION
[facility]    DC order noted, all docs needed have been faxed to [facility] for home care services.     Home care to see patient by 11/17/23    Bronwyn Cole RN, BSN CTN  [facility] [phone]

## 2023-11-17 LAB — BACTERIA BLD CULT: NORMAL

## 2023-11-18 LAB — BACTERIA BLD CULT ORG #2: NORMAL

## 2023-11-24 ENCOUNTER — HOSPITAL ENCOUNTER (INPATIENT)
Age: 78
LOS: 3 days | Discharge: HOME HEALTH | DRG: 871 | End: 2023-11-27
Attending: EMERGENCY MEDICINE | Admitting: HOSPITALIST
Payer: MEDICARE

## 2023-11-24 ENCOUNTER — APPOINTMENT (OUTPATIENT)
Dept: GENERAL RADIOLOGY | Age: 78
DRG: 871 | End: 2023-11-24
Payer: MEDICARE

## 2023-11-24 DIAGNOSIS — I48.91 ATRIAL FIBRILLATION, UNSPECIFIED TYPE (HCC): ICD-10-CM

## 2023-11-24 DIAGNOSIS — I48.91 ATRIAL FIBRILLATION WITH RAPID VENTRICULAR RESPONSE (HCC): ICD-10-CM

## 2023-11-24 DIAGNOSIS — R09.02 HYPOXIA: ICD-10-CM

## 2023-11-24 DIAGNOSIS — U07.1 PNEUMONIA DUE TO 2019-NCOV: Primary | ICD-10-CM

## 2023-11-24 DIAGNOSIS — J12.82 PNEUMONIA DUE TO 2019-NCOV: Primary | ICD-10-CM

## 2023-11-24 PROBLEM — J20.8 ACUTE BRONCHITIS DUE TO SEVERE ACUTE RESPIRATORY SYNDROME CORONAVIRUS 2 (SARS-COV-2): Status: ACTIVE | Noted: 2023-11-24

## 2023-11-24 PROBLEM — I50.43 ACUTE ON CHRONIC COMBINED SYSTOLIC (CONGESTIVE) AND DIASTOLIC (CONGESTIVE) HEART FAILURE (HCC): Status: ACTIVE | Noted: 2023-11-24

## 2023-11-24 LAB
ANION GAP SERPL CALCULATED.3IONS-SCNC: 13 MMOL/L (ref 3–16)
BASE EXCESS BLDV CALC-SCNC: -3.3 MMOL/L
BASOPHILS # BLD: 0.2 K/UL (ref 0–0.2)
BASOPHILS NFR BLD: 1.2 %
BUN SERPL-MCNC: 25 MG/DL (ref 7–20)
CALCIUM SERPL-MCNC: 8.7 MG/DL (ref 8.3–10.6)
CHLORIDE SERPL-SCNC: 101 MMOL/L (ref 99–110)
CO2 BLDV-SCNC: 26 MMOL/L
CO2 SERPL-SCNC: 26 MMOL/L (ref 21–32)
COHGB MFR BLDV: 1.2 %
CREAT SERPL-MCNC: 1.5 MG/DL (ref 0.8–1.3)
DEPRECATED RDW RBC AUTO: 14.5 % (ref 12.4–15.4)
EOSINOPHIL # BLD: 0 K/UL (ref 0–0.6)
EOSINOPHIL NFR BLD: 0.2 %
FLUAV RNA UPPER RESP QL NAA+PROBE: NEGATIVE
FLUBV AG NPH QL: NEGATIVE
GFR SERPLBLD CREATININE-BSD FMLA CKD-EPI: 47 ML/MIN/{1.73_M2}
GLUCOSE BLD-MCNC: 213 MG/DL (ref 70–99)
GLUCOSE BLD-MCNC: 251 MG/DL (ref 70–99)
GLUCOSE BLD-MCNC: 425 MG/DL (ref 70–99)
GLUCOSE BLD-MCNC: 546 MG/DL (ref 70–99)
GLUCOSE SERPL-MCNC: 262 MG/DL (ref 70–99)
HCO3 BLDV-SCNC: 24 MMOL/L (ref 23–29)
HCT VFR BLD AUTO: 44.9 % (ref 40.5–52.5)
HGB BLD-MCNC: 14.6 G/DL (ref 13.5–17.5)
LACTATE BLDV-SCNC: 2.3 MMOL/L (ref 0.4–2)
LYMPHOCYTES # BLD: 0.9 K/UL (ref 1–5.1)
LYMPHOCYTES NFR BLD: 6.7 %
MCH RBC QN AUTO: 29.1 PG (ref 26–34)
MCHC RBC AUTO-ENTMCNC: 32.6 G/DL (ref 31–36)
MCV RBC AUTO: 89.5 FL (ref 80–100)
METHGB MFR BLDV: 0.4 %
MONOCYTES # BLD: 1 K/UL (ref 0–1.3)
MONOCYTES NFR BLD: 7.2 %
NEUTROPHILS # BLD: 11.3 K/UL (ref 1.7–7.7)
NEUTROPHILS NFR BLD: 84.7 %
NT-PROBNP SERPL-MCNC: 208 PG/ML (ref 0–449)
O2 THERAPY: ABNORMAL
ORGANISM: ABNORMAL
PCO2 BLDV: 50.7 MMHG (ref 40–50)
PERFORMED ON: ABNORMAL
PH BLDV: 7.28 [PH] (ref 7.35–7.45)
PLATELET # BLD AUTO: 310 K/UL (ref 135–450)
PMV BLD AUTO: 7.3 FL (ref 5–10.5)
PNEUMONIA PANEL MOLECULAR: ABNORMAL
PO2 BLDV: 45 MMHG
POTASSIUM SERPL-SCNC: 4.5 MMOL/L (ref 3.5–5.1)
PROCALCITONIN SERPL IA-MCNC: 0.55 NG/ML (ref 0–0.15)
RBC # BLD AUTO: 5.01 M/UL (ref 4.2–5.9)
REPORT: NORMAL
SAO2 % BLDV: 76 %
SARS-COV-2 RDRP RESP QL NAA+PROBE: DETECTED
SODIUM SERPL-SCNC: 140 MMOL/L (ref 136–145)
TROPONIN, HIGH SENSITIVITY: 32 NG/L (ref 0–22)
WBC # BLD AUTO: 13.3 K/UL (ref 4–11)

## 2023-11-24 PROCEDURE — 84145 PROCALCITONIN (PCT): CPT

## 2023-11-24 PROCEDURE — 94640 AIRWAY INHALATION TREATMENT: CPT

## 2023-11-24 PROCEDURE — 93005 ELECTROCARDIOGRAM TRACING: CPT | Performed by: EMERGENCY MEDICINE

## 2023-11-24 PROCEDURE — 87635 SARS-COV-2 COVID-19 AMP PRB: CPT

## 2023-11-24 PROCEDURE — 6370000000 HC RX 637 (ALT 250 FOR IP): Performed by: INTERNAL MEDICINE

## 2023-11-24 PROCEDURE — 97530 THERAPEUTIC ACTIVITIES: CPT

## 2023-11-24 PROCEDURE — 96365 THER/PROPH/DIAG IV INF INIT: CPT

## 2023-11-24 PROCEDURE — 99223 1ST HOSP IP/OBS HIGH 75: CPT | Performed by: INTERNAL MEDICINE

## 2023-11-24 PROCEDURE — 94761 N-INVAS EAR/PLS OXIMETRY MLT: CPT

## 2023-11-24 PROCEDURE — 2060000000 HC ICU INTERMEDIATE R&B

## 2023-11-24 PROCEDURE — 87040 BLOOD CULTURE FOR BACTERIA: CPT

## 2023-11-24 PROCEDURE — 6360000002 HC RX W HCPCS: Performed by: HOSPITALIST

## 2023-11-24 PROCEDURE — 83605 ASSAY OF LACTIC ACID: CPT

## 2023-11-24 PROCEDURE — 83880 ASSAY OF NATRIURETIC PEPTIDE: CPT

## 2023-11-24 PROCEDURE — 87633 RESP VIRUS 12-25 TARGETS: CPT

## 2023-11-24 PROCEDURE — 87205 SMEAR GRAM STAIN: CPT

## 2023-11-24 PROCEDURE — 6370000000 HC RX 637 (ALT 250 FOR IP): Performed by: EMERGENCY MEDICINE

## 2023-11-24 PROCEDURE — 94660 CPAP INITIATION&MGMT: CPT

## 2023-11-24 PROCEDURE — 96375 TX/PRO/DX INJ NEW DRUG ADDON: CPT

## 2023-11-24 PROCEDURE — 2580000003 HC RX 258: Performed by: HOSPITALIST

## 2023-11-24 PROCEDURE — 80048 BASIC METABOLIC PNL TOTAL CA: CPT

## 2023-11-24 PROCEDURE — 87070 CULTURE OTHR SPECIMN AEROBIC: CPT

## 2023-11-24 PROCEDURE — 6360000002 HC RX W HCPCS: Performed by: EMERGENCY MEDICINE

## 2023-11-24 PROCEDURE — 6370000000 HC RX 637 (ALT 250 FOR IP): Performed by: HOSPITALIST

## 2023-11-24 PROCEDURE — 2500000003 HC RX 250 WO HCPCS: Performed by: EMERGENCY MEDICINE

## 2023-11-24 PROCEDURE — 71045 X-RAY EXAM CHEST 1 VIEW: CPT

## 2023-11-24 PROCEDURE — 82803 BLOOD GASES ANY COMBINATION: CPT

## 2023-11-24 PROCEDURE — 2580000003 HC RX 258: Performed by: EMERGENCY MEDICINE

## 2023-11-24 PROCEDURE — 85025 COMPLETE CBC W/AUTO DIFF WBC: CPT

## 2023-11-24 PROCEDURE — 97535 SELF CARE MNGMENT TRAINING: CPT

## 2023-11-24 PROCEDURE — 97162 PT EVAL MOD COMPLEX 30 MIN: CPT | Performed by: PHYSICAL THERAPIST

## 2023-11-24 PROCEDURE — 97166 OT EVAL MOD COMPLEX 45 MIN: CPT

## 2023-11-24 PROCEDURE — 97116 GAIT TRAINING THERAPY: CPT | Performed by: PHYSICAL THERAPIST

## 2023-11-24 PROCEDURE — 5A09357 ASSISTANCE WITH RESPIRATORY VENTILATION, LESS THAN 24 CONSECUTIVE HOURS, CONTINUOUS POSITIVE AIRWAY PRESSURE: ICD-10-PCS | Performed by: HOSPITALIST

## 2023-11-24 PROCEDURE — 87804 INFLUENZA ASSAY W/OPTIC: CPT

## 2023-11-24 PROCEDURE — 99285 EMERGENCY DEPT VISIT HI MDM: CPT

## 2023-11-24 PROCEDURE — 97530 THERAPEUTIC ACTIVITIES: CPT | Performed by: PHYSICAL THERAPIST

## 2023-11-24 PROCEDURE — 84484 ASSAY OF TROPONIN QUANT: CPT

## 2023-11-24 PROCEDURE — 87449 NOS EACH ORGANISM AG IA: CPT

## 2023-11-24 PROCEDURE — 36415 COLL VENOUS BLD VENIPUNCTURE: CPT

## 2023-11-24 PROCEDURE — 2700000000 HC OXYGEN THERAPY PER DAY

## 2023-11-24 RX ORDER — GABAPENTIN 400 MG/1
800 CAPSULE ORAL 4 TIMES DAILY
Status: DISCONTINUED | OUTPATIENT
Start: 2023-11-24 | End: 2023-11-27 | Stop reason: HOSPADM

## 2023-11-24 RX ORDER — DEXTROSE MONOHYDRATE 100 MG/ML
INJECTION, SOLUTION INTRAVENOUS CONTINUOUS PRN
Status: DISCONTINUED | OUTPATIENT
Start: 2023-11-24 | End: 2023-11-27 | Stop reason: HOSPADM

## 2023-11-24 RX ORDER — INSULIN GLARGINE 100 [IU]/ML
25 INJECTION, SOLUTION SUBCUTANEOUS NIGHTLY
Status: DISCONTINUED | OUTPATIENT
Start: 2023-11-24 | End: 2023-11-27 | Stop reason: HOSPADM

## 2023-11-24 RX ORDER — SODIUM CHLORIDE 0.9 % (FLUSH) 0.9 %
5-40 SYRINGE (ML) INJECTION PRN
Status: DISCONTINUED | OUTPATIENT
Start: 2023-11-24 | End: 2023-11-27 | Stop reason: HOSPADM

## 2023-11-24 RX ORDER — INSULIN LISPRO 100 [IU]/ML
8 INJECTION, SOLUTION INTRAVENOUS; SUBCUTANEOUS ONCE
Status: COMPLETED | OUTPATIENT
Start: 2023-11-24 | End: 2023-11-24

## 2023-11-24 RX ORDER — INSULIN LISPRO 100 [IU]/ML
3 INJECTION, SOLUTION INTRAVENOUS; SUBCUTANEOUS ONCE
Status: COMPLETED | OUTPATIENT
Start: 2023-11-24 | End: 2023-11-24

## 2023-11-24 RX ORDER — INSULIN LISPRO 100 [IU]/ML
0-4 INJECTION, SOLUTION INTRAVENOUS; SUBCUTANEOUS NIGHTLY
Status: DISCONTINUED | OUTPATIENT
Start: 2023-11-24 | End: 2023-11-27 | Stop reason: HOSPADM

## 2023-11-24 RX ORDER — DILTIAZEM HYDROCHLORIDE 5 MG/ML
10 INJECTION INTRAVENOUS ONCE
Status: COMPLETED | OUTPATIENT
Start: 2023-11-24 | End: 2023-11-24

## 2023-11-24 RX ORDER — ACETAMINOPHEN 325 MG/1
650 TABLET ORAL EVERY 6 HOURS PRN
Status: DISCONTINUED | OUTPATIENT
Start: 2023-11-24 | End: 2023-11-27 | Stop reason: HOSPADM

## 2023-11-24 RX ORDER — FUROSEMIDE 10 MG/ML
40 INJECTION INTRAMUSCULAR; INTRAVENOUS ONCE
Status: DISCONTINUED | OUTPATIENT
Start: 2023-11-24 | End: 2023-11-24

## 2023-11-24 RX ORDER — SODIUM CHLORIDE 0.9 % (FLUSH) 0.9 %
5-40 SYRINGE (ML) INJECTION EVERY 12 HOURS SCHEDULED
Status: DISCONTINUED | OUTPATIENT
Start: 2023-11-24 | End: 2023-11-27 | Stop reason: HOSPADM

## 2023-11-24 RX ORDER — IPRATROPIUM BROMIDE AND ALBUTEROL SULFATE 2.5; .5 MG/3ML; MG/3ML
1 SOLUTION RESPIRATORY (INHALATION) ONCE
Status: COMPLETED | OUTPATIENT
Start: 2023-11-24 | End: 2023-11-24

## 2023-11-24 RX ORDER — INSULIN LISPRO 100 [IU]/ML
5 INJECTION, SOLUTION INTRAVENOUS; SUBCUTANEOUS
Status: DISCONTINUED | OUTPATIENT
Start: 2023-11-24 | End: 2023-11-27 | Stop reason: HOSPADM

## 2023-11-24 RX ORDER — SODIUM CHLORIDE 9 MG/ML
INJECTION, SOLUTION INTRAVENOUS PRN
Status: DISCONTINUED | OUTPATIENT
Start: 2023-11-24 | End: 2023-11-27 | Stop reason: HOSPADM

## 2023-11-24 RX ORDER — LEVOFLOXACIN 5 MG/ML
750 INJECTION, SOLUTION INTRAVENOUS ONCE
Status: COMPLETED | OUTPATIENT
Start: 2023-11-24 | End: 2023-11-24

## 2023-11-24 RX ORDER — DEXAMETHASONE SODIUM PHOSPHATE 4 MG/ML
6 INJECTION, SOLUTION INTRA-ARTICULAR; INTRALESIONAL; INTRAMUSCULAR; INTRAVENOUS; SOFT TISSUE ONCE
Status: COMPLETED | OUTPATIENT
Start: 2023-11-24 | End: 2023-11-24

## 2023-11-24 RX ORDER — METHOCARBAMOL 500 MG/1
500 TABLET, FILM COATED ORAL 3 TIMES DAILY PRN
Status: DISCONTINUED | OUTPATIENT
Start: 2023-11-24 | End: 2023-11-25 | Stop reason: SDUPTHER

## 2023-11-24 RX ORDER — ACETAMINOPHEN 650 MG/1
650 SUPPOSITORY RECTAL EVERY 6 HOURS PRN
Status: DISCONTINUED | OUTPATIENT
Start: 2023-11-24 | End: 2023-11-27 | Stop reason: HOSPADM

## 2023-11-24 RX ORDER — POLYETHYLENE GLYCOL 3350 17 G/17G
17 POWDER, FOR SOLUTION ORAL DAILY PRN
Status: DISCONTINUED | OUTPATIENT
Start: 2023-11-24 | End: 2023-11-27 | Stop reason: HOSPADM

## 2023-11-24 RX ORDER — ENOXAPARIN SODIUM 100 MG/ML
30 INJECTION SUBCUTANEOUS 2 TIMES DAILY
Status: CANCELLED | OUTPATIENT
Start: 2023-11-24

## 2023-11-24 RX ORDER — FUROSEMIDE 10 MG/ML
40 INJECTION INTRAMUSCULAR; INTRAVENOUS 2 TIMES DAILY
Status: DISCONTINUED | OUTPATIENT
Start: 2023-11-24 | End: 2023-11-27 | Stop reason: HOSPADM

## 2023-11-24 RX ORDER — ONDANSETRON 4 MG/1
4 TABLET, ORALLY DISINTEGRATING ORAL EVERY 8 HOURS PRN
Status: DISCONTINUED | OUTPATIENT
Start: 2023-11-24 | End: 2023-11-27 | Stop reason: HOSPADM

## 2023-11-24 RX ORDER — PRAMIPEXOLE DIHYDROCHLORIDE 0.5 MG/1
0.25 TABLET ORAL
Status: DISCONTINUED | OUTPATIENT
Start: 2023-11-24 | End: 2023-11-27 | Stop reason: HOSPADM

## 2023-11-24 RX ORDER — INSULIN LISPRO 100 [IU]/ML
0-8 INJECTION, SOLUTION INTRAVENOUS; SUBCUTANEOUS
Status: DISCONTINUED | OUTPATIENT
Start: 2023-11-24 | End: 2023-11-27 | Stop reason: HOSPADM

## 2023-11-24 RX ORDER — ONDANSETRON 2 MG/ML
4 INJECTION INTRAMUSCULAR; INTRAVENOUS EVERY 6 HOURS PRN
Status: DISCONTINUED | OUTPATIENT
Start: 2023-11-24 | End: 2023-11-27 | Stop reason: HOSPADM

## 2023-11-24 RX ORDER — DEXAMETHASONE SODIUM PHOSPHATE 10 MG/ML
6 INJECTION, SOLUTION INTRAMUSCULAR; INTRAVENOUS EVERY 24 HOURS
Status: DISCONTINUED | OUTPATIENT
Start: 2023-11-25 | End: 2023-11-26

## 2023-11-24 RX ADMIN — INSULIN LISPRO 5 UNITS: 100 INJECTION, SOLUTION INTRAVENOUS; SUBCUTANEOUS at 13:18

## 2023-11-24 RX ADMIN — FUROSEMIDE 40 MG: 10 INJECTION, SOLUTION INTRAMUSCULAR; INTRAVENOUS at 09:33

## 2023-11-24 RX ADMIN — INSULIN LISPRO 3 UNITS: 100 INJECTION, SOLUTION INTRAVENOUS; SUBCUTANEOUS at 14:27

## 2023-11-24 RX ADMIN — PRAMIPEXOLE DIHYDROCHLORIDE 0.25 MG: 0.5 TABLET ORAL at 18:31

## 2023-11-24 RX ADMIN — INSULIN LISPRO 8 UNITS: 100 INJECTION, SOLUTION INTRAVENOUS; SUBCUTANEOUS at 11:02

## 2023-11-24 RX ADMIN — IPRATROPIUM BROMIDE AND ALBUTEROL SULFATE 1 DOSE: 2.5; .5 SOLUTION RESPIRATORY (INHALATION) at 01:07

## 2023-11-24 RX ADMIN — AZITHROMYCIN MONOHYDRATE 500 MG: 500 INJECTION, POWDER, LYOPHILIZED, FOR SOLUTION INTRAVENOUS at 14:29

## 2023-11-24 RX ADMIN — INSULIN LISPRO 5 UNITS: 100 INJECTION, SOLUTION INTRAVENOUS; SUBCUTANEOUS at 18:32

## 2023-11-24 RX ADMIN — LEVOFLOXACIN 750 MG: 5 INJECTION, SOLUTION INTRAVENOUS at 02:14

## 2023-11-24 RX ADMIN — METHOCARBAMOL 500 MG: 500 TABLET ORAL at 18:31

## 2023-11-24 RX ADMIN — GABAPENTIN 800 MG: 400 CAPSULE ORAL at 20:29

## 2023-11-24 RX ADMIN — ACETAMINOPHEN 650 MG: 325 TABLET ORAL at 18:31

## 2023-11-24 RX ADMIN — PRAMIPEXOLE DIHYDROCHLORIDE 0.25 MG: 0.5 TABLET ORAL at 20:30

## 2023-11-24 RX ADMIN — DILTIAZEM HYDROCHLORIDE 5 MG/HR: 5 INJECTION, SOLUTION INTRAVENOUS at 01:00

## 2023-11-24 RX ADMIN — APIXABAN 5 MG: 5 TABLET, FILM COATED ORAL at 20:30

## 2023-11-24 RX ADMIN — CEFTRIAXONE SODIUM 2000 MG: 2 INJECTION, POWDER, FOR SOLUTION INTRAMUSCULAR; INTRAVENOUS at 09:36

## 2023-11-24 RX ADMIN — INSULIN LISPRO 4 UNITS: 100 INJECTION, SOLUTION INTRAVENOUS; SUBCUTANEOUS at 18:32

## 2023-11-24 RX ADMIN — INSULIN GLARGINE 25 UNITS: 100 INJECTION, SOLUTION SUBCUTANEOUS at 11:02

## 2023-11-24 RX ADMIN — INSULIN LISPRO 8 UNITS: 100 INJECTION, SOLUTION INTRAVENOUS; SUBCUTANEOUS at 13:18

## 2023-11-24 RX ADMIN — FUROSEMIDE 40 MG: 10 INJECTION, SOLUTION INTRAMUSCULAR; INTRAVENOUS at 18:31

## 2023-11-24 RX ADMIN — DILTIAZEM HYDROCHLORIDE 10 MG: 5 INJECTION, SOLUTION INTRAVENOUS at 00:58

## 2023-11-24 RX ADMIN — DEXAMETHASONE SODIUM PHOSPHATE 6 MG: 4 INJECTION INTRA-ARTICULAR; INTRALESIONAL; INTRAMUSCULAR; INTRAVENOUS; SOFT TISSUE at 00:57

## 2023-11-24 RX ADMIN — APIXABAN 5 MG: 5 TABLET, FILM COATED ORAL at 09:33

## 2023-11-24 RX ADMIN — SODIUM CHLORIDE, PRESERVATIVE FREE 10 ML: 5 INJECTION INTRAVENOUS at 20:30

## 2023-11-24 ASSESSMENT — PAIN SCALES - GENERAL: PAINLEVEL_OUTOF10: 3

## 2023-11-24 ASSESSMENT — PAIN - FUNCTIONAL ASSESSMENT: PAIN_FUNCTIONAL_ASSESSMENT: PREVENTS OR INTERFERES SOME ACTIVE ACTIVITIES AND ADLS

## 2023-11-24 ASSESSMENT — PAIN DESCRIPTION - LOCATION: LOCATION: LEG

## 2023-11-24 ASSESSMENT — PAIN DESCRIPTION - ORIENTATION: ORIENTATION: RIGHT

## 2023-11-24 ASSESSMENT — PAIN DESCRIPTION - DESCRIPTORS: DESCRIPTORS: ACHING;SPASM

## 2023-11-24 NOTE — ED NOTES
Report called to PETRA Razo for Rm [id number]. Questions addressed, report accepted. Transport to be arranged.       Stef Gooden RN  11/24/23 8513

## 2023-11-24 NOTE — CONSULTS
Infectious Diseases Inpatient Consult Note      Reason for Consult:  COVID 19 Pneumonia and resp failure     Requesting Physician:        Primary Care Physician:  Ruthann Issa    History Obtained From:  [facility] and patient     CHIEF COMPLAINT:     Chief Complaint   Patient presents with    Respiratory Distress     Presents via EMS. Diagnosed with covid last Monday, worsening since. Pt short of breath, 89% room air. Pt wife called 911 due to his not being able to ambulate around residence anymore. Pt with audible \"gurgle\" lung sounds without auscultation. HISTORY OF PRESENT ILLNESS:  66 y.o. man with a significant history of coronary disease, CABG, congestive heart failure, COPD, diabetes, hypertension, paroxysmal atrial fibrillation on anticoagulation admitted to hospital secondary to cough shortness of breath. Patient was tested for COVID-19 on 11/13/23 and was d/c to home on Dexamethasone on 11/5/23 noted with any worsening symptoms including shortness of breath and respiratory distress, hypoxic to 80% on room air currently 0.4 L nasal cannula Tmax 99.6, also found to be in A-fib with RVR requiring IV Diltiazem. Labs with creatinine 1.5 lactic acid 2.3 procalcitonin 0.5 WBC 13.3, rapid flu negative COVID-19 positive, blood cultures in process, infiltrate in right lung base increased compared to previous chest x-ray. Given the need for IV antibiotics we are consulted for recommendations.     Past Medical History:    Past Medical History:   Diagnosis Date    Abrasion of right elbow 2/4/2023    Arthritis     CAD (coronary artery disease)     CABG in 2009    Chronic back pain     Chronic systolic CHF (congestive heart failure) (HCC)     COPD (chronic obstructive pulmonary disease) ([address])     Dental disease     Diabetes mellitus ([address])     Essential hypertension     Hearing loss     Paroxysmal atrial fibrillation ([address])     On Coumadin    Tinnitus        Past Surgical History:    Past Surgical

## 2023-11-24 NOTE — H&P
ipratropium-albuterol (DUONEB) 0.5-2.5 (3) MG/3ML SOLN nebulizer solution Inhale 3 mLs into the lungs every 6 hours as needed for Shortness of Breath 2/7/23   Dorian Dodge MD   pramipexole (MIRAPEX) 0.25 MG tablet Take 1 tablet by mouth in the morning, at noon, in the evening, and at bedtime 1/29/23   Ildefonso Bassett MD   furosemide (LASIX) 40 MG tablet Take 1 tablet by mouth daily 12/11/22   Ildefonso Bassett MD   flunisolide (NASALIDE) 25 MCG/ACT (0.025%) SOLN 2 sprays by Nasal route in the morning and at bedtime    Ildefonso Bassett MD   Lidocaine 4 % OINT Apply topically As needed for pain    Ildefonso Bassett MD   polyethylene glycol (GLYCOLAX) 17 GM/SCOOP powder Take 17 g by mouth daily    Ildefonso Bassett MD   Multiple Vitamins-Minerals (PRESERVISION AREDS 2+MULTI VIT PO) Take 1 tablet by mouth in the morning and at bedtime    Ildefonso Bassett MD   lisinopril (PRINIVIL;ZESTRIL) 10 MG tablet Take 1 tablet by mouth daily    Ildefonso Bassett MD   dapagliflozin (FARXIGA) 10 MG tablet Take 1 tablet by mouth every morning    Ildefonso Bassett MD   insulin 70-30 (HUMULIN;NOVOLIN) (70-30) 100 UNIT per ML injection vial Inject 25 Units into the skin 2 times daily    Ildefonso Bassett MD   Multiple Vitamins-Minerals (THERAPEUTIC MULTIVITAMIN-MINERALS) tablet Take 1 tablet by mouth daily    Ildefonso Bassett MD   apixaban (ELIQUIS) 5 MG TABS tablet Take 1 tablet by mouth 2 times daily 12/23/20   Luli Wade MD   simvastatin (ZOCOR) 80 MG tablet Take 0.5 tablets by mouth nightly 3/27/18   Ildefonso Bassett MD   vitamin D (CHOLECALCIFEROL) 1000 UNITS TABS tablet Take 1 tablet by mouth daily    Ildefonso Bassett MD   gabapentin (NEURONTIN) 800 MG tablet Take 1 tablet by mouth 4 times daily.     Ildefonso Bassett MD   aspirin 81 MG tablet Take 1 tablet by mouth daily    Ildefonso Bassett MD   metFORMIN (GLUCOPHAGE) 1000 MG tablet Take 1 tablet by

## 2023-11-25 PROBLEM — J12.82 PNEUMONIA DUE TO 2019-NCOV: Status: ACTIVE | Noted: 2023-11-25

## 2023-11-25 PROBLEM — D72.9 NEUTROPHILIA: Status: ACTIVE | Noted: 2023-11-25

## 2023-11-25 PROBLEM — I48.91 ATRIAL FIBRILLATION WITH RAPID VENTRICULAR RESPONSE (HCC): Status: ACTIVE | Noted: 2023-11-25

## 2023-11-25 PROBLEM — U07.1 PNEUMONIA DUE TO 2019-NCOV: Status: ACTIVE | Noted: 2023-11-25

## 2023-11-25 PROBLEM — D72.828 NEUTROPHILIA: Status: ACTIVE | Noted: 2023-11-25

## 2023-11-25 LAB
25(OH)D3 SERPL-MCNC: 46.5 NG/ML
ALBUMIN SERPL-MCNC: 3.6 G/DL (ref 3.4–5)
ALBUMIN/GLOB SERPL: 1.2 {RATIO} (ref 1.1–2.2)
ALP SERPL-CCNC: 50 U/L (ref 40–129)
ALT SERPL-CCNC: 16 U/L (ref 10–40)
ANION GAP SERPL CALCULATED.3IONS-SCNC: 8 MMOL/L (ref 3–16)
AST SERPL-CCNC: 15 U/L (ref 15–37)
BASOPHILS # BLD: 0 K/UL (ref 0–0.2)
BASOPHILS NFR BLD: 0.4 %
BILIRUB SERPL-MCNC: 0.3 MG/DL (ref 0–1)
BUN SERPL-MCNC: 28 MG/DL (ref 7–20)
CALCIUM SERPL-MCNC: 8.7 MG/DL (ref 8.3–10.6)
CHLORIDE SERPL-SCNC: 100 MMOL/L (ref 99–110)
CO2 SERPL-SCNC: 27 MMOL/L (ref 21–32)
CREAT SERPL-MCNC: 1.2 MG/DL (ref 0.8–1.3)
CRP SERPL-MCNC: 159.1 MG/L (ref 0–5.1)
DEPRECATED RDW RBC AUTO: 14.5 % (ref 12.4–15.4)
EKG DIAGNOSIS: NORMAL
EKG Q-T INTERVAL: 256 MS
EKG QRS DURATION: 82 MS
EKG QTC CALCULATION (BAZETT): 390 MS
EKG R AXIS: 66 DEGREES
EKG T AXIS: -76 DEGREES
EKG VENTRICULAR RATE: 140 BPM
EOSINOPHIL # BLD: 0 K/UL (ref 0–0.6)
EOSINOPHIL NFR BLD: 0.3 %
ERYTHROCYTE [SEDIMENTATION RATE] IN BLOOD BY WESTERGREN METHOD: 63 MM/HR (ref 0–20)
FERRITIN SERPL IA-MCNC: 203.9 NG/ML (ref 30–400)
GFR SERPLBLD CREATININE-BSD FMLA CKD-EPI: >60 ML/MIN/{1.73_M2}
GLUCOSE BLD-MCNC: 315 MG/DL (ref 70–99)
GLUCOSE BLD-MCNC: 390 MG/DL (ref 70–99)
GLUCOSE BLD-MCNC: 420 MG/DL (ref 70–99)
GLUCOSE BLD-MCNC: 435 MG/DL (ref 70–99)
GLUCOSE SERPL-MCNC: 297 MG/DL (ref 70–99)
HCT VFR BLD AUTO: 39.3 % (ref 40.5–52.5)
HGB BLD-MCNC: 12.8 G/DL (ref 13.5–17.5)
LEGIONELLA AG UR QL: NORMAL
LYMPHOCYTES # BLD: 1.7 K/UL (ref 1–5.1)
LYMPHOCYTES NFR BLD: 14.2 %
MCH RBC QN AUTO: 29.4 PG (ref 26–34)
MCHC RBC AUTO-ENTMCNC: 32.7 G/DL (ref 31–36)
MCV RBC AUTO: 89.8 FL (ref 80–100)
MONOCYTES # BLD: 1.1 K/UL (ref 0–1.3)
MONOCYTES NFR BLD: 8.9 %
NEUTROPHILS # BLD: 9.2 K/UL (ref 1.7–7.7)
NEUTROPHILS NFR BLD: 76.2 %
PERFORMED ON: ABNORMAL
PLATELET # BLD AUTO: 288 K/UL (ref 135–450)
PMV BLD AUTO: 7.5 FL (ref 5–10.5)
POTASSIUM SERPL-SCNC: 4.7 MMOL/L (ref 3.5–5.1)
PROCALCITONIN SERPL IA-MCNC: 1.13 NG/ML (ref 0–0.15)
PROT SERPL-MCNC: 6.7 G/DL (ref 6.4–8.2)
RBC # BLD AUTO: 4.37 M/UL (ref 4.2–5.9)
S PNEUM AG UR QL: ABNORMAL
SODIUM SERPL-SCNC: 135 MMOL/L (ref 136–145)
WBC # BLD AUTO: 12.1 K/UL (ref 4–11)

## 2023-11-25 PROCEDURE — 82306 VITAMIN D 25 HYDROXY: CPT

## 2023-11-25 PROCEDURE — 6370000000 HC RX 637 (ALT 250 FOR IP): Performed by: INTERNAL MEDICINE

## 2023-11-25 PROCEDURE — 94640 AIRWAY INHALATION TREATMENT: CPT

## 2023-11-25 PROCEDURE — 6360000002 HC RX W HCPCS: Performed by: HOSPITALIST

## 2023-11-25 PROCEDURE — 94760 N-INVAS EAR/PLS OXIMETRY 1: CPT

## 2023-11-25 PROCEDURE — 93010 ELECTROCARDIOGRAM REPORT: CPT | Performed by: INTERNAL MEDICINE

## 2023-11-25 PROCEDURE — 84145 PROCALCITONIN (PCT): CPT

## 2023-11-25 PROCEDURE — 36415 COLL VENOUS BLD VENIPUNCTURE: CPT

## 2023-11-25 PROCEDURE — 6370000000 HC RX 637 (ALT 250 FOR IP): Performed by: HOSPITALIST

## 2023-11-25 PROCEDURE — 6360000002 HC RX W HCPCS: Performed by: INTERNAL MEDICINE

## 2023-11-25 PROCEDURE — 85025 COMPLETE CBC W/AUTO DIFF WBC: CPT

## 2023-11-25 PROCEDURE — 80053 COMPREHEN METABOLIC PANEL: CPT

## 2023-11-25 PROCEDURE — 2060000000 HC ICU INTERMEDIATE R&B

## 2023-11-25 PROCEDURE — 82728 ASSAY OF FERRITIN: CPT

## 2023-11-25 PROCEDURE — 99233 SBSQ HOSP IP/OBS HIGH 50: CPT | Performed by: INTERNAL MEDICINE

## 2023-11-25 PROCEDURE — 86140 C-REACTIVE PROTEIN: CPT

## 2023-11-25 PROCEDURE — 2580000003 HC RX 258: Performed by: INTERNAL MEDICINE

## 2023-11-25 PROCEDURE — 2580000003 HC RX 258: Performed by: HOSPITALIST

## 2023-11-25 PROCEDURE — 85652 RBC SED RATE AUTOMATED: CPT

## 2023-11-25 RX ORDER — LAMOTRIGINE 25 MG/1
50 TABLET ORAL NIGHTLY
Status: DISCONTINUED | OUTPATIENT
Start: 2023-11-25 | End: 2023-11-27 | Stop reason: HOSPADM

## 2023-11-25 RX ORDER — ASPIRIN 81 MG/1
81 TABLET, CHEWABLE ORAL DAILY
Status: DISCONTINUED | OUTPATIENT
Start: 2023-11-25 | End: 2023-11-27 | Stop reason: HOSPADM

## 2023-11-25 RX ORDER — LAMOTRIGINE 25 MG/1
25 TABLET ORAL EVERY MORNING
Status: DISCONTINUED | OUTPATIENT
Start: 2023-11-25 | End: 2023-11-27 | Stop reason: HOSPADM

## 2023-11-25 RX ORDER — ALBUTEROL SULFATE 90 UG/1
2 AEROSOL, METERED RESPIRATORY (INHALATION) EVERY 4 HOURS PRN
Status: DISCONTINUED | OUTPATIENT
Start: 2023-11-25 | End: 2023-11-27 | Stop reason: HOSPADM

## 2023-11-25 RX ORDER — INSULIN LISPRO 100 [IU]/ML
5 INJECTION, SOLUTION INTRAVENOUS; SUBCUTANEOUS ONCE
Status: COMPLETED | OUTPATIENT
Start: 2023-11-25 | End: 2023-11-25

## 2023-11-25 RX ORDER — PRAMIPEXOLE DIHYDROCHLORIDE 0.5 MG/1
0.25 TABLET ORAL 4 TIMES DAILY
Status: DISCONTINUED | OUTPATIENT
Start: 2023-11-25 | End: 2023-11-25 | Stop reason: SDUPTHER

## 2023-11-25 RX ORDER — LISINOPRIL 10 MG/1
10 TABLET ORAL DAILY
Status: DISCONTINUED | OUTPATIENT
Start: 2023-11-25 | End: 2023-11-27 | Stop reason: HOSPADM

## 2023-11-25 RX ORDER — FUROSEMIDE 40 MG/1
40 TABLET ORAL DAILY
Status: DISCONTINUED | OUTPATIENT
Start: 2023-11-25 | End: 2023-11-25

## 2023-11-25 RX ORDER — ROFLUMILAST 500 UG/1
500 TABLET ORAL DAILY
Status: DISCONTINUED | OUTPATIENT
Start: 2023-11-25 | End: 2023-11-27 | Stop reason: HOSPADM

## 2023-11-25 RX ORDER — ATORVASTATIN CALCIUM 40 MG/1
40 TABLET, FILM COATED ORAL NIGHTLY
Status: DISCONTINUED | OUTPATIENT
Start: 2023-11-25 | End: 2023-11-27 | Stop reason: HOSPADM

## 2023-11-25 RX ORDER — METHOCARBAMOL 500 MG/1
500 TABLET, FILM COATED ORAL 3 TIMES DAILY PRN
Status: DISCONTINUED | OUTPATIENT
Start: 2023-11-25 | End: 2023-11-27 | Stop reason: HOSPADM

## 2023-11-25 RX ORDER — CARVEDILOL 3.12 MG/1
3.12 TABLET ORAL 2 TIMES DAILY WITH MEALS
Status: DISCONTINUED | OUTPATIENT
Start: 2023-11-25 | End: 2023-11-27 | Stop reason: HOSPADM

## 2023-11-25 RX ORDER — GABAPENTIN 800 MG/1
800 TABLET ORAL 4 TIMES DAILY
Status: DISCONTINUED | OUTPATIENT
Start: 2023-11-25 | End: 2023-11-25

## 2023-11-25 RX ADMIN — APIXABAN 5 MG: 5 TABLET, FILM COATED ORAL at 08:43

## 2023-11-25 RX ADMIN — SODIUM CHLORIDE, PRESERVATIVE FREE 10 ML: 5 INJECTION INTRAVENOUS at 08:43

## 2023-11-25 RX ADMIN — LAMOTRIGINE 25 MG: 25 TABLET ORAL at 13:18

## 2023-11-25 RX ADMIN — ROFLUMILAST 500 MCG: 500 TABLET ORAL at 13:18

## 2023-11-25 RX ADMIN — SODIUM CHLORIDE, PRESERVATIVE FREE 10 ML: 5 INJECTION INTRAVENOUS at 21:09

## 2023-11-25 RX ADMIN — MEROPENEM 1000 MG: 1 INJECTION, POWDER, FOR SOLUTION INTRAVENOUS at 12:39

## 2023-11-25 RX ADMIN — APIXABAN 5 MG: 5 TABLET, FILM COATED ORAL at 21:08

## 2023-11-25 RX ADMIN — MEROPENEM 1000 MG: 1 INJECTION, POWDER, FOR SOLUTION INTRAVENOUS at 01:20

## 2023-11-25 RX ADMIN — PRAMIPEXOLE DIHYDROCHLORIDE 0.25 MG: 0.5 TABLET ORAL at 08:42

## 2023-11-25 RX ADMIN — INSULIN LISPRO 4 UNITS: 100 INJECTION, SOLUTION INTRAVENOUS; SUBCUTANEOUS at 21:19

## 2023-11-25 RX ADMIN — CARVEDILOL 3.12 MG: 3.12 TABLET, FILM COATED ORAL at 18:08

## 2023-11-25 RX ADMIN — PRAMIPEXOLE DIHYDROCHLORIDE 0.25 MG: 0.5 TABLET ORAL at 12:28

## 2023-11-25 RX ADMIN — GABAPENTIN 800 MG: 400 CAPSULE ORAL at 08:42

## 2023-11-25 RX ADMIN — GABAPENTIN 800 MG: 400 CAPSULE ORAL at 21:08

## 2023-11-25 RX ADMIN — INSULIN LISPRO 5 UNITS: 100 INJECTION, SOLUTION INTRAVENOUS; SUBCUTANEOUS at 12:29

## 2023-11-25 RX ADMIN — INSULIN LISPRO 5 UNITS: 100 INJECTION, SOLUTION INTRAVENOUS; SUBCUTANEOUS at 18:09

## 2023-11-25 RX ADMIN — Medication 2 PUFF: at 20:11

## 2023-11-25 RX ADMIN — ASPIRIN 81 MG 81 MG: 81 TABLET ORAL at 13:18

## 2023-11-25 RX ADMIN — INSULIN LISPRO 6 UNITS: 100 INJECTION, SOLUTION INTRAVENOUS; SUBCUTANEOUS at 09:10

## 2023-11-25 RX ADMIN — LAMOTRIGINE 50 MG: 25 TABLET ORAL at 21:08

## 2023-11-25 RX ADMIN — Medication 2 PUFF: at 12:42

## 2023-11-25 RX ADMIN — INSULIN LISPRO 8 UNITS: 100 INJECTION, SOLUTION INTRAVENOUS; SUBCUTANEOUS at 12:29

## 2023-11-25 RX ADMIN — FUROSEMIDE 40 MG: 10 INJECTION, SOLUTION INTRAMUSCULAR; INTRAVENOUS at 18:08

## 2023-11-25 RX ADMIN — GABAPENTIN 800 MG: 400 CAPSULE ORAL at 18:08

## 2023-11-25 RX ADMIN — PRAMIPEXOLE DIHYDROCHLORIDE 0.25 MG: 0.5 TABLET ORAL at 18:08

## 2023-11-25 RX ADMIN — INSULIN LISPRO 5 UNITS: 100 INJECTION, SOLUTION INTRAVENOUS; SUBCUTANEOUS at 09:10

## 2023-11-25 RX ADMIN — DEXAMETHASONE SODIUM PHOSPHATE 6 MG: 10 INJECTION, SOLUTION INTRAMUSCULAR; INTRAVENOUS at 05:06

## 2023-11-25 RX ADMIN — FUROSEMIDE 40 MG: 10 INJECTION, SOLUTION INTRAMUSCULAR; INTRAVENOUS at 08:43

## 2023-11-25 RX ADMIN — GABAPENTIN 800 MG: 400 CAPSULE ORAL at 12:28

## 2023-11-25 RX ADMIN — PRAMIPEXOLE DIHYDROCHLORIDE 0.25 MG: 0.5 TABLET ORAL at 21:08

## 2023-11-25 RX ADMIN — INSULIN LISPRO 8 UNITS: 100 INJECTION, SOLUTION INTRAVENOUS; SUBCUTANEOUS at 18:08

## 2023-11-25 RX ADMIN — ATORVASTATIN CALCIUM 40 MG: 40 TABLET, FILM COATED ORAL at 21:08

## 2023-11-25 RX ADMIN — INSULIN GLARGINE 25 UNITS: 100 INJECTION, SOLUTION SUBCUTANEOUS at 21:07

## 2023-11-25 RX ADMIN — LISINOPRIL 10 MG: 10 TABLET ORAL at 13:18

## 2023-11-25 ASSESSMENT — PAIN DESCRIPTION - LOCATION: LOCATION: BACK;LEG

## 2023-11-25 ASSESSMENT — PAIN SCALES - GENERAL
PAINLEVEL_OUTOF10: 5
PAINLEVEL_OUTOF10: 0
PAINLEVEL_OUTOF10: 5

## 2023-11-25 ASSESSMENT — PAIN DESCRIPTION - PAIN TYPE: TYPE: CHRONIC PAIN

## 2023-11-25 ASSESSMENT — PAIN - FUNCTIONAL ASSESSMENT: PAIN_FUNCTIONAL_ASSESSMENT: ACTIVITIES ARE NOT PREVENTED

## 2023-11-25 ASSESSMENT — PAIN DESCRIPTION - ONSET: ONSET: ON-GOING

## 2023-11-25 ASSESSMENT — PAIN DESCRIPTION - DESCRIPTORS: DESCRIPTORS: ACHING;NUMBNESS;TINGLING

## 2023-11-25 ASSESSMENT — PAIN DESCRIPTION - ORIENTATION: ORIENTATION: RIGHT;MID;POSTERIOR

## 2023-11-25 ASSESSMENT — PAIN DESCRIPTION - FREQUENCY: FREQUENCY: CONTINUOUS

## 2023-11-26 LAB
ANION GAP SERPL CALCULATED.3IONS-SCNC: 12 MMOL/L (ref 3–16)
BACTERIA SPEC RESP CULT: NORMAL
BASOPHILS # BLD: 0.1 K/UL (ref 0–0.2)
BASOPHILS NFR BLD: 0.5 %
BUN SERPL-MCNC: 28 MG/DL (ref 7–20)
CALCIUM SERPL-MCNC: 8.8 MG/DL (ref 8.3–10.6)
CHLORIDE SERPL-SCNC: 93 MMOL/L (ref 99–110)
CO2 SERPL-SCNC: 27 MMOL/L (ref 21–32)
CREAT SERPL-MCNC: 1 MG/DL (ref 0.8–1.3)
DEPRECATED RDW RBC AUTO: 14.4 % (ref 12.4–15.4)
EOSINOPHIL # BLD: 0 K/UL (ref 0–0.6)
EOSINOPHIL NFR BLD: 0 %
GFR SERPLBLD CREATININE-BSD FMLA CKD-EPI: >60 ML/MIN/{1.73_M2}
GLUCOSE BLD-MCNC: 316 MG/DL (ref 70–99)
GLUCOSE BLD-MCNC: 390 MG/DL (ref 70–99)
GLUCOSE BLD-MCNC: 399 MG/DL (ref 70–99)
GLUCOSE BLD-MCNC: 488 MG/DL (ref 70–99)
GLUCOSE SERPL-MCNC: 512 MG/DL (ref 70–99)
GRAM STN SPEC: NORMAL
HCT VFR BLD AUTO: 42.4 % (ref 40.5–52.5)
HGB BLD-MCNC: 14.1 G/DL (ref 13.5–17.5)
LYMPHOCYTES # BLD: 0.6 K/UL (ref 1–5.1)
LYMPHOCYTES NFR BLD: 5.2 %
MCH RBC QN AUTO: 29.8 PG (ref 26–34)
MCHC RBC AUTO-ENTMCNC: 33.4 G/DL (ref 31–36)
MCV RBC AUTO: 89.3 FL (ref 80–100)
MONOCYTES # BLD: 0.2 K/UL (ref 0–1.3)
MONOCYTES NFR BLD: 1.8 %
NEUTROPHILS # BLD: 10 K/UL (ref 1.7–7.7)
NEUTROPHILS NFR BLD: 92.5 %
PERFORMED ON: ABNORMAL
PLATELET # BLD AUTO: 305 K/UL (ref 135–450)
PMV BLD AUTO: 7.3 FL (ref 5–10.5)
POTASSIUM SERPL-SCNC: 4.7 MMOL/L (ref 3.5–5.1)
RBC # BLD AUTO: 4.75 M/UL (ref 4.2–5.9)
SODIUM SERPL-SCNC: 132 MMOL/L (ref 136–145)
WBC # BLD AUTO: 10.8 K/UL (ref 4–11)

## 2023-11-26 PROCEDURE — 94760 N-INVAS EAR/PLS OXIMETRY 1: CPT

## 2023-11-26 PROCEDURE — 36415 COLL VENOUS BLD VENIPUNCTURE: CPT

## 2023-11-26 PROCEDURE — 80048 BASIC METABOLIC PNL TOTAL CA: CPT

## 2023-11-26 PROCEDURE — 99233 SBSQ HOSP IP/OBS HIGH 50: CPT | Performed by: INTERNAL MEDICINE

## 2023-11-26 PROCEDURE — 85025 COMPLETE CBC W/AUTO DIFF WBC: CPT

## 2023-11-26 PROCEDURE — 94640 AIRWAY INHALATION TREATMENT: CPT

## 2023-11-26 PROCEDURE — 6360000002 HC RX W HCPCS: Performed by: HOSPITALIST

## 2023-11-26 PROCEDURE — 2580000003 HC RX 258: Performed by: INTERNAL MEDICINE

## 2023-11-26 PROCEDURE — 6360000002 HC RX W HCPCS: Performed by: INTERNAL MEDICINE

## 2023-11-26 PROCEDURE — 2060000000 HC ICU INTERMEDIATE R&B

## 2023-11-26 PROCEDURE — 6370000000 HC RX 637 (ALT 250 FOR IP): Performed by: INTERNAL MEDICINE

## 2023-11-26 PROCEDURE — 6370000000 HC RX 637 (ALT 250 FOR IP): Performed by: HOSPITALIST

## 2023-11-26 PROCEDURE — 2580000003 HC RX 258: Performed by: HOSPITALIST

## 2023-11-26 RX ORDER — INSULIN LISPRO 100 [IU]/ML
5 INJECTION, SOLUTION INTRAVENOUS; SUBCUTANEOUS ONCE
Status: COMPLETED | OUTPATIENT
Start: 2023-11-26 | End: 2023-11-26

## 2023-11-26 RX ORDER — DEXAMETHASONE 4 MG/1
6 TABLET ORAL DAILY
Status: DISCONTINUED | OUTPATIENT
Start: 2023-11-27 | End: 2023-11-27 | Stop reason: HOSPADM

## 2023-11-26 RX ADMIN — SODIUM CHLORIDE, PRESERVATIVE FREE 10 ML: 5 INJECTION INTRAVENOUS at 22:04

## 2023-11-26 RX ADMIN — GABAPENTIN 800 MG: 400 CAPSULE ORAL at 18:11

## 2023-11-26 RX ADMIN — INSULIN LISPRO 6 UNITS: 100 INJECTION, SOLUTION INTRAVENOUS; SUBCUTANEOUS at 09:27

## 2023-11-26 RX ADMIN — GABAPENTIN 800 MG: 400 CAPSULE ORAL at 22:05

## 2023-11-26 RX ADMIN — INSULIN LISPRO 5 UNITS: 100 INJECTION, SOLUTION INTRAVENOUS; SUBCUTANEOUS at 14:05

## 2023-11-26 RX ADMIN — FUROSEMIDE 40 MG: 10 INJECTION, SOLUTION INTRAMUSCULAR; INTRAVENOUS at 09:25

## 2023-11-26 RX ADMIN — LAMOTRIGINE 50 MG: 25 TABLET ORAL at 22:05

## 2023-11-26 RX ADMIN — ATORVASTATIN CALCIUM 40 MG: 40 TABLET, FILM COATED ORAL at 22:05

## 2023-11-26 RX ADMIN — ROFLUMILAST 500 MCG: 500 TABLET ORAL at 09:25

## 2023-11-26 RX ADMIN — DEXAMETHASONE SODIUM PHOSPHATE 6 MG: 10 INJECTION, SOLUTION INTRAMUSCULAR; INTRAVENOUS at 06:05

## 2023-11-26 RX ADMIN — INSULIN LISPRO 5 UNITS: 100 INJECTION, SOLUTION INTRAVENOUS; SUBCUTANEOUS at 09:27

## 2023-11-26 RX ADMIN — MEROPENEM 1000 MG: 1 INJECTION, POWDER, FOR SOLUTION INTRAVENOUS at 14:07

## 2023-11-26 RX ADMIN — INSULIN LISPRO 4 UNITS: 100 INJECTION, SOLUTION INTRAVENOUS; SUBCUTANEOUS at 22:04

## 2023-11-26 RX ADMIN — SODIUM CHLORIDE, PRESERVATIVE FREE 10 ML: 5 INJECTION INTRAVENOUS at 09:26

## 2023-11-26 RX ADMIN — PRAMIPEXOLE DIHYDROCHLORIDE 0.25 MG: 0.5 TABLET ORAL at 18:13

## 2023-11-26 RX ADMIN — CARVEDILOL 3.12 MG: 3.12 TABLET, FILM COATED ORAL at 18:11

## 2023-11-26 RX ADMIN — PRAMIPEXOLE DIHYDROCHLORIDE 0.25 MG: 0.5 TABLET ORAL at 09:25

## 2023-11-26 RX ADMIN — ASPIRIN 81 MG 81 MG: 81 TABLET ORAL at 09:28

## 2023-11-26 RX ADMIN — APIXABAN 5 MG: 5 TABLET, FILM COATED ORAL at 22:05

## 2023-11-26 RX ADMIN — APIXABAN 5 MG: 5 TABLET, FILM COATED ORAL at 09:25

## 2023-11-26 RX ADMIN — MEROPENEM 1000 MG: 1 INJECTION, POWDER, FOR SOLUTION INTRAVENOUS at 01:49

## 2023-11-26 RX ADMIN — LISINOPRIL 10 MG: 10 TABLET ORAL at 09:25

## 2023-11-26 RX ADMIN — PRAMIPEXOLE DIHYDROCHLORIDE 0.25 MG: 0.5 TABLET ORAL at 22:05

## 2023-11-26 RX ADMIN — FUROSEMIDE 40 MG: 10 INJECTION, SOLUTION INTRAMUSCULAR; INTRAVENOUS at 18:11

## 2023-11-26 RX ADMIN — Medication 2 PUFF: at 09:08

## 2023-11-26 RX ADMIN — CARVEDILOL 3.12 MG: 3.12 TABLET, FILM COATED ORAL at 09:25

## 2023-11-26 RX ADMIN — PRAMIPEXOLE DIHYDROCHLORIDE 0.25 MG: 0.5 TABLET ORAL at 14:05

## 2023-11-26 RX ADMIN — GABAPENTIN 800 MG: 400 CAPSULE ORAL at 14:05

## 2023-11-26 RX ADMIN — INSULIN GLARGINE 25 UNITS: 100 INJECTION, SOLUTION SUBCUTANEOUS at 22:04

## 2023-11-26 RX ADMIN — LAMOTRIGINE 25 MG: 25 TABLET ORAL at 09:25

## 2023-11-26 RX ADMIN — GABAPENTIN 800 MG: 400 CAPSULE ORAL at 09:25

## 2023-11-26 RX ADMIN — INSULIN LISPRO 8 UNITS: 100 INJECTION, SOLUTION INTRAVENOUS; SUBCUTANEOUS at 18:11

## 2023-11-26 RX ADMIN — Medication 2 PUFF: at 20:36

## 2023-11-26 RX ADMIN — INSULIN LISPRO 5 UNITS: 100 INJECTION, SOLUTION INTRAVENOUS; SUBCUTANEOUS at 18:11

## 2023-11-26 RX ADMIN — INSULIN LISPRO 8 UNITS: 100 INJECTION, SOLUTION INTRAVENOUS; SUBCUTANEOUS at 14:05

## 2023-11-26 ASSESSMENT — PAIN SCALES - GENERAL
PAINLEVEL_OUTOF10: 5
PAINLEVEL_OUTOF10: 5

## 2023-11-26 ASSESSMENT — PAIN DESCRIPTION - ONSET: ONSET: ON-GOING

## 2023-11-26 ASSESSMENT — PAIN - FUNCTIONAL ASSESSMENT: PAIN_FUNCTIONAL_ASSESSMENT: ACTIVITIES ARE NOT PREVENTED

## 2023-11-26 ASSESSMENT — PAIN DESCRIPTION - FREQUENCY: FREQUENCY: CONTINUOUS

## 2023-11-26 ASSESSMENT — PAIN DESCRIPTION - DESCRIPTORS: DESCRIPTORS: ACHING;NUMBNESS;TINGLING

## 2023-11-26 ASSESSMENT — PAIN DESCRIPTION - ORIENTATION: ORIENTATION: RIGHT;LEFT;MID;POSTERIOR

## 2023-11-26 ASSESSMENT — PAIN DESCRIPTION - PAIN TYPE: TYPE: CHRONIC PAIN

## 2023-11-26 ASSESSMENT — PAIN DESCRIPTION - LOCATION: LOCATION: FOOT;LEG;BACK

## 2023-11-27 VITALS
OXYGEN SATURATION: 95 % | SYSTOLIC BLOOD PRESSURE: 109 MMHG | RESPIRATION RATE: 16 BRPM | WEIGHT: 212.96 LBS | TEMPERATURE: 97.5 F | HEART RATE: 87 BPM | DIASTOLIC BLOOD PRESSURE: 64 MMHG | HEIGHT: 70 IN | BODY MASS INDEX: 30.49 KG/M2

## 2023-11-27 LAB
ANION GAP SERPL CALCULATED.3IONS-SCNC: 14 MMOL/L (ref 3–16)
BASOPHILS # BLD: 0 K/UL (ref 0–0.2)
BASOPHILS NFR BLD: 0.2 %
BUN SERPL-MCNC: 35 MG/DL (ref 7–20)
CALCIUM SERPL-MCNC: 9.2 MG/DL (ref 8.3–10.6)
CHLORIDE SERPL-SCNC: 93 MMOL/L (ref 99–110)
CO2 SERPL-SCNC: 24 MMOL/L (ref 21–32)
CREAT SERPL-MCNC: 1 MG/DL (ref 0.8–1.3)
DEPRECATED RDW RBC AUTO: 14.3 % (ref 12.4–15.4)
EOSINOPHIL # BLD: 0 K/UL (ref 0–0.6)
EOSINOPHIL NFR BLD: 0.2 %
GFR SERPLBLD CREATININE-BSD FMLA CKD-EPI: >60 ML/MIN/{1.73_M2}
GLUCOSE BLD-MCNC: 302 MG/DL (ref 70–99)
GLUCOSE BLD-MCNC: 361 MG/DL (ref 70–99)
GLUCOSE SERPL-MCNC: 459 MG/DL (ref 70–99)
HCT VFR BLD AUTO: 40.7 % (ref 40.5–52.5)
HGB BLD-MCNC: 13.4 G/DL (ref 13.5–17.5)
LYMPHOCYTES # BLD: 1.7 K/UL (ref 1–5.1)
LYMPHOCYTES NFR BLD: 17.3 %
MCH RBC QN AUTO: 29.4 PG (ref 26–34)
MCHC RBC AUTO-ENTMCNC: 33 G/DL (ref 31–36)
MCV RBC AUTO: 89.2 FL (ref 80–100)
MONOCYTES # BLD: 0.9 K/UL (ref 0–1.3)
MONOCYTES NFR BLD: 9 %
NEUTROPHILS # BLD: 7.1 K/UL (ref 1.7–7.7)
NEUTROPHILS NFR BLD: 73.3 %
PERFORMED ON: ABNORMAL
PERFORMED ON: ABNORMAL
PLATELET # BLD AUTO: 352 K/UL (ref 135–450)
PMV BLD AUTO: 7.4 FL (ref 5–10.5)
POTASSIUM SERPL-SCNC: 3.9 MMOL/L (ref 3.5–5.1)
RBC # BLD AUTO: 4.56 M/UL (ref 4.2–5.9)
SODIUM SERPL-SCNC: 131 MMOL/L (ref 136–145)
WBC # BLD AUTO: 9.6 K/UL (ref 4–11)

## 2023-11-27 PROCEDURE — 97116 GAIT TRAINING THERAPY: CPT | Performed by: PHYSICAL THERAPIST

## 2023-11-27 PROCEDURE — 2580000003 HC RX 258: Performed by: INTERNAL MEDICINE

## 2023-11-27 PROCEDURE — 2700000000 HC OXYGEN THERAPY PER DAY

## 2023-11-27 PROCEDURE — 6360000002 HC RX W HCPCS: Performed by: INTERNAL MEDICINE

## 2023-11-27 PROCEDURE — 6370000000 HC RX 637 (ALT 250 FOR IP): Performed by: INTERNAL MEDICINE

## 2023-11-27 PROCEDURE — 94640 AIRWAY INHALATION TREATMENT: CPT

## 2023-11-27 PROCEDURE — 97530 THERAPEUTIC ACTIVITIES: CPT | Performed by: PHYSICAL THERAPIST

## 2023-11-27 PROCEDURE — 99232 SBSQ HOSP IP/OBS MODERATE 35: CPT | Performed by: INTERNAL MEDICINE

## 2023-11-27 PROCEDURE — 36415 COLL VENOUS BLD VENIPUNCTURE: CPT

## 2023-11-27 PROCEDURE — 2580000003 HC RX 258: Performed by: HOSPITALIST

## 2023-11-27 PROCEDURE — 94760 N-INVAS EAR/PLS OXIMETRY 1: CPT

## 2023-11-27 PROCEDURE — 80048 BASIC METABOLIC PNL TOTAL CA: CPT

## 2023-11-27 PROCEDURE — 6370000000 HC RX 637 (ALT 250 FOR IP): Performed by: HOSPITALIST

## 2023-11-27 PROCEDURE — 6360000002 HC RX W HCPCS: Performed by: HOSPITALIST

## 2023-11-27 PROCEDURE — 85025 COMPLETE CBC W/AUTO DIFF WBC: CPT

## 2023-11-27 RX ORDER — LEVOFLOXACIN 500 MG/1
500 TABLET, FILM COATED ORAL DAILY
Qty: 5 TABLET | Refills: 0 | Status: SHIPPED | OUTPATIENT
Start: 2023-11-27 | End: 2023-12-02

## 2023-11-27 RX ORDER — INSULIN LISPRO 100 [IU]/ML
4 INJECTION, SOLUTION INTRAVENOUS; SUBCUTANEOUS ONCE
Status: COMPLETED | OUTPATIENT
Start: 2023-11-27 | End: 2023-11-27

## 2023-11-27 RX ORDER — DEXAMETHASONE 6 MG/1
6 TABLET ORAL
Qty: 2 TABLET | Refills: 0 | Status: SHIPPED | OUTPATIENT
Start: 2023-11-27 | End: 2023-11-29

## 2023-11-27 RX ADMIN — DEXAMETHASONE 6 MG: 4 TABLET ORAL at 09:00

## 2023-11-27 RX ADMIN — GABAPENTIN 800 MG: 400 CAPSULE ORAL at 13:09

## 2023-11-27 RX ADMIN — CARVEDILOL 3.12 MG: 3.12 TABLET, FILM COATED ORAL at 08:59

## 2023-11-27 RX ADMIN — PRAMIPEXOLE DIHYDROCHLORIDE 0.25 MG: 0.5 TABLET ORAL at 08:59

## 2023-11-27 RX ADMIN — LAMOTRIGINE 25 MG: 25 TABLET ORAL at 08:59

## 2023-11-27 RX ADMIN — INSULIN LISPRO 5 UNITS: 100 INJECTION, SOLUTION INTRAVENOUS; SUBCUTANEOUS at 13:13

## 2023-11-27 RX ADMIN — SODIUM CHLORIDE, PRESERVATIVE FREE 10 ML: 5 INJECTION INTRAVENOUS at 09:00

## 2023-11-27 RX ADMIN — GABAPENTIN 800 MG: 400 CAPSULE ORAL at 08:59

## 2023-11-27 RX ADMIN — INSULIN LISPRO 4 UNITS: 100 INJECTION, SOLUTION INTRAVENOUS; SUBCUTANEOUS at 13:13

## 2023-11-27 RX ADMIN — FUROSEMIDE 40 MG: 10 INJECTION, SOLUTION INTRAMUSCULAR; INTRAVENOUS at 09:00

## 2023-11-27 RX ADMIN — Medication 2 PUFF: at 09:10

## 2023-11-27 RX ADMIN — INSULIN LISPRO 6 UNITS: 100 INJECTION, SOLUTION INTRAVENOUS; SUBCUTANEOUS at 09:03

## 2023-11-27 RX ADMIN — LISINOPRIL 10 MG: 10 TABLET ORAL at 08:59

## 2023-11-27 RX ADMIN — ROFLUMILAST 500 MCG: 500 TABLET ORAL at 08:59

## 2023-11-27 RX ADMIN — APIXABAN 5 MG: 5 TABLET, FILM COATED ORAL at 09:00

## 2023-11-27 RX ADMIN — INSULIN LISPRO 8 UNITS: 100 INJECTION, SOLUTION INTRAVENOUS; SUBCUTANEOUS at 13:13

## 2023-11-27 RX ADMIN — MEROPENEM 1000 MG: 1 INJECTION, POWDER, FOR SOLUTION INTRAVENOUS at 13:12

## 2023-11-27 RX ADMIN — ASPIRIN 81 MG 81 MG: 81 TABLET ORAL at 08:59

## 2023-11-27 RX ADMIN — MEROPENEM 1000 MG: 1 INJECTION, POWDER, FOR SOLUTION INTRAVENOUS at 01:30

## 2023-11-27 RX ADMIN — INSULIN LISPRO 5 UNITS: 100 INJECTION, SOLUTION INTRAVENOUS; SUBCUTANEOUS at 09:03

## 2023-11-27 RX ADMIN — PRAMIPEXOLE DIHYDROCHLORIDE 0.25 MG: 0.5 TABLET ORAL at 13:09

## 2023-11-27 NOTE — ACP (ADVANCE CARE PLANNING)
Advance Care Planning     Advance Care Planning Activator (Inpatient)  Conversation Note      Date of ACP Conversation: 11/27/2023     Conversation Conducted with: Patient with [address]: Next of Kin by law (only applies in absence of above) (name) wife, Angel Henry Activator: Keshawn Rodrigues, [id number]:     Current Designated Health Care Decision Maker:     Primary Decision Maker: Mert Moores - [phone]    Secondary Decision Maker: Aretha Lele - Child - [phone]    Today we documented Decision Maker(s) consistent with Legal Next of Kin hierarchy. Care Preferences    Ventilation: \"If you were in your present state of health and suddenly became very ill and were unable to breathe on your own, what would your preference be about the use of a ventilator (breathing machine) if it were available to you? \"      Would the patient desire the use of ventilator (breathing machine)?: yes    \"If your health worsens and it becomes clear that your chance of recovery is unlikely, what would your preference be about the use of a ventilator (breathing machine) if it were available to you? \"     Would the patient desire the use of ventilator (breathing machine)?: unsure      Resuscitation  \"CPR works best to restart the heart when there is a sudden event, like a heart attack, in someone who is otherwise healthy. Unfortunately, CPR does not typically restart the heart for people who have serious health conditions or who are very sick. \"    \"In the event your heart stopped as a result of an underlying serious health condition, would you want attempts to be made to restart your heart (answer \"yes\" for attempt to resuscitate) or would you prefer a natural death (answer \"no\" for do not attempt to resuscitate)? \" yes       [] Yes   [] No   Educated Patient / Joo Calderón regarding differences between Advance Directives and portable DNR orders.     Length

## 2023-11-27 NOTE — PROGRESS NOTES
4 Eyes Skin Assessment     NAME:  Bertram Swain  YOB: 1945  MEDICAL RECORD NUMBER:  [id number]    The patient is being assessed for  Admission    I agree that at least one RN has performed a thorough Head to Toe Skin Assessment on the patient. ALL assessment sites listed below have been assessed. Areas assessed by both nurses:    Head, Face, Ears, Shoulders, Back, Chest, Arms, Elbows, Hands, Sacrum. Buttock, Coccyx, Ischium, and Legs. Feet and Heels        Does the Patient have a Wound? Yes wound(s) were present on assessment.  LDA wound assessment was Initiated and completed by RN       Jose Alfredo Prevention initiated by RN: Yes  Wound Care Orders initiated by RN: No    Pressure Injury (Stage 3,4, Unstageable, DTI, NWPT, and Complex wounds) if present, place Wound referral order by RN under : Yes    New Ostomies, if present place, Ostomy referral order under : No     Nurse 1 eSignature: Electronically signed by Cinthia Foster RN on 11/24/23 at 4:45 AM EST    **SHARE this note so that the co-signing nurse can place an eSignature**    Nurse 2 eSignature: Electronically signed by Sherleen Phalen, RN on 11/24/23 at 5:52 AM EST
Clinical Pharmacy Note  Ceftriaxone Dose Adjustment    Rogelio Gutierrez is receiving ceftriaxone for CAP.  The dose has been adjusted to ceftriaxone 2g IV Q24H per protocol,     Wt Readings from Last 1 Encounters:   11/24/23 103 kg (227 lb 1.2 oz)         Ceftriaxone Empiric Dosing Table    Indication Weight < 100 kg** Weight ? 100 kg*   Bacteremia      Non-pneumococcal      Pneumococcal     2 g daily  2 g Q12H    2 g daily  2 g Q12H   Community Acquired PNA      Non- critically ill      Critically ill   1 g daily  2 g daily   2 g daily  2 g daily   CNS Infection 2 g Q12H 2 g Q12H   COPD exacerbation 1 g daily 2 g daily   Diabetic Foot Infection or SSTI 1 g daily 2 g daily   Endocarditis      Enterococcus faecalis (in         combination with ampicillin)        Strep sp., gram-negative         organisms     2 g Q12H      2 g daily   2 g Q12H      2 g daily   Intra-abdominal infection 1 g daily 2 g daily   Osteomyelitis  Discitis  Prosthetic Joint infection    Septic arthritis 2 g daily 2 g daily   Urinary Tract Infection     Uncomplicated     Complicated   1 g daily  2 g daily   2 g daily  2 g daily     Leslie Pimentel [facility], 11/24/2023 3:43 AM
Discharge orders acknowledged by RN . Discharge teaching completed with pt and family. AVS reviewed and all questions answered. Medication regimen reviewed and pt understands schedule. Follow up appointments also reviewed with pt and resources given for discharge. Pt was sent electronic to be filled and understands schedule. IV removed. Bedside monitor removed from pt. 60+ minutes of education completed. Required core measures completed. Pt vitals WDL. Pt discharged with all belongings to home with wife. Pt transported off of unit via wheelchair. No complications.      Electronically signed by Kaye Soto RN on 11/27/2023 at 6:24 PM
Hospitalist   Progress Note    Patient Name: Gladys Roca  PCP: Kavitha SANTIAGO  Date of Admission: 11/24/2023    Chief Complaint on Admission: Shortness of breath  Chief diagnosis after evaluation: COVID 19 pneumonia, acute hypoxic resp failure    Brief Synopsis: Patient is a 66 y.o. man with a medical history that includes Atrial fibrillation; COPD; combined systolic and diastolic dysfunction who was recently diagnosed with a COVID19 infection and was discharged on oral steroids. He was admitted on 11/24/2023 for evaluation and treatment of COVID 19 pneumonia and acute hypoxic resp failure. Pt Seen/Examined and Chart Reviewed. Subjective: Pt c/o frequent  diarrhea and asking for Imodium. Nurse reports semi-solid BMs      Objective:   Allergies  No known allergies    Medications    Scheduled Meds:   meropenem  1,000 mg IntraVENous Q12H    atorvastatin  40 mg Oral Nightly    Roflumilast  500 mcg Oral Daily    lisinopril  10 mg Oral Daily    lamoTRIgine  50 mg Oral Nightly    lamoTRIgine  25 mg Oral QAM    mometasone-formoterol  2 puff Inhalation BID RT    carvedilol  3.125 mg Oral BID WC    aspirin  81 mg Oral Daily    sodium chloride flush  5-40 mL IntraVENous 2 times per day    dexamethasone  6 mg IntraVENous Q24H    furosemide  40 mg IntraVENous BID    apixaban  5 mg Oral BID    insulin glargine  25 Units SubCUTAneous Nightly    insulin lispro  5 Units SubCUTAneous TID WC    insulin lispro  0-8 Units SubCUTAneous TID WC    insulin lispro  0-4 Units SubCUTAneous Nightly    gabapentin  800 mg Oral 4x daily    pramipexole  0.25 mg Oral 4x Daily PC & HS     Infusions:   dilTIAZem Stopped (11/24/23 1105)    sodium chloride      dextrose       PRN Meds:  methocarbamol, albuterol sulfate HFA, sodium chloride flush, sodium chloride, ondansetron **OR** ondansetron, polyethylene glycol, acetaminophen **OR** acetaminophen, glucose, dextrose bolus **OR** dextrose bolus, glucagon (rDNA), dextrose    Physical
Infectious Diseases Inpatient [facility]  Note    CHIEF COMPLAINT:     COVID 19 PNA  Resp failure  Pneumococcal PNA  WEI  Enterobacter on the Resp panel     HISTORY OF PRESENT ILLNESS:  66 y.o. man with a significant history of coronary disease, CABG, congestive heart failure, COPD, diabetes, hypertension, paroxysmal atrial fibrillation on anticoagulation admitted to hospital secondary to cough shortness of breath. Patient was tested for COVID-19 on 11/13/23 and was d/c to home on Dexamethasone on 11/5/23 noted with any worsening symptoms including shortness of breath and respiratory distress, hypoxic to 80% on room air currently 0.4 L nasal cannula Tmax 99.6, also found to be in A-fib with RVR requiring IV Diltiazem. Labs with creatinine 1.5 lactic acid 2.3 procalcitonin 0.5 WBC 13.3, rapid flu negative COVID-19 positive, blood cultures in process, infiltrate in right lung base increased compared to previous chest x-ray. Given the need for IV antibiotics we are consulted for recommendations. Interval History :  cough improving -no chills no fevers he is on room air       Past Medical History:    Past Medical History:   Diagnosis Date    Abrasion of right elbow 2/4/2023    Arthritis     CAD (coronary artery disease)     CABG in 2009    Chronic back pain     Chronic systolic CHF (congestive heart failure) (HCC)     COPD (chronic obstructive pulmonary disease) ([address])     Dental disease     Diabetes mellitus ([address])     Essential hypertension     Hearing loss     Paroxysmal atrial fibrillation ([address])     On Coumadin    Tinnitus        Past Surgical History:    Past Surgical History:   Procedure Laterality Date    [address]  2007    CERVICAL SPINE SURGERY      CHEST TUBE INSERTION Right 03/14/2023    14F.  Dr. Marty Campo    Shoulder    IR CHEST TUBE INSERTION  3/14/2023    IR CHEST TUBE INSERTION 3/14/2023
[facility]  [facility]    CHIEF COMPLAINT:     COVID 19 PNA  Resp failure  Pneumococcal PNA  WEI  Enterobacter on the Resp panel     HISTORY OF PRESENT ILLNESS:  66 y.o. man with a significant history of coronary disease, CABG, congestive heart failure, COPD, diabetes, hypertension, paroxysmal atrial fibrillation on anticoagulation admitted to hospital secondary to cough shortness of breath. Patient was tested for COVID-19 on 11/13/23 and was d/c to home on Dexamethasone on 11/5/23 noted with any worsening symptoms including shortness of breath and respiratory distress, hypoxic to 80% on room air currently 0.4 L nasal cannula Tmax 99.6, also found to be in A-fib with RVR requiring IV Diltiazem. Labs with creatinine 1.5 lactic acid 2.3 procalcitonin 0.5 WBC 13.3, rapid flu negative COVID-19 positive, blood cultures in process, infiltrate in right lung base increased compared to previous chest x-ray. Given the need for IV antibiotics we are consulted for recommendations. Interval History :  cough improving - and off nasal cannula tolerating IV abx ok  - no vomiting feels weak and tired     Past Medical History:    Past Medical History:   Diagnosis Date    Abrasion of right elbow 2/4/2023    Arthritis     CAD (coronary artery disease)     CABG in 2009    Chronic back pain     Chronic systolic CHF (congestive heart failure) (HCC)     COPD (chronic obstructive pulmonary disease) ([address])     Dental disease     Diabetes mellitus ([address])     Essential hypertension     Hearing loss     Paroxysmal atrial fibrillation ([address])     On Coumadin    Tinnitus        Past Surgical History:    Past Surgical History:   Procedure Laterality Date    [address]  2007    CERVICAL SPINE SURGERY      CHEST TUBE INSERTION Right 03/14/2023    14F.  Dr. Ajith Cleveland    Shoulder    IR CHEST TUBE INSERTION  3/14/2023
Infectious Diseases Inpatient [facility]  Note    CHIEF COMPLAINT:     COVID 19 PNA  Resp failure  Pneumococcal PNA  WEI  Enterobacter on the Resp panel     HISTORY OF PRESENT ILLNESS:  66 y.o. man with a significant history of coronary disease, CABG, congestive heart failure, COPD, diabetes, hypertension, paroxysmal atrial fibrillation on anticoagulation admitted to hospital secondary to cough shortness of breath. Patient was tested for COVID-19 on 11/13/23 and was d/c to home on Dexamethasone on 11/5/23 noted with any worsening symptoms including shortness of breath and respiratory distress, hypoxic to 80% on room air currently 0.4 L nasal cannula Tmax 99.6, also found to be in A-fib with RVR requiring IV Diltiazem. Labs with creatinine 1.5 lactic acid 2.3 procalcitonin 0.5 WBC 13.3, rapid flu negative COVID-19 positive, blood cultures in process, infiltrate in right lung base increased compared to previous chest x-ray. Given the need for IV antibiotics we are consulted for recommendations. Interval History :  cough improving - scant sputum sob with walking or using walker loose stools and wife in the room  Procal still elevated    Past Medical History:    Past Medical History:   Diagnosis Date    Abrasion of right elbow 2/4/2023    Arthritis     CAD (coronary artery disease)     CABG in 2009    Chronic back pain     Chronic systolic CHF (congestive heart failure) (HCC)     COPD (chronic obstructive pulmonary disease) ([address])     Dental disease     Diabetes mellitus ([address])     Essential hypertension     Hearing loss     Paroxysmal atrial fibrillation ([address])     On Coumadin    Tinnitus        Past Surgical History:    Past Surgical History:   Procedure Laterality Date    [address]  2007    CERVICAL SPINE SURGERY      CHEST TUBE INSERTION Right 03/14/2023    14F.  Dr. Willy Rodriguez    Shoulder    IR CHEST
Occupational Therapy  Facility/Department: [facility]  Occupational Therapy Initial Assessment    Name: Serafin Medrano  : 2103  MRN: [id number]  Date of Service: 2023    Discharge Recommendations:  Patient would benefit from continued therapy after discharge, 2-3 sessions per week     Serafin Medrano scored a 17/24 on the AM-PAC ADL Inpatient form. Current research shows that an AM-PAC score of 18 or greater is typically associated with a discharge to the patient's home setting. Based on the patient's AM-PAC score, and their current ADL deficits, it is recommended that the patient have 2-3 sessions per week of Occupational Therapy at d/c to increase the patient's independence. At this time, this patient demonstrates the endurance and safety to discharge home with home OT (home vs OP services) and a follow up treatment frequency of 2-3x/wk. Please see assessment section for further patient specific details. If patient discharges prior to next session this note will serve as a discharge summary. Please see below for the latest assessment towards goals. Patient Diagnosis(es): The primary encounter diagnosis was Pneumonia due to 2019-nCoV. Diagnoses of Hypoxia, Atrial fibrillation, unspecified type ([address]), and Atrial fibrillation with rapid ventricular response ([address]) were also pertinent to this visit. Past Medical History:  has a past medical history of Abrasion of right elbow, Arthritis, CAD (coronary artery disease), Chronic back pain, Chronic systolic CHF (congestive heart failure) ([address]), COPD (chronic obstructive pulmonary disease) ([address]), Dental disease, Diabetes mellitus ([address]), Essential hypertension, Hearing loss, Paroxysmal atrial fibrillation ([address]), and Tinnitus. Past Surgical History:  has a past surgical history that includes fracture surgery (Left, ); back surgery (); Cardiac surgery (); Coronary artery bypass graft; back surgery;  Cervical spine surgery;
Patient insisted to have a dressing on top of his open wound at the sacrum. Explained to him turning Q2 hr helps him to off the pressure on the site but he stated that he feels pain and discomfort whenever he turns and the dressing would help him. So applied a 4*4 hydrocolloid dressing as per his request.
Patient refusing BIPAP and doesn't want it in his room. Notified Dr. Ursula Hill. RT came and removed BIPAP from room.     Electronically signed by Rocio Quan RN on 11/25/2023 at 12:45 PM
Physical Therapy  Facility/Department: [facility]  Physical Therapy Initial Assessment    Name: Ishaan Husain  :   MRN: [id number]  Date of Service: 2023    Discharge Recommendations:  Home with Home health PT, Home with assist PRN   PT Equipment Recommendations  Equipment Needed: No      Ishaan Husain scored a 18/24 on the AM-PAC short mobility form. Current research shows that an AM-PAC score of 18 or greater is typically associated with a discharge to the patient's home setting. Based on the patient's AM-PAC score and their current functional mobility deficits, it is recommended that the patient have 2-3 sessions per week of Physical Therapy at d/c to increase the patient's independence. At this time, this patient demonstrates the endurance and safety to discharge home with Home PT and a follow up treatment frequency of 2-3x/wk. Please see assessment section for further patient specific details. If patient discharges prior to next session this note will serve as a discharge summary. Please see below for the latest assessment towards goals. Patient Diagnosis(es): The primary encounter diagnosis was Pneumonia due to 2019-nCoV. Diagnoses of Hypoxia, Atrial fibrillation, unspecified type ([address]), and Atrial fibrillation with rapid ventricular response ([address]) were also pertinent to this visit. Past Medical History:  has a past medical history of Abrasion of right elbow, Arthritis, CAD (coronary artery disease), Chronic back pain, Chronic systolic CHF (congestive heart failure) ([address]), COPD (chronic obstructive pulmonary disease) ([address]), Dental disease, Diabetes mellitus ([address]), Essential hypertension, Hearing loss, Paroxysmal atrial fibrillation ([address]), and Tinnitus. Past Surgical History:  has a past surgical history that includes fracture surgery (Left, ); back surgery (); Cardiac surgery (); Coronary artery bypass graft; back surgery;  Cervical spine surgery;
Physician Progress Note      Keyla Vaughn  CSN #:                  [id number]  :                       1945  ADMIT DATE:       2023 12:34 AM  DISCH DATE:  [facility]  PROVIDER #:        Viki Bentley MD          QUERY TEXT:    Pt admitted with Covid PNA. Pt noted to have elevated WBC, HR and RR. If   possible, please document in the progress notes and discharge summary if you   are evaluating and /or treating any of the following: The medical record reflects the following:  Risk Factors: Covid PNA DM2  Clinical Indicators: WBC 13.3, Lactic acid 2.3, WEI Acute respiratory failure,   RR 33,   Treatment: Decadron IV, Merrem, cultures and monitoring  Options provided:  -- Sepsis, 2/2 Covid PNA present on admission  -- Other - I will add my own diagnosis  -- Disagree - Not applicable / Not valid  -- Disagree - Clinically unable to determine / Unknown  -- Refer to Clinical Documentation Reviewer    PROVIDER RESPONSE TEXT:    This patient has sepsis 2/2 Covid PNA which was present on admission.     Query created by: Zachary Gamble on 2023 8:43 PM      Electronically signed by:  Viki Bentley MD 2023 9:00 AM
Placed pt on BiPAP. Pt complaining BiPAP is too strong. Decreased pt settings. Pt fairly tolerating 10/5, rate of 14, FiO2 50%. Will continue to monitor.
Pt arrived to floor via stretcher from ED and transferred to bed. Telemetry activated and confirmed with CMU. Patient oriented to room and use of call light. Call light and personal items within reach. Admission and assessment initiated. POC and education initiated and reviewed with patient/family. Denied further needs or questions at this time.
Pt. Seen and examined earlier today by our group. I have reviewed the History and Physical, and agree with the current plan of care. We will continue to follow this patient during this hospitalization.     Priscilla Liu MD
Transported pt off of BiPAP. Upon arrival to room pt states he does not want to be placed back on BiPAP. No resp distress noted. Pt on 4 L, SpO2 97%.
fibrillation; COPD; combined systolic and diastolic dysfunction who was recently admitted and discharged for COVID-19 pneumonia presenting to the emergency department with shortness of breath started few hours before presentation presents with Acute bronchitis due to severe acute respiratory syndrome coronavirus 2 (SARS-CoV-2)  Response To Previous Treatment: Not applicable  Family / Caregiver Present: Yes  Referring Practitioner: Dr Jesse Nair  Referral Date : 11/24/23  Follows Commands: Within Functional Limits  Subjective  Subjective: pt was eager to get up OOB; pt is easily distracted         Social/Functional History  Social/Functional History  Lives With: Spouse  Type of Home: House  Home Layout: Multi-level, Able to Live on Main level with bedroom/bathroom, Laundry in basement  Home Access: Stairs to enter with rails  Entrance Stairs - Number of Steps: 2  Bathroom Shower/Tub: Walk-in shower  Bathroom Toilet: Handicap height  Bathroom Equipment: Grab bars in shower, Built-in shower seat  Bathroom Accessibility: Accessible  Home Equipment: Cane, Electric scooter, Lift chair, Walker, rolling, Jduge Redo, 4 wheeled  Has the patient had two or more falls in the past year or any fall with injury in the past year?: Yes  ADL Assistance: Independent  Homemaking Responsibilities: No (wife completes)  Ambulation Assistance: Independent (with RW at home and scooter in community)  Transfer Assistance: Independent  Active : No  Patient's  Info: wife drives  Occupation: Retired  Type of Occupation:   Additional Comments: sleeps in lift chair  [address]: Impaired  Vision Exceptions: Wears glasses at all times  Hearing  Hearing: Within functional limits    Cognition   Orientation  Overall Orientation Status: Within Functional Limits  Cognition  Overall Cognitive Status: Exceptions  Arousal/Alertness: Appropriate responses to stimuli  Following Commands:  Follows one step commands with
coronavirus 2 (SARS-CoV-2)    Atrial fibrillation with RVR (HCC)    Acute on chronic combined systolic (congestive) and diastolic (congestive) heart failure (HCC)    Neutrophilia  Resolved Problems:    * No resolved hospital problems. *      ASSESSMENT AND PLAN:      Pneumonia due to 2019-nCoV  - Clinically improving  - ID consult appreciated    H/O ESBL PNA in the past  - ID following  - Continue Meropenem    Acute respiratory failure with hypoxia   - Due to Pneumonia  - Continue Decadron  -Provide supplemental oxygen as necessary to maintain an oxygen saturation of 92% or greater. Neutrophilia  - Interval improvement. Continue to monitor    CHF - Acute on chronic combined diastolic and systolic dysfunction. A 2D Echocardiogram on 08/27/2021 shows an EF of 45-50% and grade II diastolic dysfunction. A follow up Echocardiogram has been ordered to reassess cardiac function. Diurese with IV Lasix. Monitor strict I&Os and daily weights. A low sodium fluid restricted diet has been ordered. Possible acute on chronic diastolic (congestive) heart failure ([address]) - A 2D Echocardiogram on 10/19/2019 shows an EF of 50%. Continue home oral Lasix only, as he has only CXR findings consistent with an acute CHF exacerbation and no edema. I suspect that the radiological findings are likely secondary to [address] [place] [address], not CHF but will continue to assess. Monitor strict I&Os and daily weights. A low sodium fluid restricted diet has been ordered    Chronic diastolic (congestive) heart failure (HCC) - appears compensated; monitor I&Os and daily weights. WEI (acute kidney injury) ([address])  - Interval improvement.  Continue to monitor    Acute bronchitis due to severe acute respiratory syndrome coronavirus 2 (SARS-CoV-2)    Atrial fibrillation with RVR (HCC) - Continue Cardizem gtt., Coreg and Eliquis      Diabetes mellitus II - Lantus, SSI and a carb control diet    Essential (primary) hypertension - continue home meds and monitor blood

## 2023-11-27 NOTE — DISCHARGE INSTR - COC
Continuity of Care Form    Patient Name: Garrison Haley   :    MRN:  [id number]    Admit date:  2023  Discharge date:  2023    Code Status Order: Full Code   Advance Directives:     Admitting Physician:  Kayla Em MD  PCP: Crow Marshall    Discharging Nurse: [facility] Unit/Room#: V2P-4285/5121-01  Discharging Unit Phone Number: [phone]    Emergency Contact:   Extended Emergency Contact Information  Primary Emergency Contact: Portia Gauthier  Address: [address] [place] [address] [place]  of [address] Phone: [phone]  Work Phone: [phone]  Mobile Phone: [phone]  Relation: Spouse  Secondary Emergency Contact: Susanne Christensen  Home Phone: [phone]  Relation: Child    Past Surgical History:  Past Surgical History:   Procedure Laterality Date    [address]      CERVICAL SPINE SURGERY      CHEST TUBE INSERTION Right 2023    14F.  Dr. Karl Price    Shoulder    IR CHEST TUBE INSERTION  3/14/2023    IR CHEST TUBE INSERTION 3/14/2023 [facility] PROCEDURES       Immunization History:   Immunization History   Administered Date(s) Administered    COVID-19, MODERNA Bivalent, (age 12y+), IM, 48 mcg/0.5 mL 2022    Influenza Vaccine, unspecified formulation 10/22/2016    Influenza, AFLURIA (age 1 yrs+), FLUZONE, (age 10 mo+), MDV, 0.5mL 10/22/2016, 09/15/2017    Influenza, High Dose (Fluzone 65 yrs and older) 09/15/2017    Pneumococcal, PCV-13, PREVNAR 15, (age 6w+), IM, 0.5mL 2016    TDaP, ADACEL (age 6y-58y), BOOSTRIX (age 10y+), IM, 0.5mL 2018       Active Problems:  Patient Active Problem List   Diagnosis Code    Diabetes mellitus ([address]) E11.9    Atherosclerosis of native coronary artery of native heart without angina pectoris I25.10    Morbid obesity due to excess calories (HCC) E66.01    SOB

## 2023-11-27 NOTE — DISCHARGE SUMMARY
gabapentin 800 MG tablet  Commonly known as: NEURONTIN     insulin 70-30 (70-30) 100 UNIT per ML injection vial  Commonly known as: HUMULIN;NOVOLIN     ipratropium 0.5 mg-albuterol 2.5 mg 0.5-2.5 (3) MG/3ML Soln nebulizer solution  Commonly known as: DUONEB  Inhale 3 mLs into the lungs every 6 hours as needed for Shortness of Breath     * lamoTRIgine 25 MG tablet  Commonly known as: LAMICTAL     * lamoTRIgine 25 MG tablet  Commonly known as: LAMICTAL     Lidocaine 4 % Oint     lisinopril 10 MG tablet  Commonly known as: PRINIVIL;ZESTRIL     metFORMIN 1000 MG tablet  Commonly known as: GLUCOPHAGE     methocarbamol 500 MG tablet  Commonly known as: ROBAXIN     Nebulizer/Tubing/Mouthpiece Kit  With full face mask. polyethylene glycol 17 GM/SCOOP powder  Commonly known as: GLYCOLAX     pramipexole 0.25 MG tablet  Commonly known as: MIRAPEX     Roflumilast 500 MCG tablet  Commonly known as: DALIRESP  Take 1 tablet by mouth daily     simvastatin 80 MG tablet  Commonly known as: ZOCOR     * therapeutic multivitamin-minerals tablet     * PRESERVISION AREDS 2+MULTI VIT PO     vitamin D 25 MCG (1000 UT) Tabs tablet  Commonly known as: CHOLECALCIFEROL           * This list has 6 medication(s) that are the same as other medications prescribed for you. Read the directions carefully, and ask your doctor or other care provider to review them with you. Where to Get Your Medications        You can get these medications from any pharmacy    Bring a paper prescription for each of these medications  dexamethasone 6 MG tablet  levoFLOXacin 500 MG tablet         35 Minutes spent on patient evaluation, counseling and discharge planning.      Signed:  Dionte Noble MD  11/27/2023, 6:01 PM

## 2023-11-27 NOTE — PLAN OF CARE
Problem: Discharge Planning  Goal: Discharge to home or other facility with appropriate resources  11/26/2023 0052 by Yumiko Young RN  Outcome: Progressing  Flowsheets (Taken 11/25/2023 1958)  Discharge to home or other facility with appropriate resources:   Identify barriers to discharge with patient and caregiver   Arrange for needed discharge resources and transportation as appropriate   Identify discharge learning needs (meds, wound care, etc)   Refer to discharge planning if patient needs post-hospital services based on physician order or complex needs related to functional status, cognitive ability or social support system  11/25/2023 1822 by Kalyan Santana RN  Outcome: Progressing     Problem: Safety - Adult  Goal: Free from fall injury  11/26/2023 0052 by Yumkio Young RN  Outcome: Progressing  11/25/2023 1822 by Kalyan Santana RN  Outcome: Progressing     Problem: ABCDS Injury Assessment  Goal: Absence of physical injury  11/26/2023 0052 by Yumiko Young RN  Outcome: Progressing  11/25/2023 1822 by Kalyan Santana RN  Outcome: Progressing     Problem: Chronic Conditions and Co-morbidities  Goal: Patient's chronic conditions and co-morbidity symptoms are monitored and maintained or improved  11/26/2023 0052 by Yumiko Young RN  Outcome: Progressing  Flowsheets (Taken 11/25/2023 1958)  Care Plan - Patient's Chronic Conditions and Co-Morbidity Symptoms are Monitored and Maintained or Improved:   Monitor and assess patient's chronic conditions and comorbid symptoms for stability, deterioration, or improvement   Collaborate with multidisciplinary team to address chronic and comorbid conditions and prevent exacerbation or deterioration   Update acute care plan with appropriate goals if chronic or comorbid symptoms are exacerbated and prevent overall improvement and discharge  11/25/2023 1822 by Kalyan Santana RN  Outcome: Progressing     Problem: Neurosensory - Adult  Goal: Achieves stable or improved
Problem: Discharge Planning  Goal: Discharge to home or other facility with appropriate resources  11/26/2023 0054 by Mati Yepez RN  Outcome: Progressing  11/26/2023 0052 by Mati Yepez RN  Outcome: Progressing  Flowsheets (Taken 11/25/2023 1958)  Discharge to home or other facility with appropriate resources:   Identify barriers to discharge with patient and caregiver   Arrange for needed discharge resources and transportation as appropriate   Identify discharge learning needs (meds, wound care, etc)   Refer to discharge planning if patient needs post-hospital services based on physician order or complex needs related to functional status, cognitive ability or social support system  11/25/2023 1822 by Sam Sequeira RN  Outcome: Progressing     Problem: Safety - Adult  Goal: Free from fall injury  11/26/2023 0054 by Mati Yepez RN  Outcome: Progressing  11/26/2023 0052 by Mati Yepez RN  Outcome: Progressing  11/25/2023 1822 by Sam Sequeira RN  Outcome: Progressing     Problem: ABCDS Injury Assessment  Goal: Absence of physical injury  11/26/2023 0054 by Mati Yepez RN  Outcome: Progressing  11/26/2023 0052 by Mati Yepez RN  Outcome: Progressing  11/25/2023 1822 by Sam Sequeira RN  Outcome: Progressing     Problem: Chronic Conditions and Co-morbidities  Goal: Patient's chronic conditions and co-morbidity symptoms are monitored and maintained or improved  11/26/2023 0054 by Mati Yepez RN  Outcome: Progressing  11/26/2023 0052 by Mati Yepez RN  Outcome: Progressing  Flowsheets (Taken 11/25/2023 1958)  Care Plan - Patient's Chronic Conditions and Co-Morbidity Symptoms are Monitored and Maintained or Improved:   Monitor and assess patient's chronic conditions and comorbid symptoms for stability, deterioration, or improvement   Collaborate with multidisciplinary team to address chronic and comorbid conditions and prevent exacerbation or deterioration   Update acute care plan with appropriate
Problem: Discharge Planning  Goal: Discharge to home or other facility with appropriate resources  11/26/2023 1011 by Cristal Arredondo RN  Outcome: Progressing  11/26/2023 0054 by Priom Angeles RN  Outcome: Progressing  11/26/2023 0052 by Primo Angeles RN  Outcome: Progressing  Flowsheets (Taken 11/25/2023 1958)  Discharge to home or other facility with appropriate resources:   Identify barriers to discharge with patient and caregiver   Arrange for needed discharge resources and transportation as appropriate   Identify discharge learning needs (meds, wound care, etc)   Refer to discharge planning if patient needs post-hospital services based on physician order or complex needs related to functional status, cognitive ability or social support system     Problem: Safety - Adult  Goal: Free from fall injury  11/26/2023 1011 by Cristal Arredondo RN  Outcome: Progressing  11/26/2023 0054 by Primo Angeles RN  Outcome: Progressing  11/26/2023 0052 by Primo Angeles RN  Outcome: Progressing     Problem: ABCDS Injury Assessment  Goal: Absence of physical injury  11/26/2023 1011 by Cristal Arredondo RN  Outcome: Progressing  11/26/2023 0054 by Primo Angeles RN  Outcome: Progressing  11/26/2023 0052 by Primo Angeles RN  Outcome: Progressing     Problem: Chronic Conditions and Co-morbidities  Goal: Patient's chronic conditions and co-morbidity symptoms are monitored and maintained or improved  11/26/2023 1011 by Cristal Arredondo RN  Outcome: Progressing  11/26/2023 0054 by Primo Angeles RN  Outcome: Progressing  11/26/2023 0052 by Primo Angeles RN  Outcome: Progressing  Flowsheets (Taken 11/25/2023 1958)  Care Plan - Patient's Chronic Conditions and Co-Morbidity Symptoms are Monitored and Maintained or Improved:   Monitor and assess patient's chronic conditions and comorbid symptoms for stability, deterioration, or improvement   Collaborate with multidisciplinary team to address chronic and comorbid conditions and prevent
Problem: Discharge Planning  Goal: Discharge to home or other facility with appropriate resources  11/27/2023 1236 by Lora Perez RN  Outcome: Progressing     Problem: Safety - Adult  Goal: Free from fall injury  11/27/2023 1236 by Lora Perez RN  Outcome: Progressing     Problem: ABCDS Injury Assessment  Goal: Absence of physical injury  11/27/2023 1236 by Lora Perez RN  Outcome: Progressing     Problem: Chronic Conditions and Co-morbidities  Goal: Patient's chronic conditions and co-morbidity symptoms are monitored and maintained or improved  11/27/2023 1236 by Lora Perez RN  Outcome: Progressing     Problem: Neurosensory - Adult  Goal: Achieves stable or improved neurological status  11/27/2023 1236 by Lora Perez RN  Outcome: Progressing     Problem: Neurosensory - Adult  Goal: Achieves maximal functionality and self care  11/27/2023 1236 by Lora Perez RN  Outcome: Progressing     Problem: Respiratory - Adult  Goal: Achieves optimal ventilation and oxygenation  11/27/2023 1236 by Lora Perez RN  Outcome: Progressing     Problem: Skin/Tissue Integrity - Adult  Goal: Skin integrity remains intact  11/27/2023 1236 by Lora Perez RN  Outcome: Progressing     Problem: Skin/Tissue Integrity - Adult  Goal: Incisions, wounds, or drain sites healing without S/S of infection  11/27/2023 1236 by Lora Perez RN  Outcome: Progressing     Problem: Musculoskeletal - Adult  Goal: Return mobility to safest level of function  11/27/2023 1236 by Lora Perez RN  Outcome: Progressing     Problem: Musculoskeletal - Adult  Goal: Return ADL status to a safe level of function  11/27/2023 1236 by Lora Perez RN  Outcome: Progressing     Problem: Gastrointestinal - Adult  Goal: Minimal or absence of nausea and vomiting  11/27/2023 1236 by Lora Perez RN  Outcome: Progressing     Problem: Gastrointestinal - Adult  Goal: Maintains or returns to baseline bowel function  11/27/2023 1236 by Cheikh Moulton
Problem: Discharge Planning  Goal: Discharge to home or other facility with appropriate resources  Outcome: Progressing  Flowsheets  Taken 11/24/2023 0439  Discharge to home or other facility with appropriate resources:   Identify barriers to discharge with patient and caregiver   Identify discharge learning needs (meds, wound care, etc)  Taken 11/24/2023 0400  Discharge to home or other facility with appropriate resources:   Identify barriers to discharge with patient and caregiver   Identify discharge learning needs (meds, wound care, etc)     Problem: Safety - Adult  Goal: Free from fall injury  Outcome: Progressing  Flowsheets (Taken 11/24/2023 0754)  Free From Fall Injury: Instruct family/caregiver on patient safety
Problem: Discharge Planning  Goal: Discharge to home or other facility with appropriate resources  Outcome: Progressing  Flowsheets (Taken 11/24/2023 2026)  Discharge to home or other facility with appropriate resources:   Identify barriers to discharge with patient and caregiver   Identify discharge learning needs (meds, wound care, etc)     Problem: Safety - Adult  Goal: Free from fall injury  Outcome: Progressing  Flowsheets (Taken 11/24/2023 0754)  Free From Fall Injury: Instruct family/caregiver on patient safety
Problem: Discharge Planning  Goal: Discharge to home or other facility with appropriate resources  Outcome: Progressing  Flowsheets (Taken 11/26/2023 2200)  Discharge to home or other facility with appropriate resources:   Identify barriers to discharge with patient and caregiver   Arrange for needed discharge resources and transportation as appropriate   Identify discharge learning needs (meds, wound care, etc)   Refer to discharge planning if patient needs post-hospital services based on physician order or complex needs related to functional status, cognitive ability or social support system     Problem: Safety - Adult  Goal: Free from fall injury  Outcome: Progressing     Problem: ABCDS Injury Assessment  Goal: Absence of physical injury  Outcome: Progressing     Problem: Chronic Conditions and Co-morbidities  Goal: Patient's chronic conditions and co-morbidity symptoms are monitored and maintained or improved  Outcome: Progressing  Flowsheets (Taken 11/26/2023 2200)  Care Plan - Patient's Chronic Conditions and Co-Morbidity Symptoms are Monitored and Maintained or Improved:   Monitor and assess patient's chronic conditions and comorbid symptoms for stability, deterioration, or improvement   Collaborate with multidisciplinary team to address chronic and comorbid conditions and prevent exacerbation or deterioration   Update acute care plan with appropriate goals if chronic or comorbid symptoms are exacerbated and prevent overall improvement and discharge     Problem: Neurosensory - Adult  Goal: Achieves stable or improved neurological status  Outcome: Progressing  Flowsheets (Taken 11/26/2023 2200)  Achieves stable or improved neurological status:   Assess for and report changes in neurological status   Initiate measures to prevent increased intracranial pressure   Maintain blood pressure and fluid volume within ordered parameters to optimize cerebral perfusion and minimize risk of hemorrhage   Monitor
Problem: Genitourinary - Adult  Goal: Absence of urinary retention  11/27/2023 1824 by Sia Silva RN  Outcome: Completed     Problem: Genitourinary - Adult  Goal: Urinary catheter remains patent  11/27/2023 1824 by Sia Silva RN  Outcome: Completed     Problem: Metabolic/Fluid and Electrolytes - Adult  Goal: Electrolytes maintained within normal limits  11/27/2023 1824 by Sia Silva RN  Outcome: Completed     Problem: Metabolic/Fluid and Electrolytes - Adult  Goal: Hemodynamic stability and optimal renal function maintained  11/27/2023 1824 by Sia Silva RN  Outcome: Completed     Problem: Metabolic/Fluid and Electrolytes - Adult  Goal: Glucose maintained within prescribed range  11/27/2023 1824 by Sia Silva RN  Outcome: Completed     Problem: Infection - Adult  Goal: Absence of infection at discharge  11/27/2023 1824 by Sia Silva RN  Outcome: Completed     Problem: Infection - Adult  Goal: Absence of infection during hospitalization  11/27/2023 1824 by Sia Silva RN  Outcome: Completed     Problem: Anxiety  Goal: Will report anxiety at manageable levels  Description: INTERVENTIONS:  1. Administer medication as ordered  2. Teach and rehearse alternative coping skills  3. Provide emotional support with 1:1 interaction with staff  11/27/2023 1824 by Sia Silva RN  Outcome: Completed     Problem: Coping  Goal: Pt/Family able to verbalize concerns and demonstrate effective coping strategies  Description: INTERVENTIONS:  1. Assist patient/family to identify coping skills, available support systems and cultural and spiritual values  2. Provide emotional support, including active listening and acknowledgement of concerns of patient and caregivers  3. Reduce environmental stimuli, as able  4. Instruct patient/family in relaxation techniques, as appropriate  5.  Assess for spiritual pain/suffering and initiate Spiritual Care, Psychosocial Clinical Specialist consults as needed  11/27/2023
Problem: Genitourinary - Adult  Goal: Absence of urinary retention  Outcome: Progressing  Goal: Urinary catheter remains patent  Outcome: Progressing     Problem: Metabolic/Fluid and Electrolytes - Adult  Goal: Electrolytes maintained within normal limits  Outcome: Progressing  Goal: Hemodynamic stability and optimal renal function maintained  Outcome: Progressing  Goal: Glucose maintained within prescribed range  Outcome: Progressing     Problem: Infection - Adult  Goal: Absence of infection at discharge  Outcome: Progressing  Goal: Absence of infection during hospitalization  Outcome: Progressing     Problem: Anxiety  Goal: Will report anxiety at manageable levels  Description: INTERVENTIONS:  1. Administer medication as ordered  2. Teach and rehearse alternative coping skills  3. Provide emotional support with 1:1 interaction with staff  Outcome: Progressing     Problem: Coping  Goal: Pt/Family able to verbalize concerns and demonstrate effective coping strategies  Description: INTERVENTIONS:  1. Assist patient/family to identify coping skills, available support systems and cultural and spiritual values  2. Provide emotional support, including active listening and acknowledgement of concerns of patient and caregivers  3. Reduce environmental stimuli, as able  4. Instruct patient/family in relaxation techniques, as appropriate  5.  Assess for spiritual pain/suffering and initiate Spiritual Care, Psychosocial Clinical Specialist consults as needed  Outcome: Progressing

## 2023-11-28 LAB
BACTERIA BLD CULT: NORMAL
BACTERIA BLD CULT: NORMAL

## 2023-11-28 NOTE — CARE COORDINATION
11/25/23 1038   Readmission Assessment   Number of Days since last admission? 8-30 days   Previous Disposition Home with Home Health  [facility])   Who is being Interviewed Caregiver   What was the patient's/caregiver's perception as to why they think they needed to return back to the hospital? Other (Comment)  (increase SOB)   Did you visit your Primary Care Physician after you left the hospital, before you returned this time? Yes   Did you see a specialist, such as Cardiac, Pulmonary, Orthopedic Physician, etc. after you left the hospital? No   Who advised the patient to return to the hospital? Self-referral;Caregiver   Does the patient report anything that got in the way of taking their medications? No   In our efforts to provide the best possible care to you and others like you, can you think of anything that we could have done to help you after you left the hospital the first time, so that you might not have needed to return so soon?  Other (Comment)  (no)     #[phone]  Electronically signed by Sherley Claude, RN on 11/25/2023 at 10:39 AM
[facility]    DC order noted, all docs needed have been faxed to [facility] for home care services.     Home care to see patient by 11/29/23      Afsaneh Perdomo RN, BSN CTN  [facility] [phone]
Case Management Discharge Note          Date / Time of Note: 11/27/2023 5:39 PM                  Patient Name: Kamille Wick   YOB: 1945  Diagnosis: Hypoxia [R09.02]  Atrial fibrillation with rapid ventricular response ([address]) [I48.91]  Atrial fibrillation, unspecified type ([address]) [I48.91]  Pneumonia due to 2019-nCoV [U07.1, J12.82]  Acute bronchitis due to severe acute respiratory syndrome coronavirus 2 (SARS-CoV-2) [U07.1, J20.8]   Date / Time: 11/24/2023 12:34 AM    Financial:  Payor: Madhavi Toth / Plan: MEDICARE PART A AND B / Product Type: *No Product type* /      Pharmacy:    [address] [place] [address] [phone] Jb Hinkle [phone]  [address] [place] [id number]  Phone: [phone] Fax: [phone]        DISCHARGE Disposition: Home with [address]:  Home Care ordered at discharge: Yes  [address]: [facility]  Phone: [phone]  Fax: [phone]  Orders faxed: Yes      Transportation:  Transportation PLAN for discharge: family   Mode of Transport: Private Car    IMM Completed:   Yes, Case management has presented and reviewed IMM letter #2. IMM Letter given to Patient/Family/Significant other/Guardian/POA/by[de-identified] Provided to patient . .. by Reza Chavez, MSW, LSW. Education provided to patient, patient reported no questions and verbalized understanding. Patient aware of 4 hours allotted time to determine if they choose to pursue Medicare appeal process. IMM Letter date given[de-identified] 11/27/23  IMM Letter time given[de-identified] 9412. Patient and/or family/POA verbalized understanding of their medicare rights and appeal process if needed. Patient and/or family/POA has signed, initialed and placed the date and time on IMM letter #2 on the the appropriate lines. Copy of letter offered and they are aware that the original copy of IMM letter #2 is available prior to discharge from the paper chart on the unit.
[facility] Ostomy Continence Nurse  Consult Note       NAME:  Galen Sebastian  MEDICAL RECORD NUMBER:  [id number]  AGE: 66 y.o. GENDER: male  : 1945  TODAY'S DATE:  2023    Subjective   Reason for WOCN Evaluation and Assessment: right buttock wound      Galen Sebastian is a 66 y.o. male referred by:   [] Physician  [x] Nursing  [] Other:     Wound Identification:  Wound Type:  friction, moisture  Contributing Factors: chronic pressure and decreased mobility    Wound History: noted on admit  Current Wound Care Treatment:  zinc    Patient Goal of Care:  [x] Wound Healing  [] Odor Control  [] Palliative Care  [] Pain Control   [] Other:         PAST MEDICAL HISTORY        Diagnosis Date    Abrasion of right elbow 2023    Arthritis     CAD (coronary artery disease)     CABG in     Chronic back pain     Chronic systolic CHF (congestive heart failure) (HCC)     COPD (chronic obstructive pulmonary disease) ([address])     Dental disease     Diabetes mellitus ([address])     Essential hypertension     Hearing loss     Paroxysmal atrial fibrillation ([address])     On Coumadin    Tinnitus        PAST SURGICAL HISTORY    Past Surgical History:   Procedure Laterality Date    BACK SURGERY      BACK SURGERY      CARDIAC SURGERY      CERVICAL SPINE SURGERY      CHEST TUBE INSERTION Right 2023    14F.  Dr. Krunal Hancock    Shoulder    IR CHEST TUBE INSERTION  3/14/2023    IR CHEST TUBE INSERTION 3/14/2023 [facility] PROCED[facility]       FAMILY HISTORY    Family History   Problem Relation Age of Onset    Brain Cancer Mother     Heart Attack Father     Heart Attack Brother        SOCIAL HISTORY    Social History     Tobacco Use    Smoking status: Former     Packs/day: 1.50     Years: 12.00     Additional pack years: 0.00     Total pack years: 18.00     Types: Cigarettes     Start date: 1959     Quit date: 3/1/1996     Years since quittin.7
[facility]  Patient is active with [facility] for nurse, PT, OT. Will follow for discharge to home with orders to resume care.       Ana María Harris RN, BSN CTN  [facility] [phone]
Shopping  Current functional level: Assistance with the following:, Cooking, Housework, Shopping    PT AM-PAC: 18 /24  OT AM-PAC: 17 /24    Family can provide assistance at DC: Yes  Would you like Case Management to discuss the discharge plan with any other family members/significant others, and if so, who? Yes (wife)  Plans to Return to Present Housing: Yes  Other Identified Issues/Barriers to RETURNING to current housing: None  Potential Assistance needed at discharge: Home Care            Potential DME:  None  Patient expects to discharge to: [address] [place] [address] for transportation at discharge: Family    Financial    Payor: Dilip Cramer  / Plan: [facility] / Product Type: *No Product type* /     Does insurance require precert for SNF: Yes    Potential assistance Purchasing Medications: No  Meds-to-Beds request:        [address] [phone] [facility] [phone]  [facility] [id number]  Phone: [phone] Fax: [phone]      Notes:    Factors facilitating achievement of predicted outcomes: Family support, Motivated, Cooperative, Pleasant, Sense of humor, and Good insight into deficits    Barriers to discharge: Decreased endurance, Lower extremity weakness, and Long standing deficits    Home Care Information:   Currently active with [facility] : Yes   [address]: [address] [place] [address] Street: SN/PT/OT    Additional Case Management Notes:   DC plan to return home with wife. Wife can transport home. Active with [facility] for home care, will resume. Denied any other needs.     The Plan for Transition of Care is related to the following treatment goals of Hypoxia [R09.02]  Atrial fibrillation with rapid ventricular response (HCC) [I48.91]  Atrial fibrillation, unspecified type ([facility]) [I48.91]  Pneumonia due to 2019-nCoV [U07.1, J12.82]  Acute bronchitis due to severe acute respiratory syndrome

## 2023-12-29 ENCOUNTER — TELEPHONE (OUTPATIENT)
Dept: PULMONOLOGY | Age: 78
End: 2023-12-29

## 2023-12-29 DIAGNOSIS — J41.0 CHRONIC BRONCHITIS, SIMPLE (HCC): Primary | ICD-10-CM

## 2023-12-29 RX ORDER — AMOXICILLIN AND CLAVULANATE POTASSIUM 500; 125 MG/1; MG/1
1 TABLET, FILM COATED ORAL 3 TIMES DAILY
Qty: 30 TABLET | Refills: 0 | Status: SHIPPED | OUTPATIENT
Start: 2023-12-29 | End: 2024-01-08

## 2023-12-29 NOTE — TELEPHONE ENCOUNTER
Wife called in and stated Lokesh Vail has been SOB when walking, also has yellowish color phlegm. Want to know if you can call something into pharmacy or be added to your schedule today.

## 2023-12-29 NOTE — TELEPHONE ENCOUNTER
He could have been added.      Augmentin sent Erythromycin Pregnancy And Lactation Text: This medication is Pregnancy Category B and is considered safe during pregnancy. It is also excreted in breast milk.

## 2024-01-12 ENCOUNTER — HOSPITAL ENCOUNTER (INPATIENT)
Age: 79
LOS: 4 days | Discharge: HOME HEALTH | DRG: 871 | End: 2024-01-17
Attending: INTERNAL MEDICINE | Admitting: INTERNAL MEDICINE
Payer: MEDICARE

## 2024-01-12 DIAGNOSIS — R05.8 PRODUCTIVE COUGH: ICD-10-CM

## 2024-01-12 DIAGNOSIS — R09.02 HYPOXIA: Primary | ICD-10-CM

## 2024-01-12 DIAGNOSIS — J44.1 COPD EXACERBATION (HCC): ICD-10-CM

## 2024-01-12 PROCEDURE — 84484 ASSAY OF TROPONIN QUANT: CPT

## 2024-01-12 PROCEDURE — 80053 COMPREHEN METABOLIC PANEL: CPT

## 2024-01-12 PROCEDURE — 96366 THER/PROPH/DIAG IV INF ADDON: CPT

## 2024-01-12 PROCEDURE — 96365 THER/PROPH/DIAG IV INF INIT: CPT

## 2024-01-12 PROCEDURE — 83880 ASSAY OF NATRIURETIC PEPTIDE: CPT

## 2024-01-12 PROCEDURE — 93005 ELECTROCARDIOGRAM TRACING: CPT | Performed by: PHYSICIAN ASSISTANT

## 2024-01-12 PROCEDURE — 99285 EMERGENCY DEPT VISIT HI MDM: CPT

## 2024-01-12 PROCEDURE — 87040 BLOOD CULTURE FOR BACTERIA: CPT

## 2024-01-12 PROCEDURE — 85025 COMPLETE CBC W/AUTO DIFF WBC: CPT

## 2024-01-12 PROCEDURE — 87635 SARS-COV-2 COVID-19 AMP PRB: CPT

## 2024-01-12 PROCEDURE — 87804 INFLUENZA ASSAY W/OPTIC: CPT

## 2024-01-12 ASSESSMENT — PAIN - FUNCTIONAL ASSESSMENT: PAIN_FUNCTIONAL_ASSESSMENT: NONE - DENIES PAIN

## 2024-01-13 ENCOUNTER — APPOINTMENT (OUTPATIENT)
Dept: GENERAL RADIOLOGY | Age: 79
DRG: 871 | End: 2024-01-13
Payer: MEDICARE

## 2024-01-13 PROBLEM — J44.1 COPD EXACERBATION (HCC): Status: ACTIVE | Noted: 2024-01-13

## 2024-01-13 LAB
ALBUMIN SERPL-MCNC: 4.1 G/DL (ref 3.4–5)
ALBUMIN/GLOB SERPL: 1.1 {RATIO} (ref 1.1–2.2)
ALP SERPL-CCNC: 62 U/L (ref 40–129)
ALT SERPL-CCNC: 13 U/L (ref 10–40)
ANION GAP SERPL CALCULATED.3IONS-SCNC: 13 MMOL/L (ref 3–16)
ANION GAP SERPL CALCULATED.3IONS-SCNC: 13 MMOL/L (ref 3–16)
AST SERPL-CCNC: 15 U/L (ref 15–37)
BASOPHILS # BLD: 0.1 K/UL (ref 0–0.2)
BASOPHILS NFR BLD: 0.6 %
BILIRUB SERPL-MCNC: 0.3 MG/DL (ref 0–1)
BUN SERPL-MCNC: 17 MG/DL (ref 7–20)
BUN SERPL-MCNC: 19 MG/DL (ref 7–20)
CALCIUM SERPL-MCNC: 8.9 MG/DL (ref 8.3–10.6)
CALCIUM SERPL-MCNC: 9.3 MG/DL (ref 8.3–10.6)
CHLORIDE SERPL-SCNC: 100 MMOL/L (ref 99–110)
CHLORIDE SERPL-SCNC: 102 MMOL/L (ref 99–110)
CO2 SERPL-SCNC: 23 MMOL/L (ref 21–32)
CO2 SERPL-SCNC: 27 MMOL/L (ref 21–32)
CREAT SERPL-MCNC: 0.9 MG/DL (ref 0.8–1.3)
CREAT SERPL-MCNC: 1 MG/DL (ref 0.8–1.3)
DEPRECATED RDW RBC AUTO: 15.2 % (ref 12.4–15.4)
EKG ATRIAL RATE: 101 BPM
EKG DIAGNOSIS: NORMAL
EKG P AXIS: 74 DEGREES
EKG P-R INTERVAL: 188 MS
EKG Q-T INTERVAL: 348 MS
EKG QRS DURATION: 100 MS
EKG QTC CALCULATION (BAZETT): 451 MS
EKG R AXIS: 64 DEGREES
EKG T AXIS: -77 DEGREES
EKG VENTRICULAR RATE: 101 BPM
EOSINOPHIL # BLD: 0.3 K/UL (ref 0–0.6)
EOSINOPHIL NFR BLD: 2.9 %
EST. AVERAGE GLUCOSE BLD GHB EST-MCNC: 168.6 MG/DL
FLUAV RNA UPPER RESP QL NAA+PROBE: NEGATIVE
FLUBV AG NPH QL: NEGATIVE
GFR SERPLBLD CREATININE-BSD FMLA CKD-EPI: >60 ML/MIN/{1.73_M2}
GFR SERPLBLD CREATININE-BSD FMLA CKD-EPI: >60 ML/MIN/{1.73_M2}
GLUCOSE BLD-MCNC: 302 MG/DL (ref 70–99)
GLUCOSE BLD-MCNC: 335 MG/DL (ref 70–99)
GLUCOSE BLD-MCNC: 442 MG/DL (ref 70–99)
GLUCOSE BLD-MCNC: 446 MG/DL (ref 70–99)
GLUCOSE BLD-MCNC: 481 MG/DL (ref 70–99)
GLUCOSE SERPL-MCNC: 119 MG/DL (ref 70–99)
GLUCOSE SERPL-MCNC: 192 MG/DL (ref 70–99)
HBA1C MFR BLD: 7.5 %
HCT VFR BLD AUTO: 41.3 % (ref 40.5–52.5)
HGB BLD-MCNC: 13.5 G/DL (ref 13.5–17.5)
LACTATE BLDV-SCNC: 1.5 MMOL/L (ref 0.4–2)
LACTATE BLDV-SCNC: 2.1 MMOL/L (ref 0.4–2)
LYMPHOCYTES # BLD: 1.9 K/UL (ref 1–5.1)
LYMPHOCYTES NFR BLD: 16.9 %
MCH RBC QN AUTO: 28.7 PG (ref 26–34)
MCHC RBC AUTO-ENTMCNC: 32.7 G/DL (ref 31–36)
MCV RBC AUTO: 87.7 FL (ref 80–100)
MONOCYTES # BLD: 1 K/UL (ref 0–1.3)
MONOCYTES NFR BLD: 9.3 %
NEUTROPHILS # BLD: 7.9 K/UL (ref 1.7–7.7)
NEUTROPHILS NFR BLD: 70.3 %
NT-PROBNP SERPL-MCNC: 994 PG/ML (ref 0–449)
PERFORMED ON: ABNORMAL
PLATELET # BLD AUTO: 355 K/UL (ref 135–450)
PMV BLD AUTO: 6.9 FL (ref 5–10.5)
POTASSIUM SERPL-SCNC: 4 MMOL/L (ref 3.5–5.1)
POTASSIUM SERPL-SCNC: 4.5 MMOL/L (ref 3.5–5.1)
PROT SERPL-MCNC: 8 G/DL (ref 6.4–8.2)
RBC # BLD AUTO: 4.71 M/UL (ref 4.2–5.9)
SARS-COV-2 RDRP RESP QL NAA+PROBE: NOT DETECTED
SODIUM SERPL-SCNC: 138 MMOL/L (ref 136–145)
SODIUM SERPL-SCNC: 140 MMOL/L (ref 136–145)
TROPONIN, HIGH SENSITIVITY: 22 NG/L (ref 0–22)
TROPONIN, HIGH SENSITIVITY: 29 NG/L (ref 0–22)
WBC # BLD AUTO: 11.3 K/UL (ref 4–11)

## 2024-01-13 PROCEDURE — 6370000000 HC RX 637 (ALT 250 FOR IP): Performed by: INTERNAL MEDICINE

## 2024-01-13 PROCEDURE — 97530 THERAPEUTIC ACTIVITIES: CPT

## 2024-01-13 PROCEDURE — 36415 COLL VENOUS BLD VENIPUNCTURE: CPT

## 2024-01-13 PROCEDURE — 2580000003 HC RX 258

## 2024-01-13 PROCEDURE — 83036 HEMOGLOBIN GLYCOSYLATED A1C: CPT

## 2024-01-13 PROCEDURE — 97116 GAIT TRAINING THERAPY: CPT

## 2024-01-13 PROCEDURE — 92610 EVALUATE SWALLOWING FUNCTION: CPT

## 2024-01-13 PROCEDURE — 83605 ASSAY OF LACTIC ACID: CPT

## 2024-01-13 PROCEDURE — 97166 OT EVAL MOD COMPLEX 45 MIN: CPT

## 2024-01-13 PROCEDURE — 97535 SELF CARE MNGMENT TRAINING: CPT

## 2024-01-13 PROCEDURE — 6360000002 HC RX W HCPCS: Performed by: PHYSICIAN ASSISTANT

## 2024-01-13 PROCEDURE — 80048 BASIC METABOLIC PNL TOTAL CA: CPT

## 2024-01-13 PROCEDURE — 71046 X-RAY EXAM CHEST 2 VIEWS: CPT

## 2024-01-13 PROCEDURE — 2700000000 HC OXYGEN THERAPY PER DAY

## 2024-01-13 PROCEDURE — 97162 PT EVAL MOD COMPLEX 30 MIN: CPT

## 2024-01-13 PROCEDURE — 6360000002 HC RX W HCPCS: Performed by: INTERNAL MEDICINE

## 2024-01-13 PROCEDURE — 87040 BLOOD CULTURE FOR BACTERIA: CPT

## 2024-01-13 PROCEDURE — 84484 ASSAY OF TROPONIN QUANT: CPT

## 2024-01-13 PROCEDURE — 94669 MECHANICAL CHEST WALL OSCILL: CPT

## 2024-01-13 PROCEDURE — 92526 ORAL FUNCTION THERAPY: CPT

## 2024-01-13 PROCEDURE — 1200000000 HC SEMI PRIVATE

## 2024-01-13 PROCEDURE — 94640 AIRWAY INHALATION TREATMENT: CPT

## 2024-01-13 PROCEDURE — 6370000000 HC RX 637 (ALT 250 FOR IP): Performed by: PHYSICIAN ASSISTANT

## 2024-01-13 PROCEDURE — 2580000003 HC RX 258: Performed by: PHYSICIAN ASSISTANT

## 2024-01-13 PROCEDURE — 6370000000 HC RX 637 (ALT 250 FOR IP): Performed by: NURSE PRACTITIONER

## 2024-01-13 PROCEDURE — 94761 N-INVAS EAR/PLS OXIMETRY MLT: CPT

## 2024-01-13 PROCEDURE — 93010 ELECTROCARDIOGRAM REPORT: CPT | Performed by: INTERNAL MEDICINE

## 2024-01-13 RX ORDER — METHOCARBAMOL 500 MG/1
500 TABLET, FILM COATED ORAL 3 TIMES DAILY PRN
Status: DISCONTINUED | OUTPATIENT
Start: 2024-01-13 | End: 2024-01-17 | Stop reason: HOSPADM

## 2024-01-13 RX ORDER — ONDANSETRON 4 MG/1
4 TABLET, ORALLY DISINTEGRATING ORAL EVERY 8 HOURS PRN
Status: DISCONTINUED | OUTPATIENT
Start: 2024-01-13 | End: 2024-01-17 | Stop reason: HOSPADM

## 2024-01-13 RX ORDER — MV-MIN/FA/VIT K/LUTEIN/ZEAXANT 200MCG-5MG
1 CAPSULE ORAL 2 TIMES DAILY
Status: DISCONTINUED | OUTPATIENT
Start: 2024-01-13 | End: 2024-01-13

## 2024-01-13 RX ORDER — PREDNISONE 20 MG/1
40 TABLET ORAL DAILY
Status: COMPLETED | OUTPATIENT
Start: 2024-01-15 | End: 2024-01-17

## 2024-01-13 RX ORDER — INSULIN LISPRO 100 [IU]/ML
8 INJECTION, SOLUTION INTRAVENOUS; SUBCUTANEOUS
Status: DISCONTINUED | OUTPATIENT
Start: 2024-01-13 | End: 2024-01-14

## 2024-01-13 RX ORDER — ONDANSETRON 2 MG/ML
4 INJECTION INTRAMUSCULAR; INTRAVENOUS EVERY 6 HOURS PRN
Status: DISCONTINUED | OUTPATIENT
Start: 2024-01-13 | End: 2024-01-13

## 2024-01-13 RX ORDER — DEXTROSE MONOHYDRATE 100 MG/ML
INJECTION, SOLUTION INTRAVENOUS CONTINUOUS PRN
Status: DISCONTINUED | OUTPATIENT
Start: 2024-01-13 | End: 2024-01-17 | Stop reason: HOSPADM

## 2024-01-13 RX ORDER — WATER 10 ML/10ML
INJECTION INTRAMUSCULAR; INTRAVENOUS; SUBCUTANEOUS
Status: COMPLETED
Start: 2024-01-13 | End: 2024-01-13

## 2024-01-13 RX ORDER — INSULIN GLARGINE 100 [IU]/ML
28 INJECTION, SOLUTION SUBCUTANEOUS NIGHTLY
Status: DISCONTINUED | OUTPATIENT
Start: 2024-01-13 | End: 2024-01-13

## 2024-01-13 RX ORDER — INSULIN LISPRO 100 [IU]/ML
0-8 INJECTION, SOLUTION INTRAVENOUS; SUBCUTANEOUS
Status: DISCONTINUED | OUTPATIENT
Start: 2024-01-13 | End: 2024-01-16

## 2024-01-13 RX ORDER — INSULIN LISPRO 100 [IU]/ML
11 INJECTION, SOLUTION INTRAVENOUS; SUBCUTANEOUS ONCE
Status: COMPLETED | OUTPATIENT
Start: 2024-01-13 | End: 2024-01-13

## 2024-01-13 RX ORDER — ONDANSETRON 4 MG/1
4 TABLET, ORALLY DISINTEGRATING ORAL EVERY 8 HOURS PRN
Status: DISCONTINUED | OUTPATIENT
Start: 2024-01-13 | End: 2024-01-13

## 2024-01-13 RX ORDER — ROFLUMILAST 500 UG/1
500 TABLET ORAL DAILY
Status: DISCONTINUED | OUTPATIENT
Start: 2024-01-13 | End: 2024-01-17 | Stop reason: HOSPADM

## 2024-01-13 RX ORDER — METHYLPREDNISOLONE SODIUM SUCCINATE 125 MG/2ML
125 INJECTION, POWDER, LYOPHILIZED, FOR SOLUTION INTRAMUSCULAR; INTRAVENOUS ONCE
Status: COMPLETED | OUTPATIENT
Start: 2024-01-13 | End: 2024-01-13

## 2024-01-13 RX ORDER — METHYLPREDNISOLONE SODIUM SUCCINATE 40 MG/ML
40 INJECTION, POWDER, LYOPHILIZED, FOR SOLUTION INTRAMUSCULAR; INTRAVENOUS EVERY 6 HOURS
Status: COMPLETED | OUTPATIENT
Start: 2024-01-13 | End: 2024-01-14

## 2024-01-13 RX ORDER — CARVEDILOL 3.12 MG/1
3.12 TABLET ORAL 2 TIMES DAILY WITH MEALS
Status: DISCONTINUED | OUTPATIENT
Start: 2024-01-13 | End: 2024-01-17 | Stop reason: HOSPADM

## 2024-01-13 RX ORDER — LAMOTRIGINE 25 MG/1
25 TABLET ORAL EVERY MORNING
Status: DISCONTINUED | OUTPATIENT
Start: 2024-01-13 | End: 2024-01-17 | Stop reason: HOSPADM

## 2024-01-13 RX ORDER — LAMOTRIGINE 25 MG/1
50 TABLET ORAL NIGHTLY
Status: DISCONTINUED | OUTPATIENT
Start: 2024-01-13 | End: 2024-01-17 | Stop reason: HOSPADM

## 2024-01-13 RX ORDER — FLUTICASONE PROPIONATE 50 MCG
1 SPRAY, SUSPENSION (ML) NASAL 2 TIMES DAILY
Status: DISCONTINUED | OUTPATIENT
Start: 2024-01-13 | End: 2024-01-17 | Stop reason: HOSPADM

## 2024-01-13 RX ORDER — IPRATROPIUM BROMIDE AND ALBUTEROL SULFATE 2.5; .5 MG/3ML; MG/3ML
3 SOLUTION RESPIRATORY (INHALATION) ONCE
Status: COMPLETED | OUTPATIENT
Start: 2024-01-13 | End: 2024-01-13

## 2024-01-13 RX ORDER — ENOXAPARIN SODIUM 100 MG/ML
30 INJECTION SUBCUTANEOUS 2 TIMES DAILY
Status: DISCONTINUED | OUTPATIENT
Start: 2024-01-13 | End: 2024-01-13

## 2024-01-13 RX ORDER — AZITHROMYCIN 500 MG/1
500 TABLET, FILM COATED ORAL EVERY 24 HOURS
Status: COMPLETED | OUTPATIENT
Start: 2024-01-14 | End: 2024-01-16

## 2024-01-13 RX ORDER — POLYETHYLENE GLYCOL 3350 17 G/17G
17 POWDER, FOR SOLUTION ORAL DAILY
Status: DISCONTINUED | OUTPATIENT
Start: 2024-01-13 | End: 2024-01-17 | Stop reason: HOSPADM

## 2024-01-13 RX ORDER — ASPIRIN 81 MG/1
81 TABLET, CHEWABLE ORAL DAILY
Status: DISCONTINUED | OUTPATIENT
Start: 2024-01-13 | End: 2024-01-17 | Stop reason: HOSPADM

## 2024-01-13 RX ORDER — INSULIN LISPRO 100 [IU]/ML
0-4 INJECTION, SOLUTION INTRAVENOUS; SUBCUTANEOUS NIGHTLY
Status: DISCONTINUED | OUTPATIENT
Start: 2024-01-13 | End: 2024-01-16

## 2024-01-13 RX ORDER — ACETAMINOPHEN 650 MG/1
650 SUPPOSITORY RECTAL EVERY 6 HOURS PRN
Status: DISCONTINUED | OUTPATIENT
Start: 2024-01-13 | End: 2024-01-17 | Stop reason: HOSPADM

## 2024-01-13 RX ORDER — ATORVASTATIN CALCIUM 20 MG/1
20 TABLET, FILM COATED ORAL DAILY
Status: DISCONTINUED | OUTPATIENT
Start: 2024-01-13 | End: 2024-01-17 | Stop reason: HOSPADM

## 2024-01-13 RX ORDER — FUROSEMIDE 40 MG/1
40 TABLET ORAL DAILY
Status: DISCONTINUED | OUTPATIENT
Start: 2024-01-13 | End: 2024-01-17 | Stop reason: HOSPADM

## 2024-01-13 RX ORDER — IPRATROPIUM BROMIDE AND ALBUTEROL SULFATE 2.5; .5 MG/3ML; MG/3ML
1 SOLUTION RESPIRATORY (INHALATION)
Status: DISCONTINUED | OUTPATIENT
Start: 2024-01-13 | End: 2024-01-17 | Stop reason: HOSPADM

## 2024-01-13 RX ORDER — ACETAMINOPHEN 325 MG/1
650 TABLET ORAL EVERY 6 HOURS PRN
Status: DISCONTINUED | OUTPATIENT
Start: 2024-01-13 | End: 2024-01-17 | Stop reason: HOSPADM

## 2024-01-13 RX ORDER — GABAPENTIN 400 MG/1
800 CAPSULE ORAL 4 TIMES DAILY
Status: DISCONTINUED | OUTPATIENT
Start: 2024-01-13 | End: 2024-01-17 | Stop reason: HOSPADM

## 2024-01-13 RX ORDER — VITAMIN B COMPLEX
1000 TABLET ORAL DAILY
Status: DISCONTINUED | OUTPATIENT
Start: 2024-01-13 | End: 2024-01-17 | Stop reason: HOSPADM

## 2024-01-13 RX ORDER — INSULIN GLARGINE 100 [IU]/ML
28 INJECTION, SOLUTION SUBCUTANEOUS NIGHTLY
Status: DISCONTINUED | OUTPATIENT
Start: 2024-01-13 | End: 2024-01-14

## 2024-01-13 RX ORDER — I-VITE, TAB 1000-60-2MG (60/BT) 300MCG-200
1 TAB ORAL DAILY
Status: DISCONTINUED | OUTPATIENT
Start: 2024-01-13 | End: 2024-01-17 | Stop reason: HOSPADM

## 2024-01-13 RX ORDER — ONDANSETRON 2 MG/ML
4 INJECTION INTRAMUSCULAR; INTRAVENOUS EVERY 6 HOURS PRN
Status: DISCONTINUED | OUTPATIENT
Start: 2024-01-13 | End: 2024-01-17 | Stop reason: HOSPADM

## 2024-01-13 RX ORDER — LISINOPRIL 10 MG/1
10 TABLET ORAL DAILY
Status: DISCONTINUED | OUTPATIENT
Start: 2024-01-13 | End: 2024-01-13

## 2024-01-13 RX ORDER — INSULIN LISPRO 100 [IU]/ML
5 INJECTION, SOLUTION INTRAVENOUS; SUBCUTANEOUS
Status: DISCONTINUED | OUTPATIENT
Start: 2024-01-13 | End: 2024-01-13

## 2024-01-13 RX ORDER — PRAMIPEXOLE DIHYDROCHLORIDE 0.5 MG/1
0.25 TABLET ORAL 4 TIMES DAILY
Status: DISCONTINUED | OUTPATIENT
Start: 2024-01-13 | End: 2024-01-17 | Stop reason: HOSPADM

## 2024-01-13 RX ADMIN — PRAMIPEXOLE DIHYDROCHLORIDE 0.25 MG: 0.5 TABLET ORAL at 21:15

## 2024-01-13 RX ADMIN — MOMETASONE FUROATE AND FORMOTEROL FUMARATE DIHYDRATE 2 PUFF: 100; 5 AEROSOL RESPIRATORY (INHALATION) at 20:02

## 2024-01-13 RX ADMIN — METHYLPREDNISOLONE SODIUM SUCCINATE 40 MG: 40 INJECTION, POWDER, LYOPHILIZED, FOR SOLUTION INTRAMUSCULAR; INTRAVENOUS at 09:25

## 2024-01-13 RX ADMIN — INSULIN LISPRO 4 UNITS: 100 INJECTION, SOLUTION INTRAVENOUS; SUBCUTANEOUS at 20:24

## 2024-01-13 RX ADMIN — INSULIN GLARGINE 28 UNITS: 100 INJECTION, SOLUTION SUBCUTANEOUS at 13:47

## 2024-01-13 RX ADMIN — INSULIN LISPRO 8 UNITS: 100 INJECTION, SOLUTION INTRAVENOUS; SUBCUTANEOUS at 17:38

## 2024-01-13 RX ADMIN — IPRATROPIUM BROMIDE AND ALBUTEROL SULFATE 3 DOSE: .5; 3 SOLUTION RESPIRATORY (INHALATION) at 01:00

## 2024-01-13 RX ADMIN — ATORVASTATIN CALCIUM 20 MG: 20 TABLET, FILM COATED ORAL at 09:24

## 2024-01-13 RX ADMIN — GABAPENTIN 800 MG: 400 CAPSULE ORAL at 11:41

## 2024-01-13 RX ADMIN — METHYLPREDNISOLONE SODIUM SUCCINATE 40 MG: 40 INJECTION, POWDER, LYOPHILIZED, FOR SOLUTION INTRAMUSCULAR; INTRAVENOUS at 13:47

## 2024-01-13 RX ADMIN — IPRATROPIUM BROMIDE AND ALBUTEROL SULFATE 1 DOSE: 2.5; .5 SOLUTION RESPIRATORY (INHALATION) at 20:01

## 2024-01-13 RX ADMIN — METHOCARBAMOL 500 MG: 500 TABLET ORAL at 03:45

## 2024-01-13 RX ADMIN — I-VITE, TAB 1000-60-2MG (60/BT) 1 TABLET: TAB at 11:42

## 2024-01-13 RX ADMIN — FLUTICASONE PROPIONATE 1 SPRAY: 50 SPRAY, METERED NASAL at 09:28

## 2024-01-13 RX ADMIN — METHYLPREDNISOLONE SODIUM SUCCINATE 40 MG: 40 INJECTION, POWDER, LYOPHILIZED, FOR SOLUTION INTRAMUSCULAR; INTRAVENOUS at 21:15

## 2024-01-13 RX ADMIN — PRAMIPEXOLE DIHYDROCHLORIDE 0.25 MG: 0.5 TABLET ORAL at 11:41

## 2024-01-13 RX ADMIN — WATER 10 ML: 1 INJECTION INTRAMUSCULAR; INTRAVENOUS; SUBCUTANEOUS at 09:25

## 2024-01-13 RX ADMIN — METHOCARBAMOL 500 MG: 500 TABLET ORAL at 17:37

## 2024-01-13 RX ADMIN — GABAPENTIN 800 MG: 400 CAPSULE ORAL at 09:24

## 2024-01-13 RX ADMIN — INSULIN LISPRO 6 UNITS: 100 INJECTION, SOLUTION INTRAVENOUS; SUBCUTANEOUS at 09:28

## 2024-01-13 RX ADMIN — Medication 1000 UNITS: at 09:25

## 2024-01-13 RX ADMIN — CARVEDILOL 3.12 MG: 3.12 TABLET, FILM COATED ORAL at 09:24

## 2024-01-13 RX ADMIN — INSULIN LISPRO 8 UNITS: 100 INJECTION, SOLUTION INTRAVENOUS; SUBCUTANEOUS at 11:41

## 2024-01-13 RX ADMIN — PRAMIPEXOLE DIHYDROCHLORIDE 0.25 MG: 0.5 TABLET ORAL at 09:25

## 2024-01-13 RX ADMIN — METHYLPREDNISOLONE SODIUM SUCCINATE 125 MG: 125 INJECTION INTRAMUSCULAR; INTRAVENOUS at 00:50

## 2024-01-13 RX ADMIN — POLYETHYLENE GLYCOL 3350 17 G: 17 POWDER, FOR SOLUTION ORAL at 09:25

## 2024-01-13 RX ADMIN — METHYLPREDNISOLONE SODIUM SUCCINATE 40 MG: 40 INJECTION, POWDER, LYOPHILIZED, FOR SOLUTION INTRAMUSCULAR; INTRAVENOUS at 03:43

## 2024-01-13 RX ADMIN — METHOCARBAMOL 500 MG: 500 TABLET ORAL at 09:25

## 2024-01-13 RX ADMIN — MOMETASONE FUROATE AND FORMOTEROL FUMARATE DIHYDRATE 2 PUFF: 100; 5 AEROSOL RESPIRATORY (INHALATION) at 11:43

## 2024-01-13 RX ADMIN — INSULIN LISPRO 11 UNITS: 100 INJECTION, SOLUTION INTRAVENOUS; SUBCUTANEOUS at 20:23

## 2024-01-13 RX ADMIN — APIXABAN 5 MG: 5 TABLET, FILM COATED ORAL at 21:15

## 2024-01-13 RX ADMIN — FUROSEMIDE 40 MG: 40 TABLET ORAL at 09:24

## 2024-01-13 RX ADMIN — APIXABAN 5 MG: 5 TABLET, FILM COATED ORAL at 09:25

## 2024-01-13 RX ADMIN — LAMOTRIGINE 50 MG: 25 TABLET ORAL at 21:15

## 2024-01-13 RX ADMIN — IPRATROPIUM BROMIDE AND ALBUTEROL SULFATE 1 DOSE: 2.5; .5 SOLUTION RESPIRATORY (INHALATION) at 15:29

## 2024-01-13 RX ADMIN — CARVEDILOL 3.12 MG: 3.12 TABLET, FILM COATED ORAL at 17:37

## 2024-01-13 RX ADMIN — ASPIRIN 81 MG: 81 TABLET, CHEWABLE ORAL at 09:24

## 2024-01-13 RX ADMIN — INSULIN LISPRO 5 UNITS: 100 INJECTION, SOLUTION INTRAVENOUS; SUBCUTANEOUS at 11:41

## 2024-01-13 RX ADMIN — LAMOTRIGINE 25 MG: 25 TABLET ORAL at 09:24

## 2024-01-13 RX ADMIN — GABAPENTIN 800 MG: 400 CAPSULE ORAL at 21:15

## 2024-01-13 RX ADMIN — IPRATROPIUM BROMIDE AND ALBUTEROL SULFATE 1 DOSE: 2.5; .5 SOLUTION RESPIRATORY (INHALATION) at 07:31

## 2024-01-13 RX ADMIN — PRAMIPEXOLE DIHYDROCHLORIDE 0.25 MG: 0.5 TABLET ORAL at 17:37

## 2024-01-13 RX ADMIN — GABAPENTIN 800 MG: 400 CAPSULE ORAL at 17:37

## 2024-01-13 RX ADMIN — INSULIN LISPRO 5 UNITS: 100 INJECTION, SOLUTION INTRAVENOUS; SUBCUTANEOUS at 09:25

## 2024-01-13 RX ADMIN — ROFLUMILAST 500 MCG: 500 TABLET ORAL at 11:42

## 2024-01-13 RX ADMIN — WATER 1000 MG: 1 INJECTION INTRAMUSCULAR; INTRAVENOUS; SUBCUTANEOUS at 03:45

## 2024-01-13 RX ADMIN — IPRATROPIUM BROMIDE AND ALBUTEROL SULFATE 1 DOSE: 2.5; .5 SOLUTION RESPIRATORY (INHALATION) at 11:33

## 2024-01-13 RX ADMIN — FLUTICASONE PROPIONATE 1 SPRAY: 50 SPRAY, METERED NASAL at 21:23

## 2024-01-13 RX ADMIN — AZITHROMYCIN MONOHYDRATE 500 MG: 500 INJECTION, POWDER, LYOPHILIZED, FOR SOLUTION INTRAVENOUS at 03:53

## 2024-01-13 ASSESSMENT — PAIN DESCRIPTION - LOCATION
LOCATION: BACK;FLANK
LOCATION: BACK

## 2024-01-13 ASSESSMENT — PAIN - FUNCTIONAL ASSESSMENT: PAIN_FUNCTIONAL_ASSESSMENT: ACTIVITIES ARE NOT PREVENTED

## 2024-01-13 ASSESSMENT — PAIN DESCRIPTION - FREQUENCY: FREQUENCY: CONTINUOUS

## 2024-01-13 ASSESSMENT — PAIN DESCRIPTION - DESCRIPTORS: DESCRIPTORS: DISCOMFORT

## 2024-01-13 ASSESSMENT — PAIN DESCRIPTION - ORIENTATION: ORIENTATION: RIGHT

## 2024-01-13 ASSESSMENT — PAIN DESCRIPTION - ONSET: ONSET: ON-GOING

## 2024-01-13 ASSESSMENT — PAIN DESCRIPTION - PAIN TYPE: TYPE: NEUROPATHIC PAIN

## 2024-01-13 ASSESSMENT — PAIN SCALES - GENERAL
PAINLEVEL_OUTOF10: 5
PAINLEVEL_OUTOF10: 5

## 2024-01-13 ASSESSMENT — PAIN SCALES - WONG BAKER: WONGBAKER_NUMERICALRESPONSE: 0

## 2024-01-13 NOTE — ED TRIAGE NOTES
Pt reports increased SOB, cough over the last two days. Hx of COPD.   PCP had him on amoxicillin last week d/t SOB.

## 2024-01-13 NOTE — H&P
Hospital Medicine History & Physical      PCP: Gallo Sanchez    Date of Admission: 1/12/2024    Date of Service: Pt seen/examined on 01/13/2024 and Admitted to Inpatient with expected LOS greater than two midnights due to medical therapy.   Chief Complaint: Shortness of breath cough      History Of Present Illness:      78 y.o. male who presented to [facility] with shortness of breath and cough, happening for 2 to 3 days slowly worsening, no obvious alleviating or aggravating factors, in the context of COPD no obvious trigger not associated with nausea or vomiting patient is a former smoker, found to be in dyspnea in ED patient being admitted for further management and treatment    Past Medical History:          Diagnosis Date    Abrasion of right elbow 2/4/2023    Arthritis     CAD (coronary artery disease)     CABG in 2009    Chronic back pain     Chronic systolic CHF (congestive heart failure) (HCC)     COPD (chronic obstructive pulmonary disease) (HCC)     Dental disease     Diabetes mellitus (HCC)     Essential hypertension     Hearing loss     Paroxysmal atrial fibrillation (HCC)     On Coumadin    Tinnitus        Past Surgical History:          Procedure Laterality Date    BACK SURGERY  1995    BACK SURGERY      CARDIAC SURGERY  2007    CERVICAL SPINE SURGERY      CHEST TUBE INSERTION Right 03/14/2023    14F. Dr. Jalloh    CORONARY ARTERY BYPASS GRAFT      FRACTURE SURGERY Left 1988    Shoulder    IR CHEST TUBE INSERTION  3/14/2023    IR CHEST TUBE INSERTION 3/14/2023 [facility]       Medications Prior to Admission:      Prior to Admission medications    Medication Sig Start Date End Date Taking? Authorizing Provider   albuterol (PROVENTIL) (2.5 MG/3ML) 0.083% nebulizer solution USE 1 VIAL IN NEBULIZER 4 TIMES DAILY 12/22/23   Ayleen Munoz, APRN - CNP   fluticasone-salmeterol (ADVAIR DISKUS) 250-50 MCG/ACT AEPB diskus inhaler Inhale 1 puff into the lungs in the morning and 1 puff in the

## 2024-01-13 NOTE — ACP (ADVANCE CARE PLANNING)
Advance Care Planning     Advance Care Planning Inpatient Note  [facility] Department    Today's Date: 1/13/2024  Unit: [facility] 3W ORTHOPEDICS    Received request from HealthCare Provider.  Upon review of chart and communication with care team, patient's decision making abilities are not in question.. Patient was/were present in the room during visit.    Goals of ACP Conversation:  Discuss advance care planning documents    Health Care Decision Makers:       Primary Decision Maker: DishaPortia - Spouse - [phone]    Secondary Decision Maker: Bang Gauthier Jr - Child - [phone]  Summary:  No Decision Maker named by patient at this time    Advance Care Planning Documents (Patient Wishes):  None     Assessment:  Pt stated that he has HCPOA and LW docs completed at home and that he'll ask his spouse to bring them to the hospital.     Interventions:  Provided education on documents for clarity and greater understanding    Care Preferences Communicated:   No    Outcomes/Plan:  ACP Discussion: Postponed  Pt to follow-up    Electronically signed by Chaplain Kirk on 1/13/2024 at 10:03 AM

## 2024-01-13 NOTE — ED PROVIDER NOTES
[facility] EMERGENCY DEPARTMENT  EMERGENCY DEPARTMENT ENCOUNTER        Pt Name: Jorge Gauthier  MRN: [id number]  Birthdate 1945  Date of evaluation: 1/12/2024  Provider: GRAHAM Harris  PCP: Gallo Sanchez  Note Started: 2:34 AM EST 1/13/24      TRES. I have evaluated this patient.        CHIEF COMPLAINT       Chief Complaint   Patient presents with    Shortness of Breath     Pt reports increased SOB, cough over the last two days. Hx of COPD.        HISTORY OF PRESENT ILLNESS: 1 or more Elements     History from : Patient and Family wife    Limitations to history : None    Jorge Gauthier is a 78 y.o. male who presents complaining of shortness of breath.  Wife tells me they have been monitoring his oxygen at home and has been around 92%.  He has been sick for 2 or 3 days.  He was admitted to the hospital around Thanksgiving with pneumonia and COVID.  His oxygen have been low at that time.  He tells me fully recovered but at the end of December he started coughing up some productive phlegm.  He called Dr. Campos his pulmonologist and completed a 10-day course of Augmentin a couple days ago.  The symptoms came \"right back.\"  He is a former smoker.  History of CHF, A-fib on Eliquis and Lasix.  History of diabetes.    Nursing Notes were all reviewed and agreed with or any disagreements were addressed in the HPI.    REVIEW OF SYSTEMS :      Review of Systems   Constitutional:  Negative for fever.   Respiratory:  Positive for shortness of breath and wheezing.    Cardiovascular:  Negative for chest pain and leg swelling.   Skin:  Positive for wound (Has pressure wound).   Psychiatric/Behavioral:  Negative for agitation, behavioral problems and confusion.        Positives and Pertinent negatives as per HPI.     SURGICAL HISTORY     Past Surgical History:   Procedure Laterality Date    BACK SURGERY  1995    BACK SURGERY      CARDIAC SURGERY  2007    CERVICAL SPINE SURGERY      CHEST TUBE

## 2024-01-13 NOTE — ED NOTES
Report given to PETRA Hector. MEERA discussed. All questions answered. RN verbalized understanding.

## 2024-01-14 LAB
ANION GAP SERPL CALCULATED.3IONS-SCNC: 11 MMOL/L (ref 3–16)
BASOPHILS # BLD: 0 K/UL (ref 0–0.2)
BASOPHILS NFR BLD: 0.1 %
BUN SERPL-MCNC: 24 MG/DL (ref 7–20)
CALCIUM SERPL-MCNC: 8.8 MG/DL (ref 8.3–10.6)
CHLORIDE SERPL-SCNC: 100 MMOL/L (ref 99–110)
CO2 SERPL-SCNC: 27 MMOL/L (ref 21–32)
CREAT SERPL-MCNC: 1 MG/DL (ref 0.8–1.3)
DEPRECATED RDW RBC AUTO: 15.3 % (ref 12.4–15.4)
EOSINOPHIL # BLD: 0 K/UL (ref 0–0.6)
EOSINOPHIL NFR BLD: 0 %
GFR SERPLBLD CREATININE-BSD FMLA CKD-EPI: >60 ML/MIN/{1.73_M2}
GLUCOSE BLD-MCNC: 340 MG/DL (ref 70–99)
GLUCOSE BLD-MCNC: 343 MG/DL (ref 70–99)
GLUCOSE BLD-MCNC: 347 MG/DL (ref 70–99)
GLUCOSE BLD-MCNC: 364 MG/DL (ref 70–99)
GLUCOSE SERPL-MCNC: 392 MG/DL (ref 70–99)
HCT VFR BLD AUTO: 37.5 % (ref 40.5–52.5)
HGB BLD-MCNC: 11.9 G/DL (ref 13.5–17.5)
LYMPHOCYTES # BLD: 0.9 K/UL (ref 1–5.1)
LYMPHOCYTES NFR BLD: 6.6 %
MCH RBC QN AUTO: 28.2 PG (ref 26–34)
MCHC RBC AUTO-ENTMCNC: 31.8 G/DL (ref 31–36)
MCV RBC AUTO: 88.6 FL (ref 80–100)
MONOCYTES # BLD: 0.6 K/UL (ref 0–1.3)
MONOCYTES NFR BLD: 4.3 %
NEUTROPHILS # BLD: 12.1 K/UL (ref 1.7–7.7)
NEUTROPHILS NFR BLD: 89 %
PERFORMED ON: ABNORMAL
PLATELET # BLD AUTO: 366 K/UL (ref 135–450)
PMV BLD AUTO: 7.2 FL (ref 5–10.5)
POTASSIUM SERPL-SCNC: 5 MMOL/L (ref 3.5–5.1)
RBC # BLD AUTO: 4.24 M/UL (ref 4.2–5.9)
SODIUM SERPL-SCNC: 138 MMOL/L (ref 136–145)
WBC # BLD AUTO: 13.6 K/UL (ref 4–11)

## 2024-01-14 PROCEDURE — 6370000000 HC RX 637 (ALT 250 FOR IP): Performed by: INTERNAL MEDICINE

## 2024-01-14 PROCEDURE — 2700000000 HC OXYGEN THERAPY PER DAY

## 2024-01-14 PROCEDURE — 85025 COMPLETE CBC W/AUTO DIFF WBC: CPT

## 2024-01-14 PROCEDURE — 94640 AIRWAY INHALATION TREATMENT: CPT

## 2024-01-14 PROCEDURE — 94669 MECHANICAL CHEST WALL OSCILL: CPT

## 2024-01-14 PROCEDURE — 1200000000 HC SEMI PRIVATE

## 2024-01-14 PROCEDURE — 80048 BASIC METABOLIC PNL TOTAL CA: CPT

## 2024-01-14 PROCEDURE — 36415 COLL VENOUS BLD VENIPUNCTURE: CPT

## 2024-01-14 PROCEDURE — 2580000003 HC RX 258: Performed by: INTERNAL MEDICINE

## 2024-01-14 PROCEDURE — 6360000002 HC RX W HCPCS: Performed by: INTERNAL MEDICINE

## 2024-01-14 PROCEDURE — 2580000003 HC RX 258

## 2024-01-14 PROCEDURE — 94761 N-INVAS EAR/PLS OXIMETRY MLT: CPT

## 2024-01-14 RX ORDER — INSULIN GLARGINE 100 [IU]/ML
30 INJECTION, SOLUTION SUBCUTANEOUS NIGHTLY
Status: DISCONTINUED | OUTPATIENT
Start: 2024-01-14 | End: 2024-01-17 | Stop reason: HOSPADM

## 2024-01-14 RX ORDER — INSULIN GLARGINE 100 [IU]/ML
4 INJECTION, SOLUTION SUBCUTANEOUS ONCE
Status: COMPLETED | OUTPATIENT
Start: 2024-01-14 | End: 2024-01-14

## 2024-01-14 RX ORDER — INSULIN GLARGINE 100 [IU]/ML
5 INJECTION, SOLUTION SUBCUTANEOUS ONCE
Status: DISCONTINUED | OUTPATIENT
Start: 2024-01-14 | End: 2024-01-14

## 2024-01-14 RX ORDER — WATER 10 ML/10ML
INJECTION INTRAMUSCULAR; INTRAVENOUS; SUBCUTANEOUS
Status: COMPLETED
Start: 2024-01-14 | End: 2024-01-14

## 2024-01-14 RX ORDER — INSULIN LISPRO 100 [IU]/ML
10 INJECTION, SOLUTION INTRAVENOUS; SUBCUTANEOUS
Status: DISCONTINUED | OUTPATIENT
Start: 2024-01-15 | End: 2024-01-17 | Stop reason: HOSPADM

## 2024-01-14 RX ADMIN — METHYLPREDNISOLONE SODIUM SUCCINATE 40 MG: 40 INJECTION, POWDER, LYOPHILIZED, FOR SOLUTION INTRAMUSCULAR; INTRAVENOUS at 02:31

## 2024-01-14 RX ADMIN — IPRATROPIUM BROMIDE AND ALBUTEROL SULFATE 1 DOSE: 2.5; .5 SOLUTION RESPIRATORY (INHALATION) at 20:05

## 2024-01-14 RX ADMIN — FLUTICASONE PROPIONATE 1 SPRAY: 50 SPRAY, METERED NASAL at 20:49

## 2024-01-14 RX ADMIN — Medication 1000 UNITS: at 08:50

## 2024-01-14 RX ADMIN — INSULIN LISPRO 8 UNITS: 100 INJECTION, SOLUTION INTRAVENOUS; SUBCUTANEOUS at 12:12

## 2024-01-14 RX ADMIN — GABAPENTIN 800 MG: 400 CAPSULE ORAL at 08:53

## 2024-01-14 RX ADMIN — ROFLUMILAST 500 MCG: 500 TABLET ORAL at 08:47

## 2024-01-14 RX ADMIN — INSULIN LISPRO 8 UNITS: 100 INJECTION, SOLUTION INTRAVENOUS; SUBCUTANEOUS at 16:48

## 2024-01-14 RX ADMIN — PRAMIPEXOLE DIHYDROCHLORIDE 0.25 MG: 0.5 TABLET ORAL at 16:48

## 2024-01-14 RX ADMIN — PRAMIPEXOLE DIHYDROCHLORIDE 0.25 MG: 0.5 TABLET ORAL at 08:56

## 2024-01-14 RX ADMIN — INSULIN LISPRO 4 UNITS: 100 INJECTION, SOLUTION INTRAVENOUS; SUBCUTANEOUS at 20:48

## 2024-01-14 RX ADMIN — INSULIN LISPRO 6 UNITS: 100 INJECTION, SOLUTION INTRAVENOUS; SUBCUTANEOUS at 16:48

## 2024-01-14 RX ADMIN — METHYLPREDNISOLONE SODIUM SUCCINATE 40 MG: 40 INJECTION, POWDER, LYOPHILIZED, FOR SOLUTION INTRAMUSCULAR; INTRAVENOUS at 20:49

## 2024-01-14 RX ADMIN — LAMOTRIGINE 25 MG: 25 TABLET ORAL at 08:51

## 2024-01-14 RX ADMIN — METHYLPREDNISOLONE SODIUM SUCCINATE 40 MG: 40 INJECTION, POWDER, LYOPHILIZED, FOR SOLUTION INTRAMUSCULAR; INTRAVENOUS at 08:45

## 2024-01-14 RX ADMIN — LAMOTRIGINE 50 MG: 25 TABLET ORAL at 20:48

## 2024-01-14 RX ADMIN — MOMETASONE FUROATE AND FORMOTEROL FUMARATE DIHYDRATE 2 PUFF: 100; 5 AEROSOL RESPIRATORY (INHALATION) at 20:05

## 2024-01-14 RX ADMIN — CARVEDILOL 3.12 MG: 3.12 TABLET, FILM COATED ORAL at 16:48

## 2024-01-14 RX ADMIN — IPRATROPIUM BROMIDE AND ALBUTEROL SULFATE 1 DOSE: 2.5; .5 SOLUTION RESPIRATORY (INHALATION) at 16:14

## 2024-01-14 RX ADMIN — GABAPENTIN 800 MG: 400 CAPSULE ORAL at 16:48

## 2024-01-14 RX ADMIN — PRAMIPEXOLE DIHYDROCHLORIDE 0.25 MG: 0.5 TABLET ORAL at 20:48

## 2024-01-14 RX ADMIN — I-VITE, TAB 1000-60-2MG (60/BT) 1 TABLET: TAB at 08:56

## 2024-01-14 RX ADMIN — ATORVASTATIN CALCIUM 20 MG: 20 TABLET, FILM COATED ORAL at 08:52

## 2024-01-14 RX ADMIN — IPRATROPIUM BROMIDE AND ALBUTEROL SULFATE 1 DOSE: 2.5; .5 SOLUTION RESPIRATORY (INHALATION) at 12:20

## 2024-01-14 RX ADMIN — INSULIN GLARGINE 30 UNITS: 100 INJECTION, SOLUTION SUBCUTANEOUS at 20:49

## 2024-01-14 RX ADMIN — POLYETHYLENE GLYCOL 3350 17 G: 17 POWDER, FOR SOLUTION ORAL at 08:45

## 2024-01-14 RX ADMIN — MOMETASONE FUROATE AND FORMOTEROL FUMARATE DIHYDRATE 2 PUFF: 100; 5 AEROSOL RESPIRATORY (INHALATION) at 07:55

## 2024-01-14 RX ADMIN — INSULIN LISPRO 8 UNITS: 100 INJECTION, SOLUTION INTRAVENOUS; SUBCUTANEOUS at 09:01

## 2024-01-14 RX ADMIN — ASPIRIN 81 MG: 81 TABLET, CHEWABLE ORAL at 08:57

## 2024-01-14 RX ADMIN — AZITHROMYCIN 500 MG: 500 TABLET, FILM COATED ORAL at 02:31

## 2024-01-14 RX ADMIN — INSULIN GLARGINE 4 UNITS: 100 INJECTION, SOLUTION SUBCUTANEOUS at 17:32

## 2024-01-14 RX ADMIN — PRAMIPEXOLE DIHYDROCHLORIDE 0.25 MG: 0.5 TABLET ORAL at 12:11

## 2024-01-14 RX ADMIN — CARVEDILOL 3.12 MG: 3.12 TABLET, FILM COATED ORAL at 08:57

## 2024-01-14 RX ADMIN — ACETAMINOPHEN 650 MG: 325 TABLET ORAL at 08:49

## 2024-01-14 RX ADMIN — METHYLPREDNISOLONE SODIUM SUCCINATE 40 MG: 40 INJECTION, POWDER, LYOPHILIZED, FOR SOLUTION INTRAMUSCULAR; INTRAVENOUS at 16:48

## 2024-01-14 RX ADMIN — WATER 10 ML: 1 INJECTION INTRAMUSCULAR; INTRAVENOUS; SUBCUTANEOUS at 09:05

## 2024-01-14 RX ADMIN — GABAPENTIN 800 MG: 400 CAPSULE ORAL at 20:48

## 2024-01-14 RX ADMIN — FLUTICASONE PROPIONATE 1 SPRAY: 50 SPRAY, METERED NASAL at 09:01

## 2024-01-14 RX ADMIN — IPRATROPIUM BROMIDE AND ALBUTEROL SULFATE 1 DOSE: 2.5; .5 SOLUTION RESPIRATORY (INHALATION) at 07:56

## 2024-01-14 RX ADMIN — METHOCARBAMOL 500 MG: 500 TABLET ORAL at 02:31

## 2024-01-14 RX ADMIN — APIXABAN 5 MG: 5 TABLET, FILM COATED ORAL at 20:48

## 2024-01-14 RX ADMIN — FUROSEMIDE 40 MG: 40 TABLET ORAL at 08:52

## 2024-01-14 RX ADMIN — APIXABAN 5 MG: 5 TABLET, FILM COATED ORAL at 08:53

## 2024-01-14 RX ADMIN — GABAPENTIN 800 MG: 400 CAPSULE ORAL at 12:11

## 2024-01-14 RX ADMIN — WATER 1000 MG: 1 INJECTION INTRAMUSCULAR; INTRAVENOUS; SUBCUTANEOUS at 02:31

## 2024-01-14 RX ADMIN — INSULIN LISPRO 6 UNITS: 100 INJECTION, SOLUTION INTRAVENOUS; SUBCUTANEOUS at 12:12

## 2024-01-14 ASSESSMENT — PAIN DESCRIPTION - ORIENTATION
ORIENTATION: RIGHT
ORIENTATION: RIGHT

## 2024-01-14 ASSESSMENT — PAIN DESCRIPTION - LOCATION
LOCATION: LEG
LOCATION: LEG

## 2024-01-14 ASSESSMENT — PAIN DESCRIPTION - ONSET: ONSET: ON-GOING

## 2024-01-14 ASSESSMENT — PAIN SCALES - GENERAL
PAINLEVEL_OUTOF10: 4
PAINLEVEL_OUTOF10: 4

## 2024-01-14 ASSESSMENT — PAIN SCALES - WONG BAKER: WONGBAKER_NUMERICALRESPONSE: 0

## 2024-01-15 LAB
ANION GAP SERPL CALCULATED.3IONS-SCNC: 8 MMOL/L (ref 3–16)
BASOPHILS # BLD: 0.1 K/UL (ref 0–0.2)
BASOPHILS NFR BLD: 0.5 %
BUN SERPL-MCNC: 26 MG/DL (ref 7–20)
CALCIUM SERPL-MCNC: 8.5 MG/DL (ref 8.3–10.6)
CHLORIDE SERPL-SCNC: 95 MMOL/L (ref 99–110)
CO2 SERPL-SCNC: 30 MMOL/L (ref 21–32)
CREAT SERPL-MCNC: 0.9 MG/DL (ref 0.8–1.3)
DEPRECATED RDW RBC AUTO: 15 % (ref 12.4–15.4)
EOSINOPHIL # BLD: 0 K/UL (ref 0–0.6)
EOSINOPHIL NFR BLD: 0 %
GFR SERPLBLD CREATININE-BSD FMLA CKD-EPI: >60 ML/MIN/{1.73_M2}
GLUCOSE BLD-MCNC: 253 MG/DL (ref 70–99)
GLUCOSE BLD-MCNC: 296 MG/DL (ref 70–99)
GLUCOSE BLD-MCNC: 335 MG/DL (ref 70–99)
GLUCOSE BLD-MCNC: 365 MG/DL (ref 70–99)
GLUCOSE BLD-MCNC: 406 MG/DL (ref 70–99)
GLUCOSE SERPL-MCNC: 385 MG/DL (ref 70–99)
HCT VFR BLD AUTO: 37.4 % (ref 40.5–52.5)
HGB BLD-MCNC: 12.2 G/DL (ref 13.5–17.5)
LYMPHOCYTES # BLD: 1.3 K/UL (ref 1–5.1)
LYMPHOCYTES NFR BLD: 10.8 %
MCH RBC QN AUTO: 28.6 PG (ref 26–34)
MCHC RBC AUTO-ENTMCNC: 32.5 G/DL (ref 31–36)
MCV RBC AUTO: 88.1 FL (ref 80–100)
MONOCYTES # BLD: 0.6 K/UL (ref 0–1.3)
MONOCYTES NFR BLD: 5.2 %
NEUTROPHILS # BLD: 10.3 K/UL (ref 1.7–7.7)
NEUTROPHILS NFR BLD: 83.5 %
PERFORMED ON: ABNORMAL
PLATELET # BLD AUTO: 372 K/UL (ref 135–450)
PMV BLD AUTO: 7.2 FL (ref 5–10.5)
POTASSIUM SERPL-SCNC: 4.4 MMOL/L (ref 3.5–5.1)
RBC # BLD AUTO: 4.25 M/UL (ref 4.2–5.9)
SODIUM SERPL-SCNC: 133 MMOL/L (ref 136–145)
WBC # BLD AUTO: 12.3 K/UL (ref 4–11)

## 2024-01-15 PROCEDURE — 1200000000 HC SEMI PRIVATE

## 2024-01-15 PROCEDURE — 2580000003 HC RX 258: Performed by: INTERNAL MEDICINE

## 2024-01-15 PROCEDURE — 97113 AQUATIC THERAPY/EXERCISES: CPT

## 2024-01-15 PROCEDURE — 94640 AIRWAY INHALATION TREATMENT: CPT

## 2024-01-15 PROCEDURE — 36415 COLL VENOUS BLD VENIPUNCTURE: CPT

## 2024-01-15 PROCEDURE — 6370000000 HC RX 637 (ALT 250 FOR IP): Performed by: INTERNAL MEDICINE

## 2024-01-15 PROCEDURE — 97530 THERAPEUTIC ACTIVITIES: CPT

## 2024-01-15 PROCEDURE — 97110 THERAPEUTIC EXERCISES: CPT

## 2024-01-15 PROCEDURE — 94760 N-INVAS EAR/PLS OXIMETRY 1: CPT

## 2024-01-15 PROCEDURE — 94669 MECHANICAL CHEST WALL OSCILL: CPT

## 2024-01-15 PROCEDURE — 85025 COMPLETE CBC W/AUTO DIFF WBC: CPT

## 2024-01-15 PROCEDURE — 97535 SELF CARE MNGMENT TRAINING: CPT

## 2024-01-15 PROCEDURE — 6360000002 HC RX W HCPCS: Performed by: INTERNAL MEDICINE

## 2024-01-15 PROCEDURE — 6370000000 HC RX 637 (ALT 250 FOR IP): Performed by: NURSE PRACTITIONER

## 2024-01-15 PROCEDURE — 6370000000 HC RX 637 (ALT 250 FOR IP): Performed by: STUDENT IN AN ORGANIZED HEALTH CARE EDUCATION/TRAINING PROGRAM

## 2024-01-15 PROCEDURE — 80048 BASIC METABOLIC PNL TOTAL CA: CPT

## 2024-01-15 RX ORDER — WATER 10 ML/10ML
1 INJECTION INTRAMUSCULAR; INTRAVENOUS; SUBCUTANEOUS PRN
Status: DISCONTINUED | OUTPATIENT
Start: 2024-01-15 | End: 2024-01-17 | Stop reason: HOSPADM

## 2024-01-15 RX ORDER — INSULIN LISPRO 100 [IU]/ML
6 INJECTION, SOLUTION INTRAVENOUS; SUBCUTANEOUS ONCE
Status: COMPLETED | OUTPATIENT
Start: 2024-01-15 | End: 2024-01-15

## 2024-01-15 RX ORDER — PRAMIPEXOLE DIHYDROCHLORIDE 0.12 MG/1
0.25 TABLET ORAL NIGHTLY
COMMUNITY

## 2024-01-15 RX ADMIN — IPRATROPIUM BROMIDE AND ALBUTEROL SULFATE 1 DOSE: 2.5; .5 SOLUTION RESPIRATORY (INHALATION) at 07:58

## 2024-01-15 RX ADMIN — FLUTICASONE PROPIONATE 1 SPRAY: 50 SPRAY, METERED NASAL at 21:00

## 2024-01-15 RX ADMIN — APIXABAN 5 MG: 5 TABLET, FILM COATED ORAL at 22:15

## 2024-01-15 RX ADMIN — I-VITE, TAB 1000-60-2MG (60/BT) 1 TABLET: TAB at 09:03

## 2024-01-15 RX ADMIN — INSULIN LISPRO 6 UNITS: 100 INJECTION, SOLUTION INTRAVENOUS; SUBCUTANEOUS at 22:22

## 2024-01-15 RX ADMIN — INSULIN LISPRO 10 UNITS: 100 INJECTION, SOLUTION INTRAVENOUS; SUBCUTANEOUS at 12:25

## 2024-01-15 RX ADMIN — PREDNISONE 40 MG: 20 TABLET ORAL at 09:04

## 2024-01-15 RX ADMIN — GABAPENTIN 800 MG: 400 CAPSULE ORAL at 22:15

## 2024-01-15 RX ADMIN — MOMETASONE FUROATE AND FORMOTEROL FUMARATE DIHYDRATE 2 PUFF: 100; 5 AEROSOL RESPIRATORY (INHALATION) at 19:55

## 2024-01-15 RX ADMIN — LAMOTRIGINE 25 MG: 25 TABLET ORAL at 09:04

## 2024-01-15 RX ADMIN — INSULIN LISPRO 4 UNITS: 100 INJECTION, SOLUTION INTRAVENOUS; SUBCUTANEOUS at 12:25

## 2024-01-15 RX ADMIN — FLUTICASONE PROPIONATE 1 SPRAY: 50 SPRAY, METERED NASAL at 09:10

## 2024-01-15 RX ADMIN — INSULIN LISPRO 6 UNITS: 100 INJECTION, SOLUTION INTRAVENOUS; SUBCUTANEOUS at 16:53

## 2024-01-15 RX ADMIN — PRAMIPEXOLE DIHYDROCHLORIDE 0.25 MG: 0.5 TABLET ORAL at 22:15

## 2024-01-15 RX ADMIN — ASPIRIN 81 MG: 81 TABLET, CHEWABLE ORAL at 09:04

## 2024-01-15 RX ADMIN — Medication 1000 UNITS: at 09:03

## 2024-01-15 RX ADMIN — MOMETASONE FUROATE AND FORMOTEROL FUMARATE DIHYDRATE 2 PUFF: 100; 5 AEROSOL RESPIRATORY (INHALATION) at 07:58

## 2024-01-15 RX ADMIN — IPRATROPIUM BROMIDE AND ALBUTEROL SULFATE 1 DOSE: 2.5; .5 SOLUTION RESPIRATORY (INHALATION) at 12:20

## 2024-01-15 RX ADMIN — FUROSEMIDE 40 MG: 40 TABLET ORAL at 09:04

## 2024-01-15 RX ADMIN — GABAPENTIN 800 MG: 400 CAPSULE ORAL at 16:51

## 2024-01-15 RX ADMIN — WATER 1000 MG: 1 INJECTION INTRAMUSCULAR; INTRAVENOUS; SUBCUTANEOUS at 03:08

## 2024-01-15 RX ADMIN — PRAMIPEXOLE DIHYDROCHLORIDE 0.25 MG: 0.5 TABLET ORAL at 09:04

## 2024-01-15 RX ADMIN — APIXABAN 5 MG: 5 TABLET, FILM COATED ORAL at 09:04

## 2024-01-15 RX ADMIN — CARVEDILOL 3.12 MG: 3.12 TABLET, FILM COATED ORAL at 09:04

## 2024-01-15 RX ADMIN — ROFLUMILAST 500 MCG: 500 TABLET ORAL at 09:04

## 2024-01-15 RX ADMIN — INSULIN LISPRO 4 UNITS: 100 INJECTION, SOLUTION INTRAVENOUS; SUBCUTANEOUS at 22:21

## 2024-01-15 RX ADMIN — IPRATROPIUM BROMIDE AND ALBUTEROL SULFATE 1 DOSE: 2.5; .5 SOLUTION RESPIRATORY (INHALATION) at 15:59

## 2024-01-15 RX ADMIN — Medication 1 LOZENGE: at 17:00

## 2024-01-15 RX ADMIN — PRAMIPEXOLE DIHYDROCHLORIDE 0.25 MG: 0.5 TABLET ORAL at 16:52

## 2024-01-15 RX ADMIN — CARVEDILOL 3.12 MG: 3.12 TABLET, FILM COATED ORAL at 16:52

## 2024-01-15 RX ADMIN — INSULIN GLARGINE 30 UNITS: 100 INJECTION, SOLUTION SUBCUTANEOUS at 22:22

## 2024-01-15 RX ADMIN — POLYETHYLENE GLYCOL 3350 17 G: 17 POWDER, FOR SOLUTION ORAL at 09:03

## 2024-01-15 RX ADMIN — IPRATROPIUM BROMIDE AND ALBUTEROL SULFATE 1 DOSE: 2.5; .5 SOLUTION RESPIRATORY (INHALATION) at 19:55

## 2024-01-15 RX ADMIN — LAMOTRIGINE 50 MG: 25 TABLET ORAL at 22:15

## 2024-01-15 RX ADMIN — INSULIN LISPRO 8 UNITS: 100 INJECTION, SOLUTION INTRAVENOUS; SUBCUTANEOUS at 09:10

## 2024-01-15 RX ADMIN — INSULIN LISPRO 10 UNITS: 100 INJECTION, SOLUTION INTRAVENOUS; SUBCUTANEOUS at 16:53

## 2024-01-15 RX ADMIN — ATORVASTATIN CALCIUM 20 MG: 20 TABLET, FILM COATED ORAL at 09:03

## 2024-01-15 RX ADMIN — GABAPENTIN 800 MG: 400 CAPSULE ORAL at 13:24

## 2024-01-15 RX ADMIN — AZITHROMYCIN 500 MG: 500 TABLET, FILM COATED ORAL at 03:08

## 2024-01-15 RX ADMIN — ANORECTAL OINTMENT: 15.7; .44; 24; 20.6 OINTMENT TOPICAL at 22:15

## 2024-01-15 RX ADMIN — GABAPENTIN 800 MG: 400 CAPSULE ORAL at 09:04

## 2024-01-15 RX ADMIN — INSULIN LISPRO 10 UNITS: 100 INJECTION, SOLUTION INTRAVENOUS; SUBCUTANEOUS at 09:10

## 2024-01-15 RX ADMIN — PRAMIPEXOLE DIHYDROCHLORIDE 0.25 MG: 0.5 TABLET ORAL at 13:24

## 2024-01-15 ASSESSMENT — PAIN DESCRIPTION - DESCRIPTORS: DESCRIPTORS: NUMBNESS;TINGLING

## 2024-01-15 ASSESSMENT — PAIN DESCRIPTION - ORIENTATION: ORIENTATION: RIGHT;LEFT

## 2024-01-15 ASSESSMENT — PAIN SCALES - GENERAL
PAINLEVEL_OUTOF10: 4
PAINLEVEL_OUTOF10: 0

## 2024-01-15 ASSESSMENT — PAIN DESCRIPTION - LOCATION: LOCATION: LEG

## 2024-01-15 ASSESSMENT — PAIN DESCRIPTION - ONSET: ONSET: ON-GOING

## 2024-01-15 ASSESSMENT — PAIN DESCRIPTION - PAIN TYPE: TYPE: NEUROPATHIC PAIN

## 2024-01-15 ASSESSMENT — PAIN - FUNCTIONAL ASSESSMENT: PAIN_FUNCTIONAL_ASSESSMENT: ACTIVITIES ARE NOT PREVENTED

## 2024-01-15 ASSESSMENT — PAIN DESCRIPTION - FREQUENCY: FREQUENCY: CONTINUOUS

## 2024-01-15 NOTE — ACP (ADVANCE CARE PLANNING)
Advance Care Planning     Advance Care Planning Activator (Inpatient)  Conversation Note      Date of ACP Conversation: 1/15/2024     Conversation Conducted with: Patient with Decision Making Capacity    ACP Activator: Supriya Ibarra        Health Care Decision Maker:     Current Designated Health Care Decision Maker:     Primary Decision Maker: Portia Gauthier - Spouse - [phone]    Secondary Decision Maker: Bang Gauthier Jr - Child - [phone]  Click here to complete Healthcare Decision Makers including section of the Healthcare Decision Maker Relationship (ie \"Primary\")      Care Preferences    Ventilation:  \"If you were in your present state of health and suddenly became very ill and were unable to breathe on your own, what would your preference be about the use of a ventilator (breathing machine) if it were available to you?\"      Would the patient desire the use of ventilator (breathing machine)?: no    \"If your health worsens and it becomes clear that your chance of recovery is unlikely, what would your preference be about the use of a ventilator (breathing machine) if it were available to you?\"     Would the patient desire the use of ventilator (breathing machine)?: No      Resuscitation  \"CPR works best to restart the heart when there is a sudden event, like a heart attack, in someone who is otherwise healthy. Unfortunately, CPR does not typically restart the heart for people who have serious health conditions or who are very sick.\"    \"In the event your heart stopped as a result of an underlying serious health condition, would you want attempts to be made to restart your heart (answer \"yes\" for attempt to resuscitate) or would you prefer a natural death (answer \"no\" for do not attempt to resuscitate)?\" unsure       [] Yes   [] No   Educated Patient / Decision Maker regarding differences between Advance Directives and portable DNR orders.    Length of ACP Conversation in minutes:      Conversation

## 2024-01-16 LAB
ANION GAP SERPL CALCULATED.3IONS-SCNC: 11 MMOL/L (ref 3–16)
BASOPHILS # BLD: 0.1 K/UL (ref 0–0.2)
BASOPHILS NFR BLD: 0.5 %
BUN SERPL-MCNC: 27 MG/DL (ref 7–20)
CALCIUM SERPL-MCNC: 8.9 MG/DL (ref 8.3–10.6)
CHLORIDE SERPL-SCNC: 98 MMOL/L (ref 99–110)
CO2 SERPL-SCNC: 31 MMOL/L (ref 21–32)
CREAT SERPL-MCNC: 0.9 MG/DL (ref 0.8–1.3)
DEPRECATED RDW RBC AUTO: 15 % (ref 12.4–15.4)
EOSINOPHIL # BLD: 0 K/UL (ref 0–0.6)
EOSINOPHIL NFR BLD: 0.2 %
GFR SERPLBLD CREATININE-BSD FMLA CKD-EPI: >60 ML/MIN/{1.73_M2}
GLUCOSE BLD-MCNC: 250 MG/DL (ref 70–99)
GLUCOSE BLD-MCNC: 251 MG/DL (ref 70–99)
GLUCOSE BLD-MCNC: 284 MG/DL (ref 70–99)
GLUCOSE BLD-MCNC: 498 MG/DL (ref 70–99)
GLUCOSE SERPL-MCNC: 218 MG/DL (ref 70–99)
HCT VFR BLD AUTO: 40 % (ref 40.5–52.5)
HGB BLD-MCNC: 13.4 G/DL (ref 13.5–17.5)
LYMPHOCYTES # BLD: 2.4 K/UL (ref 1–5.1)
LYMPHOCYTES NFR BLD: 18.7 %
MAGNESIUM SERPL-MCNC: 2.2 MG/DL (ref 1.8–2.4)
MCH RBC QN AUTO: 28.9 PG (ref 26–34)
MCHC RBC AUTO-ENTMCNC: 33.4 G/DL (ref 31–36)
MCV RBC AUTO: 86.7 FL (ref 80–100)
MONOCYTES # BLD: 1.4 K/UL (ref 0–1.3)
MONOCYTES NFR BLD: 11 %
NEUTROPHILS # BLD: 9 K/UL (ref 1.7–7.7)
NEUTROPHILS NFR BLD: 69.6 %
PERFORMED ON: ABNORMAL
PLATELET # BLD AUTO: 369 K/UL (ref 135–450)
PMV BLD AUTO: 7.2 FL (ref 5–10.5)
POTASSIUM SERPL-SCNC: 4.3 MMOL/L (ref 3.5–5.1)
RBC # BLD AUTO: 4.62 M/UL (ref 4.2–5.9)
SODIUM SERPL-SCNC: 140 MMOL/L (ref 136–145)
WBC # BLD AUTO: 13 K/UL (ref 4–11)

## 2024-01-16 PROCEDURE — 6370000000 HC RX 637 (ALT 250 FOR IP): Performed by: INTERNAL MEDICINE

## 2024-01-16 PROCEDURE — 94760 N-INVAS EAR/PLS OXIMETRY 1: CPT

## 2024-01-16 PROCEDURE — 83735 ASSAY OF MAGNESIUM: CPT

## 2024-01-16 PROCEDURE — 94669 MECHANICAL CHEST WALL OSCILL: CPT

## 2024-01-16 PROCEDURE — 6370000000 HC RX 637 (ALT 250 FOR IP): Performed by: STUDENT IN AN ORGANIZED HEALTH CARE EDUCATION/TRAINING PROGRAM

## 2024-01-16 PROCEDURE — 85025 COMPLETE CBC W/AUTO DIFF WBC: CPT

## 2024-01-16 PROCEDURE — 36415 COLL VENOUS BLD VENIPUNCTURE: CPT

## 2024-01-16 PROCEDURE — 6370000000 HC RX 637 (ALT 250 FOR IP): Performed by: NURSE PRACTITIONER

## 2024-01-16 PROCEDURE — 6360000002 HC RX W HCPCS: Performed by: INTERNAL MEDICINE

## 2024-01-16 PROCEDURE — 80048 BASIC METABOLIC PNL TOTAL CA: CPT

## 2024-01-16 PROCEDURE — 97110 THERAPEUTIC EXERCISES: CPT

## 2024-01-16 PROCEDURE — 94640 AIRWAY INHALATION TREATMENT: CPT

## 2024-01-16 PROCEDURE — 97116 GAIT TRAINING THERAPY: CPT

## 2024-01-16 PROCEDURE — 2580000003 HC RX 258: Performed by: INTERNAL MEDICINE

## 2024-01-16 PROCEDURE — 1200000000 HC SEMI PRIVATE

## 2024-01-16 PROCEDURE — 97530 THERAPEUTIC ACTIVITIES: CPT

## 2024-01-16 RX ORDER — INSULIN LISPRO 100 [IU]/ML
0-16 INJECTION, SOLUTION INTRAVENOUS; SUBCUTANEOUS
Status: DISCONTINUED | OUTPATIENT
Start: 2024-01-17 | End: 2024-01-17 | Stop reason: HOSPADM

## 2024-01-16 RX ORDER — INSULIN LISPRO 100 [IU]/ML
10 INJECTION, SOLUTION INTRAVENOUS; SUBCUTANEOUS ONCE
Status: COMPLETED | OUTPATIENT
Start: 2024-01-16 | End: 2024-01-16

## 2024-01-16 RX ORDER — INSULIN LISPRO 100 [IU]/ML
0-4 INJECTION, SOLUTION INTRAVENOUS; SUBCUTANEOUS NIGHTLY
Status: DISCONTINUED | OUTPATIENT
Start: 2024-01-16 | End: 2024-01-17 | Stop reason: HOSPADM

## 2024-01-16 RX ORDER — FUROSEMIDE 10 MG/ML
20 INJECTION INTRAMUSCULAR; INTRAVENOUS ONCE
Status: COMPLETED | OUTPATIENT
Start: 2024-01-16 | End: 2024-01-16

## 2024-01-16 RX ADMIN — INSULIN GLARGINE 30 UNITS: 100 INJECTION, SOLUTION SUBCUTANEOUS at 20:42

## 2024-01-16 RX ADMIN — PRAMIPEXOLE DIHYDROCHLORIDE 0.25 MG: 0.5 TABLET ORAL at 08:11

## 2024-01-16 RX ADMIN — Medication 1 LOZENGE: at 08:26

## 2024-01-16 RX ADMIN — AZITHROMYCIN 500 MG: 500 TABLET, FILM COATED ORAL at 02:13

## 2024-01-16 RX ADMIN — GABAPENTIN 800 MG: 400 CAPSULE ORAL at 17:19

## 2024-01-16 RX ADMIN — INSULIN LISPRO 10 UNITS: 100 INJECTION, SOLUTION INTRAVENOUS; SUBCUTANEOUS at 11:32

## 2024-01-16 RX ADMIN — INSULIN LISPRO 4 UNITS: 100 INJECTION, SOLUTION INTRAVENOUS; SUBCUTANEOUS at 17:22

## 2024-01-16 RX ADMIN — INSULIN LISPRO 2 UNITS: 100 INJECTION, SOLUTION INTRAVENOUS; SUBCUTANEOUS at 08:14

## 2024-01-16 RX ADMIN — PRAMIPEXOLE DIHYDROCHLORIDE 0.25 MG: 0.5 TABLET ORAL at 20:43

## 2024-01-16 RX ADMIN — PREDNISONE 40 MG: 20 TABLET ORAL at 08:12

## 2024-01-16 RX ADMIN — ASPIRIN 81 MG: 81 TABLET, CHEWABLE ORAL at 08:12

## 2024-01-16 RX ADMIN — PRAMIPEXOLE DIHYDROCHLORIDE 0.25 MG: 0.5 TABLET ORAL at 13:22

## 2024-01-16 RX ADMIN — APIXABAN 5 MG: 5 TABLET, FILM COATED ORAL at 08:12

## 2024-01-16 RX ADMIN — IPRATROPIUM BROMIDE AND ALBUTEROL SULFATE 1 DOSE: 2.5; .5 SOLUTION RESPIRATORY (INHALATION) at 07:56

## 2024-01-16 RX ADMIN — GABAPENTIN 800 MG: 400 CAPSULE ORAL at 20:43

## 2024-01-16 RX ADMIN — WATER 1000 MG: 1 INJECTION INTRAMUSCULAR; INTRAVENOUS; SUBCUTANEOUS at 02:13

## 2024-01-16 RX ADMIN — LAMOTRIGINE 25 MG: 25 TABLET ORAL at 08:13

## 2024-01-16 RX ADMIN — APIXABAN 5 MG: 5 TABLET, FILM COATED ORAL at 20:43

## 2024-01-16 RX ADMIN — FUROSEMIDE 40 MG: 40 TABLET ORAL at 08:13

## 2024-01-16 RX ADMIN — FUROSEMIDE 20 MG: 10 INJECTION, SOLUTION INTRAMUSCULAR; INTRAVENOUS at 11:31

## 2024-01-16 RX ADMIN — INSULIN LISPRO 4 UNITS: 100 INJECTION, SOLUTION INTRAVENOUS; SUBCUTANEOUS at 11:32

## 2024-01-16 RX ADMIN — FLUTICASONE PROPIONATE 1 SPRAY: 50 SPRAY, METERED NASAL at 20:44

## 2024-01-16 RX ADMIN — Medication 1 LOZENGE: at 16:12

## 2024-01-16 RX ADMIN — MOMETASONE FUROATE AND FORMOTEROL FUMARATE DIHYDRATE 2 PUFF: 100; 5 AEROSOL RESPIRATORY (INHALATION) at 21:34

## 2024-01-16 RX ADMIN — POLYETHYLENE GLYCOL 3350 17 G: 17 POWDER, FOR SOLUTION ORAL at 08:10

## 2024-01-16 RX ADMIN — Medication 1000 UNITS: at 08:11

## 2024-01-16 RX ADMIN — MOMETASONE FUROATE AND FORMOTEROL FUMARATE DIHYDRATE 2 PUFF: 100; 5 AEROSOL RESPIRATORY (INHALATION) at 07:56

## 2024-01-16 RX ADMIN — GABAPENTIN 800 MG: 400 CAPSULE ORAL at 08:11

## 2024-01-16 RX ADMIN — LAMOTRIGINE 50 MG: 25 TABLET ORAL at 20:43

## 2024-01-16 RX ADMIN — IPRATROPIUM BROMIDE AND ALBUTEROL SULFATE 1 DOSE: 2.5; .5 SOLUTION RESPIRATORY (INHALATION) at 11:59

## 2024-01-16 RX ADMIN — IPRATROPIUM BROMIDE AND ALBUTEROL SULFATE 1 DOSE: 2.5; .5 SOLUTION RESPIRATORY (INHALATION) at 16:03

## 2024-01-16 RX ADMIN — I-VITE, TAB 1000-60-2MG (60/BT) 1 TABLET: TAB at 08:12

## 2024-01-16 RX ADMIN — INSULIN LISPRO 10 UNITS: 100 INJECTION, SOLUTION INTRAVENOUS; SUBCUTANEOUS at 08:14

## 2024-01-16 RX ADMIN — ANORECTAL OINTMENT: 15.7; .44; 24; 20.6 OINTMENT TOPICAL at 08:12

## 2024-01-16 RX ADMIN — ATORVASTATIN CALCIUM 20 MG: 20 TABLET, FILM COATED ORAL at 08:13

## 2024-01-16 RX ADMIN — ROFLUMILAST 500 MCG: 500 TABLET ORAL at 08:10

## 2024-01-16 RX ADMIN — INSULIN LISPRO 10 UNITS: 100 INJECTION, SOLUTION INTRAVENOUS; SUBCUTANEOUS at 17:22

## 2024-01-16 RX ADMIN — IPRATROPIUM BROMIDE AND ALBUTEROL SULFATE 1 DOSE: 2.5; .5 SOLUTION RESPIRATORY (INHALATION) at 21:33

## 2024-01-16 RX ADMIN — GABAPENTIN 800 MG: 400 CAPSULE ORAL at 13:22

## 2024-01-16 RX ADMIN — PRAMIPEXOLE DIHYDROCHLORIDE 0.25 MG: 0.5 TABLET ORAL at 17:19

## 2024-01-16 RX ADMIN — INSULIN LISPRO 4 UNITS: 100 INJECTION, SOLUTION INTRAVENOUS; SUBCUTANEOUS at 20:42

## 2024-01-16 RX ADMIN — CARVEDILOL 3.12 MG: 3.12 TABLET, FILM COATED ORAL at 17:19

## 2024-01-16 RX ADMIN — INSULIN LISPRO 10 UNITS: 100 INJECTION, SOLUTION INTRAVENOUS; SUBCUTANEOUS at 20:42

## 2024-01-16 RX ADMIN — CARVEDILOL 3.12 MG: 3.12 TABLET, FILM COATED ORAL at 08:12

## 2024-01-16 RX ADMIN — ANORECTAL OINTMENT: 15.7; .44; 24; 20.6 OINTMENT TOPICAL at 20:43

## 2024-01-16 RX ADMIN — FLUTICASONE PROPIONATE 1 SPRAY: 50 SPRAY, METERED NASAL at 08:14

## 2024-01-16 ASSESSMENT — PAIN SCALES - GENERAL
PAINLEVEL_OUTOF10: 0
PAINLEVEL_OUTOF10: 0

## 2024-01-17 VITALS
DIASTOLIC BLOOD PRESSURE: 74 MMHG | HEIGHT: 71 IN | SYSTOLIC BLOOD PRESSURE: 145 MMHG | WEIGHT: 224.43 LBS | BODY MASS INDEX: 31.42 KG/M2 | RESPIRATION RATE: 18 BRPM | TEMPERATURE: 97.4 F | OXYGEN SATURATION: 94 % | HEART RATE: 83 BPM

## 2024-01-17 LAB
BACTERIA BLD CULT ORG #2: NORMAL
BACTERIA BLD CULT: NORMAL
GLUCOSE BLD-MCNC: 242 MG/DL (ref 70–99)
GLUCOSE BLD-MCNC: 311 MG/DL (ref 70–99)
PERFORMED ON: ABNORMAL
PERFORMED ON: ABNORMAL

## 2024-01-17 PROCEDURE — 6360000002 HC RX W HCPCS: Performed by: INTERNAL MEDICINE

## 2024-01-17 PROCEDURE — 6370000000 HC RX 637 (ALT 250 FOR IP): Performed by: NURSE PRACTITIONER

## 2024-01-17 PROCEDURE — 94640 AIRWAY INHALATION TREATMENT: CPT

## 2024-01-17 PROCEDURE — 6370000000 HC RX 637 (ALT 250 FOR IP): Performed by: INTERNAL MEDICINE

## 2024-01-17 PROCEDURE — 94760 N-INVAS EAR/PLS OXIMETRY 1: CPT

## 2024-01-17 PROCEDURE — 2580000003 HC RX 258: Performed by: INTERNAL MEDICINE

## 2024-01-17 PROCEDURE — 94669 MECHANICAL CHEST WALL OSCILL: CPT

## 2024-01-17 RX ORDER — CEFDINIR 300 MG/1
300 CAPSULE ORAL 2 TIMES DAILY
Qty: 4 CAPSULE | Refills: 0 | Status: SHIPPED | OUTPATIENT
Start: 2024-01-17 | End: 2024-01-17

## 2024-01-17 RX ORDER — CEFDINIR 300 MG/1
300 CAPSULE ORAL 2 TIMES DAILY
Qty: 4 CAPSULE | Refills: 0 | Status: SHIPPED | OUTPATIENT
Start: 2024-01-17 | End: 2024-01-19

## 2024-01-17 RX ADMIN — ROFLUMILAST 500 MCG: 500 TABLET ORAL at 08:19

## 2024-01-17 RX ADMIN — GABAPENTIN 800 MG: 400 CAPSULE ORAL at 12:34

## 2024-01-17 RX ADMIN — PREDNISONE 40 MG: 20 TABLET ORAL at 08:19

## 2024-01-17 RX ADMIN — IPRATROPIUM BROMIDE AND ALBUTEROL SULFATE 1 DOSE: 2.5; .5 SOLUTION RESPIRATORY (INHALATION) at 12:52

## 2024-01-17 RX ADMIN — I-VITE, TAB 1000-60-2MG (60/BT) 1 TABLET: TAB at 08:19

## 2024-01-17 RX ADMIN — CARVEDILOL 3.12 MG: 3.12 TABLET, FILM COATED ORAL at 08:19

## 2024-01-17 RX ADMIN — LAMOTRIGINE 25 MG: 25 TABLET ORAL at 08:19

## 2024-01-17 RX ADMIN — PRAMIPEXOLE DIHYDROCHLORIDE 0.25 MG: 0.5 TABLET ORAL at 12:34

## 2024-01-17 RX ADMIN — POLYETHYLENE GLYCOL 3350 17 G: 17 POWDER, FOR SOLUTION ORAL at 08:19

## 2024-01-17 RX ADMIN — INSULIN LISPRO 10 UNITS: 100 INJECTION, SOLUTION INTRAVENOUS; SUBCUTANEOUS at 08:20

## 2024-01-17 RX ADMIN — ASPIRIN 81 MG: 81 TABLET, CHEWABLE ORAL at 08:19

## 2024-01-17 RX ADMIN — MOMETASONE FUROATE AND FORMOTEROL FUMARATE DIHYDRATE 2 PUFF: 100; 5 AEROSOL RESPIRATORY (INHALATION) at 08:13

## 2024-01-17 RX ADMIN — PRAMIPEXOLE DIHYDROCHLORIDE 0.25 MG: 0.5 TABLET ORAL at 08:19

## 2024-01-17 RX ADMIN — INSULIN LISPRO 10 UNITS: 100 INJECTION, SOLUTION INTRAVENOUS; SUBCUTANEOUS at 12:35

## 2024-01-17 RX ADMIN — GABAPENTIN 800 MG: 400 CAPSULE ORAL at 08:19

## 2024-01-17 RX ADMIN — INSULIN LISPRO 12 UNITS: 100 INJECTION, SOLUTION INTRAVENOUS; SUBCUTANEOUS at 12:35

## 2024-01-17 RX ADMIN — IPRATROPIUM BROMIDE AND ALBUTEROL SULFATE 1 DOSE: 2.5; .5 SOLUTION RESPIRATORY (INHALATION) at 08:13

## 2024-01-17 RX ADMIN — INSULIN LISPRO 4 UNITS: 100 INJECTION, SOLUTION INTRAVENOUS; SUBCUTANEOUS at 08:20

## 2024-01-17 RX ADMIN — Medication 1000 UNITS: at 08:19

## 2024-01-17 RX ADMIN — WATER 1000 MG: 1 INJECTION INTRAMUSCULAR; INTRAVENOUS; SUBCUTANEOUS at 04:00

## 2024-01-17 RX ADMIN — ATORVASTATIN CALCIUM 20 MG: 20 TABLET, FILM COATED ORAL at 08:19

## 2024-01-17 RX ADMIN — FUROSEMIDE 40 MG: 40 TABLET ORAL at 08:19

## 2024-01-17 RX ADMIN — APIXABAN 5 MG: 5 TABLET, FILM COATED ORAL at 08:19

## 2024-01-17 NOTE — DISCHARGE INSTR - COC
General    Routes of Feeding: Oral  Liquids: Nectar Thick Liquids  Daily Fluid Restriction: no  Last Modified Barium Swallow with Video (Video Swallowing Test): not done    Treatments at the Time of Hospital Discharge:   Respiratory Treatments: none  Oxygen Therapy:  is not on home oxygen therapy.  Ventilator:    - No ventilator support    Rehab Therapies: Physical Therapy and Occupational Therapy  Weight Bearing Status/Restrictions: No weight bearing restrictions  Other Medical Equipment (for information only, NOT a DME order):  walker  Other Treatments: none    Patient's personal belongings (please select all that are sent with patient):  clothing    RN SIGNATURE:  Electronically signed by Anderson Reyes RN on 1/17/24 at 11:08 AM EST    CASE MANAGEMENT/SOCIAL WORK SECTION    Inpatient Status Date: ***    Readmission Risk Assessment Score:  Readmission Risk              Risk of Unplanned Readmission:  29           Discharging to Facility/ Agency   Name:     [facility]  Address:  [address] [place] [address]  Phone:     [phone]  Fax:         [phone]      Dialysis Facility (if applicable)   Name:  Address:  Dialysis Schedule:  Phone:  Fax:    / signature: {Esignature:[id number]}    PHYSICIAN SECTION    Prognosis: Good    Condition at Discharge: Stable    Rehab Potential (if transferring to Rehab): Good    Recommended Labs or Other Treatments After Discharge: PT, OT, skilled nursing, follow-up with PCP in 3 days follow-up with cardiology Dr. Rowe in 1 week    Physician Certification: I certify the above information and transfer of Jorge Gauthier  is necessary for the continuing treatment of the diagnosis listed and that he requires Home Care for greater 30 days.     Update Admission H&P: No change in H&P    PHYSICIAN SIGNATURE:  Electronically signed by J Carlos Huston MD on 1/17/24 at 11:27 AM EST

## 2024-01-17 NOTE — DISCHARGE SUMMARY
Hospital Medicine Discharge Summary    Patient ID: Jorge Gauthier      Patient's PCP: Gallo Sanchez    Admit Date: 1/12/2024     Discharge Date:   01/17/2024    Admitting Provider: Alvin Santoyo MD     Discharge Provider: J Carlos Huston MD     Discharge Diagnoses:       Active Hospital Problems    Diagnosis     COPD exacerbation (HCC) [J44.1]        The patient was seen and examined on day of discharge and this discharge summary is in conjunction with any daily progress note from day of discharge.    Hospital Course:     From HPI:\"78 y.o. male who presented to [facility] with shortness of breath and cough, happening for 2 to 3 days slowly worsening, no obvious alleviating or aggravating factors, in the context of COPD no obvious trigger not associated with nausea or vomiting patient is a former smoker, found to be in dyspnea in ED patient being admitted for further management and treatment \"    Acute likely on chronic respiratory failure with hypoxia presented with saturation less than 88% with dyspnea needing supplemental oxygen to keep saturation above 90%, due to COPD exacerbation, DuoNeb, completed steroids, keep on 2 more days of antibiotics on discharge  Sepsis, with tachycardia and tachypnea on admission, due to COPD exacerbation, IV antibiotics steroids, as above, changed antibiotics to Omnicef for 2 more days  COPD exacerbation, IV methylprednisolone followed by prednisone, DuoNeb, received IV antibiotics 2 more days of Omnicef as outpatient  Elevated lactic acid due to metformin and breathing therapy,improved.   Diabetes mellitus resume home regimen diabetic diet  Atrial fibrillation on oral anticoagulation  Abnormal EKG with minimally elevated troponin and history of CAD no chest pain, troponin was elevated due to COPD follow-up as outpatient      Physical Exam Performed:     BP (!) 145/74   Pulse 83   Temp 97.4 °F (36.3 °C) (Oral)   Resp 18   Ht 1.791 m (5' 10.5\")   Wt 101.8 kg (224

## 2024-01-17 NOTE — CARE COORDINATION
01/17/24 1107   IMM Letter   IMM Letter given to Patient/Family/Significant other/Guardian/POA/by: 2nd IMM letter explained to pt and copy provided per JALYN Ibarra RN   IMM Letter date given: 01/17/24   IMM Letter time given: 1107     Education provided to patient. Patient reported no questions and verbalized understanding. Patient made aware that he/she has 4 hours prior to discharging from hospital to decide if he/she wishes to pursue the Medicare appeal process.      #[phone]  
[facility]    DC order noted, all docs needed have been faxed to [facility] for home care services.    Home care to see patient by 1/19/24    Raphael Rodrigez RN, BSN CTN  [facility] [phone]   
DISCHARGE SUMMARY     DATE OF DISCHARGE: 1/17/24    DISCHARGE DESTINATION: home    HOME CARE: Yes    Agency Name: [facility]  Discharging to Facility/ Agency   Name:  [facility]    Address: [address] [place] [address]  Phone: [phone]  Fax: [phone]     Notified: RN, Family, and Facility/Agency    TRANSPORTATION: Private Car    NEW DME ORDERED: N/A    Electronically signed by Supriya Ibarra on 1/17/2024 at 11:11 AM  #[phone]  
[facility]  Patient is active with [facility] for nurse.   Will follow for discharge to home with orders to resume care.      Raphael Rodrigez RN, BSN CTN  [facility] [phone]      
House  Plan for transportation at discharge:  car    Financial    Payor: MEDICARE / Plan: MEDICARE PART A AND B / Product Type: *No Product type* /     Does insurance require precert for SNF: n/a    Potential assistance Purchasing Medications: (P) No  Meds-to-Beds request:        [id number] - [place], [place] - [address] - P [phone] - F [phone] [address] [address]  Phone: [phone] Fax: [phone]    [facility] - [place], [place] - [address] - P [phone] - F [phone]  [address]  [address]  [facility] [address]  Phone: [phone] Fax: [phone]      Notes:    Factors facilitating achievement of predicted outcomes: Family support, Motivated, Cooperative, Pleasant, and Has needed Durable Medical Equipment at home    Barriers to discharge: none    Additional Case Management Notes: met with patient at bedside to discuss DC needs  Patient plans to return home with his wife  He states he is currently active with VN w/ [facility]  Confirmed services w/ [facility] # #[phone]- also informed them that patient will also need PT/OT in addition to his home VN services  They will follow for DC orders  Pt denies any other needs at this time    The Plan for Transition of Care is related to the following treatment goals of Hypoxia [R09.02]  Productive cough [R05.8]  COPD exacerbation (HCC) [J44.1]         Supriya Ibarra  Case Management Department  Ph: [phone] Fax: [phone]    
01/15/24 1458   Wound Etiology Other 01/15/24 1458   Dressing Status Clean;Dry;Intact 01/15/24 1458   Wound Length (cm) 0.5 cm 01/15/24 1458   Wound Width (cm) 0.5 cm 01/15/24 1458   Wound Depth (cm) 0 cm 01/15/24 1458   Wound Surface Area (cm^2) 0.25 cm^2 01/15/24 1458   Wound Volume (cm^3) 0 cm^3 01/15/24 1458   Wound Assessment Dry 01/15/24 1458   Drainage Amount None (dry) 01/15/24 1458   Odor None 01/15/24 1458   Berkley-wound Assessment Blanchable erythema 01/15/24 1458   Margins Defined edges 01/15/24 1458   Number of days: 52        Pt seen. Scabbed area noted to right buttock. Periwound pink to light purple, but blanches. Suspect may have some fungal etiology as pt tells me it is getting better with antifungal powder.     Response to treatment:  Well tolerated by patient.     Plan   Plan of Care:    -Calmoseptine to buttocks  -elevate heels    Specialty Bed Required : Yes   [x] Low Air Loss   [] Pressure Redistribution  [] Fluid Immersion  [] Bariatric  [] Total Pressure Relief  [] Other:     Current Diet: ADULT DIET; Regular; Moderately Thick (Honey)  Dietician consult:  No    Discharge Plan:  Placement for patient upon discharge: home with support    Patient appropriate for Outpatient Wound Care Center: No    Referrals:  [x]   [] Home Health Care  [] Supplies  [] Other    Patient/Caregiver Teaching:  Level of patient/caregiver understanding able to:   [] Indicates understanding       [] Needs reinforcement  [] Unsuccessful      [x] Verbal Understanding  [] Demonstrated understanding       [] No evidence of learning  [] Refused teaching         [] N/A       Electronically signed by TOMMY Walton on 1/15/2024 at 3:00 PM

## 2024-01-17 NOTE — PROGRESS NOTES
V2.0    [facility] [place] Progress Note      Name:  Jorge Gauthier /Age/Sex: 1945  (78 y.o. male)   MRN & CSN:  [id number] & [id number] Encounter Date/Time: 1/15/2024 6:26 AM EST   Location:  [id number]-6120/3102-01 PCP: Gallo Sanchez     Attending:Deandra Solis MD       Hospital Day: 4    Assessment and Recommendations   Jorge Gauthier is a 78 y.o. male who presents with COPD exacerbation (HCC)    78-year-old male admitted to the hospital with worsening shortness of breath diagnosed with COPD.  Plan:     Acute likely on chronic respiratory failure with hypoxia in the setting of suspected COPD exacerbation presented with saturation less than 88% with dyspnea needing supplemental oxygen to keep saturation above 90% currently on 2 L, due to COPD exacerbation, IV steroids, DuoNeb, telemetry monitoring.  Gradually getting better still does not feel well enough needs 2 more days of steroids and antibiotics prior to discharge plan  Sepsis, with tachycardia and tachypnea on admission, due to COPD exacerbation, IV antibiotics steroids, as above  COPD exacerbation, IV methylprednisolone followed by prednisone, DuoNeb, ceftriaxone and azithromycin for now  Elevated lactic acid due to metformin and breathing therapy, improving/stable  Diabetes mellitus sliding scale uncontrolled due to steroids add preprandial dose  Atrial fibrillation on oral anticoagulation  Generalized weakness PT OT on board recommending home health care on discharge  Abnormal EKG with minimally elevated troponin and history of CAD repeat troponin    Diet ADULT DIET; Regular; Moderately Thick (Honey)   DVT Prophylaxis [] Lovenox, []  Heparin, [] SCDs, [] Ambulation,  [] Eliquis, [] Xarelto  [] Coumadin   Code Status Full Code    At discharge likely home health care    Staying for 2 more days at least for IV steroids and antibiotics     Personally reviewed Lab Studies and Imaging     Discussed management of the case with nursing staff  Telemetry strip reviewed 
    V2.0    [facility] [place] Progress Note      Name:  Jorge Gauthier /Age/Sex: 1945  (78 y.o. male)   MRN & CSN:  [id number] & [id number] Encounter Date/Time: 2024 6:46 AM EST   Location:  [id number]-1788/3102-01 PCP: Gallo Sanchez     Attending:J Carlos Huston MD       Hospital Day: 5    Assessment and Recommendations   Jorge Gauthier is a 78 y.o. male who presents with COPD exacerbation (HCC)    Acute likely on chronic respiratory failure with hypoxia presented with saturation less than 88% with dyspnea needing supplemental oxygen to keep saturation above 90%, due to COPD exacerbation, IV steroids, DuoNeb, telemetry monitoring.  Sepsis, with tachycardia and tachypnea on admission, due to COPD exacerbation, IV antibiotics steroids, as above  COPD exacerbation, IV methylprednisolone followed by prednisone, DuoNeb, ceftriaxone and azithromycin for now  Elevated lactic acid due to metformin and breathing therapy,improved.   Diabetes mellitus sliding scale uncontrolled due to steroids added and adjusted preprandial doses, increased lantus.   Atrial fibrillation on oral anticoagulation  Generalized weakness PT OT  Abnormal EKG with minimally elevated troponin and history of CAD repeat troponin      Diet ADULT DIET; Regular; Moderately Thick (Honey)   DVT Prophylaxis [] Lovenox, []  Heparin, [] SCDs, [] Ambulation,  [] Eliquis, [] Xarelto  [] Coumadin   Code Status Full Code             Personally reviewed Lab Studies and Imaging     Discussed management of the case with physical therapy    Telemetry strip reviewed no ST elevation      Drugs that require monitoring for toxicity include steroids and the method of monitoring was blood sugar    Medical Decision Making:  The following items were considered in medical decision making:  Discussion of patient care with other providers  Reviewed clinical lab tests  Reviewed radiology tests  Reviewed other diagnostic tests/interventions  Independent review of radiologic 
    V2.0    [facility] [place] Progress Note      Name:  Jorge Gauthier /Age/Sex: 1945  (78 y.o. male)   MRN & CSN:  [id number] & [id number] Encounter Date/Time: 2024 6:26 AM EST   Location:  [id number]-7788/3102-01 PCP: Gallo Sanchez     Attending:J Carlos Huston MD       Hospital Day: 3    Assessment and Recommendations   Jorge Gauthier is a 78 y.o. male who presents with COPD exacerbation (HCC)    78-year-old male admitted to the hospital with worsening shortness of breath diagnosed with COPD.  Plan:     Acute likely on chronic respiratory failure with hypoxia presented with saturation less than 88% with dyspnea needing supplemental oxygen to keep saturation above 90% currently on 4 L, due to COPD exacerbation, IV steroids, DuoNeb, telemetry monitoring agree with plan to admit for further management and treatment  Sepsis, with tachycardia and tachypnea on admission, due to COPD exacerbation, IV antibiotics steroids, as above  COPD exacerbation, IV methylprednisolone followed by prednisone, DuoNeb, ceftriaxone and azithromycin for now  Elevated lactic acid due to metformin and breathing therapy, will repeat  Diabetes mellitus sliding scale uncontrolled due to steroids add preprandial dose  Atrial fibrillation on oral anticoagulation  Generalized weakness PT OT  Abnormal EKG with minimally elevated troponin and history of CAD repeat troponin    Diet ADULT DIET; Regular; Moderately Thick (Honey)   DVT Prophylaxis [] Lovenox, []  Heparin, [] SCDs, [] Ambulation,  [] Eliquis, [] Xarelto  [] Coumadin   Code Status Full Code             Personally reviewed Lab Studies and Imaging     Discussed management of the case with nursing staff  Telemetry strip reviewed no ST elevation      Drugs that require monitoring for toxicity include steroids and the method of monitoring was blood sugar    Medical Decision Making:  The following items were considered in medical decision making:  Discussion of patient care with other 
  Physician Progress Note      PATIENT:               JAROD MOREJON  CSN #:                  [id number]  :                       1945  ADMIT DATE:       2024 11:43 PM  DISCH DATE:  RESPONDING  PROVIDER #:        Deandra Solis MD          QUERY TEXT:    Dear Dr. Solis  Patient admitted with COPD exacerbation . Noted documentation of \"sepsis...due   to COPD exacerbation\" in H and P and Progress Notes -. In order to   support the diagnosis of sepsis, please include additional clinical indicators   in your documentation.  Or please document if the diagnosis of sepsis has   been ruled out after further study    The medical record reflects the following:  Risk Factors: Hx CAD, CHF, DM, A-fib, HTN, COPD, recently treated for   pneumonia  Clinical Indicators:  ED for SOB and cough x 2 days, + wheezes. O2   sat=86%, Resp=22/min, HR=96, afebrile, WBC=11.3, Neutrophils=7.9, Trop=29,   Lactic acid=2.1.  PCP notes \"Sepsis, with tachycardia and tachypnea on   admission, due to COPD exacerbation, IV antibiotics steroids\" CXR-Cardiomegaly   with mild pulmonary edema. Possible small left pleural effusion.  labs   WBC=13.6, Neutrophils=12.1, XB=[phone] and requires O2 4L/min NC  Treatment: Rocephin, Zithromax, blood cultures  Thank you,  Vivian Gonzalez RN, CDS  [email or web address]  Options provided:  -- Sepsis was ruled out after study and pt has SIRS due to COPD exacerbation  -- Evolving Sepsis due to pneumonia  present on admission  -- Evolving Sepsis due to infection present on admission, please specify   present on admission.  -- Other - I will add my own diagnosis  -- Disagree - Not applicable / Not valid  -- Disagree - Clinically unable to determine / Unknown  -- Refer to Clinical Documentation Reviewer    PROVIDER RESPONSE TEXT:    Evolving Sepsis is due to pneumonia present on admission.    Query created by: Vivian Oliveros on 1/15/2024 8:45 AM      Electronically signed by:  Deandra Solis 
4 Eyes Skin Assessment     NAME:  Jorge Gauthier  YOB: 1945  MEDICAL RECORD NUMBER:  [id number]    The patient is being assessed for  Admission    I agree that at least one RN has performed a thorough Head to Toe Skin Assessment on the patient. ALL assessment sites listed below have been assessed.      Areas assessed by both nurses:    Head, Face, Ears, Shoulders, Back, Chest, Arms, Elbows, Hands, Sacrum. Buttock, Coccyx, Ischium, Legs. Feet and Heels, and Under Medical Devices         Does the Patient have a Wound? Yes wound(s) were present on assessment. LDA wound assessment was Initiated and completed by RN       Jose Alfredo Prevention initiated by RN: Yes  Wound Care Orders initiated by RN: No    Pressure Injury (Stage 3,4, Unstageable, DTI, NWPT, and Complex wounds) if present, place Wound referral order by RN under : YES    New Ostomies, if present place, Ostomy referral order under : No     Nurse 1 eSignature: Electronically signed by Katelynn Copeland RN on 1/13/24 at 6:09 AM EST    **SHARE this note so that the co-signing nurse can place an eSignature**    Nurse 2 eSignature: Electronically signed by JEFFY ALVAREZ RN on 1/13/24 at 7:01 AM EST    
Bed in lowest position, wheels locked, bed/chair exit alarm in place, call light within reach, and non skid footwear on. Walkway free of clutter. Pt alert and oriented and able to make needs known. Pt educated to use call light when needing to get up, and pt utilizes call light to make needs known. Will continue care. Electronically signed by Katelynn Copeland RN on 1/13/2024 at 6:43 AM     
Bedside introduction complete. Patient denies any needs at this time. Updated whiteboard with RN and PCA name and number. Patient able to make needs known, using call light appropriately.      
Occupational Therapy  Facility/Department: [address] ORTHOPEDICS  Occupational Therapy Initial Assessment    Name: Jorge Gauthier  : 1945  MRN: [id number]  Date of Service: 2024    Discharge Recommendations:  Patient would benefit from continued therapy after discharge, 24 hour supervision or assist, Home with Home health OT, Continue to assess pending progress (vs SNF)     Jorge Gauthier scored a 18/24 on the AM-PAC ADL Inpatient form. Current research shows that an AM-PAC score of 18 or greater is typically associated with a discharge to the patient's home setting. Based on the patient's AM-PAC score, and their current ADL deficits, it is recommended that the patient have 2-3 sessions per week of Occupational Therapy at d/c to increase the patient's independence.  At this time, this patient demonstrates the endurance and safety to discharge home with 24/7 A,  OT, and a follow up treatment frequency of 2-3x/wk.   Please see assessment section for further patient specific details.    If patient discharges prior to next session this note will serve as a discharge summary.  Please see below for the latest assessment towards goals.        Patient Diagnosis(es): The primary encounter diagnosis was Hypoxia. Diagnoses of COPD exacerbation (HCC) and Productive cough were also pertinent to this visit.  Past Medical History:  has a past medical history of Abrasion of right elbow, Arthritis, CAD (coronary artery disease), Chronic back pain, Chronic systolic CHF (congestive heart failure) (HCC), COPD (chronic obstructive pulmonary disease) (HCC), Dental disease, Diabetes mellitus (HCC), Essential hypertension, Hearing loss, Paroxysmal atrial fibrillation (HCC), and Tinnitus.  Past Surgical History:  has a past surgical history that includes fracture surgery (Left, ); back surgery (); Cardiac surgery (); Coronary artery bypass graft; back surgery; Cervical spine surgery; chest tube insertion (Right, 
Occupational Therapy  Facility/Department: [address] ORTHOPEDICS  Occupational Therapy Daily Treatment    Name: Jorge Gauthier  : 1945  MRN: [id number]  Date of Service: 1/15/2024    Discharge Recommendations:  Patient would benefit from continued therapy after discharge, 24 hour supervision or assist, Home with Home health OT (vs SNF)  Jorge Gauthier scored a 19/24 on the AM-PAC ADL Inpatient form. Current research shows that an AM-PAC score of 18 or greater is typically associated with a discharge to the patient's home setting. Based on the patient's AM-PAC score, and their current ADL deficits, it is recommended that the patient have 2-3 sessions per week of Occupational Therapy at d/c to increase the patient's independence.  At this time, this patient demonstrates the endurance and safety to discharge home with HHOT and a follow up treatment frequency of 2-3x/wk.   Please see assessment section for further patient specific details.    If patient discharges prior to next session this note will serve as a discharge summary.  Please see below for the latest assessment towards goals.          Patient Diagnosis(es): The primary encounter diagnosis was Hypoxia. Diagnoses of COPD exacerbation (HCC) and Productive cough were also pertinent to this visit.  Past Medical History:  has a past medical history of Abrasion of right elbow, Arthritis, CAD (coronary artery disease), Chronic back pain, Chronic systolic CHF (congestive heart failure) (HCC), COPD (chronic obstructive pulmonary disease) (HCC), Dental disease, Diabetes mellitus (HCC), Essential hypertension, Hearing loss, Paroxysmal atrial fibrillation (HCC), and Tinnitus.  Past Surgical History:  has a past surgical history that includes fracture surgery (Left, ); back surgery (); Cardiac surgery (); Coronary artery bypass graft; back surgery; Cervical spine surgery; chest tube insertion (Right, 2023); and IR CHEST TUBE INSERTION 
Patient blood glucose not checked via POC and patient consumed 100% of his breakfast prior to this RN assessment. Utilized morning labs for insulin administration with a blood glucose of 218. Electronically signed by Yenni Cochran RN on 1/16/2024 at 9:16 AM    
Patient discharged and taken out via personal scooter. This RN helped patient load up scooter and helped into car with wife. All belongings with patient, discharge instructions reviewed, patient asked for script to be sent to [facility] and Dr. Huston did send to that location. IV was removed before leaving floor.   
Patient remained in chair all shift, despite encouragement to get in bed on specialty mattress. Patient intermittently repositioned and brought to a standing position throughout the night. Fall precautions remain in place, chair wheels locked, call light within reach and alarm on. Patient denies further needs at this time.    
Patient resting in chair this am eating breakfast, no acute complaints and vital signs stable. Scheduled medications given, plan for wound care and assessment after patient is done eating. Voices no additional needs at this time.   
Patient up to chair, A&O X4. VSS. Left PIV flushed, dressing CDI, NS locked and capped at this time. Patient denies pain. AM medications taken crushed in applesauce without complaint (see eMAR).  Patient tolerating PO intake with a honey thick restriction and appetite adequate. Patient requested a PRN Cepacol throat lozenge and reports a continued non productive cough.  Calmoseptine cream and antifungal powder applied to patients buttocks bilaterally at site of wound.  Mepilex replaced. Patient reports a BM overnight, still requests glycolax ordered with morning medication administration.  No other needs verbalized at this time. Standard safety precautions in place and call light within reach. Electronically signed by Yenni Cochran RN on 1/16/2024 at 8:35 AM    
Patient up to chair, A&O X4. VSS. Left PIV flushed, dressing CDI, NS locked and capped at this time. Patient reports pain of a 4/10, described as a numbness and tingling in his legs bilaterally. Patient denies a need for pain medication and states his scheduled gabapentin provides relief.  All other AM medications taken crushed in applesauce without complaint (see eMAR).  Patient tolerating PO intake with a honey thick restriction and appetite appears adequate. No other needs verbalized at this time. Standard safety precautions in place and call light within reach. Electronically signed by Yenni Cochran RN on 1/15/2024 at 0905 AM  
Perfect Serve sent to MD Solis, \"FYI, Patient admitted with COPD exacerbation, currently on steroids. Blood glucose this AM was 365. Patient ordered humalog 10U prandial and will recieve 8U per sliding scale.\" Awaiting response and/or additional orders. Electronically signed by Yenni Cochran RN on 1/15/2024 at 8:44 AM    MD aware of patient blood glucose and message reviewed. No new orders at this time.Electronically signed by Yenni Cochran RN on 1/15/2024 at 0910 AM      
Pharmacy Medication Reconciliation Note     List of medications patient is currently taking is complete.    Source of information:   1. Patient med list   2. Rx disp history    Notes regarding home medications:   1. Changed Lamotrigine to 50mg bid  2. Added Pramipexole 0.125mg 2 tabs QHS  3. Removed Lisinopril    Denies taking any other OTC or herbal medications    Nathanael Santiago RPH, [facility] 1/15/2024 12:12 PM      
Physical Therapy  Facility/Department: [address] ORTHOPEDICS  Physical Therapy Treatment session     Name: Jorge Gauthier  : 1945  MRN: [id number]  Date of Service: 2024    Discharge Recommendations:  Home with Home health PT, Home with assist PRN   PT Equipment Recommendations  Equipment Needed: No    Jorge Gauthier scored a 20/24 on the AM-PAC short mobility form. Current research shows that an AM-PAC score of 18 or greater is typically associated with a discharge to the patient's home setting. Based on the patient's AM-PAC score and their current functional mobility deficits, it is recommended that the patient have 2-3 sessions per week of Physical Therapy at d/c to increase the patient's independence.  At this time, this patient demonstrates the endurance and safety to discharge home with home health PT  (home vs OP services) and a follow up treatment frequency of 2-3x/wk.  Please see assessment section for further patient specific details.    If patient discharges prior to next session this note will serve as a discharge summary.  Please see below for the latest assessment towards goals.      Patient Diagnosis(es): The primary encounter diagnosis was Hypoxia. Diagnoses of COPD exacerbation (HCC) and Productive cough were also pertinent to this visit.  Past Medical History:  has a past medical history of Abrasion of right elbow, Arthritis, CAD (coronary artery disease), Chronic back pain, Chronic systolic CHF (congestive heart failure) (HCC), COPD (chronic obstructive pulmonary disease) (HCC), Dental disease, Diabetes mellitus (HCC), Essential hypertension, Hearing loss, Paroxysmal atrial fibrillation (HCC), and Tinnitus.  Past Surgical History:  has a past surgical history that includes fracture surgery (Left, ); back surgery (); Cardiac surgery (); Coronary artery bypass graft; back surgery; Cervical spine surgery; chest tube insertion (Right, 2023); and IR CHEST TUBE INSERTION 
Physical Therapy  Facility/Department: [address] ORTHOPEDICS  Physical Therapy Initial Assessment  This note serves as D/C summary if patient is discharged prior to next treatment session.       Name: Jorge Gauthier  : 1945  MRN: [id number]  Date of Service: 2024    Discharge Recommendations:  Continue to assess pending progress (home with 24 hour assist and HHPT vs. 3-5x/wk)   PT Equipment Recommendations  Equipment Needed: No        Patient Diagnosis(es): The primary encounter diagnosis was Hypoxia. Diagnoses of COPD exacerbation (HCC) and Productive cough were also pertinent to this visit.  Past Medical History:  has a past medical history of Abrasion of right elbow, Arthritis, CAD (coronary artery disease), Chronic back pain, Chronic systolic CHF (congestive heart failure) (HCC), COPD (chronic obstructive pulmonary disease) (HCC), Dental disease, Diabetes mellitus (HCC), Essential hypertension, Hearing loss, Paroxysmal atrial fibrillation (HCC), and Tinnitus.  Past Surgical History:  has a past surgical history that includes fracture surgery (Left, ); back surgery (); Cardiac surgery (); Coronary artery bypass graft; back surgery; Cervical spine surgery; chest tube insertion (Right, 2023); and IR CHEST TUBE INSERTION (3/14/2023).    Assessment   Body Structures, Functions, Activity Limitations Requiring Skilled Therapeutic Intervention: Decreased functional mobility ;Decreased endurance;Decreased balance  Assessment: Pt is a 77 y/o male admitted with COPD exacerbation.  Pt lives with his wife on the main level of the home with 2 SULY and indep ambulating with RW in the home and using electric scooter in the community.  Pt currently on 4L 02 (no 02 at baseline) and presents with decreased activity tolerance and decreased balance.  This date, pt a high fall risk due to multiple LOB posterior and poor walker safety during ambulation.  Will continue to assess pending progress.  Pt is 
Physical Therapy  Facility/Department: [address] ORTHOPEDICS  Daily Treatment Note  NAME: Jorge Gauthier  : 1945  MRN: [id number]    Date of Service: 1/15/2024    Discharge Recommendations:  Home with assist PRN, Home with Home health PT, Patient would benefit from continued therapy after discharge   PT Equipment Recommendations  Equipment Needed: No    Patient Diagnosis(es): The primary encounter diagnosis was Hypoxia. Diagnoses of COPD exacerbation (HCC) and Productive cough were also pertinent to this visit.    Assessment   Assessment: Pt on room air, able to ambulate short distance in room with RW, SBA.  Appears to be feeling better, feels comfortable returning home with HHA, HHPT, assist from wife.  Recommend continued therapy to maximize balance and endurance.    Jorge Gauthier scored a 18/24 on the AM-PAC short mobility form.     If patient discharges prior to next session this note will serve as a discharge summary.  Please see below for the latest assessment towards goals.      Equipment Needed: No     Plan    Physical Therapy Plan  General Plan: 2-3 times per week  Current Treatment Recommendations: Strengthening;Balance training;Functional mobility training;Transfer training;Endurance training;Gait training;Home exercise program;Safety education & training;Patient/Caregiver education & training;Therapeutic activities;Positioning     Restrictions  Restrictions/Precautions  Restrictions/Precautions: Fall Risk  Position Activity Restriction  Other position/activity restrictions: 4L 02 per NC     Subjective    Subjective  Subjective: Up in chair.  Pleasant and agreeable to session.  Orientation  Overall Orientation Status: Within Functional Limits     Objective     Transfer Training  Transfer Training: Yes  Overall Level of Assistance: Stand-by assistance  Sit to Stand: Stand-by assistance  Stand to Sit: Stand-by assistance    Gait Training  Gait Training: Yes  Gait  Overall Level of Assistance: 
Pt arrived to floor from ER at 0526   via stretcher to RM 3102. Pt oriented to room, call light, policies and procedures, the menu and ordering. Call light within reach. Bed in lowest position, bed alarm on, and wheels locked. Pt verbalized understanding. No complaints, questions or concerns at this time.  Electronically signed by Katelynn Copeland RN on 1/13/2024 at 6:08 AM    
Pt care transferred to Yen LAMBERT at this time.    Electronically signed by Paulo Gudino RN on 1/16/2024 at 1:19 AM    
Pt has been increased to 4 L o2 for reports of SOB. PETRA Pace called RT.  
Pt is alert and oriented x4, resting quietly in bed. Nightly medications and intake tolerated well. No complaints of nausea, vomiting, or pain at this time. No other needs made known at this time. Fall precautions in place. Call light within reach. Will continue to monitor.    Electronically signed by Paulo Gudino RN on 1/16/2024 at 1:22 AM      
Pt left while we were at lunch, md sending meds to home pharmacy  
Pt reports sob- RT in pt rm at this time.   
Speech Therapy note regarding f/u regarding 1/13/2024 recommendations following KASSI.    No orders received  Will d/c SLP f/u  If status changes please re-order.     Signed  Colleen Chand MS,CCC,SLP [id number]  Speech and Language Pathologist  1/16/2024 at 1256 pm  
The following secure message sent to MD via [employer]:    \"pt is 79 y/o M newly admitted last night for SOB 2+ days, cough. pt has hx COPD, CHF and diabetes. currently on 3 L o2 but does not use o2 at baseline. [facility]. pt reported difficulty breathing this morning, RT came to see. pt has power chair in room and reports needing thickened liquids for difficult swallowing. i was wondering if pt may benefit from pt/ot eval, speech eval and possibly mucinex for productive cough\"    Awaiting MD response    Electronically signed by Reema Garcia RN on 1/13/2024 at 8:35 AM    
   []Left side      [x]Right side: asymmetry    []Labial ROM/Coordination    []Labial Symmetry   [x]Lingual ROM/Coordination : reduced coordination; deviates on protrusion/elevation  [x]Lingual Asymmetry:   []Gag  []Other:     Oral Phase: Deficit areas of concern   [x]Prolonged Mastication:   []Spillage Left:   []Spillage Right:  []Pocketing Left:   []Pocketing Right:   [x]Decreased Anterior to Posterior Transit:   [x]Suspected Premature Bolus Loss:   [x]Lingual/Palatal Residue:   []Other:     Pharyngeal Phase: Deficit areas of concern   [x]Delayed Swallow:   [x]Suspected Pharyngeal Pooling:   [x]Decreased Laryngeal Elevation:   []Absent Swallow:  []Wet Vocal Quality:   []Throat Clearing-Immediate:   []Throat Clearing-Delayed:   []Cough-Immediate:   []Cough-Delayed:  []Change in Vital Signs:  [x]Suspected Delayed Pharyngeal Clearing:  []Other:       Eating Assistance:  []Independent  []Setup or clean-up assistance   [x] Supervision or touching assistance   [] Partial or moderate assistance   [] Substantial or maximal assistance  [] Dependent       EDUCATION:   Provided education regarding role of SLP, results of assessment, recommendations and general speech pathology plan of care.   [x] Pt verbalized understanding and agreement   [] Pt requires ongoing learning   [] No evidence of comprehension   Talked with spouse 1/13/2024 via telephone regarding KASSI ; findings; recommendations. Did confirmation of another MBSS via mobile unit but unclear date . Spouse thought it was in the summer 2023 because it was warm  If patient discharges prior to next visit, this note will serve as discharge.     Timed Code Minutes: 0  Total Treatment Minutes: 1226 to 1310=44 minutes    Electronically signed by:    Colleen Chand,MS,CCC,SLP [id number]  Speech and Language Pathologist  1/13/2024 at 1336 pm

## 2024-01-17 NOTE — PLAN OF CARE
Problem: Discharge Planning  Goal: Discharge to home or other facility with appropriate resources  1/17/2024 1239 by Carola Barron RN  Outcome: Completed  1/17/2024 0746 by Carola Barron RN  Outcome: Progressing  Flowsheets (Taken 1/17/2024 0746)  Discharge to home or other facility with appropriate resources: Identify barriers to discharge with patient and caregiver  1/16/2024 2351 by Ronit Holder, RN  Outcome: Progressing  Flowsheets (Taken 1/16/2024 2351)  Discharge to home or other facility with appropriate resources:   Identify barriers to discharge with patient and caregiver   Identify discharge learning needs (meds, wound care, etc)     Problem: Skin/Tissue Integrity  Goal: Absence of new skin breakdown  Description: 1.  Monitor for areas of redness and/or skin breakdown  2.  Assess vascular access sites hourly  3.  Every 4-6 hours minimum:  Change oxygen saturation probe site  4.  Every 4-6 hours:  If on nasal continuous positive airway pressure, respiratory therapy assess nares and determine need for appliance change or resting period.  1/17/2024 1239 by Carola Barron RN  Outcome: Completed  1/17/2024 0746 by Carola Barron RN  Outcome: Progressing     Problem: Safety - Adult  Goal: Free from fall injury  1/17/2024 1239 by Carola Barron RN  Outcome: Completed  1/17/2024 0746 by Carola Barron RN  Outcome: Progressing  Flowsheets (Taken 1/17/2024 0746)  Free From Fall Injury: Instruct family/caregiver on patient safety  1/16/2024 2351 by Ronit Holder, RN  Outcome: Progressing  Flowsheets (Taken 1/16/2024 2351)  Free From Fall Injury:   Instruct family/caregiver on patient safety   Based on caregiver fall risk screen, instruct family/caregiver to ask for assistance with transferring infant if caregiver noted to have fall risk factors     Problem: ABCDS Injury Assessment  Goal: Absence of physical injury  1/17/2024 1239 by Carola Barron RN  Outcome: 
  Problem: Discharge Planning  Goal: Discharge to home or other facility with appropriate resources  Outcome: Progressing  Flowsheets (Taken 1/13/2024 0618)  Discharge to home or other facility with appropriate resources:   Identify barriers to discharge with patient and caregiver   Arrange for needed discharge resources and transportation as appropriate   Identify discharge learning needs (meds, wound care, etc)     Problem: Skin/Tissue Integrity  Goal: Absence of new skin breakdown  Description: 1.  Monitor for areas of redness and/or skin breakdown  2.  Assess vascular access sites hourly  3.  Every 4-6 hours minimum:  Change oxygen saturation probe site  4.  Every 4-6 hours:  If on nasal continuous positive airway pressure, respiratory therapy assess nares and determine need for appliance change or resting period.  Outcome: Progressing  Note: Jose Alfredo score assessed. Patient able to ambulate and turn self. Repositioned patient Q2H and assessed skin. Educated patient on importance of repositioning to prevent skin issues.        Problem: Safety - Adult  Goal: Free from fall injury  Outcome: Progressing  Flowsheets (Taken 1/13/2024 0618)  Free From Fall Injury:   Instruct family/caregiver on patient safety   Based on caregiver fall risk screen, instruct family/caregiver to ask for assistance with transferring infant if caregiver noted to have fall risk factors     Problem: ABCDS Injury Assessment  Goal: Absence of physical injury  Outcome: Progressing  Flowsheets (Taken 1/13/2024 0618)  Absence of Physical Injury: Implement safety measures based on patient assessment     
factors     Problem: ABCDS Injury Assessment  Goal: Absence of physical injury  1/13/2024 1821 by Reema Garcia, RN  Outcome: Progressing  1/13/2024 0618 by Katelynn Copeland, RN  Outcome: Progressing  Flowsheets (Taken 1/13/2024 0618)  Absence of Physical Injury: Implement safety measures based on patient assessment     Problem: Pain  Goal: Verbalizes/displays adequate comfort level or baseline comfort level  Outcome: Progressing     
of pain and evaluate response   Consider cultural and social influences on pain and pain management   Assess pain using appropriate pain scale   Implement non-pharmacological measures as appropriate and evaluate response     Problem: Neurosensory - Adult  Goal: Achieves stable or improved neurological status  Outcome: Progressing  Flowsheets (Taken 1/15/2024 1438)  Achieves stable or improved neurological status: Assess for and report changes in neurological status     Problem: Respiratory - Adult  Goal: Achieves optimal ventilation and oxygenation  Outcome: Progressing     Problem: Cardiovascular - Adult  Goal: Maintains optimal cardiac output and hemodynamic stability  Outcome: Progressing  Flowsheets (Taken 1/15/2024 1438)  Maintains optimal cardiac output and hemodynamic stability: Monitor blood pressure and heart rate     Problem: Skin/Tissue Integrity - Adult  Goal: Skin integrity remains intact  Outcome: Progressing  Goal: Incisions, wounds, or drain sites healing without S/S of infection  Outcome: Progressing  Flowsheets (Taken 1/15/2024 1438)  Incisions, Wounds, or Drain Sites Healing Without Sign and Symptoms of Infection: ADMISSION and DAILY: Assess and document risk factors for pressure ulcer development  Goal: Oral mucous membranes remain intact  Outcome: Progressing     Problem: Musculoskeletal - Adult  Goal: Return mobility to safest level of function  Outcome: Progressing  Flowsheets (Taken 1/15/2024 1438)  Return Mobility to Safest Level of Function:   Assess patient stability and activity tolerance for standing, transferring and ambulating with or without assistive devices   Assist with transfers and ambulation using safe patient handling equipment as needed   Obtain physical therapy/occupational therapy consults as needed   Instruct patient/family in ordered activity level     Problem: Gastrointestinal - Adult  Goal: Minimal or absence of nausea and vomiting  Outcome: Progressing  Goal: Maintains 
of pain and evaluate response   Consider cultural and social influences on pain and pain management   Assess pain using appropriate pain scale   Implement non-pharmacological measures as appropriate and evaluate response     Problem: Neurosensory - Adult  Goal: Achieves stable or improved neurological status  Outcome: Progressing  Flowsheets (Taken 1/16/2024 1700)  Achieves stable or improved neurological status: Assess for and report changes in neurological status     Problem: Respiratory - Adult  Goal: Achieves optimal ventilation and oxygenation  Outcome: Progressing  Flowsheets (Taken 1/16/2024 1700)  Achieves optimal ventilation and oxygenation:   Assess for changes in respiratory status   Assess for changes in mentation and behavior   Encourage broncho-pulmonary hygiene including cough, deep breathe, incentive spirometry   Oxygen supplementation based on oxygen saturation or arterial blood gases   Respiratory therapy support as indicated     Problem: Cardiovascular - Adult  Goal: Maintains optimal cardiac output and hemodynamic stability  Outcome: Progressing     Problem: Skin/Tissue Integrity - Adult  Goal: Skin integrity remains intact  Outcome: Progressing  Flowsheets (Taken 1/16/2024 1700)  Skin Integrity Remains Intact: Monitor for areas of redness and/or skin breakdown  Goal: Incisions, wounds, or drain sites healing without S/S of infection  Outcome: Progressing  Goal: Oral mucous membranes remain intact  Outcome: Progressing  Flowsheets (Taken 1/16/2024 1700)  Oral Mucous Membranes Remain Intact: Assess oral mucosa and hygiene practices     Problem: Musculoskeletal - Adult  Goal: Return mobility to safest level of function  Outcome: Progressing     Problem: Gastrointestinal - Adult  Goal: Minimal or absence of nausea and vomiting  Outcome: Progressing  Goal: Maintains or returns to baseline bowel function  Outcome: Progressing  Goal: Maintains adequate nutritional intake  Outcome: Progressing   
scale   Encourage patient to monitor pain and request assistance   Administer analgesics based on type and severity of pain and evaluate response   Implement non-pharmacological measures as appropriate and evaluate response     Problem: Neurosensory - Adult  Goal: Achieves stable or improved neurological status  Outcome: Progressing     Problem: Respiratory - Adult  Goal: Achieves optimal ventilation and oxygenation  Outcome: Progressing     Problem: Cardiovascular - Adult  Goal: Maintains optimal cardiac output and hemodynamic stability  Outcome: Progressing     Problem: Skin/Tissue Integrity - Adult  Goal: Skin integrity remains intact  Outcome: Progressing  Flowsheets  Taken 1/14/2024 0157 by Samreen Miranda RN  Skin Integrity Remains Intact:   Monitor for areas of redness and/or skin breakdown   Assess vascular access sites hourly  Taken 1/13/2024 2125 by Samreen Miranda RN  Skin Integrity Remains Intact:   Monitor for areas of redness and/or skin breakdown   Assess vascular access sites hourly  Goal: Incisions, wounds, or drain sites healing without S/S of infection  Outcome: Progressing  Goal: Oral mucous membranes remain intact  Outcome: Progressing     Problem: Musculoskeletal - Adult  Goal: Return mobility to safest level of function  Outcome: Progressing     Problem: Gastrointestinal - Adult  Goal: Minimal or absence of nausea and vomiting  Outcome: Progressing  Goal: Maintains or returns to baseline bowel function  Outcome: Progressing  Goal: Maintains adequate nutritional intake  Outcome: Progressing     Problem: Genitourinary - Adult  Goal: Absence of urinary retention  Outcome: Progressing     Problem: Infection - Adult  Goal: Absence of infection at discharge  Outcome: Progressing  Goal: Absence of infection during hospitalization  Outcome: Progressing     Problem: Metabolic/Fluid and Electrolytes - Adult  Goal: Electrolytes maintained within normal limits  Outcome: Progressing     
status  1/16/2024 0119 by Paulo Gudino RN  Outcome: Progressing  Flowsheets (Taken 1/16/2024 0119)  Achieves stable or improved neurological status:   Assess for and report changes in neurological status   Initiate measures to prevent increased intracranial pressure     Problem: Respiratory - Adult  Goal: Achieves optimal ventilation and oxygenation  1/16/2024 0119 by Paulo Gudino RN  Outcome: Progressing  Flowsheets (Taken 1/16/2024 0119)  Achieves optimal ventilation and oxygenation:   Assess for changes in respiratory status   Assess for changes in mentation and behavior   Position to facilitate oxygenation and minimize respiratory effort   Oxygen supplementation based on oxygen saturation or arterial blood gases     Problem: Cardiovascular - Adult  Goal: Maintains optimal cardiac output and hemodynamic stability  1/16/2024 0119 by Paulo Gudino RN  Outcome: Progressing  Flowsheets (Taken 1/16/2024 0119)  Maintains optimal cardiac output and hemodynamic stability:   Monitor blood pressure and heart rate   Monitor urine output and notify Licensed Independent Practitioner for values outside of normal range   Assess for signs of decreased cardiac output   Administer fluid and/or volume expanders as ordered     Problem: Skin/Tissue Integrity - Adult  Goal: Skin integrity remains intact  1/16/2024 0119 by Paulo Gudino RN  Outcome: Progressing  Flowsheets (Taken 1/16/2024 0119)  Skin Integrity Remains Intact:   Monitor for areas of redness and/or skin breakdown   Assess vascular access sites hourly     Problem: Skin/Tissue Integrity - Adult  Goal: Incisions, wounds, or drain sites healing without S/S of infection  1/16/2024 0119 by Paulo Gudino RN  Outcome: Progressing     Problem: Skin/Tissue Integrity - Adult  Goal: Oral mucous membranes remain intact  1/16/2024 0119 by Paulo Gudino RN  Outcome: Progressing     Problem: Musculoskeletal - Adult  Goal: Return mobility to safest level of 
Assess for and report changes in neurological status     Problem: Respiratory - Adult  Goal: Achieves optimal ventilation and oxygenation  1/16/2024 2351 by Ronit Holder RN  Outcome: Progressing  Flowsheets (Taken 1/16/2024 2351)  Achieves optimal ventilation and oxygenation: Assess for changes in respiratory status  1/16/2024 1700 by Yenni Cochran RN  Outcome: Progressing  Flowsheets (Taken 1/16/2024 1700)  Achieves optimal ventilation and oxygenation:   Assess for changes in respiratory status   Assess for changes in mentation and behavior   Encourage broncho-pulmonary hygiene including cough, deep breathe, incentive spirometry   Oxygen supplementation based on oxygen saturation or arterial blood gases   Respiratory therapy support as indicated     Problem: Cardiovascular - Adult  Goal: Maintains optimal cardiac output and hemodynamic stability  1/16/2024 2351 by Ronit Holder RN  Outcome: Progressing  Flowsheets (Taken 1/16/2024 2351)  Maintains optimal cardiac output and hemodynamic stability: Monitor blood pressure and heart rate  1/16/2024 1700 by Yenni Cochran RN  Outcome: Progressing     Problem: Skin/Tissue Integrity - Adult  Goal: Skin integrity remains intact  1/16/2024 2351 by Ronit Holder RN  Outcome: Progressing  Flowsheets (Taken 1/16/2024 2351)  Skin Integrity Remains Intact: Monitor for areas of redness and/or skin breakdown  1/16/2024 1700 by Yenni Cochran RN  Outcome: Progressing  Flowsheets (Taken 1/16/2024 1700)  Skin Integrity Remains Intact: Monitor for areas of redness and/or skin breakdown  Goal: Incisions, wounds, or drain sites healing without S/S of infection  1/16/2024 2351 by Ronit Holder RN  Outcome: Progressing  Flowsheets (Taken 1/16/2024 2351)  Incisions, Wounds, or Drain Sites Healing Without Sign and Symptoms of Infection: TWICE DAILY: Assess and document skin integrity  1/16/2024 1700 by Yenni Cochran RN  Outcome: Progressing  Goal: Oral mucous 
Samreen RN  Outcome: Progressing  Flowsheets (Taken 1/13/2024 2000)  Verbalizes/displays adequate comfort level or baseline comfort level:   Assess pain using appropriate pain scale   Encourage patient to monitor pain and request assistance   Administer analgesics based on type and severity of pain and evaluate response   Implement non-pharmacological measures as appropriate and evaluate response  1/13/2024 1821 by Reema Garcia, RN  Outcome: Progressing     
Intact: Assess oral mucosa and hygiene practices  Taken 1/14/2024 2104  Oral Mucous Membranes Remain Intact: Assess oral mucosa and hygiene practices  1/14/2024 1115 by Reema Garcia RN  Outcome: Progressing     Problem: Musculoskeletal - Adult  Goal: Return mobility to safest level of function  1/15/2024 0019 by Samreen Miranda RN  Outcome: Progressing  Flowsheets (Taken 1/14/2024 2104)  Return Mobility to Safest Level of Function:   Assess patient stability and activity tolerance for standing, transferring and ambulating with or without assistive devices   Assist with transfers and ambulation using safe patient handling equipment as needed   Ensure adequate protection for wounds/incisions during mobilization   Obtain physical therapy/occupational therapy consults as needed  1/14/2024 1115 by Reema Garcia RN  Outcome: Progressing     Problem: Gastrointestinal - Adult  Goal: Minimal or absence of nausea and vomiting  1/15/2024 0019 by Samreen Miranda RN  Outcome: Progressing  Flowsheets (Taken 1/14/2024 2104)  Minimal or absence of nausea and vomiting:   Administer IV fluids as ordered to ensure adequate hydration   Administer ordered antiemetic medications as needed  1/14/2024 1115 by Reema Garcia RN  Outcome: Progressing  Goal: Maintains or returns to baseline bowel function  1/15/2024 0019 by Samreen Miranda RN  Outcome: Progressing  Flowsheets (Taken 1/14/2024 2104)  Maintains or returns to baseline bowel function:   Assess bowel function   Encourage oral fluids to ensure adequate hydration   Administer ordered medications as needed  1/14/2024 1115 by Reema Garcia RN  Outcome: Progressing  Goal: Maintains adequate nutritional intake  1/15/2024 0019 by Samreen Miranda RN  Outcome: Progressing  Flowsheets (Taken 1/14/2024 2104)  Maintains adequate nutritional intake: Monitor percentage of each meal consumed  1/14/2024 1115 by Reema Garcia RN  Outcome: Progressing     Problem: 
Symptoms are Monitored and Maintained or Improved: Monitor and assess patient's chronic conditions and comorbid symptoms for stability, deterioration, or improvement  1/16/2024 2351 by Ronit Holder, RN  Outcome: Progressing  Flowsheets (Taken 1/16/2024 2351)  Care Plan - Patient's Chronic Conditions and Co-Morbidity Symptoms are Monitored and Maintained or Improved: Monitor and assess patient's chronic conditions and comorbid symptoms for stability, deterioration, or improvement

## 2024-01-29 ENCOUNTER — APPOINTMENT (OUTPATIENT)
Dept: GENERAL RADIOLOGY | Age: 79
End: 2024-01-29
Payer: MEDICARE

## 2024-01-29 ENCOUNTER — HOSPITAL ENCOUNTER (INPATIENT)
Age: 79
LOS: 4 days | Discharge: HOME | End: 2024-02-02
Attending: ANESTHESIOLOGY | Admitting: ANESTHESIOLOGY
Payer: MEDICARE

## 2024-01-29 DIAGNOSIS — J44.1 COPD EXACERBATION (HCC): ICD-10-CM

## 2024-01-29 DIAGNOSIS — J18.9 PNEUMONIA DUE TO INFECTIOUS ORGANISM, UNSPECIFIED LATERALITY, UNSPECIFIED PART OF LUNG: ICD-10-CM

## 2024-01-29 DIAGNOSIS — J96.02 ACUTE RESPIRATORY FAILURE WITH HYPOXIA AND HYPERCAPNIA (HCC): Primary | ICD-10-CM

## 2024-01-29 DIAGNOSIS — J96.01 ACUTE RESPIRATORY FAILURE WITH HYPOXIA AND HYPERCAPNIA (HCC): Primary | ICD-10-CM

## 2024-01-29 PROBLEM — J96.00 ARF (ACUTE RESPIRATORY FAILURE) (HCC): Status: ACTIVE | Noted: 2024-01-29

## 2024-01-29 LAB
ALBUMIN SERPL-MCNC: 4 G/DL (ref 3.4–5)
ALBUMIN/GLOB SERPL: 1.4 {RATIO} (ref 1.1–2.2)
ALP SERPL-CCNC: 62 U/L (ref 40–129)
ALT SERPL-CCNC: 15 U/L (ref 10–40)
ANION GAP SERPL CALCULATED.3IONS-SCNC: 9 MMOL/L (ref 3–16)
AST SERPL-CCNC: 16 U/L (ref 15–37)
BASE EXCESS BLDV CALC-SCNC: 5.3 MMOL/L
BASOPHILS # BLD: 0.1 K/UL (ref 0–0.2)
BASOPHILS NFR BLD: 1.2 %
BILIRUB SERPL-MCNC: 0.3 MG/DL (ref 0–1)
BILIRUB UR QL STRIP.AUTO: NEGATIVE
BUN SERPL-MCNC: 22 MG/DL (ref 7–20)
CALCIUM SERPL-MCNC: 8.9 MG/DL (ref 8.3–10.6)
CHLORIDE SERPL-SCNC: 99 MMOL/L (ref 99–110)
CLARITY UR: CLEAR
CO2 BLDV-SCNC: 33 MMOL/L
CO2 SERPL-SCNC: 27 MMOL/L (ref 21–32)
COHGB MFR BLDV: 1.7 %
COLOR UR: YELLOW
CREAT SERPL-MCNC: 1 MG/DL (ref 0.8–1.3)
DEPRECATED RDW RBC AUTO: 15.6 % (ref 12.4–15.4)
EOSINOPHIL # BLD: 0.3 K/UL (ref 0–0.6)
EOSINOPHIL NFR BLD: 2.3 %
FLUAV RNA UPPER RESP QL NAA+PROBE: NEGATIVE
FLUBV AG NPH QL: NEGATIVE
GFR SERPLBLD CREATININE-BSD FMLA CKD-EPI: >60 ML/MIN/{1.73_M2}
GLUCOSE BLD-MCNC: 164 MG/DL (ref 70–99)
GLUCOSE SERPL-MCNC: 172 MG/DL (ref 70–99)
GLUCOSE UR STRIP.AUTO-MCNC: >=1000 MG/DL
HCO3 BLDV-SCNC: 32 MMOL/L (ref 23–29)
HCT VFR BLD AUTO: 39.7 % (ref 40.5–52.5)
HGB BLD-MCNC: 12.6 G/DL (ref 13.5–17.5)
HGB UR QL STRIP.AUTO: NEGATIVE
INR PPP: 0.97 (ref 0.84–1.16)
KETONES UR STRIP.AUTO-MCNC: NEGATIVE MG/DL
LACTATE BLDV-SCNC: 1.3 MMOL/L (ref 0.4–1.9)
LACTATE BLDV-SCNC: 1.7 MMOL/L (ref 0.4–1.9)
LEUKOCYTE ESTERASE UR QL STRIP.AUTO: NEGATIVE
LYMPHOCYTES # BLD: 1.8 K/UL (ref 1–5.1)
LYMPHOCYTES NFR BLD: 14.9 %
MCH RBC QN AUTO: 28 PG (ref 26–34)
MCHC RBC AUTO-ENTMCNC: 31.7 G/DL (ref 31–36)
MCV RBC AUTO: 88.1 FL (ref 80–100)
METHGB MFR BLDV: 0.6 %
MONOCYTES # BLD: 0.9 K/UL (ref 0–1.3)
MONOCYTES NFR BLD: 7.9 %
NEUTROPHILS # BLD: 8.7 K/UL (ref 1.7–7.7)
NEUTROPHILS NFR BLD: 73.7 %
NITRITE UR QL STRIP.AUTO: NEGATIVE
NT-PROBNP SERPL-MCNC: 612 PG/ML (ref 0–449)
O2 THERAPY: ABNORMAL
PCO2 BLDV: 52.8 MMHG (ref 40–50)
PERFORMED ON: ABNORMAL
PH BLDV: 7.39 [PH] (ref 7.35–7.45)
PH UR STRIP.AUTO: 7.5 [PH] (ref 5–8)
PLATELET # BLD AUTO: 292 K/UL (ref 135–450)
PMV BLD AUTO: 7.3 FL (ref 5–10.5)
PO2 BLDV: 31 MMHG
POTASSIUM SERPL-SCNC: 4.4 MMOL/L (ref 3.5–5.1)
PROCALCITONIN SERPL IA-MCNC: 0.1 NG/ML (ref 0–0.15)
PROT SERPL-MCNC: 6.8 G/DL (ref 6.4–8.2)
PROT UR STRIP.AUTO-MCNC: NEGATIVE MG/DL
PROTHROMBIN TIME: 12.9 SEC (ref 11.5–14.8)
RBC # BLD AUTO: 4.5 M/UL (ref 4.2–5.9)
RSV AG NOSE QL: NEGATIVE
SAO2 % BLDV: 53 %
SARS-COV-2 RDRP RESP QL NAA+PROBE: NOT DETECTED
SODIUM SERPL-SCNC: 135 MMOL/L (ref 136–145)
SP GR UR STRIP.AUTO: 1.03 (ref 1–1.03)
TROPONIN, HIGH SENSITIVITY: 28 NG/L (ref 0–22)
TROPONIN, HIGH SENSITIVITY: 28 NG/L (ref 0–22)
UA COMPLETE W REFLEX CULTURE PNL UR: ABNORMAL
UA DIPSTICK W REFLEX MICRO PNL UR: ABNORMAL
URN SPEC COLLECT METH UR: ABNORMAL
UROBILINOGEN UR STRIP-ACNC: 0.2 E.U./DL
WBC # BLD AUTO: 11.8 K/UL (ref 4–11)

## 2024-01-29 PROCEDURE — 87807 RSV ASSAY W/OPTIC: CPT

## 2024-01-29 PROCEDURE — 2700000000 HC OXYGEN THERAPY PER DAY

## 2024-01-29 PROCEDURE — 1200000000 HC SEMI PRIVATE

## 2024-01-29 PROCEDURE — 85610 PROTHROMBIN TIME: CPT

## 2024-01-29 PROCEDURE — 80053 COMPREHEN METABOLIC PANEL: CPT

## 2024-01-29 PROCEDURE — 87804 INFLUENZA ASSAY W/OPTIC: CPT

## 2024-01-29 PROCEDURE — 85025 COMPLETE CBC W/AUTO DIFF WBC: CPT

## 2024-01-29 PROCEDURE — 2580000003 HC RX 258: Performed by: PHYSICIAN ASSISTANT

## 2024-01-29 PROCEDURE — 87040 BLOOD CULTURE FOR BACTERIA: CPT

## 2024-01-29 PROCEDURE — 87635 SARS-COV-2 COVID-19 AMP PRB: CPT

## 2024-01-29 PROCEDURE — 71045 X-RAY EXAM CHEST 1 VIEW: CPT

## 2024-01-29 PROCEDURE — 93005 ELECTROCARDIOGRAM TRACING: CPT | Performed by: ANESTHESIOLOGY

## 2024-01-29 PROCEDURE — 81003 URINALYSIS AUTO W/O SCOPE: CPT

## 2024-01-29 PROCEDURE — 96374 THER/PROPH/DIAG INJ IV PUSH: CPT

## 2024-01-29 PROCEDURE — 6360000002 HC RX W HCPCS: Performed by: PHYSICIAN ASSISTANT

## 2024-01-29 PROCEDURE — 99285 EMERGENCY DEPT VISIT HI MDM: CPT

## 2024-01-29 PROCEDURE — 82803 BLOOD GASES ANY COMBINATION: CPT

## 2024-01-29 PROCEDURE — 36415 COLL VENOUS BLD VENIPUNCTURE: CPT

## 2024-01-29 PROCEDURE — 83880 ASSAY OF NATRIURETIC PEPTIDE: CPT

## 2024-01-29 PROCEDURE — 6370000000 HC RX 637 (ALT 250 FOR IP): Performed by: PHYSICIAN ASSISTANT

## 2024-01-29 PROCEDURE — 84484 ASSAY OF TROPONIN QUANT: CPT

## 2024-01-29 PROCEDURE — 83605 ASSAY OF LACTIC ACID: CPT

## 2024-01-29 PROCEDURE — 84145 PROCALCITONIN (PCT): CPT

## 2024-01-29 PROCEDURE — 94640 AIRWAY INHALATION TREATMENT: CPT

## 2024-01-29 PROCEDURE — 94761 N-INVAS EAR/PLS OXIMETRY MLT: CPT

## 2024-01-29 RX ORDER — IPRATROPIUM BROMIDE AND ALBUTEROL SULFATE 2.5; .5 MG/3ML; MG/3ML
1 SOLUTION RESPIRATORY (INHALATION) ONCE
Status: COMPLETED | OUTPATIENT
Start: 2024-01-29 | End: 2024-01-30

## 2024-01-29 RX ORDER — ALBUTEROL SULFATE 2.5 MG/3ML
5 SOLUTION RESPIRATORY (INHALATION) ONCE
Status: COMPLETED | OUTPATIENT
Start: 2024-01-29 | End: 2024-01-30

## 2024-01-29 RX ORDER — IPRATROPIUM BROMIDE AND ALBUTEROL SULFATE 2.5; .5 MG/3ML; MG/3ML
1 SOLUTION RESPIRATORY (INHALATION) ONCE
Status: COMPLETED | OUTPATIENT
Start: 2024-01-29 | End: 2024-01-29

## 2024-01-29 RX ORDER — ALBUTEROL SULFATE 2.5 MG/3ML
5 SOLUTION RESPIRATORY (INHALATION) ONCE
Status: COMPLETED | OUTPATIENT
Start: 2024-01-29 | End: 2024-01-29

## 2024-01-29 RX ORDER — METHYLPREDNISOLONE SODIUM SUCCINATE 125 MG/2ML
125 INJECTION, POWDER, LYOPHILIZED, FOR SOLUTION INTRAMUSCULAR; INTRAVENOUS ONCE
Status: COMPLETED | OUTPATIENT
Start: 2024-01-29 | End: 2024-01-29

## 2024-01-29 RX ADMIN — ALBUTEROL SULFATE 5 MG: 2.5 SOLUTION RESPIRATORY (INHALATION) at 21:40

## 2024-01-29 RX ADMIN — IPRATROPIUM BROMIDE AND ALBUTEROL SULFATE 1 DOSE: .5; 2.5 SOLUTION RESPIRATORY (INHALATION) at 21:40

## 2024-01-29 RX ADMIN — METHYLPREDNISOLONE SODIUM SUCCINATE 125 MG: 125 INJECTION INTRAMUSCULAR; INTRAVENOUS at 21:52

## 2024-01-29 RX ADMIN — CEFEPIME 2000 MG: 2 INJECTION, POWDER, FOR SOLUTION INTRAVENOUS at 23:59

## 2024-01-29 ASSESSMENT — PAIN SCALES - GENERAL: PAINLEVEL_OUTOF10: 0

## 2024-01-30 LAB
ANION GAP SERPL CALCULATED.3IONS-SCNC: 13 MMOL/L (ref 3–16)
BASOPHILS # BLD: 0 K/UL (ref 0–0.2)
BASOPHILS NFR BLD: 0.3 %
BUN SERPL-MCNC: 24 MG/DL (ref 7–20)
CALCIUM SERPL-MCNC: 9.1 MG/DL (ref 8.3–10.6)
CHLORIDE SERPL-SCNC: 101 MMOL/L (ref 99–110)
CO2 SERPL-SCNC: 26 MMOL/L (ref 21–32)
CREAT SERPL-MCNC: 1.1 MG/DL (ref 0.8–1.3)
DEPRECATED RDW RBC AUTO: 15.4 % (ref 12.4–15.4)
EKG ATRIAL RATE: 100 BPM
EKG DIAGNOSIS: NORMAL
EKG P AXIS: 61 DEGREES
EKG P-R INTERVAL: 200 MS
EKG Q-T INTERVAL: 342 MS
EKG QRS DURATION: 98 MS
EKG QTC CALCULATION (BAZETT): 441 MS
EKG R AXIS: 60 DEGREES
EKG T AXIS: -36 DEGREES
EKG VENTRICULAR RATE: 100 BPM
EOSINOPHIL # BLD: 0 K/UL (ref 0–0.6)
EOSINOPHIL NFR BLD: 0 %
GFR SERPLBLD CREATININE-BSD FMLA CKD-EPI: >60 ML/MIN/{1.73_M2}
GLUCOSE BLD-MCNC: 297 MG/DL (ref 70–99)
GLUCOSE BLD-MCNC: 331 MG/DL (ref 70–99)
GLUCOSE BLD-MCNC: 342 MG/DL (ref 70–99)
GLUCOSE BLD-MCNC: 415 MG/DL (ref 70–99)
GLUCOSE BLD-MCNC: 423 MG/DL (ref 70–99)
GLUCOSE SERPL-MCNC: 388 MG/DL (ref 70–99)
HCT VFR BLD AUTO: 41.3 % (ref 40.5–52.5)
HGB BLD-MCNC: 13.6 G/DL (ref 13.5–17.5)
LYMPHOCYTES # BLD: 0.5 K/UL (ref 1–5.1)
LYMPHOCYTES NFR BLD: 4.9 %
MCH RBC QN AUTO: 28.8 PG (ref 26–34)
MCHC RBC AUTO-ENTMCNC: 32.9 G/DL (ref 31–36)
MCV RBC AUTO: 87.7 FL (ref 80–100)
MONOCYTES # BLD: 0.1 K/UL (ref 0–1.3)
MONOCYTES NFR BLD: 0.6 %
NEUTROPHILS # BLD: 10.1 K/UL (ref 1.7–7.7)
NEUTROPHILS NFR BLD: 94.2 %
PERFORMED ON: ABNORMAL
PLATELET # BLD AUTO: 293 K/UL (ref 135–450)
PMV BLD AUTO: 7.5 FL (ref 5–10.5)
POTASSIUM SERPL-SCNC: 5.1 MMOL/L (ref 3.5–5.1)
RBC # BLD AUTO: 4.71 M/UL (ref 4.2–5.9)
REASON FOR REJECTION: NORMAL
REASON FOR REJECTION: NORMAL
REJECTED TEST: NORMAL
REJECTED TEST: NORMAL
REPORT: NORMAL
RESP PATH DNA+RNA PNL NPH NAA+NON-PROBE: NORMAL
SODIUM SERPL-SCNC: 140 MMOL/L (ref 136–145)
TROPONIN, HIGH SENSITIVITY: 26 NG/L (ref 0–22)
TROPONIN, HIGH SENSITIVITY: 26 NG/L (ref 0–22)
WBC # BLD AUTO: 10.7 K/UL (ref 4–11)

## 2024-01-30 PROCEDURE — 6370000000 HC RX 637 (ALT 250 FOR IP): Performed by: PHYSICIAN ASSISTANT

## 2024-01-30 PROCEDURE — 1200000000 HC SEMI PRIVATE

## 2024-01-30 PROCEDURE — 84484 ASSAY OF TROPONIN QUANT: CPT

## 2024-01-30 PROCEDURE — 36415 COLL VENOUS BLD VENIPUNCTURE: CPT

## 2024-01-30 PROCEDURE — 2700000000 HC OXYGEN THERAPY PER DAY

## 2024-01-30 PROCEDURE — 6360000002 HC RX W HCPCS: Performed by: ANESTHESIOLOGY

## 2024-01-30 PROCEDURE — 6370000000 HC RX 637 (ALT 250 FOR IP)

## 2024-01-30 PROCEDURE — 6370000000 HC RX 637 (ALT 250 FOR IP): Performed by: ANESTHESIOLOGY

## 2024-01-30 PROCEDURE — 94640 AIRWAY INHALATION TREATMENT: CPT

## 2024-01-30 PROCEDURE — 87205 SMEAR GRAM STAIN: CPT

## 2024-01-30 PROCEDURE — 6360000002 HC RX W HCPCS: Performed by: PHYSICIAN ASSISTANT

## 2024-01-30 PROCEDURE — 0202U NFCT DS 22 TRGT SARS-COV-2: CPT

## 2024-01-30 PROCEDURE — 2580000003 HC RX 258: Performed by: PHYSICIAN ASSISTANT

## 2024-01-30 PROCEDURE — 2580000003 HC RX 258: Performed by: ANESTHESIOLOGY

## 2024-01-30 PROCEDURE — 80048 BASIC METABOLIC PNL TOTAL CA: CPT

## 2024-01-30 PROCEDURE — 6360000002 HC RX W HCPCS

## 2024-01-30 PROCEDURE — 2580000003 HC RX 258

## 2024-01-30 PROCEDURE — 93010 ELECTROCARDIOGRAM REPORT: CPT | Performed by: INTERNAL MEDICINE

## 2024-01-30 PROCEDURE — 6370000000 HC RX 637 (ALT 250 FOR IP): Performed by: NURSE PRACTITIONER

## 2024-01-30 PROCEDURE — 94761 N-INVAS EAR/PLS OXIMETRY MLT: CPT

## 2024-01-30 PROCEDURE — 85025 COMPLETE CBC W/AUTO DIFF WBC: CPT

## 2024-01-30 RX ORDER — FUROSEMIDE 10 MG/ML
40 INJECTION INTRAMUSCULAR; INTRAVENOUS DAILY
Status: DISCONTINUED | OUTPATIENT
Start: 2024-01-30 | End: 2024-01-30

## 2024-01-30 RX ORDER — INSULIN LISPRO 100 [IU]/ML
0-16 INJECTION, SOLUTION INTRAVENOUS; SUBCUTANEOUS
Status: DISCONTINUED | OUTPATIENT
Start: 2024-01-30 | End: 2024-02-02 | Stop reason: HOSPADM

## 2024-01-30 RX ORDER — ATORVASTATIN CALCIUM 20 MG/1
20 TABLET, FILM COATED ORAL NIGHTLY
Status: DISCONTINUED | OUTPATIENT
Start: 2024-01-30 | End: 2024-02-02 | Stop reason: HOSPADM

## 2024-01-30 RX ORDER — GABAPENTIN 400 MG/1
800 CAPSULE ORAL 4 TIMES DAILY
Status: DISCONTINUED | OUTPATIENT
Start: 2024-01-30 | End: 2024-02-02 | Stop reason: HOSPADM

## 2024-01-30 RX ORDER — ONDANSETRON 4 MG/1
4 TABLET, ORALLY DISINTEGRATING ORAL EVERY 8 HOURS PRN
Status: DISCONTINUED | OUTPATIENT
Start: 2024-01-30 | End: 2024-02-02 | Stop reason: HOSPADM

## 2024-01-30 RX ORDER — INSULIN GLARGINE 100 [IU]/ML
10 INJECTION, SOLUTION SUBCUTANEOUS ONCE
Status: COMPLETED | OUTPATIENT
Start: 2024-01-30 | End: 2024-01-30

## 2024-01-30 RX ORDER — SODIUM CHLORIDE 0.9 % (FLUSH) 0.9 %
5-40 SYRINGE (ML) INJECTION EVERY 12 HOURS SCHEDULED
Status: DISCONTINUED | OUTPATIENT
Start: 2024-01-30 | End: 2024-02-02 | Stop reason: HOSPADM

## 2024-01-30 RX ORDER — GLUCAGON 1 MG/ML
1 KIT INJECTION PRN
Status: DISCONTINUED | OUTPATIENT
Start: 2024-01-30 | End: 2024-02-02 | Stop reason: HOSPADM

## 2024-01-30 RX ORDER — ACETAMINOPHEN 325 MG/1
650 TABLET ORAL EVERY 6 HOURS PRN
Status: DISCONTINUED | OUTPATIENT
Start: 2024-01-30 | End: 2024-02-02 | Stop reason: HOSPADM

## 2024-01-30 RX ORDER — PRAMIPEXOLE DIHYDROCHLORIDE 0.5 MG/1
0.25 TABLET ORAL NIGHTLY
Status: DISCONTINUED | OUTPATIENT
Start: 2024-01-30 | End: 2024-01-30

## 2024-01-30 RX ORDER — INSULIN GLARGINE 100 [IU]/ML
25 INJECTION, SOLUTION SUBCUTANEOUS DAILY
Status: DISCONTINUED | OUTPATIENT
Start: 2024-01-31 | End: 2024-01-31

## 2024-01-30 RX ORDER — SODIUM CHLORIDE 9 MG/ML
INJECTION, SOLUTION INTRAVENOUS PRN
Status: DISCONTINUED | OUTPATIENT
Start: 2024-01-30 | End: 2024-02-02 | Stop reason: HOSPADM

## 2024-01-30 RX ORDER — INSULIN LISPRO 100 [IU]/ML
0-4 INJECTION, SOLUTION INTRAVENOUS; SUBCUTANEOUS NIGHTLY
Status: DISCONTINUED | OUTPATIENT
Start: 2024-01-30 | End: 2024-02-02 | Stop reason: HOSPADM

## 2024-01-30 RX ORDER — LAMOTRIGINE 25 MG/1
50 TABLET ORAL 2 TIMES DAILY
Status: DISCONTINUED | OUTPATIENT
Start: 2024-01-30 | End: 2024-01-30

## 2024-01-30 RX ORDER — ASPIRIN 81 MG/1
81 TABLET, CHEWABLE ORAL DAILY
Status: DISCONTINUED | OUTPATIENT
Start: 2024-01-30 | End: 2024-02-02 | Stop reason: HOSPADM

## 2024-01-30 RX ORDER — CARVEDILOL 3.12 MG/1
3.12 TABLET ORAL 2 TIMES DAILY WITH MEALS
Status: DISCONTINUED | OUTPATIENT
Start: 2024-01-30 | End: 2024-02-02 | Stop reason: HOSPADM

## 2024-01-30 RX ORDER — ACETAMINOPHEN 650 MG/1
650 SUPPOSITORY RECTAL EVERY 6 HOURS PRN
Status: DISCONTINUED | OUTPATIENT
Start: 2024-01-30 | End: 2024-02-02 | Stop reason: HOSPADM

## 2024-01-30 RX ORDER — FUROSEMIDE 10 MG/ML
40 INJECTION INTRAMUSCULAR; INTRAVENOUS 2 TIMES DAILY
Status: DISCONTINUED | OUTPATIENT
Start: 2024-01-30 | End: 2024-02-01

## 2024-01-30 RX ORDER — INSULIN GLARGINE 100 [IU]/ML
15 INJECTION, SOLUTION SUBCUTANEOUS DAILY
Status: DISCONTINUED | OUTPATIENT
Start: 2024-01-30 | End: 2024-01-30

## 2024-01-30 RX ORDER — ONDANSETRON 2 MG/ML
4 INJECTION INTRAMUSCULAR; INTRAVENOUS EVERY 6 HOURS PRN
Status: DISCONTINUED | OUTPATIENT
Start: 2024-01-30 | End: 2024-02-02 | Stop reason: HOSPADM

## 2024-01-30 RX ORDER — PREDNISONE 20 MG/1
40 TABLET ORAL DAILY
Status: DISCONTINUED | OUTPATIENT
Start: 2024-01-31 | End: 2024-02-02 | Stop reason: HOSPADM

## 2024-01-30 RX ORDER — POTASSIUM CHLORIDE 7.45 MG/ML
10 INJECTION INTRAVENOUS PRN
Status: DISCONTINUED | OUTPATIENT
Start: 2024-01-30 | End: 2024-02-02 | Stop reason: HOSPADM

## 2024-01-30 RX ORDER — POLYETHYLENE GLYCOL 3350 17 G/17G
17 POWDER, FOR SOLUTION ORAL DAILY PRN
Status: DISCONTINUED | OUTPATIENT
Start: 2024-01-30 | End: 2024-02-02 | Stop reason: HOSPADM

## 2024-01-30 RX ORDER — PRAMIPEXOLE DIHYDROCHLORIDE 0.5 MG/1
0.25 TABLET ORAL 4 TIMES DAILY
Status: DISCONTINUED | OUTPATIENT
Start: 2024-01-30 | End: 2024-02-02 | Stop reason: HOSPADM

## 2024-01-30 RX ORDER — POTASSIUM CHLORIDE 20 MEQ/1
40 TABLET, EXTENDED RELEASE ORAL PRN
Status: DISCONTINUED | OUTPATIENT
Start: 2024-01-30 | End: 2024-02-02 | Stop reason: HOSPADM

## 2024-01-30 RX ORDER — PANTOPRAZOLE SODIUM 40 MG/1
40 TABLET, DELAYED RELEASE ORAL
Status: DISCONTINUED | OUTPATIENT
Start: 2024-01-30 | End: 2024-02-02 | Stop reason: HOSPADM

## 2024-01-30 RX ORDER — MAGNESIUM SULFATE IN WATER 40 MG/ML
2000 INJECTION, SOLUTION INTRAVENOUS PRN
Status: DISCONTINUED | OUTPATIENT
Start: 2024-01-30 | End: 2024-02-02 | Stop reason: HOSPADM

## 2024-01-30 RX ORDER — INSULIN LISPRO 100 [IU]/ML
4 INJECTION, SOLUTION INTRAVENOUS; SUBCUTANEOUS ONCE
Status: COMPLETED | OUTPATIENT
Start: 2024-01-30 | End: 2024-01-30

## 2024-01-30 RX ORDER — ROFLUMILAST 500 UG/1
500 TABLET ORAL DAILY
Status: DISCONTINUED | OUTPATIENT
Start: 2024-01-30 | End: 2024-02-02 | Stop reason: HOSPADM

## 2024-01-30 RX ORDER — ALBUTEROL SULFATE 2.5 MG/3ML
2.5 SOLUTION RESPIRATORY (INHALATION) EVERY 4 HOURS PRN
Status: DISCONTINUED | OUTPATIENT
Start: 2024-01-30 | End: 2024-02-02 | Stop reason: HOSPADM

## 2024-01-30 RX ORDER — AZITHROMYCIN 250 MG/1
250 TABLET, FILM COATED ORAL DAILY
Status: COMPLETED | OUTPATIENT
Start: 2024-01-30 | End: 2024-02-01

## 2024-01-30 RX ORDER — LAMOTRIGINE 25 MG/1
75 TABLET ORAL 2 TIMES DAILY
Status: DISCONTINUED | OUTPATIENT
Start: 2024-01-30 | End: 2024-02-02 | Stop reason: HOSPADM

## 2024-01-30 RX ORDER — METHYLPREDNISOLONE SODIUM SUCCINATE 40 MG/ML
40 INJECTION, POWDER, LYOPHILIZED, FOR SOLUTION INTRAMUSCULAR; INTRAVENOUS EVERY 8 HOURS
Status: DISCONTINUED | OUTPATIENT
Start: 2024-01-30 | End: 2024-01-30

## 2024-01-30 RX ORDER — SODIUM CHLORIDE 0.9 % (FLUSH) 0.9 %
5-40 SYRINGE (ML) INJECTION PRN
Status: DISCONTINUED | OUTPATIENT
Start: 2024-01-30 | End: 2024-02-02 | Stop reason: HOSPADM

## 2024-01-30 RX ORDER — DEXTROSE MONOHYDRATE 100 MG/ML
INJECTION, SOLUTION INTRAVENOUS CONTINUOUS PRN
Status: DISCONTINUED | OUTPATIENT
Start: 2024-01-30 | End: 2024-02-02 | Stop reason: HOSPADM

## 2024-01-30 RX ADMIN — INSULIN LISPRO 4 UNITS: 100 INJECTION, SOLUTION INTRAVENOUS; SUBCUTANEOUS at 21:32

## 2024-01-30 RX ADMIN — INSULIN GLARGINE 15 UNITS: 100 INJECTION, SOLUTION SUBCUTANEOUS at 03:07

## 2024-01-30 RX ADMIN — FUROSEMIDE 40 MG: 10 INJECTION, SOLUTION INTRAMUSCULAR; INTRAVENOUS at 17:26

## 2024-01-30 RX ADMIN — LAMOTRIGINE 75 MG: 25 TABLET ORAL at 21:08

## 2024-01-30 RX ADMIN — VANCOMYCIN HYDROCHLORIDE 1500 MG: 1.5 INJECTION, POWDER, LYOPHILIZED, FOR SOLUTION INTRAVENOUS at 03:05

## 2024-01-30 RX ADMIN — ALBUTEROL SULFATE 5 MG: 2.5 SOLUTION RESPIRATORY (INHALATION) at 00:00

## 2024-01-30 RX ADMIN — INSULIN LISPRO 4 UNITS: 100 INJECTION, SOLUTION INTRAVENOUS; SUBCUTANEOUS at 21:33

## 2024-01-30 RX ADMIN — INSULIN GLARGINE 10 UNITS: 100 INJECTION, SOLUTION SUBCUTANEOUS at 17:24

## 2024-01-30 RX ADMIN — GABAPENTIN 800 MG: 400 CAPSULE ORAL at 11:53

## 2024-01-30 RX ADMIN — GABAPENTIN 800 MG: 400 CAPSULE ORAL at 08:54

## 2024-01-30 RX ADMIN — PRAMIPEXOLE DIHYDROCHLORIDE 0.25 MG: 0.5 TABLET ORAL at 21:08

## 2024-01-30 RX ADMIN — INSULIN LISPRO 12 UNITS: 100 INJECTION, SOLUTION INTRAVENOUS; SUBCUTANEOUS at 11:53

## 2024-01-30 RX ADMIN — GABAPENTIN 800 MG: 400 CAPSULE ORAL at 17:25

## 2024-01-30 RX ADMIN — PANTOPRAZOLE SODIUM 40 MG: 40 TABLET, DELAYED RELEASE ORAL at 03:06

## 2024-01-30 RX ADMIN — PRAMIPEXOLE DIHYDROCHLORIDE 0.25 MG: 0.5 TABLET ORAL at 11:53

## 2024-01-30 RX ADMIN — APIXABAN 5 MG: 5 TABLET, FILM COATED ORAL at 08:54

## 2024-01-30 RX ADMIN — PRAMIPEXOLE DIHYDROCHLORIDE 0.25 MG: 0.5 TABLET ORAL at 17:24

## 2024-01-30 RX ADMIN — PRAMIPEXOLE DIHYDROCHLORIDE 0.25 MG: 0.5 TABLET ORAL at 08:56

## 2024-01-30 RX ADMIN — APIXABAN 5 MG: 5 TABLET, FILM COATED ORAL at 21:32

## 2024-01-30 RX ADMIN — METHYLPREDNISOLONE SODIUM SUCCINATE 40 MG: 40 INJECTION INTRAMUSCULAR; INTRAVENOUS at 06:38

## 2024-01-30 RX ADMIN — CARVEDILOL 3.12 MG: 3.12 TABLET, FILM COATED ORAL at 17:25

## 2024-01-30 RX ADMIN — SODIUM CHLORIDE, PRESERVATIVE FREE 10 ML: 5 INJECTION INTRAVENOUS at 21:35

## 2024-01-30 RX ADMIN — ASPIRIN 81 MG 81 MG: 81 TABLET ORAL at 08:54

## 2024-01-30 RX ADMIN — ROFLUMILAST 500 MCG: 500 TABLET ORAL at 09:00

## 2024-01-30 RX ADMIN — CARVEDILOL 3.12 MG: 3.12 TABLET, FILM COATED ORAL at 08:55

## 2024-01-30 RX ADMIN — CEFTRIAXONE SODIUM 2000 MG: 2 INJECTION, POWDER, FOR SOLUTION INTRAMUSCULAR; INTRAVENOUS at 11:52

## 2024-01-30 RX ADMIN — ATORVASTATIN CALCIUM 20 MG: 20 TABLET, FILM COATED ORAL at 21:08

## 2024-01-30 RX ADMIN — LAMOTRIGINE 75 MG: 25 TABLET ORAL at 08:53

## 2024-01-30 RX ADMIN — SODIUM CHLORIDE, PRESERVATIVE FREE 10 ML: 5 INJECTION INTRAVENOUS at 08:57

## 2024-01-30 RX ADMIN — ALBUTEROL SULFATE 2.5 MG: 2.5 SOLUTION RESPIRATORY (INHALATION) at 02:01

## 2024-01-30 RX ADMIN — APIXABAN 5 MG: 5 TABLET, FILM COATED ORAL at 03:06

## 2024-01-30 RX ADMIN — IPRATROPIUM BROMIDE AND ALBUTEROL SULFATE 1 DOSE: 2.5; .5 SOLUTION RESPIRATORY (INHALATION) at 00:00

## 2024-01-30 RX ADMIN — GABAPENTIN 800 MG: 400 CAPSULE ORAL at 21:08

## 2024-01-30 RX ADMIN — AZITHROMYCIN 250 MG: 250 TABLET, FILM COATED ORAL at 11:54

## 2024-01-30 RX ADMIN — PANTOPRAZOLE SODIUM 40 MG: 40 TABLET, DELAYED RELEASE ORAL at 06:38

## 2024-01-30 RX ADMIN — FUROSEMIDE 40 MG: 10 INJECTION, SOLUTION INTRAMUSCULAR; INTRAVENOUS at 08:52

## 2024-01-30 RX ADMIN — INSULIN LISPRO 12 UNITS: 100 INJECTION, SOLUTION INTRAVENOUS; SUBCUTANEOUS at 09:04

## 2024-01-30 RX ADMIN — INSULIN LISPRO 16 UNITS: 100 INJECTION, SOLUTION INTRAVENOUS; SUBCUTANEOUS at 17:24

## 2024-01-30 ASSESSMENT — PAIN SCALES - GENERAL: PAINLEVEL_OUTOF10: 0

## 2024-01-30 NOTE — ED PROVIDER NOTES
Apply topically As needed for pain    METFORMIN (GLUCOPHAGE) 1000 MG TABLET    Take 1 tablet by mouth 2 times daily (with meals)    METHOCARBAMOL (ROBAXIN) 500 MG TABLET    Take 1 tablet by mouth 3 times daily as needed (spasms)    MULTIPLE VITAMINS-MINERALS (PRESERVISION AREDS 2+MULTI VIT PO)    Take 1 tablet by mouth in the morning and at bedtime    MULTIPLE VITAMINS-MINERALS (THERAPEUTIC MULTIVITAMIN-MINERALS) TABLET    Take 1 tablet by mouth daily    POLYETHYLENE GLYCOL (GLYCOLAX) 17 GM/SCOOP POWDER    Take 17 g by mouth 2 times daily as needed    PRAMIPEXOLE (MIRAPEX) 0.125 MG TABLET    Take 2 tablets by mouth at bedtime    PRAMIPEXOLE (MIRAPEX) 0.25 MG TABLET    Take 1 tablet by mouth in the morning, at noon, in the evening, and at bedtime    RESPIRATORY THERAPY SUPPLIES (NEBULIZER/TUBING/MOUTHPIECE) KIT    With full face mask.    ROFLUMILAST (DALIRESP) 500 MCG TABLET    Take 1 tablet by mouth daily    SIMVASTATIN (ZOCOR) 80 MG TABLET    Take 0.5 tablets by mouth nightly    VITAMIN D (CHOLECALCIFEROL) 1000 UNITS TABS TABLET    Take 1 tablet by mouth daily       ALLERGIES     No known allergies    FAMILYHISTORY       Family History   Problem Relation Age of Onset    Brain Cancer Mother     Heart Attack Father     Heart Attack Brother         SOCIAL HISTORY       Social History     Tobacco Use    Smoking status: Former     Current packs/day: 0.00     Average packs/day: 1.5 packs/day for 37.2 years (55.7 ttl pk-yrs)     Types: Cigarettes     Start date: 1959     Quit date: 3/1/1996     Years since quittin.9    Smokeless tobacco: Never   Vaping Use    Vaping Use: Never used   Substance Use Topics    Alcohol use: No    Drug use: No       SCREENINGS        Jose Coma Scale  Eye Opening: Spontaneous  Best Verbal Response: Oriented  Best Motor Response: Obeys commands  Jose Coma Scale Score: 15                CIWA Assessment  BP: (!) 152/89  Pulse: (!) 102      CURB-65 Score  Confusion: No  BUN > 19  below findings:        Interpretation per the Radiologist below, if available at the time of this note:    XR CHEST PORTABLE   Final Result   Mild patchy opacities in both lower lung fields and platelike atelectasis,   slightly less prominent than the prior study and favored to represent   improving pulmonary edema and/or superimposed pneumonia.      Trace bilateral pleural effusion.           No results found.    No results found.    PROCEDURES   Unless otherwise noted below, none     Procedures    CRITICAL CARE TIME (.cctime)   I personally saw the patient and independently provided 32 minutes of non-concurrent critical care out of the total shared critical care time provided. This excludes time spent doing separately billable procedures.  This includes time at the bedside, data interpretation, medication management, obtaining critical history from collateral sources if the patient is unable to provide it directly, and physician consultation.  Specifics of interventions taken and potentially life-threatening diagnostic considerations are listed above in the medical decision making.  If this was a shared visit with a Physician the time in this attestation is non-concurrent critical care time out of the total shared critical care time provided by the Physician and myself.      PAST MEDICAL HISTORY      has a past medical history of Abrasion of right elbow (2/4/2023), Arthritis, CAD (coronary artery disease), Chronic back pain, Chronic systolic CHF (congestive heart failure) (HCC), COPD (chronic obstructive pulmonary disease) (HCC), Dental disease, Diabetes mellitus (HCC), Essential hypertension, Hearing loss, Paroxysmal atrial fibrillation (HCC), and Tinnitus.     Chronic Conditions affecting Care: above    EMERGENCY DEPARTMENT COURSE and DIFFERENTIAL DIAGNOSIS/MDM:   Vitals:    Vitals:    01/29/24 2143 01/29/24 2219 01/29/24 2230 01/30/24 0000   BP:   (!) 152/89    Pulse:   96 (!) 102   Resp:   19 20   Temp:

## 2024-01-30 NOTE — PROGRESS NOTES
4 Eyes Skin Assessment     NAME:  Jorge Gauthier  YOB: 1945  MEDICAL RECORD NUMBER:  [id number]    The patient is being assessed for  Admission    I agree that at least one RN has performed a thorough Head to Toe Skin Assessment on the patient. ALL assessment sites listed below have been assessed.      Areas assessed by both nurses:    Head, Face, Ears, Shoulders, Back, Chest, Arms, Elbows, Hands, Sacrum. Buttock, Coccyx, Ischium, Legs. Feet and Heels, and Under Medical Devices         Does the Patient have a Wound? No noted wound(s)       Jose Alfredo Prevention initiated by RN: Yes  Wound Care Orders initiated by RN: No    Pressure Injury (Stage 3,4, Unstageable, DTI, NWPT, and Complex wounds) if present, place Wound referral order by RN under : No    New Ostomies, if present place, Ostomy referral order under : No     Nurse 1 eSignature: Electronically signed by Madhavi Kruse RN on 1/30/24 at 1:31 AM EST      Nurse 2 eSignature: Electronically signed by Leonie Eddy RN on 1/30/24 at 2:13 AM EST

## 2024-01-30 NOTE — PROGRESS NOTES
Patient states he wants water, tissues, and wants to order breakfast. This RN walked in to give patient blankets due to patient yelling at this RN that his room is cold. He started he no longer needed blankets. This RN placed tele monitor back on. Patient continued to ask if I put monitor back on correctly. Patient then stated I need to come back over and place his oxygen back on him. Patient continued to speak aggressively towards this RN stating staff has done nothing for the patient.

## 2024-01-30 NOTE — PROGRESS NOTES
Pt educated on importance of fluid restriction with CHF diagnosis. Pt stated he would \"sip only a little when thirsty\".

## 2024-01-30 NOTE — H&P
V2.0  History and Physical      Name:  Jorge Gauthier /Age/Sex: 1945  (78 y.o. male)   MRN & CSN:  [id number] & [id number] Encounter Date/Time: 2024 11:44 PM EST   Location:   PCP: Gallo Sanchez       Assessment and Plan:   Acute hypoxic respiratory failure likely due to COPD exacerbation: Cefepime was started in the ED, will continue, inhalers, steroids, wean off oxygen    Diabetes mellitus type 2: lantus, ISS, frequent blood sugar checks    Paroxysmal A-fib: Beta-blocker, Eliquis    History of coronary artery disease: Aspirin, statin, beta-blocker    History of CHF: Not in exacerbation    Admit: inpt  Code: full  DVT proph: SCD, eliquis      History of Present Illness:     Chief Complaint: SOB  Jorge Gauthier is a 78 y.o. male who is presenting with progressive shortness of breath that started today.  Patient has history of COPD and he does not use oxygen at baseline.  When I saw the patient he was comfortable in bed, awake alert oriented x 3 on 2 L oxygen nasal cannula.  Patient reports progressive cough and wheezing.  He denies fever, chills, increasing leg swelling or weight, nausea vomiting, abdominal pain, dysuria and bleeding.    In the ED vitals were stable.  Labs showed blood sugar 172, proBNP 612, troponin 28>>28, white count 11.8, procalcitonin 0.10, lactate unremarkable, COVID and flu test unremarkable.  EKG without acute ST changes.  UA negative for infection.  Venous gas showed pH 7.38 and pCO2 52.8.  Chest x-ray showed possible pneumonia.  Patient received 1 dose of cefepime, tobramycin, inhalers and methylprednisone 125 mg IV.     Review of Systems:        Pertinent positives and negatives discussed in HPI     Objective:   No intake or output data in the 24 hours ending 24 2344   Vitals:   Vitals:    24 2140 24 2143 01/29/24 2219 24 2230   BP:    (!) 152/89   Pulse: 96   96   Resp: 18   19   Temp:       TempSrc:       SpO2: 98% 97%  98%   Weight:    pulmonary disease) (HCC)     Dental disease     Diabetes mellitus (HCC)     Essential hypertension     Hearing loss     Paroxysmal atrial fibrillation (HCC)     On Coumadin    Tinnitus      PSHX:  has a past surgical history that includes fracture surgery (Left, ); back surgery (); Cardiac surgery (); Coronary artery bypass graft; back surgery; Cervical spine surgery; chest tube insertion (Right, 2023); and IR CHEST TUBE INSERTION (3/14/2023).  Allergies:   Allergies   Allergen Reactions    No Known Allergies      Fam HX:  family history includes Brain Cancer in his mother; Heart Attack in his brother and father.  Soc HX:   Social History     Socioeconomic History    Marital status:      Spouse name: None    Number of children: None    Years of education: None    Highest education level: None   Tobacco Use    Smoking status: Former     Current packs/day: 0.00     Average packs/day: 1.5 packs/day for 37.2 years (55.7 ttl pk-yrs)     Types: Cigarettes     Start date: 1959     Quit date: 3/1/1996     Years since quittin.9    Smokeless tobacco: Never   Vaping Use    Vaping Use: Never used   Substance and Sexual Activity    Alcohol use: No    Drug use: No    Sexual activity: Not Currently     Social Determinants of Health     Food Insecurity: No Food Insecurity (2024)    Hunger Vital Sign     Worried About Running Out of Food in the Last Year: Never true     Ran Out of Food in the Last Year: Never true   Transportation Needs: No Transportation Needs (2024)    PRAPARE - Transportation     Lack of Transportation (Medical): No     Lack of Transportation (Non-Medical): No   Housing Stability: Low Risk  (2024)    Housing Stability Vital Sign     Unable to Pay for Housing in the Last Year: No     Number of Places Lived in the Last Year: 1     Unstable Housing in the Last Year: No       Medications:   Medications:    cefepime  2,000 mg IntraVENous Once    And    tobramycin  5  mg/kg IntraVENous Once    ipratropium 0.5 mg-albuterol 2.5 mg  1 Dose Inhalation Once    albuterol  5 mg Nebulization Once      Infusions:   PRN Meds:      Labs      CBC:   Recent Labs     01/29/24 2136   WBC 11.8*   HGB 12.6*        BMP:    Recent Labs     01/29/24 2136   *   K 4.4   CL 99   CO2 27   BUN 22*   CREATININE 1.0   GLUCOSE 172*     Hepatic:   Recent Labs     01/29/24 2136   AST 16   ALT 15   BILITOT 0.3   ALKPHOS 62     Lipids:   Lab Results   Component Value Date/Time    CHOL 138 10/20/2011 10:53 AM    HDL 46 10/20/2011 10:53 AM    TRIG 149 10/20/2011 10:53 AM     Hemoglobin A1C:   Lab Results   Component Value Date/Time    LABA1C 7.5 01/13/2024 03:05 AM     TSH:   Lab Results   Component Value Date/Time    TSH 3.05 10/20/2011 10:53 AM     Troponin: No results found for: \"TROPONINT\"  Lactic Acid: No results for input(s): \"LACTA\" in the last 72 hours.  BNP:   Recent Labs     01/29/24 2136   PROBNP 612*     UA:  Lab Results   Component Value Date/Time    NITRU Negative 01/29/2024 10:20 PM    COLORU Yellow 01/29/2024 10:20 PM    PHUR 7.5 01/29/2024 10:20 PM    WBCUA 1 11/13/2023 11:34 AM    RBCUA 3 11/13/2023 11:34 AM    BACTERIA None Seen 11/13/2023 11:34 AM    CLARITYU Clear 01/29/2024 10:20 PM    SPECGRAV 1.027 01/29/2024 10:20 PM    LEUKOCYTESUR Negative 01/29/2024 10:20 PM    UROBILINOGEN 0.2 01/29/2024 10:20 PM    BILIRUBINUR Negative 01/29/2024 10:20 PM    BLOODU Negative 01/29/2024 10:20 PM    GLUCOSEU >=1000 01/29/2024 10:20 PM    KETUA Negative 01/29/2024 10:20 PM     Urine Cultures:   Lab Results   Component Value Date/Time    LABURIN No growth at 18-36 hours 07/11/2018 01:27 AM     Blood Cultures:   Lab Results   Component Value Date/Time    BC No Growth after 4 days of incubation. 01/12/2024 11:55 PM     Lab Results   Component Value Date/Time    BLOODCULT2 No Growth after 4 days of incubation. 01/13/2024 03:05 AM     Organism:   Lab Results   Component Value Date/Time    ORG

## 2024-01-30 NOTE — ED PROVIDER NOTES
I did not perform an evaluation of Jorge Gauthier but was asked to review his EKG.     EKG interpreted by myself.   Sinus rhythm with PACs and a rate of 100, normal axis, , QRS 98, QTc 441, no ST elevations, no ST depressions, poor R wave progression, T wave inversions inferior leads, compared EKG from 1/12/2024 no acute morphologic changes and PACs now present       Abad Alatorre MD  01/29/24 8720

## 2024-01-30 NOTE — ACP (ADVANCE CARE PLANNING)
Advance Care Planning     Advance Care Planning Activator (Inpatient)  Conversation Note      Date of ACP Conversation: 1/30/2024     Conversation Conducted with: Patient with Decision Making Capacity    ACP Activator: RENETTA Mercado      Health Care Decision Maker:     Current Designated Health Care Decision Maker:     Primary Decision Maker: Portia Gauthier - Spouse - [phone]    Secondary Decision Maker: aBng Gauthier Jr - Child - [phone]    Care Preferences    Ventilation:  \"If you were in your present state of health and suddenly became very ill and were unable to breathe on your own, what would your preference be about the use of a ventilator (breathing machine) if it were available to you?\"      Would the patient desire the use of ventilator (breathing machine)?: no    \"If your health worsens and it becomes clear that your chance of recovery is unlikely, what would your preference be about the use of a ventilator (breathing machine) if it were available to you?\"     Would the patient desire the use of ventilator (breathing machine)?: No      Resuscitation  \"CPR works best to restart the heart when there is a sudden event, like a heart attack, in someone who is otherwise healthy. Unfortunately, CPR does not typically restart the heart for people who have serious health conditions or who are very sick.\"    \"In the event your heart stopped as a result of an underlying serious health condition, would you want attempts to be made to restart your heart (answer \"yes\" for attempt to resuscitate) or would you prefer a natural death (answer \"no\" for do not attempt to resuscitate)?\" no       [] Yes   [x] No   Educated Patient / Decision Maker regarding differences between Advance Directives and portable DNR orders.    Length of ACP Conversation in minutes:  5    Conversation Outcomes:  ACP discussion completed    Follow-up plan:    [x] Schedule follow-up conversation to continue planning  [] Referred

## 2024-01-30 NOTE — FLOWSHEET NOTE
Patient on lasix and has CHF in history.  Stated that his doctors told him to drink plenty of water.  Instructed that with CHF his fluids should be restricted and that the purpose of taking lasix is to pull extra water out of his body to take the strain of his heart.  Patient still insisting that his cardiologist told him to drink plenty of water.

## 2024-01-30 NOTE — PLAN OF CARE
Problem: Discharge Planning  Goal: Discharge to home or other facility with appropriate resources  1/30/2024 1041 by Ronit Schuster RN  Outcome: Progressing  Flowsheets (Taken 1/30/2024 0817)  Discharge to home or other facility with appropriate resources: Identify barriers to discharge with patient and caregiver  1/30/2024 0223 by Madhavi Kruse RN  Outcome: Progressing  Flowsheets  Taken 1/30/2024 0215  Discharge to home or other facility with appropriate resources:   Identify barriers to discharge with patient and caregiver   Arrange for needed discharge resources and transportation as appropriate  Taken 1/30/2024 0130  Discharge to home or other facility with appropriate resources:   Identify barriers to discharge with patient and caregiver   Arrange for needed discharge resources and transportation as appropriate     Problem: Skin/Tissue Integrity  Goal: Absence of new skin breakdown  Description: 1.  Monitor for areas of redness and/or skin breakdown  2.  Assess vascular access sites hourly  3.  Every 4-6 hours minimum:  Change oxygen saturation probe site  4.  Every 4-6 hours:  If on nasal continuous positive airway pressure, respiratory therapy assess nares and determine need for appliance change or resting period.  1/30/2024 1041 by Ronit Schuster RN  Outcome: Progressing  1/30/2024 0223 by Madhavi Kruse RN  Outcome: Progressing     Problem: Safety - Adult  Goal: Free from fall injury  1/30/2024 1041 by Ronit Schuster RN  Outcome: Progressing  1/30/2024 0223 by Madhavi Kruse RN  Outcome: Progressing     Problem: ABCDS Injury Assessment  Goal: Absence of physical injury  1/30/2024 1041 by Ronit Schuster RN  Outcome: Progressing  1/30/2024 0223 by Madhavi Kruse RN  Outcome: Progressing     Problem: Neurosensory - Adult  Goal: Achieves stable or improved neurological status  1/30/2024 1041 by Ronit Schuster RN  Outcome: Progressing  Flowsheets (Taken 1/30/2024 0817)  Achieves stable or improved

## 2024-01-30 NOTE — PROGRESS NOTES
Pharmacy Note - Ceftriaxone adjustment made per [facility] protocol    Original order:  Ceftriaxone 1,000mg IV Q 24 hours    Estimated Creatinine Clearance: 67 mL/min (based on SCr of 1.1 mg/dL).    Weight: 104 kg    Recent Labs     01/29/24  2136 01/30/24  0518   BUN 22* 24*   CREATININE 1.0 1.1       Renally adjusted order:  Ceftriaxone 2,000mg IV Q 24 hours for weight > 100kg    Please call pharmacy with any questions.    Thank you,  Lili Coats [facility]  1/30/2024 9:03 AM

## 2024-01-30 NOTE — CARE COORDINATION
Case Management Assessment  Initial Evaluation    Date/Time of Evaluation: 1/30/2024 12:00 PM  Assessment Completed by: RENETTA Mercado    If patient is discharged prior to next notation, then this note serves as note for discharge by case management.    Patient Name: Jorge Gauthier                   YOB: 1945  Diagnosis: ARF (acute respiratory failure) ([facility]) [J96.00]  COPD exacerbation ([facility]) [J44.1]  Acute respiratory failure with hypoxia and hypercapnia ([facility]) [J96.01, J96.02]  Pneumonia due to infectious organism, unspecified laterality, unspecified part of lung [J18.9]                   Date / Time: 1/29/2024  8:27 PM    Patient Admission Status: Inpatient   Readmission Risk (Low < 19, Mod (19-27), High > 27): Readmission Risk Score: 28.7    Current PCP: Gallo Sanchez  PCP verified by CM? Yes    Chart Reviewed: Yes      History Provided by: Patient  Patient Orientation: Alert and Oriented, Person, Place, Situation, Self    Patient Cognition: Alert    Hospitalization in the last 30 days (Readmission):  Yes    If yes, Readmission Assessment in  Navigator will be completed.    Advance Directives:      Code Status: Full Code   Patient's Primary Decision Maker is: Legal Next of Kin    Primary Decision Maker: Portia Gauthier - Spouse - [phone]    Secondary Decision Maker: Bang Gauthier Jr - Child - [phone]    Discharge Planning:    Patient lives with: Spouse/Significant Other Type of Home: House  Primary Care Giver: Self  Patient Support Systems include: Spouse/Significant Other, Children   Current Financial resources: Medicare  Current community resources: ECF/Home Care (Active with [facility] for PT/RN)  Current services prior to admission: Durable Medical Equipment, Home Care            Current DME: (P) Other (Comment), Walker (Walk in shower, built in shower chair, electric scooter, nebulizer)            Type of Home Care services:  (P) PT, Nursing Services    ADLS  Prior functional level:

## 2024-01-30 NOTE — CARE COORDINATION
01/30/24 1204   Readmission Assessment   Number of Days since last admission? 8-30 days   Previous Disposition Home with Home Health   Who is being Interviewed Patient   What was the patient's/caregiver's perception as to why they think they needed to return back to the hospital? Other (Comment)  (Breathing difficulty)   Did you visit your Primary Care Physician after you left the hospital, before you returned this time? Yes   Did you see a specialist, such as Cardiac, Pulmonary, Orthopedic Physician, etc. after you left the hospital? No   Who advised the patient to return to the hospital? Self-referral   Does the patient report anything that got in the way of taking their medications? No   In our efforts to provide the best possible care to you and others like you, can you think of anything that we could have done to help you after you left the hospital the first time, so that you might not have needed to return so soon? Other (Comment)  (\"No I had a good time here\")     RENETTA Franks [facility]  [phone]  Electronically signed by RENETTA Mercado on 1/30/2024 at 12:05 PM

## 2024-01-30 NOTE — ED NOTES
ED handoff report provided to PETRA Hendrickson. Patient to be transported to Room 4107 via stretcher. IV site clean, dry, and intact. Vitals stable. Patient updated on plan of care. All questions answered.

## 2024-01-30 NOTE — PROGRESS NOTES
Hospitalist Progress Note      PCP: Gallo Sanchez    Date of Admission: 1/29/2024    Chief Complaint: shortness of breath    Hospital Course: Jorge Gauthier is a 78 y.o. male who is presenting with progressive shortness of breath that started today.  Patient has history of COPD and he does not use oxygen at baseline.  When I saw the patient he was comfortable in bed, awake alert oriented x 3 on 2 L oxygen nasal cannula.  Patient reports progressive cough and wheezing.  He denies fever, chills, increasing leg swelling or weight, nausea vomiting, abdominal pain, dysuria and bleeding.     In the ED vitals were stable.  Labs showed blood sugar 172, proBNP 612, troponin 28>>28, white count 11.8, procalcitonin 0.10, lactate unremarkable, COVID and flu test unremarkable.  EKG without acute ST changes.  UA negative for infection.  Venous gas showed pH 7.38 and pCO2 52.8.  Chest x-ray showed possible pneumonia.  Patient received 1 dose of cefepime, tobramycin, inhalers and methylprednisone 125 mg IV.     Appeared volume overloaded on exam.  Was started on IV diuresis.  Procalcitonin was negative, antibiotics adjusted to ceftriaxone/azithromycin to cover for possible COPD exacerbation.    Subjective: Patient seen sitting in chair, currently on 2 L of cannula.  Patient states he felt better after he got discharged from the hospital and then all of a sudden felt short of breath again the day prior to admission around 4 AM.  States his legs have been swelling, normally they are skinny.  States shortness of breath is worse lying flat.  Also having some dyspnea on exertion with movement to the bathroom.  Denies any chest pain, no fevers chills or other infectious symptoms.  Patient states he does not weigh himself every day but is usually around 217-2 22    Assessment/Plan:  Acute hypoxic respiratory failure  COPD exacerbation  -Initially satting 88% on room air.  Currently quiring 2 L nasal cannula, no oxygen at  39.7* 41.3    293     Recent Labs     01/29/24 2136 01/30/24  0518   * 140   K 4.4 5.1   CL 99 101   CO2 27 26   BUN 22* 24*   CREATININE 1.0 1.1   CALCIUM 8.9 9.1     Recent Labs     01/29/24 2136   AST 16   ALT 15   BILITOT 0.3   ALKPHOS 62     Recent Labs     01/29/24 2136   INR 0.97     No results for input(s): \"CKTOTAL\", \"TROPONINI\" in the last 72 hours.    Urinalysis:      Lab Results   Component Value Date/Time    NITRU Negative 01/29/2024 10:20 PM    WBCUA 1 11/13/2023 11:34 AM    BACTERIA None Seen 11/13/2023 11:34 AM    RBCUA 3 11/13/2023 11:34 AM    BLOODU Negative 01/29/2024 10:20 PM    SPECGRAV 1.027 01/29/2024 10:20 PM    GLUCOSEU >=1000 01/29/2024 10:20 PM       Radiology:  XR CHEST PORTABLE   Final Result   Mild patchy opacities in both lower lung fields and platelike atelectasis,   slightly less prominent than the prior study and favored to represent   improving pulmonary edema and/or superimposed pneumonia.      Trace bilateral pleural effusion.                 DVT Prophylaxis: Resume home Eliquis  Diet: ADULT DIET; Regular; 3 carb choices (45 gm/meal); No Added Salt (3-4 gm); 2000 ml  Code Status: Full Code    PT/OT Eval Status: Ordered    Dispo -Home with home health care    Spring Steele PA-C      NOTE:  This report was transcribed using voice recognition software.  Every effort was made to ensure accuracy; however, inadvertent computerized transcription errors may be present.

## 2024-01-30 NOTE — CARE COORDINATION
[facility]  Patient is active with [facility] for nurse and PT.   Will follow for discharge to home with orders to resume care.      Raphael Rodrigez RN, BSN CTN  [facility] [phone]

## 2024-01-30 NOTE — PLAN OF CARE
Problem: Discharge Planning  Goal: Discharge to home or other facility with appropriate resources  Outcome: Progressing  Flowsheets  Taken 1/30/2024 0215  Discharge to home or other facility with appropriate resources:   Identify barriers to discharge with patient and caregiver   Arrange for needed discharge resources and transportation as appropriate  Taken 1/30/2024 0130  Discharge to home or other facility with appropriate resources:   Identify barriers to discharge with patient and caregiver   Arrange for needed discharge resources and transportation as appropriate     Problem: Skin/Tissue Integrity  Goal: Absence of new skin breakdown  Description: 1.  Monitor for areas of redness and/or skin breakdown  2.  Assess vascular access sites hourly  3.  Every 4-6 hours minimum:  Change oxygen saturation probe site  4.  Every 4-6 hours:  If on nasal continuous positive airway pressure, respiratory therapy assess nares and determine need for appliance change or resting period.  Outcome: Progressing     Problem: Safety - Adult  Goal: Free from fall injury  Outcome: Progressing     Problem: ABCDS Injury Assessment  Goal: Absence of physical injury  Outcome: Progressing     Problem: Neurosensory - Adult  Goal: Achieves stable or improved neurological status  Outcome: Progressing  Flowsheets (Taken 1/30/2024 0215)  Achieves stable or improved neurological status: Assess for and report changes in neurological status  Goal: Achieves maximal functionality and self care  Outcome: Progressing  Flowsheets (Taken 1/30/2024 0215)  Achieves maximal functionality and self care: Monitor swallowing and airway patency with patient fatigue and changes in neurological status     Problem: Respiratory - Adult  Goal: Achieves optimal ventilation and oxygenation  Outcome: Progressing  Flowsheets (Taken 1/30/2024 0215)  Achieves optimal ventilation and oxygenation: Assess for changes in respiratory status     Problem: Cardiovascular -  Adult  Goal: Maintains optimal cardiac output and hemodynamic stability  Outcome: Progressing  Flowsheets (Taken 1/30/2024 0215)  Maintains optimal cardiac output and hemodynamic stability: Monitor blood pressure and heart rate     Problem: Skin/Tissue Integrity - Adult  Goal: Skin integrity remains intact  Outcome: Progressing  Flowsheets (Taken 1/30/2024 0215)  Skin Integrity Remains Intact: Monitor for areas of redness and/or skin breakdown     Problem: Musculoskeletal - Adult  Goal: Return mobility to safest level of function  Outcome: Progressing  Flowsheets (Taken 1/30/2024 0215)  Return Mobility to Safest Level of Function: Assess patient stability and activity tolerance for standing, transferring and ambulating with or without assistive devices  Goal: Return ADL status to a safe level of function  Outcome: Progressing  Flowsheets (Taken 1/30/2024 0215)  Return ADL Status to a Safe Level of Function: Assess activities of daily living deficits and provide assistive devices as needed     Problem: Gastrointestinal - Adult  Goal: Maintains or returns to baseline bowel function  Outcome: Progressing  Flowsheets (Taken 1/30/2024 0215)  Maintains or returns to baseline bowel function: Assess bowel function  Goal: Maintains adequate nutritional intake  Outcome: Progressing  Flowsheets (Taken 1/30/2024 0215)  Maintains adequate nutritional intake: Monitor percentage of each meal consumed     Problem: Infection - Adult  Goal: Absence of infection at discharge  Outcome: Progressing  Flowsheets (Taken 1/30/2024 0215)  Absence of infection at discharge: Assess and monitor for signs and symptoms of infection     Problem: Metabolic/Fluid and Electrolytes - Adult  Goal: Electrolytes maintained within normal limits  Outcome: Progressing  Flowsheets (Taken 1/30/2024 0215)  Electrolytes maintained within normal limits: Monitor labs and assess patient for signs and symptoms of electrolyte imbalances

## 2024-01-30 NOTE — CONSULTS
Clinical Pharmacy Note  Vancomycin Consult    Pharmacy consult received for one-time dose of vancomycin in the Emergency Department per GRAHAM Hunt.    Ht Readings from Last 1 Encounters:   01/29/24 1.791 m (5' 10.5\")        Wt Readings from Last 1 Encounters:   01/29/24 106.3 kg (234 lb 5.6 oz)         Assessment/Plan:  Vancomycin 1500 mg x 1 in ED.  If vancomycin is to continue on admission and pharmacy is to manage dosing, please re-consult with admission orders.    Salty Guo, ShawnD

## 2024-01-30 NOTE — PROGRESS NOTES
Patient walked to bathroom. Patient started yelling at PCA that he could not breathe. That we do nothing for him. This RN went into patients room. Attempted to calm patient down. Patient started yelling at this RN saying we have done nothing for him. I explained to patient that we have been in the room frequently to go to bathroom, from bed to chair to bed to chair. Patient has been given three glasses of water, snacks, and turkey sandwich. Patient continues to yell at RN. Two other floor RN's came into room to check on this RN due to patient yelling so loudly. Patient told this RN to \"get the fuck out of my room.\"

## 2024-01-30 NOTE — PROGRESS NOTES
Patient walked to bathroom X1 walker. Patient states he is short of breath. Patients oxygen saturation is 93%. Patient able to calm down. Patient walked back to bed.

## 2024-01-30 NOTE — DISCHARGE INSTR - COC
Continuity of Care Form    Patient Name: Jorge Gauthier   :  1945  MRN:  [id number]    Admit date:  2024  Discharge date:  ***    Code Status Order: DNR-CCA   Advance Directives:     Admitting Physician:  Telly Subramanian DO  PCP: Gallo Sanchez    Discharging Nurse: ***  Discharging Hospital Unit/Room#: Q6G-1145/4107-01  Discharging Unit Phone Number: ***    Emergency Contact:   Extended Emergency Contact Information  Primary Emergency Contact: GauthierPortia huang  Address: [address] [place] [address]  Home Phone: [phone]  Work Phone: [phone]  Mobile Phone: [phone]  Relation: Spouse  Secondary Emergency Contact: GauthierBang huang Jr  Home Phone: [phone]  Relation: Child    Past Surgical History:  Past Surgical History:   Procedure Laterality Date    BACK SURGERY      BACK SURGERY      CARDIAC SURGERY      CERVICAL SPINE SURGERY      CHEST TUBE INSERTION Right 2023    14F. Dr. Jalloh    CORONARY ARTERY BYPASS GRAFT      FRACTURE SURGERY Left     Shoulder    IR CHEST TUBE INSERTION  3/14/2023    IR CHEST TUBE INSERTION 3/14/2023 [facility] PROCED[facility]       Immunization History:   Immunization History   Administered Date(s) Administered    COVID-19, MODERNA Bivalent, (age 12y+), IM, 50 mcg/0.5 mL 2022    Influenza Vaccine, unspecified formulation 10/22/2016    Influenza, AFLURIA (age 3 yrs+), FLUZONE, (age 6 mo+), MDV, 0.5mL 10/22/2016, 09/15/2017    Influenza, High Dose (Fluzone 65 yrs and older) 09/15/2017    Pneumococcal, PCV-13, PREVNAR 13, (age 6w+), IM, 0.5mL 2016    TDaP, ADACEL (age 10y-64y), BOOSTRIX (age 10y+), IM, 0.5mL 2018       Active Problems:  Patient Active Problem List   Diagnosis Code    Diabetes mellitus (HCC) E11.9    Atherosclerosis of native coronary artery of native heart without angina pectoris I25.10    Morbid obesity due to excess calories ([facility]) E66.01    SOB (shortness of breath) R06.02     LIMIT fluid intake to 48 - 64 ounces ( 1.5 - 2 liters) per day.     Call [facility] [phone] and Gallo Sanchez [phone] and/or [facility] Heart Failure Resource Line @ [phone] with any questions or concerns.   Please continue heart failure education to patient and family/support system.  See After Visit Summary for hospital follow up appointment details.  Consider spiritual care referral for support and/or completion of advance directives [phone].  Consider: [facility] telehealth program if patient agreeable and able to participate, palliative care consult for ongoing goals of care, end of life, and/or chronic disease management discussions, and referral to [facility] ([phone]) once SNF/HHC complete.      Patient's personal belongings (please select all that are sent with patient):  {P DME Belongings:157112050}    RN SIGNATURE:  {Esignature:289353448}    CASE MANAGEMENT/SOCIAL WORK SECTION    Inpatient Status Date: 1/29/2024    Readmission Risk Assessment Score:  Readmission Risk              Risk of Unplanned Readmission:  30         Discharging to Facility/ Agency   Name:  [facility]    Address: [address] [place] [address], [address] [place] [address]  Phone: [phone]  Fax: [phone]         / signature: Electronically signed by RENETTA Mercado on 2/2/24 at 9:23 AM EST    PHYSICIAN SECTION    Prognosis: {Prognosis:8641741513}    Condition at Discharge: { Patient Condition:525975715}    Rehab Potential (if transferring to Rehab): {Prognosis:4146663786}    Recommended Labs or Other Treatments After Discharge: ***    Physician Certification: I certify the above information and transfer of Jorge Gauthier  is necessary for the continuing treatment of the diagnosis listed and that he requires {Admit to Appropriate Level of Care:35620} for {GREATER/LESS:867306866} 30 days.     Update Admission H&P: {CHP DME Changes in

## 2024-01-30 NOTE — PROGRESS NOTES
Education given to patient about fluid overload. Patient continues to request water. Patient is not on restriction.

## 2024-01-30 NOTE — PROGRESS NOTES
Admitted patient to room 4107 from the Emergency Room with shortness of breath. VS recorded (see flowsheet). Patient's breathing regular and unlabored, denies pain. Oriented to room, call light, TV, phone, patient rights and responsibilities. Bed in lowest position and locked, exit alarm in place. Non-slip socks on. ID bracelet on and correct per pt verbally reporting name and date of birth. Call light within reach. Needed items within reach.

## 2024-01-31 LAB
ANION GAP SERPL CALCULATED.3IONS-SCNC: 10 MMOL/L (ref 3–16)
BUN SERPL-MCNC: 30 MG/DL (ref 7–20)
CALCIUM SERPL-MCNC: 8.9 MG/DL (ref 8.3–10.6)
CHLORIDE SERPL-SCNC: 97 MMOL/L (ref 99–110)
CO2 SERPL-SCNC: 32 MMOL/L (ref 21–32)
CREAT SERPL-MCNC: 1.1 MG/DL (ref 0.8–1.3)
GFR SERPLBLD CREATININE-BSD FMLA CKD-EPI: >60 ML/MIN/{1.73_M2}
GLUCOSE BLD-MCNC: 256 MG/DL (ref 70–99)
GLUCOSE BLD-MCNC: 280 MG/DL (ref 70–99)
GLUCOSE BLD-MCNC: 328 MG/DL (ref 70–99)
GLUCOSE BLD-MCNC: 477 MG/DL (ref 70–99)
GLUCOSE SERPL-MCNC: 281 MG/DL (ref 70–99)
PERFORMED ON: ABNORMAL
POTASSIUM SERPL-SCNC: 4 MMOL/L (ref 3.5–5.1)
SODIUM SERPL-SCNC: 139 MMOL/L (ref 136–145)

## 2024-01-31 PROCEDURE — 6360000002 HC RX W HCPCS

## 2024-01-31 PROCEDURE — 1200000000 HC SEMI PRIVATE

## 2024-01-31 PROCEDURE — 36415 COLL VENOUS BLD VENIPUNCTURE: CPT

## 2024-01-31 PROCEDURE — 94640 AIRWAY INHALATION TREATMENT: CPT

## 2024-01-31 PROCEDURE — 2700000000 HC OXYGEN THERAPY PER DAY

## 2024-01-31 PROCEDURE — 6370000000 HC RX 637 (ALT 250 FOR IP): Performed by: NURSE PRACTITIONER

## 2024-01-31 PROCEDURE — 97116 GAIT TRAINING THERAPY: CPT

## 2024-01-31 PROCEDURE — 6370000000 HC RX 637 (ALT 250 FOR IP): Performed by: ANESTHESIOLOGY

## 2024-01-31 PROCEDURE — 80048 BASIC METABOLIC PNL TOTAL CA: CPT

## 2024-01-31 PROCEDURE — 97530 THERAPEUTIC ACTIVITIES: CPT

## 2024-01-31 PROCEDURE — 6360000002 HC RX W HCPCS: Performed by: ANESTHESIOLOGY

## 2024-01-31 PROCEDURE — 97535 SELF CARE MNGMENT TRAINING: CPT

## 2024-01-31 PROCEDURE — 94761 N-INVAS EAR/PLS OXIMETRY MLT: CPT

## 2024-01-31 PROCEDURE — 97165 OT EVAL LOW COMPLEX 30 MIN: CPT

## 2024-01-31 PROCEDURE — 2580000003 HC RX 258: Performed by: ANESTHESIOLOGY

## 2024-01-31 PROCEDURE — 97162 PT EVAL MOD COMPLEX 30 MIN: CPT

## 2024-01-31 PROCEDURE — 94669 MECHANICAL CHEST WALL OSCILL: CPT

## 2024-01-31 PROCEDURE — 2580000003 HC RX 258

## 2024-01-31 PROCEDURE — 6370000000 HC RX 637 (ALT 250 FOR IP)

## 2024-01-31 RX ORDER — INSULIN LISPRO 100 [IU]/ML
4 INJECTION, SOLUTION INTRAVENOUS; SUBCUTANEOUS ONCE
Status: COMPLETED | OUTPATIENT
Start: 2024-01-31 | End: 2024-01-31

## 2024-01-31 RX ORDER — INSULIN GLARGINE 100 [IU]/ML
30 INJECTION, SOLUTION SUBCUTANEOUS DAILY
Status: DISCONTINUED | OUTPATIENT
Start: 2024-02-01 | End: 2024-02-01

## 2024-01-31 RX ADMIN — CARVEDILOL 3.12 MG: 3.12 TABLET, FILM COATED ORAL at 17:28

## 2024-01-31 RX ADMIN — POLYETHYLENE GLYCOL 3350 17 G: 17 POWDER, FOR SOLUTION ORAL at 09:13

## 2024-01-31 RX ADMIN — ROFLUMILAST 500 MCG: 500 TABLET ORAL at 09:26

## 2024-01-31 RX ADMIN — GABAPENTIN 800 MG: 400 CAPSULE ORAL at 21:34

## 2024-01-31 RX ADMIN — LAMOTRIGINE 75 MG: 25 TABLET ORAL at 21:34

## 2024-01-31 RX ADMIN — INSULIN LISPRO 8 UNITS: 100 INJECTION, SOLUTION INTRAVENOUS; SUBCUTANEOUS at 09:14

## 2024-01-31 RX ADMIN — PRAMIPEXOLE DIHYDROCHLORIDE 0.25 MG: 0.5 TABLET ORAL at 13:36

## 2024-01-31 RX ADMIN — ASPIRIN 81 MG 81 MG: 81 TABLET ORAL at 09:13

## 2024-01-31 RX ADMIN — INSULIN LISPRO 12 UNITS: 100 INJECTION, SOLUTION INTRAVENOUS; SUBCUTANEOUS at 17:29

## 2024-01-31 RX ADMIN — CARVEDILOL 3.12 MG: 3.12 TABLET, FILM COATED ORAL at 09:13

## 2024-01-31 RX ADMIN — FUROSEMIDE 40 MG: 10 INJECTION, SOLUTION INTRAMUSCULAR; INTRAVENOUS at 09:14

## 2024-01-31 RX ADMIN — PRAMIPEXOLE DIHYDROCHLORIDE 0.25 MG: 0.5 TABLET ORAL at 09:13

## 2024-01-31 RX ADMIN — INSULIN LISPRO 8 UNITS: 100 INJECTION, SOLUTION INTRAVENOUS; SUBCUTANEOUS at 13:36

## 2024-01-31 RX ADMIN — GABAPENTIN 800 MG: 400 CAPSULE ORAL at 17:28

## 2024-01-31 RX ADMIN — GABAPENTIN 800 MG: 400 CAPSULE ORAL at 13:36

## 2024-01-31 RX ADMIN — ALBUTEROL SULFATE 2.5 MG: 2.5 SOLUTION RESPIRATORY (INHALATION) at 05:40

## 2024-01-31 RX ADMIN — PRAMIPEXOLE DIHYDROCHLORIDE 0.25 MG: 0.5 TABLET ORAL at 17:28

## 2024-01-31 RX ADMIN — LAMOTRIGINE 75 MG: 25 TABLET ORAL at 09:13

## 2024-01-31 RX ADMIN — INSULIN LISPRO 4 UNITS: 100 INJECTION, SOLUTION INTRAVENOUS; SUBCUTANEOUS at 21:33

## 2024-01-31 RX ADMIN — APIXABAN 5 MG: 5 TABLET, FILM COATED ORAL at 21:34

## 2024-01-31 RX ADMIN — GABAPENTIN 800 MG: 400 CAPSULE ORAL at 09:13

## 2024-01-31 RX ADMIN — INSULIN LISPRO 4 UNITS: 100 INJECTION, SOLUTION INTRAVENOUS; SUBCUTANEOUS at 21:32

## 2024-01-31 RX ADMIN — APIXABAN 5 MG: 5 TABLET, FILM COATED ORAL at 09:13

## 2024-01-31 RX ADMIN — INSULIN GLARGINE 25 UNITS: 100 INJECTION, SOLUTION SUBCUTANEOUS at 09:14

## 2024-01-31 RX ADMIN — AZITHROMYCIN 250 MG: 250 TABLET, FILM COATED ORAL at 09:13

## 2024-01-31 RX ADMIN — FUROSEMIDE 40 MG: 10 INJECTION, SOLUTION INTRAMUSCULAR; INTRAVENOUS at 17:28

## 2024-01-31 RX ADMIN — PREDNISONE 40 MG: 20 TABLET ORAL at 09:13

## 2024-01-31 RX ADMIN — ATORVASTATIN CALCIUM 20 MG: 20 TABLET, FILM COATED ORAL at 21:34

## 2024-01-31 RX ADMIN — SODIUM CHLORIDE, PRESERVATIVE FREE 10 ML: 5 INJECTION INTRAVENOUS at 09:23

## 2024-01-31 RX ADMIN — CEFTRIAXONE SODIUM 2000 MG: 2 INJECTION, POWDER, FOR SOLUTION INTRAMUSCULAR; INTRAVENOUS at 09:22

## 2024-01-31 RX ADMIN — SODIUM CHLORIDE, PRESERVATIVE FREE 10 ML: 5 INJECTION INTRAVENOUS at 21:38

## 2024-01-31 RX ADMIN — PANTOPRAZOLE SODIUM 40 MG: 40 TABLET, DELAYED RELEASE ORAL at 05:32

## 2024-01-31 RX ADMIN — PRAMIPEXOLE DIHYDROCHLORIDE 0.25 MG: 0.5 TABLET ORAL at 21:35

## 2024-01-31 ASSESSMENT — PAIN SCALES - GENERAL: PAINLEVEL_OUTOF10: 0

## 2024-01-31 NOTE — PROGRESS NOTES
Occupational Therapy  Facility/Department: [address] MED SURG  Occupational Therapy Initial Assessment    Name: Jorge Gauthier  : 1945  MRN: [id number]  Date of Service: 2024    Discharge Recommendations:  Home with assist PRN  OT Equipment Recommendations  Equipment Needed: No    Jorge Gauthier scored a 21/24 on the AM-PAC ADL Inpatient form. Current research shows that an AM-PAC score of 18 or greater is typically associated with a discharge to the patient's home setting.     If patient discharges prior to next session this note will serve as a discharge summary.  Please see below for the latest assessment towards goals.         Patient Diagnosis(es): The primary encounter diagnosis was Acute respiratory failure with hypoxia and hypercapnia (HCC). Diagnoses of COPD exacerbation (HCC) and Pneumonia due to infectious organism, unspecified laterality, unspecified part of lung were also pertinent to this visit.  Past Medical History:  has a past medical history of Abrasion of right elbow, Arthritis, CAD (coronary artery disease), Chronic back pain, Chronic systolic CHF (congestive heart failure) (HCC), COPD (chronic obstructive pulmonary disease) (HCC), Dental disease, Diabetes mellitus (HCC), Essential hypertension, Hearing loss, Paroxysmal atrial fibrillation (HCC), and Tinnitus.  Past Surgical History:  has a past surgical history that includes fracture surgery (Left, ); back surgery (); Cardiac surgery (); Coronary artery bypass graft; back surgery; Cervical spine surgery; chest tube insertion (Right, 2023); and IR CHEST TUBE INSERTION (3/14/2023).           Assessment   Performance deficits / Impairments: Decreased functional mobility ;Decreased ADL status;Decreased endurance;Decreased high-level IADLs;Decreased sensation;Decreased safe awareness  Assessment: Pt is a 78 y.o. male admitted with Acute hypoxic respiratory failure, COPD exacerbation, possible PNA. PTA, pt living with  wife, independent ADLs and fxl mobility using RW. Pt currently functioning slightly below baseline d/t the above deficits, today requiring SBA fxl transfers/mobility with RW, SBA toileting and grooming, and anticipate pt would require SBA for full ADL. Will cont to see on acute to address the above limitations and maximize pt's safety and independence. Anticipate pt will be safe to return home with assist prn at d/c. Do not anticipate need for home OT.  Prognosis: Good  Decision Making: Low Complexity  History: see above  REQUIRES OT FOLLOW-UP: Yes  Activity Tolerance  Activity Tolerance: Patient Tolerated treatment well;Patient limited by fatigue  Activity Tolerance Comments: 1 seated rest break with fxl mobility in hallway. SpO2 on RA 95-96% at rest, 91% with exertion        Plan   Occupational Therapy Plan  Times Per Week: 3-5  Current Treatment Recommendations: Strengthening, Balance training, Functional mobility training, Endurance training, Safety education & training, Self-Care / ADL     Restrictions  Restrictions/Precautions  Restrictions/Precautions: Fall Risk  Position Activity Restriction  Other position/activity restrictions: monitor    Subjective   General  Chart Reviewed: Yes  Additional Pertinent Hx: Pt is a 78 y.o. male with PMHx of COPD, CHF presenting to [facility] with c/o progressive SOB. Chest x-ray showed possible pneumonia.  Family / Caregiver Present: No  Referring Practitioner: Spring Steele, RAVEN  Diagnosis: Acute hypoxic respiratory failure, COPD exacerbation, possible PNA  Subjective  Subjective: Pt met b/s for OT eval/tx. Pt in BR on arrival, agreeable to participate in therapy. Pt reports 5/10 chronic R LE and back pain.     Social/Functional History  Social/Functional History  Lives With: Spouse  Type of Home: House  Home Layout: Multi-level, Able to Live on Main level with bedroom/bathroom (2 steps down to the \"man cave\", B rails)  Home Access: Stairs to enter with

## 2024-01-31 NOTE — PROGRESS NOTES
Hospitalist Progress Note      PCP: Gallo Sanchez    Date of Admission: 1/29/2024    Chief Complaint: shortness of breath    Hospital Course: Jorge Gauthier is a 78 y.o. male who is presenting with progressive shortness of breath that started today.  Patient has history of COPD and he does not use oxygen at baseline.  When I saw the patient he was comfortable in bed, awake alert oriented x 3 on 2 L oxygen nasal cannula.  Patient reports progressive cough and wheezing.  He denies fever, chills, increasing leg swelling or weight, nausea vomiting, abdominal pain, dysuria and bleeding.     In the ED vitals were stable.  Labs showed blood sugar 172, proBNP 612, troponin 28>>28, white count 11.8, procalcitonin 0.10, lactate unremarkable, COVID and flu test unremarkable.  EKG without acute ST changes.  UA negative for infection.  Venous gas showed pH 7.38 and pCO2 52.8.  Chest x-ray showed possible pneumonia.  Patient received 1 dose of cefepime, tobramycin, inhalers and methylprednisone 125 mg IV.     Appeared volume overloaded on exam.  Was started on IV diuresis.  Procalcitonin was negative, antibiotics adjusted to ceftriaxone/azithromycin to cover for possible COPD exacerbation.    Subjective: Patient seen sitting in chair, feels like his breathing is better today and he feels better.  Also thinks that his legs are less swollen.  Denies any significant shortness of breath, no chest pain, still on oxygen though.  Discussed the importance of trying to limit his fluids since we are giving him a IV diuretics, he was agreeable and understanding.    Assessment/Plan:  Acute hypoxic respiratory failure  COPD exacerbation  -Initially satting 88% on room air.  Currently quiring 3L nasal cannula, no oxygen at baseline.  -Likely combination of hypervolemia and COPD exacerbation.  CXR with possible pulmonary edema and/or superimposed pneumonia, procalcitonin negative  -Initially placed on cefepime, transitioned to  ceftriaxone/azithromycin  -Solu-Medrol transitioned to p.o. prednisone  -RVP negative  -Continue DuoNebs, wean oxygen as tolerable. Resume home inhaelrs and roflumilast     Acute on chronic combined CHF  -Appeared hypervolemic on exam with BLE edema, bibasilar crackles.  CXR showing trace bilateral pleural effusions, possible improving pulmonary edema   -Wt on d.c 1/17 was 224, currently 222, unsure of accuracy of weights.  Currently -1 L  -Started on IV Lasix 40 twice daily, strict I/os, daily weights  -Prior echo 8/25/2021 showed EF 45-50%, grade 2 diastolic dysfunction  -Continue home farxiga, carvedilol  -Monitor creatinine, currently stable at 1.1    CAD  -Continue ASA, statin, beta-blocker    Paroxysmal atrial fibrillation  -Continue beta-blocker and home Eliquis.  Rates controlled currently.  Continue monitor    Type 2 diabetes with hyperglycemia  -Initially placed on Lantus 15 units, dose increased to 25 units 1/30. Home regimen is 70-30 humalog-novolg 25 uits BID. Hold home Meformin.   -On PO steroids, SSI increased to high dose SSI with accuchecks.   -Glucose still not well-controlled, fasting this morning 280.  Increase Lantus to 30 units nightly.    PAF  -Resume home Eliquis and coreg    CODE STATUS  -Per social work, patient wants to be DNR.  Discussed with patient, CODE STATUS updated in chart.  Active Hospital Problems    Diagnosis     ARF (acute respiratory failure) ([facility]) [J96.00]        Medications:  Reviewed    Infusion Medications    sodium chloride      dextrose       Scheduled Medications    apixaban  5 mg Oral BID    aspirin  81 mg Oral Daily    carvedilol  3.125 mg Oral BID WC    gabapentin  800 mg Oral 4x Daily    pramipexole  0.25 mg Oral 4x daily    Roflumilast  500 mcg Oral Daily    atorvastatin  20 mg Oral Nightly    sodium chloride flush  5-40 mL IntraVENous 2 times per day    pantoprazole  40 mg Oral QAM AC    lamoTRIgine  75 mg Oral BID    insulin lispro  0-16 Units SubCUTAneous TID WC  01/29/24  2136 01/30/24  0518 01/31/24  0757   * 140 139   K 4.4 5.1 4.0   CL 99 101 97*   CO2 27 26 32   BUN 22* 24* 30*   CREATININE 1.0 1.1 1.1   CALCIUM 8.9 9.1 8.9       Recent Labs     01/29/24 2136   AST 16   ALT 15   BILITOT 0.3   ALKPHOS 62       Recent Labs     01/29/24 2136   INR 0.97       No results for input(s): \"CKTOTAL\", \"TROPONINI\" in the last 72 hours.    Urinalysis:      Lab Results   Component Value Date/Time    NITRU Negative 01/29/2024 10:20 PM    WBCUA 1 11/13/2023 11:34 AM    BACTERIA None Seen 11/13/2023 11:34 AM    RBCUA 3 11/13/2023 11:34 AM    BLOODU Negative 01/29/2024 10:20 PM    SPECGRAV 1.027 01/29/2024 10:20 PM    GLUCOSEU >=1000 01/29/2024 10:20 PM       Radiology:  XR CHEST PORTABLE   Final Result   Mild patchy opacities in both lower lung fields and platelike atelectasis,   slightly less prominent than the prior study and favored to represent   improving pulmonary edema and/or superimposed pneumonia.      Trace bilateral pleural effusion.                 DVT Prophylaxis: Resume home Eliquis  Diet: ADULT DIET; Regular; 3 carb choices (45 gm/meal); No Added Salt (3-4 gm); 2000 ml  Code Status: DNR-CCA    PT/OT Eval Status: Ordered    Dispo -Home with home health care once medically stable, likely in 1-2 days    Spring Steele PA-C      NOTE:  This report was transcribed using voice recognition software.  Every effort was made to ensure accuracy; however, inadvertent computerized transcription errors may be present.

## 2024-01-31 NOTE — PLAN OF CARE
Problem: Discharge Planning  Goal: Discharge to home or other facility with appropriate resources  Outcome: Progressing     Problem: Skin/Tissue Integrity  Goal: Absence of new skin breakdown  Description: 1.  Monitor for areas of redness and/or skin breakdown  2.  Assess vascular access sites hourly  3.  Every 4-6 hours minimum:  Change oxygen saturation probe site  4.  Every 4-6 hours:  If on nasal continuous positive airway pressure, respiratory therapy assess nares and determine need for appliance change or resting period.  Outcome: Progressing     Problem: Safety - Adult  Goal: Free from fall injury  Outcome: Progressing     Problem: ABCDS Injury Assessment  Goal: Absence of physical injury  Outcome: Progressing     Problem: Neurosensory - Adult  Goal: Achieves stable or improved neurological status  Outcome: Progressing  Goal: Achieves maximal functionality and self care  Outcome: Progressing     Problem: Respiratory - Adult  Goal: Achieves optimal ventilation and oxygenation  Outcome: Progressing     Problem: Cardiovascular - Adult  Goal: Maintains optimal cardiac output and hemodynamic stability  Outcome: Progressing     Problem: Skin/Tissue Integrity - Adult  Goal: Skin integrity remains intact  Outcome: Progressing     Problem: Musculoskeletal - Adult  Goal: Return mobility to safest level of function  Outcome: Progressing  Goal: Return ADL status to a safe level of function  Outcome: Progressing     Problem: Gastrointestinal - Adult  Goal: Maintains or returns to baseline bowel function  Outcome: Progressing  Goal: Maintains adequate nutritional intake  Outcome: Progressing     Problem: Infection - Adult  Goal: Absence of infection at discharge  Outcome: Progressing     Problem: Metabolic/Fluid and Electrolytes - Adult  Goal: Electrolytes maintained within normal limits  Outcome: Progressing

## 2024-01-31 NOTE — PLAN OF CARE
Problem: Discharge Planning  Goal: Discharge to home or other facility with appropriate resources  1/31/2024 1612 by Heather Russ RN  Outcome: Progressing  1/31/2024 0245 by Lalita Ayers RN  Outcome: Progressing     Problem: Skin/Tissue Integrity  Goal: Absence of new skin breakdown  Description: 1.  Monitor for areas of redness and/or skin breakdown  2.  Assess vascular access sites hourly  3.  Every 4-6 hours minimum:  Change oxygen saturation probe site  4.  Every 4-6 hours:  If on nasal continuous positive airway pressure, respiratory therapy assess nares and determine need for appliance change or resting period.  1/31/2024 1612 by Heather Russ RN  Outcome: Progressing  1/31/2024 0245 by Lalita Ayers RN  Outcome: Progressing     Problem: Safety - Adult  Goal: Free from fall injury  1/31/2024 1612 by Heather Russ RN  Outcome: Progressing  1/31/2024 0245 by Lalita Ayers RN  Outcome: Progressing     Problem: ABCDS Injury Assessment  Goal: Absence of physical injury  1/31/2024 1612 by Heather Russ RN  Outcome: Progressing  1/31/2024 0245 by Lalita Ayers RN  Outcome: Progressing     Problem: Neurosensory - Adult  Goal: Achieves stable or improved neurological status  1/31/2024 1612 by Heather Russ RN  Outcome: Progressing  1/31/2024 0245 by Lalita Ayers RN  Outcome: Progressing  Goal: Achieves maximal functionality and self care  1/31/2024 1612 by Heather Russ RN  Outcome: Progressing  1/31/2024 0245 by Lalita Ayers RN  Outcome: Progressing     Problem: Respiratory - Adult  Goal: Achieves optimal ventilation and oxygenation  1/31/2024 1612 by Heather Russ RN  Outcome: Progressing  1/31/2024 0245 by Lalita Ayers RN  Outcome: Progressing     Problem: Cardiovascular - Adult  Goal: Maintains optimal cardiac output and hemodynamic stability  1/31/2024 1612 by Heather Russ RN  Outcome: Progressing  1/31/2024 0245 by Lalita Ayers RN  Outcome:  Progressing     Problem: Skin/Tissue Integrity - Adult  Goal: Skin integrity remains intact  1/31/2024 1612 by Heather Russ RN  Outcome: Progressing  Flowsheets (Taken 1/31/2024 0900)  Skin Integrity Remains Intact: Monitor for areas of redness and/or skin breakdown  1/31/2024 0245 by Lalita Ayers RN  Outcome: Progressing     Problem: Musculoskeletal - Adult  Goal: Return mobility to safest level of function  1/31/2024 1612 by Heather Russ RN  Outcome: Progressing  1/31/2024 0245 by Lalita Ayers RN  Outcome: Progressing  Goal: Return ADL status to a safe level of function  1/31/2024 1612 by Heather Russ RN  Outcome: Progressing  1/31/2024 0245 by Lalita Ayers RN  Outcome: Progressing     Problem: Gastrointestinal - Adult  Goal: Maintains or returns to baseline bowel function  1/31/2024 1612 by Heather Russ RN  Outcome: Progressing  1/31/2024 0245 by Lalita Ayers RN  Outcome: Progressing  Goal: Maintains adequate nutritional intake  1/31/2024 1612 by Heather Russ RN  Outcome: Progressing  1/31/2024 0245 by Lalita Ayers RN  Outcome: Progressing     Problem: Infection - Adult  Goal: Absence of infection at discharge  1/31/2024 1612 by Heather Russ RN  Outcome: Progressing  1/31/2024 0245 by Lalita Ayers RN  Outcome: Progressing     Problem: Metabolic/Fluid and Electrolytes - Adult  Goal: Electrolytes maintained within normal limits  1/31/2024 1612 by Heather Russ RN  Outcome: Progressing  1/31/2024 0245 by Lalita Ayers RN  Outcome: Progressing

## 2024-01-31 NOTE — CONSULTS
(acute respiratory failure) ([facility]) [J96.00]  COPD exacerbation ([facility]) [J44.1]  Acute respiratory failure with hypoxia and hypercapnia ([facility]) [J96.01, J96.02]  Pneumonia due to infectious organism, unspecified laterality, unspecified part of lung [J18.9]     /68   Pulse 76   Temp 97.6 °F (36.4 °C) (Oral)   Resp 18   Ht 1.791 m (5' 10.5\")   Wt 101 kg (222 lb 11.2 oz)   SpO2 97%   BMI 31.50 kg/m²     ADMIT:  Weight - Scale: 106.3 kg (234 lb 5.6 oz)    TODAY: Weight - Scale: 101 kg (222 lb 11.2 oz)    Wt Readings from Last 5 Encounters:   01/31/24 101 kg (222 lb 11.2 oz)   01/16/24 101.8 kg (224 lb 6.9 oz)   11/27/23 96.6 kg (212 lb 15.4 oz)   11/15/23 97.8 kg (215 lb 9.8 oz)   09/19/23 98 kg (216 lb)          Intake/Output Summary (Last 24 hours) at 1/31/2024 1457  Last data filed at 1/31/2024 1304  Gross per 24 hour   Intake 1500 ml   Output 3075 ml   Net -1575 ml       Lab Results   Component Value Date/Time    PROBNP 612 01/29/2024 09:36 PM       ECHOCARDIOGRAM:   8/27/21 EF 45-50%, grade II DD      Assessment:     CONSULTS:   PHARMACY TO DOSE VANCOMYCIN  IP CONSULT TO HEART FAILURE NURSE/COORDINATOR    PATIENT'S OUTPT CARDIOLOGIST: Dr Salty Sanz at [facility]. Last office visit was 12/4/23    SCALE AVAILABLE:  Yes- \"I weigh on occasion\". Education done.      EDUCATION PROVIDED: Patient     [x]  What is HF & risk factors/causes pertinent to pt. Pt with h/o ICM, improved s/p. HTN, COPD. Education provided on how relates to CHF.    [x]  Common tests performed. I reminded pt that he has an outpt echo ordered by Dr Sanz that is scheduled on the same day as his cardiology appt on 2/5. \"I didn't know I had a test but I knew I have an appt. I'm sure my wife knows tho as she puts it all in her phone.\"     [x]  HF S&S. Pt came in with c/o SOB and edema. Teaching done on how that relates.    [x]  Importance of doing & recording daily wts, 3/5 rule, HF Zones, who and when to call. Pt states he was  provide teach back       [] Needs reinforcement      [x] Other:  HF RN resource number left for wife Portia in case any questions were to arise       TEACHING TIME:  30 minutes       Plan:       DISCHARGE PLAN:  Placement for patient upon discharge: home with support   Discharge appointment scheduled: Yes 2/5 at 10:30 with his Cardiologist Dr Sanz    RECOMMENDATIONS:   [x]  Encourage to call Heart Failure Resource Line with any further questions or concerns  [x]  Continue to reiterate HF education   []  If EF </=40%, ensure GDMT has been met at time of dc (if not on GDMT ensure contraindication is documented)  []  [facility] Referral placed  []  Cardiac Rehab Phase 1 referral placed and/or contact info given  [x]  Continue to support pt's present goal of: \"go home tomorrow\"          Electronically signed by Page Garza, RN, BSN CHFN  on 1/31/2024 at 2:57 PM

## 2024-01-31 NOTE — PROGRESS NOTES
Physical Therapy  Facility/Department: [address] MED SURG  Physical Therapy Initial Assessment  If patient discharges prior to next session this note will serve as a discharge summary.  Please see below for the latest assessment towards goals.    Name: Jorge Gauthier  : 1945  MRN: [id number]  Date of Service: 2024    Discharge Recommendations:  Patient would benefit from continued therapy after discharge, Home with Home health PT, Home with assist PRN   Jorge Gauthier scored a 20/24 on the AM-PAC short mobility form. Current research shows that an AM-PAC score of 18 or greater is typically associated with a discharge to the patient's home setting. Based on the patient's AM-PAC score and their current functional mobility deficits, it is recommended that the patient have 2-3 sessions per week of Physical Therapy at d/c to increase the patient's independence.  At this time, this patient demonstrates the endurance and safety to discharge home with prn assist and home PT (home vs OP services) and a follow up treatment frequency of 2-3x/wk.  Please see assessment section for further patient specific details.  PT Equipment Recommendations  Equipment Needed: No      Patient Diagnosis(es): The primary encounter diagnosis was Acute respiratory failure with hypoxia and hypercapnia (HCC). Diagnoses of COPD exacerbation (HCC) and Pneumonia due to infectious organism, unspecified laterality, unspecified part of lung were also pertinent to this visit.  Past Medical History:  has a past medical history of Abrasion of right elbow, Arthritis, CAD (coronary artery disease), Chronic back pain, Chronic systolic CHF (congestive heart failure) (HCC), COPD (chronic obstructive pulmonary disease) (HCC), Dental disease, Diabetes mellitus (HCC), Essential hypertension, Hearing loss, Paroxysmal atrial fibrillation (HCC), and Tinnitus.  Past Surgical History:  has a past surgical history that includes fracture surgery (Left, );  (wife completes)  Ambulation Assistance: Independent (with RW in home, electric scooter when out; uses cane for short hallway only)  Transfer Assistance: Independent  Active : No  Patient's  Info: wife drives  Occupation: Retired  IADL Comments: pt sleeps in LIFT chair  Vision/Hearing  Vision  Vision: Impaired  Vision Exceptions: Wears glasses at all times  Hearing  Hearing: Exceptions to WFL  Hearing Exceptions: Hard of hearing/hearing concerns;No hearing aid    Cognition   Orientation  Overall Orientation Status: Within Functional Limits  Orientation Level: Oriented to place;Oriented to situation;Oriented to person;Oriented to time  Cognition  Cognition Comment: minimally decreased safety awareness and insight     Objective           Gross Assessment  Sensation: Impaired (neuropathy in B feet and B hands)     AROM RLE (degrees)  RLE AROM: WFL  AROM LLE (degrees)  LLE AROM : WFL  Strength RLE  Comment: functionally good but slightly less than the L leg  Strength LLE  Strength LLE: WFL           Bed mobility  Bed Mobility Comments: n/a this session, the pt already up in the chair and he sleeps in a LIFT chair at home    Transfers  Sit to Stand: Stand by assistance;Minimal Assistance (SBA from the commode and the recliner but min A from low bench in hallway)  Stand to Sit: Stand by assistance    Ambulation  Surface: Level tile  Device: Rolling Walker  Assistance: Stand by assistance  Quality of Gait: flexed posture; appears to drag the R foot during swing thru; no LOB noted; good management of the walker  Distance: 25 feet x 1, 70 feet x 2  Comments: SpO2 on room air 91% > 96% with activity     Balance  Posture: Fair (flexed posture in standing and walking)  Sitting - Static: Good  Sitting - Dynamic: Good  Standing - Static: Good  Standing - Dynamic: Good;-  Comments: stood to the sink with supervision for hand hygiene       AM-PAC - Mobility    AM-PAC Basic Mobility - Inpatient   How much help is needed

## 2024-02-01 LAB
ANION GAP SERPL CALCULATED.3IONS-SCNC: 10 MMOL/L (ref 3–16)
BUN SERPL-MCNC: 33 MG/DL (ref 7–20)
CALCIUM SERPL-MCNC: 8.7 MG/DL (ref 8.3–10.6)
CHLORIDE SERPL-SCNC: 98 MMOL/L (ref 99–110)
CO2 SERPL-SCNC: 32 MMOL/L (ref 21–32)
CREAT SERPL-MCNC: 1 MG/DL (ref 0.8–1.3)
GFR SERPLBLD CREATININE-BSD FMLA CKD-EPI: >60 ML/MIN/{1.73_M2}
GLUCOSE BLD-MCNC: 282 MG/DL (ref 70–99)
GLUCOSE BLD-MCNC: 282 MG/DL (ref 70–99)
GLUCOSE BLD-MCNC: 328 MG/DL (ref 70–99)
GLUCOSE BLD-MCNC: 340 MG/DL (ref 70–99)
GLUCOSE SERPL-MCNC: 313 MG/DL (ref 70–99)
PERFORMED ON: ABNORMAL
POTASSIUM SERPL-SCNC: 3.9 MMOL/L (ref 3.5–5.1)
SODIUM SERPL-SCNC: 140 MMOL/L (ref 136–145)

## 2024-02-01 PROCEDURE — 94640 AIRWAY INHALATION TREATMENT: CPT

## 2024-02-01 PROCEDURE — 6370000000 HC RX 637 (ALT 250 FOR IP): Performed by: ANESTHESIOLOGY

## 2024-02-01 PROCEDURE — 36415 COLL VENOUS BLD VENIPUNCTURE: CPT

## 2024-02-01 PROCEDURE — 6370000000 HC RX 637 (ALT 250 FOR IP): Performed by: NURSE PRACTITIONER

## 2024-02-01 PROCEDURE — 1200000000 HC SEMI PRIVATE

## 2024-02-01 PROCEDURE — 6360000002 HC RX W HCPCS

## 2024-02-01 PROCEDURE — 2580000003 HC RX 258

## 2024-02-01 PROCEDURE — 94669 MECHANICAL CHEST WALL OSCILL: CPT

## 2024-02-01 PROCEDURE — 80048 BASIC METABOLIC PNL TOTAL CA: CPT

## 2024-02-01 PROCEDURE — 2580000003 HC RX 258: Performed by: ANESTHESIOLOGY

## 2024-02-01 PROCEDURE — 6360000002 HC RX W HCPCS: Performed by: ANESTHESIOLOGY

## 2024-02-01 PROCEDURE — 6370000000 HC RX 637 (ALT 250 FOR IP)

## 2024-02-01 PROCEDURE — 94760 N-INVAS EAR/PLS OXIMETRY 1: CPT

## 2024-02-01 RX ORDER — CEFDINIR 300 MG/1
300 CAPSULE ORAL EVERY 12 HOURS SCHEDULED
Status: DISCONTINUED | OUTPATIENT
Start: 2024-02-02 | End: 2024-02-02 | Stop reason: HOSPADM

## 2024-02-01 RX ORDER — INSULIN LISPRO 100 [IU]/ML
5 INJECTION, SOLUTION INTRAVENOUS; SUBCUTANEOUS
Status: DISCONTINUED | OUTPATIENT
Start: 2024-02-01 | End: 2024-02-01

## 2024-02-01 RX ORDER — INSULIN GLARGINE 100 [IU]/ML
28 INJECTION, SOLUTION SUBCUTANEOUS DAILY
Status: DISCONTINUED | OUTPATIENT
Start: 2024-02-02 | End: 2024-02-01

## 2024-02-01 RX ORDER — SODIUM CHLORIDE FOR INHALATION 3 %
4 VIAL, NEBULIZER (ML) INHALATION
Status: DISCONTINUED | OUTPATIENT
Start: 2024-02-01 | End: 2024-02-02 | Stop reason: HOSPADM

## 2024-02-01 RX ORDER — INSULIN GLARGINE 100 [IU]/ML
25 INJECTION, SOLUTION SUBCUTANEOUS DAILY
Status: DISCONTINUED | OUTPATIENT
Start: 2024-02-02 | End: 2024-02-02 | Stop reason: HOSPADM

## 2024-02-01 RX ORDER — FUROSEMIDE 40 MG/1
40 TABLET ORAL DAILY
Status: DISCONTINUED | OUTPATIENT
Start: 2024-02-01 | End: 2024-02-02 | Stop reason: HOSPADM

## 2024-02-01 RX ORDER — GUAIFENESIN 600 MG/1
600 TABLET, EXTENDED RELEASE ORAL 2 TIMES DAILY
Status: DISCONTINUED | OUTPATIENT
Start: 2024-02-01 | End: 2024-02-02 | Stop reason: HOSPADM

## 2024-02-01 RX ORDER — INSULIN LISPRO 100 [IU]/ML
9 INJECTION, SOLUTION INTRAVENOUS; SUBCUTANEOUS
Status: DISCONTINUED | OUTPATIENT
Start: 2024-02-01 | End: 2024-02-02

## 2024-02-01 RX ADMIN — EMPAGLIFLOZIN 10 MG: 10 TABLET, FILM COATED ORAL at 13:06

## 2024-02-01 RX ADMIN — PRAMIPEXOLE DIHYDROCHLORIDE 0.25 MG: 0.5 TABLET ORAL at 21:14

## 2024-02-01 RX ADMIN — PRAMIPEXOLE DIHYDROCHLORIDE 0.25 MG: 0.5 TABLET ORAL at 11:57

## 2024-02-01 RX ADMIN — ROFLUMILAST 500 MCG: 500 TABLET ORAL at 08:21

## 2024-02-01 RX ADMIN — INSULIN GLARGINE 30 UNITS: 100 INJECTION, SOLUTION SUBCUTANEOUS at 08:23

## 2024-02-01 RX ADMIN — ASPIRIN 81 MG 81 MG: 81 TABLET ORAL at 08:21

## 2024-02-01 RX ADMIN — FUROSEMIDE 40 MG: 10 INJECTION, SOLUTION INTRAMUSCULAR; INTRAVENOUS at 08:20

## 2024-02-01 RX ADMIN — INSULIN LISPRO 8 UNITS: 100 INJECTION, SOLUTION INTRAVENOUS; SUBCUTANEOUS at 08:23

## 2024-02-01 RX ADMIN — FUROSEMIDE 40 MG: 40 TABLET ORAL at 16:37

## 2024-02-01 RX ADMIN — APIXABAN 5 MG: 5 TABLET, FILM COATED ORAL at 08:20

## 2024-02-01 RX ADMIN — AZITHROMYCIN 250 MG: 250 TABLET, FILM COATED ORAL at 08:20

## 2024-02-01 RX ADMIN — INSULIN LISPRO 9 UNITS: 100 INJECTION, SOLUTION INTRAVENOUS; SUBCUTANEOUS at 13:06

## 2024-02-01 RX ADMIN — LAMOTRIGINE 75 MG: 25 TABLET ORAL at 21:15

## 2024-02-01 RX ADMIN — SODIUM CHLORIDE SOLN NEBU 3% 4 ML: 3 NEBU SOLN at 19:31

## 2024-02-01 RX ADMIN — PRAMIPEXOLE DIHYDROCHLORIDE 0.25 MG: 0.5 TABLET ORAL at 08:21

## 2024-02-01 RX ADMIN — GABAPENTIN 800 MG: 400 CAPSULE ORAL at 11:57

## 2024-02-01 RX ADMIN — GABAPENTIN 800 MG: 400 CAPSULE ORAL at 21:14

## 2024-02-01 RX ADMIN — INSULIN LISPRO 12 UNITS: 100 INJECTION, SOLUTION INTRAVENOUS; SUBCUTANEOUS at 16:37

## 2024-02-01 RX ADMIN — SODIUM CHLORIDE, PRESERVATIVE FREE 10 ML: 5 INJECTION INTRAVENOUS at 08:24

## 2024-02-01 RX ADMIN — PANTOPRAZOLE SODIUM 40 MG: 40 TABLET, DELAYED RELEASE ORAL at 05:38

## 2024-02-01 RX ADMIN — APIXABAN 5 MG: 5 TABLET, FILM COATED ORAL at 21:17

## 2024-02-01 RX ADMIN — PRAMIPEXOLE DIHYDROCHLORIDE 0.25 MG: 0.5 TABLET ORAL at 16:37

## 2024-02-01 RX ADMIN — CARVEDILOL 3.12 MG: 3.12 TABLET, FILM COATED ORAL at 16:37

## 2024-02-01 RX ADMIN — INSULIN LISPRO 12 UNITS: 100 INJECTION, SOLUTION INTRAVENOUS; SUBCUTANEOUS at 11:57

## 2024-02-01 RX ADMIN — INSULIN LISPRO 9 UNITS: 100 INJECTION, SOLUTION INTRAVENOUS; SUBCUTANEOUS at 16:37

## 2024-02-01 RX ADMIN — GABAPENTIN 800 MG: 400 CAPSULE ORAL at 08:20

## 2024-02-01 RX ADMIN — SODIUM CHLORIDE, PRESERVATIVE FREE 10 ML: 5 INJECTION INTRAVENOUS at 21:16

## 2024-02-01 RX ADMIN — CARVEDILOL 3.12 MG: 3.12 TABLET, FILM COATED ORAL at 08:21

## 2024-02-01 RX ADMIN — ATORVASTATIN CALCIUM 20 MG: 20 TABLET, FILM COATED ORAL at 21:15

## 2024-02-01 RX ADMIN — GUAIFENESIN 600 MG: 600 TABLET ORAL at 21:15

## 2024-02-01 RX ADMIN — LAMOTRIGINE 75 MG: 25 TABLET ORAL at 08:21

## 2024-02-01 RX ADMIN — ALBUTEROL SULFATE 2.5 MG: 2.5 SOLUTION RESPIRATORY (INHALATION) at 19:31

## 2024-02-01 RX ADMIN — CEFTRIAXONE SODIUM 2000 MG: 2 INJECTION, POWDER, FOR SOLUTION INTRAMUSCULAR; INTRAVENOUS at 10:06

## 2024-02-01 RX ADMIN — GABAPENTIN 800 MG: 400 CAPSULE ORAL at 16:37

## 2024-02-01 RX ADMIN — PREDNISONE 40 MG: 20 TABLET ORAL at 08:20

## 2024-02-01 RX ADMIN — ALBUTEROL SULFATE 2.5 MG: 2.5 SOLUTION RESPIRATORY (INHALATION) at 04:43

## 2024-02-01 RX ADMIN — GUAIFENESIN 600 MG: 600 TABLET ORAL at 13:06

## 2024-02-01 ASSESSMENT — PAIN SCALES - GENERAL
PAINLEVEL_OUTOF10: 5
PAINLEVEL_OUTOF10: 0

## 2024-02-01 NOTE — CARE COORDINATION
Discharge Planning:  Therapy has evaluated this pt and recommends home with HC.  Pt plans to return home with his spouse and would like to resume HC services through [facility] (PT/RN).  Spouse will transport pt home at d/c.  PLAN: Return home with spouse with a resumption of HC services through [facility] (PT/OT).  Spouse will transport pt home at d/c.  RENETTA Franks  [phone]  Electronically signed by RENETTA Mercado on 2/1/2024 at 7:43 AM

## 2024-02-01 NOTE — PLAN OF CARE
Problem: Discharge Planning  Goal: Discharge to home or other facility with appropriate resources  2/1/2024 0106 by Lalita Ayers RN  Outcome: Progressing  1/31/2024 1612 by Heather Russ RN  Outcome: Progressing     Problem: Skin/Tissue Integrity  Goal: Absence of new skin breakdown  Description: 1.  Monitor for areas of redness and/or skin breakdown  2.  Assess vascular access sites hourly  3.  Every 4-6 hours minimum:  Change oxygen saturation probe site  4.  Every 4-6 hours:  If on nasal continuous positive airway pressure, respiratory therapy assess nares and determine need for appliance change or resting period.  2/1/2024 0106 by Lalita Ayers RN  Outcome: Progressing  1/31/2024 1612 by Heather Russ RN  Outcome: Progressing     Problem: Safety - Adult  Goal: Free from fall injury  2/1/2024 0106 by Lalita Ayers RN  Outcome: Progressing  1/31/2024 1612 by Heather Russ RN  Outcome: Progressing     Problem: ABCDS Injury Assessment  Goal: Absence of physical injury  2/1/2024 0106 by Lalita Ayers RN  Outcome: Progressing  1/31/2024 1612 by Heather Russ RN  Outcome: Progressing     Problem: Neurosensory - Adult  Goal: Achieves stable or improved neurological status  2/1/2024 0106 by Lalita Ayers RN  Outcome: Progressing  1/31/2024 1612 by Heather Russ RN  Outcome: Progressing  Goal: Achieves maximal functionality and self care  2/1/2024 0106 by Lalita Ayers RN  Outcome: Progressing  1/31/2024 1612 by Heather Russ RN  Outcome: Progressing     Problem: Respiratory - Adult  Goal: Achieves optimal ventilation and oxygenation  2/1/2024 0106 by Lalita Ayers RN  Outcome: Progressing  1/31/2024 1612 by Heather Russ RN  Outcome: Progressing     Problem: Cardiovascular - Adult  Goal: Maintains optimal cardiac output and hemodynamic stability  2/1/2024 0106 by Lalita Ayers RN  Outcome: Progressing  1/31/2024 1612 by Heather Russ RN  Outcome: Progressing      Problem: Skin/Tissue Integrity - Adult  Goal: Skin integrity remains intact  2/1/2024 0106 by Lalita Ayers RN  Outcome: Progressing  1/31/2024 1612 by Heather Russ RN  Outcome: Progressing  Flowsheets (Taken 1/31/2024 0900)  Skin Integrity Remains Intact: Monitor for areas of redness and/or skin breakdown     Problem: Musculoskeletal - Adult  Goal: Return mobility to safest level of function  2/1/2024 0106 by Lalita Ayers RN  Outcome: Progressing  1/31/2024 1612 by Heather Russ RN  Outcome: Progressing  Goal: Return ADL status to a safe level of function  2/1/2024 0106 by Lalita Ayers RN  Outcome: Progressing  1/31/2024 1612 by Heather Russ RN  Outcome: Progressing     Problem: Gastrointestinal - Adult  Goal: Maintains or returns to baseline bowel function  2/1/2024 0106 by Lalita Ayers RN  Outcome: Progressing  1/31/2024 1612 by Heather Russ RN  Outcome: Progressing  Goal: Maintains adequate nutritional intake  2/1/2024 0106 by Lalita Ayers RN  Outcome: Progressing  1/31/2024 1612 by Heather Russ RN  Outcome: Progressing     Problem: Infection - Adult  Goal: Absence of infection at discharge  2/1/2024 0106 by Lalita Ayers RN  Outcome: Progressing  1/31/2024 1612 by Heather Russ RN  Outcome: Progressing     Problem: Metabolic/Fluid and Electrolytes - Adult  Goal: Electrolytes maintained within normal limits  2/1/2024 0106 by Lalita Ayers RN  Outcome: Progressing  1/31/2024 1612 by Heather Russ RN  Outcome: Progressing

## 2024-02-01 NOTE — PROGRESS NOTES
Patient is non-compliant with fluid restriction. Patient upset when nurse informed patient he was already over his restricted limit.

## 2024-02-01 NOTE — PLAN OF CARE
Problem: Discharge Planning  Goal: Discharge to home or other facility with appropriate resources  2/1/2024 1009 by Palmira Gill RN  Outcome: Progressing  2/1/2024 0106 by Lalita Ayers RN  Outcome: Progressing     Problem: Skin/Tissue Integrity  Goal: Absence of new skin breakdown  Description: 1.  Monitor for areas of redness and/or skin breakdown  2.  Assess vascular access sites hourly  3.  Every 4-6 hours minimum:  Change oxygen saturation probe site  4.  Every 4-6 hours:  If on nasal continuous positive airway pressure, respiratory therapy assess nares and determine need for appliance change or resting period.  2/1/2024 1009 by Palmira Gill RN  Outcome: Progressing  2/1/2024 0106 by Lalita Ayers RN  Outcome: Progressing     Problem: Safety - Adult  Goal: Free from fall injury  2/1/2024 1009 by Palmira Gill RN  Outcome: Progressing  2/1/2024 0106 by Lalita Ayers RN  Outcome: Progressing     Problem: ABCDS Injury Assessment  Goal: Absence of physical injury  2/1/2024 1009 by Palmira Gill RN  Outcome: Progressing  2/1/2024 0106 by Lalita Ayers RN  Outcome: Progressing     Problem: Neurosensory - Adult  Goal: Achieves stable or improved neurological status  2/1/2024 1009 by Palmira Gill RN  Outcome: Progressing  2/1/2024 0106 by Lalita Ayers RN  Outcome: Progressing  Goal: Achieves maximal functionality and self care  2/1/2024 1009 by Palmira Gill RN  Outcome: Progressing  2/1/2024 0106 by Lalita Ayers RN  Outcome: Progressing     Problem: Respiratory - Adult  Goal: Achieves optimal ventilation and oxygenation  2/1/2024 1009 by Palmira Gill RN  Outcome: Progressing  2/1/2024 0106 by Lalita Ayers RN  Outcome: Progressing     Problem: Cardiovascular - Adult  Goal: Maintains optimal cardiac output and hemodynamic stability  2/1/2024 1009 by Palmira Gill RN  Outcome: Progressing  2/1/2024 0106 by Lalita Ayers RN  Outcome: Progressing     Problem: Skin/Tissue

## 2024-02-01 NOTE — PROGRESS NOTES
Patient is non compliant with 2000 ml fluid restriction. Patient states that he drank everything that was brought to him yesterday. Patient was eucated this shift and last shift by this nurse about fluid restriction.

## 2024-02-01 NOTE — PROGRESS NOTES
[facility]  Diabetes Education   Progress Note       NAME:  Jorge Gauthier  MEDICAL RECORD NUMBER:  [id number]  AGE: 78 y.o.   GENDER: male  : 1945  TODAY'S DATE:  2024    Subjective   Reason for Diabetic Education Evaluation and Assessment: hyperglycemia     Jorge reports increased thirst and frustration with fluid restriction.      He report that hs blood sugars always spike on steroids.      Visit Type: evaluation      Jorge Gauthier is a 78 y.o. male referred by:     [] Physician  [x] Nursing  [] Chart Review   [] Other:     PAST MEDICAL HISTORY        Diagnosis Date    Abrasion of right elbow 2023    Arthritis     CAD (coronary artery disease)     CABG in     Chronic back pain     Chronic systolic CHF (congestive heart failure) (HCC)     COPD (chronic obstructive pulmonary disease) (HCC)     Dental disease     Diabetes mellitus (HCC)     Essential hypertension     Hearing loss     Paroxysmal atrial fibrillation (HCC)     On Coumadin    Tinnitus        PAST SURGICAL HISTORY    Past Surgical History:   Procedure Laterality Date    BACK SURGERY      BACK SURGERY      CARDIAC SURGERY      CERVICAL SPINE SURGERY      CHEST TUBE INSERTION Right 2023    14F. Dr. Jalloh    CORONARY ARTERY BYPASS GRAFT      FRACTURE SURGERY Left     Shoulder    IR CHEST TUBE INSERTION  3/14/2023    IR CHEST TUBE INSERTION 3/14/2023 [facility]       FAMILY HISTORY    Family History   Problem Relation Age of Onset    Brain Cancer Mother     Heart Attack Father     Heart Attack Brother        SOCIAL HISTORY    Social History     Tobacco Use    Smoking status: Former     Current packs/day: 0.00     Average packs/day: 1.5 packs/day for 37.2 years (55.7 ttl pk-yrs)     Types: Cigarettes     Start date: 1959     Quit date: 3/1/1996     Years since quittin.9    Smokeless tobacco: Never   Vaping Use    Vaping Use: Never used   Substance Use Topics    Alcohol use: No

## 2024-02-01 NOTE — PROGRESS NOTES
Hospitalist Progress Note      PCP: Gallo Sanchez    Date of Admission: 1/29/2024    Chief Complaint: shortness of breath    Hospital Course: Jorge Gauthier is a 78 y.o. male who is presenting with progressive shortness of breath that started today.  Patient has history of COPD and he does not use oxygen at baseline.  When I saw the patient he was comfortable in bed, awake alert oriented x 3 on 2 L oxygen nasal cannula.  Patient reports progressive cough and wheezing.  He denies fever, chills, increasing leg swelling or weight, nausea vomiting, abdominal pain, dysuria and bleeding.     In the ED vitals were stable.  Labs showed blood sugar 172, proBNP 612, troponin 28>>28, white count 11.8, procalcitonin 0.10, lactate unremarkable, COVID and flu test unremarkable.  EKG without acute ST changes.  UA negative for infection.  Venous gas showed pH 7.38 and pCO2 52.8.  Chest x-ray showed possible pneumonia.  Patient received 1 dose of cefepime, tobramycin, inhalers and methylprednisone 125 mg IV.     Appeared volume overloaded on exam.  Was started on IV diuresis.  Procalcitonin was negative, antibiotics adjusted to ceftriaxone/azithromycin to cover for possible COPD exacerbation.    Subjective: Patient seen sitting in chair, off oxygen and stable on room air.  States his breathing is doing really well today except when he has his coughing fits.  Patient has very little understanding of his COPD and CHF, tried to educate patient on course of diseases, recommendations.  He does state he has a follow-up with his heart heart doctor and his lung doctor in the next 2 weeks and encouraged him to also ask these questions to them for educational purposes as well.  Patient thinks that he is about ready for discharge but would like to stay 1 more day to make sure as he recently had to come back after discharge for shortness of breath.    Assessment/Plan:  Acute hypoxic respiratory failure  COPD exacerbation  -Initially  report was transcribed using voice recognition software.  Every effort was made to ensure accuracy; however, inadvertent computerized transcription errors may be present.

## 2024-02-02 VITALS
WEIGHT: 218.7 LBS | OXYGEN SATURATION: 98 % | TEMPERATURE: 98 F | DIASTOLIC BLOOD PRESSURE: 62 MMHG | RESPIRATION RATE: 16 BRPM | HEIGHT: 71 IN | HEART RATE: 86 BPM | SYSTOLIC BLOOD PRESSURE: 135 MMHG | BODY MASS INDEX: 30.62 KG/M2

## 2024-02-02 LAB
ANION GAP SERPL CALCULATED.3IONS-SCNC: 10 MMOL/L (ref 3–16)
BACTERIA BLD CULT: NORMAL
BUN SERPL-MCNC: 28 MG/DL (ref 7–20)
CALCIUM SERPL-MCNC: 8.6 MG/DL (ref 8.3–10.6)
CHLORIDE SERPL-SCNC: 98 MMOL/L (ref 99–110)
CO2 SERPL-SCNC: 31 MMOL/L (ref 21–32)
CREAT SERPL-MCNC: 1 MG/DL (ref 0.8–1.3)
GFR SERPLBLD CREATININE-BSD FMLA CKD-EPI: >60 ML/MIN/{1.73_M2}
GLUCOSE BLD-MCNC: 199 MG/DL (ref 70–99)
GLUCOSE BLD-MCNC: 267 MG/DL (ref 70–99)
GLUCOSE BLD-MCNC: 326 MG/DL (ref 70–99)
GLUCOSE BLD-MCNC: 349 MG/DL (ref 70–99)
GLUCOSE SERPL-MCNC: 368 MG/DL (ref 70–99)
PERFORMED ON: ABNORMAL
POTASSIUM SERPL-SCNC: 4.1 MMOL/L (ref 3.5–5.1)
SODIUM SERPL-SCNC: 139 MMOL/L (ref 136–145)

## 2024-02-02 PROCEDURE — 94640 AIRWAY INHALATION TREATMENT: CPT

## 2024-02-02 PROCEDURE — 94669 MECHANICAL CHEST WALL OSCILL: CPT

## 2024-02-02 PROCEDURE — 94760 N-INVAS EAR/PLS OXIMETRY 1: CPT

## 2024-02-02 PROCEDURE — 97110 THERAPEUTIC EXERCISES: CPT

## 2024-02-02 PROCEDURE — 80048 BASIC METABOLIC PNL TOTAL CA: CPT

## 2024-02-02 PROCEDURE — 6370000000 HC RX 637 (ALT 250 FOR IP): Performed by: NURSE PRACTITIONER

## 2024-02-02 PROCEDURE — 6370000000 HC RX 637 (ALT 250 FOR IP)

## 2024-02-02 PROCEDURE — 36415 COLL VENOUS BLD VENIPUNCTURE: CPT

## 2024-02-02 PROCEDURE — 2580000003 HC RX 258

## 2024-02-02 PROCEDURE — 97116 GAIT TRAINING THERAPY: CPT

## 2024-02-02 PROCEDURE — 6370000000 HC RX 637 (ALT 250 FOR IP): Performed by: ANESTHESIOLOGY

## 2024-02-02 PROCEDURE — 2580000003 HC RX 258: Performed by: ANESTHESIOLOGY

## 2024-02-02 RX ORDER — GUAIFENESIN 600 MG/1
600 TABLET, EXTENDED RELEASE ORAL 2 TIMES DAILY
Qty: 14 TABLET | Refills: 0 | Status: SHIPPED | OUTPATIENT
Start: 2024-02-02 | End: 2024-02-09

## 2024-02-02 RX ORDER — PREDNISONE 20 MG/1
40 TABLET ORAL DAILY
Qty: 2 TABLET | Refills: 0 | Status: SHIPPED | OUTPATIENT
Start: 2024-02-03 | End: 2024-02-04

## 2024-02-02 RX ORDER — GUAIFENESIN 600 MG/1
600 TABLET, EXTENDED RELEASE ORAL 2 TIMES DAILY
Qty: 14 TABLET | Refills: 0 | Status: SHIPPED | OUTPATIENT
Start: 2024-02-02 | End: 2024-02-02

## 2024-02-02 RX ORDER — INSULIN LISPRO 100 [IU]/ML
12 INJECTION, SOLUTION INTRAVENOUS; SUBCUTANEOUS
Status: DISCONTINUED | OUTPATIENT
Start: 2024-02-02 | End: 2024-02-02 | Stop reason: HOSPADM

## 2024-02-02 RX ORDER — CEFDINIR 300 MG/1
300 CAPSULE ORAL EVERY 12 HOURS SCHEDULED
Qty: 3 CAPSULE | Refills: 0 | Status: SHIPPED | OUTPATIENT
Start: 2024-02-02 | End: 2024-02-02

## 2024-02-02 RX ORDER — CEFDINIR 300 MG/1
300 CAPSULE ORAL EVERY 12 HOURS SCHEDULED
Qty: 3 CAPSULE | Refills: 0 | Status: SHIPPED | OUTPATIENT
Start: 2024-02-02 | End: 2024-02-04

## 2024-02-02 RX ORDER — PREDNISONE 20 MG/1
40 TABLET ORAL DAILY
Qty: 2 TABLET | Refills: 0 | Status: SHIPPED | OUTPATIENT
Start: 2024-02-03 | End: 2024-02-02

## 2024-02-02 RX ADMIN — SODIUM CHLORIDE SOLN NEBU 3% 4 ML: 3 NEBU SOLN at 09:30

## 2024-02-02 RX ADMIN — PRAMIPEXOLE DIHYDROCHLORIDE 0.25 MG: 0.5 TABLET ORAL at 07:41

## 2024-02-02 RX ADMIN — GUAIFENESIN 600 MG: 600 TABLET ORAL at 07:41

## 2024-02-02 RX ADMIN — INSULIN GLARGINE 25 UNITS: 100 INJECTION, SOLUTION SUBCUTANEOUS at 07:47

## 2024-02-02 RX ADMIN — PREDNISONE 40 MG: 20 TABLET ORAL at 07:41

## 2024-02-02 RX ADMIN — APIXABAN 5 MG: 5 TABLET, FILM COATED ORAL at 07:41

## 2024-02-02 RX ADMIN — PANTOPRAZOLE SODIUM 40 MG: 40 TABLET, DELAYED RELEASE ORAL at 05:20

## 2024-02-02 RX ADMIN — ROFLUMILAST 500 MCG: 500 TABLET ORAL at 07:42

## 2024-02-02 RX ADMIN — ASPIRIN 81 MG 81 MG: 81 TABLET ORAL at 07:41

## 2024-02-02 RX ADMIN — SODIUM CHLORIDE, PRESERVATIVE FREE 10 ML: 5 INJECTION INTRAVENOUS at 07:47

## 2024-02-02 RX ADMIN — INSULIN LISPRO 9 UNITS: 100 INJECTION, SOLUTION INTRAVENOUS; SUBCUTANEOUS at 07:48

## 2024-02-02 RX ADMIN — EMPAGLIFLOZIN 10 MG: 10 TABLET, FILM COATED ORAL at 07:42

## 2024-02-02 RX ADMIN — LAMOTRIGINE 75 MG: 25 TABLET ORAL at 07:41

## 2024-02-02 RX ADMIN — GABAPENTIN 800 MG: 400 CAPSULE ORAL at 07:41

## 2024-02-02 RX ADMIN — INSULIN HUMAN 13 UNITS: 100 INJECTION, SUSPENSION SUBCUTANEOUS at 10:37

## 2024-02-02 RX ADMIN — FUROSEMIDE 40 MG: 40 TABLET ORAL at 07:41

## 2024-02-02 RX ADMIN — CEFDINIR 300 MG: 300 CAPSULE ORAL at 07:40

## 2024-02-02 RX ADMIN — CARVEDILOL 3.12 MG: 3.12 TABLET, FILM COATED ORAL at 07:42

## 2024-02-02 RX ADMIN — INSULIN LISPRO 12 UNITS: 100 INJECTION, SOLUTION INTRAVENOUS; SUBCUTANEOUS at 07:47

## 2024-02-02 ASSESSMENT — PAIN SCALES - GENERAL: PAINLEVEL_OUTOF10: 0

## 2024-02-02 NOTE — DISCHARGE SUMMARY
Hospital Medicine Discharge Summary    Patient ID: Jorge Gauthier      Patient's PCP: Gallo Sanchez    Admit Date: 1/29/2024     Discharge Date:   2/2/24    Admitting Physician: Telly Subramanian DO     Discharge Physician: Spring Steele PA-C     Discharge Diagnoses  Acute hypoxic respiratory failure  COPD exacerbation  Acute on chronic combined CHF  CAD  PAF  Type 2 diabetes with hyperglycemia      Hospital Course: Jorge Gauthier is a 78 y.o. male who is presenting with progressive shortness of breath.  Patient has history of COPD and he does not use oxygen at baseline.  Patient reports progressive cough and wheezing.  He denies fever, chills, increasing leg swelling or weight, nausea vomiting, abdominal pain, dysuria and bleeding.     In the ED vitals were stable.  Labs showed blood sugar 172, proBNP 612, troponin 28>>28, white count 11.8, procalcitonin 0.10, lactate unremarkable, COVID and flu test unremarkable.  EKG without acute ST changes.  UA negative for infection.  Venous gas showed pH 7.38 and pCO2 52.8.  Chest x-ray showed possible pneumonia.  Patient received 1 dose of cefepime, tobramycin, inhalers and methylprednisone 125 mg IV.      He appeared volume overloaded on exam.  Was started on IV diuresis. Procalcitonin was negative, antibiotics adjusted to ceftriaxone/azithromycin to cover for possible CAP with COPD exacerbation and he completed course of azithromycin and Omnicef.  He will complete course of prednisone at discharge.  He has follow-up with his primary pulmonologist soon.    Patient was weaned to room air, remained stable for 48 hours on room air prior to discharge.  Patient diuresed well, switch back to oral Lasix. Upon discharge, patient's weight was 218 which was decreased significantly from admission weight of 234.  CHF RN visited patient to provide education, he has follow-up with Dr. Sanz his primary cardiologist on 2/5.  Extensive education given to patient regarding   01/30/2024 05:18 AM       Renal:    Lab Results   Component Value Date/Time     02/02/2024 05:06 AM    K 4.1 02/02/2024 05:06 AM    CL 98 02/02/2024 05:06 AM    CO2 31 02/02/2024 05:06 AM    BUN 28 02/02/2024 05:06 AM    CREATININE 1.0 02/02/2024 05:06 AM    CALCIUM 8.6 02/02/2024 05:06 AM    PHOS 2.3 03/15/2023 05:34 AM         Significant Diagnostic Studies    Radiology:   XR CHEST PORTABLE   Final Result   Mild patchy opacities in both lower lung fields and platelike atelectasis,   slightly less prominent than the prior study and favored to represent   improving pulmonary edema and/or superimposed pneumonia.      Trace bilateral pleural effusion.                Consults:     PHARMACY TO DOSE VANCOMYCIN  IP CONSULT TO HEART FAILURE NURSE/COORDINATOR    Disposition: Home with home health care    Condition at Discharge: Stable    Discharge Instructions/Follow-up: Follow-up with Dr. Sanz on 2/5.  Follow-up with pulmonologist at next appointment scheduled.  Maintain fluid restriction, daily weights.  Return to ER for worsening shortness of breath    Code Status:  DNR-CCA     Activity: activity as tolerated    Diet: cardiac diet      Discharge Medications:     Current Discharge Medication List             Details   albuterol (PROVENTIL) (2.5 MG/3ML) 0.083% nebulizer solution USE 1 VIAL IN NEBULIZER 4 TIMES DAILY  Qty: 120 each, Refills: 11      fluticasone-salmeterol (ADVAIR DISKUS) 250-50 MCG/ACT AEPB diskus inhaler Inhale 1 puff into the lungs in the morning and 1 puff in the evening.      lamoTRIgine (LAMICTAL) 25 MG tablet Take 3 tablets by mouth 2 times daily      methocarbamol (ROBAXIN) 500 MG tablet Take 1 tablet by mouth 3 times daily as needed (spasms)      Respiratory Therapy Supplies (NEBULIZER/TUBING/MOUTHPIECE) KIT With full face mask.  Qty: 1 kit, Refills: 0    Associated Diagnoses: Chronic bronchitis, simple (HCC)      albuterol sulfate HFA (PROVENTIL;VENTOLIN;PROAIR) 108 (90 Base)  discharge is more than 36 min in the examination, evaluation, counseling and review of medications and discharge plan.      Signed:    Spring Steele PA-C   2/2/2024    The patient was seen and examined on day of discharge and this discharge summary is in conjunction with any daily progress note from day of discharge.    Thank you Gallo Sanchez for the opportunity to be involved in this patient's care. If you have any questions or concerns please feel free to contact me at [phone].    NOTE:  This report was transcribed using voice recognition software.  Every effort was made to ensure accuracy; however, inadvertent computerized transcription errors may be present.

## 2024-02-02 NOTE — PROGRESS NOTES
Physical Therapy  Facility/Department: [address] MED SURG  Physical Therapy Treatment Note    Name: Jorge Gauthier  : 1945  MRN: [id number]  Date of Service: 2024    Discharge Recommendations:  Patient would benefit from continued therapy after discharge, Home with Home health PT, Home with assist PRN   PT Equipment Recommendations  Equipment Needed: No    Jorge Gauthier scored a 22/24 on the AM-PAC short mobility form. Current research shows that an AM-PAC score of 18 or greater is typically associated with a discharge to the patient's home setting. Based on the patient's AM-PAC score and their current functional mobility deficits, it is recommended that the patient have 2-3 sessions per week of Physical Therapy at d/c to increase the patient's independence.  At this time, this patient demonstrates the endurance and safety to discharge home with HHPT and a follow up treatment frequency of 2-3x/wk.  Please see assessment section for further patient specific details.    If patient discharges prior to next session this note will serve as a discharge summary.  Please see below for the latest assessment towards goals.          Patient Diagnosis(es): The primary encounter diagnosis was Acute respiratory failure with hypoxia and hypercapnia (HCC). Diagnoses of COPD exacerbation (HCC) and Pneumonia due to infectious organism, unspecified laterality, unspecified part of lung were also pertinent to this visit.  Past Medical History:  has a past medical history of Abrasion of right elbow, Arthritis, CAD (coronary artery disease), Chronic back pain, Chronic systolic CHF (congestive heart failure) (HCC), COPD (chronic obstructive pulmonary disease) (HCC), Dental disease, Diabetes mellitus (HCC), Essential hypertension, Hearing loss, Paroxysmal atrial fibrillation (HCC), and Tinnitus.  Past Surgical History:  has a past surgical history that includes fracture surgery (Left, ); back surgery (); Cardiac surgery

## 2024-02-02 NOTE — PLAN OF CARE
Problem: Discharge Planning  Goal: Discharge to home or other facility with appropriate resources  2/2/2024 1139 by Palmira Gill RN  Outcome: Progressing  2/2/2024 0132 by Marianna Mendoza RN  Outcome: Progressing     Problem: Skin/Tissue Integrity  Goal: Absence of new skin breakdown  Description: 1.  Monitor for areas of redness and/or skin breakdown  2.  Assess vascular access sites hourly  3.  Every 4-6 hours minimum:  Change oxygen saturation probe site  4.  Every 4-6 hours:  If on nasal continuous positive airway pressure, respiratory therapy assess nares and determine need for appliance change or resting period.  2/2/2024 1139 by Palmira Gill RN  Outcome: Progressing  2/2/2024 0132 by Marianna Mendoza RN  Outcome: Progressing     Problem: Safety - Adult  Goal: Free from fall injury  2/2/2024 1139 by Palmira Gill RN  Outcome: Progressing  2/2/2024 0132 by Marianna Mendoza RN  Outcome: Progressing     Problem: ABCDS Injury Assessment  Goal: Absence of physical injury  2/2/2024 1139 by Palmira Gill RN  Outcome: Progressing  2/2/2024 0132 by Marianna Mendoza RN  Outcome: Progressing     Problem: Neurosensory - Adult  Goal: Achieves stable or improved neurological status  2/2/2024 1139 by Palmira Gill RN  Outcome: Progressing  2/2/2024 0132 by Marianna Mendoza RN  Outcome: Progressing  Goal: Achieves maximal functionality and self care  2/2/2024 1139 by Palmira Gill RN  Outcome: Progressing  2/2/2024 0132 by Marianna Mendoza RN  Outcome: Progressing     Problem: Respiratory - Adult  Goal: Achieves optimal ventilation and oxygenation  2/2/2024 1139 by Palmira Gill RN  Outcome: Progressing  2/2/2024 0132 by Marianna Mendoza RN  Outcome: Progressing     Problem: Cardiovascular - Adult  Goal: Maintains optimal cardiac output and hemodynamic stability  2/2/2024 1139 by Palmira Gill RN  Outcome: Progressing  2/2/2024 0132 by

## 2024-02-02 NOTE — CARE COORDINATION
Case Management Discharge Note          Date / Time of Note: 2/2/2024 11:28 AM                  Patient Name: Jorge Gauthier   YOB: 1945  Diagnosis: ARF (acute respiratory failure) ([facility]) [J96.00]  COPD exacerbation (HCC) [J44.1]  Acute respiratory failure with hypoxia and hypercapnia (HCC) [J96.01, J96.02]  Pneumonia due to infectious organism, unspecified laterality, unspecified part of lung [J18.9]   Date / Time: 1/29/2024  8:27 PM    Financial:  Payor: MEDICARE / Plan: MEDICARE PART A AND B / Product Type: *No Product type* /      Pharmacy:    [id number] [place], [place] - [address] [phone] - F [phone]  [address] [address]  Phone: [phone] Fax: [phone]    [facility] [address] - P [phone] - F [phone]  [address]  [address]  [facility] [address]  Phone: [phone] Fax: [phone]      Assistance purchasing medications?: Potential Assistance Purchasing Medications: No (Uses [facility])  Assistance provided by Case Management: None at this time    DISCHARGE Disposition: Home with Home Health Care      Home Care:  Home Care ordered at discharge: Yes  Home Care Agency: [facility]  Phone: [phone]  Fax: [phone]  Orders faxed: Yes      Transportation:  Transportation PLAN for discharge: family   Mode of Transport: Private Car  Spouse to transport pt home.       IMM Completed:   Yes, Case management has presented and reviewed IMM letter #2.           .   Patient and/or family/POA verbalized understanding of their medicare rights and appeal process if needed. Patient and/or family/POA has signed, initialed and placed the date and time on IMM letter #2 on the the appropriate lines. Copy of letter offered and they are aware that the original copy of IMM letter #2 is available prior to discharge from the paper chart

## 2024-02-02 NOTE — PROGRESS NOTES
[facility]  Hyperglycemia Event      NAME: Jorge Gauthier  MEDICAL RECORD NUMBER:  [id number]  AGE: 78 y.o.   GENDER: male  : 1945  EPISODE DATE:  2024     Data     Recent Labs     24  0750 24  1114 24  1627 24  0522 24  0728   POCGLU 282* 340* 328* 282* 349* 326*       Medications  Scheduled Medications:   furosemide  40 mg Oral Daily    empagliflozin  10 mg Oral Daily    sodium chloride (Inhalant)  4 mL Nebulization BID RT    cefdinir  300 mg Oral 2 times per day    guaiFENesin  600 mg Oral BID    insulin glargine  25 Units SubCUTAneous Daily    insulin lispro  9 Units SubCUTAneous TID WC    apixaban  5 mg Oral BID    aspirin  81 mg Oral Daily    carvedilol  3.125 mg Oral BID WC    gabapentin  800 mg Oral 4x Daily    pramipexole  0.25 mg Oral 4x daily    Roflumilast  500 mcg Oral Daily    atorvastatin  20 mg Oral Nightly    sodium chloride flush  5-40 mL IntraVENous 2 times per day    pantoprazole  40 mg Oral QAM AC    lamoTRIgine  75 mg Oral BID    insulin lispro  0-16 Units SubCUTAneous TID WC    insulin lispro  0-4 Units SubCUTAneous Nightly    predniSONE  40 mg Oral Daily       Diet  Current diet/supplement order: ADULT DIET; Regular; 3 carb choices (45 gm/meal); No Added Salt (3-4 gm); 2000 ml     Recorded PO: PO Meals Eaten (%): 76 - 100% last meal in flowsheets      Action     Total dose of insulin yesterday   Physician/PA Notified of event: Yes   GRAHAM Narvaez     Recommend adding NPH 10 units this morning with prednisone dose.      Recommend resuming usual evening dose of 70/30 at dinner this evening at home and increase the AM dose for 2/3/24 with prednisone dose.  Then resume usual 70/30 dosing starting with dinner 2/3/24.      Responce     Medication plan updated: Yes      Electronically signed by Marialuisa Obrien RN on 2024 at 9:18 AM

## 2024-02-02 NOTE — PLAN OF CARE
Problem: Discharge Planning  Goal: Discharge to home or other facility with appropriate resources  Outcome: Progressing     Problem: Skin/Tissue Integrity  Goal: Absence of new skin breakdown  Description: 1.  Monitor for areas of redness and/or skin breakdown  2.  Assess vascular access sites hourly  3.  Every 4-6 hours minimum:  Change oxygen saturation probe site  4.  Every 4-6 hours:  If on nasal continuous positive airway pressure, respiratory therapy assess nares and determine need for appliance change or resting period.  Outcome: Progressing     Problem: Safety - Adult  Goal: Free from fall injury  Outcome: Progressing     Problem: ABCDS Injury Assessment  Goal: Absence of physical injury  Outcome: Progressing     Problem: Neurosensory - Adult  Goal: Achieves stable or improved neurological status  Outcome: Progressing     Problem: Neurosensory - Adult  Goal: Achieves maximal functionality and self care  Outcome: Progressing     Problem: Respiratory - Adult  Goal: Achieves optimal ventilation and oxygenation  Outcome: Progressing     Problem: Cardiovascular - Adult  Goal: Maintains optimal cardiac output and hemodynamic stability  Outcome: Progressing     Problem: Skin/Tissue Integrity - Adult  Goal: Skin integrity remains intact  Outcome: Progressing     Problem: Musculoskeletal - Adult  Goal: Return mobility to safest level of function  Outcome: Progressing     Problem: Musculoskeletal - Adult  Goal: Return ADL status to a safe level of function  Outcome: Progressing     Problem: Gastrointestinal - Adult  Goal: Maintains or returns to baseline bowel function  Outcome: Progressing     Problem: Gastrointestinal - Adult  Goal: Maintains adequate nutritional intake  Outcome: Progressing     Problem: Infection - Adult  Goal: Absence of infection at discharge  Outcome: Progressing     Problem: Metabolic/Fluid and Electrolytes - Adult  Goal: Electrolytes maintained within normal limits  Outcome: Progressing

## 2024-02-02 NOTE — CARE COORDINATION
[facility]    DC order noted, all docs needed have been faxed to [facility] for home care services.    Home care to see patient by 2/4/24    Raphael Rodrigez RN, BSN CTN  [facility] [phone]

## 2024-02-02 NOTE — PROGRESS NOTES
Reviewed dc instructions with pt. Pt verbalized understanding. PIV removed. Dressing clean dry and intact. Pt dc to private residence. Wheelchair to transport pt. To vehicle. Pt dc with personal belongings.

## 2024-02-02 NOTE — PROGRESS NOTES
Occupational Therapy  Attempt Note    Jorge Gauthier  2/2/2024    OT attempted to see for therapy treatment session, but was unable to see d/t pt receiving a breathing treatment. Will attempt again later as therapy schedule permits.    If pt is d/c'd prior to next therapy treatment session, please refer to last therapy progress note for pt's d/c status and OT recommendations.    Ivelisse Arvizu, OTR/L [id number]

## 2024-02-02 NOTE — DISCHARGE INSTRUCTIONS
Take 30 units of your insulin twice daily on saturday 2/3 while taking the PO prednisone. GO back to usually 25 units twice daily on Sunday 2/4.     Please follow up with Pulmonology and Cardiology as planned. Return to ER for worsening shortness of breath.

## 2024-02-02 NOTE — NURSE NAVIGATOR
Discharge order noted. He has a follow up appointment in place with his usual Cardiologist, Dr. Sanz, 2/5/@ 1030am  this was scheduled prior to this admission. It is on his AVS, his bedside RN will review this with him.  All GDMT has been addressed  D/c wt 218lbs/ 99.2kg standing

## 2024-02-03 LAB — BACTERIA BLD CULT ORG #2: NORMAL

## 2024-02-07 ENCOUNTER — TELEPHONE (OUTPATIENT)
Dept: PULMONOLOGY | Age: 79
End: 2024-02-07

## 2024-02-07 DIAGNOSIS — J41.0 CHRONIC BRONCHITIS, SIMPLE (HCC): Primary | ICD-10-CM

## 2024-02-07 NOTE — TELEPHONE ENCOUNTER
Pt's nurse from [facility] called stating he has been coughing an wheezing very bad was in ER 1 week ago only sent home with 1 day worth of medication. Pt would like something called in to his [facility].

## 2024-02-08 RX ORDER — AMOXICILLIN AND CLAVULANATE POTASSIUM 500; 125 MG/1; MG/1
1 TABLET, FILM COATED ORAL 3 TIMES DAILY
Qty: 30 TABLET | Refills: 0 | Status: SHIPPED | OUTPATIENT
Start: 2024-02-08 | End: 2024-02-18

## 2024-02-08 RX ORDER — PREDNISONE 10 MG/1
TABLET ORAL
Qty: 30 TABLET | Refills: 0 | Status: SHIPPED | OUTPATIENT
Start: 2024-02-08 | End: 2024-02-18

## 2024-02-12 ENCOUNTER — OFFICE VISIT (OUTPATIENT)
Dept: PULMONOLOGY | Age: 79
End: 2024-02-12
Payer: MEDICARE

## 2024-02-12 VITALS
DIASTOLIC BLOOD PRESSURE: 66 MMHG | SYSTOLIC BLOOD PRESSURE: 124 MMHG | TEMPERATURE: 97.7 F | RESPIRATION RATE: 18 BRPM | HEART RATE: 82 BPM | HEIGHT: 70 IN | WEIGHT: 214 LBS | OXYGEN SATURATION: 97 % | BODY MASS INDEX: 30.64 KG/M2

## 2024-02-12 DIAGNOSIS — E66.01 MORBID OBESITY DUE TO EXCESS CALORIES (HCC): ICD-10-CM

## 2024-02-12 DIAGNOSIS — J41.0 CHRONIC BRONCHITIS, SIMPLE (HCC): Primary | ICD-10-CM

## 2024-02-12 DIAGNOSIS — T17.908D CHRONIC PULMONARY ASPIRATION, SUBSEQUENT ENCOUNTER: ICD-10-CM

## 2024-02-12 PROCEDURE — 1036F TOBACCO NON-USER: CPT | Performed by: INTERNAL MEDICINE

## 2024-02-12 PROCEDURE — 1123F ACP DISCUSS/DSCN MKR DOCD: CPT | Performed by: INTERNAL MEDICINE

## 2024-02-12 PROCEDURE — 3074F SYST BP LT 130 MM HG: CPT | Performed by: INTERNAL MEDICINE

## 2024-02-12 PROCEDURE — G8417 CALC BMI ABV UP PARAM F/U: HCPCS | Performed by: INTERNAL MEDICINE

## 2024-02-12 PROCEDURE — G8428 CUR MEDS NOT DOCUMENT: HCPCS | Performed by: INTERNAL MEDICINE

## 2024-02-12 PROCEDURE — 3023F SPIROM DOC REV: CPT | Performed by: INTERNAL MEDICINE

## 2024-02-12 PROCEDURE — 99214 OFFICE O/P EST MOD 30 MIN: CPT | Performed by: INTERNAL MEDICINE

## 2024-02-12 PROCEDURE — 1111F DSCHRG MED/CURRENT MED MERGE: CPT | Performed by: INTERNAL MEDICINE

## 2024-02-12 PROCEDURE — G8484 FLU IMMUNIZE NO ADMIN: HCPCS | Performed by: INTERNAL MEDICINE

## 2024-02-12 PROCEDURE — 3078F DIAST BP <80 MM HG: CPT | Performed by: INTERNAL MEDICINE

## 2024-02-12 RX ORDER — BUDESONIDE, GLYCOPYRROLATE, AND FORMOTEROL FUMARATE 160; 9; 4.8 UG/1; UG/1; UG/1
2 AEROSOL, METERED RESPIRATORY (INHALATION) 2 TIMES DAILY
Qty: 1 EACH | Refills: 0 | Status: SHIPPED | COMMUNITY
Start: 2024-02-12 | End: 2024-02-13 | Stop reason: SDUPTHER

## 2024-02-12 RX ORDER — BUDESONIDE, GLYCOPYRROLATE, AND FORMOTEROL FUMARATE 160; 9; 4.8 UG/1; UG/1; UG/1
2 AEROSOL, METERED RESPIRATORY (INHALATION) 2 TIMES DAILY
Qty: 1 EACH | Refills: 0 | COMMUNITY
Start: 2024-02-12 | End: 2024-02-13 | Stop reason: SDUPTHER

## 2024-02-12 NOTE — PROGRESS NOTES
REASON FOR CONSULTATION/CC:    Chief Complaint   Patient presents with    COPD        Consult at request of   Gallo Sanchez for      PCP: Gallo Sanchez    HISTORY OF PRESENT ILLNESS: Jorge Gauthier is a 78 y.o. year old male with a history of chronic bronchitis  who presents :               Obesity       chronic bronchitis   Daliresp - using but no clear this is helping. Will complete current rx then stop  Advair 250 vs Wixela. No benefit per pt.      Symbicort          Aspiration with hx of pneumonia.    Still having a lot fo cough with all meals.  Still using thickener and follow advised of SLP.      Using IS / flutters.  This is works well.  Using flutter device 10 per usage and 8-9 times per day.            Objective:   PHYSICAL EXAM:  Blood pressure 124/66, pulse 82, temperature 97.7 °F (36.5 °C), resp. rate 18, height 1.778 m (5' 10\"), weight 97.1 kg (214 lb), SpO2 97 %.'  Body mass index is 30.71 kg/m².   Gen: No distress.    Eyes:    ENT:    Neck:    Resp: No accessory muscle use. No crackles. No wheezes. diffuse rhonchi.    CV: Regular rate. Regular rhythm. No murmur or rub. no edema.   GI:    Skin:    Lymph:    M/S: No cyanosis. No clubbing.     Neuro: Moves all four extremities.   Psych: Oriented x 3. No anxiety.  Awake. Alert. Intact judgement and insight.      Data Reviewed:        Assessment:      chronic bronchitis   Chronic rhonitis  Neuropathy with DM       Plan:      Problem List Items Addressed This Visit       Chronic pulmonary aspiration     Continues to have aspiration.  For airway clearance, continue incentive spirometry and flutter device.  He is using 7% nebulized saline purchasing from [employer].    To improve his airway clearance, order vest therapy.  This may be difficult to obtain secondary to lack of documented bronchiectasis         Morbid obesity due to excess calories (HCC)     Continues to be uncontrolled         Chronic bronchitis, simple (HCC) - Primary     Continues to have

## 2024-02-14 NOTE — ASSESSMENT & PLAN NOTE
Continues to have aspiration.  For airway clearance, continue incentive spirometry and flutter device.  He is using 7% nebulized saline purchasing from [facility].    To improve his airway clearance, order vest therapy.  This may be difficult to obtain secondary to lack of documented bronchiectasis

## 2024-02-14 NOTE — ASSESSMENT & PLAN NOTE
Continues to have significant symptoms.  This is secondary to aspiration along with simple chronic bronchitis.  Continue Daliresp and Advair.      The patient has been admitted 6 times in the last calendar year all secondary to acute bronchitis/pneumonia.  He he has significant congestion that leads to bacterial pneumonia.  He has been on significant airway clearance with 3% saline, Acapella, incentive spirometry, cough techniques, and innumerable rounds of antibiotics.  He has received the antibiotics from myself and his primary care therefore difficult to give the total number but greater than 10 per this last calendar year.  He has a daily productive cough for years with 6 admissions as stated above with numerable exacerbations in between.      Therefore, the patient should benefit from vest therapy to improve airway clearance and to decrease need for admissions and antibiotics.

## 2024-02-19 ENCOUNTER — TELEPHONE (OUTPATIENT)
Dept: PULMONOLOGY | Age: 79
End: 2024-02-19

## 2024-02-19 DIAGNOSIS — J44.9 MODERATE COPD (CHRONIC OBSTRUCTIVE PULMONARY DISEASE) (HCC): Primary | ICD-10-CM

## 2024-02-19 NOTE — TELEPHONE ENCOUNTER
Portia calls in stating  that the Breztri sample given to Bang works great. She would like us to send in a prescription for it to [facility] P [phone]  Thank you!

## 2024-02-20 ENCOUNTER — TELEPHONE (OUTPATIENT)
Dept: PULMONOLOGY | Age: 79
End: 2024-02-20

## 2024-02-20 NOTE — TELEPHONE ENCOUNTER
Virgil Manrique called concerning the VEST. It is not covered because patient has only straight Medicare. No supplement or replacement medicare. Patient refused to fill out the financial assistance form to see if they can get this for him. Please call

## 2024-02-21 NOTE — TELEPHONE ENCOUNTER
Called pt he stated paying out of pocket is not a option would like to know if any medication would benefit him going forward.

## 2024-02-22 NOTE — TELEPHONE ENCOUNTER
I would point him to everything we discussed at last appointment.     There is not a medication to take place of the vest.  He can use his breathing medications, nebulized saline, vibratory airway clearance.  Skip the vest

## 2024-02-27 ENCOUNTER — TELEPHONE (OUTPATIENT)
Dept: PULMONOLOGY | Age: 79
End: 2024-02-27

## 2024-02-27 NOTE — TELEPHONE ENCOUNTER
Called pt with the message from  going forward since vest is not a option. Pt verbalized understanding.

## 2024-02-27 NOTE — TELEPHONE ENCOUNTER
Alka with [employer] calling, she is asking if the Breztri that patient was put on was a replacement for the Advair? Please call

## 2024-03-19 ENCOUNTER — HOSPITAL ENCOUNTER (INPATIENT)
Age: 79
LOS: 4 days | Discharge: HOME HEALTH | DRG: 871 | End: 2024-03-24
Attending: INTERNAL MEDICINE | Admitting: INTERNAL MEDICINE
Payer: MEDICARE

## 2024-03-19 ENCOUNTER — APPOINTMENT (OUTPATIENT)
Dept: GENERAL RADIOLOGY | Age: 79
DRG: 871 | End: 2024-03-19
Payer: MEDICARE

## 2024-03-19 DIAGNOSIS — J18.9 PNEUMONIA DUE TO INFECTIOUS ORGANISM, UNSPECIFIED LATERALITY, UNSPECIFIED PART OF LUNG: ICD-10-CM

## 2024-03-19 DIAGNOSIS — J96.01 ACUTE RESPIRATORY FAILURE WITH HYPOXIA (HCC): Primary | ICD-10-CM

## 2024-03-19 DIAGNOSIS — I50.43 ACUTE ON CHRONIC COMBINED SYSTOLIC AND DIASTOLIC CHF (CONGESTIVE HEART FAILURE) (HCC): ICD-10-CM

## 2024-03-19 DIAGNOSIS — J44.1 COPD EXACERBATION (HCC): ICD-10-CM

## 2024-03-19 LAB
ALBUMIN SERPL-MCNC: 4.1 G/DL (ref 3.4–5)
ALBUMIN/GLOB SERPL: 1.2 {RATIO} (ref 1.1–2.2)
ALP SERPL-CCNC: 59 U/L (ref 40–129)
ALT SERPL-CCNC: 10 U/L (ref 10–40)
ANION GAP SERPL CALCULATED.3IONS-SCNC: 16 MMOL/L (ref 3–16)
AST SERPL-CCNC: 13 U/L (ref 15–37)
BASE EXCESS BLDV CALC-SCNC: 4.4 MMOL/L
BASOPHILS # BLD: 0.1 K/UL (ref 0–0.2)
BASOPHILS NFR BLD: 0.4 %
BILIRUB SERPL-MCNC: 0.5 MG/DL (ref 0–1)
BUN SERPL-MCNC: 27 MG/DL (ref 7–20)
CALCIUM SERPL-MCNC: 9.3 MG/DL (ref 8.3–10.6)
CHLORIDE SERPL-SCNC: 96 MMOL/L (ref 99–110)
CO2 BLDV-SCNC: 32 MMOL/L
CO2 SERPL-SCNC: 26 MMOL/L (ref 21–32)
COHGB MFR BLDV: 2.2 %
CREAT SERPL-MCNC: 1.2 MG/DL (ref 0.8–1.3)
DEPRECATED RDW RBC AUTO: 16.6 % (ref 12.4–15.4)
EOSINOPHIL # BLD: 0.1 K/UL (ref 0–0.6)
EOSINOPHIL NFR BLD: 0.8 %
FLUAV RNA UPPER RESP QL NAA+PROBE: NEGATIVE
FLUBV AG NPH QL: NEGATIVE
GFR SERPLBLD CREATININE-BSD FMLA CKD-EPI: >60 ML/MIN/{1.73_M2}
GLUCOSE SERPL-MCNC: 248 MG/DL (ref 70–99)
HCO3 BLDV-SCNC: 30 MMOL/L (ref 23–29)
HCT VFR BLD AUTO: 37.8 % (ref 40.5–52.5)
HGB BLD-MCNC: 11.9 G/DL (ref 13.5–17.5)
LACTATE BLDV-SCNC: 2.5 MMOL/L (ref 0.4–1.9)
LYMPHOCYTES # BLD: 1.1 K/UL (ref 1–5.1)
LYMPHOCYTES NFR BLD: 6.4 %
MCH RBC QN AUTO: 26.6 PG (ref 26–34)
MCHC RBC AUTO-ENTMCNC: 31.4 G/DL (ref 31–36)
MCV RBC AUTO: 84.7 FL (ref 80–100)
METHGB MFR BLDV: 0.5 %
MONOCYTES # BLD: 1.2 K/UL (ref 0–1.3)
MONOCYTES NFR BLD: 7.3 %
NEUTROPHILS # BLD: 14.2 K/UL (ref 1.7–7.7)
NEUTROPHILS NFR BLD: 85.1 %
NT-PROBNP SERPL-MCNC: 655 PG/ML (ref 0–449)
O2 THERAPY: ABNORMAL
PCO2 BLDV: 50.1 MMHG (ref 40–50)
PH BLDV: 7.39 [PH] (ref 7.35–7.45)
PLATELET # BLD AUTO: 352 K/UL (ref 135–450)
PMV BLD AUTO: 6.9 FL (ref 5–10.5)
PO2 BLDV: 35 MMHG
POTASSIUM SERPL-SCNC: 4.5 MMOL/L (ref 3.5–5.1)
PROT SERPL-MCNC: 7.5 G/DL (ref 6.4–8.2)
RBC # BLD AUTO: 4.47 M/UL (ref 4.2–5.9)
SAO2 % BLDV: 64 %
SARS-COV-2 RDRP RESP QL NAA+PROBE: NOT DETECTED
SODIUM SERPL-SCNC: 138 MMOL/L (ref 136–145)
TROPONIN, HIGH SENSITIVITY: 35 NG/L (ref 0–22)
WBC # BLD AUTO: 16.7 K/UL (ref 4–11)

## 2024-03-19 PROCEDURE — 96365 THER/PROPH/DIAG IV INF INIT: CPT

## 2024-03-19 PROCEDURE — 83880 ASSAY OF NATRIURETIC PEPTIDE: CPT

## 2024-03-19 PROCEDURE — 6360000002 HC RX W HCPCS: Performed by: PHYSICIAN ASSISTANT

## 2024-03-19 PROCEDURE — 71045 X-RAY EXAM CHEST 1 VIEW: CPT

## 2024-03-19 PROCEDURE — 96375 TX/PRO/DX INJ NEW DRUG ADDON: CPT

## 2024-03-19 PROCEDURE — 6370000000 HC RX 637 (ALT 250 FOR IP): Performed by: PHYSICIAN ASSISTANT

## 2024-03-19 PROCEDURE — 84484 ASSAY OF TROPONIN QUANT: CPT

## 2024-03-19 PROCEDURE — 87635 SARS-COV-2 COVID-19 AMP PRB: CPT

## 2024-03-19 PROCEDURE — 80053 COMPREHEN METABOLIC PANEL: CPT

## 2024-03-19 PROCEDURE — 99285 EMERGENCY DEPT VISIT HI MDM: CPT

## 2024-03-19 PROCEDURE — 83605 ASSAY OF LACTIC ACID: CPT

## 2024-03-19 PROCEDURE — 87804 INFLUENZA ASSAY W/OPTIC: CPT

## 2024-03-19 PROCEDURE — 87040 BLOOD CULTURE FOR BACTERIA: CPT

## 2024-03-19 PROCEDURE — 93005 ELECTROCARDIOGRAM TRACING: CPT | Performed by: INTERNAL MEDICINE

## 2024-03-19 PROCEDURE — 36415 COLL VENOUS BLD VENIPUNCTURE: CPT

## 2024-03-19 PROCEDURE — 2580000003 HC RX 258: Performed by: PHYSICIAN ASSISTANT

## 2024-03-19 PROCEDURE — 94640 AIRWAY INHALATION TREATMENT: CPT

## 2024-03-19 PROCEDURE — 82803 BLOOD GASES ANY COMBINATION: CPT

## 2024-03-19 PROCEDURE — 85025 COMPLETE CBC W/AUTO DIFF WBC: CPT

## 2024-03-19 PROCEDURE — 2580000003 HC RX 258

## 2024-03-19 PROCEDURE — 2700000000 HC OXYGEN THERAPY PER DAY

## 2024-03-19 RX ORDER — WATER 10 ML/10ML
INJECTION INTRAMUSCULAR; INTRAVENOUS; SUBCUTANEOUS
Status: COMPLETED
Start: 2024-03-19 | End: 2024-03-19

## 2024-03-19 RX ORDER — IPRATROPIUM BROMIDE AND ALBUTEROL SULFATE 2.5; .5 MG/3ML; MG/3ML
3 SOLUTION RESPIRATORY (INHALATION) ONCE
Status: COMPLETED | OUTPATIENT
Start: 2024-03-19 | End: 2024-03-19

## 2024-03-19 RX ORDER — METHYLPREDNISOLONE SODIUM SUCCINATE 125 MG/2ML
125 INJECTION, POWDER, LYOPHILIZED, FOR SOLUTION INTRAMUSCULAR; INTRAVENOUS ONCE
Status: COMPLETED | OUTPATIENT
Start: 2024-03-19 | End: 2024-03-19

## 2024-03-19 RX ADMIN — WATER 2 ML: 1 INJECTION INTRAMUSCULAR; INTRAVENOUS; SUBCUTANEOUS at 21:34

## 2024-03-19 RX ADMIN — CEFTRIAXONE 1000 MG: 1 INJECTION, POWDER, FOR SOLUTION INTRAMUSCULAR; INTRAVENOUS at 23:17

## 2024-03-19 RX ADMIN — METHYLPREDNISOLONE SODIUM SUCCINATE 125 MG: 125 INJECTION, POWDER, LYOPHILIZED, FOR SOLUTION INTRAMUSCULAR; INTRAVENOUS at 21:34

## 2024-03-19 RX ADMIN — IPRATROPIUM BROMIDE AND ALBUTEROL SULFATE 3 DOSE: 2.5; .5 SOLUTION RESPIRATORY (INHALATION) at 21:47

## 2024-03-19 RX ADMIN — AZITHROMYCIN MONOHYDRATE 500 MG: 500 INJECTION, POWDER, LYOPHILIZED, FOR SOLUTION INTRAVENOUS at 23:24

## 2024-03-20 PROBLEM — I50.9 CHF EXACERBATION (HCC): Status: RESOLVED | Noted: 2024-03-20 | Resolved: 2024-03-20

## 2024-03-20 PROBLEM — I50.9 CHF EXACERBATION (HCC): Status: ACTIVE | Noted: 2024-03-20

## 2024-03-20 PROBLEM — E11.65 UNCONTROLLED TYPE 2 DIABETES MELLITUS WITH HYPERGLYCEMIA (HCC): Status: ACTIVE | Noted: 2018-04-06

## 2024-03-20 LAB
ANION GAP SERPL CALCULATED.3IONS-SCNC: 12 MMOL/L (ref 3–16)
BASOPHILS # BLD: 0 K/UL (ref 0–0.2)
BASOPHILS NFR BLD: 0.1 %
BUN SERPL-MCNC: 30 MG/DL (ref 7–20)
CALCIUM SERPL-MCNC: 8.8 MG/DL (ref 8.3–10.6)
CHLORIDE SERPL-SCNC: 95 MMOL/L (ref 99–110)
CO2 SERPL-SCNC: 26 MMOL/L (ref 21–32)
CREAT SERPL-MCNC: 1.2 MG/DL (ref 0.8–1.3)
DEPRECATED RDW RBC AUTO: 16.5 % (ref 12.4–15.4)
EKG ATRIAL RATE: 89 BPM
EKG DIAGNOSIS: NORMAL
EKG P AXIS: 29 DEGREES
EKG P-R INTERVAL: 184 MS
EKG Q-T INTERVAL: 376 MS
EKG QRS DURATION: 98 MS
EKG QTC CALCULATION (BAZETT): 457 MS
EKG R AXIS: 39 DEGREES
EKG T AXIS: 52 DEGREES
EKG VENTRICULAR RATE: 89 BPM
EOSINOPHIL # BLD: 0 K/UL (ref 0–0.6)
EOSINOPHIL NFR BLD: 0 %
EST. AVERAGE GLUCOSE BLD GHB EST-MCNC: 180 MG/DL
EST. AVERAGE GLUCOSE BLD GHB EST-MCNC: 182.9 MG/DL
GFR SERPLBLD CREATININE-BSD FMLA CKD-EPI: >60 ML/MIN/{1.73_M2}
GLUCOSE BLD-MCNC: 241 MG/DL (ref 70–99)
GLUCOSE BLD-MCNC: 323 MG/DL (ref 70–99)
GLUCOSE BLD-MCNC: 340 MG/DL (ref 70–99)
GLUCOSE BLD-MCNC: 360 MG/DL (ref 70–99)
GLUCOSE BLD-MCNC: 372 MG/DL (ref 70–99)
GLUCOSE SERPL-MCNC: 400 MG/DL (ref 70–99)
HBA1C MFR BLD: 7.9 %
HBA1C MFR BLD: 8 %
HCT VFR BLD AUTO: 35.4 % (ref 40.5–52.5)
HGB BLD-MCNC: 11.4 G/DL (ref 13.5–17.5)
LACTATE BLDV-SCNC: 1.7 MMOL/L (ref 0.4–2)
LACTATE BLDV-SCNC: 2.1 MMOL/L (ref 0.4–1.9)
LEGIONELLA AG UR QL: NORMAL
LYMPHOCYTES # BLD: 0.6 K/UL (ref 1–5.1)
LYMPHOCYTES NFR BLD: 4.7 %
MCH RBC QN AUTO: 27.4 PG (ref 26–34)
MCHC RBC AUTO-ENTMCNC: 32.2 G/DL (ref 31–36)
MCV RBC AUTO: 84.8 FL (ref 80–100)
MONOCYTES # BLD: 0.2 K/UL (ref 0–1.3)
MONOCYTES NFR BLD: 1.6 %
NEUTROPHILS # BLD: 11.3 K/UL (ref 1.7–7.7)
NEUTROPHILS NFR BLD: 93.6 %
PERFORMED ON: ABNORMAL
PLATELET # BLD AUTO: 309 K/UL (ref 135–450)
PMV BLD AUTO: 6.9 FL (ref 5–10.5)
POTASSIUM SERPL-SCNC: 4.9 MMOL/L (ref 3.5–5.1)
PROCALCITONIN SERPL IA-MCNC: 0.37 NG/ML (ref 0–0.15)
RBC # BLD AUTO: 4.17 M/UL (ref 4.2–5.9)
S PNEUM AG UR QL: NORMAL
SODIUM SERPL-SCNC: 133 MMOL/L (ref 136–145)
TROPONIN, HIGH SENSITIVITY: 34 NG/L (ref 0–22)
WBC # BLD AUTO: 12.1 K/UL (ref 4–11)

## 2024-03-20 PROCEDURE — 87205 SMEAR GRAM STAIN: CPT

## 2024-03-20 PROCEDURE — 94669 MECHANICAL CHEST WALL OSCILL: CPT

## 2024-03-20 PROCEDURE — 6370000000 HC RX 637 (ALT 250 FOR IP): Performed by: INTERNAL MEDICINE

## 2024-03-20 PROCEDURE — 6360000002 HC RX W HCPCS: Performed by: INTERNAL MEDICINE

## 2024-03-20 PROCEDURE — 87186 SC STD MICRODIL/AGAR DIL: CPT

## 2024-03-20 PROCEDURE — 1200000000 HC SEMI PRIVATE

## 2024-03-20 PROCEDURE — 87070 CULTURE OTHR SPECIMN AEROBIC: CPT

## 2024-03-20 PROCEDURE — 80048 BASIC METABOLIC PNL TOTAL CA: CPT

## 2024-03-20 PROCEDURE — 97530 THERAPEUTIC ACTIVITIES: CPT

## 2024-03-20 PROCEDURE — 2700000000 HC OXYGEN THERAPY PER DAY

## 2024-03-20 PROCEDURE — 94150 VITAL CAPACITY TEST: CPT

## 2024-03-20 PROCEDURE — 85025 COMPLETE CBC W/AUTO DIFF WBC: CPT

## 2024-03-20 PROCEDURE — 94640 AIRWAY INHALATION TREATMENT: CPT

## 2024-03-20 PROCEDURE — 94761 N-INVAS EAR/PLS OXIMETRY MLT: CPT

## 2024-03-20 PROCEDURE — 93010 ELECTROCARDIOGRAM REPORT: CPT | Performed by: INTERNAL MEDICINE

## 2024-03-20 PROCEDURE — 87449 NOS EACH ORGANISM AG IA: CPT

## 2024-03-20 PROCEDURE — 97116 GAIT TRAINING THERAPY: CPT

## 2024-03-20 PROCEDURE — 83605 ASSAY OF LACTIC ACID: CPT

## 2024-03-20 PROCEDURE — 2580000003 HC RX 258

## 2024-03-20 PROCEDURE — 97162 PT EVAL MOD COMPLEX 30 MIN: CPT

## 2024-03-20 PROCEDURE — 2580000003 HC RX 258: Performed by: INTERNAL MEDICINE

## 2024-03-20 PROCEDURE — 97166 OT EVAL MOD COMPLEX 45 MIN: CPT

## 2024-03-20 PROCEDURE — 83036 HEMOGLOBIN GLYCOSYLATED A1C: CPT

## 2024-03-20 PROCEDURE — 97535 SELF CARE MNGMENT TRAINING: CPT

## 2024-03-20 PROCEDURE — 36415 COLL VENOUS BLD VENIPUNCTURE: CPT

## 2024-03-20 PROCEDURE — 84145 PROCALCITONIN (PCT): CPT

## 2024-03-20 PROCEDURE — 87077 CULTURE AEROBIC IDENTIFY: CPT

## 2024-03-20 RX ORDER — INSULIN LISPRO 100 [IU]/ML
0-4 INJECTION, SOLUTION INTRAVENOUS; SUBCUTANEOUS NIGHTLY
Status: DISCONTINUED | OUTPATIENT
Start: 2024-03-20 | End: 2024-03-24 | Stop reason: HOSPADM

## 2024-03-20 RX ORDER — GUAIFENESIN 600 MG/1
600 TABLET, EXTENDED RELEASE ORAL 2 TIMES DAILY
Status: DISCONTINUED | OUTPATIENT
Start: 2024-03-20 | End: 2024-03-24 | Stop reason: HOSPADM

## 2024-03-20 RX ORDER — ACETAMINOPHEN 650 MG/1
650 SUPPOSITORY RECTAL EVERY 6 HOURS PRN
Status: DISCONTINUED | OUTPATIENT
Start: 2024-03-20 | End: 2024-03-24 | Stop reason: HOSPADM

## 2024-03-20 RX ORDER — SODIUM CHLORIDE 9 MG/ML
INJECTION, SOLUTION INTRAVENOUS PRN
Status: DISCONTINUED | OUTPATIENT
Start: 2024-03-20 | End: 2024-03-24 | Stop reason: HOSPADM

## 2024-03-20 RX ORDER — GUAIFENESIN/DEXTROMETHORPHAN 100-10MG/5
5 SYRUP ORAL EVERY 4 HOURS PRN
Status: DISCONTINUED | OUTPATIENT
Start: 2024-03-20 | End: 2024-03-24 | Stop reason: HOSPADM

## 2024-03-20 RX ORDER — INSULIN LISPRO 100 [IU]/ML
6 INJECTION, SOLUTION INTRAVENOUS; SUBCUTANEOUS
Status: DISCONTINUED | OUTPATIENT
Start: 2024-03-20 | End: 2024-03-22

## 2024-03-20 RX ORDER — ALBUTEROL SULFATE 2.5 MG/3ML
2.5 SOLUTION RESPIRATORY (INHALATION)
Status: DISCONTINUED | OUTPATIENT
Start: 2024-03-20 | End: 2024-03-24 | Stop reason: HOSPADM

## 2024-03-20 RX ORDER — ONDANSETRON 4 MG/1
4 TABLET, ORALLY DISINTEGRATING ORAL EVERY 8 HOURS PRN
Status: DISCONTINUED | OUTPATIENT
Start: 2024-03-20 | End: 2024-03-24 | Stop reason: HOSPADM

## 2024-03-20 RX ORDER — ROFLUMILAST 500 UG/1
500 TABLET ORAL DAILY
Status: DISCONTINUED | OUTPATIENT
Start: 2024-03-20 | End: 2024-03-24 | Stop reason: HOSPADM

## 2024-03-20 RX ORDER — SODIUM CHLORIDE 0.9 % (FLUSH) 0.9 %
5-40 SYRINGE (ML) INJECTION PRN
Status: DISCONTINUED | OUTPATIENT
Start: 2024-03-20 | End: 2024-03-24 | Stop reason: HOSPADM

## 2024-03-20 RX ORDER — ASPIRIN 81 MG/1
81 TABLET, CHEWABLE ORAL DAILY
Status: DISCONTINUED | OUTPATIENT
Start: 2024-03-20 | End: 2024-03-24 | Stop reason: HOSPADM

## 2024-03-20 RX ORDER — IPRATROPIUM BROMIDE AND ALBUTEROL SULFATE 2.5; .5 MG/3ML; MG/3ML
1 SOLUTION RESPIRATORY (INHALATION)
Status: DISCONTINUED | OUTPATIENT
Start: 2024-03-20 | End: 2024-03-20

## 2024-03-20 RX ORDER — WATER 10 ML/10ML
INJECTION INTRAMUSCULAR; INTRAVENOUS; SUBCUTANEOUS
Status: COMPLETED
Start: 2024-03-20 | End: 2024-03-20

## 2024-03-20 RX ORDER — ACETAMINOPHEN 325 MG/1
650 TABLET ORAL EVERY 6 HOURS PRN
Status: DISCONTINUED | OUTPATIENT
Start: 2024-03-20 | End: 2024-03-24 | Stop reason: HOSPADM

## 2024-03-20 RX ORDER — SODIUM CHLORIDE 0.9 % (FLUSH) 0.9 %
5-40 SYRINGE (ML) INJECTION EVERY 12 HOURS SCHEDULED
Status: DISCONTINUED | OUTPATIENT
Start: 2024-03-20 | End: 2024-03-24 | Stop reason: HOSPADM

## 2024-03-20 RX ORDER — FUROSEMIDE 40 MG/1
40 TABLET ORAL DAILY
Status: DISCONTINUED | OUTPATIENT
Start: 2024-03-20 | End: 2024-03-24 | Stop reason: HOSPADM

## 2024-03-20 RX ORDER — METHYLPREDNISOLONE SODIUM SUCCINATE 40 MG/ML
40 INJECTION, POWDER, LYOPHILIZED, FOR SOLUTION INTRAMUSCULAR; INTRAVENOUS EVERY 6 HOURS
Status: COMPLETED | OUTPATIENT
Start: 2024-03-20 | End: 2024-03-21

## 2024-03-20 RX ORDER — ALBUTEROL SULFATE 2.5 MG/3ML
2.5 SOLUTION RESPIRATORY (INHALATION) EVERY 4 HOURS PRN
Status: DISCONTINUED | OUTPATIENT
Start: 2024-03-20 | End: 2024-03-24 | Stop reason: HOSPADM

## 2024-03-20 RX ORDER — MAGNESIUM HYDROXIDE/ALUMINUM HYDROXICE/SIMETHICONE 120; 1200; 1200 MG/30ML; MG/30ML; MG/30ML
30 SUSPENSION ORAL EVERY 6 HOURS PRN
Status: DISCONTINUED | OUTPATIENT
Start: 2024-03-20 | End: 2024-03-24 | Stop reason: HOSPADM

## 2024-03-20 RX ORDER — GABAPENTIN 400 MG/1
800 CAPSULE ORAL 4 TIMES DAILY
Status: DISCONTINUED | OUTPATIENT
Start: 2024-03-20 | End: 2024-03-24 | Stop reason: HOSPADM

## 2024-03-20 RX ORDER — IPRATROPIUM BROMIDE AND ALBUTEROL SULFATE 2.5; .5 MG/3ML; MG/3ML
1 SOLUTION RESPIRATORY (INHALATION)
Status: DISCONTINUED | OUTPATIENT
Start: 2024-03-20 | End: 2024-03-24 | Stop reason: HOSPADM

## 2024-03-20 RX ORDER — INSULIN LISPRO 100 [IU]/ML
0-8 INJECTION, SOLUTION INTRAVENOUS; SUBCUTANEOUS
Status: DISCONTINUED | OUTPATIENT
Start: 2024-03-20 | End: 2024-03-24 | Stop reason: HOSPADM

## 2024-03-20 RX ORDER — ENOXAPARIN SODIUM 100 MG/ML
30 INJECTION SUBCUTANEOUS 2 TIMES DAILY
Status: DISCONTINUED | OUTPATIENT
Start: 2024-03-20 | End: 2024-03-20

## 2024-03-20 RX ORDER — AZITHROMYCIN 500 MG/1
500 TABLET, FILM COATED ORAL DAILY
Status: DISCONTINUED | OUTPATIENT
Start: 2024-03-20 | End: 2024-03-22 | Stop reason: ALTCHOICE

## 2024-03-20 RX ORDER — DEXTROSE MONOHYDRATE 100 MG/ML
INJECTION, SOLUTION INTRAVENOUS CONTINUOUS PRN
Status: DISCONTINUED | OUTPATIENT
Start: 2024-03-20 | End: 2024-03-24 | Stop reason: HOSPADM

## 2024-03-20 RX ORDER — PREDNISONE 20 MG/1
40 TABLET ORAL DAILY
Status: COMPLETED | OUTPATIENT
Start: 2024-03-22 | End: 2024-03-24

## 2024-03-20 RX ORDER — GLUCAGON 1 MG/ML
1 KIT INJECTION PRN
Status: DISCONTINUED | OUTPATIENT
Start: 2024-03-20 | End: 2024-03-24 | Stop reason: HOSPADM

## 2024-03-20 RX ORDER — PRAMIPEXOLE DIHYDROCHLORIDE 0.12 MG/1
0.25 TABLET ORAL NIGHTLY
COMMUNITY

## 2024-03-20 RX ORDER — INSULIN GLARGINE 100 [IU]/ML
10 INJECTION, SOLUTION SUBCUTANEOUS ONCE
Status: COMPLETED | OUTPATIENT
Start: 2024-03-20 | End: 2024-03-20

## 2024-03-20 RX ORDER — INSULIN GLARGINE 100 [IU]/ML
20 INJECTION, SOLUTION SUBCUTANEOUS NIGHTLY
Status: DISCONTINUED | OUTPATIENT
Start: 2024-03-20 | End: 2024-03-21

## 2024-03-20 RX ORDER — LAMOTRIGINE 25 MG/1
75 TABLET ORAL 2 TIMES DAILY
Status: DISCONTINUED | OUTPATIENT
Start: 2024-03-20 | End: 2024-03-24 | Stop reason: HOSPADM

## 2024-03-20 RX ORDER — VITAMIN B COMPLEX
1000 TABLET ORAL DAILY
Status: DISCONTINUED | OUTPATIENT
Start: 2024-03-20 | End: 2024-03-24 | Stop reason: HOSPADM

## 2024-03-20 RX ORDER — POLYETHYLENE GLYCOL 3350 17 G/17G
17 POWDER, FOR SOLUTION ORAL DAILY PRN
Status: DISCONTINUED | OUTPATIENT
Start: 2024-03-20 | End: 2024-03-21

## 2024-03-20 RX ORDER — CARVEDILOL 3.12 MG/1
3.12 TABLET ORAL 2 TIMES DAILY WITH MEALS
Status: DISCONTINUED | OUTPATIENT
Start: 2024-03-20 | End: 2024-03-24 | Stop reason: HOSPADM

## 2024-03-20 RX ORDER — ONDANSETRON 2 MG/ML
4 INJECTION INTRAMUSCULAR; INTRAVENOUS EVERY 6 HOURS PRN
Status: DISCONTINUED | OUTPATIENT
Start: 2024-03-20 | End: 2024-03-24 | Stop reason: HOSPADM

## 2024-03-20 RX ORDER — METHOCARBAMOL 500 MG/1
500 TABLET, FILM COATED ORAL 3 TIMES DAILY PRN
Status: DISCONTINUED | OUTPATIENT
Start: 2024-03-20 | End: 2024-03-24 | Stop reason: HOSPADM

## 2024-03-20 RX ORDER — PRAMIPEXOLE DIHYDROCHLORIDE 0.25 MG/1
0.25 TABLET ORAL 4 TIMES DAILY
Status: DISCONTINUED | OUTPATIENT
Start: 2024-03-20 | End: 2024-03-24 | Stop reason: HOSPADM

## 2024-03-20 RX ORDER — FLUTICASONE PROPIONATE 50 MCG
1 SPRAY, SUSPENSION (ML) NASAL 2 TIMES DAILY
Status: DISCONTINUED | OUTPATIENT
Start: 2024-03-20 | End: 2024-03-24 | Stop reason: HOSPADM

## 2024-03-20 RX ORDER — LAMOTRIGINE 25 MG/1
100 TABLET ORAL NIGHTLY
COMMUNITY

## 2024-03-20 RX ORDER — ATORVASTATIN CALCIUM 40 MG/1
40 TABLET, FILM COATED ORAL DAILY
Status: DISCONTINUED | OUTPATIENT
Start: 2024-03-20 | End: 2024-03-24 | Stop reason: HOSPADM

## 2024-03-20 RX ADMIN — Medication 1000 UNITS: at 08:27

## 2024-03-20 RX ADMIN — FLUTICASONE PROPIONATE 1 SPRAY: 50 SPRAY, METERED NASAL at 21:45

## 2024-03-20 RX ADMIN — ALBUTEROL SULFATE 2.5 MG: 2.5 SOLUTION RESPIRATORY (INHALATION) at 04:42

## 2024-03-20 RX ADMIN — INSULIN GLARGINE 20 UNITS: 100 INJECTION, SOLUTION SUBCUTANEOUS at 02:43

## 2024-03-20 RX ADMIN — CARVEDILOL 3.12 MG: 3.12 TABLET, FILM COATED ORAL at 08:33

## 2024-03-20 RX ADMIN — WATER 10 ML: 1 INJECTION INTRAMUSCULAR; INTRAVENOUS; SUBCUTANEOUS at 16:23

## 2024-03-20 RX ADMIN — IPRATROPIUM BROMIDE AND ALBUTEROL SULFATE 1 DOSE: 2.5; .5 SOLUTION RESPIRATORY (INHALATION) at 23:58

## 2024-03-20 RX ADMIN — PRAMIPEXOLE DIHYDROCHLORIDE 0.25 MG: 0.25 TABLET ORAL at 08:27

## 2024-03-20 RX ADMIN — EMPAGLIFLOZIN 10 MG: 10 TABLET, FILM COATED ORAL at 08:26

## 2024-03-20 RX ADMIN — IPRATROPIUM BROMIDE AND ALBUTEROL SULFATE 1 DOSE: 2.5; .5 SOLUTION RESPIRATORY (INHALATION) at 12:19

## 2024-03-20 RX ADMIN — ROFLUMILAST 500 MCG: 500 TABLET ORAL at 08:27

## 2024-03-20 RX ADMIN — IPRATROPIUM BROMIDE AND ALBUTEROL SULFATE 1 DOSE: 2.5; .5 SOLUTION RESPIRATORY (INHALATION) at 19:42

## 2024-03-20 RX ADMIN — IPRATROPIUM BROMIDE AND ALBUTEROL SULFATE 1 DOSE: 2.5; .5 SOLUTION RESPIRATORY (INHALATION) at 08:59

## 2024-03-20 RX ADMIN — APIXABAN 5 MG: 5 TABLET, FILM COATED ORAL at 08:27

## 2024-03-20 RX ADMIN — INSULIN LISPRO 6 UNITS: 100 INJECTION, SOLUTION INTRAVENOUS; SUBCUTANEOUS at 17:35

## 2024-03-20 RX ADMIN — SODIUM CHLORIDE: 9 INJECTION, SOLUTION INTRAVENOUS at 21:41

## 2024-03-20 RX ADMIN — INSULIN GLARGINE 10 UNITS: 100 INJECTION, SOLUTION SUBCUTANEOUS at 09:14

## 2024-03-20 RX ADMIN — PRAMIPEXOLE DIHYDROCHLORIDE 0.25 MG: 0.25 TABLET ORAL at 17:35

## 2024-03-20 RX ADMIN — PRAMIPEXOLE DIHYDROCHLORIDE 0.25 MG: 0.25 TABLET ORAL at 21:57

## 2024-03-20 RX ADMIN — FLUTICASONE PROPIONATE 1 SPRAY: 50 SPRAY, METERED NASAL at 08:33

## 2024-03-20 RX ADMIN — METHYLPREDNISOLONE SODIUM SUCCINATE 40 MG: 40 INJECTION INTRAMUSCULAR; INTRAVENOUS at 16:23

## 2024-03-20 RX ADMIN — MOMETASONE FUROATE AND FORMOTEROL FUMARATE DIHYDRATE 2 PUFF: 200; 5 AEROSOL RESPIRATORY (INHALATION) at 08:59

## 2024-03-20 RX ADMIN — METHYLPREDNISOLONE SODIUM SUCCINATE 40 MG: 40 INJECTION INTRAMUSCULAR; INTRAVENOUS at 08:26

## 2024-03-20 RX ADMIN — CARVEDILOL 3.12 MG: 3.12 TABLET, FILM COATED ORAL at 16:23

## 2024-03-20 RX ADMIN — PRAMIPEXOLE DIHYDROCHLORIDE 0.25 MG: 0.25 TABLET ORAL at 12:09

## 2024-03-20 RX ADMIN — Medication 10 ML: at 21:42

## 2024-03-20 RX ADMIN — IPRATROPIUM BROMIDE AND ALBUTEROL SULFATE 1 DOSE: 2.5; .5 SOLUTION RESPIRATORY (INHALATION) at 16:31

## 2024-03-20 RX ADMIN — INSULIN LISPRO 8 UNITS: 100 INJECTION, SOLUTION INTRAVENOUS; SUBCUTANEOUS at 09:14

## 2024-03-20 RX ADMIN — APIXABAN 5 MG: 5 TABLET, FILM COATED ORAL at 21:46

## 2024-03-20 RX ADMIN — GUAIFENESIN 600 MG: 600 TABLET ORAL at 12:13

## 2024-03-20 RX ADMIN — GABAPENTIN 800 MG: 400 CAPSULE ORAL at 21:46

## 2024-03-20 RX ADMIN — METHYLPREDNISOLONE SODIUM SUCCINATE 40 MG: 40 INJECTION INTRAMUSCULAR; INTRAVENOUS at 21:45

## 2024-03-20 RX ADMIN — GABAPENTIN 800 MG: 400 CAPSULE ORAL at 08:26

## 2024-03-20 RX ADMIN — GABAPENTIN 800 MG: 400 CAPSULE ORAL at 12:09

## 2024-03-20 RX ADMIN — WATER 10 ML: 1 INJECTION INTRAMUSCULAR; INTRAVENOUS; SUBCUTANEOUS at 08:26

## 2024-03-20 RX ADMIN — INSULIN LISPRO 4 UNITS: 100 INJECTION, SOLUTION INTRAVENOUS; SUBCUTANEOUS at 21:44

## 2024-03-20 RX ADMIN — INSULIN LISPRO 8 UNITS: 100 INJECTION, SOLUTION INTRAVENOUS; SUBCUTANEOUS at 12:10

## 2024-03-20 RX ADMIN — LAMOTRIGINE 75 MG: 25 TABLET ORAL at 21:46

## 2024-03-20 RX ADMIN — FUROSEMIDE 40 MG: 40 TABLET ORAL at 11:01

## 2024-03-20 RX ADMIN — METHYLPREDNISOLONE SODIUM SUCCINATE 40 MG: 40 INJECTION INTRAMUSCULAR; INTRAVENOUS at 02:43

## 2024-03-20 RX ADMIN — GUAIFENESIN 600 MG: 600 TABLET ORAL at 21:46

## 2024-03-20 RX ADMIN — AZITHROMYCIN 500 MG: 500 TABLET, FILM COATED ORAL at 21:56

## 2024-03-20 RX ADMIN — Medication 10 ML: at 08:28

## 2024-03-20 RX ADMIN — LAMOTRIGINE 75 MG: 25 TABLET ORAL at 08:27

## 2024-03-20 RX ADMIN — INSULIN LISPRO 6 UNITS: 100 INJECTION, SOLUTION INTRAVENOUS; SUBCUTANEOUS at 12:10

## 2024-03-20 RX ADMIN — GABAPENTIN 800 MG: 400 CAPSULE ORAL at 16:23

## 2024-03-20 RX ADMIN — MOMETASONE FUROATE AND FORMOTEROL FUMARATE DIHYDRATE 2 PUFF: 200; 5 AEROSOL RESPIRATORY (INHALATION) at 19:42

## 2024-03-20 RX ADMIN — INSULIN GLARGINE 20 UNITS: 100 INJECTION, SOLUTION SUBCUTANEOUS at 21:45

## 2024-03-20 RX ADMIN — ATORVASTATIN CALCIUM 40 MG: 40 TABLET, FILM COATED ORAL at 08:26

## 2024-03-20 RX ADMIN — ASPIRIN 81 MG: 81 TABLET, CHEWABLE ORAL at 08:27

## 2024-03-20 RX ADMIN — POLYETHYLENE GLYCOL 3350 17 G: 17 POWDER, FOR SOLUTION ORAL at 21:46

## 2024-03-20 RX ADMIN — CEFTRIAXONE SODIUM 2000 MG: 2 INJECTION, POWDER, FOR SOLUTION INTRAMUSCULAR; INTRAVENOUS at 21:44

## 2024-03-20 NOTE — DISCHARGE INSTRUCTIONS
Extra Heart Failure Education/ Tools/ Resources:     https://[employer].com/publication/?q=167540   --- this is American Heart Association interactive Healthier Living with Heart Failure guidebook.  Please click hyperlink or copy / paste link into search bar. The QR Code is also available below. Use your mouse to scroll through the pages.  Lots of information about weight monitoring, diet tips, activity, meds, etc    Heart Failure Tools and Resources QR Code is below. It includes multiple resources to include symptom tracker, med tracker, further HF info, and access to a HF Support Network online Community    HF Geneva Oanh  -- this is a free smart phone oanh available for iPhone and Android download.  Use your phone to track sodium / fluid intake, zone tool symptom tracking, weights, medications, etc. Click on this hyperlink  HF [place] Oanh   for QR code for easy download or the link is also found in the below HF Tools and Resources.      DASH (Dietary Approach to Stop Hypertension) diet --  https://www.nhlbi.nih.gov/education/dash-eating-plan -- this diet is a flexible eating plan that promotes heart healthy eating style.  Click on hyperlink or copy / paste link into search bar.  Lots of low sodium recipes and tips.    https://www.[employer].TuTanda/recipes  -- more free recipes         Diabetes Association:    About Diabetes: https://diabetes.org/about-diabetes    Life with Diabetes: https://diabetes.org/living-with-diabetes    Health & Wellness: https://diabetes.org/health-wellness    Food & Nutrition: https://diabetes.org/food-nutrition    Tools & Resources: https://diabetes.org/tools-resources

## 2024-03-20 NOTE — ACP (ADVANCE CARE PLANNING)
Advance Care Planning     Advance Care Planning Activator (Inpatient)  Conversation Note      Date of ACP Conversation: 3/20/2024     Conversation Conducted with: Patient with Decision Making Capacity    ACP Activator: Court Flores RN    Health Care Decision Maker:     Current Designated Health Care Decision Maker:     Primary Decision Maker: Portia Gauthier - Spouse - [phone]    Secondary Decision Maker: Bang Gauthier Jr - Child - [phone]    Care Preferences    Ventilation:  \"If you were in your present state of health and suddenly became very ill and were unable to breathe on your own, what would your preference be about the use of a ventilator (breathing machine) if it were available to you?\"      Would the patient desire the use of ventilator (breathing machine)?: no    \"If your health worsens and it becomes clear that your chance of recovery is unlikely, what would your preference be about the use of a ventilator (breathing machine) if it were available to you?\"     Would the patient desire the use of ventilator (breathing machine)?: No      Resuscitation  \"CPR works best to restart the heart when there is a sudden event, like a heart attack, in someone who is otherwise healthy. Unfortunately, CPR does not typically restart the heart for people who have serious health conditions or who are very sick.\"    \"In the event your heart stopped as a result of an underlying serious health condition, would you want attempts to be made to restart your heart (answer \"yes\" for attempt to resuscitate) or would you prefer a natural death (answer \"no\" for do not attempt to resuscitate)?\" no       [] Yes   [x] No   Educated Patient / Decision Maker regarding differences between Advance Directives and portable DNR orders.    Length of ACP Conversation in minutes:  3    Conversation Outcomes:  Existing advance directive reviewed with patient; no changes to patient's previously recorded wishes    Follow-up plan:    []

## 2024-03-20 NOTE — ED NOTES
Pt present to the ED via self c/o  sob. Pt states that has copd and takes daily breathing treatment but today it didn't help. Pt states he kept feeling short of breath than he call the nurse line and was advised to come to the ED. Pt satting 85% on RA, was placed on 3L NC oxygen went up to 95%. Pt AXO4. Call light within reach.

## 2024-03-20 NOTE — RT PROTOCOL NOTE
RT Inhaler-Nebulizer Bronchodilator Protocol Note    There is a bronchodilator order in the chart from a provider indicating to follow the RT Bronchodilator Protocol and there is an “Initiate RT Inhaler-Nebulizer Bronchodilator Protocol” order as well (see protocol at bottom of note).    CXR Findings:  XR CHEST PORTABLE    Result Date: 3/19/2024  Borderline cardiomegaly and mild central pulmonary congestion which is more apparent Hazy perihilar bibasilar interstitial and ground-glass opacity which is more prominent and may be related to pulmonary edema and/or pneumonia. Questionable small bibasilar pleural effusions       The findings from the last RT Protocol Assessment were as follows:   History Pulmonary Disease: Chronic pulmonary disease  Respiratory Pattern: Mild dyspnea at rest, irregular pattern, or RR 21-25 bpm  Breath Sounds: Inspiratory and expiratory or bilateral wheezing and/or rhonchi  Cough: Strong, productive  Indication for Bronchodilator Therapy: Decreased or absent breath sounds, On home bronchodilators  Bronchodilator Assessment Score: 13    Aerosolized bronchodilator medication orders have been revised according to the RT Inhaler-Nebulizer Bronchodilator Protocol below.    Respiratory Therapist to perform RT Therapy Protocol Assessment initially then follow the protocol.  Repeat RT Therapy Protocol Assessment PRN for score 0-3 or on second treatment, BID, and PRN for scores above 3.    No Indications - adjust the frequency to every 6 hours PRN wheezing or bronchospasm, if no treatments needed after 48 hours then discontinue using Per Protocol order mode.     If indication present, adjust the RT bronchodilator orders based on the Bronchodilator Assessment Score as indicated below.  Use Inhaler orders unless patient has one or more of the following: on home nebulizer, not able to hold breath for 10 seconds, is not alert and oriented, cannot activate and use MDI correctly, or respiratory rate 25

## 2024-03-20 NOTE — PLAN OF CARE
Problem: Discharge Planning  Goal: Discharge to home or other facility with appropriate resources  3/20/2024 1023 by Stephy Ruby, RN  Outcome: Progressing  3/20/2024 0303 by Josi Kaur, RN  Outcome: Progressing  Flowsheets (Taken 3/20/2024 0210)  Discharge to home or other facility with appropriate resources: Identify barriers to discharge with patient and caregiver     Problem: Safety - Adult  Goal: Free from fall injury  Outcome: Progressing

## 2024-03-20 NOTE — ED PROVIDER NOTES
I did not perform an evaluation of Jorge DIEGO Jims but was asked to review his EKG.     EKG interpreted by myself.   Normal sinus rhythm with a rate of 89, normal axis, normal intervals, no ST elevations, no ST depressions, Q waves inferior leads, compared EKG from 1/29/2024 T waves in the inversions inferior leads less apparent and PACs no longer present        Abad Alatorre MD  03/19/24 3481    
Lactate > 4        or   [] SBP < 90 or MAP < 65 for at        least two readings in the first        hour after fluid bolus        administration      [] Vasopressors initiated (if hypotension persists after fluid resuscitation)        [] No criteria met for Septic Shock.   Patient Vitals for the past 6 hrs:   BP Temp Pulse Resp SpO2   03/19/24 2147 -- -- -- -- 94 %   03/20/24 0036 (!) 107/53 -- 84 (!) 9 90 %   03/20/24 0210 131/72 98.2 °F (36.8 °C) 96 20 90 %      Recent Labs     03/19/24  2121   WBC 16.7*   CREATININE 1.2   BILITOT 0.5            Time Severe Sepsis Identified: 2330    Fluid Resuscitation Rational: less than 30mL/kg because concern for fluid overload and hypoxia      Repeat lactate level: improving    Reassessment Exam:   Not applicable. Patient does not have septic shock.    Chronic Conditions affecting care:    has a past medical history of Abrasion of right elbow (2/4/2023), Arthritis, CAD (coronary artery disease), Chronic back pain, Chronic systolic CHF (congestive heart failure) (HCC), COPD (chronic obstructive pulmonary disease) (HCC), Dental disease, Diabetes mellitus (HCC), Essential hypertension, Hearing loss, Paroxysmal atrial fibrillation (HCC), and Tinnitus.    CONSULTS: (Who and What was discussed)  None      Social Determinants : None    Records Reviewed (Source):     CC/HPI Summary, DDx, ED Course, and Reassessment:     Patient presents ED with HPI noted above.    On arrival oxygen saturation 85% on room air.  Patient does not take supplemental oxygen at baseline.  Patient with conversational tachypnea.  Physical exam as above.  Patient with faint wheeze and coarse breath sounds throughout.  He was given Solu-Medrol followed by 3 DuoNeb treatments.  At reassessment he voiced improvement of symptoms.  Breathing improved at this time.  Chest x-ray showed hazy perihilar bibasilar interstitial and groundglass opacities may relate to pulmonary edema or pneumonia.  Given history

## 2024-03-20 NOTE — DISCHARGE INSTR - COC
Continuity of Care Form    Patient Name: Jorge Gauthier   :  1945  MRN:  [id number]    Admit date:  3/19/2024  Discharge date:  3/24/2024    Code Status Order: DNR-CCA   Advance Directives:     Admitting Physician:  Gabbie Bolivar MD  PCP: Gallo Sanchez    Discharging Nurse: Sakina SANTIAGO   Discharging Hospital Unit/Room#: G7B-0943/4119-01  Discharging Unit Phone Number: [phone]    Emergency Contact:   Extended Emergency Contact Information  Primary Emergency Contact: Portia Gauthier  Address: [address] [place] [address]  Home Phone: [phone]  Work Phone: [phone]  Mobile Phone: [phone]  Relation: Spouse  Secondary Emergency Contact: Bang Gauthier Jr  Home Phone: [phone]  Relation: Child    Past Surgical History:  Past Surgical History:   Procedure Laterality Date    BACK SURGERY      BACK SURGERY      CARDIAC SURGERY      CERVICAL SPINE SURGERY      CHEST TUBE INSERTION Right 2023    14F. Dr. Jalloh    CORONARY ARTERY BYPASS GRAFT      FRACTURE SURGERY Left     Shoulder    IR CHEST TUBE INSERTION  3/14/2023    IR CHEST TUBE INSERTION 3/14/2023 [facility] SPECIAL PROCEDURES       Immunization History:   Immunization History   Administered Date(s) Administered    COVID-19, MODERNA Bivalent, (age 12y+), IM, 50 mcg/0.5 mL 2022    Influenza Vaccine, unspecified formulation 10/22/2016    Influenza, AFLURIA (age 3 yrs+), FLUZONE, (age 6 mo+), MDV, 0.5mL 10/22/2016, 09/15/2017    Influenza, High Dose (Fluzone 65 yrs and older) 09/15/2017    Pneumococcal, PCV-13, PREVNAR 13, (age 6w+), IM, 0.5mL 2016    TDaP, ADACEL (age 10y-64y), BOOSTRIX (age 10y+), IM, 0.5mL 2018       Active Problems:  Patient Active Problem List   Diagnosis Code    Diabetes mellitus (HCC) E11.9    Atherosclerosis of native coronary artery of native heart without angina pectoris I25.10    Morbid obesity due to excess calories (HCC) E66.01    SOB

## 2024-03-20 NOTE — CARE COORDINATION
[facility]  Patient is active with [facility] for nurse, PT/OT.   Will follow for discharge to home with orders to resume care.    Raphael Rodrigez RN, BSN CTN  [facility] [phone]

## 2024-03-20 NOTE — H&P
V2.0  History and Physical      Name:  Jorge Gauthier /Age/Sex: 1945  (78 y.o. male)   MRN & CSN:  [id number] & [id number] Encounter Date/Time: 3/19/2024 11:51 PM EDT   Location:   PCP: Gallo Sanchez       Hospital Day: 1    Assessment and Plan:   Jorge Gauthier is a 78 y.o. obese male with a pmh of chronic bronchitis/COPD, diabetes type 2, chronic low back pain, peripheral neuropathy, CAD, chronic systolic and diastolic heart failure, paroxysmal atrial fibrillation on chronic anticoagulation who presents with COPD exacerbation (HCC).    Hospital Problems             Last Modified POA    * (Principal) COPD exacerbation (HCC) 3/20/2024 Yes    Acute respiratory failure with hypoxia (HCC) 3/20/2024 Yes    Acute on chronic combined systolic (congestive) and diastolic (congestive) heart failure (HCC) 3/20/2024 Yes    Uncontrolled type 2 diabetes mellitus with hyperglycemia (HCC) 3/20/2024 Unknown    Atypical pneumonia 3/20/2024 Unknown    Chronic back pain 3/20/2024 Yes       Plan:  Bronchodilators, steroids and combination of Rocephin and doxycycline  Check Legionella and strep pneumo antigens  Monitor on telemetry  Gentle hydration  Strict I's and O's  Check 2D echo and trend troponins  Moderate sliding scale insulin before every meal and at bedtime as needed  Lantus 20 units nightly  Continue home regimen for chronic stable conditions  PT/OT eval and treat for discharge planning      Disposition:   Current Living situation: Home with wife  Expected Disposition: Home versus SNF  Estimated D/C: 3 to 4 days    Diet ADULT DIET; Regular; 3 carb choices (45 gm/meal); Low Fat/Low Chol/High Fiber/BECKA   DVT Prophylaxis [] Lovenox, []  Heparin, [] SCDs, [] Ambulation,  [x] Eliquis, [] Xarelto, [] Coumadin   Code Status Full Code   Surrogate Decision Maker/ POA Wife     Personally reviewed Lab Studies and Imaging     Discussed management of the case with ED provider who recommended admission.    EKG

## 2024-03-20 NOTE — CARE COORDINATION
Case Management Assessment  Initial Evaluation    Date/Time of Evaluation: 3/20/2024 11:59 AM  Assessment Completed by: Court Flores RN    If patient is discharged prior to next notation, then this note serves as note for discharge by case management.    Patient Name: Jorge Gauthier                     YOB: 1945  Diagnosis: COPD exacerbation (HCC) [J44.1]  Acute respiratory failure with hypoxia (HCC) [J96.01]  Pneumonia due to infectious organism, unspecified laterality, unspecified part of lung [J18.9]                     Date / Time: 3/19/2024  9:03 PM    Patient Admission Status: Inpatient   Readmission Risk (Low < 19, Mod (19-27), High > 27): Readmission Risk Score: 28.2    Current PCP: Gallo Sanchez  PCP verified by CM? (P) Yes    Chart Reviewed: Yes      History Provided by: Patient  Patient Orientation: Alert and Oriented    Patient Cognition: Alert    Hospitalization in the last 30 days (Readmission):  No    If yes, Readmission Assessment in CM Navigator will be completed.    Advance Directives:      Code Status: DNR-CCA   Patient's Primary Decision Maker is: Legal Next of Kin    Primary Decision Maker: Portia Gauthier - Spouse - [phone]    Secondary Decision Maker: Bang Gauthier Jr - Child - [phone]    Discharge Planning:    Patient lives with: (P) Spouse/Significant Other Type of Home: (P) House (2 steps to enter)  Primary Care Giver:    Patient Support Systems include: Spouse/Significant Other   Current Financial resources: (P) Medicare  Current community resources: (P) ECF/Home Care  Current services prior to admission: (P) Home Care ([facility] (SN, OT, PT))            Current DME:              Type of Home Care services:  (P) PT, OT, Nursing Services    ADLS  Prior functional level: (P) Assistance with the following:, Cooking, Housework, Shopping  Current functional level: (P) Assistance with the following:, Cooking, Housework, Shopping    PT AM-PAC: 19 /24  OT AM-PAC: 20

## 2024-03-21 LAB
GLUCOSE BLD-MCNC: 241 MG/DL (ref 70–99)
GLUCOSE BLD-MCNC: 296 MG/DL (ref 70–99)
GLUCOSE BLD-MCNC: 331 MG/DL (ref 70–99)
GLUCOSE BLD-MCNC: 358 MG/DL (ref 70–99)
PERFORMED ON: ABNORMAL

## 2024-03-21 PROCEDURE — 94669 MECHANICAL CHEST WALL OSCILL: CPT

## 2024-03-21 PROCEDURE — 6370000000 HC RX 637 (ALT 250 FOR IP): Performed by: INTERNAL MEDICINE

## 2024-03-21 PROCEDURE — 6360000002 HC RX W HCPCS: Performed by: INTERNAL MEDICINE

## 2024-03-21 PROCEDURE — 2700000000 HC OXYGEN THERAPY PER DAY

## 2024-03-21 PROCEDURE — 94761 N-INVAS EAR/PLS OXIMETRY MLT: CPT

## 2024-03-21 PROCEDURE — 1200000000 HC SEMI PRIVATE

## 2024-03-21 PROCEDURE — 97530 THERAPEUTIC ACTIVITIES: CPT

## 2024-03-21 PROCEDURE — 97110 THERAPEUTIC EXERCISES: CPT

## 2024-03-21 PROCEDURE — 2580000003 HC RX 258

## 2024-03-21 PROCEDURE — 94640 AIRWAY INHALATION TREATMENT: CPT

## 2024-03-21 PROCEDURE — 97116 GAIT TRAINING THERAPY: CPT

## 2024-03-21 PROCEDURE — 2580000003 HC RX 258: Performed by: INTERNAL MEDICINE

## 2024-03-21 RX ORDER — POLYETHYLENE GLYCOL 3350 17 G/17G
17 POWDER, FOR SOLUTION ORAL 2 TIMES DAILY PRN
Status: DISCONTINUED | OUTPATIENT
Start: 2024-03-21 | End: 2024-03-24 | Stop reason: HOSPADM

## 2024-03-21 RX ORDER — DOCUSATE SODIUM 100 MG/1
100 CAPSULE, LIQUID FILLED ORAL 2 TIMES DAILY
Status: DISCONTINUED | OUTPATIENT
Start: 2024-03-21 | End: 2024-03-24 | Stop reason: HOSPADM

## 2024-03-21 RX ORDER — WATER 10 ML/10ML
INJECTION INTRAMUSCULAR; INTRAVENOUS; SUBCUTANEOUS
Status: COMPLETED
Start: 2024-03-21 | End: 2024-03-21

## 2024-03-21 RX ORDER — INSULIN LISPRO 100 [IU]/ML
10 INJECTION, SOLUTION INTRAVENOUS; SUBCUTANEOUS ONCE
Status: COMPLETED | OUTPATIENT
Start: 2024-03-21 | End: 2024-03-21

## 2024-03-21 RX ORDER — INSULIN GLARGINE 100 [IU]/ML
20 INJECTION, SOLUTION SUBCUTANEOUS 2 TIMES DAILY
Status: DISCONTINUED | OUTPATIENT
Start: 2024-03-21 | End: 2024-03-24 | Stop reason: HOSPADM

## 2024-03-21 RX ADMIN — INSULIN LISPRO 6 UNITS: 100 INJECTION, SOLUTION INTRAVENOUS; SUBCUTANEOUS at 12:08

## 2024-03-21 RX ADMIN — IPRATROPIUM BROMIDE AND ALBUTEROL SULFATE 1 DOSE: 2.5; .5 SOLUTION RESPIRATORY (INHALATION) at 03:12

## 2024-03-21 RX ADMIN — PRAMIPEXOLE DIHYDROCHLORIDE 0.25 MG: 0.25 TABLET ORAL at 20:47

## 2024-03-21 RX ADMIN — GABAPENTIN 800 MG: 400 CAPSULE ORAL at 08:19

## 2024-03-21 RX ADMIN — METHYLPREDNISOLONE SODIUM SUCCINATE 40 MG: 40 INJECTION INTRAMUSCULAR; INTRAVENOUS at 03:33

## 2024-03-21 RX ADMIN — MOMETASONE FUROATE AND FORMOTEROL FUMARATE DIHYDRATE 2 PUFF: 200; 5 AEROSOL RESPIRATORY (INHALATION) at 07:43

## 2024-03-21 RX ADMIN — CEFTRIAXONE SODIUM 2000 MG: 2 INJECTION, POWDER, FOR SOLUTION INTRAMUSCULAR; INTRAVENOUS at 20:54

## 2024-03-21 RX ADMIN — Medication 1000 UNITS: at 08:19

## 2024-03-21 RX ADMIN — WATER 10 ML: 1 INJECTION INTRAMUSCULAR; INTRAVENOUS; SUBCUTANEOUS at 16:23

## 2024-03-21 RX ADMIN — PRAMIPEXOLE DIHYDROCHLORIDE 0.25 MG: 0.25 TABLET ORAL at 17:12

## 2024-03-21 RX ADMIN — FLUTICASONE PROPIONATE 1 SPRAY: 50 SPRAY, METERED NASAL at 08:20

## 2024-03-21 RX ADMIN — GABAPENTIN 800 MG: 400 CAPSULE ORAL at 12:08

## 2024-03-21 RX ADMIN — LAMOTRIGINE 75 MG: 25 TABLET ORAL at 08:20

## 2024-03-21 RX ADMIN — INSULIN LISPRO 10 UNITS: 100 INJECTION, SOLUTION INTRAVENOUS; SUBCUTANEOUS at 08:18

## 2024-03-21 RX ADMIN — MOMETASONE FUROATE AND FORMOTEROL FUMARATE DIHYDRATE 2 PUFF: 200; 5 AEROSOL RESPIRATORY (INHALATION) at 20:24

## 2024-03-21 RX ADMIN — CARVEDILOL 3.12 MG: 3.12 TABLET, FILM COATED ORAL at 17:12

## 2024-03-21 RX ADMIN — PRAMIPEXOLE DIHYDROCHLORIDE 0.25 MG: 0.25 TABLET ORAL at 12:08

## 2024-03-21 RX ADMIN — GUAIFENESIN 600 MG: 600 TABLET ORAL at 08:20

## 2024-03-21 RX ADMIN — ROFLUMILAST 500 MCG: 500 TABLET ORAL at 08:20

## 2024-03-21 RX ADMIN — ASPIRIN 81 MG: 81 TABLET, CHEWABLE ORAL at 08:19

## 2024-03-21 RX ADMIN — POLYETHYLENE GLYCOL 3350 17 G: 17 POWDER, FOR SOLUTION ORAL at 18:50

## 2024-03-21 RX ADMIN — IPRATROPIUM BROMIDE AND ALBUTEROL SULFATE 1 DOSE: 2.5; .5 SOLUTION RESPIRATORY (INHALATION) at 15:46

## 2024-03-21 RX ADMIN — EMPAGLIFLOZIN 10 MG: 10 TABLET, FILM COATED ORAL at 08:19

## 2024-03-21 RX ADMIN — LAMOTRIGINE 75 MG: 25 TABLET ORAL at 20:48

## 2024-03-21 RX ADMIN — CARVEDILOL 3.12 MG: 3.12 TABLET, FILM COATED ORAL at 08:19

## 2024-03-21 RX ADMIN — APIXABAN 5 MG: 5 TABLET, FILM COATED ORAL at 20:47

## 2024-03-21 RX ADMIN — APIXABAN 5 MG: 5 TABLET, FILM COATED ORAL at 08:19

## 2024-03-21 RX ADMIN — INSULIN GLARGINE 20 UNITS: 100 INJECTION, SOLUTION SUBCUTANEOUS at 20:48

## 2024-03-21 RX ADMIN — AZITHROMYCIN 500 MG: 500 TABLET, FILM COATED ORAL at 08:20

## 2024-03-21 RX ADMIN — INSULIN GLARGINE 20 UNITS: 100 INJECTION, SOLUTION SUBCUTANEOUS at 12:08

## 2024-03-21 RX ADMIN — FLUTICASONE PROPIONATE 1 SPRAY: 50 SPRAY, METERED NASAL at 20:48

## 2024-03-21 RX ADMIN — METHYLPREDNISOLONE SODIUM SUCCINATE 40 MG: 40 INJECTION INTRAMUSCULAR; INTRAVENOUS at 20:48

## 2024-03-21 RX ADMIN — METHYLPREDNISOLONE SODIUM SUCCINATE 40 MG: 40 INJECTION INTRAMUSCULAR; INTRAVENOUS at 16:23

## 2024-03-21 RX ADMIN — ATORVASTATIN CALCIUM 40 MG: 40 TABLET, FILM COATED ORAL at 08:19

## 2024-03-21 RX ADMIN — GUAIFENESIN 600 MG: 600 TABLET ORAL at 20:47

## 2024-03-21 RX ADMIN — Medication 10 ML: at 20:48

## 2024-03-21 RX ADMIN — IPRATROPIUM BROMIDE AND ALBUTEROL SULFATE 1 DOSE: 2.5; .5 SOLUTION RESPIRATORY (INHALATION) at 23:56

## 2024-03-21 RX ADMIN — DOCUSATE SODIUM 100 MG: 100 CAPSULE, LIQUID FILLED ORAL at 20:47

## 2024-03-21 RX ADMIN — GABAPENTIN 800 MG: 400 CAPSULE ORAL at 17:12

## 2024-03-21 RX ADMIN — WATER 10 ML: 1 INJECTION INTRAMUSCULAR; INTRAVENOUS; SUBCUTANEOUS at 08:18

## 2024-03-21 RX ADMIN — INSULIN LISPRO 8 UNITS: 100 INJECTION, SOLUTION INTRAVENOUS; SUBCUTANEOUS at 08:17

## 2024-03-21 RX ADMIN — DOCUSATE SODIUM 100 MG: 100 CAPSULE, LIQUID FILLED ORAL at 07:28

## 2024-03-21 RX ADMIN — FUROSEMIDE 40 MG: 40 TABLET ORAL at 08:19

## 2024-03-21 RX ADMIN — GABAPENTIN 800 MG: 400 CAPSULE ORAL at 20:47

## 2024-03-21 RX ADMIN — METHYLPREDNISOLONE SODIUM SUCCINATE 40 MG: 40 INJECTION INTRAMUSCULAR; INTRAVENOUS at 08:18

## 2024-03-21 RX ADMIN — IPRATROPIUM BROMIDE AND ALBUTEROL SULFATE 1 DOSE: 2.5; .5 SOLUTION RESPIRATORY (INHALATION) at 12:07

## 2024-03-21 RX ADMIN — IPRATROPIUM BROMIDE AND ALBUTEROL SULFATE 1 DOSE: 2.5; .5 SOLUTION RESPIRATORY (INHALATION) at 07:43

## 2024-03-21 RX ADMIN — PRAMIPEXOLE DIHYDROCHLORIDE 0.25 MG: 0.25 TABLET ORAL at 08:18

## 2024-03-21 RX ADMIN — POLYETHYLENE GLYCOL 3350 17 G: 17 POWDER, FOR SOLUTION ORAL at 08:17

## 2024-03-21 RX ADMIN — INSULIN LISPRO 6 UNITS: 100 INJECTION, SOLUTION INTRAVENOUS; SUBCUTANEOUS at 17:13

## 2024-03-21 RX ADMIN — INSULIN LISPRO 4 UNITS: 100 INJECTION, SOLUTION INTRAVENOUS; SUBCUTANEOUS at 17:13

## 2024-03-21 RX ADMIN — IPRATROPIUM BROMIDE AND ALBUTEROL SULFATE 1 DOSE: 2.5; .5 SOLUTION RESPIRATORY (INHALATION) at 20:24

## 2024-03-21 RX ADMIN — Medication 5 ML: at 08:21

## 2024-03-21 NOTE — PLAN OF CARE
Problem: Discharge Planning  Goal: Discharge to home or other facility with appropriate resources  Outcome: Progressing  Flowsheets (Taken 3/21/2024 0145)  Discharge to home or other facility with appropriate resources: Identify barriers to discharge with patient and caregiver     Problem: Safety - Adult  Goal: Free from fall injury  Outcome: Progressing  Flowsheets (Taken 3/21/2024 0219)  Free From Fall Injury: Instruct family/caregiver on patient safety

## 2024-03-21 NOTE — PLAN OF CARE
Problem: Discharge Planning  Goal: Discharge to home or other facility with appropriate resources  3/21/2024 1045 by Stephy Ruby RN  Outcome: Progressing  3/21/2024 0220 by Tracy James RN  Outcome: Progressing  Flowsheets (Taken 3/21/2024 0145)  Discharge to home or other facility with appropriate resources: Identify barriers to discharge with patient and caregiver     Problem: Safety - Adult  Goal: Free from fall injury  3/21/2024 1045 by Stephy Ruby, RN  Outcome: Progressing  3/21/2024 0220 by Tracy James RN  Outcome: Progressing  Flowsheets (Taken 3/21/2024 0219)  Free From Fall Injury: Instruct family/caregiver on patient safety

## 2024-03-22 LAB
ANION GAP SERPL CALCULATED.3IONS-SCNC: 11 MMOL/L (ref 3–16)
BASOPHILS # BLD: 0 K/UL (ref 0–0.2)
BASOPHILS NFR BLD: 0.2 %
BUN SERPL-MCNC: 34 MG/DL (ref 7–20)
CALCIUM SERPL-MCNC: 9.2 MG/DL (ref 8.3–10.6)
CHLORIDE SERPL-SCNC: 99 MMOL/L (ref 99–110)
CO2 SERPL-SCNC: 31 MMOL/L (ref 21–32)
CREAT SERPL-MCNC: 1.1 MG/DL (ref 0.8–1.3)
DEPRECATED RDW RBC AUTO: 16.7 % (ref 12.4–15.4)
EOSINOPHIL # BLD: 0 K/UL (ref 0–0.6)
EOSINOPHIL NFR BLD: 0 %
GFR SERPLBLD CREATININE-BSD FMLA CKD-EPI: >60 ML/MIN/{1.73_M2}
GLUCOSE BLD-MCNC: 229 MG/DL (ref 70–99)
GLUCOSE BLD-MCNC: 266 MG/DL (ref 70–99)
GLUCOSE BLD-MCNC: 287 MG/DL (ref 70–99)
GLUCOSE BLD-MCNC: 357 MG/DL (ref 70–99)
GLUCOSE SERPL-MCNC: 196 MG/DL (ref 70–99)
HCT VFR BLD AUTO: 37.4 % (ref 40.5–52.5)
HGB BLD-MCNC: 12 G/DL (ref 13.5–17.5)
LYMPHOCYTES # BLD: 1 K/UL (ref 1–5.1)
LYMPHOCYTES NFR BLD: 8.1 %
MCH RBC QN AUTO: 27 PG (ref 26–34)
MCHC RBC AUTO-ENTMCNC: 32.1 G/DL (ref 31–36)
MCV RBC AUTO: 84 FL (ref 80–100)
MONOCYTES # BLD: 0.9 K/UL (ref 0–1.3)
MONOCYTES NFR BLD: 6.8 %
NEUTROPHILS # BLD: 10.8 K/UL (ref 1.7–7.7)
NEUTROPHILS NFR BLD: 84.9 %
PERFORMED ON: ABNORMAL
PLATELET # BLD AUTO: 438 K/UL (ref 135–450)
PMV BLD AUTO: 6.9 FL (ref 5–10.5)
POTASSIUM SERPL-SCNC: 4.3 MMOL/L (ref 3.5–5.1)
RBC # BLD AUTO: 4.45 M/UL (ref 4.2–5.9)
SODIUM SERPL-SCNC: 141 MMOL/L (ref 136–145)
WBC # BLD AUTO: 12.7 K/UL (ref 4–11)

## 2024-03-22 PROCEDURE — 6370000000 HC RX 637 (ALT 250 FOR IP): Performed by: INTERNAL MEDICINE

## 2024-03-22 PROCEDURE — 36415 COLL VENOUS BLD VENIPUNCTURE: CPT

## 2024-03-22 PROCEDURE — 2580000003 HC RX 258: Performed by: INTERNAL MEDICINE

## 2024-03-22 PROCEDURE — 94640 AIRWAY INHALATION TREATMENT: CPT

## 2024-03-22 PROCEDURE — 97535 SELF CARE MNGMENT TRAINING: CPT

## 2024-03-22 PROCEDURE — 97530 THERAPEUTIC ACTIVITIES: CPT

## 2024-03-22 PROCEDURE — 97110 THERAPEUTIC EXERCISES: CPT

## 2024-03-22 PROCEDURE — 6360000002 HC RX W HCPCS: Performed by: INTERNAL MEDICINE

## 2024-03-22 PROCEDURE — 1200000000 HC SEMI PRIVATE

## 2024-03-22 PROCEDURE — 94761 N-INVAS EAR/PLS OXIMETRY MLT: CPT

## 2024-03-22 PROCEDURE — 2700000000 HC OXYGEN THERAPY PER DAY

## 2024-03-22 PROCEDURE — 80048 BASIC METABOLIC PNL TOTAL CA: CPT

## 2024-03-22 PROCEDURE — 97116 GAIT TRAINING THERAPY: CPT

## 2024-03-22 PROCEDURE — 94669 MECHANICAL CHEST WALL OSCILL: CPT

## 2024-03-22 PROCEDURE — 85025 COMPLETE CBC W/AUTO DIFF WBC: CPT

## 2024-03-22 RX ORDER — INSULIN LISPRO 100 [IU]/ML
8 INJECTION, SOLUTION INTRAVENOUS; SUBCUTANEOUS
Status: DISCONTINUED | OUTPATIENT
Start: 2024-03-22 | End: 2024-03-24 | Stop reason: HOSPADM

## 2024-03-22 RX ADMIN — POLYETHYLENE GLYCOL 3350 17 G: 17 POWDER, FOR SOLUTION ORAL at 21:44

## 2024-03-22 RX ADMIN — INSULIN GLARGINE 20 UNITS: 100 INJECTION, SOLUTION SUBCUTANEOUS at 08:27

## 2024-03-22 RX ADMIN — FLUTICASONE PROPIONATE 1 SPRAY: 50 SPRAY, METERED NASAL at 08:28

## 2024-03-22 RX ADMIN — APIXABAN 5 MG: 5 TABLET, FILM COATED ORAL at 08:28

## 2024-03-22 RX ADMIN — INSULIN LISPRO 8 UNITS: 100 INJECTION, SOLUTION INTRAVENOUS; SUBCUTANEOUS at 12:11

## 2024-03-22 RX ADMIN — PRAMIPEXOLE DIHYDROCHLORIDE 0.25 MG: 0.25 TABLET ORAL at 17:31

## 2024-03-22 RX ADMIN — Medication 1000 UNITS: at 08:28

## 2024-03-22 RX ADMIN — FLUTICASONE PROPIONATE 1 SPRAY: 50 SPRAY, METERED NASAL at 21:55

## 2024-03-22 RX ADMIN — MOMETASONE FUROATE AND FORMOTEROL FUMARATE DIHYDRATE 2 PUFF: 200; 5 AEROSOL RESPIRATORY (INHALATION) at 08:15

## 2024-03-22 RX ADMIN — IPRATROPIUM BROMIDE AND ALBUTEROL SULFATE 1 DOSE: 2.5; .5 SOLUTION RESPIRATORY (INHALATION) at 23:04

## 2024-03-22 RX ADMIN — INSULIN LISPRO 2 UNITS: 100 INJECTION, SOLUTION INTRAVENOUS; SUBCUTANEOUS at 17:31

## 2024-03-22 RX ADMIN — GUAIFENESIN 600 MG: 600 TABLET ORAL at 21:42

## 2024-03-22 RX ADMIN — INSULIN GLARGINE 20 UNITS: 100 INJECTION, SOLUTION SUBCUTANEOUS at 21:43

## 2024-03-22 RX ADMIN — FUROSEMIDE 40 MG: 40 TABLET ORAL at 08:28

## 2024-03-22 RX ADMIN — CARVEDILOL 3.12 MG: 3.12 TABLET, FILM COATED ORAL at 17:31

## 2024-03-22 RX ADMIN — MOMETASONE FUROATE AND FORMOTEROL FUMARATE DIHYDRATE 2 PUFF: 200; 5 AEROSOL RESPIRATORY (INHALATION) at 20:38

## 2024-03-22 RX ADMIN — PRAMIPEXOLE DIHYDROCHLORIDE 0.25 MG: 0.25 TABLET ORAL at 21:42

## 2024-03-22 RX ADMIN — LAMOTRIGINE 75 MG: 25 TABLET ORAL at 08:28

## 2024-03-22 RX ADMIN — INSULIN LISPRO 8 UNITS: 100 INJECTION, SOLUTION INTRAVENOUS; SUBCUTANEOUS at 08:27

## 2024-03-22 RX ADMIN — PRAMIPEXOLE DIHYDROCHLORIDE 0.25 MG: 0.25 TABLET ORAL at 08:28

## 2024-03-22 RX ADMIN — IPRATROPIUM BROMIDE AND ALBUTEROL SULFATE 1 DOSE: 2.5; .5 SOLUTION RESPIRATORY (INHALATION) at 08:15

## 2024-03-22 RX ADMIN — CEFEPIME 2000 MG: 2 INJECTION, POWDER, FOR SOLUTION INTRAVENOUS at 17:45

## 2024-03-22 RX ADMIN — Medication 10 ML: at 08:26

## 2024-03-22 RX ADMIN — POLYETHYLENE GLYCOL 3350 17 G: 17 POWDER, FOR SOLUTION ORAL at 08:28

## 2024-03-22 RX ADMIN — IPRATROPIUM BROMIDE AND ALBUTEROL SULFATE 1 DOSE: 2.5; .5 SOLUTION RESPIRATORY (INHALATION) at 04:15

## 2024-03-22 RX ADMIN — GABAPENTIN 800 MG: 400 CAPSULE ORAL at 17:31

## 2024-03-22 RX ADMIN — GABAPENTIN 800 MG: 400 CAPSULE ORAL at 08:28

## 2024-03-22 RX ADMIN — LAMOTRIGINE 75 MG: 25 TABLET ORAL at 21:43

## 2024-03-22 RX ADMIN — GABAPENTIN 800 MG: 400 CAPSULE ORAL at 12:11

## 2024-03-22 RX ADMIN — IPRATROPIUM BROMIDE AND ALBUTEROL SULFATE 1 DOSE: 2.5; .5 SOLUTION RESPIRATORY (INHALATION) at 11:40

## 2024-03-22 RX ADMIN — INSULIN LISPRO 4 UNITS: 100 INJECTION, SOLUTION INTRAVENOUS; SUBCUTANEOUS at 12:12

## 2024-03-22 RX ADMIN — CEFEPIME 2000 MG: 2 INJECTION, POWDER, FOR SOLUTION INTRAVENOUS at 10:39

## 2024-03-22 RX ADMIN — ATORVASTATIN CALCIUM 40 MG: 40 TABLET, FILM COATED ORAL at 08:28

## 2024-03-22 RX ADMIN — PRAMIPEXOLE DIHYDROCHLORIDE 0.25 MG: 0.25 TABLET ORAL at 12:11

## 2024-03-22 RX ADMIN — PREDNISONE 40 MG: 20 TABLET ORAL at 08:28

## 2024-03-22 RX ADMIN — GABAPENTIN 800 MG: 400 CAPSULE ORAL at 21:43

## 2024-03-22 RX ADMIN — APIXABAN 5 MG: 5 TABLET, FILM COATED ORAL at 21:43

## 2024-03-22 RX ADMIN — EMPAGLIFLOZIN 10 MG: 10 TABLET, FILM COATED ORAL at 08:28

## 2024-03-22 RX ADMIN — Medication 10 ML: at 21:44

## 2024-03-22 RX ADMIN — CARVEDILOL 3.12 MG: 3.12 TABLET, FILM COATED ORAL at 08:28

## 2024-03-22 RX ADMIN — INSULIN LISPRO 6 UNITS: 100 INJECTION, SOLUTION INTRAVENOUS; SUBCUTANEOUS at 08:27

## 2024-03-22 RX ADMIN — IPRATROPIUM BROMIDE AND ALBUTEROL SULFATE 1 DOSE: 2.5; .5 SOLUTION RESPIRATORY (INHALATION) at 20:38

## 2024-03-22 RX ADMIN — ROFLUMILAST 500 MCG: 500 TABLET ORAL at 08:28

## 2024-03-22 RX ADMIN — INSULIN LISPRO 8 UNITS: 100 INJECTION, SOLUTION INTRAVENOUS; SUBCUTANEOUS at 17:30

## 2024-03-22 RX ADMIN — ASPIRIN 81 MG: 81 TABLET, CHEWABLE ORAL at 08:29

## 2024-03-22 RX ADMIN — DOCUSATE SODIUM 100 MG: 100 CAPSULE, LIQUID FILLED ORAL at 21:43

## 2024-03-22 RX ADMIN — AZITHROMYCIN 500 MG: 500 TABLET, FILM COATED ORAL at 08:28

## 2024-03-22 RX ADMIN — IPRATROPIUM BROMIDE AND ALBUTEROL SULFATE 1 DOSE: 2.5; .5 SOLUTION RESPIRATORY (INHALATION) at 15:32

## 2024-03-22 RX ADMIN — DOCUSATE SODIUM 100 MG: 100 CAPSULE, LIQUID FILLED ORAL at 08:28

## 2024-03-22 RX ADMIN — GUAIFENESIN 600 MG: 600 TABLET ORAL at 08:28

## 2024-03-22 ASSESSMENT — PAIN SCALES - GENERAL: PAINLEVEL_OUTOF10: 0

## 2024-03-22 NOTE — PLAN OF CARE
Problem: Discharge Planning  Goal: Discharge to home or other facility with appropriate resources  3/22/2024 1054 by Stephy Ruby RN  Outcome: Progressing  3/22/2024 0113 by Tracy James RN  Outcome: Progressing  Flowsheets (Taken 3/22/2024 0109)  Discharge to home or other facility with appropriate resources: Identify barriers to discharge with patient and caregiver     Problem: Safety - Adult  Goal: Free from fall injury  3/22/2024 1054 by Stephy Ruby, RN  Outcome: Progressing  3/22/2024 0113 by Tracy James RN  Outcome: Progressing  Flowsheets (Taken 3/22/2024 0112)  Free From Fall Injury: Instruct family/caregiver on patient safety

## 2024-03-22 NOTE — CARE COORDINATION
3/22 Plan: Return to home with wife. Resume services with [facility] (SN, PT, OT). Wife will transport. Oxygen at 1 L/M. Follow for needs. Electronically signed by Court Flores RN on 3/22/2024 at 2:48 PM

## 2024-03-22 NOTE — PLAN OF CARE
Problem: Discharge Planning  Goal: Discharge to home or other facility with appropriate resources  Outcome: Progressing  Flowsheets (Taken 3/22/2024 0109)  Discharge to home or other facility with appropriate resources: Identify barriers to discharge with patient and caregiver     Problem: Safety - Adult  Goal: Free from fall injury  Outcome: Progressing  Flowsheets (Taken 3/22/2024 0112)  Free From Fall Injury: Instruct family/caregiver on patient safety

## 2024-03-23 LAB
ANION GAP SERPL CALCULATED.3IONS-SCNC: 9 MMOL/L (ref 3–16)
BACTERIA SPEC RESP CULT: ABNORMAL
BACTERIA SPEC RESP CULT: ABNORMAL
BASOPHILS # BLD: 0 K/UL (ref 0–0.2)
BASOPHILS NFR BLD: 0.4 %
BUN SERPL-MCNC: 26 MG/DL (ref 7–20)
CALCIUM SERPL-MCNC: 8.9 MG/DL (ref 8.3–10.6)
CHLORIDE SERPL-SCNC: 101 MMOL/L (ref 99–110)
CO2 SERPL-SCNC: 31 MMOL/L (ref 21–32)
CREAT SERPL-MCNC: 1 MG/DL (ref 0.8–1.3)
DEPRECATED RDW RBC AUTO: 16.7 % (ref 12.4–15.4)
EOSINOPHIL # BLD: 0 K/UL (ref 0–0.6)
EOSINOPHIL NFR BLD: 0.3 %
GFR SERPLBLD CREATININE-BSD FMLA CKD-EPI: >60 ML/MIN/{1.73_M2}
GLUCOSE BLD-MCNC: 155 MG/DL (ref 70–99)
GLUCOSE BLD-MCNC: 192 MG/DL (ref 70–99)
GLUCOSE BLD-MCNC: 228 MG/DL (ref 70–99)
GLUCOSE BLD-MCNC: 229 MG/DL (ref 70–99)
GLUCOSE SERPL-MCNC: 185 MG/DL (ref 70–99)
GRAM STN SPEC: ABNORMAL
HCT VFR BLD AUTO: 37.5 % (ref 40.5–52.5)
HGB BLD-MCNC: 12.1 G/DL (ref 13.5–17.5)
LYMPHOCYTES # BLD: 2 K/UL (ref 1–5.1)
LYMPHOCYTES NFR BLD: 18.8 %
MCH RBC QN AUTO: 27.2 PG (ref 26–34)
MCHC RBC AUTO-ENTMCNC: 32.3 G/DL (ref 31–36)
MCV RBC AUTO: 84.2 FL (ref 80–100)
MONOCYTES # BLD: 1.3 K/UL (ref 0–1.3)
MONOCYTES NFR BLD: 12.4 %
NEUTROPHILS # BLD: 7.2 K/UL (ref 1.7–7.7)
NEUTROPHILS NFR BLD: 68.1 %
ORGANISM: ABNORMAL
PERFORMED ON: ABNORMAL
PLATELET # BLD AUTO: 431 K/UL (ref 135–450)
PMV BLD AUTO: 6.8 FL (ref 5–10.5)
POTASSIUM SERPL-SCNC: 3.9 MMOL/L (ref 3.5–5.1)
RBC # BLD AUTO: 4.45 M/UL (ref 4.2–5.9)
SODIUM SERPL-SCNC: 141 MMOL/L (ref 136–145)
WBC # BLD AUTO: 10.5 K/UL (ref 4–11)

## 2024-03-23 PROCEDURE — 94640 AIRWAY INHALATION TREATMENT: CPT

## 2024-03-23 PROCEDURE — 2580000003 HC RX 258: Performed by: INTERNAL MEDICINE

## 2024-03-23 PROCEDURE — 94669 MECHANICAL CHEST WALL OSCILL: CPT

## 2024-03-23 PROCEDURE — 6370000000 HC RX 637 (ALT 250 FOR IP): Performed by: INTERNAL MEDICINE

## 2024-03-23 PROCEDURE — 85025 COMPLETE CBC W/AUTO DIFF WBC: CPT

## 2024-03-23 PROCEDURE — 6360000002 HC RX W HCPCS: Performed by: INTERNAL MEDICINE

## 2024-03-23 PROCEDURE — 36415 COLL VENOUS BLD VENIPUNCTURE: CPT

## 2024-03-23 PROCEDURE — 94761 N-INVAS EAR/PLS OXIMETRY MLT: CPT

## 2024-03-23 PROCEDURE — 1200000000 HC SEMI PRIVATE

## 2024-03-23 PROCEDURE — 2700000000 HC OXYGEN THERAPY PER DAY

## 2024-03-23 PROCEDURE — 80048 BASIC METABOLIC PNL TOTAL CA: CPT

## 2024-03-23 RX ADMIN — ROFLUMILAST 500 MCG: 500 TABLET ORAL at 09:35

## 2024-03-23 RX ADMIN — CARVEDILOL 3.12 MG: 3.12 TABLET, FILM COATED ORAL at 09:33

## 2024-03-23 RX ADMIN — DOCUSATE SODIUM 100 MG: 100 CAPSULE, LIQUID FILLED ORAL at 21:13

## 2024-03-23 RX ADMIN — IPRATROPIUM BROMIDE AND ALBUTEROL SULFATE 1 DOSE: 2.5; .5 SOLUTION RESPIRATORY (INHALATION) at 19:54

## 2024-03-23 RX ADMIN — CEFEPIME 2000 MG: 2 INJECTION, POWDER, FOR SOLUTION INTRAVENOUS at 01:45

## 2024-03-23 RX ADMIN — CARVEDILOL 3.12 MG: 3.12 TABLET, FILM COATED ORAL at 17:28

## 2024-03-23 RX ADMIN — GABAPENTIN 800 MG: 400 CAPSULE ORAL at 12:07

## 2024-03-23 RX ADMIN — INSULIN GLARGINE 20 UNITS: 100 INJECTION, SOLUTION SUBCUTANEOUS at 09:24

## 2024-03-23 RX ADMIN — PREDNISONE 40 MG: 20 TABLET ORAL at 09:32

## 2024-03-23 RX ADMIN — LAMOTRIGINE 75 MG: 25 TABLET ORAL at 21:13

## 2024-03-23 RX ADMIN — Medication 10 ML: at 09:35

## 2024-03-23 RX ADMIN — INSULIN LISPRO 8 UNITS: 100 INJECTION, SOLUTION INTRAVENOUS; SUBCUTANEOUS at 09:24

## 2024-03-23 RX ADMIN — FLUTICASONE PROPIONATE 1 SPRAY: 50 SPRAY, METERED NASAL at 09:27

## 2024-03-23 RX ADMIN — PRAMIPEXOLE DIHYDROCHLORIDE 0.25 MG: 0.25 TABLET ORAL at 12:51

## 2024-03-23 RX ADMIN — Medication 10 ML: at 17:30

## 2024-03-23 RX ADMIN — GABAPENTIN 800 MG: 400 CAPSULE ORAL at 17:28

## 2024-03-23 RX ADMIN — PRAMIPEXOLE DIHYDROCHLORIDE 0.25 MG: 0.25 TABLET ORAL at 09:33

## 2024-03-23 RX ADMIN — PRAMIPEXOLE DIHYDROCHLORIDE 0.25 MG: 0.25 TABLET ORAL at 21:12

## 2024-03-23 RX ADMIN — APIXABAN 5 MG: 5 TABLET, FILM COATED ORAL at 09:34

## 2024-03-23 RX ADMIN — CEFEPIME 2000 MG: 2 INJECTION, POWDER, FOR SOLUTION INTRAVENOUS at 09:50

## 2024-03-23 RX ADMIN — Medication 1000 UNITS: at 09:34

## 2024-03-23 RX ADMIN — SODIUM CHLORIDE: 9 INJECTION, SOLUTION INTRAVENOUS at 09:41

## 2024-03-23 RX ADMIN — INSULIN LISPRO 8 UNITS: 100 INJECTION, SOLUTION INTRAVENOUS; SUBCUTANEOUS at 17:23

## 2024-03-23 RX ADMIN — INSULIN LISPRO 8 UNITS: 100 INJECTION, SOLUTION INTRAVENOUS; SUBCUTANEOUS at 12:07

## 2024-03-23 RX ADMIN — IPRATROPIUM BROMIDE AND ALBUTEROL SULFATE 1 DOSE: 2.5; .5 SOLUTION RESPIRATORY (INHALATION) at 16:16

## 2024-03-23 RX ADMIN — IPRATROPIUM BROMIDE AND ALBUTEROL SULFATE 1 DOSE: 2.5; .5 SOLUTION RESPIRATORY (INHALATION) at 03:22

## 2024-03-23 RX ADMIN — SODIUM CHLORIDE: 9 INJECTION, SOLUTION INTRAVENOUS at 17:34

## 2024-03-23 RX ADMIN — INSULIN GLARGINE 20 UNITS: 100 INJECTION, SOLUTION SUBCUTANEOUS at 21:13

## 2024-03-23 RX ADMIN — ATORVASTATIN CALCIUM 40 MG: 40 TABLET, FILM COATED ORAL at 09:32

## 2024-03-23 RX ADMIN — GUAIFENESIN 600 MG: 600 TABLET ORAL at 21:12

## 2024-03-23 RX ADMIN — DOCUSATE SODIUM 100 MG: 100 CAPSULE, LIQUID FILLED ORAL at 09:34

## 2024-03-23 RX ADMIN — ASPIRIN 81 MG: 81 TABLET, CHEWABLE ORAL at 09:34

## 2024-03-23 RX ADMIN — LAMOTRIGINE 75 MG: 25 TABLET ORAL at 09:33

## 2024-03-23 RX ADMIN — GABAPENTIN 800 MG: 400 CAPSULE ORAL at 09:34

## 2024-03-23 RX ADMIN — IPRATROPIUM BROMIDE AND ALBUTEROL SULFATE 1 DOSE: 2.5; .5 SOLUTION RESPIRATORY (INHALATION) at 07:42

## 2024-03-23 RX ADMIN — PRAMIPEXOLE DIHYDROCHLORIDE 0.25 MG: 0.25 TABLET ORAL at 17:28

## 2024-03-23 RX ADMIN — IPRATROPIUM BROMIDE AND ALBUTEROL SULFATE 1 DOSE: 2.5; .5 SOLUTION RESPIRATORY (INHALATION) at 11:45

## 2024-03-23 RX ADMIN — MOMETASONE FUROATE AND FORMOTEROL FUMARATE DIHYDRATE 2 PUFF: 200; 5 AEROSOL RESPIRATORY (INHALATION) at 19:54

## 2024-03-23 RX ADMIN — APIXABAN 5 MG: 5 TABLET, FILM COATED ORAL at 21:12

## 2024-03-23 RX ADMIN — FLUTICASONE PROPIONATE 1 SPRAY: 50 SPRAY, METERED NASAL at 21:14

## 2024-03-23 RX ADMIN — GABAPENTIN 800 MG: 400 CAPSULE ORAL at 21:13

## 2024-03-23 RX ADMIN — Medication 10 ML: at 21:12

## 2024-03-23 RX ADMIN — FUROSEMIDE 40 MG: 40 TABLET ORAL at 09:32

## 2024-03-23 RX ADMIN — INSULIN LISPRO 2 UNITS: 100 INJECTION, SOLUTION INTRAVENOUS; SUBCUTANEOUS at 12:07

## 2024-03-23 RX ADMIN — CEFEPIME 2000 MG: 2 INJECTION, POWDER, FOR SOLUTION INTRAVENOUS at 17:36

## 2024-03-23 RX ADMIN — EMPAGLIFLOZIN 10 MG: 10 TABLET, FILM COATED ORAL at 09:34

## 2024-03-23 RX ADMIN — GUAIFENESIN 600 MG: 600 TABLET ORAL at 09:33

## 2024-03-23 NOTE — PLAN OF CARE
Problem: Discharge Planning  Goal: Discharge to home or other facility with appropriate resources  3/22/2024 2322 by Lara Atkinson RN  Outcome: Progressing  3/22/2024 1054 by Stephy Ruby RN  Outcome: Progressing     Problem: Safety - Adult  Goal: Free from fall injury  3/22/2024 2322 by Lara Atkinson RN  Outcome: Progressing  3/22/2024 1054 by Stephy Ruby RN  Outcome: Progressing     Problem: Respiratory - Adult  Goal: Achieves optimal ventilation and oxygenation  Outcome: Progressing     Problem: Cardiovascular - Adult  Goal: Maintains optimal cardiac output and hemodynamic stability  Outcome: Progressing     Problem: Skin/Tissue Integrity - Adult  Goal: Skin integrity remains intact  Outcome: Progressing     Problem: Musculoskeletal - Adult  Goal: Return mobility to safest level of function  Outcome: Progressing     Problem: Gastrointestinal - Adult  Goal: Minimal or absence of nausea and vomiting  Outcome: Progressing     Problem: Metabolic/Fluid and Electrolytes - Adult  Goal: Electrolytes maintained within normal limits  Outcome: Progressing

## 2024-03-23 NOTE — PLAN OF CARE
Problem: Discharge Planning  Goal: Discharge to home or other facility with appropriate resources  Outcome: Progressing     Problem: Safety - Adult  Goal: Free from fall injury  Outcome: Progressing     Problem: Respiratory - Adult  Goal: Achieves optimal ventilation and oxygenation  Outcome: Progressing     Problem: Cardiovascular - Adult  Goal: Maintains optimal cardiac output and hemodynamic stability  Outcome: Progressing  Goal: Absence of cardiac dysrhythmias or at baseline  Outcome: Progressing     Problem: Skin/Tissue Integrity - Adult  Goal: Skin integrity remains intact  Outcome: Progressing  Goal: Incisions, wounds, or drain sites healing without S/S of infection  Outcome: Progressing  Goal: Oral mucous membranes remain intact  Outcome: Progressing     Problem: Musculoskeletal - Adult  Goal: Return mobility to safest level of function  Outcome: Progressing  Goal: Maintain proper alignment of affected body part  Outcome: Progressing  Goal: Return ADL status to a safe level of function  Outcome: Progressing     Problem: Gastrointestinal - Adult  Goal: Minimal or absence of nausea and vomiting  Outcome: Progressing  Goal: Maintains or returns to baseline bowel function  Outcome: Progressing  Goal: Maintains adequate nutritional intake  Outcome: Progressing     Problem: Metabolic/Fluid and Electrolytes - Adult  Goal: Electrolytes maintained within normal limits  Outcome: Progressing  Goal: Hemodynamic stability and optimal renal function maintained  Outcome: Progressing  Goal: Glucose maintained within prescribed range  Outcome: Progressing

## 2024-03-24 VITALS
HEART RATE: 88 BPM | SYSTOLIC BLOOD PRESSURE: 126 MMHG | DIASTOLIC BLOOD PRESSURE: 62 MMHG | HEIGHT: 71 IN | TEMPERATURE: 97.9 F | RESPIRATION RATE: 16 BRPM | WEIGHT: 220.8 LBS | OXYGEN SATURATION: 94 % | BODY MASS INDEX: 30.91 KG/M2

## 2024-03-24 LAB
ANION GAP SERPL CALCULATED.3IONS-SCNC: 9 MMOL/L (ref 3–16)
BACTERIA BLD CULT ORG #2: NORMAL
BACTERIA BLD CULT: NORMAL
BASOPHILS # BLD: 0 K/UL (ref 0–0.2)
BASOPHILS NFR BLD: 0.3 %
BUN SERPL-MCNC: 24 MG/DL (ref 7–20)
CALCIUM SERPL-MCNC: 8.9 MG/DL (ref 8.3–10.6)
CHLORIDE SERPL-SCNC: 101 MMOL/L (ref 99–110)
CO2 SERPL-SCNC: 28 MMOL/L (ref 21–32)
CREAT SERPL-MCNC: 1.1 MG/DL (ref 0.8–1.3)
DEPRECATED RDW RBC AUTO: 16.2 % (ref 12.4–15.4)
EOSINOPHIL # BLD: 0 K/UL (ref 0–0.6)
EOSINOPHIL NFR BLD: 0.4 %
GFR SERPLBLD CREATININE-BSD FMLA CKD-EPI: >60 ML/MIN/{1.73_M2}
GLUCOSE BLD-MCNC: 172 MG/DL (ref 70–99)
GLUCOSE BLD-MCNC: 234 MG/DL (ref 70–99)
GLUCOSE SERPL-MCNC: 242 MG/DL (ref 70–99)
HCT VFR BLD AUTO: 37.4 % (ref 40.5–52.5)
HGB BLD-MCNC: 12.1 G/DL (ref 13.5–17.5)
LYMPHOCYTES # BLD: 1.8 K/UL (ref 1–5.1)
LYMPHOCYTES NFR BLD: 15.2 %
MCH RBC QN AUTO: 26.8 PG (ref 26–34)
MCHC RBC AUTO-ENTMCNC: 32.3 G/DL (ref 31–36)
MCV RBC AUTO: 83.1 FL (ref 80–100)
MONOCYTES # BLD: 1.4 K/UL (ref 0–1.3)
MONOCYTES NFR BLD: 12.2 %
NEUTROPHILS # BLD: 8.5 K/UL (ref 1.7–7.7)
NEUTROPHILS NFR BLD: 71.9 %
PERFORMED ON: ABNORMAL
PERFORMED ON: ABNORMAL
PLATELET # BLD AUTO: 379 K/UL (ref 135–450)
PMV BLD AUTO: 6.6 FL (ref 5–10.5)
POTASSIUM SERPL-SCNC: 3.9 MMOL/L (ref 3.5–5.1)
RBC # BLD AUTO: 4.5 M/UL (ref 4.2–5.9)
SODIUM SERPL-SCNC: 138 MMOL/L (ref 136–145)
WBC # BLD AUTO: 11.8 K/UL (ref 4–11)

## 2024-03-24 PROCEDURE — 80048 BASIC METABOLIC PNL TOTAL CA: CPT

## 2024-03-24 PROCEDURE — 36415 COLL VENOUS BLD VENIPUNCTURE: CPT

## 2024-03-24 PROCEDURE — 2580000003 HC RX 258: Performed by: INTERNAL MEDICINE

## 2024-03-24 PROCEDURE — 6370000000 HC RX 637 (ALT 250 FOR IP): Performed by: INTERNAL MEDICINE

## 2024-03-24 PROCEDURE — 6360000002 HC RX W HCPCS: Performed by: INTERNAL MEDICINE

## 2024-03-24 PROCEDURE — 94669 MECHANICAL CHEST WALL OSCILL: CPT

## 2024-03-24 PROCEDURE — 94760 N-INVAS EAR/PLS OXIMETRY 1: CPT

## 2024-03-24 PROCEDURE — 85025 COMPLETE CBC W/AUTO DIFF WBC: CPT

## 2024-03-24 PROCEDURE — 94640 AIRWAY INHALATION TREATMENT: CPT

## 2024-03-24 RX ORDER — CIPROFLOXACIN 500 MG/1
500 TABLET, FILM COATED ORAL 2 TIMES DAILY
Qty: 10 TABLET | Refills: 0 | Status: SHIPPED | OUTPATIENT
Start: 2024-03-24 | End: 2024-03-29

## 2024-03-24 RX ORDER — GUAIFENESIN 600 MG/1
600 TABLET, EXTENDED RELEASE ORAL 2 TIMES DAILY
Qty: 14 TABLET | Refills: 0 | Status: SHIPPED | OUTPATIENT
Start: 2024-03-24

## 2024-03-24 RX ADMIN — INSULIN LISPRO 8 UNITS: 100 INJECTION, SOLUTION INTRAVENOUS; SUBCUTANEOUS at 09:38

## 2024-03-24 RX ADMIN — PRAMIPEXOLE DIHYDROCHLORIDE 0.25 MG: 0.25 TABLET ORAL at 09:43

## 2024-03-24 RX ADMIN — GABAPENTIN 800 MG: 400 CAPSULE ORAL at 09:45

## 2024-03-24 RX ADMIN — MOMETASONE FUROATE AND FORMOTEROL FUMARATE DIHYDRATE 2 PUFF: 200; 5 AEROSOL RESPIRATORY (INHALATION) at 12:03

## 2024-03-24 RX ADMIN — PREDNISONE 40 MG: 20 TABLET ORAL at 09:43

## 2024-03-24 RX ADMIN — CEFEPIME 2000 MG: 2 INJECTION, POWDER, FOR SOLUTION INTRAVENOUS at 09:56

## 2024-03-24 RX ADMIN — APIXABAN 5 MG: 5 TABLET, FILM COATED ORAL at 09:45

## 2024-03-24 RX ADMIN — SODIUM CHLORIDE: 9 INJECTION, SOLUTION INTRAVENOUS at 09:53

## 2024-03-24 RX ADMIN — ASPIRIN 81 MG: 81 TABLET, CHEWABLE ORAL at 09:44

## 2024-03-24 RX ADMIN — IPRATROPIUM BROMIDE AND ALBUTEROL SULFATE 1 DOSE: 2.5; .5 SOLUTION RESPIRATORY (INHALATION) at 05:10

## 2024-03-24 RX ADMIN — PRAMIPEXOLE DIHYDROCHLORIDE 0.25 MG: 0.25 TABLET ORAL at 13:39

## 2024-03-24 RX ADMIN — LAMOTRIGINE 75 MG: 25 TABLET ORAL at 09:44

## 2024-03-24 RX ADMIN — FLUTICASONE PROPIONATE 1 SPRAY: 50 SPRAY, METERED NASAL at 09:46

## 2024-03-24 RX ADMIN — GUAIFENESIN 600 MG: 600 TABLET ORAL at 09:43

## 2024-03-24 RX ADMIN — Medication 1000 UNITS: at 09:45

## 2024-03-24 RX ADMIN — IPRATROPIUM BROMIDE AND ALBUTEROL SULFATE 1 DOSE: 2.5; .5 SOLUTION RESPIRATORY (INHALATION) at 12:03

## 2024-03-24 RX ADMIN — ROFLUMILAST 500 MCG: 500 TABLET ORAL at 09:43

## 2024-03-24 RX ADMIN — INSULIN GLARGINE 20 UNITS: 100 INJECTION, SOLUTION SUBCUTANEOUS at 09:39

## 2024-03-24 RX ADMIN — IPRATROPIUM BROMIDE AND ALBUTEROL SULFATE 1 DOSE: 2.5; .5 SOLUTION RESPIRATORY (INHALATION) at 00:09

## 2024-03-24 RX ADMIN — Medication 10 ML: at 09:47

## 2024-03-24 RX ADMIN — INSULIN LISPRO 2 UNITS: 100 INJECTION, SOLUTION INTRAVENOUS; SUBCUTANEOUS at 09:40

## 2024-03-24 RX ADMIN — CEFEPIME 2000 MG: 2 INJECTION, POWDER, FOR SOLUTION INTRAVENOUS at 02:31

## 2024-03-24 RX ADMIN — DOCUSATE SODIUM 100 MG: 100 CAPSULE, LIQUID FILLED ORAL at 09:45

## 2024-03-24 RX ADMIN — ATORVASTATIN CALCIUM 40 MG: 40 TABLET, FILM COATED ORAL at 09:45

## 2024-03-24 RX ADMIN — EMPAGLIFLOZIN 10 MG: 10 TABLET, FILM COATED ORAL at 09:45

## 2024-03-24 RX ADMIN — INSULIN LISPRO 8 UNITS: 100 INJECTION, SOLUTION INTRAVENOUS; SUBCUTANEOUS at 13:39

## 2024-03-24 RX ADMIN — CARVEDILOL 3.12 MG: 3.12 TABLET, FILM COATED ORAL at 09:45

## 2024-03-24 RX ADMIN — FUROSEMIDE 40 MG: 40 TABLET ORAL at 09:43

## 2024-03-24 RX ADMIN — GABAPENTIN 800 MG: 400 CAPSULE ORAL at 13:38

## 2024-03-24 ASSESSMENT — PAIN SCALES - GENERAL: PAINLEVEL_OUTOF10: 5

## 2024-03-24 NOTE — PLAN OF CARE
Problem: Discharge Planning  Goal: Discharge to home or other facility with appropriate resources  3/24/2024 0103 by Lara Atkinson RN  Outcome: Progressing  3/23/2024 1937 by Sakina Mitchell RN  Outcome: Progressing     Problem: Safety - Adult  Goal: Free from fall injury  3/24/2024 0103 by Lara Atkinson RN  Outcome: Progressing  3/23/2024 1937 by Sakina Mitchell RN  Outcome: Progressing     Problem: Respiratory - Adult  Goal: Achieves optimal ventilation and oxygenation  3/24/2024 0103 by Lara Atkinson RN  Outcome: Progressing  3/23/2024 1937 by Sakina Mitchell RN  Outcome: Progressing     Problem: Cardiovascular - Adult  Goal: Maintains optimal cardiac output and hemodynamic stability  3/24/2024 0103 by Lara Atkinson RN  Outcome: Progressing  3/23/2024 1937 by Sakina Mitchell RN  Outcome: Progressing  Goal: Absence of cardiac dysrhythmias or at baseline  3/23/2024 1937 by Sakina Mitchell RN  Outcome: Progressing     Problem: Skin/Tissue Integrity - Adult  Goal: Skin integrity remains intact  3/24/2024 0103 by Lara Atkinson RN  Outcome: Progressing  3/23/2024 1937 by Sakina Mitchell RN  Outcome: Progressing  Goal: Incisions, wounds, or drain sites healing without S/S of infection  3/23/2024 1937 by Sakina Mitchell RN  Outcome: Progressing  Goal: Oral mucous membranes remain intact  3/23/2024 1937 by Sakina Mitchell RN  Outcome: Progressing     Problem: Musculoskeletal - Adult  Goal: Return mobility to safest level of function  3/24/2024 0103 by Lara Atkinson RN  Outcome: Progressing  3/23/2024 1937 by Sakina Mitchell RN  Outcome: Progressing  Goal: Maintain proper alignment of affected body part  3/23/2024 1937 by Sakina Mitchell RN  Outcome: Progressing  Goal: Return ADL status to a safe level of function  3/23/2024 1937 by Sakina Mitchell RN  Outcome: Progressing     Problem: Gastrointestinal - Adult  Goal: Minimal or absence of nausea and

## 2024-03-24 NOTE — PLAN OF CARE
Problem: Discharge Planning  Goal: Discharge to home or other facility with appropriate resources  3/24/2024 1410 by Sakina Mitchell RN  Outcome: Progressing  3/24/2024 0103 by Lara Atkinson RN  Outcome: Progressing     Problem: Safety - Adult  Goal: Free from fall injury  3/24/2024 1410 by Sakina Mitchell RN  Outcome: Progressing  3/24/2024 0103 by Lara Atkinson RN  Outcome: Progressing     Problem: Respiratory - Adult  Goal: Achieves optimal ventilation and oxygenation  3/24/2024 1410 by Sakina Mitchell RN  Outcome: Progressing  3/24/2024 0103 by Lara Atkinson RN  Outcome: Progressing     Problem: Cardiovascular - Adult  Goal: Maintains optimal cardiac output and hemodynamic stability  3/24/2024 1410 by Sakina Mitchell RN  Outcome: Progressing  3/24/2024 0103 by Lara Atkinson RN  Outcome: Progressing  Goal: Absence of cardiac dysrhythmias or at baseline  Outcome: Progressing     Problem: Skin/Tissue Integrity - Adult  Goal: Skin integrity remains intact  3/24/2024 1410 by Sakina Mitchell RN  Outcome: Progressing  3/24/2024 0103 by Lara Atkinson RN  Outcome: Progressing  Goal: Incisions, wounds, or drain sites healing without S/S of infection  Outcome: Progressing  Goal: Oral mucous membranes remain intact  Outcome: Progressing     Problem: Musculoskeletal - Adult  Goal: Return mobility to safest level of function  3/24/2024 1410 by Sakina Mitchell RN  Outcome: Progressing  3/24/2024 0103 by Lara Atkinson RN  Outcome: Progressing  Goal: Maintain proper alignment of affected body part  Outcome: Progressing  Goal: Return ADL status to a safe level of function  Outcome: Progressing     Problem: Gastrointestinal - Adult  Goal: Minimal or absence of nausea and vomiting  Outcome: Progressing  Goal: Maintains or returns to baseline bowel function  Outcome: Progressing  Goal: Maintains adequate nutritional intake  Outcome: Progressing     Problem: Metabolic/Fluid and Electrolytes -

## 2024-03-24 NOTE — PROGRESS NOTES
Hospitalist Progress Note      PCP: Gallo Sanchez    Date of Admission: 3/19/2024    Subjective: feels SOB better, cough + with sputum production    Medications:  Reviewed    Infusion Medications    sodium chloride 5 mL/hr at 03/20/24 2141    dextrose       Scheduled Medications    docusate sodium  100 mg Oral BID    insulin glargine  20 Units SubCUTAneous BID    sodium chloride flush  5-40 mL IntraVENous 2 times per day    azithromycin  500 mg Oral Daily    methylPREDNISolone  40 mg IntraVENous Q6H    Followed by    [START ON 3/22/2024] predniSONE  40 mg Oral Daily    insulin lispro  0-8 Units SubCUTAneous TID WC    insulin lispro  0-4 Units SubCUTAneous Nightly    apixaban  5 mg Oral BID    aspirin  81 mg Oral Daily    carvedilol  3.125 mg Oral BID WC    empagliflozin  10 mg Oral Daily    fluticasone  1 spray Each Nostril BID    gabapentin  800 mg Oral 4x Daily    lamoTRIgine  75 mg Oral BID    pramipexole  0.25 mg Oral 4x daily    Roflumilast  500 mcg Oral Daily    atorvastatin  40 mg Oral Daily    Vitamin D  1,000 Units Oral Daily    mometasone-formoterol  2 puff Inhalation BID RT    furosemide  40 mg Oral Daily    insulin lispro  6 Units SubCUTAneous TID     guaiFENesin  600 mg Oral BID    cefTRIAXone (ROCEPHIN) IV  2,000 mg IntraVENous Q24H    ipratropium 0.5 mg-albuterol 2.5 mg  1 Dose Inhalation Q4H RT     PRN Meds: polyethylene glycol, sodium chloride flush, sodium chloride, ondansetron **OR** ondansetron, acetaminophen **OR** acetaminophen, aluminum & magnesium hydroxide-simethicone, albuterol, guaiFENesin-dextromethorphan, glucose, dextrose bolus **OR** dextrose bolus, glucagon (rDNA), dextrose, methocarbamol, albuterol, ipratropium      Intake/Output Summary (Last 24 hours) at 3/21/2024 1719  Last data filed at 3/21/2024 1421  Gross per 24 hour   Intake 970 ml   Output 1750 ml   Net -780 ml       Physical Exam Performed:    BP (!) 146/55   Pulse (!) 107   Temp 97.9 °F (36.6 °C) (Oral)   
      Hospitalist Progress Note      PCP: Gallo Sanchez    Date of Admission: 3/19/2024    Subjective: feels better, off O2    Medications:  Reviewed    Infusion Medications    sodium chloride Stopped (03/23/24 1400)    dextrose       Scheduled Medications    cefepime  2,000 mg IntraVENous Q8H    insulin lispro  8 Units SubCUTAneous TID WC    docusate sodium  100 mg Oral BID    insulin glargine  20 Units SubCUTAneous BID    sodium chloride flush  5-40 mL IntraVENous 2 times per day    predniSONE  40 mg Oral Daily    insulin lispro  0-8 Units SubCUTAneous TID WC    insulin lispro  0-4 Units SubCUTAneous Nightly    apixaban  5 mg Oral BID    aspirin  81 mg Oral Daily    carvedilol  3.125 mg Oral BID WC    empagliflozin  10 mg Oral Daily    fluticasone  1 spray Each Nostril BID    gabapentin  800 mg Oral 4x Daily    lamoTRIgine  75 mg Oral BID    pramipexole  0.25 mg Oral 4x daily    Roflumilast  500 mcg Oral Daily    atorvastatin  40 mg Oral Daily    Vitamin D  1,000 Units Oral Daily    mometasone-formoterol  2 puff Inhalation BID RT    furosemide  40 mg Oral Daily    guaiFENesin  600 mg Oral BID    ipratropium 0.5 mg-albuterol 2.5 mg  1 Dose Inhalation Q4H RT     PRN Meds: polyethylene glycol, sodium chloride flush, sodium chloride, ondansetron **OR** ondansetron, acetaminophen **OR** acetaminophen, aluminum & magnesium hydroxide-simethicone, albuterol, guaiFENesin-dextromethorphan, glucose, dextrose bolus **OR** dextrose bolus, glucagon (rDNA), dextrose, methocarbamol, albuterol, ipratropium      Intake/Output Summary (Last 24 hours) at 3/23/2024 1708  Last data filed at 3/23/2024 1429  Gross per 24 hour   Intake 1530.55 ml   Output 1750 ml   Net -219.45 ml       Physical Exam Performed:    /63   Pulse 68   Temp 97.5 °F (36.4 °C) (Oral)   Resp 18   Ht 1.803 m (5' 11\")   Wt 101.7 kg (224 lb 3.2 oz)   SpO2 94%   BMI 31.27 kg/m²       General: Well-developed/obese, nontoxic in NAD  Eyes: EOMI, 
      Hospitalist Progress Note      PCP: Gallo Sanchez    Date of Admission: 3/19/2024    Subjective: feels cough improved, LE swollen, requesting relaxing fluid restriction    Medications:  Reviewed    Infusion Medications    sodium chloride 5 mL/hr at 03/20/24 2141    dextrose       Scheduled Medications    cefepime  2,000 mg IntraVENous Q8H    insulin lispro  8 Units SubCUTAneous TID WC    docusate sodium  100 mg Oral BID    insulin glargine  20 Units SubCUTAneous BID    sodium chloride flush  5-40 mL IntraVENous 2 times per day    predniSONE  40 mg Oral Daily    insulin lispro  0-8 Units SubCUTAneous TID WC    insulin lispro  0-4 Units SubCUTAneous Nightly    apixaban  5 mg Oral BID    aspirin  81 mg Oral Daily    carvedilol  3.125 mg Oral BID WC    empagliflozin  10 mg Oral Daily    fluticasone  1 spray Each Nostril BID    gabapentin  800 mg Oral 4x Daily    lamoTRIgine  75 mg Oral BID    pramipexole  0.25 mg Oral 4x daily    Roflumilast  500 mcg Oral Daily    atorvastatin  40 mg Oral Daily    Vitamin D  1,000 Units Oral Daily    mometasone-formoterol  2 puff Inhalation BID RT    furosemide  40 mg Oral Daily    guaiFENesin  600 mg Oral BID    ipratropium 0.5 mg-albuterol 2.5 mg  1 Dose Inhalation Q4H RT     PRN Meds: polyethylene glycol, sodium chloride flush, sodium chloride, ondansetron **OR** ondansetron, acetaminophen **OR** acetaminophen, aluminum & magnesium hydroxide-simethicone, albuterol, guaiFENesin-dextromethorphan, glucose, dextrose bolus **OR** dextrose bolus, glucagon (rDNA), dextrose, methocarbamol, albuterol, ipratropium      Intake/Output Summary (Last 24 hours) at 3/22/2024 1436  Last data filed at 3/22/2024 1320  Gross per 24 hour   Intake 1525 ml   Output 2590 ml   Net -1065 ml       Physical Exam Performed:    /68   Pulse 80   Temp 97.4 °F (36.3 °C) (Oral)   Resp 16   Ht 1.803 m (5' 11\")   Wt 105.6 kg (232 lb 12.9 oz)   SpO2 99%   BMI 32.47 kg/m²     General: 
      Hospitalist Progress Note      PCP: Gallo Sanchez    Date of Admission: 3/19/2024    Subjective: pt states his SOB not improved, requesting to restart his lasix, requesting antibiotics    Medications:  Reviewed    Infusion Medications    sodium chloride      dextrose       Scheduled Medications    sodium chloride flush  5-40 mL IntraVENous 2 times per day    azithromycin  500 mg Oral Daily    methylPREDNISolone  40 mg IntraVENous Q6H    Followed by    [START ON 3/22/2024] predniSONE  40 mg Oral Daily    insulin lispro  0-8 Units SubCUTAneous TID WC    insulin lispro  0-4 Units SubCUTAneous Nightly    apixaban  5 mg Oral BID    aspirin  81 mg Oral Daily    carvedilol  3.125 mg Oral BID WC    empagliflozin  10 mg Oral Daily    fluticasone  1 spray Each Nostril BID    gabapentin  800 mg Oral 4x Daily    lamoTRIgine  75 mg Oral BID    pramipexole  0.25 mg Oral 4x daily    Roflumilast  500 mcg Oral Daily    atorvastatin  40 mg Oral Daily    Vitamin D  1,000 Units Oral Daily    insulin glargine  20 Units SubCUTAneous Nightly    mometasone-formoterol  2 puff Inhalation BID RT    furosemide  40 mg Oral Daily    insulin lispro  6 Units SubCUTAneous TID WC    guaiFENesin  600 mg Oral BID    cefTRIAXone (ROCEPHIN) IV  2,000 mg IntraVENous Q24H    ipratropium 0.5 mg-albuterol 2.5 mg  1 Dose Inhalation Q4H RT     PRN Meds: sodium chloride flush, sodium chloride, ondansetron **OR** ondansetron, polyethylene glycol, acetaminophen **OR** acetaminophen, aluminum & magnesium hydroxide-simethicone, albuterol, guaiFENesin-dextromethorphan, glucose, dextrose bolus **OR** dextrose bolus, glucagon (rDNA), dextrose, methocarbamol, albuterol, ipratropium      Intake/Output Summary (Last 24 hours) at 3/20/2024 1720  Last data filed at 3/20/2024 1631  Gross per 24 hour   Intake 1410 ml   Output 1450 ml   Net -40 ml       Physical Exam Performed:    /73   Pulse 87   Temp 97.7 °F (36.5 °C) (Oral)   Resp 18   Ht 1.803 m (5' 
  Physician Progress Note      PATIENT:               JAROD MOREJON  CSN #:                  [id number]  :                       1945  ADMIT DATE:       3/19/2024 9:03 PM  DISCH DATE:  RESPONDING  PROVIDER #:        Alma Mcbride MD          QUERY TEXT:    Dear Dr. Mcbride,  Pt admitted with Atypical pneumonia, acute hypoxic resp failure. Pt noted to   have Leukocytosis, Neutrophilia, Lactic acid elevation, hypoxia. If possible,   please document in the progress notes and discharge summary if you are   evaluating and /or treating any of the following:    The medical record reflects the following:  Risk Factors: Hx COPD, CAD, A-fib, CHF, DM, Current pneumonia  Clinical Indicators: 3/19 ED for SOB, prod cough, 85% sat on RA, WBC=16.7,   Neutrophils=14.2, Lactic acid=2.5. CXR-Hazy perihilar bibasilar interstitial   and ground-glass opacity which is more prominent and may be related to   pulmonary edema and/or pneumonia. ED notes \"sepsis identified\"  Treatment: O2 3L, Rocephin, Zithromax  Thank you,  Vivian Gonzalez RN, CDS  [email or web address]  Options provided:  -- Sepsis due to pneumonia present on admission  -- Pneumonia without Sepsis  -- Other - I will add my own diagnosis  -- Disagree - Not applicable / Not valid  -- Disagree - Clinically unable to determine / Unknown  -- Refer to Clinical Documentation Reviewer    PROVIDER RESPONSE TEXT:    This patient has sepsis due to pneumonia which was present on admission.    Query created by: Vivian Oliveros on 3/20/2024 2:42 PM      Electronically signed by:  Alma Mcbride MD 3/20/2024 3:56 PM          
4 Eyes Skin Assessment     NAME:  Jorge Gauthier  YOB: 1945  MEDICAL RECORD NUMBER:  [id number]    The patient is being assessed for  Admission    I agree that at least one RN has performed a thorough Head to Toe Skin Assessment on the patient. ALL assessment sites listed below have been assessed.      Areas assessed by both nurses:    Head, Face, Ears, Shoulders, Back, Chest, Arms, Elbows, Hands, Sacrum. Buttock, Coccyx, Ischium, Legs. Feet and Heels, and Under Medical Devices         Does the Patient have a Wound? No noted wound(s)       Jose Alfredo Prevention initiated by RN: no    Wound Care Orders initiated by RN: No    Pressure Injury (Stage 3,4, Unstageable, DTI, NWPT, and Complex wounds) if present, place Wound referral order by RN under : No    New Ostomies, if present place, Ostomy referral order under : No     Nurse 1 eSignature: Electronically signed by Josi Kaur RN on 3/20/24 at 3:05 AM EDT    **SHARE this note so that the co-signing nurse can place an eSignature**    Nurse 2 eSignature: Electronically signed by Madelaine Colon RN on 3/20/24 at 3:18 AM EDT    
[facility]  Hyperglycemia Event      NAME: Jorge Gauthier  MEDICAL RECORD NUMBER:  [id number]  AGE: 78 y.o.   GENDER: male  : 1945  EPISODE DATE:  3/21/2024     Data     Recent Labs     24  0739 24  1152 24  1623 24  2041 24  0733 24  1156   POCGLU 360* 372* 340* 323* 358* 331*       HgBA1c:    Lab Results   Component Value Date/Time    LABA1C 8.0 2024 11:44 AM       BUN/Creatinine:    Lab Results   Component Value Date/Time    BUN 30 2024 06:37 AM    CREATININE 1.2 2024 06:37 AM       Medications  Scheduled Medications:   docusate sodium  100 mg Oral BID    insulin glargine  20 Units SubCUTAneous BID    sodium chloride flush  5-40 mL IntraVENous 2 times per day    azithromycin  500 mg Oral Daily    methylPREDNISolone  40 mg IntraVENous Q6H    Followed by    [START ON 3/22/2024] predniSONE  40 mg Oral Daily    insulin lispro  0-8 Units SubCUTAneous TID WC    insulin lispro  0-4 Units SubCUTAneous Nightly    apixaban  5 mg Oral BID    aspirin  81 mg Oral Daily    carvedilol  3.125 mg Oral BID WC    empagliflozin  10 mg Oral Daily    fluticasone  1 spray Each Nostril BID    gabapentin  800 mg Oral 4x Daily    lamoTRIgine  75 mg Oral BID    pramipexole  0.25 mg Oral 4x daily    Roflumilast  500 mcg Oral Daily    atorvastatin  40 mg Oral Daily    Vitamin D  1,000 Units Oral Daily    mometasone-formoterol  2 puff Inhalation BID RT    furosemide  40 mg Oral Daily    insulin lispro  6 Units SubCUTAneous TID WC    guaiFENesin  600 mg Oral BID    cefTRIAXone (ROCEPHIN) IV  2,000 mg IntraVENous Q24H    ipratropium 0.5 mg-albuterol 2.5 mg  1 Dose Inhalation Q4H RT       Diet  Current diet/supplement order: ADULT DIET; Regular; 3 carb choices (45 gm/meal); Low Fat/Low Chol/High Fiber/BECKA; 1500 ml     Recorded PO:   last meal in flowsheets      Action      Glucose Target: 140-180    Pt current receiving IV Solumedrol 40 mg every 6 hours until 3/22/2024 
[facility]  Glycemia Control      NAME: Jorge Gauthier  MEDICAL RECORD NUMBER:  [id number]  AGE: 78 y.o.   GENDER: male  : 1945  EPISODE DATE:  3/22/2024     Data     Recent Labs     24  20424  0733 24  1156 24  1601 24  0807   POCGLU 323* 358* 331* 296* 241* 357*       HgBA1c:    Lab Results   Component Value Date/Time    LABA1C 8.0 2024 11:44 AM       BUN/Creatinine:    Lab Results   Component Value Date/Time    BUN 30 2024 06:37 AM    CREATININE 1.2 2024 06:37 AM       Medications  Scheduled Medications:   cefepime  2,000 mg IntraVENous Once    Followed by    cefepime  2,000 mg IntraVENous Q8H    insulin lispro  8 Units SubCUTAneous TID WC    docusate sodium  100 mg Oral BID    insulin glargine  20 Units SubCUTAneous BID    sodium chloride flush  5-40 mL IntraVENous 2 times per day    predniSONE  40 mg Oral Daily    insulin lispro  0-8 Units SubCUTAneous TID WC    insulin lispro  0-4 Units SubCUTAneous Nightly    apixaban  5 mg Oral BID    aspirin  81 mg Oral Daily    carvedilol  3.125 mg Oral BID WC    empagliflozin  10 mg Oral Daily    fluticasone  1 spray Each Nostril BID    gabapentin  800 mg Oral 4x Daily    lamoTRIgine  75 mg Oral BID    pramipexole  0.25 mg Oral 4x daily    Roflumilast  500 mcg Oral Daily    atorvastatin  40 mg Oral Daily    Vitamin D  1,000 Units Oral Daily    mometasone-formoterol  2 puff Inhalation BID RT    furosemide  40 mg Oral Daily    guaiFENesin  600 mg Oral BID    ipratropium 0.5 mg-albuterol 2.5 mg  1 Dose Inhalation Q4H RT       Diet  Current diet/supplement order: ADULT DIET; Regular; 3 carb choices (45 gm/meal); Low Fat/Low Chol/High Fiber/BECKA; 1500 ml     Recorded PO: PO Meals Eaten (%): 76 - 100% last meal in flowsheets      Action      Glucose Target: 140-180     Total Daily Insulin:  3/22/2024: 34 units (as of 0906am) + Jardiance 10mg + Prednisone 40mg (tab)  3/21/2024: 80 units + 
Occupational Therapy  Facility/Department: [address] MED SURG  Occupational Therapy Daily Treatment Note    Name: Jorge Gauthier  : 1945  MRN: [id number]  Date of Service: 3/21/2024    Discharge Recommendations:  Home with assist PRN, Home with Home health OT      Jorge Gauthier scored a 20/24 on the AM-PAC ADL Inpatient form. Current research shows that an AM-PAC score of 18 or greater is typically associated with a discharge to the patient's home setting. Based on the patient's AM-PAC score, and their current ADL deficits, it is recommended that the patient have 2-3 sessions per week of Occupational Therapy at d/c to increase the patient's independence.  At this time, this patient demonstrates the endurance and safety to discharge home with assist prn and a HHOT follow up treatment frequency of 2-3x/wk.   Please see assessment section for further patient specific details.    If patient discharges prior to next session this note will serve as a discharge summary.  Please see below for the latest assessment towards goals.      Patient Diagnosis(es): The primary encounter diagnosis was Acute respiratory failure with hypoxia (HCC). Diagnoses of COPD exacerbation (HCC), Pneumonia due to infectious organism, unspecified laterality, unspecified part of lung, and Acute on chronic combined systolic and diastolic CHF (congestive heart failure) (HCC) were also pertinent to this visit.  Past Medical History:  has a past medical history of Abrasion of right elbow, Arthritis, CAD (coronary artery disease), Chronic back pain, Chronic systolic CHF (congestive heart failure) (HCC), COPD (chronic obstructive pulmonary disease) (HCC), Dental disease, Diabetes mellitus (HCC), Essential hypertension, Hearing loss, Paroxysmal atrial fibrillation (HCC), and Tinnitus.  Past Surgical History:  has a past surgical history that includes fracture surgery (Left, ); back surgery (); Cardiac surgery (); Coronary artery bypass 
Patient chart giving a sepsis alert >5. Patient AOX4. Vitals stable this night. NP Tania Mcgrath notified via secure message. No new orders. Care on going.  
Patient expressing concern about too much air flowing in his room and this is making the room cold. Temperature control showing room temp at 75 F as per request but patient pt states that its not warm enough. Declines a warm blanket. Patient on his scooter in the bathroom as that is where he says its warm enough for him.Recliner chair moved from the window away from the air flow. RN notified Charge RN. Care on going.  
Patient had a bath with the help of the PCA. Settled in Chair. Denies any other needs at this time.  
Patient still declining to get out of his scooter until the room is heated to his preference. Heat turned up. Patient educated on safety precautions. Needs reinforcement. Remains AOX4. Care on going. Charge aware.  
Physical Therapy  Facility/Department: [address] MED SURG  Physical Therapy Initial Assessment  If patient discharges prior to next session this note will serve as a discharge summary.  Please see below for the latest assessment towards goals.    Name: Jorge Gauthier  : 1945  MRN: [id number]  Date of Service: 3/20/2024    Discharge Recommendations:  Home with Home health PT, Patient would benefit from continued therapy after discharge, Home with assist PRN   Jorge Gauthier scored a 19/24 on the AM-PAC short mobility form. Current research shows that an AM-PAC score of 18 or greater is typically associated with a discharge to the patient's home setting. Based on the patient's AM-PAC score and their current functional mobility deficits, it is recommended that the patient have 2-3 sessions per week of Physical Therapy at d/c to increase the patient's independence.  At this time, this patient demonstrates the endurance and safety to discharge home with prn assist and home PT (home vs OP services) and a follow up treatment frequency of 2-3x/wk.  Please see assessment section for further patient specific details.  PT Equipment Recommendations  Equipment Needed: No      Patient Diagnosis(es): The primary encounter diagnosis was Acute respiratory failure with hypoxia (HCC). Diagnoses of COPD exacerbation (HCC), Pneumonia due to infectious organism, unspecified laterality, unspecified part of lung, and Acute on chronic combined systolic and diastolic CHF (congestive heart failure) (HCC) were also pertinent to this visit.  Past Medical History:  has a past medical history of Abrasion of right elbow, Arthritis, CAD (coronary artery disease), Chronic back pain, Chronic systolic CHF (congestive heart failure) (HCC), COPD (chronic obstructive pulmonary disease) (HCC), Dental disease, Diabetes mellitus (HCC), Essential hypertension, Hearing loss, Paroxysmal atrial fibrillation (HCC), and Tinnitus.  Past Surgical History:  
Physical Therapy  Facility/Department: [address] MED SURG  Physical Therapy Treatment    Name: Jorge Gauthier  : 1945  MRN: [id number]  Date of Service: 3/21/2024    Discharge Recommendations:  Home with Home health PT, Patient would benefit from continued therapy after discharge, Home with assist PRN   PT Equipment Recommendations  Equipment Needed: No    Jorge Gauthier scored a 19/24 on the AM-PAC short mobility form. Current research shows that an AM-PAC score of 18 or greater is typically associated with a discharge to the patient's home setting. Based on the patient's AM-PAC score and their current functional mobility deficits, it is recommended that the patient have 2-3 sessions per week of Physical Therapy at d/c to increase the patient's independence.  At this time, this patient demonstrates the endurance and safety to discharge home with home PT services and a follow up treatment frequency of 2-3x/wk.  Please see assessment section for further patient specific details.    If patient discharges prior to next session this note will serve as a discharge summary.  Please see below for the latest assessment towards goals.          Patient Diagnosis(es): The primary encounter diagnosis was Acute respiratory failure with hypoxia (HCC). Diagnoses of COPD exacerbation (HCC), Pneumonia due to infectious organism, unspecified laterality, unspecified part of lung, and Acute on chronic combined systolic and diastolic CHF (congestive heart failure) (HCC) were also pertinent to this visit.  Past Medical History:  has a past medical history of Abrasion of right elbow, Arthritis, CAD (coronary artery disease), Chronic back pain, Chronic systolic CHF (congestive heart failure) (HCC), COPD (chronic obstructive pulmonary disease) (HCC), Dental disease, Diabetes mellitus (HCC), Essential hypertension, Hearing loss, Paroxysmal atrial fibrillation (HCC), and Tinnitus.  Past Surgical History:  has a past surgical history 
Physical Therapy  Facility/Department: [address] MED SURG  Physical Therapy Daily Note  If patient discharges prior to next session this note will serve as a discharge summary.  Please see below for the latest assessment towards goals.       Name: Jorge Gauthier  : 1945  MRN: [id number]  Date of Service: 3/22/2024    Discharge Recommendations:  Home with Home health PT, Patient would benefit from continued therapy after discharge, Home with assist PRN   Jorge Gauthier scored a 19/24 on the AM-PAC short mobility form. Current research shows that an AM-PAC score of 18 or greater is typically associated with a discharge to the patient's home setting. Based on the patient's AM-PAC score and their current functional mobility deficits, it is recommended that the patient have 2-3 sessions per week of Physical Therapy at d/c to increase the patient's independence.  At this time, this patient demonstrates the endurance and safety to discharge home with prn assist and home PT (home vs OP services) and a follow up treatment frequency of 2-3x/wk.  Please see assessment section for further patient specific details.  PT Equipment Recommendations  Equipment Needed: No      Patient Diagnosis(es): The primary encounter diagnosis was Acute respiratory failure with hypoxia (HCC). Diagnoses of COPD exacerbation (HCC), Pneumonia due to infectious organism, unspecified laterality, unspecified part of lung, and Acute on chronic combined systolic and diastolic CHF (congestive heart failure) (HCC) were also pertinent to this visit.  Past Medical History:  has a past medical history of Abrasion of right elbow, Arthritis, CAD (coronary artery disease), Chronic back pain, Chronic systolic CHF (congestive heart failure) (HCC), COPD (chronic obstructive pulmonary disease) (HCC), Dental disease, Diabetes mellitus (HCC), Essential hypertension, Hearing loss, Paroxysmal atrial fibrillation (HCC), and Tinnitus.  Past Surgical History:  has a 
Pt AOX4- pt denied n/v, sob, diarrhea, pain -   Chair in locked position with  Call light and bed side table within reach   Non skid socks and chair alarm on   
Pt AOX4- pt denied n/v, sob, diarrhea, pain -   Chair in locked position with  Call light and bed side table within reach   Non skid socks and chair alarm on   
Pt admitted to 4119   from ER, pt is from home with wife. AOX4   VSS,  on 3L NC, is not oxygen dependent at home.  Oriented to room and call light. Pt up to chair and requesting to sleep in chair. Pt does not express any additional needs at this time.  Pt reminded to call for any needs and before getting up on own pt verbalized understanding and call light within reach.    
Pt c/o constipation, did not have bowel movement since few days. Glycolax given. Pt tried to go for bowel movement frequently but had not done yet.  
Pt is on O2 at 2 lt/min via NP, SPO2 maintained. Pt had 1 bowel movement. Pt uses bedside urinal for urine output, output is adequate, measured and recorded. Pt is sitting comfortably on a chair.   ........Electronically signed by Tracy James RN on 3/22/2024 at 6:46 AM   
Pt's complain is ,he has been taking glycolax twice a day, as a PRN but order is for daily PRN. So RN requested to the dr for dose modifying. Dr placed an order for Tab Docusate BD. Informed to the pt but pt is not satisfying with the docusate. Pt wants to have miralax two times a day, at 6 am and 6 pm.  
Reviewed discharge and follow up instructions with pt - answered all questions - pt stable w/out s/sx of distress at discharge - pt utilized his scooter for transport to [facility]   
graft; back surgery; Cervical spine surgery; chest tube insertion (Right, 03/14/2023); and IR CHEST TUBE INSERTION (3/14/2023).    Treatment Diagnosis: impaired ADL status, endurance, fxl mobility      Assessment   Performance deficits / Impairments: Decreased functional mobility ;Decreased ADL status;Decreased endurance;Decreased high-level IADLs;Decreased sensation;Decreased balance;Decreased safe awareness  Assessment: Jorge Gauthier is a 78 y.o. obese male with a pmh of chronic bronchitis/COPD, diabetes type 2, chronic low back pain, peripheral neuropathy, CAD, chronic systolic and diastolic heart failure, paroxysmal atrial fibrillation on chronic anticoagulation who presents with COPD exacerbation. PTA, pt living with wife and independent ADLs and fxl mobility with RW. Pt has home PT/OT. Pt currently functioning slightly below baseline, being most limited in self-care by decreased endurance and fxl mobility. Today-pt demo'd improved fxl standing tolerance to ambulate further distances into hallway with RW and SBA, SpO2 on 1 L O2 88%. Pt participated in B UE therex to improve strength/endurance for ADLs and fxl mobility. Pt SBA toileting, grooming, and LB dressing, and anticipate pt would require SBA for full ADL. Will cont to see on acute to address the above limitations and maximize pt's safety and independence. Anticipate pt will be safe to return home with assist prn and resumed HHOT.  Treatment Diagnosis: impaired ADL status, endurance, fxl mobility  Prognosis: Good  History: see above  Activity Tolerance  Activity Tolerance: Patient limited by fatigue;Patient Tolerated treatment well  Activity Tolerance Comments: SpO2 on 1 L O2 88% with exertion and >90% at rest        Plan   Occupational Therapy Plan  Times Per Week: 3-5  Current Treatment Recommendations: Strengthening, Balance training, Functional mobility training, Safety education & training, Self-Care / ADL, Endurance training 
all; sets up meds for the pt)  Ambulation Assistance: Independent (with RW in home, electric scooter when out; uses cane for short hallway only)  Transfer Assistance: Independent  Active : No  Patient's  Info: wife drives  Occupation: Retired  IADL Comments: pt sleeps in LIFT chair       Objective       Observation/Palpation  Observation: the pt on 1L O2 initially; SpO2 went down to 83% with activity; RN bumped pt up to 2L and the pt recovered to 94%; RN left the pt on 2L    Safety Devices  Type of Devices: Call light within reach;Chair alarm in place;Gait belt;Patient at risk for falls;Left in chair;Nurse notified    AROM: Within functional limits (B UEs)  PROM: Within functional limits (B UE's)  Strength: Within functional limits (B UE's)  Coordination: Within functional limits  Tone: Normal  Sensation: Impaired (pt reports neuropathy B LE's, and N/T in hands at times)    ADL  Grooming: Stand by assistance  Grooming Skilled Clinical Factors: standing at sink to wash hands  LE Dressing: Stand by assistance  LE Dressing Skilled Clinical Factors: SBA to don/doff socks  Toileting: Stand by assistance  Toileting Skilled Clinical Factors: to void urine/BM at commode, SBA clothing management down/up at grab bar, pericare in seated SBA. Pt required rest break following task.  Functional Mobility: Stand by assistance  Functional Mobility Skilled Clinical Factors: ~25 ft x2 with RW and SBA/CGA. Assist to manage O2 line. Decreased safety awareness.  Additional Comments: Anticipate pt is independent feeding, SBA bathing and dressing based on ROM/strength, balance, endurance.    Activity Tolerance  Activity Tolerance: Patient limited by endurance  Activity Tolerance Comments: the pt on 1L O2 initially and desat'd to 83% after using the commode and walking; increased to 2L and he recovered to 94%, left on 2L O2    Bed mobility  Bed Mobility Comments: n/a this session, the pt already up and sleeps in a LIFT chair at

## 2024-03-24 NOTE — PLAN OF CARE
Problem: Discharge Planning  Goal: Discharge to home or other facility with appropriate resources  3/24/2024 1536 by Sakina Mitchell RN  Outcome: Completed  3/24/2024 1410 by Sakina Mitchell RN  Outcome: Progressing     Problem: Safety - Adult  Goal: Free from fall injury  3/24/2024 1536 by Sakina Mitchell RN  Outcome: Completed  3/24/2024 1410 by Sakina Mitchell RN  Outcome: Progressing     Problem: Respiratory - Adult  Goal: Achieves optimal ventilation and oxygenation  3/24/2024 1536 by Sakina Mitchell RN  Outcome: Completed  3/24/2024 1410 by Sakina Mitchell RN  Outcome: Progressing     Problem: Cardiovascular - Adult  Goal: Maintains optimal cardiac output and hemodynamic stability  3/24/2024 1536 by Sakina Mitchell RN  Outcome: Completed  3/24/2024 1410 by Sakina Mitchell RN  Outcome: Progressing  Goal: Absence of cardiac dysrhythmias or at baseline  3/24/2024 1536 by Sakina Mitchell RN  Outcome: Completed  3/24/2024 1410 by Sakina Mitchell RN  Outcome: Progressing     Problem: Skin/Tissue Integrity - Adult  Goal: Skin integrity remains intact  3/24/2024 1536 by Sakina Mitchell RN  Outcome: Completed  3/24/2024 1410 by Sakina Mitchell RN  Outcome: Progressing  Goal: Incisions, wounds, or drain sites healing without S/S of infection  3/24/2024 1536 by Sakina Mitchell RN  Outcome: Completed  3/24/2024 1410 by Sakina Mitchell RN  Outcome: Progressing  Goal: Oral mucous membranes remain intact  3/24/2024 1536 by Sakina Mitchell RN  Outcome: Completed  3/24/2024 1410 by Sakina Mitchell RN  Outcome: Progressing     Problem: Musculoskeletal - Adult  Goal: Return mobility to safest level of function  3/24/2024 1536 by Sakina Mitchell RN  Outcome: Completed  3/24/2024 1410 by Sakina Mitchell RN  Outcome: Progressing  Goal: Maintain proper alignment of affected body part  3/24/2024 1536 by Sakina Mitchell, RN  Outcome:

## 2024-03-24 NOTE — CARE COORDINATION
DISCHARGE SUMMARY     DATE OF DISCHARGE: 3/24/24    DISCHARGE DESTINATION: Home with wife    HOME CARE: Yes    Discharging to Facility/ Agency   Name:  [facility]    Address: [address] [place] [address]  Phone: [phone]  Fax: [phone]      HEMODIALYSIS: No    TRANSPORTATION: Private Car    NEW DME ORDERED: no    COMMENTS: Discharge to home with wife and resume home health care with [facility]-notified of discharge (Cheryle [phone]). Wife Portia notified of discharge. Electronically signed by Court Flores RN on 3/24/2024 at 11:25 AM

## 2024-04-16 ENCOUNTER — OFFICE VISIT (OUTPATIENT)
Dept: PULMONOLOGY | Age: 79
End: 2024-04-16
Payer: MEDICARE

## 2024-04-16 VITALS
BODY MASS INDEX: 30.52 KG/M2 | WEIGHT: 218 LBS | TEMPERATURE: 97.6 F | HEIGHT: 71 IN | OXYGEN SATURATION: 97 % | HEART RATE: 77 BPM | RESPIRATION RATE: 18 BRPM

## 2024-04-16 DIAGNOSIS — T17.908S CHRONIC PULMONARY ASPIRATION, SEQUELA: Primary | ICD-10-CM

## 2024-04-16 PROCEDURE — 1123F ACP DISCUSS/DSCN MKR DOCD: CPT | Performed by: INTERNAL MEDICINE

## 2024-04-16 PROCEDURE — 99213 OFFICE O/P EST LOW 20 MIN: CPT | Performed by: INTERNAL MEDICINE

## 2024-04-16 PROCEDURE — G8417 CALC BMI ABV UP PARAM F/U: HCPCS | Performed by: INTERNAL MEDICINE

## 2024-04-16 PROCEDURE — 1036F TOBACCO NON-USER: CPT | Performed by: INTERNAL MEDICINE

## 2024-04-16 PROCEDURE — 1111F DSCHRG MED/CURRENT MED MERGE: CPT | Performed by: INTERNAL MEDICINE

## 2024-04-16 PROCEDURE — G8427 DOCREV CUR MEDS BY ELIG CLIN: HCPCS | Performed by: INTERNAL MEDICINE

## 2024-04-16 RX ORDER — BENZONATATE 100 MG/1
100 CAPSULE ORAL 3 TIMES DAILY PRN
Qty: 90 CAPSULE | Refills: 3 | Status: SHIPPED | OUTPATIENT
Start: 2024-04-16

## 2024-04-16 NOTE — PROGRESS NOTES
REASON FOR CONSULTATION/CC:    Chief Complaint   Patient presents with    Follow-up     oxygen went to 88 last night took awhile to get back to 93        Consult at request of   Gallo Sanchez for      PCP: Gallo Sanchez    HISTORY OF PRESENT ILLNESS: Jorge Gauthier is a 78 y.o. year old male with a history of chronic bronchitis  who presents :       History   History of aspiration pneumonia with complications of a pleural effusion with multidrug-resistant including ESBL with last modified barium swallow test in March 2023 with laryngeal penetration with thickened liquids.    Use Daliresp without benefit.  No clear benefit with Advair and Wixela.        Current history  Obesity       chronic bronchitis   Daliresp - using but no clear this is helping. Will complete current rx then stop  Advair 250 vs Wixela. No benefit per pt.      Symbicort          Aspiration with hx of pneumonia.  IS / flutters with saline nebulizer  Hot coffee helps with phlegm as well   Continues to have significant   Continues to have productive phlegm multiple times per night.   Vest is ~ $15,000 vs ~ $200 per month.   Mucinex.           Objective:   PHYSICAL EXAM:  Pulse 77, temperature 97.6 °F (36.4 °C), resp. rate 18, height 1.803 m (5' 11\"), weight 98.9 kg (218 lb), SpO2 97 %.'  Body mass index is 30.4 kg/m².   Gen: No distress.    Eyes:    ENT:    Neck:    Resp: No accessory muscle use. No crackles. No wheezes. Mild diffuse rhonchi.    CV: Regular rate. Regular rhythm. No murmur or rub. no edema.   GI:    Skin:    Lymph:    M/S: No cyanosis. No clubbing.     Neuro: Moves all four extremities.   Psych: Oriented x 3. No anxiety.  Awake. Alert. Intact judgement and insight.      Data Reviewed:        Assessment:      chronic bronchitis   Chronic rhonitis  Neuropathy with DM       Plan:      Problem List Items Addressed This Visit       Chronic pulmonary aspiration - Primary     Patient is likely continue to aspirate with ongoing

## 2024-04-16 NOTE — ASSESSMENT & PLAN NOTE
Patient is likely continue to aspirate with ongoing chronic infection.  He has received multiple rounds of antibiotics    Airway Clearance:   Incentive spirometry  Flutter device  Mucinex  Nebulized Saline - [employer]  Hot coffee    Failed   Daliresp  Advair / Wixela  Chest Vest (cost)      Will try cough suppressant with Dimetabs vs benzonatate.      Aspiration could be minimized with surgical intervention but not clear if this would be effective long term.  Likely would not be help.  PEG can be considered.

## 2024-05-07 ENCOUNTER — APPOINTMENT (OUTPATIENT)
Dept: GENERAL RADIOLOGY | Age: 79
DRG: 871 | End: 2024-05-07
Payer: MEDICARE

## 2024-05-07 ENCOUNTER — HOSPITAL ENCOUNTER (INPATIENT)
Age: 79
LOS: 6 days | Discharge: HOME HEALTH | DRG: 871 | End: 2024-05-13
Attending: INTERNAL MEDICINE | Admitting: STUDENT IN AN ORGANIZED HEALTH CARE EDUCATION/TRAINING PROGRAM
Payer: MEDICARE

## 2024-05-07 DIAGNOSIS — Z78.9 ACTIVITY OF DAILY LIVING ALTERATION: ICD-10-CM

## 2024-05-07 DIAGNOSIS — A41.9 SEVERE SEPSIS (HCC): ICD-10-CM

## 2024-05-07 DIAGNOSIS — R65.20 SEVERE SEPSIS (HCC): ICD-10-CM

## 2024-05-07 DIAGNOSIS — J18.9 PNEUMONIA OF RIGHT LOWER LOBE DUE TO INFECTIOUS ORGANISM: Primary | ICD-10-CM

## 2024-05-07 LAB
ALBUMIN SERPL-MCNC: 4.1 G/DL (ref 3.4–5)
ALBUMIN/GLOB SERPL: 1.2 {RATIO} (ref 1.1–2.2)
ALP SERPL-CCNC: 63 U/L (ref 40–129)
ALT SERPL-CCNC: 11 U/L (ref 10–40)
ANION GAP SERPL CALCULATED.3IONS-SCNC: 10 MMOL/L (ref 3–16)
AST SERPL-CCNC: 14 U/L (ref 15–37)
BASE EXCESS BLDV CALC-SCNC: 4.7 MMOL/L
BASOPHILS # BLD: 0.1 K/UL (ref 0–0.2)
BASOPHILS NFR BLD: 1.2 %
BILIRUB SERPL-MCNC: 0.3 MG/DL (ref 0–1)
BUN SERPL-MCNC: 26 MG/DL (ref 7–20)
CALCIUM SERPL-MCNC: 9 MG/DL (ref 8.3–10.6)
CHLORIDE SERPL-SCNC: 101 MMOL/L (ref 99–110)
CO2 BLDV-SCNC: 34 MMOL/L
CO2 SERPL-SCNC: 30 MMOL/L (ref 21–32)
COHGB MFR BLDV: 1.9 %
CREAT SERPL-MCNC: 1.2 MG/DL (ref 0.8–1.3)
DEPRECATED RDW RBC AUTO: 17.5 % (ref 12.4–15.4)
EOSINOPHIL # BLD: 0.4 K/UL (ref 0–0.6)
EOSINOPHIL NFR BLD: 4.2 %
GFR SERPLBLD CREATININE-BSD FMLA CKD-EPI: 62 ML/MIN/{1.73_M2}
GLUCOSE BLD-MCNC: 251 MG/DL (ref 70–99)
GLUCOSE SERPL-MCNC: 207 MG/DL (ref 70–99)
HCO3 BLDV-SCNC: 32 MMOL/L (ref 23–29)
HCT VFR BLD AUTO: 37.9 % (ref 40.5–52.5)
HGB BLD-MCNC: 12.3 G/DL (ref 13.5–17.5)
LACTATE BLDV-SCNC: 2.6 MMOL/L (ref 0.4–1.9)
LACTATE BLDV-SCNC: 2.7 MMOL/L (ref 0.4–1.9)
LYMPHOCYTES # BLD: 1.4 K/UL (ref 1–5.1)
LYMPHOCYTES NFR BLD: 14.2 %
MAGNESIUM SERPL-MCNC: 2.2 MG/DL (ref 1.8–2.4)
MCH RBC QN AUTO: 26.8 PG (ref 26–34)
MCHC RBC AUTO-ENTMCNC: 32.6 G/DL (ref 31–36)
MCV RBC AUTO: 82.3 FL (ref 80–100)
METHGB MFR BLDV: 0.3 %
MONOCYTES # BLD: 1.1 K/UL (ref 0–1.3)
MONOCYTES NFR BLD: 10.7 %
NEUTROPHILS # BLD: 7 K/UL (ref 1.7–7.7)
NEUTROPHILS NFR BLD: 69.7 %
NT-PROBNP SERPL-MCNC: 336 PG/ML (ref 0–449)
O2 THERAPY: ABNORMAL
PCO2 BLDV: 58.3 MMHG (ref 40–50)
PERFORMED ON: ABNORMAL
PH BLDV: 7.35 [PH] (ref 7.35–7.45)
PLATELET # BLD AUTO: 285 K/UL (ref 135–450)
PMV BLD AUTO: 7 FL (ref 5–10.5)
PO2 BLDV: <30 MMHG
POTASSIUM SERPL-SCNC: 4.7 MMOL/L (ref 3.5–5.1)
PROCALCITONIN SERPL IA-MCNC: 0.13 NG/ML (ref 0–0.15)
PROT SERPL-MCNC: 7.6 G/DL (ref 6.4–8.2)
RBC # BLD AUTO: 4.61 M/UL (ref 4.2–5.9)
SAO2 % BLDV: 51 %
SODIUM SERPL-SCNC: 141 MMOL/L (ref 136–145)
TROPONIN, HIGH SENSITIVITY: 32 NG/L (ref 0–22)
WBC # BLD AUTO: 10.1 K/UL (ref 4–11)

## 2024-05-07 PROCEDURE — 2580000003 HC RX 258: Performed by: PHYSICIAN ASSISTANT

## 2024-05-07 PROCEDURE — 84145 PROCALCITONIN (PCT): CPT

## 2024-05-07 PROCEDURE — 85025 COMPLETE CBC W/AUTO DIFF WBC: CPT

## 2024-05-07 PROCEDURE — 2700000000 HC OXYGEN THERAPY PER DAY

## 2024-05-07 PROCEDURE — 84484 ASSAY OF TROPONIN QUANT: CPT

## 2024-05-07 PROCEDURE — 83605 ASSAY OF LACTIC ACID: CPT

## 2024-05-07 PROCEDURE — 83735 ASSAY OF MAGNESIUM: CPT

## 2024-05-07 PROCEDURE — 1200000000 HC SEMI PRIVATE

## 2024-05-07 PROCEDURE — 93005 ELECTROCARDIOGRAM TRACING: CPT | Performed by: INTERNAL MEDICINE

## 2024-05-07 PROCEDURE — 6360000002 HC RX W HCPCS: Performed by: STUDENT IN AN ORGANIZED HEALTH CARE EDUCATION/TRAINING PROGRAM

## 2024-05-07 PROCEDURE — 80053 COMPREHEN METABOLIC PANEL: CPT

## 2024-05-07 PROCEDURE — 96365 THER/PROPH/DIAG IV INF INIT: CPT

## 2024-05-07 PROCEDURE — 6360000002 HC RX W HCPCS: Performed by: PHYSICIAN ASSISTANT

## 2024-05-07 PROCEDURE — 83036 HEMOGLOBIN GLYCOSYLATED A1C: CPT

## 2024-05-07 PROCEDURE — 82803 BLOOD GASES ANY COMBINATION: CPT

## 2024-05-07 PROCEDURE — 94640 AIRWAY INHALATION TREATMENT: CPT

## 2024-05-07 PROCEDURE — 6370000000 HC RX 637 (ALT 250 FOR IP): Performed by: STUDENT IN AN ORGANIZED HEALTH CARE EDUCATION/TRAINING PROGRAM

## 2024-05-07 PROCEDURE — 71046 X-RAY EXAM CHEST 2 VIEWS: CPT

## 2024-05-07 PROCEDURE — 99285 EMERGENCY DEPT VISIT HI MDM: CPT

## 2024-05-07 PROCEDURE — 83880 ASSAY OF NATRIURETIC PEPTIDE: CPT

## 2024-05-07 PROCEDURE — 87040 BLOOD CULTURE FOR BACTERIA: CPT

## 2024-05-07 PROCEDURE — 96375 TX/PRO/DX INJ NEW DRUG ADDON: CPT

## 2024-05-07 RX ORDER — DEXTROSE MONOHYDRATE 100 MG/ML
INJECTION, SOLUTION INTRAVENOUS CONTINUOUS PRN
Status: DISCONTINUED | OUTPATIENT
Start: 2024-05-07 | End: 2024-05-13 | Stop reason: HOSPADM

## 2024-05-07 RX ORDER — ONDANSETRON 2 MG/ML
4 INJECTION INTRAMUSCULAR; INTRAVENOUS EVERY 6 HOURS PRN
Status: DISCONTINUED | OUTPATIENT
Start: 2024-05-07 | End: 2024-05-13 | Stop reason: HOSPADM

## 2024-05-07 RX ORDER — ACETAMINOPHEN 650 MG/1
650 SUPPOSITORY RECTAL EVERY 6 HOURS PRN
Status: DISCONTINUED | OUTPATIENT
Start: 2024-05-07 | End: 2024-05-13 | Stop reason: HOSPADM

## 2024-05-07 RX ORDER — M-VIT,TX,IRON,MINS/CALC/FOLIC 27MG-0.4MG
1 TABLET ORAL DAILY
Status: DISCONTINUED | OUTPATIENT
Start: 2024-05-08 | End: 2024-05-13 | Stop reason: HOSPADM

## 2024-05-07 RX ORDER — ASPIRIN 81 MG/1
81 TABLET, CHEWABLE ORAL DAILY
Status: DISCONTINUED | OUTPATIENT
Start: 2024-05-08 | End: 2024-05-13 | Stop reason: HOSPADM

## 2024-05-07 RX ORDER — GLUCAGON 1 MG/ML
1 KIT INJECTION PRN
Status: DISCONTINUED | OUTPATIENT
Start: 2024-05-07 | End: 2024-05-13 | Stop reason: HOSPADM

## 2024-05-07 RX ORDER — SODIUM CHLORIDE 0.9 % (FLUSH) 0.9 %
5-40 SYRINGE (ML) INJECTION PRN
Status: DISCONTINUED | OUTPATIENT
Start: 2024-05-07 | End: 2024-05-13 | Stop reason: HOSPADM

## 2024-05-07 RX ORDER — LAMOTRIGINE 100 MG/1
100 TABLET ORAL NIGHTLY
Status: DISCONTINUED | OUTPATIENT
Start: 2024-05-07 | End: 2024-05-13 | Stop reason: HOSPADM

## 2024-05-07 RX ORDER — IPRATROPIUM BROMIDE AND ALBUTEROL SULFATE 2.5; .5 MG/3ML; MG/3ML
1 SOLUTION RESPIRATORY (INHALATION)
Status: DISCONTINUED | OUTPATIENT
Start: 2024-05-07 | End: 2024-05-07

## 2024-05-07 RX ORDER — PRAMIPEXOLE DIHYDROCHLORIDE 0.25 MG/1
0.25 TABLET ORAL 4 TIMES DAILY
Status: DISCONTINUED | OUTPATIENT
Start: 2024-05-07 | End: 2024-05-13 | Stop reason: HOSPADM

## 2024-05-07 RX ORDER — GABAPENTIN 400 MG/1
800 CAPSULE ORAL 4 TIMES DAILY
Status: DISCONTINUED | OUTPATIENT
Start: 2024-05-07 | End: 2024-05-13 | Stop reason: HOSPADM

## 2024-05-07 RX ORDER — PRAMIPEXOLE DIHYDROCHLORIDE 0.5 MG/1
0.25 TABLET ORAL NIGHTLY
Status: DISCONTINUED | OUTPATIENT
Start: 2024-05-07 | End: 2024-05-09

## 2024-05-07 RX ORDER — ACETAMINOPHEN 325 MG/1
650 TABLET ORAL EVERY 6 HOURS PRN
Status: DISCONTINUED | OUTPATIENT
Start: 2024-05-07 | End: 2024-05-13 | Stop reason: HOSPADM

## 2024-05-07 RX ORDER — IPRATROPIUM BROMIDE AND ALBUTEROL SULFATE 2.5; .5 MG/3ML; MG/3ML
1 SOLUTION RESPIRATORY (INHALATION)
Status: DISCONTINUED | OUTPATIENT
Start: 2024-05-08 | End: 2024-05-08

## 2024-05-07 RX ORDER — SODIUM CHLORIDE 0.9 % (FLUSH) 0.9 %
5-40 SYRINGE (ML) INJECTION EVERY 12 HOURS SCHEDULED
Status: DISCONTINUED | OUTPATIENT
Start: 2024-05-07 | End: 2024-05-13 | Stop reason: HOSPADM

## 2024-05-07 RX ORDER — ATORVASTATIN CALCIUM 20 MG/1
20 TABLET, FILM COATED ORAL NIGHTLY
Status: DISCONTINUED | OUTPATIENT
Start: 2024-05-07 | End: 2024-05-13 | Stop reason: HOSPADM

## 2024-05-07 RX ORDER — VITAMIN B COMPLEX
1000 TABLET ORAL DAILY
Status: DISCONTINUED | OUTPATIENT
Start: 2024-05-08 | End: 2024-05-13 | Stop reason: HOSPADM

## 2024-05-07 RX ORDER — INSULIN GLARGINE 100 [IU]/ML
10 INJECTION, SOLUTION SUBCUTANEOUS NIGHTLY
Status: DISCONTINUED | OUTPATIENT
Start: 2024-05-07 | End: 2024-05-08

## 2024-05-07 RX ORDER — BENZONATATE 100 MG/1
100 CAPSULE ORAL 3 TIMES DAILY PRN
Status: DISCONTINUED | OUTPATIENT
Start: 2024-05-07 | End: 2024-05-13 | Stop reason: HOSPADM

## 2024-05-07 RX ORDER — LAMOTRIGINE 25 MG/1
75 TABLET ORAL EVERY MORNING
Status: DISCONTINUED | OUTPATIENT
Start: 2024-05-08 | End: 2024-05-13 | Stop reason: HOSPADM

## 2024-05-07 RX ORDER — METHOCARBAMOL 500 MG/1
500 TABLET, FILM COATED ORAL 3 TIMES DAILY PRN
Status: DISCONTINUED | OUTPATIENT
Start: 2024-05-07 | End: 2024-05-13 | Stop reason: HOSPADM

## 2024-05-07 RX ORDER — BENZONATATE 100 MG/1
100 CAPSULE ORAL 3 TIMES DAILY PRN
Status: DISCONTINUED | OUTPATIENT
Start: 2024-05-07 | End: 2024-05-07 | Stop reason: SDUPTHER

## 2024-05-07 RX ORDER — ROFLUMILAST 500 UG/1
500 TABLET ORAL DAILY
Status: DISCONTINUED | OUTPATIENT
Start: 2024-05-08 | End: 2024-05-13 | Stop reason: HOSPADM

## 2024-05-07 RX ORDER — CARVEDILOL 6.25 MG/1
3.12 TABLET ORAL 2 TIMES DAILY WITH MEALS
COMMUNITY

## 2024-05-07 RX ORDER — PREDNISONE 20 MG/1
40 TABLET ORAL DAILY
Status: COMPLETED | OUTPATIENT
Start: 2024-05-08 | End: 2024-05-12

## 2024-05-07 RX ORDER — INSULIN LISPRO 100 [IU]/ML
0-8 INJECTION, SOLUTION INTRAVENOUS; SUBCUTANEOUS
Status: DISCONTINUED | OUTPATIENT
Start: 2024-05-08 | End: 2024-05-13 | Stop reason: HOSPADM

## 2024-05-07 RX ORDER — ONDANSETRON 4 MG/1
4 TABLET, ORALLY DISINTEGRATING ORAL EVERY 8 HOURS PRN
Status: DISCONTINUED | OUTPATIENT
Start: 2024-05-07 | End: 2024-05-13 | Stop reason: HOSPADM

## 2024-05-07 RX ORDER — ALBUTEROL SULFATE 2.5 MG/3ML
2.5 SOLUTION RESPIRATORY (INHALATION)
Status: DISCONTINUED | OUTPATIENT
Start: 2024-05-07 | End: 2024-05-13 | Stop reason: HOSPADM

## 2024-05-07 RX ORDER — INSULIN LISPRO 100 [IU]/ML
0-4 INJECTION, SOLUTION INTRAVENOUS; SUBCUTANEOUS NIGHTLY
Status: DISCONTINUED | OUTPATIENT
Start: 2024-05-07 | End: 2024-05-13 | Stop reason: HOSPADM

## 2024-05-07 RX ORDER — GUAIFENESIN 600 MG/1
600 TABLET, EXTENDED RELEASE ORAL 2 TIMES DAILY
Status: DISCONTINUED | OUTPATIENT
Start: 2024-05-07 | End: 2024-05-13 | Stop reason: HOSPADM

## 2024-05-07 RX ORDER — POLYETHYLENE GLYCOL 3350 17 G/17G
17 POWDER, FOR SOLUTION ORAL DAILY PRN
Status: DISCONTINUED | OUTPATIENT
Start: 2024-05-07 | End: 2024-05-13 | Stop reason: HOSPADM

## 2024-05-07 RX ORDER — SODIUM CHLORIDE 9 MG/ML
INJECTION, SOLUTION INTRAVENOUS PRN
Status: DISCONTINUED | OUTPATIENT
Start: 2024-05-07 | End: 2024-05-13 | Stop reason: HOSPADM

## 2024-05-07 RX ADMIN — METHOCARBAMOL 500 MG: 500 TABLET ORAL at 23:51

## 2024-05-07 RX ADMIN — PRAMIPEXOLE DIHYDROCHLORIDE 0.25 MG: 0.5 TABLET ORAL at 23:51

## 2024-05-07 RX ADMIN — ALBUTEROL SULFATE 2.5 MG: 2.5 SOLUTION RESPIRATORY (INHALATION) at 23:48

## 2024-05-07 RX ADMIN — ACETAMINOPHEN 325MG 650 MG: 325 TABLET ORAL at 19:51

## 2024-05-07 RX ADMIN — GABAPENTIN 800 MG: 400 CAPSULE ORAL at 23:52

## 2024-05-07 RX ADMIN — WATER 1000 MG: 1 INJECTION INTRAMUSCULAR; INTRAVENOUS; SUBCUTANEOUS at 17:34

## 2024-05-07 RX ADMIN — APIXABAN 5 MG: 5 TABLET, FILM COATED ORAL at 23:52

## 2024-05-07 RX ADMIN — AZITHROMYCIN MONOHYDRATE 500 MG: 500 INJECTION, POWDER, LYOPHILIZED, FOR SOLUTION INTRAVENOUS at 17:40

## 2024-05-07 RX ADMIN — LAMOTRIGINE 100 MG: 100 TABLET ORAL at 23:52

## 2024-05-07 RX ADMIN — ATORVASTATIN CALCIUM 20 MG: 20 TABLET, FILM COATED ORAL at 23:53

## 2024-05-07 ASSESSMENT — PAIN DESCRIPTION - ORIENTATION: ORIENTATION: MID

## 2024-05-07 ASSESSMENT — PAIN SCALES - GENERAL
PAINLEVEL_OUTOF10: 6
PAINLEVEL_OUTOF10: 10

## 2024-05-07 ASSESSMENT — PAIN DESCRIPTION - LOCATION
LOCATION: BACK
LOCATION: BACK

## 2024-05-07 NOTE — ED PROVIDER NOTES
[facility] EMERGENCY DEPARTMENT  EMERGENCY DEPARTMENT ENCOUNTER        Pt Name: Jorge Gauthier  MRN: [id number]  Birthdate 1945  Date of evaluation: 5/7/2024  Provider: Chely West PA-C  PCP: Gallo Sanchez MD  Note Started: 5:08 PM EDT 5/7/24      TRES. I have evaluated this patient.        CHIEF COMPLAINT       Chief Complaint   Patient presents with    Shortness of Breath    Hypoxia       HISTORY OF PRESENT ILLNESS: 1 or more Elements             Jorge Gauthier is a 78 y.o. male who presents to the emergency department with complaint of increased shortness of breath over the past 2 days.  He states that he has been having difficulty since this started getting around his house as he just feels generally weak in bilateral lower extremities.  He had a home health nurse come to the house because of his weakness and shortness of breath and they noted that his oxygen was in the 80s.  They wanted him to come in for further evaluation.  He is having associated cough.  But he denies any fevers or chills.    Nursing Notes were all reviewed and agreed with or any disagreements were addressed in the HPI.    REVIEW OF SYSTEMS :      Review of Systems   All other systems reviewed and are negative.      Positives and Pertinent negatives as per HPI.     SURGICAL HISTORY     Past Surgical History:   Procedure Laterality Date    BACK SURGERY  1995    BACK SURGERY      CARDIAC SURGERY  2007    CERVICAL SPINE SURGERY      CHEST TUBE INSERTION Right 03/14/2023    14F. Dr. Jalloh    CORONARY ARTERY BYPASS GRAFT      FRACTURE SURGERY Left 1988    Shoulder    IR CHEST TUBE INSERTION  3/14/2023    IR CHEST TUBE INSERTION 3/14/2023 [facility] PROCED[facility]       CURRENTMEDICATIONS       Previous Medications    ALBUTEROL (PROVENTIL) (2.5 MG/3ML) 0.083% NEBULIZER SOLUTION    USE 1 VIAL IN NEBULIZER 4 TIMES DAILY    ALBUTEROL SULFATE HFA (PROVENTIL;VENTOLIN;PROAIR) 108 (90 BASE) MCG/ACT INHALER    Inhale  cultures. Consider giving him the 30 cc bolus normal saline, however with his history of CHF NYHA3 we will hold off at this time.    I discussed with patient since he is feeling generally weak and difficulty performing ADLs at home that we will admit him for further evaluation.  He was in agreement with plan.  He was admitted in stable condition.    Disposition Considerations (include 1 Tests not done, Shared Decision Making, Pt Expectation of Test or Tx.): Considered admission versus discharge, however patient was really having a difficult time standing on his own.  We elected to keep him in the hospital for further evaluation.        I am the Primary Clinician of Record.    FINAL IMPRESSION      1. Pneumonia of right lower lobe due to infectious organism    2. Activity of daily living alteration    3. Severe sepsis (HCC)          DISPOSITION/PLAN     DISPOSITION Decision To Admit 05/07/2024 05:07:42 PM      PATIENT REFERRED TO:  No follow-up provider specified.    DISCHARGE MEDICATIONS:  New Prescriptions    No medications on file       DISCONTINUED MEDICATIONS:  Discontinued Medications    CARVEDILOL (COREG) 25 MG TABLET    Take 0.5 tablets by mouth 2 times daily (with meals)    GUAIFENESIN (MUCINEX) 600 MG EXTENDED RELEASE TABLET    Take 1 tablet by mouth 2 times daily              (Please note that portions of this note were completed with a voice recognition program.  Efforts were made to edit the dictations but occasionally words are mis-transcribed.)    Chely West PA-C (electronically signed)            Chely West PA-C  05/07/24 2030

## 2024-05-07 NOTE — PROGRESS NOTES
Medication Reconciliation    List of medications patient is currently taking is complete.     Source of information: 1. Conversation with patient and family at bedside                                      2. EPIC records                                       3. Patient provided medication list     Allergies  No known allergies     Notes regarding home medications:   1. Patient received all of his morning home medications prior to arrival to the ER today.  2. Lasix dose is now 80 mg po QAM.    Luis Alberto Bocanegra RPH, PharmD, BCPS  5/7/2024 5:52 PM

## 2024-05-07 NOTE — ED TRIAGE NOTES
Patient to the ER with complaints of SOB on exertion that started yesterday afternoon.  Patient says they called the home health nurse today and she came to the house and told him she had concerns for pneumonia.     Previous triage nurse had received a call-ahead report from [facility] on this patient and was told that his spO2 on ambulation was 80% but was at 91% at rest.     At time of triage, patient is sitting at between 90-93% spO2 on room air.  Alert and oriented x4.      Hx of COPD, CHF, and open heart surgery.   Denies any chest pain right now.

## 2024-05-08 PROBLEM — J92.9 PLEURAL PLAQUE: Status: ACTIVE | Noted: 2024-05-08

## 2024-05-08 PROBLEM — J41.1 MUCOPURULENT CHRONIC BRONCHITIS (HCC): Status: ACTIVE | Noted: 2024-05-08

## 2024-05-08 LAB
ANION GAP SERPL CALCULATED.3IONS-SCNC: 10 MMOL/L (ref 3–16)
BASOPHILS # BLD: 0.1 K/UL (ref 0–0.2)
BASOPHILS NFR BLD: 1 %
BUN SERPL-MCNC: 20 MG/DL (ref 7–20)
CALCIUM SERPL-MCNC: 8.5 MG/DL (ref 8.3–10.6)
CHLORIDE SERPL-SCNC: 101 MMOL/L (ref 99–110)
CO2 SERPL-SCNC: 28 MMOL/L (ref 21–32)
CREAT SERPL-MCNC: 1 MG/DL (ref 0.8–1.3)
DEPRECATED RDW RBC AUTO: 16.7 % (ref 12.4–15.4)
EKG ATRIAL RATE: 84 BPM
EKG DIAGNOSIS: NORMAL
EKG P AXIS: 64 DEGREES
EKG P-R INTERVAL: 218 MS
EKG Q-T INTERVAL: 396 MS
EKG QRS DURATION: 102 MS
EKG QTC CALCULATION (BAZETT): 467 MS
EKG R AXIS: 63 DEGREES
EKG T AXIS: 37 DEGREES
EKG VENTRICULAR RATE: 84 BPM
EOSINOPHIL # BLD: 0.3 K/UL (ref 0–0.6)
EOSINOPHIL NFR BLD: 3.2 %
EST. AVERAGE GLUCOSE BLD GHB EST-MCNC: 171.4 MG/DL
GFR SERPLBLD CREATININE-BSD FMLA CKD-EPI: 77 ML/MIN/{1.73_M2}
GLUCOSE BLD-MCNC: 192 MG/DL (ref 70–99)
GLUCOSE BLD-MCNC: 306 MG/DL (ref 70–99)
GLUCOSE BLD-MCNC: 310 MG/DL (ref 70–99)
GLUCOSE BLD-MCNC: 366 MG/DL (ref 70–99)
GLUCOSE SERPL-MCNC: 168 MG/DL (ref 70–99)
HBA1C MFR BLD: 7.6 %
HCT VFR BLD AUTO: 33.9 % (ref 40.5–52.5)
HGB BLD-MCNC: 11.2 G/DL (ref 13.5–17.5)
LACTATE BLDV-SCNC: 1.1 MMOL/L (ref 0.4–2)
LEGIONELLA AG UR QL: NORMAL
LYMPHOCYTES # BLD: 1.4 K/UL (ref 1–5.1)
LYMPHOCYTES NFR BLD: 14.3 %
MCH RBC QN AUTO: 26.7 PG (ref 26–34)
MCHC RBC AUTO-ENTMCNC: 32.9 G/DL (ref 31–36)
MCV RBC AUTO: 81 FL (ref 80–100)
MONOCYTES # BLD: 1.3 K/UL (ref 0–1.3)
MONOCYTES NFR BLD: 12.9 %
NEUTROPHILS # BLD: 6.8 K/UL (ref 1.7–7.7)
NEUTROPHILS NFR BLD: 68.6 %
ORGANISM: ABNORMAL
ORGANISM: ABNORMAL
PERFORMED ON: ABNORMAL
PLATELET # BLD AUTO: 247 K/UL (ref 135–450)
PMV BLD AUTO: 7 FL (ref 5–10.5)
POTASSIUM SERPL-SCNC: 4.4 MMOL/L (ref 3.5–5.1)
RBC # BLD AUTO: 4.18 M/UL (ref 4.2–5.9)
REPORT: NORMAL
RESP PATH DNA+RNA PNL L RESP NAA+NON-PRB: ABNORMAL
RESP PATH DNA+RNA PNL L RESP NAA+NON-PRB: ABNORMAL
S PNEUM AG UR QL: NORMAL
SARS-COV-2 RDRP RESP QL NAA+PROBE: NOT DETECTED
SODIUM SERPL-SCNC: 139 MMOL/L (ref 136–145)
WBC # BLD AUTO: 10 K/UL (ref 4–11)

## 2024-05-08 PROCEDURE — 87635 SARS-COV-2 COVID-19 AMP PRB: CPT

## 2024-05-08 PROCEDURE — 85025 COMPLETE CBC W/AUTO DIFF WBC: CPT

## 2024-05-08 PROCEDURE — 1200000000 HC SEMI PRIVATE

## 2024-05-08 PROCEDURE — 6360000002 HC RX W HCPCS: Performed by: STUDENT IN AN ORGANIZED HEALTH CARE EDUCATION/TRAINING PROGRAM

## 2024-05-08 PROCEDURE — 99223 1ST HOSP IP/OBS HIGH 75: CPT | Performed by: INTERNAL MEDICINE

## 2024-05-08 PROCEDURE — 97166 OT EVAL MOD COMPLEX 45 MIN: CPT

## 2024-05-08 PROCEDURE — 87633 RESP VIRUS 12-25 TARGETS: CPT

## 2024-05-08 PROCEDURE — 6370000000 HC RX 637 (ALT 250 FOR IP)

## 2024-05-08 PROCEDURE — 87205 SMEAR GRAM STAIN: CPT

## 2024-05-08 PROCEDURE — 6370000000 HC RX 637 (ALT 250 FOR IP): Performed by: INTERNAL MEDICINE

## 2024-05-08 PROCEDURE — 6370000000 HC RX 637 (ALT 250 FOR IP): Performed by: STUDENT IN AN ORGANIZED HEALTH CARE EDUCATION/TRAINING PROGRAM

## 2024-05-08 PROCEDURE — 92610 EVALUATE SWALLOWING FUNCTION: CPT

## 2024-05-08 PROCEDURE — 87186 SC STD MICRODIL/AGAR DIL: CPT

## 2024-05-08 PROCEDURE — 94669 MECHANICAL CHEST WALL OSCILL: CPT

## 2024-05-08 PROCEDURE — 87070 CULTURE OTHR SPECIMN AEROBIC: CPT

## 2024-05-08 PROCEDURE — 36415 COLL VENOUS BLD VENIPUNCTURE: CPT

## 2024-05-08 PROCEDURE — 2580000003 HC RX 258: Performed by: INTERNAL MEDICINE

## 2024-05-08 PROCEDURE — 97530 THERAPEUTIC ACTIVITIES: CPT

## 2024-05-08 PROCEDURE — 87081 CULTURE SCREEN ONLY: CPT

## 2024-05-08 PROCEDURE — 97535 SELF CARE MNGMENT TRAINING: CPT

## 2024-05-08 PROCEDURE — 93010 ELECTROCARDIOGRAM REPORT: CPT | Performed by: INTERNAL MEDICINE

## 2024-05-08 PROCEDURE — 94761 N-INVAS EAR/PLS OXIMETRY MLT: CPT

## 2024-05-08 PROCEDURE — 97129 THER IVNTJ 1ST 15 MIN: CPT

## 2024-05-08 PROCEDURE — 94640 AIRWAY INHALATION TREATMENT: CPT

## 2024-05-08 PROCEDURE — 2580000003 HC RX 258: Performed by: STUDENT IN AN ORGANIZED HEALTH CARE EDUCATION/TRAINING PROGRAM

## 2024-05-08 PROCEDURE — 87077 CULTURE AEROBIC IDENTIFY: CPT

## 2024-05-08 PROCEDURE — 2700000000 HC OXYGEN THERAPY PER DAY

## 2024-05-08 PROCEDURE — 97162 PT EVAL MOD COMPLEX 30 MIN: CPT

## 2024-05-08 PROCEDURE — 87449 NOS EACH ORGANISM AG IA: CPT

## 2024-05-08 PROCEDURE — 80048 BASIC METABOLIC PNL TOTAL CA: CPT

## 2024-05-08 PROCEDURE — 83605 ASSAY OF LACTIC ACID: CPT

## 2024-05-08 RX ORDER — INSULIN GLARGINE 100 [IU]/ML
10 INJECTION, SOLUTION SUBCUTANEOUS 2 TIMES DAILY
Status: DISCONTINUED | OUTPATIENT
Start: 2024-05-08 | End: 2024-05-09

## 2024-05-08 RX ORDER — IPRATROPIUM BROMIDE AND ALBUTEROL SULFATE 2.5; .5 MG/3ML; MG/3ML
1 SOLUTION RESPIRATORY (INHALATION)
Status: DISCONTINUED | OUTPATIENT
Start: 2024-05-08 | End: 2024-05-08

## 2024-05-08 RX ORDER — FUROSEMIDE 40 MG/1
80 TABLET ORAL DAILY
Status: DISCONTINUED | OUTPATIENT
Start: 2024-05-08 | End: 2024-05-13 | Stop reason: HOSPADM

## 2024-05-08 RX ORDER — SODIUM CHLORIDE FOR INHALATION 3 %
4 VIAL, NEBULIZER (ML) INHALATION
Status: DISCONTINUED | OUTPATIENT
Start: 2024-05-08 | End: 2024-05-08

## 2024-05-08 RX ORDER — SODIUM CHLORIDE FOR INHALATION 3 %
4 VIAL, NEBULIZER (ML) INHALATION
Status: DISCONTINUED | OUTPATIENT
Start: 2024-05-08 | End: 2024-05-12

## 2024-05-08 RX ORDER — IPRATROPIUM BROMIDE AND ALBUTEROL SULFATE 2.5; .5 MG/3ML; MG/3ML
1 SOLUTION RESPIRATORY (INHALATION)
Status: DISCONTINUED | OUTPATIENT
Start: 2024-05-08 | End: 2024-05-13 | Stop reason: HOSPADM

## 2024-05-08 RX ORDER — CARVEDILOL 3.12 MG/1
3.12 TABLET ORAL 2 TIMES DAILY WITH MEALS
Status: DISCONTINUED | OUTPATIENT
Start: 2024-05-08 | End: 2024-05-13 | Stop reason: HOSPADM

## 2024-05-08 RX ADMIN — INSULIN LISPRO 8 UNITS: 100 INJECTION, SOLUTION INTRAVENOUS; SUBCUTANEOUS at 12:33

## 2024-05-08 RX ADMIN — PRAMIPEXOLE DIHYDROCHLORIDE 0.25 MG: 0.5 TABLET ORAL at 21:43

## 2024-05-08 RX ADMIN — CEFEPIME 2000 MG: 2 INJECTION, POWDER, FOR SOLUTION INTRAVENOUS at 05:54

## 2024-05-08 RX ADMIN — PREDNISONE 40 MG: 20 TABLET ORAL at 09:13

## 2024-05-08 RX ADMIN — INSULIN GLARGINE 10 UNITS: 100 INJECTION, SOLUTION SUBCUTANEOUS at 21:45

## 2024-05-08 RX ADMIN — Medication 1000 UNITS: at 09:13

## 2024-05-08 RX ADMIN — GUAIFENESIN 600 MG: 600 TABLET, EXTENDED RELEASE ORAL at 09:13

## 2024-05-08 RX ADMIN — CARVEDILOL 3.12 MG: 3.12 TABLET, FILM COATED ORAL at 17:16

## 2024-05-08 RX ADMIN — VANCOMYCIN HYDROCHLORIDE 1500 MG: 1.5 INJECTION, POWDER, LYOPHILIZED, FOR SOLUTION INTRAVENOUS at 18:58

## 2024-05-08 RX ADMIN — INSULIN LISPRO 6 UNITS: 100 INJECTION, SOLUTION INTRAVENOUS; SUBCUTANEOUS at 17:17

## 2024-05-08 RX ADMIN — SODIUM CHLORIDE SOLN NEBU 3% 4 ML: 3 NEBU SOLN at 16:32

## 2024-05-08 RX ADMIN — CEFEPIME 2000 MG: 2 INJECTION, POWDER, FOR SOLUTION INTRAVENOUS at 00:21

## 2024-05-08 RX ADMIN — IPRATROPIUM BROMIDE AND ALBUTEROL SULFATE 1 DOSE: 2.5; .5 SOLUTION RESPIRATORY (INHALATION) at 12:46

## 2024-05-08 RX ADMIN — SODIUM CHLORIDE SOLN NEBU 3% 4 ML: 3 NEBU SOLN at 19:49

## 2024-05-08 RX ADMIN — SODIUM CHLORIDE, PRESERVATIVE FREE 10 ML: 5 INJECTION INTRAVENOUS at 00:47

## 2024-05-08 RX ADMIN — PRAMIPEXOLE DIHYDROCHLORIDE 0.25 MG: 0.5 TABLET ORAL at 13:30

## 2024-05-08 RX ADMIN — SODIUM CHLORIDE, PRESERVATIVE FREE 10 ML: 5 INJECTION INTRAVENOUS at 09:14

## 2024-05-08 RX ADMIN — SODIUM CHLORIDE SOLN NEBU 3% 4 ML: 3 NEBU SOLN at 12:46

## 2024-05-08 RX ADMIN — LAMOTRIGINE 100 MG: 100 TABLET ORAL at 21:42

## 2024-05-08 RX ADMIN — APIXABAN 5 MG: 5 TABLET, FILM COATED ORAL at 21:42

## 2024-05-08 RX ADMIN — APIXABAN 5 MG: 5 TABLET, FILM COATED ORAL at 09:13

## 2024-05-08 RX ADMIN — GUAIFENESIN 600 MG: 600 TABLET, EXTENDED RELEASE ORAL at 21:42

## 2024-05-08 RX ADMIN — PRAMIPEXOLE DIHYDROCHLORIDE 0.25 MG: 0.5 TABLET ORAL at 17:16

## 2024-05-08 RX ADMIN — Medication 1 TABLET: at 09:12

## 2024-05-08 RX ADMIN — GABAPENTIN 800 MG: 400 CAPSULE ORAL at 21:43

## 2024-05-08 RX ADMIN — INSULIN LISPRO 4 UNITS: 100 INJECTION, SOLUTION INTRAVENOUS; SUBCUTANEOUS at 21:45

## 2024-05-08 RX ADMIN — GABAPENTIN 800 MG: 400 CAPSULE ORAL at 13:30

## 2024-05-08 RX ADMIN — GABAPENTIN 800 MG: 400 CAPSULE ORAL at 17:16

## 2024-05-08 RX ADMIN — IPRATROPIUM BROMIDE AND ALBUTEROL SULFATE 1 DOSE: 2.5; .5 SOLUTION RESPIRATORY (INHALATION) at 16:22

## 2024-05-08 RX ADMIN — IPRATROPIUM BROMIDE AND ALBUTEROL SULFATE 1 DOSE: .5; 2.5 SOLUTION RESPIRATORY (INHALATION) at 07:32

## 2024-05-08 RX ADMIN — ROFLUMILAST 500 MCG: 500 TABLET ORAL at 09:12

## 2024-05-08 RX ADMIN — IPRATROPIUM BROMIDE AND ALBUTEROL SULFATE 1 DOSE: 2.5; .5 SOLUTION RESPIRATORY (INHALATION) at 19:49

## 2024-05-08 RX ADMIN — ALBUTEROL SULFATE 2.5 MG: 2.5 SOLUTION RESPIRATORY (INHALATION) at 03:51

## 2024-05-08 RX ADMIN — CEFEPIME 2000 MG: 2 INJECTION, POWDER, FOR SOLUTION INTRAVENOUS at 23:25

## 2024-05-08 RX ADMIN — AZITHROMYCIN MONOHYDRATE 500 MG: 500 INJECTION, POWDER, LYOPHILIZED, FOR SOLUTION INTRAVENOUS at 21:50

## 2024-05-08 RX ADMIN — ATORVASTATIN CALCIUM 20 MG: 20 TABLET, FILM COATED ORAL at 21:42

## 2024-05-08 RX ADMIN — ASPIRIN 81 MG: 81 TABLET, CHEWABLE ORAL at 09:13

## 2024-05-08 RX ADMIN — PRAMIPEXOLE DIHYDROCHLORIDE 0.25 MG: 0.5 TABLET ORAL at 09:12

## 2024-05-08 RX ADMIN — BENZONATATE 100 MG: 100 CAPSULE ORAL at 05:55

## 2024-05-08 RX ADMIN — INSULIN GLARGINE 10 UNITS: 100 INJECTION, SOLUTION SUBCUTANEOUS at 00:05

## 2024-05-08 RX ADMIN — LAMOTRIGINE 75 MG: 25 TABLET ORAL at 09:13

## 2024-05-08 RX ADMIN — CEFEPIME 2000 MG: 2 INJECTION, POWDER, FOR SOLUTION INTRAVENOUS at 14:09

## 2024-05-08 RX ADMIN — GUAIFENESIN 600 MG: 600 TABLET, EXTENDED RELEASE ORAL at 00:04

## 2024-05-08 RX ADMIN — FUROSEMIDE 80 MG: 40 TABLET ORAL at 14:10

## 2024-05-08 RX ADMIN — VANCOMYCIN HYDROCHLORIDE 1500 MG: 1.5 INJECTION, POWDER, LYOPHILIZED, FOR SOLUTION INTRAVENOUS at 01:40

## 2024-05-08 RX ADMIN — GABAPENTIN 800 MG: 400 CAPSULE ORAL at 09:12

## 2024-05-08 ASSESSMENT — PAIN SCALES - GENERAL
PAINLEVEL_OUTOF10: 0

## 2024-05-08 NOTE — PLAN OF CARE
Problem: Discharge Planning  Goal: Discharge to home or other facility with appropriate resources  5/8/2024 1207 by Rocky Thomas RN  Outcome: Progressing  5/8/2024 0212 by Lalita Ayers RN  Outcome: Progressing  Flowsheets (Taken 5/8/2024 0044)  Discharge to home or other facility with appropriate resources:   Identify barriers to discharge with patient and caregiver   Arrange for interpreters to assist at discharge as needed   Arrange for needed discharge resources and transportation as appropriate   Refer to discharge planning if patient needs post-hospital services based on physician order or complex needs related to functional status, cognitive ability or social support system     Problem: Safety - Adult  Goal: Free from fall injury  5/8/2024 1207 by Rocky Thomas RN  Outcome: Progressing  5/8/2024 0212 by Lalita Ayers RN  Outcome: Progressing     Problem: Pain  Goal: Verbalizes/displays adequate comfort level or baseline comfort level  5/8/2024 1207 by Rocky Thomas RN  Outcome: Progressing  5/8/2024 0212 by Lalita Ayers RN  Outcome: Progressing     Problem: Cardiovascular - Adult  Goal: Maintains optimal cardiac output and hemodynamic stability  5/8/2024 1207 by Rocky Thomas RN  Outcome: Progressing  5/8/2024 0212 by Lalita Ayers RN  Outcome: Progressing  Goal: Absence of cardiac dysrhythmias or at baseline  5/8/2024 1207 by Rocky Thomas RN  Outcome: Progressing  5/8/2024 0212 by Lalita Ayers RN  Outcome: Progressing     Problem: Skin/Tissue Integrity - Adult  Goal: Skin integrity remains intact  5/8/2024 1207 by Rocky Thomas RN  Outcome: Progressing  Flowsheets (Taken 5/8/2024 0900)  Skin Integrity Remains Intact:   Monitor for areas of redness and/or skin breakdown   Assess vascular access sites hourly   Every 4-6 hours minimum: Change oxygen saturation probe site   Every 4-6 hours: If on nasal continuous positive airway pressure, respiratory therapy assesses nares and determine  Progressing  Goal: Glucose maintained within prescribed range  5/8/2024 1207 by Rocky Thomas, RN  Outcome: Progressing  5/8/2024 0212 by Lalita Ayers, RN  Outcome: Progressing     Problem: Respiratory - Adult  Goal: Achieves optimal ventilation and oxygenation  Outcome: Progressing     Problem: Neurosensory - Adult  Goal: Achieves stable or improved neurological status  Outcome: Progressing  Goal: Absence of seizures  Outcome: Progressing  Goal: Remains free of injury related to seizures activity  Outcome: Progressing  Goal: Achieves maximal functionality and self care  Outcome: Progressing     Problem: Gastrointestinal - Adult  Goal: Minimal or absence of nausea and vomiting  Outcome: Progressing  Goal: Maintains or returns to baseline bowel function  Outcome: Progressing  Goal: Maintains adequate nutritional intake  Outcome: Progressing  Goal: Establish and maintain optimal ostomy function  Outcome: Progressing

## 2024-05-08 NOTE — PROGRESS NOTES
Social/Functional History  Social/Functional History  Lives With: Spouse  Type of Home: House  Home Layout: Two level, Performs ADL's on one level, Able to Live on Main level with bedroom/bathroom (2 steps down to the \"man cave\", B rails; Two-Story with basement, pt doesn't go upstairs)  Home Access: Level entry  Entrance Stairs - Number of Steps: level entry into home, 2 SULY home from man cave into the house  Entrance Stairs - Rails: Both  Bathroom Shower/Tub: Walk-in shower (new shower installed, has a nonskid mat)  Bathroom Toilet: Handicap height  Bathroom Equipment: Grab bars in shower, Built-in shower seat, Grab bars around toilet (current shower seats are too low)  Bathroom Accessibility: Accessible  Home Equipment: Cane, Electric scooter, Lift chair, Walker, rolling, Rollator (the pt has multiple walkers and canes thru-out the house on the different levels of the house)  Has the patient had two or more falls in the past year or any fall with injury in the past year?: Yes (fell in the shower ~2.5 weeks ago)  Receives Help From: Home health ([facility] for OT/PT)  ADL Assistance: Independent  Homemaking Assistance: Needs assistance (wife does all; sets up meds for the pt)  Ambulation Assistance: Independent (with RW in home, electric scooter when out; uses cane for short hallway only)  Transfer Assistance: Independent  Active : No  Patient's  Info: wife drives  Occupation: Retired    Vision/Hearing  Vision  Vision: Impaired  Vision Exceptions: Wears glasses at all times  Hearing  Hearing: Exceptions to WFL  Hearing Exceptions: Hard of hearing/hearing concerns;No hearing aid      Cognition   Orientation  Overall Orientation Status: Within Functional Limits  Orientation Level: Oriented X4  Cognition  Overall Cognitive Status: WFL     Objective   Bed mobility  Supine to Sit: Moderate assistance  Sit to Supine: Unable to assess  Scooting: Minimal assistance  Bed Mobility Comments: Patient completes bed  mobility with HOB elevated and increased time. Patient demonstrates good B LE initiation to EOB,  unable to complete fully  Transfers  Sit to Stand: Contact guard assistance  Stand to Sit: Contact guard assistance  Comment: Patient completes STS from EOB and recliner chair with RW placed in front, VC for hand placement  Ambulation  Surface: Level tile  Device: Rolling Walker  Other Apparatus: O2 (3 L)  Assistance: Contact guard assistance  Gait Deviations: Slow Emely;Decreased step length;Decreased step height  Distance: 15 ft + 60 ft  Comments: Patient desats to 87% following ambulation, recovers to >90% following 1-2 minutes of rest break  More Ambulation?: No  Stairs/Curb  Stairs?: No     Balance  Posture: Fair  Sitting - Static: Good  Sitting - Dynamic: Fair;+  Standing - Static: Fair;+  Standing - Dynamic: Fair  Comments: CGA w/ RW in standing           AM-PAC - Mobility    AM-PAC Basic Mobility - Inpatient   How much help is needed turning from your back to your side while in a flat bed without using bedrails?: A Little  How much help is needed moving from lying on your back to sitting on the side of a flat bed without using bedrails?: A Lot  How much help is needed moving to and from a bed to a chair?: A Little  How much help is needed standing up from a chair using your arms?: A Little  How much help is needed walking in hospital room?: A Little  How much help is needed climbing 3-5 steps with a railing?: A Lot  AM-PAC Inpatient Mobility Raw Score : 16  AM-PAC Inpatient T-Scale Score : 40.78  Mobility Inpatient CMS 0-100% Score: 54.16  Mobility Inpatient CMS G-Code Modifier : CK         Goals  Short Term Goals  Time Frame for Short Term Goals: Upon d/c  Short Term Goal 1: Patient completes bed mobility independently  Short Term Goal 2: Patient completes transfers supervision with use of RW  Short Term Goal 3: Patient ambulates 50 ft SBA with use of RW  Additional Goals?: No       Education  Patient

## 2024-05-08 NOTE — CARE COORDINATION
Case Management Assessment  Initial Evaluation    Date/Time of Evaluation: 5/8/2024 3:49 PM  Assessment Completed by: Court Flores RN    If patient is discharged prior to next notation, then this note serves as note for discharge by case management.    Patient Name: Jorge Gauthier                     YOB: 1945  Diagnosis: Activity of daily living alteration [Z78.9]  Severe sepsis (HCC) [A41.9, R65.20]  Sepsis due to pneumonia (HCC) [J18.9, A41.9]  Pneumonia of right lower lobe due to infectious organism [J18.9]                     Date / Time: 5/7/2024  3:22 PM    Patient Admission Status: Inpatient   Readmission Risk (Low < 19, Mod (19-27), High > 27): Readmission Risk Score: 26.7    Current PCP: Gallo Sanchez MD  PCP verified by CM? (P) Yes    Chart Reviewed: Yes      History Provided by: Patient  Patient Orientation: Alert and Oriented    Patient Cognition: Alert    Hospitalization in the last 30 days (Readmission):  No    If yes, Readmission Assessment in  Navigator will be completed.    Advance Directives:      Code Status: DNR-CCA   Patient's Primary Decision Maker is: Named in Scanned ACP Document    Primary Decision Maker: DishaPortia - Spouse - [phone]    Secondary Decision Maker: Bang Gauthier Jr - Child - [phone]    Discharge Planning:    Patient lives with: (P) Spouse/Significant Other Type of Home: (P) House (2 steps to enter)  Primary Care Giver: Self  Patient Support Systems include: Spouse/Significant Other   Current Financial resources: (P) Medicare  Current community resources: (P) ECF/Home Care  Current services prior to admission: (P) Home Care (active with [facility])            Current DME: Motorized scooter, Walker, Cane             Type of Home Care services:  (P) Nursing Services, PT, OT    ADLS  Prior functional level: (P) Assistance with the following:, Shopping, Housework, Cooking  Current functional level: (P) Assistance with the following:, Shopping, Cooking,

## 2024-05-08 NOTE — CONSULTS
PATIENT IS SEEN AT THE REQUEST OF DR. Hernandes for recurrent SOB    HISTORY OF PRESENT ILLNESS:  This is a 78 y.o. male who presented to the ED on 5/7 with a CC of SOB and low oxygen. Per ED notes he has been SOB for the past two days with weakness.  His oxygen was low and he was admitted for pneumonia.  He said the main thing he noticed was more weakness in his legs before he coughed.  He says he gets weak when he is sick with pneumonia.  Has been trying to stick with modified diet to reduce aspirating.  He sleeps in a chair at night but snacks during the night.  He does not use oxygen at home.  Uses nebulizers to help cough up mucus which usually works very well      Established Pulmonologist:  [place]     PAST MEDICAL HISTORY:  Past Medical History:   Diagnosis Date    Abrasion of right elbow 2/4/2023    Arthritis     CAD (coronary artery disease)     CABG in 2009    Chronic back pain     Chronic systolic CHF (congestive heart failure) (HCC)     COPD (chronic obstructive pulmonary disease) (HCC)     Dental disease     Diabetes mellitus (HCC)     Essential hypertension     Hearing loss     Paroxysmal atrial fibrillation (HCC)     On Coumadin    Tinnitus        PAST SURGICAL HISTORY:  Past Surgical History:   Procedure Laterality Date    BACK SURGERY  1995    BACK SURGERY      CARDIAC SURGERY  2007    CERVICAL SPINE SURGERY      CHEST TUBE INSERTION Right 03/14/2023    14F. Dr. Jalloh    CORONARY ARTERY BYPASS GRAFT      FRACTURE SURGERY Left 1988    Shoulder    IR CHEST TUBE INSERTION  3/14/2023    IR CHEST TUBE INSERTION 3/14/2023 [facility]ED[facility]       FAMILY HISTORY:  family history includes Brain Cancer in his mother; Heart Attack in his brother and father.    SOCIAL HISTORY:   reports that he quit smoking about 28 years ago. His smoking use included cigarettes. He started smoking about 65 years ago. He has a 55.7 pack-year smoking history. He has been exposed to tobacco smoke. He has never used  smokeless tobacco.    Scheduled Meds:   apixaban  5 mg Oral BID    aspirin  81 mg Oral Daily    gabapentin  800 mg Oral 4x Daily    lamoTRIgine  75 mg Oral QAM    lamoTRIgine  100 mg Oral Nightly    therapeutic multivitamin-minerals  1 tablet Oral Daily    pramipexole  0.25 mg Oral Nightly    pramipexole  0.25 mg Oral 4x Daily    Roflumilast  500 mcg Oral Daily    atorvastatin  20 mg Oral Nightly    Vitamin D  1,000 Units Oral Daily    sodium chloride flush  5-40 mL IntraVENous 2 times per day    cefepime  2,000 mg IntraVENous Q8H    azithromycin  500 mg IntraVENous Q24H    predniSONE  40 mg Oral Daily    insulin glargine  10 Units SubCUTAneous Nightly    insulin lispro  0-8 Units SubCUTAneous TID WC    insulin lispro  0-4 Units SubCUTAneous Nightly    guaiFENesin  600 mg Oral BID    vancomycin (VANCOCIN) intermittent dosing (placeholder)   Other RX Placeholder    vancomycin  1,500 mg IntraVENous Q18H    ipratropium 0.5 mg-albuterol 2.5 mg  1 Dose Inhalation Q6H WA RT       Continuous Infusions:   sodium chloride      dextrose         PRN Meds:  methocarbamol, sodium chloride flush, sodium chloride, ondansetron **OR** ondansetron, polyethylene glycol, acetaminophen **OR** acetaminophen, albuterol, benzonatate, glucose, dextrose bolus **OR** dextrose bolus, glucagon (rDNA), dextrose    ALLERGIES:  Patient is allergic to no known allergies.    REVIEW OF SYSTEMS:  Constitutional: Negative for fever or chills  HENT: Negative for sore throat  Eyes: Negative for redness   Respiratory: SOB, low oxygen, productive coughing  Cardiovascular: Negative for chest pain  Gastrointestinal: Negative for vomiting, diarrhea   Genitourinary: Negative for hematuria, negative for dysuria  Musculoskeletal: Negative for arthralgias   Skin: Negative for rash  Neurological: leg weakness   Hematological: Negative for adenopathy  Extremities:  Negative for swelling    PHYSICAL EXAM:  Blood pressure (!) 144/70, pulse 80, temperature 97.9 °F

## 2024-05-08 NOTE — H&P
V2.0  History and Physical      Name:  Jorge Gauthier /Age/Sex: 1945  (78 y.o. male)   MRN & CSN:  [id number] & [id number] Encounter Date/Time: 2024 8:25 PM EDT   Location:  [facility] PCP: Gallo Sanchez MD       Hospital Day: 1    Assessment and Plan:   Patient is a 78-year-old male with a past medical history of COPD, chronic pulmonary aspiration, ESBL, diabetes mellitus type 2, neuropathy, CHF, atrial fibrillation, hyperlipidemia who presents to the ED with complaints of shortness of breath of a day's duration  Hospital Problems             Last Modified POA    * (Principal) Sepsis due to pneumonia (HCC) 2024 Yes       Severe sepsis due to pneumonia  Acute COPD exacerbation  Chronic pulmonary aspiration  Diabetes mellitus type 2  Neuropathy  CHF not in acute exacerbation  Atrial fibrillation  Hyperlipidemia  - Patient presenting with cough, shortness of breath, wheezing, tachypnea, tachycardia, lactic acidosis.  Chest x-ray concerning for pneumonia.  - Get sputum culture, blood culture, MRSA PCR, procalcitonin, urine strep/Legionella. Start IV vancomycin/cefepime/azithromycin to cover for hospital-acquired pneumonia given recent admission with IV antibiotics.  P.o. prednisone 40 mg daily.  As needed albuterol nebs, scheduled DuoNebs, scheduled Tessalon Perles, as needed guaifenesin, incentive spirometry. Continuous pulse oximetry.  - Consult pulmonology given history of chronic pulmonary aspiration, ESBL.  Consult SLP.  - Check A1c, placed on Lantus 10 units nightly, low sliding scale insulin 3 times daily ACHS  - Continue home gabapentin  - Will hold home Coreg, Lasix as patient's blood pressure is presently low normal, restart as tolerated.  - Continue home Eliquis  - Continue rest of chronic home meds      Disposition:   Current Living situation: Home  Expected Disposition: Home  Estimated D/C: 3 days    Diet Moderate carb diet   DVT Prophylaxis [] Lovenox, []  Heparin, [] SCDs, []

## 2024-05-08 NOTE — CONSULTS
Clinical Pharmacy Note  Vancomycin Consult    Jorge Gauthier is a 78 y.o. male ordered Vancomycin for Pneumonia; consult received from Dr. Hernandes to manage therapy. Also receiving cefepime and Zithromax.    Allergies:  No known allergies     Temp max:  Temp (24hrs), Av °F (36.7 °C), Min:97.7 °F (36.5 °C), Max:98.2 °F (36.8 °C)      Recent Labs     24  1541   WBC 10.1       Recent Labs     24  1541   BUN 26*   CREATININE 1.2       No intake or output data in the 24 hours ending 24 2208    Culture Results:      Ht Readings from Last 1 Encounters:   24 1.803 m (5' 11\")        Wt Readings from Last 1 Encounters:   24 99.8 kg (220 lb)         Estimated Creatinine Clearance: 61 mL/min (based on SCr of 1.2 mg/dL).    Assessment/Plan:  Day # 1 of vancomycin.  Vancomycin 1500 mg IV every 18 hours ordered.    Goal -600  Predicted AUC 24-48 hrs: 466  Predicted AUC steady state: 594    Thank you for the consult.    Salty Guo, PharmD

## 2024-05-08 NOTE — PROGRESS NOTES
Pt yelling, arrive to room he states he's falling out of chair. PCA at chairside to prevent pt from standing before 2ndary PCA bringing Stedy. Pt verbal with staff and asked to calm down. Continues to be argumentative. Pt instructed to place nasal cannula on, take deep breaths and calm down. SpO2 92%. Recheck 95% on 3L. Pt now calm, breaths even and unlabored. Call light, side table and trash can with urinals within reach. Plan of care ongoing. Electronically signed by Rocky Thomas RN on 5/8/2024 at 4:23 PM

## 2024-05-08 NOTE — PROGRESS NOTES
Facility/Department: [address] MED SURG  Initial Assessment  DYSPHAGIA BEDSIDE SWALLOW EVALUATION     Patient: Jorge Gauthier   : 1945   MRN: [id number]      Evaluation Date: 2024   Admitting Diagnosis:   Activity of daily living alteration [Z78.9]  Severe sepsis (HCC) [A41.9, R65.20]  Sepsis due to pneumonia (HCC) [J18.9, A41.9]  Pneumonia of right lower lobe due to infectious organism [J18.9]    Pain: Did not state                                                       CHART REVIEW:  2024 admitted with c/o cough, SOB  MD ADMISSION H&P HPI:  Patient is a 78-year-old male with a past medical history of COPD, chronic pulmonary aspiration, ESBL, diabetes mellitus type 2, neuropathy, CHF, atrial fibrillation, hyperlipidemia who presents to the ED with complaints of shortness of breath of a day's duration  - Per report, patient has had chronic shortness of breath, however this morning he began to experience worsening shortness of breath associated with cough productive of greenish sputum, wheezing, fatigue, for which reason he presented to the emergency department.  He denies fevers, chills, chest pain, nausea, vomiting, recent travels, recent sick contacts.   Evaluation in the emergency department was concerning for wheezing, respiratory distress, tachycardia.  Patient is being admitted for further evaluation and management.     IMAGING:  CXR: 2024  IMPRESSION:  Generalized increased pulmonary markings, with small pleural effusions, increase interstitial markings in the lung bases, all suggesting interstitial pulmonary edema and CHF.  Right lower lobe pneumonia cannot be excluded.    EMR REVIEW:  Over the last year, patient with 2023, 2024, 2024, and 3/19/2024 hospital admits r/t pneumonia, resp failure, and/or hypoxia  Active with Palliative Care at home (initiated 2024)  Most Recent Pulmonology Vit: 2024  Chronic pulmonary aspiration: \"Patient is likely continue to aspirate  consideration for repeat MBS which can be completed with MD order.  Otherwise, ST to follow-up 1-2 times to confirm baseline diet tolerance.     MEDICAL OR COGNITIVE/BEHAVIORAL FACTORS WHICH CAN EXACERBATE: h/o silent aspiration    Recommended Diet and Intervention 5/8/2024:  Diet Solids Recommendation:  Regular texture diet  Liquid Consistency Recommendation:  Mildly (nectar) thick liquids  Recommended form of Meds: Meds in puree      Compensatory Swallowing Strategies:  Effortful Swallows , Upright as possible with all PO intake , No straws , Small bites/sips , Eat/feed slowly, Remain upright 30-45 min     SHORT TERM DYSPHAGIA GOALS/PLAN OF CARE: Speech therapy for dysphagia tx 1-2 times to ensure diet tolerance.  Goals  Pt will functionally tolerate recommended diet with no overt clinical s/s of aspiration   Pt will demonstrate understanding of aspiration risk and precautions via education/demonstration with occasional prompting       Dysphagia Therapeutic Intervention:  Diet Tolerance Monitoring , Patient/Family Education     Dysphagia Prognosis: [] good []fair  []guarded [x]poor  Barriers to progress: time post initial onset; severity of imps (silent aspiration)    Discharge Recommendations: Do not anticipate need for further speech/dysphagia therapy upon discharge from hospital       TEST DATA  Vision: adequate for dysphagia needs  Hearing: adequate for dysphagia needs    Cognitive/behavioral/communication:   Oriented to [x] self [x] place [] date [x] situation  [x]alert []lethargic  [x]cooperative []self-limiting   []confused   []distractible []agitated []impulsive  [x]verbally responsive []nonverbal []limited verbal responses  [x]follows one step commands []does not follow dx []follows complex commands  []aphasic []dysarthric  [] other:     Dentition: [x]Adequate []Dentures []Missing Many Teeth []Edentulous []Other:  Vocal Quality: [x]Normal []Dysphonic  []Aphonic  []Hoarse []Wet []Weak []Other:  Volitional

## 2024-05-08 NOTE — PROGRESS NOTES
Hospitalist Progress Note      PCP: Gallo Sanchez MD    Date of Admission: 5/7/2024    Chief Complaint: Shortness of breath    Hospital Course: Jorge Gauthier is a 78-year-old male medical history of COPD CHF atrial fibrillation, CAD, type 2 diabetes mellitus presented to the ED with complaint of 2 days of generalized weakness, increased shortness of breath, and productive cough.  His home health nurse found his saturations to be in the 80s on room air.  On ED arrival, patient was tachypneic, tachycardic with an elevated lactic acid level, meeting sepsis criteria.  On attempt to ambulate, patient was noted to be hypoxic requiring supplemental oxygen with no needs at baseline. Patient admitted for further evaluation of COPD exacerbation and potential pneumonia evident on imaging.  Pulmonology consulted. Suspect component of chronic aspiration due to non-compliance with recommended modified diet in addition to COPD exacerbation.  SLP consulted. Ongoing treatment with inhalers, broad spectrum antibiotics and steroids.    Subjective: Patient seen. Somewhat irritated about temperature in room. States he has been progressively short of breath over the last 2-3 days with productive cough.  No fevers or chills.  He states his lower extremity swelling is actually improved compared to prior after being started on previous dose of diuretics.  He denies any recent choking or aspiration like event, but does admit to snacking at night.  Per nursing, wife did report that he is not very compliant with modified diet.  Patient updated on plan of care.  No needs voiced.    Assessment/Plan:    Sepsis, POA  -criteria: HR, RR, LA  -suspected pulmonary source  -sputum and blood cultures pending   -IV abx as below    Acute hypoxic respiratory failure   -no baseline O2 requirements; currently requiring 3 L NC  -likely secondary to combination of below   -monitor pulse ox, wean as able    COPD exacerbation  -respiratory culture     cefepime  2,000 mg IntraVENous Q8H    azithromycin  500 mg IntraVENous Q24H    predniSONE  40 mg Oral Daily    insulin lispro  0-8 Units SubCUTAneous TID WC    insulin lispro  0-4 Units SubCUTAneous Nightly    guaiFENesin  600 mg Oral BID    vancomycin (VANCOCIN) intermittent dosing (placeholder)   Other RX Placeholder    vancomycin  1,500 mg IntraVENous Q18H     PRN Meds: methocarbamol, sodium chloride flush, sodium chloride, ondansetron **OR** ondansetron, polyethylene glycol, acetaminophen **OR** acetaminophen, albuterol, benzonatate, glucose, dextrose bolus **OR** dextrose bolus, glucagon (rDNA), dextrose      Intake/Output Summary (Last 24 hours) at 5/8/2024 1440  Last data filed at 5/8/2024 1008  Gross per 24 hour   Intake 780 ml   Output 600 ml   Net 180 ml       Physical Exam Performed:    BP (!) 143/94   Pulse 88   Temp 98.1 °F (36.7 °C)   Resp 20   Ht 1.803 m (5' 11\")   Wt 104.9 kg (231 lb 4.2 oz)   SpO2 95%   BMI 32.25 kg/m²     General appearance: No apparent distress, appears stated age and cooperative. Chronically ill appearing male.  HEENT: Pupils equal, round, and reactive to light. Conjunctivae/corneas clear.  Neck: Supple, with full range of motion. No jugular venous distention. Trachea midline.  Respiratory:  Normal respiratory effort. Scattered rhonchi. No significant rales or rhonchi.   Cardiovascular: Regular rate and rhythm with normal S1/S2 without murmurs, rubs or gallops. Trace bilateral peripheral edema.  Abdomen: Soft, non-tender, non-distended with normal bowel sounds.  Musculoskeletal: No clubbing, cyanosis or edema bilaterally.  Full range of motion without deformity.  Neurologic:  Neurovascularly intact without any focal sensory/motor deficits.   Psychiatric: Alert and oriented, thought content appropriate, normal insight  Capillary Refill: Brisk,< 3 seconds   Peripheral Pulses: +2 palpable, equal bilaterally       Labs:   Recent Labs     05/07/24  1541 05/08/24  0601   WBC

## 2024-05-08 NOTE — PROGRESS NOTES
Occupational Therapy  Facility/Department: [address] MED SURG  Occupational Therapy Initial Assessment    Name: Jorge Gauthier  : 1945  MRN: [id number]  Date of Service: 2024    Discharge Recommendations:  Home with assist PRN, Home with Home health OT        Jorge Gauthier scored a 18/24 on the AM-PAC ADL Inpatient form. Current research shows that an AM-PAC score of 18 or greater is typically associated with a discharge to the patient's home setting. Based on the patient's AM-PAC score, and their current ADL deficits, it is recommended that the patient have 2-3 sessions per week of Occupational Therapy at d/c to increase the patient's independence.  At this time, this patient demonstrates the endurance and safety to discharge home with assist prn and a HHOT follow up treatment frequency of 2-3x/wk.   Please see assessment section for further patient specific details.    If patient discharges prior to next session this note will serve as a discharge summary.  Please see below for the latest assessment towards goals.    Patient Diagnosis(es): The primary encounter diagnosis was Pneumonia of right lower lobe due to infectious organism. Diagnoses of Activity of daily living alteration and Severe sepsis (HCC) were also pertinent to this visit.  Past Medical History:  has a past medical history of Abrasion of right elbow, Arthritis, CAD (coronary artery disease), Chronic back pain, Chronic systolic CHF (congestive heart failure) (HCC), COPD (chronic obstructive pulmonary disease) (HCC), Dental disease, Diabetes mellitus (HCC), Essential hypertension, Hearing loss, Paroxysmal atrial fibrillation (HCC), and Tinnitus.  Past Surgical History:  has a past surgical history that includes fracture surgery (Left, ); back surgery (); Cardiac surgery (); Coronary artery bypass graft; back surgery; Cervical spine surgery; chest tube insertion (Right, 2023); and IR CHEST TUBE INSERTION

## 2024-05-08 NOTE — ACP (ADVANCE CARE PLANNING)
Advance Care Planning     Advance Care Planning Activator (Inpatient)  Conversation Note      Date of ACP Conversation: 5/8/2024     Conversation Conducted with: Patient with Decision Making Capacity    ACP Activator: Court Flores RN    Health Care Decision Maker:     Current Designated Health Care Decision Maker:     Primary Decision Maker: Portia Gauthier - Spouse - [phone]    Secondary Decision Maker: Bang Gauthier Jr - Child - [phone]    Care Preferences    Ventilation:  \"If you were in your present state of health and suddenly became very ill and were unable to breathe on your own, what would your preference be about the use of a ventilator (breathing machine) if it were available to you?\"      Would the patient desire the use of ventilator (breathing machine)?: no    \"If your health worsens and it becomes clear that your chance of recovery is unlikely, what would your preference be about the use of a ventilator (breathing machine) if it were available to you?\"     Would the patient desire the use of ventilator (breathing machine)?: No      Resuscitation  \"CPR works best to restart the heart when there is a sudden event, like a heart attack, in someone who is otherwise healthy. Unfortunately, CPR does not typically restart the heart for people who have serious health conditions or who are very sick.\"    \"In the event your heart stopped as a result of an underlying serious health condition, would you want attempts to be made to restart your heart (answer \"yes\" for attempt to resuscitate) or would you prefer a natural death (answer \"no\" for do not attempt to resuscitate)?\" no       [] Yes   [] No   Educated Patient / Decision Maker regarding differences between Advance Directives and portable DNR orders.    Length of ACP Conversation in minutes:  3    Conversation Outcomes:  Existing advance directive reviewed with patient; no changes to patient's previously recorded wishes    Follow-up plan:    [] Schedule  Detail Level: Detailed

## 2024-05-08 NOTE — PROGRESS NOTES
Pt A+O x4, agitated, defiant and noncompliant. 15 min Education given on CHF, nectar thickened liquids and not to add ice. Pt verbal when told ice was contraindicated to thickened liquids. Breaths labored with activity. Pt sitting in chair, states no dizziness or pain. Nasal cannula on 3L. Alarm on in chair, legs elevated, call light and side table in reach. Expresses no further needs at this time. Plan of care ongoing.

## 2024-05-08 NOTE — PLAN OF CARE
Problem: Discharge Planning  Goal: Discharge to home or other facility with appropriate resources  Outcome: Progressing  Flowsheets (Taken 5/8/2024 0044)  Discharge to home or other facility with appropriate resources:   Identify barriers to discharge with patient and caregiver   Arrange for interpreters to assist at discharge as needed   Arrange for needed discharge resources and transportation as appropriate   Refer to discharge planning if patient needs post-hospital services based on physician order or complex needs related to functional status, cognitive ability or social support system     Problem: Safety - Adult  Goal: Free from fall injury  Outcome: Progressing     Problem: Pain  Goal: Verbalizes/displays adequate comfort level or baseline comfort level  Outcome: Progressing     Problem: Cardiovascular - Adult  Goal: Maintains optimal cardiac output and hemodynamic stability  Outcome: Progressing  Goal: Absence of cardiac dysrhythmias or at baseline  Outcome: Progressing     Problem: Skin/Tissue Integrity - Adult  Goal: Skin integrity remains intact  Outcome: Progressing  Goal: Incisions, wounds, or drain sites healing without S/S of infection  Outcome: Progressing  Goal: Oral mucous membranes remain intact  Outcome: Progressing     Problem: Musculoskeletal - Adult  Goal: Return mobility to safest level of function  Outcome: Progressing  Goal: Maintain proper alignment of affected body part  Outcome: Progressing  Goal: Return ADL status to a safe level of function  Outcome: Progressing     Problem: Infection - Adult  Goal: Absence of infection at discharge  Outcome: Progressing  Goal: Absence of infection during hospitalization  Outcome: Progressing  Goal: Absence of fever/infection during anticipated neutropenic period  Outcome: Progressing     Problem: Metabolic/Fluid and Electrolytes - Adult  Goal: Electrolytes maintained within normal limits  Outcome: Progressing  Goal: Hemodynamic stability and

## 2024-05-08 NOTE — PROGRESS NOTES
Patient admitted for SOB. Patient placed on 2 liters of oxygen when placed in room. Patient is A&O x4. /91  all other VSS. Patient has been oriented to room and has call light and bedside table within reach. Patient has a urinal at bedside. Patient resting in bed at this time.

## 2024-05-08 NOTE — DISCHARGE INSTR - COC
Continuity of Care Form    Patient Name: Jorge Gauthier   :  1945  MRN:  [id number]    Admit date:  2024  Discharge date:  ***    Code Status Order: DNR-CCA   Advance Directives:     Admitting Physician:  Juan Pablo Hernandes MD  PCP: Gallo Sanchez MD    Discharging Nurse: ***  Discharging Hospital Unit/Room#: X9E-7457/4125-01  Discharging Unit Phone Number: ***    Emergency Contact:   Extended Emergency Contact Information  Primary Emergency Contact: Portia Gauthier  Address: [address] [place] [address]  Home Phone: [phone]  Work Phone: [phone]  Mobile Phone: [phone]  Relation: Spouse  Secondary Emergency Contact: Bang Gauthier Jr  Home Phone: [phone]  Relation: Child    Past Surgical History:  Past Surgical History:   Procedure Laterality Date    BACK SURGERY      BACK SURGERY      CARDIAC SURGERY      CERVICAL SPINE SURGERY      CHEST TUBE INSERTION Right 2023    14F. Dr. Jalloh    CORONARY ARTERY BYPASS GRAFT      FRACTURE SURGERY Left     Shoulder    IR CHEST TUBE INSERTION  3/14/2023    IR CHEST TUBE INSERTION 3/14/2023 [facility] SPECIAL PROCEDURES       Immunization History:   Immunization History   Administered Date(s) Administered    COVID-19, MODERNA Bivalent, (age 12y+), IM, 50 mcg/0.5 mL 2022    Influenza Vaccine, unspecified formulation 10/22/2016    Influenza, AFLURIA (age 3 yrs+), FLUZONE, (age 6 mo+), MDV, 0.5mL 10/22/2016, 09/15/2017    Influenza, High Dose (Fluzone 65 yrs and older) 09/15/2017    Pneumococcal, PCV-13, PREVNAR 13, (age 6w+), IM, 0.5mL 2016    TDaP, ADACEL (age 10y-64y), BOOSTRIX (age 10y+), IM, 0.5mL 2018       Active Problems:  Patient Active Problem List   Diagnosis Code    Diabetes mellitus (HCC) E11.9    Atherosclerosis of native coronary artery of native heart without angina pectoris I25.10    Morbid obesity due to excess calories ([facility]) E66.01    SOB (shortness of  Admission H&P: No change in H&P    PHYSICIAN SIGNATURE:  Electronically signed by Ivelisse Grant PA-C on 5/13/24 at 2:40 PM EDT / Dr. Dubon

## 2024-05-09 LAB
ANION GAP SERPL CALCULATED.3IONS-SCNC: 11 MMOL/L (ref 3–16)
BASOPHILS # BLD: 0.1 K/UL (ref 0–0.2)
BASOPHILS NFR BLD: 1 %
BUN SERPL-MCNC: 21 MG/DL (ref 7–20)
CALCIUM SERPL-MCNC: 9.1 MG/DL (ref 8.3–10.6)
CHLORIDE SERPL-SCNC: 100 MMOL/L (ref 99–110)
CO2 SERPL-SCNC: 30 MMOL/L (ref 21–32)
CREAT SERPL-MCNC: 1.1 MG/DL (ref 0.8–1.3)
DEPRECATED RDW RBC AUTO: 16.9 % (ref 12.4–15.4)
EOSINOPHIL # BLD: 0.1 K/UL (ref 0–0.6)
EOSINOPHIL NFR BLD: 1.5 %
GFR SERPLBLD CREATININE-BSD FMLA CKD-EPI: 68 ML/MIN/{1.73_M2}
GLUCOSE BLD-MCNC: 258 MG/DL (ref 70–99)
GLUCOSE BLD-MCNC: 336 MG/DL (ref 70–99)
GLUCOSE BLD-MCNC: 347 MG/DL (ref 70–99)
GLUCOSE BLD-MCNC: 391 MG/DL (ref 70–99)
GLUCOSE SERPL-MCNC: 293 MG/DL (ref 70–99)
HCT VFR BLD AUTO: 34.8 % (ref 40.5–52.5)
HGB BLD-MCNC: 11.4 G/DL (ref 13.5–17.5)
LYMPHOCYTES # BLD: 1.5 K/UL (ref 1–5.1)
LYMPHOCYTES NFR BLD: 17.4 %
MCH RBC QN AUTO: 26.5 PG (ref 26–34)
MCHC RBC AUTO-ENTMCNC: 32.6 G/DL (ref 31–36)
MCV RBC AUTO: 81.3 FL (ref 80–100)
MONOCYTES # BLD: 1.1 K/UL (ref 0–1.3)
MONOCYTES NFR BLD: 12.8 %
NEUTROPHILS # BLD: 5.7 K/UL (ref 1.7–7.7)
NEUTROPHILS NFR BLD: 67.3 %
PERFORMED ON: ABNORMAL
PLATELET # BLD AUTO: 247 K/UL (ref 135–450)
PMV BLD AUTO: 7 FL (ref 5–10.5)
POTASSIUM SERPL-SCNC: 4 MMOL/L (ref 3.5–5.1)
RBC # BLD AUTO: 4.28 M/UL (ref 4.2–5.9)
SODIUM SERPL-SCNC: 141 MMOL/L (ref 136–145)
VANCOMYCIN SERPL-MCNC: 10.8 UG/ML
WBC # BLD AUTO: 8.4 K/UL (ref 4–11)

## 2024-05-09 PROCEDURE — 97530 THERAPEUTIC ACTIVITIES: CPT

## 2024-05-09 PROCEDURE — 6360000002 HC RX W HCPCS: Performed by: INTERNAL MEDICINE

## 2024-05-09 PROCEDURE — 2580000003 HC RX 258: Performed by: STUDENT IN AN ORGANIZED HEALTH CARE EDUCATION/TRAINING PROGRAM

## 2024-05-09 PROCEDURE — 92526 ORAL FUNCTION THERAPY: CPT

## 2024-05-09 PROCEDURE — 99233 SBSQ HOSP IP/OBS HIGH 50: CPT | Performed by: INTERNAL MEDICINE

## 2024-05-09 PROCEDURE — 6360000002 HC RX W HCPCS: Performed by: STUDENT IN AN ORGANIZED HEALTH CARE EDUCATION/TRAINING PROGRAM

## 2024-05-09 PROCEDURE — 80202 ASSAY OF VANCOMYCIN: CPT

## 2024-05-09 PROCEDURE — 85025 COMPLETE CBC W/AUTO DIFF WBC: CPT

## 2024-05-09 PROCEDURE — 6370000000 HC RX 637 (ALT 250 FOR IP): Performed by: STUDENT IN AN ORGANIZED HEALTH CARE EDUCATION/TRAINING PROGRAM

## 2024-05-09 PROCEDURE — 2700000000 HC OXYGEN THERAPY PER DAY

## 2024-05-09 PROCEDURE — 97110 THERAPEUTIC EXERCISES: CPT

## 2024-05-09 PROCEDURE — 94640 AIRWAY INHALATION TREATMENT: CPT

## 2024-05-09 PROCEDURE — 36415 COLL VENOUS BLD VENIPUNCTURE: CPT

## 2024-05-09 PROCEDURE — 2580000003 HC RX 258: Performed by: INTERNAL MEDICINE

## 2024-05-09 PROCEDURE — 94669 MECHANICAL CHEST WALL OSCILL: CPT

## 2024-05-09 PROCEDURE — 99223 1ST HOSP IP/OBS HIGH 75: CPT | Performed by: INTERNAL MEDICINE

## 2024-05-09 PROCEDURE — 80048 BASIC METABOLIC PNL TOTAL CA: CPT

## 2024-05-09 PROCEDURE — 6370000000 HC RX 637 (ALT 250 FOR IP)

## 2024-05-09 PROCEDURE — 6370000000 HC RX 637 (ALT 250 FOR IP): Performed by: INTERNAL MEDICINE

## 2024-05-09 PROCEDURE — 1200000000 HC SEMI PRIVATE

## 2024-05-09 PROCEDURE — 94761 N-INVAS EAR/PLS OXIMETRY MLT: CPT

## 2024-05-09 RX ORDER — INSULIN GLARGINE 100 [IU]/ML
15 INJECTION, SOLUTION SUBCUTANEOUS 2 TIMES DAILY
Status: DISCONTINUED | OUTPATIENT
Start: 2024-05-09 | End: 2024-05-09

## 2024-05-09 RX ORDER — INSULIN GLARGINE 100 [IU]/ML
20 INJECTION, SOLUTION SUBCUTANEOUS 2 TIMES DAILY
Status: DISCONTINUED | OUTPATIENT
Start: 2024-05-09 | End: 2024-05-10

## 2024-05-09 RX ORDER — TOBRAMYCIN INHALATION SOLUTION 300 MG/5ML
300 INHALANT RESPIRATORY (INHALATION)
Status: DISCONTINUED | OUTPATIENT
Start: 2024-05-09 | End: 2024-05-13 | Stop reason: HOSPADM

## 2024-05-09 RX ADMIN — Medication 1000 UNITS: at 08:10

## 2024-05-09 RX ADMIN — SODIUM CHLORIDE SOLN NEBU 3% 4 ML: 3 NEBU SOLN at 20:40

## 2024-05-09 RX ADMIN — INSULIN LISPRO 8 UNITS: 100 INJECTION, SOLUTION INTRAVENOUS; SUBCUTANEOUS at 17:10

## 2024-05-09 RX ADMIN — IPRATROPIUM BROMIDE AND ALBUTEROL SULFATE 1 DOSE: 2.5; .5 SOLUTION RESPIRATORY (INHALATION) at 16:39

## 2024-05-09 RX ADMIN — LAMOTRIGINE 100 MG: 100 TABLET ORAL at 21:49

## 2024-05-09 RX ADMIN — ATORVASTATIN CALCIUM 20 MG: 20 TABLET, FILM COATED ORAL at 21:48

## 2024-05-09 RX ADMIN — PRAMIPEXOLE DIHYDROCHLORIDE 0.25 MG: 0.5 TABLET ORAL at 08:10

## 2024-05-09 RX ADMIN — INSULIN GLARGINE 20 UNITS: 100 INJECTION, SOLUTION SUBCUTANEOUS at 21:49

## 2024-05-09 RX ADMIN — CEFEPIME 2000 MG: 2 INJECTION, POWDER, FOR SOLUTION INTRAVENOUS at 14:51

## 2024-05-09 RX ADMIN — IPRATROPIUM BROMIDE AND ALBUTEROL SULFATE 1 DOSE: 2.5; .5 SOLUTION RESPIRATORY (INHALATION) at 20:39

## 2024-05-09 RX ADMIN — SODIUM CHLORIDE SOLN NEBU 3% 4 ML: 3 NEBU SOLN at 12:25

## 2024-05-09 RX ADMIN — LAMOTRIGINE 75 MG: 25 TABLET ORAL at 08:08

## 2024-05-09 RX ADMIN — SODIUM CHLORIDE SOLN NEBU 3% 4 ML: 3 NEBU SOLN at 16:40

## 2024-05-09 RX ADMIN — PRAMIPEXOLE DIHYDROCHLORIDE 0.25 MG: 0.5 TABLET ORAL at 21:49

## 2024-05-09 RX ADMIN — APIXABAN 5 MG: 5 TABLET, FILM COATED ORAL at 21:49

## 2024-05-09 RX ADMIN — PRAMIPEXOLE DIHYDROCHLORIDE 0.25 MG: 0.5 TABLET ORAL at 17:10

## 2024-05-09 RX ADMIN — ROFLUMILAST 500 MCG: 500 TABLET ORAL at 10:15

## 2024-05-09 RX ADMIN — INSULIN LISPRO 4 UNITS: 100 INJECTION, SOLUTION INTRAVENOUS; SUBCUTANEOUS at 21:50

## 2024-05-09 RX ADMIN — INSULIN LISPRO 6 UNITS: 100 INJECTION, SOLUTION INTRAVENOUS; SUBCUTANEOUS at 12:27

## 2024-05-09 RX ADMIN — GUAIFENESIN 600 MG: 600 TABLET, EXTENDED RELEASE ORAL at 08:09

## 2024-05-09 RX ADMIN — GABAPENTIN 800 MG: 400 CAPSULE ORAL at 12:27

## 2024-05-09 RX ADMIN — PREDNISONE 40 MG: 20 TABLET ORAL at 08:10

## 2024-05-09 RX ADMIN — SODIUM CHLORIDE, PRESERVATIVE FREE 10 ML: 5 INJECTION INTRAVENOUS at 21:50

## 2024-05-09 RX ADMIN — TOBRAMYCIN 300 MG: 300 SOLUTION RESPIRATORY (INHALATION) at 20:40

## 2024-05-09 RX ADMIN — CEFEPIME 2000 MG: 2 INJECTION, POWDER, FOR SOLUTION INTRAVENOUS at 06:08

## 2024-05-09 RX ADMIN — IPRATROPIUM BROMIDE AND ALBUTEROL SULFATE 1 DOSE: 2.5; .5 SOLUTION RESPIRATORY (INHALATION) at 08:10

## 2024-05-09 RX ADMIN — GABAPENTIN 800 MG: 400 CAPSULE ORAL at 08:10

## 2024-05-09 RX ADMIN — Medication 1 TABLET: at 08:10

## 2024-05-09 RX ADMIN — ACETAMINOPHEN 325MG 650 MG: 325 TABLET ORAL at 21:48

## 2024-05-09 RX ADMIN — FUROSEMIDE 80 MG: 40 TABLET ORAL at 08:10

## 2024-05-09 RX ADMIN — GABAPENTIN 800 MG: 400 CAPSULE ORAL at 17:10

## 2024-05-09 RX ADMIN — PRAMIPEXOLE DIHYDROCHLORIDE 0.25 MG: 0.5 TABLET ORAL at 12:27

## 2024-05-09 RX ADMIN — ASPIRIN 81 MG: 81 TABLET, CHEWABLE ORAL at 08:09

## 2024-05-09 RX ADMIN — CEFEPIME 2000 MG: 2 INJECTION, POWDER, FOR SOLUTION INTRAVENOUS at 21:56

## 2024-05-09 RX ADMIN — GUAIFENESIN 600 MG: 600 TABLET, EXTENDED RELEASE ORAL at 21:49

## 2024-05-09 RX ADMIN — INSULIN GLARGINE 15 UNITS: 100 INJECTION, SOLUTION SUBCUTANEOUS at 08:23

## 2024-05-09 RX ADMIN — CARVEDILOL 3.12 MG: 3.12 TABLET, FILM COATED ORAL at 08:10

## 2024-05-09 RX ADMIN — GABAPENTIN 800 MG: 400 CAPSULE ORAL at 21:48

## 2024-05-09 RX ADMIN — APIXABAN 5 MG: 5 TABLET, FILM COATED ORAL at 08:10

## 2024-05-09 RX ADMIN — SODIUM CHLORIDE SOLN NEBU 3% 4 ML: 3 NEBU SOLN at 08:10

## 2024-05-09 RX ADMIN — IPRATROPIUM BROMIDE AND ALBUTEROL SULFATE 1 DOSE: 2.5; .5 SOLUTION RESPIRATORY (INHALATION) at 12:25

## 2024-05-09 RX ADMIN — CARVEDILOL 3.12 MG: 3.12 TABLET, FILM COATED ORAL at 17:10

## 2024-05-09 RX ADMIN — INSULIN LISPRO 4 UNITS: 100 INJECTION, SOLUTION INTRAVENOUS; SUBCUTANEOUS at 08:23

## 2024-05-09 ASSESSMENT — PAIN DESCRIPTION - FREQUENCY: FREQUENCY: INTERMITTENT

## 2024-05-09 ASSESSMENT — PAIN DESCRIPTION - ORIENTATION: ORIENTATION: LEFT

## 2024-05-09 ASSESSMENT — PAIN DESCRIPTION - ONSET: ONSET: PROGRESSIVE

## 2024-05-09 ASSESSMENT — PAIN DESCRIPTION - LOCATION: LOCATION: KNEE

## 2024-05-09 ASSESSMENT — PAIN DESCRIPTION - DESCRIPTORS: DESCRIPTORS: ACHING

## 2024-05-09 ASSESSMENT — PAIN SCALES - GENERAL: PAINLEVEL_OUTOF10: 3

## 2024-05-09 ASSESSMENT — PAIN DESCRIPTION - PAIN TYPE: TYPE: CHRONIC PAIN

## 2024-05-09 ASSESSMENT — PAIN SCALES - WONG BAKER: WONGBAKER_NUMERICALRESPONSE: NO HURT

## 2024-05-09 ASSESSMENT — PAIN - FUNCTIONAL ASSESSMENT: PAIN_FUNCTIONAL_ASSESSMENT: ACTIVITIES ARE NOT PREVENTED

## 2024-05-09 NOTE — PROGRESS NOTES
CO2 30 28 30   BUN 26* 20 21*   CREATININE 1.2 1.0 1.1   CALCIUM 9.0 8.5 9.1     Recent Labs     05/07/24  1541   AST 14*   ALT 11   BILITOT 0.3   ALKPHOS 63       Urinalysis:      Lab Results   Component Value Date/Time    NITRU Negative 01/29/2024 10:20 PM    WBCUA 1 11/13/2023 11:34 AM    BACTERIA None Seen 11/13/2023 11:34 AM    RBCUA 3 11/13/2023 11:34 AM    BLOODU Negative 01/29/2024 10:20 PM    GLUCOSEU >=1000 01/29/2024 10:20 PM       Radiology:  XR CHEST (2 VW)   Final Result   Generalized increased pulmonary markings, with small pleural effusions,   increase interstitial markings in the lung bases, all suggesting interstitial   pulmonary edema and CHF.  Right lower lobe pneumonia cannot be excluded.             DVT Prophylaxis: Eliquis  Diet: ADULT DIET; Regular; 4 carb choices (60 gm/meal); No Added Salt (3-4 gm); Mildly Thick (Nectar)  Code Status: DNR-CCA    PT/OT Eval Status: Recommend home with PT/OT    Dispo - Home, pending clinical improvement likely 1-2 days    Ivelisse Grant PA-C      NOTE:  This report was transcribed using voice recognition software.  Every effort was made to ensure accuracy; however, inadvertent computerized transcription errors may be present.

## 2024-05-09 NOTE — PROGRESS NOTES
Pulmonary Progress Note    CC:  Follow up pneumonia, chronic aspiration     Subjective:  Pneumonia panel with enterobacter and klebsiella   Coughing up a lot of mucus  Still on oxygen  Using acapella with good relief    ROS  Productive coughing       Intake/Output Summary (Last 24 hours) at 5/9/2024 0825  Last data filed at 5/9/2024 0725  Gross per 24 hour   Intake 1540.45 ml   Output 1850 ml   Net -309.55 ml         PHYSICAL EXAM:  Blood pressure 132/70, pulse 68, temperature 98.1 °F (36.7 °C), temperature source Oral, resp. rate 18, height 1.803 m (5' 11\"), weight 104.9 kg (231 lb 4.2 oz), SpO2 100 %.'  Gen: No distress.   Eyes: PERRL. No sclera icterus. No conjunctival injection.   ENT: No discharge. Pharynx clear. External appearance of ears and nose normal.  Neck: Trachea midline. No obvious mass.    Resp: Rhonchi  CV: Regular rate. Regular rhythm. No murmur or rub.    GI: Non-tender. Non-distended. No hernia.   Skin: Warm, dry, normal texture and turgor. No nodule on exposed extremities.   Lymph: No cervical LAD. No supraclavicular LAD.   M/S: No cyanosis. No clubbing. No joint deformity.    Neuro: Moves all four extremities. CN 2-12 tested, no defect noted.  Ext:   + edema    Medications:    Scheduled Meds:   insulin glargine  15 Units SubCUTAneous BID    sodium chloride (Inhalant)  4 mL Nebulization Q4H While awake    ipratropium 0.5 mg-albuterol 2.5 mg  1 Dose Inhalation Q4H WA RT    furosemide  80 mg Oral Daily    carvedilol  3.125 mg Oral BID WC    apixaban  5 mg Oral BID    aspirin  81 mg Oral Daily    gabapentin  800 mg Oral 4x Daily    lamoTRIgine  75 mg Oral QAM    lamoTRIgine  100 mg Oral Nightly    therapeutic multivitamin-minerals  1 tablet Oral Daily    pramipexole  0.25 mg Oral Nightly    pramipexole  0.25 mg Oral 4x Daily    Roflumilast  500 mcg Oral Daily    atorvastatin  20 mg Oral Nightly    Vitamin D  1,000 Units Oral Daily    sodium chloride flush  5-40 mL IntraVENous 2 times per day

## 2024-05-09 NOTE — PLAN OF CARE
Problem: Discharge Planning  Goal: Discharge to home or other facility with appropriate resources  5/9/2024 0141 by Ha Denis RN  Outcome: Progressing     Problem: Safety - Adult  Goal: Free from fall injury  5/9/2024 0141 by Ha Denis RN  Outcome: Progressing     Problem: Pain  Goal: Verbalizes/displays adequate comfort level or baseline comfort level  5/9/2024 0141 by Ha Denis RN  Outcome: Progressing     Problem: Cardiovascular - Adult  Goal: Maintains optimal cardiac output and hemodynamic stability  5/9/2024 0141 by Ha Denis RN  Outcome: Progressing     Problem: Cardiovascular - Adult  Goal: Absence of cardiac dysrhythmias or at baseline  5/8/2024 1207 by Rocky Thomas RN  Outcome: Progressing     Problem: Skin/Tissue Integrity - Adult  Goal: Skin integrity remains intact  5/9/2024 0141 by Ha Denis RN  Outcome: Progressing     Problem: Skin/Tissue Integrity - Adult  Goal: Incisions, wounds, or drain sites healing without S/S of infection  5/9/2024 0141 by Ha Denis RN  Outcome: Progressing     Problem: Skin/Tissue Integrity - Adult  Goal: Oral mucous membranes remain intact  5/9/2024 0141 by Ha Denis RN  Outcome: Progressing     Problem: Musculoskeletal - Adult  Goal: Return mobility to safest level of function  5/9/2024 0141 by Ha Denis RN  Outcome: Progressing     Problem: Musculoskeletal - Adult  Goal: Maintain proper alignment of affected body part  5/9/2024 0141 by Ha Denis RN  Outcome: Progressing     Problem: Musculoskeletal - Adult  Goal: Return ADL status to a safe level of function  5/9/2024 0141 by Ha Denis RN  Outcome: Progressing     Problem: Infection - Adult  Goal: Absence of infection at discharge  5/9/2024 0141 by Ha Denis RN  Outcome: Progressing     Problem: Infection - Adult  Goal: Absence of infection during hospitalization  5/9/2024 0141 by Ha Denis RN  Outcome: Progressing     Problem: Infection -  Adult  Goal: Absence of fever/infection during anticipated neutropenic period  5/9/2024 0141 by Ha Denis RN  Outcome: Progressing     Problem: Metabolic/Fluid and Electrolytes - Adult  Goal: Electrolytes maintained within normal limits  5/9/2024 0141 by Ha Denis RN  Outcome: Progressing     Problem: Metabolic/Fluid and Electrolytes - Adult  Goal: Hemodynamic stability and optimal renal function maintained  5/9/2024 0141 by Ha Denis RN  Outcome: Progressing     Problem: Metabolic/Fluid and Electrolytes - Adult  Goal: Glucose maintained within prescribed range  5/9/2024 0141 by Ha Denis RN  Outcome: Progressing     Problem: Respiratory - Adult  Goal: Achieves optimal ventilation and oxygenation  5/9/2024 0141 by Ha Denis RN  Outcome: Progressing     Problem: Neurosensory - Adult  Goal: Achieves stable or improved neurological status  5/9/2024 0141 by Ha Denis RN  Outcome: Progressing     Problem: Neurosensory - Adult  Goal: Absence of seizures  5/9/2024 0141 by Ha Denis RN  Outcome: Progressing     Problem: Neurosensory - Adult  Goal: Remains free of injury related to seizures activity  5/9/2024 0141 by Ha Denis RN  Outcome: Progressing     Problem: Neurosensory - Adult  Goal: Achieves maximal functionality and self care  5/9/2024 0141 by Ha Denis RN  Outcome: Progressing     Problem: Gastrointestinal - Adult  Goal: Minimal or absence of nausea and vomiting  5/9/2024 0141 by Ha Denis RN  Outcome: Progressing     Problem: Gastrointestinal - Adult  Goal: Maintains or returns to baseline bowel function  5/9/2024 0141 by Ha Denis RN  Outcome: Progressing     Problem: Gastrointestinal - Adult  Goal: Maintains adequate nutritional intake  5/9/2024 0141 by Ha Denis RN  Outcome: Progressing     Problem: Gastrointestinal - Adult  Goal: Establish and maintain optimal ostomy function  5/9/2024 0141 by Ha Denis RN  Outcome: Progressing

## 2024-05-09 NOTE — PROGRESS NOTES
Occupational Therapy  Facility/Department: [address] MED SURG  Occupational Therapy Daily Treatment Note    Name: Jorge Gauthier  : 1945  MRN: [id number]  Date of Service: 2024    Discharge Recommendations:  Home with Home health OT, 24 hour supervision or assist      Jorge Gauthier scored a 18/24 on the AM-PAC ADL Inpatient form. Current research shows that an AM-PAC score of 18 or greater is typically associated with a discharge to the patient's home setting. Based on the patient's AM-PAC score, and their current ADL deficits, it is recommended that the patient have 2-3 sessions per week of Occupational Therapy at d/c to increase the patient's independence.  At this time, this patient demonstrates the endurance and safety to discharge home with 24 hour assist and a HHOT follow up treatment frequency of 2-3x/wk.   Please see assessment section for further patient specific details.    If patient discharges prior to next session this note will serve as a discharge summary.  Please see below for the latest assessment towards goals.      Patient Diagnosis(es): The primary encounter diagnosis was Pneumonia of right lower lobe due to infectious organism. Diagnoses of Activity of daily living alteration and Severe sepsis (HCC) were also pertinent to this visit.  Past Medical History:  has a past medical history of Abrasion of right elbow, Arthritis, CAD (coronary artery disease), Chronic back pain, Chronic systolic CHF (congestive heart failure) (HCC), COPD (chronic obstructive pulmonary disease) (HCC), Dental disease, Diabetes mellitus (HCC), Essential hypertension, Hearing loss, Paroxysmal atrial fibrillation (HCC), and Tinnitus.  Past Surgical History:  has a past surgical history that includes fracture surgery (Left, ); back surgery (); Cardiac surgery (); Coronary artery bypass graft; back surgery; Cervical spine surgery; chest tube insertion (Right, 2023); and IR CHEST TUBE INSERTION  ED with c/o SOB. Pt admitted with severe sepsis d/t PNA, acute COPD exacerbation.  Family / Caregiver Present: No  Referring Practitioner: Juan Pablo Hernandes MD  Diagnosis: severe sepsis d/t PNA, acute COPD exacerbation  Subjective  Subjective: Pt met b/s for OT tx. Pt seated in recliner on arrival, agreeable to participate in therapy.     Social/Functional History  Social/Functional History  Lives With: Spouse  Type of Home: House  Home Layout: Two level, Performs ADL's on one level, Able to Live on Main level with bedroom/bathroom (2 steps down to the \"man cave\", B rails; Two-Story with basement, pt doesn't go upstairs)  Home Access: Level entry  Entrance Stairs - Number of Steps: level entry into home, 2 SULY home from man cave into the house  Entrance Stairs - Rails: Both  Bathroom Shower/Tub: Walk-in shower (new shower installed, has a nonskid mat)  Bathroom Toilet: Handicap height  Bathroom Equipment: Grab bars in shower, Built-in shower seat, Grab bars around toilet (current shower seats are too low)  Bathroom Accessibility: Accessible  Home Equipment: Cane, Electric scooter, Lift chair, Walker, rolling, Rollator (the pt has multiple walkers and canes thru-out the house on the different levels of the house)  Has the patient had two or more falls in the past year or any fall with injury in the past year?: Yes (fell in the shower ~2.5 weeks ago)  Receives Help From: Home health ([facility] for OT/PT)  ADL Assistance: Independent  Homemaking Assistance: Needs assistance (wife does all; sets up meds for the pt)  Ambulation Assistance: Independent (with RW in home, electric scooter when out; uses cane for short hallway only)  Transfer Assistance: Independent  Active : No  Patient's  Info: wife drives  Occupation: Retired       Objective       Safety Devices  Type of Devices: Call light within reach;Chair alarm in place;Gait belt;Left in chair;Nurse notified  Restraints  Restraints Initially in Place:

## 2024-05-09 NOTE — PLAN OF CARE
Problem: Discharge Planning  Goal: Discharge to home or other facility with appropriate resources  5/9/2024 1252 by Stephy Ruby RN  Outcome: Progressing  5/9/2024 0141 by Ha Denis RN  Outcome: Progressing     Problem: Safety - Adult  Goal: Free from fall injury  5/9/2024 1252 by Stephy Ruby RN  Outcome: Progressing  5/9/2024 0141 by Ha Denis RN  Outcome: Progressing     Problem: Pain  Goal: Verbalizes/displays adequate comfort level or baseline comfort level  5/9/2024 1252 by Stephy Ruby RN  Outcome: Progressing  5/9/2024 0141 by Ha Denis RN  Outcome: Progressing     Problem: Cardiovascular - Adult  Goal: Maintains optimal cardiac output and hemodynamic stability  5/9/2024 1252 by Stephy Ruby RN  Outcome: Progressing  5/9/2024 0141 by Ha Denis RN  Outcome: Progressing  Goal: Absence of cardiac dysrhythmias or at baseline  5/9/2024 1252 by Stephy Ruby RN  Outcome: Progressing  5/9/2024 0141 by Ha Denis RN  Outcome: Progressing     Problem: Skin/Tissue Integrity - Adult  Goal: Skin integrity remains intact  5/9/2024 1252 by Stephy Ruby RN  Outcome: Progressing  5/9/2024 0141 by Ha Denis RN  Outcome: Progressing  Goal: Incisions, wounds, or drain sites healing without S/S of infection  5/9/2024 1252 by Stephy Ruby RN  Outcome: Progressing  5/9/2024 0141 by Ha Denis RN  Outcome: Progressing  Goal: Oral mucous membranes remain intact  5/9/2024 1252 by Stephy Ruby RN  Outcome: Progressing  5/9/2024 0141 by Ha Denis RN  Outcome: Progressing     Problem: Musculoskeletal - Adult  Goal: Return mobility to safest level of function  5/9/2024 1252 by Stephy Ruby RN  Outcome: Progressing  5/9/2024 0141 by Ha Denis RN  Outcome: Progressing  Goal: Maintain proper alignment of affected body part  5/9/2024 1252 by Stephy Ruby RN  Outcome: Progressing  5/9/2024 0141 by Ha Denis RN  Outcome: Progressing  Goal:  RN  Outcome: Progressing  5/9/2024 0141 by Ha Denis RN  Outcome: Progressing  Goal: Achieves maximal functionality and self care  5/9/2024 1252 by Stephy Ruby RN  Outcome: Progressing  5/9/2024 0141 by Ha Denis RN  Outcome: Progressing     Problem: Gastrointestinal - Adult  Goal: Minimal or absence of nausea and vomiting  5/9/2024 1252 by Stephy Ruby RN  Outcome: Progressing  5/9/2024 0141 by Ha Denis RN  Outcome: Progressing  Goal: Maintains or returns to baseline bowel function  5/9/2024 1252 by Stephy Ruby RN  Outcome: Progressing  5/9/2024 0141 by Ha Denis RN  Outcome: Progressing  Goal: Maintains adequate nutritional intake  5/9/2024 1252 by Stephy Ruby RN  Outcome: Progressing  5/9/2024 0141 by Ha Denis RN  Outcome: Progressing  Goal: Establish and maintain optimal ostomy function  5/9/2024 1252 by Stephy Ruby RN  Outcome: Progressing  5/9/2024 0141 by Ha Denis RN  Outcome: Progressing     Problem: Skin/Tissue Integrity  Goal: Absence of new skin breakdown  Description: 1.  Monitor for areas of redness and/or skin breakdown  2.  Assess vascular access sites hourly  3.  Every 4-6 hours minimum:  Change oxygen saturation probe site  4.  Every 4-6 hours:  If on nasal continuous positive airway pressure, respiratory therapy assess nares and determine need for appliance change or resting period.  Outcome: Progressing

## 2024-05-09 NOTE — PROGRESS NOTES
Physical Therapy  Facility/Department: [address] MED SURG  Physical Therapy Treatment Note    Name: Jorge Gauthier  : 1945  MRN: [id number]  Date of Service: 2024    Discharge Recommendations:  Home with Home health PT, Patient would benefit from continued therapy after discharge, 24 hour supervision or assist   PT Equipment Recommendations  Equipment Needed: No      Patient Diagnosis(es): The primary encounter diagnosis was Pneumonia of right lower lobe due to infectious organism. Diagnoses of Activity of daily living alteration and Severe sepsis (HCC) were also pertinent to this visit.  Past Medical History:  has a past medical history of Abrasion of right elbow, Arthritis, CAD (coronary artery disease), Chronic back pain, Chronic systolic CHF (congestive heart failure) (HCC), COPD (chronic obstructive pulmonary disease) (HCC), Dental disease, Diabetes mellitus (HCC), Essential hypertension, Hearing loss, Paroxysmal atrial fibrillation (HCC), and Tinnitus.  Past Surgical History:  has a past surgical history that includes fracture surgery (Left, ); back surgery (); Cardiac surgery (); Coronary artery bypass graft; back surgery; Cervical spine surgery; chest tube insertion (Right, 2023); and IR CHEST TUBE INSERTION (3/14/2023).    Assessment   Body Structures, Functions, Activity Limitations Requiring Skilled Therapeutic Intervention: Decreased functional mobility ;Decreased endurance;Decreased balance;Decreased posture  Assessment: Patient is 77 y/o male presenting to [facility] secondary to SOB. Patient currently presents just below baseline function with all mobility. Patient's PLOF includes being mod I with use of RW for transfers and ambulation. Today, patient requires CGA with use of RW for transfers and ambulation of household distances.  Patient will continue to benefit from skilled PT in order to return to PLOF with all functional mobility prior to d/c from hospital. Anticipate

## 2024-05-09 NOTE — CONSULTS
Infectious Diseases Inpatient Consult Note      Reason for Consult: Gram Negative PNA, COPD      Requesting Physician:  Dr. RIVERA      Primary Care Physician:  Gallo Sanchez MD    History Obtained From:  Epic AND PT     CHIEF COMPLAINT:     Chief Complaint   Patient presents with    Shortness of Breath    Hypoxia         HISTORY OF PRESENT ILLNESS:  78 y.o. man with a history of COPD, coronary artery disease, COPD, congestive heart failure, diabetes, paroxysmal atrial fibrillation, history of COVID-19 pneumonia following this is developing recurrent pneumonia including Enterobacter and pneumococcal pneumonia in the past.  Was evaluated by infectious diseases in Nov 2023, now admitted to hospital secondary to increasing shortness of breath cough sputum production, labs indicate creatinine 1.2 lactic acid 2.7 LFT normal hemoglobin A1c 7.6 WBC 8.4 hemoglobin 11.4 procalcitonin normal blood cultures so far negative sputum testing pneumonia molecular panel positive for Klebsiella as well as Enterobacter.  Pneumococcal antigen negative urine Legionella negative chest x-ray with increased pulmonary markings with changes in the lung bases and right lower lobe pneumonia noted.  We are consulted for IV antibiotic recommendations      Past Medical History:    Past Medical History:   Diagnosis Date    Abrasion of right elbow 2/4/2023    Arthritis     CAD (coronary artery disease)     CABG in 2009    Chronic back pain     Chronic systolic CHF (congestive heart failure) (HCC)     COPD (chronic obstructive pulmonary disease) (HCC)     Dental disease     Diabetes mellitus (HCC)     Essential hypertension     Hearing loss     Paroxysmal atrial fibrillation (HCC)     On Coumadin    Tinnitus        Past Surgical History:    Past Surgical History:   Procedure Laterality Date    BACK SURGERY  1995    BACK SURGERY      CARDIAC SURGERY  2007    CERVICAL SPINE SURGERY      CHEST TUBE INSERTION Right 03/14/2023    14F.   Cough productive of clear sputum R05.8    History of coronary artery bypass graft Z95.1    Abrasion of right elbow S50.311A    Paroxysmal atrial fibrillation ([facility]) I48.0    Chronic pulmonary aspiration T17.908A    Atypical pneumonia J18.9    Pleural effusion, bilateral J90    Longstanding persistent atrial fibrillation ([facility]) I48.11    Enterobacter cloacae pneumonia ([facility]) J15.69    ESBL (extended spectrum beta-lactamase) producing bacteria infection A49.9, Z16.12    Abnormal CT of the chest R93.89    Elevated erythrocyte sedimentation rate R70.0    CRP elevated R79.82    Peripherally inserted central catheter (PICC) in place Z45.2    Acute respiratory failure with hypoxia ([facility]) J96.01    Acute bronchitis due to severe acute respiratory syndrome coronavirus 2 (SARS-CoV-2) U07.1, J20.8    Atrial fibrillation with RVR ([facility]) I48.91    Acute on chronic combined systolic (congestive) and diastolic (congestive) heart failure ([facility]) I50.43    Neutrophilia D72.9    Pneumonia due to 2019-nCoV U07.1, J12.82    COPD exacerbation ([facility]) J44.1    ARF (acute respiratory failure) ([facility]) J96.00    Sepsis due to pneumonia ([facility]) J18.9, A41.9    Mucopurulent chronic bronchitis ([facility]) J41.1    Pleural plaque J92.9        ICD-10-CM    1. Pneumonia of right lower lobe due to infectious organism  J18.9       2. Activity of daily living alteration  Z78.9       3. Severe sepsis ([facility])  A41.9     R65.20          Acute hypoxic respiratory failure  Recurrent pneumonia  Right lower lobe pneumonia  COPD with exacerbation  Enterobacter on multiple occasions on the sputum  CABG history  CAD  BMI 32  Lactic acidosis  Respiratory culture in process  Respiratory pneumonia panel positive for Klebsiella as well as Enterobacter  Chest x-ray with right basilar pneumonia  Chronic anticoagulation  History of MDRO infection in the past    Given the ongoing respiratory symptoms will need IV antibiotics.  Sputum culture in process pneumonia panel marked noted will

## 2024-05-09 NOTE — PROGRESS NOTES
Facility/Department: [address] MED SURG  [facility] DYSPHAGIA THERAPY  DISCHARGE SUMMARY    Patient: Jorge Gauthier   : 1945   MRN: [id number]      Evaluation Date: 2024   Admitting Diagnosis:   Activity of daily living alteration [Z78.9]  Severe sepsis (HCC) [A41.9, R65.20]  Sepsis due to pneumonia (HCC) [J18.9, A41.9]  Pneumonia of right lower lobe due to infectious organism [J18.9]   has a past medical history of Abrasion of right elbow (2023), Arthritis, CAD (coronary artery disease), Chronic back pain, Chronic systolic CHF (congestive heart failure) (HCC), COPD (chronic obstructive pulmonary disease) (HCC), Dental disease, Diabetes mellitus (HCC), Essential hypertension, Hearing loss, Paroxysmal atrial fibrillation (HCC), and Tinnitus.   has a past surgical history that includes fracture surgery (Left, ); back surgery (); Cardiac surgery (); Coronary artery bypass graft; back surgery; Cervical spine surgery; chest tube insertion (Right, 2023); and IR CHEST TUBE INSERTION (3/14/2023).  Allergies: NKA    CHART REVIEW:  2024 admitted with c/o cough, SOB  MD ADMISSION H&P HPI:  Patient is a 78-year-old male with a past medical history of COPD, chronic pulmonary aspiration, ESBL, diabetes mellitus type 2, neuropathy, CHF, atrial fibrillation, hyperlipidemia who presents to the ED with complaints of shortness of breath of a day's duration  - Per report, patient has had chronic shortness of breath, however this morning he began to experience worsening shortness of breath associated with cough productive of greenish sputum, wheezing, fatigue, for which reason he presented to the emergency department.  He denies fevers, chills, chest pain, nausea, vomiting, recent travels, recent sick contacts.   Evaluation in the emergency department was concerning for wheezing, respiratory distress, tachycardia.  Patient is being admitted for further evaluation and management.      IMAGING:  CXR:

## 2024-05-10 PROBLEM — J18.9 PNEUMONIA OF RIGHT LOWER LOBE DUE TO INFECTIOUS ORGANISM: Status: ACTIVE | Noted: 2024-05-10

## 2024-05-10 PROBLEM — J15.69 GRAM-NEGATIVE PNEUMONIA (HCC): Status: ACTIVE | Noted: 2024-05-10

## 2024-05-10 LAB
ANION GAP SERPL CALCULATED.3IONS-SCNC: 13 MMOL/L (ref 3–16)
BACTERIA SPEC RESP CULT: ABNORMAL
BASOPHILS # BLD: 0.1 K/UL (ref 0–0.2)
BASOPHILS NFR BLD: 0.7 %
BUN SERPL-MCNC: 20 MG/DL (ref 7–20)
CALCIUM SERPL-MCNC: 8.9 MG/DL (ref 8.3–10.6)
CHLORIDE SERPL-SCNC: 99 MMOL/L (ref 99–110)
CO2 SERPL-SCNC: 29 MMOL/L (ref 21–32)
CREAT SERPL-MCNC: 0.9 MG/DL (ref 0.8–1.3)
DEPRECATED RDW RBC AUTO: 17.1 % (ref 12.4–15.4)
EOSINOPHIL # BLD: 0.2 K/UL (ref 0–0.6)
EOSINOPHIL NFR BLD: 1.4 %
GFR SERPLBLD CREATININE-BSD FMLA CKD-EPI: 87 ML/MIN/{1.73_M2}
GLUCOSE BLD-MCNC: 258 MG/DL (ref 70–99)
GLUCOSE BLD-MCNC: 377 MG/DL (ref 70–99)
GLUCOSE BLD-MCNC: 394 MG/DL (ref 70–99)
GLUCOSE BLD-MCNC: 397 MG/DL (ref 70–99)
GLUCOSE SERPL-MCNC: 244 MG/DL (ref 70–99)
GRAM STN SPEC: ABNORMAL
HCT VFR BLD AUTO: 36.2 % (ref 40.5–52.5)
HGB BLD-MCNC: 11.6 G/DL (ref 13.5–17.5)
LYMPHOCYTES # BLD: 1.3 K/UL (ref 1–5.1)
LYMPHOCYTES NFR BLD: 11.2 %
MCH RBC QN AUTO: 25.9 PG (ref 26–34)
MCHC RBC AUTO-ENTMCNC: 32.1 G/DL (ref 31–36)
MCV RBC AUTO: 80.7 FL (ref 80–100)
MONOCYTES # BLD: 1.4 K/UL (ref 0–1.3)
MONOCYTES NFR BLD: 11.8 %
MRSA SPEC QL CULT: NORMAL
NEUTROPHILS # BLD: 8.8 K/UL (ref 1.7–7.7)
NEUTROPHILS NFR BLD: 74.9 %
ORGANISM: ABNORMAL
ORGANISM: ABNORMAL
PERFORMED ON: ABNORMAL
PLATELET # BLD AUTO: 265 K/UL (ref 135–450)
PMV BLD AUTO: 7 FL (ref 5–10.5)
POTASSIUM SERPL-SCNC: 3.9 MMOL/L (ref 3.5–5.1)
RBC # BLD AUTO: 4.48 M/UL (ref 4.2–5.9)
SODIUM SERPL-SCNC: 141 MMOL/L (ref 136–145)
WBC # BLD AUTO: 11.8 K/UL (ref 4–11)

## 2024-05-10 PROCEDURE — 2580000003 HC RX 258: Performed by: STUDENT IN AN ORGANIZED HEALTH CARE EDUCATION/TRAINING PROGRAM

## 2024-05-10 PROCEDURE — 94640 AIRWAY INHALATION TREATMENT: CPT

## 2024-05-10 PROCEDURE — 6370000000 HC RX 637 (ALT 250 FOR IP)

## 2024-05-10 PROCEDURE — 36415 COLL VENOUS BLD VENIPUNCTURE: CPT

## 2024-05-10 PROCEDURE — 6360000002 HC RX W HCPCS: Performed by: STUDENT IN AN ORGANIZED HEALTH CARE EDUCATION/TRAINING PROGRAM

## 2024-05-10 PROCEDURE — 6370000000 HC RX 637 (ALT 250 FOR IP): Performed by: STUDENT IN AN ORGANIZED HEALTH CARE EDUCATION/TRAINING PROGRAM

## 2024-05-10 PROCEDURE — 99233 SBSQ HOSP IP/OBS HIGH 50: CPT | Performed by: INTERNAL MEDICINE

## 2024-05-10 PROCEDURE — 6360000002 HC RX W HCPCS: Performed by: INTERNAL MEDICINE

## 2024-05-10 PROCEDURE — 80048 BASIC METABOLIC PNL TOTAL CA: CPT

## 2024-05-10 PROCEDURE — 2580000003 HC RX 258: Performed by: INTERNAL MEDICINE

## 2024-05-10 PROCEDURE — 2700000000 HC OXYGEN THERAPY PER DAY

## 2024-05-10 PROCEDURE — 6370000000 HC RX 637 (ALT 250 FOR IP): Performed by: INTERNAL MEDICINE

## 2024-05-10 PROCEDURE — 99232 SBSQ HOSP IP/OBS MODERATE 35: CPT | Performed by: INTERNAL MEDICINE

## 2024-05-10 PROCEDURE — 94669 MECHANICAL CHEST WALL OSCILL: CPT

## 2024-05-10 PROCEDURE — 85025 COMPLETE CBC W/AUTO DIFF WBC: CPT

## 2024-05-10 PROCEDURE — 94760 N-INVAS EAR/PLS OXIMETRY 1: CPT

## 2024-05-10 PROCEDURE — 1200000000 HC SEMI PRIVATE

## 2024-05-10 RX ORDER — INSULIN GLARGINE 100 [IU]/ML
25 INJECTION, SOLUTION SUBCUTANEOUS 2 TIMES DAILY
Status: DISCONTINUED | OUTPATIENT
Start: 2024-05-10 | End: 2024-05-13 | Stop reason: HOSPADM

## 2024-05-10 RX ORDER — INSULIN LISPRO 100 [IU]/ML
5 INJECTION, SOLUTION INTRAVENOUS; SUBCUTANEOUS ONCE
Status: COMPLETED | OUTPATIENT
Start: 2024-05-10 | End: 2024-05-10

## 2024-05-10 RX ORDER — INSULIN LISPRO 100 [IU]/ML
10 INJECTION, SOLUTION INTRAVENOUS; SUBCUTANEOUS ONCE
Status: COMPLETED | OUTPATIENT
Start: 2024-05-10 | End: 2024-05-10

## 2024-05-10 RX ADMIN — INSULIN LISPRO 10 UNITS: 100 INJECTION, SOLUTION INTRAVENOUS; SUBCUTANEOUS at 17:23

## 2024-05-10 RX ADMIN — PRAMIPEXOLE DIHYDROCHLORIDE 0.25 MG: 0.5 TABLET ORAL at 09:04

## 2024-05-10 RX ADMIN — GABAPENTIN 800 MG: 400 CAPSULE ORAL at 21:16

## 2024-05-10 RX ADMIN — GABAPENTIN 800 MG: 400 CAPSULE ORAL at 09:04

## 2024-05-10 RX ADMIN — GABAPENTIN 800 MG: 400 CAPSULE ORAL at 12:06

## 2024-05-10 RX ADMIN — CEFEPIME 2000 MG: 2 INJECTION, POWDER, FOR SOLUTION INTRAVENOUS at 22:18

## 2024-05-10 RX ADMIN — SODIUM CHLORIDE SOLN NEBU 3% 4 ML: 3 NEBU SOLN at 15:54

## 2024-05-10 RX ADMIN — APIXABAN 5 MG: 5 TABLET, FILM COATED ORAL at 09:05

## 2024-05-10 RX ADMIN — CEFEPIME 2000 MG: 2 INJECTION, POWDER, FOR SOLUTION INTRAVENOUS at 05:38

## 2024-05-10 RX ADMIN — INSULIN LISPRO 4 UNITS: 100 INJECTION, SOLUTION INTRAVENOUS; SUBCUTANEOUS at 22:16

## 2024-05-10 RX ADMIN — INSULIN LISPRO 4 UNITS: 100 INJECTION, SOLUTION INTRAVENOUS; SUBCUTANEOUS at 09:03

## 2024-05-10 RX ADMIN — APIXABAN 5 MG: 5 TABLET, FILM COATED ORAL at 21:15

## 2024-05-10 RX ADMIN — Medication 1000 UNITS: at 09:05

## 2024-05-10 RX ADMIN — PRAMIPEXOLE DIHYDROCHLORIDE 0.25 MG: 0.5 TABLET ORAL at 12:06

## 2024-05-10 RX ADMIN — IPRATROPIUM BROMIDE AND ALBUTEROL SULFATE 1 DOSE: 2.5; .5 SOLUTION RESPIRATORY (INHALATION) at 12:13

## 2024-05-10 RX ADMIN — INSULIN GLARGINE 25 UNITS: 100 INJECTION, SOLUTION SUBCUTANEOUS at 22:15

## 2024-05-10 RX ADMIN — CEFEPIME 2000 MG: 2 INJECTION, POWDER, FOR SOLUTION INTRAVENOUS at 14:39

## 2024-05-10 RX ADMIN — SODIUM CHLORIDE SOLN NEBU 3% 4 ML: 3 NEBU SOLN at 12:13

## 2024-05-10 RX ADMIN — INSULIN LISPRO 8 UNITS: 100 INJECTION, SOLUTION INTRAVENOUS; SUBCUTANEOUS at 12:06

## 2024-05-10 RX ADMIN — BENZONATATE 100 MG: 100 CAPSULE ORAL at 05:43

## 2024-05-10 RX ADMIN — INSULIN LISPRO 8 UNITS: 100 INJECTION, SOLUTION INTRAVENOUS; SUBCUTANEOUS at 17:23

## 2024-05-10 RX ADMIN — INSULIN LISPRO 5 UNITS: 100 INJECTION, SOLUTION INTRAVENOUS; SUBCUTANEOUS at 12:07

## 2024-05-10 RX ADMIN — SODIUM CHLORIDE SOLN NEBU 3% 4 ML: 3 NEBU SOLN at 19:49

## 2024-05-10 RX ADMIN — TOBRAMYCIN 300 MG: 300 SOLUTION RESPIRATORY (INHALATION) at 08:11

## 2024-05-10 RX ADMIN — INSULIN GLARGINE 20 UNITS: 100 INJECTION, SOLUTION SUBCUTANEOUS at 09:03

## 2024-05-10 RX ADMIN — PRAMIPEXOLE DIHYDROCHLORIDE 0.25 MG: 0.5 TABLET ORAL at 17:23

## 2024-05-10 RX ADMIN — PRAMIPEXOLE DIHYDROCHLORIDE 0.25 MG: 0.5 TABLET ORAL at 22:41

## 2024-05-10 RX ADMIN — SODIUM CHLORIDE SOLN NEBU 3% 4 ML: 3 NEBU SOLN at 08:11

## 2024-05-10 RX ADMIN — TOBRAMYCIN 300 MG: 300 SOLUTION RESPIRATORY (INHALATION) at 19:49

## 2024-05-10 RX ADMIN — GUAIFENESIN 600 MG: 600 TABLET, EXTENDED RELEASE ORAL at 21:16

## 2024-05-10 RX ADMIN — PREDNISONE 40 MG: 20 TABLET ORAL at 09:05

## 2024-05-10 RX ADMIN — SODIUM CHLORIDE, PRESERVATIVE FREE 10 ML: 5 INJECTION INTRAVENOUS at 21:18

## 2024-05-10 RX ADMIN — FUROSEMIDE 80 MG: 40 TABLET ORAL at 09:04

## 2024-05-10 RX ADMIN — GABAPENTIN 800 MG: 400 CAPSULE ORAL at 17:22

## 2024-05-10 RX ADMIN — CARVEDILOL 3.12 MG: 3.12 TABLET, FILM COATED ORAL at 17:23

## 2024-05-10 RX ADMIN — ATORVASTATIN CALCIUM 20 MG: 20 TABLET, FILM COATED ORAL at 21:15

## 2024-05-10 RX ADMIN — LAMOTRIGINE 75 MG: 25 TABLET ORAL at 09:04

## 2024-05-10 RX ADMIN — IPRATROPIUM BROMIDE AND ALBUTEROL SULFATE 1 DOSE: 2.5; .5 SOLUTION RESPIRATORY (INHALATION) at 08:10

## 2024-05-10 RX ADMIN — ROFLUMILAST 500 MCG: 500 TABLET ORAL at 09:05

## 2024-05-10 RX ADMIN — IPRATROPIUM BROMIDE AND ALBUTEROL SULFATE 1 DOSE: 2.5; .5 SOLUTION RESPIRATORY (INHALATION) at 15:54

## 2024-05-10 RX ADMIN — Medication 1 TABLET: at 09:04

## 2024-05-10 RX ADMIN — IPRATROPIUM BROMIDE AND ALBUTEROL SULFATE 1 DOSE: 2.5; .5 SOLUTION RESPIRATORY (INHALATION) at 19:49

## 2024-05-10 RX ADMIN — ASPIRIN 81 MG: 81 TABLET, CHEWABLE ORAL at 09:05

## 2024-05-10 RX ADMIN — GUAIFENESIN 600 MG: 600 TABLET, EXTENDED RELEASE ORAL at 09:05

## 2024-05-10 RX ADMIN — CARVEDILOL 3.12 MG: 3.12 TABLET, FILM COATED ORAL at 09:05

## 2024-05-10 RX ADMIN — LAMOTRIGINE 100 MG: 100 TABLET ORAL at 21:15

## 2024-05-10 ASSESSMENT — PAIN SCALES - GENERAL: PAINLEVEL_OUTOF10: 0

## 2024-05-10 NOTE — PROGRESS NOTES
Pulmonary Progress Note    CC:  Follow up pneumonia, chronic aspiration     Subjective:  On cefepime and inhaled tobramycin per ID  Still coughing up a lot  Doing ok otherwise     ROS  Productive coughing       Intake/Output Summary (Last 24 hours) at 5/10/2024 0835  Last data filed at 5/10/2024 0602  Gross per 24 hour   Intake 1320 ml   Output 3250 ml   Net -1930 ml           PHYSICAL EXAM:  Blood pressure 125/69, pulse 85, temperature 98.4 °F (36.9 °C), temperature source Oral, resp. rate 16, height 1.803 m (5' 11\"), weight 99.4 kg (219 lb 2.2 oz), SpO2 98 %.'  Gen: No distress.   Eyes: PERRL. No sclera icterus. No conjunctival injection.   ENT: No discharge. Pharynx clear. External appearance of ears and nose normal.  Neck: Trachea midline. No obvious mass.    Resp: Rhonchi  CV: Regular rate. Regular rhythm. No murmur or rub.    GI: Non-tender. Non-distended. No hernia.   Skin: Warm, dry, normal texture and turgor. No nodule on exposed extremities.   Lymph: No cervical LAD. No supraclavicular LAD.   M/S: No cyanosis. No clubbing. No joint deformity.    Neuro: Moves all four extremities. CN 2-12 tested, no defect noted.  Ext:   + edema    Medications:    Scheduled Meds:   insulin glargine  20 Units SubCUTAneous BID    tobramycin (PF)  300 mg Nebulization BID RT    sodium chloride (Inhalant)  4 mL Nebulization Q4H While awake    ipratropium 0.5 mg-albuterol 2.5 mg  1 Dose Inhalation Q4H WA RT    furosemide  80 mg Oral Daily    carvedilol  3.125 mg Oral BID WC    apixaban  5 mg Oral BID    aspirin  81 mg Oral Daily    gabapentin  800 mg Oral 4x Daily    lamoTRIgine  75 mg Oral QAM    lamoTRIgine  100 mg Oral Nightly    therapeutic multivitamin-minerals  1 tablet Oral Daily    pramipexole  0.25 mg Oral 4x Daily    Roflumilast  500 mcg Oral Daily    atorvastatin  20 mg Oral Nightly    Vitamin D  1,000 Units Oral Daily    sodium chloride flush  5-40 mL IntraVENous 2 times per day    cefepime  2,000 mg IntraVENous Q8H     predniSONE  40 mg Oral Daily    insulin lispro  0-8 Units SubCUTAneous TID WC    insulin lispro  0-4 Units SubCUTAneous Nightly    guaiFENesin  600 mg Oral BID       Continuous Infusions:   sodium chloride      dextrose         PRN Meds:  methocarbamol, sodium chloride flush, sodium chloride, ondansetron **OR** ondansetron, polyethylene glycol, acetaminophen **OR** acetaminophen, albuterol, benzonatate, glucose, dextrose bolus **OR** dextrose bolus, glucagon (rDNA), dextrose    Labs:  CBC:   Recent Labs     05/08/24  0601 05/09/24  0430 05/10/24  0420   WBC 10.0 8.4 11.8*   HGB 11.2* 11.4* 11.6*   HCT 33.9* 34.8* 36.2*   MCV 81.0 81.3 80.7    247 265       BMP:   Recent Labs     05/08/24  0601 05/09/24  0430 05/10/24  0420    141 141   K 4.4 4.0 3.9    100 99   CO2 28 30 29   BUN 20 21* 20   CREATININE 1.0 1.1 0.9       LIVER PROFILE:   Recent Labs     05/07/24  1541   AST 14*   ALT 11   BILITOT 0.3   ALKPHOS 63       PT/INR: No results for input(s): \"PROTIME\", \"INR\" in the last 72 hours.  APTT: No results for input(s): \"APTT\" in the last 72 hours.  UA:No results for input(s): \"NITRITE\", \"COLORU\", \"PHUR\", \"LABCAST\", \"WBCUA\", \"RBCUA\", \"MUCUS\", \"TRICHOMONAS\", \"YEAST\", \"BACTERIA\", \"CLARITYU\", \"SPECGRAV\", \"LEUKOCYTESUR\", \"UROBILINOGEN\", \"BILIRUBINUR\", \"BLOODU\", \"GLUCOSEU\", \"AMORPHOUS\" in the last 72 hours.    Invalid input(s): \"KETONESU\"  No results for input(s): \"PH\", \"PCO2\", \"PO2\" in the last 72 hours.        Films:  Chest imaging reports were reviewed and imaging was reviewed by me and showed no new films     ABG:  None    Cultures:  Pneumonia panel:  enterobacter, klebsiella     I reviewed the labs and images listed above    ASSESSMENT/PLAN:  Acute Hypoxic Respiratory Failure with saturations less than 90% on room air  Titrate oxygen for saturations greater than or equal to 90%  Chronic Bronchitis with exacerbation  Duonebs but make q 4 hours  Saline nebs to q 4 hours  Daliresp   Cefepime and

## 2024-05-10 NOTE — PLAN OF CARE
RN  Outcome: Progressing  Flowsheets (Taken 5/9/2024 2232)  Electrolytes maintained within normal limits:   Monitor labs and assess patient for signs and symptoms of electrolyte imbalances   Administer electrolyte replacement as ordered   Fluid restriction as ordered   Monitor response to electrolyte replacements, including repeat lab results as appropriate   Instruct patient on fluid and nutrition restrictions as appropriate  5/9/2024 1252 by Stephy Ruby RN  Outcome: Progressing  Goal: Hemodynamic stability and optimal renal function maintained  5/9/2024 2232 by Katelynn Copeland RN  Outcome: Progressing  Flowsheets (Taken 5/9/2024 2232)  Hemodynamic stability and optimal renal function maintained:   Monitor labs and assess for signs and symptoms of volume excess or deficit   Monitor intake, output and patient weight   Monitor urine specific gravity, serum osmolarity and serum sodium as indicated or ordered   Instruct patient on fluid and nutrition restrictions as appropriate   Monitor response to interventions for patient's volume status, including labs, urine output, blood pressure (other measures as available)  5/9/2024 1252 by Stephy Ruby RN  Outcome: Progressing  Goal: Glucose maintained within prescribed range  5/9/2024 2232 by Katelynn Copeland, RN  Outcome: Progressing  Flowsheets (Taken 5/9/2024 2232)  Glucose maintained within prescribed range:   Monitor blood glucose as ordered   Assess for signs and symptoms of hyperglycemia and hypoglycemia   Administer ordered medications to maintain glucose within target range   Assess barriers to adequate nutritional intake and initiate nutrition consult as needed   Instruct patient on self management of diabetes and initiate consult as needed  5/9/2024 1252 by Stephy Ruby RN  Outcome: Progressing     Problem: Respiratory - Adult  Goal: Achieves optimal ventilation and oxygenation  5/9/2024 2232 by Katelynn Copeland, RN  Outcome: Progressing  Flowsheets (Taken  nutritional intake  5/9/2024 2232 by Katelynn Copeland RN  Outcome: Progressing  Flowsheets (Taken 5/9/2024 2232)  Maintains adequate nutritional intake:   Identify factors contributing to decreased intake, treat as appropriate   Assist with meals as needed   Obtain nutritional consult as needed   Monitor intake and output, weight and lab values   Monitor percentage of each meal consumed  5/9/2024 1252 by Stephy Ruby RN  Outcome: Progressing  Goal: Establish and maintain optimal ostomy function  5/9/2024 2232 by Katelynn Copeland RN  Outcome: Progressing  Flowsheets (Taken 5/9/2024 2232)  Establish and maintain optimal ostomy function:   Monitor output from ostomies   Administer IV fluids and TPN as ordered   Introduce and advance enteral feedings as ordered  5/9/2024 1252 by Stephy Ruby RN  Outcome: Progressing     Problem: Skin/Tissue Integrity  Goal: Absence of new skin breakdown  Description: 1.  Monitor for areas of redness and/or skin breakdown  2.  Assess vascular access sites hourly  3.  Every 4-6 hours minimum:  Change oxygen saturation probe site  4.  Every 4-6 hours:  If on nasal continuous positive airway pressure, respiratory therapy assess nares and determine need for appliance change or resting period.  5/9/2024 2232 by Katelynn Copeland RN  Outcome: Progressing  Note: Jose Alfredo score assessed. Patient able to ambulate and turn self. Repositioned patient Q2H and assessed skin. Educated patient on importance of repositioning to prevent skin issues.     5/9/2024 1252 by Stephy Ruby RN  Outcome: Progressing     Problem: ABCDS Injury Assessment  Goal: Absence of physical injury  Outcome: Progressing  Flowsheets (Taken 5/9/2024 2232)  Absence of Physical Injury: Implement safety measures based on patient assessment

## 2024-05-10 NOTE — PROGRESS NOTES
Labs     05/08/24  0601 05/09/24  0430 05/10/24  0420   WBC 10.0 8.4 11.8*   HGB 11.2* 11.4* 11.6*   HCT 33.9* 34.8* 36.2*    247 265       Recent Labs     05/08/24  0601 05/09/24  0430 05/10/24  0420    141 141   K 4.4 4.0 3.9    100 99   CO2 28 30 29   BUN 20 21* 20   CREATININE 1.0 1.1 0.9   CALCIUM 8.5 9.1 8.9       Recent Labs     05/07/24  1541   AST 14*   ALT 11   BILITOT 0.3   ALKPHOS 63         Urinalysis:      Lab Results   Component Value Date/Time    NITRU Negative 01/29/2024 10:20 PM    WBCUA 1 11/13/2023 11:34 AM    BACTERIA None Seen 11/13/2023 11:34 AM    RBCUA 3 11/13/2023 11:34 AM    BLOODU Negative 01/29/2024 10:20 PM    GLUCOSEU >=1000 01/29/2024 10:20 PM       Radiology:  XR CHEST (2 VW)   Final Result   Generalized increased pulmonary markings, with small pleural effusions,   increase interstitial markings in the lung bases, all suggesting interstitial   pulmonary edema and CHF.  Right lower lobe pneumonia cannot be excluded.             DVT Prophylaxis: Eliquis  Diet: ADULT DIET; Regular; 4 carb choices (60 gm/meal); No Added Salt (3-4 gm); Mildly Thick (Nectar); 1800 ml  Code Status: DNR-CCA    PT/OT Eval Status: Recommend home with PT/OT    Dispo - Home likely in 1-2 days pending final ID recs, oxygenation improvement    Spring Steele PA-C      NOTE:  This report was transcribed using voice recognition software.  Every effort was made to ensure accuracy; however, inadvertent computerized transcription errors may be present.

## 2024-05-10 NOTE — PLAN OF CARE
within normal limits:   Monitor labs and assess patient for signs and symptoms of electrolyte imbalances   Administer electrolyte replacement as ordered   Monitor response to electrolyte replacements, including repeat lab results as appropriate   Fluid restriction as ordered   Instruct patient on fluid and nutrition restrictions as appropriate  Goal: Hemodynamic stability and optimal renal function maintained  5/10/2024 1009 by Stephy Ruby RN  Outcome: Progressing  5/9/2024 2232 by Katelynn Copeland, RN  Outcome: Progressing  Flowsheets  Taken 5/9/2024 2232  Hemodynamic stability and optimal renal function maintained:   Monitor labs and assess for signs and symptoms of volume excess or deficit   Monitor intake, output and patient weight   Monitor urine specific gravity, serum osmolarity and serum sodium as indicated or ordered   Instruct patient on fluid and nutrition restrictions as appropriate   Monitor response to interventions for patient's volume status, including labs, urine output, blood pressure (other measures as available)  Taken 5/9/2024 2155  Hemodynamic stability and optimal renal function maintained:   Monitor labs and assess for signs and symptoms of volume excess or deficit   Monitor intake, output and patient weight   Monitor urine specific gravity, serum osmolarity and serum sodium as indicated or ordered   Monitor response to interventions for patient's volume status, including labs, urine output, blood pressure (other measures as available)  Goal: Glucose maintained within prescribed range  5/10/2024 1009 by Stephy Ruby RN  Outcome: Progressing  5/9/2024 2232 by Katelynn Copeland, RN  Outcome: Progressing  Flowsheets  Taken 5/9/2024 2232  Glucose maintained within prescribed range:   Monitor blood glucose as ordered   Assess for signs and symptoms of hyperglycemia and hypoglycemia   Administer ordered medications to maintain glucose within target range   Assess barriers to adequate nutritional  and/or skin breakdown  2.  Assess vascular access sites hourly  3.  Every 4-6 hours minimum:  Change oxygen saturation probe site  4.  Every 4-6 hours:  If on nasal continuous positive airway pressure, respiratory therapy assess nares and determine need for appliance change or resting period.  5/10/2024 1009 by Stephy Ruby, RN  Outcome: Progressing  5/9/2024 2232 by Katelynn Copeland RN  Outcome: Progressing  Note: Jose Alfredo score assessed. Patient able to ambulate and turn self. Repositioned patient Q2H and assessed skin. Educated patient on importance of repositioning to prevent skin issues.        Problem: ABCDS Injury Assessment  Goal: Absence of physical injury  5/10/2024 1009 by Stephy Ruby RN  Outcome: Progressing  5/9/2024 2232 by Katelynn Copeland RN  Outcome: Progressing  Flowsheets (Taken 5/9/2024 2232)  Absence of Physical Injury: Implement safety measures based on patient assessment

## 2024-05-10 NOTE — PLAN OF CARE
Problem: Discharge Planning  Goal: Discharge to home or other facility with appropriate resources  5/10/2024 1009 by Stephy Ruby RN  Outcome: Progressing  5/10/2024 1009 by Stephy Ruby RN  Outcome: Progressing  5/9/2024 2232 by Katelynn Copeland RN  Outcome: Progressing  Flowsheets  Taken 5/9/2024 2155 by Katelynn Copeland RN  Discharge to home or other facility with appropriate resources:   Identify barriers to discharge with patient and caregiver   Identify discharge learning needs (meds, wound care, etc)   Arrange for needed discharge resources and transportation as appropriate   Arrange for interpreters to assist at discharge as needed  Taken 5/8/2024 0044 by Lalita Ayers RN  Discharge to home or other facility with appropriate resources:   Identify barriers to discharge with patient and caregiver   Arrange for interpreters to assist at discharge as needed   Arrange for needed discharge resources and transportation as appropriate   Refer to discharge planning if patient needs post-hospital services based on physician order or complex needs related to functional status, cognitive ability or social support system     Problem: Safety - Adult  Goal: Free from fall injury  5/10/2024 1009 by Stephy Ruby RN  Outcome: Progressing  5/10/2024 1009 by Stephy Ruby RN  Outcome: Progressing  5/9/2024 2232 by Katelynn Copeland RN  Outcome: Progressing  Flowsheets  Taken 5/9/2024 2232  Free From Fall Injury:   Instruct family/caregiver on patient safety   Based on caregiver fall risk screen, instruct family/caregiver to ask for assistance with transferring infant if caregiver noted to have fall risk factors  Taken 5/9/2024 2230  Free From Fall Injury:   Instruct family/caregiver on patient safety   Based on caregiver fall risk screen, instruct family/caregiver to ask for assistance with transferring infant if caregiver noted to have fall risk factors     Problem: Pain  Goal: Verbalizes/displays adequate comfort  Progressing  Flowsheets  Taken 5/9/2024 2232  Maintains or returns to baseline bowel function:   Encourage oral fluids to ensure adequate hydration   Assess bowel function   Administer IV fluids as ordered to ensure adequate hydration   Administer ordered medications as needed  Taken 5/9/2024 2155  Maintains or returns to baseline bowel function:   Nutrition consult to assist patient with appropriate food choices   Encourage mobilization and activity   Administer ordered medications as needed  Goal: Maintains adequate nutritional intake  5/10/2024 1009 by Stephy Ruby RN  Outcome: Progressing  5/10/2024 1009 by Stephy Ruby RN  Outcome: Progressing  5/9/2024 2232 by Katelynn Copeland RN  Outcome: Progressing  Flowsheets (Taken 5/9/2024 2232)  Maintains adequate nutritional intake:   Identify factors contributing to decreased intake, treat as appropriate   Assist with meals as needed   Obtain nutritional consult as needed   Monitor intake and output, weight and lab values   Monitor percentage of each meal consumed  Goal: Establish and maintain optimal ostomy function  5/10/2024 1009 by Stephy Ruby RN  Outcome: Progressing  5/10/2024 1009 by Stephy Ruby RN  Outcome: Progressing  5/9/2024 2232 by Katelynn Copeland RN  Outcome: Progressing  Flowsheets (Taken 5/9/2024 2232)  Establish and maintain optimal ostomy function:   Monitor output from ostomies   Administer IV fluids and TPN as ordered   Introduce and advance enteral feedings as ordered     Problem: Skin/Tissue Integrity  Goal: Absence of new skin breakdown  Description: 1.  Monitor for areas of redness and/or skin breakdown  2.  Assess vascular access sites hourly  3.  Every 4-6 hours minimum:  Change oxygen saturation probe site  4.  Every 4-6 hours:  If on nasal continuous positive airway pressure, respiratory therapy assess nares and determine need for appliance change or resting period.  5/10/2024 1009 by Stephy Ruby RN  Outcome:

## 2024-05-10 NOTE — PROGRESS NOTES
Pt alert and oriented except to time. Pt refusing to get into the bed. Pt remain in chair per this shift but allowing to repositioning some. VSS on 1 L of oxygen. Pt c/o of chronic pain to left knee. Managed with elevation and PRN tylenol.  Fluids restriction education continues. Pt voiced understanding. Pt continues with moist productive cough. Pt resting fair. No further needs voiced at this time. Fall precautions in place. Bed alarm on. Call light within reach. Will continue round. Electronically signed by Katelynn Copeland RN on 5/10/2024 at 4:48 AM

## 2024-05-10 NOTE — PROGRESS NOTES
Sensitive <=0.25 mcg/mL BACTERIAL SUSCEPTIBILITY PANEL BY MOISE     ertapenem Sensitive <=0.5 mcg/mL BACTERIAL SUSCEPTIBILITY PANEL BY MOISE     gentamicin Sensitive <=2 mcg/mL BACTERIAL SUSCEPTIBILITY PANEL BY MOISE     levofloxacin Sensitive <=0.5 mcg/mL BACTERIAL SUSCEPTIBILITY PANEL BY MOISE     meropenem Sensitive <=1 mcg/mL BACTERIAL SUSCEPTIBILITY PANEL BY MOISE     piperacillin-tazobactam Sensitive <=8 mcg/mL BACTERIAL SUSCEPTIBILITY PANEL BY MOISE     trimethoprim-sulfamethoxazole Sensitive <=0.5/9.5 mcg/mL BACTERIAL SUSCEPTIBILITY PANEL BY MOISE               Component    CULTURE, RESPIRATORY Light growth normal respiratory jodi with Abnormal    Gram Stain Result 4+ WBC's (Polymorphonuclear)  2+ WBC's (Mononuclear)  2+ Epithelial Cells  4+ Gram positive cocci  1+ Gram negative rods  1+ Yeast   Organism Klebsiella pneumoniae Abnormal    CULTURE, RESPIRATORY Moderate growth   Organism Enterobacter cloacae complex Abnormal    CULTURE, RESPIRATORY Moderate growth   Resulting Agency [facility]        Susceptibility    Klebsiella pneumoniae (1)    Antibiotic Interpretation Microscan  Method Status    amoxicillin-clavulanate Sensitive <=8/4 mcg/mL BACTERIAL SUSCEPTIBILITY PANEL BY MOISE     ampicillin Resistant >16 mcg/mL BACTERIAL SUSCEPTIBILITY PANEL BY MOISE     ampicillin-sulbactam Sensitive 8/4 mcg/mL BACTERIAL SUSCEPTIBILITY PANEL BY MOISE     ceFAZolin Sensitive <=2 mcg/mL BACTERIAL SUSCEPTIBILITY PANEL BY MOISE     cefepime Sensitive <=2 mcg/mL BACTERIAL SUSCEPTIBILITY PANEL BY MOISE     cefOXitin Sensitive <=8 mcg/mL BACTERIAL SUSCEPTIBILITY PANEL BY MOISE     cefTRIAXone Sensitive <=1 mcg/mL BACTERIAL SUSCEPTIBILITY PANEL BY MOISE     cefuroxime Sensitive <=4 mcg/mL BACTERIAL SUSCEPTIBILITY PANEL BY MOISE     ciprofloxacin Sensitive <=0.25 mcg/mL BACTERIAL SUSCEPTIBILITY PANEL BY MOISE     ertapenem Sensitive <=0.5 mcg/mL BACTERIAL SUSCEPTIBILITY PANEL BY MOISE     gentamicin Sensitive <=2 mcg/mL BACTERIAL SUSCEPTIBILITY  albuterol, benzonatate, glucose, dextrose bolus **OR** dextrose bolus, glucagon (rDNA), dextrose    Imaging:   XR CHEST (2 VW)   Final Result   Generalized increased pulmonary markings, with small pleural effusions,   increase interstitial markings in the lung bases, all suggesting interstitial   pulmonary edema and CHF.  Right lower lobe pneumonia cannot be excluded.             All pertinent images and reports for the current Hospitalization were reviewed by me.    IMPRESSION:    Patient Active Problem List   Diagnosis Code    Diabetes mellitus ([facility]) E11.9    Atherosclerosis of native coronary artery of native heart without angina pectoris I25.10    Morbid obesity due to excess calories ([facility]) E66.01    SOB (shortness of breath) R06.02    Abnormal chest CT R93.89    Back pain M54.9    Uncontrolled type 2 diabetes mellitus with hyperglycemia ([facility]) E11.65    Chronic back pain M54.9, G89.29    Chronic combined systolic (congestive) and diastolic (congestive) heart failure ([facility]) I50.42    Hyperlipidemia E78.5    Primary hypertension I10    Leukocytosis D72.829    Chronic bronchitis, simple ([facility]) J41.0    Chronic rhinitis J31.0    Generalized weakness R53.1    WEI (acute kidney injury) ([facility]) N17.9    DMII (diabetes mellitus, type 2) ([facility]) E11.9    Supratherapeutic INR R79.1    Tachycardia R00.0    Tachypnea R06.82    Non morbid obesity due to excess calories E66.09    Fatigue R53.83    Cough productive of clear sputum R05.8    History of coronary artery bypass graft Z95.1    Abrasion of right elbow S50.311A    Paroxysmal atrial fibrillation ([facility]) I48.0    Chronic pulmonary aspiration T17.908A    Atypical pneumonia J18.9    Pleural effusion, bilateral J90    Longstanding persistent atrial fibrillation ([facility]) I48.11    Enterobacter cloacae pneumonia ([facility]) J15.69    ESBL (extended spectrum beta-lactamase) producing bacteria infection A49.9, Z16.12    Abnormal CT of the chest R93.89    Elevated erythrocyte sedimentation rate

## 2024-05-11 PROBLEM — J15.0 PNEUMONIA OF RIGHT LOWER LOBE DUE TO KLEBSIELLA PNEUMONIAE (HCC): Status: ACTIVE | Noted: 2024-05-11

## 2024-05-11 LAB
ANION GAP SERPL CALCULATED.3IONS-SCNC: 9 MMOL/L (ref 3–16)
BACTERIA BLD CULT ORG #2: NORMAL
BACTERIA BLD CULT: NORMAL
BASOPHILS # BLD: 0.1 K/UL (ref 0–0.2)
BASOPHILS NFR BLD: 0.9 %
BUN SERPL-MCNC: 16 MG/DL (ref 7–20)
CALCIUM SERPL-MCNC: 8.7 MG/DL (ref 8.3–10.6)
CHLORIDE SERPL-SCNC: 100 MMOL/L (ref 99–110)
CO2 SERPL-SCNC: 31 MMOL/L (ref 21–32)
CREAT SERPL-MCNC: 1 MG/DL (ref 0.8–1.3)
DEPRECATED RDW RBC AUTO: 17 % (ref 12.4–15.4)
EOSINOPHIL # BLD: 0.3 K/UL (ref 0–0.6)
EOSINOPHIL NFR BLD: 2.6 %
GFR SERPLBLD CREATININE-BSD FMLA CKD-EPI: 77 ML/MIN/{1.73_M2}
GLUCOSE BLD-MCNC: 215 MG/DL (ref 70–99)
GLUCOSE BLD-MCNC: 294 MG/DL (ref 70–99)
GLUCOSE BLD-MCNC: 311 MG/DL (ref 70–99)
GLUCOSE BLD-MCNC: 378 MG/DL (ref 70–99)
GLUCOSE BLD-MCNC: 437 MG/DL (ref 70–99)
GLUCOSE SERPL-MCNC: 198 MG/DL (ref 70–99)
HCT VFR BLD AUTO: 36.9 % (ref 40.5–52.5)
HGB BLD-MCNC: 11.7 G/DL (ref 13.5–17.5)
LYMPHOCYTES # BLD: 2.2 K/UL (ref 1–5.1)
LYMPHOCYTES NFR BLD: 22.6 %
MAGNESIUM SERPL-MCNC: 2 MG/DL (ref 1.8–2.4)
MCH RBC QN AUTO: 25.8 PG (ref 26–34)
MCHC RBC AUTO-ENTMCNC: 31.8 G/DL (ref 31–36)
MCV RBC AUTO: 81.3 FL (ref 80–100)
MONOCYTES # BLD: 1.2 K/UL (ref 0–1.3)
MONOCYTES NFR BLD: 12 %
NEUTROPHILS # BLD: 5.9 K/UL (ref 1.7–7.7)
NEUTROPHILS NFR BLD: 61.9 %
PERFORMED ON: ABNORMAL
PLATELET # BLD AUTO: 294 K/UL (ref 135–450)
PMV BLD AUTO: 7.3 FL (ref 5–10.5)
POTASSIUM SERPL-SCNC: 3.3 MMOL/L (ref 3.5–5.1)
RBC # BLD AUTO: 4.54 M/UL (ref 4.2–5.9)
SODIUM SERPL-SCNC: 140 MMOL/L (ref 136–145)
WBC # BLD AUTO: 9.6 K/UL (ref 4–11)

## 2024-05-11 PROCEDURE — 99232 SBSQ HOSP IP/OBS MODERATE 35: CPT | Performed by: INTERNAL MEDICINE

## 2024-05-11 PROCEDURE — 6370000000 HC RX 637 (ALT 250 FOR IP): Performed by: STUDENT IN AN ORGANIZED HEALTH CARE EDUCATION/TRAINING PROGRAM

## 2024-05-11 PROCEDURE — 6360000002 HC RX W HCPCS: Performed by: STUDENT IN AN ORGANIZED HEALTH CARE EDUCATION/TRAINING PROGRAM

## 2024-05-11 PROCEDURE — 94640 AIRWAY INHALATION TREATMENT: CPT

## 2024-05-11 PROCEDURE — 6370000000 HC RX 637 (ALT 250 FOR IP)

## 2024-05-11 PROCEDURE — 83735 ASSAY OF MAGNESIUM: CPT

## 2024-05-11 PROCEDURE — 85025 COMPLETE CBC W/AUTO DIFF WBC: CPT

## 2024-05-11 PROCEDURE — 1200000000 HC SEMI PRIVATE

## 2024-05-11 PROCEDURE — 80048 BASIC METABOLIC PNL TOTAL CA: CPT

## 2024-05-11 PROCEDURE — 36415 COLL VENOUS BLD VENIPUNCTURE: CPT

## 2024-05-11 PROCEDURE — 94760 N-INVAS EAR/PLS OXIMETRY 1: CPT

## 2024-05-11 PROCEDURE — 6360000002 HC RX W HCPCS: Performed by: INTERNAL MEDICINE

## 2024-05-11 PROCEDURE — 2580000003 HC RX 258: Performed by: INTERNAL MEDICINE

## 2024-05-11 PROCEDURE — 6370000000 HC RX 637 (ALT 250 FOR IP): Performed by: INTERNAL MEDICINE

## 2024-05-11 PROCEDURE — 2580000003 HC RX 258: Performed by: STUDENT IN AN ORGANIZED HEALTH CARE EDUCATION/TRAINING PROGRAM

## 2024-05-11 PROCEDURE — 94669 MECHANICAL CHEST WALL OSCILL: CPT

## 2024-05-11 RX ORDER — POTASSIUM CHLORIDE 20 MEQ/1
20 TABLET, EXTENDED RELEASE ORAL ONCE
Status: COMPLETED | OUTPATIENT
Start: 2024-05-11 | End: 2024-05-11

## 2024-05-11 RX ORDER — INSULIN LISPRO 100 [IU]/ML
10 INJECTION, SOLUTION INTRAVENOUS; SUBCUTANEOUS ONCE
Status: COMPLETED | OUTPATIENT
Start: 2024-05-11 | End: 2024-05-11

## 2024-05-11 RX ADMIN — IPRATROPIUM BROMIDE AND ALBUTEROL SULFATE 1 DOSE: 2.5; .5 SOLUTION RESPIRATORY (INHALATION) at 12:03

## 2024-05-11 RX ADMIN — ROFLUMILAST 500 MCG: 500 TABLET ORAL at 09:01

## 2024-05-11 RX ADMIN — CEFEPIME 2000 MG: 2 INJECTION, POWDER, FOR SOLUTION INTRAVENOUS at 21:22

## 2024-05-11 RX ADMIN — Medication 1 TABLET: at 09:02

## 2024-05-11 RX ADMIN — Medication 1000 UNITS: at 09:02

## 2024-05-11 RX ADMIN — SODIUM CHLORIDE SOLN NEBU 3% 4 ML: 3 NEBU SOLN at 08:24

## 2024-05-11 RX ADMIN — GUAIFENESIN 600 MG: 600 TABLET, EXTENDED RELEASE ORAL at 09:01

## 2024-05-11 RX ADMIN — BENZONATATE 100 MG: 100 CAPSULE ORAL at 18:36

## 2024-05-11 RX ADMIN — ATORVASTATIN CALCIUM 20 MG: 20 TABLET, FILM COATED ORAL at 21:23

## 2024-05-11 RX ADMIN — IPRATROPIUM BROMIDE AND ALBUTEROL SULFATE 1 DOSE: 2.5; .5 SOLUTION RESPIRATORY (INHALATION) at 15:40

## 2024-05-11 RX ADMIN — SODIUM CHLORIDE, PRESERVATIVE FREE 10 ML: 5 INJECTION INTRAVENOUS at 09:03

## 2024-05-11 RX ADMIN — INSULIN LISPRO 8 UNITS: 100 INJECTION, SOLUTION INTRAVENOUS; SUBCUTANEOUS at 12:47

## 2024-05-11 RX ADMIN — ASPIRIN 81 MG: 81 TABLET, CHEWABLE ORAL at 09:01

## 2024-05-11 RX ADMIN — IPRATROPIUM BROMIDE AND ALBUTEROL SULFATE 1 DOSE: 2.5; .5 SOLUTION RESPIRATORY (INHALATION) at 19:26

## 2024-05-11 RX ADMIN — PRAMIPEXOLE DIHYDROCHLORIDE 0.25 MG: 0.5 TABLET ORAL at 21:23

## 2024-05-11 RX ADMIN — CEFEPIME 2000 MG: 2 INJECTION, POWDER, FOR SOLUTION INTRAVENOUS at 14:26

## 2024-05-11 RX ADMIN — LAMOTRIGINE 75 MG: 25 TABLET ORAL at 09:02

## 2024-05-11 RX ADMIN — TOBRAMYCIN 300 MG: 300 SOLUTION RESPIRATORY (INHALATION) at 08:24

## 2024-05-11 RX ADMIN — SODIUM CHLORIDE SOLN NEBU 3% 4 ML: 3 NEBU SOLN at 15:40

## 2024-05-11 RX ADMIN — FUROSEMIDE 80 MG: 40 TABLET ORAL at 09:01

## 2024-05-11 RX ADMIN — CARVEDILOL 3.12 MG: 3.12 TABLET, FILM COATED ORAL at 17:12

## 2024-05-11 RX ADMIN — SODIUM CHLORIDE SOLN NEBU 3% 4 ML: 3 NEBU SOLN at 19:26

## 2024-05-11 RX ADMIN — PRAMIPEXOLE DIHYDROCHLORIDE 0.25 MG: 0.5 TABLET ORAL at 09:01

## 2024-05-11 RX ADMIN — PRAMIPEXOLE DIHYDROCHLORIDE 0.25 MG: 0.5 TABLET ORAL at 12:46

## 2024-05-11 RX ADMIN — APIXABAN 5 MG: 5 TABLET, FILM COATED ORAL at 09:02

## 2024-05-11 RX ADMIN — INSULIN GLARGINE 25 UNITS: 100 INJECTION, SOLUTION SUBCUTANEOUS at 09:03

## 2024-05-11 RX ADMIN — INSULIN LISPRO 2 UNITS: 100 INJECTION, SOLUTION INTRAVENOUS; SUBCUTANEOUS at 08:54

## 2024-05-11 RX ADMIN — INSULIN GLARGINE 25 UNITS: 100 INJECTION, SOLUTION SUBCUTANEOUS at 21:23

## 2024-05-11 RX ADMIN — LAMOTRIGINE 100 MG: 100 TABLET ORAL at 21:24

## 2024-05-11 RX ADMIN — PRAMIPEXOLE DIHYDROCHLORIDE 0.25 MG: 0.5 TABLET ORAL at 17:12

## 2024-05-11 RX ADMIN — GABAPENTIN 800 MG: 400 CAPSULE ORAL at 09:01

## 2024-05-11 RX ADMIN — INSULIN LISPRO 10 UNITS: 100 INJECTION, SOLUTION INTRAVENOUS; SUBCUTANEOUS at 14:26

## 2024-05-11 RX ADMIN — INSULIN LISPRO 4 UNITS: 100 INJECTION, SOLUTION INTRAVENOUS; SUBCUTANEOUS at 17:13

## 2024-05-11 RX ADMIN — SODIUM CHLORIDE, PRESERVATIVE FREE 10 ML: 5 INJECTION INTRAVENOUS at 21:24

## 2024-05-11 RX ADMIN — GABAPENTIN 800 MG: 400 CAPSULE ORAL at 21:23

## 2024-05-11 RX ADMIN — POTASSIUM CHLORIDE 20 MEQ: 1500 TABLET, EXTENDED RELEASE ORAL at 09:01

## 2024-05-11 RX ADMIN — APIXABAN 5 MG: 5 TABLET, FILM COATED ORAL at 21:23

## 2024-05-11 RX ADMIN — INSULIN LISPRO 4 UNITS: 100 INJECTION, SOLUTION INTRAVENOUS; SUBCUTANEOUS at 21:22

## 2024-05-11 RX ADMIN — CARVEDILOL 3.12 MG: 3.12 TABLET, FILM COATED ORAL at 09:02

## 2024-05-11 RX ADMIN — TOBRAMYCIN 300 MG: 300 SOLUTION RESPIRATORY (INHALATION) at 19:26

## 2024-05-11 RX ADMIN — GUAIFENESIN 600 MG: 600 TABLET, EXTENDED RELEASE ORAL at 21:23

## 2024-05-11 RX ADMIN — GABAPENTIN 800 MG: 400 CAPSULE ORAL at 17:12

## 2024-05-11 RX ADMIN — PREDNISONE 40 MG: 20 TABLET ORAL at 09:02

## 2024-05-11 RX ADMIN — CEFEPIME 2000 MG: 2 INJECTION, POWDER, FOR SOLUTION INTRAVENOUS at 06:32

## 2024-05-11 RX ADMIN — METHOCARBAMOL 500 MG: 500 TABLET ORAL at 23:10

## 2024-05-11 RX ADMIN — SODIUM CHLORIDE SOLN NEBU 3% 4 ML: 3 NEBU SOLN at 12:04

## 2024-05-11 RX ADMIN — IPRATROPIUM BROMIDE AND ALBUTEROL SULFATE 1 DOSE: 2.5; .5 SOLUTION RESPIRATORY (INHALATION) at 08:23

## 2024-05-11 RX ADMIN — ACETAMINOPHEN 325MG 650 MG: 325 TABLET ORAL at 23:10

## 2024-05-11 RX ADMIN — GABAPENTIN 800 MG: 400 CAPSULE ORAL at 12:47

## 2024-05-11 ASSESSMENT — PAIN SCALES - GENERAL
PAINLEVEL_OUTOF10: 0
PAINLEVEL_OUTOF10: 5
PAINLEVEL_OUTOF10: 0
PAINLEVEL_OUTOF10: 0
PAINLEVEL_OUTOF10: 5
PAINLEVEL_OUTOF10: 0

## 2024-05-11 ASSESSMENT — PAIN DESCRIPTION - DESCRIPTORS: DESCRIPTORS: ACHING;DISCOMFORT

## 2024-05-11 ASSESSMENT — PAIN DESCRIPTION - LOCATION
LOCATION: BACK;LEG
LOCATION: BACK;LEG

## 2024-05-11 ASSESSMENT — PAIN DESCRIPTION - ORIENTATION: ORIENTATION: RIGHT;LEFT;LOWER

## 2024-05-11 NOTE — PROGRESS NOTES
Pt BS recheck, 437. MD notified. New order one time dose Humalog 10 units give. No c/o pain, breathes even and unlabored. Call light and chair alarm on. Plan of care ongoing. Electronically signed by Rocky Thomas RN on 5/11/2024 at 3:10 PM

## 2024-05-11 NOTE — PLAN OF CARE
Problem: Discharge Planning  Goal: Discharge to home or other facility with appropriate resources  5/11/2024 1200 by Rocky Thomas RN  Outcome: Progressing  5/11/2024 0347 by Ayala Chavez RN  Outcome: Progressing     Problem: Safety - Adult  Goal: Free from fall injury  5/11/2024 1200 by Rocky Thomas RN  Outcome: Progressing  5/11/2024 0347 by Ayala Chavez RN  Outcome: Progressing     Problem: Pain  Goal: Verbalizes/displays adequate comfort level or baseline comfort level  5/11/2024 1200 by Rocky Thomas RN  Outcome: Progressing  5/11/2024 0347 by Ayala Chavez RN  Outcome: Progressing     Problem: Cardiovascular - Adult  Goal: Maintains optimal cardiac output and hemodynamic stability  5/11/2024 1200 by Rocky Thomas RN  Outcome: Progressing  5/11/2024 0347 by Ayala Chavez RN  Outcome: Progressing  Goal: Absence of cardiac dysrhythmias or at baseline  5/11/2024 1200 by Rocky Thomas RN  Outcome: Progressing  5/11/2024 0347 by Ayala Chavez RN  Outcome: Progressing     Problem: Skin/Tissue Integrity - Adult  Goal: Skin integrity remains intact  5/11/2024 1200 by Rocky Thomas RN  Outcome: Progressing  5/11/2024 0347 by Ayala Chavez RN  Outcome: Progressing  Goal: Incisions, wounds, or drain sites healing without S/S of infection  5/11/2024 1200 by Rocky Thomas RN  Outcome: Progressing  5/11/2024 0347 by Ayala Chavez RN  Outcome: Progressing  Goal: Oral mucous membranes remain intact  5/11/2024 1200 by Rocky Thomas RN  Outcome: Progressing  5/11/2024 0347 by Ayala Chavez RN  Outcome: Progressing     Problem: Musculoskeletal - Adult  Goal: Return mobility to safest level of function  5/11/2024 1200 by Rocky Thomas RN  Outcome: Progressing  5/11/2024 0347 by Ayala Chavez RN  Outcome: Progressing  Goal: Maintain proper alignment of affected body part  5/11/2024 1200 by Rocky Thomas RN  Outcome:  Progressing  5/11/2024 0347 by Ayala Chavez RN  Outcome: Progressing  Goal: Return ADL status to a safe level of function  5/11/2024 1200 by Rocky Thomas RN  Outcome: Progressing  5/11/2024 0347 by Ayala Chavez RN  Outcome: Progressing     Problem: Infection - Adult  Goal: Absence of infection at discharge  5/11/2024 1200 by Rocky Thomas RN  Outcome: Progressing  5/11/2024 0347 by Ayala Chavez RN  Outcome: Progressing  Goal: Absence of infection during hospitalization  5/11/2024 1200 by Rocky Thomas RN  Outcome: Progressing  5/11/2024 0347 by Ayala Chavez RN  Outcome: Progressing  Goal: Absence of fever/infection during anticipated neutropenic period  5/11/2024 1200 by Rocky Thomas RN  Outcome: Progressing  5/11/2024 0347 by Ayala Chavez RN  Outcome: Progressing     Problem: Metabolic/Fluid and Electrolytes - Adult  Goal: Electrolytes maintained within normal limits  5/11/2024 1200 by Rocky Thomas RN  Outcome: Progressing  5/11/2024 0347 by Ayala Chavez RN  Outcome: Progressing  Goal: Hemodynamic stability and optimal renal function maintained  5/11/2024 1200 by Rocky Thomas RN  Outcome: Progressing  5/11/2024 0347 by Ayala Chavez RN  Outcome: Progressing  Goal: Glucose maintained within prescribed range  5/11/2024 1200 by Rocky Thomas RN  Outcome: Progressing  5/11/2024 0347 by Ayala Chavez RN  Outcome: Progressing     Problem: Respiratory - Adult  Goal: Achieves optimal ventilation and oxygenation  5/11/2024 1200 by Rocky Thomas RN  Outcome: Progressing  5/11/2024 0347 by Ayala Chavez RN  Outcome: Progressing     Problem: Neurosensory - Adult  Goal: Achieves stable or improved neurological status  5/11/2024 1200 by Rocky Thomas RN  Outcome: Progressing  5/11/2024 0347 by Ayala Chavez RN  Outcome: Progressing  Goal: Absence of seizures  5/11/2024 1200 by Rocky Thomas

## 2024-05-11 NOTE — PROGRESS NOTES
Pt in bed resting, A+O x4, breaths even and unlabored. Instructed to turn and offload off sacrum. States understanding and agrees. Pillow wedge used to right side lying. 15 min CHF education given, defiant, and uncooperative . No further questions at this time. Call light and side table in reach. Bed alarm on. Plan of care ongoing.

## 2024-05-11 NOTE — PROGRESS NOTES
Pt up to chair, . Humalog 8 units given per sliding scale. MD notified. PT A+O x4. Argumentative and agitated. Call light, side table, and urinal in reach. Chair alarm on. Plan of care ongoing.

## 2024-05-11 NOTE — PLAN OF CARE
Problem: Discharge Planning  Goal: Discharge to home or other facility with appropriate resources  Outcome: Progressing     Problem: Safety - Adult  Goal: Free from fall injury  Outcome: Progressing     Problem: Pain  Goal: Verbalizes/displays adequate comfort level or baseline comfort level  Outcome: Progressing     Problem: Cardiovascular - Adult  Goal: Maintains optimal cardiac output and hemodynamic stability  Outcome: Progressing  Goal: Absence of cardiac dysrhythmias or at baseline  Outcome: Progressing     Problem: Skin/Tissue Integrity - Adult  Goal: Skin integrity remains intact  Outcome: Progressing  Goal: Incisions, wounds, or drain sites healing without S/S of infection  Outcome: Progressing  Goal: Oral mucous membranes remain intact  Outcome: Progressing     Problem: Musculoskeletal - Adult  Goal: Return mobility to safest level of function  Outcome: Progressing  Goal: Maintain proper alignment of affected body part  Outcome: Progressing  Goal: Return ADL status to a safe level of function  Outcome: Progressing     Problem: Infection - Adult  Goal: Absence of infection at discharge  Outcome: Progressing  Goal: Absence of infection during hospitalization  Outcome: Progressing  Goal: Absence of fever/infection during anticipated neutropenic period  Outcome: Progressing     Problem: Metabolic/Fluid and Electrolytes - Adult  Goal: Electrolytes maintained within normal limits  Outcome: Progressing  Goal: Hemodynamic stability and optimal renal function maintained  Outcome: Progressing  Goal: Glucose maintained within prescribed range  Outcome: Progressing     Problem: Respiratory - Adult  Goal: Achieves optimal ventilation and oxygenation  Outcome: Progressing     Problem: Neurosensory - Adult  Goal: Achieves stable or improved neurological status  Outcome: Progressing  Goal: Absence of seizures  Outcome: Progressing  Goal: Remains free of injury related to seizures activity  Outcome: Progressing  Goal:

## 2024-05-11 NOTE — PROGRESS NOTES
Hospitalist Progress Note      PCP: Gallo Sanchez MD    Date of Admission: 5/7/2024    Chief Complaint: Shortness of breath    Hospital Course: Jorge Gauthier is a 78-year-old male medical history of COPD CHF atrial fibrillation, CAD, type 2 diabetes mellitus presented to the ED with complaint of 2 days of generalized weakness, increased shortness of breath, and productive cough.  His home health nurse found his saturations to be in the 80s on room air.  On ED arrival, patient was tachypneic, tachycardic with an elevated lactic acid level, meeting sepsis criteria.  On attempt to ambulate, patient was noted to be hypoxic requiring supplemental oxygen with no needs at baseline. Patient admitted for further evaluation of COPD exacerbation and potential pneumonia evident on imaging.  Pulmonology consulted. Suspect component of chronic aspiration due to non-compliance with recommended modified diet in addition to COPD exacerbation.  SLP consulted. Pneumonia panel positive for enterobacter and klebsiella. ID consulted for assistance. Ongoing treatment with inhalers, antibiotics and steroids.    Subjective: Patient seen lying in bed, states he is feeling okay today.  Had a significant coughing fit yesterday he said almost made him pass out.  Still having a productive cough.  Breathing does appear improved today.    Assessment/Plan:    Sepsis, POA  -criteria: HR, RR, LA  -suspected pulmonary source  -Blood cultures no growth to date, respiratory culture growing Klebsiella pneumonia and Enterobacter  -Sepsis state resolving    Acute hypoxic respiratory failure   -no baseline O2 requirements; currently weaned to room air 5/11  -likely secondary to combination of below   -monitor pulse ox, wean as able  -Will order home OT eval by RT prior to discharge if oxygen needs increase     Recurrent Enterobacter pneumonia  -respiratory culture sent  -started on IV vanc, cefepime, azithromycin per pulmonology  -pneumonia panel  nonpitting BLE edema  Neurologic:  Neurovascularly intact without any focal sensory/motor deficits.   Psychiatric: Alert and oriented, thought content appropriate, normal insight    Labs:   Recent Labs     05/09/24  0430 05/10/24  0420 05/11/24  0645   WBC 8.4 11.8* 9.6   HGB 11.4* 11.6* 11.7*   HCT 34.8* 36.2* 36.9*    265 294       Recent Labs     05/09/24  0430 05/10/24  0420 05/11/24  0645    141 140   K 4.0 3.9 3.3*    99 100   CO2 30 29 31   BUN 21* 20 16   CREATININE 1.1 0.9 1.0   CALCIUM 9.1 8.9 8.7       No results for input(s): \"AST\", \"ALT\", \"BILIDIR\", \"BILITOT\", \"ALKPHOS\" in the last 72 hours.      Urinalysis:      Lab Results   Component Value Date/Time    NITRU Negative 01/29/2024 10:20 PM    WBCUA 1 11/13/2023 11:34 AM    BACTERIA None Seen 11/13/2023 11:34 AM    RBCUA 3 11/13/2023 11:34 AM    BLOODU Negative 01/29/2024 10:20 PM    GLUCOSEU >=1000 01/29/2024 10:20 PM       Radiology:  XR CHEST (2 VW)   Final Result   Generalized increased pulmonary markings, with small pleural effusions,   increase interstitial markings in the lung bases, all suggesting interstitial   pulmonary edema and CHF.  Right lower lobe pneumonia cannot be excluded.             DVT Prophylaxis: Eliquis  Diet: ADULT DIET; Regular; 4 carb choices (60 gm/meal); No Added Salt (3-4 gm); Mildly Thick (Nectar); 1800 ml  Code Status: DNR-CCA    PT/OT Eval Status: Recommend home with PT/OT    Dispo -Home likely in 1-2 days pending PICC line placement, final IV antibiotic recommendations    Spring Steele PA-C      NOTE:  This report was transcribed using voice recognition software.  Every effort was made to ensure accuracy; however, inadvertent computerized transcription errors may be present.

## 2024-05-11 NOTE — PROGRESS NOTES
Patient  .Patient get schedule insulin glargine 25 units and lispro 4 units.And Notified for Nereida Guthrie NP.

## 2024-05-11 NOTE — PROGRESS NOTES
Pulmonary Progress Note    Date of Admission: 5/7/2024   LOS: 4 days     Chief Complaint   Patient presents with    Shortness of Breath    Hypoxia       Assessment/Plan:     Acute hypoxemic respiratory failure-resolved  -Tolerating room air    Acute exacerbation chronic bronchitis  -DuoNebs, Daliresp  -Cefepime/ADIA per ID    History of chronic aspiration    From pulmonary standpoint okay for discharge.  No further inpatient recommendations, we will sign off at this time.  Please let us know if we can be of any further assistance.     24 Hour Events/Subjective  No acute events overnight.  Now tolerating room air    ROS:   No nausea  No Vomiting  No chest pain      Intake/Output Summary (Last 24 hours) at 5/11/2024 1032  Last data filed at 5/11/2024 0913  Gross per 24 hour   Intake 1200 ml   Output 1850 ml   Net -650 ml         PHYSICAL EXAM:   Blood pressure 130/87, pulse 87, temperature 97.8 °F (36.6 °C), temperature source Oral, resp. rate 16, height 1.803 m (5' 11\"), weight 101.9 kg (224 lb 10.4 oz), SpO2 95 %.'  Gen:  No acute distress.   Resp:  No crackles. No wheezes. No rhonchi. No dullness on percussion.  CV: Regular rate. Regular rhythm. No murmur or rub. No edema.   Neuro:  no focal neurologic deficit.  Moves all extremities  Psych: Awake and alert  Mood stable.      Labs reviewed:  CBC:   Recent Labs     05/09/24 0430 05/10/24  0420 05/11/24  0645   WBC 8.4 11.8* 9.6   HGB 11.4* 11.6* 11.7*   HCT 34.8* 36.2* 36.9*   MCV 81.3 80.7 81.3    265 294     BMP:   Recent Labs     05/09/24  0430 05/10/24  0420 05/11/24  0645    141 140   K 4.0 3.9 3.3*    99 100   CO2 30 29 31   BUN 21* 20 16   CREATININE 1.1 0.9 1.0     LIVER PROFILE: No results for input(s): \"AST\", \"ALT\", \"LIPASE\", \"AMYLASE\", \"BILIDIR\", \"BILITOT\", \"ALKPHOS\" in the last 72 hours.    Invalid input(s): \"ALB\"  PT/INR: No results for input(s): \"PROTIME\", \"INR\" in the last 72 hours.        Films:  Radiology Review:  Pertinent

## 2024-05-12 LAB
ANION GAP SERPL CALCULATED.3IONS-SCNC: 12 MMOL/L (ref 3–16)
BUN SERPL-MCNC: 18 MG/DL (ref 7–20)
CALCIUM SERPL-MCNC: 9.3 MG/DL (ref 8.3–10.6)
CHLORIDE SERPL-SCNC: 99 MMOL/L (ref 99–110)
CO2 SERPL-SCNC: 27 MMOL/L (ref 21–32)
CREAT SERPL-MCNC: 0.9 MG/DL (ref 0.8–1.3)
GFR SERPLBLD CREATININE-BSD FMLA CKD-EPI: 87 ML/MIN/{1.73_M2}
GLUCOSE BLD-MCNC: 157 MG/DL (ref 70–99)
GLUCOSE BLD-MCNC: 281 MG/DL (ref 70–99)
GLUCOSE BLD-MCNC: 321 MG/DL (ref 70–99)
GLUCOSE BLD-MCNC: 340 MG/DL (ref 70–99)
GLUCOSE SERPL-MCNC: 196 MG/DL (ref 70–99)
PERFORMED ON: ABNORMAL
POTASSIUM SERPL-SCNC: 3.7 MMOL/L (ref 3.5–5.1)
SODIUM SERPL-SCNC: 138 MMOL/L (ref 136–145)

## 2024-05-12 PROCEDURE — 6370000000 HC RX 637 (ALT 250 FOR IP)

## 2024-05-12 PROCEDURE — 36415 COLL VENOUS BLD VENIPUNCTURE: CPT

## 2024-05-12 PROCEDURE — 6370000000 HC RX 637 (ALT 250 FOR IP): Performed by: STUDENT IN AN ORGANIZED HEALTH CARE EDUCATION/TRAINING PROGRAM

## 2024-05-12 PROCEDURE — 6360000002 HC RX W HCPCS: Performed by: STUDENT IN AN ORGANIZED HEALTH CARE EDUCATION/TRAINING PROGRAM

## 2024-05-12 PROCEDURE — 2580000003 HC RX 258: Performed by: INTERNAL MEDICINE

## 2024-05-12 PROCEDURE — 94640 AIRWAY INHALATION TREATMENT: CPT

## 2024-05-12 PROCEDURE — 6370000000 HC RX 637 (ALT 250 FOR IP): Performed by: INTERNAL MEDICINE

## 2024-05-12 PROCEDURE — 94760 N-INVAS EAR/PLS OXIMETRY 1: CPT

## 2024-05-12 PROCEDURE — 2580000003 HC RX 258: Performed by: STUDENT IN AN ORGANIZED HEALTH CARE EDUCATION/TRAINING PROGRAM

## 2024-05-12 PROCEDURE — 94669 MECHANICAL CHEST WALL OSCILL: CPT

## 2024-05-12 PROCEDURE — 6360000002 HC RX W HCPCS: Performed by: INTERNAL MEDICINE

## 2024-05-12 PROCEDURE — 1200000000 HC SEMI PRIVATE

## 2024-05-12 PROCEDURE — 80048 BASIC METABOLIC PNL TOTAL CA: CPT

## 2024-05-12 RX ORDER — SODIUM CHLORIDE FOR INHALATION 3 %
4 VIAL, NEBULIZER (ML) INHALATION
Status: DISCONTINUED | OUTPATIENT
Start: 2024-05-13 | End: 2024-05-13 | Stop reason: HOSPADM

## 2024-05-12 RX ORDER — INSULIN LISPRO 100 [IU]/ML
5 INJECTION, SOLUTION INTRAVENOUS; SUBCUTANEOUS
Status: DISCONTINUED | OUTPATIENT
Start: 2024-05-12 | End: 2024-05-13 | Stop reason: HOSPADM

## 2024-05-12 RX ADMIN — INSULIN LISPRO 5 UNITS: 100 INJECTION, SOLUTION INTRAVENOUS; SUBCUTANEOUS at 17:56

## 2024-05-12 RX ADMIN — INSULIN LISPRO 4 UNITS: 100 INJECTION, SOLUTION INTRAVENOUS; SUBCUTANEOUS at 21:46

## 2024-05-12 RX ADMIN — ASPIRIN 81 MG: 81 TABLET, CHEWABLE ORAL at 08:33

## 2024-05-12 RX ADMIN — PRAMIPEXOLE DIHYDROCHLORIDE 0.25 MG: 0.5 TABLET ORAL at 22:58

## 2024-05-12 RX ADMIN — INSULIN LISPRO 5 UNITS: 100 INJECTION, SOLUTION INTRAVENOUS; SUBCUTANEOUS at 12:23

## 2024-05-12 RX ADMIN — IPRATROPIUM BROMIDE AND ALBUTEROL SULFATE 1 DOSE: 2.5; .5 SOLUTION RESPIRATORY (INHALATION) at 12:29

## 2024-05-12 RX ADMIN — PRAMIPEXOLE DIHYDROCHLORIDE 0.25 MG: 0.5 TABLET ORAL at 08:34

## 2024-05-12 RX ADMIN — ATORVASTATIN CALCIUM 20 MG: 20 TABLET, FILM COATED ORAL at 21:45

## 2024-05-12 RX ADMIN — Medication 1 TABLET: at 08:33

## 2024-05-12 RX ADMIN — CEFEPIME 2000 MG: 2 INJECTION, POWDER, FOR SOLUTION INTRAVENOUS at 14:26

## 2024-05-12 RX ADMIN — SODIUM CHLORIDE SOLN NEBU 3% 4 ML: 3 NEBU SOLN at 20:23

## 2024-05-12 RX ADMIN — BENZONATATE 100 MG: 100 CAPSULE ORAL at 05:28

## 2024-05-12 RX ADMIN — CEFEPIME 2000 MG: 2 INJECTION, POWDER, FOR SOLUTION INTRAVENOUS at 21:44

## 2024-05-12 RX ADMIN — TOBRAMYCIN 300 MG: 300 SOLUTION RESPIRATORY (INHALATION) at 07:45

## 2024-05-12 RX ADMIN — SODIUM CHLORIDE SOLN NEBU 3% 4 ML: 3 NEBU SOLN at 15:43

## 2024-05-12 RX ADMIN — CEFEPIME 2000 MG: 2 INJECTION, POWDER, FOR SOLUTION INTRAVENOUS at 05:31

## 2024-05-12 RX ADMIN — APIXABAN 5 MG: 5 TABLET, FILM COATED ORAL at 21:45

## 2024-05-12 RX ADMIN — LAMOTRIGINE 100 MG: 100 TABLET ORAL at 21:45

## 2024-05-12 RX ADMIN — INSULIN LISPRO 4 UNITS: 100 INJECTION, SOLUTION INTRAVENOUS; SUBCUTANEOUS at 12:22

## 2024-05-12 RX ADMIN — APIXABAN 5 MG: 5 TABLET, FILM COATED ORAL at 08:35

## 2024-05-12 RX ADMIN — SODIUM CHLORIDE SOLN NEBU 3% 4 ML: 3 NEBU SOLN at 12:30

## 2024-05-12 RX ADMIN — INSULIN LISPRO 6 UNITS: 100 INJECTION, SOLUTION INTRAVENOUS; SUBCUTANEOUS at 17:56

## 2024-05-12 RX ADMIN — IPRATROPIUM BROMIDE AND ALBUTEROL SULFATE 1 DOSE: 2.5; .5 SOLUTION RESPIRATORY (INHALATION) at 15:43

## 2024-05-12 RX ADMIN — CARVEDILOL 3.12 MG: 3.12 TABLET, FILM COATED ORAL at 08:33

## 2024-05-12 RX ADMIN — INSULIN GLARGINE 25 UNITS: 100 INJECTION, SOLUTION SUBCUTANEOUS at 08:34

## 2024-05-12 RX ADMIN — CARVEDILOL 3.12 MG: 3.12 TABLET, FILM COATED ORAL at 17:16

## 2024-05-12 RX ADMIN — GUAIFENESIN 600 MG: 600 TABLET, EXTENDED RELEASE ORAL at 08:33

## 2024-05-12 RX ADMIN — PREDNISONE 40 MG: 20 TABLET ORAL at 08:33

## 2024-05-12 RX ADMIN — PRAMIPEXOLE DIHYDROCHLORIDE 0.25 MG: 0.5 TABLET ORAL at 12:27

## 2024-05-12 RX ADMIN — IPRATROPIUM BROMIDE AND ALBUTEROL SULFATE 1 DOSE: 2.5; .5 SOLUTION RESPIRATORY (INHALATION) at 07:45

## 2024-05-12 RX ADMIN — Medication 1000 UNITS: at 08:33

## 2024-05-12 RX ADMIN — INSULIN GLARGINE 25 UNITS: 100 INJECTION, SOLUTION SUBCUTANEOUS at 21:46

## 2024-05-12 RX ADMIN — PRAMIPEXOLE DIHYDROCHLORIDE 0.25 MG: 0.5 TABLET ORAL at 17:16

## 2024-05-12 RX ADMIN — GABAPENTIN 800 MG: 400 CAPSULE ORAL at 21:45

## 2024-05-12 RX ADMIN — FUROSEMIDE 80 MG: 40 TABLET ORAL at 08:33

## 2024-05-12 RX ADMIN — GABAPENTIN 800 MG: 400 CAPSULE ORAL at 08:34

## 2024-05-12 RX ADMIN — LAMOTRIGINE 75 MG: 25 TABLET ORAL at 08:33

## 2024-05-12 RX ADMIN — GUAIFENESIN 600 MG: 600 TABLET, EXTENDED RELEASE ORAL at 21:45

## 2024-05-12 RX ADMIN — TOBRAMYCIN 300 MG: 300 SOLUTION RESPIRATORY (INHALATION) at 20:22

## 2024-05-12 RX ADMIN — ROFLUMILAST 500 MCG: 500 TABLET ORAL at 08:33

## 2024-05-12 RX ADMIN — GABAPENTIN 800 MG: 400 CAPSULE ORAL at 17:16

## 2024-05-12 RX ADMIN — BENZONATATE 100 MG: 100 CAPSULE ORAL at 21:52

## 2024-05-12 RX ADMIN — IPRATROPIUM BROMIDE AND ALBUTEROL SULFATE 1 DOSE: 2.5; .5 SOLUTION RESPIRATORY (INHALATION) at 20:22

## 2024-05-12 RX ADMIN — SODIUM CHLORIDE SOLN NEBU 3% 4 ML: 3 NEBU SOLN at 07:55

## 2024-05-12 RX ADMIN — GABAPENTIN 800 MG: 400 CAPSULE ORAL at 12:22

## 2024-05-12 ASSESSMENT — PAIN DESCRIPTION - DESCRIPTORS: DESCRIPTORS: ACHING;DISCOMFORT;SPASM

## 2024-05-12 ASSESSMENT — PAIN DESCRIPTION - LOCATION: LOCATION: LEG

## 2024-05-12 ASSESSMENT — PAIN DESCRIPTION - PAIN TYPE: TYPE: CHRONIC PAIN

## 2024-05-12 ASSESSMENT — PAIN SCALES - GENERAL
PAINLEVEL_OUTOF10: 0
PAINLEVEL_OUTOF10: 3
PAINLEVEL_OUTOF10: 0

## 2024-05-12 ASSESSMENT — PAIN DESCRIPTION - ONSET: ONSET: ON-GOING

## 2024-05-12 ASSESSMENT — PAIN - FUNCTIONAL ASSESSMENT: PAIN_FUNCTIONAL_ASSESSMENT: ACTIVITIES ARE NOT PREVENTED

## 2024-05-12 ASSESSMENT — PAIN DESCRIPTION - ORIENTATION: ORIENTATION: RIGHT

## 2024-05-12 ASSESSMENT — PAIN DESCRIPTION - FREQUENCY: FREQUENCY: INTERMITTENT

## 2024-05-12 NOTE — PLAN OF CARE
Problem: Discharge Planning  Goal: Discharge to home or other facility with appropriate resources  Outcome: Progressing  Flowsheets (Taken 5/11/2024 1948)  Discharge to home or other facility with appropriate resources:   Identify barriers to discharge with patient and caregiver   Arrange for needed discharge resources and transportation as appropriate   Identify discharge learning needs (meds, wound care, etc)     Problem: Safety - Adult  Goal: Free from fall injury  Outcome: Progressing     Problem: Pain  Goal: Verbalizes/displays adequate comfort level or baseline comfort level  Outcome: Progressing  Flowsheets (Taken 5/11/2024 1948)  Verbalizes/displays adequate comfort level or baseline comfort level:   Encourage patient to monitor pain and request assistance   Administer analgesics based on type and severity of pain and evaluate response   Assess pain using appropriate pain scale   Implement non-pharmacological measures as appropriate and evaluate response   Consider cultural and social influences on pain and pain management     Problem: Cardiovascular - Adult  Goal: Maintains optimal cardiac output and hemodynamic stability  Outcome: Progressing  Flowsheets (Taken 5/11/2024 1948)  Maintains optimal cardiac output and hemodynamic stability:   Monitor blood pressure and heart rate   Monitor urine output and notify Licensed Independent Practitioner for values outside of normal range   Assess for signs of decreased cardiac output  Goal: Absence of cardiac dysrhythmias or at baseline  Outcome: Progressing  Flowsheets (Taken 5/11/2024 1948)  Absence of cardiac dysrhythmias or at baseline:   Monitor cardiac rate and rhythm   Assess for signs of decreased cardiac output     Problem: Skin/Tissue Integrity - Adult  Goal: Skin integrity remains intact  Outcome: Progressing  Flowsheets  Taken 5/12/2024 0635  Skin Integrity Remains Intact: Monitor for areas of redness and/or skin breakdown  Taken 5/11/2024 1948  Skin  maintained within prescribed range  Outcome: Progressing  Flowsheets (Taken 5/11/2024 1948)  Glucose maintained within prescribed range:   Monitor blood glucose as ordered   Assess for signs and symptoms of hyperglycemia and hypoglycemia   Administer ordered medications to maintain glucose within target range   Assess barriers to adequate nutritional intake and initiate nutrition consult as needed   Instruct patient on self management of diabetes and initiate consult as needed     Problem: Respiratory - Adult  Goal: Achieves optimal ventilation and oxygenation  Outcome: Progressing  Flowsheets (Taken 5/11/2024 1948)  Achieves optimal ventilation and oxygenation:   Assess for changes in respiratory status   Assess for changes in mentation and behavior   Position to facilitate oxygenation and minimize respiratory effort   Oxygen supplementation based on oxygen saturation or arterial blood gases     Problem: Neurosensory - Adult  Goal: Achieves stable or improved neurological status  Outcome: Progressing  Flowsheets (Taken 5/11/2024 1948)  Achieves stable or improved neurological status:   Assess for and report changes in neurological status   Initiate measures to prevent increased intracranial pressure  Goal: Absence of seizures  Outcome: Progressing  Goal: Remains free of injury related to seizures activity  Outcome: Progressing  Goal: Achieves maximal functionality and self care  Outcome: Progressing     Problem: Gastrointestinal - Adult  Goal: Minimal or absence of nausea and vomiting  Outcome: Progressing  Flowsheets (Taken 5/11/2024 1948)  Minimal or absence of nausea and vomiting:   Administer ordered antiemetic medications as needed   Provide nonpharmacologic comfort measures as appropriate  Goal: Maintains or returns to baseline bowel function  Outcome: Progressing  Flowsheets (Taken 5/11/2024 1948)  Maintains or returns to baseline bowel function:   Assess bowel function   Encourage oral fluids to ensure

## 2024-05-12 NOTE — PLAN OF CARE
Absence of fever/infection during anticipated neutropenic period  5/12/2024 1019 by Kevin Meehan RN  Outcome: Progressing  5/12/2024 0636 by Rocky Rice RN  Outcome: Progressing  Flowsheets (Taken 5/11/2024 1948)  Absence of fever/infection during anticipated neutropenic period: Monitor white blood cell count     Problem: Metabolic/Fluid and Electrolytes - Adult  Goal: Electrolytes maintained within normal limits  5/12/2024 1019 by Kevin Meehan RN  Outcome: Progressing  5/12/2024 0636 by Rocky Rice RN  Outcome: Progressing  Flowsheets (Taken 5/11/2024 1948)  Electrolytes maintained within normal limits:   Monitor labs and assess patient for signs and symptoms of electrolyte imbalances   Administer electrolyte replacement as ordered   Monitor response to electrolyte replacements, including repeat lab results as appropriate   Fluid restriction as ordered   Instruct patient on fluid and nutrition restrictions as appropriate  Goal: Hemodynamic stability and optimal renal function maintained  5/12/2024 1019 by Kevin Meehan RN  Outcome: Progressing  5/12/2024 0636 by Rocky Rice RN  Outcome: Progressing  Flowsheets (Taken 5/11/2024 1948)  Hemodynamic stability and optimal renal function maintained:   Monitor labs and assess for signs and symptoms of volume excess or deficit   Monitor intake, output and patient weight   Monitor response to interventions for patient's volume status, including labs, urine output, blood pressure (other measures as available)   Instruct patient on fluid and nutrition restrictions as appropriate  Goal: Glucose maintained within prescribed range  5/12/2024 1019 by Kevin Meehan RN  Outcome: Progressing  5/12/2024 0636 by Rocky Rice RN  Outcome: Progressing  Flowsheets (Taken 5/11/2024 1948)  Glucose maintained within prescribed range:   Monitor blood glucose as ordered   Assess for signs and symptoms of hyperglycemia and hypoglycemia   Administer ordered  medications to maintain glucose within target range   Assess barriers to adequate nutritional intake and initiate nutrition consult as needed   Instruct patient on self management of diabetes and initiate consult as needed     Problem: Respiratory - Adult  Goal: Achieves optimal ventilation and oxygenation  5/12/2024 1019 by Kevin Meehan RN  Outcome: Progressing  5/12/2024 0636 by Rocky Rice RN  Outcome: Progressing  Flowsheets (Taken 5/11/2024 1948)  Achieves optimal ventilation and oxygenation:   Assess for changes in respiratory status   Assess for changes in mentation and behavior   Position to facilitate oxygenation and minimize respiratory effort   Oxygen supplementation based on oxygen saturation or arterial blood gases     Problem: Neurosensory - Adult  Goal: Achieves stable or improved neurological status  5/12/2024 1019 by Kevin Meehan RN  Outcome: Progressing  5/12/2024 0636 by Rocky Rice RN  Outcome: Progressing  Flowsheets (Taken 5/11/2024 1948)  Achieves stable or improved neurological status:   Assess for and report changes in neurological status   Initiate measures to prevent increased intracranial pressure  Goal: Absence of seizures  5/12/2024 1019 by Kevin Meehan RN  Outcome: Progressing  5/12/2024 0636 by Rocky Rice RN  Outcome: Progressing  Goal: Remains free of injury related to seizures activity  5/12/2024 1019 by Kevin Meehan RN  Outcome: Progressing  5/12/2024 0636 by Rocky Rice RN  Outcome: Progressing  Goal: Achieves maximal functionality and self care  5/12/2024 1019 by Kevin Meehan RN  Outcome: Progressing  5/12/2024 0636 by Rocky Rice RN  Outcome: Progressing     Problem: Gastrointestinal - Adult  Goal: Minimal or absence of nausea and vomiting  5/12/2024 1019 by Kevin Meehan RN  Outcome: Progressing  5/12/2024 0636 by Rocky Rice RN  Outcome: Progressing  Flowsheets (Taken 5/11/2024 1948)  Minimal or absence of nausea and

## 2024-05-12 NOTE — PROGRESS NOTES
Hospitalist Progress Note      PCP: Gallo Sanchez MD    Date of Admission: 5/7/2024    Chief Complaint: Shortness of breath    Hospital Course: Jorge Gauthier is a 78-year-old male medical history of COPD CHF atrial fibrillation, CAD, type 2 diabetes mellitus presented to the ED with complaint of 2 days of generalized weakness, increased shortness of breath, and productive cough.  His home health nurse found his saturations to be in the 80s on room air.  On ED arrival, patient was tachypneic, tachycardic with an elevated lactic acid level, meeting sepsis criteria.  On attempt to ambulate, patient was noted to be hypoxic requiring supplemental oxygen with no needs at baseline. Patient admitted for further evaluation of COPD exacerbation and potential pneumonia evident on imaging.  Pulmonology consulted. Suspect component of chronic aspiration due to non-compliance with recommended modified diet in addition to COPD exacerbation.  SLP consulted. Pneumonia panel positive for enterobacter and klebsiella. ID consulted for assistance. Ongoing treatment with inhalers, antibiotics and steroids.    Subjective: Patient seen lying in bed, states he is feeling okay today, productive cough has slowed down but he still is bringing a lot up.  No shortness of breath, has remained stable on room air.  No fevers or chills overnight    Assessment/Plan:    Sepsis, POA  -criteria: HR, RR, LA  -suspected pulmonary source  -Blood cultures no growth to date, respiratory culture growing Klebsiella pneumonia and Enterobacter  -Sepsis state resolving    Acute hypoxic respiratory failure   -no baseline O2 requirements; currently weaned to room air 5/11  -likely secondary to combination of below   -monitor pulse ox, wean as able  -Will order home OT eval by RT prior to discharge if oxygen needs increase but has remained stable on room air for past 24 hours    Recurrent Enterobacter pneumonia  -respiratory culture sent  -started on IV

## 2024-05-13 VITALS
WEIGHT: 221 LBS | HEIGHT: 71 IN | SYSTOLIC BLOOD PRESSURE: 148 MMHG | BODY MASS INDEX: 30.94 KG/M2 | OXYGEN SATURATION: 98 % | DIASTOLIC BLOOD PRESSURE: 67 MMHG | TEMPERATURE: 97.5 F | RESPIRATION RATE: 16 BRPM | HEART RATE: 85 BPM

## 2024-05-13 LAB
GLUCOSE BLD-MCNC: 145 MG/DL (ref 70–99)
GLUCOSE BLD-MCNC: 145 MG/DL (ref 70–99)
GLUCOSE BLD-MCNC: 321 MG/DL (ref 70–99)
PERFORMED ON: ABNORMAL

## 2024-05-13 PROCEDURE — 97110 THERAPEUTIC EXERCISES: CPT

## 2024-05-13 PROCEDURE — 2580000003 HC RX 258: Performed by: STUDENT IN AN ORGANIZED HEALTH CARE EDUCATION/TRAINING PROGRAM

## 2024-05-13 PROCEDURE — 2580000003 HC RX 258: Performed by: INTERNAL MEDICINE

## 2024-05-13 PROCEDURE — 97530 THERAPEUTIC ACTIVITIES: CPT

## 2024-05-13 PROCEDURE — 36569 INSJ PICC 5 YR+ W/O IMAGING: CPT

## 2024-05-13 PROCEDURE — 6370000000 HC RX 637 (ALT 250 FOR IP)

## 2024-05-13 PROCEDURE — 94760 N-INVAS EAR/PLS OXIMETRY 1: CPT

## 2024-05-13 PROCEDURE — C1751 CATH, INF, PER/CENT/MIDLINE: HCPCS

## 2024-05-13 PROCEDURE — 02HV33Z INSERTION OF INFUSION DEVICE INTO SUPERIOR VENA CAVA, PERCUTANEOUS APPROACH: ICD-10-PCS | Performed by: INTERNAL MEDICINE

## 2024-05-13 PROCEDURE — 99232 SBSQ HOSP IP/OBS MODERATE 35: CPT | Performed by: INTERNAL MEDICINE

## 2024-05-13 PROCEDURE — 6360000002 HC RX W HCPCS: Performed by: STUDENT IN AN ORGANIZED HEALTH CARE EDUCATION/TRAINING PROGRAM

## 2024-05-13 PROCEDURE — 6370000000 HC RX 637 (ALT 250 FOR IP): Performed by: STUDENT IN AN ORGANIZED HEALTH CARE EDUCATION/TRAINING PROGRAM

## 2024-05-13 PROCEDURE — 6360000002 HC RX W HCPCS: Performed by: INTERNAL MEDICINE

## 2024-05-13 PROCEDURE — 94640 AIRWAY INHALATION TREATMENT: CPT

## 2024-05-13 PROCEDURE — 6370000000 HC RX 637 (ALT 250 FOR IP): Performed by: INTERNAL MEDICINE

## 2024-05-13 PROCEDURE — 94669 MECHANICAL CHEST WALL OSCILL: CPT

## 2024-05-13 RX ORDER — SODIUM CHLORIDE 9 MG/ML
25 INJECTION, SOLUTION INTRAVENOUS PRN
Status: DISCONTINUED | OUTPATIENT
Start: 2024-05-13 | End: 2024-05-13 | Stop reason: HOSPADM

## 2024-05-13 RX ORDER — GUAIFENESIN 600 MG/1
600 TABLET, EXTENDED RELEASE ORAL 2 TIMES DAILY
Qty: 14 TABLET | Refills: 0 | Status: SHIPPED | OUTPATIENT
Start: 2024-05-13

## 2024-05-13 RX ORDER — TOBRAMYCIN INHALATION SOLUTION 300 MG/5ML
300 INHALANT RESPIRATORY (INHALATION)
Qty: 100 ML | Refills: 0 | Status: SHIPPED | OUTPATIENT
Start: 2024-05-13 | End: 2024-05-13

## 2024-05-13 RX ORDER — SODIUM CHLORIDE 0.9 % (FLUSH) 0.9 %
5-40 SYRINGE (ML) INJECTION EVERY 12 HOURS SCHEDULED
Status: DISCONTINUED | OUTPATIENT
Start: 2024-05-13 | End: 2024-05-13 | Stop reason: HOSPADM

## 2024-05-13 RX ORDER — LIDOCAINE HYDROCHLORIDE 10 MG/ML
5 INJECTION, SOLUTION EPIDURAL; INFILTRATION; INTRACAUDAL; PERINEURAL ONCE
Status: DISCONTINUED | OUTPATIENT
Start: 2024-05-13 | End: 2024-05-13 | Stop reason: HOSPADM

## 2024-05-13 RX ORDER — TOBRAMYCIN INHALATION SOLUTION 300 MG/5ML
300 INHALANT RESPIRATORY (INHALATION)
Qty: 100 ML | Refills: 0 | Status: SHIPPED | OUTPATIENT
Start: 2024-05-13 | End: 2024-05-23

## 2024-05-13 RX ORDER — SODIUM CHLORIDE 0.9 % (FLUSH) 0.9 %
5-40 SYRINGE (ML) INJECTION PRN
Status: DISCONTINUED | OUTPATIENT
Start: 2024-05-13 | End: 2024-05-13 | Stop reason: HOSPADM

## 2024-05-13 RX ADMIN — ACETAMINOPHEN 325MG 650 MG: 325 TABLET ORAL at 01:40

## 2024-05-13 RX ADMIN — INSULIN LISPRO 5 UNITS: 100 INJECTION, SOLUTION INTRAVENOUS; SUBCUTANEOUS at 17:01

## 2024-05-13 RX ADMIN — SODIUM CHLORIDE SOLN NEBU 3% 4 ML: 3 NEBU SOLN at 11:51

## 2024-05-13 RX ADMIN — ASPIRIN 81 MG: 81 TABLET, CHEWABLE ORAL at 09:13

## 2024-05-13 RX ADMIN — APIXABAN 5 MG: 5 TABLET, FILM COATED ORAL at 09:13

## 2024-05-13 RX ADMIN — GUAIFENESIN 600 MG: 600 TABLET, EXTENDED RELEASE ORAL at 09:12

## 2024-05-13 RX ADMIN — GABAPENTIN 800 MG: 400 CAPSULE ORAL at 13:14

## 2024-05-13 RX ADMIN — PRAMIPEXOLE DIHYDROCHLORIDE 0.25 MG: 0.5 TABLET ORAL at 13:19

## 2024-05-13 RX ADMIN — INSULIN GLARGINE 25 UNITS: 100 INJECTION, SOLUTION SUBCUTANEOUS at 09:13

## 2024-05-13 RX ADMIN — PRAMIPEXOLE DIHYDROCHLORIDE 0.25 MG: 0.5 TABLET ORAL at 17:01

## 2024-05-13 RX ADMIN — CEFEPIME 2000 MG: 2 INJECTION, POWDER, FOR SOLUTION INTRAVENOUS at 05:30

## 2024-05-13 RX ADMIN — CEFEPIME 2000 MG: 2 INJECTION, POWDER, FOR SOLUTION INTRAVENOUS at 13:14

## 2024-05-13 RX ADMIN — GABAPENTIN 800 MG: 400 CAPSULE ORAL at 09:13

## 2024-05-13 RX ADMIN — SODIUM CHLORIDE SOLN NEBU 3% 4 ML: 3 NEBU SOLN at 17:04

## 2024-05-13 RX ADMIN — METHOCARBAMOL 500 MG: 500 TABLET ORAL at 01:40

## 2024-05-13 RX ADMIN — ROFLUMILAST 500 MCG: 500 TABLET ORAL at 09:21

## 2024-05-13 RX ADMIN — FUROSEMIDE 80 MG: 40 TABLET ORAL at 09:13

## 2024-05-13 RX ADMIN — Medication 1 TABLET: at 09:12

## 2024-05-13 RX ADMIN — CARVEDILOL 3.12 MG: 3.12 TABLET, FILM COATED ORAL at 09:13

## 2024-05-13 RX ADMIN — GABAPENTIN 800 MG: 400 CAPSULE ORAL at 17:00

## 2024-05-13 RX ADMIN — LAMOTRIGINE 75 MG: 25 TABLET ORAL at 09:12

## 2024-05-13 RX ADMIN — IPRATROPIUM BROMIDE AND ALBUTEROL SULFATE 1 DOSE: 2.5; .5 SOLUTION RESPIRATORY (INHALATION) at 08:08

## 2024-05-13 RX ADMIN — TOBRAMYCIN 300 MG: 300 SOLUTION RESPIRATORY (INHALATION) at 08:28

## 2024-05-13 RX ADMIN — IPRATROPIUM BROMIDE AND ALBUTEROL SULFATE 1 DOSE: 2.5; .5 SOLUTION RESPIRATORY (INHALATION) at 17:04

## 2024-05-13 RX ADMIN — INSULIN LISPRO 5 UNITS: 100 INJECTION, SOLUTION INTRAVENOUS; SUBCUTANEOUS at 11:55

## 2024-05-13 RX ADMIN — SODIUM CHLORIDE SOLN NEBU 3% 4 ML: 3 NEBU SOLN at 08:18

## 2024-05-13 RX ADMIN — BENZONATATE 100 MG: 100 CAPSULE ORAL at 09:20

## 2024-05-13 RX ADMIN — Medication 10 ML: at 13:15

## 2024-05-13 RX ADMIN — INSULIN LISPRO 5 UNITS: 100 INJECTION, SOLUTION INTRAVENOUS; SUBCUTANEOUS at 09:13

## 2024-05-13 RX ADMIN — CARVEDILOL 3.12 MG: 3.12 TABLET, FILM COATED ORAL at 17:00

## 2024-05-13 RX ADMIN — IPRATROPIUM BROMIDE AND ALBUTEROL SULFATE 1 DOSE: 2.5; .5 SOLUTION RESPIRATORY (INHALATION) at 11:41

## 2024-05-13 RX ADMIN — Medication 1000 UNITS: at 09:12

## 2024-05-13 RX ADMIN — PRAMIPEXOLE DIHYDROCHLORIDE 0.25 MG: 0.5 TABLET ORAL at 09:20

## 2024-05-13 RX ADMIN — INSULIN LISPRO 6 UNITS: 100 INJECTION, SOLUTION INTRAVENOUS; SUBCUTANEOUS at 11:55

## 2024-05-13 ASSESSMENT — PAIN DESCRIPTION - LOCATION
LOCATION: LEG
LOCATION: LEG

## 2024-05-13 ASSESSMENT — PAIN SCALES - GENERAL
PAINLEVEL_OUTOF10: 3
PAINLEVEL_OUTOF10: 0
PAINLEVEL_OUTOF10: 3
PAINLEVEL_OUTOF10: 0

## 2024-05-13 ASSESSMENT — PAIN - FUNCTIONAL ASSESSMENT: PAIN_FUNCTIONAL_ASSESSMENT: ACTIVITIES ARE NOT PREVENTED

## 2024-05-13 ASSESSMENT — PAIN DESCRIPTION - ORIENTATION
ORIENTATION: RIGHT
ORIENTATION: RIGHT

## 2024-05-13 ASSESSMENT — PAIN DESCRIPTION - DESCRIPTORS
DESCRIPTORS: ACHING;DISCOMFORT
DESCRIPTORS: ACHING;DISCOMFORT;CRAMPING;SPASM

## 2024-05-13 ASSESSMENT — PAIN DESCRIPTION - PAIN TYPE: TYPE: CHRONIC PAIN

## 2024-05-13 NOTE — CARE COORDINATION
Case Management Discharge Note          Date / Time of Note: 5/13/2024 3:18 PM                  Patient Name: Jorge Gauthier   YOB: 1945  Diagnosis: Activity of daily living alteration [Z78.9]  Severe sepsis (HCC) [A41.9, R65.20]  Sepsis due to pneumonia (HCC) [J18.9, A41.9]  Pneumonia of right lower lobe due to infectious organism [J18.9]   Date / Time: 5/7/2024  3:22 PM    Financial:  Payor: MEDICARE / Plan: MEDICARE PART A AND B / Product Type: *No Product type* /      Pharmacy:    [id number] [place], [place] - [address] -  [phone] - F [phone]  [address] [address]  Phone: [phone] Fax: [phone]    [facility] [place] [address] - P [phone] - F [phone]  [facility]  [address]  [facility] [address]  Phone: [phone] Fax: [phone]      Assistance purchasing medications?: Potential Assistance Purchasing Medications: No  Assistance provided by Case Management: None at this time    DISCHARGE Disposition: Home with Home Health Care      Home Care:  Home Care ordered at discharge: Yes  Home Care Agency: [facility]  Phone: [phone]  Fax: [phone]  Orders faxed: No Orders pulled per Epic access    [facility]  [address] [place] [address]  Phone: [phone]  Fax: [phone]  Orders called to pharmacist    Durable Medical Equipment:  DME Provider: None  Transportation:  Transportation PLAN for discharge: family   Mode of Transport: Private Car  Reason for medical transport: Not Applicable  Name of Transport Company: Not Applicable  Time of Transport: When ready    Transport form completed: Not Indicated    IMM Completed:   Yes, Case management has presented and reviewed IMM letter #2.           .   Patient and/or family/POA verbalized understanding of their medicare rights and appeal process if

## 2024-05-13 NOTE — PROGRESS NOTES
Discharge instructions, medications, side effects and follow ups reviewed at bedside with patient. IV removed. PICC line remains. Picc flushed and capped. Vitals taken and stable. Patient belongings packed at bedside. Wife to pick patient up around dinnertime.

## 2024-05-13 NOTE — PROGRESS NOTES
Arrived to place PICC after chart review. Pre-procedure and timeout done with PETRA Caro. Discussed limitations of placement and allergies. Consent confirmed. Procedure explained to pt, including risks and benefits. All questions answered. Pt verbalized understanding.      Vessels easily collapsible upon assessment. No difficulty accessing L brachial vein. Blood free flowing and non-pulsatile. Guidewire, introducer, and catheter all easily inserted. PICC placement verified using 3CG technology as evidenced by peaked P-waves with no initial deflection. Line has brisk blood return and flushes easily.     OK to use PICC. Please use new IV tubing when connecting to the newly placed central line.      Patient tolerated sterile procedure well. Bed left in low position with belongings and call light within reach. Educated on line care. Handoff to bedside RN.      Please call with any questions or concerns. The  will direct you to the PICC RN that is on call.      [phone]

## 2024-05-13 NOTE — CARE COORDINATION
Discharge Planning:     Discharging to Facility/ Agency   Name:  [facility]    Address: [address] [place] [address]  Phone: [phone]  Fax: [phone]     Received call from Raphael with [facility] confirming that patient is active with them and they will follow at discharge.  Call placed to [facility] pharmacist at [phone] and verbal order given for home IV abx.  Pt with single lumen PICC in place.       Electronically signed by Lynda Mendiola RN on 5/13/2024 at 3:15 PM

## 2024-05-13 NOTE — PROGRESS NOTES
Occupational Therapy  Facility/Department: [address] MED SURG  Occupational Therapy Daily Treatment Note    Name: Jorge Gauthier  : 1945  MRN: [id number]  Date of Service: 2024    Discharge Recommendations:  Home with Home health OT, 24 hour supervision or assist      Jorge Gauthier scored a  on the AM-PAC ADL Inpatient form. Current research shows that an AM-PAC score of 18 or greater is typically associated with a discharge to the patient's home setting. Based on the patient's AM-PAC score, and their current ADL deficits, it is recommended that the patient have 2-3 sessions per week of Occupational Therapy at d/c to increase the patient's independence.  At this time, this patient demonstrates the endurance and safety to discharge home with wife's assist and a HHOT follow up treatment frequency of 2-3x/wk.   Please see assessment section for further patient specific details.    If patient discharges prior to next session this note will serve as a discharge summary.  Please see below for the latest assessment towards goals.      Patient Diagnosis(es): The primary encounter diagnosis was Pneumonia of right lower lobe due to infectious organism. Diagnoses of Activity of daily living alteration and Severe sepsis (HCC) were also pertinent to this visit.  Past Medical History:  has a past medical history of Abrasion of right elbow, Arthritis, CAD (coronary artery disease), Chronic back pain, Chronic systolic CHF (congestive heart failure) (HCC), COPD (chronic obstructive pulmonary disease) (HCC), Dental disease, Diabetes mellitus (HCC), Essential hypertension, Hearing loss, Paroxysmal atrial fibrillation (HCC), and Tinnitus.  Past Surgical History:  has a past surgical history that includes fracture surgery (Left, ); back surgery (); Cardiac surgery (); Coronary artery bypass graft; back surgery; Cervical spine surgery; chest tube insertion (Right, 2023); and IR CHEST TUBE INSERTION  Yes  Additional Pertinent Hx: Patient is a 78-year-old male with a past medical history of COPD, chronic pulmonary aspiration, ESBL, diabetes mellitus type 2, neuropathy, CHF, atrial fibrillation, hyperlipidemia who presents to the ED with c/o SOB. Pt admitted with severe sepsis d/t PNA, acute COPD exacerbation.  Family / Caregiver Present: No  Referring Practitioner: Juan Pablo Hernandes MD  Diagnosis: severe sepsis d/t PNA, acute COPD exacerbation  Subjective  Subjective: Pt met b/s for OT tx. Pt seated in recliner sleeping on arrival, awakens to call of name and agreeable to participate in therapy.     Social/Functional History  Social/Functional History  Lives With: Spouse  Type of Home: House  Home Layout: Two level, Performs ADL's on one level, Able to Live on Main level with bedroom/bathroom (2 steps down to the \"man cave\", B rails; Two-Story with basement, pt doesn't go upstairs)  Home Access: Level entry  Entrance Stairs - Number of Steps: level entry into home, 2 SULY home from man cave into the house  Entrance Stairs - Rails: Both  Bathroom Shower/Tub: Walk-in shower (new shower installed, has a nonskid mat)  Bathroom Toilet: Handicap height  Bathroom Equipment: Grab bars in shower, Built-in shower seat, Grab bars around toilet (current shower seats are too low)  Bathroom Accessibility: Accessible  Home Equipment: Cane, Electric scooter, Lift chair, Walker, rolling, Rollator (the pt has multiple walkers and canes thru-out the house on the different levels of the house)  Has the patient had two or more falls in the past year or any fall with injury in the past year?: Yes (fell in the shower ~2.5 weeks ago)  Receives Help From: Home health ([facility] for OT/PT)  ADL Assistance: Independent  Homemaking Assistance: Needs assistance (wife does all; sets up meds for the pt)  Ambulation Assistance: Independent (with RW in home, electric scooter when out; uses cane for short hallway only)  Transfer Assistance:

## 2024-05-13 NOTE — PROGRESS NOTES
Pt continues on ATB for PNA. . Has a persistent cough which he states has been productive. Is receiving Mucinex. Is sitting in recliner at this time which is where he stays at all times. Will not lie on the bed. Assisted with bed bath this am . Does get slightly dyspneic with exertion. . Is on room air.

## 2024-05-13 NOTE — PROGRESS NOTES
Picc line order.   Consent obtained and questionnaire completed.   Dynamic access notified.   Patient in bed.

## 2024-05-13 NOTE — PLAN OF CARE
Problem: Discharge Planning  Goal: Discharge to home or other facility with appropriate resources  Outcome: Progressing  Flowsheets (Taken 5/12/2024 2020)  Discharge to home or other facility with appropriate resources:   Identify barriers to discharge with patient and caregiver   Arrange for needed discharge resources and transportation as appropriate   Identify discharge learning needs (meds, wound care, etc)     Problem: Safety - Adult  Goal: Free from fall injury  Outcome: Progressing     Problem: Pain  Goal: Verbalizes/displays adequate comfort level or baseline comfort level  Outcome: Progressing  Flowsheets  Taken 5/12/2024 2335  Verbalizes/displays adequate comfort level or baseline comfort level:   Encourage patient to monitor pain and request assistance   Assess pain using appropriate pain scale   Administer analgesics based on type and severity of pain and evaluate response   Implement non-pharmacological measures as appropriate and evaluate response   Consider cultural and social influences on pain and pain management  Taken 5/12/2024 2020  Verbalizes/displays adequate comfort level or baseline comfort level:   Encourage patient to monitor pain and request assistance   Assess pain using appropriate pain scale   Administer analgesics based on type and severity of pain and evaluate response   Implement non-pharmacological measures as appropriate and evaluate response   Consider cultural and social influences on pain and pain management     Problem: Cardiovascular - Adult  Goal: Maintains optimal cardiac output and hemodynamic stability  Outcome: Progressing  Flowsheets (Taken 5/12/2024 2020)  Maintains optimal cardiac output and hemodynamic stability:   Monitor blood pressure and heart rate   Monitor urine output and notify Licensed Independent Practitioner for values outside of normal range   Assess for signs of decreased cardiac output  Goal: Absence of cardiac dysrhythmias or at baseline  Outcome:  activities of daily living deficits and provide assistive devices as needed   Assist and instruct patient to increase activity and self care as tolerated     Problem: Infection - Adult  Goal: Absence of infection at discharge  Outcome: Progressing  Flowsheets (Taken 5/12/2024 2020)  Absence of infection at discharge:   Assess and monitor for signs and symptoms of infection   Monitor lab/diagnostic results   Monitor all insertion sites i.e., indwelling lines, tubes and drains   Administer medications as ordered  Goal: Absence of infection during hospitalization  Outcome: Progressing  Flowsheets (Taken 5/12/2024 2020)  Absence of infection during hospitalization:   Assess and monitor for signs and symptoms of infection   Monitor lab/diagnostic results   Monitor all insertion sites i.e., indwelling lines, tubes and drains   Administer medications as ordered  Goal: Absence of fever/infection during anticipated neutropenic period  Outcome: Progressing  Flowsheets (Taken 5/12/2024 2020)  Absence of fever/infection during anticipated neutropenic period: Monitor white blood cell count     Problem: Metabolic/Fluid and Electrolytes - Adult  Goal: Electrolytes maintained within normal limits  Outcome: Progressing  Flowsheets (Taken 5/12/2024 2020)  Electrolytes maintained within normal limits:   Monitor labs and assess patient for signs and symptoms of electrolyte imbalances   Administer electrolyte replacement as ordered   Monitor response to electrolyte replacements, including repeat lab results as appropriate  Goal: Hemodynamic stability and optimal renal function maintained  Outcome: Progressing  Flowsheets (Taken 5/12/2024 2020)  Hemodynamic stability and optimal renal function maintained:   Monitor labs and assess for signs and symptoms of volume excess or deficit   Monitor intake, output and patient weight   Monitor urine specific gravity, serum osmolarity and serum sodium as indicated or ordered   Encourage oral intake

## 2024-05-13 NOTE — DISCHARGE SUMMARY
Hospital Medicine Discharge Summary    Patient ID: Jorge Gauthier      Patient's PCP: Gallo Sanchez MD    Admit Date: 5/7/2024     Discharge Date:  05/13/24    Admitting Physician: Juan Pablo Hernandes MD     Discharging Provider: Ivelisse Grant PA-C       Hospital Course:   Jorge Gauthier is a 78-year-old male medical history of COPD CHF atrial fibrillation, CAD, type 2 diabetes mellitus presented to the ED with complaint of 2 days of generalized weakness, increased shortness of breath, and productive cough. His home health nurse found his saturations to be in the 80s on room air. On ED arrival, patient was tachypneic, tachycardic with an elevated lactic acid level, meeting sepsis criteria. On attempt to ambulate, patient was noted to be hypoxic requiring supplemental oxygen with no needs at baseline. Patient admitted for further evaluation of COPD exacerbation and potential pneumonia evident on imaging. Pulmonology consulted. Suspect component of chronic aspiration due to non-compliance with recommended modified diet in addition to COPD exacerbation. SLP consulted and recommend regular texture diet, mildly thick liquids, and meds in puree. Pneumonia panel positive for enterobacter and klebsiella. ID consulted for assistance. Patient had symptomatic improvement with inhalers, antibiotics and steroids. He was weaned to room air. Pulmonology signed off 5/11. PICC line placed and patient discharged on IV cefepime and inhaled tobramycin per ID.    Sepsis, POA  -criteria: HR, RR, LA  -suspected pulmonary source  -blood cultures no growth to date, respiratory culture growing Klebsiella pneumonia and Enterobacter  -sepsis state resolving     Acute hypoxic respiratory failure   -no baseline O2 requirements; required up to 3L NC  -likely secondary to combination of below   -monitored pulse ox, weaned to room air 5/11 and has remained stable      Recurrent Enterobacter pneumonia  -pulmonology consulted   -completed  PO prednisone for 5-day course, EOT 5/12  -diya duonebs and albuterol prn  -started on IV vanc, cefepime, azithromycin per pulmonology  -respiratory culture and pneumonia panel +enterobacter, klebsiella   -ID consulted given recurrent enterobacter infection, recommend IV cefepime and inhaled tobramycin   -PICC line placed for continued outpatient IV antibiotics     Chronic pulmonary aspiration  -suspect non-compliance with recommended modified diet  -SLP consulted, recommend regular texture diet, nectar thick liquids, meds in puree  -aspiration precautions   -respiratory culture growing Klebsiella and Enterobacter, ID consulted as above     Chronic combined CHF  -CXR suggests interstitial pulmonary edema and CHF, clinically appears euvolemic  -last echo 2021, showed EF 45-50%, G2DD  -continue home lasix 80 mg daily   -monitored daily weights, I&Os     Paroxysmal atrial fibrillation  Secondary hypercoagulable state  -continue coreg and Eliquis     HTN  -home coreg and lasix held on admission due to soft pressure  -continue home meds with hold parameters     CAD  -continue asa, statin, beta blocker     T2DM with neuropathy  -home dose is 70/30 Humulin/Novolin 25 units twice daily  -started on lispro 5 units with meal for hyperglycemia likely 2/2 steroids   -last day of steroid therapy 5/12, hyperglycemia should improve now that prednisone discontinued  -continue gabapentin  -resume home regimen at discharge and follow up with PCP regarding diabetes management     Hypokalemia  -resolved with prn replacement       Patient stated they felt well and wanted to go home.  Patient denied chest pain, shortness of breath, palpitations, abdominal pain, nausea vomiting, diarrhea, dysuria, headache lightheadedness or dizziness.  Appetite good.  Voiding without difficulty.  Reviewed plan of care with patient, verbalized understanding and agreement.  Denied further questions or needs.      Physical Exam Performed:     /71

## 2024-05-13 NOTE — PROGRESS NOTES
Infectious Diseases Inpatient Progress  Note    CHIEF COMPLAINT:     Recurrent pneumonia  Gram-negative pneumonia  Enterobacter and Klebsiella pneumonia  COPD  Right basilar pneumonia  Acute hypoxic respiratory failure    HISTORY OF PRESENT ILLNESS:  78 y.o. man with a history of COPD, coronary artery disease, COPD, congestive heart failure, diabetes, paroxysmal atrial fibrillation, history of COVID-19 pneumonia following this is developing recurrent pneumonia including Enterobacter and pneumococcal pneumonia in the past.  Was evaluated by infectious diseases in Nov 2023, now admitted to hospital secondary to increasing shortness of breath cough sputum production, labs indicate creatinine 1.2 lactic acid 2.7 LFT normal hemoglobin A1c 7.6 WBC 8.4 hemoglobin 11.4 procalcitonin normal blood cultures so far negative sputum testing pneumonia molecular panel positive for Klebsiella as well as Enterobacter.  Pneumococcal antigen negative urine Legionella negative chest x-ray with increased pulmonary markings with changes in the lung bases and right lower lobe pneumonia noted.  We are consulted for IV antibiotic recommendations    Interval history: Ongoing cough and sputum production producing now slowly improving on IV antibiotics status post PICC line for outpatient antibiotic therapy.      Past Medical History:    Past Medical History:   Diagnosis Date    Abrasion of right elbow 2/4/2023    Arthritis     CAD (coronary artery disease)     CABG in 2009    Chronic back pain     Chronic systolic CHF (congestive heart failure) (HCC)     COPD (chronic obstructive pulmonary disease) (HCC)     Dental disease     Diabetes mellitus (HCC)     Essential hypertension     Hearing loss     Paroxysmal atrial fibrillation (HCC)     On Coumadin    Tinnitus        Past Surgical History:    Past Surgical History:   Procedure Laterality Date    BACK SURGERY  1995    BACK SURGERY      CARDIAC SURGERY  2007    CERVICAL SPINE SURGERY

## 2024-05-13 NOTE — PLAN OF CARE
nutritional intake  5/13/2024 0252 by Rocky Rice RN  Outcome: Progressing  Flowsheets (Taken 5/12/2024 2020)  Maintains adequate nutritional intake:   Monitor percentage of each meal consumed   Identify factors contributing to decreased intake, treat as appropriate   Assist with meals as needed   Monitor intake and output, weight and lab values   Obtain nutritional consult as needed  Goal: Establish and maintain optimal ostomy function  5/13/2024 0252 by Rocky Rice RN  Outcome: Progressing     Problem: Skin/Tissue Integrity  Goal: Absence of new skin breakdown  Description: 1.  Monitor for areas of redness and/or skin breakdown  2.  Assess vascular access sites hourly  3.  Every 4-6 hours minimum:  Change oxygen saturation probe site  4.  Every 4-6 hours:  If on nasal continuous positive airway pressure, respiratory therapy assess nares and determine need for appliance change or resting period.  5/13/2024 0252 by Rocky Rice RN  Outcome: Progressing     Problem: ABCDS Injury Assessment  Goal: Absence of physical injury  5/13/2024 0252 by Rocky Rice RN  Outcome: Progressing

## 2024-05-14 ENCOUNTER — APPOINTMENT (OUTPATIENT)
Dept: GENERAL RADIOLOGY | Age: 79
End: 2024-05-14
Payer: MEDICARE

## 2024-05-14 ENCOUNTER — HOSPITAL ENCOUNTER (EMERGENCY)
Age: 79
Discharge: HOME | End: 2024-05-14
Payer: MEDICARE

## 2024-05-14 ENCOUNTER — APPOINTMENT (OUTPATIENT)
Dept: CT IMAGING | Age: 79
End: 2024-05-14
Payer: MEDICARE

## 2024-05-14 VITALS
SYSTOLIC BLOOD PRESSURE: 109 MMHG | HEART RATE: 88 BPM | DIASTOLIC BLOOD PRESSURE: 56 MMHG | TEMPERATURE: 97.8 F | RESPIRATION RATE: 18 BRPM | OXYGEN SATURATION: 99 %

## 2024-05-14 DIAGNOSIS — J18.9 RECURRENT PNEUMONIA: ICD-10-CM

## 2024-05-14 DIAGNOSIS — Z79.2 RECEIVING INTRAVENOUS ANTIBIOTIC TREATMENT AS OUTPATIENT: ICD-10-CM
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
## 2024-05-14 DIAGNOSIS — S82.891A CLOSED FRACTURE OF RIGHT ANKLE, INITIAL ENCOUNTER: ICD-10-CM

## 2024-05-14 DIAGNOSIS — R53.81 PHYSICAL DEBILITY: ICD-10-CM

## 2024-05-14 LAB
ALBUMIN SERPL-MCNC: 3.6 G/DL (ref 3.4–5)
ALBUMIN/GLOB SERPL: 1.2 {RATIO} (ref 1.1–2.2)
ALP SERPL-CCNC: 55 U/L (ref 40–129)
ALT SERPL-CCNC: 13 U/L (ref 10–40)
ANION GAP SERPL CALCULATED.3IONS-SCNC: 11 MMOL/L (ref 3–16)
AST SERPL-CCNC: 17 U/L (ref 15–37)
BASOPHILS # BLD: 0.1 K/UL (ref 0–0.2)
BASOPHILS NFR BLD: 0.7 %
BILIRUB SERPL-MCNC: 0.4 MG/DL (ref 0–1)
BUN SERPL-MCNC: 28 MG/DL (ref 7–20)
CALCIUM SERPL-MCNC: 9.1 MG/DL (ref 8.3–10.6)
CHLORIDE SERPL-SCNC: 96 MMOL/L (ref 99–110)
CO2 SERPL-SCNC: 31 MMOL/L (ref 21–32)
CREAT SERPL-MCNC: 1.5 MG/DL (ref 0.8–1.3)
DEPRECATED RDW RBC AUTO: 16.8 % (ref 12.4–15.4)
EOSINOPHIL # BLD: 0.3 K/UL (ref 0–0.6)
EOSINOPHIL NFR BLD: 2.7 %
GFR SERPLBLD CREATININE-BSD FMLA CKD-EPI: 47 ML/MIN/{1.73_M2}
GLUCOSE SERPL-MCNC: 119 MG/DL (ref 70–99)
HCT VFR BLD AUTO: 35.7 % (ref 40.5–52.5)
HGB BLD-MCNC: 11.4 G/DL (ref 13.5–17.5)
LYMPHOCYTES # BLD: 1.3 K/UL (ref 1–5.1)
LYMPHOCYTES NFR BLD: 10.7 %
MCH RBC QN AUTO: 25.9 PG (ref 26–34)
MCHC RBC AUTO-ENTMCNC: 32.1 G/DL (ref 31–36)
MCV RBC AUTO: 80.9 FL (ref 80–100)
MONOCYTES # BLD: 1.5 K/UL (ref 0–1.3)
MONOCYTES NFR BLD: 12.5 %
NEUTROPHILS # BLD: 9 K/UL (ref 1.7–7.7)
NEUTROPHILS NFR BLD: 73.4 %
PLATELET # BLD AUTO: 314 K/UL (ref 135–450)
PMV BLD AUTO: 7 FL (ref 5–10.5)
POTASSIUM SERPL-SCNC: 3.8 MMOL/L (ref 3.5–5.1)
PROT SERPL-MCNC: 6.5 G/DL (ref 6.4–8.2)
RBC # BLD AUTO: 4.41 M/UL (ref 4.2–5.9)
SODIUM SERPL-SCNC: 138 MMOL/L (ref 136–145)
WBC # BLD AUTO: 12.3 K/UL (ref 4–11)

## 2024-05-14 PROCEDURE — 94640 AIRWAY INHALATION TREATMENT: CPT

## 2024-05-14 PROCEDURE — 29515 APPLICATION SHORT LEG SPLINT: CPT

## 2024-05-14 PROCEDURE — 96375 TX/PRO/DX INJ NEW DRUG ADDON: CPT

## 2024-05-14 PROCEDURE — 6370000000 HC RX 637 (ALT 250 FOR IP): Performed by: GENERAL ACUTE CARE HOSPITAL

## 2024-05-14 PROCEDURE — 36415 COLL VENOUS BLD VENIPUNCTURE: CPT

## 2024-05-14 PROCEDURE — 94761 N-INVAS EAR/PLS OXIMETRY MLT: CPT

## 2024-05-14 PROCEDURE — 73610 X-RAY EXAM OF ANKLE: CPT

## 2024-05-14 PROCEDURE — 2700000000 HC OXYGEN THERAPY PER DAY

## 2024-05-14 PROCEDURE — 70450 CT HEAD/BRAIN W/O DYE: CPT

## 2024-05-14 PROCEDURE — 99284 EMERGENCY DEPT VISIT MOD MDM: CPT

## 2024-05-14 PROCEDURE — 96361 HYDRATE IV INFUSION ADD-ON: CPT

## 2024-05-14 PROCEDURE — 72125 CT NECK SPINE W/O DYE: CPT

## 2024-05-14 PROCEDURE — 96365 THER/PROPH/DIAG IV INF INIT: CPT

## 2024-05-14 PROCEDURE — 2580000003 HC RX 258: Performed by: GENERAL ACUTE CARE HOSPITAL

## 2024-05-14 PROCEDURE — 85025 COMPLETE CBC W/AUTO DIFF WBC: CPT

## 2024-05-14 PROCEDURE — 6360000002 HC RX W HCPCS: Performed by: GENERAL ACUTE CARE HOSPITAL

## 2024-05-14 PROCEDURE — 80053 COMPREHEN METABOLIC PANEL: CPT

## 2024-05-14 RX ORDER — ONDANSETRON 2 MG/ML
4 INJECTION INTRAMUSCULAR; INTRAVENOUS ONCE
Status: COMPLETED | OUTPATIENT
Start: 2024-05-14 | End: 2024-05-14

## 2024-05-14 RX ORDER — ACETAMINOPHEN 500 MG
1000 TABLET ORAL ONCE
Status: COMPLETED | OUTPATIENT
Start: 2024-05-14 | End: 2024-05-14

## 2024-05-14 RX ORDER — IPRATROPIUM BROMIDE AND ALBUTEROL SULFATE 2.5; .5 MG/3ML; MG/3ML
1 SOLUTION RESPIRATORY (INHALATION) ONCE
Status: COMPLETED | OUTPATIENT
Start: 2024-05-14 | End: 2024-05-14

## 2024-05-14 RX ORDER — 0.9 % SODIUM CHLORIDE 0.9 %
500 INTRAVENOUS SOLUTION INTRAVENOUS ONCE
Status: COMPLETED | OUTPATIENT
Start: 2024-05-14 | End: 2024-05-14

## 2024-05-14 RX ADMIN — IPRATROPIUM BROMIDE AND ALBUTEROL SULFATE 1 DOSE: .5; 2.5 SOLUTION RESPIRATORY (INHALATION) at 12:12

## 2024-05-14 RX ADMIN — CEFEPIME 2000 MG: 2 INJECTION, POWDER, FOR SOLUTION INTRAVENOUS at 11:49

## 2024-05-14 RX ADMIN — ONDANSETRON 4 MG: 2 INJECTION INTRAMUSCULAR; INTRAVENOUS at 11:08

## 2024-05-14 RX ADMIN — SODIUM CHLORIDE 500 ML: 9 INJECTION, SOLUTION INTRAVENOUS at 12:01

## 2024-05-14 RX ADMIN — ACETAMINOPHEN 1000 MG: 500 TABLET ORAL at 11:07

## 2024-05-14 ASSESSMENT — ENCOUNTER SYMPTOMS
VOICE CHANGE: 0
ABDOMINAL PAIN: 0
BACK PAIN: 0
CHEST TIGHTNESS: 0
WHEEZING: 1
SORE THROAT: 0
NAUSEA: 0
VOMITING: 0

## 2024-05-14 ASSESSMENT — PAIN SCALES - GENERAL
PAINLEVEL_OUTOF10: 6
PAINLEVEL_OUTOF10: 4
PAINLEVEL_OUTOF10: 9

## 2024-05-14 ASSESSMENT — PAIN DESCRIPTION - ONSET: ONSET: ON-GOING

## 2024-05-14 ASSESSMENT — PAIN DESCRIPTION - FREQUENCY: FREQUENCY: CONTINUOUS

## 2024-05-14 ASSESSMENT — PAIN DESCRIPTION - PAIN TYPE: TYPE: ACUTE PAIN

## 2024-05-14 ASSESSMENT — PAIN - FUNCTIONAL ASSESSMENT: PAIN_FUNCTIONAL_ASSESSMENT: 0-10

## 2024-05-14 ASSESSMENT — PAIN DESCRIPTION - LOCATION: LOCATION: ANKLE

## 2024-05-14 ASSESSMENT — PAIN DESCRIPTION - DESCRIPTORS: DESCRIPTORS: ACHING

## 2024-05-14 ASSESSMENT — PAIN DESCRIPTION - ORIENTATION: ORIENTATION: RIGHT

## 2024-05-14 NOTE — CARE COORDINATION
Discharge Planning:    Left messages for all 4 facilities to see if they can accept patient from ED.    [facility] called back, they has no beds at this time.    [facility] called back - they are unable to accept patient.    [facility] is able to accept.  [facility]    Met with patient and his wife they are in agreement with discharging from the ED to [facility].

## 2024-05-14 NOTE — ED PROVIDER NOTES
in the SEP-1 Core Measure due to severe sepsis or septic shock?   No   Exclusion criteria - the patient is NOT to be included for SEP-1 Core Measure due to:  2+ SIRS criteria are not met    Chronic Conditions affecting care:    has a past medical history of Abrasion of right elbow (2/4/2023), Arthritis, CAD (coronary artery disease), Chronic back pain, Chronic systolic CHF (congestive heart failure) (HCC), COPD (chronic obstructive pulmonary disease) ([facility]), Dental disease, Diabetes mellitus (HCC), Essential hypertension, Hearing loss, Paroxysmal atrial fibrillation (HCC), and Tinnitus.    CONSULTS: (Who and What was discussed)  IP CONSULT TO SOCIAL WORK  IP CONSULT TO ORTHOPEDIC SURGERY      Social Determinants Significantly Affecting Health : None    Records Reviewed (External and Source) previous records reviewed in order to gain further information regarding patient's PMH is well as his HPI.  Nursing notes reviewed.    CC/HPI Summary, DDx, ED Course, and Reassessment: with history of multiple comorbid's including COPD CHF atrial fibrillation, CAD, type 2 diabetes mellitus who presents to the emergency department today via EMS from home for evaluation of a right ankle injury sustained after mechanical fall.  Patient does have history of multiple comorbid's.  Patient was discharged to home yesterday after a 6-day admission to this hospital where he was treated for acute respiratory failure with hypoxia, pneumonia, and sepsis.  Patient was discharged with PICC line for IV antibiotics.  Home health nurse went for initial evaluation to the patient's residence and sent patient to the emergency department due to this right lower extremity injury.  Patient is anticoagulated.  He states that he did not hit his head.  This fall was unwitnessed.  Patient reports a pain level of 9 out of 10, describes his pain as constant, dull, and throbbing.  The pain is none migratory, worse when touching the affected area and with

## 2024-05-14 NOTE — CARE COORDINATION
Case Management Discharge Note          Date / Time of Note: 5/14/2024 2:54 PM                  Patient Name: Jorge Gauthier   YOB: 1945  Diagnosis: No admission diagnoses are documented for this encounter.   Date / Time: 5/14/2024 10:03 AM    Financial:  Payor: MEDICARE / Plan: MEDICARE PART A AND B / Product Type: *No Product type* /      Pharmacy:    [id number] - [place], [place] - [address] - P [phone] - F [phone]  [address] [address]  Phone: [phone] Fax: [phone]    [facility] - [place], [place] - [address] - P [phone] - F [phone]  [address]  [address]  [facility] [address]  Phone: [phone] Fax: [phone]      DISCHARGE Disposition: Skilled Nursing Facility (SNF)    Nursing Facility:   Discharging to Facility/ Agency   Name: [facility]  Address:  [address] [place] [address]   Phone:  [phone]  Fax:  [phone]    LOC at discharge: Skilled Nursing  AHSAN Completed: Yes             NURSING REPORT NUMBER: [phone]               NURSING FAX NUMBER: [phone]    Notification completed in HENS/PAS?:  Yes : CM has completed HENS online through secure website for SNF admission at [facility].   Document ID #: [id number] PASRR completed      Transportation:  Transportation PLAN for discharge: EMS transportation   Mode of Transport: Ambulance stretcher - S  Reason for medical transport: Non healed fractures of right ankle require ambulance transport due to fall risk  Name of Transport Company: [id number]  Phone: [phone]  Time of Transport: 4:45pm    Transport form completed: No    IMM Completed:   Not Indicated    Additional CM Notes: Patient to rehab at [facility] to get himself safely home.  Patient discharge home yesterday and fall and fractured his right ankle.    The Plan for Transition of Care is related to the

## 2024-05-14 NOTE — ED NOTES
Patient resting comfortably, respirations easy, unlabored. Patient in no acute distress. Denies needs at this time. Call light within reach, bed in lowest position, side rails up x 2.  Family at bedside

## 2024-05-14 NOTE — DISCHARGE INSTR - COC
and Occupational Therapy  Weight Bearing Status/Restrictions: No weight bearing restrictions  Other Medical Equipment (for information only, NOT a DME order):  walker  Other Treatments: NA    Patient's personal belongings (please select all that are sent with patient):  NA    RN SIGNATURE:  Electronically signed by Bobby Johnson RN on 5/14/24 at 2:42 PM EDT    CASE MANAGEMENT/SOCIAL WORK SECTION    Inpatient Status Date: N/A    Readmission Risk Assessment Score:  Readmission Risk              Risk of Unplanned Readmission:  0           Discharging to Facility/ Agency   Name: [facility]  Address:  [address] [place] [address]   Phone:  [phone]  Fax:  [phone]      / signature: Electronically signed by CADEN Yao on 5/14/24 at 2:26 PM EDT    PHYSICIAN SECTION    Prognosis: Good    Condition at Discharge: Stable    Rehab Potential (if transferring to Rehab): Good    Recommended Labs or Other Treatments After Discharge: PT & OT  IV Cefepime x 2 gm Q 12  HR X STOP DATE X  5/29  CBC with  diff, CMP, ESR, CRP weekly  Fax results to   PICC line in place  No ID follow up   First dose given in Hospital      Dr.Ravindhar Mariangel BROOKS  Infectious Disease  [facility] Physician  Phone: [phone]   Fax : [phone]       Physician Certification: I certify the above information and transfer of Jorge Gauthier  is necessary for the continuing treatment of the diagnosis listed and that he requires Skilled Nursing Facility for less 30 days.     Update Admission H&P: No change in H&P    PHYSICIAN SIGNATURE:  Wayne Quinones MD

## 2024-05-14 NOTE — CARE COORDINATION
ESTER consult: Placement:  Patient discharged from Hospital yesterday and went home, fell and fracture his right ankle.  Patient and wife requesting SNF placement.  Referrals sent to [facility], [facility] and [facility].  Waiting response.

## 2024-05-14 NOTE — ED NOTES
[facility] here at this time to pick patient up. Pt sent home with discharge paperwork and [facility] sent with stretcher. Pt alert and oriented and VSS on discharge.

## 2024-05-15 ENCOUNTER — TELEPHONE (OUTPATIENT)
Dept: INFECTIOUS DISEASES | Age: 79
End: 2024-05-15

## 2024-05-15 ENCOUNTER — TELEPHONE (OUTPATIENT)
Dept: ORTHOPEDIC SURGERY | Age: 79
End: 2024-05-15

## 2024-05-15 DIAGNOSIS — J15.69 ENTEROBACTER CLOACAE PNEUMONIA (HCC): Primary | ICD-10-CM

## 2024-05-15 NOTE — TELEPHONE ENCOUNTER
Spoke with Rossy LAMBERT at [facility] and verified all OPAT orders:  V Cefepime x 2 gm Q 12  HR X STOP DATE X  5/29   CBC with  diff, CMP, ESR, CRP weekly   Fax results to      She v/u

## 2024-05-15 NOTE — TELEPHONE ENCOUNTER
Call the patient's facility. LVM stating that the patient needs to schedule an appointment with Dr. Chavez Today or Friday. Called [phone]      Upon return call, please schedule today 5/15/24 or Friday 5/17/24 at [place]. Please reach out to Marie or Chely upon scheduling.

## 2024-05-15 NOTE — TELEPHONE ENCOUNTER
Spoke with Dinorah Davis.   Informed her that the patient should not eat before coming to his appointment on Friday at 8:45 AM.

## 2024-05-17 ENCOUNTER — APPOINTMENT (OUTPATIENT)
Dept: GENERAL RADIOLOGY | Age: 79
End: 2024-05-17
Attending: ORTHOPAEDIC SURGERY
Payer: MEDICARE

## 2024-05-17 ENCOUNTER — TELEPHONE (OUTPATIENT)
Dept: ORTHOPEDIC SURGERY | Age: 79
End: 2024-05-17

## 2024-05-17 ENCOUNTER — HOSPITAL ENCOUNTER (OUTPATIENT)
Age: 79
Setting detail: OUTPATIENT SURGERY
Discharge: NURSING, REHAB OR LONG-TERM CARE | End: 2024-05-17
Attending: ORTHOPAEDIC SURGERY | Admitting: ORTHOPAEDIC SURGERY
Payer: MEDICARE

## 2024-05-17 ENCOUNTER — ANESTHESIA EVENT (OUTPATIENT)
Dept: OPERATING ROOM | Age: 79
End: 2024-05-17
Payer: MEDICARE

## 2024-05-17 ENCOUNTER — ANESTHESIA (OUTPATIENT)
Dept: OPERATING ROOM | Age: 79
End: 2024-05-17
Payer: MEDICARE

## 2024-05-17 ENCOUNTER — OFFICE VISIT (OUTPATIENT)
Dept: ORTHOPEDIC SURGERY | Age: 79
End: 2024-05-17
Payer: MEDICARE

## 2024-05-17 VITALS
WEIGHT: 220.9 LBS | TEMPERATURE: 97 F | SYSTOLIC BLOOD PRESSURE: 121 MMHG | RESPIRATION RATE: 22 BRPM | OXYGEN SATURATION: 97 % | HEART RATE: 91 BPM | DIASTOLIC BLOOD PRESSURE: 52 MMHG | BODY MASS INDEX: 30.81 KG/M2

## 2024-05-17 DIAGNOSIS — S82.851A CLOSED TRIMALLEOLAR FRACTURE OF RIGHT ANKLE, INITIAL ENCOUNTER: Primary | ICD-10-CM

## 2024-05-17 DIAGNOSIS — S93.431A SYNDESMOTIC DISRUPTION OF RIGHT ANKLE, INITIAL ENCOUNTER: ICD-10-CM

## 2024-05-17 LAB
GLUCOSE BLD-MCNC: 232 MG/DL (ref 70–99)
GLUCOSE BLD-MCNC: 240 MG/DL (ref 70–99)
PERFORMED ON: ABNORMAL
PERFORMED ON: ABNORMAL

## 2024-05-17 PROCEDURE — 94640 AIRWAY INHALATION TREATMENT: CPT

## 2024-05-17 PROCEDURE — 2700000000 HC OXYGEN THERAPY PER DAY

## 2024-05-17 PROCEDURE — 27822 TREATMENT OF ANKLE FRACTURE: CPT | Performed by: NURSE PRACTITIONER

## 2024-05-17 PROCEDURE — 7100000011 HC PHASE II RECOVERY - ADDTL 15 MIN: Performed by: ORTHOPAEDIC SURGERY

## 2024-05-17 PROCEDURE — 94761 N-INVAS EAR/PLS OXIMETRY MLT: CPT

## 2024-05-17 PROCEDURE — 1036F TOBACCO NON-USER: CPT | Performed by: ORTHOPAEDIC SURGERY

## 2024-05-17 PROCEDURE — 6360000002 HC RX W HCPCS: Performed by: ORTHOPAEDIC SURGERY

## 2024-05-17 PROCEDURE — 6360000002 HC RX W HCPCS: Performed by: NURSE ANESTHETIST, CERTIFIED REGISTERED

## 2024-05-17 PROCEDURE — 2709999900 HC NON-CHARGEABLE SUPPLY: Performed by: ORTHOPAEDIC SURGERY

## 2024-05-17 PROCEDURE — 6370000000 HC RX 637 (ALT 250 FOR IP): Performed by: ANESTHESIOLOGY

## 2024-05-17 PROCEDURE — 3600000005 HC SURGERY LEVEL 5 BASE: Performed by: ORTHOPAEDIC SURGERY

## 2024-05-17 PROCEDURE — 1111F DSCHRG MED/CURRENT MED MERGE: CPT | Performed by: ORTHOPAEDIC SURGERY

## 2024-05-17 PROCEDURE — 27829 TREAT LOWER LEG JOINT: CPT | Performed by: NURSE PRACTITIONER

## 2024-05-17 PROCEDURE — 7100000001 HC PACU RECOVERY - ADDTL 15 MIN: Performed by: ORTHOPAEDIC SURGERY

## 2024-05-17 PROCEDURE — 73600 X-RAY EXAM OF ANKLE: CPT

## 2024-05-17 PROCEDURE — 2580000003 HC RX 258: Performed by: ORTHOPAEDIC SURGERY

## 2024-05-17 PROCEDURE — 3700000000 HC ANESTHESIA ATTENDED CARE: Performed by: ORTHOPAEDIC SURGERY

## 2024-05-17 PROCEDURE — 1123F ACP DISCUSS/DSCN MKR DOCD: CPT | Performed by: ORTHOPAEDIC SURGERY

## 2024-05-17 PROCEDURE — G8417 CALC BMI ABV UP PARAM F/U: HCPCS | Performed by: ORTHOPAEDIC SURGERY

## 2024-05-17 PROCEDURE — C1713 ANCHOR/SCREW BN/BN,TIS/BN: HCPCS | Performed by: ORTHOPAEDIC SURGERY

## 2024-05-17 PROCEDURE — A4217 STERILE WATER/SALINE, 500 ML: HCPCS | Performed by: ORTHOPAEDIC SURGERY

## 2024-05-17 PROCEDURE — 2580000003 HC RX 258: Performed by: NURSE ANESTHETIST, CERTIFIED REGISTERED

## 2024-05-17 PROCEDURE — 3700000001 HC ADD 15 MINUTES (ANESTHESIA): Performed by: ORTHOPAEDIC SURGERY

## 2024-05-17 PROCEDURE — 27822 TREATMENT OF ANKLE FRACTURE: CPT | Performed by: ORTHOPAEDIC SURGERY

## 2024-05-17 PROCEDURE — 3600000015 HC SURGERY LEVEL 5 ADDTL 15MIN: Performed by: ORTHOPAEDIC SURGERY

## 2024-05-17 PROCEDURE — 27829 TREAT LOWER LEG JOINT: CPT | Performed by: ORTHOPAEDIC SURGERY

## 2024-05-17 PROCEDURE — 99204 OFFICE O/P NEW MOD 45 MIN: CPT | Performed by: ORTHOPAEDIC SURGERY

## 2024-05-17 PROCEDURE — 7100000000 HC PACU RECOVERY - FIRST 15 MIN: Performed by: ORTHOPAEDIC SURGERY

## 2024-05-17 PROCEDURE — 2500000003 HC RX 250 WO HCPCS: Performed by: NURSE ANESTHETIST, CERTIFIED REGISTERED

## 2024-05-17 PROCEDURE — G8427 DOCREV CUR MEDS BY ELIG CLIN: HCPCS | Performed by: ORTHOPAEDIC SURGERY

## 2024-05-17 PROCEDURE — 2720000010 HC SURG SUPPLY STERILE: Performed by: ORTHOPAEDIC SURGERY

## 2024-05-17 PROCEDURE — 7100000010 HC PHASE II RECOVERY - FIRST 15 MIN: Performed by: ORTHOPAEDIC SURGERY

## 2024-05-17 DEVICE — SCREW BNE L20MM DIA2.7MM HEX HD DIA2.5MM CANC BIODUR 108C: Type: IMPLANTABLE DEVICE | Site: ANKLE | Status: FUNCTIONAL

## 2024-05-17 DEVICE — SCREW BNE L22MM DIA2.7MM CORT ANK FT S STL ST NONCANNULATED: Type: IMPLANTABLE DEVICE | Site: ANKLE | Status: FUNCTIONAL

## 2024-05-17 DEVICE — SCREW BNE L50MM DIA3.5MM HD DIA2.7MM LNG PERIARTC ST: Type: IMPLANTABLE DEVICE | Site: ANKLE | Status: FUNCTIONAL

## 2024-05-17 DEVICE — SCREW BNE L18MM DIA2.7MM HEX HD DIA2.5MM CANC BIODUR 108C: Type: IMPLANTABLE DEVICE | Site: ANKLE | Status: FUNCTIONAL

## 2024-05-17 DEVICE — SCREW BNE L18MM DIA3.5MM HD DIA2.7MM PERIARTC CORT S STL ST: Type: IMPLANTABLE DEVICE | Site: ANKLE | Status: FUNCTIONAL

## 2024-05-17 DEVICE — SCREW BNE L60MM DIA3.5MM HD DIA2.7MM PERIARTC CORT S STL ST: Type: IMPLANTABLE DEVICE | Site: ANKLE | Status: FUNCTIONAL

## 2024-05-17 DEVICE — PLATE BNE L80MM 4 H R LAT DST PERIARTC FIBULAR S STL LOK: Type: IMPLANTABLE DEVICE | Site: ANKLE | Status: FUNCTIONAL

## 2024-05-17 DEVICE — SCREW BONE L22MM D27MM HEX HEAD D25MM CNCLLS BDR 108C SELF T: Type: IMPLANTABLE DEVICE | Site: ANKLE | Status: FUNCTIONAL

## 2024-05-17 RX ORDER — IPRATROPIUM BROMIDE AND ALBUTEROL SULFATE 2.5; .5 MG/3ML; MG/3ML
1 SOLUTION RESPIRATORY (INHALATION)
Status: DISCONTINUED | OUTPATIENT
Start: 2024-05-17 | End: 2024-05-17

## 2024-05-17 RX ORDER — IPRATROPIUM BROMIDE AND ALBUTEROL SULFATE 2.5; .5 MG/3ML; MG/3ML
1 SOLUTION RESPIRATORY (INHALATION) EVERY 4 HOURS PRN
Status: DISCONTINUED | OUTPATIENT
Start: 2024-05-17 | End: 2024-05-17 | Stop reason: HOSPADM

## 2024-05-17 RX ORDER — EPINEPHRINE 1 MG/ML
INJECTION, SOLUTION, CONCENTRATE INTRAVENOUS PRN
Status: DISCONTINUED | OUTPATIENT
Start: 2024-05-17 | End: 2024-05-17 | Stop reason: SDUPTHER

## 2024-05-17 RX ORDER — CEFAZOLIN SODIUM 1 G/3ML
INJECTION, POWDER, FOR SOLUTION INTRAMUSCULAR; INTRAVENOUS PRN
Status: DISCONTINUED | OUTPATIENT
Start: 2024-05-17 | End: 2024-05-17 | Stop reason: SDUPTHER

## 2024-05-17 RX ORDER — SODIUM CHLORIDE 0.9 % (FLUSH) 0.9 %
5-40 SYRINGE (ML) INJECTION EVERY 12 HOURS SCHEDULED
Status: DISCONTINUED | OUTPATIENT
Start: 2024-05-17 | End: 2024-05-17 | Stop reason: HOSPADM

## 2024-05-17 RX ORDER — SUCCINYLCHOLINE/SOD CL,ISO/PF 200MG/10ML
SYRINGE (ML) INTRAVENOUS PRN
Status: DISCONTINUED | OUTPATIENT
Start: 2024-05-17 | End: 2024-05-17 | Stop reason: SDUPTHER

## 2024-05-17 RX ORDER — SODIUM CHLORIDE 9 MG/ML
INJECTION, SOLUTION INTRAVENOUS CONTINUOUS PRN
Status: DISCONTINUED | OUTPATIENT
Start: 2024-05-17 | End: 2024-05-17 | Stop reason: SDUPTHER

## 2024-05-17 RX ORDER — BUPIVACAINE HYDROCHLORIDE 5 MG/ML
INJECTION, SOLUTION EPIDURAL; INTRACAUDAL
Status: COMPLETED | OUTPATIENT
Start: 2024-05-17 | End: 2024-05-17

## 2024-05-17 RX ORDER — ONDANSETRON 2 MG/ML
4 INJECTION INTRAMUSCULAR; INTRAVENOUS
Status: DISCONTINUED | OUTPATIENT
Start: 2024-05-17 | End: 2024-05-17 | Stop reason: HOSPADM

## 2024-05-17 RX ORDER — SODIUM CHLORIDE 9 MG/ML
INJECTION, SOLUTION INTRAVENOUS PRN
Status: DISCONTINUED | OUTPATIENT
Start: 2024-05-17 | End: 2024-05-17 | Stop reason: HOSPADM

## 2024-05-17 RX ORDER — OXYCODONE HYDROCHLORIDE AND ACETAMINOPHEN 5; 325 MG/1; MG/1
1 TABLET ORAL
Status: COMPLETED | OUTPATIENT
Start: 2024-05-17 | End: 2024-05-17

## 2024-05-17 RX ORDER — PROPOFOL 10 MG/ML
INJECTION, EMULSION INTRAVENOUS PRN
Status: DISCONTINUED | OUTPATIENT
Start: 2024-05-17 | End: 2024-05-17 | Stop reason: SDUPTHER

## 2024-05-17 RX ORDER — LIDOCAINE HYDROCHLORIDE 20 MG/ML
INJECTION, SOLUTION EPIDURAL; INFILTRATION; INTRACAUDAL; PERINEURAL PRN
Status: DISCONTINUED | OUTPATIENT
Start: 2024-05-17 | End: 2024-05-17 | Stop reason: SDUPTHER

## 2024-05-17 RX ORDER — SODIUM CHLORIDE 0.9 % (FLUSH) 0.9 %
5-40 SYRINGE (ML) INJECTION PRN
Status: DISCONTINUED | OUTPATIENT
Start: 2024-05-17 | End: 2024-05-17 | Stop reason: HOSPADM

## 2024-05-17 RX ORDER — NALOXONE HYDROCHLORIDE 0.4 MG/ML
INJECTION, SOLUTION INTRAMUSCULAR; INTRAVENOUS; SUBCUTANEOUS PRN
Status: DISCONTINUED | OUTPATIENT
Start: 2024-05-17 | End: 2024-05-17 | Stop reason: HOSPADM

## 2024-05-17 RX ORDER — FENTANYL CITRATE 0.05 MG/ML
25 INJECTION, SOLUTION INTRAMUSCULAR; INTRAVENOUS EVERY 5 MIN PRN
Status: DISCONTINUED | OUTPATIENT
Start: 2024-05-17 | End: 2024-05-17 | Stop reason: HOSPADM

## 2024-05-17 RX ORDER — EPHEDRINE SULFATE/0.9% NACL/PF 25 MG/5 ML
SYRINGE (ML) INTRAVENOUS PRN
Status: DISCONTINUED | OUTPATIENT
Start: 2024-05-17 | End: 2024-05-17 | Stop reason: SDUPTHER

## 2024-05-17 RX ORDER — PHENYLEPHRINE HCL IN 0.9% NACL 1 MG/10 ML
SYRINGE (ML) INTRAVENOUS PRN
Status: DISCONTINUED | OUTPATIENT
Start: 2024-05-17 | End: 2024-05-17 | Stop reason: SDUPTHER

## 2024-05-17 RX ORDER — ROCURONIUM BROMIDE 10 MG/ML
INJECTION, SOLUTION INTRAVENOUS PRN
Status: DISCONTINUED | OUTPATIENT
Start: 2024-05-17 | End: 2024-05-17 | Stop reason: SDUPTHER

## 2024-05-17 RX ORDER — FENTANYL CITRATE 50 UG/ML
INJECTION, SOLUTION INTRAMUSCULAR; INTRAVENOUS PRN
Status: DISCONTINUED | OUTPATIENT
Start: 2024-05-17 | End: 2024-05-17 | Stop reason: SDUPTHER

## 2024-05-17 RX ORDER — VASOPRESSIN 20 U/ML
INJECTION PARENTERAL PRN
Status: DISCONTINUED | OUTPATIENT
Start: 2024-05-17 | End: 2024-05-17 | Stop reason: SDUPTHER

## 2024-05-17 RX ADMIN — EPINEPHRINE 10 MCG: 1 INJECTION, SOLUTION, CONCENTRATE INTRAVENOUS at 11:42

## 2024-05-17 RX ADMIN — SODIUM CHLORIDE: 9 INJECTION, SOLUTION INTRAVENOUS at 10:55

## 2024-05-17 RX ADMIN — EPHEDRINE SULFATE 5 MG: 5 INJECTION INTRAVENOUS at 11:40

## 2024-05-17 RX ADMIN — VASOPRESSIN 2 UNITS: 20 INJECTION INTRAVENOUS at 11:19

## 2024-05-17 RX ADMIN — LIDOCAINE HYDROCHLORIDE 100 MG: 20 INJECTION, SOLUTION EPIDURAL; INFILTRATION; INTRACAUDAL; PERINEURAL at 11:03

## 2024-05-17 RX ADMIN — IPRATROPIUM BROMIDE AND ALBUTEROL SULFATE 1 DOSE: 2.5; .5 SOLUTION RESPIRATORY (INHALATION) at 12:34

## 2024-05-17 RX ADMIN — Medication 180 MG: at 11:04

## 2024-05-17 RX ADMIN — PROPOFOL 100 MG: 10 INJECTION, EMULSION INTRAVENOUS at 11:04

## 2024-05-17 RX ADMIN — CEFAZOLIN SODIUM 2 G: 1 INJECTION, POWDER, FOR SOLUTION INTRAMUSCULAR; INTRAVENOUS at 11:10

## 2024-05-17 RX ADMIN — VASOPRESSIN 6 UNITS: 20 INJECTION INTRAVENOUS at 11:33

## 2024-05-17 RX ADMIN — Medication 200 MCG: at 11:13

## 2024-05-17 RX ADMIN — ROCURONIUM BROMIDE 40 MG: 10 SOLUTION INTRAVENOUS at 11:08

## 2024-05-17 RX ADMIN — VASOPRESSIN 6 UNITS: 20 INJECTION INTRAVENOUS at 11:37

## 2024-05-17 RX ADMIN — VASOPRESSIN 4 UNITS: 20 INJECTION INTRAVENOUS at 11:28

## 2024-05-17 RX ADMIN — Medication 200 MCG: at 11:07

## 2024-05-17 RX ADMIN — SUGAMMADEX 200 MG: 100 INJECTION, SOLUTION INTRAVENOUS at 11:47

## 2024-05-17 RX ADMIN — VASOPRESSIN 2 UNITS: 20 INJECTION INTRAVENOUS at 11:25

## 2024-05-17 RX ADMIN — FENTANYL CITRATE 100 MCG: 50 INJECTION INTRAMUSCULAR; INTRAVENOUS at 11:01

## 2024-05-17 RX ADMIN — Medication 200 MCG: at 11:10

## 2024-05-17 RX ADMIN — ROCURONIUM BROMIDE 10 MG: 10 SOLUTION INTRAVENOUS at 11:03

## 2024-05-17 RX ADMIN — OXYCODONE HYDROCHLORIDE AND ACETAMINOPHEN 1 TABLET: 5; 325 TABLET ORAL at 13:43

## 2024-05-17 ASSESSMENT — PAIN SCALES - GENERAL
PAINLEVEL_OUTOF10: 5
PAINLEVEL_OUTOF10: 0
PAINLEVEL_OUTOF10: 3

## 2024-05-17 ASSESSMENT — PAIN DESCRIPTION - DESCRIPTORS
DESCRIPTORS: DISCOMFORT
DESCRIPTORS: DISCOMFORT
DESCRIPTORS: THROBBING

## 2024-05-17 ASSESSMENT — PAIN DESCRIPTION - ORIENTATION
ORIENTATION: RIGHT
ORIENTATION: RIGHT

## 2024-05-17 ASSESSMENT — PAIN - FUNCTIONAL ASSESSMENT
PAIN_FUNCTIONAL_ASSESSMENT: PREVENTS OR INTERFERES SOME ACTIVE ACTIVITIES AND ADLS
PAIN_FUNCTIONAL_ASSESSMENT: 0-10
PAIN_FUNCTIONAL_ASSESSMENT: PREVENTS OR INTERFERES SOME ACTIVE ACTIVITIES AND ADLS
PAIN_FUNCTIONAL_ASSESSMENT: PREVENTS OR INTERFERES SOME ACTIVE ACTIVITIES AND ADLS

## 2024-05-17 ASSESSMENT — PAIN DESCRIPTION - ONSET
ONSET: GRADUAL
ONSET: GRADUAL

## 2024-05-17 ASSESSMENT — PAIN DESCRIPTION - LOCATION
LOCATION: LEG;ANKLE
LOCATION: LEG;ANKLE

## 2024-05-17 ASSESSMENT — PAIN DESCRIPTION - FREQUENCY
FREQUENCY: CONTINUOUS
FREQUENCY: CONTINUOUS

## 2024-05-17 ASSESSMENT — PAIN DESCRIPTION - PAIN TYPE
TYPE: SURGICAL PAIN
TYPE: SURGICAL PAIN

## 2024-05-17 NOTE — DISCHARGE INSTRUCTIONS
Post op instruction:  1- Discharge ECF  2- Diagnosis Right ankle displaced trimalleolar fracture.  3- Non Weight Bearing right ankle  4- Elevation surgical site, with ice  5- Keep splint dry and clean  6- Follow Up in 2 weeks.  7- For DVT prophylaxis- Aspirin 325 mg daily     José Miguel Chavez MD, 5/17/2024

## 2024-05-17 NOTE — TELEPHONE ENCOUNTER
General Question     Subject: [facility] SX  Patient and /or Facility Request: NURSING HOME  Contact Number: [phone]    PT WAS GIVEN ALL MEDS LAST NIGHT HAS TO [facility]    PLEASE CLL PHN NUMBER ABOVE TO [facility]

## 2024-05-17 NOTE — FLOWSHEET NOTE
Report given to PETRA Blair Supervisor at [facility]  [phone].  Transportation here to pick pt. Up and take him back.  RN wheeled pt. Out to discharge bridge.  Accompanied by wife and Daughter.

## 2024-05-17 NOTE — BRIEF OP NOTE
Brief Postoperative Note      Patient: Jorge aGuthier  YOB: 1945  MRN: [id number]    Date of Procedure: 5/17/2024    Pre-Op Diagnosis Codes:     * Closed fracture of right ankle trimalleolar fracture with syndesmosis disruption, initial encounter      Post-Op Diagnosis: Same       Procedure(s):  OPEN REDUCTION INTERNAL FIXATION RIGHT ANKLE TRIMALLEOLAR FRACTURE WITH SYNDESMOSIS REPAIR.    Surgeon(s):  José Miguel Chavez MD    Assistant: JOEY Villegas    Anesthesia: General    Estimated Blood Loss (mL): Minimal    Complications: None    Specimens:   * No specimens in log *    Implants:  Implant Name Type Inv. Item Serial No.  Lot No. LRB No. Used Action   PLATE BNE L80MM 4 H R LAT DST PERIARTC FIBULAR S STL CRISTAL - [id number]  PLATE BNE L80MM 4 H R LAT DST PERIARTC FIBULAR S STL CRISTAL  [employer]WD  Right 1 Implanted   SCREW BNE L22MM DIA2.7MM SHEILA ANK FT S STL ST NONCANNULATED - [id number]  SCREW BNE L22MM DIA2.7MM SHEILA ANK FT S STL ST NONCANNULATED  [employer]WD  Right 1 Implanted   DUP USE [id number] SCREW CORTCL PERIART FTHRD 3.5X18MM - [id number] Screw/Plate/Nail/Brandon DUP USE [id number] SCREW CORTCL PERIART FTHRD 3.5X18MM  [employer]PMM  Right 2 Implanted   SCREW BONE L22MM D27MM HEX HEAD D25MM CNCLLS BDR 108C SELF T - [id number]  SCREW BONE L22MM D27MM HEX HEAD D25MM CNCLLS BDR 108C SELF T  [employer]WD  Right 3 Implanted   SCREW BNE L20MM DIA2.7MM HEX HD DIA2.5MM CANC BIODUR 108C - [id number]  SCREW BNE L20MM DIA2.7MM HEX HD DIA2.5MM CANC BIODUR 108C  [employer]WD  Right 1 Implanted   SCREW BNE L18MM DIA2.7MM HEX HD DIA2.5MM CANC BIODUR 108C - [id number]  SCREW BNE L18MM DIA2.7MM HEX HD DIA2.5MM CANC BIODUR 108C  [employer]WD  Right 1 Implanted   SCREW BNE L60MM DIA3.5MM HD DIA2.7MM PERIARTC SHEILA S STL ST - [id number]  SCREW BNE L60MM DIA3.5MM HD DIA2.7MM PERIARTC SHEILA S STL ST  [employer]WD  Right 1 Implanted   SCREW BNE L50MM

## 2024-05-17 NOTE — ANESTHESIA PRE PROCEDURE
pre-anesthesia assessment may be used as a history and physical.    DOS STAFF ADDENDUM:    Pt seen and examined, chart reviewed (including anesthesia, drug and allergy history).  No interval changes to history and physical examination.  Anesthetic plan, risks, benefits, alternatives, and personnel involved discussed with patient. Questions and concerns addressed.  Patient(family) verbalized an understanding and agrees to proceed.      Edy Jeter MD  May 17, 2024  9:49 AM

## 2024-05-17 NOTE — FLOWSHEET NOTE
Pt. Voided 250cc urine per urinal.  RNs assisted getting pt. Dressed and up to wheelchair.  Transportation back to [facility] notified.  [facility] will be here around 3pm.  Wife and daughter notified. Wife spoke with Dr. Chavez via phone. Patient and responsible adult verbalized understanding of discharge instructions, sedation medication, and potential complications including pain. Patient instructed to call Doctor if complications occur.

## 2024-05-17 NOTE — ANESTHESIA POSTPROCEDURE EVALUATION
Department of Anesthesiology  Postprocedure Note    Patient: Jorge Gauthier  MRN: [id number]  YOB: 1945  Date of evaluation: 5/17/2024    Procedure Summary       Date: 05/17/24 Room / Location: [address]    Anesthesia Start: 1055 Anesthesia Stop: 1207    Procedure: OPEN REDUCTION INTERNAL FIXATION RIGHT ANKLE (Right: Ankle) Diagnosis:       Closed fracture of right ankle, initial encounter      (Closed fracture of right ankle, initial encounter [S82.891A])    Surgeons: José Miguel Chavez MD Responsible Provider: Edy Jeter MD    Anesthesia Type: general ASA Status: 3            Anesthesia Type: No value filed.    June Phase I: June Score: 9    June Phase II: June Score: 9    Anesthesia Post Evaluation    Patient location during evaluation: PACU  Patient participation: complete - patient participated  Level of consciousness: awake and alert  Pain score: 3  Nausea & Vomiting: no nausea  Cardiovascular status: hemodynamically stable  Respiratory status: acceptable  Hydration status: stable  Pain management: adequate    No notable events documented.

## 2024-05-17 NOTE — PROGRESS NOTES
the overall alignment of the fracture and treatment options including both surgical and non-surgical treatment, and that my recommendation is an open reduction and internal fixation given the amount of displacement and comminution of the fracture.     I discussed the risks and benefits of surgery with the patient, including but not limited to infection, bleeding, pain, injury to nerves or blood vessels failure of the surgery and need for additional surgery.  All the patient's questions were answered.   We discussed an expected post-operative course.  He  is understanding of this and wishes to proceed.    Surgery today at [place].       José Miguel Chavez MD

## 2024-05-17 NOTE — OP NOTE
[facility]          [address] [place] [address]                            OPERATIVE REPORT      PATIENT NAME: JAROD MOREJON               : 1945  MED REC NO: [id number]                      ROOM: OR  ACCOUNT NO: [id number]                       ADMIT DATE: 2024  PROVIDER: José Miguel Chavez MD      DATE OF PROCEDURE:  2024    SURGEON:  José Miguel Chavez MD    ASSISTANT:  Diamond Villegas CNP.    PREOPERATIVE DIAGNOSES:    1. Right ankle trimalleolar displaced fracture.  2. Right ankle distal tibia-fibular syndesmosis disruption.    POSTOPERATIVE DIAGNOSES:    1. Right ankle trimalleolar displaced fracture.  2. Right ankle distal tibia-fibular syndesmosis disruption.    PROCEDURE DONE:    1. Open treatment of right ankle trimalleolar fracture with open reduction and internal fixation.  2. Open treatment of right ankle distal tibia-fibular syndesmosis disruption with 2 syndesmosis screws.    ANESTHESIA:  General anesthesia.    ESTIMATED BLOOD LOSS:  Minimal.    COMPLICATIONS:  None.    TOURNIQUET:  Right upper thigh, 300 mmHg.    IMPLANTS USED:    1. Maira distal fibular locking plate and 1 lag screw.  2. Maira 3.5 cortical screw x2 for syndesmosis repair.    INDICATIONS:  This is a 78-year-old white male who was at home and sustained a fall with a twisting injury to the right ankle.  He was then seen in the [facility] ER where he was found to have a trimalleolar fracture.  He was then discharged from the ER to extended-care facility.  We saw him in office.  We recommended surgical fixation because of the instability of the fracture.  All risks, benefits, and alternatives were discussed with the patient and he agreed to proceed with surgical fixation.    Given the patient's Body mass index is 30.81 kg/m². added significant challenge to the procedure. It required significant physical and mental effort. It required 60% more time for such procedure.

## 2024-05-17 NOTE — FLOWSHEET NOTE
Pt. Arrived in phase 2.  Awake, denies pain.  \"I am peeing.\"  Urinal in place.  O2 sat 95% on 2L/NC.  Text message sent to family.  Audible weezes.

## 2024-05-17 NOTE — H&P
Preoperative H&P Update    The patient's History and Physical in the medical record from 5/17/2024 was reviewed by me today.    Past Medical History:   Diagnosis Date    Abrasion of right elbow 2/4/2023    Arthritis     CAD (coronary artery disease)     CABG in 2009    Chronic back pain     Chronic systolic CHF (congestive heart failure) (HCC)     COPD (chronic obstructive pulmonary disease) (HCC)     Dental disease     Diabetes mellitus (HCC)     Essential hypertension     Hearing loss     Paroxysmal atrial fibrillation (HCC)     On Coumadin    Tinnitus      Past Surgical History:   Procedure Laterality Date    BACK SURGERY  1995    BACK SURGERY      CARDIAC SURGERY  2007    CERVICAL SPINE SURGERY      CHEST TUBE INSERTION Right 03/14/2023    14F. Dr. Jalloh    CORONARY ARTERY BYPASS GRAFT      FRACTURE SURGERY Left 1988    Shoulder    IR CHEST TUBE INSERTION  3/14/2023    IR CHEST TUBE INSERTION 3/14/2023 [facility] PROCEDURES     No current facility-administered medications on file prior to encounter.     Current Outpatient Medications on File Prior to Encounter   Medication Sig Dispense Refill    cefepime (MAXIPIME) infusion Infuse 2,000 mg intravenously in the morning and 2,000 mg in the evening. Do all this for 14 days. Compound per protocol. 56 g 0    sodium chloride 0.9 % SOLN 50 mL with ceFEPIme 2 g SOLR 2,000 mg Infuse 2,000 mg intravenously every 12 hours      guaiFENesin (MUCINEX) 600 MG extended release tablet Take 1 tablet by mouth 2 times daily 14 tablet 0    tobramycin, PF, (ADIA) 300 MG/5ML nebulizer solution Take 5 mLs by nebulization in the morning and 5 mLs in the evening. Do all this for 20 doses. 100 mL 0    carvedilol (COREG) 6.25 MG tablet Take 0.5 tablets by mouth 2 times daily (with meals)      benzonatate (TESSALON PERLES) 100 MG capsule Take 1 capsule by mouth 3 times daily as needed for Cough 90 capsule 3    lamoTRIgine (LAMICTAL) 25 MG tablet Take 4 tablets by mouth at bedtime       (ZOCOR) 80 MG tablet Take 0.5 tablets by mouth nightly      vitamin D (CHOLECALCIFEROL) 1000 UNITS TABS tablet Take 1 tablet by mouth daily      gabapentin (NEURONTIN) 800 MG tablet Take 1 tablet by mouth 4 times daily.      aspirin 81 MG tablet Take 1 tablet by mouth daily      metFORMIN (GLUCOPHAGE) 1000 MG tablet Take 1 tablet by mouth 2 times daily (with meals)         Allergies   Allergen Reactions    Donepezil Nausea Only    No Known Allergies     Amitriptyline Hallucinations    Enoxaparin Dizziness or Vertigo and Nausea Only      I reviewed the HPI, medications, allergies, reason for surgery, diagnosis and treatment plan and there has been no change.    The patient was evaluated by me today. Physical exam findings for this update include:    There were no vitals filed for this visit.  Airway is intact  Chest: chest clear, no wheezing, rales, normal symmetric air entry, no tachypnea, retractions or cyanosis  Heart: regular rate and rhythm ; heart sounds normal  Findings on exam of the body region where surgery is to be performed include:  Right ankle displaced trimalleolar fracture.    Electronically signed by José Miguel Chavez MD on 5/17/2024 at 8:55 AM

## 2024-05-20 LAB
ALBUMIN SERPL-MCNC: 3.8 G/DL
ALP BLD-CCNC: 53 U/L
ALT SERPL-CCNC: 10 U/L
ANION GAP SERPL CALCULATED.3IONS-SCNC: ABNORMAL MMOL/L
AST SERPL-CCNC: 15 U/L
BASOPHILS ABSOLUTE: 0.1 /ΜL
BASOPHILS RELATIVE PERCENT: 1.4 %
BILIRUB SERPL-MCNC: 0.4 MG/DL (ref 0.1–1.4)
BUN BLDV-MCNC: 19 MG/DL
C-REACTIVE PROTEIN: 7
CALCIUM SERPL-MCNC: 9.1 MG/DL
CHLORIDE BLD-SCNC: 99 MMOL/L
CO2: 32 MMOL/L
CREAT SERPL-MCNC: 1.1 MG/DL
EGFR: ABNORMAL
EOSINOPHILS ABSOLUTE: 0.4 /ΜL
EOSINOPHILS RELATIVE PERCENT: 3.8 %
GLUCOSE BLD-MCNC: 171 MG/DL
HCT VFR BLD CALC: 33.6 % (ref 41–53)
HEMOGLOBIN: 10.5 G/DL (ref 13.5–17.5)
LYMPHOCYTES ABSOLUTE: 1.8 /ΜL
LYMPHOCYTES RELATIVE PERCENT: 18.4 %
MCH RBC QN AUTO: 25.6 PG
MCHC RBC AUTO-ENTMCNC: 31.1 G/DL
MCV RBC AUTO: 82.4 FL
MONOCYTES ABSOLUTE: 1.1 /ΜL
MONOCYTES RELATIVE PERCENT: 11.4 %
NEUTROPHILS ABSOLUTE: 6.4 /ΜL
NEUTROPHILS RELATIVE PERCENT: 64.8 %
PDW BLD-RTO: 15 %
PLATELET # BLD: 305 K/ΜL
PMV BLD AUTO: 9.2 FL
POTASSIUM SERPL-SCNC: 4.2 MMOL/L
RBC # BLD: 4.1 10^6/ΜL
SEDIMENTATION RATE, ERYTHROCYTE: 62
SODIUM BLD-SCNC: 140 MMOL/L
TOTAL PROTEIN: 6.5
WBC # BLD: 9.8 10^3/ML

## 2024-05-21 ENCOUNTER — TELEPHONE (OUTPATIENT)
Dept: ORTHOPEDIC SURGERY | Age: 79
End: 2024-05-21

## 2024-05-21 NOTE — TELEPHONE ENCOUNTER
Wife was mad aware that Dr. Chavez is not able to place an RX for a stair lift. The order may need to come from PCP.

## 2024-05-21 NOTE — PROGRESS NOTES
Physician Progress Note      PATIENT:               JAROD MOREJON  [facility] #:                  [id number]  :                       1945  ADMIT DATE:       2024 3:22 PM  DISCH DATE:        2024 6:30 PM  RESPONDING  PROVIDER #:        Reji RIVERA MD          QUERY TEXT:    Patient admitted with generalized weakness, increased shortness of breath, and   productive cough.  Noted documentation of sepsis in H&P and discharge.  In   order to support the diagnosis of sepsis, please include additional clinical   indicators in your documentation.  Or please document if the diagnosis of   sepsis has been ruled out after further study    The medical record reflects the following:  Risk Factors: pneumonia    Clinical Indicators: D/C notes- Sepsis, POA  -criteria: HR, RR, LA  -suspected pulmonary source  -blood cultures no growth to date, respiratory culture growing Klebsiella   pneumonia and Enterobacter  -sepsis state resolving  Treatment: blood and respiratory cultures, pulmonology consult,  IV vanc,   cefepime, azithromycin, monitor labs, monitor vitals    Thank You Donna Leslie RN, [email or web address] [email or web address]  Options provided:  -- Sepsis present as evidenced by, Please document evidence.  -- Sepsis was ruled out after study  -- Other - I will add my own diagnosis  -- Disagree - Not applicable / Not valid  -- Disagree - Clinically unable to determine / Unknown  -- Refer to Clinical Documentation Reviewer    PROVIDER RESPONSE TEXT:    Sepsis was ruled out after study.    Query created by: Donna Leslie on 2024 11:28 AM      Electronically signed by:  Reji RIVERA MD 2024 12:26 PM

## 2024-05-21 NOTE — TELEPHONE ENCOUNTER
General Question     Subject: STAIR LIFT PRESCRIPTION   Patient and /or Facility Request: Jorge Gauthier   Contact Number: [phone]     PATIENT'S WIFE CALLING STATING THAT THE PATIENT WILL BE COMING HOME SOON AND WANTS TO KNOW IF DR HUFF CAN SEND A PRESCRIPTION OVER TO [employer] STATING THAT THE PATIENT NEEDS THE STAIR LIFT AT HOME FOR HIS NO WEIGHT BARRING RIGHT ANKLE    [facility] AT FAX NUMBER [phone]

## 2024-05-23 ENCOUNTER — TELEPHONE (OUTPATIENT)
Dept: INFECTIOUS DISEASES | Age: 79
End: 2024-05-23

## 2024-05-23 DIAGNOSIS — J15.69 ENTEROBACTER CLOACAE PNEUMONIA (HCC): ICD-10-CM

## 2024-05-23 NOTE — TELEPHONE ENCOUNTER
Left vm for Zoë, Unit Manager of [address] [place] [address], at [facility].  Requesting this weeks lab results for pt - CBC with Diff, CMP, ESR, CRP  My name, title, Dr August's name, specialty and affiliation  Pt name and +  My direct number [phone]  Office fax [phone]

## 2024-05-24 NOTE — TELEPHONE ENCOUNTER
OPAT Nurse Coordinator Weekly Update Note    Current OPAT plan:  IV Cefepime x 2 gm Q 12  HR X STOP DATE X  5/29      Diagnosis:  Enterobacter and Klebsiella pneumonia      Assessment:  Spoke with Miriam.  She states pt is doing quite well.  He is up in his wheelchair frequently and is wheeling self around the unit.  Pt is tolerating IV ATB well.  VSS.  No c/o of SOB or cough      Follow up appts:  Dr Chavez 5/31  Dr. Dang 7/30

## 2024-05-27 LAB
ALBUMIN SERPL-MCNC: 4 G/DL
ALP BLD-CCNC: 58 U/L
ALT SERPL-CCNC: 11 U/L
ANION GAP SERPL CALCULATED.3IONS-SCNC: ABNORMAL MMOL/L
AST SERPL-CCNC: 14 U/L
BASOPHILS ABSOLUTE: 0.1 /ΜL
BASOPHILS RELATIVE PERCENT: 1.4 %
BILIRUB SERPL-MCNC: 0.2 MG/DL (ref 0.1–1.4)
BUN BLDV-MCNC: 18 MG/DL
C-REACTIVE PROTEIN: 5.47
CALCIUM SERPL-MCNC: 9.3 MG/DL
CHLORIDE BLD-SCNC: 99 MMOL/L
CO2: 29 MMOL/L
CREAT SERPL-MCNC: 1.1 MG/DL
EGFR: ABNORMAL
EOSINOPHILS ABSOLUTE: 0.3 /ΜL
EOSINOPHILS RELATIVE PERCENT: 4.8 %
GLUCOSE BLD-MCNC: 208 MG/DL
HCT VFR BLD CALC: 34.1 % (ref 41–53)
HEMOGLOBIN: 34.1 G/DL (ref 13.5–17.5)
LYMPHOCYTES ABSOLUTE: 1.8 /ΜL
LYMPHOCYTES RELATIVE PERCENT: 26 %
MCH RBC QN AUTO: 25.2 PG
MCHC RBC AUTO-ENTMCNC: 30.2 G/DL
MCV RBC AUTO: 83.4 FL
MONOCYTES ABSOLUTE: 0.7 /ΜL
MONOCYTES RELATIVE PERCENT: 10 %
NEUTROPHILS ABSOLUTE: 4.1 /ΜL
NEUTROPHILS RELATIVE PERCENT: 57.5 %
PDW BLD-RTO: 15 %
PLATELET # BLD: 331 K/ΜL
PMV BLD AUTO: 8.8 FL
POTASSIUM SERPL-SCNC: 4.4 MMOL/L
RBC # BLD: 4.09 10^6/ΜL
SEDIMENTATION RATE, ERYTHROCYTE: 56
SODIUM BLD-SCNC: 140 MMOL/L
TOTAL PROTEIN: 6.8
WBC # BLD: 7.1 10^3/ML

## 2024-05-28 DIAGNOSIS — J15.69 ENTEROBACTER CLOACAE PNEUMONIA (HCC): ICD-10-CM

## 2024-05-30 ENCOUNTER — TELEPHONE (OUTPATIENT)
Dept: ORTHOPEDIC SURGERY | Age: 79
End: 2024-05-30

## 2024-05-30 NOTE — TELEPHONE ENCOUNTER
LVM to return call to move apt on 5/31/24 to an earlier time.     Upon return call, please offer 8:00, 9:00, or 9:30.  OK to overbook.

## 2024-05-31 ENCOUNTER — OFFICE VISIT (OUTPATIENT)
Dept: ORTHOPEDIC SURGERY | Age: 79
End: 2024-05-31

## 2024-05-31 ENCOUNTER — TELEPHONE (OUTPATIENT)
Dept: INFECTIOUS DISEASES | Age: 79
End: 2024-05-31

## 2024-05-31 ENCOUNTER — TELEPHONE (OUTPATIENT)
Dept: ORTHOPEDIC SURGERY | Age: 79
End: 2024-05-31

## 2024-05-31 DIAGNOSIS — S82.851A CLOSED TRIMALLEOLAR FRACTURE OF RIGHT ANKLE, INITIAL ENCOUNTER: Primary | ICD-10-CM

## 2024-05-31 DIAGNOSIS — S93.431A SYNDESMOTIC DISRUPTION OF RIGHT ANKLE, INITIAL ENCOUNTER: ICD-10-CM

## 2024-05-31 PROCEDURE — 99024 POSTOP FOLLOW-UP VISIT: CPT | Performed by: ORTHOPAEDIC SURGERY

## 2024-05-31 NOTE — TELEPHONE ENCOUNTER
Faxing Patient Information     Facility Name: [facility]   Contact Name: PATRICIA   Contact Number: [phone]  Facility Fax Number: [phone]      PATRICIA CALLING IN REGARDING NEEDING RESTRICTION ON THE BOOT THE PT HAS ON HIS RT FOOT .     FACILITY ALSO NEEDS AFTER SUMMARY PAPER FROM 5- WITH [facility].     PLEASE CALL BACK PT AT THE ABOVE NUMBER LISTED

## 2024-05-31 NOTE — TELEPHONE ENCOUNTER
Salvador Whatley N routed conversation to [facility] [id number] minutes ago (12:31 PM)     Salvador Whatley N41 minutes ago (12:31 PM)     VIOLET  Faxing Patient Information      Facility Name: [facility]   Contact Name: PATRICIA   Contact Number:   Facility Fax Number: [phone]        PATRICIA CALLING IN REGARDING NEEDING RESTRICTION ON THE BOOT THE PT HAS ON HIS RT FOOT .      FACILITY ALSO NEEDS AFTER SUMMARY PAPER FROM 5- WITH [facility].      PLEASE CALL BACK PT AT THE ABOVE NUMBER LISTED

## 2024-05-31 NOTE — TELEPHONE ENCOUNTER
Spoke to Elisa, advised TTWB in boot 4-6 weeks.     Will fax office note upon completion.     [facility] Fax Number: [phone]

## 2024-05-31 NOTE — TELEPHONE ENCOUNTER
Called [facility] for an update on pt, was informed that PICC was removed this am per their house docs orders.  
Patient/Caregiver provided printed discharge information.

## 2024-05-31 NOTE — PROGRESS NOTES
DIAGNOSIS:    1-Right ankle trimalleolar displaced fracture, status post ORIF.  2-Right ankle distal tibiofibular syndesmosis disruption,status post  with syndesmosis screws x 2      DATE OF SURGERY: 5/17/2024.    HISTORY OF PRESENT ILLNESS:  Mr. Gauthier 79 y.o.  male who came in today for 2 weeks postoperative visit.  The patient denies any significant pain in the right ankle. Rates pain a 0-1/10 VAS mild, aching, intermittent and are improving. Aggravating factors movement. Alleviating factors rest. He has been in a splint, and non WB.  He has chronic numbness  tingling sensation. No fever or Chills.  He i's in a rehab center working with physical therapy.  He has diabetes and dense diabetic neuropathy.  Denies smoking.    PHYSICAL EXAMINATION:  The incision healing well.  No signs of any erythema or drainage, minimal swelling. He has no pain with the active or passive range of motion of the right ankle, but decrease ROM.  He has chronic decreased sensation in bilateral lower extremities, and he is neurovascularly intact.    IMAGING:  Three views right ankle taken today in the office showed anatomic alignment of the fracture, plate and screws in good position, no loosening. Ankle mortise is well centered.  There are 2 syndesmosis screws intact.       IMPRESSION:  2 weeks out from  1-Right ankle trimalleolar displaced fracture, status post ORIF.  2-Right ankle distal tibiofibular syndesmosis disruption,status post  with syndesmosis screws x 2      PLAN: He placed in a boot, and can start TTWB for transfers only for 6 weeks. I have told the patient to work on ROM. Continue DVT prophylaxis. Compression hose to help with the swelling. He can remove the boot for sleep, shower and rest. He patient will come back for a follow up in 6 weeks.  At that time, we will take 3 views of the right ankle standing.We will most likely not remove the syndesmosis screws.        Procedures    Breg / Airselect Tall Walking Boot

## 2024-05-31 NOTE — TELEPHONE ENCOUNTER
This is a duplicate encounter.     An ongoing, open encounter is already ongoing for this patient regarding the same topic. Therefore, this encounter will be closed as to decrease risk of miscommunication and misunderstanding.      Please reference encounter dated 5/31/2024 10:57a for the remainder of this topic.

## 2024-05-31 NOTE — TELEPHONE ENCOUNTER
Please [facility] 5[facility]31 [facility] upon completion. TTWB in boot 4-6 weeks.

## 2024-06-10 ENCOUNTER — APPOINTMENT (OUTPATIENT)
Dept: CT IMAGING | Age: 79
End: 2024-06-10
Payer: MEDICARE

## 2024-06-10 ENCOUNTER — HOSPITAL ENCOUNTER (INPATIENT)
Age: 79
LOS: 4 days | Discharge: HOME HEALTH | End: 2024-06-14
Attending: STUDENT IN AN ORGANIZED HEALTH CARE EDUCATION/TRAINING PROGRAM | Admitting: STUDENT IN AN ORGANIZED HEALTH CARE EDUCATION/TRAINING PROGRAM
Payer: MEDICARE

## 2024-06-10 ENCOUNTER — APPOINTMENT (OUTPATIENT)
Dept: GENERAL RADIOLOGY | Age: 79
End: 2024-06-10
Payer: MEDICARE

## 2024-06-10 DIAGNOSIS — J18.9 PNEUMONIA DUE TO INFECTIOUS ORGANISM, UNSPECIFIED LATERALITY, UNSPECIFIED PART OF LUNG: ICD-10-CM

## 2024-06-10 DIAGNOSIS — T17.908S CHRONIC PULMONARY ASPIRATION, SEQUELA: ICD-10-CM

## 2024-06-10 DIAGNOSIS — J44.1 COPD EXACERBATION (HCC): Primary | ICD-10-CM

## 2024-06-10 DIAGNOSIS — J96.01 ACUTE RESPIRATORY FAILURE WITH HYPOXIA (HCC): ICD-10-CM

## 2024-06-10 LAB
ALBUMIN SERPL-MCNC: 4.2 G/DL (ref 3.4–5)
ALBUMIN/GLOB SERPL: 1.2 {RATIO} (ref 1.1–2.2)
ALP SERPL-CCNC: 65 U/L (ref 40–129)
ALT SERPL-CCNC: 7 U/L (ref 10–40)
ANION GAP SERPL CALCULATED.3IONS-SCNC: 12 MMOL/L (ref 3–16)
AST SERPL-CCNC: 13 U/L (ref 15–37)
BASE EXCESS BLDV CALC-SCNC: 3.8 MMOL/L
BASE EXCESS BLDV CALC-SCNC: 4.5 MMOL/L
BASOPHILS # BLD: 0.1 K/UL (ref 0–0.2)
BASOPHILS NFR BLD: 1.2 %
BILIRUB SERPL-MCNC: 0.3 MG/DL (ref 0–1)
BUN SERPL-MCNC: 25 MG/DL (ref 7–20)
CALCIUM SERPL-MCNC: 9.3 MG/DL (ref 8.3–10.6)
CHLORIDE SERPL-SCNC: 101 MMOL/L (ref 99–110)
CO2 BLDV-SCNC: 32 MMOL/L
CO2 BLDV-SCNC: 32 MMOL/L
CO2 SERPL-SCNC: 28 MMOL/L (ref 21–32)
COHGB MFR BLDV: 2 %
COHGB MFR BLDV: 2 %
CREAT SERPL-MCNC: 1.1 MG/DL (ref 0.8–1.3)
DEPRECATED RDW RBC AUTO: 17.5 % (ref 12.4–15.4)
EOSINOPHIL # BLD: 0.4 K/UL (ref 0–0.6)
EOSINOPHIL NFR BLD: 3.8 %
GFR SERPLBLD CREATININE-BSD FMLA CKD-EPI: 68 ML/MIN/{1.73_M2}
GLUCOSE BLD-MCNC: 292 MG/DL (ref 70–99)
GLUCOSE SERPL-MCNC: 126 MG/DL (ref 70–99)
HCO3 BLDV-SCNC: 30 MMOL/L (ref 23–29)
HCO3 BLDV-SCNC: 31 MMOL/L (ref 23–29)
HCT VFR BLD AUTO: 35.7 % (ref 40.5–52.5)
HGB BLD-MCNC: 11.1 G/DL (ref 13.5–17.5)
LACTATE BLDV-SCNC: 1.4 MMOL/L (ref 0.4–1.9)
LACTATE BLDV-SCNC: 2.2 MMOL/L (ref 0.4–1.9)
LYMPHOCYTES # BLD: 2.1 K/UL (ref 1–5.1)
LYMPHOCYTES NFR BLD: 22.2 %
MCH RBC QN AUTO: 25.7 PG (ref 26–34)
MCHC RBC AUTO-ENTMCNC: 31.2 G/DL (ref 31–36)
MCV RBC AUTO: 82.4 FL (ref 80–100)
METHGB MFR BLDV: 0.3 %
METHGB MFR BLDV: 0.4 %
MONOCYTES # BLD: 1.1 K/UL (ref 0–1.3)
MONOCYTES NFR BLD: 11.5 %
NEUTROPHILS # BLD: 5.8 K/UL (ref 1.7–7.7)
NEUTROPHILS NFR BLD: 61.3 %
NT-PROBNP SERPL-MCNC: 876 PG/ML (ref 0–449)
O2 THERAPY: ABNORMAL
O2 THERAPY: ABNORMAL
PCO2 BLDV: 49 MMHG (ref 40–50)
PCO2 BLDV: 56.2 MMHG (ref 40–50)
PERFORMED ON: ABNORMAL
PH BLDV: 7.35 [PH] (ref 7.35–7.45)
PH BLDV: 7.4 [PH] (ref 7.35–7.45)
PLATELET # BLD AUTO: 362 K/UL (ref 135–450)
PMV BLD AUTO: 7 FL (ref 5–10.5)
PO2 BLDV: 45 MMHG
PO2 BLDV: 54 MMHG
POTASSIUM SERPL-SCNC: 4.5 MMOL/L (ref 3.5–5.1)
PROT SERPL-MCNC: 7.6 G/DL (ref 6.4–8.2)
RBC # BLD AUTO: 4.33 M/UL (ref 4.2–5.9)
SAO2 % BLDV: 77 %
SAO2 % BLDV: 87 %
SODIUM SERPL-SCNC: 141 MMOL/L (ref 136–145)
TROPONIN, HIGH SENSITIVITY: 27 NG/L (ref 0–22)
TROPONIN, HIGH SENSITIVITY: 30 NG/L (ref 0–22)
WBC # BLD AUTO: 9.4 K/UL (ref 4–11)

## 2024-06-10 PROCEDURE — 87040 BLOOD CULTURE FOR BACTERIA: CPT

## 2024-06-10 PROCEDURE — 6370000000 HC RX 637 (ALT 250 FOR IP): Performed by: STUDENT IN AN ORGANIZED HEALTH CARE EDUCATION/TRAINING PROGRAM

## 2024-06-10 PROCEDURE — 94640 AIRWAY INHALATION TREATMENT: CPT

## 2024-06-10 PROCEDURE — 84484 ASSAY OF TROPONIN QUANT: CPT

## 2024-06-10 PROCEDURE — 82803 BLOOD GASES ANY COMBINATION: CPT

## 2024-06-10 PROCEDURE — 99285 EMERGENCY DEPT VISIT HI MDM: CPT

## 2024-06-10 PROCEDURE — 71045 X-RAY EXAM CHEST 1 VIEW: CPT

## 2024-06-10 PROCEDURE — 96367 TX/PROPH/DG ADDL SEQ IV INF: CPT

## 2024-06-10 PROCEDURE — 6360000002 HC RX W HCPCS: Performed by: STUDENT IN AN ORGANIZED HEALTH CARE EDUCATION/TRAINING PROGRAM

## 2024-06-10 PROCEDURE — 94761 N-INVAS EAR/PLS OXIMETRY MLT: CPT

## 2024-06-10 PROCEDURE — 94660 CPAP INITIATION&MGMT: CPT

## 2024-06-10 PROCEDURE — 93005 ELECTROCARDIOGRAM TRACING: CPT | Performed by: STUDENT IN AN ORGANIZED HEALTH CARE EDUCATION/TRAINING PROGRAM

## 2024-06-10 PROCEDURE — 83605 ASSAY OF LACTIC ACID: CPT

## 2024-06-10 PROCEDURE — 96366 THER/PROPH/DIAG IV INF ADDON: CPT

## 2024-06-10 PROCEDURE — 85025 COMPLETE CBC W/AUTO DIFF WBC: CPT

## 2024-06-10 PROCEDURE — 5A09357 ASSISTANCE WITH RESPIRATORY VENTILATION, LESS THAN 24 CONSECUTIVE HOURS, CONTINUOUS POSITIVE AIRWAY PRESSURE: ICD-10-PCS | Performed by: INTERNAL MEDICINE

## 2024-06-10 PROCEDURE — 96365 THER/PROPH/DIAG IV INF INIT: CPT

## 2024-06-10 PROCEDURE — 2580000003 HC RX 258: Performed by: STUDENT IN AN ORGANIZED HEALTH CARE EDUCATION/TRAINING PROGRAM

## 2024-06-10 PROCEDURE — 71260 CT THORAX DX C+: CPT

## 2024-06-10 PROCEDURE — 2500000003 HC RX 250 WO HCPCS: Performed by: STUDENT IN AN ORGANIZED HEALTH CARE EDUCATION/TRAINING PROGRAM

## 2024-06-10 PROCEDURE — 2700000000 HC OXYGEN THERAPY PER DAY

## 2024-06-10 PROCEDURE — 80053 COMPREHEN METABOLIC PANEL: CPT

## 2024-06-10 PROCEDURE — 96375 TX/PRO/DX INJ NEW DRUG ADDON: CPT

## 2024-06-10 PROCEDURE — 2000000000 HC ICU R&B

## 2024-06-10 PROCEDURE — 83880 ASSAY OF NATRIURETIC PEPTIDE: CPT

## 2024-06-10 PROCEDURE — 6360000004 HC RX CONTRAST MEDICATION: Performed by: STUDENT IN AN ORGANIZED HEALTH CARE EDUCATION/TRAINING PROGRAM

## 2024-06-10 RX ORDER — SODIUM CHLORIDE 0.9 % (FLUSH) 0.9 %
5-40 SYRINGE (ML) INJECTION EVERY 12 HOURS SCHEDULED
Status: DISCONTINUED | OUTPATIENT
Start: 2024-06-10 | End: 2024-06-14 | Stop reason: HOSPADM

## 2024-06-10 RX ORDER — IPRATROPIUM BROMIDE AND ALBUTEROL SULFATE 2.5; .5 MG/3ML; MG/3ML
3 SOLUTION RESPIRATORY (INHALATION) ONCE
Status: COMPLETED | OUTPATIENT
Start: 2024-06-10 | End: 2024-06-10

## 2024-06-10 RX ORDER — POTASSIUM CHLORIDE 7.45 MG/ML
10 INJECTION INTRAVENOUS PRN
Status: DISCONTINUED | OUTPATIENT
Start: 2024-06-10 | End: 2024-06-14 | Stop reason: HOSPADM

## 2024-06-10 RX ORDER — M-VIT,TX,IRON,MINS/CALC/FOLIC 27MG-0.4MG
1 TABLET ORAL DAILY
Status: DISCONTINUED | OUTPATIENT
Start: 2024-06-11 | End: 2024-06-14 | Stop reason: HOSPADM

## 2024-06-10 RX ORDER — PRAMIPEXOLE DIHYDROCHLORIDE 0.5 MG/1
0.5 TABLET ORAL 2 TIMES DAILY
Status: DISCONTINUED | OUTPATIENT
Start: 2024-06-10 | End: 2024-06-12

## 2024-06-10 RX ORDER — POTASSIUM CHLORIDE 29.8 MG/ML
20 INJECTION INTRAVENOUS PRN
Status: DISCONTINUED | OUTPATIENT
Start: 2024-06-10 | End: 2024-06-14 | Stop reason: HOSPADM

## 2024-06-10 RX ORDER — FENTANYL CITRATE 50 UG/ML
50 INJECTION, SOLUTION INTRAMUSCULAR; INTRAVENOUS ONCE
Status: COMPLETED | OUTPATIENT
Start: 2024-06-10 | End: 2024-06-10

## 2024-06-10 RX ORDER — POLYETHYLENE GLYCOL 3350 17 G/17G
17 POWDER, FOR SOLUTION ORAL 2 TIMES DAILY PRN
Status: DISCONTINUED | OUTPATIENT
Start: 2024-06-10 | End: 2024-06-10

## 2024-06-10 RX ORDER — LORAZEPAM 2 MG/ML
0.5 INJECTION INTRAMUSCULAR ONCE
Status: DISCONTINUED | OUTPATIENT
Start: 2024-06-10 | End: 2024-06-10

## 2024-06-10 RX ORDER — INSULIN LISPRO 100 [IU]/ML
0-4 INJECTION, SOLUTION INTRAVENOUS; SUBCUTANEOUS NIGHTLY
Status: DISCONTINUED | OUTPATIENT
Start: 2024-06-10 | End: 2024-06-14 | Stop reason: HOSPADM

## 2024-06-10 RX ORDER — POLYETHYLENE GLYCOL 3350 17 G/17G
17 POWDER, FOR SOLUTION ORAL DAILY PRN
Status: DISCONTINUED | OUTPATIENT
Start: 2024-06-10 | End: 2024-06-10

## 2024-06-10 RX ORDER — ASPIRIN 81 MG/1
81 TABLET, CHEWABLE ORAL DAILY
Status: DISCONTINUED | OUTPATIENT
Start: 2024-06-11 | End: 2024-06-14 | Stop reason: HOSPADM

## 2024-06-10 RX ORDER — ONDANSETRON 4 MG/1
4 TABLET, ORALLY DISINTEGRATING ORAL EVERY 8 HOURS PRN
Status: DISCONTINUED | OUTPATIENT
Start: 2024-06-10 | End: 2024-06-14 | Stop reason: HOSPADM

## 2024-06-10 RX ORDER — GLUCAGON 1 MG/ML
1 KIT INJECTION PRN
Status: DISCONTINUED | OUTPATIENT
Start: 2024-06-10 | End: 2024-06-14 | Stop reason: HOSPADM

## 2024-06-10 RX ORDER — GABAPENTIN 400 MG/1
800 CAPSULE ORAL 4 TIMES DAILY
Status: DISCONTINUED | OUTPATIENT
Start: 2024-06-10 | End: 2024-06-14 | Stop reason: HOSPADM

## 2024-06-10 RX ORDER — DEXTROSE MONOHYDRATE 100 MG/ML
INJECTION, SOLUTION INTRAVENOUS CONTINUOUS PRN
Status: DISCONTINUED | OUTPATIENT
Start: 2024-06-10 | End: 2024-06-14 | Stop reason: HOSPADM

## 2024-06-10 RX ORDER — FUROSEMIDE 40 MG/1
40 TABLET ORAL DAILY
Status: DISCONTINUED | OUTPATIENT
Start: 2024-06-11 | End: 2024-06-14 | Stop reason: HOSPADM

## 2024-06-10 RX ORDER — INSULIN LISPRO 100 [IU]/ML
16 INJECTION, SOLUTION INTRAVENOUS; SUBCUTANEOUS
Status: DISCONTINUED | OUTPATIENT
Start: 2024-06-11 | End: 2024-06-11

## 2024-06-10 RX ORDER — PRAMIPEXOLE DIHYDROCHLORIDE 0.5 MG/1
0.25 TABLET ORAL NIGHTLY
Status: DISCONTINUED | OUTPATIENT
Start: 2024-06-10 | End: 2024-06-12

## 2024-06-10 RX ORDER — HEPARIN SODIUM 5000 [USP'U]/ML
5000 INJECTION, SOLUTION INTRAVENOUS; SUBCUTANEOUS EVERY 8 HOURS SCHEDULED
Status: CANCELLED | OUTPATIENT
Start: 2024-06-10

## 2024-06-10 RX ORDER — VITAMIN B COMPLEX
1000 TABLET ORAL DAILY
Status: DISCONTINUED | OUTPATIENT
Start: 2024-06-11 | End: 2024-06-14 | Stop reason: HOSPADM

## 2024-06-10 RX ORDER — BENZONATATE 100 MG/1
100 CAPSULE ORAL 3 TIMES DAILY PRN
Status: DISCONTINUED | OUTPATIENT
Start: 2024-06-10 | End: 2024-06-14 | Stop reason: HOSPADM

## 2024-06-10 RX ORDER — CARVEDILOL 3.12 MG/1
3.12 TABLET ORAL 2 TIMES DAILY WITH MEALS
Status: DISCONTINUED | OUTPATIENT
Start: 2024-06-11 | End: 2024-06-14 | Stop reason: HOSPADM

## 2024-06-10 RX ORDER — DEXMEDETOMIDINE HYDROCHLORIDE 4 UG/ML
.1-1.5 INJECTION, SOLUTION INTRAVENOUS CONTINUOUS
Status: DISCONTINUED | OUTPATIENT
Start: 2024-06-10 | End: 2024-06-11

## 2024-06-10 RX ORDER — ACETAMINOPHEN 650 MG/1
650 SUPPOSITORY RECTAL EVERY 6 HOURS PRN
Status: DISCONTINUED | OUTPATIENT
Start: 2024-06-10 | End: 2024-06-14 | Stop reason: HOSPADM

## 2024-06-10 RX ORDER — ALBUTEROL SULFATE 90 UG/1
2 AEROSOL, METERED RESPIRATORY (INHALATION) EVERY 4 HOURS PRN
Status: DISCONTINUED | OUTPATIENT
Start: 2024-06-10 | End: 2024-06-14 | Stop reason: HOSPADM

## 2024-06-10 RX ORDER — IPRATROPIUM BROMIDE AND ALBUTEROL SULFATE 2.5; .5 MG/3ML; MG/3ML
1 SOLUTION RESPIRATORY (INHALATION) ONCE
Status: COMPLETED | OUTPATIENT
Start: 2024-06-10 | End: 2024-06-10

## 2024-06-10 RX ORDER — METHOCARBAMOL 500 MG/1
500 TABLET, FILM COATED ORAL 3 TIMES DAILY PRN
Status: DISCONTINUED | OUTPATIENT
Start: 2024-06-10 | End: 2024-06-14 | Stop reason: HOSPADM

## 2024-06-10 RX ORDER — METHYLPREDNISOLONE SODIUM SUCCINATE 125 MG/2ML
125 INJECTION, POWDER, LYOPHILIZED, FOR SOLUTION INTRAMUSCULAR; INTRAVENOUS ONCE
Status: COMPLETED | OUTPATIENT
Start: 2024-06-10 | End: 2024-06-10

## 2024-06-10 RX ORDER — MAGNESIUM SULFATE IN WATER 40 MG/ML
2000 INJECTION, SOLUTION INTRAVENOUS PRN
Status: DISCONTINUED | OUTPATIENT
Start: 2024-06-10 | End: 2024-06-14 | Stop reason: HOSPADM

## 2024-06-10 RX ORDER — ROFLUMILAST 500 UG/1
500 TABLET ORAL 2 TIMES DAILY
Status: DISCONTINUED | OUTPATIENT
Start: 2024-06-11 | End: 2024-06-14 | Stop reason: HOSPADM

## 2024-06-10 RX ORDER — ALBUTEROL SULFATE 2.5 MG/3ML
2.5 SOLUTION RESPIRATORY (INHALATION)
Status: DISCONTINUED | OUTPATIENT
Start: 2024-06-10 | End: 2024-06-11

## 2024-06-10 RX ORDER — INSULIN GLARGINE 100 [IU]/ML
45 INJECTION, SOLUTION SUBCUTANEOUS 2 TIMES DAILY
Status: DISCONTINUED | OUTPATIENT
Start: 2024-06-10 | End: 2024-06-11

## 2024-06-10 RX ORDER — ATORVASTATIN CALCIUM 20 MG/1
20 TABLET, FILM COATED ORAL DAILY
Status: DISCONTINUED | OUTPATIENT
Start: 2024-06-11 | End: 2024-06-14 | Stop reason: HOSPADM

## 2024-06-10 RX ORDER — POLYETHYLENE GLYCOL 3350 17 G/17G
17 POWDER, FOR SOLUTION ORAL 2 TIMES DAILY PRN
Status: DISCONTINUED | OUTPATIENT
Start: 2024-06-10 | End: 2024-06-14 | Stop reason: HOSPADM

## 2024-06-10 RX ORDER — INSULIN LISPRO 100 [IU]/ML
0-16 INJECTION, SOLUTION INTRAVENOUS; SUBCUTANEOUS
Status: DISCONTINUED | OUTPATIENT
Start: 2024-06-11 | End: 2024-06-14 | Stop reason: HOSPADM

## 2024-06-10 RX ORDER — SIMVASTATIN 40 MG
40 TABLET ORAL NIGHTLY
Status: ON HOLD | COMMUNITY

## 2024-06-10 RX ORDER — ACETAMINOPHEN 325 MG/1
650 TABLET ORAL EVERY 6 HOURS PRN
Status: DISCONTINUED | OUTPATIENT
Start: 2024-06-10 | End: 2024-06-14 | Stop reason: HOSPADM

## 2024-06-10 RX ORDER — LAMOTRIGINE 25 MG/1
75 TABLET ORAL EVERY MORNING
Status: DISCONTINUED | OUTPATIENT
Start: 2024-06-11 | End: 2024-06-14 | Stop reason: HOSPADM

## 2024-06-10 RX ORDER — CARVEDILOL 6.25 MG/1
3.12 TABLET ORAL 2 TIMES DAILY WITH MEALS
Status: ON HOLD | COMMUNITY

## 2024-06-10 RX ORDER — SODIUM CHLORIDE 9 MG/ML
INJECTION, SOLUTION INTRAVENOUS PRN
Status: DISCONTINUED | OUTPATIENT
Start: 2024-06-10 | End: 2024-06-14 | Stop reason: HOSPADM

## 2024-06-10 RX ORDER — ONDANSETRON 2 MG/ML
4 INJECTION INTRAMUSCULAR; INTRAVENOUS EVERY 6 HOURS PRN
Status: DISCONTINUED | OUTPATIENT
Start: 2024-06-10 | End: 2024-06-14 | Stop reason: HOSPADM

## 2024-06-10 RX ORDER — SODIUM CHLORIDE 0.9 % (FLUSH) 0.9 %
5-40 SYRINGE (ML) INJECTION PRN
Status: DISCONTINUED | OUTPATIENT
Start: 2024-06-10 | End: 2024-06-14 | Stop reason: HOSPADM

## 2024-06-10 RX ORDER — LAMOTRIGINE 100 MG/1
100 TABLET ORAL NIGHTLY
Status: DISCONTINUED | OUTPATIENT
Start: 2024-06-10 | End: 2024-06-14 | Stop reason: HOSPADM

## 2024-06-10 RX ORDER — GUAIFENESIN 600 MG/1
600 TABLET, EXTENDED RELEASE ORAL 2 TIMES DAILY
Status: DISCONTINUED | OUTPATIENT
Start: 2024-06-10 | End: 2024-06-14 | Stop reason: HOSPADM

## 2024-06-10 RX ORDER — MAGNESIUM SULFATE IN WATER 40 MG/ML
2000 INJECTION, SOLUTION INTRAVENOUS ONCE
Status: COMPLETED | OUTPATIENT
Start: 2024-06-10 | End: 2024-06-10

## 2024-06-10 RX ADMIN — WATER 1000 MG: 1 INJECTION INTRAMUSCULAR; INTRAVENOUS; SUBCUTANEOUS at 19:04

## 2024-06-10 RX ADMIN — AZITHROMYCIN MONOHYDRATE 500 MG: 500 INJECTION, POWDER, LYOPHILIZED, FOR SOLUTION INTRAVENOUS at 19:10

## 2024-06-10 RX ADMIN — GUAIFENESIN 600 MG: 600 TABLET ORAL at 23:38

## 2024-06-10 RX ADMIN — IPRATROPIUM BROMIDE AND ALBUTEROL SULFATE 3 DOSE: .5; 3 SOLUTION RESPIRATORY (INHALATION) at 17:08

## 2024-06-10 RX ADMIN — IOPAMIDOL 75 ML: 755 INJECTION, SOLUTION INTRAVENOUS at 18:48

## 2024-06-10 RX ADMIN — GABAPENTIN 800 MG: 400 CAPSULE ORAL at 23:54

## 2024-06-10 RX ADMIN — FENTANYL CITRATE 50 MCG: 50 INJECTION INTRAMUSCULAR; INTRAVENOUS at 21:07

## 2024-06-10 RX ADMIN — MAGNESIUM SULFATE HEPTAHYDRATE 2000 MG: 40 INJECTION, SOLUTION INTRAVENOUS at 17:17

## 2024-06-10 RX ADMIN — IPRATROPIUM BROMIDE AND ALBUTEROL SULFATE 1 DOSE: .5; 3 SOLUTION RESPIRATORY (INHALATION) at 21:15

## 2024-06-10 RX ADMIN — METHYLPREDNISOLONE SODIUM SUCCINATE 125 MG: 125 INJECTION INTRAMUSCULAR; INTRAVENOUS at 21:07

## 2024-06-10 RX ADMIN — PRAMIPEXOLE DIHYDROCHLORIDE 0.25 MG: 0.5 TABLET ORAL at 23:38

## 2024-06-10 RX ADMIN — INSULIN GLARGINE 45 UNITS: 100 INJECTION, SOLUTION SUBCUTANEOUS at 23:38

## 2024-06-10 RX ADMIN — DEXMEDETOMIDINE HYDROCHLORIDE 0.1 MCG/KG/HR: 4 INJECTION, SOLUTION INTRAVENOUS at 23:47

## 2024-06-10 RX ADMIN — APIXABAN 5 MG: 5 TABLET, FILM COATED ORAL at 23:37

## 2024-06-10 RX ADMIN — LAMOTRIGINE 100 MG: 100 TABLET ORAL at 23:37

## 2024-06-10 RX ADMIN — ALBUTEROL SULFATE 2.5 MG: 2.5 SOLUTION RESPIRATORY (INHALATION) at 23:39

## 2024-06-10 RX ADMIN — IPRATROPIUM BROMIDE AND ALBUTEROL SULFATE 1 DOSE: .5; 3 SOLUTION RESPIRATORY (INHALATION) at 19:40

## 2024-06-10 ASSESSMENT — PAIN DESCRIPTION - PAIN TYPE: TYPE: ACUTE PAIN

## 2024-06-10 ASSESSMENT — PAIN DESCRIPTION - ORIENTATION
ORIENTATION: LEFT
ORIENTATION: LEFT

## 2024-06-10 ASSESSMENT — PAIN DESCRIPTION - LOCATION
LOCATION: BUTTOCKS
LOCATION: BUTTOCKS

## 2024-06-10 ASSESSMENT — PAIN - FUNCTIONAL ASSESSMENT
PAIN_FUNCTIONAL_ASSESSMENT: ACTIVITIES ARE NOT PREVENTED
PAIN_FUNCTIONAL_ASSESSMENT: ACTIVITIES ARE NOT PREVENTED

## 2024-06-10 ASSESSMENT — PAIN DESCRIPTION - DESCRIPTORS
DESCRIPTORS: BURNING
DESCRIPTORS: BURNING

## 2024-06-10 ASSESSMENT — PAIN SCALES - GENERAL
PAINLEVEL_OUTOF10: 0
PAINLEVEL_OUTOF10: 4
PAINLEVEL_OUTOF10: 2
PAINLEVEL_OUTOF10: 0

## 2024-06-10 NOTE — ED PROVIDER NOTES
[facility] EMERGENCY DEPARTMENT      EMERGENCY MEDICINE     Pt Name: Jorge Gauthier  MRN: [id number]  Birthdate 1945  Date of evaluation: 6/10/2024  Provider: Dio Gore MD    CHIEF COMPLAINT       Chief Complaint   Patient presents with    Shortness of Breath     Pt arrives ems from home c/o shortness of breath, states hx of copd, at home nebulizer x 4 tx with little improvement. Pt does not use o2 at home arrives to ed 84 % RA.     Cough     HISTORY OF PRESENT ILLNESS   Jorge Gauthier is a 79 y.o. male who presents to the emergency department for shortness of breath the last few days worsening today.  Does not use oxygen at home is significant hypoxic upon arrival in the low 80s worsening cough for last couple days recent surgery on his right lower extremity.    PASTMEDICAL HISTORY     Past Medical History:   Diagnosis Date    Abrasion of right elbow 2/4/2023    Arthritis     CAD (coronary artery disease)     CABG in 2009    Chronic back pain     Chronic systolic CHF (congestive heart failure) ([facility])     COPD (chronic obstructive pulmonary disease) ([facility])     Dental disease     Diabetes mellitus ([facility])     Essential hypertension     Hearing loss     Paroxysmal atrial fibrillation ([facility])     On Coumadin    Tinnitus        Patient Active Problem List   Diagnosis Code    Diabetes mellitus ([facility]) E11.9    Atherosclerosis of native coronary artery of native heart without angina pectoris I25.10    Morbid obesity due to excess calories ([facility]) E66.01    SOB (shortness of breath) R06.02    Abnormal chest CT R93.89    Back pain M54.9    Uncontrolled type 2 diabetes mellitus with hyperglycemia ([facility]) E11.65    Chronic back pain M54.9, G89.29    Chronic combined systolic (congestive) and diastolic (congestive) heart failure ([facility]) I50.42    Hyperlipidemia E78.5    Primary hypertension I10    Leukocytosis D72.829    Chronic bronchitis, simple ([facility]) J41.0    Chronic rhinitis J31.0       dexmedeTOMIDine (PRECEDEX) 400 mcg in dextrose 5 % 100 mL infusion (has no administration in time range)   ipratropium 0.5 mg-albuterol 2.5 mg (DUONEB) nebulizer solution 1 Dose (has no administration in time range)   methylPREDNISolone sodium succ (SOLU-MEDROL) injection 125 mg (has no administration in time range)   ipratropium 0.5 mg-albuterol 2.5 mg (DUONEB) nebulizer solution 3 Dose (3 Doses Inhalation Given 6/10/24 1708)   magnesium sulfate 2000 mg in 50 mL IVPB premix (0 mg IntraVENous Stopped 6/10/24 1750)   cefTRIAXone (ROCEPHIN) 1,000 mg in sterile water 10 mL IV syringe (1,000 mg IntraVENous Given 6/10/24 1904)   azithromycin (ZITHROMAX) 500 mg in 250 mL addavial (500 mg IntraVENous New Bag 6/10/24 1910)   iopamidol (ISOVUE-370) 76 % injection 75 mL (75 mLs IntraVENous Given 6/10/24 1848)   ipratropium 0.5 mg-albuterol 2.5 mg (DUONEB) nebulizer solution 1 Dose (1 Dose Inhalation Given 6/10/24 1940)         Responses to treatment:       PROCEDURES: (None if blank)  Procedures:       CRITICAL CARE: (None if blank)  CRITICAL CARE  I personally saw the patient and independently provided 74 minutes of non-concurrent critical care out of the total shared critical care time provided. This excludes seperately billable procedures. Critical care time was provided for acute hypoxic respiratory that required close evaluation and/or intervention with concern for potential patient decompensation.         DISCHARGE PRESCRIPTIONS: (None if blank)  New Prescriptions    No medications on file         I am the primary clinician of record.     FINAL IMPRESSION      1. COPD exacerbation (HCC)    2. Acute respiratory failure with hypoxia (HCC)    3. Pneumonia due to infectious organism, unspecified laterality, unspecified part of lung            DISPOSITION/PLAN   DISPOSITION Decision To Admit 06/10/2024 08:56:26 PM      OUTPATIENT FOLLOW UP THE PATIENT:  No follow-up provider specified.      Electronically Signed: Dio  CARLOS MANUEL Gore MD, 06/10/24, 8:59 PM    This report has been produced using speech recognition software and may contain errors related to that system including errors in grammar, punctuation, and spelling, as well as words and phrases that may be inappropriate. If there are any questions or concerns please feel free to contact the dictating provider for clarification.       Dio Gore MD  06/10/24 7632

## 2024-06-11 LAB
ANION GAP SERPL CALCULATED.3IONS-SCNC: 10 MMOL/L (ref 3–16)
BASE EXCESS BLDV CALC-SCNC: 3.1 MMOL/L
BASE EXCESS BLDV CALC-SCNC: 3.6 MMOL/L
BASE EXCESS BLDV CALC-SCNC: 5.4 MMOL/L
BASOPHILS # BLD: 0 K/UL (ref 0–0.2)
BASOPHILS NFR BLD: 0.3 %
BUN SERPL-MCNC: 30 MG/DL (ref 7–20)
CALCIUM SERPL-MCNC: 8.8 MG/DL (ref 8.3–10.6)
CHLORIDE SERPL-SCNC: 104 MMOL/L (ref 99–110)
CO2 BLDV-SCNC: 30 MMOL/L
CO2 BLDV-SCNC: 30 MMOL/L
CO2 BLDV-SCNC: 31 MMOL/L
CO2 SERPL-SCNC: 26 MMOL/L (ref 21–32)
COHGB MFR BLDV: 0.9 %
COHGB MFR BLDV: 1.9 %
COHGB MFR BLDV: 2.1 %
CREAT SERPL-MCNC: 1.1 MG/DL (ref 0.8–1.3)
DEPRECATED RDW RBC AUTO: 17.7 % (ref 12.4–15.4)
EKG DIAGNOSIS: NORMAL
EKG Q-T INTERVAL: 392 MS
EKG QRS DURATION: 108 MS
EKG QTC CALCULATION (BAZETT): 482 MS
EKG R AXIS: 56 DEGREES
EKG T AXIS: 50 DEGREES
EKG VENTRICULAR RATE: 91 BPM
EOSINOPHIL # BLD: 0 K/UL (ref 0–0.6)
EOSINOPHIL NFR BLD: 0 %
GFR SERPLBLD CREATININE-BSD FMLA CKD-EPI: 68 ML/MIN/{1.73_M2}
GLUCOSE BLD-MCNC: 198 MG/DL (ref 70–99)
GLUCOSE BLD-MCNC: 216 MG/DL (ref 70–99)
GLUCOSE BLD-MCNC: 230 MG/DL (ref 70–99)
GLUCOSE BLD-MCNC: 233 MG/DL (ref 70–99)
GLUCOSE BLD-MCNC: 248 MG/DL (ref 70–99)
GLUCOSE BLD-MCNC: 252 MG/DL (ref 70–99)
GLUCOSE SERPL-MCNC: 263 MG/DL (ref 70–99)
HCO3 BLDV-SCNC: 29 MMOL/L (ref 23–29)
HCO3 BLDV-SCNC: 29 MMOL/L (ref 23–29)
HCO3 BLDV-SCNC: 30 MMOL/L (ref 23–29)
HCT VFR BLD AUTO: 33.3 % (ref 40.5–52.5)
HGB BLD-MCNC: 10.5 G/DL (ref 13.5–17.5)
LYMPHOCYTES # BLD: 0.8 K/UL (ref 1–5.1)
LYMPHOCYTES NFR BLD: 10.5 %
MAGNESIUM SERPL-MCNC: 2.5 MG/DL (ref 1.8–2.4)
MCH RBC QN AUTO: 25.7 PG (ref 26–34)
MCHC RBC AUTO-ENTMCNC: 31.5 G/DL (ref 31–36)
MCV RBC AUTO: 81.6 FL (ref 80–100)
METHGB MFR BLDV: 0 %
METHGB MFR BLDV: 0.4 %
METHGB MFR BLDV: 0.5 %
MONOCYTES # BLD: 0.1 K/UL (ref 0–1.3)
MONOCYTES NFR BLD: 0.9 %
NEUTROPHILS # BLD: 6.8 K/UL (ref 1.7–7.7)
NEUTROPHILS NFR BLD: 88.3 %
O2 THERAPY: ABNORMAL
O2 THERAPY: NORMAL
O2 THERAPY: NORMAL
ORGANISM: ABNORMAL
PCO2 BLDV: 43.5 MMHG (ref 40–50)
PCO2 BLDV: 43.7 MMHG (ref 40–50)
PCO2 BLDV: 48.6 MMHG (ref 40–50)
PERFORMED ON: ABNORMAL
PH BLDV: 7.38 [PH] (ref 7.35–7.45)
PH BLDV: 7.42 [PH] (ref 7.35–7.45)
PH BLDV: 7.45 [PH] (ref 7.35–7.45)
PHOSPHATE SERPL-MCNC: 4.3 MG/DL (ref 2.5–4.9)
PLATELET # BLD AUTO: 321 K/UL (ref 135–450)
PMV BLD AUTO: 7.3 FL (ref 5–10.5)
PO2 BLDV: 162 MMHG
PO2 BLDV: 42 MMHG
PO2 BLDV: 63 MMHG
POTASSIUM SERPL-SCNC: 5.1 MMOL/L (ref 3.5–5.1)
RBC # BLD AUTO: 4.08 M/UL (ref 4.2–5.9)
REPORT: NORMAL
RESP PATH DNA+RNA PNL L RESP NAA+NON-PRB: ABNORMAL
SAO2 % BLDV: 71 %
SAO2 % BLDV: 93 %
SAO2 % BLDV: 99 %
SODIUM SERPL-SCNC: 140 MMOL/L (ref 136–145)
WBC # BLD AUTO: 7.7 K/UL (ref 4–11)

## 2024-06-11 PROCEDURE — 6360000002 HC RX W HCPCS: Performed by: STUDENT IN AN ORGANIZED HEALTH CARE EDUCATION/TRAINING PROGRAM

## 2024-06-11 PROCEDURE — 2580000003 HC RX 258

## 2024-06-11 PROCEDURE — 6370000000 HC RX 637 (ALT 250 FOR IP): Performed by: NURSE PRACTITIONER

## 2024-06-11 PROCEDURE — 85025 COMPLETE CBC W/AUTO DIFF WBC: CPT

## 2024-06-11 PROCEDURE — 2580000003 HC RX 258: Performed by: STUDENT IN AN ORGANIZED HEALTH CARE EDUCATION/TRAINING PROGRAM

## 2024-06-11 PROCEDURE — 6360000002 HC RX W HCPCS: Performed by: NURSE PRACTITIONER

## 2024-06-11 PROCEDURE — 6370000000 HC RX 637 (ALT 250 FOR IP): Performed by: HOSPITALIST

## 2024-06-11 PROCEDURE — 87205 SMEAR GRAM STAIN: CPT

## 2024-06-11 PROCEDURE — 92610 EVALUATE SWALLOWING FUNCTION: CPT

## 2024-06-11 PROCEDURE — 99223 1ST HOSP IP/OBS HIGH 75: CPT | Performed by: INTERNAL MEDICINE

## 2024-06-11 PROCEDURE — 6370000000 HC RX 637 (ALT 250 FOR IP): Performed by: STUDENT IN AN ORGANIZED HEALTH CARE EDUCATION/TRAINING PROGRAM

## 2024-06-11 PROCEDURE — 92526 ORAL FUNCTION THERAPY: CPT

## 2024-06-11 PROCEDURE — 36415 COLL VENOUS BLD VENIPUNCTURE: CPT

## 2024-06-11 PROCEDURE — 94640 AIRWAY INHALATION TREATMENT: CPT

## 2024-06-11 PROCEDURE — 94669 MECHANICAL CHEST WALL OSCILL: CPT

## 2024-06-11 PROCEDURE — 87070 CULTURE OTHR SPECIMN AEROBIC: CPT

## 2024-06-11 PROCEDURE — 80048 BASIC METABOLIC PNL TOTAL CA: CPT

## 2024-06-11 PROCEDURE — 94150 VITAL CAPACITY TEST: CPT

## 2024-06-11 PROCEDURE — 84100 ASSAY OF PHOSPHORUS: CPT

## 2024-06-11 PROCEDURE — 87633 RESP VIRUS 12-25 TARGETS: CPT

## 2024-06-11 PROCEDURE — 93010 ELECTROCARDIOGRAM REPORT: CPT | Performed by: INTERNAL MEDICINE

## 2024-06-11 PROCEDURE — 2060000000 HC ICU INTERMEDIATE R&B

## 2024-06-11 PROCEDURE — 2700000000 HC OXYGEN THERAPY PER DAY

## 2024-06-11 PROCEDURE — 83735 ASSAY OF MAGNESIUM: CPT

## 2024-06-11 PROCEDURE — 94761 N-INVAS EAR/PLS OXIMETRY MLT: CPT

## 2024-06-11 PROCEDURE — 82803 BLOOD GASES ANY COMBINATION: CPT

## 2024-06-11 RX ORDER — ALBUTEROL SULFATE 2.5 MG/3ML
2.5 SOLUTION RESPIRATORY (INHALATION) EVERY 4 HOURS PRN
Status: DISCONTINUED | OUTPATIENT
Start: 2024-06-11 | End: 2024-06-14 | Stop reason: HOSPADM

## 2024-06-11 RX ORDER — PREDNISONE 20 MG/1
40 TABLET ORAL DAILY
Status: DISCONTINUED | OUTPATIENT
Start: 2024-06-11 | End: 2024-06-14 | Stop reason: HOSPADM

## 2024-06-11 RX ORDER — INSULIN LISPRO 100 [IU]/ML
10 INJECTION, SOLUTION INTRAVENOUS; SUBCUTANEOUS
Status: DISCONTINUED | OUTPATIENT
Start: 2024-06-11 | End: 2024-06-13 | Stop reason: DRUGHIGH

## 2024-06-11 RX ORDER — METHYLPREDNISOLONE SODIUM SUCCINATE 40 MG/ML
40 INJECTION, POWDER, LYOPHILIZED, FOR SOLUTION INTRAMUSCULAR; INTRAVENOUS EVERY 6 HOURS
Status: DISCONTINUED | OUTPATIENT
Start: 2024-06-11 | End: 2024-06-11

## 2024-06-11 RX ORDER — WATER 10 ML/10ML
INJECTION INTRAMUSCULAR; INTRAVENOUS; SUBCUTANEOUS
Status: COMPLETED
Start: 2024-06-11 | End: 2024-06-11

## 2024-06-11 RX ORDER — IPRATROPIUM BROMIDE AND ALBUTEROL SULFATE 2.5; .5 MG/3ML; MG/3ML
1 SOLUTION RESPIRATORY (INHALATION)
Status: DISCONTINUED | OUTPATIENT
Start: 2024-06-11 | End: 2024-06-14 | Stop reason: HOSPADM

## 2024-06-11 RX ORDER — FUROSEMIDE 10 MG/ML
40 INJECTION INTRAMUSCULAR; INTRAVENOUS ONCE
Status: COMPLETED | OUTPATIENT
Start: 2024-06-11 | End: 2024-06-11

## 2024-06-11 RX ORDER — INSULIN GLARGINE 100 [IU]/ML
25 INJECTION, SOLUTION SUBCUTANEOUS 2 TIMES DAILY
Status: DISCONTINUED | OUTPATIENT
Start: 2024-06-11 | End: 2024-06-13 | Stop reason: DRUGHIGH

## 2024-06-11 RX ADMIN — INSULIN LISPRO 10 UNITS: 100 INJECTION, SOLUTION INTRAVENOUS; SUBCUTANEOUS at 14:31

## 2024-06-11 RX ADMIN — CEFTRIAXONE SODIUM 2000 MG: 2 INJECTION, POWDER, FOR SOLUTION INTRAMUSCULAR; INTRAVENOUS at 18:32

## 2024-06-11 RX ADMIN — INSULIN GLARGINE 25 UNITS: 100 INJECTION, SOLUTION SUBCUTANEOUS at 11:00

## 2024-06-11 RX ADMIN — ROFLUMILAST 500 MCG: 500 TABLET ORAL at 22:43

## 2024-06-11 RX ADMIN — WATER 1 ML: 1 INJECTION INTRAMUSCULAR; INTRAVENOUS; SUBCUTANEOUS at 01:31

## 2024-06-11 RX ADMIN — MOMETASONE FUROATE AND FORMOTEROL FUMARATE DIHYDRATE 2 PUFF: 200; 5 AEROSOL RESPIRATORY (INHALATION) at 09:30

## 2024-06-11 RX ADMIN — AZITHROMYCIN MONOHYDRATE 500 MG: 500 INJECTION, POWDER, LYOPHILIZED, FOR SOLUTION INTRAVENOUS at 19:08

## 2024-06-11 RX ADMIN — METHOCARBAMOL 500 MG: 500 TABLET ORAL at 23:19

## 2024-06-11 RX ADMIN — ROFLUMILAST 500 MCG: 500 TABLET ORAL at 01:01

## 2024-06-11 RX ADMIN — LAMOTRIGINE 75 MG: 25 TABLET ORAL at 10:50

## 2024-06-11 RX ADMIN — GABAPENTIN 800 MG: 400 CAPSULE ORAL at 09:44

## 2024-06-11 RX ADMIN — INSULIN LISPRO 8 UNITS: 100 INJECTION, SOLUTION INTRAVENOUS; SUBCUTANEOUS at 09:17

## 2024-06-11 RX ADMIN — APIXABAN 5 MG: 5 TABLET, FILM COATED ORAL at 21:14

## 2024-06-11 RX ADMIN — EMPAGLIFLOZIN 10 MG: 10 TABLET, FILM COATED ORAL at 10:50

## 2024-06-11 RX ADMIN — ROFLUMILAST 500 MCG: 500 TABLET ORAL at 09:26

## 2024-06-11 RX ADMIN — GABAPENTIN 800 MG: 400 CAPSULE ORAL at 16:51

## 2024-06-11 RX ADMIN — LAMOTRIGINE 100 MG: 100 TABLET ORAL at 21:14

## 2024-06-11 RX ADMIN — IPRATROPIUM BROMIDE AND ALBUTEROL SULFATE 1 DOSE: 2.5; .5 SOLUTION RESPIRATORY (INHALATION) at 20:57

## 2024-06-11 RX ADMIN — ALBUTEROL SULFATE 2.5 MG: 2.5 SOLUTION RESPIRATORY (INHALATION) at 09:30

## 2024-06-11 RX ADMIN — PREDNISONE 40 MG: 20 TABLET ORAL at 11:04

## 2024-06-11 RX ADMIN — FUROSEMIDE 40 MG: 40 TABLET ORAL at 09:44

## 2024-06-11 RX ADMIN — GABAPENTIN 800 MG: 400 CAPSULE ORAL at 13:56

## 2024-06-11 RX ADMIN — INSULIN LISPRO 10 UNITS: 100 INJECTION, SOLUTION INTRAVENOUS; SUBCUTANEOUS at 17:25

## 2024-06-11 RX ADMIN — FUROSEMIDE 40 MG: 10 INJECTION, SOLUTION INTRAMUSCULAR; INTRAVENOUS at 11:00

## 2024-06-11 RX ADMIN — PRAMIPEXOLE DIHYDROCHLORIDE 0.5 MG: 0.5 TABLET ORAL at 09:26

## 2024-06-11 RX ADMIN — WATER 1 ML: 1 INJECTION INTRAMUSCULAR; INTRAVENOUS; SUBCUTANEOUS at 09:18

## 2024-06-11 RX ADMIN — POLYETHYLENE GLYCOL 3350 17 G: 17 POWDER, FOR SOLUTION ORAL at 11:06

## 2024-06-11 RX ADMIN — PRAMIPEXOLE DIHYDROCHLORIDE 0.25 MG: 0.5 TABLET ORAL at 21:13

## 2024-06-11 RX ADMIN — INSULIN GLARGINE 25 UNITS: 100 INJECTION, SOLUTION SUBCUTANEOUS at 21:14

## 2024-06-11 RX ADMIN — GUAIFENESIN 600 MG: 600 TABLET ORAL at 09:44

## 2024-06-11 RX ADMIN — GABAPENTIN 800 MG: 400 CAPSULE ORAL at 21:13

## 2024-06-11 RX ADMIN — METFORMIN HYDROCHLORIDE 1000 MG: 500 TABLET ORAL at 16:51

## 2024-06-11 RX ADMIN — METFORMIN HYDROCHLORIDE 1000 MG: 500 TABLET ORAL at 10:50

## 2024-06-11 RX ADMIN — METHYLPREDNISOLONE SODIUM SUCCINATE 40 MG: 40 INJECTION INTRAMUSCULAR; INTRAVENOUS at 01:31

## 2024-06-11 RX ADMIN — METHOCARBAMOL 500 MG: 500 TABLET ORAL at 05:59

## 2024-06-11 RX ADMIN — ASPIRIN 81 MG: 81 TABLET, CHEWABLE ORAL at 09:26

## 2024-06-11 RX ADMIN — MOMETASONE FUROATE AND FORMOTEROL FUMARATE DIHYDRATE 2 PUFF: 200; 5 AEROSOL RESPIRATORY (INHALATION) at 20:57

## 2024-06-11 RX ADMIN — CARVEDILOL 3.12 MG: 3.12 TABLET, FILM COATED ORAL at 16:51

## 2024-06-11 RX ADMIN — INSULIN LISPRO 4 UNITS: 100 INJECTION, SOLUTION INTRAVENOUS; SUBCUTANEOUS at 17:26

## 2024-06-11 RX ADMIN — Medication 1000 UNITS: at 09:44

## 2024-06-11 RX ADMIN — ATORVASTATIN CALCIUM 20 MG: 20 TABLET, FILM COATED ORAL at 09:44

## 2024-06-11 RX ADMIN — IPRATROPIUM BROMIDE AND ALBUTEROL SULFATE 1 DOSE: 2.5; .5 SOLUTION RESPIRATORY (INHALATION) at 16:18

## 2024-06-11 RX ADMIN — CARVEDILOL 3.12 MG: 3.12 TABLET, FILM COATED ORAL at 09:25

## 2024-06-11 RX ADMIN — GUAIFENESIN 600 MG: 600 TABLET ORAL at 21:13

## 2024-06-11 RX ADMIN — BENZONATATE 100 MG: 100 CAPSULE ORAL at 21:13

## 2024-06-11 RX ADMIN — Medication 1 TABLET: at 09:44

## 2024-06-11 RX ADMIN — APIXABAN 5 MG: 5 TABLET, FILM COATED ORAL at 09:26

## 2024-06-11 RX ADMIN — INSULIN LISPRO 4 UNITS: 100 INJECTION, SOLUTION INTRAVENOUS; SUBCUTANEOUS at 12:34

## 2024-06-11 RX ADMIN — METHYLPREDNISOLONE SODIUM SUCCINATE 40 MG: 40 INJECTION INTRAMUSCULAR; INTRAVENOUS at 09:18

## 2024-06-11 ASSESSMENT — PAIN SCALES - GENERAL
PAINLEVEL_OUTOF10: 0
PAINLEVEL_OUTOF10: 7
PAINLEVEL_OUTOF10: 0
PAINLEVEL_OUTOF10: 7

## 2024-06-11 ASSESSMENT — PAIN DESCRIPTION - DESCRIPTORS: DESCRIPTORS: ACHING

## 2024-06-11 ASSESSMENT — PAIN DESCRIPTION - LOCATION
LOCATION: LEG
LOCATION: ANKLE

## 2024-06-11 ASSESSMENT — PAIN DESCRIPTION - ORIENTATION
ORIENTATION: RIGHT
ORIENTATION: RIGHT

## 2024-06-11 ASSESSMENT — PAIN - FUNCTIONAL ASSESSMENT: PAIN_FUNCTIONAL_ASSESSMENT: PREVENTS OR INTERFERES SOME ACTIVE ACTIVITIES AND ADLS

## 2024-06-11 NOTE — PROGRESS NOTES
Facility/Department: [address] ICU  Initial Assessment  DYSPHAGIA BEDSIDE SWALLOW EVALUATION     Patient: Jorge Gauthier   : 1945   MRN: [id number]      Evaluation Date: 2024   Admitting Diagnosis:   COPD exacerbation (HCC) [J44.1]  Acute respiratory failure with hypoxia (HCC) [J96.01]  Pneumonia due to infectious organism, unspecified laterality, unspecified part of lung [J18.9]   has a past medical history of Abrasion of right elbow, Arthritis, CAD (coronary artery disease), Chronic back pain, Chronic systolic CHF (congestive heart failure) ([facility]), COPD (chronic obstructive pulmonary disease) ([facility]), Dental disease, Diabetes mellitus ([facility]), Essential hypertension, Hearing loss, Paroxysmal atrial fibrillation (HCC), and Tinnitus.   has a past surgical history that includes fracture surgery (Left, ); back surgery (); Cardiac surgery (); Coronary artery bypass graft; back surgery; Cervical spine surgery; chest tube insertion (Right, 2023); IR CHEST TUBE INSERTION (3/14/2023); and Ankle fracture surgery (Right, 2024).  Allergies: medication allergies noted                                             Onset date: 6/10/2024     CHART REVIEW:  6/10/2024 admitted with c/o ELVIN BROOKS ADMISSION H&P HPI:  Jorge Gauthier is a 79 y.o. male with pmh of paroxysmal A-fib on DOAC, atherosclerotic heart disease, COPD not on home oxygen, history of CABG, CHF with preserved EF, type 2 diabetes insulin-dependent who presents with respiratory distress.  In the ED patient's vitals wereOriginally noted to be afebrile Tmax of 98.5, heart rate in the 90s, blood pressure elevated first 154/82 but is improved with NIV, satting in the 80s at first despite 4 L of nasal cannula and switched to BiPAP which significantly improved his oxygenation.  Labs with BMP if sodium of 126, lactic acid went from 2.2-1.4 after treatment of exacerbation.  proBNP 800s, troponin up at first to 30 down to 27, largely unremarkable LFTs.  history of silent aspiration. Concern for consistent cough with thin trials this date.   Patient with chronic dysphagia. Recommend to resume baseline diet of regular diet texture with mildly/nectar thick liquids; meds in puree/pudding.  Compensatory strategies reinforced as well as rec to avoid icing thickened liquids as this has been an issue in the past. Patient did require repetition and reinforcement for current recs and rationale.  If there is med team concern for decline in swallow function contributing to current status, rec consideration for repeat MBS which can be completed with MD order.  Of note, patient with partial compliance with nectar thick liquids at home and recent brief period with thin liquids at SNF prior to discharge which may continue to current med team concerns for clinical aspiration indicators.  ST to follow-up 1-2 times to confirm baseline diet tolerance.     MEDICAL OR COGNITIVE/BEHAVIORAL FACTORS WHICH CAN EXACERBATE: h/o silent aspiration    Recommended Diet and Intervention 6/11/2024:  Diet Solids Recommendation:  Regular texture Liquid Consistency Recommendation:  Mildly/Nectar Thick liquids    Recommended form of Meds: Whole in puree/pudding     Compensatory Swallowing Strategies:  Effortful Swallows , Upright as possible with all PO intake , No straws , Small bites/sips , Swallow 2 times per bite , Eat/feed slowly, Remain upright 30-45 min     SHORT TERM DYSPHAGIA GOALS/PLAN OF CARE: Speech therapy for dysphagia tx 1-3 times over admission  Goals  Pt will functionally tolerate recommended diet with no overt clinical s/s of aspiration  The patient/caregiver will demonstrate understanding of compensatory strategies for improved swallowing safety with set-up       Dysphagia Therapeutic Intervention:  Diet Tolerance Monitoring , Patient/Family Education     Dysphagia Prognosis: poor   Barriers to progress:  time post initial onset; severity of imps (silent aspiration)     Discharge

## 2024-06-11 NOTE — PROGRESS NOTES
06/11/24 1513   Encounter Summary   Encounter Overview/Reason Spiritual/Emotional Needs   Service Provided For Patient and family together   Referral/Consult From [facility]   Support System Spouse;Children   Last Encounter  06/11/24  (concerned & feeling frustrated multiple hospitalization; support & blessing. PN)   Complexity of Encounter Moderate   Begin Time 1450   End Time  1515   Total Time Calculated 25 min   Spiritual/Emotional needs   Type Spiritual Support   Grief, Loss, and Adjustments   Type Life Adjustments   Assessment/Intervention/Outcome   Assessment Concerns with suffering;Calm;Hopeful   Intervention Discussed illness injury and it’s impact;Explored/Affirmed feelings, thoughts, concerns;Explored Coping Skills/Resources;Nurtured Hope;Prayer (assurance of)/Jackson   Outcome Encouraged;Engaged in conversation;Expressed feelings, needs, and concerns;Expressed Gratitude

## 2024-06-11 NOTE — PROGRESS NOTES
ED SBAR report provided to PETRA Morales. Patient to be transported to Room 2104 via stretcher by RN.Patient transported with bedside cardiac monitor. IV site clean, dry, and intact. MEWS score and pain assessed as 2/10 and documented. Patient's score on the HAMLIN Fall scale reviewed with receiving RN. Updated patient and family on plan of care.

## 2024-06-11 NOTE — PROGRESS NOTES
Patient arrived via stretcher.  Transferred to bed.  Assessment and admission completed.  Patient with complaints of mask to tight.  Tried to transition to nasal canula however within minutes patient with anxiety and SOB with accessory muscle use.    Placed back onto BiPAP at this time.    Wife helped with admission questions.  Patient oriented to room and call light.

## 2024-06-11 NOTE — DISCHARGE INSTRUCTIONS
Extra Heart Failure Education/ Tools/ Resources:     https://24Fundraiser.com.com/publication/?n=336408   --- this is American Heart Association interactive Healthier Living with Heart Failure guidebook.  Please click hyperlink or copy / paste link into search bar. The QR Code is also available below. Use your mouse to scroll through the pages.  Lots of information about weight monitoring, diet tips, activity, meds, etc    Heart Failure Tools and Resources QR Code is below. It includes multiple resources to include symptom tracker, med tracker, further HF info, and access to a HF Support Network online Community    HF [place]  -- this is a free smart phone oanh available for iPhone and Android download.  Use your phone to track sodium / fluid intake, zone tool symptom tracking, weights, medications, etc. Click on this hyperlink  HF [place]   for QR code for easy download or the link is also found in the below HF Tools and Resources.      DASH (Dietary Approach to Stop Hypertension) diet --  https://www.nhlbi.nih.gov/education/dash-eating-plan -- this diet is a flexible eating plan that promotes heart healthy eating style.  Click on hyperlink or copy / paste link into search bar.  Lots of low sodium recipes and tips.    https://www.[employer]/recipes  -- more free recipes

## 2024-06-11 NOTE — CARE COORDINATION
06/11/24 1644   Readmission Assessment   Number of Days since last admission? 8-30 days   Previous Disposition SNF   Who is being Interviewed Patient   What was the patient's/caregiver's perception as to why they think they needed to return back to the hospital? Other (Comment)  (SOB)   Did you visit your Primary Care Physician after you left the hospital, before you returned this time? No   Why weren't you able to visit your PCP? Did not have an appointment   Did you see a specialist, such as Cardiac, Pulmonary, Orthopedic Physician, etc. after you left the hospital? Yes   Who advised the patient to return to the hospital? Self-referral   Does the patient report anything that got in the way of taking their medications? No   In our efforts to provide the best possible care to you and others like you, can you think of anything that we could have done to help you after you left the hospital the first time, so that you might not have needed to return so soon? Other (Comment)  (Nothing)     Ivette MENDEZ RN  Case Management  [phone]    Electronically signed by Ivette Michael RN on 6/11/2024 at 4:46 PM

## 2024-06-11 NOTE — CARE COORDINATION
Case Management Assessment  Initial Evaluation    Date/Time of Evaluation: 6/11/2024 4:56 PM  Assessment Completed by: Ivette Michael RN    If patient is discharged prior to next notation, then this note serves as note for discharge by case management.    Patient Name: Jorge Gauthier                   YOB: 1945  Diagnosis: COPD exacerbation (HCC) [J44.1]  Acute respiratory failure with hypoxia (HCC) [J96.01]  Pneumonia due to infectious organism, unspecified laterality, unspecified part of lung [J18.9]                   Date / Time: 6/10/2024  4:31 PM    Patient Admission Status: Inpatient   Readmission Risk (Low < 19, Mod (19-27), High > 27): Readmission Risk Score: 34.3    Current PCP: Gallo Sanchez MD  PCP verified by CM? Yes    Chart Reviewed: Yes      History Provided by: Patient  Patient Orientation: Alert and Oriented    Patient Cognition: Alert    Hospitalization in the last 30 days (Readmission):  Yes    If yes, Readmission Assessment in  Navigator will be completed.    Advance Directives:      Code Status: DNR-CCA   Patient's Primary Decision Maker is: Legal Next of Kin    Primary Decision Maker: Portia Gauthier - Spouse - [phone]    Secondary Decision Maker: Bang Gauthier Jr - Child - [phone]    Discharge Planning:    Patient lives with: Spouse/Significant Other Type of Home: House  Primary Care Giver: Self  Patient Support Systems include: Spouse/Significant Other   Current Financial resources: Medicare  Current community resources: ECF/Home Care  Current services prior to admission: Durable Medical Equipment            Current DME: Walker, Other (Comment) (ramp, electric scooter-2 walker-6, transfer chair)            Type of Home Care services:  None    ADLS  Prior functional level: Assistance with the following:, Mobility, Shopping, Housework, Cooking  Current functional level: Assistance with the following:, Shopping, Housework, Cooking, Mobility    PT AM-PAC:   /24  OT AM-PAC:

## 2024-06-11 NOTE — PROGRESS NOTES
Pt stating that bipap is causing him to \"feel like he is choking\" mask loosened by EDRN, and HOB elevated. Education provided to pt that o2 is stable, and feeling anxious is norml when on bipap. EDMD made aware, per verbal order ok to remove pt from bipap at this time for a break. Placed onto 4L NC at this time. Pt's baseline room air.

## 2024-06-11 NOTE — ACP (ADVANCE CARE PLANNING)
Advance Care Planning     Advance Care Planning Activator (Inpatient)  Conversation Note      Date of ACP Conversation: 6/11/2024     Conversation Conducted with: Patient with Decision Making Capacity    ACP Activator: Ivette Michael RN    Health Care Decision Maker:     Current Designated Health Care Decision Maker:     Primary Decision Maker: Portia Gauthier - Spouse - [phone]    Secondary Decision Maker: Bang Gauthier Jr - Child - [phone]    Care Preferences    Ventilation:  \"If you were in your present state of health and suddenly became very ill and were unable to breathe on your own, what would your preference be about the use of a ventilator (breathing machine) if it were available to you?\"      Would the patient desire the use of ventilator (breathing machine)?: no    \"If your health worsens and it becomes clear that your chance of recovery is unlikely, what would your preference be about the use of a ventilator (breathing machine) if it were available to you?\"     Would the patient desire the use of ventilator (breathing machine)?: No      Resuscitation  \"CPR works best to restart the heart when there is a sudden event, like a heart attack, in someone who is otherwise healthy. Unfortunately, CPR does not typically restart the heart for people who have serious health conditions or who are very sick.\"    \"In the event your heart stopped as a result of an underlying serious health condition, would you want attempts to be made to restart your heart (answer \"yes\" for attempt to resuscitate) or would you prefer a natural death (answer \"no\" for do not attempt to resuscitate)?\" no       [] Yes   [x] No   Educated Patient / Decision Maker regarding differences between Advance Directives and portable DNR orders.    Length of ACP Conversation in minutes: 3     Conversation Outcomes:  ACP discussion completed    Follow-up plan:    [] Schedule follow-up conversation to continue planning  [] Referred individual to

## 2024-06-11 NOTE — PROGRESS NOTES
4 Eyes Skin Assessment     NAME:  Jorge Gauthier  YOB: 1945  MEDICAL RECORD NUMBER:  [id number]    The patient is being assessed for  Admission    I agree that at least one RN has performed a thorough Head to Toe Skin Assessment on the patient. ALL assessment sites listed below have been assessed.      Areas assessed by both nurses:    Head, Face, Ears, Shoulders, Back, Chest, Arms, Elbows, Hands, Sacrum. Buttock, Coccyx, Ischium, Legs. Feet and Heels, and Under Medical Devices         Does the Patient have a Wound? Yes wound(s) were present on assessment. LDA wound assessment was Initiated and completed by RN       Jose Alfredo Prevention initiated by RN: Yes  Wound Care Orders initiated by RN: Yes    Pressure Injury (Stage 3,4, Unstageable, DTI, NWPT, and Complex wounds) if present, place Wound referral order by RN under : No    New Ostomies, if present place, Ostomy referral order under : No     Nurse 1 eSignature: Electronically signed by Agnieszka Alcantar RN on 6/11/24 at 5:42 AM EDT    **SHARE this note so that the co-signing nurse can place an eSignature**    Nurse 2 eSignature: Electronically signed by Josi Pal RN on 6/11/24 at 5:44 AM EDT

## 2024-06-11 NOTE — PROGRESS NOTES
Component Value Date/Time    BC No Growth after 4 days of incubation. 05/07/2024 03:56 PM     Lab Results   Component Value Date/Time    BLOODCULT2 No Growth after 4 days of incubation. 05/07/2024 03:56 PM     Organism:   Lab Results   Component Value Date/Time    ORG Enterobacter cloacae complex DNA Detected 05/08/2024 11:40 AM    ORG Klebsiella pneumoniae DNA Detected 05/08/2024 11:40 AM         Assessment and Recommendations   Jorge Gauthier is a 79 y.o. male who presents with shortness of breath and cough      Acute respiratory failure with hypoxia-on 4 L nasal cannula     Continue supplemental oxygen as tolerated  Right lower lobe pneumonia/CAP-concern for aspiration pneumonia     Continue IV Rocephin plus azithromycin  Swallow evaluation by speech therapy  Obtain pneumonia panel, sputum cultures  COPD with possible acute exacerbation..    Continue bronchodilators and steroids  Possible acute on chronic heart failure with preserved action fraction  Continue diuretics, fluid restriction, Jardiance, maintain strict I's and O's  Paroxysmal atrial fibrillation   -continue Eliquis  diabetes mellitus type 2 uncontrolled hyperglycemia, insulin-dependent  -continue basal bolus insulin  CAD-stable-continue statin, beta-blocker, aspirin    Diet ADULT DIET; Regular; Mildly Thick (Nectar)     DVT Prophylaxis [] Lovenox, []  Heparin, [] SCDs, [] Ambulation,  [x] Eliquis, [] Xarelto  [] Coumadin   Code Status Full Code   Disposition From: Home  Expected Disposition: Home  Estimated Date of Discharge: 3 to 4 days  Patient requires continued admission due to    Surrogate Decision Maker/ POA       Personally reviewed Lab Studies and Imaging        Medical Decision Making:  The following items were considered in medical decision making:  Discussion of patient care with other providers  Reviewed clinical lab tests  Reviewed radiology tests  Reviewed other diagnostic tests/interventions  Independent review of radiologic  images  Microbiology cultures and other micro tests reviewed        Electronically signed by Chris Calderon MD on 6/11/2024 at 3:28 PM  Comment: Please note this report has been produced using speech recognition software and may contain errors related to that system including errors in grammar, punctuation, and spelling, as well as words and phrases that may be inappropriate. If there are any questions or concerns, please feel free to contact the dictating provider for clarification.

## 2024-06-11 NOTE — PROGRESS NOTES
Secure message to Tania Mcgrath NP.     Notified that I started patient on 0.02 mcg/kg/hr of precedex instead of the ordered dose.  This dose is helping patient to tolerate treatments.  She is ok with this change.   After clarifying patient code status notified her that patient is is CCA DNI.

## 2024-06-11 NOTE — PLAN OF CARE
Problem: Discharge Planning  Goal: Discharge to home or other facility with appropriate resources  6/11/2024 1443 by Joseph Gonzalez RN  Outcome: Progressing  Flowsheets (Taken 6/11/2024 0830)  Discharge to home or other facility with appropriate resources:   Identify barriers to discharge with patient and caregiver   Arrange for needed discharge resources and transportation as appropriate   Identify discharge learning needs (meds, wound care, etc)  6/11/2024 0353 by Agnieszka Alcantar RN  Outcome: Progressing  Flowsheets  Taken 6/11/2024 0350  Discharge to home or other facility with appropriate resources:   Identify barriers to discharge with patient and caregiver   Identify discharge learning needs (meds, wound care, etc)   Refer to discharge planning if patient needs post-hospital services based on physician order or complex needs related to functional status, cognitive ability or social support system   Arrange for interpreters to assist at discharge as needed  Taken 6/10/2024 2254  Discharge to home or other facility with appropriate resources:   Identify barriers to discharge with patient and caregiver   Arrange for needed discharge resources and transportation as appropriate   Identify discharge learning needs (meds, wound care, etc)   Refer to discharge planning if patient needs post-hospital services based on physician order or complex needs related to functional status, cognitive ability or social support system     Problem: Pain  Goal: Verbalizes/displays adequate comfort level or baseline comfort level  6/11/2024 1443 by Joseph Gonzalez RN  Outcome: Progressing  6/11/2024 0353 by Agnieszka Alcantar RN  Outcome: Progressing  Flowsheets (Taken 6/11/2024 0000)  Verbalizes/displays adequate comfort level or baseline comfort level:   Encourage patient to monitor pain and request assistance   Assess pain using appropriate pain scale   Administer analgesics based on type and severity of pain and evaluate  response   Implement non-pharmacological measures as appropriate and evaluate response     Problem: Safety - Adult  Goal: Free from fall injury  6/11/2024 1443 by Joseph Gonzalez RN  Outcome: Progressing  6/11/2024 0353 by Agnieszka Alcantar RN  Outcome: Progressing     Problem: Skin/Tissue Integrity  Goal: Absence of new skin breakdown  Description: 1.  Monitor for areas of redness and/or skin breakdown  2.  Assess vascular access sites hourly  3.  Every 4-6 hours minimum:  Change oxygen saturation probe site  4.  Every 4-6 hours:  If on nasal continuous positive airway pressure, respiratory therapy assess nares and determine need for appliance change or resting period.  6/11/2024 1443 by Joseph Gonzalez RN  Outcome: Progressing  6/11/2024 0353 by Agnieszka Alcantar RN  Outcome: Progressing     Problem: Respiratory - Adult  Goal: Achieves optimal ventilation and oxygenation  Outcome: Progressing  Flowsheets (Taken 6/11/2024 0830)  Achieves optimal ventilation and oxygenation:   Assess for changes in respiratory status   Assess for changes in mentation and behavior   Oxygen supplementation based on oxygen saturation or arterial blood gases   Assess and instruct to report shortness of breath or any respiratory difficulty     Problem: Cardiovascular - Adult  Goal: Maintains optimal cardiac output and hemodynamic stability  Outcome: Progressing  Flowsheets (Taken 6/11/2024 0830)  Maintains optimal cardiac output and hemodynamic stability:   Monitor blood pressure and heart rate   Monitor urine output and notify Licensed Independent Practitioner for values outside of normal range  Goal: Absence of cardiac dysrhythmias or at baseline  Outcome: Progressing  Flowsheets (Taken 6/11/2024 0830)  Absence of cardiac dysrhythmias or at baseline:   Monitor cardiac rate and rhythm   Assess for signs of decreased cardiac output     Problem: Metabolic/Fluid and Electrolytes - Adult  Goal: Electrolytes maintained within normal  limits  Outcome: Progressing  Flowsheets (Taken 6/11/2024 0830)  Electrolytes maintained within normal limits:   Monitor labs and assess patient for signs and symptoms of electrolyte imbalances   Administer electrolyte replacement as ordered   Monitor response to electrolyte replacements, including repeat lab results as appropriate  Goal: Hemodynamic stability and optimal renal function maintained  Outcome: Progressing  Flowsheets (Taken 6/11/2024 0830)  Hemodynamic stability and optimal renal function maintained:   Monitor labs and assess for signs and symptoms of volume excess or deficit   Monitor intake, output and patient weight   Monitor urine specific gravity, serum osmolarity and serum sodium as indicated or ordered   Monitor response to interventions for patient's volume status, including labs, urine output, blood pressure (other measures as available)  Goal: Glucose maintained within prescribed range  Outcome: Progressing  Flowsheets (Taken 6/11/2024 0830)  Glucose maintained within prescribed range:   Monitor blood glucose as ordered   Assess for signs and symptoms of hyperglycemia and hypoglycemia   Administer ordered medications to maintain glucose within target range     Problem: Chronic Conditions and Co-morbidities  Goal: Patient's chronic conditions and co-morbidity symptoms are monitored and maintained or improved  Outcome: Progressing  Flowsheets (Taken 6/11/2024 0830)  Care Plan - Patient's Chronic Conditions and Co-Morbidity Symptoms are Monitored and Maintained or Improved:   Monitor and assess patient's chronic conditions and comorbid symptoms for stability, deterioration, or improvement   Collaborate with multidisciplinary team to address chronic and comorbid conditions and prevent exacerbation or deterioration

## 2024-06-11 NOTE — PROGRESS NOTES
Pt yelling out for staff. EDRN entering room pt stating that he is SOB again. Pt o2 sats dropping to 84% NC, RR 30. Pt placed back onto bipap at this time. EDMD made aware, N.O for medication at this time

## 2024-06-11 NOTE — PLAN OF CARE
Problem: Discharge Planning  Goal: Discharge to home or other facility with appropriate resources  Outcome: Progressing  Flowsheets  Taken 6/11/2024 0350  Discharge to home or other facility with appropriate resources:   Identify barriers to discharge with patient and caregiver   Identify discharge learning needs (meds, wound care, etc)   Refer to discharge planning if patient needs post-hospital services based on physician order or complex needs related to functional status, cognitive ability or social support system   Arrange for interpreters to assist at discharge as needed  Taken 6/10/2024 2254  Discharge to home or other facility with appropriate resources:   Identify barriers to discharge with patient and caregiver   Arrange for needed discharge resources and transportation as appropriate   Identify discharge learning needs (meds, wound care, etc)   Refer to discharge planning if patient needs post-hospital services based on physician order or complex needs related to functional status, cognitive ability or social support system     Problem: Pain  Goal: Verbalizes/displays adequate comfort level or baseline comfort level  Outcome: Progressing  Flowsheets (Taken 6/11/2024 0000)  Verbalizes/displays adequate comfort level or baseline comfort level:   Encourage patient to monitor pain and request assistance   Assess pain using appropriate pain scale   Administer analgesics based on type and severity of pain and evaluate response   Implement non-pharmacological measures as appropriate and evaluate response     Problem: Safety - Adult  Goal: Free from fall injury  Outcome: Progressing     Problem: Skin/Tissue Integrity  Goal: Absence of new skin breakdown  Description: 1.  Monitor for areas of redness and/or skin breakdown  2.  Assess vascular access sites hourly  3.  Every 4-6 hours minimum:  Change oxygen saturation probe site  4.  Every 4-6 hours:  If on nasal continuous positive airway pressure, respiratory

## 2024-06-11 NOTE — PROGRESS NOTES
This RN introduced self to pt and spouse at bedside. Pt a/ox2 which is baseline per spouse. remains on tele with VS obtained. Call light in reach, pt educated on use , and to alert staff for needs. All questions answered at this time. Repeat VBG sent at this time. Pt on bipap, repositioned by x2 staff members per pt request. HOB elevated to high fowlers

## 2024-06-11 NOTE — CONSULTS
REASON FOR CONSULTATION/CC: Hypoxia      Consult at request of Chris Calderon MD     PCP: Gallo Sanchez MD  Established Pulmonologist: Luis Alberto Dang    Chief Complaint   Patient presents with    Shortness of Breath     Pt arrives ems from home c/o shortness of breath, states hx of copd, at home nebulizer x 4 tx with little improvement. Pt does not use o2 at home arrives to ed 84 % RA.     Cough       HISTORY OF PRESENT ILLNESS: Jorge Gauthier is a 79 y.o. year old male with a history of chronic aspiration, diastolic and systolic heart failure who presents with shortness of breath.  Symptoms been present for greater than 24 hours.  Found to be hypoxic on arrival to the ED.  CT of the chest shows likely bibasilar right greater than left pneumonia suspicious for aspiration which she has a known history of.  Also extensive intralobular septal thickening that could be consistent with acute pulmonary edema.  Patient is on 4 L of oxygen during my visit.  He states he does feel better.      Past Medical History:   Diagnosis Date    Abrasion of right elbow 2/4/2023    Arthritis     CAD (coronary artery disease)     CABG in 2009    Chronic back pain     Chronic systolic CHF (congestive heart failure) (HCC)     COPD (chronic obstructive pulmonary disease) (HCC)     Dental disease     Diabetes mellitus (HCC)     Essential hypertension     Hearing loss     Paroxysmal atrial fibrillation (HCC)     On Coumadin    Tinnitus          Past Surgical History:   Procedure Laterality Date    ANKLE FRACTURE SURGERY Right 5/17/2024    OPEN REDUCTION INTERNAL FIXATION RIGHT ANKLE performed by José Miguel Chavez MD at [facility] OR    BACK SURGERY  1995    BACK SURGERY      CARDIAC SURGERY  2007    CERVICAL SPINE SURGERY      CHEST TUBE INSERTION Right 03/14/2023    14F. Dr. Jalloh    CORONARY ARTERY BYPASS GRAFT      FRACTURE SURGERY Left 1988    Shoulder    IR CHEST TUBE INSERTION  3/14/2023    IR CHEST TUBE INSERTION 3/14/2023 [facility]

## 2024-06-11 NOTE — H&P
V2.0  History and Physical      Name:  Jorge Gauthier /Age/Sex: 1945  (79 y.o. male)   MRN & CSN:  [id number] & [id number] Encounter Date/Time: 2024 1:13 AM EDT   Location:  [id number] PCP: Gallo Sanchez MD       Hospital Day: 2    Assessment and Plan:   Jorge Gauthier is a 79 y.o. male with a pmh of paroxysmal A-fib on DOAC, atherosclerotic heart disease, COPD not on home oxygen, history of CABG, CHF with preserved EF, type 2 diabetes insulin-dependent, who presents with COPD exacerbation (HCC)    Hospital Problems             Last Modified POA    * (Principal) COPD exacerbation (HCC) 6/10/2024 Yes       Plan:  Acute COPD exacerbation with hypoxia, patient currently on NIV which we will continue as well as VBG's.  Continue with oxygen as well as DuoNebs, Solu-Medrol every 6.  Wean as able.  Will also treat for possible pneumonia with azithromycin and ceftriaxone given chest x-ray.  Also consider possible CHF exacerbation contributing to this.  Paroxysmal A-fib continue patient on his DOAC on Eliquis  CHF with preserved EF  52-57% in 2024, continue patient's home GDMT including his atorvastatin, Coreg, empagliflozin, Lasix.  Consider increasing patient's Lasix if not improving with COPD management given chest x-ray.  CAD continue patient's aspirin and watch for any signs of bleeding due to patient being also on Eliquis  Type 2 diabetes continue patient on 80% of his home regimen and do sliding scale insulin as well as hypoglycemia protocol    Disposition:   Current Living situation: Home  Expected Disposition: Home  Estimated D/C: 4 days    Diet Diet NPO   DVT Prophylaxis [] Lovenox, []  Heparin, [] SCDs, [] Ambulation,  [x] Eliquis, [] Xarelto, [] Coumadin   Code Status DNR-CCA   Surrogate Decision Maker/ POA Patient's wife Portia     Personally reviewed Lab Studies and Imaging     Discussed management of the case with ED who recommended patient for COPD exacerbation requiring  which is mildly dilated with no aneurysm or dissection.  There are moderate coronary artery calcifications. Lungs/pleura: The lungs are hyperinflated and emphysematous.  There are hazy interstitial and ground-glass opacities scattered along the upper lobes extending into the lung bases.  There is a 1 cm irregular pleural based infiltrate right upper lobe posteriorly with a questionable central area of necrosis which is more apparent.  There is pneumothorax.  There are mild pleural-based calcifications left lung base posteriorly and laterally with mild pleural thickening in the area which is unchanged.  There is mild loculated fluid seen extending into the fissures and lung bases anterior laterally.  There are small bibasilar pleural effusions layering posteriorly with adjacent moderate subsegmental atelectasis and consolidation posterolaterally which is more prominent on the right with air bronchograms which is more prominent on the right.  There is no endobronchial lesion. Upper Abdomen: Limited images of the upper abdomen are unremarkable. Soft Tissues/Bones: No acute bone or soft tissue abnormality.     Motion artifact limiting the exam.  Within the limitations of the exam, no obvious acute pulmonary embolus can be seen. Mildly dilated and atherosclerotic thoracic aorta with no aneurysm or dissection. Mild mediastinal adenopathy.  Recommend follow-up. Status post CABG surgery. Hazy ground-glass opacities along the upper lobes extending inferiorly in the lung bases which could represent pulmonary edema vs early infiltrates from pneumonia.  Recommend follow-up. Small bibasilar pleural effusions with adjacent moderate subsegmental atelectasis and consolidation along the lung bases posterolaterally which is more prominent on the right.  There is mild loculated pleural fluid in the fissures and lung bases anteriorly. 11 mm pleural-based infiltrate right upper lobe posteriorly with questionable central necrosis which  could be due  infectious or neoplastic in etiology. Recommend follow-up. Mild pleural thickening and pleural based calcifications along the left lung base which is unchanged and may be due to prior asbestos exposure.     XR CHEST PORTABLE    Result Date: 6/10/2024  EXAMINATION: ONE XRAY VIEW OF THE CHEST 6/10/2024 4:56 pm COMPARISON: 05/07/2024 HISTORY: ORDERING SYSTEM PROVIDED HISTORY: sob TECHNOLOGIST PROVIDED HISTORY: Reason for exam:->sob FINDINGS: Again seen are changes of prior open heart surgery.  The cardiac silhouette appears magnified.  There is bilateral interstitial and airspace opacities, which may reflect underlying pulmonary edema or pneumonia in the correct clinical setting.  There is small bilateral pleural effusions.  No discrete pneumothorax seen.     Bilateral interstitial and airspace opacities, which may reflect underlying pulmonary edema or pneumonia in the correct clinical setting.  Small bilateral pleural effusions.         Electronically signed by Venita Omer MD on 6/11/2024 at 1:13 AM  Time spent on this was 61 mins.

## 2024-06-11 NOTE — PROGRESS NOTES
NAME:  Jorge Gauthier  YOB: 1945  MEDICAL RECORD NUMBER:  [id number]    Shift Summary: Patient with COLLADO and at rest.  BiPap for several hours.  Once on precedex able to transition patient to NC at 6LPM.  Currently at 5LPM.     Family updated: Yes:  Portia    Rhythm: Normal Sinus Rhythm     Most recent vitals:   Visit Vitals  /63   Pulse 76   Temp 97.7 °F (36.5 °C) (Axillary)   Resp 15   Ht 1.803 m (5' 11\")   Wt 103.6 kg (228 lb 6.3 oz)   SpO2 95%   BMI 31.85 kg/m²           No data found.    No data found.      Respiratory support needed (if any):  - O2 - NC - 6 lpm    Admission weight Weight - Scale: 101.7 kg (224 lb 3.3 oz) (06/10/24 1647)    Today's weight    Wt Readings from Last 1 Encounters:   06/11/24 103.6 kg (228 lb 6.3 oz)        Leon need assessed each shift: N/A - no leon present  UOP >30ml/hr: YES  Last documented BM (in last 48 hrs):  Patient Vitals for the past 48 hrs:   Last BM (including prior to admit)   06/10/24 2254 06/09/24                Restraints (in use currently or dc'd in last 12 hrs): No    Order current and documentation up to date? No    Lines/Drains reviewed @ bedside.  Peripheral IV 06/10/24 Left Antecubital (Active)   Number of days: 0       Peripheral IV 06/10/24 Left Forearm (Active)   Number of days: 0         Drip rates at handoff:    dexmedeTOMIDine 0.2 mcg/kg/hr (06/11/24 0400)    sodium chloride      dextrose         Lab Data:   CBC:   Recent Labs     06/10/24  1658 06/11/24  0359   WBC 9.4 7.7   HGB 11.1* 10.5*   HCT 35.7* 33.3*   MCV 82.4 81.6    321     BMP:    Recent Labs     06/10/24  1658 06/11/24  0359    140   K 4.5 5.1   CO2 28 26   BUN 25* 30*   CREATININE 1.1 1.1     LIVR:   Recent Labs     06/10/24  1658   AST 13*   ALT 7*     PT/INR: No results for input(s): \"PROT\", \"INR\" in the last 72 hours.  APTT: No results for input(s): \"APTT\" in the last 72 hours.  ABG: No results for input(s): \"PHART\", \"[id number]\", \"PO2ART\" in the last 72

## 2024-06-12 LAB
ANION GAP SERPL CALCULATED.3IONS-SCNC: 13 MMOL/L (ref 3–16)
BASE EXCESS BLDV CALC-SCNC: 4.9 MMOL/L
BASE EXCESS BLDV CALC-SCNC: 5.2 MMOL/L
BASE EXCESS BLDV CALC-SCNC: 5.7 MMOL/L
BASE EXCESS BLDV CALC-SCNC: 5.9 MMOL/L
BASOPHILS # BLD: 0 K/UL (ref 0–0.2)
BASOPHILS NFR BLD: 0.3 %
BUN SERPL-MCNC: 38 MG/DL (ref 7–20)
CALCIUM SERPL-MCNC: 9.2 MG/DL (ref 8.3–10.6)
CHLORIDE SERPL-SCNC: 105 MMOL/L (ref 99–110)
CO2 BLDV-SCNC: 32 MMOL/L
CO2 BLDV-SCNC: 33 MMOL/L
CO2 SERPL-SCNC: 28 MMOL/L (ref 21–32)
COHGB MFR BLDV: 1 %
COHGB MFR BLDV: 1.5 %
COHGB MFR BLDV: 2 %
COHGB MFR BLDV: 2 %
CREAT SERPL-MCNC: 1.2 MG/DL (ref 0.8–1.3)
DEPRECATED RDW RBC AUTO: 17.8 % (ref 12.4–15.4)
EOSINOPHIL # BLD: 0 K/UL (ref 0–0.6)
EOSINOPHIL NFR BLD: 0 %
GFR SERPLBLD CREATININE-BSD FMLA CKD-EPI: 62 ML/MIN/{1.73_M2}
GLUCOSE BLD-MCNC: 169 MG/DL (ref 70–99)
GLUCOSE BLD-MCNC: 212 MG/DL (ref 70–99)
GLUCOSE BLD-MCNC: 97 MG/DL (ref 70–99)
GLUCOSE BLD-MCNC: 97 MG/DL (ref 70–99)
GLUCOSE SERPL-MCNC: 218 MG/DL (ref 70–99)
HCO3 BLDV-SCNC: 30 MMOL/L (ref 23–29)
HCO3 BLDV-SCNC: 31 MMOL/L (ref 23–29)
HCT VFR BLD AUTO: 31.2 % (ref 40.5–52.5)
HGB BLD-MCNC: 10.2 G/DL (ref 13.5–17.5)
LYMPHOCYTES # BLD: 1.6 K/UL (ref 1–5.1)
LYMPHOCYTES NFR BLD: 12.8 %
MAGNESIUM SERPL-MCNC: 2.5 MG/DL (ref 1.8–2.4)
MCH RBC QN AUTO: 26.2 PG (ref 26–34)
MCHC RBC AUTO-ENTMCNC: 32.6 G/DL (ref 31–36)
MCV RBC AUTO: 80.5 FL (ref 80–100)
METHGB MFR BLDV: 0 %
METHGB MFR BLDV: 0.1 %
METHGB MFR BLDV: 0.5 %
METHGB MFR BLDV: 0.7 %
MONOCYTES # BLD: 1.3 K/UL (ref 0–1.3)
MONOCYTES NFR BLD: 11.1 %
NEUTROPHILS # BLD: 9.2 K/UL (ref 1.7–7.7)
NEUTROPHILS NFR BLD: 75.8 %
O2 THERAPY: ABNORMAL
PCO2 BLDV: 44.8 MMHG (ref 40–50)
PCO2 BLDV: 45.8 MMHG (ref 40–50)
PCO2 BLDV: 48.2 MMHG (ref 40–50)
PCO2 BLDV: 49.4 MMHG (ref 40–50)
PERFORMED ON: ABNORMAL
PERFORMED ON: ABNORMAL
PERFORMED ON: NORMAL
PERFORMED ON: NORMAL
PH BLDV: 7.4 [PH] (ref 7.35–7.45)
PH BLDV: 7.42 [PH] (ref 7.35–7.45)
PH BLDV: 7.43 [PH] (ref 7.35–7.45)
PH BLDV: 7.44 [PH] (ref 7.35–7.45)
PHOSPHATE SERPL-MCNC: 3.7 MG/DL (ref 2.5–4.9)
PLATELET # BLD AUTO: 354 K/UL (ref 135–450)
PMV BLD AUTO: 7 FL (ref 5–10.5)
PO2 BLDV: 126 MMHG
PO2 BLDV: 35 MMHG
PO2 BLDV: 40 MMHG
PO2 BLDV: 66 MMHG
POTASSIUM SERPL-SCNC: 4.3 MMOL/L (ref 3.5–5.1)
PROCALCITONIN SERPL IA-MCNC: 0.11 NG/ML (ref 0–0.15)
RBC # BLD AUTO: 3.88 M/UL (ref 4.2–5.9)
SAO2 % BLDV: 63 %
SAO2 % BLDV: 76 %
SAO2 % BLDV: 92 %
SAO2 % BLDV: 99 %
SODIUM SERPL-SCNC: 146 MMOL/L (ref 136–145)
WBC # BLD AUTO: 12.2 K/UL (ref 4–11)

## 2024-06-12 PROCEDURE — 83735 ASSAY OF MAGNESIUM: CPT

## 2024-06-12 PROCEDURE — 94669 MECHANICAL CHEST WALL OSCILL: CPT

## 2024-06-12 PROCEDURE — 97162 PT EVAL MOD COMPLEX 30 MIN: CPT

## 2024-06-12 PROCEDURE — 6370000000 HC RX 637 (ALT 250 FOR IP): Performed by: STUDENT IN AN ORGANIZED HEALTH CARE EDUCATION/TRAINING PROGRAM

## 2024-06-12 PROCEDURE — 2580000003 HC RX 258: Performed by: STUDENT IN AN ORGANIZED HEALTH CARE EDUCATION/TRAINING PROGRAM

## 2024-06-12 PROCEDURE — 6370000000 HC RX 637 (ALT 250 FOR IP): Performed by: NURSE PRACTITIONER

## 2024-06-12 PROCEDURE — 85025 COMPLETE CBC W/AUTO DIFF WBC: CPT

## 2024-06-12 PROCEDURE — 94640 AIRWAY INHALATION TREATMENT: CPT

## 2024-06-12 PROCEDURE — 99233 SBSQ HOSP IP/OBS HIGH 50: CPT | Performed by: INTERNAL MEDICINE

## 2024-06-12 PROCEDURE — 84100 ASSAY OF PHOSPHORUS: CPT

## 2024-06-12 PROCEDURE — 84145 PROCALCITONIN (PCT): CPT

## 2024-06-12 PROCEDURE — 36415 COLL VENOUS BLD VENIPUNCTURE: CPT

## 2024-06-12 PROCEDURE — 82803 BLOOD GASES ANY COMBINATION: CPT

## 2024-06-12 PROCEDURE — 2060000000 HC ICU INTERMEDIATE R&B

## 2024-06-12 PROCEDURE — 6370000000 HC RX 637 (ALT 250 FOR IP): Performed by: HOSPITALIST

## 2024-06-12 PROCEDURE — 94761 N-INVAS EAR/PLS OXIMETRY MLT: CPT

## 2024-06-12 PROCEDURE — 97530 THERAPEUTIC ACTIVITIES: CPT

## 2024-06-12 PROCEDURE — 2700000000 HC OXYGEN THERAPY PER DAY

## 2024-06-12 PROCEDURE — 97166 OT EVAL MOD COMPLEX 45 MIN: CPT

## 2024-06-12 PROCEDURE — 80048 BASIC METABOLIC PNL TOTAL CA: CPT

## 2024-06-12 RX ORDER — PRAMIPEXOLE DIHYDROCHLORIDE 0.5 MG/1
0.25 TABLET ORAL 4 TIMES DAILY
Status: DISCONTINUED | OUTPATIENT
Start: 2024-06-12 | End: 2024-06-14 | Stop reason: HOSPADM

## 2024-06-12 RX ADMIN — Medication 1 TABLET: at 08:26

## 2024-06-12 RX ADMIN — ATORVASTATIN CALCIUM 20 MG: 20 TABLET, FILM COATED ORAL at 08:26

## 2024-06-12 RX ADMIN — INSULIN LISPRO 10 UNITS: 100 INJECTION, SOLUTION INTRAVENOUS; SUBCUTANEOUS at 17:59

## 2024-06-12 RX ADMIN — IPRATROPIUM BROMIDE AND ALBUTEROL SULFATE 1 DOSE: 2.5; .5 SOLUTION RESPIRATORY (INHALATION) at 00:06

## 2024-06-12 RX ADMIN — APIXABAN 5 MG: 5 TABLET, FILM COATED ORAL at 19:50

## 2024-06-12 RX ADMIN — Medication 10 ML: at 08:28

## 2024-06-12 RX ADMIN — EMPAGLIFLOZIN 10 MG: 10 TABLET, FILM COATED ORAL at 08:26

## 2024-06-12 RX ADMIN — FUROSEMIDE 40 MG: 40 TABLET ORAL at 08:26

## 2024-06-12 RX ADMIN — IPRATROPIUM BROMIDE AND ALBUTEROL SULFATE 1 DOSE: 2.5; .5 SOLUTION RESPIRATORY (INHALATION) at 19:45

## 2024-06-12 RX ADMIN — INSULIN GLARGINE 25 UNITS: 100 INJECTION, SOLUTION SUBCUTANEOUS at 20:32

## 2024-06-12 RX ADMIN — METFORMIN HYDROCHLORIDE 1000 MG: 500 TABLET ORAL at 18:00

## 2024-06-12 RX ADMIN — ACETAMINOPHEN 325MG 650 MG: 325 TABLET ORAL at 20:32

## 2024-06-12 RX ADMIN — IPRATROPIUM BROMIDE AND ALBUTEROL SULFATE 1 DOSE: 2.5; .5 SOLUTION RESPIRATORY (INHALATION) at 05:10

## 2024-06-12 RX ADMIN — MOMETASONE FUROATE AND FORMOTEROL FUMARATE DIHYDRATE 2 PUFF: 200; 5 AEROSOL RESPIRATORY (INHALATION) at 09:28

## 2024-06-12 RX ADMIN — GABAPENTIN 800 MG: 400 CAPSULE ORAL at 19:50

## 2024-06-12 RX ADMIN — ROFLUMILAST 500 MCG: 500 TABLET ORAL at 09:48

## 2024-06-12 RX ADMIN — IPRATROPIUM BROMIDE AND ALBUTEROL SULFATE 1 DOSE: 2.5; .5 SOLUTION RESPIRATORY (INHALATION) at 09:28

## 2024-06-12 RX ADMIN — PREDNISONE 40 MG: 20 TABLET ORAL at 08:25

## 2024-06-12 RX ADMIN — GABAPENTIN 800 MG: 400 CAPSULE ORAL at 18:00

## 2024-06-12 RX ADMIN — METFORMIN HYDROCHLORIDE 1000 MG: 500 TABLET ORAL at 08:25

## 2024-06-12 RX ADMIN — GABAPENTIN 800 MG: 400 CAPSULE ORAL at 12:41

## 2024-06-12 RX ADMIN — ASPIRIN 81 MG: 81 TABLET, CHEWABLE ORAL at 08:26

## 2024-06-12 RX ADMIN — Medication 10 ML: at 19:51

## 2024-06-12 RX ADMIN — LAMOTRIGINE 75 MG: 25 TABLET ORAL at 08:26

## 2024-06-12 RX ADMIN — GUAIFENESIN 600 MG: 600 TABLET ORAL at 08:26

## 2024-06-12 RX ADMIN — INSULIN LISPRO 10 UNITS: 100 INJECTION, SOLUTION INTRAVENOUS; SUBCUTANEOUS at 12:41

## 2024-06-12 RX ADMIN — IPRATROPIUM BROMIDE AND ALBUTEROL SULFATE 1 DOSE: 2.5; .5 SOLUTION RESPIRATORY (INHALATION) at 16:37

## 2024-06-12 RX ADMIN — PRAMIPEXOLE DIHYDROCHLORIDE 0.5 MG: 0.5 TABLET ORAL at 08:25

## 2024-06-12 RX ADMIN — IPRATROPIUM BROMIDE AND ALBUTEROL SULFATE 1 DOSE: 2.5; .5 SOLUTION RESPIRATORY (INHALATION) at 11:43

## 2024-06-12 RX ADMIN — INSULIN GLARGINE 25 UNITS: 100 INJECTION, SOLUTION SUBCUTANEOUS at 08:26

## 2024-06-12 RX ADMIN — CARVEDILOL 3.12 MG: 3.12 TABLET, FILM COATED ORAL at 08:25

## 2024-06-12 RX ADMIN — GABAPENTIN 800 MG: 400 CAPSULE ORAL at 08:26

## 2024-06-12 RX ADMIN — ROFLUMILAST 500 MCG: 500 TABLET ORAL at 19:52

## 2024-06-12 RX ADMIN — PRAMIPEXOLE DIHYDROCHLORIDE 0.25 MG: 0.5 TABLET ORAL at 19:50

## 2024-06-12 RX ADMIN — APIXABAN 5 MG: 5 TABLET, FILM COATED ORAL at 08:25

## 2024-06-12 RX ADMIN — INSULIN LISPRO 4 UNITS: 100 INJECTION, SOLUTION INTRAVENOUS; SUBCUTANEOUS at 12:41

## 2024-06-12 RX ADMIN — Medication 1000 UNITS: at 08:26

## 2024-06-12 RX ADMIN — LAMOTRIGINE 100 MG: 100 TABLET ORAL at 19:51

## 2024-06-12 RX ADMIN — CARVEDILOL 3.12 MG: 3.12 TABLET, FILM COATED ORAL at 18:00

## 2024-06-12 RX ADMIN — MOMETASONE FUROATE AND FORMOTEROL FUMARATE DIHYDRATE 2 PUFF: 200; 5 AEROSOL RESPIRATORY (INHALATION) at 19:45

## 2024-06-12 RX ADMIN — INSULIN LISPRO 10 UNITS: 100 INJECTION, SOLUTION INTRAVENOUS; SUBCUTANEOUS at 08:27

## 2024-06-12 RX ADMIN — GUAIFENESIN 600 MG: 600 TABLET ORAL at 19:50

## 2024-06-12 ASSESSMENT — PAIN DESCRIPTION - FREQUENCY: FREQUENCY: CONTINUOUS

## 2024-06-12 ASSESSMENT — PAIN - FUNCTIONAL ASSESSMENT
PAIN_FUNCTIONAL_ASSESSMENT: PREVENTS OR INTERFERES SOME ACTIVE ACTIVITIES AND ADLS
PAIN_FUNCTIONAL_ASSESSMENT: PREVENTS OR INTERFERES SOME ACTIVE ACTIVITIES AND ADLS

## 2024-06-12 ASSESSMENT — PAIN SCALES - GENERAL
PAINLEVEL_OUTOF10: 0
PAINLEVEL_OUTOF10: 6
PAINLEVEL_OUTOF10: 6
PAINLEVEL_OUTOF10: 0

## 2024-06-12 ASSESSMENT — PAIN DESCRIPTION - LOCATION
LOCATION: ANKLE
LOCATION: FOOT

## 2024-06-12 ASSESSMENT — PAIN DESCRIPTION - ORIENTATION
ORIENTATION: RIGHT
ORIENTATION: RIGHT

## 2024-06-12 ASSESSMENT — PAIN DESCRIPTION - DESCRIPTORS
DESCRIPTORS: ACHING
DESCRIPTORS: ACHING

## 2024-06-12 ASSESSMENT — PAIN SCALES - WONG BAKER: WONGBAKER_NUMERICALRESPONSE: NO HURT

## 2024-06-12 NOTE — PROGRESS NOTES
Occupational Therapy  Facility/Department: [address] PROGRESSIVE CARE  Occupational Therapy Initial Assessment and Tentative D/C      Name: Jorge Gauthier  : 1945  MRN: [id number]  Date of Service: 2024    Discharge Recommendations: Jorge Gauthier scored a 17/24 on the AM-PAC ADL Inpatient form. Current research shows that an AM-PAC score of 18 or greater is typically associated with a discharge to the patient's home setting. Based on the patient's AM-PAC score, and their current ADL deficits, it is recommended that the patient have 2-3 sessions per week of Occupational Therapy at d/c to increase the patient's independence.  At this time, this patient demonstrates the endurance and safety to discharge home with HHOT and a follow up treatment frequency of 2-3x/wk.   Please see assessment section for further patient specific details.    If patient discharges prior to next session this note will serve as a discharge summary.  Please see below for the latest assessment towards goals.     2-3 sessions per week, Home with assist PRN  OT Equipment Recommendations  Equipment Needed: No       Patient Diagnosis(es): The primary encounter diagnosis was COPD exacerbation ([facility]). Diagnoses of Acute respiratory failure with hypoxia ([facility]) and Pneumonia due to infectious organism, unspecified laterality, unspecified part of lung were also pertinent to this visit.  Past Medical History:  has a past medical history of Abrasion of right elbow, Arthritis, CAD (coronary artery disease), Chronic back pain, Chronic systolic CHF (congestive heart failure) ([facility]), COPD (chronic obstructive pulmonary disease) (HCC), Dental disease, Diabetes mellitus (HCC), Essential hypertension, Hearing loss, Paroxysmal atrial fibrillation (HCC), and Tinnitus.  Past Surgical History:  has a past surgical history that includes fracture surgery (Left, ); back surgery (); Cardiac surgery (); Coronary artery bypass graft; back surgery;  at home is significant hypoxic upon arrival in the low 80s worsening cough for last couple days recent surgery on his right lower extremity.  Family / Caregiver Present: No  Referring Practitioner: Chris Calderon MD  Subjective  Subjective: Pt agreeable to OT evaluation. No reports of pain.     Social/Functional History  Social/Functional History  Lives With: Spouse  Type of Home: House  Home Layout: Two level, Performs ADL's on one level, Able to Live on Main level with bedroom/bathroom (2 steps down to the \"man cave\" (now has stair lift), B rails; Two-Story with basement, pt doesn't go upstairs)  Home Access: Level entry  Bathroom Shower/Tub: Walk-in shower (new shower installed, has a nonskid mat)  Bathroom Toilet: Handicap height  Bathroom Equipment: Grab bars in shower, Built-in shower seat, Grab bars around toilet (I have 10 grab bars in my bathroom)  Bathroom Accessibility: Accessible  Home Equipment: Cane, Electric scooter, Lift chair, Walker - Rolling, Rollator (transport chair; the pt has multiple walkers and canes thru-out the house on the different levels of the house)  Has the patient had two or more falls in the past year or any fall with injury in the past year?: Yes  Receives Help From: Home health  ADL Assistance: Independent  Ambulation Assistance: Independent (with RW in home, electric scooter when out; uses cane for short hallway only)  Transfer Assistance: Independent  Active : No  Patient's  Info: wife drives  Occupation: Retired       Objective           Safety Devices  Type of Devices: All fall risk precautions in place;Call light within reach;Gait belt;Patient at risk for falls;Left in chair;Chair alarm in place  Restraints  Restraints Initially in Place: No  Bed Mobility Training  Bed Mobility Training: Yes  Overall Level of Assistance: Stand-by assistance  Supine to Sit: Stand-by assistance  Scooting: Stand-by assistance  Balance  Sitting: Intact  Standing: Impaired

## 2024-06-12 NOTE — PLAN OF CARE
Problem: Discharge Planning  Goal: Discharge to home or other facility with appropriate resources  6/12/2024 0000 by Carrie Smith RN  Outcome: Progressing  6/11/2024 1443 by Joseph Gonzalez RN  Outcome: Progressing  Flowsheets (Taken 6/11/2024 0830)  Discharge to home or other facility with appropriate resources:   Identify barriers to discharge with patient and caregiver   Arrange for needed discharge resources and transportation as appropriate   Identify discharge learning needs (meds, wound care, etc)     Problem: Pain  Goal: Verbalizes/displays adequate comfort level or baseline comfort level  6/12/2024 0000 by Carrie Smith RN  Outcome: Progressing  6/11/2024 1443 by Joseph Gonzalez RN  Outcome: Progressing     Problem: Safety - Adult  Goal: Free from fall injury  6/12/2024 0000 by Carrie Smith RN  Outcome: Progressing  6/11/2024 1443 by Joseph Gonzalez RN  Outcome: Progressing     Problem: Skin/Tissue Integrity  Goal: Absence of new skin breakdown  Description: 1.  Monitor for areas of redness and/or skin breakdown  2.  Assess vascular access sites hourly  3.  Every 4-6 hours minimum:  Change oxygen saturation probe site  4.  Every 4-6 hours:  If on nasal continuous positive airway pressure, respiratory therapy assess nares and determine need for appliance change or resting period.  6/12/2024 0000 by Carrie Smith RN  Outcome: Progressing  6/11/2024 1443 by Joseph Gonzalez RN  Outcome: Progressing     Problem: Respiratory - Adult  Goal: Achieves optimal ventilation and oxygenation  6/12/2024 0000 by Carrie Smith RN  Outcome: Progressing  6/11/2024 1443 by Joseph Gonzalez RN  Outcome: Progressing  Flowsheets (Taken 6/11/2024 0830)  Achieves optimal ventilation and oxygenation:   Assess for changes in respiratory status   Assess for changes in mentation and behavior   Oxygen supplementation based on oxygen saturation or arterial blood gases   Assess and instruct to report shortness of breath or any  respiratory difficulty     Problem: Cardiovascular - Adult  Goal: Maintains optimal cardiac output and hemodynamic stability  6/12/2024 0000 by Carrie Smith RN  Outcome: Progressing  6/11/2024 1443 by Joseph Gonzalez RN  Outcome: Progressing  Flowsheets (Taken 6/11/2024 0830)  Maintains optimal cardiac output and hemodynamic stability:   Monitor blood pressure and heart rate   Monitor urine output and notify Licensed Independent Practitioner for values outside of normal range  Goal: Absence of cardiac dysrhythmias or at baseline  6/12/2024 0000 by Carrie Smith RN  Outcome: Progressing  6/11/2024 1443 by Joseph Gonzalez RN  Outcome: Progressing  Flowsheets (Taken 6/11/2024 0830)  Absence of cardiac dysrhythmias or at baseline:   Monitor cardiac rate and rhythm   Assess for signs of decreased cardiac output     Problem: Metabolic/Fluid and Electrolytes - Adult  Goal: Electrolytes maintained within normal limits  6/12/2024 0000 by Carrie Smith RN  Outcome: Progressing  6/11/2024 1443 by Joseph Gonzalez RN  Outcome: Progressing  Flowsheets (Taken 6/11/2024 0830)  Electrolytes maintained within normal limits:   Monitor labs and assess patient for signs and symptoms of electrolyte imbalances   Administer electrolyte replacement as ordered   Monitor response to electrolyte replacements, including repeat lab results as appropriate  Goal: Hemodynamic stability and optimal renal function maintained  6/12/2024 0000 by Carrie Smith RN  Outcome: Progressing  6/11/2024 1443 by Joseph Gonzalez RN  Outcome: Progressing  Flowsheets (Taken 6/11/2024 0830)  Hemodynamic stability and optimal renal function maintained:   Monitor labs and assess for signs and symptoms of volume excess or deficit   Monitor intake, output and patient weight   Monitor urine specific gravity, serum osmolarity and serum sodium as indicated or ordered   Monitor response to interventions for patient's volume status, including labs, urine output, blood

## 2024-06-12 NOTE — CONSULTS
[facility]  Palliative Care   Consult Note    NAME:  Jorge Gauthier  MEDICAL RECORD NUMBER:  [id number]  AGE: 79 y.o.   GENDER: male  : 1945  TODAY'S DATE:  2024    Subjective     Reason for Consult:  goals of care  Visit Type: Initial Consult      Jorge Gauthier is a 79 y.o. male referred by:   [x] Physician    PAST MEDICAL HISTORY      Diagnosis Date    Abrasion of right elbow 2023    Arthritis     CAD (coronary artery disease)     CABG in     Chronic back pain     Chronic systolic CHF (congestive heart failure) (HCC)     COPD (chronic obstructive pulmonary disease) (HCC)     Dental disease     Diabetes mellitus (HCC)     Essential hypertension     Hearing loss     Paroxysmal atrial fibrillation (HCC)     On Coumadin    Tinnitus        PAST SURGICAL HISTORY  Past Surgical History:   Procedure Laterality Date    ANKLE FRACTURE SURGERY Right 2024    OPEN REDUCTION INTERNAL FIXATION RIGHT ANKLE performed by José Miguel Chavez MD at [facility] OR    BACK SURGERY      BACK SURGERY      CARDIAC SURGERY      CERVICAL SPINE SURGERY      CHEST TUBE INSERTION Right 2023    14F. Dr. Jalloh    CORONARY ARTERY BYPASS GRAFT      FRACTURE SURGERY Left     Shoulder    IR CHEST TUBE INSERTION  3/14/2023    IR CHEST TUBE INSERTION 3/14/2023 [facility] SPECIAL PROCEDURES       FAMILY HISTORY  Family History   Problem Relation Age of Onset    Brain Cancer Mother     Heart Attack Father     Heart Attack Brother        SOCIAL HISTORY  Social History     Tobacco Use    Smoking status: Former     Current packs/day: 0.00     Average packs/day: 1.5 packs/day for 37.2 years (55.7 ttl pk-yrs)     Types: Cigarettes     Start date: 1959     Quit date: 3/1/1996     Years since quittin.3     Passive exposure: Past    Smokeless tobacco: Never   Vaping Use    Vaping Use: Never used   Substance Use Topics    Alcohol use: No    Drug use: No       ALLERGIES  Allergies   Allergen Reactions     Death    Level of patient/caregiver understanding able to:       [x] Verbalize Understanding   [] Demonstrate Understanding       [] Teach Back       [] Needs Reinforcement     []  Other:      Teaching Time:  0hours  20 minutes     Plan        Social Service Consult Made:  Yes  Assistance filling out Living Will/HPOA:  No      Discharge Plan:  Education/support to family  Education/support to patient  Providing support for coping/adaptation/distress of family  Providing support for coping/adaptation/distress of patient  Clarification of medical condition to patient and family  Code status clarified: DNRCC  Palliative care orders introduced    Discharge Environment:  [] Hospice Consult Agency:  [] Inpatient Hospice    [] Home with Hospice Care   [] ECF with Hospice  [] ECF skilled care with Hospice to follow   [] Other:    Discussion: Patient admitted from home for shortness of breath. PMH of a fib, COPD, history of cabg, CHF preserved EF, DM. I met with patient and his wife he is from home, his wife provides assistance as necessary, he has all medical equipment necessary for care at home has home care, and has pallicare following him at home. He verified he is DNRCC he does not want any heroics.     I will continue to follow Mr. Gauthier's care as needed.      Thank you for allowing me to participate in the care of Mr. Gauthier .     Electronically signed by Jessica Fischer, RN, BSN, CHPN on 6/12/2024 at 3:48 PM  Palliative Care Nurse [facility]  Office: [phone]

## 2024-06-12 NOTE — PROGRESS NOTES
Physical Therapy  Facility/Department: [address] PROGRESSIVE CARE  Physical Therapy Initial Assessment    Name: Jorge Gauthier  : 1945  MRN: [id number]  Date of Service: 2024    Discharge Recommendations:  24 hour supervision or assist, Home with Home health PT, Continue to assess pending progress          Patient Diagnosis(es): The primary encounter diagnosis was COPD exacerbation (HCC). Diagnoses of Acute respiratory failure with hypoxia (HCC) and Pneumonia due to infectious organism, unspecified laterality, unspecified part of lung were also pertinent to this visit.  Past Medical History:  has a past medical history of Abrasion of right elbow, Arthritis, CAD (coronary artery disease), Chronic back pain, Chronic systolic CHF (congestive heart failure) (HCC), COPD (chronic obstructive pulmonary disease) (HCC), Dental disease, Diabetes mellitus (HCC), Essential hypertension, Hearing loss, Paroxysmal atrial fibrillation (HCC), and Tinnitus.  Past Surgical History:  has a past surgical history that includes fracture surgery (Left, ); back surgery (); Cardiac surgery (); Coronary artery bypass graft; back surgery; Cervical spine surgery; chest tube insertion (Right, 2023); IR CHEST TUBE INSERTION (3/14/2023); and Ankle fracture surgery (Right, 2024).    Assessment   Body Structures, Functions, Activity Limitations Requiring Skilled Therapeutic Intervention: Decreased functional mobility ;Decreased strength;Decreased ADL status;Decreased safe awareness;Decreased endurance;Decreased balance  Assessment: Jorge Gauthier is a 79 y.o. male who presents to the emergency department on 6/10/24 for shortness of breath. Note recent R ORIF for trimalleolar fx, he is now TTWB with CAM boot per Dr. Chavez. Prior to admission, pt living with spouse in home setting, independent with ADLs and ambulation with RW vs cane. Pt currently functioning below baseline. Anticipate return home with 24hr supv and

## 2024-06-12 NOTE — CARE COORDINATION
Met with patient. Discussed therapy recs. Patient is not interested in going back to a nursing home and says he is returning home. Patient in agreement with [facility] ([facility]). Messaged Raphael brush/ [facility] to confirm able to accept, await response. Will need home care orders at discharge for PT/OT/RN.   Patient says his wife can transport him home.   Watch for home O2 needs, currently on 2L but room air baseline.  Anticipated discharge in the next 1-2 days per physician notes.    Electronically signed by Steffany Harkins RN Case Management on 6/12/2024 at 1:01 PM

## 2024-06-12 NOTE — PROGRESS NOTES
Pulmonary Progress Note    Date of Admission: 6/10/2024   LOS: 2 days       CC:  Chief Complaint   Patient presents with    Shortness of Breath     Pt arrives ems from home c/o shortness of breath, states hx of copd, at home nebulizer x 4 tx with little improvement. Pt does not use o2 at home arrives to ed 84 % RA.     Cough        Subjective:  Feeling much much better.            Assessment:     chronic bronchitis   Chronic rhonitis  Neuropathy with DM   Chronic aspiraton    Plan:     This note may have been transcribed using Dragon Dictation software. Please disregard any translational errors.       Hospital Day: 2     Chronic aspiration to airway  Pneumonia, right lower lobe, aspiration   Aspiration precautions, SLP eval   Acute on chronic finding on CT chest.    Parainfluenza on pneumnia molecular panel   Check / follow procal to assess need for abx  IS, Acapella  Duoneb's      Hx CHF   EF 45% with diastolic dysfunction   diuresis      Acute hpoxemia   Weaned to 2 L NC while in room      Discussed d/c tomorrow vs Friday     Data:        PHYSICAL EXAM:   Blood pressure 117/61, pulse 89, temperature 97.6 °F (36.4 °C), temperature source Oral, resp. rate 18, height 1.803 m (5' 11\"), weight 103.1 kg (227 lb 4.7 oz), SpO2 95 %.'  Body mass index is 31.7 kg/m².   Gen: No distress.    ENT:   Resp: No accessory muscle use. No crackles. No wheezes. No rhonchi.    CV: Regular rate. Regular rhythm. No murmur or rub. No edema.   Skin: Warm, dry, normal texture and turgor. No nodule on exposed extremities.   M/S: No cyanosis. No clubbing. No joint deformity. Right foot in boot  Psych: Oriented x 3. No anxiety.  Awake. Alert. Intact judgement and insight. Good Mood / Affect.  Memory appears in tact       Medications:               This note was transcribed using Dragon Dictation software. Please disregard any translational errors.      RUBÉN ECHAVARRIA   [facility] Pulmonary, Sleep and Critical Care  [phone]

## 2024-06-12 NOTE — PROGRESS NOTES
V2.0    [facility] [place]      Name:  Jorge Gauthier /Age/Sex: 1945  (79 y.o. male)   MRN & CSN:  [id number] & [id number] Encounter Date/Time: 2024 8:57 AM EDT   Location:  [id number]-0120/5255-01 PCP: Gallo Sanchez MD     Attending:Chris Calderon MD       Hospital Day: 3      HPI :   Chief Complaint: Shortness of breath    Jorge Gauthier is a 79 y.o. male who presents with shortness of breath      Subjective:     Patient feels better today.  Shortness of breath has little improved.  No fevers or chills.    Review of Systems:      Pertinent positives and negatives discussed in HPI    Objective:     Intake/Output Summary (Last 24 hours) at 2024 0906  Last data filed at 2024 0258  Gross per 24 hour   Intake 734.24 ml   Output 2900 ml   Net -2165.76 ml        Vitals:   Vitals:    24 0349 24 0400 24 0510 24 0757   BP:  117/61  115/62   Pulse:    (!) 108   Resp:    19   Temp:    97.7 °F (36.5 °C)   TempSrc:    Oral   SpO2:   95% 98%   Weight: 103.1 kg (227 lb 4.7 oz)      Height:             Physical Exam:      Physical Exam Performed:    /62   Pulse (!) 108   Temp 97.7 °F (36.5 °C) (Oral)   Resp 19   Ht 1.803 m (5' 11\")   Wt 103.1 kg (227 lb 4.7 oz)   SpO2 98%   BMI 31.70 kg/m²     General appearance: No apparent distress, appears stated age and cooperative.  HEENT: Pupils equal, round, and reactive to light. Conjunctivae/corneas clear.  Neck: Supple, with full range of motion. No jugular venous distention. Trachea midline.  Respiratory:  Normal respiratory effort. Clear to auscultation, bilaterally without Rales/Wheezes/Rhonchi.  Cardiovascular: Regular rate and rhythm with normal S1/S2 without murmurs, rubs or gallops.  Abdomen: Soft, non-tender, non-distended with normal bowel sounds.  Musculoskeletal: No clubbing, cyanosis or edema bilaterally.  Full range of motion without deformity.  Neurologic:  Neurovascularly intact without any focal

## 2024-06-13 LAB
ANION GAP SERPL CALCULATED.3IONS-SCNC: 14 MMOL/L (ref 3–16)
BACTERIA SPEC RESP CULT: NORMAL
BASE EXCESS BLDV CALC-SCNC: 3.9 MMOL/L
BASE EXCESS BLDV CALC-SCNC: 5.4 MMOL/L
BASOPHILS # BLD: 0 K/UL (ref 0–0.2)
BASOPHILS NFR BLD: 0.5 %
BUN SERPL-MCNC: 30 MG/DL (ref 7–20)
CALCIUM SERPL-MCNC: 9.2 MG/DL (ref 8.3–10.6)
CHLORIDE SERPL-SCNC: 101 MMOL/L (ref 99–110)
CO2 BLDV-SCNC: 31 MMOL/L
CO2 BLDV-SCNC: 32 MMOL/L
CO2 SERPL-SCNC: 27 MMOL/L (ref 21–32)
COHGB MFR BLDV: 1.7 %
COHGB MFR BLDV: 3.9 %
CREAT SERPL-MCNC: 1 MG/DL (ref 0.8–1.3)
DEPRECATED RDW RBC AUTO: 17.9 % (ref 12.4–15.4)
EOSINOPHIL # BLD: 0 K/UL (ref 0–0.6)
EOSINOPHIL NFR BLD: 0.1 %
GFR SERPLBLD CREATININE-BSD FMLA CKD-EPI: 77 ML/MIN/{1.73_M2}
GLUCOSE BLD-MCNC: 146 MG/DL (ref 70–99)
GLUCOSE BLD-MCNC: 150 MG/DL (ref 70–99)
GLUCOSE BLD-MCNC: 181 MG/DL (ref 70–99)
GLUCOSE BLD-MCNC: 92 MG/DL (ref 70–99)
GLUCOSE SERPL-MCNC: 122 MG/DL (ref 70–99)
GRAM STN SPEC: NORMAL
HCO3 BLDV-SCNC: 29 MMOL/L (ref 23–29)
HCO3 BLDV-SCNC: 30 MMOL/L (ref 23–29)
HCT VFR BLD AUTO: 31 % (ref 40.5–52.5)
HGB BLD-MCNC: 10.1 G/DL (ref 13.5–17.5)
LYMPHOCYTES # BLD: 2.1 K/UL (ref 1–5.1)
LYMPHOCYTES NFR BLD: 25.4 %
MAGNESIUM SERPL-MCNC: 2.1 MG/DL (ref 1.8–2.4)
MCH RBC QN AUTO: 26.3 PG (ref 26–34)
MCHC RBC AUTO-ENTMCNC: 32.7 G/DL (ref 31–36)
MCV RBC AUTO: 80.5 FL (ref 80–100)
METHGB MFR BLDV: 0.2 %
METHGB MFR BLDV: 0.7 %
MONOCYTES # BLD: 1 K/UL (ref 0–1.3)
MONOCYTES NFR BLD: 11.8 %
NEUTROPHILS # BLD: 5.1 K/UL (ref 1.7–7.7)
NEUTROPHILS NFR BLD: 62.2 %
O2 THERAPY: ABNORMAL
O2 THERAPY: NORMAL
PCO2 BLDV: 45.7 MMHG (ref 40–50)
PCO2 BLDV: 47.4 MMHG (ref 40–50)
PERFORMED ON: ABNORMAL
PERFORMED ON: NORMAL
PH BLDV: 7.4 [PH] (ref 7.35–7.45)
PH BLDV: 7.43 [PH] (ref 7.35–7.45)
PHOSPHATE SERPL-MCNC: 2.8 MG/DL (ref 2.5–4.9)
PLATELET # BLD AUTO: 312 K/UL (ref 135–450)
PMV BLD AUTO: 6.9 FL (ref 5–10.5)
PO2 BLDV: 45 MMHG
PO2 BLDV: 51 MMHG
POTASSIUM SERPL-SCNC: 3.7 MMOL/L (ref 3.5–5.1)
PROCALCITONIN SERPL IA-MCNC: 0.13 NG/ML (ref 0–0.15)
RBC # BLD AUTO: 3.85 M/UL (ref 4.2–5.9)
SAO2 % BLDV: 80 %
SAO2 % BLDV: 84 %
SODIUM SERPL-SCNC: 142 MMOL/L (ref 136–145)
WBC # BLD AUTO: 8.2 K/UL (ref 4–11)

## 2024-06-13 PROCEDURE — 94640 AIRWAY INHALATION TREATMENT: CPT

## 2024-06-13 PROCEDURE — 94761 N-INVAS EAR/PLS OXIMETRY MLT: CPT

## 2024-06-13 PROCEDURE — 6370000000 HC RX 637 (ALT 250 FOR IP): Performed by: NURSE PRACTITIONER

## 2024-06-13 PROCEDURE — 85025 COMPLETE CBC W/AUTO DIFF WBC: CPT

## 2024-06-13 PROCEDURE — 84100 ASSAY OF PHOSPHORUS: CPT

## 2024-06-13 PROCEDURE — 36415 COLL VENOUS BLD VENIPUNCTURE: CPT

## 2024-06-13 PROCEDURE — 83735 ASSAY OF MAGNESIUM: CPT

## 2024-06-13 PROCEDURE — 2060000000 HC ICU INTERMEDIATE R&B

## 2024-06-13 PROCEDURE — 99232 SBSQ HOSP IP/OBS MODERATE 35: CPT | Performed by: INTERNAL MEDICINE

## 2024-06-13 PROCEDURE — 92526 ORAL FUNCTION THERAPY: CPT

## 2024-06-13 PROCEDURE — 6370000000 HC RX 637 (ALT 250 FOR IP): Performed by: HOSPITALIST

## 2024-06-13 PROCEDURE — 6370000000 HC RX 637 (ALT 250 FOR IP): Performed by: STUDENT IN AN ORGANIZED HEALTH CARE EDUCATION/TRAINING PROGRAM

## 2024-06-13 PROCEDURE — 2580000003 HC RX 258: Performed by: STUDENT IN AN ORGANIZED HEALTH CARE EDUCATION/TRAINING PROGRAM

## 2024-06-13 PROCEDURE — 97530 THERAPEUTIC ACTIVITIES: CPT

## 2024-06-13 PROCEDURE — 97110 THERAPEUTIC EXERCISES: CPT

## 2024-06-13 PROCEDURE — 80048 BASIC METABOLIC PNL TOTAL CA: CPT

## 2024-06-13 PROCEDURE — 82803 BLOOD GASES ANY COMBINATION: CPT

## 2024-06-13 PROCEDURE — 94669 MECHANICAL CHEST WALL OSCILL: CPT

## 2024-06-13 PROCEDURE — 84145 PROCALCITONIN (PCT): CPT

## 2024-06-13 RX ORDER — INSULIN GLARGINE 100 [IU]/ML
28 INJECTION, SOLUTION SUBCUTANEOUS DAILY
Status: DISCONTINUED | OUTPATIENT
Start: 2024-06-14 | End: 2024-06-14 | Stop reason: HOSPADM

## 2024-06-13 RX ORDER — INSULIN LISPRO 100 [IU]/ML
5 INJECTION, SOLUTION INTRAVENOUS; SUBCUTANEOUS
Status: DISCONTINUED | OUTPATIENT
Start: 2024-06-13 | End: 2024-06-14 | Stop reason: HOSPADM

## 2024-06-13 RX ADMIN — GABAPENTIN 800 MG: 400 CAPSULE ORAL at 20:46

## 2024-06-13 RX ADMIN — ATORVASTATIN CALCIUM 20 MG: 20 TABLET, FILM COATED ORAL at 08:34

## 2024-06-13 RX ADMIN — IPRATROPIUM BROMIDE AND ALBUTEROL SULFATE 1 DOSE: 2.5; .5 SOLUTION RESPIRATORY (INHALATION) at 04:07

## 2024-06-13 RX ADMIN — ROFLUMILAST 500 MCG: 500 TABLET ORAL at 08:34

## 2024-06-13 RX ADMIN — Medication 1000 UNITS: at 08:34

## 2024-06-13 RX ADMIN — Medication 1 TABLET: at 08:34

## 2024-06-13 RX ADMIN — IPRATROPIUM BROMIDE AND ALBUTEROL SULFATE 1 DOSE: 2.5; .5 SOLUTION RESPIRATORY (INHALATION) at 20:20

## 2024-06-13 RX ADMIN — INSULIN GLARGINE 25 UNITS: 100 INJECTION, SOLUTION SUBCUTANEOUS at 08:33

## 2024-06-13 RX ADMIN — GUAIFENESIN 600 MG: 600 TABLET ORAL at 20:46

## 2024-06-13 RX ADMIN — MOMETASONE FUROATE AND FORMOTEROL FUMARATE DIHYDRATE 2 PUFF: 200; 5 AEROSOL RESPIRATORY (INHALATION) at 20:20

## 2024-06-13 RX ADMIN — ROFLUMILAST 500 MCG: 500 TABLET ORAL at 20:51

## 2024-06-13 RX ADMIN — PRAMIPEXOLE DIHYDROCHLORIDE 0.25 MG: 0.5 TABLET ORAL at 20:45

## 2024-06-13 RX ADMIN — PREDNISONE 40 MG: 20 TABLET ORAL at 08:34

## 2024-06-13 RX ADMIN — APIXABAN 5 MG: 5 TABLET, FILM COATED ORAL at 20:46

## 2024-06-13 RX ADMIN — INSULIN LISPRO 10 UNITS: 100 INJECTION, SOLUTION INTRAVENOUS; SUBCUTANEOUS at 13:07

## 2024-06-13 RX ADMIN — CARVEDILOL 3.12 MG: 3.12 TABLET, FILM COATED ORAL at 17:31

## 2024-06-13 RX ADMIN — IPRATROPIUM BROMIDE AND ALBUTEROL SULFATE 1 DOSE: 2.5; .5 SOLUTION RESPIRATORY (INHALATION) at 00:23

## 2024-06-13 RX ADMIN — IPRATROPIUM BROMIDE AND ALBUTEROL SULFATE 1 DOSE: 2.5; .5 SOLUTION RESPIRATORY (INHALATION) at 08:06

## 2024-06-13 RX ADMIN — PRAMIPEXOLE DIHYDROCHLORIDE 0.25 MG: 0.5 TABLET ORAL at 13:07

## 2024-06-13 RX ADMIN — PRAMIPEXOLE DIHYDROCHLORIDE 0.25 MG: 0.5 TABLET ORAL at 17:31

## 2024-06-13 RX ADMIN — MOMETASONE FUROATE AND FORMOTEROL FUMARATE DIHYDRATE 2 PUFF: 200; 5 AEROSOL RESPIRATORY (INHALATION) at 08:06

## 2024-06-13 RX ADMIN — IPRATROPIUM BROMIDE AND ALBUTEROL SULFATE 1 DOSE: 2.5; .5 SOLUTION RESPIRATORY (INHALATION) at 16:12

## 2024-06-13 RX ADMIN — LAMOTRIGINE 100 MG: 100 TABLET ORAL at 20:46

## 2024-06-13 RX ADMIN — APIXABAN 5 MG: 5 TABLET, FILM COATED ORAL at 08:34

## 2024-06-13 RX ADMIN — METFORMIN HYDROCHLORIDE 1000 MG: 500 TABLET ORAL at 17:32

## 2024-06-13 RX ADMIN — INSULIN LISPRO 5 UNITS: 100 INJECTION, SOLUTION INTRAVENOUS; SUBCUTANEOUS at 17:32

## 2024-06-13 RX ADMIN — FUROSEMIDE 40 MG: 40 TABLET ORAL at 08:34

## 2024-06-13 RX ADMIN — INSULIN LISPRO 10 UNITS: 100 INJECTION, SOLUTION INTRAVENOUS; SUBCUTANEOUS at 08:33

## 2024-06-13 RX ADMIN — PRAMIPEXOLE DIHYDROCHLORIDE 0.25 MG: 0.5 TABLET ORAL at 08:34

## 2024-06-13 RX ADMIN — LAMOTRIGINE 75 MG: 25 TABLET ORAL at 08:33

## 2024-06-13 RX ADMIN — Medication 10 ML: at 08:40

## 2024-06-13 RX ADMIN — IPRATROPIUM BROMIDE AND ALBUTEROL SULFATE 1 DOSE: 2.5; .5 SOLUTION RESPIRATORY (INHALATION) at 11:26

## 2024-06-13 RX ADMIN — GABAPENTIN 800 MG: 400 CAPSULE ORAL at 08:34

## 2024-06-13 RX ADMIN — METFORMIN HYDROCHLORIDE 1000 MG: 500 TABLET ORAL at 08:34

## 2024-06-13 RX ADMIN — Medication 10 ML: at 20:45

## 2024-06-13 RX ADMIN — GABAPENTIN 800 MG: 400 CAPSULE ORAL at 17:31

## 2024-06-13 RX ADMIN — EMPAGLIFLOZIN 10 MG: 10 TABLET, FILM COATED ORAL at 08:34

## 2024-06-13 RX ADMIN — GUAIFENESIN 600 MG: 600 TABLET ORAL at 08:34

## 2024-06-13 RX ADMIN — GABAPENTIN 800 MG: 400 CAPSULE ORAL at 13:07

## 2024-06-13 RX ADMIN — ASPIRIN 81 MG: 81 TABLET, CHEWABLE ORAL at 08:34

## 2024-06-13 RX ADMIN — CARVEDILOL 3.12 MG: 3.12 TABLET, FILM COATED ORAL at 08:34

## 2024-06-13 ASSESSMENT — PAIN SCALES - GENERAL
PAINLEVEL_OUTOF10: 0

## 2024-06-13 ASSESSMENT — PAIN SCALES - WONG BAKER: WONGBAKER_NUMERICALRESPONSE: NO HURT

## 2024-06-13 NOTE — PROGRESS NOTES
V2.0    [facility] [place]      Name:  Jorge Gauthier /Age/Sex: 1945  (79 y.o. male)   MRN & CSN:  [id number] & [id number] Encounter Date/Time: 2024 8:57 AM EDT   Location:  [id number] PCP: Gallo Sanchez MD     Attending:Chris Calderon MD       Hospital Day: 4      HPI :   Chief Complaint: Shortness of breath    Jorge Gauthier is a 79 y.o. male who presents with shortness of breath      Subjective:     Reports no shortness of breath and is currently on room air although reports persistently rattling from airway secretions    Review of Systems:      Pertinent positives and negatives discussed in HPI    Objective:     Intake/Output Summary (Last 24 hours) at 2024 1124  Last data filed at 2024 0339  Gross per 24 hour   Intake 600 ml   Output 1300 ml   Net -700 ml        Vitals:   Vitals:    24 0407 24 0732 24 0806 24 0809   BP:  133/77     Pulse: 80 91 81 82   Resp: 19 18 19 29   Temp:  98.4 °F (36.9 °C)     TempSrc:  Oral     SpO2: 96%  94%    Weight:       Height:             Physical Exam:      Physical Exam Performed:    /77   Pulse 82   Temp 98.4 °F (36.9 °C) (Oral)   Resp 29   Ht 1.803 m (5' 11\")   Wt 102.9 kg (226 lb 13.7 oz)   SpO2 94%   BMI 31.64 kg/m²     General appearance: No apparent distress, appears stated age and cooperative.  HEENT: Pupils equal, round, and reactive to light. Conjunctivae/corneas clear.  Neck: Supple, with full range of motion. No jugular venous distention. Trachea midline.  Respiratory:  Normal respiratory effort. Clear to auscultation, bilaterally without Rales/Wheezes/Rhonchi.  Cardiovascular: Regular rate and rhythm with normal S1/S2 without murmurs, rubs or gallops.  Abdomen: Soft, non-tender, non-distended with normal bowel sounds.  Musculoskeletal: No clubbing, cyanosis or edema bilaterally.  Full range of motion without deformity.  Neurologic:  Neurovascularly intact without any focal sensory/motor

## 2024-06-13 NOTE — PROGRESS NOTES
Occupational Therapy  Facility/Department: [address] PROGRESSIVE CARE  Occupational Therapy Daily Treatment    Name: Jorge Gauthier  : 1945  MRN: [id number]  Date of Service: 2024    Discharge Recommendations:  2-3 sessions per week, Home with assist PRN  Jorge Gauthier scored a 17/24 on the AM-PAC ADL Inpatient form. Current research shows that an AM-PAC score of 18 or greater is typically associated with a discharge to the patient's home setting. Based on the patient's AM-PAC score, and their current ADL deficits, it is recommended that the patient have 2-3 sessions per week of Occupational Therapy at d/c to increase the patient's independence.  At this time, this patient demonstrates the endurance and safety to discharge home with HHOT and a follow up treatment frequency of 2-3x/wk.   Please see assessment section for further patient specific details.    If patient discharges prior to next session this note will serve as a discharge summary.  Please see below for the latest assessment towards goals.          Patient Diagnosis(es): The primary encounter diagnosis was COPD exacerbation (HCC). Diagnoses of Acute respiratory failure with hypoxia (HCC) and Pneumonia due to infectious organism, unspecified laterality, unspecified part of lung were also pertinent to this visit.  Past Medical History:  has a past medical history of Abrasion of right elbow, Arthritis, CAD (coronary artery disease), Chronic back pain, Chronic systolic CHF (congestive heart failure) (HCC), COPD (chronic obstructive pulmonary disease) (HCC), Dental disease, Diabetes mellitus (HCC), Essential hypertension, Hearing loss, Paroxysmal atrial fibrillation (HCC), and Tinnitus.  Past Surgical History:  has a past surgical history that includes fracture surgery (Left, ); back surgery (); Cardiac surgery (); Coronary artery bypass graft; back surgery; Cervical spine surgery; chest tube insertion (Right, 2023); IR CHEST  ADL  AM-PAC Daily Activity - Inpatient   How much help is needed for putting on and taking off regular lower body clothing?: A Lot  How much help is needed for bathing (which includes washing, rinsing, drying)?: A Little  How much help is needed for toileting (which includes using toilet, bedpan, or urinal)?: A Lot  How much help is needed for putting on and taking off regular upper body clothing?: A Little  How much help is needed for taking care of personal grooming?: A Little  How much help for eating meals?: None  [facility] Raw Score: 17  AM-PAC Inpatient ADL T-Scale Score : 37.26  ADL Inpatient CMS 0-100% Score: 50.11  ADL Inpatient CMS G-Code Modifier : CK    Goals  Short Term Goals  Time Frame for Short Term Goals: prior to D/C  Short Term Goal 1: complete transfers with SBA-MET upgraded to supervision  Short Term Goal 2: complete bathing and dressing with Min A  Short Term Goal 3: complete toileting with SBA  Short Term Goal 4: tolerate B UE exercises x10 reps for increased strength and endurance with ADLs  Long Term Goals  Time Frame for Long Term Goals : STG=LTG  Patient Goals   Patient goals : return home       Therapy Time   Individual Concurrent Group Co-treatment   Time In 1510         Time Out 1550         Minutes 40         Timed Code Treatment Minutes: 40 Minutes       Robe Lindo OTR/L [id number]

## 2024-06-13 NOTE — PROGRESS NOTES
Physical Therapy  Facility/Department: [address] PROGRESSIVE CARE  Daily Treatment Note  NAME: Jorge Gauthier  : 1945  MRN: [id number]    Date of Service: 2024    Discharge Recommendations:  Patient would benefit from continued therapy after discharge, Home with Home health PT, Home with assist PRN   PT Equipment Recommendations  Equipment Needed: No    Patient Diagnosis(es): The primary encounter diagnosis was COPD exacerbation (HCC). Diagnoses of Acute respiratory failure with hypoxia (HCC) and Pneumonia due to infectious organism, unspecified laterality, unspecified part of lung were also pertinent to this visit.    Assessment   Assessment: Pt able to complete transfers to/from electric scooter with cueing for setup, offloading RLE.  He tolerated 4 min. standing trial at RW, again able to offload RLE.  He would benefit from continued therapy to improve his strength, balance, endurance, and independence with mobility tasks.  Anticipate return home with assist from wife, HHPT.    Jorge Gauthier scored a 19/24 on the AM-PAC short mobility form. Current research shows that an AM-PAC score of 18 or greater is typically associated with a discharge to the patient's home setting. Based on the patient's AM-PAC score and their current functional mobility deficits, it is recommended that the patient have 2-3 sessions per week of Physical Therapy at d/c to increase the patient's independence.  At this time, this patient demonstrates the endurance and safety to discharge home with HHPT (home vs OP services) and a follow up treatment frequency of 2-3x/wk.  Please see assessment section for further patient specific details.    If patient discharges prior to next session this note will serve as a discharge summary.  Please see below for the latest assessment towards goals.      Activity Tolerance: Patient tolerated treatment well  Equipment Needed: No     Plan    Physical Therapy Plan  General Plan: 2-3 times per  week  Current Treatment Recommendations: Strengthening;Balance training;Functional mobility training;Transfer training;Gait training;Endurance training;Neuromuscular re-education;Patient/Caregiver education & training;Safety education & training;Equipment evaluation, education, & procurement;Therapeutic activities     Restrictions  Restrictions/Precautions  Restrictions/Precautions: Weight Bearing  Lower Extremity Weight Bearing Restrictions  Right Lower Extremity Weight Bearing: Toe Touch Weight Bearing  Position Activity Restriction  Other position/activity restrictions: Per Dr. Chavez on 5/17/24, \"He is nonweightbearing for the first 2 weeks and after that, he can start toe-touch weightbearing in a boot for the following 4-6 weeks.\"     Subjective    Subjective  Subjective: Up in chair.  Pleasant and agreeable to session.  Orientation  Overall Orientation Status: Within Functional Limits     Objective        Transfer Training  Transfer Training: Yes  Overall Level of Assistance: Stand-by assistance  Sit to Stand: Stand-by assistance  Stand to Sit: Stand-by assistance  Stand Pivot Transfers: Stand-by assistance (Practiced x 4 trials.  Cues to offload RLE)  Wheelchair Management  Wheelchair Management: Yes (Electric scooter)  Overall Level of Assistance: Independent (Propulsion in tiwari over level surfaces, no difficulty with L/R turns, backing up.)  Distance (ft): 350 Feet     PT Exercises  Exercise Treatment: Static stand at RW x 4 min., supervision, able to offload RLE.    Other Specialty Interventions  Other Treatments/Modalities: Able to position himself for comfort in bedside chair.    Safety Devices  Type of Devices: All fall risk precautions in place;Call light within reach;Chair alarm in place;Left in chair  Restraints  Restraints Initially in Place: No     Goals  Short Term Goals  Time Frame for Short Term Goals: by acute discharge  Short Term Goal 1: bed mobility mod I  Short Term Goal 2: sit<>Stand to RW  with CGA  Short Term Goal 3: ambulate > 20' with RW and CGA  Patient Goals   Patient Goals : none stated    Education  Patient Education  Education Given To: Patient  Education Provided: Role of Therapy;Plan of Care  Education Outcome: Verbalized understanding;Continued education needed    AM-PAC - Mobility    AM-PAC Basic Mobility - Inpatient   How much help is needed turning from your back to your side while in a flat bed without using bedrails?: None  How much help is needed moving from lying on your back to sitting on the side of a flat bed without using bedrails?: None  How much help is needed moving to and from a bed to a chair?: None  How much help is needed standing up from a chair using your arms?: None  How much help is needed walking in hospital room?: A Lot  How much help is needed climbing 3-5 steps with a railing?: Total  AM-PAC Inpatient Mobility Raw Score : 19  AM-PAC Inpatient T-Scale Score : 45.44  Mobility Inpatient CMS 0-100% Score: 41.77  Mobility Inpatient CMS G-Code Modifier : CK         Therapy Time   Individual Concurrent Group Co-treatment   Time In 1048         Time Out 1111         Minutes 23         Timed Code Treatment Minutes: 23 Minutes       Dontae Farmer PT   Electronically signed by Dontae Farmer PT [id number] on 6/13/2024 at 11:18 AM

## 2024-06-13 NOTE — PLAN OF CARE
Problem: Discharge Planning  Goal: Discharge to home or other facility with appropriate resources  6/13/2024 0027 by Aissatou Vizcaino, RN  Outcome: Progressing  6/12/2024 1422 by Edel Galvan, RN  Outcome: Progressing     Problem: Pain  Goal: Verbalizes/displays adequate comfort level or baseline comfort level  6/13/2024 0027 by Aissatou Vizcaino, RN  Outcome: Progressing  6/12/2024 1422 by Edel Galvan, RN  Outcome: Progressing     Problem: Safety - Adult  Goal: Free from fall injury  Outcome: Progressing     Problem: Skin/Tissue Integrity  Goal: Absence of new skin breakdown  Description: 1.  Monitor for areas of redness and/or skin breakdown  2.  Assess vascular access sites hourly  3.  Every 4-6 hours minimum:  Change oxygen saturation probe site  4.  Every 4-6 hours:  If on nasal continuous positive airway pressure, respiratory therapy assess nares and determine need for appliance change or resting period.  Outcome: Progressing     Problem: Respiratory - Adult  Goal: Achieves optimal ventilation and oxygenation  Outcome: Progressing     Problem: Cardiovascular - Adult  Goal: Maintains optimal cardiac output and hemodynamic stability  Outcome: Progressing  Goal: Absence of cardiac dysrhythmias or at baseline  Outcome: Progressing     Problem: Metabolic/Fluid and Electrolytes - Adult  Goal: Electrolytes maintained within normal limits  Outcome: Progressing  Goal: Hemodynamic stability and optimal renal function maintained  Outcome: Progressing  Goal: Glucose maintained within prescribed range  Outcome: Progressing     Problem: Chronic Conditions and Co-morbidities  Goal: Patient's chronic conditions and co-morbidity symptoms are monitored and maintained or improved  Outcome: Progressing   Alert and oriented x4 Resp. Easy and even Sats. WNL on 1L O2 per n/c Lungs with Rhonchi t/o Cough and deep breathing exercises encouraged Medicated x1 for c/o right foot pain with good effect. Purwik in place with good  output Remains up in chair at this time Refusing to go to bed as of yet Free from fall/injury

## 2024-06-13 NOTE — PROGRESS NOTES
Facility/Department: [address] CARE  SLP DYSPHAGIA THERAPY  DISCHARGE SUMMARY    Patient: Jorge Gauthier   : 1945   MRN: [id number]    Treatment Date: 2024   Admitting Diagnosis:   COPD exacerbation ([facility]) [J44.1]  Acute respiratory failure with hypoxia ([facility]) [J96.01]  Pneumonia due to infectious organism, unspecified laterality, unspecified part of lung [J18.9]   has a past medical history of Abrasion of right elbow (2023), Arthritis, CAD (coronary artery disease), Chronic back pain, Chronic systolic CHF (congestive heart failure) ([facility]), COPD (chronic obstructive pulmonary disease) ([facility]), Dental disease, Diabetes mellitus ([facility]), Essential hypertension, Hearing loss, Paroxysmal atrial fibrillation ([facility]), and Tinnitus.   has a past surgical history that includes fracture surgery (Left, ); back surgery (); Cardiac surgery (); Coronary artery bypass graft; back surgery; Cervical spine surgery; chest tube insertion (Right, 2023); IR CHEST TUBE INSERTION (3/14/2023); and Ankle fracture surgery (Right, 2024).  Allergies: medication allergies noted     Baseline History/Prior Level of Function:   Living Status: admitted from home with wife; recently discharged from SNF on Saturday  Baseline diet: advanced to regular diet texture with thin liquids at SNF - no MBS reported prior to upgrade; wife reports typically thickening liquids to slightly to mildly thick at home     Prior Dysphagia History:   Initially seen for dysphagia in 2018 with silent aspiration dating back to that time  8/3/2018 MBS with rec for regular/thin using small sips and protective cough post-swallow  1/10/2019 MBS with rec for regular/thin using small sips and protective cough post-swallow  2023 MBS rec for downgrade to soft/honey; patient dc'd home that day with pt/fam ed completed prior to dc  3/15/2023 MBS rec for minced/honey; discharged home with rec for minced/honey  Home ST after 3/2023  6/10/2024  IMPRESSION:  Motion artifact limiting the exam.  Within the limitations of the exam, no obvious acute pulmonary embolus can be seen.  Mildly dilated and atherosclerotic thoracic aorta with no aneurysm or dissection.  Mild mediastinal adenopathy.  Recommend follow-up.  Status post CABG surgery.  Hazy ground-glass opacities along the upper lobes extending inferiorly in the lung bases which could represent pulmonary edema vs early infiltrates from pneumonia. Recommend follow-up.  Small bibasilar pleural effusions with adjacent moderate subsegmental atelectasis and consolidation along the lung bases posterolaterally which is more prominent on the right.  There is mild loculated pleural fluid in the fissures and lung bases anteriorly.  11 mm pleural-based infiltrate right upper lobe posteriorly with questionable central necrosis which could be due  infectious or neoplastic in etiology. Recommend follow-up.  Mild pleural thickening and pleural based calcifications along the left lung base which is unchanged and may be due to prior asbestos exposure.     EMR REVIEW:  Over the last year, patient with 11/24/2023, 1/12/2024, 1/29/2024, 3/19/2024, and 5/7/2024 hospital admits r/t pneumonia, resp failure, and/or hypoxia  Active with Palliative Care at home (initiated 4/23/2024); wife reports plan for follow-up visit July 2024  Recent OP Pulmonology Visit: 4/16/2024  Chronic pulmonary aspiration: \"Patient is likely continue to aspirate with ongoing chronic infection. He has received multiple rounds of antibiotics\"     Most Recent Modified Barium Swallow Study on record: 3/15/2023  Mild-moderate oral stage dysphagia characterized by decreased mastication and decreased lingual manipulation.  Prolonged but adequate mastication with textured solids.  Prolonged bolus formation and movement with solids.  Reduced bolus control with premature bolus loss to the pharynx over the tongue base with all.  Moderate-severe pharyngeal

## 2024-06-13 NOTE — PROGRESS NOTES
Pulmonary Progress Note    Date of Admission: 6/10/2024   LOS: 3 days       CC:  Chief Complaint   Patient presents with    Shortness of Breath     Pt arrives ems from home c/o shortness of breath, states hx of copd, at home nebulizer x 4 tx with little improvement. Pt does not use o2 at home arrives to ed 84 % RA.     Cough        Subjective:  Feeling much much better.   Weaned to room air   He would like to d/c tomorrow.          Assessment:     chronic bronchitis   Chronic rhonitis  Neuropathy with DM   Chronic aspiraton    Plan:     This note may have been transcribed using Dragon Dictation software. Please disregard any translational errors.       Hospital Day: 3     Chronic aspiration to airway  Pneumonia, right lower lobe, aspiration   Aspiration precautions, SLP eval   Acute on chronic finding on CT chest.    Parainfluenza on pneumnia molecular panel   Stop antibiotics tomorrow.   IS, Acapella  Duoneb's    Improving hypoxemia.     Hx CHF   EF 45% with diastolic dysfunction   diuresis      Acute hpoxemia   On room air     Pt request d/c tomorrow.      Data:        PHYSICAL EXAM:   Blood pressure 133/77, pulse 91, temperature 98.4 °F (36.9 °C), temperature source Oral, resp. rate 16, height 1.803 m (5' 11\"), weight 102.9 kg (226 lb 13.7 oz), SpO2 97 %.'  Body mass index is 31.64 kg/m².   Gen: No distress.    ENT:   Resp: No accessory muscle use. No crackles. No wheezes. ++  rhonchi.    CV: Regular rate. Regular rhythm. No murmur or rub. No edema.   Skin: Warm, dry, normal texture and turgor. No nodule on exposed extremities.   M/S: No cyanosis. No clubbing. No joint deformity. Right foot in boot  Psych: Oriented x 3. No anxiety.  Awake. Alert. Intact judgement and insight. Good Mood / Affect.  Memory appears in tact       Medications:               This note was transcribed using Dragon Dictation software. Please disregard any translational errors.      RUBÉN Albright [facility], Sleep and

## 2024-06-13 NOTE — DISCHARGE INSTR - COC
Continuity of Care Form    Patient Name: Jorge Gauthier   :  1945  MRN:  [id number]    Admit date:  6/10/2024  Discharge date:  2024    Code Status Order: DNR-CCA   Advance Directives:     Admitting Physician:  Venita Omer MD  PCP: Gallo Sanchez MD    Discharging Nurse: Edel LAMBERT  Discharging Hospital Unit/Room#: A9B-6757/5255-01  Discharging Unit Phone Number: [phone]    Emergency Contact:   Extended Emergency Contact Information  Primary Emergency Contact: Portia Gauthier  Address: [address] [place] [address]  Home Phone: [phone]  Work Phone: [phone]  Mobile Phone: [phone]  Relation: Spouse  Secondary Emergency Contact: Bang Gauthier Jr  Home Phone: [phone]  Relation: Child    Past Surgical History:  Past Surgical History:   Procedure Laterality Date    ANKLE FRACTURE SURGERY Right 2024    OPEN REDUCTION INTERNAL FIXATION RIGHT ANKLE performed by José Miguel Chavez MD at [facility] OR    BACK SURGERY      BACK SURGERY      CARDIAC SURGERY      CERVICAL SPINE SURGERY      CHEST TUBE INSERTION Right 2023    14F. Dr. Jalloh    CORONARY ARTERY BYPASS GRAFT      FRACTURE SURGERY Left     Shoulder    IR CHEST TUBE INSERTION  3/14/2023    IR CHEST TUBE INSERTION 3/14/2023 [facility] SPECIAL PROCEDURES       Immunization History:   Immunization History   Administered Date(s) Administered    COVID-19, MODERNA Bivalent, (age 12y+), IM, 50 mcg/0.5 mL 2022    Influenza Vaccine, unspecified formulation 10/22/2016    Influenza, AFLURIA (age 3 yrs+), FLUZONE, (age 6 mo+), MDV, 0.5mL 10/22/2016, 09/15/2017    Influenza, High Dose (Fluzone 65 yrs and older) 09/15/2017    Pneumococcal, PCV-13, PREVNAR 13, (age 6w+), IM, 0.5mL 2016    TDaP, ADACEL (age 10y-64y), BOOSTRIX (age 10y+), IM, 0.5mL 2018       Active Problems:  Patient Active Problem List   Diagnosis Code    Diabetes mellitus (HCC) E11.9    Atherosclerosis of

## 2024-06-14 VITALS
TEMPERATURE: 97.7 F | BODY MASS INDEX: 31.7 KG/M2 | DIASTOLIC BLOOD PRESSURE: 68 MMHG | SYSTOLIC BLOOD PRESSURE: 133 MMHG | WEIGHT: 226.41 LBS | HEART RATE: 82 BPM | HEIGHT: 71 IN | RESPIRATION RATE: 22 BRPM | OXYGEN SATURATION: 92 %

## 2024-06-14 LAB
BACTERIA BLD CULT ORG #2: NORMAL
BACTERIA BLD CULT: NORMAL
GLUCOSE BLD-MCNC: 117 MG/DL (ref 70–99)
GLUCOSE BLD-MCNC: 189 MG/DL (ref 70–99)
MAGNESIUM SERPL-MCNC: 2.1 MG/DL (ref 1.8–2.4)
PERFORMED ON: ABNORMAL
PERFORMED ON: ABNORMAL
PHOSPHATE SERPL-MCNC: 2.7 MG/DL (ref 2.5–4.9)

## 2024-06-14 PROCEDURE — 94640 AIRWAY INHALATION TREATMENT: CPT

## 2024-06-14 PROCEDURE — 99232 SBSQ HOSP IP/OBS MODERATE 35: CPT | Performed by: INTERNAL MEDICINE

## 2024-06-14 PROCEDURE — 6370000000 HC RX 637 (ALT 250 FOR IP): Performed by: NURSE PRACTITIONER

## 2024-06-14 PROCEDURE — 2580000003 HC RX 258: Performed by: STUDENT IN AN ORGANIZED HEALTH CARE EDUCATION/TRAINING PROGRAM

## 2024-06-14 PROCEDURE — 94669 MECHANICAL CHEST WALL OSCILL: CPT

## 2024-06-14 PROCEDURE — 36415 COLL VENOUS BLD VENIPUNCTURE: CPT

## 2024-06-14 PROCEDURE — 83735 ASSAY OF MAGNESIUM: CPT

## 2024-06-14 PROCEDURE — 84100 ASSAY OF PHOSPHORUS: CPT

## 2024-06-14 PROCEDURE — 94760 N-INVAS EAR/PLS OXIMETRY 1: CPT

## 2024-06-14 PROCEDURE — 6370000000 HC RX 637 (ALT 250 FOR IP): Performed by: HOSPITALIST

## 2024-06-14 PROCEDURE — 97530 THERAPEUTIC ACTIVITIES: CPT

## 2024-06-14 PROCEDURE — 6370000000 HC RX 637 (ALT 250 FOR IP): Performed by: STUDENT IN AN ORGANIZED HEALTH CARE EDUCATION/TRAINING PROGRAM

## 2024-06-14 RX ORDER — GUAIFENESIN 600 MG/1
600 TABLET, EXTENDED RELEASE ORAL 2 TIMES DAILY
Qty: 14 TABLET | Refills: 0 | Status: ON HOLD | OUTPATIENT
Start: 2024-06-14 | End: 2024-07-14

## 2024-06-14 RX ORDER — IPRATROPIUM BROMIDE AND ALBUTEROL SULFATE 2.5; .5 MG/3ML; MG/3ML
SOLUTION RESPIRATORY (INHALATION)
Status: DISCONTINUED
Start: 2024-06-14 | End: 2024-06-14 | Stop reason: HOSPADM

## 2024-06-14 RX ADMIN — IPRATROPIUM BROMIDE AND ALBUTEROL SULFATE 1 DOSE: 2.5; .5 SOLUTION RESPIRATORY (INHALATION) at 04:13

## 2024-06-14 RX ADMIN — ATORVASTATIN CALCIUM 20 MG: 20 TABLET, FILM COATED ORAL at 08:08

## 2024-06-14 RX ADMIN — Medication 1000 UNITS: at 08:09

## 2024-06-14 RX ADMIN — FUROSEMIDE 40 MG: 40 TABLET ORAL at 08:08

## 2024-06-14 RX ADMIN — CARVEDILOL 3.12 MG: 3.12 TABLET, FILM COATED ORAL at 08:08

## 2024-06-14 RX ADMIN — IPRATROPIUM BROMIDE AND ALBUTEROL SULFATE 1 DOSE: 2.5; .5 SOLUTION RESPIRATORY (INHALATION) at 07:52

## 2024-06-14 RX ADMIN — Medication 1 TABLET: at 08:07

## 2024-06-14 RX ADMIN — GABAPENTIN 800 MG: 400 CAPSULE ORAL at 13:00

## 2024-06-14 RX ADMIN — MOMETASONE FUROATE AND FORMOTEROL FUMARATE DIHYDRATE 2 PUFF: 200; 5 AEROSOL RESPIRATORY (INHALATION) at 07:53

## 2024-06-14 RX ADMIN — LAMOTRIGINE 75 MG: 25 TABLET ORAL at 08:08

## 2024-06-14 RX ADMIN — PRAMIPEXOLE DIHYDROCHLORIDE 0.25 MG: 0.5 TABLET ORAL at 08:08

## 2024-06-14 RX ADMIN — EMPAGLIFLOZIN 10 MG: 10 TABLET, FILM COATED ORAL at 08:09

## 2024-06-14 RX ADMIN — METFORMIN HYDROCHLORIDE 1000 MG: 500 TABLET ORAL at 08:08

## 2024-06-14 RX ADMIN — INSULIN LISPRO 5 UNITS: 100 INJECTION, SOLUTION INTRAVENOUS; SUBCUTANEOUS at 08:09

## 2024-06-14 RX ADMIN — INSULIN LISPRO 5 UNITS: 100 INJECTION, SOLUTION INTRAVENOUS; SUBCUTANEOUS at 12:59

## 2024-06-14 RX ADMIN — ASPIRIN 81 MG: 81 TABLET, CHEWABLE ORAL at 08:09

## 2024-06-14 RX ADMIN — PRAMIPEXOLE DIHYDROCHLORIDE 0.25 MG: 0.5 TABLET ORAL at 13:00

## 2024-06-14 RX ADMIN — INSULIN GLARGINE 28 UNITS: 100 INJECTION, SOLUTION SUBCUTANEOUS at 08:09

## 2024-06-14 RX ADMIN — GABAPENTIN 800 MG: 400 CAPSULE ORAL at 08:09

## 2024-06-14 RX ADMIN — PREDNISONE 40 MG: 20 TABLET ORAL at 08:09

## 2024-06-14 RX ADMIN — Medication 10 ML: at 08:10

## 2024-06-14 RX ADMIN — IPRATROPIUM BROMIDE AND ALBUTEROL SULFATE 1 DOSE: 2.5; .5 SOLUTION RESPIRATORY (INHALATION) at 00:01

## 2024-06-14 RX ADMIN — ROFLUMILAST 500 MCG: 500 TABLET ORAL at 08:09

## 2024-06-14 RX ADMIN — APIXABAN 5 MG: 5 TABLET, FILM COATED ORAL at 08:09

## 2024-06-14 RX ADMIN — GUAIFENESIN 600 MG: 600 TABLET ORAL at 08:09

## 2024-06-14 NOTE — CARE COORDINATION
Case Management Discharge Note          Date / Time of Note: 6/14/2024 11:31 AM                  Patient Name: Jorge Gauthier   YOB: 1945  Diagnosis: COPD exacerbation (HCC) [J44.1]  Acute respiratory failure with hypoxia (HCC) [J96.01]  Pneumonia due to infectious organism, unspecified laterality, unspecified part of lung [J18.9]   Date / Time: 6/10/2024  4:31 PM    Financial:  Payor: MEDICARE / Plan: MEDICARE PART A AND B / Product Type: *No Product type* /      Pharmacy:    [id number] [place], [place] - [address] - P [phone] - F [phone]  [address] [address]  Phone: [phone] Fax: [phone]    [facility] - [place], [place] - [address] - P [phone] - F [phone]  [address]  [address]  [facility] [address]  Phone: [phone] Fax: [phone]      Assistance purchasing medications?: Potential Assistance Purchasing Medications: No  Assistance provided by Case Management: None at this time    DISCHARGE Disposition: Home with Home Health Care    Home Care:  Home Care ordered at discharge: Yes  Home Care Agency: [facility]  Phone: [phone]  Fax: [phone]  Orders faxed: Yes    Transportation:  Transportation PLAN for discharge: family  Mode of Transport: Private Car  Time of Transport: afternoon    Transport form completed: Not Indicated    IMM Completed:   Yes, Case management has presented and reviewed IMM letter #2.       IMM Letter date given:: 06/12/24   .   Patient and/or family/POA verbalized understanding of their medicare rights and appeal process if needed. Patient and/or family/POA has signed, initialed and placed the date and time on IMM letter #2 on the the appropriate lines. Copy of letter offered and they are aware that the original copy of IMM letter #2 is available prior to discharge from the paper chart on the unit.  Electronic  documentation has been entered into epic for IMM letter #2 and original paper copy has been added to the paper chart at the nurses station.     Additional CM Notes: Patient will return home with wife & [facility]. Patient denies additional needs. HC orders placed, Raphael brush/ SVITLANA aware of dc and pulled orders via Epic.     The Plan for Transition of Care is related to the following treatment goals of COPD exacerbation (HCC) [J44.1]  Acute respiratory failure with hypoxia (HCC) [J96.01]  Pneumonia due to infectious organism, unspecified laterality, unspecified part of lung [J18.9]    The Patient and/or patient representative Jorge and his family were provided with a choice of provider and agrees with the discharge plan Yes    Freedom of choice list was provided with basic dialogue that supports the patient's individualized plan of care/goals and shares the quality data associated with the providers. Yes    Steffany Harkins, RN  [facility]   Case Management Department  Ph: [phone]

## 2024-06-14 NOTE — PROGRESS NOTES
Physical Therapy  Facility/Department: [address] PROGRESSIVE CARE  Daily Treatment Note  NAME: Jorge Gauthier  : 1945  MRN: [id number]    Date of Service: 2024    Discharge Recommendations:  Patient would benefit from continued therapy after discharge, Home with Home health PT, Home with assist PRN   PT Equipment Recommendations  Equipment Needed: No    Patient Diagnosis(es): The primary encounter diagnosis was COPD exacerbation (HCC). Diagnoses of Acute respiratory failure with hypoxia (HCC) and Pneumonia due to infectious organism, unspecified laterality, unspecified part of lung were also pertinent to this visit.    Assessment   Assessment: Pt able to complete transfers to/from electric scooter with cueing for setup.  Initially, he appeared to place to much weight through RLE.  With cueing, and additional practice, he was able to complete transfers with less WB RLE.  He would benefit from continued therapy to improve his strength, balance, endurance, and independence with mobility tasks.  Anticipate return home with assist from wife, HHPT.    Jorge Gauthier scored a 18/24 on the AM-PAC short mobility form. Current research shows that an AM-PAC score of 18 or greater is typically associated with a discharge to the patient's home setting. Based on the patient's AM-PAC score and their current functional mobility deficits, it is recommended that the patient have 2-3 sessions per week of Physical Therapy at d/c to increase the patient's independence.  At this time, this patient demonstrates the endurance and safety to discharge home with HHPT (home vs OP services) and a follow up treatment frequency of 2-3x/wk.  Please see assessment section for further patient specific details.    If patient discharges prior to next session this note will serve as a discharge summary.  Please see below for the latest assessment towards goals.      Activity Tolerance: Patient tolerated treatment well     Plan    Physical  Therapy Plan  General Plan: 2-3 times per week  Current Treatment Recommendations: Strengthening;Balance training;Functional mobility training;Transfer training;Gait training;Endurance training;Neuromuscular re-education;Patient/Caregiver education & training;Safety education & training;Equipment evaluation, education, & procurement;Therapeutic activities     Restrictions  Restrictions/Precautions  Restrictions/Precautions: Weight Bearing  Lower Extremity Weight Bearing Restrictions  Right Lower Extremity Weight Bearing: Toe Touch Weight Bearing  Position Activity Restriction  Other position/activity restrictions: Per Dr. Chavez on 5/17/24, \"He is nonweightbearing for the first 2 weeks and after that, he can start toe-touch weightbearing in a boot for the following 4-6 weeks.\"     Subjective    Subjective  Subjective: Up in chair.  Pleasant and agreeable to session.  Orientation  Overall Orientation Status: Within Functional Limits     Objective     Balance  Sitting: With support  Standing: With support    Transfer Training  Transfer Training: Yes  Overall Level of Assistance: Stand-by assistance  Sit to Stand: Stand-by assistance  Stand to Sit: Stand-by assistance  Stand Pivot Transfers: Stand-by assistance (Cues to offload RLE.  Inconsistent performance d/t variable setup of transfer (scooter to chair).  Practiced x 6 trials.)  Wheelchair Management  Wheelchair Management: Yes (Electric scooter)  Overall Level of Assistance: Independent  Distance (ft): 500 Feet     Other Specialty Interventions  Other Treatments/Modalities: Pt had a BM.  Able to complete toilet transfer (scooter to commode) with grab bar, SBA.  Did appear to place too much weight through RLE.  He completed lynnette-care without assist, and transferred back to scooter with SBA.    Safety Devices  Type of Devices: All fall risk precautions in place;Call light within reach;Chair alarm in place;Left in bed;Nurse notified  Restraints  Restraints Initially

## 2024-06-14 NOTE — DISCHARGE SUMMARY
V2.0  Discharge Summary    Name:  Jorge Gauthier /Age/Sex: 1945 (79 y.o. male)   Admit Date: 6/10/2024  Discharge Date: 24    MRN & CSN:  [id number] & [id number] Encounter Date and Time 24 10:13 AM EDT    Attending:  Chris Calderon MD Discharging Provider: Chris Calderon MD       Hospital Course:     Brief HPI: Jorge Gauthier is a  79 y.o. male with pmh of paroxysmal A-fib on DOAC, atherosclerotic heart disease, COPD not on home oxygen, history of CABG, CHF with preserved EF, type 2 diabetes insulin-dependent who presents with respiratory distress.     In the ED patient's vitals wereOriginally noted to be afebrile Tmax of 98.5, heart rate in the 90s, blood pressure elevated first 154/82 but is improved with NIV, satting in the 80s at first despite 4 L of nasal cannula and switched to BiPAP which significantly improved his oxygenation.  Labs with BMP if sodium of 126, lactic acid went from 2.2-1.4 after treatment of exacerbation.  proBNP 800s, troponin up at first to 30 down to 27, largely unremarkable LFTs.  No leukocytosis on CBC hemoglobin at 11.1 close patient's baseline.  Original VBG without hypercapnia and pH of 7.399    Brief Problem Based Course:     Acute respiratory failure with hypoxia-required up to 5 L nasal cannula, weaned down to room air prior to discharge  Parainfluenza infection on  pneumonia panel  Recurrent aspiration into the airway-maintain aspiration precautions  Right lower lobe pneumonia-concern for aspiration pneumonia  Right lower lobe infiltrate on CT appears to be chronic chronic  Procalcitonin within normal limits     Procalcitonin is normal..  Rocephin plus azithromycin-antibiotics discontinued     Continue bronchodilators, pulmonary toilet      Maintain aspiration precautions     Patient clinically improved to baseline with treatment prior to discharge     Oropharyngeal dysphagia--on modified diet per speech  COPD with possible acute exacerbation..     is a 1 cm irregular pleural based infiltrate right upper lobe posteriorly with a questionable central area of necrosis which is more apparent.  There is pneumothorax.  There are mild pleural-based calcifications left lung base posteriorly and laterally with mild pleural thickening in the area which is unchanged.  There is mild loculated fluid seen extending into the fissures and lung bases anterior laterally.  There are small bibasilar pleural effusions layering posteriorly with adjacent moderate subsegmental atelectasis and consolidation posterolaterally which is more prominent on the right with air bronchograms which is more prominent on the right.  There is no endobronchial lesion. Upper Abdomen: Limited images of the upper abdomen are unremarkable. Soft Tissues/Bones: No acute bone or soft tissue abnormality.     Motion artifact limiting the exam.  Within the limitations of the exam, no obvious acute pulmonary embolus can be seen. Mildly dilated and atherosclerotic thoracic aorta with no aneurysm or dissection. Mild mediastinal adenopathy.  Recommend follow-up. Status post CABG surgery. Hazy ground-glass opacities along the upper lobes extending inferiorly in the lung bases which could represent pulmonary edema vs early infiltrates from pneumonia.  Recommend follow-up. Small bibasilar pleural effusions with adjacent moderate subsegmental atelectasis and consolidation along the lung bases posterolaterally which is more prominent on the right.  There is mild loculated pleural fluid in the fissures and lung bases anteriorly. 11 mm pleural-based infiltrate right upper lobe posteriorly with questionable central necrosis which could be due  infectious or neoplastic in etiology. Recommend follow-up. Mild pleural thickening and pleural based calcifications along the left lung base which is unchanged and may be due to prior asbestos exposure.     XR CHEST PORTABLE    Result Date: 6/10/2024  EXAMINATION: ONE XRAY VIEW OF  Negative 01/29/2024 10:20 PM    BLOODU Negative 01/29/2024 10:20 PM    GLUCOSEU >=1000 01/29/2024 10:20 PM    KETUA Negative 01/29/2024 10:20 PM     Urine Cultures:   Lab Results   Component Value Date/Time    LABURIN No growth at 18-36 hours 07/11/2018 01:27 AM     Blood Cultures:   Lab Results   Component Value Date/Time    BC  06/10/2024 04:58 PM     No Growth to date.  Any change in status will be called.     Lab Results   Component Value Date/Time    BLOODCULT2  06/10/2024 04:58 PM     No Growth to date.  Any change in status will be called.     Organism:   Lab Results   Component Value Date/Time    ORG Parainfluenza Virus by PCR 06/11/2024 05:30 PM       Time Spent Discharging patient 50 minutes    Electronically signed by Chris Calderon MD on 6/14/2024 at 10:13 AM

## 2024-06-14 NOTE — PROGRESS NOTES
Pulmonary Progress Note    Date of Admission: 6/10/2024   LOS: 4 days       CC:  Chief Complaint   Patient presents with    Shortness of Breath     Pt arrives ems from home c/o shortness of breath, states hx of copd, at home nebulizer x 4 tx with little improvement. Pt does not use o2 at home arrives to ed 84 % RA.     Cough        Subjective:  Feels that he is ready to be discharged today       Assessment:     chronic bronchitis   Chronic rhonitis  Neuropathy with DM   Chronic aspiraton    Plan:     This note may have been transcribed using Dragon Dictation software. Please disregard any translational errors.       Hospital Day: 4     Chronic aspiration to airway  Pneumonia, right lower lobe, aspiration   Aspiration precautions, SLP eval   Acute on chronic finding on CT chest.    Parainfluenza on pneumnia molecular panel   Stop antibiotics  .   IS, Acapella  Duoneb's    Improving hypoxemia.    On room air   Hx CHF   EF 45% with diastolic dysfunction   diuresis      Acute hpoxemia   On room air     Pt request d/c today .      Data:        PHYSICAL EXAM:   Blood pressure 133/68, pulse 82, temperature 97.7 °F (36.5 °C), temperature source Oral, resp. rate 22, height 1.803 m (5' 11\"), weight 102.7 kg (226 lb 6.6 oz), SpO2 92 %.'  Body mass index is 31.58 kg/m².   Gen: No distress.    ENT:   Resp: No accessory muscle use. No crackles. No wheezes. no  rhonchi.    CV: Regular rate. Regular rhythm. No murmur or rub. No edema.   Skin: Warm, dry, normal texture and turgor. No nodule on exposed extremities.   M/S: No cyanosis. No clubbing. No joint deformity. Right foot in boot  Psych: Oriented x 3. No anxiety.  Awake. Alert. Intact judgement and insight. Good Mood / Affect.  Memory appears in tact       Medications:               This note was transcribed using Dragon Dictation software. Please disregard any translational errors.      RUBÉN ECHAVARRIA   [facility]  [phone]

## 2024-06-16 ENCOUNTER — APPOINTMENT (OUTPATIENT)
Dept: CT IMAGING | Age: 79
DRG: 193 | End: 2024-06-16
Payer: MEDICARE

## 2024-06-16 ENCOUNTER — APPOINTMENT (OUTPATIENT)
Dept: GENERAL RADIOLOGY | Age: 79
DRG: 193 | End: 2024-06-16
Payer: MEDICARE

## 2024-06-16 ENCOUNTER — HOSPITAL ENCOUNTER (INPATIENT)
Age: 79
LOS: 2 days | Discharge: HOME HEALTH | DRG: 193 | End: 2024-06-19
Attending: INTERNAL MEDICINE | Admitting: INTERNAL MEDICINE
Payer: MEDICARE

## 2024-06-16 DIAGNOSIS — J18.9 PNEUMONIA DUE TO INFECTIOUS ORGANISM, UNSPECIFIED LATERALITY, UNSPECIFIED PART OF LUNG: Primary | ICD-10-CM

## 2024-06-16 DIAGNOSIS — J96.01 ACUTE RESPIRATORY FAILURE WITH HYPOXIA (HCC): ICD-10-CM

## 2024-06-16 DIAGNOSIS — J44.1 COPD EXACERBATION (HCC): ICD-10-CM

## 2024-06-16 LAB
ALBUMIN SERPL-MCNC: 4 G/DL (ref 3.4–5)
ALBUMIN/GLOB SERPL: 1.3 {RATIO} (ref 1.1–2.2)
ALP SERPL-CCNC: 56 U/L (ref 40–129)
ALT SERPL-CCNC: 9 U/L (ref 10–40)
ANION GAP SERPL CALCULATED.3IONS-SCNC: 11 MMOL/L (ref 3–16)
AST SERPL-CCNC: 11 U/L (ref 15–37)
BASE EXCESS BLDV CALC-SCNC: 4.6 MMOL/L
BASOPHILS # BLD: 0.1 K/UL (ref 0–0.2)
BASOPHILS NFR BLD: 0.8 %
BILIRUB SERPL-MCNC: 0.4 MG/DL (ref 0–1)
BUN SERPL-MCNC: 19 MG/DL (ref 7–20)
CALCIUM SERPL-MCNC: 8.8 MG/DL (ref 8.3–10.6)
CHLORIDE SERPL-SCNC: 101 MMOL/L (ref 99–110)
CO2 BLDV-SCNC: 33 MMOL/L
CO2 SERPL-SCNC: 28 MMOL/L (ref 21–32)
COHGB MFR BLDV: 2.3 %
CREAT SERPL-MCNC: 1 MG/DL (ref 0.8–1.3)
DEPRECATED RDW RBC AUTO: 18.4 % (ref 12.4–15.4)
EOSINOPHIL # BLD: 0.4 K/UL (ref 0–0.6)
EOSINOPHIL NFR BLD: 3.8 %
FLUAV RNA UPPER RESP QL NAA+PROBE: NEGATIVE
FLUBV AG NPH QL: NEGATIVE
GFR SERPLBLD CREATININE-BSD FMLA CKD-EPI: 77 ML/MIN/{1.73_M2}
GLUCOSE SERPL-MCNC: 145 MG/DL (ref 70–99)
HCO3 BLDV-SCNC: 31 MMOL/L (ref 23–29)
HCT VFR BLD AUTO: 36 % (ref 40.5–52.5)
HGB BLD-MCNC: 11.2 G/DL (ref 13.5–17.5)
LACTATE BLDV-SCNC: 1.8 MMOL/L (ref 0.4–2)
LYMPHOCYTES # BLD: 1.7 K/UL (ref 1–5.1)
LYMPHOCYTES NFR BLD: 17.4 %
MCH RBC QN AUTO: 25.6 PG (ref 26–34)
MCHC RBC AUTO-ENTMCNC: 31.2 G/DL (ref 31–36)
MCV RBC AUTO: 82.2 FL (ref 80–100)
METHGB MFR BLDV: 0.5 %
MONOCYTES # BLD: 1 K/UL (ref 0–1.3)
MONOCYTES NFR BLD: 10.7 %
NEUTROPHILS # BLD: 6.4 K/UL (ref 1.7–7.7)
NEUTROPHILS NFR BLD: 67.3 %
NT-PROBNP SERPL-MCNC: 1127 PG/ML (ref 0–449)
O2 THERAPY: ABNORMAL
PCO2 BLDV: 53.4 MMHG (ref 40–50)
PH BLDV: 7.37 [PH] (ref 7.35–7.45)
PLATELET # BLD AUTO: 366 K/UL (ref 135–450)
PMV BLD AUTO: 6.9 FL (ref 5–10.5)
PO2 BLDV: 41 MMHG
POTASSIUM SERPL-SCNC: 4.4 MMOL/L (ref 3.5–5.1)
PROT SERPL-MCNC: 7 G/DL (ref 6.4–8.2)
RBC # BLD AUTO: 4.38 M/UL (ref 4.2–5.9)
SAO2 % BLDV: 73 %
SARS-COV-2 RDRP RESP QL NAA+PROBE: NOT DETECTED
SODIUM SERPL-SCNC: 140 MMOL/L (ref 136–145)
TROPONIN, HIGH SENSITIVITY: 32 NG/L (ref 0–22)
TROPONIN, HIGH SENSITIVITY: 35 NG/L (ref 0–22)
WBC # BLD AUTO: 9.6 K/UL (ref 4–11)

## 2024-06-16 PROCEDURE — 87040 BLOOD CULTURE FOR BACTERIA: CPT

## 2024-06-16 PROCEDURE — 94640 AIRWAY INHALATION TREATMENT: CPT

## 2024-06-16 PROCEDURE — 83880 ASSAY OF NATRIURETIC PEPTIDE: CPT

## 2024-06-16 PROCEDURE — 71260 CT THORAX DX C+: CPT

## 2024-06-16 PROCEDURE — 96375 TX/PRO/DX INJ NEW DRUG ADDON: CPT

## 2024-06-16 PROCEDURE — 94761 N-INVAS EAR/PLS OXIMETRY MLT: CPT

## 2024-06-16 PROCEDURE — 2700000000 HC OXYGEN THERAPY PER DAY

## 2024-06-16 PROCEDURE — 87635 SARS-COV-2 COVID-19 AMP PRB: CPT

## 2024-06-16 PROCEDURE — 82803 BLOOD GASES ANY COMBINATION: CPT

## 2024-06-16 PROCEDURE — 6360000002 HC RX W HCPCS: Performed by: PHYSICIAN ASSISTANT

## 2024-06-16 PROCEDURE — 93005 ELECTROCARDIOGRAM TRACING: CPT | Performed by: INTERNAL MEDICINE

## 2024-06-16 PROCEDURE — 85025 COMPLETE CBC W/AUTO DIFF WBC: CPT

## 2024-06-16 PROCEDURE — 87804 INFLUENZA ASSAY W/OPTIC: CPT

## 2024-06-16 PROCEDURE — 83605 ASSAY OF LACTIC ACID: CPT

## 2024-06-16 PROCEDURE — 71045 X-RAY EXAM CHEST 1 VIEW: CPT

## 2024-06-16 PROCEDURE — 96365 THER/PROPH/DIAG IV INF INIT: CPT

## 2024-06-16 PROCEDURE — 6360000004 HC RX CONTRAST MEDICATION: Performed by: PHYSICIAN ASSISTANT

## 2024-06-16 PROCEDURE — 6370000000 HC RX 637 (ALT 250 FOR IP): Performed by: PHYSICIAN ASSISTANT

## 2024-06-16 PROCEDURE — 99285 EMERGENCY DEPT VISIT HI MDM: CPT

## 2024-06-16 PROCEDURE — 84484 ASSAY OF TROPONIN QUANT: CPT

## 2024-06-16 PROCEDURE — 80053 COMPREHEN METABOLIC PANEL: CPT

## 2024-06-16 RX ORDER — ALBUTEROL SULFATE 2.5 MG/3ML
5 SOLUTION RESPIRATORY (INHALATION) ONCE
Status: COMPLETED | OUTPATIENT
Start: 2024-06-16 | End: 2024-06-16

## 2024-06-16 RX ORDER — METHYLPREDNISOLONE SODIUM SUCCINATE 125 MG/2ML
60 INJECTION, POWDER, LYOPHILIZED, FOR SOLUTION INTRAMUSCULAR; INTRAVENOUS ONCE
Status: COMPLETED | OUTPATIENT
Start: 2024-06-16 | End: 2024-06-16

## 2024-06-16 RX ORDER — IPRATROPIUM BROMIDE AND ALBUTEROL SULFATE 2.5; .5 MG/3ML; MG/3ML
1 SOLUTION RESPIRATORY (INHALATION) ONCE
Status: COMPLETED | OUTPATIENT
Start: 2024-06-16 | End: 2024-06-16

## 2024-06-16 RX ADMIN — METHYLPREDNISOLONE SODIUM SUCCINATE 60 MG: 125 INJECTION INTRAMUSCULAR; INTRAVENOUS at 21:09

## 2024-06-16 RX ADMIN — IOPAMIDOL 75 ML: 755 INJECTION, SOLUTION INTRAVENOUS at 22:32

## 2024-06-16 RX ADMIN — ALBUTEROL SULFATE 5 MG: 2.5 SOLUTION RESPIRATORY (INHALATION) at 20:33

## 2024-06-16 RX ADMIN — IPRATROPIUM BROMIDE AND ALBUTEROL SULFATE 1 DOSE: 2.5; .5 SOLUTION RESPIRATORY (INHALATION) at 20:33

## 2024-06-16 ASSESSMENT — PAIN - FUNCTIONAL ASSESSMENT: PAIN_FUNCTIONAL_ASSESSMENT: NONE - DENIES PAIN

## 2024-06-17 LAB
ANION GAP SERPL CALCULATED.3IONS-SCNC: 12 MMOL/L (ref 3–16)
BASOPHILS # BLD: 0 K/UL (ref 0–0.2)
BASOPHILS NFR BLD: 0.4 %
BUN SERPL-MCNC: 20 MG/DL (ref 7–20)
CALCIUM SERPL-MCNC: 8.5 MG/DL (ref 8.3–10.6)
CHLORIDE SERPL-SCNC: 100 MMOL/L (ref 99–110)
CO2 SERPL-SCNC: 27 MMOL/L (ref 21–32)
CREAT SERPL-MCNC: 0.9 MG/DL (ref 0.8–1.3)
DEPRECATED RDW RBC AUTO: 18 % (ref 12.4–15.4)
EKG ATRIAL RATE: 96 BPM
EKG DIAGNOSIS: NORMAL
EKG P AXIS: 80 DEGREES
EKG P-R INTERVAL: 196 MS
EKG Q-T INTERVAL: 364 MS
EKG QRS DURATION: 112 MS
EKG QTC CALCULATION (BAZETT): 459 MS
EKG R AXIS: 47 DEGREES
EKG T AXIS: -16 DEGREES
EKG VENTRICULAR RATE: 96 BPM
EOSINOPHIL # BLD: 0 K/UL (ref 0–0.6)
EOSINOPHIL NFR BLD: 0 %
GFR SERPLBLD CREATININE-BSD FMLA CKD-EPI: 87 ML/MIN/{1.73_M2}
GLUCOSE BLD-MCNC: 211 MG/DL (ref 70–99)
GLUCOSE BLD-MCNC: 241 MG/DL (ref 70–99)
GLUCOSE BLD-MCNC: 313 MG/DL (ref 70–99)
GLUCOSE SERPL-MCNC: 229 MG/DL (ref 70–99)
HCT VFR BLD AUTO: 32 % (ref 40.5–52.5)
HGB BLD-MCNC: 10.2 G/DL (ref 13.5–17.5)
LYMPHOCYTES # BLD: 0.7 K/UL (ref 1–5.1)
LYMPHOCYTES NFR BLD: 6.4 %
MCH RBC QN AUTO: 25.9 PG (ref 26–34)
MCHC RBC AUTO-ENTMCNC: 32 G/DL (ref 31–36)
MCV RBC AUTO: 81.1 FL (ref 80–100)
MONOCYTES # BLD: 0.2 K/UL (ref 0–1.3)
MONOCYTES NFR BLD: 2.2 %
NEUTROPHILS # BLD: 9.3 K/UL (ref 1.7–7.7)
NEUTROPHILS NFR BLD: 91 %
ORGANISM: ABNORMAL
PERFORMED ON: ABNORMAL
PLATELET # BLD AUTO: 326 K/UL (ref 135–450)
PMV BLD AUTO: 6.9 FL (ref 5–10.5)
POTASSIUM SERPL-SCNC: 5.1 MMOL/L (ref 3.5–5.1)
POTASSIUM SERPL-SCNC: 5.5 MMOL/L (ref 3.5–5.1)
PROCALCITONIN SERPL IA-MCNC: 0.09 NG/ML (ref 0–0.15)
RBC # BLD AUTO: 3.95 M/UL (ref 4.2–5.9)
REPORT: NORMAL
RESP PATH DNA+RNA PNL L RESP NAA+NON-PRB: ABNORMAL
RESP PATH DNA+RNA PNL L RESP NAA+NON-PRB: ABNORMAL
SODIUM SERPL-SCNC: 139 MMOL/L (ref 136–145)
WBC # BLD AUTO: 10.2 K/UL (ref 4–11)

## 2024-06-17 PROCEDURE — 87633 RESP VIRUS 12-25 TARGETS: CPT

## 2024-06-17 PROCEDURE — 6360000002 HC RX W HCPCS: Performed by: PHYSICIAN ASSISTANT

## 2024-06-17 PROCEDURE — 6360000002 HC RX W HCPCS: Performed by: INTERNAL MEDICINE

## 2024-06-17 PROCEDURE — 97530 THERAPEUTIC ACTIVITIES: CPT

## 2024-06-17 PROCEDURE — 97535 SELF CARE MNGMENT TRAINING: CPT

## 2024-06-17 PROCEDURE — 6370000000 HC RX 637 (ALT 250 FOR IP): Performed by: INTERNAL MEDICINE

## 2024-06-17 PROCEDURE — 1200000000 HC SEMI PRIVATE

## 2024-06-17 PROCEDURE — 2700000000 HC OXYGEN THERAPY PER DAY

## 2024-06-17 PROCEDURE — 94761 N-INVAS EAR/PLS OXIMETRY MLT: CPT

## 2024-06-17 PROCEDURE — 84132 ASSAY OF SERUM POTASSIUM: CPT

## 2024-06-17 PROCEDURE — 36415 COLL VENOUS BLD VENIPUNCTURE: CPT

## 2024-06-17 PROCEDURE — 0202U NFCT DS 22 TRGT SARS-COV-2: CPT

## 2024-06-17 PROCEDURE — 2580000003 HC RX 258: Performed by: PHYSICIAN ASSISTANT

## 2024-06-17 PROCEDURE — 85025 COMPLETE CBC W/AUTO DIFF WBC: CPT

## 2024-06-17 PROCEDURE — 94640 AIRWAY INHALATION TREATMENT: CPT

## 2024-06-17 PROCEDURE — 97162 PT EVAL MOD COMPLEX 30 MIN: CPT

## 2024-06-17 PROCEDURE — 80048 BASIC METABOLIC PNL TOTAL CA: CPT

## 2024-06-17 PROCEDURE — 84145 PROCALCITONIN (PCT): CPT

## 2024-06-17 PROCEDURE — 93010 ELECTROCARDIOGRAM REPORT: CPT | Performed by: INTERNAL MEDICINE

## 2024-06-17 PROCEDURE — 2580000003 HC RX 258: Performed by: INTERNAL MEDICINE

## 2024-06-17 PROCEDURE — 99223 1ST HOSP IP/OBS HIGH 75: CPT | Performed by: INTERNAL MEDICINE

## 2024-06-17 PROCEDURE — 97166 OT EVAL MOD COMPLEX 45 MIN: CPT

## 2024-06-17 PROCEDURE — 87641 MR-STAPH DNA AMP PROBE: CPT

## 2024-06-17 RX ORDER — INSULIN GLARGINE 100 [IU]/ML
22 INJECTION, SOLUTION SUBCUTANEOUS DAILY
Status: DISCONTINUED | OUTPATIENT
Start: 2024-06-17 | End: 2024-06-19 | Stop reason: HOSPADM

## 2024-06-17 RX ORDER — GABAPENTIN 400 MG/1
800 CAPSULE ORAL 4 TIMES DAILY
Status: DISCONTINUED | OUTPATIENT
Start: 2024-06-17 | End: 2024-06-19 | Stop reason: HOSPADM

## 2024-06-17 RX ORDER — GLUCAGON 1 MG/ML
1 KIT INJECTION PRN
Status: DISCONTINUED | OUTPATIENT
Start: 2024-06-17 | End: 2024-06-19 | Stop reason: HOSPADM

## 2024-06-17 RX ORDER — SODIUM CHLORIDE 0.9 % (FLUSH) 0.9 %
5-40 SYRINGE (ML) INJECTION EVERY 12 HOURS SCHEDULED
Status: DISCONTINUED | OUTPATIENT
Start: 2024-06-17 | End: 2024-06-19 | Stop reason: HOSPADM

## 2024-06-17 RX ORDER — LAMOTRIGINE 25 MG/1
75 TABLET ORAL EVERY MORNING
Status: DISCONTINUED | OUTPATIENT
Start: 2024-06-17 | End: 2024-06-19 | Stop reason: HOSPADM

## 2024-06-17 RX ORDER — ONDANSETRON 4 MG/1
4 TABLET, ORALLY DISINTEGRATING ORAL EVERY 8 HOURS PRN
Status: DISCONTINUED | OUTPATIENT
Start: 2024-06-17 | End: 2024-06-19 | Stop reason: HOSPADM

## 2024-06-17 RX ORDER — INSULIN LISPRO 100 [IU]/ML
4 INJECTION, SOLUTION INTRAVENOUS; SUBCUTANEOUS
Status: DISCONTINUED | OUTPATIENT
Start: 2024-06-17 | End: 2024-06-19 | Stop reason: HOSPADM

## 2024-06-17 RX ORDER — ONDANSETRON 2 MG/ML
4 INJECTION INTRAMUSCULAR; INTRAVENOUS EVERY 6 HOURS PRN
Status: DISCONTINUED | OUTPATIENT
Start: 2024-06-17 | End: 2024-06-19 | Stop reason: HOSPADM

## 2024-06-17 RX ORDER — PRAMIPEXOLE DIHYDROCHLORIDE 0.25 MG/1
0.25 TABLET ORAL NIGHTLY
Status: DISCONTINUED | OUTPATIENT
Start: 2024-06-17 | End: 2024-06-17

## 2024-06-17 RX ORDER — SODIUM CHLORIDE 9 MG/ML
INJECTION, SOLUTION INTRAVENOUS PRN
Status: DISCONTINUED | OUTPATIENT
Start: 2024-06-17 | End: 2024-06-19 | Stop reason: HOSPADM

## 2024-06-17 RX ORDER — ROFLUMILAST 500 UG/1
500 TABLET ORAL 2 TIMES DAILY
Status: DISCONTINUED | OUTPATIENT
Start: 2024-06-17 | End: 2024-06-19 | Stop reason: HOSPADM

## 2024-06-17 RX ORDER — CARVEDILOL 3.12 MG/1
3.12 TABLET ORAL 2 TIMES DAILY WITH MEALS
Status: DISCONTINUED | OUTPATIENT
Start: 2024-06-17 | End: 2024-06-19 | Stop reason: HOSPADM

## 2024-06-17 RX ORDER — GUAIFENESIN 600 MG/1
600 TABLET, EXTENDED RELEASE ORAL 2 TIMES DAILY
Status: DISCONTINUED | OUTPATIENT
Start: 2024-06-17 | End: 2024-06-19 | Stop reason: HOSPADM

## 2024-06-17 RX ORDER — VITAMIN B COMPLEX
1000 TABLET ORAL DAILY
Status: DISCONTINUED | OUTPATIENT
Start: 2024-06-17 | End: 2024-06-19 | Stop reason: HOSPADM

## 2024-06-17 RX ORDER — IPRATROPIUM BROMIDE AND ALBUTEROL SULFATE 2.5; .5 MG/3ML; MG/3ML
1 SOLUTION RESPIRATORY (INHALATION) EVERY 4 HOURS PRN
Status: DISCONTINUED | OUTPATIENT
Start: 2024-06-17 | End: 2024-06-19 | Stop reason: HOSPADM

## 2024-06-17 RX ORDER — DEXTROSE MONOHYDRATE 100 MG/ML
INJECTION, SOLUTION INTRAVENOUS CONTINUOUS PRN
Status: DISCONTINUED | OUTPATIENT
Start: 2024-06-17 | End: 2024-06-19 | Stop reason: HOSPADM

## 2024-06-17 RX ORDER — PRAMIPEXOLE DIHYDROCHLORIDE 0.5 MG/1
0.5 TABLET ORAL 2 TIMES DAILY
Status: DISCONTINUED | OUTPATIENT
Start: 2024-06-17 | End: 2024-06-19 | Stop reason: HOSPADM

## 2024-06-17 RX ORDER — ACETAMINOPHEN 325 MG/1
650 TABLET ORAL EVERY 6 HOURS PRN
Status: DISCONTINUED | OUTPATIENT
Start: 2024-06-17 | End: 2024-06-19 | Stop reason: HOSPADM

## 2024-06-17 RX ORDER — ACETAMINOPHEN 650 MG/1
650 SUPPOSITORY RECTAL EVERY 6 HOURS PRN
Status: DISCONTINUED | OUTPATIENT
Start: 2024-06-17 | End: 2024-06-19 | Stop reason: HOSPADM

## 2024-06-17 RX ORDER — IPRATROPIUM BROMIDE AND ALBUTEROL SULFATE 2.5; .5 MG/3ML; MG/3ML
1 SOLUTION RESPIRATORY (INHALATION)
Status: DISCONTINUED | OUTPATIENT
Start: 2024-06-17 | End: 2024-06-19 | Stop reason: HOSPADM

## 2024-06-17 RX ORDER — POLYETHYLENE GLYCOL 3350 17 G/17G
17 POWDER, FOR SOLUTION ORAL DAILY PRN
Status: DISCONTINUED | OUTPATIENT
Start: 2024-06-17 | End: 2024-06-18

## 2024-06-17 RX ORDER — ALBUTEROL SULFATE 90 UG/1
2 AEROSOL, METERED RESPIRATORY (INHALATION) EVERY 4 HOURS PRN
Status: DISCONTINUED | OUTPATIENT
Start: 2024-06-17 | End: 2024-06-19 | Stop reason: HOSPADM

## 2024-06-17 RX ORDER — LIDOCAINE 40 MG/G
CREAM TOPICAL PRN
Status: DISCONTINUED | OUTPATIENT
Start: 2024-06-17 | End: 2024-06-19 | Stop reason: HOSPADM

## 2024-06-17 RX ORDER — METHOCARBAMOL 500 MG/1
500 TABLET, FILM COATED ORAL 3 TIMES DAILY PRN
Status: DISCONTINUED | OUTPATIENT
Start: 2024-06-17 | End: 2024-06-19 | Stop reason: HOSPADM

## 2024-06-17 RX ORDER — SODIUM CHLORIDE 0.9 % (FLUSH) 0.9 %
5-40 SYRINGE (ML) INJECTION PRN
Status: DISCONTINUED | OUTPATIENT
Start: 2024-06-17 | End: 2024-06-19 | Stop reason: HOSPADM

## 2024-06-17 RX ORDER — M-VIT,TX,IRON,MINS/CALC/FOLIC 27MG-0.4MG
1 TABLET ORAL DAILY
Status: DISCONTINUED | OUTPATIENT
Start: 2024-06-17 | End: 2024-06-19 | Stop reason: HOSPADM

## 2024-06-17 RX ORDER — LAMOTRIGINE 100 MG/1
100 TABLET ORAL NIGHTLY
Status: DISCONTINUED | OUTPATIENT
Start: 2024-06-17 | End: 2024-06-19 | Stop reason: HOSPADM

## 2024-06-17 RX ORDER — ASPIRIN 81 MG/1
81 TABLET, CHEWABLE ORAL DAILY
Status: DISCONTINUED | OUTPATIENT
Start: 2024-06-17 | End: 2024-06-19 | Stop reason: HOSPADM

## 2024-06-17 RX ORDER — INSULIN LISPRO 100 [IU]/ML
0-4 INJECTION, SOLUTION INTRAVENOUS; SUBCUTANEOUS NIGHTLY
Status: DISCONTINUED | OUTPATIENT
Start: 2024-06-17 | End: 2024-06-19 | Stop reason: HOSPADM

## 2024-06-17 RX ORDER — INSULIN LISPRO 100 [IU]/ML
0-4 INJECTION, SOLUTION INTRAVENOUS; SUBCUTANEOUS
Status: DISCONTINUED | OUTPATIENT
Start: 2024-06-17 | End: 2024-06-19 | Stop reason: HOSPADM

## 2024-06-17 RX ORDER — PRAMIPEXOLE DIHYDROCHLORIDE 0.25 MG/1
0.5 TABLET ORAL NIGHTLY
Status: DISCONTINUED | OUTPATIENT
Start: 2024-06-17 | End: 2024-06-17

## 2024-06-17 RX ORDER — FUROSEMIDE 10 MG/ML
40 INJECTION INTRAMUSCULAR; INTRAVENOUS DAILY
Status: DISCONTINUED | OUTPATIENT
Start: 2024-06-17 | End: 2024-06-19 | Stop reason: HOSPADM

## 2024-06-17 RX ORDER — ATORVASTATIN CALCIUM 20 MG/1
20 TABLET, FILM COATED ORAL NIGHTLY
Status: DISCONTINUED | OUTPATIENT
Start: 2024-06-17 | End: 2024-06-19 | Stop reason: HOSPADM

## 2024-06-17 RX ADMIN — CEFEPIME 2000 MG: 2 INJECTION, POWDER, FOR SOLUTION INTRAVENOUS at 01:01

## 2024-06-17 RX ADMIN — GABAPENTIN 800 MG: 400 CAPSULE ORAL at 17:27

## 2024-06-17 RX ADMIN — MULTIPLE VITAMINS W/ MINERALS TAB 1 TABLET: TAB at 09:04

## 2024-06-17 RX ADMIN — IPRATROPIUM BROMIDE AND ALBUTEROL SULFATE 1 DOSE: .5; 3 SOLUTION RESPIRATORY (INHALATION) at 07:49

## 2024-06-17 RX ADMIN — GABAPENTIN 800 MG: 400 CAPSULE ORAL at 02:42

## 2024-06-17 RX ADMIN — IPRATROPIUM BROMIDE AND ALBUTEROL SULFATE 1 DOSE: .5; 3 SOLUTION RESPIRATORY (INHALATION) at 11:19

## 2024-06-17 RX ADMIN — CEFEPIME 2000 MG: 2 INJECTION, POWDER, FOR SOLUTION INTRAVENOUS at 17:30

## 2024-06-17 RX ADMIN — INSULIN GLARGINE 22 UNITS: 100 INJECTION, SOLUTION SUBCUTANEOUS at 08:56

## 2024-06-17 RX ADMIN — IPRATROPIUM BROMIDE AND ALBUTEROL SULFATE 1 DOSE: .5; 3 SOLUTION RESPIRATORY (INHALATION) at 16:16

## 2024-06-17 RX ADMIN — CARVEDILOL 3.12 MG: 3.12 TABLET, FILM COATED ORAL at 08:54

## 2024-06-17 RX ADMIN — PRAMIPEXOLE DIHYDROCHLORIDE 0.5 MG: 0.5 TABLET ORAL at 21:27

## 2024-06-17 RX ADMIN — GUAIFENESIN 600 MG: 600 TABLET ORAL at 21:27

## 2024-06-17 RX ADMIN — VANCOMYCIN HYDROCHLORIDE 1000 MG: 1 INJECTION, POWDER, LYOPHILIZED, FOR SOLUTION INTRAVENOUS at 21:37

## 2024-06-17 RX ADMIN — IPRATROPIUM BROMIDE AND ALBUTEROL SULFATE 1 DOSE: .5; 3 SOLUTION RESPIRATORY (INHALATION) at 20:41

## 2024-06-17 RX ADMIN — INSULIN LISPRO 1 UNITS: 100 INJECTION, SOLUTION INTRAVENOUS; SUBCUTANEOUS at 11:53

## 2024-06-17 RX ADMIN — GABAPENTIN 800 MG: 400 CAPSULE ORAL at 08:54

## 2024-06-17 RX ADMIN — EMPAGLIFLOZIN 10 MG: 10 TABLET, FILM COATED ORAL at 08:54

## 2024-06-17 RX ADMIN — VANCOMYCIN HYDROCHLORIDE 1000 MG: 1 INJECTION, POWDER, LYOPHILIZED, FOR SOLUTION INTRAVENOUS at 11:57

## 2024-06-17 RX ADMIN — FUROSEMIDE 40 MG: 10 INJECTION, SOLUTION INTRAMUSCULAR; INTRAVENOUS at 08:55

## 2024-06-17 RX ADMIN — INSULIN LISPRO 4 UNITS: 100 INJECTION, SOLUTION INTRAVENOUS; SUBCUTANEOUS at 11:53

## 2024-06-17 RX ADMIN — INSULIN LISPRO 1 UNITS: 100 INJECTION, SOLUTION INTRAVENOUS; SUBCUTANEOUS at 17:27

## 2024-06-17 RX ADMIN — CEFEPIME 2000 MG: 2 INJECTION, POWDER, FOR SOLUTION INTRAVENOUS at 09:02

## 2024-06-17 RX ADMIN — IPRATROPIUM BROMIDE AND ALBUTEROL SULFATE 1 DOSE: .5; 3 SOLUTION RESPIRATORY (INHALATION) at 05:28

## 2024-06-17 RX ADMIN — INSULIN LISPRO 4 UNITS: 100 INJECTION, SOLUTION INTRAVENOUS; SUBCUTANEOUS at 21:27

## 2024-06-17 RX ADMIN — APIXABAN 5 MG: 5 TABLET, FILM COATED ORAL at 08:54

## 2024-06-17 RX ADMIN — SODIUM CHLORIDE 5 ML/HR: 9 INJECTION, SOLUTION INTRAVENOUS at 08:51

## 2024-06-17 RX ADMIN — GABAPENTIN 800 MG: 400 CAPSULE ORAL at 21:27

## 2024-06-17 RX ADMIN — GUAIFENESIN 600 MG: 600 TABLET ORAL at 08:54

## 2024-06-17 RX ADMIN — ROFLUMILAST 500 MCG: 500 TABLET ORAL at 21:28

## 2024-06-17 RX ADMIN — Medication 1000 UNITS: at 08:54

## 2024-06-17 RX ADMIN — LAMOTRIGINE 100 MG: 100 TABLET ORAL at 21:27

## 2024-06-17 RX ADMIN — PRAMIPEXOLE DIHYDROCHLORIDE 0.5 MG: 0.25 TABLET ORAL at 02:43

## 2024-06-17 RX ADMIN — INSULIN LISPRO 4 UNITS: 100 INJECTION, SOLUTION INTRAVENOUS; SUBCUTANEOUS at 08:46

## 2024-06-17 RX ADMIN — APIXABAN 5 MG: 5 TABLET, FILM COATED ORAL at 21:27

## 2024-06-17 RX ADMIN — ROFLUMILAST 500 MCG: 500 TABLET ORAL at 09:02

## 2024-06-17 RX ADMIN — VANCOMYCIN HYDROCHLORIDE 1000 MG: 1 INJECTION, POWDER, LYOPHILIZED, FOR SOLUTION INTRAVENOUS at 02:04

## 2024-06-17 RX ADMIN — CARVEDILOL 3.12 MG: 3.12 TABLET, FILM COATED ORAL at 17:32

## 2024-06-17 RX ADMIN — INSULIN LISPRO 1 UNITS: 100 INJECTION, SOLUTION INTRAVENOUS; SUBCUTANEOUS at 08:46

## 2024-06-17 RX ADMIN — ASPIRIN 81 MG: 81 TABLET, CHEWABLE ORAL at 08:54

## 2024-06-17 RX ADMIN — SODIUM CHLORIDE, PRESERVATIVE FREE 10 ML: 5 INJECTION INTRAVENOUS at 22:09

## 2024-06-17 RX ADMIN — INSULIN LISPRO 4 UNITS: 100 INJECTION, SOLUTION INTRAVENOUS; SUBCUTANEOUS at 17:27

## 2024-06-17 RX ADMIN — PRAMIPEXOLE DIHYDROCHLORIDE 0.5 MG: 0.5 TABLET ORAL at 08:54

## 2024-06-17 RX ADMIN — LAMOTRIGINE 75 MG: 25 TABLET ORAL at 09:02

## 2024-06-17 RX ADMIN — GABAPENTIN 800 MG: 400 CAPSULE ORAL at 12:02

## 2024-06-17 RX ADMIN — SODIUM CHLORIDE, PRESERVATIVE FREE 10 ML: 5 INJECTION INTRAVENOUS at 08:52

## 2024-06-17 RX ADMIN — ATORVASTATIN CALCIUM 20 MG: 20 TABLET, FILM COATED ORAL at 21:27

## 2024-06-17 NOTE — PROGRESS NOTES
Physical Therapy  Facility/Department: [address] MED SURG  Physical Therapy Initial Assessment    Name: Jorge Gauthier  : 1945  MRN: [id number]  Date of Service: 2024    Discharge Recommendations:  Continue to assess pending progress, Patient would benefit from continued therapy after discharge, Home with assist PRN, Home with Home health PT   PT Equipment Recommendations  Equipment Needed: No  Other: will cont to assess    Jorge Gauthier scored a 15/24 on the AM-PAC short mobility form. Current research shows that an AM-PAC score of 18 or greater is typically associated with a discharge to the patient's home setting. Based on the patient's AM-PAC score and their current functional mobility deficits, it is recommended that the patient have 2-3 sessions per week of Physical Therapy at d/c to increase the patient's independence.  At this time, this patient demonstrates the endurance and safety to discharge home with HHPT and a follow up treatment frequency of 2-3x/wk.  Please see assessment section for further patient specific details.    If patient discharges prior to next session this note will serve as a discharge summary.  Please see below for the latest assessment towards goals.          Patient Diagnosis(es): The primary encounter diagnosis was Pneumonia due to infectious organism, unspecified laterality, unspecified part of lung. Diagnoses of Acute respiratory failure with hypoxia (HCC) and COPD exacerbation (HCC) were also pertinent to this visit.  Past Medical History:  has a past medical history of Abrasion of right elbow, Arthritis, CAD (coronary artery disease), Chronic back pain, Chronic systolic CHF (congestive heart failure) (HCC), COPD (chronic obstructive pulmonary disease) (HCC), Dental disease, Diabetes mellitus (HCC), Essential hypertension, Hearing loss, Paroxysmal atrial fibrillation (HCC), and Tinnitus.  Past Surgical History:  has a past surgical history that includes fracture

## 2024-06-17 NOTE — ACP (ADVANCE CARE PLANNING)
Advance Care Planning     Advance Care Planning Activator (Inpatient)  Conversation Note      Date of ACP Conversation: 6/17/2024     Conversation Conducted with: Patient with Decision Making Capacity    ACP Activator: RENETTA Beltran    Health Care Decision Maker:     Current Designated Health Care Decision Maker:     Primary Decision Maker: Portia Gauthier - Spouse - [phone]    Secondary Decision Maker: Bang Gauthier Jr - Child - [phone]    Today we documented Decision Maker(s) consistent with Legal Next of Kin hierarchy.    Care Preferences    Ventilation:  \"If you were in your present state of health and suddenly became very ill and were unable to breathe on your own, what would your preference be about the use of a ventilator (breathing machine) if it were available to you?\"      Would the patient desire the use of ventilator (breathing machine)?: yes    \"If your health worsens and it becomes clear that your chance of recovery is unlikely, what would your preference be about the use of a ventilator (breathing machine) if it were available to you?\"     Would the patient desire the use of ventilator (breathing machine)?: Yes      Resuscitation  \"CPR works best to restart the heart when there is a sudden event, like a heart attack, in someone who is otherwise healthy. Unfortunately, CPR does not typically restart the heart for people who have serious health conditions or who are very sick.\"    \"In the event your heart stopped as a result of an underlying serious health condition, would you want attempts to be made to restart your heart (answer \"yes\" for attempt to resuscitate) or would you prefer a natural death (answer \"no\" for do not attempt to resuscitate)?\" yes       [] Yes   [] No   Educated Patient / Decision Maker regarding differences between Advance Directives and portable DNR orders.    Length of ACP Conversation in minutes:  2 min    Conversation Outcomes:  ACP discussion completed    Follow-up

## 2024-06-17 NOTE — H&P
PROVIDED HISTORY: Reason for exam:->recent surgery hypoxic sob tachycardic concern for PE Additional Contrast?->1 Reason for Exam: recent surgery hypoxic sob tachycardic concern for PE Additional signs and symptoms: hx chf, chest tube, cardiac surg, FINDINGS: There is motion artifact throughout the exam. Pulmonary Arteries: The pulmonary arteries are adequately opacified for evaluation.  No evidence of intraluminal filling defect to suggest pulmonary embolism.  Main pulmonary artery is normal in caliber. Mediastinum: There are multiple small lymph nodes along the pretracheal region and AP window measuring up to 1.8 cm.  There is a 2.3 cm subcarinal lymph node.  The heart and pericardium demonstrate no acute abnormality. There is mild calcified plaque throughout the aorta which is mildly dilated with no aneurysm or dissection.  There are moderate coronary artery calcifications. Lungs/pleura: The lungs are hyperinflated and emphysematous.  There are hazy interstitial and ground-glass opacities scattered along the upper lobes extending into the lung bases.  There is a 1 cm irregular pleural based infiltrate right upper lobe posteriorly with a questionable central area of necrosis which is more apparent.  There is pneumothorax.  There are mild pleural-based calcifications left lung base posteriorly and laterally with mild pleural thickening in the area which is unchanged.  There is mild loculated fluid seen extending into the fissures and lung bases anterior laterally.  There are small bibasilar pleural effusions layering posteriorly with adjacent moderate subsegmental atelectasis and consolidation posterolaterally which is more prominent on the right with air bronchograms which is more prominent on the right.  There is no endobronchial lesion. Upper Abdomen: Limited images of the upper abdomen are unremarkable. Soft Tissues/Bones: No acute bone or soft tissue abnormality.     Motion artifact limiting the exam.  Within

## 2024-06-17 NOTE — PROGRESS NOTES
Patient came from ED with 2L NC verbalized SOB, oxygen sats at 88%, Increased oxygen support to 4L NC.    Electronically signed by Teressa Gil RN on 6/17/2024 at 5:42 AM

## 2024-06-17 NOTE — PLAN OF CARE
Problem: Discharge Planning  Goal: Discharge to home or other facility with appropriate resources  Outcome: Progressing  Flowsheets (Taken 6/17/2024 0768)  Discharge to home or other facility with appropriate resources: Identify barriers to discharge with patient and caregiver     Problem: Safety - Adult  Goal: Free from fall injury  Outcome: Progressing     Problem: Skin/Tissue Integrity  Goal: Absence of new skin breakdown  Description: 1.  Monitor for areas of redness and/or skin breakdown  2.  Assess vascular access sites hourly  3.  Every 4-6 hours minimum:  Change oxygen saturation probe site  4.  Every 4-6 hours:  If on nasal continuous positive airway pressure, respiratory therapy assess nares and determine need for appliance change or resting period.  Outcome: Progressing

## 2024-06-17 NOTE — PROGRESS NOTES
Comprehensive Nutrition Assessment    Type and Reason for Visit:  Initial, Positive Nutrition Screen    Nutrition Recommendations/Plan:   Continue CCC (4) / 2 gm Na / Mildly Thick (Nectar) Liquids  Continue 2000 ml per day per Provider  Offer Iker (wound healing supplement) bid     Malnutrition Assessment:  Malnutrition Status:  No malnutrition (06/17/24 1507)    Context:  Acute Illness         Nutrition Assessment:    MST 2 for altered skin integrity. PMH includes; ORIF right ankle (5/17/24), CABG, COPD, CHF, DM, HTN. Pt adm with acute respiratory failure. Diet adv to CCC (4) / 2 gm Na with mildly thick (nectar) liquids. Pt reported and records confirm good po intake. Pt confirmed following a low sodium diet at home, but recall revealed use of some high sodium choices, like mac and cheese, regular soup etc. Pt did not want to discuss diet needs for home, but agreed to written information for wife to reference. Pt did endorse use of nectar thick liquids at home. Noted a stage ll on coccyx. Will offer Iker bid to help with wound healing. When asked about UBW, pt reported 217 lbs. Records reflect wts of 220-230s over the past year. Will continue to monitor trend. Will continue to monitor progress.    Nutrition Related Findings:    Labs reviewed. Noted lantus and coverage to help manage BS. (Noted steroid adm x 1). Noted pt is -1.6 liters. Noted no edema. Noted BM on 6/16. Noted daily MVI and Vitamin D. Wound Type: Stage II, Skin Tears (stage ll to coccyx. Noted skin tear to RLE. Noted area to left hand.)       Current Nutrition Intake & Therapies:    Average Meal Intake: %     ADULT DIET; Regular; 4 carb choices (60 gm/meal); Low Sodium (2 gm); Mildly Thick ([place]); 2000 ml    Anthropometric Measures:  Height: 180.3 cm (5' 10.98\")  Ideal Body Weight (IBW): 172 lbs (78 kg)    Admission Body Weight: 105.7 kg (233 lb)  Current Body Weight: 102.5 kg (226 lb),   IBW. Weight Source: Bed Scale  Current BMI (kg/m2):

## 2024-06-17 NOTE — CARE COORDINATION
Readmission Assessment  Number of Days since last admission?: 1-7 days  Previous Disposition: Home with Home Health  Who is being Interviewed: Patient  What was the patient's/caregiver's perception as to why they think they needed to return back to the hospital?: Did not realize care needs would be so extensive  Did you visit your Primary Care Physician after you left the hospital, before you returned this time?: No  Why weren't you able to visit your PCP?: Did not have an appointment  Did you see a specialist, such as Cardiac, Pulmonary, Orthopedic Physician, etc. after you left the hospital?: No  Who advised the patient to return to the hospital?: Self-referral  Does the patient report anything that got in the way of taking their medications?: No  In our efforts to provide the best possible care to you and others like you, can you think of anything that we could have done to help you after you left the hospital the first time, so that you might not have needed to return so soon?: Other (Comment) (NA)     Case Management Assessment  Initial Evaluation    Date/Time of Evaluation: 6/17/2024 3:04 PM  Assessment Completed by: RENETTA Beltran    If patient is discharged prior to next notation, then this note serves as note for discharge by case management.    Patient Name: Jorge Gauthier                   YOB: 1945  Diagnosis: COPD exacerbation (HCC) [J44.1]  Acute respiratory failure with hypoxia (HCC) [J96.01]  Pneumonia due to infectious organism, unspecified laterality, unspecified part of lung [J18.9]                   Date / Time: 6/16/2024  8:24 PM    Patient Admission Status: Inpatient   Readmission Risk (Low < 19, Mod (19-27), High > 27): Readmission Risk Score: 36.4    Current PCP: Gallo Sanchez MD  PCP verified by CM? (P) Yes    Chart Reviewed: Yes      History Provided by: (P) Patient  Patient Orientation: (P) Alert and Oriented, Person, Place    Patient Cognition: (P)  #[id number] - [place], [place] - [address] - P [phone] - F [phone]  [address]  [facility] [address]  Phone: [phone] Fax: [phone]    [facility] - [place], [place] - [address] - P [phone] - F [phone]  [address]  [address]  [facility] [address]  Phone: [phone] Fax: [phone]      Notes:    Factors facilitating achievement of predicted outcomes: Family support, Motivated, Cooperative, and Pleasant    Barriers to discharge: NA    Additional Case Management Notes: Pt is from home, does not ambulate, uses wheelchair, stair lift, and electric scooter. Currently on oxygen 2L, will attempt to wean but pt states he does need it for COPD.   Wife to transport at [place]. Active with [facility].     The Plan for Transition of Care is related to the following treatment goals of COPD exacerbation (HCC) [J44.1]  Acute respiratory failure with hypoxia (HCC) [J96.01]  Pneumonia due to infectious organism, unspecified laterality, unspecified part of lung [J18.9]    IF APPLICABLE: The Patient and/or patient representative Jorge and his family were provided with a choice of provider and agrees with the discharge plan. Freedom of choice list with basic dialogue that supports the patient's individualized plan of care/goals and shares the quality data associated with the providers was provided to: (P) Patient   Patient Representative Name:       The Patient and/or Patient Representative Agree with the Discharge Plan? (P) Yes    Joseluis Whitehead, WILFRIDO, [facility] [employer] Work Case Management   Phone: [phone]  Fax: [phone]

## 2024-06-17 NOTE — PROGRESS NOTES
Occupational Therapy  Facility/Department: [address] MED SURG  Occupational Therapy Initial Assessment    Name: Jorge Gauthier  : 1945  MRN: [id number]  Date of Service: 2024    Discharge Recommendations:  Patient would benefit from continued therapy after discharge, 24 hour supervision or assist, 3-5 sessions per week, Home with Home health OT     Jorge Gauthier scored a 17/ on the AM-PAC ADL Inpatient form. Current research shows that an AM-PAC score of 18 or greater is typically associated with a discharge to the patient's home setting. Based on the patient's AM-PAC score, and their current ADL deficits, it is recommended that the patient have 2-3 sessions per week of Occupational Therapy at d/c to increase the patient's independence.  At this time, this patient demonstrates the endurance and safety to discharge home with home OT and a follow up treatment frequency of 2-3x/wk.   Please see assessment section for further patient specific details.    If patient discharges prior to next session this note will serve as a discharge summary.  Please see below for the latest assessment towards goals.        Patient Diagnosis(es): The primary encounter diagnosis was Pneumonia due to infectious organism, unspecified laterality, unspecified part of lung. Diagnoses of Acute respiratory failure with hypoxia (HCC) and COPD exacerbation (HCC) were also pertinent to this visit.  Past Medical History:  has a past medical history of Abrasion of right elbow, Arthritis, CAD (coronary artery disease), Chronic back pain, Chronic systolic CHF (congestive heart failure) (HCC), COPD (chronic obstructive pulmonary disease) (HCC), Dental disease, Diabetes mellitus (HCC), Essential hypertension, Hearing loss, Paroxysmal atrial fibrillation (HCC), and Tinnitus.  Past Surgical History:  has a past surgical history that includes fracture surgery (Left, ); back surgery (); Cardiac surgery (); Coronary artery bypass

## 2024-06-17 NOTE — DISCHARGE INSTR - COC
Continuity of Care Form    Patient Name: Jorge Gauthier   :  1945  MRN:  [id number]    Admit date:  2024  Discharge date:  24    Code Status Order: Limited   Advance Directives:     Admitting Physician:  Debra Garvin MD  PCP: Gallo Sanchez MD    Discharging Nurse: Snow LAMBERT  Discharging Hospital Unit/Room#: L0R-3063/4278-01  Discharging Unit Phone Number: [phone]    Emergency Contact:   Extended Emergency Contact Information  Primary Emergency Contact: Portia Gauthier  Address: [address] [place] [address]  Home Phone: [phone]  Work Phone: [phone]  Mobile Phone: [phone]  Relation: Spouse  Secondary Emergency Contact: Bang Gauthier Jr  Home Phone: [phone]  Relation: Child    Past Surgical History:  Past Surgical History:   Procedure Laterality Date    ANKLE FRACTURE SURGERY Right 2024    OPEN REDUCTION INTERNAL FIXATION RIGHT ANKLE performed by José Miguel Chavez MD at [facility] OR    BACK SURGERY      BACK SURGERY      CARDIAC SURGERY      CERVICAL SPINE SURGERY      CHEST TUBE INSERTION Right 2023    14F. Dr. Jalloh    CORONARY ARTERY BYPASS GRAFT      FRACTURE SURGERY Left     Shoulder    IR CHEST TUBE INSERTION  3/14/2023    IR CHEST TUBE INSERTION 3/14/2023 [facility] SPECIAL PROCEDURES       Immunization History:   Immunization History   Administered Date(s) Administered    COVID-19, MODERNA Bivalent, (age 12y+), IM, 50 mcg/0.5 mL 2022    Influenza Vaccine, unspecified formulation 10/22/2016    Influenza, AFLURIA (age 3 yrs+), FLUZONE, (age 6 mo+), MDV, 0.5mL 10/22/2016, 09/15/2017    Influenza, High Dose (Fluzone 65 yrs and older) 09/15/2017    Pneumococcal, PCV-13, PREVNAR 13, (age 6w+), IM, 0.5mL 2016    TDaP, ADACEL (age 10y-64y), BOOSTRIX (age 10y+), IM, 0.5mL 2018       Active Problems:  Patient Active Problem List   Diagnosis Code    Diabetes mellitus (HCC) E11.9    Atherosclerosis of

## 2024-06-17 NOTE — PROGRESS NOTES
4 Eyes Skin Assessment     NAME:  Jorge Gauthier  YOB: 1945  MEDICAL RECORD NUMBER:  [id number]    The patient is being assessed for  Admission    I agree that at least one RN has performed a thorough Head to Toe Skin Assessment on the patient. ALL assessment sites listed below have been assessed.      Areas assessed by both nurses:    Head, Face, Ears, Shoulders, Back, Chest, Arms, Elbows, Hands, Sacrum. Buttock, Coccyx, Ischium, and Legs. Feet and Heels        Does the Patient have a Wound? Yes wound(s) were present on assessment. LDA wound assessment was Initiated and completed by RN       Jose Alfredo Prevention initiated by RN: Yes  Wound Care Orders initiated by RN: No    Pressure Injury (Stage 3,4, Unstageable, DTI, NWPT, and Complex wounds) if present, place Wound referral order by RN under : No    New Ostomies, if present place, Ostomy referral order under : No     Nurse 1 eSignature: Electronically signed by Teressa Gil RN on 6/17/24 at 4:00 AM EDT    **SHARE this note so that the co-signing nurse can place an eSignature**    Nurse 2 eSignature: Electronically signed by Faye Watt RN on 6/17/24 at 4:02 AM EDT

## 2024-06-17 NOTE — CONSULTS
REASON FOR CONSULTATION/CC: Hypoxia      Consult at request of Alma Mcbride MD     PCP: Gallo Sanchez MD  Established Pulmonologist: Luis Alberto Dang    Chief Complaint   Patient presents with    Shortness of Breath       HISTORY OF PRESENT ILLNESS: Jorge Gauthier is a 79 y.o. year old male with a history of chronic aspiration, diastolic and systolic heart failure who presents with shortness of breath.  Symptoms been present for greater than 24 hours.  Patient was recently admitted and discharged and then came back due to worsening shortness of breath.  Found to have a worsening oxygen requirement CT of the chest shows likely bibasilar right greater than left pneumonia suspicious for aspiration which he has a known history of.  CT also consistent with acute pulmonary edema.  He is on 3 L of oxygen      Past Medical History:   Diagnosis Date    Abrasion of right elbow 2/4/2023    Arthritis     CAD (coronary artery disease)     CABG in 2009    Chronic back pain     Chronic systolic CHF (congestive heart failure) (HCC)     COPD (chronic obstructive pulmonary disease) (HCC)     Dental disease     Diabetes mellitus (HCC)     Essential hypertension     Hearing loss     Paroxysmal atrial fibrillation (HCC)     On Coumadin    Tinnitus          Past Surgical History:   Procedure Laterality Date    ANKLE FRACTURE SURGERY Right 5/17/2024    OPEN REDUCTION INTERNAL FIXATION RIGHT ANKLE performed by José Miguel Chavez MD at [facility] OR    BACK SURGERY  1995    BACK SURGERY      CARDIAC SURGERY  2007    CERVICAL SPINE SURGERY      CHEST TUBE INSERTION Right 03/14/2023    14F. Dr. Jalloh    CORONARY ARTERY BYPASS GRAFT      FRACTURE SURGERY Left 1988    Shoulder    IR CHEST TUBE INSERTION  3/14/2023    IR CHEST TUBE INSERTION 3/14/2023 [facility] SPECIAL PROCEDURES       Family Hx  family history includes Brain Cancer in his mother; Heart Attack in his brother and father.    Social Hx   reports that he quit smoking about 28

## 2024-06-17 NOTE — PLAN OF CARE
Oxygen supplementation based on oxygen saturation or arterial blood gases   Encourage broncho-pulmonary hygiene including cough, deep breathe, incentive spirometry   Assess the need for suctioning and aspirate as needed   Assess and instruct to report shortness of breath or any respiratory difficulty   Respiratory therapy support as indicated     Problem: Musculoskeletal - Adult  Goal: Return mobility to safest level of function  Outcome: Progressing  Flowsheets (Taken 6/17/2024 8388)  Return Mobility to Safest Level of Function:   Assess patient stability and activity tolerance for standing, transferring and ambulating with or without assistive devices   Assist with transfers and ambulation using safe patient handling equipment as needed

## 2024-06-17 NOTE — CONSULTS
Clinical Pharmacy Note  Vancomycin Consult    Jogre Gauthier is a 79 y.o. male ordered Vancomycin for Pneumonia; consult received from Dr. Garvin to manage therapy. Also receiving cefepime.    Allergies:  Donepezil, Amitriptyline, and Enoxaparin     Temp max:  Temp (24hrs), Av.9 °F (36.6 °C), Min:97.8 °F (36.6 °C), Max:97.9 °F (36.6 °C)      Recent Labs     24   WBC 9.6       Recent Labs     24   BUN 19   CREATININE 1.0       No intake or output data in the 24 hours ending 24 0436    Culture Results:      Ht Readings from Last 1 Encounters:   24 1.803 m (5' 10.98\")        Wt Readings from Last 1 Encounters:   24 102.6 kg (226 lb 3.1 oz)         Estimated Creatinine Clearance: 73 mL/min (based on SCr of 1 mg/dL).    Assessment/Plan:  Day # 1 of vancomycin.  Vancomycin 1000 mg IV every 12 hours ordered.    Goal -600  Predicted AUC 24-48 hrs: 405  Predicted AUC steady state: 519    Thank you for the consult.   Salty Guo, ShawnD

## 2024-06-17 NOTE — ED PROVIDER NOTES
[facility] EMERGENCY DEPARTMENT  EMERGENCY DEPARTMENT ENCOUNTER        Pt Name: Jorge Gauthier  MRN: [id number]  Birthdate 1945  Date of evaluation: 6/16/2024  Provider: GRAHAM Harris  PCP: Gallo Sanchez MD  Note Started: 9:14 PM EDT 6/16/24      TRES. I have evaluated this patient.        CHIEF COMPLAINT       Chief Complaint   Patient presents with    Shortness of Breath       HISTORY OF PRESENT ILLNESS: 1 or more Elements     History from : Patient and EMS    Limitations to history : None    Jorge Gauthier is a 79 y.o. male who presents via EMS from home where he lives  with his wife for shortness of breath.  He tells me it started about an hour ago.  He tells me he is coughing up yellow phlegm.  No fever that he is aware.  Tells me he was recently in the hospital apparently has been home for 3 days.  Was treated for pneumonia recently as well.  He does not typically use oxygen.  EMS reported hypoxia and placed him on 4 L.  He is a former smoker history of COPD, CAD follows with Dr. Campos pulmonology.  He had surgery after he broke his right foot and is in a boot the surgery was a month ago.  He has a history of A-fib on Eliquis compliant with this.  Denies prior history of DVT or PE.  Has not noted increased leg swelling but does have bilateral lower extremity edema.  He denies any additional recent falls or injury.    Nursing Notes were all reviewed and agreed with or any disagreements were addressed in the HPI.    REVIEW OF SYSTEMS :      Review of Systems   Constitutional:  Negative for fever.   Respiratory:  Positive for cough, shortness of breath and wheezing.    Cardiovascular:  Positive for leg swelling. Negative for chest pain.   Gastrointestinal:  Negative for abdominal pain and vomiting.   Musculoskeletal:  Positive for gait problem (In a boot from surgery 1 month ago on his right foot).   Skin:  Negative for color change, rash and wound.   Neurological:   upper lobes extending into the lung bases.  There is a 1 cm irregular pleural based infiltrate right upper lobe posteriorly with a questionable central area of necrosis which is more apparent.  There is pneumothorax.  There are mild pleural-based calcifications left lung base posteriorly and laterally with mild pleural thickening in the area which is unchanged.  There is mild loculated fluid seen extending into the fissures and lung bases anterior laterally.  There are small bibasilar pleural effusions layering posteriorly with adjacent moderate subsegmental atelectasis and consolidation posterolaterally which is more prominent on the right with air bronchograms which is more prominent on the right.  There is no endobronchial lesion. Upper Abdomen: Limited images of the upper abdomen are unremarkable. Soft Tissues/Bones: No acute bone or soft tissue abnormality.     Motion artifact limiting the exam.  Within the limitations of the exam, no obvious acute pulmonary embolus can be seen. Mildly dilated and atherosclerotic thoracic aorta with no aneurysm or dissection. Mild mediastinal adenopathy.  Recommend follow-up. Status post CABG surgery. Hazy ground-glass opacities along the upper lobes extending inferiorly in the lung bases which could represent pulmonary edema vs early infiltrates from pneumonia.  Recommend follow-up. Small bibasilar pleural effusions with adjacent moderate subsegmental atelectasis and consolidation along the lung bases posterolaterally which is more prominent on the right.  There is mild loculated pleural fluid in the fissures and lung bases anteriorly. 11 mm pleural-based infiltrate right upper lobe posteriorly with questionable central necrosis which could be due  infectious or neoplastic in etiology. Recommend follow-up. Mild pleural thickening and pleural based calcifications along the left lung base which is unchanged and may be due to prior asbestos exposure.     XR CHEST

## 2024-06-17 NOTE — PROGRESS NOTES
and also treated for aspiration pneumonia  -Remains afebrile.  No leukocytosis.  Procalcitonin negative.  Check pneumonia panel.  -continue broad-spectrum vancomycin and cefepime for now  -Pulmonology here consulted, appreciate recommendations  -Wean oxygen as tolerable.  Will need home O2 eval prior to discharge    Dysphagia  History of aspiration pneumonia  -Was seen by SLP last admission, recommended regular diet/mildly thick nectar liquids  -Patient endorses compliance with diet at home    Acute on chronic diastolic heart failure  -Most recent echo from 8/2021 showed EF 45-50% and grade 2 diastolic dysfunction  -proBNP only mildly elevated at  1127 and was 876 on 6/10, does not appear grossly fluid overloaded on exam.  -Given 1 dose of IV Lasix, will continue to monitor response and transition back to home oral lasix prior to d/c.   -Strict I/os, daily weights    CAD s/p CABG  -Continue home aspirin, carvedilol, statin    Chronic atrial fibrillation  -Currently well rate controlled.  Continue home carvedilol and Eliquis    Type 2 diabetes insulin-dependent  -Home regimen is 70/30 insulin 25 units daily with oral metformin.  Hold home regimen.  Start Lantus 22 units nightly and uptitrate as necessary  -Low-dose SSI with Accu-Cheks    Right ankle fracture  -Recently had ORIF with Dr. Chavez on 5/18/2024.  Currently in boot.  PT/OT consulted.  Follow-up with orthopedics outpatient as planned.    COPD  -Resume home inhalers and DuoNebs.  Active Hospital Problems    Diagnosis     Acute respiratory failure with hypoxia (HCC) [J96.01]        Medications:  Reviewed    Infusion Medications    sodium chloride 5 mL/hr (06/17/24 0851)    dextrose       Scheduled Medications    sodium chloride flush  5-40 mL IntraVENous 2 times per day    ipratropium 0.5 mg-albuterol 2.5 mg  1 Dose Inhalation Q4H WA RT    gabapentin  800 mg Oral 4x Daily    apixaban  5 mg Oral BID    aspirin  81 mg Oral Daily    carvedilol  3.125 mg Oral BID

## 2024-06-17 NOTE — ACP (ADVANCE CARE PLANNING)
Code status discussed with patient - opted DNR/DNI/no defibrillation  Surrogate decision maker- Wife- Disha Pearce- [phone]

## 2024-06-17 NOTE — PROGRESS NOTES
Newly admitted patient from ED, alert and oriented X4. Patient denies pain. Patient has dyspnea on exertion. Patient has crackles and wheezing. On oxygen support 4L Via NC with oxygen sats at 94%. Noted BLE edema. Positioned HOB elevated, aspiration precaution maintained.RT Called for breathing treatment. Fall preventive measures promoted. Casandra Garvin informed.     Electronically signed by Teressa Gil RN on 6/17/2024 at 4:05 AM

## 2024-06-17 NOTE — ED NOTES
Report received from PETRA Rucker at this time. Introduced self to pt, all needs met, call light within reach.

## 2024-06-18 LAB
ANION GAP SERPL CALCULATED.3IONS-SCNC: 13 MMOL/L (ref 3–16)
BASOPHILS # BLD: 0.1 K/UL (ref 0–0.2)
BASOPHILS NFR BLD: 0.9 %
BUN SERPL-MCNC: 24 MG/DL (ref 7–20)
CALCIUM SERPL-MCNC: 9 MG/DL (ref 8.3–10.6)
CHLORIDE SERPL-SCNC: 101 MMOL/L (ref 99–110)
CO2 SERPL-SCNC: 27 MMOL/L (ref 21–32)
CREAT SERPL-MCNC: 1.1 MG/DL (ref 0.8–1.3)
DEPRECATED RDW RBC AUTO: 18.1 % (ref 12.4–15.4)
EOSINOPHIL # BLD: 0.3 K/UL (ref 0–0.6)
EOSINOPHIL NFR BLD: 4.2 %
GFR SERPLBLD CREATININE-BSD FMLA CKD-EPI: 68 ML/MIN/{1.73_M2}
GLUCOSE BLD-MCNC: 142 MG/DL (ref 70–99)
GLUCOSE BLD-MCNC: 146 MG/DL (ref 70–99)
GLUCOSE BLD-MCNC: 193 MG/DL (ref 70–99)
GLUCOSE BLD-MCNC: 368 MG/DL (ref 70–99)
GLUCOSE SERPL-MCNC: 145 MG/DL (ref 70–99)
HCT VFR BLD AUTO: 32 % (ref 40.5–52.5)
HGB BLD-MCNC: 10 G/DL (ref 13.5–17.5)
LYMPHOCYTES # BLD: 1.6 K/UL (ref 1–5.1)
LYMPHOCYTES NFR BLD: 20.3 %
MCH RBC QN AUTO: 25.5 PG (ref 26–34)
MCHC RBC AUTO-ENTMCNC: 31.2 G/DL (ref 31–36)
MCV RBC AUTO: 81.7 FL (ref 80–100)
MONOCYTES # BLD: 0.9 K/UL (ref 0–1.3)
MONOCYTES NFR BLD: 11.7 %
MRSA DNA SPEC QL NAA+PROBE: NORMAL
NEUTROPHILS # BLD: 4.8 K/UL (ref 1.7–7.7)
NEUTROPHILS NFR BLD: 62.9 %
PERFORMED ON: ABNORMAL
PLATELET # BLD AUTO: 325 K/UL (ref 135–450)
PMV BLD AUTO: 7.1 FL (ref 5–10.5)
POTASSIUM SERPL-SCNC: 3.9 MMOL/L (ref 3.5–5.1)
RBC # BLD AUTO: 3.91 M/UL (ref 4.2–5.9)
SARS-COV-2 RDRP RESP QL NAA+PROBE: NOT DETECTED
SODIUM SERPL-SCNC: 141 MMOL/L (ref 136–145)
VANCOMYCIN SERPL-MCNC: 13.5 UG/ML
WBC # BLD AUTO: 7.7 K/UL (ref 4–11)

## 2024-06-18 PROCEDURE — 6360000002 HC RX W HCPCS: Performed by: INTERNAL MEDICINE

## 2024-06-18 PROCEDURE — 87635 SARS-COV-2 COVID-19 AMP PRB: CPT

## 2024-06-18 PROCEDURE — 94761 N-INVAS EAR/PLS OXIMETRY MLT: CPT

## 2024-06-18 PROCEDURE — 2580000003 HC RX 258: Performed by: INTERNAL MEDICINE

## 2024-06-18 PROCEDURE — 99232 SBSQ HOSP IP/OBS MODERATE 35: CPT | Performed by: INTERNAL MEDICINE

## 2024-06-18 PROCEDURE — 97530 THERAPEUTIC ACTIVITIES: CPT

## 2024-06-18 PROCEDURE — 6370000000 HC RX 637 (ALT 250 FOR IP): Performed by: INTERNAL MEDICINE

## 2024-06-18 PROCEDURE — 6370000000 HC RX 637 (ALT 250 FOR IP): Performed by: NURSE PRACTITIONER

## 2024-06-18 PROCEDURE — 2700000000 HC OXYGEN THERAPY PER DAY

## 2024-06-18 PROCEDURE — 94640 AIRWAY INHALATION TREATMENT: CPT

## 2024-06-18 PROCEDURE — 94680 O2 UPTK RST&XERS DIR SIMPLE: CPT

## 2024-06-18 PROCEDURE — 94669 MECHANICAL CHEST WALL OSCILL: CPT

## 2024-06-18 PROCEDURE — 80048 BASIC METABOLIC PNL TOTAL CA: CPT

## 2024-06-18 PROCEDURE — 85025 COMPLETE CBC W/AUTO DIFF WBC: CPT

## 2024-06-18 PROCEDURE — 80202 ASSAY OF VANCOMYCIN: CPT

## 2024-06-18 PROCEDURE — 36415 COLL VENOUS BLD VENIPUNCTURE: CPT

## 2024-06-18 PROCEDURE — 1200000000 HC SEMI PRIVATE

## 2024-06-18 RX ORDER — POLYETHYLENE GLYCOL 3350 17 G/17G
17 POWDER, FOR SOLUTION ORAL 2 TIMES DAILY
Status: DISCONTINUED | OUTPATIENT
Start: 2024-06-18 | End: 2024-06-19 | Stop reason: HOSPADM

## 2024-06-18 RX ORDER — POLYETHYLENE GLYCOL 3350 17 G/17G
17 POWDER, FOR SOLUTION ORAL 2 TIMES DAILY
Status: DISCONTINUED | OUTPATIENT
Start: 2024-06-19 | End: 2024-06-18

## 2024-06-18 RX ADMIN — Medication 1000 UNITS: at 09:10

## 2024-06-18 RX ADMIN — CEFEPIME 2000 MG: 2 INJECTION, POWDER, FOR SOLUTION INTRAVENOUS at 09:27

## 2024-06-18 RX ADMIN — INSULIN LISPRO 4 UNITS: 100 INJECTION, SOLUTION INTRAVENOUS; SUBCUTANEOUS at 21:24

## 2024-06-18 RX ADMIN — LAMOTRIGINE 75 MG: 25 TABLET ORAL at 09:09

## 2024-06-18 RX ADMIN — PRAMIPEXOLE DIHYDROCHLORIDE 0.5 MG: 0.5 TABLET ORAL at 21:20

## 2024-06-18 RX ADMIN — GUAIFENESIN 600 MG: 600 TABLET ORAL at 21:20

## 2024-06-18 RX ADMIN — APIXABAN 5 MG: 5 TABLET, FILM COATED ORAL at 09:09

## 2024-06-18 RX ADMIN — ROFLUMILAST 500 MCG: 500 TABLET ORAL at 09:09

## 2024-06-18 RX ADMIN — IPRATROPIUM BROMIDE AND ALBUTEROL SULFATE 1 DOSE: .5; 3 SOLUTION RESPIRATORY (INHALATION) at 08:22

## 2024-06-18 RX ADMIN — IPRATROPIUM BROMIDE AND ALBUTEROL SULFATE 1 DOSE: .5; 3 SOLUTION RESPIRATORY (INHALATION) at 23:48

## 2024-06-18 RX ADMIN — ASPIRIN 81 MG: 81 TABLET, CHEWABLE ORAL at 09:10

## 2024-06-18 RX ADMIN — SODIUM CHLORIDE, PRESERVATIVE FREE 10 ML: 5 INJECTION INTRAVENOUS at 21:20

## 2024-06-18 RX ADMIN — CARVEDILOL 3.12 MG: 3.12 TABLET, FILM COATED ORAL at 17:22

## 2024-06-18 RX ADMIN — GABAPENTIN 800 MG: 400 CAPSULE ORAL at 12:35

## 2024-06-18 RX ADMIN — EMPAGLIFLOZIN 10 MG: 10 TABLET, FILM COATED ORAL at 09:09

## 2024-06-18 RX ADMIN — IPRATROPIUM BROMIDE AND ALBUTEROL SULFATE 1 DOSE: .5; 3 SOLUTION RESPIRATORY (INHALATION) at 16:33

## 2024-06-18 RX ADMIN — GUAIFENESIN 600 MG: 600 TABLET ORAL at 09:10

## 2024-06-18 RX ADMIN — ATORVASTATIN CALCIUM 20 MG: 20 TABLET, FILM COATED ORAL at 21:20

## 2024-06-18 RX ADMIN — SODIUM CHLORIDE, PRESERVATIVE FREE 10 ML: 5 INJECTION INTRAVENOUS at 09:09

## 2024-06-18 RX ADMIN — POLYETHYLENE GLYCOL 3350 17 G: 17 POWDER, FOR SOLUTION ORAL at 09:30

## 2024-06-18 RX ADMIN — IPRATROPIUM BROMIDE AND ALBUTEROL SULFATE 1 DOSE: .5; 3 SOLUTION RESPIRATORY (INHALATION) at 20:50

## 2024-06-18 RX ADMIN — INSULIN GLARGINE 22 UNITS: 100 INJECTION, SOLUTION SUBCUTANEOUS at 09:11

## 2024-06-18 RX ADMIN — APIXABAN 5 MG: 5 TABLET, FILM COATED ORAL at 21:20

## 2024-06-18 RX ADMIN — FUROSEMIDE 40 MG: 10 INJECTION, SOLUTION INTRAMUSCULAR; INTRAVENOUS at 09:10

## 2024-06-18 RX ADMIN — GABAPENTIN 800 MG: 400 CAPSULE ORAL at 21:20

## 2024-06-18 RX ADMIN — GABAPENTIN 800 MG: 400 CAPSULE ORAL at 09:10

## 2024-06-18 RX ADMIN — INSULIN LISPRO 4 UNITS: 100 INJECTION, SOLUTION INTRAVENOUS; SUBCUTANEOUS at 09:11

## 2024-06-18 RX ADMIN — LAMOTRIGINE 100 MG: 100 TABLET ORAL at 21:20

## 2024-06-18 RX ADMIN — IPRATROPIUM BROMIDE AND ALBUTEROL SULFATE 1 DOSE: .5; 3 SOLUTION RESPIRATORY (INHALATION) at 12:02

## 2024-06-18 RX ADMIN — ROFLUMILAST 500 MCG: 500 TABLET ORAL at 21:19

## 2024-06-18 RX ADMIN — PRAMIPEXOLE DIHYDROCHLORIDE 0.5 MG: 0.5 TABLET ORAL at 09:09

## 2024-06-18 RX ADMIN — CEFEPIME 2000 MG: 2 INJECTION, POWDER, FOR SOLUTION INTRAVENOUS at 01:33

## 2024-06-18 RX ADMIN — GABAPENTIN 800 MG: 400 CAPSULE ORAL at 17:22

## 2024-06-18 RX ADMIN — MULTIPLE VITAMINS W/ MINERALS TAB 1 TABLET: TAB at 09:12

## 2024-06-18 RX ADMIN — POLYETHYLENE GLYCOL 3350 17 G: 17 POWDER, FOR SOLUTION ORAL at 23:12

## 2024-06-18 RX ADMIN — CEFEPIME 2000 MG: 2 INJECTION, POWDER, FOR SOLUTION INTRAVENOUS at 17:20

## 2024-06-18 NOTE — PLAN OF CARE
Problem: Discharge Planning  Goal: Discharge to home or other facility with appropriate resources  Outcome: Progressing  Flowsheets (Taken 6/18/2024 0900)  Discharge to home or other facility with appropriate resources:   Identify barriers to discharge with patient and caregiver   Arrange for needed discharge resources and transportation as appropriate   Identify discharge learning needs (meds, wound care, etc)     Problem: Safety - Adult  Goal: Free from fall injury  Outcome: Progressing     Problem: Skin/Tissue Integrity  Goal: Absence of new skin breakdown  Description: 1.  Monitor for areas of redness and/or skin breakdown  2.  Assess vascular access sites hourly  Outcome: Progressing     Problem: Chronic Conditions and Co-morbidities  Goal: Patient's chronic conditions and co-morbidity symptoms are monitored and maintained or improved  Outcome: Progressing  Flowsheets (Taken 6/18/2024 0900)  Care Plan - Patient's Chronic Conditions and Co-Morbidity Symptoms are Monitored and Maintained or Improved:   Monitor and assess patient's chronic conditions and comorbid symptoms for stability, deterioration, or improvement   Collaborate with multidisciplinary team to address chronic and comorbid conditions and prevent exacerbation or deterioration   Update acute care plan with appropriate goals if chronic or comorbid symptoms are exacerbated and prevent overall improvement and discharge     Problem: Respiratory - Adult  Goal: Achieves optimal ventilation and oxygenation  Outcome: Progressing  Flowsheets (Taken 6/18/2024 0900)  Achieves optimal ventilation and oxygenation:   Assess for changes in respiratory status   Assess for changes in mentation and behavior   Position to facilitate oxygenation and minimize respiratory effort   Oxygen supplementation based on oxygen saturation or arterial blood gases     Problem: Musculoskeletal - Adult  Goal: Return mobility to safest level of function  Outcome:

## 2024-06-18 NOTE — DISCHARGE INSTRUCTIONS
Extra Heart Failure Education/ Tools/ Resources:     https://[employer].com/publication/?k=329763   --- this is American Heart Association interactive Healthier Living with Heart Failure guidebook.  Please click hyperlink or copy / paste link into search bar. The QR Code is also available below. Use your mouse to scroll through the pages.  Lots of information about weight monitoring, diet tips, activity, meds, etc    Heart Failure Tools and Resources QR Code is below. It includes multiple resources to include symptom tracker, med tracker, further HF info, and access to a HF Support Network online Community    HF Hamilton Oanh  -- this is a free smart phone oanh available for iPhone and Android download.  Use your phone to track sodium / fluid intake, zone tool symptom tracking, weights, medications, etc. Click on this hyperlink  HF [place] Oanh   for QR code for easy download or the link is also found in the below HF Tools and Resources.      DASH (Dietary Approach to Stop Hypertension) diet --  https://www.nhlbi.nih.gov/education/dash-eating-plan -- this diet is a flexible eating plan that promotes heart healthy eating style.  Click on hyperlink or copy / paste link into search bar.  Lots of low sodium recipes and tips.    https://www.[employer]/recipes  -- more free recipes

## 2024-06-18 NOTE — PROGRESS NOTES
Hospitalist Progress Note      PCP: Gallo Sanchez MD    Date of Admission: 6/16/2024    Chief Complaint: Shortness of breath    Hospital Course: Jorge Gauthier is a 79 y.o. male with coronary artery disease s/p CABG, chronic atrial fibrillation, on anticoagulation, chronic combined diastolic and systolic CHF, diabetes mellitus type 2, on long-term insulin, diabetic neuropathy, restless leg syndrome, COPD, not on home oxygen, multiple admissions for pneumonia, hypoxic respiratory failure presented to ED with complaints of shortness of breath.  Patient was recently discharged from the hospital 6/14/2024 after being treated for aspiration pneumonia.  While in ED he was requiring 4 L nasal cannula with no prior oxygen use at baseline.  CT PE protocol showed multifocal opacities in the middle lobe, superior segment of the right lower lobe and left lower lobe that are new or worsened from recent exams.  Also showed some consolidative area in the posterior right base.  He was started on broad-spectrum vancomycin and cefepime.  He was admitted for further workup and pulmonology was consulted.  He was also given 2 doses of IV Lasix for concerns of acute on chronic CHF.  Oxygen needs improving..    Home O2 eval ordered for 6/18    Subjective: Patient seen sitting in chair, states he is feeling better today.  Was able to provide sputum sample yesterday and he was informed it was still positive for parainfluenza virus.  Patient does feel like his shortness of breath has improved, again asking if he can go home on oxygen, discussed getting home RT eval today.    Assessment/Plan:    Acute hypoxic respiratory failure  Multifocal pneumonia  Parainfluenza virus infection  -Baseline is room air, requiring 4 L nasal cannula on admission.  Now weaned to 2L.  -CTA chest on admission showed multifocal opacities in the middle lobe, superior segment of the right lower lobe and left lower lobe which have been new or worsened from  recent exams.  -Patient was recently admitted here from 6/10 to 6/14 for acute hypoxic respiratory failure secondary to parainfluenza infection and also treated for aspiration pneumonia  -Remains afebrile.  No leukocytosis.  Procalcitonin negative.  Pneumonia panel again positive for parainfluenza virus  -continue broad-spectrum vancomycin and cefepime for now  -Pulmonology here consulted, continue antibiotics and supportive care for now, discussed with pulmonology, will arrange for home O2 eval for today in anticipation of discharge tomorrow 6/19  -Wean oxygen as tolerable.      Dysphagia  History of aspiration pneumonia  -Was seen by SLP last admission, recommended regular diet/mildly thick nectar liquids  -Patient endorses compliance with diet at home    Acute on chronic diastolic heart failure  -Most recent echo from 8/2021 showed EF 45-50% and grade 2 diastolic dysfunction  -proBNP only mildly elevated at  1127 and was 876 on 6/10, does not appear grossly fluid overloaded on exam.  -Given 2 doses of Lasix thus far, -3 L.  Still with some minor LE edema.  Continue dose of IV Lasix in a.m. with plan to transition back to p.o. prior to discharge  -Strict I/os, daily weights    CAD s/p CABG  -Continue home aspirin, carvedilol, statin    Chronic atrial fibrillation  -Currently well rate controlled.  Continue home carvedilol and Eliquis    Type 2 diabetes insulin-dependent  -Home regimen is 70/30 insulin 25 units daily with oral metformin.  Hold home regimen.  Start Lantus 22 units nightly and uptitrate as necessary  -Low-dose SSI with Accu-Cheks    Right ankle fracture  -Recently had ORIF with Dr. Chavez on 5/18/2024.  Currently in boot.  PT/OT consulted and recommend home with home PT.  Follow-up with orthopedics outpatient as planned.    COPD  -Resume home inhalers and DuoNebs.  Active Hospital Problems    Diagnosis     Acute respiratory failure with hypoxia (HCC) [J96.01]        Medications:  Reviewed    Infusion

## 2024-06-18 NOTE — CARE COORDINATION
Chart review done, rounds completed. Pt is from home with wife, here for trouble breathing and currently on 2L- O2; have [employer] following for O2 needs. Will need order, and dme phrase at dc if O2 is needed. Pt is diuresiing, and on antbx. [facility]. Will needed new orders.   Likely 1-2 days.     Joseluis Whitehead, WILFRIDO, [facility] Social Work Case Management   Phone: [phone]  Fax: [phone]

## 2024-06-18 NOTE — CARE COORDINATION
[facility] Wound Ostomy Continence Nurse  Consult Note       NAME:  Jorge Gauthier  MEDICAL RECORD NUMBER:  [id number]  AGE: 79 y.o.   GENDER: male  : 1945  TODAY'S DATE:  2024    Subjective   Reason for WOCN Evaluation and Assessment: stage 2. Pt does not have pressure injury, looks more like friction.      Jorge Gauthier is a 79 y.o. male referred by:   [] Physician  [x] Nursing  [] Other:     Wound Identification:  Wound Type:  friction  Contributing Factors: none    Wound History: pt tells me he had wound from last admit from bed. Able to see friction injury when pt had COVID in November  Current Wound Care Treatment:  border    Patient Goal of Care:  [x] Wound Healing  [] Odor Control  [] Palliative Care  [] Pain Control   [] Other:         PAST MEDICAL HISTORY        Diagnosis Date    Abrasion of right elbow 2023    Arthritis     CAD (coronary artery disease)     CABG in     Chronic back pain     Chronic systolic CHF (congestive heart failure) (HCC)     COPD (chronic obstructive pulmonary disease) (HCC)     Dental disease     Diabetes mellitus (HCC)     Essential hypertension     Hearing loss     Paroxysmal atrial fibrillation (HCC)     On Coumadin    Tinnitus        PAST SURGICAL HISTORY    Past Surgical History:   Procedure Laterality Date    ANKLE FRACTURE SURGERY Right 2024    OPEN REDUCTION INTERNAL FIXATION RIGHT ANKLE performed by José Miguel Chavez MD at [facility] OR    BACK SURGERY      BACK SURGERY      CARDIAC SURGERY      CERVICAL SPINE SURGERY      CHEST TUBE INSERTION Right 2023    14F. Dr. Jalloh    CORONARY ARTERY BYPASS GRAFT      FRACTURE SURGERY Left     Shoulder    IR CHEST TUBE INSERTION  3/14/2023    IR CHEST TUBE INSERTION 3/14/2023 [facility] SPECIAL PROCEDURES       FAMILY HISTORY    Family History   Problem Relation Age of Onset    Brain Cancer Mother     Heart Attack Father     Heart Attack Brother        SOCIAL HISTORY    Social History     Tobacco Use  Jose Alfredo Scale Score: 18    Patient Active Problem List   Diagnosis Code    Diabetes mellitus ([facility]) E11.9    Atherosclerosis of native coronary artery of native heart without angina pectoris I25.10    Morbid obesity due to excess calories ([facility]) E66.01    SOB (shortness of breath) R06.02    Abnormal chest CT R93.89    Back pain M54.9    Uncontrolled type 2 diabetes mellitus with hyperglycemia ([facility]) E11.65    Chronic back pain M54.9, G89.29    Chronic combined systolic (congestive) and diastolic (congestive) heart failure ([facility]) I50.42    Hyperlipidemia E78.5    Primary hypertension I10    Leukocytosis D72.829    Chronic bronchitis, simple ([facility]) J41.0    Chronic rhinitis J31.0    Generalized weakness R53.1    WEI (acute kidney injury) ([facility]) N17.9    DMII (diabetes mellitus, type 2) ([facility]) E11.9    Supratherapeutic INR R79.1    Tachycardia R00.0    Tachypnea R06.82    Non morbid obesity due to excess calories E66.09    Fatigue R53.83    Cough productive of clear sputum R05.8    History of coronary artery bypass graft Z95.1    Abrasion of right elbow S50.311A    Paroxysmal atrial fibrillation ([facility]) I48.0    Chronic pulmonary aspiration T17.908A    Atypical pneumonia J18.9    Pleural effusion, bilateral J90    Longstanding persistent atrial fibrillation ([facility]) I48.11    Enterobacter cloacae pneumonia ([facility]) J15.69    ESBL (extended spectrum beta-lactamase) producing bacteria infection A49.9, Z16.12    Abnormal CT of the chest R93.89    Elevated erythrocyte sedimentation rate R70.0    CRP elevated R79.82    Peripherally inserted central catheter (PICC) in place Z45.2    Acute respiratory failure with hypoxia ([facility]) J96.01    Acute bronchitis due to severe acute respiratory syndrome coronavirus 2 (SARS-CoV-2) U07.1, J20.8    Atrial fibrillation with RVR ([facility]) I48.91    Acute on chronic combined systolic (congestive) and diastolic (congestive) heart failure ([facility]) I50.43    Neutrophilia D72.9    Pneumonia due to 2019-nCoV U07.1,

## 2024-06-18 NOTE — PLAN OF CARE
Problem: Discharge Planning  Goal: Discharge to home or other facility with appropriate resources  6/17/2024 2347 by Elizabeth Cobb RN  Outcome: Progressing  6/17/2024 1515 by Summer Mtz RN  Outcome: Progressing  Flowsheets (Taken 6/17/2024 0830)  Discharge to home or other facility with appropriate resources:   Identify barriers to discharge with patient and caregiver   Arrange for needed discharge resources and transportation as appropriate   Identify discharge learning needs (meds, wound care, etc)     Problem: Safety - Adult  Goal: Free from fall injury  6/17/2024 2347 by Elizabeth Cobb RN  Outcome: Progressing  6/17/2024 1515 by Summer Mtz RN  Outcome: Progressing  Flowsheets (Taken 6/17/2024 0830)  Free From Fall Injury: Instruct family/caregiver on patient safety     Problem: Skin/Tissue Integrity  Goal: Absence of new skin breakdown  Description: 1.  Monitor for areas of redness and/or skin breakdown  2.  Assess vascular access sites hourly  3.  Every 4-6 hours minimum:  Change oxygen saturation probe site  4.  Every 4-6 hours:  If on nasal continuous positive airway pressure, respiratory therapy assess nares and determine need for appliance change or resting period.  6/17/2024 2347 by Elizabeth Cobb RN  Outcome: Progressing  6/17/2024 1515 by Summer Mtz RN  Outcome: Progressing     Problem: Chronic Conditions and Co-morbidities  Goal: Patient's chronic conditions and co-morbidity symptoms are monitored and maintained or improved  6/17/2024 2347 by Elizabeth Cobb RN  Outcome: Progressing  6/17/2024 1515 by Summer Mtz RN  Outcome: Progressing  Flowsheets (Taken 6/17/2024 0830)  Care Plan - Patient's Chronic Conditions and Co-Morbidity Symptoms are Monitored and Maintained or Improved:   Monitor and assess patient's chronic conditions and comorbid symptoms for stability, deterioration, or improvement   Collaborate with multidisciplinary team to address chronic

## 2024-06-18 NOTE — PROGRESS NOTES
Pulmonary Progress Note    Date of Admission: 6/16/2024   LOS: 1 day     Chief Complaint   Patient presents with    Shortness of Breath       Assessment/Plan:     Chronic aspiration to airway  Pneumonia, right lower lobe, aspiration  -Aspiration precautions, SLP  -Cefepime, vancomycin  -I-S, Acapella  -Scheduled bronchodilators    Parainfluenza  -Supportive care     Acute pulmonary edema  Acute on chronic systolic and diastolic congestive heart failure  -On diuretics improving oxygen requirement  -LVEF 45%, diastolic dysfunction     Acute hypoxemic respiratory failure with SpO2 less than 90% on room air  -Wean oxygen goal saturation 90%  -Home O2 eval prior to discharge        24 Hour Events/Subjective  No acute events overnight.  Oxygen requirement improving.  -Parainfluenza identified on pneumonia panel    ROS:   No nausea  No Vomiting  No chest pain      Intake/Output Summary (Last 24 hours) at 6/18/2024 0857  Last data filed at 6/18/2024 0643  Gross per 24 hour   Intake 798.03 ml   Output 2400 ml   Net -1601.97 ml         PHYSICAL EXAM:   Blood pressure 105/65, pulse 79, temperature 98 °F (36.7 °C), temperature source Oral, resp. rate 18, height 1.803 m (5' 10.98\"), weight 102.2 kg (225 lb 5 oz), SpO2 98 %.'  Gen:  No acute distress.   Resp:  No crackles. No wheezes. No rhonchi. No dullness on percussion.  CV: Regular rate. Regular rhythm. No murmur or rub. No edema.   Neuro:  no focal neurologic deficit.  Moves all extremities  Psych: Awake and alert  Mood stable.      Labs reviewed:  CBC:   Recent Labs     06/16/24 2058 06/17/24 0523 06/18/24  0523   WBC 9.6 10.2 7.7   HGB 11.2* 10.2* 10.0*   HCT 36.0* 32.0* 32.0*   MCV 82.2 81.1 81.7    326 325     BMP:   Recent Labs     06/16/24 2058 06/17/24 0523 06/17/24  0813    139  --    K 4.4 5.5* 5.1    100  --    CO2 28 27  --    BUN 19 20  --    CREATININE 1.0 0.9  --      LIVER PROFILE:   Recent Labs     06/16/24 2058   AST 11*   ALT 9*

## 2024-06-18 NOTE — PROGRESS NOTES
Patient qualified for 2lpm.        06/18/24 1336   Resting (Room Air)   SpO2 88   HR 75   Resting (On O2)   SpO2 98   HR 77   O2 Device Nasal cannula   O2 Flow Rate (l/min) 2 l/min   After Walk   Does the Patient Qualify for Home O2 Yes   Liter Flow at Rest 2   Liter Flow on Exertion 2   Does the Patient Need Portable Oxygen Tanks Yes

## 2024-06-18 NOTE — PROGRESS NOTES
Physical Therapy  Facility/Department: [address] MED SURG  Physical Therapy Treatment Note    Name: Jorge Gauthier  : 1945  MRN: [id number]  Date of Service: 2024    Discharge Recommendations:  Continue to assess pending progress, Patient would benefit from continued therapy after discharge, Home with assist PRN, Home with Home health PT   PT Equipment Recommendations  Equipment Needed: No      Patient Diagnosis(es): The primary encounter diagnosis was Pneumonia due to infectious organism, unspecified laterality, unspecified part of lung. Diagnoses of Acute respiratory failure with hypoxia (HCC) and COPD exacerbation (HCC) were also pertinent to this visit.  Past Medical History:  has a past medical history of Abrasion of right elbow, Arthritis, CAD (coronary artery disease), Chronic back pain, Chronic systolic CHF (congestive heart failure) (HCC), COPD (chronic obstructive pulmonary disease) (HCC), Dental disease, Diabetes mellitus (HCC), Essential hypertension, Hearing loss, Paroxysmal atrial fibrillation (HCC), and Tinnitus.  Past Surgical History:  has a past surgical history that includes fracture surgery (Left, ); back surgery (); Cardiac surgery (); Coronary artery bypass graft; back surgery; Cervical spine surgery; chest tube insertion (Right, 2023); IR CHEST TUBE INSERTION (3/14/2023); and Ankle fracture surgery (Right, 2024).    Assessment   Body Structures, Functions, Activity Limitations Requiring Skilled Therapeutic Intervention: Decreased functional mobility ;Decreased ADL status;Decreased strength;Decreased safe awareness;Decreased endurance;Decreased balance;Increased pain  Assessment: Pt is a 80 yo male admitted 24 s/p acute respiratory failure with hypoxia. Currently requiring 3-4L O2. Previous admit 6/10 -  with COPD exacerbation, then returned home with spouse A and home therapy. Noted recent R trimalleolar fx and TTWB in CAM boot. Pt continues to function

## 2024-06-18 NOTE — PROGRESS NOTES
Physician Progress Note      PATIENT:               JAROD MOREJON  CSN #:                  [id number]  :                       1945  ADMIT DATE:       2024 8:24 PM  DISCH DATE:  RESPONDING  PROVIDER #:        Reji Hylton MD          QUERY TEXT:    Patient admitted with pna, CHF exacerbation. Noted documentation of acute   respiratory failure in the H&P. In order to support the diagnosis of acute   respiratory failure, please include additional clinical indicators in your   documentation.  Or please document if the diagnosis of acute respiratory   failure has been ruled out after further study.    The medical record reflects the following:  Risk Factors: 78 yo w/ CHF, pna, parainfluenza virus, recent hospitalization   for pna, former smoker  Clinical Indicators: Per the H&P: acute respiratory failure with hypoxia. Per   the ED: No respiratory distress. Pulmonary effort is normal.  EMS reported   hypoxia and placed him on 4 L.  Sats 90-99%, RR 18-25.  Treatment:  VBG's, up to 4L    Acute Respiratory Failure Clinical Indicators per [facility] Training Guide and   Quick Reference Guide:  pO2 < 60 mmHg or SpO2 (pulse oximetry) < 91% breathing room air  pCO2 > 50 and pH < 7.35  P/F ratio (pO2 / FIO2) < 300  pO2 decrease or pCO2 increase by 10 mmHg from baseline (if known)  Supplemental oxygen of 40% or more  Presence of respiratory distress, tachypnea, dyspnea, shortness of breath,   wheezing  Unable to speak in complete sentences  Use of accessory muscles to breathe  Extreme anxiety and feeling of impending doom  Tripod position  Confusion/altered mental status/obtunded  Options provided:  -- Acute Respiratory Failure as evidenced by, Please document evidence.  -- Acute Respiratory Failure ruled out after study  -- Other - I will add my own diagnosis  -- Disagree - Not applicable / Not valid  -- Disagree - Clinically unable to determine / Unknown  -- Refer to Clinical Documentation  Reviewer    PROVIDER RESPONSE TEXT:    Acute Respiratory Failure has been ruled out after study.    Query created by: Charis Shrestha on 6/18/2024 11:31 AM      Electronically signed by:  Reji Hylton MD 6/18/2024 2:36 PM

## 2024-06-19 VITALS
OXYGEN SATURATION: 98 % | TEMPERATURE: 97.9 F | DIASTOLIC BLOOD PRESSURE: 71 MMHG | BODY MASS INDEX: 31.73 KG/M2 | SYSTOLIC BLOOD PRESSURE: 115 MMHG | HEART RATE: 80 BPM | RESPIRATION RATE: 16 BRPM | WEIGHT: 226.63 LBS | HEIGHT: 71 IN

## 2024-06-19 LAB
ANION GAP SERPL CALCULATED.3IONS-SCNC: 8 MMOL/L (ref 3–16)
BASOPHILS # BLD: 0.1 K/UL (ref 0–0.2)
BASOPHILS NFR BLD: 1.2 %
BUN SERPL-MCNC: 19 MG/DL (ref 7–20)
CALCIUM SERPL-MCNC: 8.9 MG/DL (ref 8.3–10.6)
CHLORIDE SERPL-SCNC: 99 MMOL/L (ref 99–110)
CO2 SERPL-SCNC: 31 MMOL/L (ref 21–32)
CREAT SERPL-MCNC: 0.8 MG/DL (ref 0.8–1.3)
DEPRECATED RDW RBC AUTO: 17.5 % (ref 12.4–15.4)
EOSINOPHIL # BLD: 0.4 K/UL (ref 0–0.6)
EOSINOPHIL NFR BLD: 4.9 %
GFR SERPLBLD CREATININE-BSD FMLA CKD-EPI: 90 ML/MIN/{1.73_M2}
GLUCOSE BLD-MCNC: 160 MG/DL (ref 70–99)
GLUCOSE BLD-MCNC: 210 MG/DL (ref 70–99)
GLUCOSE SERPL-MCNC: 163 MG/DL (ref 70–99)
HCT VFR BLD AUTO: 33 % (ref 40.5–52.5)
HGB BLD-MCNC: 10.5 G/DL (ref 13.5–17.5)
LYMPHOCYTES # BLD: 1.3 K/UL (ref 1–5.1)
LYMPHOCYTES NFR BLD: 14.1 %
MCH RBC QN AUTO: 25.8 PG (ref 26–34)
MCHC RBC AUTO-ENTMCNC: 31.9 G/DL (ref 31–36)
MCV RBC AUTO: 80.9 FL (ref 80–100)
MONOCYTES # BLD: 1 K/UL (ref 0–1.3)
MONOCYTES NFR BLD: 10.9 %
NEUTROPHILS # BLD: 6.2 K/UL (ref 1.7–7.7)
NEUTROPHILS NFR BLD: 68.9 %
PERFORMED ON: ABNORMAL
PERFORMED ON: ABNORMAL
PLATELET # BLD AUTO: 331 K/UL (ref 135–450)
PMV BLD AUTO: 6.9 FL (ref 5–10.5)
POTASSIUM SERPL-SCNC: 4.2 MMOL/L (ref 3.5–5.1)
RBC # BLD AUTO: 4.08 M/UL (ref 4.2–5.9)
SODIUM SERPL-SCNC: 138 MMOL/L (ref 136–145)
WBC # BLD AUTO: 9 K/UL (ref 4–11)

## 2024-06-19 PROCEDURE — 6360000002 HC RX W HCPCS: Performed by: INTERNAL MEDICINE

## 2024-06-19 PROCEDURE — 2580000003 HC RX 258: Performed by: INTERNAL MEDICINE

## 2024-06-19 PROCEDURE — 80048 BASIC METABOLIC PNL TOTAL CA: CPT

## 2024-06-19 PROCEDURE — 97530 THERAPEUTIC ACTIVITIES: CPT

## 2024-06-19 PROCEDURE — 99232 SBSQ HOSP IP/OBS MODERATE 35: CPT | Performed by: INTERNAL MEDICINE

## 2024-06-19 PROCEDURE — 94640 AIRWAY INHALATION TREATMENT: CPT

## 2024-06-19 PROCEDURE — 6370000000 HC RX 637 (ALT 250 FOR IP): Performed by: INTERNAL MEDICINE

## 2024-06-19 PROCEDURE — 2700000000 HC OXYGEN THERAPY PER DAY

## 2024-06-19 PROCEDURE — 6370000000 HC RX 637 (ALT 250 FOR IP): Performed by: NURSE PRACTITIONER

## 2024-06-19 PROCEDURE — 36415 COLL VENOUS BLD VENIPUNCTURE: CPT

## 2024-06-19 PROCEDURE — 94669 MECHANICAL CHEST WALL OSCILL: CPT

## 2024-06-19 PROCEDURE — 94761 N-INVAS EAR/PLS OXIMETRY MLT: CPT

## 2024-06-19 PROCEDURE — 85025 COMPLETE CBC W/AUTO DIFF WBC: CPT

## 2024-06-19 RX ORDER — LEVOFLOXACIN 750 MG/1
750 TABLET, FILM COATED ORAL DAILY
Qty: 7 TABLET | Refills: 0 | Status: ON HOLD | OUTPATIENT
Start: 2024-06-19 | End: 2024-06-25 | Stop reason: HOSPADM

## 2024-06-19 RX ORDER — FUROSEMIDE 40 MG/1
40 TABLET ORAL DAILY
Qty: 60 TABLET | Refills: 3 | Status: SHIPPED | OUTPATIENT
Start: 2024-06-19

## 2024-06-19 RX ADMIN — SODIUM CHLORIDE: 9 INJECTION, SOLUTION INTRAVENOUS at 09:50

## 2024-06-19 RX ADMIN — ROFLUMILAST 500 MCG: 500 TABLET ORAL at 09:41

## 2024-06-19 RX ADMIN — INSULIN LISPRO 4 UNITS: 100 INJECTION, SOLUTION INTRAVENOUS; SUBCUTANEOUS at 12:28

## 2024-06-19 RX ADMIN — INSULIN LISPRO 1 UNITS: 100 INJECTION, SOLUTION INTRAVENOUS; SUBCUTANEOUS at 12:28

## 2024-06-19 RX ADMIN — GUAIFENESIN 600 MG: 600 TABLET ORAL at 09:40

## 2024-06-19 RX ADMIN — Medication 1000 UNITS: at 09:40

## 2024-06-19 RX ADMIN — PRAMIPEXOLE DIHYDROCHLORIDE 0.5 MG: 0.5 TABLET ORAL at 09:40

## 2024-06-19 RX ADMIN — APIXABAN 5 MG: 5 TABLET, FILM COATED ORAL at 09:40

## 2024-06-19 RX ADMIN — POLYETHYLENE GLYCOL 3350 17 G: 17 POWDER, FOR SOLUTION ORAL at 09:41

## 2024-06-19 RX ADMIN — ASPIRIN 81 MG: 81 TABLET, CHEWABLE ORAL at 09:40

## 2024-06-19 RX ADMIN — EMPAGLIFLOZIN 10 MG: 10 TABLET, FILM COATED ORAL at 09:40

## 2024-06-19 RX ADMIN — INSULIN GLARGINE 22 UNITS: 100 INJECTION, SOLUTION SUBCUTANEOUS at 09:42

## 2024-06-19 RX ADMIN — GABAPENTIN 800 MG: 400 CAPSULE ORAL at 12:28

## 2024-06-19 RX ADMIN — CEFEPIME 2000 MG: 2 INJECTION, POWDER, FOR SOLUTION INTRAVENOUS at 09:53

## 2024-06-19 RX ADMIN — MULTIPLE VITAMINS W/ MINERALS TAB 1 TABLET: TAB at 09:43

## 2024-06-19 RX ADMIN — LAMOTRIGINE 75 MG: 25 TABLET ORAL at 09:40

## 2024-06-19 RX ADMIN — INSULIN LISPRO 4 UNITS: 100 INJECTION, SOLUTION INTRAVENOUS; SUBCUTANEOUS at 09:41

## 2024-06-19 RX ADMIN — GABAPENTIN 800 MG: 400 CAPSULE ORAL at 09:39

## 2024-06-19 RX ADMIN — IPRATROPIUM BROMIDE AND ALBUTEROL SULFATE 1 DOSE: .5; 3 SOLUTION RESPIRATORY (INHALATION) at 12:40

## 2024-06-19 RX ADMIN — IPRATROPIUM BROMIDE AND ALBUTEROL SULFATE 1 DOSE: .5; 3 SOLUTION RESPIRATORY (INHALATION) at 07:31

## 2024-06-19 RX ADMIN — CEFEPIME 2000 MG: 2 INJECTION, POWDER, FOR SOLUTION INTRAVENOUS at 01:08

## 2024-06-19 ASSESSMENT — PAIN SCALES - GENERAL: PAINLEVEL_OUTOF10: 0

## 2024-06-19 NOTE — DISCHARGE SUMMARY
Hospital Medicine Discharge Summary    Patient ID: Jorge Gauthier      Patient's PCP: Gallo Sanchez MD    Admit Date: 6/16/2024     Discharge Date:   6/19/24    Admitting Physician: Debra Garvin MD     Discharge Physician: Spring Steele PA-C       Hospital Course: Jorge Gauthier is a 79 y.o. male with coronary artery disease s/p CABG, chronic atrial fibrillation, on anticoagulation, chronic combined diastolic and systolic CHF, diabetes mellitus type 2, on long-term insulin, diabetic neuropathy, restless leg syndrome, COPD, not on home oxygen, multiple admissions for pneumonia, hypoxic respiratory failure presented to ED with complaints of shortness of breath.  Patient was recently discharged from the hospital 6/14/2024 after being treated for aspiration pneumonia.  While in ED he was requiring 4 L nasal cannula with no prior oxygen use at baseline.  CT PE protocol showed multifocal opacities in the middle lobe, superior segment of the right lower lobe and left lower lobe that are new or worsened from recent exams.  Also showed some consolidative area in the posterior right base.  He was started on broad-spectrum vancomycin and cefepime.  He was admitted for further workup and pulmonology was consulted.  He was also given 2 doses of IV Lasix for concerns of acute on chronic CHF.  Oxygen needs improved but he was still requiring 2 L nasal cannula.  Home O2 eval done by RT revealed patient qualified for 2 L nasal cannula at rest and with exertion.  Oxygen will be delivered to patient's bedside prior to discharge.  Pulmonology recommend patient discharge on fluoroquinolone to complete course, prescription was sent for Levaquin for 7 days.  Patient has follow-up scheduled with his primary care provider on 6/24.     Acute hypoxic respiratory failure  Multifocal pneumonia  Parainfluenza virus infection  -Baseline is room air, requiring 4 L nasal cannula on admission.  Now weaned to 2L.  -CTA chest on  Cough  Qty: 90 capsule, Refills: 3    Associated Diagnoses: Chronic pulmonary aspiration, sequela      !! lamoTRIgine (LAMICTAL) 25 MG tablet Take 4 tablets by mouth at bedtime      !! pramipexole (MIRAPEX) 0.125 MG tablet Take 2 tablets by mouth at bedtime      Budeson-Glycopyrrol-Formoterol 160-9-4.8 MCG/ACT AERO Inhale 2 puffs into the lungs in the morning and at bedtime  Qty: 1 each, Refills: 11    Associated Diagnoses: Moderate COPD (chronic obstructive pulmonary disease) ([facility])      albuterol (PROVENTIL) (2.5 MG/3ML) 0.083% nebulizer solution USE 1 VIAL IN NEBULIZER 4 TIMES DAILY  Qty: 120 each, Refills: 11      !! lamoTRIgine (LAMICTAL) 25 MG tablet Take 3 tablets by mouth every morning      methocarbamol (ROBAXIN) 500 MG tablet Take 1 tablet by mouth 3 times daily as needed (spasms)      albuterol sulfate HFA (PROVENTIL;VENTOLIN;PROAIR) 108 (90 Base) MCG/ACT inhaler Inhale 2 puffs into the lungs every 4 hours as needed for Wheezing  Qty: 1 each, Refills: 3    Associated Diagnoses: Chronic bronchitis, simple ([facility])      Roflumilast (DALIRESP) 500 MCG tablet Take 1 tablet by mouth daily  Qty: 30 tablet, Refills: 3      !! pramipexole (MIRAPEX) 0.25 MG tablet Take 2 tablets by mouth 2 times daily      flunisolide (NASALIDE) 25 MCG/ACT (0.025%) SOLN 2 sprays by Nasal route in the morning and at bedtime      lidocaine (XYLOCAINE) 5 % ointment Apply topically As needed for pain      polyethylene glycol (GLYCOLAX) 17 GM/SCOOP powder Take 17 g by mouth 2 times daily as needed      dapagliflozin (FARXIGA) 10 MG tablet Take 1 tablet by mouth every morning      insulin 70-30 (HUMULIN;NOVOLIN) (70-30) 100 UNIT per ML injection vial Inject 25 Units into the skin 2 times daily      Multiple Vitamins-Minerals (THERAPEUTIC MULTIVITAMIN-MINERALS) tablet Take 1 tablet by mouth daily      apixaban (ELIQUIS) 5 MG TABS tablet Take 1 tablet by mouth 2 times daily  Qty: 60 tablet, Refills: 0      vitamin D (CHOLECALCIFEROL) 1000

## 2024-06-19 NOTE — PROGRESS NOTES
Anticipate he will be able to return home with PRN assist and level 1 HHPT.  Treatment Diagnosis: Decreased endurance  Therapy Prognosis: Good  Decision Making: Medium Complexity  History: see above  Exam: see above  Clinical Presentation: evolving  Barriers to Learning: safety awareness  Activity Tolerance  Activity Tolerance: Patient tolerated treatment well     Plan   Physical Therapy Plan  General Plan: 3-5 times per week  Current Treatment Recommendations: Strengthening, Balance training, Functional mobility training, Transfer training, Endurance training, Home exercise program, Safety education & training, Patient/Caregiver education & training, Equipment evaluation, education, & procurement, Therapeutic activities  Safety Devices  Type of Devices: All fall risk precautions in place, Call light within reach, Chair alarm in place, Gait belt, Heels elevated for pressure relief, Patient at risk for falls, Left in chair     Restrictions  Restrictions/Precautions  Restrictions/Precautions: Fall Risk, Weight Bearing  Lower Extremity Weight Bearing Restrictions  Right Lower Extremity Weight Bearing: Toe Touch Weight Bearing  Partial Weight Bearing Percentage Or Pounds: Per Dr. Chavez on 5/17/24, \"He is nonweightbearing for the first 2 weeks and after that, he can start toe-touch weightbearing in a boot for the following 4-6 weeks.\"  Position Activity Restriction  Other position/activity restrictions: 3L O2, IV     Subjective   General  Chart Reviewed: Yes  Patient assessed for rehabilitation services?: Yes  Additional Pertinent Hx: Pt is a 78 yo male admitted 6/16/24 s/p acute respiratory failure with hypoxia. Currently requiring 4L O2. Previous admit 6/10 - 14 with COPD exacerbation, then returned home with spouse A and home therapy. Noted R trimalleolar fx and TTWB in CAM boot.  Response To Previous Treatment: Patient with no complaints from previous session.  Family / Caregiver Present: No  Referring Practitioner:  Training;Equipment;Fall Prevention Strategies  Education Method: Verbal  Education Outcome: Verbalized understanding;Continued education needed      Therapy Time   Individual Concurrent Group Co-treatment   Time In 0915         Time Out 0955         Minutes 40         Timed Code Treatment Minutes: 40 Minutes       Gabino Dejesus, PT

## 2024-06-19 NOTE — CARE COORDINATION
Case Management Discharge Note          Date / Time of Note: 6/19/2024 12:03 PM                  Patient Name: Jorge Gauthier   YOB: 1945  Diagnosis: COPD exacerbation (HCC) [J44.1]  Acute respiratory failure with hypoxia (HCC) [J96.01]  Pneumonia due to infectious organism, unspecified laterality, unspecified part of lung [J18.9]   Date / Time: 6/16/2024  8:24 PM    Financial:  Payor: MEDICARE / Plan: MEDICARE PART A AND B / Product Type: *No Product type* /      Pharmacy:    [id number] [place], [place] - [address] -  [phone] - F [phone]  [address] [address]  Phone: [phone] Fax: [phone]    [facility] - [place], [place] - [address] - P [phone] - F [phone]  [address]  [address]  [facility] [address]  Phone: [phone] Fax: [phone]      Assistance purchasing medications?: Potential Assistance Purchasing Medications: No  Assistance provided by Case Management: None at this time    DISCHARGE Disposition: Home with Home Health Care    Home Care:  Home Care ordered at discharge: Yes  Home Care Agency: [facility]  Phone: [phone]  Fax: [phone]  Orders faxed: No Will pull from Epic    Home Oxygen and Respiratory Equipment:  Oxygen needed at discharge?: Yes  Home Oxygen Company: [employer] Phone: [phone]   Portable tank available for discharge?: Yes    Dialysis:  Dialysis patient: No      Transportation:  Transportation PLAN for discharge: family   Mode of Transport: Private Car  Reason for medical transport: Not Applicable  Name of Transport Company: Not Applicable  Time of Transport: When ready    Transport form completed: Not Indicated    IMM Completed:   Yes, Case management has presented and reviewed IMM letter #2.           .   Patient and/or family/POA verbalized understanding of their medicare rights and appeal process if needed.

## 2024-06-19 NOTE — PLAN OF CARE
Problem: Discharge Planning  Goal: Discharge to home or other facility with appropriate resources  6/19/2024 1019 by Snow Rodriguez RN  Outcome: Completed  6/19/2024 0115 by Radha Nguyen RN  Outcome: Progressing     Problem: Safety - Adult  Goal: Free from fall injury  6/19/2024 1019 by Snow Rodriguez RN  Outcome: Completed  6/19/2024 0115 by Radha Nguyen RN  Outcome: Progressing     Problem: Skin/Tissue Integrity  Goal: Absence of new skin breakdown  Description: 1.  Monitor for areas of redness and/or skin breakdown  2.  Assess vascular access sites hourly  3.  Every 4-6 hours minimum:  Change oxygen saturation probe site  4.  Every 4-6 hours:  If on nasal continuous positive airway pressure, respiratory therapy assess nares and determine need for appliance change or resting period.  6/19/2024 1019 by Snow Rodriguez RN  Outcome: Completed  6/19/2024 0115 by Radha Nguyen RN  Outcome: Progressing     Problem: Chronic Conditions and Co-morbidities  Goal: Patient's chronic conditions and co-morbidity symptoms are monitored and maintained or improved  6/19/2024 1019 by Snow Rodriguez RN  Outcome: Completed  6/19/2024 0115 by Radha Nguyen RN  Outcome: Progressing     Problem: Respiratory - Adult  Goal: Achieves optimal ventilation and oxygenation  6/19/2024 1019 by Snow Rodriguez RN  Outcome: Completed  6/19/2024 0115 by Radha Nguyen RN  Outcome: Progressing     Problem: Musculoskeletal - Adult  Goal: Return mobility to safest level of function  6/19/2024 1019 by Snow Rodriguez RN  Outcome: Completed  6/19/2024 0115 by Radha Nguyen RN  Outcome: Progressing     Problem: Nutrition Deficit:  Goal: Optimize nutritional status  6/19/2024 1019 by Snow Rodriguez RN  Outcome: Completed  6/19/2024 0115 by Radha Nguyen RN  Outcome: Progressing     Problem: Cardiovascular - Adult  Goal: Maintains optimal cardiac output and hemodynamic stability  6/19/2024 1019 by Snow Rodriguez RN  Outcome:  Wendy, Radha, RN  Outcome: Progressing  Goal: Hemodynamic stability and optimal renal function maintained  6/19/2024 1019 by Snow Rodriguez RN  Outcome: Completed  6/19/2024 0115 by Radha Nguyen RN  Outcome: Progressing  Goal: Glucose maintained within prescribed range  6/19/2024 1019 by Snow Rodriguez RN  Outcome: Completed  6/19/2024 0115 by Radha Nguyen RN  Outcome: Progressing     Problem: Hematologic - Adult  Goal: Maintains hematologic stability  6/19/2024 1019 by Snow Rodriguez RN  Outcome: Completed  6/19/2024 0115 by Radha Nguyen RN  Outcome: Progressing

## 2024-06-19 NOTE — PROGRESS NOTES
CLINICAL PHARMACY NOTE: MEDS TO BEDS    Total # of Prescriptions Filled: 1   The following medications were delivered to the patient:  Current Discharge Medication List        START taking these medications    Details   levoFLOXacin (LEVAQUIN) 750 MG tablet Take 1 tablet by mouth daily for 7 days  Qty: 7 tablet, Refills: 0               Additional Documentation:

## 2024-06-19 NOTE — PROGRESS NOTES
Discharge instructions reviewed with patient & spouse; verbalized understanding. Pt demonstrated use of home O2 equipment. IV removed without complication, dry dressing applied. Pt using electric scooter for discharge.

## 2024-06-19 NOTE — PROGRESS NOTES
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  Please let us know if we can be of any further assistance.  Follow-up as below    Future Appointments   Date Time Provider Department Center   7/10/2024  9:15 AM Diamond Villegas APRN - CNP W ORTHO [facility]   7/30/2024 10:20 AM Luis Alberto Dang MD  PULM [facility]           24 Hour Events/Subjective  No acute events overnight.    2 L of oxygen.  Qualifies for home oxygen    ROS:   No nausea  No Vomiting  No chest pain      Intake/Output Summary (Last 24 hours) at 6/19/2024 0928  Last data filed at 6/19/2024 0522  Gross per 24 hour   Intake 393.69 ml   Output 1400 ml   Net -1006.31 ml         PHYSICAL EXAM:   Blood pressure (!) 105/57, pulse 80, temperature 97.8 °F (36.6 °C), temperature source Oral, resp. rate 17, height 1.803 m (5' 10.98\"), weight 102.8 kg (226 lb 10.1 oz), SpO2 97 %.'  Gen:  No acute distress.   Resp:  No crackles. No wheezes. No rhonchi. No dullness on percussion.  CV: Regular rate. Regular rhythm. No murmur or rub. No edema.   Neuro:  no focal neurologic deficit.  Moves all extremities  Psych: Awake and alert  Mood stable.      Labs reviewed:  CBC:   Recent Labs

## 2024-06-19 NOTE — PLAN OF CARE
Problem: Discharge Planning  Goal: Discharge to home or other facility with appropriate resources  6/19/2024 0115 by Radha Nguyen RN  Outcome: Progressing  6/18/2024 1734 by Summer Mtz RN  Outcome: Progressing  Flowsheets (Taken 6/18/2024 0900)  Discharge to home or other facility with appropriate resources:   Identify barriers to discharge with patient and caregiver   Arrange for needed discharge resources and transportation as appropriate   Identify discharge learning needs (meds, wound care, etc)     Problem: Safety - Adult  Goal: Free from fall injury  6/19/2024 0115 by Radha Nguyen RN  Outcome: Progressing  6/18/2024 1734 by Summer Mtz RN  Outcome: Progressing  Flowsheets (Taken 6/18/2024 0900)  Free From Fall Injury: Instruct family/caregiver on patient safety     Problem: Skin/Tissue Integrity  Goal: Absence of new skin breakdown  Description: 1.  Monitor for areas of redness and/or skin breakdown  2.  Assess vascular access sites hourly  3.  Every 4-6 hours minimum:  Change oxygen saturation probe site  4.  Every 4-6 hours:  If on nasal continuous positive airway pressure, respiratory therapy assess nares and determine need for appliance change or resting period.  6/19/2024 0115 by Radha Nguyen RN  Outcome: Progressing  6/18/2024 1734 by Summer Mtz RN  Outcome: Progressing     Problem: Chronic Conditions and Co-morbidities  Goal: Patient's chronic conditions and co-morbidity symptoms are monitored and maintained or improved  6/19/2024 0115 by Radha Nguyen RN  Outcome: Progressing  6/18/2024 1734 by Summer Mtz RN  Outcome: Progressing  Flowsheets (Taken 6/18/2024 0900)  Care Plan - Patient's Chronic Conditions and Co-Morbidity Symptoms are Monitored and Maintained or Improved:   Monitor and assess patient's chronic conditions and comorbid symptoms for stability, deterioration, or improvement   Collaborate with multidisciplinary team to address chronic and

## 2024-06-19 NOTE — NURSE NAVIGATOR
DC order noted. HF dc instructions are on AVS as well as on AHSAN as pt going home with resumption of home care thru [facility]. Hospital follow up appt scheduled for Monday 6/24 at 10:00 with primary care. Appt is on AVS. Bedside RN updated.     Dc wt 226 lbs

## 2024-06-19 NOTE — FLOWSHEET NOTE
Patient was evaluated today for the diagnosis of CHF.  I entered a DME order for home oxygen at 2L lpm because the diagnosis and testing require the patient to have supplemental oxygen.  Condition will improve or be benefited by oxygen use.  The patient is  able to perform good mobility in a home setting and therefore does require the use of a portable oxygen system.  The need for this equipment was discussed with the patient and he understands and is in agreement.

## 2024-06-20 LAB
BACTERIA BLD CULT ORG #2: NORMAL
BACTERIA BLD CULT: NORMAL

## 2024-06-21 ENCOUNTER — HOSPITAL ENCOUNTER (INPATIENT)
Age: 79
LOS: 4 days | Discharge: HOME HEALTH | DRG: 871 | End: 2024-06-25
Attending: EMERGENCY MEDICINE | Admitting: INTERNAL MEDICINE
Payer: MEDICARE

## 2024-06-21 ENCOUNTER — APPOINTMENT (OUTPATIENT)
Dept: GENERAL RADIOLOGY | Age: 79
DRG: 871 | End: 2024-06-21
Payer: MEDICARE

## 2024-06-21 DIAGNOSIS — J18.9 HCAP (HEALTHCARE-ASSOCIATED PNEUMONIA): Primary | ICD-10-CM

## 2024-06-21 DIAGNOSIS — J96.21 ACUTE ON CHRONIC RESPIRATORY FAILURE WITH HYPOXIA (HCC): ICD-10-CM

## 2024-06-21 PROBLEM — G93.41 ACUTE METABOLIC ENCEPHALOPATHY: Status: ACTIVE | Noted: 2024-06-21

## 2024-06-21 PROBLEM — J96.02 ACUTE RESPIRATORY FAILURE WITH HYPOXIA AND HYPERCAPNIA (HCC): Status: ACTIVE | Noted: 2023-11-13

## 2024-06-21 LAB
ALBUMIN SERPL-MCNC: 3.8 G/DL (ref 3.4–5)
ALBUMIN/GLOB SERPL: 1.2 {RATIO} (ref 1.1–2.2)
ALP SERPL-CCNC: 56 U/L (ref 40–129)
ALT SERPL-CCNC: 9 U/L (ref 10–40)
AMPHETAMINES UR QL SCN>1000 NG/ML: ABNORMAL
ANION GAP SERPL CALCULATED.3IONS-SCNC: 8 MMOL/L (ref 3–16)
ANTI-XA UNFRAC HEPARIN: >1.1 IU/ML (ref 0.3–0.7)
AST SERPL-CCNC: 11 U/L (ref 15–37)
BARBITURATES UR QL SCN>200 NG/ML: ABNORMAL
BASE EXCESS BLDA CALC-SCNC: 1.4 MMOL/L (ref -3–3)
BASE EXCESS BLDV CALC-SCNC: 3.1 MMOL/L
BASOPHILS # BLD: 0.1 K/UL (ref 0–0.2)
BASOPHILS NFR BLD: 0.7 %
BENZODIAZ UR QL SCN>200 NG/ML: ABNORMAL
BILIRUB SERPL-MCNC: 0.3 MG/DL (ref 0–1)
BILIRUB UR QL STRIP.AUTO: NEGATIVE
BUN SERPL-MCNC: 19 MG/DL (ref 7–20)
CALCIUM SERPL-MCNC: 9.2 MG/DL (ref 8.3–10.6)
CANNABINOIDS UR QL SCN>50 NG/ML: ABNORMAL
CHLORIDE SERPL-SCNC: 103 MMOL/L (ref 99–110)
CLARITY UR: CLEAR
CO2 BLDA-SCNC: 29.3 MMOL/L
CO2 BLDV-SCNC: 33 MMOL/L
CO2 SERPL-SCNC: 29 MMOL/L (ref 21–32)
COCAINE UR QL SCN: ABNORMAL
COHGB MFR BLDA: 2 % (ref 0–1.5)
COHGB MFR BLDV: 1.6 %
COLOR UR: YELLOW
CREAT SERPL-MCNC: 1.1 MG/DL (ref 0.8–1.3)
DEPRECATED RDW RBC AUTO: 17.7 % (ref 12.4–15.4)
DRUG SCREEN COMMENT UR-IMP: ABNORMAL
EKG ATRIAL RATE: 91 BPM
EKG DIAGNOSIS: NORMAL
EKG P AXIS: 42 DEGREES
EKG P-R INTERVAL: 196 MS
EKG Q-T INTERVAL: 392 MS
EKG QRS DURATION: 108 MS
EKG QTC CALCULATION (BAZETT): 482 MS
EKG R AXIS: 49 DEGREES
EKG T AXIS: -19 DEGREES
EKG VENTRICULAR RATE: 91 BPM
EOSINOPHIL # BLD: 0.4 K/UL (ref 0–0.6)
EOSINOPHIL NFR BLD: 3 %
FENTANYL SCREEN, URINE: ABNORMAL
GFR SERPLBLD CREATININE-BSD FMLA CKD-EPI: 68 ML/MIN/{1.73_M2}
GLUCOSE BLD-MCNC: 144 MG/DL (ref 70–99)
GLUCOSE BLD-MCNC: 148 MG/DL (ref 70–99)
GLUCOSE SERPL-MCNC: 111 MG/DL (ref 70–99)
GLUCOSE UR STRIP.AUTO-MCNC: >=1000 MG/DL
HCO3 BLDA-SCNC: 27.7 MMOL/L (ref 21–29)
HCO3 BLDV-SCNC: 31 MMOL/L (ref 23–29)
HCT VFR BLD AUTO: 32.2 % (ref 40.5–52.5)
HGB BLD-MCNC: 10.1 G/DL (ref 13.5–17.5)
HGB BLDA-MCNC: 11 G/DL (ref 13.5–17.5)
HGB UR QL STRIP.AUTO: NEGATIVE
KETONES UR STRIP.AUTO-MCNC: NEGATIVE MG/DL
LACTATE BLDV-SCNC: 1.8 MMOL/L (ref 0.4–2)
LEUKOCYTE ESTERASE UR QL STRIP.AUTO: NEGATIVE
LYMPHOCYTES # BLD: 1.3 K/UL (ref 1–5.1)
LYMPHOCYTES NFR BLD: 10.2 %
MCH RBC QN AUTO: 26.1 PG (ref 26–34)
MCHC RBC AUTO-ENTMCNC: 31.4 G/DL (ref 31–36)
MCV RBC AUTO: 83.2 FL (ref 80–100)
METHADONE UR QL SCN>300 NG/ML: ABNORMAL
METHGB MFR BLDA: 0.7 %
METHGB MFR BLDV: 0.6 %
MONOCYTES # BLD: 1.3 K/UL (ref 0–1.3)
MONOCYTES NFR BLD: 10.8 %
NEUTROPHILS # BLD: 9.3 K/UL (ref 1.7–7.7)
NEUTROPHILS NFR BLD: 75.3 %
NITRITE UR QL STRIP.AUTO: NEGATIVE
NT-PROBNP SERPL-MCNC: 1291 PG/ML (ref 0–449)
NT-PROBNP SERPL-MCNC: 824 PG/ML (ref 0–449)
O2 THERAPY: ABNORMAL
O2 THERAPY: ABNORMAL
OPIATES UR QL SCN>300 NG/ML: ABNORMAL
OXYCODONE UR QL SCN: POSITIVE
PCO2 BLDA: 50.8 MMHG (ref 35–45)
PCO2 BLDV: 64.4 MMHG (ref 40–50)
PCP UR QL SCN>25 NG/ML: ABNORMAL
PERFORMED ON: ABNORMAL
PERFORMED ON: ABNORMAL
PH BLDA: 7.35 [PH] (ref 7.35–7.45)
PH BLDV: 7.29 [PH] (ref 7.35–7.45)
PH UR STRIP.AUTO: 5 [PH] (ref 5–8)
PH UR STRIP: 6 [PH]
PLATELET # BLD AUTO: 317 K/UL (ref 135–450)
PMV BLD AUTO: 6.8 FL (ref 5–10.5)
PO2 BLDA: 113 MMHG (ref 75–108)
PO2 BLDV: <30 MMHG
POTASSIUM SERPL-SCNC: 4.6 MMOL/L (ref 3.5–5.1)
PROCALCITONIN SERPL IA-MCNC: 0.08 NG/ML (ref 0–0.15)
PROT SERPL-MCNC: 6.9 G/DL (ref 6.4–8.2)
PROT UR STRIP.AUTO-MCNC: NEGATIVE MG/DL
RBC # BLD AUTO: 3.87 M/UL (ref 4.2–5.9)
REPORT: NORMAL
RESP PATH DNA+RNA PNL NPH NAA+NON-PROBE: NORMAL
SAO2 % BLDA: 98.6 %
SAO2 % BLDV: 25 %
SODIUM SERPL-SCNC: 140 MMOL/L (ref 136–145)
SP GR UR STRIP.AUTO: 1.02 (ref 1–1.03)
TROPONIN, HIGH SENSITIVITY: 28 NG/L (ref 0–22)
TROPONIN, HIGH SENSITIVITY: 37 NG/L (ref 0–22)
TSH SERPL DL<=0.005 MIU/L-ACNC: 3.71 UIU/ML (ref 0.27–4.2)
UA DIPSTICK W REFLEX MICRO PNL UR: ABNORMAL
URN SPEC COLLECT METH UR: ABNORMAL
UROBILINOGEN UR STRIP-ACNC: 0.2 E.U./DL
VANCOMYCIN SERPL-MCNC: 9 UG/ML
WBC # BLD AUTO: 12.4 K/UL (ref 4–11)

## 2024-06-21 PROCEDURE — 2580000003 HC RX 258: Performed by: HOSPITALIST

## 2024-06-21 PROCEDURE — 80053 COMPREHEN METABOLIC PANEL: CPT

## 2024-06-21 PROCEDURE — 36415 COLL VENOUS BLD VENIPUNCTURE: CPT

## 2024-06-21 PROCEDURE — 85025 COMPLETE CBC W/AUTO DIFF WBC: CPT

## 2024-06-21 PROCEDURE — 6370000000 HC RX 637 (ALT 250 FOR IP): Performed by: INTERNAL MEDICINE

## 2024-06-21 PROCEDURE — 36600 WITHDRAWAL OF ARTERIAL BLOOD: CPT

## 2024-06-21 PROCEDURE — 84484 ASSAY OF TROPONIN QUANT: CPT

## 2024-06-21 PROCEDURE — 6360000002 HC RX W HCPCS: Performed by: HOSPITALIST

## 2024-06-21 PROCEDURE — 6360000002 HC RX W HCPCS

## 2024-06-21 PROCEDURE — 80202 ASSAY OF VANCOMYCIN: CPT

## 2024-06-21 PROCEDURE — 96375 TX/PRO/DX INJ NEW DRUG ADDON: CPT

## 2024-06-21 PROCEDURE — 2060000000 HC ICU INTERMEDIATE R&B

## 2024-06-21 PROCEDURE — 94640 AIRWAY INHALATION TREATMENT: CPT

## 2024-06-21 PROCEDURE — 85520 HEPARIN ASSAY: CPT

## 2024-06-21 PROCEDURE — 93005 ELECTROCARDIOGRAM TRACING: CPT | Performed by: EMERGENCY MEDICINE

## 2024-06-21 PROCEDURE — 31720 CLEARANCE OF AIRWAYS: CPT

## 2024-06-21 PROCEDURE — 84443 ASSAY THYROID STIM HORMONE: CPT

## 2024-06-21 PROCEDURE — 87040 BLOOD CULTURE FOR BACTERIA: CPT

## 2024-06-21 PROCEDURE — 83605 ASSAY OF LACTIC ACID: CPT

## 2024-06-21 PROCEDURE — 97129 THER IVNTJ 1ST 15 MIN: CPT

## 2024-06-21 PROCEDURE — 0202U NFCT DS 22 TRGT SARS-COV-2: CPT

## 2024-06-21 PROCEDURE — 99285 EMERGENCY DEPT VISIT HI MDM: CPT

## 2024-06-21 PROCEDURE — 2580000003 HC RX 258: Performed by: EMERGENCY MEDICINE

## 2024-06-21 PROCEDURE — 96365 THER/PROPH/DIAG IV INF INIT: CPT

## 2024-06-21 PROCEDURE — 71045 X-RAY EXAM CHEST 1 VIEW: CPT

## 2024-06-21 PROCEDURE — 80307 DRUG TEST PRSMV CHEM ANLYZR: CPT

## 2024-06-21 PROCEDURE — 6360000002 HC RX W HCPCS: Performed by: INTERNAL MEDICINE

## 2024-06-21 PROCEDURE — 92610 EVALUATE SWALLOWING FUNCTION: CPT

## 2024-06-21 PROCEDURE — 92526 ORAL FUNCTION THERAPY: CPT

## 2024-06-21 PROCEDURE — 2700000000 HC OXYGEN THERAPY PER DAY

## 2024-06-21 PROCEDURE — 93010 ELECTROCARDIOGRAM REPORT: CPT | Performed by: INTERNAL MEDICINE

## 2024-06-21 PROCEDURE — 82803 BLOOD GASES ANY COMBINATION: CPT

## 2024-06-21 PROCEDURE — 6370000000 HC RX 637 (ALT 250 FOR IP): Performed by: EMERGENCY MEDICINE

## 2024-06-21 PROCEDURE — 81003 URINALYSIS AUTO W/O SCOPE: CPT

## 2024-06-21 PROCEDURE — 83880 ASSAY OF NATRIURETIC PEPTIDE: CPT

## 2024-06-21 PROCEDURE — 6360000002 HC RX W HCPCS: Performed by: EMERGENCY MEDICINE

## 2024-06-21 PROCEDURE — 94761 N-INVAS EAR/PLS OXIMETRY MLT: CPT

## 2024-06-21 PROCEDURE — 5A0935A ASSISTANCE WITH RESPIRATORY VENTILATION, LESS THAN 24 CONSECUTIVE HOURS, HIGH NASAL FLOW/VELOCITY: ICD-10-PCS | Performed by: STUDENT IN AN ORGANIZED HEALTH CARE EDUCATION/TRAINING PROGRAM

## 2024-06-21 PROCEDURE — 84145 PROCALCITONIN (PCT): CPT

## 2024-06-21 PROCEDURE — 9990000010 HC NO CHARGE VISIT

## 2024-06-21 PROCEDURE — 87641 MR-STAPH DNA AMP PROBE: CPT

## 2024-06-21 RX ORDER — VANCOMYCIN 2 G/400ML
2000 INJECTION, SOLUTION INTRAVENOUS ONCE
Status: COMPLETED | OUTPATIENT
Start: 2024-06-21 | End: 2024-06-21

## 2024-06-21 RX ORDER — GLUCAGON 1 MG/ML
1 KIT INJECTION PRN
Status: DISCONTINUED | OUTPATIENT
Start: 2024-06-21 | End: 2024-06-25 | Stop reason: HOSPADM

## 2024-06-21 RX ORDER — ACETAMINOPHEN 650 MG/1
650 SUPPOSITORY RECTAL EVERY 6 HOURS PRN
Status: DISCONTINUED | OUTPATIENT
Start: 2024-06-21 | End: 2024-06-25 | Stop reason: HOSPADM

## 2024-06-21 RX ORDER — POLYETHYLENE GLYCOL 3350 17 G/17G
17 POWDER, FOR SOLUTION ORAL DAILY PRN
Status: DISCONTINUED | OUTPATIENT
Start: 2024-06-21 | End: 2024-06-25 | Stop reason: HOSPADM

## 2024-06-21 RX ORDER — PRAMIPEXOLE DIHYDROCHLORIDE 0.5 MG/1
0.25 TABLET ORAL NIGHTLY
Status: DISCONTINUED | OUTPATIENT
Start: 2024-06-21 | End: 2024-06-25 | Stop reason: HOSPADM

## 2024-06-21 RX ORDER — ALBUTEROL SULFATE 2.5 MG/3ML
SOLUTION RESPIRATORY (INHALATION)
Status: COMPLETED
Start: 2024-06-21 | End: 2024-06-21

## 2024-06-21 RX ORDER — INSULIN LISPRO 100 [IU]/ML
0-4 INJECTION, SOLUTION INTRAVENOUS; SUBCUTANEOUS NIGHTLY
Status: DISCONTINUED | OUTPATIENT
Start: 2024-06-21 | End: 2024-06-25

## 2024-06-21 RX ORDER — METOPROLOL TARTRATE 1 MG/ML
5 INJECTION, SOLUTION INTRAVENOUS EVERY 6 HOURS PRN
Status: DISCONTINUED | OUTPATIENT
Start: 2024-06-21 | End: 2024-06-25 | Stop reason: HOSPADM

## 2024-06-21 RX ORDER — INSULIN LISPRO 100 [IU]/ML
0-8 INJECTION, SOLUTION INTRAVENOUS; SUBCUTANEOUS
Status: DISCONTINUED | OUTPATIENT
Start: 2024-06-21 | End: 2024-06-25

## 2024-06-21 RX ORDER — ATORVASTATIN CALCIUM 20 MG/1
20 TABLET, FILM COATED ORAL NIGHTLY
Status: DISCONTINUED | OUTPATIENT
Start: 2024-06-21 | End: 2024-06-25 | Stop reason: HOSPADM

## 2024-06-21 RX ORDER — ENOXAPARIN SODIUM 100 MG/ML
100 INJECTION SUBCUTANEOUS 2 TIMES DAILY
Status: DISCONTINUED | OUTPATIENT
Start: 2024-06-21 | End: 2024-06-25 | Stop reason: HOSPADM

## 2024-06-21 RX ORDER — FUROSEMIDE 40 MG/1
40 TABLET ORAL DAILY
Status: DISCONTINUED | OUTPATIENT
Start: 2024-06-21 | End: 2024-06-22

## 2024-06-21 RX ORDER — DEXTROSE MONOHYDRATE 100 MG/ML
INJECTION, SOLUTION INTRAVENOUS CONTINUOUS PRN
Status: DISCONTINUED | OUTPATIENT
Start: 2024-06-21 | End: 2024-06-25 | Stop reason: HOSPADM

## 2024-06-21 RX ORDER — CARVEDILOL 3.12 MG/1
3.12 TABLET ORAL 2 TIMES DAILY WITH MEALS
Status: DISCONTINUED | OUTPATIENT
Start: 2024-06-21 | End: 2024-06-25 | Stop reason: HOSPADM

## 2024-06-21 RX ORDER — SODIUM CHLORIDE 0.9 % (FLUSH) 0.9 %
5-40 SYRINGE (ML) INJECTION EVERY 12 HOURS SCHEDULED
Status: DISCONTINUED | OUTPATIENT
Start: 2024-06-21 | End: 2024-06-25 | Stop reason: HOSPADM

## 2024-06-21 RX ORDER — ENOXAPARIN SODIUM 100 MG/ML
100 INJECTION SUBCUTANEOUS 2 TIMES DAILY
Status: DISCONTINUED | OUTPATIENT
Start: 2024-06-22 | End: 2024-06-21

## 2024-06-21 RX ORDER — BENZONATATE 200 MG/1
200 CAPSULE ORAL 3 TIMES DAILY PRN
Status: DISCONTINUED | OUTPATIENT
Start: 2024-06-21 | End: 2024-06-25 | Stop reason: HOSPADM

## 2024-06-21 RX ORDER — ONDANSETRON 4 MG/1
4 TABLET, ORALLY DISINTEGRATING ORAL EVERY 8 HOURS PRN
Status: DISCONTINUED | OUTPATIENT
Start: 2024-06-21 | End: 2024-06-25 | Stop reason: HOSPADM

## 2024-06-21 RX ORDER — FLUTICASONE PROPIONATE 50 MCG
2 SPRAY, SUSPENSION (ML) NASAL 2 TIMES DAILY
Status: DISCONTINUED | OUTPATIENT
Start: 2024-06-21 | End: 2024-06-25 | Stop reason: HOSPADM

## 2024-06-21 RX ORDER — LAMOTRIGINE 25 MG/1
75 TABLET ORAL EVERY MORNING
Status: DISCONTINUED | OUTPATIENT
Start: 2024-06-21 | End: 2024-06-25 | Stop reason: HOSPADM

## 2024-06-21 RX ORDER — SODIUM CHLORIDE 0.9 % (FLUSH) 0.9 %
5-40 SYRINGE (ML) INJECTION PRN
Status: DISCONTINUED | OUTPATIENT
Start: 2024-06-21 | End: 2024-06-25 | Stop reason: HOSPADM

## 2024-06-21 RX ORDER — ACETAMINOPHEN 325 MG/1
650 TABLET ORAL EVERY 6 HOURS PRN
Status: DISCONTINUED | OUTPATIENT
Start: 2024-06-21 | End: 2024-06-25 | Stop reason: HOSPADM

## 2024-06-21 RX ORDER — PRAMIPEXOLE DIHYDROCHLORIDE 0.5 MG/1
0.5 TABLET ORAL 2 TIMES DAILY
Status: DISCONTINUED | OUTPATIENT
Start: 2024-06-21 | End: 2024-06-25 | Stop reason: HOSPADM

## 2024-06-21 RX ORDER — ASPIRIN 81 MG/1
81 TABLET, CHEWABLE ORAL DAILY
Status: DISCONTINUED | OUTPATIENT
Start: 2024-06-21 | End: 2024-06-25 | Stop reason: HOSPADM

## 2024-06-21 RX ORDER — ROFLUMILAST 500 UG/1
500 TABLET ORAL 2 TIMES DAILY
Status: DISCONTINUED | OUTPATIENT
Start: 2024-06-21 | End: 2024-06-25 | Stop reason: HOSPADM

## 2024-06-21 RX ORDER — ONDANSETRON 2 MG/ML
4 INJECTION INTRAMUSCULAR; INTRAVENOUS EVERY 6 HOURS PRN
Status: DISCONTINUED | OUTPATIENT
Start: 2024-06-21 | End: 2024-06-25 | Stop reason: HOSPADM

## 2024-06-21 RX ORDER — METRONIDAZOLE 500 MG/100ML
500 INJECTION, SOLUTION INTRAVENOUS EVERY 8 HOURS
Status: DISCONTINUED | OUTPATIENT
Start: 2024-06-21 | End: 2024-06-22

## 2024-06-21 RX ORDER — GABAPENTIN 400 MG/1
800 CAPSULE ORAL 4 TIMES DAILY
Status: DISCONTINUED | OUTPATIENT
Start: 2024-06-21 | End: 2024-06-25 | Stop reason: HOSPADM

## 2024-06-21 RX ORDER — METRONIDAZOLE 500 MG/100ML
500 INJECTION, SOLUTION INTRAVENOUS ONCE
Status: DISCONTINUED | OUTPATIENT
Start: 2024-06-21 | End: 2024-06-21

## 2024-06-21 RX ORDER — FUROSEMIDE 10 MG/ML
40 INJECTION INTRAMUSCULAR; INTRAVENOUS ONCE
Status: COMPLETED | OUTPATIENT
Start: 2024-06-21 | End: 2024-06-21

## 2024-06-21 RX ORDER — FUROSEMIDE 10 MG/ML
INJECTION INTRAMUSCULAR; INTRAVENOUS
Status: COMPLETED
Start: 2024-06-21 | End: 2024-06-21

## 2024-06-21 RX ORDER — ENOXAPARIN SODIUM 150 MG/ML
1 INJECTION SUBCUTANEOUS 2 TIMES DAILY
Status: DISCONTINUED | OUTPATIENT
Start: 2024-06-21 | End: 2024-06-21

## 2024-06-21 RX ORDER — ALBUTEROL SULFATE 2.5 MG/3ML
2.5 SOLUTION RESPIRATORY (INHALATION)
Status: DISCONTINUED | OUTPATIENT
Start: 2024-06-21 | End: 2024-06-25 | Stop reason: HOSPADM

## 2024-06-21 RX ORDER — IPRATROPIUM BROMIDE AND ALBUTEROL SULFATE 2.5; .5 MG/3ML; MG/3ML
1 SOLUTION RESPIRATORY (INHALATION) ONCE
Status: COMPLETED | OUTPATIENT
Start: 2024-06-21 | End: 2024-06-21

## 2024-06-21 RX ORDER — LAMOTRIGINE 100 MG/1
100 TABLET ORAL NIGHTLY
Status: DISCONTINUED | OUTPATIENT
Start: 2024-06-21 | End: 2024-06-25 | Stop reason: HOSPADM

## 2024-06-21 RX ORDER — SODIUM CHLORIDE 9 MG/ML
INJECTION, SOLUTION INTRAVENOUS PRN
Status: DISCONTINUED | OUTPATIENT
Start: 2024-06-21 | End: 2024-06-25 | Stop reason: HOSPADM

## 2024-06-21 RX ADMIN — ALBUTEROL SULFATE 2.5 MG: 2.5 SOLUTION RESPIRATORY (INHALATION) at 11:28

## 2024-06-21 RX ADMIN — METRONIDAZOLE 500 MG: 500 INJECTION, SOLUTION INTRAVENOUS at 18:54

## 2024-06-21 RX ADMIN — ALBUTEROL SULFATE 2.5 MG: 2.5 SOLUTION RESPIRATORY (INHALATION) at 19:46

## 2024-06-21 RX ADMIN — FUROSEMIDE 40 MG: 10 INJECTION INTRAMUSCULAR; INTRAVENOUS at 17:20

## 2024-06-21 RX ADMIN — CEFEPIME 2000 MG: 2 INJECTION, POWDER, FOR SOLUTION INTRAVENOUS at 22:23

## 2024-06-21 RX ADMIN — FUROSEMIDE 40 MG: 10 INJECTION, SOLUTION INTRAMUSCULAR; INTRAVENOUS at 17:20

## 2024-06-21 RX ADMIN — ENOXAPARIN SODIUM 100 MG: 100 INJECTION SUBCUTANEOUS at 22:29

## 2024-06-21 RX ADMIN — CEFEPIME 2000 MG: 2 INJECTION, POWDER, FOR SOLUTION INTRAVENOUS at 13:11

## 2024-06-21 RX ADMIN — IPRATROPIUM BROMIDE AND ALBUTEROL SULFATE 1 DOSE: .5; 3 SOLUTION RESPIRATORY (INHALATION) at 06:14

## 2024-06-21 RX ADMIN — VANCOMYCIN 2000 MG: 2 INJECTION, SOLUTION INTRAVENOUS at 05:19

## 2024-06-21 RX ADMIN — CEFEPIME 2000 MG: 2 INJECTION, POWDER, FOR SOLUTION INTRAVENOUS at 04:46

## 2024-06-21 RX ADMIN — MOMETASONE FUROATE AND FORMOTEROL FUMARATE DIHYDRATE 2 PUFF: 200; 5 AEROSOL RESPIRATORY (INHALATION) at 19:46

## 2024-06-21 RX ADMIN — ALBUTEROL SULFATE 2.5 MG: 2.5 SOLUTION RESPIRATORY (INHALATION) at 14:54

## 2024-06-21 ASSESSMENT — PAIN DESCRIPTION - ORIENTATION
ORIENTATION: RIGHT
ORIENTATION: RIGHT

## 2024-06-21 ASSESSMENT — PAIN DESCRIPTION - PAIN TYPE
TYPE: SURGICAL PAIN
TYPE: ACUTE PAIN

## 2024-06-21 ASSESSMENT — PAIN - FUNCTIONAL ASSESSMENT
PAIN_FUNCTIONAL_ASSESSMENT: NONE - DENIES PAIN
PAIN_FUNCTIONAL_ASSESSMENT: PREVENTS OR INTERFERES SOME ACTIVE ACTIVITIES AND ADLS

## 2024-06-21 ASSESSMENT — PAIN DESCRIPTION - FREQUENCY: FREQUENCY: CONTINUOUS

## 2024-06-21 ASSESSMENT — PAIN DESCRIPTION - LOCATION
LOCATION: FOOT;ANKLE
LOCATION: FOOT;ANKLE

## 2024-06-21 ASSESSMENT — PAIN SCALES - GENERAL
PAINLEVEL_OUTOF10: 4
PAINLEVEL_OUTOF10: 9

## 2024-06-21 ASSESSMENT — PAIN DESCRIPTION - DESCRIPTORS
DESCRIPTORS: ACHING
DESCRIPTORS: SORE;SPASM

## 2024-06-21 ASSESSMENT — PAIN SCALES - WONG BAKER: WONGBAKER_NUMERICALRESPONSE: HURTS LITTLE MORE

## 2024-06-21 NOTE — ED NOTES
Report called to PETRA Kulkarni for Rm [id number]. Questions addressed and report accepted. Transport to be arranged as soon as staff is available.

## 2024-06-21 NOTE — ED PROVIDER NOTES
opacities since June 16, 2024.  Small right pleural effusion.  Hospitalist consulted for admission for further evaluation and treatment.  Admit    CONSULTS: (Who and What was discussed)  PHARMACY TO DOSE VANCOMYCIN    Is this patient to be included in the SEP-1 Core Measure due to severe sepsis or septic shock?   No   Exclusion criteria - the patient is NOT to be included for SEP-1 Core Measure due to:  May have criteria for sepsis, but does not meet criteria for severe sepsis or septic shock     During the patient's ED course, the patient was given:  Medications   ceFEPIme (MAXIPIME) 2,000 mg in sodium chloride 0.9 % 100 mL IVPB (mini-bag) (has no administration in time range)   vancomycin (VANCOCIN) 2000 mg in 400 mL IVPB (has no administration in time range)            I am the Primary Clinician of Record.    FINAL IMPRESSION        ICD-10-CM    1. HCAP (healthcare-associated pneumonia)  J18.9       2. Acute on chronic respiratory failure with hypoxia (HCC)  J96.21             DISPOSITION/PLAN     DISPOSITION  admit       PATIENT REFERRED TO:  No follow-up provider specified.    DISCHARGE MEDICATIONS:  Patient was given scripts for the following medications. I counseled patient how to take these medications:  New Prescriptions    No medications on file       DISCONTINUED MEDICATIONS:  Discontinued Medications    No medications on file       Pt was seen during the COVID 19 pandemic. Appropriate PPE worn by ME during patient encounters. Patient was cared for during a time with constrained hospital bed capacity with nationwide stress on resources and staffing.      (This chart was generated in part by using Dragon Dictation system and may contain errors related to that system including errors in grammar, punctuation, and spelling, as well as words and phrases that may be inappropriate. If there are any questions or concerns please feel free to contact the dictating provider for clarification.)         Edie Golden
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
## 2024-06-21 NOTE — PLAN OF CARE
Problem: Safety - Adult  Goal: Free from fall injury  Outcome: Progressing  Flowsheets (Taken 6/21/2024 0808)  Free From Fall Injury: Instruct family/caregiver on patient safety     Problem: Discharge Planning  Goal: Discharge to home or other facility with appropriate resources  Outcome: Progressing  Flowsheets (Taken 6/21/2024 0808)  Discharge to home or other facility with appropriate resources:   Identify barriers to discharge with patient and caregiver   Identify discharge learning needs (meds, wound care, etc)     Problem: Skin/Tissue Integrity  Goal: Absence of new skin breakdown  Description: 1.  Monitor for areas of redness and/or skin breakdown  2.  Assess vascular access sites hourly  3.  Every 4-6 hours minimum:  Change oxygen saturation probe site  4.  Every 4-6 hours:  If on nasal continuous positive airway pressure, respiratory therapy assess nares and determine need for appliance change or resting period.  Outcome: Progressing  Note: Patient will remain free from new skin breakdown during inpatient stay

## 2024-06-21 NOTE — DISCHARGE INSTR - COC
Continuity of Care Form    Patient Name: Jorge Gauthier   :  1945  MRN:  [id number]    Admit date:  2024  Discharge date:      Code Status Order: Full Code   Advance Directives:     Admitting Physician:  Erik Maria MD  PCP: Gallo Sanchez MD    Discharging Nurse: [facility] Unit/Room#: P5P-7373/3103-01  Discharging Unit Phone Number: [phone]    Emergency Contact:   Extended Emergency Contact Information  Primary Emergency Contact: Portia Gauthier  Address: [address] [place] [address] [facility]  Home Phone: [phone]  Work Phone: [phone]  Mobile Phone: [phone]  Relation: Spouse  Secondary Emergency Contact: Bang Gauthier Jr  Home Phone: [phone]  Relation: Child    Past Surgical History:  Past Surgical History:   Procedure Laterality Date    ANKLE FRACTURE SURGERY Right 2024    OPEN REDUCTION INTERNAL FIXATION RIGHT ANKLE performed by José Miguel Chavez MD at [facility] OR    BACK SURGERY      BACK SURGERY      CARDIAC SURGERY      CERVICAL SPINE SURGERY      CHEST TUBE INSERTION Right 2023    14F. Dr. Jalloh    CORONARY ARTERY BYPASS GRAFT      FRACTURE SURGERY Left     Shoulder    IR CHEST TUBE INSERTION  3/14/2023    IR CHEST TUBE INSERTION 3/14/2023 [facility] SPECIAL PROCEDURES       Immunization History:   Immunization History   Administered Date(s) Administered    COVID-19, MODERNA Bivalent, (age 12y+), IM, 50 mcg/0.5 mL 2022    Influenza Vaccine, unspecified formulation 10/22/2016    Influenza, AFLURIA (age 3 yrs+), FLUZONE, (age 6 mo+), MDV, 0.5mL 10/22/2016, 09/15/2017    Influenza, High Dose (Fluzone 65 yrs and older) 09/15/2017    Pneumococcal, PCV-13, PREVNAR 13, (age 6w+), IM, 0.5mL 2016    TDaP, ADACEL (age 10y-64y), BOOSTRIX (age 10y+), IM, 0.5mL 2018       Active Problems:  Patient Active Problem List   Diagnosis Code    Diabetes mellitus (HCC) E11.9    Atherosclerosis of

## 2024-06-21 NOTE — PLAN OF CARE
Patient was not seen or examined.  Admission orders were placed to facilitate throughput time at the emergency department.

## 2024-06-22 PROBLEM — J96.22 ACUTE ON CHRONIC RESPIRATORY FAILURE WITH HYPOXIA AND HYPERCAPNIA (HCC): Status: ACTIVE | Noted: 2024-06-22

## 2024-06-22 PROBLEM — J96.21 ACUTE ON CHRONIC RESPIRATORY FAILURE WITH HYPOXIA AND HYPERCAPNIA (HCC): Status: ACTIVE | Noted: 2024-06-22

## 2024-06-22 LAB
GLUCOSE BLD-MCNC: 136 MG/DL (ref 70–99)
GLUCOSE BLD-MCNC: 170 MG/DL (ref 70–99)
GLUCOSE BLD-MCNC: 223 MG/DL (ref 70–99)
GLUCOSE BLD-MCNC: 367 MG/DL (ref 70–99)
MRSA DNA SPEC QL NAA+PROBE: NORMAL
ORGANISM: ABNORMAL
PERFORMED ON: ABNORMAL
REPORT: NORMAL
RESP PATH DNA+RNA PNL L RESP NAA+NON-PRB: ABNORMAL
VANCOMYCIN SERPL-MCNC: 10.8 UG/ML

## 2024-06-22 PROCEDURE — 6360000002 HC RX W HCPCS: Performed by: HOSPITALIST

## 2024-06-22 PROCEDURE — 97162 PT EVAL MOD COMPLEX 30 MIN: CPT

## 2024-06-22 PROCEDURE — 2580000003 HC RX 258: Performed by: HOSPITALIST

## 2024-06-22 PROCEDURE — 94668 MNPJ CHEST WALL SBSQ: CPT

## 2024-06-22 PROCEDURE — 80202 ASSAY OF VANCOMYCIN: CPT

## 2024-06-22 PROCEDURE — 87205 SMEAR GRAM STAIN: CPT

## 2024-06-22 PROCEDURE — 6360000002 HC RX W HCPCS: Performed by: NURSE PRACTITIONER

## 2024-06-22 PROCEDURE — 2580000003 HC RX 258: Performed by: INTERNAL MEDICINE

## 2024-06-22 PROCEDURE — 94667 MNPJ CHEST WALL 1ST: CPT

## 2024-06-22 PROCEDURE — 6360000002 HC RX W HCPCS: Performed by: INTERNAL MEDICINE

## 2024-06-22 PROCEDURE — 97535 SELF CARE MNGMENT TRAINING: CPT

## 2024-06-22 PROCEDURE — 87633 RESP VIRUS 12-25 TARGETS: CPT

## 2024-06-22 PROCEDURE — 6370000000 HC RX 637 (ALT 250 FOR IP): Performed by: INTERNAL MEDICINE

## 2024-06-22 PROCEDURE — 2060000000 HC ICU INTERMEDIATE R&B

## 2024-06-22 PROCEDURE — 97166 OT EVAL MOD COMPLEX 45 MIN: CPT

## 2024-06-22 PROCEDURE — 99223 1ST HOSP IP/OBS HIGH 75: CPT | Performed by: INTERNAL MEDICINE

## 2024-06-22 PROCEDURE — 36415 COLL VENOUS BLD VENIPUNCTURE: CPT

## 2024-06-22 PROCEDURE — 87070 CULTURE OTHR SPECIMN AEROBIC: CPT

## 2024-06-22 PROCEDURE — 94640 AIRWAY INHALATION TREATMENT: CPT

## 2024-06-22 PROCEDURE — 94760 N-INVAS EAR/PLS OXIMETRY 1: CPT

## 2024-06-22 PROCEDURE — 94669 MECHANICAL CHEST WALL OSCILL: CPT

## 2024-06-22 PROCEDURE — 2700000000 HC OXYGEN THERAPY PER DAY

## 2024-06-22 PROCEDURE — 97530 THERAPEUTIC ACTIVITIES: CPT

## 2024-06-22 RX ORDER — MORPHINE SULFATE 4 MG/ML
4 INJECTION, SOLUTION INTRAMUSCULAR; INTRAVENOUS EVERY 4 HOURS PRN
Status: DISCONTINUED | OUTPATIENT
Start: 2024-06-22 | End: 2024-06-25 | Stop reason: HOSPADM

## 2024-06-22 RX ORDER — ORPHENADRINE CITRATE 30 MG/ML
30 INJECTION INTRAMUSCULAR; INTRAVENOUS ONCE
Status: COMPLETED | OUTPATIENT
Start: 2024-06-22 | End: 2024-06-22

## 2024-06-22 RX ORDER — FUROSEMIDE 10 MG/ML
40 INJECTION INTRAMUSCULAR; INTRAVENOUS 2 TIMES DAILY
Status: DISCONTINUED | OUTPATIENT
Start: 2024-06-22 | End: 2024-06-25 | Stop reason: HOSPADM

## 2024-06-22 RX ORDER — FUROSEMIDE 10 MG/ML
40 INJECTION INTRAMUSCULAR; INTRAVENOUS ONCE
Status: COMPLETED | OUTPATIENT
Start: 2024-06-22 | End: 2024-06-22

## 2024-06-22 RX ORDER — IPRATROPIUM BROMIDE AND ALBUTEROL SULFATE 2.5; .5 MG/3ML; MG/3ML
1 SOLUTION RESPIRATORY (INHALATION)
Status: DISCONTINUED | OUTPATIENT
Start: 2024-06-22 | End: 2024-06-25 | Stop reason: HOSPADM

## 2024-06-22 RX ORDER — METHYLPREDNISOLONE SODIUM SUCCINATE 40 MG/ML
40 INJECTION, POWDER, LYOPHILIZED, FOR SOLUTION INTRAMUSCULAR; INTRAVENOUS EVERY 12 HOURS
Status: DISCONTINUED | OUTPATIENT
Start: 2024-06-22 | End: 2024-06-25

## 2024-06-22 RX ORDER — SODIUM CHLORIDE FOR INHALATION 3 %
15 VIAL, NEBULIZER (ML) INHALATION
Status: DISCONTINUED | OUTPATIENT
Start: 2024-06-22 | End: 2024-06-25 | Stop reason: HOSPADM

## 2024-06-22 RX ADMIN — SODIUM CHLORIDE SOLN NEBU 3% 15 ML: 3 NEBU SOLN at 19:57

## 2024-06-22 RX ADMIN — MORPHINE SULFATE 4 MG: 4 INJECTION, SOLUTION INTRAMUSCULAR; INTRAVENOUS at 20:35

## 2024-06-22 RX ADMIN — INSULIN LISPRO 4 UNITS: 100 INJECTION, SOLUTION INTRAVENOUS; SUBCUTANEOUS at 20:34

## 2024-06-22 RX ADMIN — MEROPENEM 1000 MG: 1 INJECTION INTRAVENOUS at 14:27

## 2024-06-22 RX ADMIN — METRONIDAZOLE 500 MG: 500 INJECTION, SOLUTION INTRAVENOUS at 09:46

## 2024-06-22 RX ADMIN — FUROSEMIDE 40 MG: 10 INJECTION, SOLUTION INTRAMUSCULAR; INTRAVENOUS at 18:06

## 2024-06-22 RX ADMIN — SODIUM CHLORIDE SOLN NEBU 3% 15 ML: 3 NEBU SOLN at 15:42

## 2024-06-22 RX ADMIN — METRONIDAZOLE 500 MG: 500 INJECTION, SOLUTION INTRAVENOUS at 01:17

## 2024-06-22 RX ADMIN — VANCOMYCIN HYDROCHLORIDE 1000 MG: 1 INJECTION, POWDER, LYOPHILIZED, FOR SOLUTION INTRAVENOUS at 01:17

## 2024-06-22 RX ADMIN — INSULIN LISPRO 2 UNITS: 100 INJECTION, SOLUTION INTRAVENOUS; SUBCUTANEOUS at 18:05

## 2024-06-22 RX ADMIN — SODIUM CHLORIDE, PRESERVATIVE FREE 10 ML: 5 INJECTION INTRAVENOUS at 09:49

## 2024-06-22 RX ADMIN — MORPHINE SULFATE 4 MG: 4 INJECTION, SOLUTION INTRAMUSCULAR; INTRAVENOUS at 01:51

## 2024-06-22 RX ADMIN — MOMETASONE FUROATE AND FORMOTEROL FUMARATE DIHYDRATE 2 PUFF: 200; 5 AEROSOL RESPIRATORY (INHALATION) at 08:30

## 2024-06-22 RX ADMIN — IPRATROPIUM BROMIDE AND ALBUTEROL SULFATE 1 DOSE: .5; 3 SOLUTION RESPIRATORY (INHALATION) at 15:43

## 2024-06-22 RX ADMIN — ALBUTEROL SULFATE 2.5 MG: 2.5 SOLUTION RESPIRATORY (INHALATION) at 08:30

## 2024-06-22 RX ADMIN — MOMETASONE FUROATE AND FORMOTEROL FUMARATE DIHYDRATE 2 PUFF: 200; 5 AEROSOL RESPIRATORY (INHALATION) at 19:57

## 2024-06-22 RX ADMIN — ENOXAPARIN SODIUM 100 MG: 100 INJECTION SUBCUTANEOUS at 20:35

## 2024-06-22 RX ADMIN — ORPHENADRINE CITRATE 30 MG: 60 INJECTION INTRAMUSCULAR; INTRAVENOUS at 06:16

## 2024-06-22 RX ADMIN — METHYLPREDNISOLONE SODIUM SUCCINATE 40 MG: 40 INJECTION INTRAMUSCULAR; INTRAVENOUS at 14:28

## 2024-06-22 RX ADMIN — FUROSEMIDE 40 MG: 10 INJECTION, SOLUTION INTRAMUSCULAR; INTRAVENOUS at 09:50

## 2024-06-22 RX ADMIN — IPRATROPIUM BROMIDE AND ALBUTEROL SULFATE 1 DOSE: .5; 3 SOLUTION RESPIRATORY (INHALATION) at 19:57

## 2024-06-22 RX ADMIN — MEROPENEM 1000 MG: 1 INJECTION INTRAVENOUS at 20:40

## 2024-06-22 RX ADMIN — CEFEPIME 2000 MG: 2 INJECTION, POWDER, FOR SOLUTION INTRAVENOUS at 09:49

## 2024-06-22 RX ADMIN — SODIUM CHLORIDE, PRESERVATIVE FREE 5 ML: 5 INJECTION INTRAVENOUS at 20:35

## 2024-06-22 RX ADMIN — ENOXAPARIN SODIUM 100 MG: 100 INJECTION SUBCUTANEOUS at 09:49

## 2024-06-22 ASSESSMENT — PAIN SCALES - WONG BAKER
WONGBAKER_NUMERICALRESPONSE: HURTS A LITTLE BIT
WONGBAKER_NUMERICALRESPONSE: HURTS A LITTLE BIT
WONGBAKER_NUMERICALRESPONSE: NO HURT

## 2024-06-22 ASSESSMENT — PAIN SCALES - GENERAL
PAINLEVEL_OUTOF10: 7
PAINLEVEL_OUTOF10: 0
PAINLEVEL_OUTOF10: 8
PAINLEVEL_OUTOF10: 0
PAINLEVEL_OUTOF10: 0
PAINLEVEL_OUTOF10: 9

## 2024-06-22 ASSESSMENT — PAIN - FUNCTIONAL ASSESSMENT
PAIN_FUNCTIONAL_ASSESSMENT: PREVENTS OR INTERFERES SOME ACTIVE ACTIVITIES AND ADLS

## 2024-06-22 ASSESSMENT — PAIN DESCRIPTION - LOCATION
LOCATION: FOOT
LOCATION: FOOT
LOCATION: FOOT;BACK;LEG

## 2024-06-22 ASSESSMENT — PAIN DESCRIPTION - ORIENTATION
ORIENTATION: RIGHT;LEFT;LOWER;MID;UPPER
ORIENTATION: RIGHT
ORIENTATION: LEFT

## 2024-06-22 ASSESSMENT — PAIN DESCRIPTION - PAIN TYPE: TYPE: ACUTE PAIN

## 2024-06-22 ASSESSMENT — PAIN DESCRIPTION - DESCRIPTORS
DESCRIPTORS: ACHING
DESCRIPTORS: ACHING;DULL
DESCRIPTORS: ACHING;DULL

## 2024-06-22 ASSESSMENT — PAIN DESCRIPTION - ONSET: ONSET: GRADUAL

## 2024-06-22 ASSESSMENT — PAIN DESCRIPTION - FREQUENCY: FREQUENCY: INTERMITTENT

## 2024-06-22 NOTE — PLAN OF CARE
Problem: Safety - Adult  Goal: Free from fall injury  Outcome: Progressing     Problem: Discharge Planning  Goal: Discharge to home or other facility with appropriate resources  Outcome: Progressing     Problem: Skin/Tissue Integrity  Goal: Absence of new skin breakdown  Description: 1.  Monitor for areas of redness and/or skin breakdown  2.  Assess vascular access sites hourly  3.  Every 4-6 hours minimum:  Change oxygen saturation probe site  4.  Every 4-6 hours:  If on nasal continuous positive airway pressure, respiratory therapy assess nares and determine need for appliance change or resting period.  Outcome: Progressing     Problem: Chronic Conditions and Co-morbidities  Goal: Patient's chronic conditions and co-morbidity symptoms are monitored and maintained or improved  Outcome: Progressing     Problem: Respiratory - Adult  Goal: Achieves optimal ventilation and oxygenation  Outcome: Progressing     Problem: Cardiovascular - Adult  Goal: Maintains optimal cardiac output and hemodynamic stability  Outcome: Progressing     Problem: Metabolic/Fluid and Electrolytes - Adult  Goal: Electrolytes maintained within normal limits  Outcome: Progressing     Problem: Pain  Goal: Verbalizes/displays adequate comfort level or baseline comfort level  Outcome: Progressing

## 2024-06-22 NOTE — PLAN OF CARE
Problem: Safety - Adult  Goal: Free from fall injury  Outcome: Progressing  Flowsheets (Taken 6/21/2024 1542 by Cheo Peterson, RN)  Free From Fall Injury: Instruct family/caregiver on patient safety     Problem: Discharge Planning  Goal: Discharge to home or other facility with appropriate resources  Outcome: Progressing  Flowsheets (Taken 6/21/2024 1550 by Cheo Peterson, RN)  Discharge to home or other facility with appropriate resources:   Identify barriers to discharge with patient and caregiver   Identify discharge learning needs (meds, wound care, etc)     Problem: Skin/Tissue Integrity  Goal: Absence of new skin breakdown  Description: 1.  Monitor for areas of redness and/or skin breakdown  2.  Assess vascular access sites hourly  3.  Every 4-6 hours minimum:  Change oxygen saturation probe site  4.  Every 4-6 hours:  If on nasal continuous positive airway pressure, respiratory therapy assess nares and determine need for appliance change or resting period.  Outcome: Progressing     Problem: Chronic Conditions and Co-morbidities  Goal: Patient's chronic conditions and co-morbidity symptoms are monitored and maintained or improved  Outcome: Progressing     Problem: Respiratory - Adult  Goal: Achieves optimal ventilation and oxygenation  Outcome: Progressing     Problem: Cardiovascular - Adult  Goal: Maintains optimal cardiac output and hemodynamic stability  Outcome: Progressing     Problem: Metabolic/Fluid and Electrolytes - Adult  Goal: Electrolytes maintained within normal limits  Outcome: Progressing     Problem: Pain  Goal: Verbalizes/displays adequate comfort level or baseline comfort level  Outcome: Progressing     Problem: ABCDS Injury Assessment  Goal: Absence of physical injury  Outcome: Progressing  Flowsheets (Taken 6/21/2024 1542 by hCeo Peterson, RN)  Absence of Physical Injury: Implement safety measures based on patient assessment

## 2024-06-23 LAB
ANION GAP SERPL CALCULATED.3IONS-SCNC: 12 MMOL/L (ref 3–16)
BASOPHILS # BLD: 0 K/UL (ref 0–0.2)
BASOPHILS NFR BLD: 0.5 %
BUN SERPL-MCNC: 18 MG/DL (ref 7–20)
CALCIUM SERPL-MCNC: 9.3 MG/DL (ref 8.3–10.6)
CHLORIDE SERPL-SCNC: 96 MMOL/L (ref 99–110)
CO2 SERPL-SCNC: 30 MMOL/L (ref 21–32)
CREAT SERPL-MCNC: 1 MG/DL (ref 0.8–1.3)
DEPRECATED RDW RBC AUTO: 18 % (ref 12.4–15.4)
EOSINOPHIL # BLD: 0 K/UL (ref 0–0.6)
EOSINOPHIL NFR BLD: 0.1 %
GFR SERPLBLD CREATININE-BSD FMLA CKD-EPI: 77 ML/MIN/{1.73_M2}
GLUCOSE BLD-MCNC: 275 MG/DL (ref 70–99)
GLUCOSE BLD-MCNC: 289 MG/DL (ref 70–99)
GLUCOSE BLD-MCNC: 335 MG/DL (ref 70–99)
GLUCOSE BLD-MCNC: 344 MG/DL (ref 70–99)
GLUCOSE SERPL-MCNC: 195 MG/DL (ref 70–99)
HCT VFR BLD AUTO: 31.3 % (ref 40.5–52.5)
HGB BLD-MCNC: 10.1 G/DL (ref 13.5–17.5)
LYMPHOCYTES # BLD: 0.6 K/UL (ref 1–5.1)
LYMPHOCYTES NFR BLD: 8.2 %
MCH RBC QN AUTO: 26.1 PG (ref 26–34)
MCHC RBC AUTO-ENTMCNC: 32.2 G/DL (ref 31–36)
MCV RBC AUTO: 81 FL (ref 80–100)
MONOCYTES # BLD: 0.3 K/UL (ref 0–1.3)
MONOCYTES NFR BLD: 3.9 %
NEUTROPHILS # BLD: 6.9 K/UL (ref 1.7–7.7)
NEUTROPHILS NFR BLD: 87.3 %
NT-PROBNP SERPL-MCNC: 1612 PG/ML (ref 0–449)
PERFORMED ON: ABNORMAL
PLATELET # BLD AUTO: 296 K/UL (ref 135–450)
PMV BLD AUTO: 7.1 FL (ref 5–10.5)
POTASSIUM SERPL-SCNC: 4.5 MMOL/L (ref 3.5–5.1)
RBC # BLD AUTO: 3.86 M/UL (ref 4.2–5.9)
SODIUM SERPL-SCNC: 138 MMOL/L (ref 136–145)
WBC # BLD AUTO: 7.9 K/UL (ref 4–11)

## 2024-06-23 PROCEDURE — 80048 BASIC METABOLIC PNL TOTAL CA: CPT

## 2024-06-23 PROCEDURE — 83880 ASSAY OF NATRIURETIC PEPTIDE: CPT

## 2024-06-23 PROCEDURE — 94669 MECHANICAL CHEST WALL OSCILL: CPT

## 2024-06-23 PROCEDURE — 99232 SBSQ HOSP IP/OBS MODERATE 35: CPT | Performed by: INTERNAL MEDICINE

## 2024-06-23 PROCEDURE — 94668 MNPJ CHEST WALL SBSQ: CPT

## 2024-06-23 PROCEDURE — 6370000000 HC RX 637 (ALT 250 FOR IP): Performed by: INTERNAL MEDICINE

## 2024-06-23 PROCEDURE — 6360000002 HC RX W HCPCS: Performed by: NURSE PRACTITIONER

## 2024-06-23 PROCEDURE — 85025 COMPLETE CBC W/AUTO DIFF WBC: CPT

## 2024-06-23 PROCEDURE — 94640 AIRWAY INHALATION TREATMENT: CPT

## 2024-06-23 PROCEDURE — 6360000002 HC RX W HCPCS: Performed by: INTERNAL MEDICINE

## 2024-06-23 PROCEDURE — 2580000003 HC RX 258: Performed by: INTERNAL MEDICINE

## 2024-06-23 PROCEDURE — 92526 ORAL FUNCTION THERAPY: CPT

## 2024-06-23 PROCEDURE — 2060000000 HC ICU INTERMEDIATE R&B

## 2024-06-23 PROCEDURE — 2580000003 HC RX 258

## 2024-06-23 PROCEDURE — 36415 COLL VENOUS BLD VENIPUNCTURE: CPT

## 2024-06-23 RX ORDER — WATER 10 ML/10ML
INJECTION INTRAMUSCULAR; INTRAVENOUS; SUBCUTANEOUS
Status: COMPLETED
Start: 2024-06-23 | End: 2024-06-23

## 2024-06-23 RX ADMIN — SODIUM CHLORIDE SOLN NEBU 3% 4 ML: 3 NEBU SOLN at 16:05

## 2024-06-23 RX ADMIN — INSULIN LISPRO 4 UNITS: 100 INJECTION, SOLUTION INTRAVENOUS; SUBCUTANEOUS at 18:32

## 2024-06-23 RX ADMIN — MOMETASONE FUROATE AND FORMOTEROL FUMARATE DIHYDRATE 2 PUFF: 200; 5 AEROSOL RESPIRATORY (INHALATION) at 19:55

## 2024-06-23 RX ADMIN — MEROPENEM 1000 MG: 1 INJECTION INTRAVENOUS at 14:16

## 2024-06-23 RX ADMIN — INSULIN LISPRO 6 UNITS: 100 INJECTION, SOLUTION INTRAVENOUS; SUBCUTANEOUS at 12:52

## 2024-06-23 RX ADMIN — MEROPENEM 1000 MG: 1 INJECTION INTRAVENOUS at 21:01

## 2024-06-23 RX ADMIN — ENOXAPARIN SODIUM 100 MG: 100 INJECTION SUBCUTANEOUS at 20:57

## 2024-06-23 RX ADMIN — IPRATROPIUM BROMIDE AND ALBUTEROL SULFATE 1 DOSE: .5; 3 SOLUTION RESPIRATORY (INHALATION) at 11:38

## 2024-06-23 RX ADMIN — WATER 1 ML: 1 INJECTION INTRAMUSCULAR; INTRAVENOUS; SUBCUTANEOUS at 02:01

## 2024-06-23 RX ADMIN — IPRATROPIUM BROMIDE AND ALBUTEROL SULFATE 1 DOSE: .5; 3 SOLUTION RESPIRATORY (INHALATION) at 16:05

## 2024-06-23 RX ADMIN — METHYLPREDNISOLONE SODIUM SUCCINATE 40 MG: 40 INJECTION INTRAMUSCULAR; INTRAVENOUS at 14:16

## 2024-06-23 RX ADMIN — INSULIN LISPRO 4 UNITS: 100 INJECTION, SOLUTION INTRAVENOUS; SUBCUTANEOUS at 08:45

## 2024-06-23 RX ADMIN — INSULIN LISPRO 3 UNITS: 100 INJECTION, SOLUTION INTRAVENOUS; SUBCUTANEOUS at 20:57

## 2024-06-23 RX ADMIN — IPRATROPIUM BROMIDE AND ALBUTEROL SULFATE 1 DOSE: .5; 3 SOLUTION RESPIRATORY (INHALATION) at 19:55

## 2024-06-23 RX ADMIN — SODIUM CHLORIDE SOLN NEBU 3% 4 ML: 3 NEBU SOLN at 11:38

## 2024-06-23 RX ADMIN — ENOXAPARIN SODIUM 100 MG: 100 INJECTION SUBCUTANEOUS at 08:45

## 2024-06-23 RX ADMIN — SODIUM CHLORIDE SOLN NEBU 3% 15 ML: 3 NEBU SOLN at 19:55

## 2024-06-23 RX ADMIN — ASPIRIN 81 MG: 81 TABLET, CHEWABLE ORAL at 08:45

## 2024-06-23 RX ADMIN — MORPHINE SULFATE 4 MG: 4 INJECTION, SOLUTION INTRAMUSCULAR; INTRAVENOUS at 21:13

## 2024-06-23 RX ADMIN — METHYLPREDNISOLONE SODIUM SUCCINATE 40 MG: 40 INJECTION INTRAMUSCULAR; INTRAVENOUS at 02:01

## 2024-06-23 RX ADMIN — MEROPENEM 1000 MG: 1 INJECTION INTRAVENOUS at 05:02

## 2024-06-23 RX ADMIN — FUROSEMIDE 40 MG: 10 INJECTION, SOLUTION INTRAMUSCULAR; INTRAVENOUS at 08:45

## 2024-06-23 RX ADMIN — MOMETASONE FUROATE AND FORMOTEROL FUMARATE DIHYDRATE 2 PUFF: 200; 5 AEROSOL RESPIRATORY (INHALATION) at 11:39

## 2024-06-23 RX ADMIN — FUROSEMIDE 40 MG: 10 INJECTION, SOLUTION INTRAMUSCULAR; INTRAVENOUS at 18:32

## 2024-06-23 ASSESSMENT — PAIN DESCRIPTION - LOCATION: LOCATION: FOOT

## 2024-06-23 ASSESSMENT — PAIN DESCRIPTION - ORIENTATION: ORIENTATION: RIGHT

## 2024-06-23 ASSESSMENT — PAIN - FUNCTIONAL ASSESSMENT: PAIN_FUNCTIONAL_ASSESSMENT: PREVENTS OR INTERFERES SOME ACTIVE ACTIVITIES AND ADLS

## 2024-06-23 ASSESSMENT — PAIN DESCRIPTION - ONSET: ONSET: GRADUAL

## 2024-06-23 ASSESSMENT — PAIN DESCRIPTION - DESCRIPTORS: DESCRIPTORS: ACHING

## 2024-06-23 ASSESSMENT — PAIN DESCRIPTION - PAIN TYPE: TYPE: ACUTE PAIN

## 2024-06-23 ASSESSMENT — PAIN SCALES - GENERAL: PAINLEVEL_OUTOF10: 6

## 2024-06-23 ASSESSMENT — PAIN DESCRIPTION - FREQUENCY: FREQUENCY: INTERMITTENT

## 2024-06-23 NOTE — PLAN OF CARE
Problem: Safety - Adult  Goal: Free from fall injury  6/23/2024 0644 by Velia Davidson RN  Outcome: Progressing     Problem: Skin/Tissue Integrity  Goal: Absence of new skin breakdown  Description: 1.  Monitor for areas of redness and/or skin breakdown  2.  Assess vascular access sites hourly  3.  Every 4-6 hours minimum:  Change oxygen saturation probe site  4.  Every 4-6 hours:  If on nasal continuous positive airway pressure, respiratory therapy assess nares and determine need for appliance change or resting period.  6/23/2024 0644 by Velia Davidson RN  Outcome: Progressing      Problem: Chronic Conditions and Co-morbidities  Goal: Patient's chronic conditions and co-morbidity symptoms are monitored and maintained or improved  6/23/2024 0644 by Velia Davidson RN  Outcome: Progressing  Flowsheets (Taken 6/23/2024 0644)  Care Plan - Patient's Chronic Conditions and Co-Morbidity Symptoms are Monitored and Maintained or Improved: Monitor and assess patient's chronic conditions and comorbid symptoms for stability, deterioration, or improvement     Problem: Respiratory - Adult  Goal: Achieves optimal ventilation and oxygenation  6/23/2024 0644 by Velia Davidson RN  Outcome: Progressing  Flowsheets (Taken 6/23/2024 0644)  Achieves optimal ventilation and oxygenation:   Assess for changes in respiratory status   Assess for changes in mentation and behavior   Oxygen supplementation based on oxygen saturation or arterial blood gases   Position to facilitate oxygenation and minimize respiratory effort     Problem: Pain  Goal: Verbalizes/displays adequate comfort level or baseline comfort level  6/23/2024 0644 by Velia Davidson RN  Outcome: Progressing  Flowsheets (Taken 6/23/2024 0644)  Verbalizes/displays adequate comfort level or baseline comfort level:   Assess pain using appropriate pain scale   Encourage patient to monitor pain and request assistance     Problem: ABCDS Injury Assessment  Goal: Absence of

## 2024-06-23 NOTE — PLAN OF CARE
Problem: Safety - Adult  Goal: Free from fall injury  6/23/2024 1107 by Lina Stiles RN  Outcome: Progressing     Problem: Discharge Planning  Goal: Discharge to home or other facility with appropriate resources  6/23/2024 1107 by Lina Stiles RN  Outcome: Progressing     Problem: Skin/Tissue Integrity  Goal: Absence of new skin breakdown  Description: 1.  Monitor for areas of redness and/or skin breakdown  2.  Assess vascular access sites hourly  3.  Every 4-6 hours minimum:  Change oxygen saturation probe site  4.  Every 4-6 hours:  If on nasal continuous positive airway pressure, respiratory therapy assess nares and determine need for appliance change or resting period.  6/23/2024 1107 by Lina Stiles RN  Outcome: Progressing     Problem: Respiratory - Adult  Goal: Achieves optimal ventilation and oxygenation  6/23/2024 1107 by Lina Stiles RN  Outcome: Progressing

## 2024-06-23 NOTE — PLAN OF CARE
Problem: Safety - Adult  Goal: Free from fall injury  6/23/2024 0646 by Velia Davidson RN  Outcome: Progressing     Problem: Discharge Planning  Goal: Discharge to home or other facility with appropriate resources  6/23/2024 0646 by Velia Davidson RN  Outcome: Progressing     Problem: Skin/Tissue Integrity  Goal: Absence of new skin breakdown  Description: 1.  Monitor for areas of redness and/or skin breakdown  2.  Assess vascular access sites hourly  3.  Every 4-6 hours minimum:  Change oxygen saturation probe site  4.  Every 4-6 hours:  If on nasal continuous positive airway pressure, respiratory therapy assess nares and determine need for appliance change or resting period.  6/23/2024 0646 by Velia Davidson RN  Outcome: Progressing     Problem: Chronic Conditions and Co-morbidities  Goal: Patient's chronic conditions and co-morbidity symptoms are monitored and maintained or improved  6/23/2024 0646 by Velia Davidson RN  Outcome: Progressing     Problem: Chronic Conditions and Co-morbidities  Goal: Patient's chronic conditions and co-morbidity symptoms are monitored and maintained or improved  6/22/2024 1738 by Lina Stiles RN  Outcome: Progressing     Problem: Respiratory - Adult  Goal: Achieves optimal ventilation and oxygenation  6/23/2024 0646 by Velia Davidson RN  Outcome: Progressing     Problem: Cardiovascular - Adult  Goal: Maintains optimal cardiac output and hemodynamic stability  6/23/2024 0644 by Velia Davidson RN  Outcome: Progressing  Flowsheets (Taken 6/23/2024 0644)  Maintains optimal cardiac output and hemodynamic stability: Monitor blood pressure and heart rate     Problem: Metabolic/Fluid and Electrolytes - Adult  Goal: Electrolytes maintained within normal limits  6/22/2024 1738 by Lina Stiles RN  Outcome: Progressing     Problem: Metabolic/Fluid and Electrolytes - Adult  Goal: Electrolytes maintained within normal limits  6/22/2024 1738 by Lina Stiles RN  Outcome:

## 2024-06-24 LAB
ANION GAP SERPL CALCULATED.3IONS-SCNC: 12 MMOL/L (ref 3–16)
BACTERIA SPEC RESP CULT: NORMAL
BASOPHILS # BLD: 0 K/UL (ref 0–0.2)
BASOPHILS NFR BLD: 0.4 %
BUN SERPL-MCNC: 24 MG/DL (ref 7–20)
CALCIUM SERPL-MCNC: 9.3 MG/DL (ref 8.3–10.6)
CHLORIDE SERPL-SCNC: 97 MMOL/L (ref 99–110)
CO2 SERPL-SCNC: 28 MMOL/L (ref 21–32)
CREAT SERPL-MCNC: 0.8 MG/DL (ref 0.8–1.3)
DEPRECATED RDW RBC AUTO: 17.9 % (ref 12.4–15.4)
EOSINOPHIL # BLD: 0 K/UL (ref 0–0.6)
EOSINOPHIL NFR BLD: 0 %
GFR SERPLBLD CREATININE-BSD FMLA CKD-EPI: 90 ML/MIN/{1.73_M2}
GLUCOSE BLD-MCNC: 332 MG/DL (ref 70–99)
GLUCOSE BLD-MCNC: 352 MG/DL (ref 70–99)
GLUCOSE BLD-MCNC: 423 MG/DL (ref 70–99)
GLUCOSE BLD-MCNC: 465 MG/DL (ref 70–99)
GLUCOSE SERPL-MCNC: 354 MG/DL (ref 70–99)
GRAM STN SPEC: NORMAL
HCT VFR BLD AUTO: 33.3 % (ref 40.5–52.5)
HGB BLD-MCNC: 10.5 G/DL (ref 13.5–17.5)
LYMPHOCYTES # BLD: 0.8 K/UL (ref 1–5.1)
LYMPHOCYTES NFR BLD: 7.8 %
MCH RBC QN AUTO: 25.7 PG (ref 26–34)
MCHC RBC AUTO-ENTMCNC: 31.6 G/DL (ref 31–36)
MCV RBC AUTO: 81.5 FL (ref 80–100)
MONOCYTES # BLD: 0.3 K/UL (ref 0–1.3)
MONOCYTES NFR BLD: 3 %
NEUTROPHILS # BLD: 9.6 K/UL (ref 1.7–7.7)
NEUTROPHILS NFR BLD: 88.8 %
NT-PROBNP SERPL-MCNC: 1369 PG/ML (ref 0–449)
PERFORMED ON: ABNORMAL
PLATELET # BLD AUTO: 396 K/UL (ref 135–450)
PMV BLD AUTO: 7.2 FL (ref 5–10.5)
POTASSIUM SERPL-SCNC: 4.3 MMOL/L (ref 3.5–5.1)
RBC # BLD AUTO: 4.08 M/UL (ref 4.2–5.9)
SODIUM SERPL-SCNC: 137 MMOL/L (ref 136–145)
WBC # BLD AUTO: 10.8 K/UL (ref 4–11)

## 2024-06-24 PROCEDURE — 6370000000 HC RX 637 (ALT 250 FOR IP): Performed by: REGISTERED NURSE

## 2024-06-24 PROCEDURE — 6360000002 HC RX W HCPCS: Performed by: INTERNAL MEDICINE

## 2024-06-24 PROCEDURE — 9990000010 HC NO CHARGE VISIT: Performed by: PHYSICAL THERAPIST

## 2024-06-24 PROCEDURE — 94668 MNPJ CHEST WALL SBSQ: CPT

## 2024-06-24 PROCEDURE — 94669 MECHANICAL CHEST WALL OSCILL: CPT

## 2024-06-24 PROCEDURE — 80048 BASIC METABOLIC PNL TOTAL CA: CPT

## 2024-06-24 PROCEDURE — 94760 N-INVAS EAR/PLS OXIMETRY 1: CPT

## 2024-06-24 PROCEDURE — 94640 AIRWAY INHALATION TREATMENT: CPT

## 2024-06-24 PROCEDURE — 2580000003 HC RX 258: Performed by: HOSPITALIST

## 2024-06-24 PROCEDURE — 2700000000 HC OXYGEN THERAPY PER DAY

## 2024-06-24 PROCEDURE — 6370000000 HC RX 637 (ALT 250 FOR IP): Performed by: INTERNAL MEDICINE

## 2024-06-24 PROCEDURE — 83880 ASSAY OF NATRIURETIC PEPTIDE: CPT

## 2024-06-24 PROCEDURE — 2060000000 HC ICU INTERMEDIATE R&B

## 2024-06-24 PROCEDURE — 6360000002 HC RX W HCPCS: Performed by: NURSE PRACTITIONER

## 2024-06-24 PROCEDURE — 85025 COMPLETE CBC W/AUTO DIFF WBC: CPT

## 2024-06-24 PROCEDURE — 99232 SBSQ HOSP IP/OBS MODERATE 35: CPT | Performed by: INTERNAL MEDICINE

## 2024-06-24 PROCEDURE — 2580000003 HC RX 258: Performed by: INTERNAL MEDICINE

## 2024-06-24 PROCEDURE — 36415 COLL VENOUS BLD VENIPUNCTURE: CPT

## 2024-06-24 RX ORDER — INSULIN LISPRO 100 [IU]/ML
6 INJECTION, SOLUTION INTRAVENOUS; SUBCUTANEOUS ONCE
Status: COMPLETED | OUTPATIENT
Start: 2024-06-24 | End: 2024-06-24

## 2024-06-24 RX ADMIN — MEROPENEM 1000 MG: 1 INJECTION INTRAVENOUS at 13:12

## 2024-06-24 RX ADMIN — MOMETASONE FUROATE AND FORMOTEROL FUMARATE DIHYDRATE 2 PUFF: 200; 5 AEROSOL RESPIRATORY (INHALATION) at 07:41

## 2024-06-24 RX ADMIN — SODIUM CHLORIDE SOLN NEBU 3% 15 ML: 3 NEBU SOLN at 15:33

## 2024-06-24 RX ADMIN — FUROSEMIDE 40 MG: 10 INJECTION, SOLUTION INTRAMUSCULAR; INTRAVENOUS at 09:39

## 2024-06-24 RX ADMIN — IPRATROPIUM BROMIDE AND ALBUTEROL SULFATE 1 DOSE: .5; 3 SOLUTION RESPIRATORY (INHALATION) at 07:41

## 2024-06-24 RX ADMIN — ENOXAPARIN SODIUM 100 MG: 100 INJECTION SUBCUTANEOUS at 09:03

## 2024-06-24 RX ADMIN — SODIUM CHLORIDE SOLN NEBU 3% 15 ML: 3 NEBU SOLN at 21:24

## 2024-06-24 RX ADMIN — INSULIN LISPRO 6 UNITS: 100 INJECTION, SOLUTION INTRAVENOUS; SUBCUTANEOUS at 20:41

## 2024-06-24 RX ADMIN — MEROPENEM 1000 MG: 1 INJECTION INTRAVENOUS at 05:06

## 2024-06-24 RX ADMIN — Medication 10 ML: at 13:05

## 2024-06-24 RX ADMIN — SODIUM CHLORIDE SOLN NEBU 3% 15 ML: 3 NEBU SOLN at 11:47

## 2024-06-24 RX ADMIN — MOMETASONE FUROATE AND FORMOTEROL FUMARATE DIHYDRATE 2 PUFF: 200; 5 AEROSOL RESPIRATORY (INHALATION) at 21:25

## 2024-06-24 RX ADMIN — INSULIN LISPRO 8 UNITS: 100 INJECTION, SOLUTION INTRAVENOUS; SUBCUTANEOUS at 09:01

## 2024-06-24 RX ADMIN — MEROPENEM 1000 MG: 1 INJECTION INTRAVENOUS at 20:45

## 2024-06-24 RX ADMIN — METHYLPREDNISOLONE SODIUM SUCCINATE 40 MG: 40 INJECTION INTRAMUSCULAR; INTRAVENOUS at 13:00

## 2024-06-24 RX ADMIN — Medication 10 ML: at 13:01

## 2024-06-24 RX ADMIN — INSULIN LISPRO 8 UNITS: 100 INJECTION, SOLUTION INTRAVENOUS; SUBCUTANEOUS at 17:58

## 2024-06-24 RX ADMIN — METHYLPREDNISOLONE SODIUM SUCCINATE 40 MG: 40 INJECTION INTRAMUSCULAR; INTRAVENOUS at 01:02

## 2024-06-24 RX ADMIN — IPRATROPIUM BROMIDE AND ALBUTEROL SULFATE 1 DOSE: .5; 3 SOLUTION RESPIRATORY (INHALATION) at 21:24

## 2024-06-24 RX ADMIN — INSULIN LISPRO 4 UNITS: 100 INJECTION, SOLUTION INTRAVENOUS; SUBCUTANEOUS at 20:40

## 2024-06-24 RX ADMIN — IPRATROPIUM BROMIDE AND ALBUTEROL SULFATE 1 DOSE: .5; 3 SOLUTION RESPIRATORY (INHALATION) at 15:33

## 2024-06-24 RX ADMIN — ENOXAPARIN SODIUM 100 MG: 100 INJECTION SUBCUTANEOUS at 20:39

## 2024-06-24 RX ADMIN — SODIUM CHLORIDE, PRESERVATIVE FREE 10 ML: 5 INJECTION INTRAVENOUS at 09:40

## 2024-06-24 RX ADMIN — SODIUM CHLORIDE SOLN NEBU 3% 4 ML: 3 NEBU SOLN at 07:41

## 2024-06-24 RX ADMIN — FLUTICASONE PROPIONATE 2 SPRAY: 50 SPRAY, METERED NASAL at 09:11

## 2024-06-24 RX ADMIN — ASPIRIN 81 MG: 81 TABLET, CHEWABLE ORAL at 09:03

## 2024-06-24 RX ADMIN — IPRATROPIUM BROMIDE AND ALBUTEROL SULFATE 1 DOSE: .5; 3 SOLUTION RESPIRATORY (INHALATION) at 11:47

## 2024-06-24 RX ADMIN — MORPHINE SULFATE 4 MG: 4 INJECTION, SOLUTION INTRAMUSCULAR; INTRAVENOUS at 11:54

## 2024-06-24 RX ADMIN — FUROSEMIDE 40 MG: 10 INJECTION, SOLUTION INTRAMUSCULAR; INTRAVENOUS at 17:58

## 2024-06-24 RX ADMIN — INSULIN LISPRO 6 UNITS: 100 INJECTION, SOLUTION INTRAVENOUS; SUBCUTANEOUS at 12:59

## 2024-06-24 ASSESSMENT — PAIN DESCRIPTION - ORIENTATION
ORIENTATION: RIGHT
ORIENTATION: RIGHT

## 2024-06-24 ASSESSMENT — PAIN DESCRIPTION - ONSET
ONSET: GRADUAL
ONSET: GRADUAL

## 2024-06-24 ASSESSMENT — PAIN SCALES - WONG BAKER
WONGBAKER_NUMERICALRESPONSE: NO HURT
WONGBAKER_NUMERICALRESPONSE: NO HURT

## 2024-06-24 ASSESSMENT — PAIN DESCRIPTION - PAIN TYPE
TYPE: ACUTE PAIN
TYPE: ACUTE PAIN

## 2024-06-24 ASSESSMENT — ENCOUNTER SYMPTOMS: SHORTNESS OF BREATH: 1

## 2024-06-24 ASSESSMENT — PAIN DESCRIPTION - LOCATION
LOCATION: FOOT
LOCATION: FOOT

## 2024-06-24 ASSESSMENT — PAIN SCALES - GENERAL
PAINLEVEL_OUTOF10: 7
PAINLEVEL_OUTOF10: 5
PAINLEVEL_OUTOF10: 7
PAINLEVEL_OUTOF10: 6

## 2024-06-24 ASSESSMENT — PAIN DESCRIPTION - DESCRIPTORS
DESCRIPTORS: ACHING
DESCRIPTORS: ACHING

## 2024-06-24 ASSESSMENT — PAIN DESCRIPTION - FREQUENCY
FREQUENCY: INTERMITTENT
FREQUENCY: INTERMITTENT

## 2024-06-24 NOTE — ACP (ADVANCE CARE PLANNING)
Advance Care Planning     Advance Care Planning Activator (Inpatient)  Conversation Note      Date of ACP Conversation: 6/24/2024     Conversation Conducted with: Patient with Decision Making Capacity    ACP Activator: Dinorah Anderson RN    Health Care Decision Maker:     Current Designated Health Care Decision Maker:     Primary Decision Maker: Portia Gauthier - Spouse - [phone]    Secondary Decision Maker: Bang Gauthier Jr - Child - [phone]    Today we documented Decision Maker(s) consistent with ACP documents on file.    Care Preferences    Ventilation:  \"If you were in your present state of health and suddenly became very ill and were unable to breathe on your own, what would your preference be about the use of a ventilator (breathing machine) if it were available to you?\"      Would the patient desire the use of ventilator (breathing machine)?: no    \"If your health worsens and it becomes clear that your chance of recovery is unlikely, what would your preference be about the use of a ventilator (breathing machine) if it were available to you?\"     Would the patient desire the use of ventilator (breathing machine)?: No      Resuscitation  \"CPR works best to restart the heart when there is a sudden event, like a heart attack, in someone who is otherwise healthy. Unfortunately, CPR does not typically restart the heart for people who have serious health conditions or who are very sick.\"    \"In the event your heart stopped as a result of an underlying serious health condition, would you want attempts to be made to restart your heart (answer \"yes\" for attempt to resuscitate) or would you prefer a natural death (answer \"no\" for do not attempt to resuscitate)?\" no       [] Yes   [] No   Educated Patient / Decision Maker regarding differences between Advance Directives and portable DNR orders.    Length of ACP Conversation in minutes:      Conversation Outcomes:  ACP discussion completed with Palliative care

## 2024-06-24 NOTE — DISCHARGE INSTRUCTIONS
Extra Heart Failure Education/ Tools/ Resources:     https://Parsely.com/publication/?i=069461   --- this is American Heart Association interactive Healthier Living with Heart Failure guidebook.  Please click hyperlink or copy / paste link into search bar. The QR Code is also available below. Use your mouse to scroll through the pages.  Lots of information about weight monitoring, diet tips, activity, meds, etc    Heart Failure Tools and Resources QR Code is below. It includes multiple resources to include symptom tracker, med tracker, further HF info, and access to a HF Support Network online Community    HF [place] Oanh  -- this is a free smart phone oanh available for iPhone and Android download.  Use your phone to track sodium / fluid intake, zone tool symptom tracking, weights, medications, etc. Click on this hyperlink  HF [place] Oanh   for QR code for easy download or the link is also found in the below HF Tools and Resources.      DASH (Dietary Approach to Stop Hypertension) diet --  https://www.nhlbi.nih.gov/education/dash-eating-plan -- this diet is a flexible eating plan that promotes heart healthy eating style.  Click on hyperlink or copy / paste link into search bar.  Lots of low sodium recipes and tips.    https://www.[employer]/recipes  -- more free recipes

## 2024-06-24 NOTE — PLAN OF CARE
Problem: Safety - Adult  Goal: Free from fall injury  Outcome: Progressing  Flowsheets (Taken 6/24/2024 0559)  Free From Fall Injury: Instruct family/caregiver on patient safety     Problem: Discharge Planning  Goal: Discharge to home or other facility with appropriate resources  Outcome: Progressing  Flowsheets (Taken 6/24/2024 0559)  Discharge to home or other facility with appropriate resources:   Identify barriers to discharge with patient and caregiver   Identify discharge learning needs (meds, wound care, etc)     Problem: Skin/Tissue Integrity  Goal: Absence of new skin breakdown  Description: 1.  Monitor for areas of redness and/or skin breakdown  2.  Assess vascular access sites hourly  3.  Every 4-6 hours minimum:  Change oxygen saturation probe site  4.  Every 4-6 hours:  If on nasal continuous positive airway pressure, respiratory therapy assess nares and determine need for appliance change or resting period.  Outcome: Progressing     Problem: Respiratory - Adult  Goal: Achieves optimal ventilation and oxygenation  Outcome: Progressing  Flowsheets (Taken 6/24/2024 0559)  Achieves optimal ventilation and oxygenation:   Assess for changes in respiratory status   Assess for changes in mentation and behavior   Oxygen supplementation based on oxygen saturation or arterial blood gases     Problem: Pain  Goal: Verbalizes/displays adequate comfort level or baseline comfort level  Outcome: Progressing  Flowsheets (Taken 6/24/2024 0559)  Verbalizes/displays adequate comfort level or baseline comfort level:   Encourage patient to monitor pain and request assistance   Assess pain using appropriate pain scale   Implement non-pharmacological measures as appropriate and evaluate response   Administer analgesics based on type and severity of pain and evaluate response

## 2024-06-25 VITALS
DIASTOLIC BLOOD PRESSURE: 63 MMHG | WEIGHT: 214.29 LBS | RESPIRATION RATE: 21 BRPM | TEMPERATURE: 98 F | BODY MASS INDEX: 30 KG/M2 | SYSTOLIC BLOOD PRESSURE: 101 MMHG | HEIGHT: 71 IN | OXYGEN SATURATION: 100 % | HEART RATE: 87 BPM

## 2024-06-25 LAB
ANION GAP SERPL CALCULATED.3IONS-SCNC: 14 MMOL/L (ref 3–16)
BACTERIA BLD CULT ORG #2: NORMAL
BACTERIA BLD CULT: NORMAL
BASOPHILS # BLD: 0 K/UL (ref 0–0.2)
BASOPHILS NFR BLD: 0.4 %
BUN SERPL-MCNC: 26 MG/DL (ref 7–20)
CALCIUM SERPL-MCNC: 9.3 MG/DL (ref 8.3–10.6)
CHLORIDE SERPL-SCNC: 95 MMOL/L (ref 99–110)
CO2 SERPL-SCNC: 28 MMOL/L (ref 21–32)
CREAT SERPL-MCNC: 0.8 MG/DL (ref 0.8–1.3)
DEPRECATED RDW RBC AUTO: 17.5 % (ref 12.4–15.4)
EKG DIAGNOSIS: NORMAL
EKG Q-T INTERVAL: 370 MS
EKG QRS DURATION: 110 MS
EKG QTC CALCULATION (BAZETT): 486 MS
EKG R AXIS: 46 DEGREES
EKG T AXIS: -40 DEGREES
EKG VENTRICULAR RATE: 104 BPM
EOSINOPHIL # BLD: 0 K/UL (ref 0–0.6)
EOSINOPHIL NFR BLD: 0.1 %
GFR SERPLBLD CREATININE-BSD FMLA CKD-EPI: 90 ML/MIN/{1.73_M2}
GLUCOSE BLD-MCNC: 310 MG/DL (ref 70–99)
GLUCOSE BLD-MCNC: 319 MG/DL (ref 70–99)
GLUCOSE BLD-MCNC: 410 MG/DL (ref 70–99)
GLUCOSE SERPL-MCNC: 263 MG/DL (ref 70–99)
HCT VFR BLD AUTO: 31.2 % (ref 40.5–52.5)
HGB BLD-MCNC: 10.2 G/DL (ref 13.5–17.5)
LYMPHOCYTES # BLD: 1 K/UL (ref 1–5.1)
LYMPHOCYTES NFR BLD: 10.7 %
MCH RBC QN AUTO: 25.8 PG (ref 26–34)
MCHC RBC AUTO-ENTMCNC: 32.6 G/DL (ref 31–36)
MCV RBC AUTO: 79.1 FL (ref 80–100)
MONOCYTES # BLD: 0.5 K/UL (ref 0–1.3)
MONOCYTES NFR BLD: 6.1 %
NEUTROPHILS # BLD: 7.5 K/UL (ref 1.7–7.7)
NEUTROPHILS NFR BLD: 82.7 %
NT-PROBNP SERPL-MCNC: 1458 PG/ML (ref 0–449)
PERFORMED ON: ABNORMAL
PLATELET # BLD AUTO: 351 K/UL (ref 135–450)
PMV BLD AUTO: 6.9 FL (ref 5–10.5)
POTASSIUM SERPL-SCNC: 4 MMOL/L (ref 3.5–5.1)
RBC # BLD AUTO: 3.95 M/UL (ref 4.2–5.9)
SODIUM SERPL-SCNC: 137 MMOL/L (ref 136–145)
WBC # BLD AUTO: 9.1 K/UL (ref 4–11)

## 2024-06-25 PROCEDURE — 6370000000 HC RX 637 (ALT 250 FOR IP): Performed by: HOSPITALIST

## 2024-06-25 PROCEDURE — 6370000000 HC RX 637 (ALT 250 FOR IP): Performed by: REGISTERED NURSE

## 2024-06-25 PROCEDURE — 85025 COMPLETE CBC W/AUTO DIFF WBC: CPT

## 2024-06-25 PROCEDURE — 2580000003 HC RX 258: Performed by: INTERNAL MEDICINE

## 2024-06-25 PROCEDURE — 80048 BASIC METABOLIC PNL TOTAL CA: CPT

## 2024-06-25 PROCEDURE — 6370000000 HC RX 637 (ALT 250 FOR IP): Performed by: INTERNAL MEDICINE

## 2024-06-25 PROCEDURE — 94669 MECHANICAL CHEST WALL OSCILL: CPT

## 2024-06-25 PROCEDURE — 97530 THERAPEUTIC ACTIVITIES: CPT | Performed by: PHYSICAL THERAPIST

## 2024-06-25 PROCEDURE — 6360000002 HC RX W HCPCS: Performed by: INTERNAL MEDICINE

## 2024-06-25 PROCEDURE — 36415 COLL VENOUS BLD VENIPUNCTURE: CPT

## 2024-06-25 PROCEDURE — 93005 ELECTROCARDIOGRAM TRACING: CPT | Performed by: STUDENT IN AN ORGANIZED HEALTH CARE EDUCATION/TRAINING PROGRAM

## 2024-06-25 PROCEDURE — 94640 AIRWAY INHALATION TREATMENT: CPT

## 2024-06-25 PROCEDURE — 94760 N-INVAS EAR/PLS OXIMETRY 1: CPT

## 2024-06-25 PROCEDURE — 97530 THERAPEUTIC ACTIVITIES: CPT

## 2024-06-25 PROCEDURE — 2580000003 HC RX 258: Performed by: HOSPITALIST

## 2024-06-25 PROCEDURE — 99232 SBSQ HOSP IP/OBS MODERATE 35: CPT | Performed by: INTERNAL MEDICINE

## 2024-06-25 PROCEDURE — 2700000000 HC OXYGEN THERAPY PER DAY

## 2024-06-25 PROCEDURE — 2500000003 HC RX 250 WO HCPCS: Performed by: NURSE PRACTITIONER

## 2024-06-25 PROCEDURE — 93010 ELECTROCARDIOGRAM REPORT: CPT | Performed by: INTERNAL MEDICINE

## 2024-06-25 PROCEDURE — 83880 ASSAY OF NATRIURETIC PEPTIDE: CPT

## 2024-06-25 RX ORDER — INSULIN LISPRO 100 [IU]/ML
0-4 INJECTION, SOLUTION INTRAVENOUS; SUBCUTANEOUS NIGHTLY
Status: DISCONTINUED | OUTPATIENT
Start: 2024-06-25 | End: 2024-06-25 | Stop reason: HOSPADM

## 2024-06-25 RX ORDER — PREDNISONE 20 MG/1
40 TABLET ORAL DAILY
Qty: 10 TABLET | Refills: 0 | Status: SHIPPED | OUTPATIENT
Start: 2024-06-26 | End: 2024-07-01

## 2024-06-25 RX ORDER — INSULIN LISPRO 100 [IU]/ML
0-16 INJECTION, SOLUTION INTRAVENOUS; SUBCUTANEOUS
Status: DISCONTINUED | OUTPATIENT
Start: 2024-06-25 | End: 2024-06-25 | Stop reason: HOSPADM

## 2024-06-25 RX ORDER — INSULIN LISPRO 100 [IU]/ML
12 INJECTION, SOLUTION INTRAVENOUS; SUBCUTANEOUS ONCE
Status: COMPLETED | OUTPATIENT
Start: 2024-06-25 | End: 2024-06-25

## 2024-06-25 RX ORDER — PREDNISONE 20 MG/1
40 TABLET ORAL DAILY
Status: DISCONTINUED | OUTPATIENT
Start: 2024-06-26 | End: 2024-06-25 | Stop reason: HOSPADM

## 2024-06-25 RX ADMIN — FLUTICASONE PROPIONATE 2 SPRAY: 50 SPRAY, METERED NASAL at 08:29

## 2024-06-25 RX ADMIN — IPRATROPIUM BROMIDE AND ALBUTEROL SULFATE 1 DOSE: .5; 3 SOLUTION RESPIRATORY (INHALATION) at 12:35

## 2024-06-25 RX ADMIN — POLYETHYLENE GLYCOL 3350 17 G: 17 POWDER, FOR SOLUTION ORAL at 08:48

## 2024-06-25 RX ADMIN — SODIUM CHLORIDE, PRESERVATIVE FREE 10 ML: 5 INJECTION INTRAVENOUS at 08:30

## 2024-06-25 RX ADMIN — FUROSEMIDE 40 MG: 10 INJECTION, SOLUTION INTRAMUSCULAR; INTRAVENOUS at 08:29

## 2024-06-25 RX ADMIN — ASPIRIN 81 MG: 81 TABLET, CHEWABLE ORAL at 08:29

## 2024-06-25 RX ADMIN — INSULIN LISPRO 12 UNITS: 100 INJECTION, SOLUTION INTRAVENOUS; SUBCUTANEOUS at 12:16

## 2024-06-25 RX ADMIN — ENOXAPARIN SODIUM 100 MG: 100 INJECTION SUBCUTANEOUS at 08:29

## 2024-06-25 RX ADMIN — INSULIN LISPRO 12 UNITS: 100 INJECTION, SOLUTION INTRAVENOUS; SUBCUTANEOUS at 01:14

## 2024-06-25 RX ADMIN — SODIUM CHLORIDE SOLN NEBU 3% 15 ML: 3 NEBU SOLN at 12:34

## 2024-06-25 RX ADMIN — METOPROLOL TARTRATE 5 MG: 1 INJECTION, SOLUTION INTRAVENOUS at 14:45

## 2024-06-25 RX ADMIN — MEROPENEM 1000 MG: 1 INJECTION INTRAVENOUS at 05:26

## 2024-06-25 RX ADMIN — METHYLPREDNISOLONE SODIUM SUCCINATE 40 MG: 40 INJECTION INTRAMUSCULAR; INTRAVENOUS at 00:55

## 2024-06-25 RX ADMIN — INSULIN LISPRO 12 UNITS: 100 INJECTION, SOLUTION INTRAVENOUS; SUBCUTANEOUS at 08:29

## 2024-06-25 RX ADMIN — METOPROLOL TARTRATE 5 MG: 1 INJECTION, SOLUTION INTRAVENOUS at 09:20

## 2024-06-25 ASSESSMENT — PAIN SCALES - GENERAL
PAINLEVEL_OUTOF10: 6
PAINLEVEL_OUTOF10: 3

## 2024-06-25 NOTE — CARE COORDINATION
Case Management Discharge Note          Date / Time of Note: 6/25/2024 1:39 PM                  Patient Name: Jorge Gauthier   YOB: 1945  Diagnosis: HCAP (healthcare-associated pneumonia) [J18.9]  Acute on chronic respiratory failure with hypoxia (HCC) [J96.21]  Multifocal pneumonia [J18.9]   Date / Time: 6/21/2024  3:35 AM    Financial:  Payor: MEDICARE / Plan: MEDICARE PART A AND B / Product Type: *No Product type* /      Pharmacy:    [id number] [place], [place] - [address] [facility] [address] [facility] [facility] [phone] - F [phone]  [address] [address]  Phone: [phone] Fax: [phone]      Assistance purchasing medications?: Potential Assistance Purchasing Medications: No  Assistance provided by Case Management: None at this time    DISCHARGE Disposition: Home with Home Health Care    Home Care:  Home Care ordered at discharge: Yes  Home Care Agency: [facility]  Phone: [phone]  Fax: [phone]  Orders faxed: can pull from [employer]    Transportation:  Transportation PLAN for discharge: family   Mode of Transport: Private Car  Time of Transport: will call wife    [facility] Completed:   Yes, Case management has presented and reviewed IMM letter #2.       IMM Letter date given:: 06/24/24   .   Patient and/or family/POA verbalized understanding of their medicare rights and appeal process if needed. Patient and/or family/POA has signed, initialed and placed the date and time on IMM letter #2 on the the appropriate lines. Copy of letter offered and they are aware that the original copy of IMM letter #2 is available prior to discharge from the paper chart on the unit.  Electronic documentation has been entered into epic for IMM letter #2 and original paper copy has been added to the paper chart at the nurses station.     Additional CM Notes:   DC order noted.  [facility] to follow for home care needs. Wife will transport.  Oxygen through [facility].      The 
     [facility] Nurse  Consult Note       NAME:  Jorge Gauthier  MEDICAL RECORD NUMBER:  [id number]  AGE: 79 y.o.   GENDER: male  : 1945  TODAY'S DATE:  2024    Subjective   Reason for WOCN Evaluation and Assessment: buttock wound; pt has stage 2 pressure injury to right buttock, POA      Jorge Gauthier is a 79 y.o. male referred by:   [] Physician  [x] Nursing  [] Other:     Wound Identification:  Wound Type: pressure  Contributing Factors: chronic pressure, decreased mobility, and shear force    Wound History: pt tells me he had wound last admission  Current Wound Care Treatment:  silicone lotion    Patient Goal of Care:  [x] Wound Healing  [] Odor Control  [] Palliative Care  [] Pain Control   [] Other:         PAST MEDICAL HISTORY        Diagnosis Date    Arthritis     CAD (coronary artery disease)     CABG in     Chronic back pain     Chronic combined systolic (congestive) and diastolic (congestive) heart failure (HCC) 2018    Chronic systolic CHF (congestive heart failure) ([facility])     COPD (chronic obstructive pulmonary disease) ([facility])     Dental disease     Diabetes mellitus (HCC)     DMII (diabetes mellitus, type 2) ([facility]) 2019    Essential hypertension     Hearing loss     Hyperlipidemia 2018    Longstanding persistent atrial fibrillation (HCC) 2023    Non morbid obesity due to excess calories 2023    Paroxysmal atrial fibrillation (HCC)     On Coumadin    Primary hypertension 2018    Tinnitus        PAST SURGICAL HISTORY    Past Surgical History:   Procedure Laterality Date    ANKLE FRACTURE SURGERY Right 2024    OPEN REDUCTION INTERNAL FIXATION RIGHT ANKLE performed by José Miguel Chavez MD at [facility] OR    BACK SURGERY      BACK SURGERY      CARDIAC SURGERY      CERVICAL SPINE SURGERY      CHEST TUBE INSERTION Right 2023    14F. Dr. Jalloh    CORONARY ARTERY BYPASS GRAFT      FRACTURE SURGERY Left     Shoulder    IR 
   06/24/24 1203   Readmission Assessment   Number of Days since last admission? 1-7 days   Previous Disposition Home with Home Health  ([facility])   Who is being Interviewed Patient   What was the patient's/caregiver's perception as to why they think they needed to return back to the hospital? Other (Comment)  (increased shortness of breath)   Did you visit your Primary Care Physician after you left the hospital, before you returned this time? No   Why weren't you able to visit your PCP? Did not have an appointment   Did you see a specialist, such as Cardiac, Pulmonary, Orthopedic Physician, etc. after you left the hospital? No   Who advised the patient to return to the hospital? Self-referral   Does the patient report anything that got in the way of taking their medications? No   In our efforts to provide the best possible care to you and others like you, can you think of anything that we could have done to help you after you left the hospital the first time, so that you might not have needed to return so soon? Other (Comment)  (no)     #[phone]  Electronically signed by Dinorah Anderson RN on 6/24/2024 at 12:04 PM    
the providers was provided to: Patient     The Patient and/or Patient Representative Agree with the Discharge Plan? Yes    Dinorah Anderson RN  Case Management Department  Ph: [phone]

## 2024-06-25 NOTE — PLAN OF CARE
Problem: Safety - Adult  Goal: Free from fall injury  Outcome: Progressing  Flowsheets (Taken 6/25/2024 0645)  Free From Fall Injury: Instruct family/caregiver on patient safety     Problem: Discharge Planning  Goal: Discharge to home or other facility with appropriate resources  Outcome: Progressing  Flowsheets (Taken 6/25/2024 0645)  Discharge to home or other facility with appropriate resources:   Identify barriers to discharge with patient and caregiver   Identify discharge learning needs (meds, wound care, etc)     Problem: Skin/Tissue Integrity  Goal: Absence of new skin breakdown  Description: 1.  Monitor for areas of redness and/or skin breakdown  2.  Assess vascular access sites hourly  3.  Every 4-6 hours minimum:  Change oxygen saturation probe site  4.  Every 4-6 hours:  If on nasal continuous positive airway pressure, respiratory therapy assess nares and determine need for appliance change or resting period.  Outcome: Progressing     Problem: Chronic Conditions and Co-morbidities  Goal: Patient's chronic conditions and co-morbidity symptoms are monitored and maintained or improved  Outcome: Progressing  Flowsheets (Taken 6/25/2024 0645)  Care Plan - Patient's Chronic Conditions and Co-Morbidity Symptoms are Monitored and Maintained or Improved: Monitor and assess patient's chronic conditions and comorbid symptoms for stability, deterioration, or improvement     Problem: Respiratory - Adult  Goal: Achieves optimal ventilation and oxygenation  Outcome: Progressing  Flowsheets (Taken 6/25/2024 0645)  Achieves optimal ventilation and oxygenation:   Assess for changes in respiratory status   Assess for changes in mentation and behavior   Oxygen supplementation based on oxygen saturation or arterial blood gases   Position to facilitate oxygenation and minimize respiratory effort     Problem: Pain  Goal: Verbalizes/displays adequate comfort level or baseline comfort level  Outcome: Progressing  Flowsheets

## 2024-06-25 NOTE — PLAN OF CARE
Problem: Safety - Adult  Goal: Free from fall injury  6/25/2024 1010 by Cheryle Dunlap RN  Outcome: Progressing     Problem: Discharge Planning  Goal: Discharge to home or other facility with appropriate resources  6/25/2024 1010 by Cheryle Dunlap RN  Outcome: Progressing  Flowsheets (Taken 6/25/2024 0831)  Discharge to home or other facility with appropriate resources: Identify barriers to discharge with patient and caregiver     Problem: Skin/Tissue Integrity  Goal: Absence of new skin breakdown  Description: 1.  Monitor for areas of redness and/or skin breakdown  2.  Assess vascular access sites hourly  3.  Every 4-6 hours minimum:  Change oxygen saturation probe site  4.  Every 4-6 hours:  If on nasal continuous positive airway pressure, respiratory therapy assess nares and determine need for appliance change or resting period.  6/25/2024 1010 by Cheryle Dunlap RN  Outcome: Progressing     Problem: Chronic Conditions and Co-morbidities  Goal: Patient's chronic conditions and co-morbidity symptoms are monitored and maintained or improved  6/25/2024 1010 by Cheryle Dunlap RN  Outcome: Progressing  Flowsheets (Taken 6/25/2024 0831)  Care Plan - Patient's Chronic Conditions and Co-Morbidity Symptoms are Monitored and Maintained or Improved: Monitor and assess patient's chronic conditions and comorbid symptoms for stability, deterioration, or improvement     Problem: Respiratory - Adult  Goal: Achieves optimal ventilation and oxygenation  6/25/2024 1010 by Cheryle Dunlap RN  Outcome: Progressing

## 2024-06-25 NOTE — PROGRESS NOTES
4 Eyes Admission Assessment     I agree as the admission nurse that 2 RN's have performed a thorough Head to Toe Skin Assessment on the patient. ALL assessment sites listed below have been assessed on admission.       Areas assessed by both nurses: Cheo  []   Head, Face, and Ears   []   Shoulders, Back, and Chest  []   Arms, Elbows, and Hands   []   Coccyx, Sacrum, and Ischum  []   Legs, Feet, and Heels        Does the Patient have Skin Breakdown?  No         Jose Alfredo Prevention initiated:  Yes   Wound Care Orders initiated:  No      [facility] nurse consulted for Pressure Injury (Stage 3,4, Unstageable, DTI, NWPT, and Complex wounds):  No      Nurse 1 eSignature: Electronically signed by Cheo Peterson RN on 6/21/24 at 3:28 PM EDT    **SHARE this note so that the co-signing nurse is able to place an eSignature**    Nurse 2 eSignature: {Esignature:[id number]}                 
Blood glucose 410. Patient asymptomatic. NP notified.   
Blood sugar 367. Patient asymptomatic. Will administer 4units insulin lispro, per corrective scale order. Will monitor. NP notified.   
Blood sugar 465. Pt asymptomatic. This nurse will administer 4 units insulin lispro per correctional scale. NP notified.   
Discharge orders acknowledged by RN . Discharge teaching completed with pt and family. AVS reviewed and all questions answered. Medication regimen reviewed and pt understands schedule. Follow up appointments also reviewed with pt and resources given for discharge. Pt was sent electronic to be filled and understands schedule. IV removed. Bedside monitor removed from pt. 60+ minutes of education completed. Required core measures completed. Pt vitals WDL. Pt discharged with all belongings to home with wife. Pt transported off of unit via wheelchair. No complications.      Electronically signed by Cheryle Dunlap RN on 6/25/2024 at 4:54 PM    
Facility/Department: [address] PROGRESSIVE CARE  SLP DYSPHAGIA THERAPY  DISCHARGE SUMMARY    Patient: Jorge Gauthier   : 1945   MRN: [id number]    Treatment Date: 2024   Admitting Diagnosis:   HCAP (healthcare-associated pneumonia) [J18.9]  Acute on chronic respiratory failure with hypoxia (HCC) [J96.21]  Multifocal pneumonia [J18.9]   has a past medical history of Arthritis, CAD (coronary artery disease), Chronic back pain, Chronic combined systolic (congestive) and diastolic (congestive) heart failure (HCC) (2018), Chronic systolic CHF (congestive heart failure) (Formerly [facility]), COPD (chronic obstructive pulmonary disease) (Formerly [facility]), Dental disease, Diabetes mellitus (Formerly [facility]), DMII (diabetes mellitus, type 2) (Formerly [facility]) (2019), Essential hypertension, Hearing loss, Hyperlipidemia (2018), Longstanding persistent atrial fibrillation ([facility]ly [facility]) (2023), Non morbid obesity due to excess calories (2023), Paroxysmal atrial fibrillation ([facility]ly [facility]), Primary hypertension (2018), and Tinnitus.   has a past surgical history that includes fracture surgery (Left, ); back surgery (); Cardiac surgery (); Coronary artery bypass graft; back surgery; Cervical spine surgery; chest tube insertion (Right, 2023); IR CHEST TUBE INSERTION (3/14/2023); and Ankle fracture surgery (Right, 2024).  Allergies: medication allergies noted    Baseline History/Prior Level of Function:   Living Status: admitted from home with wife; recently discharged from [facility]  Baseline diet: advanced to regular diet texture with thin liquids at SNF - no MBS reported prior to upgrade; wife reports typically thickening liquids to slightly to mildly thick at home     Prior Dysphagia History:   Initially seen for dysphagia in 2018 with silent aspiration dating back to that time  8/3/2018 MBS with rec for regular/thin using small sips and protective cough post-swallow  1/10/2019 MBS with rec for regular/thin using small sips 
Hospitalist   Progress Note    Patient Name: Jorge Gauthier  PCP: Gallo Sanchez MD  Date of Admission: 6/21/2024    Chief Complaint on Admission: Pt sent to hospital after being found to be acting unusually and being confused   Chief diagnosis after evaluation: Multifocal pneumonia due to gram-negative bacteria, acute on chronic combined systolic (congestive) and diastolic (congestive) heart failure and acute respiratory failure with hypoxia and hypercapnea     Brief Synopsis: Patient is a 79 y.o. man who has a past medical history of Arthritis, CAD (coronary artery disease), Chronic back pain, Chronic combined systolic (congestive) and diastolic (congestive) heart failure (Formerly [facility]), Chronic systolic CHF (congestive heart failure) (Formerly [facility]), COPD (chronic obstructive pulmonary disease) (Formerly [facility]), Dental disease, Diabetes mellitus ([facility]), DMII (diabetes mellitus, type 2) ([facility]ly [facility]), Essential hypertension, Hearing loss, Hyperlipidemia, Longstanding persistent atrial fibrillation ([facility]ly [facility]), Non morbid obesity due to excess calories, Paroxysmal atrial fibrillation ([facility]), Primary hypertension, and Tinnitus. who was admitted on 6/21/2024 for evaluation and treatment of multifocal pneumonia due to gram-negative bacteria, acute on chronic combined systolic (congestive) and diastolic (congestive) heart failure and acute respiratory failure with hypoxia and hypercapnea     Pt Seen/Examined and Chart Reviewed.     Subjective: Pt is feeling better and has no new complaints. His mentation has improved    Objective:  Allergies  Donepezil, Amitriptyline, and Enoxaparin    Medications    Scheduled Meds:   sodium chloride flush  5-40 mL IntraVENous 2 times per day    cefepime  2,000 mg IntraVENous Q8H    metroNIDAZOLE  500 mg IntraVENous Q8H    vancomycin (VANCOCIN) intermittent dosing (placeholder)   Other RX Placeholder    [Held by provider] atorvastatin  20 mg Oral Nightly    [Held by provider] Roflumilast  500 mcg Oral BID    [Held by provider] 
Hospitalist   Progress Note    Patient Name: Jorge Gauthier  PCP: Gallo Sanchez MD  Date of Admission: 6/21/2024    Chief Complaint on Admission: Pt sent to hospital after being found to be acting unusually and being confused   Chief diagnosis after evaluation: Multifocal pneumonia due to gram-negative bacteria, acute on chronic combined systolic (congestive) and diastolic (congestive) heart failure and acute respiratory failure with hypoxia and hypercapnea     Brief Synopsis: Patient is a 79 y.o. man who has a past medical history of Arthritis, CAD (coronary artery disease), Chronic back pain, Chronic combined systolic (congestive) and diastolic (congestive) heart failure (Formerly [facility]), Chronic systolic CHF (congestive heart failure) ([facility]), COPD (chronic obstructive pulmonary disease) ([facility]ly [facility]), Dental disease, Diabetes mellitus ([facility]), DMII (diabetes mellitus, type 2) ([facility]), Essential hypertension, Hearing loss, Hyperlipidemia, Longstanding persistent atrial fibrillation ([facility]), Non morbid obesity due to excess calories, Paroxysmal atrial fibrillation ([facility]), Primary hypertension, and Tinnitus. who was admitted on 6/21/2024 for evaluation and treatment of multifocal pneumonia due to gram-negative bacteria, acute on chronic combined systolic (congestive) and diastolic (congestive) heart failure and acute respiratory failure with hypoxia and hypercapnea     Pt Seen/Examined and Chart Reviewed.     Subjective: Pt has no new complaints today    Objective:  Allergies  Donepezil, Amitriptyline, and Enoxaparin    Medications    Scheduled Meds:   furosemide  40 mg IntraVENous BID    meropenem  1,000 mg IntraVENous Q8H    ipratropium 0.5 mg-albuterol 2.5 mg  1 Dose Inhalation Q4H WA RT    sodium chloride (Inhalant)  15 mL Nebulization Q4H WA RT    methylPREDNISolone  40 mg IntraVENous Q12H    sodium chloride flush  5-40 mL IntraVENous 2 times per day    [Held by provider] atorvastatin  20 mg Oral Nightly    [Held by provider] 
NT suction attempted x3. Patient did not tolerate well. Unable to obtain specimen.     06/21/24 4759   Airway Clearance   Suction Nasotracheal   Subglottic Suction Done No   Suction Device Suction catheter   Suction Catheter Size 14 Fr   Sputum Method Obtained Nasal tracheal   Sputum Amount Other (comment)  (None)   Sputum Color/Odor Other (comment)  (None)   Sputum Consistency Other (comment)  (None)     Electronically signed by Erik Hemphill RCP on 6/21/2024 at 11:38 PM    
OK to D/c per MD Elena.    Electronically signed by Cheryle Dunlap RN on 6/25/2024 at 4:20 PM    
Occupational Therapy  Facility/Department: [address] PROGRESSIVE CARE  Occupational Therapy Daily Assessment    Name: Jorge Gauthier  : 1945  MRN: [id number]  Date of Service: 2024    Discharge Recommendations:  24 hour supervision or assist, 2-3 sessions per week  OT Equipment Recommendations  Equipment Needed: No     Jorge Gauthier scored a 16/24 on the AM-PAC ADL Inpatient form. Current research shows that an AM-PAC score of 18 or greater is typically associated with a discharge to the patient's home setting. Based on the patient's AM-PAC score, and their current ADL deficits, it is recommended that the patient have 2-3 sessions per week of Occupational Therapy at d/c to increase the patient's independence.  At this time, this patient demonstrates the endurance and safety to discharge home with home therapy and a follow up treatment frequency of 2-3x/wk.   Please see assessment section for further patient specific details.    If patient discharges prior to next session this note will serve as a discharge summary.  Please see below for the latest assessment towards goals.      Patient Diagnosis(es): The primary encounter diagnosis was HCAP (healthcare-associated pneumonia). A diagnosis of Acute on chronic respiratory failure with hypoxia (HCC) was also pertinent to this visit.  Past Medical History:  has a past medical history of Arthritis, CAD (coronary artery disease), Chronic back pain, Chronic combined systolic (congestive) and diastolic (congestive) heart failure (HCC), Chronic systolic CHF (congestive heart failure) (HCC), COPD (chronic obstructive pulmonary disease) (HCC), Dental disease, Diabetes mellitus (HCC), DMII (diabetes mellitus, type 2) (HCC), Essential hypertension, Hearing loss, Hyperlipidemia, Longstanding persistent atrial fibrillation (HCC), Non morbid obesity due to excess calories, Paroxysmal atrial fibrillation (HCC), Primary hypertension, and Tinnitus.  Past Surgical History:  
Patient's heart rate sustaining 130-150s PRN med given. SEE MAR.    Electronically signed by Cheryle Dunlap RN on 6/25/2024 at 9:26 AM      12 EKG done to confirm patient in AFIB.    Electronically signed by Cheryle Dunlap RN on 6/25/2024 at 10:25 AM    Patient's heart up to 150s again and sustaining 130-150s. Perfect serve sent to MD Elena regarding if ok to give PRN order for metoprolol hour early. OK to give early oer MD Elena.      Electronically signed by Cheryle Dunlap RN on 6/25/2024 at 3:14 PM       
Physical Therapy      Jorge Gauthier  [id number]  [id number]    Attempted to see for PT session however nursing requested to defer as pt up in chair with RT chest vest in place; will continue to follow and attempt later as schedule permits  Electronically signed by IFEANYI GUAMAN, PT on 6/24/2024 at 8:01 AM       
Physical Therapy  Facility/Department: [address] PROGRESSIVE CARE  Physical Therapy Initial Assessment / Co-Treat with OT    Name: Jorge Gauthier  : 1945  MRN: [id number]  Date of Service: 2024    Discharge Recommendations:  24 hour supervision or assist, Home with Home health PT   PT Equipment Recommendations  Equipment Needed: No      Patient Diagnosis(es): The primary encounter diagnosis was HCAP (healthcare-associated pneumonia). A diagnosis of Acute on chronic respiratory failure with hypoxia (HCC) was also pertinent to this visit.  Past Medical History:  has a past medical history of Arthritis, CAD (coronary artery disease), Chronic back pain, Chronic combined systolic (congestive) and diastolic (congestive) heart failure (HCC), Chronic systolic CHF (congestive heart failure) (HCC), COPD (chronic obstructive pulmonary disease) (HCC), Dental disease, Diabetes mellitus (HCC), DMII (diabetes mellitus, type 2) (HCC), Essential hypertension, Hearing loss, Hyperlipidemia, Longstanding persistent atrial fibrillation (HCC), Non morbid obesity due to excess calories, Paroxysmal atrial fibrillation (HCC), Primary hypertension, and Tinnitus.  Past Surgical History:  has a past surgical history that includes fracture surgery (Left, ); back surgery (); Cardiac surgery (); Coronary artery bypass graft; back surgery; Cervical spine surgery; chest tube insertion (Right, 2023); IR CHEST TUBE INSERTION (3/14/2023); and Ankle fracture surgery (Right, 2024).    Assessment   Assessment: Jorge Gauthier is a 79 y.o. M admit 2024 with pneumonia infection in context of dysphagia. Prior to admit patient had been at a rehab facility s/p surgical repair of R distal fibular fracture and is presently required to be toe-touch weight bearing. He had returned home from rehab facility for a day prior to this admit and was requiring assistance from his spouse for transfers and self care. Today, he was 
Physical Therapy  Facility/Department: [address] PROGRESSIVE CARE  Physical Therapy Treatment note    Name: Jorge Gauthier  : 1945  MRN: [id number]  Date of Service: 2024    Discharge Recommendations:  24 hour supervision or assist, Home with Home health PT   PT Equipment Recommendations  Equipment Needed: No      Patient Diagnosis(es): The primary encounter diagnosis was HCAP (healthcare-associated pneumonia). A diagnosis of Acute on chronic respiratory failure with hypoxia (HCC) was also pertinent to this visit.  Past Medical History:  has a past medical history of Arthritis, CAD (coronary artery disease), Chronic back pain, Chronic combined systolic (congestive) and diastolic (congestive) heart failure (HCC), Chronic systolic CHF (congestive heart failure) (HCC), COPD (chronic obstructive pulmonary disease) (HCC), Dental disease, Diabetes mellitus (HCC), DMII (diabetes mellitus, type 2) (HCC), Essential hypertension, Hearing loss, Hyperlipidemia, Longstanding persistent atrial fibrillation (HCC), Non morbid obesity due to excess calories, Paroxysmal atrial fibrillation (HCC), Primary hypertension, and Tinnitus.  Past Surgical History:  has a past surgical history that includes fracture surgery (Left, ); back surgery (); Cardiac surgery (); Coronary artery bypass graft; back surgery; Cervical spine surgery; chest tube insertion (Right, 2023); IR CHEST TUBE INSERTION (3/14/2023); and Ankle fracture surgery (Right, 2024).    Assessment   Body Structures, Functions, Activity Limitations Requiring Skilled Therapeutic Intervention: Decreased functional mobility   Assessment: Jorge Gauthier is a 79 y.o. M admit 2024 with pneumonia infection in context of dysphagia. Prior to admit patient had been at a rehab facility s/p surgical repair of R distal fibular fracture and is presently required to be toe-touch weight bearing. He had returned home from rehab facility for a day prior to 
Pt is a transfer from the 3rd floor pt A&O was on 5L NC, pt was in the middle getting situated  in bed and started of having a BM pt then started going into respiratory distress, pt dropped to the low 60's, a rapid was called, pt is now on 8L HF o2 sating in the high 90's, pt is resting in bed, not in any distress at the moment, family at the bedside, purewick in place, call light in reach, bed in lowest position   
Pulmonary Progress Note    CC:  Follow up respiratory failure    Subjective:  4 liters of oxygen  Parainfluenza virus noted on pneumonia panel  His breathing is much improved  Coughing up a lot of the mucus and likes the chest vest      Intake/Output Summary (Last 24 hours) at 6/23/2024 1115  Last data filed at 6/23/2024 0200  Gross per 24 hour   Intake 120 ml   Output 2000 ml   Net -1880 ml         PHYSICAL EXAM:  Blood pressure (!) 131/59, pulse 82, temperature 97.7 °F (36.5 °C), temperature source Oral, resp. rate 15, height 1.803 m (5' 11\"), weight 94.6 kg (208 lb 8.9 oz), SpO2 95 %.'  Gen: Chronically ill appearing   Eyes: PERRL. No sclera icterus. No conjunctival injection.   ENT: No discharge. Pharynx clear. External appearance of ears and nose normal.  Neck: Trachea midline. No obvious mass.    Resp: Diminished with rhonchi  CV: Regular rate. Regular rhythm. No murmur or rub.    GI: Non-tender. Non-distended. No hernia.   Skin: Pale and cyanotic   Lymph: No cervical LAD. No supraclavicular LAD.   M/S: No cyanosis. No clubbing. No joint deformity.    Neuro: Moves all four extremities. CN 2-12 tested, no defect noted.  Ext:   LLE edema, RLE in walking boot     Medications:    Scheduled Meds:   furosemide  40 mg IntraVENous BID    meropenem  1,000 mg IntraVENous Q8H    ipratropium 0.5 mg-albuterol 2.5 mg  1 Dose Inhalation Q4H WA RT    sodium chloride (Inhalant)  15 mL Nebulization Q4H WA RT    methylPREDNISolone  40 mg IntraVENous Q12H    sodium chloride flush  5-40 mL IntraVENous 2 times per day    [Held by provider] atorvastatin  20 mg Oral Nightly    [Held by provider] Roflumilast  500 mcg Oral BID    [Held by provider] pramipexole  0.5 mg Oral BID    [Held by provider] pramipexole  0.25 mg Oral Nightly    [Held by provider] lamoTRIgine  100 mg Oral Nightly    [Held by provider] lamoTRIgine  75 mg Oral QAM    [Held by provider] gabapentin  800 mg Oral 4x Daily    fluticasone  2 spray Each Nostril BID    
Pulmonary Progress Note    CC:  Follow up respiratory failure    Subjective:  4 liters of oxygen  Sputum normal jodi   He feels better      Intake/Output Summary (Last 24 hours) at 6/25/2024 0484  Last data filed at 6/25/2024 0427  Gross per 24 hour   Intake 740 ml   Output 900 ml   Net -160 ml           PHYSICAL EXAM:  Blood pressure 126/66, pulse 76, temperature 98.3 °F (36.8 °C), temperature source Oral, resp. rate 18, height 1.803 m (5' 11\"), weight 97.2 kg (214 lb 4.6 oz), SpO2 96 %.'  Gen: Chronically ill appearing   Eyes: PERRL. No sclera icterus. No conjunctival injection.   ENT: No discharge. Pharynx clear. External appearance of ears and nose normal.  Neck: Trachea midline. No obvious mass.    Resp: Diminished with faint rhonchi   CV: Regular rate. Regular rhythm. No murmur or rub.    GI: Non-tender. Non-distended. No hernia.   Skin: Pale and cyanotic   Lymph: No cervical LAD. No supraclavicular LAD.   M/S: No cyanosis. No clubbing. No joint deformity.    Neuro: Moves all four extremities. CN 2-12 tested, no defect noted.  Ext:   LLE edema, RLE in walking boot     Medications:    Scheduled Meds:   insulin lispro  0-16 Units SubCUTAneous TID WC    insulin lispro  0-4 Units SubCUTAneous Nightly    furosemide  40 mg IntraVENous BID    meropenem  1,000 mg IntraVENous Q8H    ipratropium 0.5 mg-albuterol 2.5 mg  1 Dose Inhalation Q4H WA RT    sodium chloride (Inhalant)  15 mL Nebulization Q4H WA RT    methylPREDNISolone  40 mg IntraVENous Q12H    sodium chloride flush  5-40 mL IntraVENous 2 times per day    [Held by provider] atorvastatin  20 mg Oral Nightly    [Held by provider] Roflumilast  500 mcg Oral BID    [Held by provider] pramipexole  0.5 mg Oral BID    [Held by provider] pramipexole  0.25 mg Oral Nightly    [Held by provider] lamoTRIgine  100 mg Oral Nightly    [Held by provider] lamoTRIgine  75 mg Oral QAM    [Held by provider] gabapentin  800 mg Oral 4x Daily    fluticasone  2 spray Each Nostril BID 
Report given to nurse on 5w, questions and concerns addressed. Wife undated on patient transfer.   
SLP ALL NOTES      1030:  follow up for dysphagia attempted: pt in with PT.      1245:  MD upgraded pt to regular and thin liquids.  D/w nsg and ST will follow up later this date as able.    1645:  d/w nsg: pt tolerating diet with no concern for dysphagia per nsg observation.    Plan:  ST to follow up with pt 6/24    Josi Keene MS [facility]/SLP [id number]  Speech Language Pathologist    
SLP NOTE    Swallow eval order received. Swallow evaluation attempted. Patient (with wife at bedside) met in room. Concern for labored work of breathing with patient report for difficulty breathing. ST to hold swallow evaluation at this time d/t concern for current respiratory status presenting increased safety risk with PO. ST to re-attempt as schedule permits unless otherwise notified.    Patient with repeated complaints for dry mouth exacerbating difficulties breathing. Oral care provided with swabs, water, and mouth moisturizer left at bedside with wife for continued moistening PRN; wife receptive to instruction to only use water to moisten. Patient left with RT who arrived at bedside as oral care was concluded.    Recommend to hold PO pending current respiratory status.    Addendum: Patient agitated and yelling at wife at bedside. This writer re-entered room to reinforce education re: rationale for holding PO at this time and to re-assure patient ST will re-attempt. RN and nurse manager at bedside and aware.    Thank you.  Vania Beckwith MA, CCC-SLP, #[id number]  Speech-Language Pathologist  Portable phone: [phone]    
   aspirin  81 mg Oral Daily    insulin lispro  0-8 Units SubCUTAneous TID WC    insulin lispro  0-4 Units SubCUTAneous Nightly    mometasone-formoterol  2 puff Inhalation BID RT    enoxaparin  100 mg SubCUTAneous BID       Continuous Infusions:   sodium chloride      dextrose         PRN Meds:  morphine, sodium chloride flush, sodium chloride, ondansetron **OR** ondansetron, polyethylene glycol, acetaminophen **OR** acetaminophen, albuterol, [Held by provider] benzonatate, glucose, dextrose bolus **OR** dextrose bolus, glucagon (rDNA), dextrose, metoprolol    Labs:  CBC:   Recent Labs     06/23/24  0504 06/24/24  0522   WBC 7.9 10.8   HGB 10.1* 10.5*   HCT 31.3* 33.3*   MCV 81.0 81.5    396       BMP:   Recent Labs     06/23/24  0504 06/24/24  0522    137   K 4.5 4.3   CL 96* 97*   CO2 30 28   BUN 18 24*   CREATININE 1.0 0.8       LIVER PROFILE:   No results for input(s): \"AST\", \"ALT\", \"LIPASE\", \"AMYLASE\", \"BILIDIR\", \"BILITOT\", \"ALKPHOS\" in the last 72 hours.    Invalid input(s): \"ALB\"    PT/INR: No results for input(s): \"PROTIME\", \"INR\" in the last 72 hours.  APTT: No results for input(s): \"APTT\" in the last 72 hours.  UA:  No results for input(s): \"NITRITE\", \"COLORU\", \"PHUR\", \"LABCAST\", \"WBCUA\", \"RBCUA\", \"MUCUS\", \"TRICHOMONAS\", \"YEAST\", \"BACTERIA\", \"CLARITYU\", \"SPECGRAV\", \"LEUKOCYTESUR\", \"UROBILINOGEN\", \"BILIRUBINUR\", \"BLOODU\", \"GLUCOSEU\", \"AMORPHOUS\" in the last 72 hours.    Invalid input(s): \"KETONESU\"    No results for input(s): \"PH\", \"PCO2\", \"PO2\" in the last 72 hours.    ECHO:   - Left ventricle: The cavity size is mildly dilated. Wall thickness is     normal. Systolic function is normal. The estimated ejection fraction is     52-57%, by biplane method of disks. Wall motion is normal; there are no     regional wall motion abnormalities. Doppler parameters are consistent with     a restrictive pattern, indicative of decreased left ventricular diastolic     compliance and increased left atrial 
by provider] Roflumilast  500 mcg Oral BID    [Held by provider] pramipexole  0.5 mg Oral BID    [Held by provider] pramipexole  0.25 mg Oral Nightly    [Held by provider] lamoTRIgine  100 mg Oral Nightly    [Held by provider] lamoTRIgine  75 mg Oral QAM    [Held by provider] gabapentin  800 mg Oral 4x Daily    fluticasone  2 spray Each Nostril BID    [Held by provider] carvedilol  3.125 mg Oral BID WC    aspirin  81 mg Oral Daily    insulin lispro  0-8 Units SubCUTAneous TID WC    insulin lispro  0-4 Units SubCUTAneous Nightly    mometasone-formoterol  2 puff Inhalation BID RT    enoxaparin  100 mg SubCUTAneous BID     Infusions:   sodium chloride      dextrose       PRN Meds:  morphine, sodium chloride flush, sodium chloride, ondansetron **OR** ondansetron, polyethylene glycol, acetaminophen **OR** acetaminophen, albuterol, [Held by provider] benzonatate, glucose, dextrose bolus **OR** dextrose bolus, glucagon (rDNA), dextrose, metoprolol    Physical    VITALS:  BP (!) 125/53   Pulse 80   Temp 98 °F (36.7 °C) (Oral)   Resp 16   Ht 1.803 m (5' 11\")   Wt 94.6 kg (208 lb 8.9 oz)   SpO2 95%   BMI 29.09 kg/m²   CONSTITUTIONAL:  Obese 79 y.o. year-old male who is awake, alert, cooperative, no apparent distress, and appears stated age  EYES:  Lids and lashes normal, PERRL, EOMI, sclera clear, conjunctiva normal  ENT:  NC/AT, MMM    NECK:  Supple, symmetrical, trachea midline, no adenopathy  HEMATOLOGIC/LYMPHATICS:  no cervical, supraclavicular or axillary lymphadenopathy  LUNGS:  clear to auscultation bilaterally, No increased work of breathing, good air exchange, no crackles or wheezing  CARDIOVASCULAR:  Regular rate and rhythm, normal S1 and S2, no S3 or S4, and no significant murmurs, rubs or gallops noted. Normal apical impulse.   ABDOMEN:  Normal active bowel sounds, soft, non-tender, non-distended, no masses palpated, no organomegally  EXTREMITIES.  Right foot in a boot (recent fracture) otherwise 
fibrillation (HCC), Non morbid obesity due to excess calories, Paroxysmal atrial fibrillation (HCC), Primary hypertension, and Tinnitus who was recently discharged from the hospital after admission for acute hypoxic respiratory failure secondary to multifocal pneumonia and parainfluenza virus infection. He presents to the ER for evaluation after being found to be acting unusually and being confused. On evaluation in the ER he was found to have pneumonia and associated respiratory failure and sepsis.  Associated signs and symptoms do not include fever or chills, hemoptysis or wheezing. He is being admitted for further evaluation and treatment.   Consults noted:     IMAGING:  CXR: 6/21/2024  IMPRESSION:  1. Worsening bilateral airspace opacities since June 16, 2024.  2. Small right pleural effusion.    EMR REVIEW:  Over the last year, patient with 11/24/2023, 1/12/2024, 1/29/2024, 3/19/2024, 5/7/2024, 6/10/2024, 6/16/2024 hospital admits r/t pneumonia, resp failure, and/or hypoxia  Active with Palliative Care at home (initiated 4/23/2024); wife reports plan for follow-up visit July 2024  Recent OP Pulmonology Visit: 4/16/2024  Chronic pulmonary aspiration: \"Patient is likely continue to aspirate with ongoing chronic infection. He has received multiple rounds of antibiotics\"     Most Recent Modified Barium Swallow Study on record: 3/15/2023  Mild-moderate oral stage dysphagia characterized by decreased mastication and decreased lingual manipulation.  Prolonged but adequate mastication with textured solids.  Prolonged bolus formation and movement with solids.  Reduced bolus control with premature bolus loss to the pharynx over the tongue base with all.  Moderate-severe pharyngeal stage dysphagia characterized by delayed swallow initiation, decreased laryngeal elevation, decreased pharyngeal peristalsis, and reduced sensation.  Premature spillage to the valleculae with all.   Occasional shallow penetration with honey via 
Out 1053         Minutes 30         Timed Code Treatment Minutes: 15 Minutes (15 minute eval)       Reji Mcallister, OTR/L

## 2024-06-25 NOTE — PLAN OF CARE
Problem: Safety - Adult  Goal: Free from fall injury  6/25/2024 1516 by Cheryle Dunlap RN  Outcome: Adequate for Discharge     Problem: Discharge Planning  Goal: Discharge to home or other facility with appropriate resources  6/25/2024 1516 by Cheryle Dunlap RN  Outcome: Adequate for Discharge     Problem: Skin/Tissue Integrity  Goal: Absence of new skin breakdown  Description: 1.  Monitor for areas of redness and/or skin breakdown  2.  Assess vascular access sites hourly  3.  Every 4-6 hours minimum:  Change oxygen saturation probe site  4.  Every 4-6 hours:  If on nasal continuous positive airway pressure, respiratory therapy assess nares and determine need for appliance change or resting period.  6/25/2024 1516 by Cheryle Dunlap RN  Outcome: Adequate for Discharge     Problem: Chronic Conditions and Co-morbidities  Goal: Patient's chronic conditions and co-morbidity symptoms are monitored and maintained or improved  6/25/2024 1516 by Cheryle Dunlap RN  Outcome: Adequate for Discharge     Problem: Respiratory - Adult  Goal: Achieves optimal ventilation and oxygenation  6/25/2024 1516 by Cheryle uDnlap RN  Outcome: Adequate for Discharge

## 2024-06-25 NOTE — NURSE NAVIGATOR
Discharge order noted. Pt has a follow up appointment scheduled with his   Primary care Dr. Sanchez 7/3/24 @ 10am. This is noted on his AVS. His uriah RN will review with him and his family. He has heart failure instructions included on the AVS as well as the [facility] for [facility].   Wt 214 lbs

## 2024-06-25 NOTE — CONSULTS
Clinical Pharmacy Note  Vancomycin Consult    Jorge Gauthier is a 79 y.o. male ordered vancomycin for PNA; consult received from Dr. Maria to manage therapy. Also receiving Cefepime/Metronidazole.    Allergies:  Donepezil, Amitriptyline, and Enoxaparin     Temp max:  Temp (24hrs), Av.1 °F (36.7 °C), Min:97.4 °F (36.3 °C), Max:98.7 °F (37.1 °C)      Recent Labs     24  0856 24  0356   WBC 9.0 12.4*       Recent Labs     24  0856 24  0356   BUN 19 19   CREATININE 0.8 1.1         Intake/Output Summary (Last 24 hours) at 2024 0930  Last data filed at 2024 0827  Gross per 24 hour   Intake --   Output 250 ml   Net -250 ml       Culture Results:  pending    Ht Readings from Last 1 Encounters:   24 1.803 m (5' 11\")        Wt Readings from Last 1 Encounters:   24 105.6 kg (232 lb 12.9 oz)         Estimated Creatinine Clearance: 67 mL/min (based on SCr of 1.1 mg/dL).    Assessment/Plan:  Day # 1 of Vancomycin.  Patient received Vancomycin 2,000 mg IVPB x 1 in the ER  Patient presents with mild WEI (serum creatinine = 1.1 mg/dL, baseline ~ 0.8 mg/dL)  Will obtain a random Vancomycin level on 24 at 2200 to assist with subsequent dosing/management    Thank you for the consult.  Will continue to follow.    Luis Alberto Bocanegra RPH, PharmD, BCPS  2024 9:37 AM    
Clinical Pharmacy Note  Vancomycin Consult    Jorge Gauthier is a 79 y.o. male ordered vancomycin for PNA; consult received from Dr. Maria to manage therapy. Also receiving Cefepime/Metronidazole.    Allergies:  Donepezil, Amitriptyline, and Enoxaparin     Temp max:  Temp (24hrs), Av.9 °F (36.6 °C), Min:97.4 °F (36.3 °C), Max:98.7 °F (37.1 °C)      Recent Labs     24  0856 24  0356   WBC 9.0 12.4*         Recent Labs     24  0856 24  0356   BUN 19 19   CREATININE 0.8 1.1           Intake/Output Summary (Last 24 hours) at 2024 2330  Last data filed at 2024 2140  Gross per 24 hour   Intake --   Output 2665 ml   Net -2665 ml         Culture Results:  pending    Ht Readings from Last 1 Encounters:   24 1.803 m (5' 11\")        Wt Readings from Last 1 Encounters:   24 105.6 kg (232 lb 12.9 oz)         Estimated Creatinine Clearance: 67 mL/min (based on SCr of 1.1 mg/dL).    Assessment/Plan:  Day # 2 of Vancomycin.  Patient received Vancomycin 2,000 mg IVPB x 1 in the ER  Patient presents with mild WEI (serum creatinine = 1.1 mg/dL, baseline ~ 0.8 mg/dL)  Vanc random: 9.0 24 2155    Plan - dose 1g now and draw vanc random 24 1200    Thank you for the consult.  Will continue to follow.    Damaris Muniz RPH, PharmD, BCPS  2024 11:30 PM    
Clinical Pharmacy Note  Vancomycin Consult    Pharmacy consult received for one-time dose of vancomycin in the Emergency Department per Dr. Golden.    Ht Readings from Last 1 Encounters:   06/21/24 1.803 m (5' 11\")        Wt Readings from Last 1 Encounters:   06/21/24 105.6 kg (232 lb 12.9 oz)         Assessment/Plan:  Vancomycin 2g x 1 in ED.  If vancomycin is to continue on admission and pharmacy is to manage dosing, please re-consult with admission orders.    Erna Muniz, ShawnD      
Comprehensive Nutrition Assessment    Type and Reason for Visit:  Wound, Consult    Nutrition Recommendations/Plan:   Continue 5 carb diet w/ nectar thick liquids. Add alejandrina BID to order     Malnutrition Assessment:  Malnutrition Status:  No malnutrition (06/25/24 1203)    Context:  Acute Illness       Nutrition Assessment:    Pt presenting w/ multifocal pneumonia. Consult for wounds. Stage 2 pressure injury to buttock. Currently in droplet plus isolation. No recent hx of significant wt loss. Eating well, finished lunch today. Will send wound healing ONS BID.    Nutrition Related Findings:    310 glucose Wound Type: Skin Tears, Stage II, Pressure Injury       Current Nutrition Intake & Therapies:    Average Meal Intake: %  Average Supplements Intake: None Ordered  ADULT DIET; Regular; 5 carb choices (75 gm/meal); Mildly Thick (Nectar)    Anthropometric Measures:  Height: 180.3 cm (5' 11\")  Ideal Body Weight (IBW): 172 lbs (78 kg)       Current Body Weight: 97.2 kg (214 lb 4.6 oz),   IBW.    Current BMI (kg/m2): 29.9                               Estimated Daily Nutrient Needs:  Energy Requirements Based On: Kcal/kg  Weight Used for Energy Requirements: Current  Energy (kcal/day): 1944 - 2430 (20 - 25 kcal/kg)  Weight Used for Protein Requirements: Ideal  Protein (g/day): 109 (1.4 g/kg IBW)  Method Used for Fluid Requirements: 1 ml/kcal  Fluid (ml/day): or per provider    Nutrition Diagnosis:   Increased nutrient needs related to increase demand for energy/nutrients as evidenced by wounds    Nutrition Interventions:   Food and/or Nutrient Delivery: Continue Current Diet, Start Oral Nutrition Supplement  Nutrition Education/Counseling: Education not indicated  Coordination of Nutrition Care: Continue to monitor while inpatient       Goals:     Goals: Meet at least 75% of estimated needs, by next RD assessment       Nutrition Monitoring and Evaluation:   Behavioral-Environmental Outcomes: None 
PATIENT IS SEEN AT THE REQUEST OF DR. tomas for pneumonia, respiratory failure    HISTORY OF PRESENT ILLNESS:  This is a 79 y.o. male who is well known to our service due to recurrent admission and chronic aspiration.  He presented to the ED with change in mental status.  Per ED notes he had recent discharge for viral pneumonia.  He returned due to confusion and hypoxia. He was aggressive to staff in the ED.  Had aspiration event yesterday on the floor but he denies that.  He said he was at home and what happens is that he gets suddenly SOB without any trigger.  The only thing that gets him better is the oxygen from the hospital.  Increasing his home oxygen does not help.  He is near baseline.  Feels very congested and feels like he needs something to help him cough up the mucus better       Established Pulmonologist:  [place]     PAST MEDICAL HISTORY:  Past Medical History:   Diagnosis Date    Arthritis     CAD (coronary artery disease)     CABG in 2009    Chronic back pain     Chronic combined systolic (congestive) and diastolic (congestive) heart failure (HCC) 04/06/2018    Chronic systolic CHF (congestive heart failure) ([facility])     COPD (chronic obstructive pulmonary disease) (HCC)     Dental disease     Diabetes mellitus (HCC)     DMII (diabetes mellitus, type 2) ([facility]) 04/14/2019    Essential hypertension     Hearing loss     Hyperlipidemia 07/30/2018    Longstanding persistent atrial fibrillation (HCC) 03/14/2023    Non morbid obesity due to excess calories 02/04/2023    Paroxysmal atrial fibrillation (HCC)     On Coumadin    Primary hypertension 07/30/2018    Tinnitus        PAST SURGICAL HISTORY:  Past Surgical History:   Procedure Laterality Date    ANKLE FRACTURE SURGERY Right 5/17/2024    OPEN REDUCTION INTERNAL FIXATION RIGHT ANKLE performed by José Miguel Chavez MD at [facility] OR    BACK SURGERY  1995    BACK SURGERY      CARDIAC SURGERY  2007    CERVICAL SPINE SURGERY      CHEST TUBE INSERTION Right 
exacerbation of condition/side effects of treatment.  Therapy requires intensive monitoring for toxicity        The patient and/or authorized decision maker consented to a voluntary Advance Care Planning conversation.   Decisional Capacity: Full   Individuals present for the conversation:  Patient with decision making capacity  Other persons present:  None    Legal Healthcare Decision Maker(s):    Primary Decision Maker: Portia Gauthier - Spouse - [phone]    Secondary Decision Maker: Bang Gauthier Jr - Child - [phone]    ACP documents available in EMR prior to discussion:  [x] Advance Medical Directive  [x] Portable DNR  [] POLST  [] [place] MOST   [] None  [] ACP documents have been previously completed by patient or surrogate, but are not available today for review.      Goals of Care Determination: Patient wants comfort care (NO CPR, vent, surgery, HD, trach, PEG)    Resuscitation Status:   Code Status: DNR-CC    Outcomes / Completed Documentation:  An explanation of advance directives and their importance was provided and the following forms completed:    [] Advance Medical Directive  [] Portable DNR   [] POLST  [x] No new documents completed  [] Patient or surrogate was asked to provide previously completed documents to the palliative team    If new document completed, original was provided to patient and/or family member.    Copy was placed for scanning into the [facility] EMR.      I spent 20 minutes providing separately identifiable ACP services with the patient and/or surrogate decision maker in a voluntary, in-person conversation discussing the patient's wishes and goals as detailed in the above note.          Signed By: Electronically signed by FLAVIO Fischer CNP on 6/24/2024 at 2:01 PM  Palliative Medicine     June 24, 2024

## 2024-06-26 NOTE — DISCHARGE SUMMARY
V2.0  Discharge Summary    Name:  Jorge Gauthier /Age/Sex: 1945 (79 y.o. male)   Admit Date: 2024  Discharge Date: 24    MRN & CSN:  [id number] & [id number] Encounter Date and Time 24 11:43 PM EDT    Attending:  No att. providers found Discharging Provider: Shane Elena MD       Hospital Course:     Brief HPI: Jorge Gauthier is a 79 y.o. male who presented with altered mental status    Brief Problem Based Course:   Sepsis due to multifocal pneumonia: Patient presented to the hospital due to increased confusion after discharge from recent hospitalization.  On ED evaluation patient was satting in the 80s, was noted to have leukocytosis, tachycardia, tachypnea, and fever.  Imaging showed worsening airspace opacities compared to previous admission.  Patient was started on IV antibiotics.  Patient clinically improved and was discharged having completed 1 week course.  Acute on chronic hypoxic respiratory failure with hypercapnia: Patient presented hypoxic into the 80s on his baseline 3 L O2.  Initial blood gases also demonstrated respiratory acidosis with hypercapnia.  Patient's pneumonia was treated and he was able to be weaned to his baseline oxygen prior to discharge  Acute metabolic encephalopathy: Secondary to severe illness with sepsis, pneumonia, hypoxia.  Improved with treatment of above conditions      The patient expressed appropriate understanding of, and agreement with the discharge recommendations, medications, and plan.     Consults this admission:  PHARMACY TO DOSE VANCOMYCIN  IP CONSULT TO HOSPITALIST  PHARMACY TO DOSE VANCOMYCIN  IP CONSULT TO PALLIATIVE CARE  IP CONSULT TO PULMONOLOGY  IP CONSULT TO DIETITIAN  IP CONSULT TO HOME CARE NEEDS    Discharge Diagnosis:   Multifocal pneumonia  Sepsis 2/2 pneumonia  Acute on chronic respiratory failure with hypoxia and hypercapnia    Discharge Instruction:   Follow up appointments:   Primary care physician: Gallo Sanchez MD

## 2024-07-03 NOTE — PROGRESS NOTES
Physician Progress Note      PATIENT:               JAROD MOREJON  CSN #:                  [id number]  :                       1945  ADMIT DATE:       2024 8:24 PM  DISCH DATE:        2024 2:56 PM  RESPONDING  PROVIDER #:        Reji Del Rosario MD          QUERY TEXT:    Pt admitted with SOB.  Pt noted to have pneumonia. If possible, please   document in the progress notes and discharge summary if you are evaluating   and/or treating any of the following:      The medical record reflects the following:  Risk Factors: 78 yo w/ recent admission for aspiration pna, hx of CHF  Clinical Indicators: Per dc summary: multiple admissions for pneumonia,   recently discharged from the hospital 2024 after being treated for   aspiration pneumonia. Multifocal pneumonia.  Parainfluenza virus infection.    Pneumonia panel again positive for parainfluenza virus.  Treatment: CT, CXR, Pulmonology consult, IV Maxipime, IV Vanc, DC'd home w/   Levaquin, Nectar thick liquids  Options provided:  -- Gram negative pneumonia  -- Gram positive pneumonia  -- Viral pneumonia  -- Aspiration pneumonia  -- Other - I will add my own diagnosis  -- Disagree - Not applicable / Not valid  -- Disagree - Clinically unable to determine / Unknown  -- Refer to Clinical Documentation Reviewer    PROVIDER RESPONSE TEXT:    This patient has viral pneumonia.    Query created by: Charis Shrestha on 2024 7:05 AM      Electronically signed by:  Reji Del Rosario MD 7/3/2024 11:42 AM

## 2024-07-10 ENCOUNTER — OFFICE VISIT (OUTPATIENT)
Dept: ORTHOPEDIC SURGERY | Age: 79
End: 2024-07-10

## 2024-07-10 VITALS — BODY MASS INDEX: 29.96 KG/M2 | WEIGHT: 214 LBS | HEIGHT: 71 IN

## 2024-07-10 DIAGNOSIS — S93.431D SYNDESMOTIC DISRUPTION OF RIGHT ANKLE, SUBSEQUENT ENCOUNTER: ICD-10-CM

## 2024-07-10 DIAGNOSIS — S82.851A CLOSED TRIMALLEOLAR FRACTURE OF RIGHT ANKLE, INITIAL ENCOUNTER: Primary | ICD-10-CM

## 2024-07-10 PROCEDURE — APPNB15 APP NON BILLABLE TIME 0-15 MINS: Performed by: NURSE PRACTITIONER

## 2024-07-10 PROCEDURE — 99024 POSTOP FOLLOW-UP VISIT: CPT | Performed by: NURSE PRACTITIONER

## 2024-07-10 NOTE — PROGRESS NOTES
DIAGNOSIS:    1-Right ankle trimalleolar displaced fracture, status post ORIF.  2-Right ankle distal tibiofibular syndesmosis disruption,status post  with syndesmosis screws x 2      DATE OF SURGERY: 5/17/2024.    HISTORY OF PRESENT ILLNESS:  Mr. aGuthier 79 y.o.  male who came in today for 8 weeks postoperative visit.  The patient denies any significant pain in the right ankle. Rates pain a 0/10 VAS and doing much better. He has been in a boot, and non WB.  He has chronic numbness  tingling sensation. No fever or Chills.  He i's in a rehab center working with physical therapy.  He has diabetes and dense diabetic neuropathy.  Denies smoking.    PHYSICAL EXAMINATION:  The incision healing well.  No signs of any erythema or drainage, minimal swelling. He has no pain with the active or passive range of motion of the right ankle, but decrease ROM.  He has chronic decreased sensation in bilateral lower extremities, and he is neurovascularly intact.    IMAGING:  Three views right ankle taken today in the office showed anatomic alignment of the fracture, plate and screws in good position, no loosening. Ankle mortise is well centered.  There are 2 syndesmosis screws intact.       IMPRESSION:  2 weeks out from  1-Right ankle trimalleolar displaced fracture, status post ORIF.  2-Right ankle distal tibiofibular syndesmosis disruption,status post  with syndesmosis screws x 2      PLAN: He will be WBAT in the boot, and start aggressive ROM and peroneal strengthening exercise with HHPT and OT. Off the boot in 2 weeks. No heavy impact activities.  The patient will come back for a follow up in 6 weeks.  At that time, we will take 3 views of the right ankle standing. We will most likely not remove the syndesmosis screws.        Diamond Villegas, APRN - CNP

## 2024-07-13 ENCOUNTER — HOSPITAL ENCOUNTER (INPATIENT)
Age: 79
LOS: 5 days | Discharge: HOME | End: 2024-07-18
Attending: EMERGENCY MEDICINE | Admitting: INTERNAL MEDICINE
Payer: MEDICARE

## 2024-07-13 ENCOUNTER — APPOINTMENT (OUTPATIENT)
Dept: GENERAL RADIOLOGY | Age: 79
End: 2024-07-13
Payer: MEDICARE

## 2024-07-13 DIAGNOSIS — J96.21 ACUTE ON CHRONIC RESPIRATORY FAILURE WITH HYPOXIA AND HYPERCAPNIA (HCC): ICD-10-CM

## 2024-07-13 DIAGNOSIS — J18.9 MULTIFOCAL PNEUMONIA: Primary | ICD-10-CM

## 2024-07-13 DIAGNOSIS — J96.22 ACUTE ON CHRONIC RESPIRATORY FAILURE WITH HYPOXIA AND HYPERCAPNIA (HCC): ICD-10-CM

## 2024-07-13 LAB
ALBUMIN SERPL-MCNC: 3.8 G/DL (ref 3.4–5)
ALBUMIN/GLOB SERPL: 1.3 {RATIO} (ref 1.1–2.2)
ALP SERPL-CCNC: 56 U/L (ref 40–129)
ALT SERPL-CCNC: 10 U/L (ref 10–40)
ANION GAP SERPL CALCULATED.3IONS-SCNC: 11 MMOL/L (ref 3–16)
AST SERPL-CCNC: 16 U/L (ref 15–37)
BASE EXCESS BLDV CALC-SCNC: 6.2 MMOL/L
BASE EXCESS BLDV CALC-SCNC: 7.4 MMOL/L
BASOPHILS # BLD: 0.1 K/UL (ref 0–0.2)
BASOPHILS NFR BLD: 1 %
BILIRUB SERPL-MCNC: 0.5 MG/DL (ref 0–1)
BUN SERPL-MCNC: 17 MG/DL (ref 7–20)
CALCIUM SERPL-MCNC: 9.2 MG/DL (ref 8.3–10.6)
CHLORIDE SERPL-SCNC: 99 MMOL/L (ref 99–110)
CO2 BLDV-SCNC: 35 MMOL/L
CO2 BLDV-SCNC: 37 MMOL/L
CO2 SERPL-SCNC: 30 MMOL/L (ref 21–32)
COHGB MFR BLDV: 2.1 %
COHGB MFR BLDV: 2.6 %
CREAT SERPL-MCNC: 1.1 MG/DL (ref 0.8–1.3)
DEPRECATED RDW RBC AUTO: 18.5 % (ref 12.4–15.4)
EOSINOPHIL # BLD: 0.1 K/UL (ref 0–0.6)
EOSINOPHIL NFR BLD: 1.2 %
FLUAV RNA UPPER RESP QL NAA+PROBE: NEGATIVE
FLUBV AG NPH QL: NEGATIVE
GFR SERPLBLD CREATININE-BSD FMLA CKD-EPI: 68 ML/MIN/{1.73_M2}
GLUCOSE BLD-MCNC: 155 MG/DL (ref 70–99)
GLUCOSE SERPL-MCNC: 137 MG/DL (ref 70–99)
HCO3 BLDV-SCNC: 33 MMOL/L (ref 23–29)
HCO3 BLDV-SCNC: 35 MMOL/L (ref 23–29)
HCT VFR BLD AUTO: 32.8 % (ref 40.5–52.5)
HGB BLD-MCNC: 10.1 G/DL (ref 13.5–17.5)
INR PPP: 1.38 (ref 0.85–1.15)
LACTATE BLDV-SCNC: 2 MMOL/L (ref 0.4–1.9)
LACTATE BLDV-SCNC: 3.2 MMOL/L (ref 0.4–1.9)
LYMPHOCYTES # BLD: 1 K/UL (ref 1–5.1)
LYMPHOCYTES NFR BLD: 11.1 %
MCH RBC QN AUTO: 25.2 PG (ref 26–34)
MCHC RBC AUTO-ENTMCNC: 30.9 G/DL (ref 31–36)
MCV RBC AUTO: 81.7 FL (ref 80–100)
METHGB MFR BLDV: 0.4 %
METHGB MFR BLDV: 0.7 %
MONOCYTES # BLD: 1.1 K/UL (ref 0–1.3)
MONOCYTES NFR BLD: 12.4 %
NEUTROPHILS # BLD: 6.6 K/UL (ref 1.7–7.7)
NEUTROPHILS NFR BLD: 74.3 %
NT-PROBNP SERPL-MCNC: 2534 PG/ML (ref 0–449)
O2 THERAPY: ABNORMAL
O2 THERAPY: ABNORMAL
PCO2 BLDV: 59.1 MMHG (ref 40–50)
PCO2 BLDV: 62.8 MMHG (ref 40–50)
PERFORMED ON: ABNORMAL
PH BLDV: 7.35 [PH] (ref 7.35–7.45)
PH BLDV: 7.36 [PH] (ref 7.35–7.45)
PLATELET # BLD AUTO: 308 K/UL (ref 135–450)
PMV BLD AUTO: 7 FL (ref 5–10.5)
PO2 BLDV: 32 MMHG
PO2 BLDV: <30 MMHG
POTASSIUM SERPL-SCNC: 4.4 MMOL/L (ref 3.5–5.1)
PROCALCITONIN SERPL IA-MCNC: 0.09 NG/ML (ref 0–0.15)
PROT SERPL-MCNC: 6.7 G/DL (ref 6.4–8.2)
PROTHROMBIN TIME: 17.1 SEC (ref 11.9–14.9)
RBC # BLD AUTO: 4.01 M/UL (ref 4.2–5.9)
SAO2 % BLDV: 32 %
SAO2 % BLDV: 55 %
SARS-COV-2 RDRP RESP QL NAA+PROBE: NOT DETECTED
SODIUM SERPL-SCNC: 140 MMOL/L (ref 136–145)
TROPONIN, HIGH SENSITIVITY: 31 NG/L (ref 0–22)
WBC # BLD AUTO: 8.9 K/UL (ref 4–11)

## 2024-07-13 PROCEDURE — 87633 RESP VIRUS 12-25 TARGETS: CPT

## 2024-07-13 PROCEDURE — 87070 CULTURE OTHR SPECIMN AEROBIC: CPT

## 2024-07-13 PROCEDURE — 87275 INFLUENZA B AG IF: CPT

## 2024-07-13 PROCEDURE — 87280 RESPIRATORY SYNCYTIAL AG IF: CPT

## 2024-07-13 PROCEDURE — 94640 AIRWAY INHALATION TREATMENT: CPT

## 2024-07-13 PROCEDURE — 93005 ELECTROCARDIOGRAM TRACING: CPT | Performed by: EMERGENCY MEDICINE

## 2024-07-13 PROCEDURE — 71045 X-RAY EXAM CHEST 1 VIEW: CPT

## 2024-07-13 PROCEDURE — 87205 SMEAR GRAM STAIN: CPT

## 2024-07-13 PROCEDURE — 87804 INFLUENZA ASSAY W/OPTIC: CPT

## 2024-07-13 PROCEDURE — 87040 BLOOD CULTURE FOR BACTERIA: CPT

## 2024-07-13 PROCEDURE — 87260 ADENOVIRUS AG IF: CPT

## 2024-07-13 PROCEDURE — 80053 COMPREHEN METABOLIC PANEL: CPT

## 2024-07-13 PROCEDURE — 2580000003 HC RX 258: Performed by: INTERNAL MEDICINE

## 2024-07-13 PROCEDURE — 84145 PROCALCITONIN (PCT): CPT

## 2024-07-13 PROCEDURE — 6370000000 HC RX 637 (ALT 250 FOR IP): Performed by: INTERNAL MEDICINE

## 2024-07-13 PROCEDURE — 87449 NOS EACH ORGANISM AG IA: CPT

## 2024-07-13 PROCEDURE — 85025 COMPLETE CBC W/AUTO DIFF WBC: CPT

## 2024-07-13 PROCEDURE — 6360000002 HC RX W HCPCS: Performed by: INTERNAL MEDICINE

## 2024-07-13 PROCEDURE — 5A09357 ASSISTANCE WITH RESPIRATORY VENTILATION, LESS THAN 24 CONSECUTIVE HOURS, CONTINUOUS POSITIVE AIRWAY PRESSURE: ICD-10-PCS | Performed by: INTERNAL MEDICINE

## 2024-07-13 PROCEDURE — 94660 CPAP INITIATION&MGMT: CPT

## 2024-07-13 PROCEDURE — 87641 MR-STAPH DNA AMP PROBE: CPT

## 2024-07-13 PROCEDURE — 87635 SARS-COV-2 COVID-19 AMP PRB: CPT

## 2024-07-13 PROCEDURE — 2000000000 HC ICU R&B

## 2024-07-13 PROCEDURE — 85610 PROTHROMBIN TIME: CPT

## 2024-07-13 PROCEDURE — 83605 ASSAY OF LACTIC ACID: CPT

## 2024-07-13 PROCEDURE — 2580000003 HC RX 258

## 2024-07-13 PROCEDURE — 36415 COLL VENOUS BLD VENIPUNCTURE: CPT

## 2024-07-13 PROCEDURE — 94761 N-INVAS EAR/PLS OXIMETRY MLT: CPT

## 2024-07-13 PROCEDURE — 82803 BLOOD GASES ANY COMBINATION: CPT

## 2024-07-13 PROCEDURE — 84484 ASSAY OF TROPONIN QUANT: CPT

## 2024-07-13 PROCEDURE — 96365 THER/PROPH/DIAG IV INF INIT: CPT

## 2024-07-13 PROCEDURE — 6360000002 HC RX W HCPCS: Performed by: EMERGENCY MEDICINE

## 2024-07-13 PROCEDURE — 87299 ANTIBODY DETECTION NOS IF: CPT

## 2024-07-13 PROCEDURE — 2580000003 HC RX 258: Performed by: EMERGENCY MEDICINE

## 2024-07-13 PROCEDURE — 99285 EMERGENCY DEPT VISIT HI MDM: CPT

## 2024-07-13 PROCEDURE — 2700000000 HC OXYGEN THERAPY PER DAY

## 2024-07-13 PROCEDURE — 87279 PARAINFLUENZA AG IF: CPT

## 2024-07-13 PROCEDURE — 6370000000 HC RX 637 (ALT 250 FOR IP): Performed by: EMERGENCY MEDICINE

## 2024-07-13 PROCEDURE — 87276 INFLUENZA A AG IF: CPT

## 2024-07-13 PROCEDURE — 83880 ASSAY OF NATRIURETIC PEPTIDE: CPT

## 2024-07-13 PROCEDURE — 96367 TX/PROPH/DG ADDL SEQ IV INF: CPT

## 2024-07-13 RX ORDER — M-VIT,TX,IRON,MINS/CALC/FOLIC 27MG-0.4MG
1 TABLET ORAL DAILY
Status: DISCONTINUED | OUTPATIENT
Start: 2024-07-14 | End: 2024-07-18 | Stop reason: HOSPADM

## 2024-07-13 RX ORDER — SODIUM CHLORIDE FOR INHALATION 3 %
4 VIAL, NEBULIZER (ML) INHALATION
Status: DISCONTINUED | OUTPATIENT
Start: 2024-07-13 | End: 2024-07-18 | Stop reason: HOSPADM

## 2024-07-13 RX ORDER — SODIUM CHLORIDE 0.9 % (FLUSH) 0.9 %
5-40 SYRINGE (ML) INJECTION PRN
Status: DISCONTINUED | OUTPATIENT
Start: 2024-07-13 | End: 2024-07-18 | Stop reason: HOSPADM

## 2024-07-13 RX ORDER — ATORVASTATIN CALCIUM 10 MG/1
10 TABLET, FILM COATED ORAL DAILY
Status: DISCONTINUED | OUTPATIENT
Start: 2024-07-14 | End: 2024-07-14

## 2024-07-13 RX ORDER — METHYLPREDNISOLONE SODIUM SUCCINATE 40 MG/ML
40 INJECTION, POWDER, LYOPHILIZED, FOR SOLUTION INTRAMUSCULAR; INTRAVENOUS EVERY 6 HOURS
Status: DISCONTINUED | OUTPATIENT
Start: 2024-07-13 | End: 2024-07-14

## 2024-07-13 RX ORDER — ASPIRIN 81 MG/1
81 TABLET, CHEWABLE ORAL DAILY
Status: DISCONTINUED | OUTPATIENT
Start: 2024-07-14 | End: 2024-07-18 | Stop reason: HOSPADM

## 2024-07-13 RX ORDER — SODIUM CHLORIDE 9 MG/ML
INJECTION, SOLUTION INTRAVENOUS PRN
Status: DISCONTINUED | OUTPATIENT
Start: 2024-07-13 | End: 2024-07-18 | Stop reason: HOSPADM

## 2024-07-13 RX ORDER — ACETAMINOPHEN 650 MG/1
650 SUPPOSITORY RECTAL EVERY 6 HOURS PRN
Status: DISCONTINUED | OUTPATIENT
Start: 2024-07-13 | End: 2024-07-18 | Stop reason: HOSPADM

## 2024-07-13 RX ORDER — LAMOTRIGINE 25 MG/1
75 TABLET ORAL EVERY MORNING
Status: DISCONTINUED | OUTPATIENT
Start: 2024-07-14 | End: 2024-07-18 | Stop reason: HOSPADM

## 2024-07-13 RX ORDER — BENZONATATE 100 MG/1
100 CAPSULE ORAL 3 TIMES DAILY PRN
Status: DISCONTINUED | OUTPATIENT
Start: 2024-07-13 | End: 2024-07-18 | Stop reason: HOSPADM

## 2024-07-13 RX ORDER — CARVEDILOL 3.12 MG/1
3.12 TABLET ORAL 2 TIMES DAILY WITH MEALS
Status: DISCONTINUED | OUTPATIENT
Start: 2024-07-13 | End: 2024-07-18 | Stop reason: HOSPADM

## 2024-07-13 RX ORDER — SODIUM CHLORIDE 0.9 % (FLUSH) 0.9 %
5-40 SYRINGE (ML) INJECTION EVERY 12 HOURS SCHEDULED
Status: DISCONTINUED | OUTPATIENT
Start: 2024-07-13 | End: 2024-07-18 | Stop reason: HOSPADM

## 2024-07-13 RX ORDER — ACETAMINOPHEN 325 MG/1
650 TABLET ORAL EVERY 6 HOURS PRN
Status: DISCONTINUED | OUTPATIENT
Start: 2024-07-13 | End: 2024-07-18 | Stop reason: HOSPADM

## 2024-07-13 RX ORDER — SODIUM CHLORIDE FOR INHALATION 0.9 %
3 VIAL, NEBULIZER (ML) INHALATION
Status: DISCONTINUED | OUTPATIENT
Start: 2024-07-13 | End: 2024-07-13

## 2024-07-13 RX ORDER — GABAPENTIN 400 MG/1
800 CAPSULE ORAL 4 TIMES DAILY
Status: DISCONTINUED | OUTPATIENT
Start: 2024-07-13 | End: 2024-07-18 | Stop reason: HOSPADM

## 2024-07-13 RX ORDER — PREDNISONE 20 MG/1
40 TABLET ORAL DAILY
Status: DISCONTINUED | OUTPATIENT
Start: 2024-07-16 | End: 2024-07-14

## 2024-07-13 RX ORDER — BUDESONIDE AND FORMOTEROL FUMARATE DIHYDRATE 160; 4.5 UG/1; UG/1
2 AEROSOL RESPIRATORY (INHALATION)
Status: DISCONTINUED | OUTPATIENT
Start: 2024-07-13 | End: 2024-07-18 | Stop reason: HOSPADM

## 2024-07-13 RX ORDER — INSULIN LISPRO 100 [IU]/ML
5 INJECTION, SOLUTION INTRAVENOUS; SUBCUTANEOUS
Status: DISCONTINUED | OUTPATIENT
Start: 2024-07-14 | End: 2024-07-18 | Stop reason: HOSPADM

## 2024-07-13 RX ORDER — GUAIFENESIN 600 MG/1
600 TABLET, EXTENDED RELEASE ORAL 2 TIMES DAILY
Status: DISCONTINUED | OUTPATIENT
Start: 2024-07-14 | End: 2024-07-18 | Stop reason: HOSPADM

## 2024-07-13 RX ORDER — IPRATROPIUM BROMIDE AND ALBUTEROL SULFATE 2.5; .5 MG/3ML; MG/3ML
1 SOLUTION RESPIRATORY (INHALATION)
Status: DISCONTINUED | OUTPATIENT
Start: 2024-07-14 | End: 2024-07-18 | Stop reason: HOSPADM

## 2024-07-13 RX ORDER — ROFLUMILAST 500 UG/1
500 TABLET ORAL 2 TIMES DAILY
Status: DISCONTINUED | OUTPATIENT
Start: 2024-07-13 | End: 2024-07-18 | Stop reason: HOSPADM

## 2024-07-13 RX ORDER — PRAMIPEXOLE DIHYDROCHLORIDE 0.5 MG/1
0.25 TABLET ORAL NIGHTLY
Status: DISCONTINUED | OUTPATIENT
Start: 2024-07-13 | End: 2024-07-18 | Stop reason: HOSPADM

## 2024-07-13 RX ORDER — VITAMIN B COMPLEX
1000 TABLET ORAL DAILY
Status: DISCONTINUED | OUTPATIENT
Start: 2024-07-14 | End: 2024-07-18 | Stop reason: HOSPADM

## 2024-07-13 RX ORDER — ONDANSETRON 2 MG/ML
4 INJECTION INTRAMUSCULAR; INTRAVENOUS EVERY 6 HOURS PRN
Status: DISCONTINUED | OUTPATIENT
Start: 2024-07-13 | End: 2024-07-18 | Stop reason: HOSPADM

## 2024-07-13 RX ORDER — GLUCAGON 1 MG/ML
1 KIT INJECTION PRN
Status: DISCONTINUED | OUTPATIENT
Start: 2024-07-13 | End: 2024-07-18 | Stop reason: HOSPADM

## 2024-07-13 RX ORDER — FUROSEMIDE 10 MG/ML
40 INJECTION INTRAMUSCULAR; INTRAVENOUS ONCE
Status: COMPLETED | OUTPATIENT
Start: 2024-07-13 | End: 2024-07-13

## 2024-07-13 RX ORDER — ONDANSETRON 4 MG/1
4 TABLET, ORALLY DISINTEGRATING ORAL EVERY 8 HOURS PRN
Status: DISCONTINUED | OUTPATIENT
Start: 2024-07-13 | End: 2024-07-18 | Stop reason: HOSPADM

## 2024-07-13 RX ORDER — INSULIN LISPRO 100 [IU]/ML
0-4 INJECTION, SOLUTION INTRAVENOUS; SUBCUTANEOUS NIGHTLY
Status: DISCONTINUED | OUTPATIENT
Start: 2024-07-13 | End: 2024-07-17

## 2024-07-13 RX ORDER — POLYETHYLENE GLYCOL 3350 17 G/17G
17 POWDER, FOR SOLUTION ORAL 2 TIMES DAILY PRN
Status: DISCONTINUED | OUTPATIENT
Start: 2024-07-13 | End: 2024-07-18 | Stop reason: HOSPADM

## 2024-07-13 RX ORDER — INSULIN GLARGINE 100 [IU]/ML
28 INJECTION, SOLUTION SUBCUTANEOUS NIGHTLY
Status: DISCONTINUED | OUTPATIENT
Start: 2024-07-14 | End: 2024-07-18 | Stop reason: HOSPADM

## 2024-07-13 RX ORDER — POLYETHYLENE GLYCOL 3350 17 G/17G
17 POWDER, FOR SOLUTION ORAL DAILY PRN
Status: DISCONTINUED | OUTPATIENT
Start: 2024-07-13 | End: 2024-07-13 | Stop reason: SDUPTHER

## 2024-07-13 RX ORDER — LAMOTRIGINE 100 MG/1
100 TABLET ORAL NIGHTLY
Status: DISCONTINUED | OUTPATIENT
Start: 2024-07-13 | End: 2024-07-18 | Stop reason: HOSPADM

## 2024-07-13 RX ORDER — WATER 10 ML/10ML
INJECTION INTRAMUSCULAR; INTRAVENOUS; SUBCUTANEOUS
Status: COMPLETED
Start: 2024-07-13 | End: 2024-07-13

## 2024-07-13 RX ORDER — 0.9 % SODIUM CHLORIDE 0.9 %
250 INTRAVENOUS SOLUTION INTRAVENOUS ONCE
Status: DISCONTINUED | OUTPATIENT
Start: 2024-07-13 | End: 2024-07-14

## 2024-07-13 RX ORDER — METHOCARBAMOL 500 MG/1
500 TABLET, FILM COATED ORAL 3 TIMES DAILY PRN
Status: DISCONTINUED | OUTPATIENT
Start: 2024-07-13 | End: 2024-07-18 | Stop reason: HOSPADM

## 2024-07-13 RX ORDER — INSULIN LISPRO 100 [IU]/ML
0-8 INJECTION, SOLUTION INTRAVENOUS; SUBCUTANEOUS
Status: DISCONTINUED | OUTPATIENT
Start: 2024-07-14 | End: 2024-07-17

## 2024-07-13 RX ORDER — IPRATROPIUM BROMIDE AND ALBUTEROL SULFATE 2.5; .5 MG/3ML; MG/3ML
1 SOLUTION RESPIRATORY (INHALATION) ONCE
Status: COMPLETED | OUTPATIENT
Start: 2024-07-13 | End: 2024-07-13

## 2024-07-13 RX ORDER — DEXTROSE MONOHYDRATE 100 MG/ML
INJECTION, SOLUTION INTRAVENOUS CONTINUOUS PRN
Status: DISCONTINUED | OUTPATIENT
Start: 2024-07-13 | End: 2024-07-18 | Stop reason: HOSPADM

## 2024-07-13 RX ORDER — PRAMIPEXOLE DIHYDROCHLORIDE 0.5 MG/1
0.5 TABLET ORAL 2 TIMES DAILY
Status: DISCONTINUED | OUTPATIENT
Start: 2024-07-13 | End: 2024-07-18 | Stop reason: HOSPADM

## 2024-07-13 RX ADMIN — IPRATROPIUM BROMIDE AND ALBUTEROL SULFATE 1 DOSE: 2.5; .5 SOLUTION RESPIRATORY (INHALATION) at 19:51

## 2024-07-13 RX ADMIN — ROFLUMILAST 500 MCG: 500 TABLET ORAL at 23:20

## 2024-07-13 RX ADMIN — GABAPENTIN 800 MG: 400 CAPSULE ORAL at 23:08

## 2024-07-13 RX ADMIN — PRAMIPEXOLE DIHYDROCHLORIDE 0.25 MG: 0.5 TABLET ORAL at 23:07

## 2024-07-13 RX ADMIN — VANCOMYCIN HYDROCHLORIDE 1500 MG: 1.5 INJECTION, POWDER, LYOPHILIZED, FOR SOLUTION INTRAVENOUS at 18:55

## 2024-07-13 RX ADMIN — CEFEPIME 2000 MG: 2 INJECTION, POWDER, FOR SOLUTION INTRAVENOUS at 17:54

## 2024-07-13 RX ADMIN — METHYLPREDNISOLONE SODIUM SUCCINATE 40 MG: 40 INJECTION INTRAMUSCULAR; INTRAVENOUS at 23:03

## 2024-07-13 RX ADMIN — LAMOTRIGINE 100 MG: 100 TABLET ORAL at 23:08

## 2024-07-13 RX ADMIN — MEROPENEM 1000 MG: 1 INJECTION INTRAVENOUS at 23:11

## 2024-07-13 RX ADMIN — PRAMIPEXOLE DIHYDROCHLORIDE 0.5 MG: 0.5 TABLET ORAL at 23:08

## 2024-07-13 RX ADMIN — FUROSEMIDE 40 MG: 10 INJECTION, SOLUTION INTRAMUSCULAR; INTRAVENOUS at 23:05

## 2024-07-13 RX ADMIN — SODIUM CHLORIDE SOLN NEBU 3% 4 ML: 3 NEBU SOLN at 21:00

## 2024-07-13 RX ADMIN — SODIUM CHLORIDE 5 ML: 9 INJECTION, SOLUTION INTRAVENOUS at 22:59

## 2024-07-13 RX ADMIN — CARVEDILOL 3.12 MG: 3.12 TABLET, FILM COATED ORAL at 23:07

## 2024-07-13 RX ADMIN — WATER 1 ML: 1 INJECTION INTRAMUSCULAR; INTRAVENOUS; SUBCUTANEOUS at 23:03

## 2024-07-13 RX ADMIN — SODIUM CHLORIDE, PRESERVATIVE FREE 10 ML: 5 INJECTION INTRAVENOUS at 23:09

## 2024-07-13 RX ADMIN — APIXABAN 5 MG: 5 TABLET, FILM COATED ORAL at 23:08

## 2024-07-13 ASSESSMENT — PAIN - FUNCTIONAL ASSESSMENT: PAIN_FUNCTIONAL_ASSESSMENT: 0-10

## 2024-07-13 ASSESSMENT — PAIN SCALES - GENERAL
PAINLEVEL_OUTOF10: 0
PAINLEVEL_OUTOF10: 0

## 2024-07-13 NOTE — H&P
History & Physical    Primary Care Provider: Gallo Sanchez MD  Source of Information: Patient/family     Chief complaint:   Chief Complaint   Patient presents with    Shortness of Breath     Pt with chronic pulmonary disease who was recently diagnosed with PNA presents with worsening SOB         History of Presenting Illness:   Jorge Gauthier is a 79 y.o. man with COPD/chronic bronchitis, recurrent pneumonia with suspected aspiration, multiple admissions recently for pneumonia with resp failure, chronic hypoxic and hypercapnic resp failure on 3 l nc, chronic diastolic HF, chronic afib, IDDM2 with neuropathy who presents with SOB last 2 days with increased cough, congestion, wheezing, leg edema and chills. No fever. No chest pain. No obvious aspiration event per wife. In ED he was noted to be hypoxic over baseline and NC increased. Cxr with patchy opacities similar to previous. Normal electrolytes, mild elevation of lactate that has improved. WBC 8.9, Hgb 10.I. VBG shows chronic hypercapnia with a mild acute component. Covid screening negative.        Review of Systems:  Review of Systems   Constitutional:  Positive for appetite change and chills. Negative for activity change and fever.   HENT:  Negative for trouble swallowing and voice change.    Eyes:  Negative for redness and visual disturbance.   Respiratory:  Positive for cough, shortness of breath and wheezing.    Cardiovascular:  Positive for leg swelling. Negative for chest pain and palpitations.   Gastrointestinal:  Positive for abdominal pain. Negative for blood in stool, constipation, diarrhea, nausea and vomiting.   Endocrine: Negative for polyphagia and polyuria.   Genitourinary:  Negative for dysuria and hematuria.   Musculoskeletal:  Negative for back pain and myalgias.   Skin:  Negative for rash and wound.   Allergic/Immunologic: Negative for immunocompromised state.   Neurological:  Negative for seizures and headaches.   Hematological:   NAAT Not Detected Not Detected   Rapid influenza A/B antigens    Collection Time: 07/13/24  5:57 PM    Specimen: Nasopharyngeal   Result Value Ref Range    Rapid Influenza A Ag Negative Negative    Rapid Influenza B Ag Negative Negative   Lactate, Sepsis    Collection Time: 07/13/24  6:56 PM   Result Value Ref Range    Lactic Acid, Sepsis 2.0 (H) 0.4 - 1.9 mmol/L         Imaging:   XR CHEST PORTABLE   Final Result   1. Patchy airspace opacities throughout the lungs, most prominent the lung   bases, similar to the prior study.   2. Small bilateral pleural effusion.   3. May represent multifocal pneumonia or pulmonary edema.                Discussion/Medical Decision Making: Patient with numerous medical comorbidities, each with increased risk for mortality and morbidity if left untreated.   I have reviewed patient's presenting subjective and objective findings, as well as all laboratory studies, imaging studies, and vital signs to date as well as treatment rendered  and patient's response to those treatments.  In addition, prior medical, surgical and relevant social and family histories were reviewed.    I have discussed patient's presentation/findings and clinical course to date with ED provider. Given the patient's current clinical presentation, I have a high level of concern for decompensation if discharged from the emergency department and that patient warrants admission to hospital.     Assessment/Plan   Acute on chronic hypoxic with chronic hypercapnic resp failure - increased work of breathing with sats  about 88%.   Given increased WOB, initiating bipap, repeat abg in 1 hour,  Wean oxygen as needed  Admit to ICU for closer monitoring. I discussed case with Resp therapist who is very familiar with patient    COPD with acute exacerbation - ongoing bronchospasm  IV steroids  Scheduled and prn bronchodilators  Oxygen/resp support as above    Possible HCAP - repeated admission recently for pneumonia, possible

## 2024-07-13 NOTE — ED PROVIDER NOTES
[facility] EMERGENCY DEPARTMENT    CHIEF COMPLAINT  Shortness of Breath (Pt with chronic pulmonary disease who was recently diagnosed with PNA presents with worsening SOB )       HISTORY OF PRESENT ILLNESS  Jorge Gauthier is a 79 y.o. male who presents to the ED with concern for PNA. NP came to pt house, made diagnosis of PNA, and referred patient to ED. Pt wears 3L n/c at baseline. Pt reports onset of sx 3-4 days ago -- SOB refractory to breathing treatments, water pills. Denies fever, chest pain, nausea, vomiting, diarrhea. Reviewed d/c summary 6/26/24 - shows pt had sepsis 2/2 multifocal PNA. Pt reports he broke his right foot about a month ago, has a boot in place. Further chart review shows Progress Note 7/10/24 shows pt dx:   1-Right ankle trimalleolar displaced fracture, status post ORIF.  2-Right ankle distal tibiofibular syndesmosis disruption,status post  with syndesmosis screws x 2  Surgery 5/17/24.     I have reviewed the following from the nursing documentation:    Past Medical History:   Diagnosis Date    Arthritis     CAD (coronary artery disease)     CABG in 2009    Chronic back pain     Chronic combined systolic (congestive) and diastolic (congestive) heart failure (HCC) 04/06/2018    Chronic systolic CHF (congestive heart failure) (HCC)     COPD (chronic obstructive pulmonary disease) (HCC)     Dental disease     Diabetes mellitus (HCC)     DMII (diabetes mellitus, type 2) ([facility]) 04/14/2019    Essential hypertension     Hearing loss     Hyperlipidemia 07/30/2018    Longstanding persistent atrial fibrillation (HCC) 03/14/2023    Non morbid obesity due to excess calories 02/04/2023    Paroxysmal atrial fibrillation (HCC)     On Coumadin    Primary hypertension 07/30/2018    Tinnitus      Past Surgical History:   Procedure Laterality Date    ANKLE FRACTURE SURGERY Right 5/17/2024    OPEN REDUCTION INTERNAL FIXATION RIGHT ANKLE performed by José Miguel Chavez MD at [facility] OR    BACK SURGERY     Culture, Blood 2    Specimen: Blood   Result Value Ref Range    Culture, Blood 2       No Growth to date.  Any change in status will be called.   Legionella Antigen, Urine    Specimen: Urine, clean catch   Result Value Ref Range    L. pneumophila Serogp 1 Ur Ag       Presumptive Negative  No Legionella pneumophila serogroup 1 antigens detected.  A negative result does not exclude infection with  Legionella pneumophila serogroup 1 nor does it rule out  other microbial-caused respiratory infections or  disease caused by other serogroups of  Legionella pneumophila.  Normal Range: Presumptive Negative     MRSA DNA Probe, Nasal    Specimen: Nares   Result Value Ref Range    MRSA SCREEN RT-PCR       Negative  MRSA DNA not detected.  Normal Range: Not detected     Respiratory Viruses DFA Panel    Specimen: Sputum Expectorated   Result Value Ref Range    RSPFA Source sputum    Strep Pneumoniae Antigen    Specimen: Urine, clean catch   Result Value Ref Range    STREP PNEUMONIAE ANTIGEN, URINE       Presumptive Negative  Presumptive negative suggests no current or recent  pneumococcal infection. Infection due to Strep pneumoniae  cannot be ruled out since the antigen present in the sample  may be below the detection limit of the test.  Normal Range:Presumptive Negative     CBC with Auto Differential   Result Value Ref Range    WBC 8.9 4.0 - 11.0 K/uL    RBC 4.01 (L) 4.20 - 5.90 M/uL    Hemoglobin 10.1 (L) 13.5 - 17.5 g/dL    Hematocrit 32.8 (L) 40.5 - 52.5 %    MCV 81.7 80.0 - 100.0 fL    MCH 25.2 (L) 26.0 - 34.0 pg    MCHC 30.9 (L) 31.0 - 36.0 g/dL    RDW 18.5 (H) 12.4 - 15.4 %    Platelets 308 135 - 450 K/uL    MPV 7.0 5.0 - 10.5 fL    Neutrophils % 74.3 %    Lymphocytes % 11.1 %    Monocytes % 12.4 %    Eosinophils % 1.2 %    Basophils % 1.0 %    Neutrophils Absolute 6.6 1.7 - 7.7 K/uL    Lymphocytes Absolute 1.0 1.0 - 5.1 K/uL    Monocytes Absolute 1.1 0.0 - 1.3 K/uL    Eosinophils Absolute 0.1 0.0 - 0.6 K/uL

## 2024-07-13 NOTE — CONSULTS
Clinical Pharmacy Note  Vancomycin Consult    Pharmacy consult received for one-time dose of vancomycin in the Emergency Department per Dr. Evans.    Ht Readings from Last 1 Encounters:   07/13/24 1.803 m (5' 11\")        Wt Readings from Last 1 Encounters:   07/13/24 97.1 kg (214 lb 1.1 oz)         Assessment/Plan:  Vancomycin 1500 mg x 1 in ED.  If vancomycin is to continue on admission and pharmacy is to manage dosing, please re-consult with admission orders.       Gala May RPH, PharmD 7/13/2024 5:43 PM

## 2024-07-14 LAB
ANION GAP SERPL CALCULATED.3IONS-SCNC: 14 MMOL/L (ref 3–16)
BASOPHILS # BLD: 0 K/UL (ref 0–0.2)
BASOPHILS NFR BLD: 0.3 %
BUN SERPL-MCNC: 20 MG/DL (ref 7–20)
CALCIUM SERPL-MCNC: 8.8 MG/DL (ref 8.3–10.6)
CHLORIDE SERPL-SCNC: 100 MMOL/L (ref 99–110)
CO2 SERPL-SCNC: 28 MMOL/L (ref 21–32)
CREAT SERPL-MCNC: 1 MG/DL (ref 0.8–1.3)
DEPRECATED RDW RBC AUTO: 18.8 % (ref 12.4–15.4)
EKG DIAGNOSIS: NORMAL
EKG Q-T INTERVAL: 400 MS
EKG QRS DURATION: 98 MS
EKG QTC CALCULATION (BAZETT): 461 MS
EKG R AXIS: 58 DEGREES
EKG T AXIS: 92 DEGREES
EKG VENTRICULAR RATE: 80 BPM
EOSINOPHIL # BLD: 0 K/UL (ref 0–0.6)
EOSINOPHIL NFR BLD: 0.1 %
GFR SERPLBLD CREATININE-BSD FMLA CKD-EPI: 77 ML/MIN/{1.73_M2}
GLUCOSE BLD-MCNC: 210 MG/DL (ref 70–99)
GLUCOSE BLD-MCNC: 235 MG/DL (ref 70–99)
GLUCOSE BLD-MCNC: 237 MG/DL (ref 70–99)
GLUCOSE BLD-MCNC: 289 MG/DL (ref 70–99)
GLUCOSE SERPL-MCNC: 219 MG/DL (ref 70–99)
HCT VFR BLD AUTO: 30 % (ref 40.5–52.5)
HGB BLD-MCNC: 9.5 G/DL (ref 13.5–17.5)
LEGIONELLA AG UR QL: NORMAL
LYMPHOCYTES # BLD: 0.5 K/UL (ref 1–5.1)
LYMPHOCYTES NFR BLD: 5.8 %
MCH RBC QN AUTO: 25.5 PG (ref 26–34)
MCHC RBC AUTO-ENTMCNC: 31.7 G/DL (ref 31–36)
MCV RBC AUTO: 80.6 FL (ref 80–100)
MONOCYTES # BLD: 0.2 K/UL (ref 0–1.3)
MONOCYTES NFR BLD: 2.5 %
MRSA DNA SPEC QL NAA+PROBE: NORMAL
NEUTROPHILS # BLD: 7.6 K/UL (ref 1.7–7.7)
NEUTROPHILS NFR BLD: 91.3 %
PERFORMED ON: ABNORMAL
PLATELET # BLD AUTO: 277 K/UL (ref 135–450)
PMV BLD AUTO: 6.8 FL (ref 5–10.5)
POTASSIUM SERPL-SCNC: 4.3 MMOL/L (ref 3.5–5.1)
RBC # BLD AUTO: 3.73 M/UL (ref 4.2–5.9)
REASON FOR REJECTION: NORMAL
S PNEUM AG UR QL: NORMAL
SODIUM SERPL-SCNC: 142 MMOL/L (ref 136–145)
TROPONIN, HIGH SENSITIVITY: 32 NG/L (ref 0–22)
WBC # BLD AUTO: 8.3 K/UL (ref 4–11)

## 2024-07-14 PROCEDURE — 2580000003 HC RX 258: Performed by: INTERNAL MEDICINE

## 2024-07-14 PROCEDURE — 94669 MECHANICAL CHEST WALL OSCILL: CPT

## 2024-07-14 PROCEDURE — 93010 ELECTROCARDIOGRAM REPORT: CPT | Performed by: INTERNAL MEDICINE

## 2024-07-14 PROCEDURE — 2500000003 HC RX 250 WO HCPCS: Performed by: INTERNAL MEDICINE

## 2024-07-14 PROCEDURE — 84484 ASSAY OF TROPONIN QUANT: CPT

## 2024-07-14 PROCEDURE — 6370000000 HC RX 637 (ALT 250 FOR IP): Performed by: INTERNAL MEDICINE

## 2024-07-14 PROCEDURE — 6360000002 HC RX W HCPCS: Performed by: INTERNAL MEDICINE

## 2024-07-14 PROCEDURE — 2700000000 HC OXYGEN THERAPY PER DAY

## 2024-07-14 PROCEDURE — 80048 BASIC METABOLIC PNL TOTAL CA: CPT

## 2024-07-14 PROCEDURE — 2060000000 HC ICU INTERMEDIATE R&B

## 2024-07-14 PROCEDURE — 36415 COLL VENOUS BLD VENIPUNCTURE: CPT

## 2024-07-14 PROCEDURE — 94640 AIRWAY INHALATION TREATMENT: CPT

## 2024-07-14 PROCEDURE — 85025 COMPLETE CBC W/AUTO DIFF WBC: CPT

## 2024-07-14 PROCEDURE — 99223 1ST HOSP IP/OBS HIGH 75: CPT | Performed by: INTERNAL MEDICINE

## 2024-07-14 PROCEDURE — 94660 CPAP INITIATION&MGMT: CPT

## 2024-07-14 PROCEDURE — 2580000003 HC RX 258

## 2024-07-14 RX ORDER — FUROSEMIDE 10 MG/ML
40 INJECTION INTRAMUSCULAR; INTRAVENOUS 2 TIMES DAILY
Status: DISCONTINUED | OUTPATIENT
Start: 2024-07-14 | End: 2024-07-17

## 2024-07-14 RX ORDER — ATORVASTATIN CALCIUM 10 MG/1
10 TABLET, FILM COATED ORAL NIGHTLY
Status: DISCONTINUED | OUTPATIENT
Start: 2024-07-14 | End: 2024-07-18 | Stop reason: HOSPADM

## 2024-07-14 RX ORDER — PREDNISONE 20 MG/1
40 TABLET ORAL DAILY
Status: COMPLETED | OUTPATIENT
Start: 2024-07-15 | End: 2024-07-18

## 2024-07-14 RX ORDER — WATER 10 ML/10ML
INJECTION INTRAMUSCULAR; INTRAVENOUS; SUBCUTANEOUS
Status: COMPLETED
Start: 2024-07-14 | End: 2024-07-14

## 2024-07-14 RX ADMIN — METHYLPREDNISOLONE SODIUM SUCCINATE 40 MG: 40 INJECTION INTRAMUSCULAR; INTRAVENOUS at 03:09

## 2024-07-14 RX ADMIN — MULTIPLE VITAMINS W/ MINERALS TAB 1 TABLET: TAB at 08:17

## 2024-07-14 RX ADMIN — INSULIN LISPRO 5 UNITS: 100 INJECTION, SOLUTION INTRAVENOUS; SUBCUTANEOUS at 08:20

## 2024-07-14 RX ADMIN — ASPIRIN 81 MG 81 MG: 81 TABLET ORAL at 08:09

## 2024-07-14 RX ADMIN — INSULIN LISPRO 2 UNITS: 100 INJECTION, SOLUTION INTRAVENOUS; SUBCUTANEOUS at 12:21

## 2024-07-14 RX ADMIN — APIXABAN 5 MG: 5 TABLET, FILM COATED ORAL at 08:09

## 2024-07-14 RX ADMIN — GABAPENTIN 800 MG: 400 CAPSULE ORAL at 16:27

## 2024-07-14 RX ADMIN — GABAPENTIN 800 MG: 400 CAPSULE ORAL at 12:19

## 2024-07-14 RX ADMIN — Medication 1000 UNITS: at 08:10

## 2024-07-14 RX ADMIN — SODIUM CHLORIDE, PRESERVATIVE FREE 10 ML: 5 INJECTION INTRAVENOUS at 20:43

## 2024-07-14 RX ADMIN — SODIUM CHLORIDE SOLN NEBU 3% 4 ML: 3 NEBU SOLN at 08:19

## 2024-07-14 RX ADMIN — ANTI-FUNGAL POWDER MICONAZOLE NITRATE TALC FREE: 1.42 POWDER TOPICAL at 20:42

## 2024-07-14 RX ADMIN — WATER 10 ML: 1 INJECTION INTRAMUSCULAR; INTRAVENOUS; SUBCUTANEOUS at 08:08

## 2024-07-14 RX ADMIN — TIOTROPIUM BROMIDE INHALATION SPRAY 2 PUFF: 3.12 SPRAY, METERED RESPIRATORY (INHALATION) at 08:19

## 2024-07-14 RX ADMIN — SODIUM CHLORIDE SOLN NEBU 3% 4 ML: 3 NEBU SOLN at 19:15

## 2024-07-14 RX ADMIN — LAMOTRIGINE 75 MG: 25 TABLET ORAL at 08:10

## 2024-07-14 RX ADMIN — MEROPENEM 1000 MG: 1 INJECTION INTRAVENOUS at 06:14

## 2024-07-14 RX ADMIN — ROFLUMILAST 500 MCG: 500 TABLET ORAL at 12:19

## 2024-07-14 RX ADMIN — PRAMIPEXOLE DIHYDROCHLORIDE 0.5 MG: 0.5 TABLET ORAL at 20:41

## 2024-07-14 RX ADMIN — IPRATROPIUM BROMIDE AND ALBUTEROL SULFATE 1 DOSE: 2.5; .5 SOLUTION RESPIRATORY (INHALATION) at 19:15

## 2024-07-14 RX ADMIN — ROFLUMILAST 500 MCG: 500 TABLET ORAL at 20:41

## 2024-07-14 RX ADMIN — METHYLPREDNISOLONE SODIUM SUCCINATE 40 MG: 40 INJECTION INTRAMUSCULAR; INTRAVENOUS at 08:08

## 2024-07-14 RX ADMIN — GABAPENTIN 800 MG: 400 CAPSULE ORAL at 20:32

## 2024-07-14 RX ADMIN — INSULIN LISPRO 2 UNITS: 100 INJECTION, SOLUTION INTRAVENOUS; SUBCUTANEOUS at 08:20

## 2024-07-14 RX ADMIN — IPRATROPIUM BROMIDE AND ALBUTEROL SULFATE 1 DOSE: 2.5; .5 SOLUTION RESPIRATORY (INHALATION) at 11:23

## 2024-07-14 RX ADMIN — INSULIN GLARGINE 28 UNITS: 100 INJECTION, SOLUTION SUBCUTANEOUS at 20:31

## 2024-07-14 RX ADMIN — BUDESONIDE AND FORMOTEROL FUMARATE DIHYDRATE 2 PUFF: 160; 4.5 AEROSOL RESPIRATORY (INHALATION) at 19:15

## 2024-07-14 RX ADMIN — MEROPENEM 1000 MG: 1 INJECTION INTRAVENOUS at 20:31

## 2024-07-14 RX ADMIN — LAMOTRIGINE 100 MG: 100 TABLET ORAL at 20:32

## 2024-07-14 RX ADMIN — PRAMIPEXOLE DIHYDROCHLORIDE 0.5 MG: 0.5 TABLET ORAL at 08:11

## 2024-07-14 RX ADMIN — BUDESONIDE AND FORMOTEROL FUMARATE DIHYDRATE 2 PUFF: 160; 4.5 AEROSOL RESPIRATORY (INHALATION) at 08:19

## 2024-07-14 RX ADMIN — INSULIN LISPRO 5 UNITS: 100 INJECTION, SOLUTION INTRAVENOUS; SUBCUTANEOUS at 12:21

## 2024-07-14 RX ADMIN — INSULIN LISPRO 5 UNITS: 100 INJECTION, SOLUTION INTRAVENOUS; SUBCUTANEOUS at 18:20

## 2024-07-14 RX ADMIN — CARVEDILOL 3.12 MG: 3.12 TABLET, FILM COATED ORAL at 16:27

## 2024-07-14 RX ADMIN — MEROPENEM 1000 MG: 1 INJECTION INTRAVENOUS at 12:37

## 2024-07-14 RX ADMIN — IPRATROPIUM BROMIDE AND ALBUTEROL SULFATE 1 DOSE: 2.5; .5 SOLUTION RESPIRATORY (INHALATION) at 15:47

## 2024-07-14 RX ADMIN — EMPAGLIFLOZIN 10 MG: 10 TABLET, FILM COATED ORAL at 08:12

## 2024-07-14 RX ADMIN — GUAIFENESIN 600 MG: 600 TABLET, EXTENDED RELEASE ORAL at 08:10

## 2024-07-14 RX ADMIN — APIXABAN 5 MG: 5 TABLET, FILM COATED ORAL at 20:32

## 2024-07-14 RX ADMIN — INSULIN LISPRO 4 UNITS: 100 INJECTION, SOLUTION INTRAVENOUS; SUBCUTANEOUS at 18:19

## 2024-07-14 RX ADMIN — SODIUM CHLORIDE: 9 INJECTION, SOLUTION INTRAVENOUS at 06:13

## 2024-07-14 RX ADMIN — PRAMIPEXOLE DIHYDROCHLORIDE 0.25 MG: 0.5 TABLET ORAL at 20:33

## 2024-07-14 RX ADMIN — ATORVASTATIN CALCIUM 10 MG: 10 TABLET, FILM COATED ORAL at 20:32

## 2024-07-14 RX ADMIN — CARVEDILOL 3.12 MG: 3.12 TABLET, FILM COATED ORAL at 08:10

## 2024-07-14 RX ADMIN — GUAIFENESIN 600 MG: 600 TABLET, EXTENDED RELEASE ORAL at 20:32

## 2024-07-14 RX ADMIN — IPRATROPIUM BROMIDE AND ALBUTEROL SULFATE 1 DOSE: 2.5; .5 SOLUTION RESPIRATORY (INHALATION) at 08:19

## 2024-07-14 RX ADMIN — FUROSEMIDE 40 MG: 10 INJECTION, SOLUTION INTRAMUSCULAR; INTRAVENOUS at 16:24

## 2024-07-14 RX ADMIN — GABAPENTIN 800 MG: 400 CAPSULE ORAL at 08:12

## 2024-07-14 ASSESSMENT — PAIN SCALES - GENERAL: PAINLEVEL_OUTOF10: 0

## 2024-07-14 NOTE — PROGRESS NOTES
Clinical Pharmacy Note  Vancomycin Consult    Jorge Gauthier is a 79 y.o. male ordered vancomycin for PNA; consult received from Dr. Rainey to manage therapy. Also receiving meropenem.    Allergies:  Donepezil, Amitriptyline, and Enoxaparin     Temp max:  Temp (24hrs), Av.9 °F (36.6 °C), Min:97.9 °F (36.6 °C), Max:97.9 °F (36.6 °C)      Recent Labs     24  1720   WBC 8.9       Recent Labs     24  1720   BUN 17   CREATININE 1.1       No intake or output data in the 24 hours ending 24    Culture Results:  pending    Ht Readings from Last 1 Encounters:   24 1.803 m (5' 11\")        Wt Readings from Last 1 Encounters:   24 97.1 kg (214 lb 1.1 oz)         Estimated Creatinine Clearance: 65 mL/min (based on SCr of 1.1 mg/dL).    Assessment/Plan:  Day # 1 of vancomycin.  Vancomycin 1500 mg IV every 24 hours.    Goal -600  Predicted  (24-48), 462 (SS)      Thank you for the consult.    Gala May RPH, PharmD 2024 9:34 PM

## 2024-07-14 NOTE — PROGRESS NOTES
4 Eyes Skin Assessment     NAME:  Jorge Gauthier  YOB: 1945  MEDICAL RECORD NUMBER:  [id number]    The patient is being assessed for  Admission    I agree that at least one RN has performed a thorough Head to Toe Skin Assessment on the patient. ALL assessment sites listed below have been assessed.      Areas assessed by both nurses:    Head, Face, Ears, Shoulders, Back, Chest, Arms, Elbows, Hands, Sacrum. Buttock, Coccyx, Ischium, Legs. Feet and Heels, and Under Medical Devices         Does the Patient have a Wound? Yes wound(s) were present on assessment. LDA wound assessment was Initiated and completed by RN       Jose Alfredo Prevention initiated by RN: Yes  Wound Care Orders initiated by RN: Yes    Pressure Injury (Stage 3,4, Unstageable, DTI, NWPT, and Complex wounds) if present, place Wound referral order by RN under : No    New Ostomies, if present place, Ostomy referral order under : No     Nurse 1 eSignature: Electronically signed by Agnieszka Alcantar RN on 7/14/24 at 1:22 AM EDT    **SHARE this note so that the co-signing nurse can place an eSignature**    Nurse 2 eSignature: Electronically signed by Tony Nuñez RN on 7/14/24 at 3:28 AM EDT

## 2024-07-14 NOTE — PLAN OF CARE
Problem: Discharge Planning  Goal: Discharge to home or other facility with appropriate resources  Outcome: Progressing  Flowsheets  Taken 7/13/2024 2200  Discharge to home or other facility with appropriate resources:   Identify barriers to discharge with patient and caregiver   Arrange for needed discharge resources and transportation as appropriate   Identify discharge learning needs (meds, wound care, etc)   Refer to discharge planning if patient needs post-hospital services based on physician order or complex needs related to functional status, cognitive ability or social support system  Taken 7/13/2024 2157  Discharge to home or other facility with appropriate resources:   Identify barriers to discharge with patient and caregiver   Identify discharge learning needs (meds, wound care, etc)   Refer to discharge planning if patient needs post-hospital services based on physician order or complex needs related to functional status, cognitive ability or social support system   Arrange for needed discharge resources and transportation as appropriate     Problem: Pain  Goal: Verbalizes/displays adequate comfort level or baseline comfort level  Outcome: Progressing  Flowsheets  Taken 7/14/2024 0015  Verbalizes/displays adequate comfort level or baseline comfort level:   Encourage patient to monitor pain and request assistance   Assess pain using appropriate pain scale   Administer analgesics based on type and severity of pain and evaluate response   Implement non-pharmacological measures as appropriate and evaluate response  Taken 7/13/2024 2200  Verbalizes/displays adequate comfort level or baseline comfort level:   Encourage patient to monitor pain and request assistance   Assess pain using appropriate pain scale   Implement non-pharmacological measures as appropriate and evaluate response   Administer analgesics based on type and severity of pain and evaluate response     Problem: Safety - Adult  Goal: Free from

## 2024-07-14 NOTE — PROGRESS NOTES
Pharmacy Medication Reconciliation Note     List of medications patient is currently taking is complete.    Source of information:   1. Patient's wife at bedside  2. EMR    Notes regarding home medications:   1. Patient's wife states he took all morning mediation doses before arriving today.  2. Patient took his morning insulin dose, but not his evening yet.      Azalia Belcher, Pharmacy Intern  7/13/2024 8:01 PM

## 2024-07-14 NOTE — PROGRESS NOTES
NAME:  Jorge Gauthier  YOB: 1945  MEDICAL RECORD NUMBER:  [id number]    Shift Summary: Patient off bipap.  Resting at this time comfortably.  Report given to Juju.  All questions answered.  $ eyes performed at bedside.     Family updated: Yes:  Portia    Rhythm: Atrial Fibrillation     Most recent vitals:   Visit Vitals  BP (!) 103/53   Pulse 74   Temp 98.1 °F (36.7 °C) (Oral)   Resp 17   Ht 1.803 m (5' 11\")   Wt 97.1 kg (214 lb 1.1 oz)   SpO2 93%   BMI 29.86 kg/m²           No data found.    No data found.      Respiratory support needed (if any):  - O2 - NC - 3 lpm    Admission weight Weight - Scale: 97.1 kg (214 lb 1.1 oz) (07/13/24 1616)    Today's weight    Wt Readings from Last 1 Encounters:   07/13/24 97.1 kg (214 lb 1.1 oz)        Leon need assessed each shift: N/A - no leon present  UOP >30ml/hr: YES  Last documented BM (in last 48 hrs):  Patient Vitals for the past 48 hrs:   Last BM (including prior to admit) Stool Occurrence   07/13/24 2200 07/14/24 1   07/14/24 0147 07/14/24 1                Restraints (in use currently or dc'd in last 12 hrs): No    Order current and documentation up to date? NA    Lines/Drains reviewed @ bedside.  Peripheral IV 07/13/24 Right Forearm (Active)   Number of days: 0         Drip rates at handoff:    sodium chloride 5 mL (07/13/24 2259)    dextrose         Lab Data:   CBC:   Recent Labs     07/13/24  1720   WBC 8.9   HGB 10.1*   HCT 32.8*   MCV 81.7        BMP:    Recent Labs     07/13/24  1720      K 4.4   CO2 30   BUN 17   CREATININE 1.1     LIVR:   Recent Labs     07/13/24  1720   AST 16   ALT 10     PT/INR:   Recent Labs     07/13/24  1720   INR 1.38*     APTT: No results for input(s): \"APTT\" in the last 72 hours.  ABG: No results for input(s): \"PHART\", \"IXJ5HGY\", \"PO2ART\" in the last 72 hours.    Any consults during the shift? Yes: Consults received: : Pulmonology    Any signed and held orders to be released?  No        4 Eyes Skin  Assessment       The patient is being assessed for  Transfer to New Unit    I agree that at least one RN has performed a thorough Head to Toe Skin Assessment on the patient. ALL assessment sites listed below have been assessed.      Areas assessed by both nurses: Head, Face, Ears, Shoulders, Back, Chest, Arms, Elbows, Hands, Sacrum. Buttock, Coccyx, Ischium, Legs. Feet and Heels, and Under Medical Devices         Does the Patient have a Wound? Yes wound(s) were present on assessment. LDA wound assessment was Initiated and completed by RN    Wound Care Orders initiated by RN: Yes       Jose Alfredo Prevention initiated by RN: Yes    Pressure Injury (Stage 3,4, Unstageable, DTI, NWPT, and Complex wounds) if present, place Wound referral order by RN under : No    New Ostomies, if present place, Ostomy referral order under : No     Nurse 1 eSignature: Electronically signed by Agnieszka Alcantar RN on 7/14/24 at 3:29 AM EDT    **SHARE this note so that the co-signing nurse can place an eSignature**    Nurse 2 eSignature: Electronically signed by Juju Stewart RN on 7/14/24 at 7:40 AM EDT

## 2024-07-14 NOTE — CONSULTS
Pulmonary Consult Note     Patient's name:  Jorge Gauthier  Medical Record Number: [id number]  Patient's account/billing number: [id number]  Patient's YOB: 1945  Age: 79 y.o.  Date of Admission: 7/13/2024  4:07 PM  Date of Consult: 7/14/2024      Primary Care Physician: Gallo Sanchez MD      Code Status: Full Code    Reason for consult: Acute on chronic respiratory failure and hypoxia    Assessment and Plan     Acute on chronic respiratory failure with hypoxia and hypercapnia   COPD with acute exacerbation  Acute on chronic diastolic heart failure  Moderate aortic stenosis  Recurrent aspiration  Paroxysmal atrial fibrillation      Plan:  Primarily CHF exacerbation resume diuresis  Copd stable  Prednisone x 4 days  Doubt PNA, d/c antibiotics   Bronchodilators      HISTORY OF PRESENT ILLNESS:   Mr./Ms. Jorge Gauthier is a 79 y.o. gentleman with past medical history stated below significant for history of COPD, history of recurrent aspiration, chronic diastolic heart failure, chronic hypoxic respiratory failure on home oxygen chronic atrial fibrillation, presented to the hospital with 2 days worsening cough chest congestion wheezing and lower extremity edema  Chest x-ray with patchy bilateral airspace disease small pleural effusions    Elevated bnp    Past Medical History:        Diagnosis Date    Arthritis     CAD (coronary artery disease)     CABG in 2009    Chronic back pain     Chronic combined systolic (congestive) and diastolic (congestive) heart failure (HCC) 04/06/2018    Chronic systolic CHF (congestive heart failure) ([facility])     COPD (chronic obstructive pulmonary disease) ([facility])     Dental disease     Diabetes mellitus (HCC)     DMII (diabetes mellitus, type 2) ([facility]) 04/14/2019    Essential hypertension     Hearing loss     Hyperlipidemia 07/30/2018    Longstanding persistent atrial fibrillation (HCC) 03/14/2023    Non morbid obesity due to  identified.  Chronic atelectasis in the dependent left  lower lobe.      CTPA: Results for orders placed during the hospital encounter of 06/16/24    CT CHEST PULMONARY EMBOLISM W CONTRAST    Narrative  EXAMINATION:  CTA OF THE CHEST 6/16/2024 10:16 pm    TECHNIQUE:  CTA of the chest was performed after the administration of intravenous  contrast.  Multiplanar reformatted images are provided for review.  MIP  images are provided for review. Automated exposure control, iterative  reconstruction, and/or weight based adjustment of the mA/kV was utilized to  reduce the radiation dose to as low as reasonably achievable.    COMPARISON:  06/10/2024    HISTORY:  ORDERING SYSTEM PROVIDED HISTORY: sob, chest pain  TECHNOLOGIST PROVIDED HISTORY:  Reason for exam:->sob, chest pain  Additional Contrast?->1  Reason for Exam: sob, chest pain    FINDINGS:  Pulmonary Arteries: Bolus timing is acceptable.  Respiratory motion artifact  degrading portions in the mid and distal branches.  Branches in the deep  right and left lower lobes are not adequately evaluated.  No large or central  pulmonary arterial emboli..  Main pulmonary artery is normal in caliber.    Mediastinum: Cardiac chambers are enlarged.  There is no pericardial  effusion.  Postsurgical changes of previous coronary bypass surgery.  Borderline lymph nodes in the paratracheal, AP window, and precarinal  regions.  Short axis measuring up to 10-11 mm and not significantly changed  from very recent exam.  Atherosclerosis of the aorta.  There is no thoracic  aortic aneurysm or dissection.    Lungs/pleura: Interlobular septal thickening and pulmonary emphysema at the  upper lungs.  Small peripheral opacities along the right upper and mid chest.  Multifocal opacities in the parenchyma of the middle lobe and right lower  lobe with consolidation in the inferior right lower lobe.  Diffuse thickening  in the peribronchial regions in the central right lung coursing into the  areas

## 2024-07-14 NOTE — PLAN OF CARE
Problem: Discharge Planning  Goal: Discharge to home or other facility with appropriate resources  7/14/2024 0920 by Soheila Gutierrez RN  Outcome: Progressing  7/14/2024 0226 by Agnieszka Alcantar RN  Outcome: Progressing  Flowsheets  Taken 7/13/2024 2200  Discharge to home or other facility with appropriate resources:   Identify barriers to discharge with patient and caregiver   Arrange for needed discharge resources and transportation as appropriate   Identify discharge learning needs (meds, wound care, etc)   Refer to discharge planning if patient needs post-hospital services based on physician order or complex needs related to functional status, cognitive ability or social support system  Taken 7/13/2024 2157  Discharge to home or other facility with appropriate resources:   Identify barriers to discharge with patient and caregiver   Identify discharge learning needs (meds, wound care, etc)   Refer to discharge planning if patient needs post-hospital services based on physician order or complex needs related to functional status, cognitive ability or social support system   Arrange for needed discharge resources and transportation as appropriate     Problem: Pain  Goal: Verbalizes/displays adequate comfort level or baseline comfort level  7/14/2024 0920 by Soheila Gutierrez RN  Outcome: Progressing  7/14/2024 0226 by Agnieszka Alcantar RN  Outcome: Progressing  Flowsheets  Taken 7/14/2024 0015  Verbalizes/displays adequate comfort level or baseline comfort level:   Encourage patient to monitor pain and request assistance   Assess pain using appropriate pain scale   Administer analgesics based on type and severity of pain and evaluate response   Implement non-pharmacological measures as appropriate and evaluate response  Taken 7/13/2024 2200  Verbalizes/displays adequate comfort level or baseline comfort level:   Encourage patient to monitor pain and request assistance   Assess pain using appropriate pain scale

## 2024-07-14 NOTE — PROGRESS NOTES
Hospitalist Progress Note      PCP: Gallo Sanchez MD    Date of Admission: 7/13/2024    Subjective: feels SOB better, cough improved    Medications:  Reviewed    Infusion Medications    sodium chloride 5 mL/hr at 07/14/24 0613    dextrose       Scheduled Medications    atorvastatin  10 mg Oral Nightly    furosemide  40 mg IntraVENous BID    apixaban  5 mg Oral BID    aspirin  81 mg Oral Daily    budesonide-formoterol  2 puff Inhalation BID RT    And    [Held by provider] tiotropium  2 puff Inhalation Daily RT    carvedilol  3.125 mg Oral BID WC    empagliflozin  10 mg Oral Daily    gabapentin  800 mg Oral 4x Daily    guaiFENesin  600 mg Oral BID    lamoTRIgine  75 mg Oral QAM    lamoTRIgine  100 mg Oral Nightly    therapeutic multivitamin-minerals  1 tablet Oral Daily    pramipexole  0.25 mg Oral Nightly    pramipexole  0.5 mg Oral BID    Roflumilast  500 mcg Oral BID    Vitamin D  1,000 Units Oral Daily    sodium chloride flush  5-40 mL IntraVENous 2 times per day    ipratropium 0.5 mg-albuterol 2.5 mg  1 Dose Inhalation Q4H WA RT    methylPREDNISolone  40 mg IntraVENous Q6H    Followed by    [START ON 7/16/2024] predniSONE  40 mg Oral Daily    meropenem  1,000 mg IntraVENous Q8H    insulin lispro  0-8 Units SubCUTAneous TID WC    insulin lispro  0-4 Units SubCUTAneous Nightly    sodium chloride (Inhalant)  4 mL Nebulization BID RT    insulin glargine  28 Units SubCUTAneous Nightly    insulin lispro  5 Units SubCUTAneous TID      PRN Meds: benzonatate, methocarbamol, polyethylene glycol, sodium chloride flush, sodium chloride, ondansetron **OR** ondansetron, acetaminophen **OR** acetaminophen, glucose, dextrose bolus **OR** dextrose bolus, glucagon (rDNA), dextrose      Intake/Output Summary (Last 24 hours) at 7/14/2024 1327  Last data filed at 7/14/2024 1130  Gross per 24 hour   Intake 698.69 ml   Output 1800 ml   Net -1101.31 ml       Physical Exam Performed:    /63   Pulse 72   Temp 97.6 °F  (36.4 °C) (Oral)   Resp 18   Ht 1.803 m (5' 11\")   Wt 97.1 kg (214 lb 1.1 oz)   SpO2 100%   BMI 29.86 kg/m²       GENERAL: NAD  Resp: lungs with bilateral rhonchi, diffuse wheezing, tachypnea but no accessory muscle use  CV: irrr, no murmur, 2+  leg edema  GI: abdomen soft, tender with palpation LLQ,  ND, active BS, no masses  Skin: good turgor, no rash  MSK: no deformities  Neuro: alert and oriented times 3, CN grossly intact, no focal strength deficits  Psych: calm and cooperative    Labs:   Recent Labs     07/13/24  1720 07/14/24  0352   WBC 8.9 8.3   HGB 10.1* 9.5*   HCT 32.8* 30.0*    277     Recent Labs     07/13/24  1720 07/14/24  0352    142   K 4.4 4.3   CL 99 100   CO2 30 28   BUN 17 20   CREATININE 1.1 1.0   CALCIUM 9.2 8.8     Recent Labs     07/13/24  1720   AST 16   ALT 10   BILITOT 0.5   ALKPHOS 56     Recent Labs     07/13/24  1720   INR 1.38*     Recent Labs     07/13/24  2149 07/14/24  0753   TROPHS 31* 32*       Urinalysis:      Lab Results   Component Value Date/Time    NITRU Negative 06/21/2024 03:56 AM    WBCUA 1 11/13/2023 11:34 AM    BACTERIA None Seen 11/13/2023 11:34 AM    RBCUA 3 11/13/2023 11:34 AM    BLOODU Negative 06/21/2024 03:56 AM    GLUCOSEU >=1000 06/21/2024 03:56 AM       Radiology:  XR CHEST PORTABLE   Final Result   1. Patchy airspace opacities throughout the lungs, most prominent the lung   bases, similar to the prior study.   2. Small bilateral pleural effusion.   3. May represent multifocal pneumonia or pulmonary edema.             PHARMACY TO DOSE VANCOMYCIN  IP CONSULT TO HOSPITALIST  IP CONSULT TO PULMONOLOGY    Assessment/Plan:    Active Hospital Problems    Diagnosis     HCAP (healthcare-associated pneumonia) [J18.9]        Acute on chronic hypoxic with chronic hypercapnic resp failure - increased work of breathing with sats  about 88%.   Given increased WOB, initiating bipap, repeat abg in 1 hour,  Wean oxygen as needed     COPD with acute  exacerbation - ongoing bronchospasm  IV steroids  Scheduled and prn bronchodilators  Oxygen/resp support as above  Pulm consulted     Possible HCAP - repeated admission recently for pneumonia, possible aspiration vs viral  Unclear if new pneumonia - no fever or leukocytosis but given overall poor status, have initiated broad - spectrum abx  Sputum culture  F/u blood cx, strep and legionella serology  Covid negative     Acute on chronic HFpEF - no overt pulm edema but increasing lower extremity edema  IV lasix once now and reassess volume status     IDDM2 with neuropathy- hold insulin 70/30 while NPO and cover with SSI  May need to add lantus if develops hyperglycemia with steroids  Continue gabapentin unless AMS     p afib - HR ok. Continue carvedilol  Continue apixaban for hypercoag state due to afib     CAD - no CP but having lower abd pain which wife reports is similar to previous MI?  Check serial troponin  EKG with nonspecific ST/T changes  Continue aspirin, statin, BB     LLQ abd pain - mild on palpation and no diarrhea, fever  Consider CT abd if persists, once more stable from resp standpoint      Diet: ADULT DIET; Regular; 4 carb choices (60 gm/meal); Low Sodium (2 gm)  Code Status: Full Code  PT/OT Eval Status: ordered    Dispo - cont care      Alma Mcbride MD

## 2024-07-14 NOTE — PROGRESS NOTES
Patient arrived to room 5252 from the ICU.  4 eyes skin assessment performed by ICU RN and this RN. Pt is on tele, vital signs obtained, admission and assessments initiated.patient is alert and oriented x 3.  Fall precautions in place, call light within reach. pt denies any needs or concerns at this time. Plan of care ongoing.

## 2024-07-14 NOTE — PROGRESS NOTES
Pt up to chair with R boot and stedy x 1 for most of shift.   Pt noted to have sig SOB along with a moist very productive cough. Continues on baseline 3L O2.  Lasix given this afternoon with urine output of 1100 ml within 90min.   RN explained to pt that pulm feels that he has more of a fluid overload vs PNA. While there is no fluid restriction ordered at this time, RN advised pt that he will likely be put on one. While pt voices understanding of this he also frequently requests drinks and has had roughly 2L in on this shift alone.   Wife at bedside briefly this evening. Updated on pt receiving Lasix.  While up in chair pt freq c/o a \"sore butt\" stating that the bed made it sore. Education provided to pt on shifting wt. Pt refused to be repositioned in chair but eventually let RN put him back to bed and turn on his side.   Electronically signed by Soheila Gutierrez RN on 7/14/24 at 6:27 PM EDT

## 2024-07-15 LAB
ANION GAP SERPL CALCULATED.3IONS-SCNC: 12 MMOL/L (ref 3–16)
BUN SERPL-MCNC: 23 MG/DL (ref 7–20)
CALCIUM SERPL-MCNC: 8.7 MG/DL (ref 8.3–10.6)
CHLORIDE SERPL-SCNC: 95 MMOL/L (ref 99–110)
CO2 SERPL-SCNC: 33 MMOL/L (ref 21–32)
CREAT SERPL-MCNC: 1.2 MG/DL (ref 0.8–1.3)
FLUAV AG SPEC QL IF: NEGATIVE
FLUBV AG SPEC QL IF: NEGATIVE
GFR SERPLBLD CREATININE-BSD FMLA CKD-EPI: 61 ML/MIN/{1.73_M2}
GLUCOSE BLD-MCNC: 214 MG/DL (ref 70–99)
GLUCOSE BLD-MCNC: 218 MG/DL (ref 70–99)
GLUCOSE BLD-MCNC: 236 MG/DL (ref 70–99)
GLUCOSE BLD-MCNC: 294 MG/DL (ref 70–99)
GLUCOSE BLD-MCNC: 329 MG/DL (ref 70–99)
GLUCOSE SERPL-MCNC: 166 MG/DL (ref 70–99)
HADV AG SPEC QL IF: NEGATIVE
HMPV AG SPEC QL IF: NEGATIVE
HPIV1 AG SPEC QL IF: NEGATIVE
HPIV2 AG SPEC QL IF: NEGATIVE
HPIV3 AG SPEC QL IF: NEGATIVE
NT-PROBNP SERPL-MCNC: 1922 PG/ML (ref 0–449)
PERFORMED ON: ABNORMAL
POTASSIUM SERPL-SCNC: 3.1 MMOL/L (ref 3.5–5.1)
RSV AG SPEC QL IF: NEGATIVE
SODIUM SERPL-SCNC: 140 MMOL/L (ref 136–145)
SPECIMEN SOURCE: NORMAL

## 2024-07-15 PROCEDURE — 6370000000 HC RX 637 (ALT 250 FOR IP): Performed by: INTERNAL MEDICINE

## 2024-07-15 PROCEDURE — 2060000000 HC ICU INTERMEDIATE R&B

## 2024-07-15 PROCEDURE — 83880 ASSAY OF NATRIURETIC PEPTIDE: CPT

## 2024-07-15 PROCEDURE — 99232 SBSQ HOSP IP/OBS MODERATE 35: CPT | Performed by: INTERNAL MEDICINE

## 2024-07-15 PROCEDURE — 36415 COLL VENOUS BLD VENIPUNCTURE: CPT

## 2024-07-15 PROCEDURE — 94761 N-INVAS EAR/PLS OXIMETRY MLT: CPT

## 2024-07-15 PROCEDURE — 2580000003 HC RX 258: Performed by: INTERNAL MEDICINE

## 2024-07-15 PROCEDURE — 80048 BASIC METABOLIC PNL TOTAL CA: CPT

## 2024-07-15 PROCEDURE — 2700000000 HC OXYGEN THERAPY PER DAY

## 2024-07-15 PROCEDURE — 0202U NFCT DS 22 TRGT SARS-COV-2: CPT

## 2024-07-15 PROCEDURE — 94669 MECHANICAL CHEST WALL OSCILL: CPT

## 2024-07-15 PROCEDURE — 6360000002 HC RX W HCPCS: Performed by: INTERNAL MEDICINE

## 2024-07-15 PROCEDURE — 87070 CULTURE OTHR SPECIMN AEROBIC: CPT

## 2024-07-15 PROCEDURE — 94640 AIRWAY INHALATION TREATMENT: CPT

## 2024-07-15 PROCEDURE — 87205 SMEAR GRAM STAIN: CPT

## 2024-07-15 PROCEDURE — 6360000002 HC RX W HCPCS: Performed by: NURSE PRACTITIONER

## 2024-07-15 PROCEDURE — 94660 CPAP INITIATION&MGMT: CPT

## 2024-07-15 PROCEDURE — 87633 RESP VIRUS 12-25 TARGETS: CPT

## 2024-07-15 RX ORDER — POTASSIUM CHLORIDE 20 MEQ/1
40 TABLET, EXTENDED RELEASE ORAL ONCE
Status: COMPLETED | OUTPATIENT
Start: 2024-07-15 | End: 2024-07-15

## 2024-07-15 RX ORDER — FUROSEMIDE 10 MG/ML
40 INJECTION INTRAMUSCULAR; INTRAVENOUS ONCE
Status: COMPLETED | OUTPATIENT
Start: 2024-07-15 | End: 2024-07-15

## 2024-07-15 RX ADMIN — LAMOTRIGINE 75 MG: 25 TABLET ORAL at 08:33

## 2024-07-15 RX ADMIN — SODIUM CHLORIDE SOLN NEBU 3% 4 ML: 3 NEBU SOLN at 20:30

## 2024-07-15 RX ADMIN — SODIUM CHLORIDE SOLN NEBU 3% 4 ML: 3 NEBU SOLN at 07:13

## 2024-07-15 RX ADMIN — GABAPENTIN 800 MG: 400 CAPSULE ORAL at 17:50

## 2024-07-15 RX ADMIN — INSULIN LISPRO 2 UNITS: 100 INJECTION, SOLUTION INTRAVENOUS; SUBCUTANEOUS at 08:34

## 2024-07-15 RX ADMIN — GABAPENTIN 800 MG: 400 CAPSULE ORAL at 12:18

## 2024-07-15 RX ADMIN — GABAPENTIN 800 MG: 400 CAPSULE ORAL at 08:32

## 2024-07-15 RX ADMIN — GUAIFENESIN 600 MG: 600 TABLET, EXTENDED RELEASE ORAL at 08:34

## 2024-07-15 RX ADMIN — INSULIN LISPRO 5 UNITS: 100 INJECTION, SOLUTION INTRAVENOUS; SUBCUTANEOUS at 17:50

## 2024-07-15 RX ADMIN — GUAIFENESIN 600 MG: 600 TABLET, EXTENDED RELEASE ORAL at 22:16

## 2024-07-15 RX ADMIN — CARVEDILOL 3.12 MG: 3.12 TABLET, FILM COATED ORAL at 17:50

## 2024-07-15 RX ADMIN — MULTIPLE VITAMINS W/ MINERALS TAB 1 TABLET: TAB at 08:35

## 2024-07-15 RX ADMIN — ATORVASTATIN CALCIUM 10 MG: 10 TABLET, FILM COATED ORAL at 22:17

## 2024-07-15 RX ADMIN — FUROSEMIDE 40 MG: 10 INJECTION, SOLUTION INTRAMUSCULAR; INTRAVENOUS at 08:34

## 2024-07-15 RX ADMIN — GABAPENTIN 800 MG: 400 CAPSULE ORAL at 22:17

## 2024-07-15 RX ADMIN — INSULIN LISPRO 6 UNITS: 100 INJECTION, SOLUTION INTRAVENOUS; SUBCUTANEOUS at 17:49

## 2024-07-15 RX ADMIN — BUDESONIDE AND FORMOTEROL FUMARATE DIHYDRATE 2 PUFF: 160; 4.5 AEROSOL RESPIRATORY (INHALATION) at 20:30

## 2024-07-15 RX ADMIN — ASPIRIN 81 MG 81 MG: 81 TABLET ORAL at 08:33

## 2024-07-15 RX ADMIN — LAMOTRIGINE 100 MG: 100 TABLET ORAL at 22:19

## 2024-07-15 RX ADMIN — SODIUM CHLORIDE, PRESERVATIVE FREE 10 ML: 5 INJECTION INTRAVENOUS at 22:25

## 2024-07-15 RX ADMIN — FUROSEMIDE 40 MG: 10 INJECTION, SOLUTION INTRAMUSCULAR; INTRAVENOUS at 03:07

## 2024-07-15 RX ADMIN — PREDNISONE 40 MG: 20 TABLET ORAL at 08:33

## 2024-07-15 RX ADMIN — INSULIN LISPRO 5 UNITS: 100 INJECTION, SOLUTION INTRAVENOUS; SUBCUTANEOUS at 08:34

## 2024-07-15 RX ADMIN — INSULIN GLARGINE 28 UNITS: 100 INJECTION, SOLUTION SUBCUTANEOUS at 22:21

## 2024-07-15 RX ADMIN — IPRATROPIUM BROMIDE AND ALBUTEROL SULFATE 1 DOSE: 2.5; .5 SOLUTION RESPIRATORY (INHALATION) at 20:30

## 2024-07-15 RX ADMIN — APIXABAN 5 MG: 5 TABLET, FILM COATED ORAL at 22:17

## 2024-07-15 RX ADMIN — APIXABAN 5 MG: 5 TABLET, FILM COATED ORAL at 08:34

## 2024-07-15 RX ADMIN — IPRATROPIUM BROMIDE AND ALBUTEROL SULFATE 1 DOSE: 2.5; .5 SOLUTION RESPIRATORY (INHALATION) at 07:13

## 2024-07-15 RX ADMIN — ROFLUMILAST 500 MCG: 500 TABLET ORAL at 22:24

## 2024-07-15 RX ADMIN — INSULIN LISPRO 2 UNITS: 100 INJECTION, SOLUTION INTRAVENOUS; SUBCUTANEOUS at 12:18

## 2024-07-15 RX ADMIN — ANTI-FUNGAL POWDER MICONAZOLE NITRATE TALC FREE: 1.42 POWDER TOPICAL at 08:31

## 2024-07-15 RX ADMIN — BUDESONIDE AND FORMOTEROL FUMARATE DIHYDRATE 2 PUFF: 160; 4.5 AEROSOL RESPIRATORY (INHALATION) at 07:14

## 2024-07-15 RX ADMIN — INSULIN LISPRO 5 UNITS: 100 INJECTION, SOLUTION INTRAVENOUS; SUBCUTANEOUS at 12:19

## 2024-07-15 RX ADMIN — POTASSIUM CHLORIDE 40 MEQ: 1500 TABLET, EXTENDED RELEASE ORAL at 11:18

## 2024-07-15 RX ADMIN — FUROSEMIDE 40 MG: 10 INJECTION, SOLUTION INTRAMUSCULAR; INTRAVENOUS at 17:50

## 2024-07-15 RX ADMIN — ANTI-FUNGAL POWDER MICONAZOLE NITRATE TALC FREE: 1.42 POWDER TOPICAL at 22:20

## 2024-07-15 RX ADMIN — SODIUM CHLORIDE, PRESERVATIVE FREE 10 ML: 5 INJECTION INTRAVENOUS at 08:35

## 2024-07-15 RX ADMIN — IPRATROPIUM BROMIDE AND ALBUTEROL SULFATE 1 DOSE: 2.5; .5 SOLUTION RESPIRATORY (INHALATION) at 11:05

## 2024-07-15 RX ADMIN — PRAMIPEXOLE DIHYDROCHLORIDE 0.25 MG: 0.5 TABLET ORAL at 22:23

## 2024-07-15 RX ADMIN — EMPAGLIFLOZIN 10 MG: 10 TABLET, FILM COATED ORAL at 08:33

## 2024-07-15 RX ADMIN — PRAMIPEXOLE DIHYDROCHLORIDE 0.5 MG: 0.5 TABLET ORAL at 22:17

## 2024-07-15 RX ADMIN — ROFLUMILAST 500 MCG: 500 TABLET ORAL at 08:33

## 2024-07-15 RX ADMIN — PRAMIPEXOLE DIHYDROCHLORIDE 0.5 MG: 0.5 TABLET ORAL at 08:33

## 2024-07-15 RX ADMIN — IPRATROPIUM BROMIDE AND ALBUTEROL SULFATE 1 DOSE: 2.5; .5 SOLUTION RESPIRATORY (INHALATION) at 15:51

## 2024-07-15 RX ADMIN — Medication 1000 UNITS: at 08:34

## 2024-07-15 RX ADMIN — MEROPENEM 1000 MG: 1 INJECTION INTRAVENOUS at 06:03

## 2024-07-15 NOTE — PROGRESS NOTES
Pulmonary Progress Note     Patient's name:  Jorge Gauthier  Medical Record Number: [id number]  Patient's account/billing number: [id number]  Patient's YOB: 1945  Age: 79 y.o.  Date of Admission: 7/13/2024  4:07 PM  Date of Consult: 7/15/2024      Primary Care Physician: Gallo Sanchez MD      Code Status: Full Code    Reason for consult: Acute on chronic respiratory failure and hypoxia    Assessment and Plan     Acute on chronic respiratory failure with hypoxia and hypercapnia   COPD with acute exacerbation  Acute on chronic diastolic heart failure  Moderate aortic stenosis  Recurrent aspiration  Paroxysmal atrial fibrillation      Plan:  Primarily CHF exacerbation continue diuresis, check bmp/bnp  Prednisone x 4 days  Doubt PNA,  Weak cough, IS/Acapella   Bronchodilators, Daliresp         Subjective:  Still sob    HISTORY OF PRESENT ILLNESS:   Mr./Ms. Jorge Gauthier is a 79 y.o. gentleman with past medical history stated below significant for history of COPD, history of recurrent aspiration, chronic diastolic heart failure, chronic hypoxic respiratory failure on home oxygen chronic atrial fibrillation, presented to the hospital with 2 days worsening cough chest congestion wheezing and lower extremity edema  Chest x-ray with patchy bilateral airspace disease small pleural effusions    Elevated bnp        REVIEW OF SYSTEMS:  Review of Systems -   General ROS: negative  Psychological ROS: negative  Ophthalmic ROS: negative  ENT ROS: negative  Allergy and Immunology ROS: negative  Hematological and Lymphatic ROS: negative  Endocrine ROS: negative  Breast ROS: negative  Respiratory ROS: cough, sob    Cardiovascular ROS: no chest pain or dyspnea on exertion  Gastrointestinal ROS:negative  Genito-Urinary ROS: negative  Musculoskeletal ROS: negative  Neurological ROS: negative  Dermatological ROS: negative        Physical Exam:    Vitals: BP (!) 100/56

## 2024-07-15 NOTE — DISCHARGE INSTRUCTIONS
Extra Heart Failure Education/ Tools/ Resources:     https://MirDeneg.com/[email or web address]   --- this is American Heart Association interactive Healthier Living with Heart Failure guidebook.  Please click hyperlink or copy / paste link into search bar. The QR Code is also available below. Use your mouse to scroll through the pages.  Lots of information about weight monitoring, diet tips, activity, meds, etc    Heart Failure Tools and Resources QR Code is below. It includes multiple resources to include symptom tracker, med tracker, further HF info, and access to a HF Support Network online Community    HF Aurora Oanh  -- this is a free smart phone oanh available for iPhone and Android download.  Use your phone to track sodium / fluid intake, zone tool symptom tracking, weights, medications, etc. Click on this hyperlink  HF Aurora Oanh   for QR code for easy download or the link is also found in the below HF Tools and Resources.      DASH (Dietary Approach to Stop Hypertension) diet --  https://www.nhlbi.nih.gov/education/dash-eating-plan -- this diet is a flexible eating plan that promotes heart healthy eating style.  Click on hyperlink or copy / paste link into search bar.  Lots of low sodium recipes and tips.    https://www.[employer].Quikly/recipes  -- more free recipes

## 2024-07-15 NOTE — PROGRESS NOTES
Patient stated having increased work of breathing, he stated he is feels congested, desatted into the 70's on 3L of O2, on auscultation ; had rhonchi, crackles, diminished breath sounds. Pt encouraged to cough up some sputum, and suctioned, hospitalist Nereida Guthrie notified via perfectserve. New orders received for stat  lasix 40mg.

## 2024-07-15 NOTE — PLAN OF CARE
Problem: Discharge Planning  Goal: Discharge to home or other facility with appropriate resources  Outcome: Progressing     Problem: Pain  Goal: Verbalizes/displays adequate comfort level or baseline comfort level  Outcome: Progressing     Problem: Respiratory - Adult  Goal: Achieves optimal ventilation and oxygenation  Outcome: Progressing     Problem: Cardiovascular - Adult  Goal: Maintains optimal cardiac output and hemodynamic stability  Outcome: Progressing     Problem: Metabolic/Fluid and Electrolytes - Adult  Goal: Electrolytes maintained within normal limits  Outcome: Progressing

## 2024-07-15 NOTE — PLAN OF CARE
Problem: Discharge Planning  Goal: Discharge to home or other facility with appropriate resources  7/15/2024 1353 by Megha Malik RN  Outcome: Progressing  7/15/2024 0336 by Juju Stewart RN  Outcome: Progressing  7/15/2024 0336 by Juju Stewart RN  Outcome: Progressing     Problem: Pain  Goal: Verbalizes/displays adequate comfort level or baseline comfort level  7/15/2024 1353 by Megha Malik RN  Outcome: Progressing  7/15/2024 0336 by Juju Stewart RN  Outcome: Progressing  7/15/2024 0336 by Juju Stewart RN  Outcome: Progressing     Problem: Safety - Adult  Goal: Free from fall injury  Outcome: Progressing     Problem: Skin/Tissue Integrity  Goal: Absence of new skin breakdown  Description: 1.  Monitor for areas of redness and/or skin breakdown  2.  Assess vascular access sites hourly  3.  Every 4-6 hours minimum:  Change oxygen saturation probe site  4.  Every 4-6 hours:  If on nasal continuous positive airway pressure, respiratory therapy assess nares and determine need for appliance change or resting period.  Outcome: Progressing     Problem: Respiratory - Adult  Goal: Achieves optimal ventilation and oxygenation  7/15/2024 1353 by Megha Malik RN  Outcome: Progressing  7/15/2024 0336 by Juju Stewart RN  Outcome: Progressing     Problem: Cardiovascular - Adult  Goal: Maintains optimal cardiac output and hemodynamic stability  7/15/2024 1353 by Mehga Malik RN  Outcome: Progressing  7/15/2024 0336 by Juju Stewart RN  Outcome: Progressing     Problem: Metabolic/Fluid and Electrolytes - Adult  Goal: Electrolytes maintained within normal limits  7/15/2024 1353 by Megha Malik RN  Outcome: Progressing  7/15/2024 0336 by Juju Stewart RN  Outcome: Progressing     Problem: Chronic Conditions and Co-morbidities  Goal: Patient's chronic conditions and co-morbidity symptoms are monitored and maintained or improved  Outcome: Progressing

## 2024-07-15 NOTE — CARE COORDINATION
07/15/24 1548   Readmission Assessment   Number of Days since last admission? 8-30 days   Previous Disposition Home with Home Health   Who is being Interviewed Patient   What was the patient's/caregiver's perception as to why they think they needed to return back to the hospital? Other (Comment)  (worsening shortness of breath)   Did you visit your Primary Care Physician after you left the hospital, before you returned this time? Yes   Why weren't you able to visit your PCP? Other (Comment)  (pt went to PCP visit)   Did you see a specialist, such as Cardiac, Pulmonary, Orthopedic Physician, etc. after you left the hospital? Yes   Who advised the patient to return to the hospital? Self-referral   Does the patient report anything that got in the way of taking their medications? No   In our efforts to provide the best possible care to you and others like you, can you think of anything that we could have done to help you after you left the hospital the first time, so that you might not have needed to return so soon? Other (Comment);Teach back instructions regarding management of illness;Teaching during hospitalization regarding your illness  (Patient says \"damned if I know.\")

## 2024-07-15 NOTE — CARE COORDINATION
Case Management Assessment  Initial Evaluation    Date/Time of Evaluation: 7/15/2024 3:56 PM  Assessment Completed by: Steffany Harkins RN    If patient is discharged prior to next notation, then this note serves as note for discharge by case management.    Patient Name: Jorge Gauthier                   YOB: 1945  Diagnosis: HCAP (healthcare-associated pneumonia) [J18.9]  Acute on chronic respiratory failure with hypoxia and hypercapnia (HCC) [J96.21, J96.22]  Multifocal pneumonia [J18.9]                   Date / Time: 7/13/2024  4:07 PM    Patient Admission Status: Inpatient   Readmission Risk (Low < 19, Mod (19-27), High > 27): Readmission Risk Score: 40.5    Current PCP: Gallo Sanchez MD  PCP verified by CM? Yes    Chart Reviewed: Yes      History Provided by: Patient  Patient Orientation: Alert and Oriented    Patient Cognition: Alert    Hospitalization in the last 30 days (Readmission):  Yes    If yes, Readmission Assessment in  Navigator will be completed.    Advance Directives:      Code Status: Full Code   Patient's Primary Decision Maker is: Named in Scanned ACP Document    Primary Decision Maker: Portia Gauthier - Spouse - [phone]    Secondary Decision Maker: Bang Gauthier Jr - Child - [phone]    Supplemental (Other) Decision Maker: Debbie Baugh - Child - [phone]    Discharge Planning:    Patient lives with: Spouse/Significant Other Type of Home: House  Primary Care Giver: Self  Patient Support Systems include: Spouse/Significant Other, Children   Current Financial resources: Medicare  Current community resources: ECF/Home Care  Current services prior to admission: Durable Medical Equipment, Home Care, Oxygen Therapy (active with [facility]            Current DME: Walker, Wheelchair, Home Aerosol, Oxygen Therapy (Comment) (uses 3L o2 thru [facility] per patient, has a surgical boot, has a stair lift)            Type of Home Care services:  Nursing  Services, OT, PT (speech)    ADLS  Prior functional level: Assistance with the following:, Bathing, Dressing, Toileting, Cooking, Housework, Shopping  Current functional level: Assistance with the following:, Bathing, Dressing, Toileting, Mobility    PT AM-PAC:   /24  OT AM-PAC:   /24    Family can provide assistance at DC: Yes  Would you like Case Management to discuss the discharge plan with any other family members/significant others, and if so, who? Yes (wife if needed)  Plans to Return to Present Housing: Yes  Other Identified Issues/Barriers to RETURNING to current housing: limited endurance, long standing deficits  Potential Assistance needed at discharge: Home Care            Potential DME: Other (Comment) (n/a)  Patient expects to discharge to: House  Plan for transportation at discharge: Family    Financial    Payor: MEDICARE / Plan: MEDICARE PART A AND B / Product Type: *No Product type* /     Does insurance require precert for SNF: No    Potential assistance Purchasing Medications: No  Meds-to-Beds request:        [id number] [place], [place] - [address] [phone] - F [phone]  [address] [address]  Phone: [phone] Fax: [phone]      Notes:    Factors facilitating achievement of predicted outcomes: Family support, Cooperative, Pleasant, and Has needed Durable Medical Equipment at home    Barriers to discharge: Decreased endurance and Lower extremity weakness    Additional Case Management Notes: Patient only wants to return home & resume [facility]. Declines SNF. Will need home care orders. Patient active with 2-3L O2 thru [facility], watch for increased O2 needs. Family transport at dc.    The Plan for Transition of Care is related to the following treatment goals of HCAP (healthcare-associated pneumonia) [J18.9]  Acute on chronic respiratory failure with hypoxia and hypercapnia (HCC) [J96.21, J96.22]  Multifocal pneumonia [J18.9]    IF

## 2024-07-15 NOTE — PROGRESS NOTES
Hospitalist Progress Note      PCP: Gallo Sanchez MD    Date of Admission: 7/13/2024    Subjective: pt states he feels more SOB today, on 3L O2, +cough    Medications:  Reviewed    Infusion Medications    sodium chloride 5 mL/hr at 07/14/24 0613    dextrose       Scheduled Medications    atorvastatin  10 mg Oral Nightly    furosemide  40 mg IntraVENous BID    predniSONE  40 mg Oral Daily    miconazole   Topical BID    apixaban  5 mg Oral BID    aspirin  81 mg Oral Daily    budesonide-formoterol  2 puff Inhalation BID RT    And    [Held by provider] tiotropium  2 puff Inhalation Daily RT    carvedilol  3.125 mg Oral BID WC    empagliflozin  10 mg Oral Daily    gabapentin  800 mg Oral 4x Daily    guaiFENesin  600 mg Oral BID    lamoTRIgine  75 mg Oral QAM    lamoTRIgine  100 mg Oral Nightly    therapeutic multivitamin-minerals  1 tablet Oral Daily    pramipexole  0.25 mg Oral Nightly    pramipexole  0.5 mg Oral BID    Roflumilast  500 mcg Oral BID    Vitamin D  1,000 Units Oral Daily    sodium chloride flush  5-40 mL IntraVENous 2 times per day    ipratropium 0.5 mg-albuterol 2.5 mg  1 Dose Inhalation Q4H WA RT    insulin lispro  0-8 Units SubCUTAneous TID WC    insulin lispro  0-4 Units SubCUTAneous Nightly    sodium chloride (Inhalant)  4 mL Nebulization BID RT    insulin glargine  28 Units SubCUTAneous Nightly    insulin lispro  5 Units SubCUTAneous TID WC     PRN Meds: benzonatate, methocarbamol, polyethylene glycol, sodium chloride flush, sodium chloride, ondansetron **OR** ondansetron, acetaminophen **OR** acetaminophen, glucose, dextrose bolus **OR** dextrose bolus, glucagon (rDNA), dextrose      Intake/Output Summary (Last 24 hours) at 7/15/2024 1433  Last data filed at 7/15/2024 1322  Gross per 24 hour   Intake 1740 ml   Output 4775 ml   Net -3035 ml       Physical Exam Performed:    BP (!) 100/56   Pulse 92   Temp 98.2 °F (36.8 °C) (Oral)   Resp (!) 32   Ht 1.803 m (5' 11\")   Wt 101 kg (222  lb 10.6 oz)   SpO2 97%   BMI 31.06 kg/m²       GENERAL: NAD  Resp: lungs with bilateral rhonchi, diffuse wheezing, tachypnea but no accessory muscle use  CV: irrr, no murmur, 2+  leg edema  GI: abdomen soft, tender with palpation LLQ,  ND, active BS, no masses  Skin: good turgor, no rash  MSK: no deformities  Neuro: alert and oriented times 3, CN grossly intact, no focal strength deficits  Psych: calm and cooperative    Labs:   Recent Labs     07/13/24  1720 07/14/24  0352   WBC 8.9 8.3   HGB 10.1* 9.5*   HCT 32.8* 30.0*    277     Recent Labs     07/13/24  1720 07/14/24  0352 07/15/24  1007    142 140   K 4.4 4.3 3.1*   CL 99 100 95*   CO2 30 28 33*   BUN 17 20 23*   CREATININE 1.1 1.0 1.2   CALCIUM 9.2 8.8 8.7     Recent Labs     07/13/24  1720   AST 16   ALT 10   BILITOT 0.5   ALKPHOS 56     Recent Labs     07/13/24  1720   INR 1.38*     Recent Labs     07/13/24  2149 07/14/24  0753   TROPHS 31* 32*       Urinalysis:      Lab Results   Component Value Date/Time    NITRU Negative 06/21/2024 03:56 AM    WBCUA 1 11/13/2023 11:34 AM    BACTERIA None Seen 11/13/2023 11:34 AM    RBCUA 3 11/13/2023 11:34 AM    BLOODU Negative 06/21/2024 03:56 AM    GLUCOSEU >=1000 06/21/2024 03:56 AM       Radiology:  XR CHEST PORTABLE   Final Result   1. Patchy airspace opacities throughout the lungs, most prominent the lung   bases, similar to the prior study.   2. Small bilateral pleural effusion.   3. May represent multifocal pneumonia or pulmonary edema.             PHARMACY TO DOSE VANCOMYCIN  IP CONSULT TO HOSPITALIST  IP CONSULT TO PULMONOLOGY    Assessment/Plan:    Active Hospital Problems    Diagnosis     HCAP (healthcare-associated pneumonia) [J18.9]          Acute on chronic hypoxic with chronic hypercapnic resp failure - increased work of breathing with sats  about 88%.   Given increased WOB, initiated bipap - now off  Wean oxygen as needed, currently on 3L     COPD with acute exacerbation - ongoing

## 2024-07-15 NOTE — ACP (ADVANCE CARE PLANNING)
Advance Care Planning     Advance Care Planning Activator (Inpatient)  Conversation Note      Date of ACP Conversation: 7/15/2024     Conversation Conducted with: Patient with Decision Making Capacity    ACP Activator: Steffany Harkins RN    Health Care Decision Maker:     Current Designated Health Care Decision Maker:     Primary Decision Maker: Portia Gauthier - Spouse - [phone]    Secondary Decision Maker: Bang Gauthier Jr - Child - [phone]    Supplemental (Other) Decision Maker: Debbie Baugh - Child - [phone]    Today we documented Decision Maker(s) consistent with ACP documents on file.    Care Preferences    Ventilation:  \"If you were in your present state of health and suddenly became very ill and were unable to breathe on your own, what would your preference be about the use of a ventilator (breathing machine) if it were available to you?\"      Would the patient desire the use of ventilator (breathing machine)?: yes    \"If your health worsens and it becomes clear that your chance of recovery is unlikely, what would your preference be about the use of a ventilator (breathing machine) if it were available to you?\"     Would the patient desire the use of ventilator (breathing machine)?: No      Resuscitation  \"CPR works best to restart the heart when there is a sudden event, like a heart attack, in someone who is otherwise healthy. Unfortunately, CPR does not typically restart the heart for people who have serious health conditions or who are very sick.\"    \"In the event your heart stopped as a result of an underlying serious health condition, would you want attempts to be made to restart your heart (answer \"yes\" for attempt to resuscitate) or would you prefer a natural death (answer \"no\" for do not attempt to resuscitate)?\" yes       [] Yes   [x] No   Educated Patient / Decision Maker regarding differences between Advance Directives and portable DNR orders.    Length of ACP Conversation in  minutes: 5 minutes     Conversation Outcomes:  ACP discussion completed    Follow-up plan:    [] Schedule follow-up conversation to continue planning  [] Referred individual to Provider for additional questions/concerns   [] Advised patient/agent/surrogate to review completed ACP document and update if needed with changes in condition, patient preferences or care setting    [x] This note routed to one or more involved healthcare providers    Electronically signed by Steffany Harkins RN Case Management on 7/15/2024 at 3:58 PM

## 2024-07-15 NOTE — DISCHARGE INSTR - COC
Continuity of Care Form    Patient Name: Jorge Gauthier   :  1945  MRN:  [id number]    Admit date:  2024  Discharge date:  ***    Code Status Order: Full Code   Advance Directives:     Admitting Physician:  Emily Rainey MD  PCP: Gallo Sanchez MD    Discharging Nurse: ***  Discharging Hospital Unit/Room#: U0Q-3094/5252-01  Discharging Unit Phone Number: ***    Emergency Contact:   Extended Emergency Contact Information  Primary Emergency Contact: Portia Gauthier  Address: [address] [place] [address]  Home Phone: [phone]  Work Phone: [phone]  Mobile Phone: [phone]  Relation: Spouse  Secondary Emergency Contact: Bang Gauthier Jr  Home Phone: [phone]  Relation: Child    Past Surgical History:  Past Surgical History:   Procedure Laterality Date    ANKLE FRACTURE SURGERY Right 2024    OPEN REDUCTION INTERNAL FIXATION RIGHT ANKLE performed by José Miguel Chavez MD at [facility] OR    BACK SURGERY      BACK SURGERY      CARDIAC SURGERY      CERVICAL SPINE SURGERY      CHEST TUBE INSERTION Right 2023    14F. Dr. Jalloh    CORONARY ARTERY BYPASS GRAFT      FRACTURE SURGERY Left     Shoulder    IR CHEST TUBE INSERTION  3/14/2023    IR CHEST TUBE INSERTION 3/14/2023 [facility] SPECIAL PROCEDURES       Immunization History:   Immunization History   Administered Date(s) Administered    COVID-19, MODERNA Bivalent, (age 12y+), IM, 50 mcg/0.5 mL 2022    Influenza Vaccine, unspecified formulation 10/22/2016    Influenza, AFLURIA (age 3 yrs+), FLUZONE, (age 6 mo+), MDV, 0.5mL 10/22/2016, 09/15/2017    Influenza, High Dose (Fluzone 65 yrs and older) 09/15/2017    Pneumococcal, PCV-13, PREVNAR 13, (age 6w+), IM, 0.5mL 2016    TDaP, ADACEL (age 10y-64y), BOOSTRIX (age 10y+), IM, 0.5mL 2018       Active Problems:  Patient Active Problem List   Diagnosis Code    Diabetes mellitus (HCC) E11.9    Atherosclerosis of native coronary  artery of native heart without angina pectoris I25.10    Morbid obesity due to excess calories ([facility]) E66.01    COPD (chronic obstructive pulmonary disease) ([facility]) J44.9    Sepsis ([facility]) A41.9    SOB (shortness of breath) R06.02    Abnormal chest CT R93.89    Back pain M54.9    Uncontrolled type 2 diabetes mellitus with hyperglycemia ([facility]) E11.65    Chronic back pain M54.9, G89.29    Chronic combined systolic (congestive) and diastolic (congestive) heart failure ([facility]) I50.42    Hyperlipidemia E78.5    Primary hypertension I10    Leukocytosis D72.829    Chronic bronchitis, simple ([facility]) J41.0    Chronic rhinitis J31.0    Generalized weakness R53.1    WEI (acute kidney injury) ([facility]) N17.9    DMII (diabetes mellitus, type 2) ([facility]) E11.9    Supratherapeutic INR R79.1    Tachycardia R00.0    Tachypnea R06.82    Non morbid obesity due to excess calories E66.09    Fatigue R53.83    Cough productive of clear sputum R05.8    History of coronary artery bypass graft Z95.1    Abrasion of right elbow S50.311A    Paroxysmal atrial fibrillation ([facility]) I48.0    Chronic pulmonary aspiration T17.908A    Atypical pneumonia J18.9    Pleural effusion, bilateral J90    Longstanding persistent atrial fibrillation ([facility]) I48.11    Enterobacter cloacae pneumonia ([facility]) J15.69    ESBL (extended spectrum beta-lactamase) producing bacteria infection A49.9, Z16.12    Abnormal CXR R93.89    Elevated erythrocyte sedimentation rate R70.0    CRP elevated R79.82    Peripherally inserted central catheter (PICC) in place Z45.2    Acute respiratory failure with hypoxia and hypercapnia ([facility]) J96.01, J96.02    Acute bronchitis due to severe acute respiratory syndrome coronavirus 2 (SARS-CoV-2) U07.1, J20.8    Atrial fibrillation with RVR ([facility]) I48.91    Acute on chronic combined systolic (congestive) and diastolic (congestive) heart failure ([facility]) I50.43    Neutrophilic leukocytosis D72.9    Pneumonia due to 2019-nCoV U07.1, J12.82    COPD exacerbation ([facility])  J44.1    ARF (acute respiratory failure) ([facility]) J96.00    Sepsis due to pneumonia ([facility]) J18.9, A41.9    Mucopurulent chronic bronchitis ([facility]) J41.1    Pleural plaque J92.9    BMI 32.0-32.9,adult Z68.32    Gram-negative pneumonia ([facility]) J15.69    Pneumonia of right lower lobe due to infectious organism J18.9    Pneumonia of right lower lobe due to Klebsiella pneumoniae ([facility]) J15.0    Closed trimalleolar fracture of right ankle S82.851A    Syndesmotic disruption of right ankle S93.431A    Multifocal pneumonia J18.9    Acute metabolic encephalopathy G93.41    Acute on chronic respiratory failure with hypoxia and hypercapnia ([facility]) J96.21, J96.22    HCAP (healthcare-associated pneumonia) J18.9       Isolation/Infection:   Isolation            No Isolation          Patient Infection Status       Infection Onset Added Last Indicated Last Indicated By Review Planned Expiration Resolved Resolved By    None active    Resolved    COVID-19 23 COVID-19, Rapid   23 Infection                        Nurse Assessment:  Last Vital Signs: /86   Pulse 100   Temp 97.3 °F (36.3 °C) (Oral)   Resp 20   Ht 1.803 m (5' 11\")   Wt 101 kg (222 lb 10.6 oz)   SpO2 92%   BMI 31.06 kg/m²     Last documented pain score (0-10 scale): Pain Level: 0  Last Weight:   Wt Readings from Last 1 Encounters:   07/15/24 101 kg (222 lb 10.6 oz)     Mental Status:  {IP PT MENTAL STATUS:70407}    IV Access:  { AHSAN IV ACCESS:814802783}    Nursing Mobility/ADLs:  Walking   {P DME ADLs:041394140}  Transfer  {P DME ADLs:370272419}  Bathing  {P DME ADLs:940655626}  Dressing  {P DME ADLs:843311344}  Toileting  {P DME ADLs:786805784}  Feeding  {P DME ADLs:033271731}  Med Admin  {P DME ADLs:402303491}  Med Delivery   { AHSAN MED Delivery:380539449}    Wound Care Documentation and Therapy:  Wound 06/10/24 Buttocks Right friction injury (Active)   Wound Image   06/24/24 1030   Wound Etiology Pressure Stage 1

## 2024-07-15 NOTE — PROGRESS NOTES
PT resting/ asleep on 3lpm nasal cannula at this time   07/14/24 2304   Oxygen Therapy/Pulse Ox   O2 Therapy Oxygen   O2 Device Nasal cannula   O2 Flow Rate (L/min) 3 L/min   Pulse 92   Respirations 19   SpO2 99 %   Pulse Oximeter Device Mode Continuous   Pulse Oximeter Device Location Finger

## 2024-07-15 NOTE — PROGRESS NOTES
PT says he has increased shortness of breath at this time, placed on BIPAP. When I asked the PT if it is helping he nodded his head yes.    07/15/24 0318   NIV Type   $NIV $Daily Charge   NIV Started/Stopped On   Equipment Type V60   Mode Bilevel   Mask Type Full face mask   Mask Size Large   Assessment   Pulse 97   Respirations 27   SpO2 93 %   Level of Consciousness 0   Comfort Level Good   Using Accessory Muscles Yes   Mask Compliance Good   Skin Assessment Clean, dry, & intact   Skin Protection for O2 Device Yes   Orientation Middle   Location Nose   Intervention(s) Skin Barrier   Breath Sounds   Breath Sounds Bilateral Rhonchi;Diminished   Settings/Measurements   PIP Observed 12 cm H20   IPAP 12 cmH20   CPAP/EPAP 5 cmH2O   Vt (Measured) 631 mL   Rate Ordered 14   Insp Rise Time (%) 3 %   FiO2  40 %   I Time/ I Time % 1 s   Minute Volume (L/min) 12.8 Liters   Mask Leak (lpm) 16 lpm   Patient's Home Machine No   Alarm Settings   Alarms On Y   Low Pressure (cmH2O) 4 cmH2O   High Pressure (cmH2O) 30 cmH2O   Delay Alarm 30 sec(s)   Apnea (secs) 30 secs   RR Low (bpm) 10   RR High (bpm) 40 br/min   Oxygen Therapy/Pulse Ox   O2 Therapy Oxygen   O2 Device PAP (positive airway pressure)   Pulse Oximeter Device Mode Continuous   Pulse Oximeter Device Location Finger

## 2024-07-16 LAB
ANION GAP SERPL CALCULATED.3IONS-SCNC: 7 MMOL/L (ref 3–16)
BUN SERPL-MCNC: 23 MG/DL (ref 7–20)
CALCIUM SERPL-MCNC: 9 MG/DL (ref 8.3–10.6)
CHLORIDE SERPL-SCNC: 97 MMOL/L (ref 99–110)
CO2 SERPL-SCNC: 35 MMOL/L (ref 21–32)
CREAT SERPL-MCNC: 1.1 MG/DL (ref 0.8–1.3)
GFR SERPLBLD CREATININE-BSD FMLA CKD-EPI: 68 ML/MIN/{1.73_M2}
GLUCOSE BLD-MCNC: 166 MG/DL (ref 70–99)
GLUCOSE BLD-MCNC: 245 MG/DL (ref 70–99)
GLUCOSE BLD-MCNC: 254 MG/DL (ref 70–99)
GLUCOSE BLD-MCNC: 291 MG/DL (ref 70–99)
GLUCOSE SERPL-MCNC: 206 MG/DL (ref 70–99)
PERFORMED ON: ABNORMAL
POTASSIUM SERPL-SCNC: 3.8 MMOL/L (ref 3.5–5.1)
REPORT: NORMAL
REPORT: NORMAL
RESP PATH DNA+RNA PNL L RESP NAA+NON-PRB: NORMAL
RESP PATH DNA+RNA PNL NPH NAA+NON-PROBE: NORMAL
SODIUM SERPL-SCNC: 139 MMOL/L (ref 136–145)

## 2024-07-16 PROCEDURE — 99232 SBSQ HOSP IP/OBS MODERATE 35: CPT | Performed by: INTERNAL MEDICINE

## 2024-07-16 PROCEDURE — 2580000003 HC RX 258: Performed by: INTERNAL MEDICINE

## 2024-07-16 PROCEDURE — 97162 PT EVAL MOD COMPLEX 30 MIN: CPT

## 2024-07-16 PROCEDURE — 94669 MECHANICAL CHEST WALL OSCILL: CPT

## 2024-07-16 PROCEDURE — 6360000002 HC RX W HCPCS: Performed by: INTERNAL MEDICINE

## 2024-07-16 PROCEDURE — 94761 N-INVAS EAR/PLS OXIMETRY MLT: CPT

## 2024-07-16 PROCEDURE — 36415 COLL VENOUS BLD VENIPUNCTURE: CPT

## 2024-07-16 PROCEDURE — 6370000000 HC RX 637 (ALT 250 FOR IP): Performed by: INTERNAL MEDICINE

## 2024-07-16 PROCEDURE — 2700000000 HC OXYGEN THERAPY PER DAY

## 2024-07-16 PROCEDURE — 0202U NFCT DS 22 TRGT SARS-COV-2: CPT

## 2024-07-16 PROCEDURE — 97116 GAIT TRAINING THERAPY: CPT

## 2024-07-16 PROCEDURE — 94640 AIRWAY INHALATION TREATMENT: CPT

## 2024-07-16 PROCEDURE — 2060000000 HC ICU INTERMEDIATE R&B

## 2024-07-16 PROCEDURE — 94668 MNPJ CHEST WALL SBSQ: CPT

## 2024-07-16 PROCEDURE — 97530 THERAPEUTIC ACTIVITIES: CPT

## 2024-07-16 PROCEDURE — 94667 MNPJ CHEST WALL 1ST: CPT

## 2024-07-16 PROCEDURE — 80048 BASIC METABOLIC PNL TOTAL CA: CPT

## 2024-07-16 RX ORDER — ACETAZOLAMIDE 500 MG/5ML
500 INJECTION, POWDER, LYOPHILIZED, FOR SOLUTION INTRAVENOUS ONCE
Status: COMPLETED | OUTPATIENT
Start: 2024-07-16 | End: 2024-07-16

## 2024-07-16 RX ADMIN — CARVEDILOL 3.12 MG: 3.12 TABLET, FILM COATED ORAL at 08:36

## 2024-07-16 RX ADMIN — IPRATROPIUM BROMIDE AND ALBUTEROL SULFATE 1 DOSE: 2.5; .5 SOLUTION RESPIRATORY (INHALATION) at 19:32

## 2024-07-16 RX ADMIN — IPRATROPIUM BROMIDE AND ALBUTEROL SULFATE 1 DOSE: 2.5; .5 SOLUTION RESPIRATORY (INHALATION) at 12:09

## 2024-07-16 RX ADMIN — INSULIN LISPRO 2 UNITS: 100 INJECTION, SOLUTION INTRAVENOUS; SUBCUTANEOUS at 18:02

## 2024-07-16 RX ADMIN — GUAIFENESIN 600 MG: 600 TABLET, EXTENDED RELEASE ORAL at 08:36

## 2024-07-16 RX ADMIN — IPRATROPIUM BROMIDE AND ALBUTEROL SULFATE 1 DOSE: 2.5; .5 SOLUTION RESPIRATORY (INHALATION) at 07:57

## 2024-07-16 RX ADMIN — Medication 1000 UNITS: at 08:36

## 2024-07-16 RX ADMIN — ATORVASTATIN CALCIUM 10 MG: 10 TABLET, FILM COATED ORAL at 21:16

## 2024-07-16 RX ADMIN — PRAMIPEXOLE DIHYDROCHLORIDE 0.25 MG: 0.5 TABLET ORAL at 21:17

## 2024-07-16 RX ADMIN — POLYETHYLENE GLYCOL 3350 17 G: 17 POWDER, FOR SOLUTION ORAL at 01:08

## 2024-07-16 RX ADMIN — GABAPENTIN 800 MG: 400 CAPSULE ORAL at 12:40

## 2024-07-16 RX ADMIN — SODIUM CHLORIDE, PRESERVATIVE FREE 10 ML: 5 INJECTION INTRAVENOUS at 21:31

## 2024-07-16 RX ADMIN — INSULIN LISPRO 5 UNITS: 100 INJECTION, SOLUTION INTRAVENOUS; SUBCUTANEOUS at 08:26

## 2024-07-16 RX ADMIN — GUAIFENESIN 600 MG: 600 TABLET, EXTENDED RELEASE ORAL at 21:16

## 2024-07-16 RX ADMIN — INSULIN GLARGINE 28 UNITS: 100 INJECTION, SOLUTION SUBCUTANEOUS at 21:22

## 2024-07-16 RX ADMIN — MULTIPLE VITAMINS W/ MINERALS TAB 1 TABLET: TAB at 08:36

## 2024-07-16 RX ADMIN — FUROSEMIDE 40 MG: 10 INJECTION, SOLUTION INTRAMUSCULAR; INTRAVENOUS at 18:02

## 2024-07-16 RX ADMIN — APIXABAN 5 MG: 5 TABLET, FILM COATED ORAL at 21:16

## 2024-07-16 RX ADMIN — ASPIRIN 81 MG 81 MG: 81 TABLET ORAL at 08:36

## 2024-07-16 RX ADMIN — APIXABAN 5 MG: 5 TABLET, FILM COATED ORAL at 08:28

## 2024-07-16 RX ADMIN — CARVEDILOL 3.12 MG: 3.12 TABLET, FILM COATED ORAL at 18:02

## 2024-07-16 RX ADMIN — ROFLUMILAST 500 MCG: 500 TABLET ORAL at 21:16

## 2024-07-16 RX ADMIN — SODIUM CHLORIDE SOLN NEBU 3% 4 ML: 3 NEBU SOLN at 07:57

## 2024-07-16 RX ADMIN — IPRATROPIUM BROMIDE AND ALBUTEROL SULFATE 1 DOSE: 2.5; .5 SOLUTION RESPIRATORY (INHALATION) at 15:44

## 2024-07-16 RX ADMIN — LAMOTRIGINE 100 MG: 100 TABLET ORAL at 21:16

## 2024-07-16 RX ADMIN — SODIUM CHLORIDE, PRESERVATIVE FREE 10 ML: 5 INJECTION INTRAVENOUS at 08:27

## 2024-07-16 RX ADMIN — INSULIN LISPRO 5 UNITS: 100 INJECTION, SOLUTION INTRAVENOUS; SUBCUTANEOUS at 12:41

## 2024-07-16 RX ADMIN — ROFLUMILAST 500 MCG: 500 TABLET ORAL at 08:26

## 2024-07-16 RX ADMIN — SODIUM CHLORIDE SOLN NEBU 3% 4 ML: 3 NEBU SOLN at 19:32

## 2024-07-16 RX ADMIN — FUROSEMIDE 40 MG: 10 INJECTION, SOLUTION INTRAMUSCULAR; INTRAVENOUS at 08:27

## 2024-07-16 RX ADMIN — ANTI-FUNGAL POWDER MICONAZOLE NITRATE TALC FREE: 1.42 POWDER TOPICAL at 08:33

## 2024-07-16 RX ADMIN — ANTI-FUNGAL POWDER MICONAZOLE NITRATE TALC FREE: 1.42 POWDER TOPICAL at 21:30

## 2024-07-16 RX ADMIN — GABAPENTIN 800 MG: 400 CAPSULE ORAL at 18:03

## 2024-07-16 RX ADMIN — BUDESONIDE AND FORMOTEROL FUMARATE DIHYDRATE 2 PUFF: 160; 4.5 AEROSOL RESPIRATORY (INHALATION) at 19:33

## 2024-07-16 RX ADMIN — INSULIN LISPRO 5 UNITS: 100 INJECTION, SOLUTION INTRAVENOUS; SUBCUTANEOUS at 18:02

## 2024-07-16 RX ADMIN — PRAMIPEXOLE DIHYDROCHLORIDE 0.5 MG: 0.5 TABLET ORAL at 08:26

## 2024-07-16 RX ADMIN — INSULIN LISPRO 4 UNITS: 100 INJECTION, SOLUTION INTRAVENOUS; SUBCUTANEOUS at 12:41

## 2024-07-16 RX ADMIN — EMPAGLIFLOZIN 10 MG: 10 TABLET, FILM COATED ORAL at 08:26

## 2024-07-16 RX ADMIN — ACETAZOLAMIDE SODIUM 500 MG: 500 INJECTION, POWDER, LYOPHILIZED, FOR SOLUTION INTRAVENOUS at 12:40

## 2024-07-16 RX ADMIN — PRAMIPEXOLE DIHYDROCHLORIDE 0.5 MG: 0.5 TABLET ORAL at 21:16

## 2024-07-16 RX ADMIN — PREDNISONE 40 MG: 20 TABLET ORAL at 08:26

## 2024-07-16 RX ADMIN — BUDESONIDE AND FORMOTEROL FUMARATE DIHYDRATE 2 PUFF: 160; 4.5 AEROSOL RESPIRATORY (INHALATION) at 07:57

## 2024-07-16 RX ADMIN — GABAPENTIN 800 MG: 400 CAPSULE ORAL at 21:16

## 2024-07-16 RX ADMIN — LAMOTRIGINE 75 MG: 25 TABLET ORAL at 08:27

## 2024-07-16 RX ADMIN — GABAPENTIN 800 MG: 400 CAPSULE ORAL at 08:26

## 2024-07-16 NOTE — PROGRESS NOTES
PCR panel for respiratory panel came back negative. Discontinued droplet precaution order per protocol. Pt currently in chair resting. Call light in reach. No distress or discomfort noted.

## 2024-07-16 NOTE — PROGRESS NOTES
Pulmonary Progress Note     Patient's name:  Jorge Gauthier  Medical Record Number: [id number]  Patient's account/billing number: [id number]  Patient's YOB: 1945  Age: 79 y.o.  Date of Admission: 7/13/2024  4:07 PM  Date of Consult: 7/16/2024      Primary Care Physician: Gallo Sanchez MD      Code Status: Full Code    Reason for consult: Acute on chronic respiratory failure and hypoxia    Assessment and Plan     Acute on chronic respiratory failure with hypoxia and hypercapnia   COPD with acute exacerbation  Acute on chronic diastolic heart failure  Moderate aortic stenosis  Recurrent aspiration  Paroxysmal atrial fibrillation      Plan:  diuresis,   Prednisone x 4 days  Vest therapy  Weak cough, IS/Acapella   Bronchodilators, Daliresp   Wean Fio2 keep sat > 90%        Subjective:  Still sob    HISTORY OF PRESENT ILLNESS:   Mr./Ms. Jorge Gauthier is a 79 y.o. gentleman with past medical history stated below significant for history of COPD, history of recurrent aspiration, chronic diastolic heart failure, chronic hypoxic respiratory failure on home oxygen chronic atrial fibrillation, presented to the hospital with 2 days worsening cough chest congestion wheezing and lower extremity edema  Chest x-ray with patchy bilateral airspace disease small pleural effusions    Elevated bnp        REVIEW OF SYSTEMS:  Review of Systems -   General ROS: negative  Psychological ROS: negative  Ophthalmic ROS: negative  ENT ROS: negative  Allergy and Immunology ROS: negative  Hematological and Lymphatic ROS: negative  Endocrine ROS: negative  Breast ROS: negative  Respiratory ROS: cough, sob    Cardiovascular ROS: no chest pain or dyspnea on exertion  Gastrointestinal ROS:negative  Genito-Urinary ROS: negative  Musculoskeletal ROS: negative  Neurological ROS: negative  Dermatological ROS: negative        Physical Exam:    Vitals: /67   Pulse 81   Temp 98.1  basal pneumonia cannot be  excluded.  The heart appears unremarkable.  Patient is status post sternotomy.    Impression  Generalized increased pulmonary markings, with small pleural effusions,  increase interstitial markings in the lung bases, all suggesting interstitial  pulmonary edema and CHF.  Right lower lobe pneumonia cannot be excluded.      CXR portable: Results for orders placed during the hospital encounter of 07/13/24    XR CHEST PORTABLE    Narrative  EXAMINATION:  ONE XRAY VIEW OF THE CHEST    7/13/2024 4:44 pm    COMPARISON:  CXR dated 06/21/2024    HISTORY:  ORDERING SYSTEM PROVIDED HISTORY: SOB  TECHNOLOGIST PROVIDED HISTORY:  Reason for exam:->SOB  Reason for Exam: sob    FINDINGS:  Medical devices: Sternotomy wires.    Mediastinum/Heart: The mediastinal contours are normal. The heart appears  normal in size.    Lungs: Patchy airspace opacities throughout the lungs, most prominent the  lung bases, similar to the prior study.    Pleura: Small bilateral pleural effusion. No pneumothorax.    Impression  1. Patchy airspace opacities throughout the lungs, most prominent the lung  bases, similar to the prior study.  2. Small bilateral pleural effusion.  3. May represent multifocal pneumonia or pulmonary edema.                     Nic Awad MD, M.D.            7/16/2024, 10:17 AM

## 2024-07-16 NOTE — PLAN OF CARE
Problem: Discharge Planning  Goal: Discharge to home or other facility with appropriate resources  7/16/2024 1652 by Silver Willams RN  Outcome: Progressing  Flowsheets (Taken 7/16/2024 0800)  Discharge to home or other facility with appropriate resources:   Identify barriers to discharge with patient and caregiver   Arrange for needed discharge resources and transportation as appropriate   Identify discharge learning needs (meds, wound care, etc)  7/16/2024 0411 by Juju Stewart RN  Outcome: Progressing     Problem: Pain  Goal: Verbalizes/displays adequate comfort level or baseline comfort level  7/16/2024 1652 by Silver Willams RN  Outcome: Progressing  7/16/2024 0411 by Juju Stewart RN  Outcome: Progressing     Problem: Safety - Adult  Goal: Free from fall injury  Outcome: Progressing  Flowsheets (Taken 7/16/2024 1650)  Free From Fall Injury:   Instruct family/caregiver on patient safety   Based on caregiver fall risk screen, instruct family/caregiver to ask for assistance with transferring infant if caregiver noted to have fall risk factors     Problem: Skin/Tissue Integrity  Goal: Absence of new skin breakdown  Description: 1.  Monitor for areas of redness and/or skin breakdown  2.  Assess vascular access sites hourly  3.  Every 4-6 hours minimum:  Change oxygen saturation probe site  4.  Every 4-6 hours:  If on nasal continuous positive airway pressure, respiratory therapy assess nares and determine need for appliance change or resting period.  Outcome: Progressing     Problem: Respiratory - Adult  Goal: Achieves optimal ventilation and oxygenation  7/16/2024 1652 by Silver Willams RN  Outcome: Progressing  7/16/2024 0411 by Juju Stewart RN  Outcome: Progressing     Problem: Cardiovascular - Adult  Goal: Maintains optimal cardiac output and hemodynamic stability  7/16/2024 1652 by Silver Willams RN  Outcome: Progressing  7/16/2024 0411 by Juju Stewart RN  Outcome:

## 2024-07-16 NOTE — PROGRESS NOTES
Physical Therapy    Facility/Department: [address] PROGRESSIVE CARE  Physical Therapy Initial Assessment    Name: Jorge Gauthier  : 1945  MRN: [id number]  Date of Service: 2024    Discharge Recommendations:    Jorge Gauthier scored a 17/24 on the AM-PAC short mobility form. Current research shows that an AM-PAC score of 18 or greater is typically associated with a discharge to the patient's home setting. Based on the patient's current functional mobility deficits and assist available at home, it is recommended that the patient have 2-3 sessions per week of Physical Therapy at d/c to increase the patient's independence.  At this time, this patient demonstrates the endurance and safety to discharge home with home health PT and a follow up treatment frequency of 2-3x/wk.  Please see assessment section for further patient specific details.    If patient discharges prior to next session this note will serve as a discharge summary.  Please see below for the latest assessment towards goals.      Home with Home health PT; home with 24 hour assistance         Patient Diagnosis(es): The primary encounter diagnosis was Multifocal pneumonia. A diagnosis of Acute on chronic respiratory failure with hypoxia and hypercapnia (HCC) was also pertinent to this visit.    Past Medical History:  has a past medical history of Arthritis, CAD (coronary artery disease), Chronic back pain, Chronic combined systolic (congestive) and diastolic (congestive) heart failure (HCC), Chronic systolic CHF (congestive heart failure) (HCC), COPD (chronic obstructive pulmonary disease) (HCC), Dental disease, Diabetes mellitus (HCC), DMII (diabetes mellitus, type 2) (HCC), Essential hypertension, Hearing loss, Hyperlipidemia, Longstanding persistent atrial fibrillation (HCC), Non morbid obesity due to excess calories, Paroxysmal atrial fibrillation (HCC), Primary hypertension, and Tinnitus.    Past Surgical History:  has a past surgical history  here - Pt states ambulation today is the furthest he has walked since LE fx happened ~ a month ago         Social/Functional History  Social/Functional History  Lives With: Spouse  Type of Home: House  Home Layout: Two level (lives on first floor)  Home Access:  (stair lift from basement garage to first floor)  Bathroom Shower/Tub: Walk-in shower  Bathroom Toilet: Handicap height  Bathroom Equipment: Grab bars in shower, Shower chair, Grab bars around toilet  Bathroom Accessibility: Walker accessible  Home Equipment: Electric scooter, Walker - Rolling, Lift chair, Cane  ADL Assistance: Independent (assist from wife since R LE fx)  Homemaking Assistance: Needs assistance (wife)  Homemaking Responsibilities: No  Ambulation Assistance:  (Pt has not been ambulating at home since coming home from rehab)  Transfer Assistance: Needs assistance (wife pushes pt on 4WW due to w/c not fitting in home)  Active : No  Patient's  Info: wife drives  Mode of Transportation:  (has scooter accessible van -- pt rides in passenger side front seat)  Occupation: Retired  Type of Occupation:     Vision/Hearing  Vision  Vision: Impaired  Vision Exceptions: Wears glasses at all times  Hearing  Hearing: Exceptions to WFL  Hearing Exceptions: Hard of hearing/hearing concerns      Cognition   Orientation  Overall Orientation Status: Within Functional Limits  Orientation Level: Oriented to place;Oriented to person;Oriented to situation;Disoriented to time  Cognition  Overall Cognitive Status: WFL  Cognition Comment: pt states he does not keep track of month/date anymore because he is old     Objective      Observation/Palpation  Posture: Fair  Gross Assessment  AROM: Generally decreased, functional (Pt states decreased ability to reach down to put on shoes because of SOB)  Strength: Generally decreased, functional      Bed mobility  Supine to Sit: Unable to assess  Sit to Supine: Unable to assess  Bed Mobility Comments: Pt in

## 2024-07-16 NOTE — PROGRESS NOTES
V2.0  [facility] [place]      Name:  Jorge Gauthier /Age/Sex: 1945  (79 y.o. male)   MRN & CSN:  [id number] & [id number] Encounter Date/Time: 2024 3:53 PM EDT    Location:  [id number]-5944/5252-01 PCP: Gallo Sanchez MD       Hospital Day: 4  Subjective:   Chief Complaint: Shortness of Breath (Pt with chronic pulmonary disease who was recently diagnosed with PNA presents with worsening SOB )     Continues to complain of shortness of breath, on oxygen by nasal cannula around 2 L, no fevers or chills, diuresing well, negative fluid balance of 1500 cc, on Lasix, on prednisone.  Review of Systems:    Review of Systems  10 point ROS negative except as stated above in \"subjective\" section    Objective:     Intake/Output Summary (Last 24 hours) at 2024 1553  Last data filed at 2024 1123  Gross per 24 hour   Intake 1080 ml   Output 4100 ml   Net -3020 ml      Vitals:   Vitals:    24 1545   BP: 130/60   Pulse: (!) 109   Resp: 24   Temp: 97.6 °F (36.4 °C)   SpO2: 98%     Physical Exam:   General: Awake, alert and oriented, NAD  Eyes: EOMI  ENT: neck supple  Cardiovascular: S1S2 present, regular rate and rhythm, no murmurs  Respiratory: Diminished at bases with bibasilar crackles gastrointestinal: Soft, non tender, positive bowel sounds   Genitourinary: no suprapubic tenderness  Musculoskeletal: No edema  Skin: warm, dry  Neuro: Alert.  Psych: Mood appropriate.     Medications:   Medications:    atorvastatin  10 mg Oral Nightly    furosemide  40 mg IntraVENous BID    predniSONE  40 mg Oral Daily    miconazole   Topical BID    apixaban  5 mg Oral BID    aspirin  81 mg Oral Daily    budesonide-formoterol  2 puff Inhalation BID RT    And    [Held by provider] tiotropium  2 puff Inhalation Daily RT    carvedilol  3.125 mg Oral BID WC    empagliflozin  10 mg Oral Daily    gabapentin  800 mg Oral 4x Daily    guaiFENesin  600 mg Oral BID    lamoTRIgine  75 mg Oral QAM    lamoTRIgine  100 mg  seasonal Coronavirus.  The laboratory's  recommendation is to order the Respiratory Panel Molecular with COVID-19  test. ([id number]).  This is a nasopharyngeal swab (NP) collection in  viral transport media.     PNEUMONIA PANEL FILM ARRAY REPORT    Collection Time: 07/15/24 10:00 PM   Result Value Ref Range    Report SEE IMAGE    POCT Glucose    Collection Time: 07/16/24  8:00 AM   Result Value Ref Range    POC Glucose 166 (H) 70 - 99 mg/dl    Performed on ACCU-CHEK    Respiratory Panel, Molecular, with COVID-19 (Restricted: peds pts or suitable admitted adults)    Collection Time: 07/16/24  8:20 AM    Specimen: Nasopharyngeal Swab   Result Value Ref Range    Respiratory Panel PCR       Respiratory Pathogens Panel PCR Result: Not Detected  See additional report for complete Respiratory Pathogens Panel     Respiratory Panel Film Array Report    Collection Time: 07/16/24  8:20 AM   Result Value Ref Range    Report SEE IMAGE    Basic Metabolic Panel w/ Reflex to MG    Collection Time: 07/16/24 10:26 AM   Result Value Ref Range    Sodium 139 136 - 145 mmol/L    Potassium reflex Magnesium 3.8 3.5 - 5.1 mmol/L    Chloride 97 (L) 99 - 110 mmol/L    CO2 35 (H) 21 - 32 mmol/L    Anion Gap 7 3 - 16    Glucose 206 (H) 70 - 99 mg/dL    BUN 23 (H) 7 - 20 mg/dL    Creatinine 1.1 0.8 - 1.3 mg/dL    Est, Glom Filt Rate 68 >60    Calcium 9.0 8.3 - 10.6 mg/dL   POCT Glucose    Collection Time: 07/16/24 12:19 PM   Result Value Ref Range    POC Glucose 254 (H) 70 - 99 mg/dl    Performed on ACCU-CHEK         Imaging/Diagnostics Last 24 Hours   No results found.    Assessment and Plan:   Jorge Gauthier is a 79 y.o. male     Current problems under treatment:    # Acute on chronic hypoxemic respiratory failure, with hypercarbia and hypoxemia  # Probably COPD exacerbation  # Acute on chronic diastolic congestive heart failure  # Moderate aortic stenosis  # Recurrent aspiration  # Paroxysmal atrial fibrillation  # Diabetes mellitus type 2  #

## 2024-07-16 NOTE — PROGRESS NOTES
Occupational Therapy  Facility/Department: [address] PROGRESSIVE CARE  Occupational Therapy Initial Assessment and Tentative D/C      Name: Jorge Gauthier  : 1945  MRN: [id number]  Date of Service: 2024    Discharge Recommendations: Jorge Gauthier scored a 17/24 on the AM-PAC ADL Inpatient form. Current research shows that an AM-PAC score of 18 or greater is typically associated with a discharge to the patient's home setting. Based on the patient's AM-PAC score, and their current ADL deficits, it is recommended that the patient have 2-3 sessions per week of Occupational Therapy at d/c to increase the patient's independence.  At this time, this patient demonstrates the endurance and safety to discharge home with HHOT and a follow up treatment frequency of 2-3x/wk.   Please see assessment section for further patient specific details.    If patient discharges prior to next session this note will serve as a discharge summary.  Please see below for the latest assessment towards goals.     24 hour supervision or assist, 2-3 sessions per week, Patient would benefit from continued therapy after discharge  OT Equipment Recommendations  Equipment Needed: No       Patient Diagnosis(es): The primary encounter diagnosis was Multifocal pneumonia. A diagnosis of Acute on chronic respiratory failure with hypoxia and hypercapnia (HCC) was also pertinent to this visit.  Past Medical History:  has a past medical history of Arthritis, CAD (coronary artery disease), Chronic back pain, Chronic combined systolic (congestive) and diastolic (congestive) heart failure (HCC), Chronic systolic CHF (congestive heart failure) (HCC), COPD (chronic obstructive pulmonary disease) (HCC), Dental disease, Diabetes mellitus (HCC), DMII (diabetes mellitus, type 2) (HCC), Essential hypertension, Hearing loss, Hyperlipidemia, Longstanding persistent atrial fibrillation (HCC), Non morbid obesity due to excess calories, Paroxysmal atrial  Yes  Patient assessed for rehabilitation services?: Yes  Additional Pertinent Hx: per ED note, Jorge Gauthier is a 79 y.o. male who presents to the ED with concern for PNA. NP came to pt house, made diagnosis of PNA, and referred patient to ED. Pt wears 3L n/c at baseline. Pt reports onset of sx 3-4 days ago -- SOB refractory to breathing treatments, water pills. Denies fever, chest pain, nausea, vomiting, diarrhea. Reviewed d/c summary 6/26/24 - shows pt had sepsis 2/2 multifocal PNA. Pt reports he broke his right foot about a month ago, has a boot in place. Further chart review shows Progress Note 7/10/24 shows pt dx:   1-Right ankle trimalleolar displaced fracture, status post ORIF.  2-Right ankle distal tibiofibular syndesmosis disruption,status post  with syndesmosis screws x 2  Surgery 5/17/24.  Family / Caregiver Present: No  Referring Practitioner: Alma Mcbride MD  Subjective  Subjective: Pt agreeable to OT evaluation. Pt reports generalized pain with no number stated. Pt on 3L O2 throughout.     Social/Functional History  Social/Functional History  Lives With: Spouse  Type of Home: House  Home Layout: Two level (lives on first floor)  Home Access:  (stair lift from basement garage to first floor)  Bathroom Shower/Tub: Walk-in shower  Bathroom Toilet: Handicap height  Bathroom Equipment: Grab bars in shower, Shower chair, Grab bars around toilet  Bathroom Accessibility: Walker accessible  Home Equipment: Electric scooter, Walker - Rolling, Lift chair, Cane  ADL Assistance: Independent (assist from wife since R LE fx)  Homemaking Assistance: Needs assistance (wife)  Homemaking Responsibilities: No  Ambulation Assistance:  (Pt has not been ambulating at home since coming home from rehab)  Transfer Assistance: Needs assistance (wife pushes pt on 4WW due to w/c not fitting in home)  Active : No  Patient's  Info: wife drives  Mode of Transportation:  (has scooter accessible van -- pt rides in

## 2024-07-17 ENCOUNTER — APPOINTMENT (OUTPATIENT)
Dept: GENERAL RADIOLOGY | Age: 79
End: 2024-07-17
Payer: MEDICARE

## 2024-07-17 LAB
ANION GAP SERPL CALCULATED.3IONS-SCNC: 10 MMOL/L (ref 3–16)
BACTERIA BLD CULT ORG #2: NORMAL
BACTERIA BLD CULT: NORMAL
BUN SERPL-MCNC: 26 MG/DL (ref 7–20)
CALCIUM SERPL-MCNC: 9.4 MG/DL (ref 8.3–10.6)
CHLORIDE SERPL-SCNC: 100 MMOL/L (ref 99–110)
CO2 SERPL-SCNC: 30 MMOL/L (ref 21–32)
CREAT SERPL-MCNC: 1.1 MG/DL (ref 0.8–1.3)
GFR SERPLBLD CREATININE-BSD FMLA CKD-EPI: 68 ML/MIN/{1.73_M2}
GLUCOSE BLD-MCNC: 205 MG/DL (ref 70–99)
GLUCOSE BLD-MCNC: 225 MG/DL (ref 70–99)
GLUCOSE BLD-MCNC: 369 MG/DL (ref 70–99)
GLUCOSE BLD-MCNC: 381 MG/DL (ref 70–99)
GLUCOSE SERPL-MCNC: 224 MG/DL (ref 70–99)
PERFORMED ON: ABNORMAL
POTASSIUM SERPL-SCNC: 3.6 MMOL/L (ref 3.5–5.1)
SODIUM SERPL-SCNC: 140 MMOL/L (ref 136–145)

## 2024-07-17 PROCEDURE — 36415 COLL VENOUS BLD VENIPUNCTURE: CPT

## 2024-07-17 PROCEDURE — 94669 MECHANICAL CHEST WALL OSCILL: CPT

## 2024-07-17 PROCEDURE — 6360000002 HC RX W HCPCS: Performed by: INTERNAL MEDICINE

## 2024-07-17 PROCEDURE — 97535 SELF CARE MNGMENT TRAINING: CPT

## 2024-07-17 PROCEDURE — 2580000003 HC RX 258: Performed by: INTERNAL MEDICINE

## 2024-07-17 PROCEDURE — 71045 X-RAY EXAM CHEST 1 VIEW: CPT

## 2024-07-17 PROCEDURE — 6370000000 HC RX 637 (ALT 250 FOR IP): Performed by: INTERNAL MEDICINE

## 2024-07-17 PROCEDURE — 6370000000 HC RX 637 (ALT 250 FOR IP): Performed by: NURSE PRACTITIONER

## 2024-07-17 PROCEDURE — 97530 THERAPEUTIC ACTIVITIES: CPT

## 2024-07-17 PROCEDURE — 2500000003 HC RX 250 WO HCPCS: Performed by: INTERNAL MEDICINE

## 2024-07-17 PROCEDURE — 94761 N-INVAS EAR/PLS OXIMETRY MLT: CPT

## 2024-07-17 PROCEDURE — 6370000000 HC RX 637 (ALT 250 FOR IP): Performed by: HOSPITALIST

## 2024-07-17 PROCEDURE — 2060000000 HC ICU INTERMEDIATE R&B

## 2024-07-17 PROCEDURE — 94640 AIRWAY INHALATION TREATMENT: CPT

## 2024-07-17 PROCEDURE — 94668 MNPJ CHEST WALL SBSQ: CPT

## 2024-07-17 PROCEDURE — 99232 SBSQ HOSP IP/OBS MODERATE 35: CPT | Performed by: INTERNAL MEDICINE

## 2024-07-17 PROCEDURE — 2700000000 HC OXYGEN THERAPY PER DAY

## 2024-07-17 PROCEDURE — 80048 BASIC METABOLIC PNL TOTAL CA: CPT

## 2024-07-17 RX ORDER — INSULIN LISPRO 100 [IU]/ML
4 INJECTION, SOLUTION INTRAVENOUS; SUBCUTANEOUS ONCE
Status: COMPLETED | OUTPATIENT
Start: 2024-07-17 | End: 2024-07-17

## 2024-07-17 RX ORDER — ACETYLCYSTEINE 200 MG/ML
600 SOLUTION ORAL; RESPIRATORY (INHALATION)
Status: DISCONTINUED | OUTPATIENT
Start: 2024-07-17 | End: 2024-07-18 | Stop reason: HOSPADM

## 2024-07-17 RX ORDER — INSULIN LISPRO 100 [IU]/ML
0-4 INJECTION, SOLUTION INTRAVENOUS; SUBCUTANEOUS NIGHTLY
Status: DISCONTINUED | OUTPATIENT
Start: 2024-07-17 | End: 2024-07-18 | Stop reason: HOSPADM

## 2024-07-17 RX ORDER — INSULIN LISPRO 100 [IU]/ML
0-16 INJECTION, SOLUTION INTRAVENOUS; SUBCUTANEOUS
Status: DISCONTINUED | OUTPATIENT
Start: 2024-07-18 | End: 2024-07-18 | Stop reason: HOSPADM

## 2024-07-17 RX ADMIN — ROFLUMILAST 500 MCG: 500 TABLET ORAL at 10:50

## 2024-07-17 RX ADMIN — POLYETHYLENE GLYCOL 3350 17 G: 17 POWDER, FOR SOLUTION ORAL at 04:08

## 2024-07-17 RX ADMIN — ASPIRIN 81 MG 81 MG: 81 TABLET ORAL at 08:27

## 2024-07-17 RX ADMIN — CARVEDILOL 3.12 MG: 3.12 TABLET, FILM COATED ORAL at 17:43

## 2024-07-17 RX ADMIN — SODIUM CHLORIDE, PRESERVATIVE FREE 10 ML: 5 INJECTION INTRAVENOUS at 08:25

## 2024-07-17 RX ADMIN — ACETYLCYSTEINE 600 MG: 200 INHALANT RESPIRATORY (INHALATION) at 19:33

## 2024-07-17 RX ADMIN — GUAIFENESIN 600 MG: 600 TABLET, EXTENDED RELEASE ORAL at 20:51

## 2024-07-17 RX ADMIN — LAMOTRIGINE 100 MG: 100 TABLET ORAL at 20:50

## 2024-07-17 RX ADMIN — INSULIN GLARGINE 28 UNITS: 100 INJECTION, SOLUTION SUBCUTANEOUS at 20:51

## 2024-07-17 RX ADMIN — INSULIN LISPRO 5 UNITS: 100 INJECTION, SOLUTION INTRAVENOUS; SUBCUTANEOUS at 12:49

## 2024-07-17 RX ADMIN — ANTI-FUNGAL POWDER MICONAZOLE NITRATE TALC FREE: 1.42 POWDER TOPICAL at 08:24

## 2024-07-17 RX ADMIN — INSULIN LISPRO 4 UNITS: 100 INJECTION, SOLUTION INTRAVENOUS; SUBCUTANEOUS at 20:55

## 2024-07-17 RX ADMIN — GUAIFENESIN 600 MG: 600 TABLET, EXTENDED RELEASE ORAL at 08:26

## 2024-07-17 RX ADMIN — IPRATROPIUM BROMIDE AND ALBUTEROL SULFATE 1 DOSE: 2.5; .5 SOLUTION RESPIRATORY (INHALATION) at 12:47

## 2024-07-17 RX ADMIN — ATORVASTATIN CALCIUM 10 MG: 10 TABLET, FILM COATED ORAL at 20:50

## 2024-07-17 RX ADMIN — BUDESONIDE AND FORMOTEROL FUMARATE DIHYDRATE 2 PUFF: 160; 4.5 AEROSOL RESPIRATORY (INHALATION) at 19:33

## 2024-07-17 RX ADMIN — EMPAGLIFLOZIN 10 MG: 10 TABLET, FILM COATED ORAL at 08:27

## 2024-07-17 RX ADMIN — PRAMIPEXOLE DIHYDROCHLORIDE 0.5 MG: 0.5 TABLET ORAL at 20:51

## 2024-07-17 RX ADMIN — SODIUM CHLORIDE, PRESERVATIVE FREE 10 ML: 5 INJECTION INTRAVENOUS at 20:55

## 2024-07-17 RX ADMIN — PREDNISONE 40 MG: 20 TABLET ORAL at 08:25

## 2024-07-17 RX ADMIN — IPRATROPIUM BROMIDE AND ALBUTEROL SULFATE 1 DOSE: 2.5; .5 SOLUTION RESPIRATORY (INHALATION) at 15:41

## 2024-07-17 RX ADMIN — GABAPENTIN 800 MG: 400 CAPSULE ORAL at 12:48

## 2024-07-17 RX ADMIN — PRAMIPEXOLE DIHYDROCHLORIDE 0.5 MG: 0.5 TABLET ORAL at 08:26

## 2024-07-17 RX ADMIN — INSULIN LISPRO 2 UNITS: 100 INJECTION, SOLUTION INTRAVENOUS; SUBCUTANEOUS at 08:22

## 2024-07-17 RX ADMIN — IPRATROPIUM BROMIDE AND ALBUTEROL SULFATE 1 DOSE: 2.5; .5 SOLUTION RESPIRATORY (INHALATION) at 19:33

## 2024-07-17 RX ADMIN — GABAPENTIN 800 MG: 400 CAPSULE ORAL at 17:43

## 2024-07-17 RX ADMIN — IPRATROPIUM BROMIDE AND ALBUTEROL SULFATE 1 DOSE: 2.5; .5 SOLUTION RESPIRATORY (INHALATION) at 09:18

## 2024-07-17 RX ADMIN — ANTI-FUNGAL POWDER MICONAZOLE NITRATE TALC FREE: 1.42 POWDER TOPICAL at 21:02

## 2024-07-17 RX ADMIN — PRAMIPEXOLE DIHYDROCHLORIDE 0.25 MG: 0.5 TABLET ORAL at 21:02

## 2024-07-17 RX ADMIN — MULTIPLE VITAMINS W/ MINERALS TAB 1 TABLET: TAB at 10:50

## 2024-07-17 RX ADMIN — LAMOTRIGINE 75 MG: 25 TABLET ORAL at 08:25

## 2024-07-17 RX ADMIN — APIXABAN 5 MG: 5 TABLET, FILM COATED ORAL at 20:51

## 2024-07-17 RX ADMIN — GABAPENTIN 800 MG: 400 CAPSULE ORAL at 08:27

## 2024-07-17 RX ADMIN — INSULIN LISPRO 5 UNITS: 100 INJECTION, SOLUTION INTRAVENOUS; SUBCUTANEOUS at 08:21

## 2024-07-17 RX ADMIN — GABAPENTIN 800 MG: 400 CAPSULE ORAL at 20:50

## 2024-07-17 RX ADMIN — INSULIN LISPRO 5 UNITS: 100 INJECTION, SOLUTION INTRAVENOUS; SUBCUTANEOUS at 17:38

## 2024-07-17 RX ADMIN — INSULIN LISPRO 2 UNITS: 100 INJECTION, SOLUTION INTRAVENOUS; SUBCUTANEOUS at 12:51

## 2024-07-17 RX ADMIN — SODIUM CHLORIDE SOLN NEBU 3% 4 ML: 3 NEBU SOLN at 19:33

## 2024-07-17 RX ADMIN — APIXABAN 5 MG: 5 TABLET, FILM COATED ORAL at 08:25

## 2024-07-17 RX ADMIN — INSULIN LISPRO 8 UNITS: 100 INJECTION, SOLUTION INTRAVENOUS; SUBCUTANEOUS at 17:38

## 2024-07-17 RX ADMIN — SODIUM CHLORIDE SOLN NEBU 3% 4 ML: 3 NEBU SOLN at 09:18

## 2024-07-17 RX ADMIN — ACETYLCYSTEINE 600 MG: 200 INHALANT RESPIRATORY (INHALATION) at 12:47

## 2024-07-17 RX ADMIN — INSULIN LISPRO 4 UNITS: 100 INJECTION, SOLUTION INTRAVENOUS; SUBCUTANEOUS at 20:54

## 2024-07-17 RX ADMIN — ROFLUMILAST 500 MCG: 500 TABLET ORAL at 21:02

## 2024-07-17 RX ADMIN — Medication 1000 UNITS: at 08:25

## 2024-07-17 RX ADMIN — BUDESONIDE AND FORMOTEROL FUMARATE DIHYDRATE 2 PUFF: 160; 4.5 AEROSOL RESPIRATORY (INHALATION) at 09:18

## 2024-07-17 ASSESSMENT — PAIN SCALES - GENERAL: PAINLEVEL_OUTOF10: 0

## 2024-07-17 NOTE — PROGRESS NOTES
Yes  Additional Pertinent Hx: per ED note, Jorge Gauthier is a 79 y.o. male who presents to the ED with concern for PNA. NP came to pt house, made diagnosis of PNA, and referred patient to ED. Pt wears 3L n/c at baseline. Pt reports onset of sx 3-4 days ago -- SOB refractory to breathing treatments, water pills. Denies fever, chest pain, nausea, vomiting, diarrhea. Reviewed d/c summary 6/26/24 - shows pt had sepsis 2/2 multifocal PNA. Pt reports he broke his right foot about a month ago, has a boot in place. Further chart review shows Progress Note 7/10/24 shows pt dx:   1-Right ankle trimalleolar displaced fracture, status post ORIF.  2-Right ankle distal tibiofibular syndesmosis disruption,status post  with syndesmosis screws x 2  Surgery 5/17/24.  Family / Caregiver Present: No  Referring Practitioner: Alma Mcbride MD  Subjective  Subjective: Pt met b/s for OT. Pt seated in recliner and agreeable. Pt very adamant he only needs to wear the boot one more day and wants to go without it; OT located ortho note from 7/10 that states 2 more weeks with the boot and pt verbalized understanding     Social/Functional History  Social/Functional History  Lives With: Spouse  Type of Home: House  Home Layout: Two level (lives on first floor)  Home Access:  (stair lift from basement garage to first floor)  Bathroom Shower/Tub: Walk-in shower  Bathroom Toilet: Handicap height  Bathroom Equipment: Grab bars in shower, Shower chair, Grab bars around toilet  Bathroom Accessibility: Walker accessible  Home Equipment: Electric scooter, Walker - Rolling, Lift chair, Cane  ADL Assistance: Independent (assist from wife since R LE fx)  Homemaking Assistance: Needs assistance (wife)  Homemaking Responsibilities: No  Ambulation Assistance:  (Pt has not been ambulating at home since coming home from rehab)  Transfer Assistance: Needs assistance (wife pushes pt on 4WW due to w/c not fitting in home)  Active : No  Patient's   Info: wife drives  Mode of Transportation:  (has scooter [employer] van -- pt rides in passenger side front seat)  Occupation: Retired  Type of Occupation:        Objective   Safety Devices  Type of Devices: Left in chair;Gait belt;Chair alarm in place;Call light within reach           ADL  LE Dressing: Minimal assistance  LE Dressing Skilled Clinical Factors: Pt donned L shoe, needed assist for R boot  Toileting: Dependent/Total  Toileting Skilled Clinical Factors: Purewick  Functional Mobility: Contact guard assistance  Functional Mobility Skilled Clinical Factors: Pt completed functional mobility ~20 ft x2 CGA using RW. Cues for RW safety but no LOB  Skin Care: Bath wipes        Bed mobility  Supine to Sit: Unable to assess  Sit to Supine: Unable to assess  Bed Mobility Comments: Pt in recliner at beginning and end of session  Transfers  Sit to stand: Contact guard assistance  Stand to sit: Moderate assistance;Contact guard assistance  Transfer Comments: RW. On 2nd stand>sit, pt's right boot got caught on the ground and he \"fell\" back into recliner w/ mod A to brace into chair     Cognition  Overall Cognitive Status: [facility]  Cognition Comment: Some decreased insight into importance of wearing boot to promote proper healing  Orientation  Overall Orientation Status: Within Functional Limits  Orientation Level: Oriented to place;Oriented to person;Oriented to situation;Disoriented to time                  Education Given To: Patient  Education Provided: Role of Therapy;Plan of Care;Transfer Training;ADL Adaptive Strategies  Education Method: Verbal;Demonstration  Barriers to Learning: None  Education Outcome: Verbalized understanding;Demonstrated understanding              AM-PAC - ADL  AM-PAC Daily Activity - Inpatient   How much help is needed for putting on and taking off regular lower body clothing?: A Lot  How much help is needed for bathing (which includes washing, rinsing, drying)?: A Little  How  much help is needed for toileting (which includes using toilet, bedpan, or urinal)?: A Lot  How much help is needed for putting on and taking off regular upper body clothing?: A Little  How much help is needed for taking care of personal grooming?: A Little  How much help for eating meals?: None  AM-[facility] Inpatient Daily Activity Raw Score: 17  AM-PAC Inpatient ADL T-Scale Score : 37.26  ADL Inpatient CMS 0-100% Score: 50.11  ADL Inpatient CMS G-Code Modifier : CK    Goals  Short Term Goals  Time Frame for Short Term Goals: prior to D/C; goals ongoing  Short Term Goal 1: complete functional transfers with SBA  Short Term Goal 2: complete bathing and dressing with Min A  Short Term Goal 3: complete toileting with SBA  Short Term Goal 4: tolerate B UE exercises x10 reps for increased strength with ADLs  Long Term Goals  Time Frame for Long Term Goals : STG=LTG  Patient Goals   Patient goals : return home       Therapy Time   Individual Concurrent Group Co-treatment   Time In 1055         Time Out 1125         Minutes 30                 Colleen Garcia OTR/L [id number]

## 2024-07-17 NOTE — PLAN OF CARE
Problem: Discharge Planning  Goal: Discharge to home or other facility with appropriate resources  7/17/2024 1307 by Nilda Sanderson, RN  Outcome: Progressing  Flowsheets (Taken 7/17/2024 0815)  Discharge to home or other facility with appropriate resources:   Identify barriers to discharge with patient and caregiver   Arrange for needed discharge resources and transportation as appropriate   Refer to discharge planning if patient needs post-hospital services based on physician order or complex needs related to functional status, cognitive ability or social support system  7/17/2024 0228 by Nena Garza, RN  Outcome: Progressing     Problem: Pain  Goal: Verbalizes/displays adequate comfort level or baseline comfort level  7/17/2024 1307 by Nilda Sanderson, RN  Outcome: Progressing  Flowsheets (Taken 7/17/2024 0815)  Verbalizes/displays adequate comfort level or baseline comfort level:   Encourage patient to monitor pain and request assistance   Assess pain using appropriate pain scale   Administer analgesics based on type and severity of pain and evaluate response   Implement non-pharmacological measures as appropriate and evaluate response   Consider cultural and social influences on pain and pain management   Notify Licensed Independent Practitioner if interventions unsuccessful or patient reports new pain  7/17/2024 0228 by Nena Garza, RN  Outcome: Progressing     Problem: Safety - Adult  Goal: Free from fall injury  7/17/2024 1307 by Nilda Sanderson, RN  Outcome: Progressing  Flowsheets (Taken 7/17/2024 0815)  Free From Fall Injury: Instruct family/caregiver on patient safety  7/17/2024 0228 by Nena Garza, RN  Outcome: Progressing     Problem: Skin/Tissue Integrity  Goal: Absence of new skin breakdown  Description: 1.  Monitor for areas of redness and/or skin breakdown  2.  Assess vascular access sites hourly  3.  Every 4-6 hours minimum:  Change oxygen saturation probe site  4.  Every 4-6 hours:  If on nasal

## 2024-07-17 NOTE — PROGRESS NOTES
Pt tolerated treatments well. Asked pt if he was wanting to use bipap tonight and he informed me \"if I need it I will call.\"

## 2024-07-17 NOTE — PROGRESS NOTES
major fissure producing a pseudomass appearance.  No  pneumothorax on either side.    Upper Abdomen: No free air or free fluid at the upper abdomen..    Soft Tissues/Bones: There is no soft tissue emphysema.  The bones appear  stable.  Sternotomy wires present.  Degenerative changes at the shoulders.  Disc disease and endplate changes in the thoracic spine but no collapse or  subluxation.  Leads in the upper thoracic bony canal coursing into the mid  back to the left of midline with a small reservoir but no electronic device.    Impression  1.  Multifocal opacities in the middle lobe, superior segment right lower  lobe, and left lower lobe have are new or worsened from recent exams  suggesting infectious inflammatory etiology.  However interlobular septal  thickening, some areas of ground-glass opacity, and small amounts of pleural  effusion are still present and could relate to edema.    2.  Consolidative area in the posterior right base more than the edge of the  left base.  Could relate to atelectatic changes or pneumonia.    3.  Respiratory motion artifact degrading the evaluation of the mid and  distal branches, but mostly at the deep lung bases.  No large or central  pulmonary arterial emboli are identified.    4.  Borderline mediastinal and hilar lymph nodes with short axis measuring to  10-11 mm.  Could be reactive in nature but underlying malignancy is not  excluded.    5.  Pleural thickening calcifications in the left mid to lower chest are  redemonstrated.  Could relate to prior asbestos exposure.      CXR PA/LAT: Results for orders placed during the hospital encounter of 05/07/24    XR CHEST (2 VW)    Narrative  EXAMINATION:  TWO XRAY VIEWS OF THE CHEST    5/7/2024 3:00 pm    COMPARISON:  None.    HISTORY:  ORDERING SYSTEM PROVIDED HISTORY: Please assess for pneumonia  TECHNOLOGIST PROVIDED HISTORY:  Reason for exam:->Please assess for pneumonia  Reason for Exam: Please assess for  pneumonia    FINDINGS:  There are generalized increased pulmonary markings, with small pleural  effusions, increase interstitial markings in the lung bases, all suggesting  interstitial pulmonary edema and CHF.  Left basal pneumonia cannot be  excluded.  The heart appears unremarkable.  Patient is status post sternotomy.    Impression  Generalized increased pulmonary markings, with small pleural effusions,  increase interstitial markings in the lung bases, all suggesting interstitial  pulmonary edema and CHF.  Right lower lobe pneumonia cannot be excluded.      CXR portable: Results for orders placed during the hospital encounter of 07/13/24    XR CHEST PORTABLE    Narrative  EXAMINATION:  ONE XRAY VIEW OF THE CHEST    7/13/2024 4:44 pm    COMPARISON:  CXR dated 06/21/2024    HISTORY:  ORDERING SYSTEM PROVIDED HISTORY: SOB  TECHNOLOGIST PROVIDED HISTORY:  Reason for exam:->SOB  Reason for Exam: sob    FINDINGS:  Medical devices: Sternotomy wires.    Mediastinum/Heart: The mediastinal contours are normal. The heart appears  normal in size.    Lungs: Patchy airspace opacities throughout the lungs, most prominent the  lung bases, similar to the prior study.    Pleura: Small bilateral pleural effusion. No pneumothorax.    Impression  1. Patchy airspace opacities throughout the lungs, most prominent the lung  bases, similar to the prior study.  2. Small bilateral pleural effusion.  3. May represent multifocal pneumonia or pulmonary edema.                     Nic Awad MD, MLEONID.            7/17/2024, 9:18 AM

## 2024-07-17 NOTE — PROGRESS NOTES
Pt dinner bg:      Lab Results   Component Value Date/Time    POCGLU 381 (H) 07/17/2024 05:22 PM     Humalog correctional order say give 8u and notify MD. Notified Wero Angeles MD who relays they will modify humalog order and no additional insulin to be given at this time. Pt did get 5u prandial in addition to 8u correctional at this time. Will monitor pt.    Electronically signed by Nilda Sanderson RN on 7/17/2024 at 5:54 PM

## 2024-07-17 NOTE — PLAN OF CARE
Problem: Discharge Planning  Goal: Discharge to home or other facility with appropriate resources  7/17/2024 0228 by Nena Garza RN  Outcome: Progressing     Problem: Pain  Goal: Verbalizes/displays adequate comfort level or baseline comfort level  7/17/2024 0228 by Nena Garza RN  Outcome: Progressing     Problem: Safety - Adult  Goal: Free from fall injury  7/17/2024 0228 by Nena Garza RN  Outcome: Progressing     Problem: Skin/Tissue Integrity  Goal: Absence of new skin breakdown  Description: 1.  Monitor for areas of redness and/or skin breakdown  2.  Assess vascular access sites hourly  3.  Every 4-6 hours minimum:  Change oxygen saturation probe site  4.  Every 4-6 hours:  If on nasal continuous positive airway pressure, respiratory therapy assess nares and determine need for appliance change or resting period.  7/17/2024 0228 by Nena Garza RN  Outcome: Progressing     Problem: Respiratory - Adult  Goal: Achieves optimal ventilation and oxygenation  7/17/2024 0228 by Nena Garza RN  Outcome: Progressing     Problem: Cardiovascular - Adult  Goal: Maintains optimal cardiac output and hemodynamic stability  7/17/2024 0228 by Nena Garza RN  Outcome: Progressing     Problem: Metabolic/Fluid and Electrolytes - Adult  Goal: Electrolytes maintained within normal limits  7/17/2024 0228 by Nena Garza RN  Outcome: Progressing     Problem: Chronic Conditions and Co-morbidities  Goal: Patient's chronic conditions and co-morbidity symptoms are monitored and maintained or improved  7/17/2024 0228 by Nena Garza RN  Outcome: Progressing     Problem: ABCDS Injury Assessment  Goal: Absence of physical injury  7/17/2024 0228 by Nena Garza RN  Outcome: Progressing

## 2024-07-17 NOTE — PROGRESS NOTES
V2.0  [facility] [place] Hospitalist Progress Note      Name:  Jorge Gauthier /Age/Sex: 1945  (79 y.o. male)   MRN & CSN:  [id number] & [id number] Encounter Date/Time: 2024 1:58 PM EDT    Location:  [id number] PCP: Gallo Sanchez MD       Hospital Day: 5  Subjective:   Chief Complaint: Shortness of Breath (Pt with chronic pulmonary disease who was recently diagnosed with PNA presents with worsening SOB )     Patient seen and evaluated at the bedside, denies any new complaints, this morning his blood pressure was soft, his Lasix as well as Coreg was held, he remains in A-fib, heart rate in the 120s currently.  Patient chronically oxygen dependent, currently on 2 L.  Review of Systems:    Review of Systems  10 point ROS negative except as stated above in \"subjective\" section    Objective:     Intake/Output Summary (Last 24 hours) at 2024 1358  Last data filed at 2024 1221  Gross per 24 hour   Intake 1050 ml   Output 4200 ml   Net -3150 ml      Vitals:   Vitals:    24 1250   BP:    Pulse: 99   Resp: 25   Temp:    SpO2: 98%     Physical Exam:   General: Awake, alert and oriented, NAD  Eyes: EOMI  ENT: neck supple  Cardiovascular: S1S2 present, regular rate and rhythm, no murmurs  Respiratory: Diminished at bases with bibasilar crackles gastrointestinal: Soft, non tender, positive bowel sounds   Genitourinary: no suprapubic tenderness  Musculoskeletal:trace edema  Skin: warm, dry  Neuro: Alert.  Psych: Mood appropriate.     Medications:   Medications:    acetylcysteine  600 mg Inhalation BID RT    atorvastatin  10 mg Oral Nightly    predniSONE  40 mg Oral Daily    miconazole   Topical BID    apixaban  5 mg Oral BID    aspirin  81 mg Oral Daily    budesonide-formoterol  2 puff Inhalation BID RT    And    [Held by provider] tiotropium  2 puff Inhalation Daily RT    carvedilol  3.125 mg Oral BID WC    empagliflozin  10 mg Oral Daily    gabapentin  800 mg Oral 4x Daily    guaiFENesin  600 mg Oral  near euvolemic, was hypotensive this morning, will hold off on further diuresis, tomorrow morning will resume his home regimen of Lasix, briefly was on BiPAP, he is off of that.    -For COPD exacerbation, finish pulse dose of steroids    -Being followed by pulmonology, current thought is that symptoms mostly represents heart failure and COPD exacerbation abdomen infection, off antibiotics.    -Patient's heart rate has gone up, was hypotensive this morning, will continue to hold his Lasix, he did not get his Coreg this morning, will resume his Coreg, give a delayed dose, will see how he responds, tentatively discharged home in 24 hours with home health      Personally reviewed Lab Studies and Imaging     EKG interpreted personally and results rapid atrial fibrillation    Imaging that was interpreted personally includes x-ray and results patchy airspace disease    Drugs that require monitoring for toxicity include Lasix and the method of monitoring was blood pressure checks electronically signed by Wero Angeles MD on 7/17/2024 at 1:58 PM    This not was generated completely or in part using Dragon, speech recognition software, please excuse any inaccuracies or misstatements.

## 2024-07-17 NOTE — CARE COORDINATION
Patient with some hypotension today. Lasix discontinued.   Pulmonology following.    Patient will return home with [facility]. Will need home care orders.     Electronically signed by Steffany Harkins RN Case Management on 7/17/2024 at 12:21 PM

## 2024-07-17 NOTE — PROGRESS NOTES
Notified Wero Angeles MD of pt BP (!) 96/55   Pulse (!) 122   Temp 97.4 °F (36.3 °C)   Resp 26   Ht 1.803 m (5' 11\")   Wt 95.7 kg (210 lb 15.7 oz)   SpO2 91%   BMI 29.43 kg/m²  and that scheduled lasix and coreg was held.    Electronically signed by Nilda Sanderson RN on 7/17/2024 at 8:55 AM      While rounding w/Wero Angeles MD this afternoon, Wero Angeles MD wants pt to have am coreg now. On reassessment, pt VS: BP 95/61   Pulse 94   Temp 97.7 °F (36.5 °C) (Oral)   Resp 21   Ht 1.803 m (5' 11\")   Wt 95.7 kg (210 lb 15.7 oz)   SpO2 99%   BMI 29.43 kg/m² . Informed Wero Angeles MD of this and he relays for me to hold it again. Will monitor pt.     Electronically signed by Nilda Sanderson RN on 7/17/2024 at 2:33 PM

## 2024-07-18 VITALS
TEMPERATURE: 97.8 F | RESPIRATION RATE: 15 BRPM | WEIGHT: 210.76 LBS | HEIGHT: 71 IN | BODY MASS INDEX: 29.51 KG/M2 | OXYGEN SATURATION: 97 % | DIASTOLIC BLOOD PRESSURE: 72 MMHG | SYSTOLIC BLOOD PRESSURE: 104 MMHG | HEART RATE: 93 BPM

## 2024-07-18 LAB
ANION GAP SERPL CALCULATED.3IONS-SCNC: 9 MMOL/L (ref 3–16)
BACTERIA SPEC RESP CULT: ABNORMAL
BUN SERPL-MCNC: 27 MG/DL (ref 7–20)
CALCIUM SERPL-MCNC: 9.5 MG/DL (ref 8.3–10.6)
CHLORIDE SERPL-SCNC: 103 MMOL/L (ref 99–110)
CO2 SERPL-SCNC: 27 MMOL/L (ref 21–32)
CREAT SERPL-MCNC: 1 MG/DL (ref 0.8–1.3)
GFR SERPLBLD CREATININE-BSD FMLA CKD-EPI: 76 ML/MIN/{1.73_M2}
GLUCOSE BLD-MCNC: 184 MG/DL (ref 70–99)
GLUCOSE BLD-MCNC: 243 MG/DL (ref 70–99)
GLUCOSE SERPL-MCNC: 219 MG/DL (ref 70–99)
GRAM STN SPEC: ABNORMAL
ORGANISM: ABNORMAL
PERFORMED ON: ABNORMAL
PERFORMED ON: ABNORMAL
POTASSIUM SERPL-SCNC: 3.9 MMOL/L (ref 3.5–5.1)
SODIUM SERPL-SCNC: 139 MMOL/L (ref 136–145)

## 2024-07-18 PROCEDURE — 80048 BASIC METABOLIC PNL TOTAL CA: CPT

## 2024-07-18 PROCEDURE — 6370000000 HC RX 637 (ALT 250 FOR IP): Performed by: INTERNAL MEDICINE

## 2024-07-18 PROCEDURE — 36415 COLL VENOUS BLD VENIPUNCTURE: CPT

## 2024-07-18 PROCEDURE — 99232 SBSQ HOSP IP/OBS MODERATE 35: CPT | Performed by: INTERNAL MEDICINE

## 2024-07-18 PROCEDURE — 94761 N-INVAS EAR/PLS OXIMETRY MLT: CPT

## 2024-07-18 PROCEDURE — 94669 MECHANICAL CHEST WALL OSCILL: CPT

## 2024-07-18 PROCEDURE — 2580000003 HC RX 258: Performed by: INTERNAL MEDICINE

## 2024-07-18 PROCEDURE — 2700000000 HC OXYGEN THERAPY PER DAY

## 2024-07-18 PROCEDURE — 6370000000 HC RX 637 (ALT 250 FOR IP): Performed by: HOSPITALIST

## 2024-07-18 PROCEDURE — 94668 MNPJ CHEST WALL SBSQ: CPT

## 2024-07-18 PROCEDURE — 94640 AIRWAY INHALATION TREATMENT: CPT

## 2024-07-18 RX ORDER — FUROSEMIDE 40 MG/1
40 TABLET ORAL DAILY
Status: DISCONTINUED | OUTPATIENT
Start: 2024-07-18 | End: 2024-07-18 | Stop reason: HOSPADM

## 2024-07-18 RX ORDER — GUAIFENESIN 600 MG/1
600 TABLET, EXTENDED RELEASE ORAL 2 TIMES DAILY
Qty: 10 TABLET | Refills: 0 | Status: SHIPPED | OUTPATIENT
Start: 2024-07-18 | End: 2024-07-23

## 2024-07-18 RX ADMIN — SODIUM CHLORIDE SOLN NEBU 3% 4 ML: 3 NEBU SOLN at 07:50

## 2024-07-18 RX ADMIN — CARVEDILOL 3.12 MG: 3.12 TABLET, FILM COATED ORAL at 08:47

## 2024-07-18 RX ADMIN — EMPAGLIFLOZIN 10 MG: 10 TABLET, FILM COATED ORAL at 08:46

## 2024-07-18 RX ADMIN — MULTIPLE VITAMINS W/ MINERALS TAB 1 TABLET: TAB at 08:49

## 2024-07-18 RX ADMIN — ACETYLCYSTEINE 600 MG: 200 INHALANT RESPIRATORY (INHALATION) at 07:51

## 2024-07-18 RX ADMIN — ROFLUMILAST 500 MCG: 500 TABLET ORAL at 08:46

## 2024-07-18 RX ADMIN — LAMOTRIGINE 75 MG: 25 TABLET ORAL at 08:46

## 2024-07-18 RX ADMIN — ANTI-FUNGAL POWDER MICONAZOLE NITRATE TALC FREE: 1.42 POWDER TOPICAL at 08:49

## 2024-07-18 RX ADMIN — INSULIN LISPRO 5 UNITS: 100 INJECTION, SOLUTION INTRAVENOUS; SUBCUTANEOUS at 09:09

## 2024-07-18 RX ADMIN — IPRATROPIUM BROMIDE AND ALBUTEROL SULFATE 1 DOSE: 2.5; .5 SOLUTION RESPIRATORY (INHALATION) at 12:02

## 2024-07-18 RX ADMIN — IPRATROPIUM BROMIDE AND ALBUTEROL SULFATE 1 DOSE: 2.5; .5 SOLUTION RESPIRATORY (INHALATION) at 07:50

## 2024-07-18 RX ADMIN — ASPIRIN 81 MG 81 MG: 81 TABLET ORAL at 08:47

## 2024-07-18 RX ADMIN — INSULIN LISPRO 5 UNITS: 100 INJECTION, SOLUTION INTRAVENOUS; SUBCUTANEOUS at 12:28

## 2024-07-18 RX ADMIN — GABAPENTIN 800 MG: 400 CAPSULE ORAL at 08:46

## 2024-07-18 RX ADMIN — SODIUM CHLORIDE, PRESERVATIVE FREE 10 ML: 5 INJECTION INTRAVENOUS at 08:48

## 2024-07-18 RX ADMIN — Medication 1000 UNITS: at 08:46

## 2024-07-18 RX ADMIN — INSULIN LISPRO 4 UNITS: 100 INJECTION, SOLUTION INTRAVENOUS; SUBCUTANEOUS at 12:28

## 2024-07-18 RX ADMIN — BUDESONIDE AND FORMOTEROL FUMARATE DIHYDRATE 2 PUFF: 160; 4.5 AEROSOL RESPIRATORY (INHALATION) at 07:50

## 2024-07-18 RX ADMIN — APIXABAN 5 MG: 5 TABLET, FILM COATED ORAL at 08:47

## 2024-07-18 RX ADMIN — GUAIFENESIN 600 MG: 600 TABLET, EXTENDED RELEASE ORAL at 08:46

## 2024-07-18 RX ADMIN — SODIUM CHLORIDE, PRESERVATIVE FREE 10 ML: 5 INJECTION INTRAVENOUS at 08:47

## 2024-07-18 RX ADMIN — FUROSEMIDE 40 MG: 40 TABLET ORAL at 08:52

## 2024-07-18 RX ADMIN — PRAMIPEXOLE DIHYDROCHLORIDE 0.5 MG: 0.5 TABLET ORAL at 08:47

## 2024-07-18 RX ADMIN — GABAPENTIN 800 MG: 400 CAPSULE ORAL at 15:44

## 2024-07-18 RX ADMIN — PREDNISONE 40 MG: 20 TABLET ORAL at 08:46

## 2024-07-18 NOTE — PLAN OF CARE
Problem: Discharge Planning  Goal: Discharge to home or other facility with appropriate resources  7/18/2024 1556 by Nilda Sanderson RN  Outcome: Adequate for Discharge  7/18/2024 1359 by Nilda Sanderson RN  Outcome: Progressing  Flowsheets (Taken 7/18/2024 0840)  Discharge to home or other facility with appropriate resources:   Identify barriers to discharge with patient and caregiver   Arrange for needed discharge resources and transportation as appropriate   Refer to discharge planning if patient needs post-hospital services based on physician order or complex needs related to functional status, cognitive ability or social support system     Problem: Pain  Goal: Verbalizes/displays adequate comfort level or baseline comfort level  7/18/2024 1556 by Nilda Sanderson RN  Outcome: Adequate for Discharge  7/18/2024 1359 by Nilda Sanderson RN  Outcome: Progressing  Flowsheets (Taken 7/18/2024 0840)  Verbalizes/displays adequate comfort level or baseline comfort level:   Encourage patient to monitor pain and request assistance   Assess pain using appropriate pain scale   Administer analgesics based on type and severity of pain and evaluate response   Implement non-pharmacological measures as appropriate and evaluate response   Consider cultural and social influences on pain and pain management   Notify Licensed Independent Practitioner if interventions unsuccessful or patient reports new pain     Problem: Safety - Adult  Goal: Free from fall injury  7/18/2024 1556 by Nilda Sanderson RN  Outcome: Adequate for Discharge  7/18/2024 1359 by Nilda Sanderson RN  Outcome: Progressing  Flowsheets (Taken 7/18/2024 0840)  Free From Fall Injury: Instruct family/caregiver on patient safety     Problem: Skin/Tissue Integrity  Goal: Absence of new skin breakdown  Description: 1.  Monitor for areas of redness and/or skin breakdown  2.  Assess vascular access sites hourly  3.  Every 4-6 hours minimum:  Change oxygen saturation probe  and assess patient for signs and symptoms of electrolyte imbalances   Administer electrolyte replacement as ordered   Monitor response to electrolyte replacements, including repeat lab results as appropriate     Problem: Chronic Conditions and Co-morbidities  Goal: Patient's chronic conditions and co-morbidity symptoms are monitored and maintained or improved  7/18/2024 1556 by Nilda Sanderson RN  Outcome: Adequate for Discharge  7/18/2024 1359 by Nilda Sanderson RN  Outcome: Progressing  Flowsheets (Taken 7/18/2024 0840)  Care Plan - Patient's Chronic Conditions and Co-Morbidity Symptoms are Monitored and Maintained or Improved:   Monitor and assess patient's chronic conditions and comorbid symptoms for stability, deterioration, or improvement   Collaborate with multidisciplinary team to address chronic and comorbid conditions and prevent exacerbation or deterioration   Update acute care plan with appropriate goals if chronic or comorbid symptoms are exacerbated and prevent overall improvement and discharge     Problem: ABCDS Injury Assessment  Goal: Absence of physical injury  7/18/2024 1556 by Nilda Sanderson RN  Outcome: Adequate for Discharge  7/18/2024 1359 by Nilda Sanderson RN  Outcome: Progressing  Flowsheets (Taken 7/18/2024 0840)  Absence of Physical Injury: Implement safety measures based on patient assessment

## 2024-07-18 NOTE — PLAN OF CARE
Problem: Discharge Planning  Goal: Discharge to home or other facility with appropriate resources  7/17/2024 2256 by Mirella Muller RN  Outcome: Progressing     Problem: Pain  Goal: Verbalizes/displays adequate comfort level or baseline comfort level  7/17/2024 2256 by Mirella Muller RN  Outcome: Progressing     Problem: Safety - Adult  Goal: Free from fall injury  7/17/2024 2256 by Mirella Muller RN  Outcome: Progressing     Problem: Skin/Tissue Integrity  Goal: Absence of new skin breakdown  Description: 1.  Monitor for areas of redness and/or skin breakdown  2.  Assess vascular access sites hourly  3.  Every 4-6 hours minimum:  Change oxygen saturation probe site  4.  Every 4-6 hours:  If on nasal continuous positive airway pressure, respiratory therapy assess nares and determine need for appliance change or resting period.  7/17/2024 2256 by Mirella Muller RN  Outcome: Progressing     Problem: Respiratory - Adult  Goal: Achieves optimal ventilation and oxygenation  7/17/2024 2256 by Mirella Muller RN  Outcome: Progressing     Problem: Cardiovascular - Adult  Goal: Maintains optimal cardiac output and hemodynamic stability  7/17/2024 2256 by Mirella Muller RN  Outcome: Progressing     Problem: Metabolic/Fluid and Electrolytes - Adult  Goal: Electrolytes maintained within normal limits  7/17/2024 2256 by Mirella Muller RN  Outcome: Progressing     Problem: Chronic Conditions and Co-morbidities  Goal: Patient's chronic conditions and co-morbidity symptoms are monitored and maintained or improved  7/17/2024 2256 by Mirella Muller RN  Outcome: Progressing     Problem: ABCDS Injury Assessment  Goal: Absence of physical injury  7/17/2024 2256 by Mirella Muller RN  Outcome: Progressing

## 2024-07-18 NOTE — PROGRESS NOTES
Patient is refusing to wear NIV for tonight and wants machine pulled from room. Order changed to PRN

## 2024-07-18 NOTE — PROGRESS NOTES
CLINICAL PHARMACY NOTE: MEDS TO BEDS    Patient declined Mucinex at discharge, saying he has plenty at home and that he can get more OTC, if needed.

## 2024-07-18 NOTE — DISCHARGE SUMMARY
V2.0  Discharge Summary    Name:  Jorge Gauthier /Age/Sex: 1945 (79 y.o. male)   Admit Date: 2024  Discharge Date: 24    MRN & CSN:  [id number] & [id number] Encounter Date and Time 24 1:37 PM EDT    Attending:  Wero Angeles MD Discharging Provider: Wero Angeles MD     Discharge Diagnosis:     # Acute on chronic hypoxemic respiratory failure, with hypercarbia and hypoxemia  # Probably COPD exacerbation  # Acute on chronic diastolic congestive heart failure  # Moderate aortic stenosis  # Recurrent aspiration  # Paroxysmal atrial fibrillation  # Diabetes mellitus type 2  # History of coronary disease    Consultants:  PHARMACY TO DOSE VANCOMYCIN  IP CONSULT TO HOSPITALIST  IP CONSULT TO PULMONOLOGY  IP CONSULT TO HOME CARE NEEDS    Brief HPI: Jorge Gauthier is a 79 y.o. male with a past medical history significant for paroxysmal atrial fibrillation on anticoagulation therapy, coronary disease, COPD not on oxygen, congestive heart failure with preserved EF, diabetes, presented with concerns of shortness of breath.  Patient was admitted for further evaluation management.  Please refer to the admitting history and physical for further details on the patient's initial presentation.      Brief hospital course:     Acute hypoxemic respiratory failure, patient initially was on noninvasive ventilation, was able to be weaned off ventilation, was seen by pulmonology, initially patient was started on antibiotics however it was felt that they are probably treating a congestive heart failure more than a pneumonia and that he probably has a competent of COPD exacerbation.  At that point antibiotics were discontinued, patient was started on steroids and thereafter transition to p.o. steroids.  Patient was also noted to have lots of atelectasis, he needed aggressive pulmonary toilet and will continue to need show as outpatient, his oxygen needs are more or less at baseline.  Patient does use a past pressure  27*   CREATININE 1.1 1.1 1.0   GLUCOSE 206* 224* 219*     Hepatic: No results for input(s): \"AST\", \"ALT\", \"BILITOT\", \"ALKPHOS\" in the last 72 hours.    Invalid input(s): \"ALB\"  Lipids:   Lab Results   Component Value Date/Time    CHOL 138 10/20/2011 10:53 AM    HDL 46 10/20/2011 10:53 AM    TRIG 149 10/20/2011 10:53 AM     Hemoglobin A1C:   Lab Results   Component Value Date/Time    LABA1C 7.6 05/07/2024 03:41 PM     TSH:   Lab Results   Component Value Date/Time    TSH 3.71 06/21/2024 03:56 AM     Troponin: No results found for: \"TROPONINT\"  Lactic Acid: No results for input(s): \"LACTA\" in the last 72 hours.  BNP: No results for input(s): \"PROBNP\" in the last 72 hours.  UA:  Lab Results   Component Value Date/Time    NITRU Negative 06/21/2024 03:56 AM    COLORU Yellow 06/21/2024 03:56 AM    PHUR 5.0 06/21/2024 03:56 AM    PHUR 6.0 06/21/2024 03:56 AM    PHUR 7.5 01/29/2024 10:20 PM    WBCUA 1 11/13/2023 11:34 AM    RBCUA 3 11/13/2023 11:34 AM    BACTERIA None Seen 11/13/2023 11:34 AM    CLARITYU Clear 06/21/2024 03:56 AM    LEUKOCYTESUR Negative 06/21/2024 03:56 AM    UROBILINOGEN 0.2 06/21/2024 03:56 AM    BILIRUBINUR Negative 06/21/2024 03:56 AM    BLOODU Negative 06/21/2024 03:56 AM    GLUCOSEU >=1000 06/21/2024 03:56 AM    KETUA Negative 06/21/2024 03:56 AM     Urine Cultures:   Lab Results   Component Value Date/Time    LABURIN No growth at 18-36 hours 07/11/2018 01:27 AM     Blood Cultures:   Lab Results   Component Value Date/Time    BC No Growth after 4 days of incubation. 07/13/2024 05:57 PM     Lab Results   Component Value Date/Time    BLOODCULT2 No Growth after 4 days of incubation. 07/13/2024 05:57 PM     Organism:   Lab Results   Component Value Date/Time    ORG Candida albicans 07/15/2024 10:00 PM       Time Spent Discharging patient 35 minutes    Electronically signed by Wero Angeles MD on 7/18/2024 at 1:37 PM    This note was generated completely or in part using Dragon voice recognition software,  please excuse ant inaccuracies or misstatements.

## 2024-07-18 NOTE — PROGRESS NOTES
PT DC'ED.   Pt had dc rx, but had mucinex at home already and no money at hospital for copay so he declined Outpt Pharm services.   IV's taken out (no bleeding, swelling, or pain)  DC paperwork went over with pt and wife and all questions answered.   Pt leaving with all belongings.   Pt riding out on own motorized scooter and wife will drive pt home.  [facility] notified of DC; spoke to Kathrine. DC strip requested.    Electronically signed by Nilda Sanderson RN on 7/18/2024 at 3:59 PM

## 2024-07-18 NOTE — PROGRESS NOTES
Pulmonary Progress Note     Patient's name:  Jorge Gauthier  Medical Record Number: [id number]  Patient's account/billing number: [id number]  Patient's YOB: 1945  Age: 79 y.o.  Date of Admission: 7/13/2024  4:07 PM  Date of Consult: 7/18/2024      Primary Care Physician: Gallo Sanchez MD      Code Status: Full Code    Reason for consult: Acute on chronic respiratory failure and hypoxia    Assessment and Plan     Acute on chronic respiratory failure with hypoxia and hypercapnia   COPD with acute exacerbation  Acute on chronic diastolic heart failure  Moderate aortic stenosis  Recurrent aspiration  Paroxysmal atrial fibrillation      Plan:  Done with steroid   Vest therapy  Weak cough, IS/Acapella   Bronchodilators, Daliresp   Wean Fio2 keep sat > 90%  Advance pt/ot      Subjective:  Sob improving     HISTORY OF PRESENT ILLNESS:   Mr./Ms. Jorge Gauthier is a 79 y.o. gentleman with past medical history stated below significant for history of COPD, history of recurrent aspiration, chronic diastolic heart failure, chronic hypoxic respiratory failure on home oxygen chronic atrial fibrillation, presented to the hospital with 2 days worsening cough chest congestion wheezing and lower extremity edema  Chest x-ray with patchy bilateral airspace disease small pleural effusions    Elevated bnp        REVIEW OF SYSTEMS:  Review of Systems -   General ROS: negative  Psychological ROS: negative  Ophthalmic ROS: negative  ENT ROS: negative  Allergy and Immunology ROS: negative  Hematological and Lymphatic ROS: negative  Endocrine ROS: negative  Breast ROS: negative  Respiratory ROS: cough, sob    Cardiovascular ROS: no chest pain or dyspnea on exertion  Gastrointestinal ROS:negative  Genito-Urinary ROS: negative  Musculoskeletal ROS: negative  Neurological ROS: negative  Dermatological ROS: negative        Physical Exam:    Vitals: BP (!) 113/59   Pulse 96    interstitial markings in the lung bases, all suggesting  interstitial pulmonary edema and CHF.  Left basal pneumonia cannot be  excluded.  The heart appears unremarkable.  Patient is status post sternotomy.    Impression  Generalized increased pulmonary markings, with small pleural effusions,  increase interstitial markings in the lung bases, all suggesting interstitial  pulmonary edema and CHF.  Right lower lobe pneumonia cannot be excluded.      CXR portable: Results for orders placed during the hospital encounter of 07/13/24    XR CHEST PORTABLE    Narrative  EXAMINATION:  ONE XRAY VIEW OF THE CHEST    7/13/2024 4:44 pm    COMPARISON:  CXR dated 06/21/2024    HISTORY:  ORDERING SYSTEM PROVIDED HISTORY: SOB  TECHNOLOGIST PROVIDED HISTORY:  Reason for exam:->SOB  Reason for Exam: sob    FINDINGS:  Medical devices: Sternotomy wires.    Mediastinum/Heart: The mediastinal contours are normal. The heart appears  normal in size.    Lungs: Patchy airspace opacities throughout the lungs, most prominent the  lung bases, similar to the prior study.    Pleura: Small bilateral pleural effusion. No pneumothorax.    Impression  1. Patchy airspace opacities throughout the lungs, most prominent the lung  bases, similar to the prior study.  2. Small bilateral pleural effusion.  3. May represent multifocal pneumonia or pulmonary edema.                     Nic Awad MD, M.D.            7/18/2024, 9:27 AM

## 2024-07-18 NOTE — PROGRESS NOTES
Pt educated on fluid restriction, but is adamant about getting more water stating, \"It's been 5 hours since I had some water.\" Non-compliant with fluid restriction.    Electronically signed by Mirella Muller RN on 7/17/2024 at 10:59 PM

## 2024-07-18 NOTE — CARE COORDINATION
Case Management Discharge Note          Date / Time of Note: 7/18/2024 1:30 PM                  Patient Name: Jorge Gauthier   YOB: 1945  Diagnosis: HCAP (healthcare-associated pneumonia) [J18.9]  Acute on chronic respiratory failure with hypoxia and hypercapnia (HCC) [J96.21, J96.22]  Multifocal pneumonia [J18.9]   Date / Time: 7/13/2024  4:07 PM    Financial:  Payor: MEDICARE / Plan: MEDICARE PART A AND B / Product Type: *No Product type* /      Pharmacy:    [id number] [place], [place] - [address] [phone] - F [phone]  [address] [facility] [address]  Phone: [phone] Fax: [phone]      Assistance purchasing medications?: Potential Assistance Purchasing Medications: No  Assistance provided by Case Management: None at this time    DISCHARGE Disposition: Home with Home Health Care    Home Care:  Home Care ordered at discharge: Yes  Home Care Agency: [facility]  Phone: [phone]  Fax: [phone]  Orders faxed: Yes    Home Oxygen and Respiratory Equipment:  Oxygen needed at discharge?: Yes  Home Oxygen Company: [employer] Phone: [phone]   Portable tank available for discharge?: Wife to bring portable tank    Transportation:  Transportation PLAN for discharge: family   Mode of Transport: Private Car  Time of Transport: after 2pm    Transport form completed: Not Indicated    IMM Completed:   Yes, Case management has presented and reviewed IMM letter #2.       IMM Letter date given:: 07/18/24   .   Patient and/or family/POA verbalized understanding of their medicare rights and appeal process if needed. Patient and/or family/POA has signed, initialed and placed the date and time on IMM letter #2 on the the appropriate lines. Copy of letter offered and they are aware that the original copy of IMM letter #2 is available prior to discharge from the paper chart on the unit.  Electronic documentation has been entered into epic

## 2024-07-18 NOTE — PLAN OF CARE
Problem: Discharge Planning  Goal: Discharge to home or other facility with appropriate resources  Outcome: Progressing  Flowsheets (Taken 7/18/2024 0840)  Discharge to home or other facility with appropriate resources:   Identify barriers to discharge with patient and caregiver   Arrange for needed discharge resources and transportation as appropriate   Refer to discharge planning if patient needs post-hospital services based on physician order or complex needs related to functional status, cognitive ability or social support system     Problem: Pain  Goal: Verbalizes/displays adequate comfort level or baseline comfort level  Outcome: Progressing  Flowsheets (Taken 7/18/2024 0840)  Verbalizes/displays adequate comfort level or baseline comfort level:   Encourage patient to monitor pain and request assistance   Assess pain using appropriate pain scale   Administer analgesics based on type and severity of pain and evaluate response   Implement non-pharmacological measures as appropriate and evaluate response   Consider cultural and social influences on pain and pain management   Notify Licensed Independent Practitioner if interventions unsuccessful or patient reports new pain     Problem: Safety - Adult  Goal: Free from fall injury  Outcome: Progressing  Flowsheets (Taken 7/18/2024 0840)  Free From Fall Injury: Instruct family/caregiver on patient safety     Problem: Skin/Tissue Integrity  Goal: Absence of new skin breakdown  Description: 1.  Monitor for areas of redness and/or skin breakdown  2.  Assess vascular access sites hourly  3.  Every 4-6 hours minimum:  Change oxygen saturation probe site  4.  Every 4-6 hours:  If on nasal continuous positive airway pressure, respiratory therapy assess nares and determine need for appliance change or resting period.  Outcome: Progressing     Problem: Respiratory - Adult  Goal: Achieves optimal ventilation and oxygenation  Outcome: Progressing  Flowsheets (Taken 7/18/2024

## 2024-07-18 NOTE — NURSE NAVIGATOR
DC order noted. HF dc instructions are on AVS as well as COPD. Plan is return home with [facility], see by date of 7/21. Pt is at his baseline O2 of 2 liters. Hospital follow up appt scheduled for 8/1 at 3:40 with Raymond Chiu NP. Bedside RN updated.

## 2024-07-24 ENCOUNTER — TELEPHONE (OUTPATIENT)
Dept: PULMONOLOGY | Age: 79
End: 2024-07-24

## 2024-07-24 DIAGNOSIS — T17.908S CHRONIC PULMONARY ASPIRATION, SEQUELA: Primary | ICD-10-CM

## 2024-07-24 NOTE — TELEPHONE ENCOUNTER
Dara with [facility] called because the pt is not doing well. His breathing has worsened. Highest 02 was 93 at rest on 3 liters. 02 at its lowest was 88.     Complains of cough and SOB  Duration: just started today   Cough with sputum production? Yes, Large amounts of yellow mucus   Fever? no  Any other Symptoms? Wheezing and crackling in left lower lobe.   Any current treatment tried? Breathing treatments   Using inhalers? Yes   do they help? no  Pharmacy? [facility] on [address]     Please FU

## 2024-07-25 RX ORDER — AMOXICILLIN AND CLAVULANATE POTASSIUM 500; 125 MG/1; MG/1
1 TABLET, FILM COATED ORAL 3 TIMES DAILY
Qty: 30 TABLET | Refills: 0 | Status: ON HOLD | OUTPATIENT
Start: 2024-07-25 | End: 2024-07-31

## 2024-07-26 ENCOUNTER — HOSPITAL ENCOUNTER (INPATIENT)
Age: 79
LOS: 4 days | Discharge: HOME | End: 2024-07-31
Attending: EMERGENCY MEDICINE | Admitting: HOSPITALIST
Payer: MEDICARE

## 2024-07-26 DIAGNOSIS — J96.22 ACUTE ON CHRONIC RESPIRATORY FAILURE WITH HYPOXIA AND HYPERCAPNIA (HCC): Primary | ICD-10-CM

## 2024-07-26 DIAGNOSIS — T17.908S CHRONIC PULMONARY ASPIRATION, SEQUELA: ICD-10-CM

## 2024-07-26 DIAGNOSIS — J81.0 ACUTE PULMONARY EDEMA (HCC): ICD-10-CM

## 2024-07-26 DIAGNOSIS — J96.21 ACUTE ON CHRONIC RESPIRATORY FAILURE WITH HYPOXIA AND HYPERCAPNIA (HCC): Primary | ICD-10-CM

## 2024-07-26 PROCEDURE — 87804 INFLUENZA ASSAY W/OPTIC: CPT

## 2024-07-26 PROCEDURE — 82803 BLOOD GASES ANY COMBINATION: CPT

## 2024-07-26 PROCEDURE — 83880 ASSAY OF NATRIURETIC PEPTIDE: CPT

## 2024-07-26 PROCEDURE — 84484 ASSAY OF TROPONIN QUANT: CPT

## 2024-07-26 PROCEDURE — 83690 ASSAY OF LIPASE: CPT

## 2024-07-26 PROCEDURE — 84145 PROCALCITONIN (PCT): CPT

## 2024-07-26 PROCEDURE — 80053 COMPREHEN METABOLIC PANEL: CPT

## 2024-07-26 PROCEDURE — 83605 ASSAY OF LACTIC ACID: CPT

## 2024-07-26 PROCEDURE — 99285 EMERGENCY DEPT VISIT HI MDM: CPT

## 2024-07-26 PROCEDURE — 87635 SARS-COV-2 COVID-19 AMP PRB: CPT

## 2024-07-26 PROCEDURE — 85025 COMPLETE CBC W/AUTO DIFF WBC: CPT

## 2024-07-26 PROCEDURE — 93005 ELECTROCARDIOGRAM TRACING: CPT | Performed by: EMERGENCY MEDICINE

## 2024-07-27 ENCOUNTER — APPOINTMENT (OUTPATIENT)
Dept: GENERAL RADIOLOGY | Age: 79
End: 2024-07-27
Payer: MEDICARE

## 2024-07-27 LAB
ALBUMIN SERPL-MCNC: 4 G/DL (ref 3.4–5)
ALBUMIN/GLOB SERPL: 1.2 {RATIO} (ref 1.1–2.2)
ALP SERPL-CCNC: 70 U/L (ref 40–129)
ALT SERPL-CCNC: 16 U/L (ref 10–40)
ANION GAP SERPL CALCULATED.3IONS-SCNC: 12 MMOL/L (ref 3–16)
AST SERPL-CCNC: 20 U/L (ref 15–37)
BASE EXCESS BLDV CALC-SCNC: 6.1 MMOL/L
BASE EXCESS BLDV CALC-SCNC: 7.8 MMOL/L
BASE EXCESS BLDV CALC-SCNC: 8.3 MMOL/L
BASOPHILS # BLD: 0.1 K/UL (ref 0–0.2)
BASOPHILS NFR BLD: 1 %
BILIRUB SERPL-MCNC: 0.5 MG/DL (ref 0–1)
BILIRUB UR QL STRIP.AUTO: NEGATIVE
BUN SERPL-MCNC: 21 MG/DL (ref 7–20)
CALCIUM SERPL-MCNC: 9.4 MG/DL (ref 8.3–10.6)
CHLORIDE SERPL-SCNC: 101 MMOL/L (ref 99–110)
CLARITY UR: CLEAR
CO2 BLDV-SCNC: 36 MMOL/L
CO2 BLDV-SCNC: 38 MMOL/L
CO2 BLDV-SCNC: 39 MMOL/L
CO2 SERPL-SCNC: 30 MMOL/L (ref 21–32)
COHGB MFR BLDV: 1.6 %
COHGB MFR BLDV: 1.7 %
COHGB MFR BLDV: 2 %
COLOR UR: YELLOW
CREAT SERPL-MCNC: 1.3 MG/DL (ref 0.8–1.3)
DEPRECATED RDW RBC AUTO: 18.2 % (ref 12.4–15.4)
EKG DIAGNOSIS: NORMAL
EKG Q-T INTERVAL: 376 MS
EKG QRS DURATION: 96 MS
EKG QTC CALCULATION (BAZETT): 462 MS
EKG R AXIS: 85 DEGREES
EKG T AXIS: -6 DEGREES
EKG VENTRICULAR RATE: 91 BPM
EOSINOPHIL # BLD: 0.3 K/UL (ref 0–0.6)
EOSINOPHIL NFR BLD: 3 %
FLUAV RNA UPPER RESP QL NAA+PROBE: NEGATIVE
FLUBV AG NPH QL: NEGATIVE
GFR SERPLBLD CREATININE-BSD FMLA CKD-EPI: 56 ML/MIN/{1.73_M2}
GLUCOSE BLD-MCNC: 147 MG/DL (ref 70–99)
GLUCOSE BLD-MCNC: 315 MG/DL (ref 70–99)
GLUCOSE BLD-MCNC: 338 MG/DL (ref 70–99)
GLUCOSE BLD-MCNC: 359 MG/DL (ref 70–99)
GLUCOSE SERPL-MCNC: 200 MG/DL (ref 70–99)
GLUCOSE UR STRIP.AUTO-MCNC: >=1000 MG/DL
HCO3 BLDV-SCNC: 34 MMOL/L (ref 23–29)
HCO3 BLDV-SCNC: 36 MMOL/L (ref 23–29)
HCO3 BLDV-SCNC: 37 MMOL/L (ref 23–29)
HCT VFR BLD AUTO: 33.8 % (ref 40.5–52.5)
HGB BLD-MCNC: 10.4 G/DL (ref 13.5–17.5)
HGB UR QL STRIP.AUTO: NEGATIVE
KETONES UR STRIP.AUTO-MCNC: NEGATIVE MG/DL
LACTATE BLDV-SCNC: 1.1 MMOL/L (ref 0.4–1.9)
LACTATE BLDV-SCNC: 1.8 MMOL/L (ref 0.4–1.9)
LEUKOCYTE ESTERASE UR QL STRIP.AUTO: NEGATIVE
LIPASE SERPL-CCNC: 23 U/L (ref 13–60)
LYMPHOCYTES # BLD: 1.2 K/UL (ref 1–5.1)
LYMPHOCYTES NFR BLD: 10.5 %
MCH RBC QN AUTO: 25.2 PG (ref 26–34)
MCHC RBC AUTO-ENTMCNC: 31 G/DL (ref 31–36)
MCV RBC AUTO: 81.5 FL (ref 80–100)
METHGB MFR BLDV: 0.6 %
METHGB MFR BLDV: 0.7 %
METHGB MFR BLDV: 0.7 %
MONOCYTES # BLD: 1 K/UL (ref 0–1.3)
MONOCYTES NFR BLD: 8.6 %
NEUTROPHILS # BLD: 8.8 K/UL (ref 1.7–7.7)
NEUTROPHILS NFR BLD: 76.9 %
NITRITE UR QL STRIP.AUTO: NEGATIVE
NT-PROBNP SERPL-MCNC: 2039 PG/ML (ref 0–449)
O2 THERAPY: ABNORMAL
PCO2 BLDV: 68.8 MMHG (ref 40–50)
PCO2 BLDV: 70.2 MMHG (ref 40–50)
PCO2 BLDV: 73.9 MMHG (ref 40–50)
PERFORMED ON: ABNORMAL
PH BLDV: 7.3 [PH] (ref 7.35–7.45)
PH BLDV: 7.3 [PH] (ref 7.35–7.45)
PH BLDV: 7.32 [PH] (ref 7.35–7.45)
PH UR STRIP.AUTO: 5 [PH] (ref 5–8)
PLATELET # BLD AUTO: 442 K/UL (ref 135–450)
PMV BLD AUTO: 6.9 FL (ref 5–10.5)
PO2 BLDV: <30 MMHG
POTASSIUM SERPL-SCNC: 4.9 MMOL/L (ref 3.5–5.1)
PROCALCITONIN SERPL IA-MCNC: 0.11 NG/ML (ref 0–0.15)
PROT SERPL-MCNC: 7.4 G/DL (ref 6.4–8.2)
PROT UR STRIP.AUTO-MCNC: NEGATIVE MG/DL
RBC # BLD AUTO: 4.14 M/UL (ref 4.2–5.9)
REPORT: NORMAL
RESP PATH DNA+RNA PNL L RESP NAA+NON-PRB: NORMAL
SAO2 % BLDV: 18 %
SAO2 % BLDV: 33 %
SAO2 % BLDV: 39 %
SARS-COV-2 RDRP RESP QL NAA+PROBE: NOT DETECTED
SODIUM SERPL-SCNC: 143 MMOL/L (ref 136–145)
SP GR UR STRIP.AUTO: 1.03 (ref 1–1.03)
TROPONIN, HIGH SENSITIVITY: 24 NG/L (ref 0–22)
TROPONIN, HIGH SENSITIVITY: 29 NG/L (ref 0–22)
UA COMPLETE W REFLEX CULTURE PNL UR: ABNORMAL
UA DIPSTICK W REFLEX MICRO PNL UR: ABNORMAL
URN SPEC COLLECT METH UR: ABNORMAL
UROBILINOGEN UR STRIP-ACNC: 1 E.U./DL
WBC # BLD AUTO: 11.4 K/UL (ref 4–11)

## 2024-07-27 PROCEDURE — 2700000000 HC OXYGEN THERAPY PER DAY

## 2024-07-27 PROCEDURE — 71045 X-RAY EXAM CHEST 1 VIEW: CPT

## 2024-07-27 PROCEDURE — 6370000000 HC RX 637 (ALT 250 FOR IP): Performed by: HOSPITALIST

## 2024-07-27 PROCEDURE — 6360000002 HC RX W HCPCS: Performed by: HOSPITALIST

## 2024-07-27 PROCEDURE — 2580000003 HC RX 258: Performed by: HOSPITALIST

## 2024-07-27 PROCEDURE — 6370000000 HC RX 637 (ALT 250 FOR IP): Performed by: FAMILY MEDICINE

## 2024-07-27 PROCEDURE — 6370000000 HC RX 637 (ALT 250 FOR IP)

## 2024-07-27 PROCEDURE — 94669 MECHANICAL CHEST WALL OSCILL: CPT

## 2024-07-27 PROCEDURE — 6360000002 HC RX W HCPCS: Performed by: INTERNAL MEDICINE

## 2024-07-27 PROCEDURE — 81003 URINALYSIS AUTO W/O SCOPE: CPT

## 2024-07-27 PROCEDURE — 2580000003 HC RX 258

## 2024-07-27 PROCEDURE — 83605 ASSAY OF LACTIC ACID: CPT

## 2024-07-27 PROCEDURE — 87205 SMEAR GRAM STAIN: CPT

## 2024-07-27 PROCEDURE — 96375 TX/PRO/DX INJ NEW DRUG ADDON: CPT

## 2024-07-27 PROCEDURE — 96374 THER/PROPH/DIAG INJ IV PUSH: CPT

## 2024-07-27 PROCEDURE — 87633 RESP VIRUS 12-25 TARGETS: CPT

## 2024-07-27 PROCEDURE — 82803 BLOOD GASES ANY COMBINATION: CPT

## 2024-07-27 PROCEDURE — 5A09357 ASSISTANCE WITH RESPIRATORY VENTILATION, LESS THAN 24 CONSECUTIVE HOURS, CONTINUOUS POSITIVE AIRWAY PRESSURE: ICD-10-PCS | Performed by: EMERGENCY MEDICINE

## 2024-07-27 PROCEDURE — 84484 ASSAY OF TROPONIN QUANT: CPT

## 2024-07-27 PROCEDURE — 94760 N-INVAS EAR/PLS OXIMETRY 1: CPT

## 2024-07-27 PROCEDURE — 2580000003 HC RX 258: Performed by: INTERNAL MEDICINE

## 2024-07-27 PROCEDURE — 94660 CPAP INITIATION&MGMT: CPT

## 2024-07-27 PROCEDURE — 6360000002 HC RX W HCPCS: Performed by: EMERGENCY MEDICINE

## 2024-07-27 PROCEDURE — 6370000000 HC RX 637 (ALT 250 FOR IP): Performed by: EMERGENCY MEDICINE

## 2024-07-27 PROCEDURE — 1200000000 HC SEMI PRIVATE

## 2024-07-27 PROCEDURE — 94640 AIRWAY INHALATION TREATMENT: CPT

## 2024-07-27 PROCEDURE — 87040 BLOOD CULTURE FOR BACTERIA: CPT

## 2024-07-27 PROCEDURE — 87070 CULTURE OTHR SPECIMN AEROBIC: CPT

## 2024-07-27 PROCEDURE — 99223 1ST HOSP IP/OBS HIGH 75: CPT | Performed by: INTERNAL MEDICINE

## 2024-07-27 PROCEDURE — 93010 ELECTROCARDIOGRAM REPORT: CPT | Performed by: INTERNAL MEDICINE

## 2024-07-27 PROCEDURE — 36415 COLL VENOUS BLD VENIPUNCTURE: CPT

## 2024-07-27 RX ORDER — SODIUM CHLORIDE FOR INHALATION 3 %
4 VIAL, NEBULIZER (ML) INHALATION
Status: DISCONTINUED | OUTPATIENT
Start: 2024-07-27 | End: 2024-07-27

## 2024-07-27 RX ORDER — PRAMIPEXOLE DIHYDROCHLORIDE 0.5 MG/1
0.25 TABLET ORAL NIGHTLY
Status: DISCONTINUED | OUTPATIENT
Start: 2024-07-27 | End: 2024-07-31 | Stop reason: HOSPADM

## 2024-07-27 RX ORDER — SODIUM CHLORIDE FOR INHALATION 3 %
4 VIAL, NEBULIZER (ML) INHALATION
Status: DISCONTINUED | OUTPATIENT
Start: 2024-07-27 | End: 2024-07-31 | Stop reason: HOSPADM

## 2024-07-27 RX ORDER — POTASSIUM CHLORIDE 20 MEQ/1
40 TABLET, EXTENDED RELEASE ORAL PRN
Status: DISCONTINUED | OUTPATIENT
Start: 2024-07-27 | End: 2024-07-31 | Stop reason: HOSPADM

## 2024-07-27 RX ORDER — GLUCAGON 1 MG/ML
1 KIT INJECTION PRN
Status: DISCONTINUED | OUTPATIENT
Start: 2024-07-27 | End: 2024-07-31 | Stop reason: HOSPADM

## 2024-07-27 RX ORDER — FUROSEMIDE 10 MG/ML
40 INJECTION INTRAMUSCULAR; INTRAVENOUS 3 TIMES DAILY
Status: DISCONTINUED | OUTPATIENT
Start: 2024-07-27 | End: 2024-07-29

## 2024-07-27 RX ORDER — SODIUM CHLORIDE 9 MG/ML
INJECTION, SOLUTION INTRAVENOUS PRN
Status: DISCONTINUED | OUTPATIENT
Start: 2024-07-27 | End: 2024-07-31 | Stop reason: HOSPADM

## 2024-07-27 RX ORDER — IPRATROPIUM BROMIDE AND ALBUTEROL SULFATE 2.5; .5 MG/3ML; MG/3ML
SOLUTION RESPIRATORY (INHALATION)
Status: COMPLETED
Start: 2024-07-27 | End: 2024-07-27

## 2024-07-27 RX ORDER — BUDESONIDE AND FORMOTEROL FUMARATE DIHYDRATE 160; 4.5 UG/1; UG/1
2 AEROSOL RESPIRATORY (INHALATION)
Status: DISCONTINUED | OUTPATIENT
Start: 2024-07-27 | End: 2024-07-31 | Stop reason: HOSPADM

## 2024-07-27 RX ORDER — SODIUM CHLORIDE FOR INHALATION 3 %
15 VIAL, NEBULIZER (ML) INHALATION
Status: DISCONTINUED | OUTPATIENT
Start: 2024-07-27 | End: 2024-07-27

## 2024-07-27 RX ORDER — LOPERAMIDE HYDROCHLORIDE 2 MG/1
2 CAPSULE ORAL 4 TIMES DAILY PRN
Status: DISCONTINUED | OUTPATIENT
Start: 2024-07-27 | End: 2024-07-31 | Stop reason: HOSPADM

## 2024-07-27 RX ORDER — INSULIN LISPRO 100 [IU]/ML
0-4 INJECTION, SOLUTION INTRAVENOUS; SUBCUTANEOUS
Status: DISCONTINUED | OUTPATIENT
Start: 2024-07-27 | End: 2024-07-27 | Stop reason: SDUPTHER

## 2024-07-27 RX ORDER — INSULIN LISPRO 100 [IU]/ML
0-4 INJECTION, SOLUTION INTRAVENOUS; SUBCUTANEOUS NIGHTLY
Status: DISCONTINUED | OUTPATIENT
Start: 2024-07-27 | End: 2024-07-27

## 2024-07-27 RX ORDER — LAMOTRIGINE 25 MG/1
75 TABLET ORAL EVERY MORNING
Status: DISCONTINUED | OUTPATIENT
Start: 2024-07-27 | End: 2024-07-27 | Stop reason: DRUGHIGH

## 2024-07-27 RX ORDER — POLYETHYLENE GLYCOL 3350 17 G/17G
17 POWDER, FOR SOLUTION ORAL DAILY PRN
Status: DISCONTINUED | OUTPATIENT
Start: 2024-07-27 | End: 2024-07-31 | Stop reason: HOSPADM

## 2024-07-27 RX ORDER — ACETAMINOPHEN 325 MG/1
650 TABLET ORAL EVERY 6 HOURS PRN
Status: DISCONTINUED | OUTPATIENT
Start: 2024-07-27 | End: 2024-07-31 | Stop reason: HOSPADM

## 2024-07-27 RX ORDER — METHYLPREDNISOLONE SODIUM SUCCINATE 125 MG/2ML
125 INJECTION, POWDER, LYOPHILIZED, FOR SOLUTION INTRAMUSCULAR; INTRAVENOUS ONCE
Status: COMPLETED | OUTPATIENT
Start: 2024-07-27 | End: 2024-07-27

## 2024-07-27 RX ORDER — METHYLPREDNISOLONE SODIUM SUCCINATE 40 MG/ML
40 INJECTION, POWDER, LYOPHILIZED, FOR SOLUTION INTRAMUSCULAR; INTRAVENOUS EVERY 8 HOURS
Status: DISCONTINUED | OUTPATIENT
Start: 2024-07-27 | End: 2024-07-29

## 2024-07-27 RX ORDER — INSULIN LISPRO 100 [IU]/ML
0-4 INJECTION, SOLUTION INTRAVENOUS; SUBCUTANEOUS NIGHTLY
Status: DISCONTINUED | OUTPATIENT
Start: 2024-07-27 | End: 2024-07-31 | Stop reason: HOSPADM

## 2024-07-27 RX ORDER — SODIUM CHLORIDE 0.9 % (FLUSH) 0.9 %
5-40 SYRINGE (ML) INJECTION PRN
Status: DISCONTINUED | OUTPATIENT
Start: 2024-07-27 | End: 2024-07-31 | Stop reason: HOSPADM

## 2024-07-27 RX ORDER — MAGNESIUM SULFATE IN WATER 40 MG/ML
2000 INJECTION, SOLUTION INTRAVENOUS PRN
Status: DISCONTINUED | OUTPATIENT
Start: 2024-07-27 | End: 2024-07-31 | Stop reason: HOSPADM

## 2024-07-27 RX ORDER — ONDANSETRON 2 MG/ML
4 INJECTION INTRAMUSCULAR; INTRAVENOUS EVERY 6 HOURS PRN
Status: DISCONTINUED | OUTPATIENT
Start: 2024-07-27 | End: 2024-07-31 | Stop reason: HOSPADM

## 2024-07-27 RX ORDER — INSULIN LISPRO 100 [IU]/ML
0-8 INJECTION, SOLUTION INTRAVENOUS; SUBCUTANEOUS
Status: DISCONTINUED | OUTPATIENT
Start: 2024-07-28 | End: 2024-07-31 | Stop reason: HOSPADM

## 2024-07-27 RX ORDER — ROFLUMILAST 500 UG/1
500 TABLET ORAL DAILY
Status: DISCONTINUED | OUTPATIENT
Start: 2024-07-27 | End: 2024-07-31 | Stop reason: HOSPADM

## 2024-07-27 RX ORDER — POLYETHYLENE GLYCOL 3350 17 G/17G
17 POWDER, FOR SOLUTION ORAL 2 TIMES DAILY PRN
Status: DISCONTINUED | OUTPATIENT
Start: 2024-07-27 | End: 2024-07-31 | Stop reason: HOSPADM

## 2024-07-27 RX ORDER — DEXTROSE MONOHYDRATE 100 MG/ML
INJECTION, SOLUTION INTRAVENOUS CONTINUOUS PRN
Status: DISCONTINUED | OUTPATIENT
Start: 2024-07-27 | End: 2024-07-31 | Stop reason: HOSPADM

## 2024-07-27 RX ORDER — VITAMIN B COMPLEX
1000 TABLET ORAL DAILY
Status: DISCONTINUED | OUTPATIENT
Start: 2024-07-27 | End: 2024-07-31 | Stop reason: HOSPADM

## 2024-07-27 RX ORDER — PRAMIPEXOLE DIHYDROCHLORIDE 0.5 MG/1
1 TABLET ORAL 4 TIMES DAILY
Status: DISCONTINUED | OUTPATIENT
Start: 2024-07-27 | End: 2024-07-31 | Stop reason: HOSPADM

## 2024-07-27 RX ORDER — METHOCARBAMOL 500 MG/1
500 TABLET, FILM COATED ORAL 3 TIMES DAILY PRN
Status: DISCONTINUED | OUTPATIENT
Start: 2024-07-27 | End: 2024-07-31 | Stop reason: HOSPADM

## 2024-07-27 RX ORDER — FLUTICASONE PROPIONATE 50 MCG
2 SPRAY, SUSPENSION (ML) NASAL 2 TIMES DAILY
Status: DISCONTINUED | OUTPATIENT
Start: 2024-07-27 | End: 2024-07-31 | Stop reason: HOSPADM

## 2024-07-27 RX ORDER — ACETAMINOPHEN 650 MG/1
650 SUPPOSITORY RECTAL EVERY 6 HOURS PRN
Status: DISCONTINUED | OUTPATIENT
Start: 2024-07-27 | End: 2024-07-31 | Stop reason: HOSPADM

## 2024-07-27 RX ORDER — ALBUTEROL SULFATE 2.5 MG/3ML
2.5 SOLUTION RESPIRATORY (INHALATION)
Status: DISCONTINUED | OUTPATIENT
Start: 2024-07-27 | End: 2024-07-31 | Stop reason: HOSPADM

## 2024-07-27 RX ORDER — POTASSIUM CHLORIDE 7.45 MG/ML
10 INJECTION INTRAVENOUS PRN
Status: DISCONTINUED | OUTPATIENT
Start: 2024-07-27 | End: 2024-07-31 | Stop reason: HOSPADM

## 2024-07-27 RX ORDER — WATER 10 ML/10ML
INJECTION INTRAMUSCULAR; INTRAVENOUS; SUBCUTANEOUS
Status: COMPLETED
Start: 2024-07-27 | End: 2024-07-27

## 2024-07-27 RX ORDER — ASPIRIN 81 MG/1
81 TABLET, CHEWABLE ORAL DAILY
Status: DISCONTINUED | OUTPATIENT
Start: 2024-07-27 | End: 2024-07-31 | Stop reason: HOSPADM

## 2024-07-27 RX ORDER — FUROSEMIDE 10 MG/ML
40 INJECTION INTRAMUSCULAR; INTRAVENOUS ONCE
Status: COMPLETED | OUTPATIENT
Start: 2024-07-27 | End: 2024-07-27

## 2024-07-27 RX ORDER — SODIUM CHLORIDE 0.9 % (FLUSH) 0.9 %
5-40 SYRINGE (ML) INJECTION EVERY 12 HOURS SCHEDULED
Status: DISCONTINUED | OUTPATIENT
Start: 2024-07-27 | End: 2024-07-31 | Stop reason: HOSPADM

## 2024-07-27 RX ORDER — ATORVASTATIN CALCIUM 20 MG/1
20 TABLET, FILM COATED ORAL NIGHTLY
Status: DISCONTINUED | OUTPATIENT
Start: 2024-07-27 | End: 2024-07-31 | Stop reason: HOSPADM

## 2024-07-27 RX ORDER — LAMOTRIGINE 100 MG/1
100 TABLET ORAL NIGHTLY
Status: DISCONTINUED | OUTPATIENT
Start: 2024-07-27 | End: 2024-07-27 | Stop reason: DRUGHIGH

## 2024-07-27 RX ORDER — ONDANSETRON 4 MG/1
4 TABLET, ORALLY DISINTEGRATING ORAL EVERY 8 HOURS PRN
Status: DISCONTINUED | OUTPATIENT
Start: 2024-07-27 | End: 2024-07-31 | Stop reason: HOSPADM

## 2024-07-27 RX ORDER — CARVEDILOL 3.12 MG/1
3.12 TABLET ORAL 2 TIMES DAILY WITH MEALS
Status: DISCONTINUED | OUTPATIENT
Start: 2024-07-27 | End: 2024-07-31 | Stop reason: HOSPADM

## 2024-07-27 RX ORDER — IPRATROPIUM BROMIDE AND ALBUTEROL SULFATE 2.5; .5 MG/3ML; MG/3ML
1 SOLUTION RESPIRATORY (INHALATION) ONCE
Status: COMPLETED | OUTPATIENT
Start: 2024-07-27 | End: 2024-07-27

## 2024-07-27 RX ORDER — GABAPENTIN 400 MG/1
800 CAPSULE ORAL 4 TIMES DAILY
Status: DISCONTINUED | OUTPATIENT
Start: 2024-07-27 | End: 2024-07-31 | Stop reason: HOSPADM

## 2024-07-27 RX ADMIN — ALBUTEROL SULFATE 2.5 MG: 2.5 SOLUTION RESPIRATORY (INHALATION) at 19:32

## 2024-07-27 RX ADMIN — CEFEPIME 2000 MG: 2 INJECTION, POWDER, FOR SOLUTION INTRAVENOUS at 08:45

## 2024-07-27 RX ADMIN — CARVEDILOL 3.12 MG: 3.12 TABLET, FILM COATED ORAL at 16:21

## 2024-07-27 RX ADMIN — SODIUM CHLORIDE: 9 INJECTION, SOLUTION INTRAVENOUS at 21:00

## 2024-07-27 RX ADMIN — WATER 10 ML: 1 INJECTION INTRAMUSCULAR; INTRAVENOUS; SUBCUTANEOUS at 00:39

## 2024-07-27 RX ADMIN — INSULIN LISPRO 3 UNITS: 100 INJECTION, SOLUTION INTRAVENOUS; SUBCUTANEOUS at 12:32

## 2024-07-27 RX ADMIN — ALBUTEROL SULFATE 2.5 MG: 2.5 SOLUTION RESPIRATORY (INHALATION) at 11:40

## 2024-07-27 RX ADMIN — SODIUM CHLORIDE SOLN NEBU 3% 4 ML: 3 NEBU SOLN at 15:59

## 2024-07-27 RX ADMIN — INSULIN LISPRO 4 UNITS: 100 INJECTION, SOLUTION INTRAVENOUS; SUBCUTANEOUS at 18:13

## 2024-07-27 RX ADMIN — GABAPENTIN 800 MG: 400 CAPSULE ORAL at 16:21

## 2024-07-27 RX ADMIN — WATER 1 ML: 1 INJECTION INTRAMUSCULAR; INTRAVENOUS; SUBCUTANEOUS at 08:30

## 2024-07-27 RX ADMIN — CARVEDILOL 3.12 MG: 3.12 TABLET, FILM COATED ORAL at 08:27

## 2024-07-27 RX ADMIN — FUROSEMIDE 40 MG: 10 INJECTION, SOLUTION INTRAMUSCULAR; INTRAVENOUS at 13:32

## 2024-07-27 RX ADMIN — SODIUM CHLORIDE SOLN NEBU 3% 4 ML: 3 NEBU SOLN at 08:14

## 2024-07-27 RX ADMIN — GABAPENTIN 800 MG: 400 CAPSULE ORAL at 20:40

## 2024-07-27 RX ADMIN — PRAMIPEXOLE DIHYDROCHLORIDE 1 MG: 0.5 TABLET ORAL at 16:21

## 2024-07-27 RX ADMIN — FUROSEMIDE 40 MG: 10 INJECTION, SOLUTION INTRAMUSCULAR; INTRAVENOUS at 02:47

## 2024-07-27 RX ADMIN — ASPIRIN 81 MG 81 MG: 81 TABLET ORAL at 08:27

## 2024-07-27 RX ADMIN — SODIUM CHLORIDE, PRESERVATIVE FREE 10 ML: 5 INJECTION INTRAVENOUS at 08:31

## 2024-07-27 RX ADMIN — METHYLPREDNISOLONE SODIUM SUCCINATE 40 MG: 40 INJECTION INTRAMUSCULAR; INTRAVENOUS at 16:21

## 2024-07-27 RX ADMIN — ATORVASTATIN CALCIUM 20 MG: 20 TABLET, FILM COATED ORAL at 20:39

## 2024-07-27 RX ADMIN — CEFEPIME 2000 MG: 2 INJECTION, POWDER, FOR SOLUTION INTRAVENOUS at 21:01

## 2024-07-27 RX ADMIN — VANCOMYCIN HYDROCHLORIDE 1000 MG: 1 INJECTION, POWDER, LYOPHILIZED, FOR SOLUTION INTRAVENOUS at 09:17

## 2024-07-27 RX ADMIN — FUROSEMIDE 40 MG: 10 INJECTION, SOLUTION INTRAMUSCULAR; INTRAVENOUS at 20:40

## 2024-07-27 RX ADMIN — ALBUTEROL SULFATE 2.5 MG: 2.5 SOLUTION RESPIRATORY (INHALATION) at 07:44

## 2024-07-27 RX ADMIN — LAMOTRIGINE 75 MG: 25 TABLET ORAL at 08:26

## 2024-07-27 RX ADMIN — METHYLPREDNISOLONE SODIUM SUCCINATE 125 MG: 125 INJECTION INTRAMUSCULAR; INTRAVENOUS at 00:39

## 2024-07-27 RX ADMIN — ROFLUMILAST 500 MCG: 500 TABLET ORAL at 09:20

## 2024-07-27 RX ADMIN — BUDESONIDE AND FORMOTEROL FUMARATE DIHYDRATE 2 PUFF: 160; 4.5 AEROSOL RESPIRATORY (INHALATION) at 07:44

## 2024-07-27 RX ADMIN — SODIUM CHLORIDE SOLN NEBU 3% 4 ML: 3 NEBU SOLN at 19:32

## 2024-07-27 RX ADMIN — METHYLPREDNISOLONE SODIUM SUCCINATE 40 MG: 40 INJECTION INTRAMUSCULAR; INTRAVENOUS at 08:17

## 2024-07-27 RX ADMIN — SODIUM CHLORIDE, PRESERVATIVE FREE 10 ML: 5 INJECTION INTRAVENOUS at 20:54

## 2024-07-27 RX ADMIN — SODIUM CHLORIDE SOLN NEBU 3% 4 ML: 3 NEBU SOLN at 11:40

## 2024-07-27 RX ADMIN — FUROSEMIDE 40 MG: 10 INJECTION, SOLUTION INTRAMUSCULAR; INTRAVENOUS at 08:17

## 2024-07-27 RX ADMIN — IPRATROPIUM BROMIDE AND ALBUTEROL SULFATE 1 DOSE: .5; 3 SOLUTION RESPIRATORY (INHALATION) at 00:31

## 2024-07-27 RX ADMIN — LAMOTRIGINE 150 MG: 100 TABLET ORAL at 20:40

## 2024-07-27 RX ADMIN — APIXABAN 5 MG: 5 TABLET, FILM COATED ORAL at 20:40

## 2024-07-27 RX ADMIN — PRAMIPEXOLE DIHYDROCHLORIDE 1 MG: 0.5 TABLET ORAL at 08:27

## 2024-07-27 RX ADMIN — APIXABAN 5 MG: 5 TABLET, FILM COATED ORAL at 08:27

## 2024-07-27 RX ADMIN — ALBUTEROL SULFATE 2.5 MG: 2.5 SOLUTION RESPIRATORY (INHALATION) at 15:59

## 2024-07-27 RX ADMIN — FLUTICASONE PROPIONATE 2 SPRAY: 50 SPRAY, METERED NASAL at 08:28

## 2024-07-27 RX ADMIN — IPRATROPIUM BROMIDE AND ALBUTEROL SULFATE: .5; 3 SOLUTION RESPIRATORY (INHALATION) at 03:17

## 2024-07-27 RX ADMIN — GABAPENTIN 800 MG: 400 CAPSULE ORAL at 13:32

## 2024-07-27 RX ADMIN — BUDESONIDE AND FORMOTEROL FUMARATE DIHYDRATE 2 PUFF: 160; 4.5 AEROSOL RESPIRATORY (INHALATION) at 19:32

## 2024-07-27 RX ADMIN — Medication 1000 UNITS: at 08:27

## 2024-07-27 RX ADMIN — PRAMIPEXOLE DIHYDROCHLORIDE 1 MG: 0.5 TABLET ORAL at 20:40

## 2024-07-27 RX ADMIN — LOPERAMIDE HYDROCHLORIDE 2 MG: 2 CAPSULE ORAL at 14:50

## 2024-07-27 RX ADMIN — FLUTICASONE PROPIONATE 2 SPRAY: 50 SPRAY, METERED NASAL at 20:59

## 2024-07-27 RX ADMIN — GABAPENTIN 800 MG: 400 CAPSULE ORAL at 08:28

## 2024-07-27 RX ADMIN — PRAMIPEXOLE DIHYDROCHLORIDE 0.25 MG: 0.5 TABLET ORAL at 20:44

## 2024-07-27 RX ADMIN — PRAMIPEXOLE DIHYDROCHLORIDE 1 MG: 0.5 TABLET ORAL at 13:31

## 2024-07-27 RX ADMIN — INSULIN LISPRO 4 UNITS: 100 INJECTION, SOLUTION INTRAVENOUS; SUBCUTANEOUS at 20:53

## 2024-07-27 RX ADMIN — TIOTROPIUM BROMIDE INHALATION SPRAY 2 PUFF: 3.12 SPRAY, METERED RESPIRATORY (INHALATION) at 07:44

## 2024-07-27 ASSESSMENT — PAIN SCALES - GENERAL
PAINLEVEL_OUTOF10: 5
PAINLEVEL_OUTOF10: 5
PAINLEVEL_OUTOF10: 0
PAINLEVEL_OUTOF10: 5

## 2024-07-27 ASSESSMENT — LIFESTYLE VARIABLES
HOW OFTEN DO YOU HAVE A DRINK CONTAINING ALCOHOL: NEVER
HOW MANY STANDARD DRINKS CONTAINING ALCOHOL DO YOU HAVE ON A TYPICAL DAY: PATIENT DOES NOT DRINK

## 2024-07-27 ASSESSMENT — PAIN DESCRIPTION - ORIENTATION: ORIENTATION: LEFT;RIGHT

## 2024-07-27 ASSESSMENT — PAIN DESCRIPTION - LOCATION: LOCATION: LEG

## 2024-07-27 ASSESSMENT — PAIN DESCRIPTION - DESCRIPTORS: DESCRIPTORS: ACHING

## 2024-07-27 ASSESSMENT — PAIN - FUNCTIONAL ASSESSMENT: PAIN_FUNCTIONAL_ASSESSMENT: ACTIVITIES ARE NOT PREVENTED

## 2024-07-27 ASSESSMENT — PAIN DESCRIPTION - PAIN TYPE: TYPE: CHRONIC PAIN

## 2024-07-27 NOTE — PROGRESS NOTES
4 Eyes Skin Assessment     NAME:  Jorge Gauthier  YOB: 1945  MEDICAL RECORD NUMBER:  [id number]    The patient is being assessed for  Transfer to New Unit    I agree that at least one RN has performed a thorough Head to Toe Skin Assessment on the patient. ALL assessment sites listed below have been assessed.      Areas assessed by both nurses:    Head, Face, Ears, Shoulders, Back, Chest, Arms, Elbows, Hands, Sacrum. Buttock, Coccyx, Ischium, Legs. Feet and Heels, and Under Medical Devices         Does the Patient have a Wound? No noted wound(s)       Jose Alfredo Prevention initiated by RN: Yes  Wound Care Orders initiated by RN: No    Pressure Injury (Stage 3,4, Unstageable, DTI, NWPT, and Complex wounds) if present, place Wound referral order by RN under : No    New Ostomies, if present place, Ostomy referral order under : No     Nurse 1 eSignature: Electronically signed by Spring Ngo RN on 7/27/24 at 7:00 PM EDT    **SHARE this note so that the co-signing nurse can place an eSignature**    Nurse 2 eSignature: Electronically signed by Maral Mccabe RN on 7/27/24 at 7:01 PM EDT

## 2024-07-27 NOTE — CONSULTS
PATIENT IS SEEN AT THE REQUEST OF DR. Johnson for COPD and CHF Exacerbations     HISTORY OF PRESENT ILLNESS:  This is a 79 y.o. male who presented to the ED on 7/26 with a CC of SOB.  Per ED Notes he had acute onset of SOB with hypoxia. He was put CPAP and nebs he was admitted for respiratory failure and pulmonary edema.  HE is currently on PAP therapy.  Recurring admissions   He said he had one day history of cough, congestion and lower oxygen.  Also breathing treatments were not helping him.  He was admitted for bpap.  He is not sure what is going on that he keeps on getting admitted.      Established Pulmonologist:  [place]     PAST MEDICAL HISTORY:  Past Medical History:   Diagnosis Date    Arthritis     CAD (coronary artery disease)     CABG in 2009    Chronic back pain     Chronic combined systolic (congestive) and diastolic (congestive) heart failure (HCC) 04/06/2018    Chronic systolic CHF (congestive heart failure) (HCC)     COPD (chronic obstructive pulmonary disease) (HCC)     Dental disease     Diabetes mellitus (HCC)     DMII (diabetes mellitus, type 2) ([facility]) 04/14/2019    Essential hypertension     Hearing loss     Hyperlipidemia 07/30/2018    Longstanding persistent atrial fibrillation (HCC) 03/14/2023    Non morbid obesity due to excess calories 02/04/2023    Paroxysmal atrial fibrillation (HCC)     On Coumadin    Primary hypertension 07/30/2018    Tinnitus        PAST SURGICAL HISTORY:  Past Surgical History:   Procedure Laterality Date    ANKLE FRACTURE SURGERY Right 5/17/2024    OPEN REDUCTION INTERNAL FIXATION RIGHT ANKLE performed by José Miguel Chavez MD at [facility] OR    BACK SURGERY  1995    BACK SURGERY      CARDIAC SURGERY  2007    CERVICAL SPINE SURGERY      CHEST TUBE INSERTION Right 03/14/2023    14F. Dr. Jalloh    CORONARY ARTERY BYPASS GRAFT      FRACTURE SURGERY Left 1988    Shoulder    IR CHEST TUBE INSERTION  3/14/2023    IR CHEST TUBE INSERTION 3/14/2023 [facility] SPECIAL PROCEDURES      I reviewed all the above labs and studies pertaining to this visit.    ASSESSMENT/PLAN:  Acute on Chronic Hypoxic/Hypercapnic Respiratory Failure  Titrate oxygen for saturations greater than or equal to 90%  PRN bilevel   Abnormal CXR with suggestion of pulmonary edema  Continue with lasix as is  Start cefepime and give one time dose of vanco  Collect sputum and pneumonia panel   Chronic Bronchitis with exacerbation   Continue with albuterol nebs  Add saline nebs  Continue with symbicort and spiriva  Continue with daliresp  Chronic Aspiration   Not on modified diet any more  May need to revisit this if another gram negative organism is isolated from sputum    DVT prophylaxis  Eliquis     Ok to have carb diet with fluid restriction       Seferino Guevara, DO [facility]

## 2024-07-27 NOTE — PLAN OF CARE
Problem: Discharge Planning  Goal: Discharge to home or other facility with appropriate resources  7/27/2024 1935 by Spring Ngo, RN  Outcome: Progressing     Problem: Pain  Goal: Verbalizes/displays adequate comfort level or baseline comfort level  7/27/2024 1935 by Spring Ngo, RN  Outcome: Progressing     Problem: Skin/Tissue Integrity  Goal: Absence of new skin breakdown  Description: 1.  Monitor for areas of redness and/or skin breakdown  2.  Assess vascular access sites hourly  3.  Every 4-6 hours minimum:  Change oxygen saturation probe site  4.  Every 4-6 hours:  If on nasal continuous positive airway pressure, respiratory therapy assess nares and determine need for appliance change or resting period.  7/27/2024 1935 by Spring Ngo, RN  Outcome: Progressing     Problem: Safety - Adult  Goal: Free from fall injury  7/27/2024 1935 by Spring Ngo, RN  Outcome: Progressing

## 2024-07-27 NOTE — PROGRESS NOTES
07/27/24 0301   NIV Type   Suction Setup and Functional Yes   NIV Started/Stopped On   Equipment Type V60   Mode Bilevel   Mask Type Full face mask   Mask Size Medium   Assessment   Pulse (!) 101   Respirations 19   BP (!) 143/91   SpO2 100 %   Mask Compliance Good   Skin Assessment Clean, dry, & intact   Settings/Measurements   PIP Observed 20 cm H20   IPAP (S)  20 cmH20   CPAP/EPAP 6 cmH2O   Vt (Measured) 588 mL   Rate Ordered (S)  18   Insp Rise Time (%) 3 %   FiO2  50 %   I Time/ I Time % 1 s   Minute Volume (L/min) 14.4 Liters   Mask Leak (lpm) 21 lpm   Patient's Home Machine No   Alarm Settings   Alarms On Y   Low Pressure (cmH2O) 4 cmH2O   High Pressure (cmH2O) 40 cmH2O   Delay Alarm 20 sec(s)   Apnea (secs) 20 secs   RR Low (bpm) 10   RR High (bpm) 40 br/min

## 2024-07-27 NOTE — PROGRESS NOTES
Per  ICU nurse , pt was to received 5 unit sof insulin instead of 4 units based on current orders. Nurse was unable to pull 5 units from [facility] , so only 4 units was given.MD has been notified   MD was also notified of best practice advisory message for this patient.

## 2024-07-27 NOTE — PROGRESS NOTES
4 Eyes Skin Assessment     NAME:  Jorge Gauthier  YOB: 1945  MEDICAL RECORD NUMBER:  [id number]    The patient is being assessed for  Admission    I agree that at least one RN has performed a thorough Head to Toe Skin Assessment on the patient. ALL assessment sites listed below have been assessed.      Areas assessed by both nurses:    Head, Face, Ears, Shoulders, Back, Chest, Arms, Elbows, Hands, Sacrum. Buttock, Coccyx, Ischium, Legs. Feet and Heels, and Under Medical Devices         Does the Patient have a Wound? Yes wound(s) were present on assessment. LDA wound assessment was Initiated and completed by RN       Jose Alfredo Prevention initiated by RN: Yes  Wound Care Orders initiated by RN: Yes    Pressure Injury (Stage 3,4, Unstageable, DTI, NWPT, and Complex wounds) if present, place Wound referral order by RN under : Yes    New Ostomies, if present place, Ostomy referral order under : No     Nurse 1 eSignature: Electronically signed by Ginette Mauro RN on 7/27/24 at 4:11 AM EDT    **SHARE this note so that the co-signing nurse can place an eSignature**    Nurse 2 eSignature: Electronically signed by Vernon Arevalo RN on 7/27/24 at 4:12 AM EDT

## 2024-07-27 NOTE — PLAN OF CARE
Problem: Discharge Planning  Goal: Discharge to home or other facility with appropriate resources  7/27/2024 1014 by Jennifer You RN  Outcome: Progressing  7/27/2024 0445 by Ginette Mauro RN  Outcome: Progressing     Problem: Pain  Goal: Verbalizes/displays adequate comfort level or baseline comfort level  7/27/2024 1014 by Jennifer You RN  Outcome: Progressing  Flowsheets (Taken 7/27/2024 0800)  Verbalizes/displays adequate comfort level or baseline comfort level:   Encourage patient to monitor pain and request assistance   Assess pain using appropriate pain scale  7/27/2024 0445 by Ginette Mauro RN  Outcome: Progressing     Problem: Skin/Tissue Integrity  Goal: Absence of new skin breakdown  Description: 1.  Monitor for areas of redness and/or skin breakdown  2.  Assess vascular access sites hourly  3.  Every 4-6 hours minimum:  Change oxygen saturation probe site  4.  Every 4-6 hours:  If on nasal continuous positive airway pressure, respiratory therapy assess nares and determine need for appliance change or resting period.  7/27/2024 1014 by Jennifer You RN  Outcome: Progressing  7/27/2024 0445 by Ginette Mauro RN  Outcome: Progressing     Problem: Safety - Adult  Goal: Free from fall injury  Outcome: Progressing

## 2024-07-27 NOTE — PROGRESS NOTES
Report received from ED RN Madhavi. This RN asked if Madhavi could place an IV prior to bringing him up to ICU since he only had one. All other questions answered.

## 2024-07-27 NOTE — PROGRESS NOTES
Patient was transferred from ICU to this floor room 4270. Patient arrived on the floor alert and oriented x4, with no signs of acute distress. IV in place, nasal canula noted to be on 3L. Skin assessment done with blanchable redness noted on buttock, vascular discoloration and scattered bruising. Patient has been oriented to room and surroundings . Call light within reach, low bed position, bed alarm on, ongoing monitoring,

## 2024-07-27 NOTE — PROGRESS NOTES
07/27/24 0008   NIV Type   $NIV $Daily Charge   NIV Started/Stopped On   Equipment Type V60   Mode Bilevel   Mask Type Full face mask   Mask Size Medium   Assessment   Pulse 92   Respirations 25   SpO2 99 %   Level of Consciousness 0   Comfort Level Good   Using Accessory Muscles No   Mask Compliance Good   Skin Assessment Clean, dry, & intact   Breath Sounds   Right Upper Lobe Diminished   Right Middle Lobe Diminished   Right Lower Lobe Diminished   Left Upper Lobe Diminished   Left Lower Lobe Diminished   Settings/Measurements   PIP Observed 16 cm H20   IPAP 16 cmH20   CPAP/EPAP 6 cmH2O   Vt (Measured) 504 mL   Rate Ordered 10   Insp Rise Time (%) 3 %   FiO2  50 %   I Time/ I Time % 1 s   Minute Volume (L/min) 12.1 Liters   Mask Leak (lpm) 21 lpm   Patient's Home Machine No   Alarm Settings   Alarms On Y   Low Pressure (cmH2O) 4 cmH2O   High Pressure (cmH2O) 40 cmH2O   Delay Alarm 20 sec(s)   Apnea (secs) 20 secs   RR Low (bpm) 10   RR High (bpm) 40 br/min

## 2024-07-27 NOTE — ED PROVIDER NOTES
Radiologist below, if available at the time of this note:    Discussed with Radiologist:     XR CHEST PORTABLE   Final Result   1. Moderate right and small left pleural effusions.   2. Interstitial edema.           XR CHEST PORTABLE    Result Date: 7/27/2024  EXAMINATION: ONE XRAY VIEW OF THE CHEST 7/26/2024 11:56 pm COMPARISON: Chest radiograph 07/17/2024. HISTORY: ORDERING SYSTEM PROVIDED HISTORY: sob TECHNOLOGIST PROVIDED HISTORY: Reason for exam:->sob Reason for Exam: very sob FINDINGS: Status post median sternotomy.  The cardiomediastinal silhouette is unchanged.  Moderate right and small left pleural effusions.  Interstitial edema is noted.  Mild bibasilar atelectasis.  No pneumothorax.  No acute osseous abnormality.     1. Moderate right and small left pleural effusions. 2. Interstitial edema.       No results found.    PROCEDURES   Unless otherwise noted below, none     Procedures    CRITICAL CARE TIME (.cct)     Critical Care  There was a high probability of life-threatening clinical deterioration in the patient's condition requiring my urgent intervention.  Total critical care time with the patient was 40 minutes excluding separately reportable procedures.  Critical care required due to patients acute hypoxic respiratory failure.       PAST MEDICAL HISTORY      has a past medical history of Arthritis, CAD (coronary artery disease), Chronic back pain, Chronic combined systolic (congestive) and diastolic (congestive) heart failure ([facility]) (04/06/2018), Chronic systolic CHF (congestive heart failure) ([facility]), COPD (chronic obstructive pulmonary disease) ([facility]), Dental disease, Diabetes mellitus ([facility]), DMII (diabetes mellitus, type 2) ([facility]) (04/14/2019), Essential hypertension, Hearing loss, Hyperlipidemia (07/30/2018), Longstanding persistent atrial fibrillation (HCC) (03/14/2023), Non morbid obesity due to excess calories (02/04/2023), Paroxysmal atrial fibrillation ([facility]), Primary hypertension (07/30/2018), and     On Coumadin    Primary hypertension 07/30/2018    Tinnitus          Records Reviewed (External and source): Reviewed patient was reviewed send admission from 7/13/2024.     Disposition Considerations (include 1 Tests not done, Admit vs D/C, Shared Decision Making, Pt Expectation of Test or Tx.): Patient has a medical condition that requires admission for further medical management evaluation.       I am the Primary Clinician of Record.    FINAL IMPRESSION      1. Acute on chronic respiratory failure with hypoxia and hypercapnia (HCC)    2. Acute pulmonary edema (HCC)          DISPOSITION/PLAN     DISPOSITION Admitted 07/27/2024 02:41:07 AM      PATIENT REFERRED TO:  No follow-up provider specified.    DISCHARGE MEDICATIONS:  Current Discharge Medication List          DISCONTINUED MEDICATIONS:  Current Discharge Medication List                 (Please note that portions of this note were completed with a voice recognition program.  Efforts were made to edit the dictations but occasionally words are mis-transcribed.)    Paulo Michael MD (electronically signed)            Paulo Michael MD  07/31/24 0559

## 2024-07-27 NOTE — ED TRIAGE NOTES
Came in via EMS for shortness of breath that started about 1hr PTA. Family gave 3 duonebs. Pt 02 in the 70s on EMS arrival. Wears 2L NC at baseline.

## 2024-07-27 NOTE — RT PROTOCOL NOTE
RT Inhaler-Nebulizer Bronchodilator Protocol Note    There is a bronchodilator order in the chart from a provider indicating to follow the RT Bronchodilator Protocol and there is an “Initiate RT Inhaler-Nebulizer Bronchodilator Protocol” order as well (see protocol at bottom of note).    CXR Findings:  XR CHEST PORTABLE    Result Date: 7/27/2024  1. Moderate right and small left pleural effusions. 2. Interstitial edema.       The findings from the last RT Protocol Assessment were as follows:   History Pulmonary Disease: Chronic pulmonary disease  Respiratory Pattern: Dyspnea on exertion or RR 21-25 bpm  Breath Sounds: Slightly diminished and/or crackles  Cough: Strong, productive  Indication for Bronchodilator Therapy: Decreased or absent breath sounds, On home bronchodilators  Bronchodilator Assessment Score: 7    Aerosolized bronchodilator medication orders have been revised according to the RT Inhaler-Nebulizer Bronchodilator Protocol below.    Respiratory Therapist to perform RT Therapy Protocol Assessment initially then follow the protocol.  Repeat RT Therapy Protocol Assessment PRN for score 0-3 or on second treatment, BID, and PRN for scores above 3.    No Indications - adjust the frequency to every 6 hours PRN wheezing or bronchospasm, if no treatments needed after 48 hours then discontinue using Per Protocol order mode.     If indication present, adjust the RT bronchodilator orders based on the Bronchodilator Assessment Score as indicated below.  Use Inhaler orders unless patient has one or more of the following: on home nebulizer, not able to hold breath for 10 seconds, is not alert and oriented, cannot activate and use MDI correctly, or respiratory rate 25 breaths per minute or more, then use the equivalent nebulizer order(s) with same Frequency and PRN reasons based on the score.  If a patient is on this medication at home then do not decrease Frequency below that used at home.    0-3 - enter or revise

## 2024-07-27 NOTE — PROGRESS NOTES
Pt arrived to ICU accompanied by ED nurse and wife. Pt refusing to wear a gown. Transferred over to ICU bed and hooked up to monitors. When asked where this pt is he states \"the hospital\" when asked which hospital he states \"I don't care\". When asked what year or month it is he states' I don't know and I don't care\". This RN explained to pt that is important for me to assess his mentation with his CO2 levels rising. Pt states \" I doesn't care\". Pt refusing admission questions at this time as well. BiPAP on at this time. VSS at this time. Pt oriented to room and call light system. External catheter placed. Questionable if redness on bottom is blanchable or not. Triad cream applied and wound care consult placed. Scattered abrasions/scabs on BL legs. 4 eyes note in.

## 2024-07-27 NOTE — H&P
HISTORY AND PHYSICAL             Date: 7/27/2024        Patient Name: Jorge Gauthier     YOB: 1945      Age:  79 y.o.    Chief Complaint     Chief Complaint   Patient presents with    Shortness of Breath          History Obtained From   patient    History of Present Illness   79m with copd, chf, afib, recently here for chf/copd exacerbation, discharged 7/18. Patient denies chest pain, fevers, chills, n/v/d. He has had a cough productive of yellow sputum. Patient had worsening sob, prompting return to ER where he was noted to be hypercapnic, placed on bipap. Currently seen in the ICU on bipap, awake, alert, denying chest pain, worsening sob    Past Medical History     Past Medical History:   Diagnosis Date    Arthritis     CAD (coronary artery disease)     CABG in 2009    Chronic back pain     Chronic combined systolic (congestive) and diastolic (congestive) heart failure (HCC) 04/06/2018    Chronic systolic CHF (congestive heart failure) ([facility])     COPD (chronic obstructive pulmonary disease) ([facility])     Dental disease     Diabetes mellitus ([facility])     DMII (diabetes mellitus, type 2) ([facility]) 04/14/2019    Essential hypertension     Hearing loss     Hyperlipidemia 07/30/2018    Longstanding persistent atrial fibrillation (HCC) 03/14/2023    Non morbid obesity due to excess calories 02/04/2023    Paroxysmal atrial fibrillation (HCC)     On Coumadin    Primary hypertension 07/30/2018    Tinnitus         Past Surgical History     Past Surgical History:   Procedure Laterality Date    ANKLE FRACTURE SURGERY Right 5/17/2024    OPEN REDUCTION INTERNAL FIXATION RIGHT ANKLE performed by José Miguel Chavez MD at [facility] OR    BACK SURGERY  1995    BACK SURGERY      CARDIAC SURGERY  2007    CERVICAL SPINE SURGERY      CHEST TUBE INSERTION Right 03/14/2023    14F. Dr. Jalloh    CORONARY ARTERY BYPASS GRAFT      FRACTURE SURGERY Left 1988    Shoulder    IR CHEST TUBE INSERTION  3/14/2023    IR CHEST TUBE INSERTION 3/14/2023  (MIRAPEX) 0.25 MG tablet Take 4 tablets by mouth in the morning, at noon, in the evening, and at bedtime 1/29/23   Ildefonso Bassett MD   flunisolide (NASALIDE) 25 MCG/ACT (0.025%) SOLN 2 sprays by Nasal route in the morning and at bedtime    Ildefonso Bassett MD   lidocaine (XYLOCAINE) 5 % ointment Apply topically As needed for pain    Ildefonso Bassett MD   polyethylene glycol (GLYCOLAX) 17 GM/SCOOP powder Take 17 g by mouth 2 times daily as needed    Ildefonso Bassett MD   dapagliflozin (FARXIGA) 10 MG tablet Take 1 tablet by mouth every morning    Ildefonso Bassett MD   insulin 70-30 (HUMULIN;NOVOLIN) (70-30) 100 UNIT per ML injection vial Inject 25 Units into the skin 2 times daily    Ildefonso Bassett MD   apixaban (ELIQUIS) 5 MG TABS tablet Take 1 tablet by mouth 2 times daily 12/23/20   Luis Alberto Dang MD   vitamin D (CHOLECALCIFEROL) 1000 UNITS TABS tablet Take 1 tablet by mouth daily    Ildefonso Bassett MD   gabapentin (NEURONTIN) 800 MG tablet Take 1 tablet by mouth 4 times daily.    Ildefonso Bassett MD   aspirin 81 MG tablet Take 1 tablet by mouth daily    Ildefonso Bassett MD   metFORMIN (GLUCOPHAGE) 1000 MG tablet Take 1 tablet by mouth 2 times daily (with meals)    Ildefonso Bassett MD        Allergies   Donepezil, Amitriptyline, and Enoxaparin    Social History     Social History       Tobacco History       Smoking Status  Former Smoking Start Date  1/1/1959 Quit Date  3/1/1996 Average Packs/Day  1.5 packs/day for 37.2 years (55.7 ttl pk-yrs) Smoking Tobacco Type  Cigarettes from 1/1/1959 to 3/1/1996   Pack Year History     Packs/Day From To Years    0 3/1/1996  28.4    1.5 1/1/1959 3/1/1996 37.2      Passive Exposure  Past      Smokeless Tobacco Use  Never              Alcohol History       Alcohol Use Status  No              Drug Use       Drug Use Status  No              Sexual Activity       Sexually Active  Not Currently                    Family

## 2024-07-27 NOTE — PROGRESS NOTES
NAME:  Jorge Gauthier  YOB: 1945  MEDICAL RECORD NUMBER:  [id number]    Shift Summary: Med/surg status. Multiple Bms, imodium PRN. BIPAP PRN.    Family updated: No, left Portia message about room transfer    Rhythm: Atrial Fibrillation     Most recent vitals:   Visit Vitals  /64   Pulse 100   Temp 98.5 °F (36.9 °C) (Oral)   Resp 21   Ht 1.803 m (5' 11\")   Wt 97.7 kg (215 lb 6.2 oz)   SpO2 96%   BMI 30.04 kg/m²           No data found.    No data found.      Respiratory support needed (if any):  - O2 - NC - 3 lpm    Admission weight Weight - Scale: 101.8 kg (224 lb 6.9 oz) (07/27/24 0008)    Today's weight    Wt Readings from Last 1 Encounters:   07/27/24 97.7 kg (215 lb 6.2 oz)        Leon need assessed each shift: N/A - no leon present  UOP >30ml/hr: YES  Last documented BM (in last 48 hrs):  Patient Vitals for the past 48 hrs:   Last BM (including prior to admit) Stool Occurrence   07/27/24 0400 07/26/24 --   07/27/24 0800 07/27/24 1   07/27/24 1001 -- 1   07/27/24 1200 -- 1   07/27/24 1236 -- 1   07/27/24 1300 -- 1                Restraints (in use currently or dc'd in last 12 hrs): No    Order current and documentation up to date? No    Lines/Drains reviewed @ bedside.  Peripheral IV Right Antecubital (Active)   Number of days:        Peripheral IV 07/27/24 Distal;Right;Anterior Forearm (Active)   Number of days: 0         Drip rates at handoff:    dextrose      sodium chloride         Lab Data:   CBC:   Recent Labs     07/26/24  2357   WBC 11.4*   HGB 10.4*   HCT 33.8*   MCV 81.5        BMP:    Recent Labs     07/26/24  2357      K 4.9   CO2 30   BUN 21*   CREATININE 1.3     LIVR:   Recent Labs     07/26/24  2357   AST 20   ALT 16     PT/INR: No results for input(s): \"INR\" in the last 72 hours.    Invalid input(s): \"PROT\"  APTT: No results for input(s): \"APTT\" in the last 72 hours.  ABG: No results for input(s): \"PHART\", \"[id number]\", \"PO2ART\" in the last 72 hours.    Any

## 2024-07-27 NOTE — PROGRESS NOTES
NAME:  Jorge Gauthier  YOB: 1945  MEDICAL RECORD NUMBER:  [id number]    Shift Summary: Pt admitted over night for worsening SOB on BiPAP. Tolerating BiPAP overnight.    Family updated: Yes:  Wife    Rhythm: Atrial Fibrillation     Most recent vitals:   Visit Vitals  /75   Pulse 86   Temp 98.1 °F (36.7 °C) (Axillary)   Resp 21   Ht 1.803 m (5' 11\")   Wt 101.8 kg (224 lb 6.9 oz)   SpO2 100%   BMI 31.30 kg/m²           No data found.    No data found.      Respiratory support needed (if any):  - BiPAP   IPAP: 20 cmH20, CPAP/EPAP: 6 cmH2O continuous    Admission weight Weight - Scale: 101.8 kg (224 lb 6.9 oz) (07/27/24 0008)    Today's weight    Wt Readings from Last 1 Encounters:   07/27/24 101.8 kg (224 lb 6.9 oz)        Leon need assessed each shift: N/A - no leon present  UOP >30ml/hr: Yes  Last documented BM (in last 48 hrs):  Patient Vitals for the past 48 hrs:   Last BM (including prior to admit)   07/27/24 0400 07/26/24                Restraints (in use currently or dc'd in last 12 hrs): No    Order current and documentation up to date? No    Lines/Drains reviewed @ bedside.  Peripheral IV Right Antecubital (Active)   Number of days:        Peripheral IV 07/27/24 Distal;Right;Anterior Forearm (Active)   Number of days: 0         Drip rates at handoff:    dextrose      sodium chloride         Lab Data:   CBC:   Recent Labs     07/26/24 2357   WBC 11.4*   HGB 10.4*   HCT 33.8*   MCV 81.5        BMP:    Recent Labs     07/26/24 2357      K 4.9   CO2 30   BUN 21*   CREATININE 1.3     LIVR:   Recent Labs     07/26/24 2357   AST 20   ALT 16     PT/INR: No results for input(s): \"INR\" in the last 72 hours.    Invalid input(s): \"PROT\"  APTT: No results for input(s): \"APTT\" in the last 72 hours.  ABG: No results for input(s): \"PHART\", \"[id number]\", \"PO2ART\" in the last 72 hours.    Any consults during the shift? Yes: Consults received: : Wound care    Any signed and held orders to be

## 2024-07-28 LAB
ANION GAP SERPL CALCULATED.3IONS-SCNC: 11 MMOL/L (ref 3–16)
BASOPHILS # BLD: 0 K/UL (ref 0–0.2)
BASOPHILS NFR BLD: 0.2 %
BUN SERPL-MCNC: 24 MG/DL (ref 7–20)
CALCIUM SERPL-MCNC: 9.3 MG/DL (ref 8.3–10.6)
CHLORIDE SERPL-SCNC: 96 MMOL/L (ref 99–110)
CO2 SERPL-SCNC: 35 MMOL/L (ref 21–32)
CREAT SERPL-MCNC: 1.1 MG/DL (ref 0.8–1.3)
DEPRECATED RDW RBC AUTO: 18.1 % (ref 12.4–15.4)
EOSINOPHIL # BLD: 0 K/UL (ref 0–0.6)
EOSINOPHIL NFR BLD: 0 %
GFR SERPLBLD CREATININE-BSD FMLA CKD-EPI: 68 ML/MIN/{1.73_M2}
GLUCOSE BLD-MCNC: 252 MG/DL (ref 70–99)
GLUCOSE BLD-MCNC: 279 MG/DL (ref 70–99)
GLUCOSE BLD-MCNC: 330 MG/DL (ref 70–99)
GLUCOSE BLD-MCNC: 379 MG/DL (ref 70–99)
GLUCOSE SERPL-MCNC: 273 MG/DL (ref 70–99)
HCT VFR BLD AUTO: 31.8 % (ref 40.5–52.5)
HGB BLD-MCNC: 9.8 G/DL (ref 13.5–17.5)
LYMPHOCYTES # BLD: 0.9 K/UL (ref 1–5.1)
LYMPHOCYTES NFR BLD: 8.2 %
MCH RBC QN AUTO: 24.9 PG (ref 26–34)
MCHC RBC AUTO-ENTMCNC: 30.8 G/DL (ref 31–36)
MCV RBC AUTO: 80.8 FL (ref 80–100)
MONOCYTES # BLD: 0.5 K/UL (ref 0–1.3)
MONOCYTES NFR BLD: 4.5 %
NEUTROPHILS # BLD: 9.9 K/UL (ref 1.7–7.7)
NEUTROPHILS NFR BLD: 87.1 %
PERFORMED ON: ABNORMAL
PLATELET # BLD AUTO: 423 K/UL (ref 135–450)
PMV BLD AUTO: 6.5 FL (ref 5–10.5)
POTASSIUM SERPL-SCNC: 4.1 MMOL/L (ref 3.5–5.1)
RBC # BLD AUTO: 3.94 M/UL (ref 4.2–5.9)
SODIUM SERPL-SCNC: 142 MMOL/L (ref 136–145)
WBC # BLD AUTO: 11.4 K/UL (ref 4–11)

## 2024-07-28 PROCEDURE — 36415 COLL VENOUS BLD VENIPUNCTURE: CPT

## 2024-07-28 PROCEDURE — 87641 MR-STAPH DNA AMP PROBE: CPT

## 2024-07-28 PROCEDURE — 6360000002 HC RX W HCPCS: Performed by: HOSPITALIST

## 2024-07-28 PROCEDURE — 85025 COMPLETE CBC W/AUTO DIFF WBC: CPT

## 2024-07-28 PROCEDURE — 94640 AIRWAY INHALATION TREATMENT: CPT

## 2024-07-28 PROCEDURE — 6370000000 HC RX 637 (ALT 250 FOR IP): Performed by: FAMILY MEDICINE

## 2024-07-28 PROCEDURE — 99233 SBSQ HOSP IP/OBS HIGH 50: CPT | Performed by: INTERNAL MEDICINE

## 2024-07-28 PROCEDURE — 6370000000 HC RX 637 (ALT 250 FOR IP): Performed by: HOSPITALIST

## 2024-07-28 PROCEDURE — 94761 N-INVAS EAR/PLS OXIMETRY MLT: CPT

## 2024-07-28 PROCEDURE — 2580000003 HC RX 258: Performed by: HOSPITALIST

## 2024-07-28 PROCEDURE — 6360000002 HC RX W HCPCS: Performed by: INTERNAL MEDICINE

## 2024-07-28 PROCEDURE — 80048 BASIC METABOLIC PNL TOTAL CA: CPT

## 2024-07-28 PROCEDURE — 1200000000 HC SEMI PRIVATE

## 2024-07-28 PROCEDURE — 2580000003 HC RX 258: Performed by: INTERNAL MEDICINE

## 2024-07-28 PROCEDURE — 94669 MECHANICAL CHEST WALL OSCILL: CPT

## 2024-07-28 PROCEDURE — 2700000000 HC OXYGEN THERAPY PER DAY

## 2024-07-28 RX ADMIN — CARVEDILOL 3.12 MG: 3.12 TABLET, FILM COATED ORAL at 08:24

## 2024-07-28 RX ADMIN — GABAPENTIN 800 MG: 400 CAPSULE ORAL at 08:24

## 2024-07-28 RX ADMIN — ALBUTEROL SULFATE 2.5 MG: 2.5 SOLUTION RESPIRATORY (INHALATION) at 08:49

## 2024-07-28 RX ADMIN — SODIUM CHLORIDE SOLN NEBU 3% 4 ML: 3 NEBU SOLN at 15:41

## 2024-07-28 RX ADMIN — GABAPENTIN 800 MG: 400 CAPSULE ORAL at 12:58

## 2024-07-28 RX ADMIN — ASPIRIN 81 MG 81 MG: 81 TABLET ORAL at 08:24

## 2024-07-28 RX ADMIN — INSULIN LISPRO 6 UNITS: 100 INJECTION, SOLUTION INTRAVENOUS; SUBCUTANEOUS at 17:37

## 2024-07-28 RX ADMIN — Medication 1000 UNITS: at 08:24

## 2024-07-28 RX ADMIN — ALBUTEROL SULFATE 2.5 MG: 2.5 SOLUTION RESPIRATORY (INHALATION) at 20:34

## 2024-07-28 RX ADMIN — SODIUM CHLORIDE SOLN NEBU 3% 4 ML: 3 NEBU SOLN at 08:50

## 2024-07-28 RX ADMIN — BUDESONIDE AND FORMOTEROL FUMARATE DIHYDRATE 2 PUFF: 160; 4.5 AEROSOL RESPIRATORY (INHALATION) at 20:34

## 2024-07-28 RX ADMIN — CARVEDILOL 3.12 MG: 3.12 TABLET, FILM COATED ORAL at 17:37

## 2024-07-28 RX ADMIN — METFORMIN HYDROCHLORIDE 1000 MG: 500 TABLET ORAL at 17:36

## 2024-07-28 RX ADMIN — SODIUM CHLORIDE SOLN NEBU 3% 4 ML: 3 NEBU SOLN at 12:32

## 2024-07-28 RX ADMIN — METHYLPREDNISOLONE SODIUM SUCCINATE 40 MG: 40 INJECTION INTRAMUSCULAR; INTRAVENOUS at 00:00

## 2024-07-28 RX ADMIN — CEFEPIME 2000 MG: 2 INJECTION, POWDER, FOR SOLUTION INTRAVENOUS at 08:40

## 2024-07-28 RX ADMIN — INSULIN LISPRO 4 UNITS: 100 INJECTION, SOLUTION INTRAVENOUS; SUBCUTANEOUS at 08:24

## 2024-07-28 RX ADMIN — SODIUM CHLORIDE SOLN NEBU 3% 4 ML: 3 NEBU SOLN at 20:34

## 2024-07-28 RX ADMIN — TIOTROPIUM BROMIDE INHALATION SPRAY 2 PUFF: 3.12 SPRAY, METERED RESPIRATORY (INHALATION) at 08:50

## 2024-07-28 RX ADMIN — PRAMIPEXOLE DIHYDROCHLORIDE 1 MG: 0.5 TABLET ORAL at 20:14

## 2024-07-28 RX ADMIN — ALBUTEROL SULFATE 2.5 MG: 2.5 SOLUTION RESPIRATORY (INHALATION) at 12:33

## 2024-07-28 RX ADMIN — APIXABAN 5 MG: 5 TABLET, FILM COATED ORAL at 20:12

## 2024-07-28 RX ADMIN — FUROSEMIDE 40 MG: 10 INJECTION, SOLUTION INTRAMUSCULAR; INTRAVENOUS at 20:03

## 2024-07-28 RX ADMIN — METFORMIN HYDROCHLORIDE 1000 MG: 500 TABLET ORAL at 08:36

## 2024-07-28 RX ADMIN — FLUTICASONE PROPIONATE 2 SPRAY: 50 SPRAY, METERED NASAL at 20:14

## 2024-07-28 RX ADMIN — FUROSEMIDE 40 MG: 10 INJECTION, SOLUTION INTRAMUSCULAR; INTRAVENOUS at 08:25

## 2024-07-28 RX ADMIN — FLUTICASONE PROPIONATE 2 SPRAY: 50 SPRAY, METERED NASAL at 08:25

## 2024-07-28 RX ADMIN — APIXABAN 5 MG: 5 TABLET, FILM COATED ORAL at 08:24

## 2024-07-28 RX ADMIN — METHYLPREDNISOLONE SODIUM SUCCINATE 40 MG: 40 INJECTION INTRAMUSCULAR; INTRAVENOUS at 15:56

## 2024-07-28 RX ADMIN — ROFLUMILAST 500 MCG: 500 TABLET ORAL at 10:15

## 2024-07-28 RX ADMIN — LAMOTRIGINE 150 MG: 100 TABLET ORAL at 20:12

## 2024-07-28 RX ADMIN — SODIUM CHLORIDE, PRESERVATIVE FREE 10 ML: 5 INJECTION INTRAVENOUS at 08:29

## 2024-07-28 RX ADMIN — CEFEPIME 2000 MG: 2 INJECTION, POWDER, FOR SOLUTION INTRAVENOUS at 20:17

## 2024-07-28 RX ADMIN — GABAPENTIN 800 MG: 400 CAPSULE ORAL at 17:37

## 2024-07-28 RX ADMIN — FUROSEMIDE 40 MG: 10 INJECTION, SOLUTION INTRAMUSCULAR; INTRAVENOUS at 12:59

## 2024-07-28 RX ADMIN — PRAMIPEXOLE DIHYDROCHLORIDE 1 MG: 0.5 TABLET ORAL at 08:24

## 2024-07-28 RX ADMIN — GABAPENTIN 800 MG: 400 CAPSULE ORAL at 20:12

## 2024-07-28 RX ADMIN — ALBUTEROL SULFATE 2.5 MG: 2.5 SOLUTION RESPIRATORY (INHALATION) at 15:41

## 2024-07-28 RX ADMIN — PRAMIPEXOLE DIHYDROCHLORIDE 1 MG: 0.5 TABLET ORAL at 12:59

## 2024-07-28 RX ADMIN — ATORVASTATIN CALCIUM 20 MG: 20 TABLET, FILM COATED ORAL at 20:13

## 2024-07-28 RX ADMIN — LAMOTRIGINE 150 MG: 100 TABLET ORAL at 08:23

## 2024-07-28 RX ADMIN — BUDESONIDE AND FORMOTEROL FUMARATE DIHYDRATE 2 PUFF: 160; 4.5 AEROSOL RESPIRATORY (INHALATION) at 08:50

## 2024-07-28 RX ADMIN — INSULIN LISPRO 4 UNITS: 100 INJECTION, SOLUTION INTRAVENOUS; SUBCUTANEOUS at 13:00

## 2024-07-28 RX ADMIN — PRAMIPEXOLE DIHYDROCHLORIDE 0.25 MG: 0.5 TABLET ORAL at 20:13

## 2024-07-28 RX ADMIN — SODIUM CHLORIDE, PRESERVATIVE FREE 10 ML: 5 INJECTION INTRAVENOUS at 19:59

## 2024-07-28 RX ADMIN — METHYLPREDNISOLONE SODIUM SUCCINATE 40 MG: 40 INJECTION INTRAMUSCULAR; INTRAVENOUS at 08:25

## 2024-07-28 RX ADMIN — PRAMIPEXOLE DIHYDROCHLORIDE 1 MG: 0.5 TABLET ORAL at 17:36

## 2024-07-28 ASSESSMENT — PAIN SCALES - GENERAL
PAINLEVEL_OUTOF10: 0
PAINLEVEL_OUTOF10: 0

## 2024-07-28 NOTE — PROGRESS NOTES
V2.0    [facility] Progress Note      Name:  Jorge Gauthier /Age/Sex: 1945  (79 y.o. male)   MRN & CSN:  [id number] & [id number] Encounter Date/Time: 2024 12:05 PM EDT   Location:  [id number] PCP: Gallo Sanchez MD     Attending:Brayan Perez MD       Hospital Day: 3    Assessment and Recommendations   Jorge Gauthier is a 79 y.o. male with pmh of arthritis, chronic back pain, chronic combined systolic and diastolic heart failure, COPD, diabetes, hypertension, hyperlipidemia paroxysmal A-fib, CABG in  who presents with Acute on chronic respiratory failure with hypoxia and hypercapnia (HCC)      Patient was recently admitted and discharged on 2024 with similar symptoms COPD versus CHF exacerbation.  Patient was doing okay but then he had worsening shortness of breath and he returned to the ER.  Patient was admitted to the ICU and then transition to the floor    Patient is on home 3 L nasal cannula oxygen continuously.    Patient was examined this morning does not seem to be in pain or acute distress he continues to be on 3 L nasal cannula oxygen he is diuresing well he is not as short of breath as he was on his presentation.  Chest x-ray shows pulmonary edema      Plan:   Acute on chronic respiratory failure with hypoxia and hypercapnia.  Patient is currently on nasal cannula 3 L oxygen.  Patient is on Lasix 40 mg IV 3 times a day.  Incentive spirometer every hour, BiPAP as needed and as tolerated.  Fluid restrictions 1800 cc a day  Pulmonology on board and following  2.  Pneumonia patient is currently on cefepime 2 g twice daily  3.  COPD exacerbation will continue albuterol, Symbicort and Spiriva and Daliresp nebulizers and breathing treatment.  Incentive spirometer.  4.  Diabetes insulin sliding scale protocol      Diet ADULT DIET; Regular; 4 carb choices (60 gm/meal); Mildly Thick (Nectar); 1800 ml   DVT Prophylaxis [] Lovenox, []  Heparin, [] SCDs, [] Ambulation,  [x] Eliquis, []

## 2024-07-28 NOTE — PROGRESS NOTES
Patient has not been compliant with his fluid intake and has been asking for more fluid intake . Pulmonology MD  was here this morning and upgrade from 1500 to 1800 ml fluid restriction. Patient has been informed but still remain unhappy. Patient states being treated unfairly ;per pt even restaurant offers water as needed and when asked. RN educated patient about his diagnosis and encouraged sipping water for long lasting.Patient refused.  Patient was unhappy this afternoon because not given ice cream or  a full cup of apple sauce for his medication. RN offered AP in small quantity but patient denied and wanted more . Pt is diabetic , and has been educated about his sugar level . Patient requested to see the \"boss: Charge nurse was notified on morning shift and oncoming night shift nurse notified nurse supervisor.

## 2024-07-28 NOTE — PLAN OF CARE
Problem: Discharge Planning  Goal: Discharge to home or other facility with appropriate resources  7/28/2024 0832 by Spring Ngo RN  Outcome: Progressing     Problem: Pain  Goal: Verbalizes/displays adequate comfort level or baseline comfort level  7/28/2024 0832 by Spring Ngo, RN  Outcome: Progressing     Problem: Skin/Tissue Integrity  Goal: Absence of new skin breakdown  Description: 1.  Monitor for areas of redness and/or skin breakdown  2.  Assess vascular access sites hourly  3.  Every 4-6 hours minimum:  Change oxygen saturation probe site  4.  Every 4-6 hours:  If on nasal continuous positive airway pressure, respiratory therapy assess nares and determine need for appliance change or resting period.  7/28/2024 0832 by Spring Ngo RN  Outcome: Progressing     Problem: Safety - Adult  Goal: Free from fall injury  7/28/2024 0832 by Spring Ngo, RN  Outcome: Progressing     Problem: ABCDS Injury Assessment  Goal: Absence of physical injury  7/28/2024 0832 by Spring Ngo, RN  Outcome: Progressing

## 2024-07-28 NOTE — PLAN OF CARE
Problem: Discharge Planning  Goal: Discharge to home or other facility with appropriate resources  7/28/2024 0059 by Tracy James RN  Outcome: Progressing  7/27/2024 1935 by Spring Ngo RN  Outcome: Progressing     Problem: Pain  Goal: Verbalizes/displays adequate comfort level or baseline comfort level  7/28/2024 0059 by Tracy James RN  Outcome: Progressing  7/27/2024 1935 by Spring Ngo RN  Outcome: Progressing     Problem: Skin/Tissue Integrity  Goal: Absence of new skin breakdown  Description: 1.  Monitor for areas of redness and/or skin breakdown  2.  Assess vascular access sites hourly  3.  Every 4-6 hours minimum:  Change oxygen saturation probe site  4.  Every 4-6 hours:  If on nasal continuous positive airway pressure, respiratory therapy assess nares and determine need for appliance change or resting period.  7/28/2024 0059 by Tracy James RN  Outcome: Progressing  7/27/2024 1935 by Spring Ngo RN  Outcome: Progressing     Problem: Safety - Adult  Goal: Free from fall injury  7/28/2024 0059 by Tracy James RN  Outcome: Progressing  Flowsheets (Taken 7/28/2024 0057)  Free From Fall Injury: Instruct family/caregiver on patient safety  7/27/2024 1935 by Spring Ngo RN  Outcome: Progressing     Problem: ABCDS Injury Assessment  Goal: Absence of physical injury  Outcome: Progressing  Flowsheets (Taken 7/28/2024 0057)  Absence of Physical Injury: Implement safety measures based on patient assessment

## 2024-07-28 NOTE — PROGRESS NOTES
Pulmonary Progress Note    CC:  Follow up respiratory failure, pneumonia,     Subjective:  Transferred out of the ICU  On 3 liters of oxygen  Afebrile   Diuresed very well   Very thirsty and would like more water  Less SOB  Still coughing up a lot of mucus    ROS  Less SOB  Still with productive cough       Intake/Output Summary (Last 24 hours) at 7/28/2024 0972  Last data filed at 7/28/2024 0521  Gross per 24 hour   Intake 990 ml   Output 5300 ml   Net -4310 ml         PHYSICAL EXAM:  Blood pressure (!) 142/77, pulse 88, temperature 97.4 °F (36.3 °C), temperature source Oral, resp. rate 18, height 1.803 m (5' 11\"), weight 95.5 kg (210 lb 8.6 oz), SpO2 95 %.'  Gen: No distress. Chronically ill   Eyes: PERRL. No sclera icterus. No conjunctival injection.   ENT: No discharge. Pharynx clear. External appearance of ears and nose normal.  Neck: Trachea midline. No obvious mass.    Resp: Rhonchi  CV: Regular rate. Regular rhythm. No murmur or rub.    GI: Non-tender. Non-distended. No hernia.   Skin: Warm, dry, normal texture and turgor. No nodule on exposed extremities.   Lymph: No cervical LAD. No supraclavicular LAD.   M/S: No cyanosis. No clubbing. No joint deformity.    Neuro: Moves all four extremities. CN 2-12 tested, no defect noted.  Ext:   + edema    Medications:    Scheduled Meds:   metFORMIN  1,000 mg Oral BID WC    albuterol  2.5 mg Nebulization 4x Daily RT    apixaban  5 mg Oral BID    aspirin  81 mg Oral Daily    budesonide-formoterol  2 puff Inhalation BID RT    And    tiotropium  2 puff Inhalation Daily RT    carvedilol  3.125 mg Oral BID WC    fluticasone  2 spray Each Nostril BID    furosemide  40 mg IntraVENous TID    gabapentin  800 mg Oral 4x Daily    pramipexole  0.25 mg Oral Nightly    pramipexole  1 mg Oral 4x daily    Roflumilast  500 mcg Oral Daily    atorvastatin  20 mg Oral Nightly    Vitamin D  1,000 Units Oral Daily    methylPREDNISolone  40 mg IntraVENous Q8H    sodium chloride flush  5-40

## 2024-07-28 NOTE — PROGRESS NOTES
Pt's deterioration index >=50, pops up in the pt's chart. Secured the message through perfect serve. Informed to NP, No any interventions at this time. Pt is resting on bed. Bed exit alarm on. Pt refused for BIPAP for this night,pt is on continue O2.

## 2024-07-29 LAB
BACTERIA SPEC RESP CULT: NORMAL
GLUCOSE BLD-MCNC: 137 MG/DL (ref 70–99)
GLUCOSE BLD-MCNC: 259 MG/DL (ref 70–99)
GLUCOSE BLD-MCNC: 271 MG/DL (ref 70–99)
GLUCOSE BLD-MCNC: 281 MG/DL (ref 70–99)
GLUCOSE BLD-MCNC: 376 MG/DL (ref 70–99)
GRAM STN SPEC: NORMAL
MRSA DNA SPEC QL NAA+PROBE: NORMAL
PERFORMED ON: ABNORMAL

## 2024-07-29 PROCEDURE — 97166 OT EVAL MOD COMPLEX 45 MIN: CPT

## 2024-07-29 PROCEDURE — 6360000002 HC RX W HCPCS: Performed by: HOSPITALIST

## 2024-07-29 PROCEDURE — 6370000000 HC RX 637 (ALT 250 FOR IP): Performed by: INTERNAL MEDICINE

## 2024-07-29 PROCEDURE — 99233 SBSQ HOSP IP/OBS HIGH 50: CPT | Performed by: INTERNAL MEDICINE

## 2024-07-29 PROCEDURE — 6370000000 HC RX 637 (ALT 250 FOR IP): Performed by: FAMILY MEDICINE

## 2024-07-29 PROCEDURE — 2700000000 HC OXYGEN THERAPY PER DAY

## 2024-07-29 PROCEDURE — 94640 AIRWAY INHALATION TREATMENT: CPT

## 2024-07-29 PROCEDURE — 94669 MECHANICAL CHEST WALL OSCILL: CPT

## 2024-07-29 PROCEDURE — 6370000000 HC RX 637 (ALT 250 FOR IP): Performed by: HOSPITALIST

## 2024-07-29 PROCEDURE — 97535 SELF CARE MNGMENT TRAINING: CPT

## 2024-07-29 PROCEDURE — 94760 N-INVAS EAR/PLS OXIMETRY 1: CPT

## 2024-07-29 PROCEDURE — 6360000002 HC RX W HCPCS: Performed by: INTERNAL MEDICINE

## 2024-07-29 PROCEDURE — 2580000003 HC RX 258: Performed by: HOSPITALIST

## 2024-07-29 PROCEDURE — 2580000003 HC RX 258: Performed by: INTERNAL MEDICINE

## 2024-07-29 PROCEDURE — 1200000000 HC SEMI PRIVATE

## 2024-07-29 PROCEDURE — 97530 THERAPEUTIC ACTIVITIES: CPT

## 2024-07-29 PROCEDURE — 97162 PT EVAL MOD COMPLEX 30 MIN: CPT

## 2024-07-29 RX ORDER — FUROSEMIDE 10 MG/ML
40 INJECTION INTRAMUSCULAR; INTRAVENOUS 2 TIMES DAILY
Status: DISCONTINUED | OUTPATIENT
Start: 2024-07-29 | End: 2024-07-30

## 2024-07-29 RX ORDER — AMOXICILLIN AND CLAVULANATE POTASSIUM 500; 125 MG/1; MG/1
1 TABLET, FILM COATED ORAL EVERY 8 HOURS SCHEDULED
Status: DISCONTINUED | OUTPATIENT
Start: 2024-07-29 | End: 2024-07-31 | Stop reason: HOSPADM

## 2024-07-29 RX ORDER — METHYLPREDNISOLONE SODIUM SUCCINATE 40 MG/ML
40 INJECTION, POWDER, LYOPHILIZED, FOR SOLUTION INTRAMUSCULAR; INTRAVENOUS EVERY 12 HOURS
Status: DISCONTINUED | OUTPATIENT
Start: 2024-07-29 | End: 2024-07-30

## 2024-07-29 RX ADMIN — GABAPENTIN 800 MG: 400 CAPSULE ORAL at 20:52

## 2024-07-29 RX ADMIN — ATORVASTATIN CALCIUM 20 MG: 20 TABLET, FILM COATED ORAL at 20:52

## 2024-07-29 RX ADMIN — TIOTROPIUM BROMIDE INHALATION SPRAY 2 PUFF: 3.12 SPRAY, METERED RESPIRATORY (INHALATION) at 07:59

## 2024-07-29 RX ADMIN — PRAMIPEXOLE DIHYDROCHLORIDE 0.25 MG: 0.5 TABLET ORAL at 20:59

## 2024-07-29 RX ADMIN — METHYLPREDNISOLONE SODIUM SUCCINATE 40 MG: 40 INJECTION INTRAMUSCULAR; INTRAVENOUS at 00:55

## 2024-07-29 RX ADMIN — FLUTICASONE PROPIONATE 2 SPRAY: 50 SPRAY, METERED NASAL at 08:42

## 2024-07-29 RX ADMIN — LAMOTRIGINE 150 MG: 100 TABLET ORAL at 08:40

## 2024-07-29 RX ADMIN — LAMOTRIGINE 150 MG: 100 TABLET ORAL at 20:52

## 2024-07-29 RX ADMIN — GABAPENTIN 800 MG: 400 CAPSULE ORAL at 12:22

## 2024-07-29 RX ADMIN — SODIUM CHLORIDE SOLN NEBU 3% 4 ML: 3 NEBU SOLN at 15:41

## 2024-07-29 RX ADMIN — ROFLUMILAST 500 MCG: 500 TABLET ORAL at 08:40

## 2024-07-29 RX ADMIN — FUROSEMIDE 40 MG: 10 INJECTION, SOLUTION INTRAMUSCULAR; INTRAVENOUS at 08:40

## 2024-07-29 RX ADMIN — GABAPENTIN 800 MG: 400 CAPSULE ORAL at 08:41

## 2024-07-29 RX ADMIN — INSULIN LISPRO 4 UNITS: 100 INJECTION, SOLUTION INTRAVENOUS; SUBCUTANEOUS at 08:39

## 2024-07-29 RX ADMIN — ALBUTEROL SULFATE 2.5 MG: 2.5 SOLUTION RESPIRATORY (INHALATION) at 21:06

## 2024-07-29 RX ADMIN — PRAMIPEXOLE DIHYDROCHLORIDE 1 MG: 0.5 TABLET ORAL at 12:21

## 2024-07-29 RX ADMIN — Medication 1000 UNITS: at 08:41

## 2024-07-29 RX ADMIN — CARVEDILOL 3.12 MG: 3.12 TABLET, FILM COATED ORAL at 08:41

## 2024-07-29 RX ADMIN — SODIUM CHLORIDE SOLN NEBU 3% 4 ML: 3 NEBU SOLN at 21:06

## 2024-07-29 RX ADMIN — ALBUTEROL SULFATE 2.5 MG: 2.5 SOLUTION RESPIRATORY (INHALATION) at 07:39

## 2024-07-29 RX ADMIN — INSULIN LISPRO 4 UNITS: 100 INJECTION, SOLUTION INTRAVENOUS; SUBCUTANEOUS at 12:21

## 2024-07-29 RX ADMIN — SODIUM CHLORIDE, PRESERVATIVE FREE 10 ML: 5 INJECTION INTRAVENOUS at 21:02

## 2024-07-29 RX ADMIN — ALBUTEROL SULFATE 2.5 MG: 2.5 SOLUTION RESPIRATORY (INHALATION) at 11:53

## 2024-07-29 RX ADMIN — BUDESONIDE AND FORMOTEROL FUMARATE DIHYDRATE 2 PUFF: 160; 4.5 AEROSOL RESPIRATORY (INHALATION) at 08:01

## 2024-07-29 RX ADMIN — GABAPENTIN 800 MG: 400 CAPSULE ORAL at 18:40

## 2024-07-29 RX ADMIN — PRAMIPEXOLE DIHYDROCHLORIDE 1 MG: 0.5 TABLET ORAL at 08:51

## 2024-07-29 RX ADMIN — PRAMIPEXOLE DIHYDROCHLORIDE 1 MG: 0.5 TABLET ORAL at 18:39

## 2024-07-29 RX ADMIN — METFORMIN HYDROCHLORIDE 1000 MG: 500 TABLET ORAL at 08:41

## 2024-07-29 RX ADMIN — APIXABAN 5 MG: 5 TABLET, FILM COATED ORAL at 20:52

## 2024-07-29 RX ADMIN — PRAMIPEXOLE DIHYDROCHLORIDE 1 MG: 0.5 TABLET ORAL at 20:59

## 2024-07-29 RX ADMIN — ASPIRIN 81 MG 81 MG: 81 TABLET ORAL at 08:41

## 2024-07-29 RX ADMIN — AMOXICILLIN AND CLAVULANATE POTASSIUM 1 TABLET: 500; 125 TABLET, FILM COATED ORAL at 21:44

## 2024-07-29 RX ADMIN — AMOXICILLIN AND CLAVULANATE POTASSIUM 1 TABLET: 500; 125 TABLET, FILM COATED ORAL at 12:22

## 2024-07-29 RX ADMIN — CEFEPIME 2000 MG: 2 INJECTION, POWDER, FOR SOLUTION INTRAVENOUS at 08:56

## 2024-07-29 RX ADMIN — ALBUTEROL SULFATE 2.5 MG: 2.5 SOLUTION RESPIRATORY (INHALATION) at 15:41

## 2024-07-29 RX ADMIN — FLUTICASONE PROPIONATE 2 SPRAY: 50 SPRAY, METERED NASAL at 21:02

## 2024-07-29 RX ADMIN — APIXABAN 5 MG: 5 TABLET, FILM COATED ORAL at 08:41

## 2024-07-29 RX ADMIN — SODIUM CHLORIDE SOLN NEBU 3% 4 ML: 3 NEBU SOLN at 07:49

## 2024-07-29 RX ADMIN — SODIUM CHLORIDE SOLN NEBU 3% 4 ML: 3 NEBU SOLN at 12:03

## 2024-07-29 RX ADMIN — METHYLPREDNISOLONE SODIUM SUCCINATE 40 MG: 40 INJECTION INTRAMUSCULAR; INTRAVENOUS at 20:52

## 2024-07-29 RX ADMIN — BUDESONIDE AND FORMOTEROL FUMARATE DIHYDRATE 2 PUFF: 160; 4.5 AEROSOL RESPIRATORY (INHALATION) at 21:06

## 2024-07-29 RX ADMIN — METFORMIN HYDROCHLORIDE 1000 MG: 500 TABLET ORAL at 18:39

## 2024-07-29 ASSESSMENT — PAIN SCALES - GENERAL
PAINLEVEL_OUTOF10: 0
PAINLEVEL_OUTOF10: 6
PAINLEVEL_OUTOF10: 0

## 2024-07-29 NOTE — CARE COORDINATION
[facility] Contin[facility] Nurse  Consult Note       NAME:  Jorge Gauthier  MEDICAL RECORD NUMBER:  [id number]  AGE: 79 y.o.   GENDER: male  : 1945  TODAY'S DATE:  2024    Subjective   Reason for WOCN Evaluation and Assessment: non-blanchable redness to bottom      Jorge Gauthier is a 79 y.o. male referred by:   [] Physician  [x] Nursing  [] Other:     Wound Identification:  Wound Type:  MASD  Contributing Factors: decreased mobility    Wound History: Pt from home, recently admitted. Hx of CHF, and COPD, DM, AFIB. Per documentation from , concern over blanchable vs. Non blanchable erythema to buttocks up on admission to ICU. Pt seen today while on 4N, blanchable erythema noted. No pressure injuries.   Current Wound Care Treatment:  n/a    Patient Goal of Care:  [x] Wound Healing  [] Odor Control  [] Palliative Care  [] Pain Control   [] Other:         PAST MEDICAL HISTORY        Diagnosis Date    Arthritis     CAD (coronary artery disease)     CABG in     Chronic back pain     Chronic combined systolic (congestive) and diastolic (congestive) heart failure (HCC) 2018    Chronic systolic CHF (congestive heart failure) (HCC)     COPD (chronic obstructive pulmonary disease) (HCC)     Dental disease     Diabetes mellitus (HCC)     DMII (diabetes mellitus, type 2) ([facility]) 2019    Essential hypertension     Hearing loss     Hyperlipidemia 2018    Longstanding persistent atrial fibrillation (HCC) 2023    Non morbid obesity due to excess calories 2023    Paroxysmal atrial fibrillation (HCC)     On Coumadin    Primary hypertension 2018    Tinnitus        PAST SURGICAL HISTORY    Past Surgical History:   Procedure Laterality Date    ANKLE FRACTURE SURGERY Right 2024    OPEN REDUCTION INTERNAL FIXATION RIGHT ANKLE performed by José Miguel Chavez MD at [facility] OR    BACK SURGERY      BACK SURGERY      CARDIAC SURGERY      CERVICAL SPINE SURGERY      CHEST TUBE

## 2024-07-29 NOTE — PROGRESS NOTES
V2.0  [facility] Hospitalist Progress Note      Name:  Jorge Gauthier /Age/Sex: 1945  (79 y.o. male)   MRN & CSN:  [id number] & [id number] Encounter Date/Time: 2024 11:59 AM EDT    Location:  [id number]-2436/4270-01 PCP: Gallo Sanchez MD       Hospital Day: 4  Subjective:   Chief Complaint: Follow-up shortness of breath    Patient remains hypoxemic, chronically on 3 L of oxygen, patient has not been very happy with fluid restriction, he is not very compliant with his care teams.  He called 911 at least twice yesterday complaining about fluid restriction, he is appropriately responsive though, negative for balance of approximately 500 cc, continues to have expectoration,    Review of Systems:    Review of Systems  10 point ROS negative except as stated above in \"subjective\" section    Objective:     Intake/Output Summary (Last 24 hours) at 2024 1159  Last data filed at 2024 1128  Gross per 24 hour   Intake 1836 ml   Output 3400 ml   Net -1564 ml      Vitals:   Vitals:    24 1130   BP: 101/60   Pulse: 73   Resp: 18   Temp: 97.2 °F (36.2 °C)   SpO2: 94%     Physical Exam:   General: Awake, alert and oriented, NAD  Eyes: EOMI  ENT: neck supple  Cardiovascular: S1S2 present, regular rate and rhythm, no murmurs  Respiratory: Extensive  rhonchi and crackles noted in all lung zones  Gastrointestinal: Soft, non tender, positive bowel sounds   Genitourinary: no suprapubic tenderness  Musculoskeletal: No edema  Skin: warm, dry  Neuro: Alert.  Psych: Mood appropriate.     Medications:   Medications:    methylPREDNISolone  40 mg IntraVENous Q12H    amoxicillin-clavulanate  1 tablet Oral 3 times per day    metFORMIN  1,000 mg Oral BID WC    albuterol  2.5 mg Nebulization 4x Daily RT    apixaban  5 mg Oral BID    aspirin  81 mg Oral Daily    budesonide-formoterol  2 puff Inhalation BID RT    And    tiotropium  2 puff Inhalation Daily RT    carvedilol  3.125 mg Oral BID WC    fluticasone  2 spray Each

## 2024-07-29 NOTE — PLAN OF CARE
Problem: Discharge Planning  Goal: Discharge to home or other facility with appropriate resources  7/28/2024 2125 by Madhavi Kruse, RN  Outcome: Progressing  Flowsheets (Taken 7/28/2024 2118)  Discharge to home or other facility with appropriate resources:   Identify barriers to discharge with patient and caregiver   Arrange for needed discharge resources and transportation as appropriate     Problem: Pain  Goal: Verbalizes/displays adequate comfort level or baseline comfort level  7/28/2024 2125 by Madhavi Kruse, RN  Outcome: Progressing     Problem: Skin/Tissue Integrity  Goal: Absence of new skin breakdown  Description: 1.  Monitor for areas of redness and/or skin breakdown  2.  Assess vascular access sites hourly  3.  Every 4-6 hours minimum:  Change oxygen saturation probe site  4.  Every 4-6 hours:  If on nasal continuous positive airway pressure, respiratory therapy assess nares and determine need for appliance change or resting period.  7/28/2024 2125 by Madhavi Kruse, RN  Outcome: Progressing     Problem: Safety - Adult  Goal: Free from fall injury  7/28/2024 2125 by Madhavi Kruse, RN  Outcome: Progressing     Problem: ABCDS Injury Assessment  Goal: Absence of physical injury  7/28/2024 2125 by Madhavi Kruse, RN  Outcome: Progressing

## 2024-07-29 NOTE — CARE COORDINATION
Navigator will be completed.    Advance Directives:      Code Status: Full Code   Patient's Primary Decision Maker is: (P) Legal Next of Kin    Primary Decision Maker: Portia Gauthier - Spouse - [phone]    Secondary Decision Maker: Bang Gauthier Jr - Child - [phone]    Supplemental (Other) Decision Maker: Debbie Baugh - Child - [phone]    Discharge Planning:    Patient lives with: (P) Spouse/Significant Other Type of Home: (P) House  Primary Care Giver: (P) Family  Patient Support Systems include: (P) Family Members, Spouse/Significant Other   Current Financial resources: (P) None  Current community resources: (P) None  Current services prior to admission: (P) Durable Medical Equipment            Current DME: (P) Walker            Type of Home Care services:  (P) PT, OT, Nursing Services    ADLS  Prior functional level: (P) Independent in ADLs/IADLs, Mobility  Current functional level: (P) Independent in ADLs/IADLs, Mobility    PT AM-PAC:   /24  OT AM-PAC:   /24    Family can provide assistance at DC: (P) Yes  Would you like Case Management to discuss the discharge plan with any other family members/significant others, and if so, who? (P) No  Plans to Return to Present Housing: (P) Yes  Other Identified Issues/Barriers to RETURNING to current housing: NA  Potential Assistance needed at discharge: (P) N/A            Potential DME:    Patient expects to discharge to: (P) House  Plan for transportation at discharge: (P) Self    Financial    Payor: MEDICARE / Plan: MEDICARE PART A AND B / Product Type: *No Product type* /     Does insurance require precert for SNF: No    Potential assistance Purchasing Medications: (P) No  Meds-to-Beds request: Yes      [facility] #[id number] - [place], [place] - [address] [phone] - F [phone]  [address] [address]  Phone: [phone] Fax: [phone]      Notes:    Factors facilitating achievement of predicted outcomes:  Family support, Motivated, Cooperative, and Pleasant    Barriers to discharge: Decreased endurance    Additional Case Management Notes: Pt is from home with spouse, [facility]. Will need new orders. On 3L-O2.     The Plan for Transition of Care is related to the following treatment goals of Acute pulmonary edema (HCC) [J81.0]  Acute on chronic respiratory failure with hypoxia and hypercapnia (HCC) [J96.21, J96.22]    IF APPLICABLE: The Patient and/or patient representative Jorge and his family were provided with a choice of provider and agrees with the discharge plan. Freedom of choice list with basic dialogue that supports the patient's individualized plan of care/goals and shares the quality data associated with the providers was provided to: (P) Patient   Patient Representative Name:       The Patient and/or Patient Representative Agree with the Discharge Plan? (P) Yes    Joseluis Whitehead LMSW, [facility] Social Work Case Management   Phone: [phone]  Fax: [phone]    yesterday

## 2024-07-29 NOTE — ACP (ADVANCE CARE PLANNING)
Advance Care Planning     Advance Care Planning Activator (Inpatient)  Conversation Note      Date of ACP Conversation: 7/29/2024     Conversation Conducted with: Patient with Decision Making Capacity    ACP Activator: RENETTA Beltran      Health Care Decision Maker:     Current Designated Health Care Decision Maker:     Primary Decision Maker: Portia Gauthier - Spouse - [phone]    Secondary Decision Maker: Bang Gauthier Jr - Child - [phone]    Supplemental (Other) Decision Maker: Debbie Baugh - Child - [phone]    Today we documented Decision Maker(s) consistent with Legal Next of Kin hierarchy.    Care Preferences    Ventilation:  \"If you were in your present state of health and suddenly became very ill and were unable to breathe on your own, what would your preference be about the use of a ventilator (breathing machine) if it were available to you?\"      Would the patient desire the use of ventilator (breathing machine)?: yes    \"If your health worsens and it becomes clear that your chance of recovery is unlikely, what would your preference be about the use of a ventilator (breathing machine) if it were available to you?\"     Would the patient desire the use of ventilator (breathing machine)?: Yes      Resuscitation  \"CPR works best to restart the heart when there is a sudden event, like a heart attack, in someone who is otherwise healthy. Unfortunately, CPR does not typically restart the heart for people who have serious health conditions or who are very sick.\"    \"In the event your heart stopped as a result of an underlying serious health condition, would you want attempts to be made to restart your heart (answer \"yes\" for attempt to resuscitate) or would you prefer a natural death (answer \"no\" for do not attempt to resuscitate)?\" yes       [] Yes   [] No   Educated Patient / Decision Maker regarding differences between Advance Directives and portable DNR orders.    Length of ACP Conversation in

## 2024-07-29 NOTE — PROGRESS NOTES
Patient called out during shift change. During bedside report, patient informed of new RN, and plan of care. Patient states that he wants to speak to Clinical Supervisor of hospital because the provider during the day stated he is allowed to have 4 full cups of ice water and we are not allowing him to have apple sauce. We treat him like a baby. Patient educated of fluid restriction and his blood sugars being elevated. Patient request to speak to Clinical supervisor.

## 2024-07-29 NOTE — PROGRESS NOTES
This RN got a call from [place] Charge RN stating patient is calling 911 stating no one has come to room in hours. This RN responded and went into patients room. Patient continues to be verbally aggressive towards this RN. This RN tries to educate patient on the importance of fluid restriction and managing blood sugar. Patient is alert and oriented X4. Patient refuses restrictions set by providers. Supervisor PETRA Prater came to bedside with this RN. Patient became verbally aggressive arguing with staff. Patient was informed that he is alert and oriented X4 and if he does choose to not follow restrictions given by provider that it is his right. Patient educated on purposes of restrictions. Patient states he is refusing restriction. Patient became upset after supervisor left room. Patient demanded apple sauce even though blood sugars have been high all day. Patient called 911 in front of this RN because he said he is not getting what he wants. Security of [facility] and [facility] police came to room along with Clinical Supervisor. Patient calmed down after  spoke to patient. Bed alarm set. All needed items within reach.

## 2024-07-29 NOTE — PROGRESS NOTES
Physical Therapy  Facility/Department: [address] MED SURG  Physical Therapy Initial Assessment    Name: Jorge Gauthier  : 1945  MRN: [id number]  Date of Service: 2024    Discharge Recommendations:  24 hour supervision or assist, Home with Home health PT, Continue to assess pending progress          Patient Diagnosis(es): The primary encounter diagnosis was Acute on chronic respiratory failure with hypoxia and hypercapnia (HCC). A diagnosis of Acute pulmonary edema (HCC) was also pertinent to this visit.  Past Medical History:  has a past medical history of Arthritis, CAD (coronary artery disease), Chronic back pain, Chronic combined systolic (congestive) and diastolic (congestive) heart failure (HCC), Chronic systolic CHF (congestive heart failure) (HCC), COPD (chronic obstructive pulmonary disease) (HCC), Dental disease, Diabetes mellitus (HCC), DMII (diabetes mellitus, type 2) (HCC), Essential hypertension, Hearing loss, Hyperlipidemia, Longstanding persistent atrial fibrillation (HCC), Non morbid obesity due to excess calories, Paroxysmal atrial fibrillation (HCC), Primary hypertension, and Tinnitus.  Past Surgical History:  has a past surgical history that includes fracture surgery (Left, ); back surgery (); Cardiac surgery (); Coronary artery bypass graft; back surgery; Cervical spine surgery; chest tube insertion (Right, 2023); IR CHEST TUBE INSERTION (3/14/2023); and Ankle fracture surgery (Right, 2024).    Assessment   Body Structures, Functions, Activity Limitations Requiring Skilled Therapeutic Intervention: Decreased functional mobility ;Decreased ADL status;Decreased strength;Decreased safe awareness;Decreased balance;Decreased endurance  Assessment: Pt is a 79 y.o. male who presented to the ED on 24 with SOB. Pt with multiple hospital admissions this year with similar issues. Recently approved by ortho to be WBAT without boot following ankle ORIF in May 2024. Prior

## 2024-07-29 NOTE — ACP (ADVANCE CARE PLANNING)
Advance Care Planning     Palliative Team Advance Care Planning (ACP) Conversation    Date of Conversation: 07/29/24    Individuals present for the conversation: Patient with decision making capacity and Spouse Portia      ACP documents on file prior to discussion:  -Power of  for Healthcare      Healthcare Decision Maker:    Primary Decision Maker: Portia Gauthier - Spouse - [phone]    Secondary Decision Maker: Bang Gauthier Jr - Child - [phone]    Supplemental (Other) Decision Maker: BrookemerDebbie belle - Child - [phone]     Conversation Summary:  We discussed code status at length and he does not want intubation, chest compressions/shocking, resuscitative meds in the event he has cardiac arrest or resp arrest/distress, he is ok with bipap.     Resuscitation Status:   Code Status: Limited     Documentation Completed:  -Portable DNR    I spent 30 minutes with the patient and/or surrogate decision maker discussing the patient's wishes and goals.      Electronically signed by Reyna MENDIOLAN, RN, CHPN on 7/29/2024 at 6:18 PM  Palliative Care Nurse  Phone [phone]          Problem: Pain  Goal: Verbalizes/displays adequate comfort level or baseline comfort level  9/24/2023 0625 by Re Reilly RN  Outcome: Not Progressing  9/24/2023 7024 by Re Reilly RN  Outcome: Not Progressing  9/23/2023 1742 by Janie Griffith RN  Outcome: Progressing     Problem: Discharge Planning  Goal: Discharge to home or other facility with appropriate resources  9/24/2023 0625 by Re Reilly RN  Outcome: Progressing  9/24/2023 0625 by Re Reilly RN  Outcome: Progressing  9/23/2023 1742 by Janie Griffith RN  Outcome: Progressing     Problem: Skin/Tissue Integrity  Goal: Absence of new skin breakdown  Description: 1. Monitor for areas of redness and/or skin breakdown  2. Assess vascular access sites hourly  3. Every 4-6 hours minimum:  Change oxygen saturation probe site  4. Every 4-6 hours:  If on nasal continuous positive airway pressure, respiratory therapy assess nares and determine need for appliance change or resting period.   9/24/2023 0625 by Re Reilly RN  Outcome: Progressing  9/24/2023 0625 by Re Reilly RN  Outcome: Progressing  9/23/2023 1742 by Janie Griffith RN  Outcome: Progressing     Problem: Safety - Adult  Goal: Free from fall injury  9/24/2023 0625 by Re Reilly RN  Outcome: Progressing  9/24/2023 0625 by Re Reilly RN  Outcome: Progressing  9/23/2023 1742 by Janie Griffith RN  Outcome: Progressing     Problem: Pain  Goal: Verbalizes/displays adequate comfort level or baseline comfort level  9/24/2023 0625 by Re Reilly RN  Outcome: Not Progressing  9/24/2023 0625 by Re Reilly RN  Outcome: Not Progressing  9/23/2023 1742 by Janie Griffith RN  Outcome: Progressing

## 2024-07-29 NOTE — PROGRESS NOTES
Pulmonary Progress Note    CC:  Follow up respiratory failure, pneumonia,     Subjective:  3 liters of oxygen  Negative 7 liters since admission   He called 911 last night due to lack of fluids  Does not appear to agree with fluid restrictions etc  He is ok mood today  Still coughing up mucus     ROS  Called 911 last night  Less SOB still productive cough      Intake/Output Summary (Last 24 hours) at 7/29/2024 0748  Last data filed at 7/29/2024 0442  Gross per 24 hour   Intake 1236 ml   Output 2900 ml   Net -1664 ml           PHYSICAL EXAM:  Blood pressure 134/78, pulse 95, temperature 97.4 °F (36.3 °C), temperature source Oral, resp. rate 18, height 1.803 m (5' 11\"), weight 95.5 kg (210 lb 8.6 oz), SpO2 96 %.'  Gen: No distress. Chronically ill   Eyes: PERRL. No sclera icterus. No conjunctival injection.   ENT: No discharge. Pharynx clear. External appearance of ears and nose normal.  Neck: Trachea midline. No obvious mass.    Resp: Rhonchi that are improving   CV: Regular rate. Regular rhythm. No murmur or rub.    GI: Non-tender. Non-distended. No hernia.   Skin: Warm, dry, normal texture and turgor. No nodule on exposed extremities.   Lymph: No cervical LAD. No supraclavicular LAD.   M/S: No cyanosis. No clubbing. No joint deformity.    Neuro: Moves all four extremities. CN 2-12 tested, no defect noted.  Ext:   + edema    Medications:    Scheduled Meds:   metFORMIN  1,000 mg Oral BID WC    albuterol  2.5 mg Nebulization 4x Daily RT    apixaban  5 mg Oral BID    aspirin  81 mg Oral Daily    budesonide-formoterol  2 puff Inhalation BID RT    And    tiotropium  2 puff Inhalation Daily RT    carvedilol  3.125 mg Oral BID WC    fluticasone  2 spray Each Nostril BID    furosemide  40 mg IntraVENous TID    gabapentin  800 mg Oral 4x Daily    pramipexole  0.25 mg Oral Nightly    pramipexole  1 mg Oral 4x daily    Roflumilast  500 mcg Oral Daily    atorvastatin  20 mg Oral Nightly    Vitamin D  1,000 Units Oral Daily

## 2024-07-29 NOTE — DISCHARGE INSTRUCTIONS
Extra Heart Failure Education/ Tools/ Resources:     https://Accurence.com/publication/?r=671164   --- this is American Heart Association interactive Healthier Living with Heart Failure guidebook.  Please click hyperlink or copy / paste link into search bar. The QR Code is also available below. Use your mouse to scroll through the pages.  Lots of information about weight monitoring, diet tips, activity, meds, etc    Heart Failure Tools and Resources QR Code is below. It includes multiple resources to include symptom tracker, med tracker, further HF info, and access to a HF Support Network online Community    HF [place] O[employer]  -- this is a free smart phone oanh available for iPhone and Android download.  Use your phone to track sodium / fluid intake, zone tool symptom tracking, weights, medications, etc. Click on this hyperlink  HF [place] [employer]   for QR code for easy download or the link is also found in the below HF Tools and Resources.      DASH (Dietary Approach to Stop Hypertension) diet --  https://www.nhlbi.nih.gov/education/dash-eating-plan -- this diet is a flexible eating plan that promotes heart healthy eating style.  Click on hyperlink or copy / paste link into search bar.  Lots of low sodium recipes and tips.    https://www.[employer]/recipes  -- more free recipes

## 2024-07-29 NOTE — PROGRESS NOTES
functional mobility ;Decreased ADL status;Decreased strength;Decreased endurance;Decreased coordination  Assessment: Pt is a 79 y.o. M admitted for acute on chronic respoiratory failure w/ hypoxia. Pt w/ multiple recent hospitalizations. PTA, he reported he has been Ind w/ household fxl mob and ADLs since his last admission. Today- he is functioning below his baseline as he required CGA for bed, SBA-CGA for fxl transfers, Min A for short fxl mob w/ RW, total A for safe mob via stedy from BR>recliner, and Max A for toileting. Pt is anticipated to require up to Max A for full ADLs. He is most limited by his generalized weakness and decreased endurance. Pt w/ good support at home from wife. He will cont to benefit from being seen on acute to address his deficits and maximize his level of Ind. Rec home w/ 24/7 assist post d/c from here. Cont to assess for further needs while hospitalized.  Treatment Diagnosis: decreased fxl transfers/mob and ADL status  Prognosis: Fair  Decision Making: Medium Complexity  History: Per H&P \"79m with copd, chf, afib, recently here for chf/copd exacerbation, discharged 7/18. Patient denies chest pain, fevers, chills, n/v/d. He has had a cough productive of yellow sputum. Patient had worsening sob, prompting return to ER where he was noted to be hypercapnic, placed on bipap. Currently seen in the ICU on bipap, awake, alert, denying chest pain, worsening sob\"  Assistance / Modification: RW and stedy  REQUIRES OT FOLLOW-UP: Yes  Activity Tolerance  Activity Tolerance: Patient limited by fatigue        Plan   Occupational Therapy Plan  Times Per Week: 2-3  Current Treatment Recommendations: Strengthening, Balance training, Functional mobility training, Endurance training, Safety education & training, Self-Care / ADL, Home management training     Restrictions  Restrictions/Precautions  Restrictions/Precautions: Fall Risk  Position Activity Restriction  Other position/activity restrictions: 2L  Moderate assistance  AROM: Within functional limits (BUE)  Strength: Generally decreased, functional (BUE)  Coordination: Generally decreased, functional  Tone: Normal  ADL  Toileting: Maximum assistance;Dependent/Total  Toileting Skilled Clinical Factors: Reba. Pt able to void BM at the toilet, Max A for pericare and clothing management while standing to stedy  Functional Mobility: Dependent/Total;Minimal assistance  Functional Mobility Skilled Clinical Factors: RW. Pt completed fxl mob from bed>BR w/ up to Min A, increased time, effortful. Pt required total A from BR>recliner via stedy d/t decreased endurance/weakness.  Additional Comments: Pt is anticipated to require up to Max A for full ADLs based on his current strength, endurance, and coordination.  Skin Care: Protective barrier;Moisture barrier     Activity Tolerance  Activity Tolerance: Patient limited by fatigue;Patient limited by endurance  Bed mobility  Supine to Sit: Contact guard assistance  Sit to Supine: Unable to assess  Bed Mobility Comments: HOB elevated, pt ended session in recliner  Transfers  Sit to stand: Stand by assistance;Contact guard assistance  Stand to sit: Stand by assistance;Contact guard assistance  Transfer Comments: CGA to/from RW, SBA to/from stedy. Cues for safe hand placement, effortful to RW  Vision  Vision: Impaired  Vision Exceptions: Wears glasses at all times  Hearing  Hearing: Within functional limits  Cognition  Overall Cognitive Status: WFL  Orientation  Overall Orientation Status: Within Functional Limits                  Education Given To: Patient  Education Provided: Role of Therapy;Plan of Care;Transfer Training;Fall Prevention Strategies  Education Method: Demonstration;Verbal  Barriers to Learning: None  Education Outcome: Verbalized understanding;Demonstrated understanding    AM-PAC - ADL  AM-PAC Daily Activity - Inpatient   How much help is needed for putting on and taking off regular lower body clothing?: A

## 2024-07-29 NOTE — PROGRESS NOTES
Patient yelling out screaming for water. This RN responded. Patient educated that this RN will go get a cup of ice water but patient will be over his fluid restriction. Patient states he does not care. This RN stated she will go get night time medications and water.

## 2024-07-29 NOTE — CONSULTS
[facility]  Palliative Care   Consult Note    NAME:  Jorge Gauthier  MEDICAL RECORD NUMBER:  [id number]  AGE: 79 y.o.   GENDER: male  : 1945  TODAY'S DATE:  2024    Subjective     Reason for Consult:  goals of care and code status   Visit Type: Initial Consult      Jorge Gauthier is a 79 y.o. male referred by:   [x] Physician    PAST MEDICAL HISTORY      Diagnosis Date    Arthritis     CAD (coronary artery disease)     CABG in     Chronic back pain     Chronic combined systolic (congestive) and diastolic (congestive) heart failure (HCC) 2018    Chronic systolic CHF (congestive heart failure) (HCC)     COPD (chronic obstructive pulmonary disease) ([facility])     Dental disease     Diabetes mellitus (HCC)     DMII (diabetes mellitus, type 2) ([facility]) 2019    Essential hypertension     Hearing loss     Hyperlipidemia 2018    Longstanding persistent atrial fibrillation (HCC) 2023    Non morbid obesity due to excess calories 2023    Paroxysmal atrial fibrillation (HCC)     On Coumadin    Primary hypertension 2018    Tinnitus        PAST SURGICAL HISTORY  Past Surgical History:   Procedure Laterality Date    ANKLE FRACTURE SURGERY Right 2024    OPEN REDUCTION INTERNAL FIXATION RIGHT ANKLE performed by José Miguel Chavez MD at [facility] OR    BACK SURGERY      BACK SURGERY      CARDIAC SURGERY      CERVICAL SPINE SURGERY      CHEST TUBE INSERTION Right 2023    14F. Dr. Jalloh    CORONARY ARTERY BYPASS GRAFT      FRACTURE SURGERY Left     Shoulder    IR CHEST TUBE INSERTION  3/14/2023    IR CHEST TUBE INSERTION 3/14/2023 [facility] SPECIAL PROCEDURES       FAMILY HISTORY  Family History   Problem Relation Age of Onset    Brain Cancer Mother     Heart Attack Father     Heart Attack Brother        SOCIAL HISTORY  Social History     Tobacco Use    Smoking status: Former     Current packs/day: 0.00     Average packs/day: 1.5 packs/day for 37.2 years (55.7  bedtime) 30 tablet 3    pramipexole (MIRAPEX) 0.25 MG tablet Take 4 tablets by mouth in the morning, at noon, in the evening, and at bedtime      flunisolide (NASALIDE) 25 MCG/ACT (0.025%) SOLN 2 sprays by Nasal route in the morning and at bedtime      lidocaine (XYLOCAINE) 5 % ointment Apply topically As needed for pain      polyethylene glycol (GLYCOLAX) 17 GM/SCOOP powder Take 17 g by mouth 2 times daily as needed      dapagliflozin (FARXIGA) 10 MG tablet Take 1 tablet by mouth every morning      insulin 70-30 (HUMULIN;NOVOLIN) (70-30) 100 UNIT per ML injection vial Inject 25 Units into the skin 2 times daily      apixaban (ELIQUIS) 5 MG TABS tablet Take 1 tablet by mouth 2 times daily 60 tablet 0    vitamin D (CHOLECALCIFEROL) 1000 UNITS TABS tablet Take 1 tablet by mouth daily      gabapentin (NEURONTIN) 800 MG tablet Take 1 tablet by mouth 4 times daily.      aspirin 81 MG tablet Take 1 tablet by mouth daily      metFORMIN (GLUCOPHAGE) 1000 MG tablet Take 1 tablet by mouth 2 times daily (with meals)         Objective         BP 97/62   Pulse 94   Temp 97.7 °F (36.5 °C) (Oral)   Resp 16   Ht 1.803 m (5' 11\")   Wt 95.5 kg (210 lb 8.6 oz)   SpO2 94%   BMI 29.36 kg/m²     Code Status: limited     Advanced Directives: copy in epic      Assessment        Management and Education    Persons available for education:        [x] Self     [] Caregiver       [x] Spouse       [] Other Family Member   []  Other    Spiritual History:  notified: Yes,     Does the patient have a Primary Care Physician?  Yes    Palliative Performance Scale:  60% [] Ambulation reduced; Significant disease; Can't do hobbies/housework; intake normal or reduced; occasional assist; LOC full/confusion  50% [x] Mainly sit/lie; Extensive disease; Can't do any work; Considerable assist; intake normal or reduced; LOC full/confusion  40% [] Mainly in bed; Extensive disease; Mainly assist; intake normal or reduced; occasional assist; LOC

## 2024-07-29 NOTE — PROGRESS NOTES
Patient refusing the use of HS BiPAP said he has just been using the oxygen. Electronically signed by Bertha Reyes RCP on 7/28/2024 at 11:46 PM         07/28/24 6343   Oxygen Therapy/Pulse Ox   O2 Therapy Oxygen   O2 Device Nasal cannula   O2 Flow Rate (L/min) 3 L/min   SpO2 94 %   Pulse Oximeter Device Mode Intermittent   Pulse Oximeter Device Location Finger

## 2024-07-29 NOTE — PROGRESS NOTES
Clinical supervisor PETRA Prater notified of patients situation and will be in to speak with patient as soon as possible.

## 2024-07-30 LAB
ANION GAP SERPL CALCULATED.3IONS-SCNC: 12 MMOL/L (ref 3–16)
BASOPHILS # BLD: 0 K/UL (ref 0–0.2)
BASOPHILS NFR BLD: 0.2 %
BUN SERPL-MCNC: 25 MG/DL (ref 7–20)
CALCIUM SERPL-MCNC: 9.3 MG/DL (ref 8.3–10.6)
CHLORIDE SERPL-SCNC: 93 MMOL/L (ref 99–110)
CO2 SERPL-SCNC: 33 MMOL/L (ref 21–32)
CREAT SERPL-MCNC: 1.1 MG/DL (ref 0.8–1.3)
DEPRECATED RDW RBC AUTO: 17.6 % (ref 12.4–15.4)
EOSINOPHIL # BLD: 0 K/UL (ref 0–0.6)
EOSINOPHIL NFR BLD: 0 %
GFR SERPLBLD CREATININE-BSD FMLA CKD-EPI: 68 ML/MIN/{1.73_M2}
GLUCOSE BLD-MCNC: 261 MG/DL (ref 70–99)
GLUCOSE BLD-MCNC: 344 MG/DL (ref 70–99)
GLUCOSE BLD-MCNC: 365 MG/DL (ref 70–99)
GLUCOSE BLD-MCNC: 388 MG/DL (ref 70–99)
GLUCOSE SERPL-MCNC: 311 MG/DL (ref 70–99)
HCT VFR BLD AUTO: 34.8 % (ref 40.5–52.5)
HGB BLD-MCNC: 10.8 G/DL (ref 13.5–17.5)
LYMPHOCYTES # BLD: 1.2 K/UL (ref 1–5.1)
LYMPHOCYTES NFR BLD: 11.5 %
MCH RBC QN AUTO: 25 PG (ref 26–34)
MCHC RBC AUTO-ENTMCNC: 31.1 G/DL (ref 31–36)
MCV RBC AUTO: 80.3 FL (ref 80–100)
MONOCYTES # BLD: 0.8 K/UL (ref 0–1.3)
MONOCYTES NFR BLD: 7.4 %
NEUTROPHILS # BLD: 8.2 K/UL (ref 1.7–7.7)
NEUTROPHILS NFR BLD: 80.9 %
PERFORMED ON: ABNORMAL
PLATELET # BLD AUTO: 434 K/UL (ref 135–450)
PMV BLD AUTO: 6.4 FL (ref 5–10.5)
POTASSIUM SERPL-SCNC: 4.4 MMOL/L (ref 3.5–5.1)
RBC # BLD AUTO: 4.34 M/UL (ref 4.2–5.9)
SODIUM SERPL-SCNC: 138 MMOL/L (ref 136–145)
WBC # BLD AUTO: 10.2 K/UL (ref 4–11)

## 2024-07-30 PROCEDURE — 6370000000 HC RX 637 (ALT 250 FOR IP): Performed by: NURSE PRACTITIONER

## 2024-07-30 PROCEDURE — 1200000000 HC SEMI PRIVATE

## 2024-07-30 PROCEDURE — 2580000003 HC RX 258: Performed by: INTERNAL MEDICINE

## 2024-07-30 PROCEDURE — 94669 MECHANICAL CHEST WALL OSCILL: CPT

## 2024-07-30 PROCEDURE — 94760 N-INVAS EAR/PLS OXIMETRY 1: CPT

## 2024-07-30 PROCEDURE — 97530 THERAPEUTIC ACTIVITIES: CPT

## 2024-07-30 PROCEDURE — 6370000000 HC RX 637 (ALT 250 FOR IP): Performed by: INTERNAL MEDICINE

## 2024-07-30 PROCEDURE — 6360000002 HC RX W HCPCS: Performed by: HOSPITALIST

## 2024-07-30 PROCEDURE — 94640 AIRWAY INHALATION TREATMENT: CPT

## 2024-07-30 PROCEDURE — 2580000003 HC RX 258: Performed by: HOSPITALIST

## 2024-07-30 PROCEDURE — 36415 COLL VENOUS BLD VENIPUNCTURE: CPT

## 2024-07-30 PROCEDURE — 6370000000 HC RX 637 (ALT 250 FOR IP): Performed by: HOSPITALIST

## 2024-07-30 PROCEDURE — 80048 BASIC METABOLIC PNL TOTAL CA: CPT

## 2024-07-30 PROCEDURE — 97535 SELF CARE MNGMENT TRAINING: CPT

## 2024-07-30 PROCEDURE — 99233 SBSQ HOSP IP/OBS HIGH 50: CPT | Performed by: INTERNAL MEDICINE

## 2024-07-30 PROCEDURE — 85025 COMPLETE CBC W/AUTO DIFF WBC: CPT

## 2024-07-30 PROCEDURE — 6370000000 HC RX 637 (ALT 250 FOR IP): Performed by: FAMILY MEDICINE

## 2024-07-30 PROCEDURE — 2700000000 HC OXYGEN THERAPY PER DAY

## 2024-07-30 PROCEDURE — 97110 THERAPEUTIC EXERCISES: CPT

## 2024-07-30 RX ORDER — PREDNISONE 20 MG/1
40 TABLET ORAL DAILY
Status: DISCONTINUED | OUTPATIENT
Start: 2024-07-30 | End: 2024-07-31 | Stop reason: HOSPADM

## 2024-07-30 RX ORDER — AZITHROMYCIN 500 MG/1
500 TABLET, FILM COATED ORAL
Status: DISCONTINUED | OUTPATIENT
Start: 2024-07-31 | End: 2024-07-31 | Stop reason: HOSPADM

## 2024-07-30 RX ORDER — FUROSEMIDE 10 MG/ML
20 INJECTION INTRAMUSCULAR; INTRAVENOUS 2 TIMES DAILY
Status: DISCONTINUED | OUTPATIENT
Start: 2024-07-30 | End: 2024-07-31 | Stop reason: HOSPADM

## 2024-07-30 RX ORDER — INSULIN LISPRO 100 [IU]/ML
3 INJECTION, SOLUTION INTRAVENOUS; SUBCUTANEOUS ONCE
Status: COMPLETED | OUTPATIENT
Start: 2024-07-30 | End: 2024-07-30

## 2024-07-30 RX ADMIN — BUDESONIDE AND FORMOTEROL FUMARATE DIHYDRATE 2 PUFF: 160; 4.5 AEROSOL RESPIRATORY (INHALATION) at 20:05

## 2024-07-30 RX ADMIN — FUROSEMIDE 20 MG: 10 INJECTION, SOLUTION INTRAMUSCULAR; INTRAVENOUS at 17:08

## 2024-07-30 RX ADMIN — GABAPENTIN 800 MG: 400 CAPSULE ORAL at 13:47

## 2024-07-30 RX ADMIN — ALBUTEROL SULFATE 2.5 MG: 2.5 SOLUTION RESPIRATORY (INHALATION) at 08:08

## 2024-07-30 RX ADMIN — APIXABAN 5 MG: 5 TABLET, FILM COATED ORAL at 20:50

## 2024-07-30 RX ADMIN — AMOXICILLIN AND CLAVULANATE POTASSIUM 1 TABLET: 500; 125 TABLET, FILM COATED ORAL at 20:50

## 2024-07-30 RX ADMIN — FLUTICASONE PROPIONATE 2 SPRAY: 50 SPRAY, METERED NASAL at 08:29

## 2024-07-30 RX ADMIN — FUROSEMIDE 20 MG: 10 INJECTION, SOLUTION INTRAMUSCULAR; INTRAVENOUS at 08:37

## 2024-07-30 RX ADMIN — ALBUTEROL SULFATE 2.5 MG: 2.5 SOLUTION RESPIRATORY (INHALATION) at 11:28

## 2024-07-30 RX ADMIN — AMOXICILLIN AND CLAVULANATE POTASSIUM 1 TABLET: 500; 125 TABLET, FILM COATED ORAL at 06:02

## 2024-07-30 RX ADMIN — ASPIRIN 81 MG 81 MG: 81 TABLET ORAL at 08:28

## 2024-07-30 RX ADMIN — GABAPENTIN 800 MG: 400 CAPSULE ORAL at 20:50

## 2024-07-30 RX ADMIN — GABAPENTIN 800 MG: 400 CAPSULE ORAL at 08:27

## 2024-07-30 RX ADMIN — GABAPENTIN 800 MG: 400 CAPSULE ORAL at 17:09

## 2024-07-30 RX ADMIN — PRAMIPEXOLE DIHYDROCHLORIDE 1 MG: 0.5 TABLET ORAL at 17:08

## 2024-07-30 RX ADMIN — LAMOTRIGINE 150 MG: 100 TABLET ORAL at 20:49

## 2024-07-30 RX ADMIN — PREDNISONE 40 MG: 20 TABLET ORAL at 08:27

## 2024-07-30 RX ADMIN — SODIUM CHLORIDE SOLN NEBU 3% 4 ML: 3 NEBU SOLN at 08:18

## 2024-07-30 RX ADMIN — CARVEDILOL 3.12 MG: 3.12 TABLET, FILM COATED ORAL at 17:08

## 2024-07-30 RX ADMIN — SODIUM CHLORIDE, PRESERVATIVE FREE 10 ML: 5 INJECTION INTRAVENOUS at 08:38

## 2024-07-30 RX ADMIN — INSULIN LISPRO 6 UNITS: 100 INJECTION, SOLUTION INTRAVENOUS; SUBCUTANEOUS at 08:28

## 2024-07-30 RX ADMIN — METFORMIN HYDROCHLORIDE 1000 MG: 500 TABLET ORAL at 08:28

## 2024-07-30 RX ADMIN — PRAMIPEXOLE DIHYDROCHLORIDE 1 MG: 0.5 TABLET ORAL at 08:27

## 2024-07-30 RX ADMIN — ATORVASTATIN CALCIUM 20 MG: 20 TABLET, FILM COATED ORAL at 20:50

## 2024-07-30 RX ADMIN — LAMOTRIGINE 150 MG: 100 TABLET ORAL at 08:28

## 2024-07-30 RX ADMIN — METFORMIN HYDROCHLORIDE 1000 MG: 500 TABLET ORAL at 17:08

## 2024-07-30 RX ADMIN — TIOTROPIUM BROMIDE INHALATION SPRAY 2 PUFF: 3.12 SPRAY, METERED RESPIRATORY (INHALATION) at 08:28

## 2024-07-30 RX ADMIN — INSULIN LISPRO 4 UNITS: 100 INJECTION, SOLUTION INTRAVENOUS; SUBCUTANEOUS at 21:02

## 2024-07-30 RX ADMIN — INSULIN LISPRO 8 UNITS: 100 INJECTION, SOLUTION INTRAVENOUS; SUBCUTANEOUS at 17:08

## 2024-07-30 RX ADMIN — ROFLUMILAST 500 MCG: 500 TABLET ORAL at 10:29

## 2024-07-30 RX ADMIN — PRAMIPEXOLE DIHYDROCHLORIDE 1 MG: 0.5 TABLET ORAL at 21:20

## 2024-07-30 RX ADMIN — PRAMIPEXOLE DIHYDROCHLORIDE 1 MG: 0.5 TABLET ORAL at 13:46

## 2024-07-30 RX ADMIN — INSULIN LISPRO 3 UNITS: 100 INJECTION, SOLUTION INTRAVENOUS; SUBCUTANEOUS at 21:03

## 2024-07-30 RX ADMIN — APIXABAN 5 MG: 5 TABLET, FILM COATED ORAL at 08:27

## 2024-07-30 RX ADMIN — SODIUM CHLORIDE SOLN NEBU 3% 4 ML: 3 NEBU SOLN at 20:05

## 2024-07-30 RX ADMIN — PRAMIPEXOLE DIHYDROCHLORIDE 0.25 MG: 0.5 TABLET ORAL at 20:50

## 2024-07-30 RX ADMIN — SODIUM CHLORIDE, PRESERVATIVE FREE 10 ML: 5 INJECTION INTRAVENOUS at 21:18

## 2024-07-30 RX ADMIN — ALBUTEROL SULFATE 2.5 MG: 2.5 SOLUTION RESPIRATORY (INHALATION) at 16:36

## 2024-07-30 RX ADMIN — SODIUM CHLORIDE SOLN NEBU 3% 4 ML: 3 NEBU SOLN at 11:38

## 2024-07-30 RX ADMIN — Medication 1000 UNITS: at 08:28

## 2024-07-30 RX ADMIN — BUDESONIDE AND FORMOTEROL FUMARATE DIHYDRATE 2 PUFF: 160; 4.5 AEROSOL RESPIRATORY (INHALATION) at 08:30

## 2024-07-30 RX ADMIN — INSULIN LISPRO 4 UNITS: 100 INJECTION, SOLUTION INTRAVENOUS; SUBCUTANEOUS at 11:56

## 2024-07-30 RX ADMIN — SODIUM CHLORIDE SOLN NEBU 3% 4 ML: 3 NEBU SOLN at 16:46

## 2024-07-30 RX ADMIN — AMOXICILLIN AND CLAVULANATE POTASSIUM 1 TABLET: 500; 125 TABLET, FILM COATED ORAL at 13:47

## 2024-07-30 RX ADMIN — ALBUTEROL SULFATE 2.5 MG: 2.5 SOLUTION RESPIRATORY (INHALATION) at 20:05

## 2024-07-30 NOTE — PROGRESS NOTES
Msged NP [facility] for deterioration index (56). pt is asleep. No distress noted. Electronically signed by Tatiana Mariee RN on 7/29/2024 at 10:44 PM

## 2024-07-30 NOTE — PLAN OF CARE
Problem: Discharge Planning  Goal: Discharge to home or other facility with appropriate resources  7/29/2024 2239 by Tatiana Mariee, RN  Outcome: Progressing     Problem: Pain  Goal: Verbalizes/displays adequate comfort level or baseline comfort level  7/29/2024 2239 by Tatiana Mariee, RN  Outcome: Progressing     Problem: Skin/Tissue Integrity  Goal: Absence of new skin breakdown  Description: 1.  Monitor for areas of redness and/or skin breakdown  2.  Assess vascular access sites hourly  3.  Every 4-6 hours minimum:  Change oxygen saturation probe site  4.  Every 4-6 hours:  If on nasal continuous positive airway pressure, respiratory therapy assess nares and determine need for appliance change or resting period.  7/29/2024 2239 by Tatiana Mariee RN  Outcome: Progressing     Problem: Safety - Adult  Goal: Free from fall injury  Outcome: Progressing     Problem: ABCDS Injury Assessment  Goal: Absence of physical injury  Outcome: Progressing     Problem: Chronic Conditions and Co-morbidities  Goal: Patient's chronic conditions and co-morbidity symptoms are monitored and maintained or improved  Outcome: Progressing     Problem: Respiratory - Adult  Goal: Achieves optimal ventilation and oxygenation  Outcome: Progressing     Problem: Cardiovascular - Adult  Goal: Maintains optimal cardiac output and hemodynamic stability  Outcome: Progressing     Problem: Metabolic/Fluid and Electrolytes - Adult  Goal: Electrolytes maintained within normal limits  Outcome: Progressing     Problem: Metabolic/Fluid and Electrolytes - Adult  Goal: Glucose maintained within prescribed range  Outcome: Progressing

## 2024-07-30 NOTE — PROGRESS NOTES
Occupational Therapy  Facility/Department: [address] MED SURG  Occupational Therapy Daily Note  Name: Jorge Gauthier  : 1945  MRN: [id number]  Date of Service: 2024    Discharge Recommendations:  24 hour supervision or assist, Home with Home health OT   Jorge Gauthier scored a  on the -[facility] ADL Inpatient form. It is recommended that the patient have 2-3 sessions per week of Occupational Therapy at d/c to increase the patient's independence.  At this time, this patient demonstrates the endurance and safety to discharge home with HHOT and a follow up treatment frequency of 2-3x/wk.   Please see assessment section for further patient specific details.    If patient discharges prior to next session this note will serve as a discharge summary.  Please see below for the latest assessment towards goals.         Patient Diagnosis(es): The primary encounter diagnosis was Acute on chronic respiratory failure with hypoxia and hypercapnia (HCC). A diagnosis of Acute pulmonary edema (HCC) was also pertinent to this visit.  Past Medical History:  has a past medical history of Arthritis, CAD (coronary artery disease), Chronic back pain, Chronic combined systolic (congestive) and diastolic (congestive) heart failure (HCC), Chronic systolic CHF (congestive heart failure) (HCC), COPD (chronic obstructive pulmonary disease) (HCC), Dental disease, Diabetes mellitus (HCC), DMII (diabetes mellitus, type 2) (HCC), Essential hypertension, Hearing loss, Hyperlipidemia, Longstanding persistent atrial fibrillation (HCC), Non morbid obesity due to excess calories, Paroxysmal atrial fibrillation (HCC), Primary hypertension, and Tinnitus.  Past Surgical History:  has a past surgical history that includes fracture surgery (Left, ); back surgery (); Cardiac surgery (); Coronary artery bypass graft; back surgery; Cervical spine surgery; chest tube insertion (Right, 2023); IR CHEST TUBE INSERTION (3/14/2023); and

## 2024-07-30 NOTE — PLAN OF CARE
Problem: Discharge Planning  Goal: Discharge to home or other facility with appropriate resources  7/30/2024 1126 by Savannah Golden RN  Outcome: Progressing  7/30/2024 0218 by Tatiana Mariee RN  Outcome: Progressing  7/29/2024 2239 by Tatiana Mariee RN  Outcome: Progressing  Flowsheets (Taken 7/29/2024 2106)  Discharge to home or other facility with appropriate resources: Identify barriers to discharge with patient and caregiver     Problem: Pain  Goal: Verbalizes/displays adequate comfort level or baseline comfort level  7/30/2024 1126 by Savannah Golden RN  Outcome: Progressing  7/30/2024 0218 by Tatiana Mariee RN  Outcome: Progressing  7/29/2024 2239 by Tatiana Mariee RN  Outcome: Progressing     Problem: Skin/Tissue Integrity  Goal: Absence of new skin breakdown  Description: 1.  Monitor for areas of redness and/or skin breakdown  2.  Assess vascular access sites hourly  3.  Every 4-6 hours minimum:  Change oxygen saturation probe site  4.  Every 4-6 hours:  If on nasal continuous positive airway pressure, respiratory therapy assess nares and determine need for appliance change or resting period.  7/30/2024 1126 by Savannah Golden RN  Outcome: Progressing  7/30/2024 0218 by Tatiana Mariee RN  Outcome: Progressing  7/29/2024 2239 by Tatiana Mariee RN  Outcome: Progressing     Problem: Safety - Adult  Goal: Free from fall injury  7/30/2024 1126 by Savannah Golden RN  Outcome: Progressing  7/30/2024 0218 by Tatiana Mariee RN  Outcome: Progressing  7/29/2024 2239 by Tatiana Mariee RN  Outcome: Progressing     Problem: ABCDS Injury Assessment  Goal: Absence of physical injury  7/30/2024 1126 by Savannah Golden RN  Outcome: Progressing  7/30/2024 0218 by Tatiana Mariee RN  Outcome: Progressing  7/29/2024 2239 by Tatiana Mariee RN  Outcome: Progressing     Problem: Chronic Conditions and Co-morbidities  Goal: Patient's chronic conditions and co-morbidity

## 2024-07-30 NOTE — CARE COORDINATION
Chart review done, rounds completed. Pt has potential dc soon, pending clearances.   [facility]     Joseluis Whitehead LMSW, [facility] Social Work Case Management   Phone: [phone]  Fax: [phone]

## 2024-07-30 NOTE — PROGRESS NOTES
Physical Therapy  Facility/Department: [address] MED SURG  Physical Therapy Treatment Note    Name: Jorge Gauthier  : 1945  MRN: [id number]  Date of Service: 2024    Discharge Recommendations:  24 hour supervision or assist, Home with Home health PT, Continue to assess pending progress          Patient Diagnosis(es): The primary encounter diagnosis was Acute on chronic respiratory failure with hypoxia and hypercapnia (HCC). A diagnosis of Acute pulmonary edema (HCC) was also pertinent to this visit.  Past Medical History:  has a past medical history of Arthritis, CAD (coronary artery disease), Chronic back pain, Chronic combined systolic (congestive) and diastolic (congestive) heart failure (HCC), Chronic systolic CHF (congestive heart failure) (HCC), COPD (chronic obstructive pulmonary disease) (HCC), Dental disease, Diabetes mellitus (HCC), DMII (diabetes mellitus, type 2) (HCC), Essential hypertension, Hearing loss, Hyperlipidemia, Longstanding persistent atrial fibrillation (HCC), Non morbid obesity due to excess calories, Paroxysmal atrial fibrillation (HCC), Primary hypertension, and Tinnitus.  Past Surgical History:  has a past surgical history that includes fracture surgery (Left, ); back surgery (); Cardiac surgery (); Coronary artery bypass graft; back surgery; Cervical spine surgery; chest tube insertion (Right, 2023); IR CHEST TUBE INSERTION (3/14/2023); and Ankle fracture surgery (Right, 2024).    Assessment   Body Structures, Functions, Activity Limitations Requiring Skilled Therapeutic Intervention: Decreased functional mobility ;Decreased ADL status;Decreased strength;Decreased safe awareness;Decreased balance;Decreased endurance  Assessment: Pt is a 79 y.o. male who presented to the ED on 24 with SOB. Pt with multiple hospital admissions this year with similar issues. Recently approved by ortho to be WBAT without boot following ankle ORIF in May 2024. Prior to

## 2024-07-30 NOTE — PROGRESS NOTES
Pulmonary Progress Note    CC:  Follow up respiratory failure, pneumonia,     Subjective:  3 liters of oxygen  Asking for tips on how to cut down on aspirating  Still coughing up a lot of mucus   He is less SOB however     ROS  Productive coughing       Intake/Output Summary (Last 24 hours) at 7/30/2024 0757  Last data filed at 7/30/2024 0606  Gross per 24 hour   Intake 1550 ml   Output 1600 ml   Net -50 ml           PHYSICAL EXAM:  Blood pressure 120/81, pulse 94, temperature 98 °F (36.7 °C), resp. rate 16, height 1.803 m (5' 11\"), weight 94.5 kg (208 lb 5.4 oz), SpO2 97 %.'  Gen: No distress. Chronically ill   Eyes: PERRL. No sclera icterus. No conjunctival injection.   ENT: No discharge. Pharynx clear. External appearance of ears and nose normal.  Neck: Trachea midline. No obvious mass.    Resp: Rhonchi  CV: Regular rate. Regular rhythm. No murmur or rub.    GI: Non-tender. Non-distended. No hernia.   Skin: Warm, dry, normal texture and turgor. No nodule on exposed extremities.   Lymph: No cervical LAD. No supraclavicular LAD.   M/S: No cyanosis. No clubbing. No joint deformity.    Neuro: Moves all four extremities. CN 2-12 tested, no defect noted.  Ext:   + edema    Medications:    Scheduled Meds:   methylPREDNISolone  40 mg IntraVENous Q12H    amoxicillin-clavulanate  1 tablet Oral 3 times per day    furosemide  40 mg IntraVENous BID    metFORMIN  1,000 mg Oral BID WC    albuterol  2.5 mg Nebulization 4x Daily RT    apixaban  5 mg Oral BID    aspirin  81 mg Oral Daily    budesonide-formoterol  2 puff Inhalation BID RT    And    tiotropium  2 puff Inhalation Daily RT    carvedilol  3.125 mg Oral BID WC    fluticasone  2 spray Each Nostril BID    gabapentin  800 mg Oral 4x Daily    pramipexole  0.25 mg Oral Nightly    pramipexole  1 mg Oral 4x daily    Roflumilast  500 mcg Oral Daily    atorvastatin  20 mg Oral Nightly    Vitamin D  1,000 Units Oral Daily    sodium chloride flush  5-40 mL IntraVENous 2 times per  day    sodium chloride (Inhalant)  4 mL Nebulization Q4H WA RT    lamoTRIgine  150 mg Oral BID    insulin lispro  0-8 Units SubCUTAneous TID WC    insulin lispro  0-4 Units SubCUTAneous Nightly       Continuous Infusions:   dextrose      sodium chloride 5 mL/hr at 07/27/24 2100       PRN Meds:  glucose, dextrose bolus **OR** dextrose bolus, glucagon (rDNA), dextrose, methocarbamol, polyethylene glycol, sodium chloride flush, sodium chloride, potassium chloride **OR** potassium alternative oral replacement **OR** potassium chloride, magnesium sulfate, ondansetron **OR** ondansetron, polyethylene glycol, acetaminophen **OR** acetaminophen, loperamide    Labs:  CBC:   Recent Labs     07/28/24  0554   WBC 11.4*   HGB 9.8*   HCT 31.8*   MCV 80.8          BMP:   Recent Labs     07/28/24  0554      K 4.1   CL 96*   CO2 35*   BUN 24*   CREATININE 1.1       LIVER PROFILE:   No results for input(s): \"AST\", \"ALT\", \"LIPASE\", \"AMYLASE\", \"BILIDIR\", \"BILITOT\", \"ALKPHOS\" in the last 72 hours.    Invalid input(s): \"ALB\"    PT/INR: No results for input(s): \"PROTIME\", \"INR\" in the last 72 hours.  APTT: No results for input(s): \"APTT\" in the last 72 hours.  UA:  No results for input(s): \"NITRITE\", \"COLORU\", \"PHUR\", \"LABCAST\", \"WBCUA\", \"RBCUA\", \"MUCUS\", \"TRICHOMONAS\", \"YEAST\", \"BACTERIA\", \"CLARITYU\", \"SPECGRAV\", \"LEUKOCYTESUR\", \"UROBILINOGEN\", \"BILIRUBINUR\", \"BLOODU\", \"GLUCOSEU\", \"AMORPHOUS\" in the last 72 hours.    Invalid input(s): \"KETONESU\"    No results for input(s): \"PH\", \"PCO2\", \"PO2\" in the last 72 hours.        Films:  Chest imaging reports were reviewed and imaging was reviewed by me and showed no new films    ABG:  No new draws    Cultures:  NGTD    I reviewed the labs and images listed above    ASSESSMENT/PLAN:  Acute on Chronic Hypoxic/Hypercapnic Respiratory Failure  Titrate oxygen for saturations greater than or equal to 90%  PRN bilevel   Abnormal CXR with suggestion of pulmonary edema  Continue with Lasix.

## 2024-07-30 NOTE — DISCHARGE INSTR - COC
Continuity of Care Form    Patient Name: Jorge Gauthier   :  1945  MRN:  [id number]    Admit date:  2024  Discharge date:  24    Code Status Order: Limited   Advance Directives:     Admitting Physician:  Lisa Johnson Jr., MD  PCP: Gallo Sanchez MD    Discharging Nurse: Savannah Golden  Discharging Hospital Unit/Room#: D2C-1818/4270-01  Discharging Unit Phone Number: [phone]    Emergency Contact:   Extended Emergency Contact Information  Primary Emergency Contact: Portia Gauthier  Address: [address] [place] [address]  Home Phone: [phone]  Work Phone: [phone]  Mobile Phone: [phone]  Relation: Spouse  Secondary Emergency Contact: Bang Gauthier Jr  Home Phone: [phone]  Relation: Child    Past Surgical History:  Past Surgical History:   Procedure Laterality Date    ANKLE FRACTURE SURGERY Right 2024    OPEN REDUCTION INTERNAL FIXATION RIGHT ANKLE performed by José Miguel Chavez MD at [facility] OR    BACK SURGERY      BACK SURGERY      CARDIAC SURGERY      CERVICAL SPINE SURGERY      CHEST TUBE INSERTION Right 2023    14F. Dr. Jalloh    CORONARY ARTERY BYPASS GRAFT      FRACTURE SURGERY Left     Shoulder    IR CHEST TUBE INSERTION  3/14/2023    IR CHEST TUBE INSERTION 3/14/2023 [facility] SPECIAL PROCEDURES       Immunization History:   Immunization History   Administered Date(s) Administered    COVID-19, MODERNA Bivalent, (age 12y+), IM, 50 mcg/0.5 mL 2022    Influenza Vaccine, unspecified formulation 10/22/2016    Influenza, AFLURIA (age 3 yrs+), FLUZONE, (age 6 mo+), MDV, 0.5mL 10/22/2016, 09/15/2017    Influenza, High Dose (Fluzone 65 yrs and older) 09/15/2017    Pneumococcal, PCV-13, PREVNAR 13, (age 6w+), IM, 0.5mL 2016    TDaP, ADACEL (age 10y-64y), BOOSTRIX (age 10y+), IM, 0.5mL 2018       Active Problems:  Patient Active Problem List   Diagnosis Code    Diabetes mellitus (HCC) E11.9    Atherosclerosis  day.     Call [facility] [phone] and Gallo Sanchez [phone] and/or [facility] Heart Failure Resource Line @ [phone] with any questions or concerns.   Please continue heart failure education to patient and family/support system.  See After Visit Summary for hospital follow up appointment details.  Consider spiritual care referral for support and/or completion of advance directives [phone].  Consider: Home Care Vitals telehealth program if patient agreeable and able to participate and palliative care consult for ongoing goals of care, end of life, and/or chronic disease management discussions.  ***   Patient's personal belongings (please select all that are sent with patient):  {[facility] DME Belongings:[id number]}    RN SIGNATURE:  Electronically signed by Savannah Golden RN on 7/31/24 at 2:27 PM EDT    CASE MANAGEMENT/SOCIAL WORK SECTION    Inpatient Status Date: 7/27/24    Readmission Risk Assessment Score:  Readmission Risk              Risk of Unplanned Readmission:  57           Discharging to Facility/ Agency   Discharging to Facility/ Agency   Name:  [facility]    Address: [address] [place] [address], [address] [place] [address]  Phone: [phone]  Fax: [phone]      [facility]  [address] [place] [address]  Phone:  [phone]  Fax: [phone]     / signature: Electronically signed by RENETTA Beltran on 7/30/24 at 2:57 PM EDT    PHYSICIAN SECTION    Prognosis: Fair    Condition at Discharge: Stable    Rehab Potential (if transferring to Rehab): Fair    Recommended Labs or Other Treatments After Discharge: Routine hospital follow up labs     Physician Certification: I certify the above information and transfer of Jorge Gauthier  is necessary for the continuing treatment of the diagnosis listed and that he requires Home Care for less 30 days.     Update Admission H&P: No change in H&P    PHYSICIAN SIGNATURE:

## 2024-07-31 VITALS
BODY MASS INDEX: 29.17 KG/M2 | TEMPERATURE: 97.4 F | SYSTOLIC BLOOD PRESSURE: 139 MMHG | WEIGHT: 208.34 LBS | RESPIRATION RATE: 16 BRPM | OXYGEN SATURATION: 100 % | HEIGHT: 71 IN | DIASTOLIC BLOOD PRESSURE: 77 MMHG | HEART RATE: 76 BPM

## 2024-07-31 LAB
BACTERIA BLD CULT: NORMAL
GLUCOSE BLD-MCNC: 236 MG/DL (ref 70–99)
GLUCOSE BLD-MCNC: 280 MG/DL (ref 70–99)
PERFORMED ON: ABNORMAL
PERFORMED ON: ABNORMAL

## 2024-07-31 PROCEDURE — 94669 MECHANICAL CHEST WALL OSCILL: CPT

## 2024-07-31 PROCEDURE — 94640 AIRWAY INHALATION TREATMENT: CPT

## 2024-07-31 PROCEDURE — 97530 THERAPEUTIC ACTIVITIES: CPT

## 2024-07-31 PROCEDURE — 99233 SBSQ HOSP IP/OBS HIGH 50: CPT | Performed by: INTERNAL MEDICINE

## 2024-07-31 PROCEDURE — 97110 THERAPEUTIC EXERCISES: CPT

## 2024-07-31 PROCEDURE — 94760 N-INVAS EAR/PLS OXIMETRY 1: CPT

## 2024-07-31 PROCEDURE — 2580000003 HC RX 258: Performed by: INTERNAL MEDICINE

## 2024-07-31 PROCEDURE — 6370000000 HC RX 637 (ALT 250 FOR IP): Performed by: HOSPITALIST

## 2024-07-31 PROCEDURE — 6370000000 HC RX 637 (ALT 250 FOR IP): Performed by: FAMILY MEDICINE

## 2024-07-31 PROCEDURE — 2580000003 HC RX 258: Performed by: HOSPITALIST

## 2024-07-31 PROCEDURE — 2700000000 HC OXYGEN THERAPY PER DAY

## 2024-07-31 PROCEDURE — 6370000000 HC RX 637 (ALT 250 FOR IP): Performed by: INTERNAL MEDICINE

## 2024-07-31 PROCEDURE — 6360000002 HC RX W HCPCS: Performed by: HOSPITALIST

## 2024-07-31 RX ORDER — THEOPHYLLINE 400 MG/1
400 TABLET, EXTENDED RELEASE ORAL DAILY
Qty: 30 TABLET | Refills: 0 | Status: SHIPPED | OUTPATIENT
Start: 2024-08-01 | End: 2024-08-31

## 2024-07-31 RX ORDER — PREDNISONE 20 MG/1
40 TABLET ORAL DAILY
Qty: 6 TABLET | Refills: 0 | Status: SHIPPED | OUTPATIENT
Start: 2024-08-01 | End: 2024-08-04

## 2024-07-31 RX ORDER — THEOPHYLLINE 400 MG/1
400 TABLET, EXTENDED RELEASE ORAL DAILY
Status: DISCONTINUED | OUTPATIENT
Start: 2024-07-31 | End: 2024-07-31 | Stop reason: HOSPADM

## 2024-07-31 RX ORDER — AMOXICILLIN AND CLAVULANATE POTASSIUM 500; 125 MG/1; MG/1
1 TABLET, FILM COATED ORAL 3 TIMES DAILY
Qty: 9 TABLET | Refills: 0 | Status: SHIPPED | OUTPATIENT
Start: 2024-07-31 | End: 2024-08-03

## 2024-07-31 RX ORDER — AZITHROMYCIN 500 MG/1
500 TABLET, FILM COATED ORAL
Qty: 12 TABLET | Refills: 0 | Status: SHIPPED | OUTPATIENT
Start: 2024-08-02 | End: 2024-09-01

## 2024-07-31 RX ADMIN — BUDESONIDE AND FORMOTEROL FUMARATE DIHYDRATE 2 PUFF: 160; 4.5 AEROSOL RESPIRATORY (INHALATION) at 11:33

## 2024-07-31 RX ADMIN — INSULIN LISPRO 2 UNITS: 100 INJECTION, SOLUTION INTRAVENOUS; SUBCUTANEOUS at 08:19

## 2024-07-31 RX ADMIN — INSULIN LISPRO 4 UNITS: 100 INJECTION, SOLUTION INTRAVENOUS; SUBCUTANEOUS at 12:02

## 2024-07-31 RX ADMIN — CARVEDILOL 3.12 MG: 3.12 TABLET, FILM COATED ORAL at 15:16

## 2024-07-31 RX ADMIN — GABAPENTIN 800 MG: 400 CAPSULE ORAL at 12:04

## 2024-07-31 RX ADMIN — GABAPENTIN 800 MG: 400 CAPSULE ORAL at 15:15

## 2024-07-31 RX ADMIN — SODIUM CHLORIDE SOLN NEBU 3% 4 ML: 3 NEBU SOLN at 11:21

## 2024-07-31 RX ADMIN — PREDNISONE 40 MG: 20 TABLET ORAL at 08:16

## 2024-07-31 RX ADMIN — AZITHROMYCIN 500 MG: 500 TABLET, FILM COATED ORAL at 08:17

## 2024-07-31 RX ADMIN — AMOXICILLIN AND CLAVULANATE POTASSIUM 1 TABLET: 500; 125 TABLET, FILM COATED ORAL at 15:16

## 2024-07-31 RX ADMIN — METFORMIN HYDROCHLORIDE 1000 MG: 500 TABLET ORAL at 15:15

## 2024-07-31 RX ADMIN — METFORMIN HYDROCHLORIDE 1000 MG: 500 TABLET ORAL at 08:16

## 2024-07-31 RX ADMIN — AMOXICILLIN AND CLAVULANATE POTASSIUM 1 TABLET: 500; 125 TABLET, FILM COATED ORAL at 05:00

## 2024-07-31 RX ADMIN — FUROSEMIDE 20 MG: 10 INJECTION, SOLUTION INTRAMUSCULAR; INTRAVENOUS at 08:18

## 2024-07-31 RX ADMIN — PRAMIPEXOLE DIHYDROCHLORIDE 1 MG: 0.5 TABLET ORAL at 15:15

## 2024-07-31 RX ADMIN — THEOPHYLLINE 400 MG: 400 TABLET, EXTENDED RELEASE ORAL at 12:02

## 2024-07-31 RX ADMIN — TIOTROPIUM BROMIDE INHALATION SPRAY 2 PUFF: 3.12 SPRAY, METERED RESPIRATORY (INHALATION) at 11:31

## 2024-07-31 RX ADMIN — CARVEDILOL 3.12 MG: 3.12 TABLET, FILM COATED ORAL at 08:17

## 2024-07-31 RX ADMIN — SODIUM CHLORIDE, PRESERVATIVE FREE 10 ML: 5 INJECTION INTRAVENOUS at 08:20

## 2024-07-31 RX ADMIN — APIXABAN 5 MG: 5 TABLET, FILM COATED ORAL at 08:17

## 2024-07-31 RX ADMIN — PRAMIPEXOLE DIHYDROCHLORIDE 1 MG: 0.5 TABLET ORAL at 08:17

## 2024-07-31 RX ADMIN — FLUTICASONE PROPIONATE 2 SPRAY: 50 SPRAY, METERED NASAL at 08:19

## 2024-07-31 RX ADMIN — GABAPENTIN 800 MG: 400 CAPSULE ORAL at 08:17

## 2024-07-31 RX ADMIN — LAMOTRIGINE 150 MG: 100 TABLET ORAL at 08:18

## 2024-07-31 RX ADMIN — ROFLUMILAST 500 MCG: 500 TABLET ORAL at 08:16

## 2024-07-31 RX ADMIN — Medication 1000 UNITS: at 08:17

## 2024-07-31 RX ADMIN — PRAMIPEXOLE DIHYDROCHLORIDE 1 MG: 0.5 TABLET ORAL at 12:04

## 2024-07-31 RX ADMIN — ALBUTEROL SULFATE 2.5 MG: 2.5 SOLUTION RESPIRATORY (INHALATION) at 11:11

## 2024-07-31 RX ADMIN — ASPIRIN 81 MG 81 MG: 81 TABLET ORAL at 08:17

## 2024-07-31 NOTE — PLAN OF CARE
Problem: Discharge Planning  Goal: Discharge to home or other facility with appropriate resources  7/31/2024 0734 by Savannah Golden RN  Outcome: Progressing  7/31/2024 0141 by Nasreen Garza RN  Outcome: Progressing     Problem: Pain  Goal: Verbalizes/displays adequate comfort level or baseline comfort level  7/31/2024 0734 by Savannah Golden RN  Outcome: Progressing  7/31/2024 0141 by Nasreen Garza RN  Outcome: Progressing     Problem: Skin/Tissue Integrity  Goal: Absence of new skin breakdown  Description: 1.  Monitor for areas of redness and/or skin breakdown  2.  Assess vascular access sites hourly  3.  Every 4-6 hours minimum:  Change oxygen saturation probe site  4.  Every 4-6 hours:  If on nasal continuous positive airway pressure, respiratory therapy assess nares and determine need for appliance change or resting period.  7/31/2024 0734 by Savannah Golden RN  Outcome: Progressing  7/31/2024 0141 by Nasreen Garza RN  Outcome: Progressing     Problem: Safety - Adult  Goal: Free from fall injury  7/31/2024 0734 by Savannah Golden RN  Outcome: Progressing  7/31/2024 0141 by Nasreen Garza RN  Outcome: Progressing     Problem: ABCDS Injury Assessment  Goal: Absence of physical injury  Outcome: Progressing     Problem: Chronic Conditions and Co-morbidities  Goal: Patient's chronic conditions and co-morbidity symptoms are monitored and maintained or improved  7/31/2024 0734 by Savannah Golden RN  Outcome: Progressing  7/31/2024 0141 by Nasreen Garza RN  Outcome: Progressing     Problem: Respiratory - Adult  Goal: Achieves optimal ventilation and oxygenation  Outcome: Progressing  Flowsheets (Taken 7/30/2024 2030 by Nasreen Garza RN)  Achieves optimal ventilation and oxygenation:   Assess for changes in respiratory status   Assess for changes in mentation and behavior     Problem: Cardiovascular - Adult  Goal: Maintains optimal cardiac output and hemodynamic stability  Outcome: Progressing  Flowsheets (Taken

## 2024-07-31 NOTE — PROGRESS NOTES
Physical Therapy  Facility/Department: [address] MED SURG  Physical Therapy Daily Treatment Note     Name: Jorge Gauthier  : 1945  MRN: [id number]  Date of Service: 2024    Discharge Recommendations:  24 hour supervision or assist, Home with Home health PT (24 hr Assist)      Patient Diagnosis(es): The primary encounter diagnosis was Acute on chronic respiratory failure with hypoxia and hypercapnia (HCC). Diagnoses of Acute pulmonary edema (HCC) and Chronic pulmonary aspiration, sequela were also pertinent to this visit.  Past Medical History:  has a past medical history of Arthritis, CAD (coronary artery disease), Chronic back pain, Chronic combined systolic (congestive) and diastolic (congestive) heart failure (HCC), Chronic systolic CHF (congestive heart failure) (HCC), COPD (chronic obstructive pulmonary disease) (HCC), Dental disease, Diabetes mellitus (HCC), DMII (diabetes mellitus, type 2) (HCC), Essential hypertension, Hearing loss, Hyperlipidemia, Longstanding persistent atrial fibrillation (HCC), Non morbid obesity due to excess calories, Paroxysmal atrial fibrillation (HCC), Primary hypertension, and Tinnitus.  Past Surgical History:  has a past surgical history that includes fracture surgery (Left, ); back surgery (); Cardiac surgery (); Coronary artery bypass graft; back surgery; Cervical spine surgery; chest tube insertion (Right, 2023); IR CHEST TUBE INSERTION (3/14/2023); and Ankle fracture surgery (Right, 2024).    Assessment   Body Structures, Functions, Activity Limitations Requiring Skilled Therapeutic Intervention: Decreased functional mobility ;Decreased ADL status;Decreased strength;Decreased safe awareness;Decreased balance;Decreased endurance  Assessment: Pt is a 79 y.o. male who presented to the ED on 24 with SOB. Pt with multiple hospital admissions this year with similar issues. Recently approved by ortho to be WBAT without boot following ankle ORIF    Orientation  Overall Orientation Status: Within Functional Limits (reported \"I don't even know the day, I don't keep track of that\".)  Orientation Level: Oriented to person;Disoriented to time  Cognition  Cognition Comment: Decreased safety awareness     Objective   Bed mobility  Supine to Sit: Unable to assess  Sit to Supine: Unable to assess  Bed Mobility Comments: pt up in recliner at end of session.  Transfers  Sit to Stand: Moderate Assistance;Minimal Assistance (posterior lean/push noted.  Attempted sit<>stand x 4-5 trials and pt did improve slightly.  PT attemped to cue pt to attempt using momentum to obtain standing---but pt impuslive and not listening to cues/directions, just attempting on own.)  Stand to Sit: Minimal Assistance;Moderate Assistance  Ambulation  WB Status: nt--pt up in recliner at end of session.  Ambulation  Surface: Level tile  Device: Rolling Walker  Assistance: Minimal assistance  Gait Deviations: Slow Emely;Decreased step length;Decreased step height  Distance: x 5 ft only bed to recliner.  Very unsteady and eager to sit asap.  Comments: Performed sit<>stand x 4 trials with ongoing Mod A initially to Min A at best--pt with decreased insight into the fact that he needs assistance.  Again tried to use cues for momentum to obatin upright, however, pt not listening to recs/education attempted to provide pt.  More Ambulation?: No  Stairs/Curb  Stairs?: No     Balance  Sitting - Static: Good  Sitting - Dynamic: Good  Standing - Static: Fair  Standing - Dynamic: Poor  Exercise Treatment: Static stand at RW x 1 minute, 15 seconds max time.  A/AROM Exercises: Seated in chair: B 2 x 10 Nick RENO      AM-PAC - Mobility    AM-PAC Basic Mobility - Inpatient   How much help is needed turning from your back to your side while in a flat bed without using bedrails?: None  How much help is needed moving from lying on your back to sitting on the side of a flat bed without using bedrails?: A

## 2024-07-31 NOTE — PROGRESS NOTES
Pulmonary Progress Note    CC:  Follow up respiratory failure, pneumonia,     Subjective:  3 liters of oxygen  Negative 8 liters since admission   Still with coughing     ROS  Productive coughing continues       Intake/Output Summary (Last 24 hours) at 7/31/2024 0757  Last data filed at 7/30/2024 1446  Gross per 24 hour   Intake --   Output 900 ml   Net -900 ml           PHYSICAL EXAM:  Blood pressure 130/82, pulse 85, temperature 97.3 °F (36.3 °C), temperature source Oral, resp. rate 15, height 1.803 m (5' 11\"), weight 94.5 kg (208 lb 5.4 oz), SpO2 98 %.'  Gen: No distress. Chronically ill   Eyes: PERRL. No sclera icterus. No conjunctival injection.   ENT: No discharge. Pharynx clear. External appearance of ears and nose normal.  Neck: Trachea midline. No obvious mass.    Resp: Rhonchi  CV: Regular rate. Regular rhythm. No murmur or rub.    GI: Non-tender. Non-distended. No hernia.   Skin: Warm, dry, normal texture and turgor. No nodule on exposed extremities.   Lymph: No cervical LAD. No supraclavicular LAD.   M/S: No cyanosis. No clubbing. No joint deformity.    Neuro: Moves all four extremities. CN 2-12 tested, no defect noted.  Ext:   + edema    Medications:    Scheduled Meds:   predniSONE  40 mg Oral Daily    furosemide  20 mg IntraVENous BID    azithromycin  500 mg Oral Once per day on Mon Wed Fri    amoxicillin-clavulanate  1 tablet Oral 3 times per day    metFORMIN  1,000 mg Oral BID WC    albuterol  2.5 mg Nebulization 4x Daily RT    apixaban  5 mg Oral BID    aspirin  81 mg Oral Daily    budesonide-formoterol  2 puff Inhalation BID RT    And    tiotropium  2 puff Inhalation Daily RT    carvedilol  3.125 mg Oral BID WC    fluticasone  2 spray Each Nostril BID    gabapentin  800 mg Oral 4x Daily    pramipexole  0.25 mg Oral Nightly    pramipexole  1 mg Oral 4x daily    Roflumilast  500 mcg Oral Daily    atorvastatin  20 mg Oral Nightly    Vitamin D  1,000 Units Oral Daily    sodium chloride flush  5-40 mL

## 2024-07-31 NOTE — DISCHARGE SUMMARY
V2.0  Discharge Summary    Name:  Jorge Gauthier /Age/Sex: 1945 (79 y.o. male)   Admit Date: 2024  Discharge Date: 24    MRN & CSN:  [id number] & [id number] Encounter Date and Time 24 1:13 PM EDT    Attending:  Wero Angeles MD Discharging Provider: Wero Angeles MD     Discharge Diagnosis:     # Acute on chronic hypoxemic/hypercarbic respiratory failure  # Acute on chronic diastolic congestive heart failure  # Probable pneumonia  # Probable COPD exacerbation  # Noncompliance with medical care  # Recurrent aspiration  # Paroxysmal atrial fibrillation  # Coronary disease  # Diabetes mellitus    Consultants:  IP CONSULT TO CRITICAL CARE  IP CONSULT TO PALLIATIVE CARE  IP CONSULT TO HOME CARE NEEDS    Brief HPI:     From H and P ....\"79m with copd, chf, afib, recently here for chf/copd exacerbation, discharged . Patient denies chest pain, fevers, chills, n/v/d. He has had a cough productive of yellow sputum. Patient had worsening sob, prompting return to ER where he was noted to be hypercapnic, placed on bipap. Currently seen in the ICU on bipap, awake, alert, denying chest pain, worsening sob \"      Brief hospital course:     Patient is a pleasant 79-year gentleman with multiple chronic comorbidities, was recently discharged from this hospital after being treated for CHF as well as COPD exacerbation.  Comes in with very similar reasons, please refer to the admitting history and physical for details.  Neurology was consulted    Patient has chronic hypoxemic respiratory failure, chronically on 3 L.  Previous hospital stay, he was treated congestive heart failure.  He was treated with IV Lasix and thus far he is -8 L since admission, he is near euvolemic and at this time will be discharged on his home regimen of Lasix, he takes 40 mg of Lasix at home.  Patient was noncompliant with fluid restriction, because he was not being given the amount of fluids he wanted, he on 2 occasions during this

## 2024-07-31 NOTE — NURSE NAVIGATOR
Discharge order noted.pt has follow up appointments scheduled with his usual Cardiologist and Primary Care MD. These are on the AVS, and his bedside RN will review with him/ and his family.   Dr. Sanz (cardiology) 8/5/24 @ 1:15pm  Dr. Sanchez (PCP)                 8/16/24 @ 3pm  D/c wt 208lbs standing

## 2024-07-31 NOTE — CARE COORDINATION
CASE MANAGEMENT DISCHARGE SUMMARY:    DISCHARGE DATE: 7/31/24    DISCHARGED TO HOME     Discharging to Facility/ Agency   Name:  [facility]    Address: [address] [place] [address]  Phone: [phone]  Fax: [phone]      [facility]  [address] [place] [address]  Phone:  [phone]  Fax: [phone]     TRANSPORTATION: family transport      Joseluis Whitehead LMSW, [facility] Social Work Case Management   Phone: [phone]  Fax: [phone]   Electronically signed by RENETTA Beltran on 7/31/2024 at 1:50 PM

## 2024-07-31 NOTE — PROGRESS NOTES
Pt wife arrived with pt motorized scooter and portable oxygen. IV and tele removed. Discharge instructions given and explained. All questions answered., Pt discharged with all of his belongings.

## 2024-07-31 NOTE — PROGRESS NOTES
Occupational Therapy  Facility/Department: [address] MED SURG  Occupational Therapy Daily Treatment Note    Name: Jorge Gauthier  : 1945  MRN: [id number]  Date of Service: 2024    Discharge Recommendations:  24 hour supervision or assist, Home with Home health OT  OT Equipment Recommendations  Other: pt reported he has the needed DME       Patient Diagnosis(es): The primary encounter diagnosis was Acute on chronic respiratory failure with hypoxia and hypercapnia (HCC). Diagnoses of Acute pulmonary edema (HCC) and Chronic pulmonary aspiration, sequela were also pertinent to this visit.  Past Medical History:  has a past medical history of Arthritis, CAD (coronary artery disease), Chronic back pain, Chronic combined systolic (congestive) and diastolic (congestive) heart failure (HCC), Chronic systolic CHF (congestive heart failure) (HCC), COPD (chronic obstructive pulmonary disease) (HCC), Dental disease, Diabetes mellitus (HCC), DMII (diabetes mellitus, type 2) (HCC), Essential hypertension, Hearing loss, Hyperlipidemia, Longstanding persistent atrial fibrillation (HCC), Non morbid obesity due to excess calories, Paroxysmal atrial fibrillation (HCC), Primary hypertension, and Tinnitus.  Past Surgical History:  has a past surgical history that includes fracture surgery (Left, ); back surgery (); Cardiac surgery (); Coronary artery bypass graft; back surgery; Cervical spine surgery; chest tube insertion (Right, 2023); IR CHEST TUBE INSERTION (3/14/2023); and Ankle fracture surgery (Right, 2024).    Treatment Diagnosis: decreased fxl transfers/mob and ADL status      Assessment   Performance deficits / Impairments: Decreased functional mobility ;Decreased ADL status;Decreased strength;Decreased endurance;Decreased coordination  Assessment: Pt is a 79 y.o. M admitted for acute on chronic respoiratory failure w/ hypoxia. Pt w/ multiple recent hospitalizations. PTA, he reported he has been

## 2024-08-07 ENCOUNTER — OFFICE VISIT (OUTPATIENT)
Dept: PULMONOLOGY | Age: 79
End: 2024-08-07
Payer: MEDICARE

## 2024-08-07 ENCOUNTER — TELEPHONE (OUTPATIENT)
Dept: PULMONOLOGY | Age: 79
End: 2024-08-07

## 2024-08-07 VITALS
OXYGEN SATURATION: 93 % | HEIGHT: 71 IN | SYSTOLIC BLOOD PRESSURE: 114 MMHG | TEMPERATURE: 97.6 F | RESPIRATION RATE: 18 BRPM | BODY MASS INDEX: 29.54 KG/M2 | HEART RATE: 94 BPM | DIASTOLIC BLOOD PRESSURE: 62 MMHG | WEIGHT: 211 LBS

## 2024-08-07 DIAGNOSIS — J44.9 MODERATE COPD (CHRONIC OBSTRUCTIVE PULMONARY DISEASE) (HCC): ICD-10-CM

## 2024-08-07 DIAGNOSIS — T17.908S CHRONIC PULMONARY ASPIRATION, SEQUELA: Primary | ICD-10-CM

## 2024-08-07 PROCEDURE — 1123F ACP DISCUSS/DSCN MKR DOCD: CPT | Performed by: INTERNAL MEDICINE

## 2024-08-07 PROCEDURE — 3074F SYST BP LT 130 MM HG: CPT | Performed by: INTERNAL MEDICINE

## 2024-08-07 PROCEDURE — G8417 CALC BMI ABV UP PARAM F/U: HCPCS | Performed by: INTERNAL MEDICINE

## 2024-08-07 PROCEDURE — 3023F SPIROM DOC REV: CPT | Performed by: INTERNAL MEDICINE

## 2024-08-07 PROCEDURE — 3078F DIAST BP <80 MM HG: CPT | Performed by: INTERNAL MEDICINE

## 2024-08-07 PROCEDURE — 1036F TOBACCO NON-USER: CPT | Performed by: INTERNAL MEDICINE

## 2024-08-07 PROCEDURE — 1111F DSCHRG MED/CURRENT MED MERGE: CPT | Performed by: INTERNAL MEDICINE

## 2024-08-07 PROCEDURE — G8427 DOCREV CUR MEDS BY ELIG CLIN: HCPCS | Performed by: INTERNAL MEDICINE

## 2024-08-07 PROCEDURE — 99215 OFFICE O/P EST HI 40 MIN: CPT | Performed by: INTERNAL MEDICINE

## 2024-08-07 NOTE — PROGRESS NOTES
REASON FOR CONSULTATION/CC:    Chief Complaint   Patient presents with    Follow-up        Consult at request of   Gallo Sanchez MD for      PCP: Gallo Sanchez MD    HISTORY OF PRESENT ILLNESS: Jorge Gauthier is a 79 y.o. year old male with a history of chronic bronchitis  who presents :       History   History of aspiration pneumonia with complications of a pleural effusion with multidrug-resistant including ESBL with last modified barium swallow test in March 2023 with laryngeal penetration with thickened liquids.    Use Daliresp without benefit.  No clear benefit with Advair and Wixela.        Current history  Obesity       chronic bronchitis   Daliresp - using but no clear this is helping. Will complete current rx then stop  Advair 250 vs Wixela. No benefit per pt.      Symbicort          Aspiration with hx of pneumonia.  IS / flutters with saline nebulizer  Hot coffee helps with phlegm as well   Continues to have significant   Continues to have productive phlegm multiple times per night.   Vest is ~ $15,000 vs ~ $200 per month.   Mucinex.          Current history  Wife and the to the family members along with the patient at the meeting today.  Ultimately moved concerned with recurrent admissions which is escalated from 2021, 2022, 2023 and now 2024.  With each admission, the patient will make some recovery than takes multiple steps back.  He is currently too weak to participate in physical therapy.  After discharge, he was able to participate with physical therapy and Occupational Therapy walking through the house.  Over the last 48 hours or so he has had no energy to participate in this.  The patient is also increasingly confused with worsening dementia and decreasing energy to participate in inpatient and outpatient physical therapy.           Objective:   PHYSICAL EXAM:  Blood pressure 114/62, pulse 94, temperature 97.6 °F (36.4 °C), resp. rate 18, height 1.803 m (5' 11\"), weight 95.7 kg (211 lb),

## 2024-08-07 NOTE — TELEPHONE ENCOUNTER
[facility] called to give an update-   They did complete the referral and a nurse is going out to assess the patient tomorrow.

## 2024-08-07 NOTE — ASSESSMENT & PLAN NOTE
Wife and 2 other family members.  Present with the patient here today.    Over 40-minute discussion today reviewing increase in hospitalization over the last calendar year with unsustained improvement.  This trajectory was reviewed comparing to 2023 2022 2021.  The family was already aware of this and concerned about his trajectory.  They also recognized his decline.  He is currently too weak to walk and more over expresses active desire not to walk, not to rehab, not to be readmitted, not to know the date or time not to go to hospice.    In short, the family recognizes that hospice is likely the best choice.  The wife was concerned about making this choice but family members present were very supportive of this and very supportive of her.    Patient was referred to [facility] of [facility] per her choice by wife and family member.

## 2024-08-08 ENCOUNTER — TELEPHONE (OUTPATIENT)
Dept: PULMONOLOGY | Age: 79
End: 2024-08-08

## 2024-08-08 NOTE — TELEPHONE ENCOUNTER
Hospice calling asking if Dr. Dang will be following patient through Hospice? Please call Tash at [phone].  Please let them know

## 2024-12-09 NOTE — PROGRESS NOTES
Continue lisinopril hydrochlorothiazide 10-12.5mg  Orders:    CBC and Auto Differential    lisinopriL-hydrochlorothiazide 10-12.5 mg tablet; Take 1 tablet by mouth once daily.     NAME:  Jorge Gauthier  YOB: 1945  MEDICAL RECORD NUMBER:  [id number]    Shift Summary: Speech and PT/OT consult. Pt weaned down to 2L NC. Off precedex, VSS. Transferred to PCU room 5255. Bedside handoff complete. Pt's wife at bedside.     Family updated: Yes:  wife at bedside    Rhythm: Normal Sinus Rhythm     Most recent vitals:   Visit Vitals  /65   Pulse 97   Temp 97.4 °F (36.3 °C) (Oral)   Resp 19   Ht 1.803 m (5' 11\")   Wt 102.4 kg (225 lb 12 oz)   SpO2 98%   BMI 31.49 kg/m²           No data found.    No data found.      Respiratory support needed (if any):  - O2 - NC - 2 lpm    Admission weight Weight - Scale: 101.7 kg (224 lb 3.3 oz) (06/10/24 1647)    Today's weight    Wt Readings from Last 1 Encounters:   06/11/24 102.4 kg (225 lb 12 oz)        Leon need assessed each shift: N/A - no leon present  UOP >30ml/hr: YES  Last documented BM (in last 48 hrs):  Patient Vitals for the past 48 hrs:   Last BM (including prior to admit)   06/10/24 2254 06/09/24 06/11/24 0830 06/09/24                Restraints (in use currently or dc'd in last 12 hrs): No    Order current and documentation up to date? No    Lines/Drains reviewed @ bedside.  Peripheral IV 06/10/24 Left Antecubital (Active)   Number of days: 1       Peripheral IV 06/10/24 Left Forearm (Active)   Number of days: 0         Drip rates at handoff:    sodium chloride      dextrose         Lab Data:   CBC:   Recent Labs     06/10/24  1658 06/11/24  0359   WBC 9.4 7.7   HGB 11.1* 10.5*   HCT 35.7* 33.3*   MCV 82.4 81.6    321     BMP:    Recent Labs     06/10/24  1658 06/11/24  0359    140   K 4.5 5.1   CO2 28 26   BUN 25* 30*   CREATININE 1.1 1.1     LIVR:   Recent Labs     06/10/24  1658   AST 13*   ALT 7*     PT/INR: No results for input(s): \"PROT\", \"INR\" in the last 72 hours.  APTT: No results for input(s): \"APTT\" in the last 72 hours.  ABG: No results for input(s): \"PHART\", \"EKA8KLA\", \"PO2ART\" in the last 72

## 2025-01-27 NOTE — TELEPHONE ENCOUNTER
Left message with Evelyne Lockhart regarding the need to call [employer] to apply for a new form to complete for Dr. Ricky Mcguire to take over his [facility] Rx. He stated he would have his wife call us back. They will need to calll [phone] to re-apply. NEGATIVE PPD or [employer]: 7/2024
MEDS PROVIDED BY [facility]

## 2025-02-05 NOTE — PROGRESS NOTES
"Patient Education     Type 2 diabetes   The Basics   Written by the doctors and editors at [facility]   What is type 2 diabetes? -- This is a disorder that disrupts the way the body uses sugar. It is sometimes called type 2 diabetes mellitus.  All of the cells in the body need sugar to work normally. Sugar gets into the cells with the help of a hormone called insulin. Insulin is made by the pancreas, an organ in the belly. If there is not enough insulin, or if cells in the body don't respond normally to insulin, sugar builds up in the blood. That is what happens to people with diabetes.  There are 2 different types of diabetes:   In type 1 diabetes, the pancreas makes little or no insulin.   In type 2 diabetes, the pancreas still makes some insulin, but the cells in the body stop responding normally. Eventually, the pancreas cannot make enough insulin to keep up.  Having excess body weight or obesity increases a person's risk of developing type 2 diabetes. But people without excess body weight can get diabetes, too.  What are the symptoms of type 2 diabetes? -- Type 2 diabetes usually causes no symptoms. When symptoms do happen, they include:   Needing to urinate often   Intense thirst   Blurry vision  Can diabetes lead to other health problems? -- Yes. Type 2 diabetes might not make you feel sick. But if it is not managed, it can lead to serious problems over time, such as:   Heart attacks   Strokes   Kidney disease   Vision problems (or even blindness)   Pain or loss of feeling in the hands and feet   Needing to have fingers, toes, or other body parts removed (amputated)  How do I know if I have type 2 diabetes? -- Your doctor or nurse can do a blood test. There are 2 tests that can be used for this. Both involve measuring the amount of sugar in your blood, called your \"blood sugar\" or \"blood glucose\":   One of the tests measures your blood sugar at the time the blood sample is taken. This test is done in the " Occupational Therapy  [facility]    OT order noted. Patient unable to be seen by OT due to leaving room with transport. Will continue to follow.      Electronically signed by Raffi Cedillo OT on 3/13/23 at 3:02 PM EDT "morning. You can't eat or drink anything except water for at least 8 hours before the test.   The other test shows what your average blood sugar has been for the past 2 to 3 months. This blood test is called \"hemoglobin A1C\" or just \"A1C.\" It can be checked at any time of the day, even if you have recently eaten.  How is type 2 diabetes treated? -- The goals of treatment are to manage your blood sugar and lower the risk of future problems that can happen in people with diabetes.  Treatment might include:   Lifestyle changes - This is an important part of managing diabetes. It includes eating healthy foods and getting plenty of physical activity.   Medicines - There are a few medicines that help lower blood sugar. Some people need to take pills that help the body make more insulin or that help insulin do its job. Others need insulin shots.  Depending on what medicines you take, you might need to check your blood sugar regularly at home. But not everyone with type 2 diabetes needs to do this. Your doctor or nurse will tell you if you should be checking your blood sugar, and when and how to do this.  Sometimes, people with type 2 diabetes also need medicines to help prevent problems caused by the disease. For instance, medicines used to lower blood pressure can reduce the chances of a heart attack or stroke.   General medical care - It's also important to take care of other areas of your health. This includes watching your blood pressure and cholesterol levels. You should also get certain vaccines, such as vaccines to protect against the flu and coronavirus disease 2019 (\"COVID-19\"). Some people also need a vaccine to prevent pneumonia.  Can type 2 diabetes be prevented? -- Yes. To lower your chances of getting type 2 diabetes, the most important thing you can do is eat a healthy diet and get plenty of physical activity. This can help you lose weight if you are overweight. But eating well and being active are also good " for your overall health. Even gentle activity, like walking, has benefits.  If you smoke, quitting can also lower your risk of type 2 diabetes. Quitting smoking can be difficult, but your doctor or nurse can help.  All topics are updated as new evidence becomes available and our peer review process is complete.  This topic retrieved from [email or web address] on: Apr 24, 2024.  Topic 56268 Version 23.0  Release: 32.3.2 - C32.113  © 2024 [employer] and/or its affiliates. All rights reserved.  Consumer Information Use and Disclaimer   Disclaimer: This generalized information is a limited summary of diagnosis, treatment, and/or medication information. It is not meant to be comprehensive and should be used as a tool to help the user understand and/or assess potential diagnostic and treatment options. It does NOT include all information about conditions, treatments, medications, side effects, or risks that may apply to a specific patient. It is not intended to be medical advice or a substitute for the medical advice, diagnosis, or treatment of a health care provider based on the health care provider's examination and assessment of a patient's specific and unique circumstances. Patients must speak with a health care provider for complete information about their health, medical questions, and treatment options, including any risks or benefits regarding use of medications. This information does not endorse any treatments or medications as safe, effective, or approved for treating a specific patient. [employer] and its affiliates disclaim any warranty or liability relating to this information or the use thereof.The use of this information is governed by the Terms of Use, available at https://www.wolterskluwer.com/en/know/clinical-effectiveness-terms. 2024© [employer], [employer] and its affiliates and/or licensors. All rights reserved.  Copyright   © 2024 [employer] and/or its affiliates. All rights reserved.

## 2025-06-23 NOTE — PROGRESS NOTES
Dr SMALL notified of O2 staying around 89-92%. Pt stated he does wear oxygen at the nursing home at times when needed. Ok to go to phase 2. Pt will be transported to nursing home.   
Patient admitted to PACU # 4 from OR at 1148 post OPEN REDUCTION INTERNAL FIXATION RIGHT ANKLE - Right  per Dr Chavez . Attached to PACU monitoring system and report received from anesthesia provider.  Patient was reported to be hemodynamically stable during procedure.  Patient drowsy on admission and denied pain.  
Respiratory at bedside for treatment. Will monitor.   
23-Jun-2025 15:44

## (undated) DEVICE — SUTURE MONOCRYL + SZ 4-0 L18IN ABSRB UD L19MM PS-2 3/8 CIR MCP496G

## (undated) DEVICE — MASTISOL ADHESIVE LIQ 2/3ML

## (undated) DEVICE — GOWN SIRUS NONREIN XL W/TWL: Brand: MEDLINE INDUSTRIES, INC.

## (undated) DEVICE — BIT DRL L100MM DIA2.5MM FOR SM FRAG UNIV LOK SYS

## (undated) DEVICE — C-ARM: Brand: UNBRANDED

## (undated) DEVICE — STRIP,CLOSURE,WOUND,MEDI-STRIP,1/2X4: Brand: MEDLINE

## (undated) DEVICE — GLOVE SURG SZ 65 THK91MIL LTX FREE SYN POLYISOPRENE

## (undated) DEVICE — GLOVE SURG 9 PF CRM STRL SENSICARE PI MIC LF

## (undated) DEVICE — MERCY HEALTH WEST TURNOVER: Brand: MEDLINE INDUSTRIES, INC.

## (undated) DEVICE — SHEET,DRAPE,53X77,STERILE: Brand: MEDLINE

## (undated) DEVICE — BANDAGE COMPR W6INXL12FT SMOOTH FOR LIMB EXSANG ESMARCH

## (undated) DEVICE — DRESSING,GAUZE,XEROFORM,CURAD,1"X8",ST: Brand: CURAD

## (undated) DEVICE — ZIMMER® STERILE DISPOSABLE TOURNIQUET CUFF WITH PLC, DUAL PORT, SINGLE BLADDER, 42 IN. (107 CM)

## (undated) DEVICE — LOWER EXTREMITY: Brand: MEDLINE INDUSTRIES, INC.

## (undated) DEVICE — BANDAGE COMPR W4INXL10YD WHITE/BEIGE E MTRX HK LOOP CLSR

## (undated) DEVICE — BIT DRL DIA2MM STD QUIK CONN FOR PERIARTC LOK PLT SYS

## (undated) DEVICE — GLOVE SURG SZ 65 L12IN FNGR THK79MIL GRN LTX FREE

## (undated) DEVICE — GLOVE SURG SZ 8 CRM LTX FREE POLYISOPRENE POLYMER BEAD ANTI

## (undated) DEVICE — SUTURE VICRYL + SZ 3-0 L18IN ABSRB UD SH 1/2 CIR TAPERCUT NDL VCP864D

## (undated) DEVICE — ELECTRODE PT RET AD L9FT HI MOIST COND ADH HYDRGEL CORDED

## (undated) DEVICE — HYPODERMIC SAFETY NEEDLE: Brand: MONOJECT

## (undated) DEVICE — TOWEL,OR,DSP,ST,BLUE,STD,4/PK,20PK/CS: Brand: MEDLINE

## (undated) DEVICE — PADDING CAST W6INXL4YD COT LO LINTING WYTEX

## (undated) DEVICE — SUTURE VICRYL + SZ 2-0 L18IN ABSRB UD CT1 L36MM 1/2 CIR VCP839D